# Patient Record
Sex: FEMALE | Race: WHITE | NOT HISPANIC OR LATINO | Employment: OTHER | URBAN - METROPOLITAN AREA
[De-identification: names, ages, dates, MRNs, and addresses within clinical notes are randomized per-mention and may not be internally consistent; named-entity substitution may affect disease eponyms.]

---

## 2017-01-19 ENCOUNTER — HOSPITAL ENCOUNTER (OUTPATIENT)
Dept: ULTRASOUND IMAGING | Facility: CLINIC | Age: 82
Discharge: HOME | End: 2017-01-19
Attending: SURGERY | Admitting: SURGERY
Payer: MEDICARE

## 2017-01-19 ENCOUNTER — OFFICE VISIT (OUTPATIENT)
Dept: OTHER | Facility: CLINIC | Age: 82
End: 2017-01-19
Attending: SURGERY
Payer: MEDICARE

## 2017-01-19 VITALS — HEART RATE: 61 BPM | SYSTOLIC BLOOD PRESSURE: 145 MMHG | DIASTOLIC BLOOD PRESSURE: 62 MMHG

## 2017-01-19 DIAGNOSIS — I65.22 CAROTID STENOSIS, LEFT: Primary | ICD-10-CM

## 2017-01-19 DIAGNOSIS — I65.29 CAROTID STENOSIS: ICD-10-CM

## 2017-01-19 DIAGNOSIS — I65.29 CAROTID STENOSIS: Primary | ICD-10-CM

## 2017-01-19 DIAGNOSIS — E78.5 HYPERLIPIDEMIA LDL GOAL <70: ICD-10-CM

## 2017-01-19 PROCEDURE — 99211 OFF/OP EST MAY X REQ PHY/QHP: CPT

## 2017-01-19 PROCEDURE — 93880 EXTRACRANIAL BILAT STUDY: CPT

## 2017-01-19 PROCEDURE — 99214 OFFICE O/P EST MOD 30 MIN: CPT | Mod: ZP | Performed by: SURGERY

## 2017-01-19 NOTE — NURSING NOTE
"Chief Complaint   Patient presents with     RECHECK     Bilateral carotid (1:15 VHC; 2:00 WRO) 1 year follow up, History of bilateral carotid artery stenosis       Initial /62 mmHg  Pulse 61  Breastfeeding? No Estimated body mass index is 35.88 kg/(m^2) as calculated from the following:    Height as of 6/20/16: 5' 1.5\" (1.562 m).    Weight as of 6/20/16: 193 lb (87.544 kg).  BP completed using cuff size: large left arm    Face to face nursing time: 8 minutes    Paula Portillo MA     "

## 2017-01-19 NOTE — PROGRESS NOTES
Ghislaine Walls comes to see me today for her yearly follow-up of her asymptomatic moderate left carotid stenosis.  She suffered a left hemispheric stroke on 4/25/2001 felt not to be related to her carotid disease with a very good recovery.    PMH: Medications: Norvasc, Synthroid, lisinopril, Lopressor, omega-3 fatty acids            Medical:  Hypertension under good control with medications                          Mixed hyperlipidemia.  She's had an elevated LDL in the past.  6/20/2016 LDL= 125                          Hypothyroidism                          Bilateral calf and ankle edema for many years  This is asymptomatic and she does not desire compression.            Nonsmoker.  Lives independently.    Exam: Alert and appropriate.  Blood pressure 145/62.  Pulse 61.  Normal affect.  Glasses.  BMI= 35.9 kg/m2              No obvious carotid bruits.  Chest= clear   Cardiovascular=RR              Bilateral lower extremity edema.  Skin is soft and supple with no thickening or significant varicosities.  Normal sensation.              +3  Dorsalis pedis pulses bilaterally.      Carotid duplex  today reveals minimal disease within the right carotid bifurcation.  On the left is a stable stenosis at the carotid bulb with a PSV= 184 cm/s   And proximal ICA with a PSV= 157 cm/s.                  This is essentially unchanged from last year when the velocities were 205 and 137 cm/s respectively.      Impression:  #1   Stable moderate asymptomatic left carotid bifurcation disease with minimal disease in the right.  She knows the symptoms associated with a stroke and will seek prompt attention if these                                   occur.   Plan follow-up duplex in one year.                              #2   Persistent elevated LDL.  With her carotid disease consideration of a statin may be appropriate per primary care.                      .  #3    Chronic dependent edema of little clinical concern  .                        #4    No evidence of PAD.       Niko Parker MD       Please route or send letter to:  Primary Care Provider (PCP)

## 2017-06-28 ENCOUNTER — OFFICE VISIT (OUTPATIENT)
Dept: FAMILY MEDICINE | Facility: CLINIC | Age: 82
End: 2017-06-28
Payer: COMMERCIAL

## 2017-06-28 VITALS
HEART RATE: 88 BPM | DIASTOLIC BLOOD PRESSURE: 66 MMHG | BODY MASS INDEX: 35.51 KG/M2 | HEIGHT: 62 IN | SYSTOLIC BLOOD PRESSURE: 126 MMHG | RESPIRATION RATE: 16 BRPM | TEMPERATURE: 98.6 F | WEIGHT: 193 LBS

## 2017-06-28 DIAGNOSIS — I10 HYPERTENSION GOAL BP (BLOOD PRESSURE) < 140/90: Chronic | ICD-10-CM

## 2017-06-28 DIAGNOSIS — H90.0 CONDUCTIVE HEARING LOSS, BILATERAL: Chronic | ICD-10-CM

## 2017-06-28 DIAGNOSIS — Z86.73 HISTORY OF CVA (CEREBROVASCULAR ACCIDENT): ICD-10-CM

## 2017-06-28 DIAGNOSIS — I10 ESSENTIAL HYPERTENSION WITH GOAL BLOOD PRESSURE LESS THAN 140/90: Chronic | ICD-10-CM

## 2017-06-28 DIAGNOSIS — N28.9 RENAL INSUFFICIENCY, MILD: Chronic | ICD-10-CM

## 2017-06-28 DIAGNOSIS — Z00.00 ROUTINE MEDICAL EXAM: Primary | ICD-10-CM

## 2017-06-28 DIAGNOSIS — E78.5 HYPERLIPIDEMIA LDL GOAL <130: Chronic | ICD-10-CM

## 2017-06-28 DIAGNOSIS — E03.9 ACQUIRED HYPOTHYROIDISM: Chronic | ICD-10-CM

## 2017-06-28 LAB
ALBUMIN UR-MCNC: 30 MG/DL
APPEARANCE UR: CLEAR
BASOPHILS # BLD AUTO: 0 10E9/L (ref 0–0.2)
BASOPHILS NFR BLD AUTO: 0.7 %
BILIRUB UR QL STRIP: ABNORMAL
COLOR UR AUTO: YELLOW
DIFFERENTIAL METHOD BLD: NORMAL
EOSINOPHIL # BLD AUTO: 0.1 10E9/L (ref 0–0.7)
EOSINOPHIL NFR BLD AUTO: 1.9 %
ERYTHROCYTE [DISTWIDTH] IN BLOOD BY AUTOMATED COUNT: 12.9 % (ref 10–15)
GLUCOSE UR STRIP-MCNC: NEGATIVE MG/DL
HCT VFR BLD AUTO: 36 % (ref 35–47)
HGB BLD-MCNC: 11.9 G/DL (ref 11.7–15.7)
HGB UR QL STRIP: NEGATIVE
KETONES UR STRIP-MCNC: NEGATIVE MG/DL
LEUKOCYTE ESTERASE UR QL STRIP: ABNORMAL
LYMPHOCYTES # BLD AUTO: 2 10E9/L (ref 0.8–5.3)
LYMPHOCYTES NFR BLD AUTO: 34.8 %
MCH RBC QN AUTO: 31.2 PG (ref 26.5–33)
MCHC RBC AUTO-ENTMCNC: 33.1 G/DL (ref 31.5–36.5)
MCV RBC AUTO: 95 FL (ref 78–100)
MONOCYTES # BLD AUTO: 0.6 10E9/L (ref 0–1.3)
MONOCYTES NFR BLD AUTO: 10.6 %
NEUTROPHILS # BLD AUTO: 2.9 10E9/L (ref 1.6–8.3)
NEUTROPHILS NFR BLD AUTO: 52 %
NITRATE UR QL: NEGATIVE
NON-SQ EPI CELLS #/AREA URNS LPF: ABNORMAL /LPF
PH UR STRIP: 5.5 PH (ref 5–7)
PLATELET # BLD AUTO: 200 10E9/L (ref 150–450)
RBC # BLD AUTO: 3.81 10E12/L (ref 3.8–5.2)
RBC #/AREA URNS AUTO: ABNORMAL /HPF (ref 0–2)
SP GR UR STRIP: 1.02 (ref 1–1.03)
URN SPEC COLLECT METH UR: ABNORMAL
UROBILINOGEN UR STRIP-ACNC: 0.2 EU/DL (ref 0.2–1)
WBC # BLD AUTO: 5.7 10E9/L (ref 4–11)
WBC #/AREA URNS AUTO: ABNORMAL /HPF (ref 0–2)

## 2017-06-28 PROCEDURE — 85025 COMPLETE CBC W/AUTO DIFF WBC: CPT | Performed by: FAMILY MEDICINE

## 2017-06-28 PROCEDURE — 80053 COMPREHEN METABOLIC PANEL: CPT | Performed by: FAMILY MEDICINE

## 2017-06-28 PROCEDURE — 36415 COLL VENOUS BLD VENIPUNCTURE: CPT | Performed by: FAMILY MEDICINE

## 2017-06-28 PROCEDURE — 80061 LIPID PANEL: CPT | Performed by: FAMILY MEDICINE

## 2017-06-28 PROCEDURE — 81001 URINALYSIS AUTO W/SCOPE: CPT | Performed by: FAMILY MEDICINE

## 2017-06-28 PROCEDURE — 99397 PER PM REEVAL EST PAT 65+ YR: CPT | Performed by: FAMILY MEDICINE

## 2017-06-28 RX ORDER — AMLODIPINE BESYLATE 10 MG/1
10 TABLET ORAL DAILY
Qty: 90 TABLET | Refills: 3 | Status: SHIPPED | OUTPATIENT
Start: 2017-06-28 | End: 2018-07-05

## 2017-06-28 RX ORDER — LISINOPRIL 40 MG/1
40 TABLET ORAL DAILY
Qty: 90 TABLET | Refills: 3 | Status: SHIPPED | OUTPATIENT
Start: 2017-06-28 | End: 2018-07-05

## 2017-06-28 RX ORDER — LEVOTHYROXINE SODIUM 50 UG/1
50 TABLET ORAL DAILY
Qty: 90 TABLET | Refills: 3 | Status: SHIPPED | OUTPATIENT
Start: 2017-06-28 | End: 2018-07-05

## 2017-06-28 RX ORDER — METOPROLOL TARTRATE 100 MG
100 TABLET ORAL 2 TIMES DAILY
Qty: 180 TABLET | Refills: 3 | Status: SHIPPED | OUTPATIENT
Start: 2017-06-28 | End: 2018-07-05

## 2017-06-28 NOTE — MR AVS SNAPSHOT
After Visit Summary   6/28/2017    Ghislaine Walls    MRN: [id number]           Patient Information     Date Of Birth          1935        Visit Information        Provider Department      6/28/2017 1:15 PM Miguelangel Simmons MD [facility]        Today's Diagnoses     Routine medical exam    -  1    Hypertension goal BP (blood pressure) < 140/90        Renal insufficiency, mild        Hyperlipidemia LDL goal <130        Acquired hypothyroidism        Conductive hearing loss, bilateral        Essential hypertension with goal blood pressure less than 140/90        History of CVA (cerebrovascular accident)          Care Instructions      Preventive Health Recommendations  Female Ages 65 +    Yearly exam:     See your health care provider every year in order to  o Review health changes.   o Discuss preventive care.    o Review your medicines if your doctor has prescribed any.      You no longer need a yearly Pap test unless you've had an abnormal Pap test in the past 10 years. If you have vaginal symptoms, such as bleeding or discharge, be sure to talk with your provider about a Pap test.      Every 1 to 2 years, have a mammogram.  If you are over 69, talk with your health care provider about whether or not you want to continue having screening mammograms.      Every 10 years, have a colonoscopy. Or, have a yearly FIT test (stool test). These exams will check for colon cancer.       Have a cholesterol test every 5 years, or more often if your doctor advises it.       Have a diabetes test (fasting glucose) every three years. If you are at risk for diabetes, you should have this test more often.       At age 65, have a bone density scan (DEXA) to check for osteoporosis (brittle bone disease).    Shots:    Get a flu shot each year.    Get a tetanus shot every 10 years.    Talk to your doctor about your pneumonia vaccines. There are now two you should receive -  "Pneumovax (PPSV 23) and Prevnar (PCV 13).    Talk to your doctor about the shingles vaccine.    Talk to your doctor about the hepatitis B vaccine.    Nutrition:     Eat at least 5 servings of fruits and vegetables each day.      Eat whole-grain bread, whole-wheat pasta and brown rice instead of white grains and rice.      Talk to your provider about Calcium and Vitamin D.     Lifestyle    Exercise at least 150 minutes a week (30 minutes a day, 5 days a week). This will help you control your weight and prevent disease.      Limit alcohol to one drink per day.      No smoking.       Wear sunscreen to prevent skin cancer.       See your dentist twice a year for an exam and cleaning.      See your eye doctor every 1 to 2 years to screen for conditions such as glaucoma, macular degeneration, cataracts, etc           Follow-ups after your visit        Who to contact     If you have questions or need follow up information about today's clinic visit or your schedule please contact [facility] directly at [phone].  Normal or non-critical lab and imaging results will be communicated to you by I'mOKhart, letter or phone within 4 business days after the clinic has received the results. If you do not hear from us within 7 days, please contact the clinic through [employer] or phone. If you have a critical or abnormal lab result, we will notify you by phone as soon as possible.  Submit refill requests through Fastnote or call your pharmacy and they will forward the refill request to us. Please allow 3 business days for your refill to be completed.          Additional Information About Your Visit        Fastnote Information     Fastnote lets you send messages to your doctor, view your test results, renew your prescriptions, schedule appointments and more. To sign up, go to [email or web address] . Click on \"Log in\" on the left side of the screen, which will take you to the Welcome page. Then click on " "\"Sign up Now\" on the right side of the page.     You will be asked to enter the access code listed below, as well as some personal information. Please follow the directions to create your username and password.     Your access code is: [email or web address]  Expires: 2017  1:44 PM     Your access code will  in 90 days. If you need help or a new code, please call your [place] clinic or [phone].        Care EveryWhere ID     This is your Care EveryWhere ID. This could be used by other organizations to access your [place] medical records  [id number]        Your Vitals Were     Pulse Temperature Respirations Height BMI (Body Mass Index)       88 98.6  F (37  C) (Tympanic) 16 5' 1.5\" (1.562 m) 35.88 kg/m2        Blood Pressure from Last 3 Encounters:   17 126/66   17 145/62   16 126/80    Weight from Last 3 Encounters:   17 193 lb (87.5 kg)   16 193 lb (87.5 kg)   06/15/15 192 lb 8 oz (87.3 kg)              We Performed the Following     CBC with platelets differential     Comprehensive metabolic panel     Lipid Profile     UA with Microscopic          Where to get your medicines      These medications were sent to [facility] [address] [place] [address]     Phone:  [phone]     amLODIPine 10 MG tablet    levothyroxine 50 MCG tablet    lisinopril 40 MG tablet    metoprolol 100 MG tablet          Primary Care Provider    Physician No Ref-Primary       No address on file        Equal Access to Services     [facility] : Hadii kalpana Fierro, waaxda luqadaha, qaybta kaalmadalila riggins, enrique clemens . So [facility] [phone].    ATENCIÓN: Si habla español, tiene a khalil disposición servicios gratuitos de asistencia lingüística. Llame al [phone].    We comply with applicable federal civil rights laws and [place] laws. We do not discriminate on the basis of race, color, " national origin, age, disability sex, sexual orientation or gender identity.            Thank you!     Thank you for choosing [facility]  for your care. Our goal is always to provide you with excellent care. Hearing back from our patients is one way we can continue to improve our services. Please take a few minutes to complete the written survey that you may receive in the mail after your visit with us. Thank you!             Your Updated Medication List - Protect others around you: Learn how to safely use, store and throw away your medicines at www.disposemymeds.org.          This list is accurate as of: 6/28/17  1:48 PM.  Always use your most recent med list.                   Brand Name Dispense Instructions for use Diagnosis    amLODIPine 10 MG tablet    NORVASC    90 tablet    Take 1 tablet (10 mg) by mouth daily    Essential hypertension with goal blood pressure less than 140/90       fish oil-omega-3 fatty acids 1000 MG capsule      Take 1 capsule by mouth every other day.        levothyroxine 50 MCG tablet    SYNTHROID/LEVOTHROID    90 tablet    Take 1 tablet (50 mcg) by mouth daily    Essential hypertension with goal blood pressure less than 140/90       lisinopril 40 MG tablet    PRINIVIL/ZESTRIL    90 tablet    Take 1 tablet (40 mg) by mouth daily    Essential hypertension with goal blood pressure less than 140/90       melatonin 3 MG tablet      Take 1 tablet by mouth nightly as needed.        metoprolol 100 MG tablet    LOPRESSOR    180 tablet    Take 1 tablet (100 mg) by mouth 2 times daily    Essential hypertension with goal blood pressure less than 140/90

## 2017-06-28 NOTE — NURSING NOTE
"Chief Complaint   Patient presents with     Physical       Initial /66  Pulse 88  Temp 98.6  F (37  C) (Tympanic)  Resp 16  Ht 5' 1.5\" (1.562 m)  Wt 193 lb (87.5 kg)  BMI 35.88 kg/m2 Estimated body mass index is 35.88 kg/(m^2) as calculated from the following:    Height as of this encounter: 5' 1.5\" (1.562 m).    Weight as of this encounter: 193 lb (87.5 kg).  Medication Reconciliation: complete     Annabel Olea CMA      "

## 2017-06-28 NOTE — PROGRESS NOTES
SUBJECTIVE:   Ghislaine Walls is a 81 year old female who presents for Preventive Visit.  click delete button to remove this line now  click delete button to remove this line now  Are you in the first 12 months of your Medicare Part B coverage?  No    Healthy Habits:    Do you get at least three servings of calcium containing foods daily (dairy, green leafy vegetables, etc.)? yes    Amount of exercise or daily activities, outside of work: 2 day(s) per week    Problems taking medications regularly No    Medication side effects: No    Have you had an eye exam in the past two years? yes    Do you see a dentist twice per year? yes    Do you have sleep apnea, excessive snoring or daytime drowsiness?no    COGNITIVE SCREEN  1) Repeat 3 items (Banana, Sunrise, Chair)    2) Clock draw: NORMAL  3) 3 item recall: Recalls 2 objects   Results: NORMAL clock, 1-2 items recalled: COGNITIVE IMPAIRMENT LESS LIKELY    Mini-CogTM Copyright S Abi. Licensed by the author for use in [facility]; reprinted with permission ([email or web address]). All rights reserved.          Reviewed and updated as needed this visit by clinical staff  Tobacco  Allergies  Meds  Med Hx  Surg Hx  Fam Hx  Soc Hx        Reviewed and updated as needed this visit by Provider        Social History   Substance Use Topics     Smoking status: Former Smoker     Packs/day: 0.50     Years: 5.00     Types: Cigarettes     Quit date: 1/1/1973     Smokeless tobacco: Never Used     Alcohol use 0.0 oz/week     0 Standard drinks or equivalent per week      Comment: 1/week       The patient does not drink >3 drinks per day nor >7 drinks per week.    Today's PHQ-2 Score:   PHQ-2 ( 1999 Pfizer) 6/28/2017 6/20/2016   Q1: Little interest or pleasure in doing things 0 0   Q2: Feeling down, depressed or hopeless 0 0   PHQ-2 Score 0 0       Do you feel safe in your environment - Yes    Do you have a Health Care Directive?: Yes: Patient states has Advance Directive and  will bring in a copy to clinic.    Current providers sharing in care for this patient include:   Patient Care Team:  No Ref-Primary, Physician as PCP - General      Hearing impairment: Yes    Ability to successfully perform activities of daily living: Yes, no assistance needed     Fall risk:  Fallen 2 or more times in the past year?: No  Any fall with injury in the past year?: No    Home safety:  none identified  click delete button to remove this line now    The following health maintenance items are reviewed in Epic and correct as of today:  Health Maintenance   Topic Date Due     ADVANCE DIRECTIVE PLANNING Q5 YRS  08/31/1953     FALL RISK ASSESSMENT  06/20/2017     INFLUENZA VACCINE (SYSTEM ASSIGNED)  09/01/2017     TETANUS IMMUNIZATION (SYSTEM ASSIGNED)  05/27/2024     DEXA SCAN SCREENING (SYSTEM ASSIGNED)  Completed     PNEUMOCOCCAL  Completed     Patient Active Problem List   Diagnosis     Hyperlipidemia LDL goal <130     Hypertension goal BP (blood pressure) < 140/90     Renal insufficiency, mild     [place] (hard of hearing)     Urgency incontinence     Thyroid activity decreased     History of CVA (cerebrovascular accident)     Past Surgical History:   Procedure Laterality Date     APPENDECTOMY  1951     CATARACT IOL, RT/LT  2001    bilateral (laser - 5/2013)     DENTAL SURGERY  1962    wisdom     EYE SURGERY  1956    Muscle release     TUBAL LIGATION  1971       Social History   Substance Use Topics     Smoking status: Former Smoker     Packs/day: 0.50     Years: 5.00     Types: Cigarettes     Quit date: 1/1/1973     Smokeless tobacco: Never Used     Alcohol use 0.0 oz/week     0 Standard drinks or equivalent per week      Comment: 1/week     Family History   Problem Relation Age of Onset     HEART DISEASE Mother      Respiratory Mother      HEART DISEASE Father      HEART DISEASE Brother      Coronary Artery Disease Brother      CAB     HEART DISEASE Brother      Coronary Artery Disease Brother      CAB      "          ROS:  C: NEGATIVE for fever, chills, change in weight  I: NEGATIVE for worrisome rashes, moles or lesions  E: NEGATIVE for vision changes or irritation  E/M: NEGATIVE for ear, mouth and throat problems  R: NEGATIVE for significant cough or SOB  B: NEGATIVE for masses, tenderness or discharge  CV: NEGATIVE for chest pain, palpitations or peripheral edema  GI: NEGATIVE for nausea, abdominal pain, heartburn, or change in bowel habits  : NEGATIVE for frequency, dysuria, or hematuria  M: NEGATIVE for significant arthralgias or myalgia  N: NEGATIVE for weakness, dizziness or paresthesias  E: NEGATIVE for temperature intolerance, skin/hair changes  H: NEGATIVE for bleeding problems  P: NEGATIVE for changes in mood or affect    OBJECTIVE:   /66  Pulse 88  Temp 98.6  F (37  C) (Tympanic)  Resp 16  Ht 5' 1.5\" (1.562 m)  Wt 193 lb (87.5 kg)  BMI 35.88 kg/m2 Estimated body mass index is 35.88 kg/(m^2) as calculated from the following:    Height as of this encounter: 5' 1.5\" (1.562 m).    Weight as of this encounter: 193 lb (87.5 kg).  EXAM:   GENERAL APPEARANCE: healthy, alert and no distress  EYES: Eyes grossly normal to inspection, PERRL and conjunctivae and sclerae normal  HENT: ear canals and TM's normal, nose and mouth without ulcers or lesions, oropharynx clear and oral mucous membranes moist  NECK: no adenopathy, no asymmetry, masses, or scars and thyroid normal to palpation  RESP: lungs clear to auscultation - no rales, rhonchi or wheezes  CV: regular rate and rhythm, normal S1 S2, no S3 or S4, no murmur, click or rub, no peripheral edema and peripheral pulses strong  ABDOMEN: soft, nontender, no hepatosplenomegaly, no masses and bowel sounds normal  MS: no musculoskeletal defects are noted and gait is age appropriate without ataxia  SKIN: no suspicious lesions or rashes  NEURO: Normal strength and tone, sensory exam grossly normal, mentation intact and speech normal  PSYCH: mentation appears " "normal and affect normal/bright    ASSESSMENT / PLAN:       ICD-10-CM    1. Routine medical exam Z00.00    2. Hypertension goal BP (blood pressure) < 140/90 I10 UA with Microscopic     CBC with platelets differential     Comprehensive metabolic panel   3. Renal insufficiency, mild N28.9    4. Hyperlipidemia LDL goal <130 E78.5 Lipid Profile   5. Acquired hypothyroidism E03.9    6. Conductive hearing loss, bilateral H90.0    7. Essential hypertension with goal blood pressure less than 140/90 I10 levothyroxine (SYNTHROID/LEVOTHROID) 50 MCG tablet     amLODIPine (NORVASC) 10 MG tablet     metoprolol (LOPRESSOR) 100 MG tablet     lisinopril (PRINIVIL/ZESTRIL) 40 MG tablet   8. History of CVA (cerebrovascular accident) Z86.73        End of Life Planning:  Patient currently has an advanced directive: she will get us a copy    COUNSELING:          Estimated body mass index is 35.88 kg/(m^2) as calculated from the following:    Height as of this encounter: 5' 1.5\" (1.562 m).    Weight as of this encounter: 193 lb (87.5 kg).  Weight management plan: Discussed healthy diet and exercise guidelines and patient will follow up in 6 months in clinic to re-evaluate.   reports that she quit smoking about 44 years ago. Her smoking use included Cigarettes. She has a 2.50 pack-year smoking history. She has never used smokeless tobacco.      Appropriate preventive services were discussed with this patient, including applicable screening as appropriate for cardiovascular disease, diabetes, osteopenia/osteoporosis, and glaucoma.  As appropriate for age/gender, discussed screening for colorectal cancer, prostate cancer, breast cancer, and cervical cancer. Checklist reviewing preventive services available has been given to the patient.    Reviewed patients plan of care and provided an AVS. The Basic Care Plan (routine screening as documented in Health Maintenance) for Ghislaine meets the Care Plan requirement. This Care Plan has been " established and reviewed with the Patient.    Counseling Resources:  ATP IV Guidelines  Pooled Cohorts Equation Calculator  Breast Cancer Risk Calculator  FRAX Risk Assessment  ICSI Preventive Guidelines  Dietary Guidelines for Americans, 2010  USDA's MyPlate  ASA Prophylaxis  Lung CA Screening    Miguelangel Simmons MD  [facility]

## 2017-06-28 NOTE — LETTER
[facility]  [address]  [address]  [place] [address] [phone]                                                                                                           Ghislaine Walls  [address]   [place] [address]    July 14, 2017      Dear Ghislaine,    The results of your recent tests were reviewed and are enclosed.     All your tests were normal.  They can be repeated in one year.      Thank you for choosing [facility].  We appreciate the opportunity to serve you and look forward to supporting your healthcare needs in the future.    If you have any questions or concerns, please call me or my staff at [phone].      Sincerely,    Miguelangel Simmons MD    Results for orders placed or performed in visit on 06/28/17   UA with Microscopic   Result Value Ref Range    Color Urine Yellow     Appearance Urine Clear     Glucose Urine Negative NEG mg/dL    Bilirubin Urine (A) NEG     Small  This is an unconfirmed screening test result. A positive result may be false.      Ketones Urine Negative NEG mg/dL    Specific Gravity Urine 1.020 1.003 - 1.035    pH Urine 5.5 5.0 - 7.0 pH    Protein Albumin Urine 30 (A) NEG mg/dL    Urobilinogen Urine 0.2 0.2 - 1.0 EU/dL    Nitrite Urine Negative NEG    Blood Urine Negative NEG    Leukocyte Esterase Urine Trace (A) NEG    Source Midstream Urine     WBC Urine O - 2 0 - 2 /HPF    RBC Urine O - 2 0 - 2 /HPF    Squamous Epithelial /LPF Urine Few FEW /LPF   CBC with platelets differential   Result Value Ref Range    WBC 5.7 4.0 - 11.0 10e9/L    RBC Count 3.81 3.8 - 5.2 10e12/L    Hemoglobin 11.9 11.7 - 15.7 g/dL    Hematocrit 36.0 35.0 - 47.0 %    MCV 95 78 - 100 fl    MCH 31.2 26.5 - 33.0 pg    MCHC 33.1 31.5 - 36.5 g/dL    RDW 12.9 10.0 - 15.0 %    Platelet Count 200 150 - 450 10e9/L    Diff Method Automated Method     % Neutrophils 52.0 %    % Lymphocytes 34.8 %    % Monocytes  10.6 %    % Eosinophils 1.9 %    % Basophils 0.7 %    Absolute Neutrophil 2.9 1.6 - 8.3 10e9/L    Absolute Lymphocytes 2.0 0.8 - 5.3 10e9/L    Absolute Monocytes 0.6 0.0 - 1.3 10e9/L    Absolute Eosinophils 0.1 0.0 - 0.7 10e9/L    Absolute Basophils 0.0 0.0 - 0.2 10e9/L   Comprehensive metabolic panel   Result Value Ref Range    Sodium 144 133 - 144 mmol/L    Potassium 4.3 3.4 - 5.3 mmol/L    Chloride 113 (H) 94 - 109 mmol/L    Carbon Dioxide 18 (L) 20 - 32 mmol/L    Anion Gap 13 3 - 14 mmol/L    Glucose 97 70 - 99 mg/dL    Urea Nitrogen 43 (H) 7 - 30 mg/dL    Creatinine 1.65 (H) 0.52 - 1.04 mg/dL    GFR Estimate 30 (L) >60 mL/min/1.7m2    GFR Estimate If Black 36 (L) >60 mL/min/1.7m2    Calcium 9.5 8.5 - 10.1 mg/dL    Bilirubin Total 0.8 0.2 - 1.3 mg/dL    Albumin 3.8 3.4 - 5.0 g/dL    Protein Total 7.7 6.8 - 8.8 g/dL    Alkaline Phosphatase 89 40 - 150 U/L    ALT 14 0 - 50 U/L    AST 15 0 - 45 U/L   Lipid Profile   Result Value Ref Range    Cholesterol 173 <200 mg/dL    Triglycerides 93 <150 mg/dL    HDL Cholesterol 45 (L) >49 mg/dL    LDL Cholesterol Calculated 109 (H) <100 mg/dL    Non HDL Cholesterol 128 <130 mg/dL

## 2017-06-29 LAB
ALBUMIN SERPL-MCNC: 3.8 G/DL (ref 3.4–5)
ALP SERPL-CCNC: 89 U/L (ref 40–150)
ALT SERPL W P-5'-P-CCNC: 14 U/L (ref 0–50)
ANION GAP SERPL CALCULATED.3IONS-SCNC: 13 MMOL/L (ref 3–14)
AST SERPL W P-5'-P-CCNC: 15 U/L (ref 0–45)
BILIRUB SERPL-MCNC: 0.8 MG/DL (ref 0.2–1.3)
BUN SERPL-MCNC: 43 MG/DL (ref 7–30)
CALCIUM SERPL-MCNC: 9.5 MG/DL (ref 8.5–10.1)
CHLORIDE SERPL-SCNC: 113 MMOL/L (ref 94–109)
CHOLEST SERPL-MCNC: 173 MG/DL
CO2 SERPL-SCNC: 18 MMOL/L (ref 20–32)
CREAT SERPL-MCNC: 1.65 MG/DL (ref 0.52–1.04)
GFR SERPL CREATININE-BSD FRML MDRD: 30 ML/MIN/1.7M2
GLUCOSE SERPL-MCNC: 97 MG/DL (ref 70–99)
HDLC SERPL-MCNC: 45 MG/DL
LDLC SERPL CALC-MCNC: 109 MG/DL
NONHDLC SERPL-MCNC: 128 MG/DL
POTASSIUM SERPL-SCNC: 4.3 MMOL/L (ref 3.4–5.3)
PROT SERPL-MCNC: 7.7 G/DL (ref 6.8–8.8)
SODIUM SERPL-SCNC: 144 MMOL/L (ref 133–144)
TRIGL SERPL-MCNC: 93 MG/DL

## 2017-08-12 ENCOUNTER — RADIANT APPOINTMENT (OUTPATIENT)
Dept: MAMMOGRAPHY | Facility: CLINIC | Age: 82
End: 2017-08-12
Attending: FAMILY MEDICINE
Payer: COMMERCIAL

## 2017-08-12 DIAGNOSIS — Z12.31 VISIT FOR SCREENING MAMMOGRAM: ICD-10-CM

## 2017-08-12 PROCEDURE — G0202 SCR MAMMO BI INCL CAD: HCPCS | Mod: TC

## 2018-02-15 ENCOUNTER — OFFICE VISIT (OUTPATIENT)
Dept: OTHER | Facility: CLINIC | Age: 83
End: 2018-02-15
Attending: SURGERY
Payer: MEDICARE

## 2018-02-15 ENCOUNTER — HOSPITAL ENCOUNTER (OUTPATIENT)
Dept: ULTRASOUND IMAGING | Facility: CLINIC | Age: 83
Discharge: HOME | End: 2018-02-15
Attending: SURGERY | Admitting: SURGERY
Payer: MEDICARE

## 2018-02-15 VITALS — DIASTOLIC BLOOD PRESSURE: 77 MMHG | HEART RATE: 74 BPM | SYSTOLIC BLOOD PRESSURE: 166 MMHG

## 2018-02-15 DIAGNOSIS — R60.9 EDEMA, UNSPECIFIED TYPE: ICD-10-CM

## 2018-02-15 DIAGNOSIS — I65.29 CAROTID STENOSIS: ICD-10-CM

## 2018-02-15 DIAGNOSIS — I65.22 CAROTID STENOSIS, ASYMPTOMATIC, LEFT: Primary | ICD-10-CM

## 2018-02-15 PROCEDURE — G0463 HOSPITAL OUTPT CLINIC VISIT: HCPCS

## 2018-02-15 PROCEDURE — 99214 OFFICE O/P EST MOD 30 MIN: CPT | Mod: ZP | Performed by: SURGERY

## 2018-02-15 PROCEDURE — 93880 EXTRACRANIAL BILAT STUDY: CPT

## 2018-02-15 NOTE — MR AVS SNAPSHOT
"              After Visit Summary   2/15/2018    Ghislaine Walls    MRN: [id number]           Patient Information     Date Of Birth          1935        Visit Information        Provider Department      2/15/2018 3:30 PM Niko Parker MD [facility]      Today's Diagnoses     Carotid stenosis, asymptomatic, left    -  1    Edema, unspecified type           Follow-ups after your visit        Follow-up notes from your care team     Return in about 1 year (around 2/15/2019).      Who to contact     If you have questions or need follow up information about today's clinic visit or your schedule please contact [facility] directly at [phone].  Normal or non-critical lab and imaging results will be communicated to you by MyChart, letter or phone within 4 business days after the clinic has received the results. If you do not hear from us within 7 days, please contact the clinic through MyChart or phone. If you have a critical or abnormal lab result, we will notify you by phone as soon as possible.  Submit refill requests through [employer] or call your pharmacy and they will forward the refill request to us. Please allow 3 business days for your refill to be completed.          Additional Information About Your Visit        MyChart Information     [employer]pped lets you send messages to your doctor, view your test results, renew your prescriptions, schedule appointments and more. To sign up, go to [email or web address] . Click on \"Log in\" on the left side of the screen, which will take you to the Welcome page. Then click on \"Sign up Now\" on the right side of the page.     You will be asked to enter the access code listed below, as well as some personal information. Please follow the directions to create your username and password.     Your access code is: [email or web address]  Expires: 5/16/2018  4:03 PM     Your access code will "  in 90 days. If you need help or a new code, please call your [place] clinic or [phone].        Care EveryWhere ID     This is your Care EveryWhere ID. This could be used by other organizations to access your [place] medical records  [id number]        Your Vitals Were     Pulse                   74            Blood Pressure from Last 3 Encounters:   02/15/18 166/77   17 126/66   17 145/62    Weight from Last 3 Encounters:   17 193 lb (87.5 kg)   16 193 lb (87.5 kg)   06/15/15 192 lb 8 oz (87.3 kg)              Today, you had the following     No orders found for display       Primary Care Provider Office Phone # Fax #    [facility] [phone] [address] [facility] [id number]        Equal Access to Services     ZBIGNIEW FLOREZ : Hadii aad ku hadasho Soomaali, waaxda luqadaha, qaybta kaalmada adeegyada, waxay idiin hayilianan shayy clemens . So [facility] [phone].    ATENCIÓN: Si habla español, tiene a khalil disposición servicios gratuitos de asistencia lingüística. Llame al [phone].    We comply with applicable federal civil rights laws and [place] laws. We do not discriminate on the basis of race, color, national origin, age, disability, sex, sexual orientation, or gender identity.            Thank you!     Thank you for choosing [facility] [place]  for your care. Our goal is always to provide you with excellent care. Hearing back from our patients is one way we can continue to improve our services. Please take a few minutes to complete the written survey that you may receive in the mail after your visit with us. Thank you!             Your Updated Medication List - Protect others around you: Learn how to safely use, store and throw away your medicines at www.disposemymeds.org.          This list is accurate as of 2/15/18  4:03 PM.  Always use your most recent med list.                   Brand  Name Dispense Instructions for use Diagnosis    amLODIPine 10 MG tablet    NORVASC    90 tablet    Take 1 tablet (10 mg) by mouth daily    Essential hypertension with goal blood pressure less than 140/90       fish oil-omega-3 fatty acids 1000 MG capsule      Take 1 capsule by mouth every other day.        levothyroxine 50 MCG tablet    SYNTHROID/LEVOTHROID    90 tablet    Take 1 tablet (50 mcg) by mouth daily    Essential hypertension with goal blood pressure less than 140/90       lisinopril 40 MG tablet    PRINIVIL/ZESTRIL    90 tablet    Take 1 tablet (40 mg) by mouth daily    Essential hypertension with goal blood pressure less than 140/90       melatonin 3 MG tablet      Take 1 tablet by mouth nightly as needed.        metoprolol tartrate 100 MG tablet    LOPRESSOR    180 tablet    Take 1 tablet (100 mg) by mouth 2 times daily    Essential hypertension with goal blood pressure less than 140/90

## 2018-02-15 NOTE — PROGRESS NOTES
[place] [facility]    Ghislaine Walls returns for follow-up of her asymptomatic left carotid stenosis.  She is remained completely asymptomatic.    PMH: Medications: Norvasc, lisinopril, Lopressor, Synthroid            Non-smoker.            Lives independently.      ROS: History of bilateral lower extremity edema that does not change with elevation and not venous in etiology.  No history of ulcerations or other problems to her legs.    She has a history of hypertension but does not check her blood pressure on a regular basis at home.    Exam: Alert and appropriate.  Blood pressure 166/77.  Pulse 74.              No carotid bruits.  Normal affect.              Chest= clear    Cardiovascular=RR               Nonpitting edema both lower legs and ankle regions with normal skin.               +3 palpable dorsalis pedis pulses bilaterally.      Carotid duplex reveals a stable moderate stenosis of the left carotid bifurcation.  Peak systolic velocity= 174 cm/s  (this was 184 cm/s last year).  Very minimal changes were noted at the right carotid bifurcation normal velocities and ratios.          Impression:   Stable moderate asymptomatic left carotid bifurcation stenosis.  Disease within the external carotid artery which is not clinically significant.  Minimal changes on the right.  Recommend follow-up duplex ultrasound in 1 year.  Also stressed the importance of good risk factor control.  We have no record of her LDL but do feel with underlying vascular problems that should be less than 70.  Her blood pressure is elevated in clinic but this may be due to anxiety.  She will start checking this at home on a regular basis.                           Chronic lower extremity swelling with no skin ulcerations or concerns.  Good arterial circulation.         MD garry Garcias      Please route or send letter to:  Primary Care Provider (PCP)

## 2018-02-27 ENCOUNTER — APPOINTMENT (OUTPATIENT)
Dept: GENERAL RADIOLOGY | Facility: CLINIC | Age: 83
End: 2018-02-27
Attending: EMERGENCY MEDICINE
Payer: MEDICARE

## 2018-02-27 ENCOUNTER — HOSPITAL ENCOUNTER (EMERGENCY)
Facility: CLINIC | Age: 83
Discharge: HOME | End: 2018-02-27
Attending: EMERGENCY MEDICINE | Admitting: EMERGENCY MEDICINE
Payer: MEDICARE

## 2018-02-27 ENCOUNTER — APPOINTMENT (OUTPATIENT)
Dept: ULTRASOUND IMAGING | Facility: CLINIC | Age: 83
End: 2018-02-27
Attending: EMERGENCY MEDICINE
Payer: MEDICARE

## 2018-02-27 ENCOUNTER — TELEPHONE (OUTPATIENT)
Dept: NURSING | Facility: CLINIC | Age: 83
End: 2018-02-27

## 2018-02-27 VITALS
RESPIRATION RATE: 18 BRPM | HEART RATE: 84 BPM | TEMPERATURE: 98 F | BODY MASS INDEX: 35.87 KG/M2 | SYSTOLIC BLOOD PRESSURE: 190 MMHG | OXYGEN SATURATION: 97 % | DIASTOLIC BLOOD PRESSURE: 89 MMHG | WEIGHT: 190 LBS | HEIGHT: 61 IN

## 2018-02-27 DIAGNOSIS — R60.0 LEG EDEMA: ICD-10-CM

## 2018-02-27 LAB
ALBUMIN SERPL-MCNC: 3.8 G/DL (ref 3.4–5)
ALP SERPL-CCNC: 114 U/L (ref 40–150)
ALT SERPL W P-5'-P-CCNC: 24 U/L (ref 0–50)
ANION GAP SERPL CALCULATED.3IONS-SCNC: 7 MMOL/L (ref 3–14)
AST SERPL W P-5'-P-CCNC: 26 U/L (ref 0–45)
BASOPHILS # BLD AUTO: 0 10E9/L (ref 0–0.2)
BASOPHILS NFR BLD AUTO: 0.3 %
BILIRUB SERPL-MCNC: 1.1 MG/DL (ref 0.2–1.3)
BUN SERPL-MCNC: 24 MG/DL (ref 7–30)
CALCIUM SERPL-MCNC: 9.4 MG/DL (ref 8.5–10.1)
CHLORIDE SERPL-SCNC: 109 MMOL/L (ref 94–109)
CO2 SERPL-SCNC: 23 MMOL/L (ref 20–32)
CREAT SERPL-MCNC: 1.22 MG/DL (ref 0.52–1.04)
DIFFERENTIAL METHOD BLD: ABNORMAL
EOSINOPHIL # BLD AUTO: 0.1 10E9/L (ref 0–0.7)
EOSINOPHIL NFR BLD AUTO: 2.1 %
ERYTHROCYTE [DISTWIDTH] IN BLOOD BY AUTOMATED COUNT: 14 % (ref 10–15)
GFR SERPL CREATININE-BSD FRML MDRD: 42 ML/MIN/1.7M2
GLUCOSE SERPL-MCNC: 95 MG/DL (ref 70–99)
HCT VFR BLD AUTO: 41.2 % (ref 35–47)
HGB BLD-MCNC: 14.3 G/DL (ref 11.7–15.7)
IMM GRANULOCYTES # BLD: 0 10E9/L (ref 0–0.4)
IMM GRANULOCYTES NFR BLD: 0.2 %
LYMPHOCYTES # BLD AUTO: 1.8 10E9/L (ref 0.8–5.3)
LYMPHOCYTES NFR BLD AUTO: 28.8 %
MCH RBC QN AUTO: 33.6 PG (ref 26.5–33)
MCHC RBC AUTO-ENTMCNC: 34.7 G/DL (ref 31.5–36.5)
MCV RBC AUTO: 97 FL (ref 78–100)
MONOCYTES # BLD AUTO: 0.7 10E9/L (ref 0–1.3)
MONOCYTES NFR BLD AUTO: 11.3 %
NEUTROPHILS # BLD AUTO: 3.6 10E9/L (ref 1.6–8.3)
NEUTROPHILS NFR BLD AUTO: 57.3 %
NRBC # BLD AUTO: 0 10*3/UL
NRBC BLD AUTO-RTO: 0 /100
NT-PROBNP SERPL-MCNC: 1844 PG/ML (ref 0–1800)
PLATELET # BLD AUTO: 203 10E9/L (ref 150–450)
POTASSIUM SERPL-SCNC: 4.4 MMOL/L (ref 3.4–5.3)
PROT SERPL-MCNC: 8.6 G/DL (ref 6.8–8.8)
RBC # BLD AUTO: 4.25 10E12/L (ref 3.8–5.2)
SODIUM SERPL-SCNC: 139 MMOL/L (ref 133–144)
TROPONIN I SERPL-MCNC: <0.015 UG/L (ref 0–0.04)
TSH SERPL DL<=0.005 MIU/L-ACNC: 0.64 MU/L (ref 0.4–4)
WBC # BLD AUTO: 6.3 10E9/L (ref 4–11)

## 2018-02-27 PROCEDURE — 25000132 ZZH RX MED GY IP 250 OP 250 PS 637: Mod: GY | Performed by: EMERGENCY MEDICINE

## 2018-02-27 PROCEDURE — 93005 ELECTROCARDIOGRAM TRACING: CPT

## 2018-02-27 PROCEDURE — 71046 X-RAY EXAM CHEST 2 VIEWS: CPT

## 2018-02-27 PROCEDURE — 99285 EMERGENCY DEPT VISIT HI MDM: CPT | Mod: 25

## 2018-02-27 PROCEDURE — 84443 ASSAY THYROID STIM HORMONE: CPT | Performed by: EMERGENCY MEDICINE

## 2018-02-27 PROCEDURE — A9270 NON-COVERED ITEM OR SERVICE: HCPCS | Mod: GY | Performed by: EMERGENCY MEDICINE

## 2018-02-27 PROCEDURE — 80053 COMPREHEN METABOLIC PANEL: CPT | Performed by: EMERGENCY MEDICINE

## 2018-02-27 PROCEDURE — 85025 COMPLETE CBC W/AUTO DIFF WBC: CPT | Performed by: EMERGENCY MEDICINE

## 2018-02-27 PROCEDURE — 93970 EXTREMITY STUDY: CPT

## 2018-02-27 PROCEDURE — 83880 ASSAY OF NATRIURETIC PEPTIDE: CPT | Performed by: EMERGENCY MEDICINE

## 2018-02-27 PROCEDURE — 84484 ASSAY OF TROPONIN QUANT: CPT | Performed by: EMERGENCY MEDICINE

## 2018-02-27 RX ORDER — FUROSEMIDE 40 MG
40 TABLET ORAL ONCE
Status: COMPLETED | OUTPATIENT
Start: 2018-02-27 | End: 2018-02-27

## 2018-02-27 RX ORDER — FUROSEMIDE 20 MG
20 TABLET ORAL DAILY
Qty: 20 TABLET | Refills: 0 | Status: SHIPPED | OUTPATIENT
Start: 2018-02-28 | End: 2018-07-05

## 2018-02-27 RX ADMIN — FUROSEMIDE 40 MG: 40 TABLET ORAL at 17:29

## 2018-02-27 ASSESSMENT — ENCOUNTER SYMPTOMS
FEVER: 0
SHORTNESS OF BREATH: 1
LIGHT-HEADEDNESS: 0
ABDOMINAL PAIN: 0
COUGH: 1
DIZZINESS: 0

## 2018-02-27 NOTE — ED NOTES
Patient reports she had evening blood pressure medication due, will be taking it as soon as she gets home.

## 2018-02-27 NOTE — ED PROVIDER NOTES
History     Chief Complaint:  Leg Swelling    HPI   Ghislaine Walls is a 82 year old female with history of hypertension, hyperlipidemia, and hypothyroidism who presents to the emergency department today for evaluation of leg swelling. The patient reports noticing increased swelling to her legs and feet bilaterally this morning. She states the swelling has seemed to improve throughout the day today, after elevation and urinating. It seems to be localized below her knees. The patient states her legs were somewhat enlarged yesterday, but this morning when she awoke the swelling was much more noticeable. The patient also notes that she has not been urinating as frequently today, and that when she eats salt her legs seem to swell up. The patient denies any pain to her legs. However the patient does endorse some shortness of breath with this leg swelling, but notes this shortness of breath has been ongoing intermittently for the last few months. Although it has been somewhat chronic for her, she does note some acute shortness of breath today while walking into the emergency department. She also endorses a productive non-bloody cough, but this also has been present for many months. The patient notes that she is chronically fatigued through most of her adult life. Patient denies any chest pain, palpitations, fever, abdominal pain, pelvic pain, lightheadedness. She denies any recent changes to her medications.     Cardiac/PE/DVT Risk Factors:  History of hypertension - Positive   History of hyperlipidemia - Positive   History of diabetes - Negative   History of smoking - Positive   Personal history of PE/DVT - Negative   Family history of PE/DVT - Negative   Family history of heart complications - Negative   Recent travel - Negative   Recent surgery - Negative   Other immobilizations - Negative   Cancer - Negative      Allergies:  Oxybutynin  Seasonal Allergies      Medications:    levothyroxine (SYNTHROID/LEVOTHROID) 50  "MCG tablet  amLODIPine (NORVASC) 10 MG tablet  metoprolol (LOPRESSOR) 100 MG tablet  lisinopril (PRINIVIL/ZESTRIL) 40 MG tablet     Past Medical History:    Hyperlipidemia   Hypertension  Hypothyroid   Peripheral artery disease  Renal insufficiency     Past Surgical History:    Appendectomy   Cataract surgery bilateral  [place] teeth extraction  Tubal ligation     Family History:    Heart disease  Respiratory disease     Social History:  The patient was accompanied to the ED by herself.  Smoking Status: Former smoker  Smokeless Tobacco: No  Alcohol Use: Yes    Marital Status:  Single      Review of Systems   Constitutional: Negative for fever.   Respiratory: Positive for cough and shortness of breath.    Cardiovascular: Positive for leg swelling. Negative for chest pain.   Gastrointestinal: Negative for abdominal pain.   Genitourinary: Positive for decreased urine volume. Negative for pelvic pain.   Neurological: Negative for dizziness and light-headedness.   All other systems reviewed and are negative.    Physical Exam     Patient Vitals for the past 24 hrs:   BP Temp Temp src Pulse Heart Rate Resp SpO2 Height Weight   02/27/18 1629 180/75 - - - - - - - -   02/27/18 1252 180/75 98  F (36.7  C) Oral 82 82 18 97 % 1.549 m (5' 1\") 86.2 kg (190 lb)      Physical Exam  SKIN:  Warm, dry.  HEMATOLOGIC/IMMUNOLOGIC/LYMPHATIC:  No pallor.  BLE edema.  HENT:  No JVD.  EYES:  Conjunctivae normal.  CARDIOVASCULAR:  Regular rate and rhythm.  No murmur.  Normal equal pedal pulses.  RESPIRATORY:  No respiratory distress, breath sounds equal with very faint bibasilar crackles.  GASTROINTESTINAL:  Soft, nontender abdomen.  No distension.  MUSCULOSKELETAL:  Non-tender lower limbs.  NEUROLOGIC:  Alert, conversant.  PSYCHIATRIC:  Normal mood.    Emergency Department Course     ECG:  Indication: Leg swelling  Completed at 1254.  Read at 1258.   Sinus rhythm with 1st degree AV block with occasional premature complexes. Minimal voltage " criteria for LVH, may be normal variant. Possible anterior infarct, age undetermined. Abnormal ECG.   Rate 76 bpm. SC interval 240. QRS duration 82. QT/QTc 398/447. P-R-T axes 45 -27 44.     Imaging:  Radiology findings were communicated with the patient who voiced understanding of the findings.    US Lower Extremity Venous Duplex bilateral:   IMPRESSION: No DVT identified.  Report per radiology       Chest XR, PA & LAT:  IMPRESSION: Mild vascular congestion. No consolidation, pleural  effusion or pneumothorax. Mild cardiomegaly.  Report per radiology       Laboratory:  Laboratory findings were communicated with the patient who voiced understanding of the findings.    CBC: WBC 6.3, HGB 14.3,    CMP: Creatinine 1.22 (H), GFR Estimate 42 (L), o/w WNL.   BNP: 1844 (H)   Troponin (Collected 1439): <0.015   TSH with free T4 reflex: 0.64     Interventions:   Lasix 40mg PO      Emergency Department Course:  Nursing notes and vitals reviewed.  1517 I performed an exam of the patient as documented above.   IV was inserted and blood was drawn for laboratory testing, results above.   The patient was sent for a chest x-ray and lower extremity ultrasound while in the emergency department, results above.    16[address] Patient road tested and did not become hypoxic.   1700 I rechecked the patient and updated her on her results.   Findings and plan explained to the Patient. Patient discharged home with instructions regarding supportive care, medications, and reasons to return. The importance of close follow-up was reviewed. I personally reviewed the laboratory and imaging results with the Patient and answered all related questions prior to discharge.   Impression & Plan      Medical Decision Making:  This patient presents with lower extremity edema that is equal and new. Otherwise she is feeling relatively well. Battery of testing performed which is reassuring. Scant amount of radiographic vascular congestion on x-ray and her BNP  is a bit elevated. However she ambulates well with no dyspnea and I think she can be discharged for primary care follow up and start Lasix. First dose of Lasix given here. I advised her to return if any new concerns, especially respiratory.     Diagnosis:    ICD-10-CM    1. Leg edema R60.0        Disposition:  Discharged to home with the below prescription.     Discharge Medications:  New Prescriptions    FUROSEMIDE (LASIX) 20 MG TABLET    Take 1 tablet (20 mg) by mouth daily for 20 days         Scribe Disclosure:  I, Aashish Alejo, am serving as a scribe at 3:12 PM on 2/27/2018 to document services personally performed by Gt Briseno MD based on my observations and the provider's statements to me.    2/27/2018    EMERGENCY DEPARTMENT       Gt Briseno MD  02/28/18 0958

## 2018-02-27 NOTE — DISCHARGE INSTRUCTIONS
Leg Swelling in Both Legs    Swelling of the feet, ankles, and legs is called edema. It is caused by excess fluid that has collected in the tissues. Extra fluid in the body settles in the lowest part because of gravity. This is why the legs and feet are most affected.  Some of the causes for edema include:    Disease of the heart like congestive heart failure    Standing or sitting for long periods of time    Infection of the feet or legs    Blood pooling in the veins of your legs (venous insufficiency)    Dilated veins in your lower leg (varicose veins)    Garters or other clothing that is tight on your legs. This will cause blood to pool in your legs because the clothing limits blood flow.    Some medicines such as hormones like birth control pills, some blood pressure medicines like calcium channel blockers (amlodipine) and steroids, some antidepressants like MAO inhibitors and tricyclics    Menstrual periods that cause you to retain fluids    Many types of renal disease    Liver failure or cirrhosis    Pregnancy, some swelling is normal, but a sudden increase in leg swelling or weight gain can be a sign of a dangerous complication of pregnancy    Poor nutrition    Thyroid disease  Medical treatment will depend on what is causing the swelling in your legs. Your healthcare provider may prescribe water pills (diuretics) to get rid of the extra fluid.  Home care  Follow these guidelines when caring for yourself at home:    Don't wear clothing like garters that is tight on your legs.    Keep your legs up while lying or sitting.    If infection, injury, or recent surgery is causing the swelling, stay off your legs as much as possible until symptoms get better.    If your healthcare provider says that your leg swelling is caused by venous insufficiency or varicose veins, don't sit or  one place for long periods of time. Take breaks and walk about every few hours. Brisk walking is a good exercise. It helps  circulate the blood that has collected in your leg. Talk with your provider about using support stockings to stop daytime leg swelling.    If your provider says that heart disease is causing your leg swelling, follow a low-salt diet to stop extra fluid from staying in your body. You may also need medicine.  Follow-up care  Follow up with your healthcare provider, or as advised.  When to seek medical advice  Call your healthcare provider right away if any of these occur:    New shortness of breath or chest pain    Shortness of breath or chest pain that gets worse    Swelling in both legs or ankles that gets worse    Swelling of the abdomen    Redness, warmth, or swelling in one leg    Fever of 100.4 F (38 C) or higher, or as directed by your healthcare provider    Yellow color to your skin or eyes    Rapid, unexplained weight gain    Having to sleep upright or use an increased number of pillows  Date Last Reviewed: 3/31/2016    4375-6544 [employer]. [address] [place] [address], [place] [address]. All rights reserved. This information is not intended as a substitute for professional medical care. Always follow your healthcare professional's instructions.

## 2018-02-27 NOTE — TELEPHONE ENCOUNTER
PT walk in.  States she has increased edema.  /64.  Both legs with edema up to knees.  Legs firm and feet swollen  Legs taunt. and shinny Pt states that she does not have any respiratory distress. Pt is not on any diuretic. Decreased voiding today. Advised pt to go to ER for evaluation.

## 2018-02-27 NOTE — ED AVS SNAPSHOT
Emergency Department    [address] [place] [address]    Phone:  [phone]    Fax:  [phone]                                       Ghislaine Walls   MRN: [id number]    Department:   Emergency Department   Date of Visit:  2/27/2018           Patient Information     Date Of Birth          1935        Your diagnoses for this visit were:     Leg edema        You were seen by Miguelangel Walker MD and Gt Briseno MD.      Follow-up Information     Please follow up.    Why:  recheck with your clinic this week - continue daily lasix, next dose tomorrow        Discharge Instructions         Leg Swelling in Both Legs    Swelling of the feet, ankles, and legs is called edema. It is caused by excess fluid that has collected in the tissues. Extra fluid in the body settles in the lowest part because of gravity. This is why the legs and feet are most affected.  Some of the causes for edema include:    Disease of the heart like congestive heart failure    Standing or sitting for long periods of time    Infection of the feet or legs    Blood pooling in the veins of your legs (venous insufficiency)    Dilated veins in your lower leg (varicose veins)    Garters or other clothing that is tight on your legs. This will cause blood to pool in your legs because the clothing limits blood flow.    Some medicines such as hormones like birth control pills, some blood pressure medicines like calcium channel blockers (amlodipine) and steroids, some antidepressants like MAO inhibitors and tricyclics    Menstrual periods that cause you to retain fluids    Many types of renal disease    Liver failure or cirrhosis    Pregnancy, some swelling is normal, but a sudden increase in leg swelling or weight gain can be a sign of a dangerous complication of pregnancy    Poor nutrition    Thyroid disease  Medical treatment will depend on what is causing the swelling in your legs. Your healthcare provider may  prescribe water pills (diuretics) to get rid of the extra fluid.  Home care  Follow these guidelines when caring for yourself at home:    Don't wear clothing like garters that is tight on your legs.    Keep your legs up while lying or sitting.    If infection, injury, or recent surgery is causing the swelling, stay off your legs as much as possible until symptoms get better.    If your healthcare provider says that your leg swelling is caused by venous insufficiency or varicose veins, don't sit or  one place for long periods of time. Take breaks and walk about every few hours. Brisk walking is a good exercise. It helps circulate the blood that has collected in your leg. Talk with your provider about using support stockings to stop daytime leg swelling.    If your provider says that heart disease is causing your leg swelling, follow a low-salt diet to stop extra fluid from staying in your body. You may also need medicine.  Follow-up care  Follow up with your healthcare provider, or as advised.  When to seek medical advice  Call your healthcare provider right away if any of these occur:    New shortness of breath or chest pain    Shortness of breath or chest pain that gets worse    Swelling in both legs or ankles that gets worse    Swelling of the abdomen    Redness, warmth, or swelling in one leg    Fever of 100.4 F (38 C) or higher, or as directed by your healthcare provider    Yellow color to your skin or eyes    Rapid, unexplained weight gain    Having to sleep upright or use an increased number of pillows  Date Last Reviewed: 3/31/2016    7388-2917 [employer]. [address] [place] [address], [place] [address]. All rights reserved. This information is not intended as a substitute for professional medical care. Always follow your healthcare professional's instructions.          24 Hour Appointment Hotline       To make an appointment at any [facility], call [phone] ([phone]). If  you don't have a family doctor or clinic, we will help you find one. [facility] clinics are conveniently located to serve the needs of you and your family.             Review of your medicines      START taking        Dose / Directions Last dose taken    furosemide 20 MG tablet   Commonly known as:  LASIX   Dose:  20 mg   Quantity:  20 tablet   Start taking on:  2/28/2018        Take 1 tablet (20 mg) by mouth daily for 20 days   Refills:  0          Our records show that you are taking the medicines listed below. If these are incorrect, please call your family doctor or clinic.        Dose / Directions Last dose taken    amLODIPine 10 MG tablet   Commonly known as:  NORVASC   Dose:  10 mg   Quantity:  90 tablet        Take 1 tablet (10 mg) by mouth daily   Refills:  3        fish oil-omega-3 fatty acids 1000 MG capsule   Dose:  1 capsule        Take 1 capsule by mouth every other day.   Refills:  0        levothyroxine 50 MCG tablet   Commonly known as:  SYNTHROID/LEVOTHROID   Dose:  50 mcg   Quantity:  90 tablet        Take 1 tablet (50 mcg) by mouth daily   Refills:  3        lisinopril 40 MG tablet   Commonly known as:  PRINIVIL/ZESTRIL   Dose:  40 mg   Quantity:  90 tablet        Take 1 tablet (40 mg) by mouth daily   Refills:  3        melatonin 3 MG tablet   Dose:  1 tablet        Take 1 tablet by mouth nightly as needed.   Refills:  0        metoprolol tartrate 100 MG tablet   Commonly known as:  LOPRESSOR   Dose:  100 mg   Quantity:  180 tablet        Take 1 tablet (100 mg) by mouth 2 times daily   Refills:  3                Prescriptions were sent or printed at these locations (1 Prescription)                   [facility] [address] [place] [id number]    Telephone:  [phone]   Fax:  [phone]   Hours:                  E-Prescribed (1 of 1)         furosemide (LASIX) 20 MG tablet                Procedures and tests performed during  your visit     BNP    CBC with platelets + differential    Chest XR,  PA & LAT    Comprehensive metabolic panel    EKG 12 lead    TSH with free T4 reflex    Troponin I (now)    US Lower Extremity Venous Duplex Bilateral      Orders Needing Specimen Collection     None      Pending Results     No orders found from 2/25/2018 to 2/28/2018.            Pending Culture Results     No orders found from 2/25/2018 to 2/28/2018.            Pending Results Instructions     If you had any lab results that were not finalized at the time of your Discharge, you can call the ED Lab Result RN at [phone]. You will be contacted by this team for any positive Lab results or changes in treatment. The nurses are available 7 days a week from 10A to 6:30P.  You can leave a message 24 hours per day and they will return your call.        Test Results From Your Hospital Stay        2/27/2018  2:48 PM      Component Results     Component Value Ref Range & Units Status    WBC 6.3 4.0 - 11.0 10e9/L Final    RBC Count 4.25 3.8 - 5.2 10e12/L Final    Hemoglobin 14.3 11.7 - 15.7 g/dL Final    Hematocrit 41.2 35.0 - 47.0 % Final    MCV 97 78 - 100 fl Final    MCH 33.6 (H) 26.5 - 33.0 pg Final    MCHC 34.7 31.5 - 36.5 g/dL Final    RDW 14.0 10.0 - 15.0 % Final    Platelet Count 203 150 - 450 10e9/L Final    Diff Method Automated Method  Final    % Neutrophils 57.3 % Final    % Lymphocytes 28.8 % Final    % Monocytes 11.3 % Final    % Eosinophils 2.1 % Final    % Basophils 0.3 % Final    % Immature Granulocytes 0.2 % Final    Nucleated RBCs 0 0 /100 Final    Absolute Neutrophil 3.6 1.6 - 8.3 10e9/L Final    Absolute Lymphocytes 1.8 0.8 - 5.3 10e9/L Final    Absolute Monocytes 0.7 0.0 - 1.3 10e9/L Final    Absolute Eosinophils 0.1 0.0 - 0.7 10e9/L Final    Absolute Basophils 0.0 0.0 - 0.2 10e9/L Final    Abs Immature Granulocytes 0.0 0 - 0.4 10e9/L Final    Absolute Nucleated RBC 0.0  Final         2/27/2018  3:09 PM      Component Results      Component Value Ref Range & Units Status    Sodium 139 133 - 144 mmol/L Final    Potassium 4.4 3.4 - 5.3 mmol/L Final    Chloride 109 94 - 109 mmol/L Final    Carbon Dioxide 23 20 - 32 mmol/L Final    Anion Gap 7 3 - 14 mmol/L Final    Glucose 95 70 - 99 mg/dL Final    Urea Nitrogen 24 7 - 30 mg/dL Final    Creatinine 1.22 (H) 0.52 - 1.04 mg/dL Final    GFR Estimate 42 (L) >60 mL/min/1.7m2 Final    Non  GFR Calc    GFR Estimate If Black 51 (L) >60 mL/min/1.7m2 Final    African American GFR Calc    Calcium 9.4 8.5 - 10.1 mg/dL Final    Bilirubin Total 1.1 0.2 - 1.3 mg/dL Final    Albumin 3.8 3.4 - 5.0 g/dL Final    Protein Total 8.6 6.8 - 8.8 g/dL Final    Alkaline Phosphatase 114 40 - 150 U/L Final    ALT 24 0 - 50 U/L Final    AST 26 0 - 45 U/L Final         2/27/2018  3:10 PM      Component Results     Component Value Ref Range & Units Status    N-Terminal Pro BNP Inpatient 1844 (H) 0 - 1800 pg/mL Final       Reference range shown and results flagged as abnormal are suggested inpatient   cut points for confirming diagnosis if CHF in an acute setting. Establishing a   baseline value for each individual patient is useful for follow-up. An   inpatient or emergency department NT-proPBNP <300 pg/mL effectively rules out   acute CHF, with 99% negative predictive value.  The outpatient non-acute reference range for ruling out CHF is:   0-125 pg/mL (age 18 to less than 75)   0-450 pg/mL (age 75 yrs and older)           2/27/2018  3:10 PM      Component Results     Component Value Ref Range & Units Status    Troponin I ES <0.015 0.000 - 0.045 ug/L Final    The 99th percentile for upper reference range is 0.045 ug/L.  Troponin values   in the range of 0.045 - 0.120 ug/L may be associated with risks of adverse   clinical events.           2/27/2018  4:26 PM      Narrative     ULTRASOUND BILATERAL LOWER EXTREMITY VENOUS DUPLEX   2/27/2018 4:19 PM    HISTORY: Acute bilateral lower limb swelling. No pain, no  trauma.    TECHNIQUE:  Venous Doppler US.? Color flow and spectral Doppler with  waveform analysis performed.    FINDINGS: Calf veins were suboptimally visualized due to swelling.  Specifically, the peroneal veins were not seen. Otherwise, no evidence  of lower extremity deep venous thrombosis.         Impression     IMPRESSION: No DVT identified.    DINESH MONTENEGRO MD         2/27/2018  4:33 PM      Narrative     CHEST TWO VIEWS  2/27/2018 4:02 PM     HISTORY: dyspnea;     COMPARISON: None        Impression     IMPRESSION: Mild vascular congestion. No consolidation, pleural  effusion or pneumothorax. Mild cardiomegaly.    NATALIE MONTGOMERY MD         2/27/2018  3:45 PM      Component Results     Component Value Ref Range & Units Status    TSH 0.64 0.40 - 4.00 mU/L Final                Clinical Quality Measure: Blood Pressure Screening     Your blood pressure was checked while you were in the emergency department today. The last reading we obtained was  BP: 180/75 . Please read the guidelines below about what these numbers mean and what you should do about them.  If your systolic blood pressure (the top number) is less than 120 and your diastolic blood pressure (the bottom number) is less than 80, then your blood pressure is normal. There is nothing more that you need to do about it.  If your systolic blood pressure (the top number) is 120-139 or your diastolic blood pressure (the bottom number) is 80-89, your blood pressure may be higher than it should be. You should have your blood pressure rechecked within a year by a primary care provider.  If your systolic blood pressure (the top number) is 140 or greater or your diastolic blood pressure (the bottom number) is 90 or greater, you may have high blood pressure. High blood pressure is treatable, but if left untreated over time it can put you at risk for heart attack, stroke, or kidney failure. You should have your blood pressure rechecked by a primary care provider  "within the next 4 weeks.  If your provider in the emergency department today gave you specific instructions to follow-up with your doctor or provider even sooner than that, you should follow that instruction and not wait for up to 4 weeks for your follow-up visit.        Thank you for choosing [facility]       Thank you for choosing [facility] for your care. Our goal is always to provide you with excellent care. Hearing back from our patients is one way we can continue to improve our services. Please take a few minutes to complete the written survey that you may receive in the mail after you visit with us. Thank you!        [employer] Information     [employer] lets you send messages to your doctor, view your test results, renew your prescriptions, schedule appointments and more. To sign up, go to [email or web address] . Click on \"Log in\" on the left side of the screen, which will take you to the Welcome page. Then click on \"Sign up Now\" on the right side of the page.     You will be asked to enter the access code listed below, as well as some personal information. Please follow the directions to create your username and password.     Your access code is: [email or web address]  Expires: 2018  4:03 PM     Your access code will  in 90 days. If you need help or a new code, please call your [facility] clinic or [phone].        Care EveryWhere ID     This is your Care EveryWhere ID. This could be used by other organizations to access your [facility] medical records  [id number]        Equal Access to Services     ZBIGNIEW FLOREZ : Hadii kalpana castro Soshelby, waaxda lujessica, qaybta kaalmada enrique riggins . So [facility] [phone].    ATENCIÓN: Si habla español, tiene a khalil disposición servicios gratuitos de asistencia lingüística. Llame al [phone].    We comply with applicable federal civil rights laws and [place] laws. We do not discriminate on the basis of race, color, national " origin, age, disability, sex, sexual orientation, or gender identity.            After Visit Summary       This is your record. Keep this with you and show to your community pharmacist(s) and doctor(s) at your next visit.

## 2018-02-27 NOTE — ED AVS SNAPSHOT
Emergency Department    [address] [place] [address]    Phone:  [phone]    Fax:  [phone]                                       Ghislaine Walls   MRN: [id number]    Department:   Emergency Department   Date of Visit:  2/27/2018           After Visit Summary Signature Page     I have received my discharge instructions, and my questions have been answered. I have discussed any challenges I see with this plan with the nurse or doctor.    ..........................................................................................................................................  Patient/Patient Representative Signature      ..........................................................................................................................................  Patient Representative Print Name and Relationship to Patient    ..................................................               ................................................  Date                                            Time    ..........................................................................................................................................  Reviewed by Signature/Title    ...................................................              ..............................................  Date                                                            Time

## 2018-03-01 ENCOUNTER — OFFICE VISIT (OUTPATIENT)
Dept: FAMILY MEDICINE | Facility: CLINIC | Age: 83
End: 2018-03-01
Payer: COMMERCIAL

## 2018-03-01 VITALS
WEIGHT: 193.5 LBS | SYSTOLIC BLOOD PRESSURE: 140 MMHG | DIASTOLIC BLOOD PRESSURE: 88 MMHG | RESPIRATION RATE: 14 BRPM | HEART RATE: 67 BPM | TEMPERATURE: 98.4 F | BODY MASS INDEX: 36.56 KG/M2

## 2018-03-01 DIAGNOSIS — I10 HYPERTENSION GOAL BP (BLOOD PRESSURE) < 140/90: Chronic | ICD-10-CM

## 2018-03-01 DIAGNOSIS — N18.30 CKD (CHRONIC KIDNEY DISEASE) STAGE 3, GFR 30-59 ML/MIN (H): ICD-10-CM

## 2018-03-01 DIAGNOSIS — I89.0 LYMPHEDEMA: Primary | ICD-10-CM

## 2018-03-01 PROCEDURE — 99214 OFFICE O/P EST MOD 30 MIN: CPT | Performed by: FAMILY MEDICINE

## 2018-03-01 NOTE — PROGRESS NOTES
"  SUBJECTIVE:   Ghislaine Walls is a 82 year old female who presents to clinic today for the following health issues:      ED/UC Followup:    Facility:  [facility]   Date of visit: 02/27/2018  Reason for visit: Leg Edema   Current Status: Not much change in the swelling.      Ghislaine is an 83 y/o female presenting for ED f/u.  Presented to ED on 2/27/18 with leg swelling complaint.  Was diagnosed with b/l LE Edema and given lasix 20 mg po qd for 20 days.  Recommended to f/u with PCP.  Has HTN, HLD, and hypothyroidism.  BNP was ~1800 in the ED and CXR showed vascular congestion.  Started taking her Lasix script yesterday, 20 mg po qd.  Tells me she is urinating and even more with the Lasix.  Admits that she is not rasing her legs or wearing compression socks.       ED summary from 2/27/18 as follows:  \"Medical Decision Making: This patient presents with lower extremity edema that is equal and new. Otherwise she is feeling relatively well. Battery of testing performed which is reassuring. Scant amount of radiographic vascular congestion on x-ray and her BNP is a bit elevated. However she ambulates well with no dyspnea and I think she can be discharged for primary care follow up and start Lasix. First dose of Lasix given here. I advised her to return if any new concerns, especially respiratory.\"     Problem list and histories reviewed & adjusted, as indicated.  Additional history: as documented    Labs reviewed in EPIC    Reviewed and updated as needed this visit by clinical staff  Tobacco  Allergies  Meds  Problems  Med Hx  Surg Hx  Fam Hx  Soc Hx        Reviewed and updated as needed this visit by Provider  Allergies  Meds  Problems         ROS:  C: NEGATIVE for fever, chills, change in weight  I: NEGATIVE for worrisome rashes, moles or lesions  E: NEGATIVE for vision changes or irritation  E/M: NEGATIVE for ear, mouth and throat problems  R: NEGATIVE for significant cough or SOB  CV: NEGATIVE for " chest pain, palpitations  GI: NEGATIVE for nausea, abdominal pain  M: NEGATIVE for significant arthralgias   N: NEGATIVE for weakness, dizziness or paresthesias  P: NEGATIVE for changes in mood or affect    OBJECTIVE:     /88  Pulse 67  Temp 98.4  F (36.9  C) (Tympanic)  Resp 14  Wt 193 lb 8 oz (87.8 kg)  BMI 36.56 kg/m2  Body mass index is 36.56 kg/(m^2).   GENERAL APPEARANCE:  alert and no acute distress  EYES: Eyes grossly normal to inspection, PERRL and conjunctivae and sclerae normal  HENT: ear canals and TM's normal, nose and mouth without ulcers or lesions, oropharynx clear and oral mucous membranes moist  NECK: no adenopathy, no asymmetry, masses, or scars and thyroid normal to palpation  RESP: lungs clear to auscultation - no rales, rhonchi or wheezes  CV: regular rate and rhythm, normal S1 S2, no S3 or S4, no murmur, click or rub.  Peripheral pulses strong  ABDOMEN: soft, nontender, no hepatosplenomegaly, no masses and bowel sounds normal  MS: gait is age appropriate without ataxia.  +3 pitting edema in LE's.  No palpable cord in LE's.   SKIN: +chronic venous stasis dermatitis in b/l LE's.  No erythema or ulcers.  NEURO: Normal strength and tone for age, sensory exam grossly normal, mentation intact and speech normal  PSYCH: mentation appears normal and affect normal/bright    Diagnostic Test Results:  BMP (future order)     ASSESSMENT/PLAN:     Problem List Items Addressed This Visit     Hypertension goal BP (blood pressure) < 140/90 (Chronic)    CKD (chronic kidney disease) stage 3, GFR 30-59 ml/min (Chronic)    Relevant Orders    Basic metabolic panel  (Ca, Cl, CO2, Creat, Gluc, K, Na, BUN)      Other Visit Diagnoses     Lymphedema    -  Primary         --For lymphedema, will continue to monitor weight closely.  Weight has been stable at 193 lbs since 6/2016.  Weight today is 193lbs.  Instructed to weight herself daily at home; will weight herself first thing in AM after going to the bathroom  and either without clothes or wearing the same pajama clothes daily.  Will also raise legs daily and wear compression socks to help with the leg swelling.  Has CKD and HTN, so will also need to monitor BMP closely while on Lasix.  Recommended to RTC in 1-2 weeks for BMP and weight check.      Mahsa Soto, DO  [facility]

## 2018-03-01 NOTE — NURSING NOTE
"Chief Complaint   Patient presents with     Hospital F/U     ER Leg Edema       Initial /88  Pulse 67  Temp 98.4  F (36.9  C) (Tympanic)  Resp 14  Wt 193 lb 8 oz (87.8 kg)  BMI 36.56 kg/m2 Estimated body mass index is 36.56 kg/(m^2) as calculated from the following:    Height as of 2/27/18: 5' 1\" (1.549 m).    Weight as of this encounter: 193 lb 8 oz (87.8 kg).  Medication Reconciliation: complete   Maricel Infante MA student     "

## 2018-03-01 NOTE — MR AVS SNAPSHOT
After Visit Summary   3/1/2018    Ghislaine Walls    MRN: [id number]           Patient Information     Date Of Birth          1935        Visit Information        Provider Department      3/1/2018 1:10 PM Mahsa Soto,  [facility]        Today's Diagnoses     CKD (chronic kidney disease) stage 3, GFR 30-59 ml/min    -  1      Care Instructions    Coping with Edema  What is edema?  Edema is the build-up of fluid in the body, which causes swelling. Swelling most commonly occurs in the feet, ankles, lower legs or hands.  Swelling can occur in the belly or chest may be a sign of a more severe problem.  Certain medicines or conditions can make the swelling worse.  Symptoms include:    Feet and lower legs get larger when you sit or walk.    Hands feel tight when you make a fist.    When you push on the skin, skin stays dented.    Shiny, tight skin.    Fast weight gain.  How is it treated?  Your care team may give you a medicine to reduce the swelling.  They may also suggest that you meet with a dietitian. He or she can help with food choices to reduce the swelling.  What can I do about the swelling?    Place your feet above your heart 3 times a day: Sit with your feet up on a stool with a pillow. Sit on the bed or couch with two pillows under your feet.    Do not stand for long periods of time.    Wear loose-fitting clothes.    Do not cross your legs.    Reduce the salt in your diet.   These foods are high in salt:    Chips, soup    Frozen meals, TV dinners    Andino, lunch meat, ham    Sauces (soy, canned spaghetti sauce)    Walk or do other exercise.    Wear compression stockings.    Drink water as normal.    Weigh yourself every day at the same time to keep track of weight gain.  When should I call my care team?  Call your care team if:    You have a hard time breathing.    You gained 5 pounds or more in 1 week.    Your hands or feet feel cold when you touch  them.    You are peeing very little or not at all.    Swelling is moving up your arms or legs.    Your tongue is swelling.    You cannot eat for more than a day  If you have any side effects, call us. We can help you manage these problems.  For more information, see:  www.chemocare.com  www.cancer.org/treatment/treatmentsandsideeffects/physicalsideeffects/dealingwithsymptomsathome  www.cancer.gov/cancertopics/coping/chemotherapy-and-you  Comments:  __________________________________________  __________________________________________  __________________________________________  __________________________________________  __________________________________________  __________________________________________  __________________________________________  For informational purposes only. Not to replace the advice of your health care provider.  Copyright   2014 [employer]. All rights reserved. SMARTworks 482258 - REV 03/16.    Leg Swelling in Both Legs    Swelling of the feet, ankles, and legs is called edema. It is caused by excess fluid that has collected in the tissues. Extra fluid in the body settles in the lowest part because of gravity. This is why the legs and feet are most affected.  Some of the causes for edema include:    Disease of the heart like congestive heart failure    Standing or sitting for long periods of time    Infection of the feet or legs    Blood pooling in the veins of your legs (venous insufficiency)    Dilated veins in your lower leg (varicose veins)    Garters or other clothing that is tight on your legs. This will cause blood to pool in your legs because the clothing limits blood flow.    Some medicines such as hormones like birth control pills, some blood pressure medicines like calcium channel blockers (amlodipine) and steroids, some antidepressants like MAO inhibitors and tricyclics    Menstrual periods that cause you to retain fluids    Many types of renal disease    Liver  failure or cirrhosis    Pregnancy, some swelling is normal, but a sudden increase in leg swelling or weight gain can be a sign of a dangerous complication of pregnancy    Poor nutrition    Thyroid disease  Medical treatment will depend on what is causing the swelling in your legs. Your healthcare provider may prescribe water pills (diuretics) to get rid of the extra fluid.  Home care  Follow these guidelines when caring for yourself at home:    Don't wear clothing like garters that is tight on your legs.    Keep your legs up while lying or sitting.    If infection, injury, or recent surgery is causing the swelling, stay off your legs as much as possible until symptoms get better.    If your healthcare provider says that your leg swelling is caused by venous insufficiency or varicose veins, don't sit or  one place for long periods of time. Take breaks and walk about every few hours. Brisk walking is a good exercise. It helps circulate the blood that has collected in your leg. Talk with your provider about using support stockings to stop daytime leg swelling.    If your provider says that heart disease is causing your leg swelling, follow a low-salt diet to stop extra fluid from staying in your body. You may also need medicine.  Follow-up care  Follow up with your healthcare provider, or as advised.  When to seek medical advice  Call your healthcare provider right away if any of these occur:    New shortness of breath or chest pain    Shortness of breath or chest pain that gets worse    Swelling in both legs or ankles that gets worse    Swelling of the abdomen    Redness, warmth, or swelling in one leg    Fever of 100.4 F (38 C) or higher, or as directed by your healthcare provider    Yellow color to your skin or eyes    Rapid, unexplained weight gain    Having to sleep upright or use an increased number of pillows  Date Last Reviewed: 3/31/2016    5665-2445 [employer]. [address] [facility],  "[place] [address]. All rights reserved. This information is not intended as a substitute for professional medical care. Always follow your healthcare professional's instructions.                Follow-ups after your visit        Follow-up notes from your care team     Return in about 2 weeks (around 3/15/2018), or if symptoms worsen or fail to improve, for Lab Work.      Future tests that were ordered for you today     Open Future Orders        Priority Expected Expires Ordered    Basic metabolic panel  (Ca, Cl, CO2, Creat, Gluc, K, Na, BUN) Routine  3/1/2019 3/1/2018            Who to contact     If you have questions or need follow up information about today's clinic visit or your schedule please contact [facility] directly at [phone].  Normal or non-critical lab and imaging results will be communicated to you by MyChart, letter or phone within 4 business days after the clinic has received the results. If you do not hear from us within 7 days, please contact the clinic through MyChart or phone. If you have a critical or abnormal lab result, we will notify you by phone as soon as possible.  Submit refill requests through [employer] or call your pharmacy and they will forward the refill request to us. Please allow 3 business days for your refill to be completed.          Additional Information About Your Visit        [employer] Information     [employer] lets you send messages to your doctor, view your test results, renew your prescriptions, schedule appointments and more. To sign up, go to [email or web address] . Click on \"Log in\" on the left side of the screen, which will take you to the Welcome page. Then click on \"Sign up Now\" on the right side of the page.     You will be asked to enter the access code listed below, as well as some personal information. Please follow the directions to create your username and password.     Your access code is: [email or web address]  Expires: 5/16/2018  4:03 " PM     Your access code will  in 90 days. If you need help or a new code, please call your [place] clinic or [phone].        Care EveryWhere ID     This is your Care EveryWhere ID. This could be used by other organizations to access your [place] medical records  [id number]        Your Vitals Were     Pulse Temperature Respirations BMI (Body Mass Index)          67 98.4  F (36.9  C) (Tympanic) 14 36.56 kg/m2         Blood Pressure from Last 3 Encounters:   18 140/88   18 190/89   02/15/18 166/77    Weight from Last 3 Encounters:   18 193 lb 8 oz (87.8 kg)   18 190 lb (86.2 kg)   17 193 lb (87.5 kg)               Primary Care Provider Office Phone # Fax #    [facility] [phone] [address] [facility] [place] [facility] [address]        Equal Access to Services     ZBIGNIEW FLOREZ : Hadii kalpana ku hadasho Soomaali, waaxda luqadaha, qaybta kaalmada adeegyada, waxay kwadwoin haymartha clemens . So [facility] [phone].    ATENCIÓN: Si habla español, tiene a khalil disposición servicios gratuitos de asistencia lingüística. Llame al [phone].    We comply with applicable federal civil rights laws and [place] laws. We do not discriminate on the basis of race, color, national origin, age, disability, sex, sexual orientation, or gender identity.            Thank you!     Thank you for choosing [facility]  for your care. Our goal is always to provide you with excellent care. Hearing back from our patients is one way we can continue to improve our services. Please take a few minutes to complete the written survey that you may receive in the mail after your visit with us. Thank you!             Your Updated Medication List - Protect others around you: Learn how to safely use, store and throw away your medicines at www.disposemymeds.org.          This list is accurate as of 3/1/18  1:47 PM.  Always use your  most recent med list.                   Brand Name Dispense Instructions for use Diagnosis    amLODIPine 10 MG tablet    NORVASC    90 tablet    Take 1 tablet (10 mg) by mouth daily    Essential hypertension with goal blood pressure less than 140/90       fish oil-omega-3 fatty acids 1000 MG capsule      Take 1 capsule by mouth every other day.        furosemide 20 MG tablet    LASIX    20 tablet    Take 1 tablet (20 mg) by mouth daily for 20 days        levothyroxine 50 MCG tablet    SYNTHROID/LEVOTHROID    90 tablet    Take 1 tablet (50 mcg) by mouth daily    Essential hypertension with goal blood pressure less than 140/90       lisinopril 40 MG tablet    PRINIVIL/ZESTRIL    90 tablet    Take 1 tablet (40 mg) by mouth daily    Essential hypertension with goal blood pressure less than 140/90       melatonin 3 MG tablet      Take 1 tablet by mouth nightly as needed.        metoprolol tartrate 100 MG tablet    LOPRESSOR    180 tablet    Take 1 tablet (100 mg) by mouth 2 times daily    Essential hypertension with goal blood pressure less than 140/90

## 2018-03-01 NOTE — PATIENT INSTRUCTIONS
Coping with Edema  What is edema?  Edema is the build-up of fluid in the body, which causes swelling. Swelling most commonly occurs in the feet, ankles, lower legs or hands.  Swelling can occur in the belly or chest may be a sign of a more severe problem.  Certain medicines or conditions can make the swelling worse.  Symptoms include:    Feet and lower legs get larger when you sit or walk.    Hands feel tight when you make a fist.    When you push on the skin, skin stays dented.    Shiny, tight skin.    Fast weight gain.  How is it treated?  Your care team may give you a medicine to reduce the swelling.  They may also suggest that you meet with a dietitian. He or she can help with food choices to reduce the swelling.  What can I do about the swelling?    Place your feet above your heart 3 times a day: Sit with your feet up on a stool with a pillow. Sit on the bed or couch with two pillows under your feet.    Do not stand for long periods of time.    Wear loose-fitting clothes.    Do not cross your legs.    Reduce the salt in your diet.   These foods are high in salt:    Chips, soup    Frozen meals, TV dinners    Andino, lunch meat, ham    Sauces (soy, canned spaghetti sauce)    Walk or do other exercise.    Wear compression stockings.    Drink water as normal.    Weigh yourself every day at the same time to keep track of weight gain.  When should I call my care team?  Call your care team if:    You have a hard time breathing.    You gained 5 pounds or more in 1 week.    Your hands or feet feel cold when you touch them.    You are peeing very little or not at all.    Swelling is moving up your arms or legs.    Your tongue is swelling.    You cannot eat for more than a day  If you have any side effects, call us. We can help you manage these problems.  For more information,  see:  www.chemocare.com  www.cancer.org/treatment/treatmentsandsideeffects/physicalsideeffects/dealingwithsymptomsathome  www.cancer.gov/cancertopics/coping/chemotherapy-and-you  Comments:  __________________________________________  __________________________________________  __________________________________________  __________________________________________  __________________________________________  __________________________________________  __________________________________________  For informational purposes only. Not to replace the advice of your health care provider.  Copyright   2014 [facility]. All rights reserved. SMARTworks 913503 - REV 03/16.    Leg Swelling in Both Legs    Swelling of the feet, ankles, and legs is called edema. It is caused by excess fluid that has collected in the tissues. Extra fluid in the body settles in the lowest part because of gravity. This is why the legs and feet are most affected.  Some of the causes for edema include:    Disease of the heart like congestive heart failure    Standing or sitting for long periods of time    Infection of the feet or legs    Blood pooling in the veins of your legs (venous insufficiency)    Dilated veins in your lower leg (varicose veins)    Garters or other clothing that is tight on your legs. This will cause blood to pool in your legs because the clothing limits blood flow.    Some medicines such as hormones like birth control pills, some blood pressure medicines like calcium channel blockers (amlodipine) and steroids, some antidepressants like MAO inhibitors and tricyclics    Menstrual periods that cause you to retain fluids    Many types of renal disease    Liver failure or cirrhosis    Pregnancy, some swelling is normal, but a sudden increase in leg swelling or weight gain can be a sign of a dangerous complication of pregnancy    Poor nutrition    Thyroid disease  Medical treatment will depend on what is causing the  swelling in your legs. Your healthcare provider may prescribe water pills (diuretics) to get rid of the extra fluid.  Home care  Follow these guidelines when caring for yourself at home:    Don't wear clothing like garters that is tight on your legs.    Keep your legs up while lying or sitting.    If infection, injury, or recent surgery is causing the swelling, stay off your legs as much as possible until symptoms get better.    If your healthcare provider says that your leg swelling is caused by venous insufficiency or varicose veins, don't sit or  one place for long periods of time. Take breaks and walk about every few hours. Brisk walking is a good exercise. It helps circulate the blood that has collected in your leg. Talk with your provider about using support stockings to stop daytime leg swelling.    If your provider says that heart disease is causing your leg swelling, follow a low-salt diet to stop extra fluid from staying in your body. You may also need medicine.  Follow-up care  Follow up with your healthcare provider, or as advised.  When to seek medical advice  Call your healthcare provider right away if any of these occur:    New shortness of breath or chest pain    Shortness of breath or chest pain that gets worse    Swelling in both legs or ankles that gets worse    Swelling of the abdomen    Redness, warmth, or swelling in one leg    Fever of 100.4 F (38 C) or higher, or as directed by your healthcare provider    Yellow color to your skin or eyes    Rapid, unexplained weight gain    Having to sleep upright or use an increased number of pillows  Date Last Reviewed: 3/31/2016    9555-0575 [employer]. [address] [place] [address], [place] [address]. All rights reserved. This information is not intended as a substitute for professional medical care. Always follow your healthcare professional's instructions.

## 2018-03-14 DIAGNOSIS — N18.30 CKD (CHRONIC KIDNEY DISEASE) STAGE 3, GFR 30-59 ML/MIN (H): ICD-10-CM

## 2018-03-14 PROCEDURE — 80048 BASIC METABOLIC PNL TOTAL CA: CPT | Performed by: FAMILY MEDICINE

## 2018-03-14 PROCEDURE — 36415 COLL VENOUS BLD VENIPUNCTURE: CPT | Performed by: FAMILY MEDICINE

## 2018-03-14 NOTE — LETTER
March 16, 2018      Ghislaine LACHELLE Walls  [address] [address]        Dear ,    We are writing to inform you of your test results.    Your kidney lab is stable, which is great!  I would recommend that you return for an office visit in about 3-4 months for your yearly physical.  I hope your leg swelling has improved.  It was great to meet you, Ghislaine.  Have a great weekend.    Resulted Orders   Basic metabolic panel  (Ca, Cl, CO2, Creat, Gluc, K, Na, BUN)   Result Value Ref Range    Sodium 143 133 - 144 mmol/L    Potassium 3.9 3.4 - 5.3 mmol/L    Chloride 109 94 - 109 mmol/L    Carbon Dioxide 26 20 - 32 mmol/L    Anion Gap 8 3 - 14 mmol/L    Glucose 119 (H) 70 - 99 mg/dL    Urea Nitrogen 32 (H) 7 - 30 mg/dL    Creatinine 1.33 (H) 0.52 - 1.04 mg/dL    GFR Estimate 38 (L) >60 mL/min/1.7m2      Comment:      Non  GFR Calc    GFR Estimate If Black 46 (L) >60 mL/min/1.7m2      Comment:       GFR Calc    Calcium 9.1 8.5 - 10.1 mg/dL       If you have any questions or concerns, please call the clinic at the number listed above.     Sincerely,  [facility]

## 2018-03-15 LAB
ANION GAP SERPL CALCULATED.3IONS-SCNC: 8 MMOL/L (ref 3–14)
BUN SERPL-MCNC: 32 MG/DL (ref 7–30)
CALCIUM SERPL-MCNC: 9.1 MG/DL (ref 8.5–10.1)
CHLORIDE SERPL-SCNC: 109 MMOL/L (ref 94–109)
CO2 SERPL-SCNC: 26 MMOL/L (ref 20–32)
CREAT SERPL-MCNC: 1.33 MG/DL (ref 0.52–1.04)
GFR SERPL CREATININE-BSD FRML MDRD: 38 ML/MIN/1.7M2
GLUCOSE SERPL-MCNC: 119 MG/DL (ref 70–99)
POTASSIUM SERPL-SCNC: 3.9 MMOL/L (ref 3.4–5.3)
SODIUM SERPL-SCNC: 143 MMOL/L (ref 133–144)

## 2018-03-23 ENCOUNTER — OFFICE VISIT (OUTPATIENT)
Dept: FAMILY MEDICINE | Facility: CLINIC | Age: 83
End: 2018-03-23
Payer: COMMERCIAL

## 2018-03-23 VITALS
RESPIRATION RATE: 16 BRPM | HEIGHT: 61 IN | WEIGHT: 196 LBS | BODY MASS INDEX: 37 KG/M2 | DIASTOLIC BLOOD PRESSURE: 70 MMHG | SYSTOLIC BLOOD PRESSURE: 139 MMHG | HEART RATE: 65 BPM | TEMPERATURE: 98.2 F | OXYGEN SATURATION: 94 %

## 2018-03-23 DIAGNOSIS — M67.40 GANGLION CYST: Primary | ICD-10-CM

## 2018-03-23 PROCEDURE — 99213 OFFICE O/P EST LOW 20 MIN: CPT | Performed by: FAMILY MEDICINE

## 2018-03-23 NOTE — PROGRESS NOTES
"  SUBJECTIVE:   Ghislaine Walls is a 82 year old female who presents to clinic today for the following health issues:      Musculoskeletal problem/pain      Duration: cyst X2 months, worsening over past 2 days.    Description  Location: Right hand index finger    Intensity:  2 to 8/10    Accompanying signs and symptoms: swelling and redness    History  Previous similar problem: no   Previous evaluation:  none    Precipitating or alleviating factors:  Trauma or overuse: no   Aggravating factors include: Use    Therapies tried and outcome: nothing      Problem list and histories reviewed & adjusted, as indicated.  Additional history: as documented    Labs reviewed in EPIC    Reviewed and updated as needed this visit by clinical staff  Tobacco  Allergies  Meds  Problems  Med Hx  Surg Hx  Fam Hx  Soc Hx        Reviewed and updated as needed this visit by Provider  Allergies  Meds  Problems         ROS:  CONSTITUTIONAL: NEGATIVE for fever, chills, change in weight  INTEGUMENTARY/SKIN: NEGATIVE for worrisome rashes, moles or lesions  EYES: NEGATIVE for vision changes or irritation  ENT/MOUTH: NEGATIVE for ear, mouth and throat problems  RESP: NEGATIVE for significant cough or SOB  CV: NEGATIVE for chest pain, palpitations or peripheral edema  GI: NEGATIVE for nausea, abdominal pain, heartburn, or change in bowel habits  : NEGATIVE for frequency, dysuria, or hematuria  MUSCULOSKELETAL:POSITIVE  for right index finger pain  HEME: NEGATIVE for bleeding problems  PSYCHIATRIC: NEGATIVE for changes in mood or affect    OBJECTIVE:     /70 (BP Location: Left arm, Patient Position: Sitting, Cuff Size: Adult Regular)  Pulse 65  Temp 98.2  F (36.8  C) (Tympanic)  Resp 16  Ht 5' 1\" (1.549 m)  Wt 196 lb (88.9 kg)  SpO2 94%  Breastfeeding? No  BMI 37.03 kg/m2  Body mass index is 37.03 kg/(m^2).   GENERAL: healthy, alert and no distress  EYES: Eyes grossly normal to inspection, PERRL and conjunctivae and " sclerae normal  HENT: ear canals and TM's normal, nose and mouth without ulcers or lesions  NECK: no adenopathy, no asymmetry, masses, or scars and thyroid normal to palpation  RESP: lungs clear to auscultation - no rales, rhonchi or wheezes  CV: regular rate and rhythm, normal S1 S2, no S3 or S4, no murmur, click or rub, no peripheral edema and peripheral pulses strong  ABDOMEN: soft, nontender, no hepatosplenomegaly, no masses and bowel sounds normal  MS: right index finger with ganglion cyst at the DIP joint (~1 cm in size)--slight tenderness with moderate palpation over the cyst.  SKIN: no suspicious lesions or rashes.  NEURO: Normal strength and tone, mentation intact and speech normal  PSYCH: mentation appears normal, affect normal/bright    Diagnostic Test Results:  none     ASSESSMENT/PLAN:     Problem List Items Addressed This Visit     None      Visit Diagnoses     Ganglion cyst    -  Primary    Relevant Orders    ORTHOPEDICS ADULT REFERRAL           Recommended rest (avoid repetitive movements that cause pain to right index finger, etc), ice packs (or warm packs) over the cyst 3-4 times daily x 1-2 weeks  May use OTC Tylenol/Ibuprofen prn pain.   Recommended to schedule low dose NSAID (has CKD) q12 hours x 1 week with Tylenol q4-6 hours prn pain.  Referral ordered to [facility] for evaluation/treatment of enlarging/painful ganglion cyst/  F/u in 1-2 weeks or sooner if needed.    Mahsa Soto, [facility]

## 2018-03-23 NOTE — MR AVS SNAPSHOT
After Visit Summary   3/23/2018    Ghislaine Walls    MRN: [id number]           Patient Information     Date Of Birth          1935        Visit Information        Provider Department      3/23/2018 10:10 AM Mahsa Soto DO [facility]        Today's Diagnoses     Ganglion cyst    -  1      Care Instructions      Ganglion Cyst: Hand    A ganglion cyst is a firm, fluid-filled lump that can suddenly appear on the front or back of the wrist or at the base of a finger. These cysts grow from normal tissue in the wrist and fingers, and range in size from a pea to a peach pit. Although ganglion cysts are common, they don t spread, and they don t become cancerous. They can occur after an injury, but many times it isn t known why they grow. Ganglion cysts can change in size, and may go away on their own.  Symptoms  A ganglion cyst is sometimes painful, especially when it first occurs. Constantly using your hand or wrist can make the cyst enlarge and hurt more. Some hand and wrist movements, such as grasping things, may also be difficult.  How a ganglion cyst develops  Your wrist and hand are made up of many small bones that meet at joints. Tendons attach muscles to the bones at the joints. The tendons allow the joints to bend and straighten. Both tendons and joints are lined with tissue called synovium. This tissue makes a thick fluid that keeps the joints and tendons moving easily. Sometimes the tissue balloons out from the joint or tendons and forms a cyst. As the cyst fills with fluid and grows, it appears as a lump you can feel.  Where ganglion cysts occur  A ganglion cyst can occur anywhere on the hand near a joint. Cysts most commonly appear on the back or palm side of the wrist, or on the palm at the base of a finger. Your doctor can usually diagnose a cyst by examining the lump. He or she may draw off a little fluid or order an X-ray to rule out other  problems.  Treating a ganglion cyst  Your healthcare provider may just watch your ganglion cyst. Many shrink and become painless without treatment. Some disappear altogether. If the cyst is unsightly or painful, or makes it hard for you to use your hand, your healthcare provider can treat it or, if needed, remove it surgically.  Nonsurgical treatment  To shrink the cyst, your provider may remove (aspirate) the fluid with a needle. If the cyst hurts, your provider may also give you an injection of an anti-inflammatory, such as cortisone, to relieve the irritation. Your hand may then be wrapped to help keep the cyst from recurring.  Surgery  If the cyst reappears after treatment, your healthcare provider may remove it surgically. A section of the tissue that lines the joint or tendon is removed along with the cyst. This helps prevent another cyst from forming, although recurrence of the cyst is still possible after surgery. Usually, only your hand or arm is numbed, and you can go home a few hours after surgery. Your hand may be in a splint for several days.  Date Last Reviewed: 9/10/2015    7561-7072 [employer]. [address] [place] [address]. All rights reserved. This information is not intended as a substitute for professional medical care. Always follow your healthcare professional's instructions.                Follow-ups after your visit        Additional Services     ORTHOPEDICS ADULT REFERRAL       Your provider has referred you to: [facility] [place] [phone]   [email or web address]    Please be aware that coverage of these services is subject to the terms and limitations of your health insurance plan.  Call member services at your health plan with any benefit or coverage questions.      Please bring the following to your appointment:    >>   Any x-rays, CTs or MRIs which have been performed.  Contact the facility where they were done to arrange for  " prior to your scheduled appointment.    >>   List of current medications   >>   This referral request   >>   Any documents/labs given to you for this referral                  Follow-up notes from your care team     Return in about 1 week (around 3/30/2018), or if symptoms worsen or fail to improve, for ganglion cyst.      Who to contact     If you have questions or need follow up information about today's clinic visit or your schedule please contact [facility] directly at [phone].  Normal or non-critical lab and imaging results will be communicated to you by MyChart, letter or phone within 4 business days after the clinic has received the results. If you do not hear from us within 7 days, please contact the clinic through [employer] or phone. If you have a critical or abnormal lab result, we will notify you by phone as soon as possible.  Submit refill requests through [employer] or call your pharmacy and they will forward the refill request to us. Please allow 3 business days for your refill to be completed.          Additional Information About Your Visit        [employer] Information     [employer] lets you send messages to your doctor, view your test results, renew your prescriptions, schedule appointments and more. To sign up, go to [email or web address] . Click on \"Log in\" on the left side of the screen, which will take you to the Welcome page. Then click on \"Sign up Now\" on the right side of the page.     You will be asked to enter the access code listed below, as well as some personal information. Please follow the directions to create your username and password.     Your access code is: [email or web address]  Expires: 2018  5:03 PM     Your access code will  in 90 days. If you need help or a new code, please call your [facility] or [phone].        Care EveryWhere ID     This is your Care EveryWhere ID. This could be used by other organizations to access your " "[place] medical records  [id number]        Your Vitals Were     Pulse Temperature Respirations Height Pulse Oximetry Breastfeeding?    65 98.2  F (36.8  C) (Tympanic) 16 5' 1\" (1.549 m) 94% No    BMI (Body Mass Index)                   37.03 kg/m2            Blood Pressure from Last 3 Encounters:   03/23/18 139/70   03/01/18 140/88   02/27/18 190/89    Weight from Last 3 Encounters:   03/23/18 196 lb (88.9 kg)   03/01/18 193 lb 8 oz (87.8 kg)   02/27/18 190 lb (86.2 kg)              We Performed the Following     ORTHOPEDICS ADULT REFERRAL        Primary Care Provider Office Phone # Fax #    [facility] [phone] [address] [facility] [phone]        Equal Access to Services     ZBIGNIEW FLOREZ : Hadii aad ku hadasho Soomaali, waaxda luqadaha, qaybta kaalmada adeegyada, waxay idiin hayaan hsayy elenaarajose clemens . So [facility] [phone].    ATENCIÓN: Si habla español, tiene a khalil disposición servicios gratuitos de asistencia lingüística. Llame al [phone].    We comply with applicable federal civil rights laws and [place] laws. We do not discriminate on the basis of race, color, national origin, age, disability, sex, sexual orientation, or gender identity.            Thank you!     Thank you for choosing [facility]  for your care. Our goal is always to provide you with excellent care. Hearing back from our patients is one way we can continue to improve our services. Please take a few minutes to complete the written survey that you may receive in the mail after your visit with us. Thank you!             Your Updated Medication List - Protect others around you: Learn how to safely use, store and throw away your medicines at www.disposemymeds.org.          This list is accurate as of 3/23/18 10:45 AM.  Always use your most recent med list.                   Brand Name Dispense Instructions for use Diagnosis    amLODIPine 10 " MG tablet    NORVASC    90 tablet    Take 1 tablet (10 mg) by mouth daily    Essential hypertension with goal blood pressure less than 140/90       fish oil-omega-3 fatty acids 1000 MG capsule      Take 1 capsule by mouth every other day.        furosemide 20 MG tablet    LASIX    20 tablet    Take 1 tablet (20 mg) by mouth daily for 20 days        levothyroxine 50 MCG tablet    SYNTHROID/LEVOTHROID    90 tablet    Take 1 tablet (50 mcg) by mouth daily    Essential hypertension with goal blood pressure less than 140/90       lisinopril 40 MG tablet    PRINIVIL/ZESTRIL    90 tablet    Take 1 tablet (40 mg) by mouth daily    Essential hypertension with goal blood pressure less than 140/90       melatonin 3 MG tablet      Take 1 tablet by mouth nightly as needed.        metoprolol tartrate 100 MG tablet    LOPRESSOR    180 tablet    Take 1 tablet (100 mg) by mouth 2 times daily    Essential hypertension with goal blood pressure less than 140/90

## 2018-03-23 NOTE — NURSING NOTE
"Chief Complaint   Patient presents with     Finger     Right first finger red, swollen and painful. Cyst noted for 2 months       Initial /70 (BP Location: Left arm, Patient Position: Sitting, Cuff Size: Adult Regular)  Pulse 65  Temp 98.2  F (36.8  C) (Tympanic)  Resp 16  Ht 5' 1\" (1.549 m)  Wt 196 lb (88.9 kg)  SpO2 94%  Breastfeeding? No  BMI 37.03 kg/m2 Estimated body mass index is 37.03 kg/(m^2) as calculated from the following:    Height as of this encounter: 5' 1\" (1.549 m).    Weight as of this encounter: 196 lb (88.9 kg).  Medication Reconciliation: complete     Nancy Mejia LPN  "

## 2018-03-23 NOTE — PATIENT INSTRUCTIONS
Ganglion Cyst: Hand    A ganglion cyst is a firm, fluid-filled lump that can suddenly appear on the front or back of the wrist or at the base of a finger. These cysts grow from normal tissue in the wrist and fingers, and range in size from a pea to a peach pit. Although ganglion cysts are common, they don t spread, and they don t become cancerous. They can occur after an injury, but many times it isn t known why they grow. Ganglion cysts can change in size, and may go away on their own.  Symptoms  A ganglion cyst is sometimes painful, especially when it first occurs. Constantly using your hand or wrist can make the cyst enlarge and hurt more. Some hand and wrist movements, such as grasping things, may also be difficult.  How a ganglion cyst develops  Your wrist and hand are made up of many small bones that meet at joints. Tendons attach muscles to the bones at the joints. The tendons allow the joints to bend and straighten. Both tendons and joints are lined with tissue called synovium. This tissue makes a thick fluid that keeps the joints and tendons moving easily. Sometimes the tissue balloons out from the joint or tendons and forms a cyst. As the cyst fills with fluid and grows, it appears as a lump you can feel.  Where ganglion cysts occur  A ganglion cyst can occur anywhere on the hand near a joint. Cysts most commonly appear on the back or palm side of the wrist, or on the palm at the base of a finger. Your doctor can usually diagnose a cyst by examining the lump. He or she may draw off a little fluid or order an X-ray to rule out other problems.  Treating a ganglion cyst  Your healthcare provider may just watch your ganglion cyst. Many shrink and become painless without treatment. Some disappear altogether. If the cyst is unsightly or painful, or makes it hard for you to use your hand, your healthcare provider can treat it or, if needed, remove it surgically.  Nonsurgical treatment  To shrink the cyst, your  provider may remove (aspirate) the fluid with a needle. If the cyst hurts, your provider may also give you an injection of an anti-inflammatory, such as cortisone, to relieve the irritation. Your hand may then be wrapped to help keep the cyst from recurring.  Surgery  If the cyst reappears after treatment, your healthcare provider may remove it surgically. A section of the tissue that lines the joint or tendon is removed along with the cyst. This helps prevent another cyst from forming, although recurrence of the cyst is still possible after surgery. Usually, only your hand or arm is numbed, and you can go home a few hours after surgery. Your hand may be in a splint for several days.  Date Last Reviewed: 9/10/2015    0383-3565 [employer]. [address] [place] [address], [place] [address]. All rights reserved. This information is not intended as a substitute for professional medical care. Always follow your healthcare professional's instructions.

## 2018-07-05 ENCOUNTER — OFFICE VISIT (OUTPATIENT)
Dept: FAMILY MEDICINE | Facility: CLINIC | Age: 83
End: 2018-07-05
Payer: COMMERCIAL

## 2018-07-05 VITALS
BODY MASS INDEX: 33.4 KG/M2 | HEIGHT: 62 IN | TEMPERATURE: 98.6 F | WEIGHT: 181.5 LBS | HEART RATE: 53 BPM | OXYGEN SATURATION: 99 % | RESPIRATION RATE: 14 BRPM | DIASTOLIC BLOOD PRESSURE: 54 MMHG | SYSTOLIC BLOOD PRESSURE: 142 MMHG

## 2018-07-05 DIAGNOSIS — N18.30 CKD (CHRONIC KIDNEY DISEASE) STAGE 3, GFR 30-59 ML/MIN (H): Chronic | ICD-10-CM

## 2018-07-05 DIAGNOSIS — E03.9 HYPOTHYROIDISM, UNSPECIFIED TYPE: ICD-10-CM

## 2018-07-05 DIAGNOSIS — Z00.00 ENCOUNTER FOR ROUTINE ADULT HEALTH EXAMINATION WITHOUT ABNORMAL FINDINGS: Primary | ICD-10-CM

## 2018-07-05 DIAGNOSIS — H90.0 CONDUCTIVE HEARING LOSS, BILATERAL: Chronic | ICD-10-CM

## 2018-07-05 DIAGNOSIS — I10 HYPERTENSION GOAL BP (BLOOD PRESSURE) < 140/90: Chronic | ICD-10-CM

## 2018-07-05 PROCEDURE — 80048 BASIC METABOLIC PNL TOTAL CA: CPT | Performed by: FAMILY MEDICINE

## 2018-07-05 PROCEDURE — 84439 ASSAY OF FREE THYROXINE: CPT | Performed by: FAMILY MEDICINE

## 2018-07-05 PROCEDURE — 36415 COLL VENOUS BLD VENIPUNCTURE: CPT | Performed by: FAMILY MEDICINE

## 2018-07-05 PROCEDURE — 84443 ASSAY THYROID STIM HORMONE: CPT | Performed by: FAMILY MEDICINE

## 2018-07-05 PROCEDURE — G0439 PPPS, SUBSEQ VISIT: HCPCS | Performed by: FAMILY MEDICINE

## 2018-07-05 RX ORDER — AMLODIPINE BESYLATE 10 MG/1
10 TABLET ORAL DAILY
Qty: 90 TABLET | Refills: 1 | Status: SHIPPED | OUTPATIENT
Start: 2018-07-05 | End: 2018-10-24

## 2018-07-05 RX ORDER — LEVOTHYROXINE SODIUM 50 UG/1
50 TABLET ORAL DAILY
Qty: 90 TABLET | Refills: 1 | Status: SHIPPED | OUTPATIENT
Start: 2018-07-05 | End: 2018-07-11

## 2018-07-05 RX ORDER — LISINOPRIL 40 MG/1
40 TABLET ORAL DAILY
Qty: 90 TABLET | Refills: 1 | Status: SHIPPED | OUTPATIENT
Start: 2018-07-05 | End: 2019-01-02

## 2018-07-05 RX ORDER — METOPROLOL TARTRATE 100 MG
100 TABLET ORAL 2 TIMES DAILY
Qty: 180 TABLET | Refills: 1 | Status: SHIPPED | OUTPATIENT
Start: 2018-07-05 | End: 2019-01-02

## 2018-07-05 NOTE — PROGRESS NOTES
SUBJECTIVE:   Ghislaine Walls is a 82 year old female who presents for Preventive Visit.    Are you in the first 12 months of your Medicare Part B coverage?  No    Healthy Habits:    Do you get at least three servings of calcium containing foods daily (dairy, green leafy vegetables, etc.)? yes and no, taking calcium and/or vitamin D supplement: no    Amount of exercise or daily activities, outside of work: 2 day(s) per week    Problems taking medications regularly No    Medication side effects: No    Have you had an eye exam in the past two years? yes    Do you see a dentist twice per year? yes    Do you have sleep apnea, excessive snoring or daytime drowsiness?no      Ability to successfully perform activities of daily living: Yes, no assistance needed    Home safety:  none identified     Hearing impairment: Yes, wears HEARING AIDES    Fall risk:  Fallen 2 or more times in the past year?: No  Any fall with injury in the past year?: No      COGNITIVE SCREEN  1) Repeat 3 items (Leader, Season, Table)    2) Clock draw: NORMAL  3) 3 item recall: Recalls 3 objects  Results: 3 items recalled: COGNITIVE IMPAIRMENT LESS LIKELY    Mini-CogTM Copyright [employer]. Licensed by the author for use in [facility]; reprinted with permission (soob@[email or web address]). All rights reserved.          Reviewed and updated as needed this visit by clinical staff  Tobacco  Allergies  Meds  Problems  Med Hx  Surg Hx  Fam Hx  Soc Hx          Reviewed and updated as needed this visit by Provider  Allergies  Meds  Problems        Social History   Substance Use Topics     Smoking status: Former Smoker     Packs/day: 0.50     Years: 5.00     Types: Cigarettes     Quit date: 1/1/1973     Smokeless tobacco: Never Used     Alcohol use 0.0 oz/week     0 Standard drinks or equivalent per week      Comment: 1/week       If you drink alcohol do you typically have >3 drinks per day or >7 drinks per week? No                        Today's  "PHQ-2 Score:   PHQ-2 ( 1999 Pf[employer]) 7/5/2018 6/28/2017   Q1: Little interest or pleasure in doing things 0 0   Q2: Feeling down, depressed or hopeless 0 0   PHQ-2 Score 0 0       Do you feel safe in your environment - Yes    Do you have a Health Care Directive?: Yes: Advance Directive has been received and scanned.    Current providers sharing in care for this patient include:   Patient Care Team:  [facility] Erica as PCP - General    The following health maintenance items are reviewed in Epic and correct as of today:  Health Maintenance   Topic Date Due     FALL RISK ASSESSMENT  06/28/2018     INFLUENZA VACCINE (1) 09/01/2018     PHQ-2 Q1 YR  07/05/2019     ADVANCE DIRECTIVE PLANNING Q5 YRS  03/23/2023     TETANUS IMMUNIZATION (SYSTEM ASSIGNED)  05/27/2024     DEXA SCAN SCREENING (SYSTEM ASSIGNED)  Completed     PNEUMOCOCCAL  Completed     Labs reviewed in EPIC        ROS:  CONSTITUTIONAL: NEGATIVE for fever, chills, change in weight  INTEGUMENTARY/SKIN: NEGATIVE for worrisome rashes, moles or lesions  EYES: NEGATIVE for vision changes or irritation  ENT/MOUTH: NEGATIVE for ear, mouth and throat problems  RESP: NEGATIVE for significant cough or SOB  BREAST: NEGATIVE for masses, tenderness or discharge  CV: NEGATIVE for chest pain, palpitations or peripheral edema  GI: NEGATIVE for nausea, abdominal pain, heartburn, or change in bowel habits  : NEGATIVE for frequency, dysuria, or hematuria  MUSCULOSKELETAL: NEGATIVE for significant arthralgias or myalgia  NEURO: NEGATIVE for weakness, dizziness or paresthesias  HEME: NEGATIVE for bleeding problems  PSYCHIATRIC: NEGATIVE for changes in mood or affect    OBJECTIVE:   /54  Pulse 53  Temp 98.6  F (37  C) (Tympanic)  Resp 14  Ht 5' 2\" (1.575 m)  Wt 181 lb 8 oz (82.3 kg)  SpO2 99%  Breastfeeding? No  BMI 33.2 kg/m2 Estimated body mass index is 33.2 kg/(m^2) as calculated from the following:    Height as of this encounter: 5' 2\" " (1.575 m).    Weight as of this encounter: 181 lb 8 oz (82.3 kg).  EXAM:   GENERAL APPEARANCE: alert and no distress  EYES: Eyes grossly normal to inspection, PERRL and conjunctivae and sclerae normal  HENT: ear canals and TM's normal, nose and mouth without ulcers or lesions, oropharynx clear and oral mucous membranes moist  NECK: no adenopathy, no asymmetry, masses, or scars and thyroid normal to palpation  RESP: lungs clear to auscultation - no rales, rhonchi or wheezes  BREAST: normal without masses, tenderness or nipple discharge and no palpable axillary masses or adenopathy  CV: regular rate and rhythm, normal S1 S2, no S3 or S4, no murmur, click or rub, peripheral pulses strong.  +2 pitting b/l LE edema  ABDOMEN: soft, nontender, no hepatosplenomegaly, no masses and bowel sounds normal   (female): deferred per pt request  MS: no musculoskeletal defects are noted and gait is age appropriate without ataxia  SKIN: no suspicious lesions or rashes  NEURO: Normal strength and tone, sensory exam grossly normal, mentation intact and speech normal  PSYCH: mentation appears normal and affect normal/bright    Diagnostic Test Results:  BMP, TSH/T4    ASSESSMENT / PLAN:   Ghislaine was seen today for medicare visit.    Diagnoses and all orders for this visit:    Encounter for routine adult health examination without abnormal findings    Hypertension goal BP (blood pressure) < 140/90  -     amLODIPine (NORVASC) 10 MG tablet; Take 1 tablet (10 mg) by mouth daily  -     lisinopril (PRINIVIL/ZESTRIL) 40 MG tablet; Take 1 tablet (40 mg) by mouth daily  -     metoprolol tartrate (LOPRESSOR) 100 MG tablet; Take 1 tablet (100 mg) by mouth 2 times daily    Conductive hearing loss, bilateral    CKD (chronic kidney disease) stage 3, GFR 30-59 ml/min  -     Basic metabolic panel  (Ca, Cl, CO2, Creat, Gluc, K, Na, BUN)    Hypothyroidism, unspecified type  -     levothyroxine (SYNTHROID/LEVOTHROID) 50 MCG tablet; Take 1 tablet (50 mcg)  "by mouth daily  -     TSH  -     T4, free      Follow-up recommended for yearly preventive exams or sooner for an acute issues.    Needs to see provider in 6-7 months for med refills and monitor labs    End of Life Planning:  Patient currently has an advanced directive: DNR/DNI.  Practitioner is supportive of decision.    COUNSELING:  Reviewed preventive health counseling, as reflected in patient instructions  Special attention given to:       Regular exercise       Healthy diet/nutrition       Vision screening       Hearing screening       Dental care    BP Readings from Last 1 Encounters:   07/05/18 142/54     Estimated body mass index is 33.2 kg/(m^2) as calculated from the following:    Height as of this encounter: 5' 2\" (1.575 m).    Weight as of this encounter: 181 lb 8 oz (82.3 kg).      Weight management plan: Discussed healthy diet and exercise guidelines and patient will follow up in 6 months in clinic to re-evaluate.     reports that she quit smoking about 45 years ago. Her smoking use included Cigarettes. She has a 2.50 pack-year smoking history. She has never used smokeless tobacco.      Appropriate preventive services were discussed with this patient, including applicable screening as appropriate for cardiovascular disease, diabetes, osteopenia/osteoporosis, and glaucoma.  As appropriate for age/gender, discussed screening for colorectal cancer, prostate cancer, breast cancer, and cervical cancer. Checklist reviewing preventive services available has been given to the patient.    Reviewed patients plan of care and provided an AVS. The Intermediate Care Plan ( asthma action plan, low back pain action plan, and migraine action plan) for Ghislaine meets the Care Plan requirement. This Care Plan has been established and reviewed with the Patient.    Counseling Resources:  ATP IV Guidelines  Pooled Cohorts Equation Calculator  Breast Cancer Risk Calculator  FRAX Risk Assessment  ICSI Preventive " Guidelines  Dietary Guidelines for Americans, 2010  USDA's MyPlate  ASA Prophylaxis  Lung CA Screening    Mahsa Soto,   [facility]

## 2018-07-05 NOTE — MR AVS SNAPSHOT
After Visit Summary   7/5/2018    Ghislaine Walls    MRN: [id number]           Patient Information     Date Of Birth          1935        Visit Information        Provider Department      7/5/2018 1:10 PM Mahsa Soto DO [facility]        Today's Diagnoses     Encounter for routine adult health examination without abnormal findings    -  1    Hyperlipidemia LDL goal <130        Hypertension goal BP (blood pressure) < 140/90        Conductive hearing loss, bilateral        CKD (chronic kidney disease) stage 3, GFR 30-59 ml/min        Essential hypertension with goal blood pressure less than 140/90        Hypothyroidism, unspecified type          Care Instructions      Preventive Health Recommendations    Female Ages 65 +    Yearly exam:     See your health care provider every year in order to  o Review health changes.   o Discuss preventive care.    o Review your medicines if your doctor has prescribed any.      You no longer need a yearly Pap test unless you've had an abnormal Pap test in the past 10 years. If you have vaginal symptoms, such as bleeding or discharge, be sure to talk with your provider about a Pap test.      Every 1 to 2 years, have a mammogram.  If you are over 69, talk with your health care provider about whether or not you want to continue having screening mammograms.      Every 10 years, have a colonoscopy. Or, have a yearly FIT test (stool test). These exams will check for colon cancer.       Have a cholesterol test every 5 years, or more often if your doctor advises it.       Have a diabetes test (fasting glucose) every three years. If you are at risk for diabetes, you should have this test more often.       At age 65, have a bone density scan (DEXA) to check for osteoporosis (brittle bone disease).    Shots:    Get a flu shot each year.    Get a tetanus shot every 10 years.    Talk to your doctor about your pneumonia vaccines. There  are now two you should receive - Pneumovax (PPSV 23) and Prevnar (PCV 13).    Talk to your pharmacist about the shingles vaccine.    Talk to your doctor about the hepatitis B vaccine.    Nutrition:     Eat at least 5 servings of fruits and vegetables each day.      Eat whole-grain bread, whole-wheat pasta and brown rice instead of white grains and rice.      Get adequate Calcium and Vitamin D.     Lifestyle    Exercise at least 150 minutes a week (30 minutes a day, 5 days a week). This will help you control your weight and prevent disease.      Limit alcohol to one drink per day.      No smoking.       Wear sunscreen to prevent skin cancer.       See your dentist twice a year for an exam and cleaning.    See your eye doctor every 1 to 2 years to screen for conditions such as glaucoma, macular degeneration and cataracts.  Gout Diet  Gout is a painful condition caused by an excess of uric acid, a waste product made by the body. Uric acid forms crystals that collect in the joints. The immune response to these crystals brings on symptoms of joint pain and swelling. This is called a gout attack. Often, medications and diet changes are combined to manage gout. Below are some guidelines for changing your diet to help you manage gout and prevent attacks. Your health care provider will help you determine the best eating plan for you.     Eating to manage gout  Weight loss for those who are overweight may help reduce gout attacks.  Eat less of these foods  Eating too many foods containing purines may raise the levels of uric acid in your body. This raises your risk for a gout attack. Try to limit these foods and drinks:  Alcohol, such as beer and red wine. You may be told to avoid alcohol completely.  Soft drinks that contain sugar or high fructose corn syrup  Certain fish, including anchovies, sardines, fish eggs, and herring  Shellfish  Certain meats, such as red meat, hot dogs, luncheon meats, and turkey  Organ meats, such  as liver, kidneys, and sweetbreads  Legumes, such as dried beans and peas  Other high fat foods such as gravy, whole milk, and high fat cheeses  Vegetables such as asparagus, cauliflower, spinach, and mushrooms used to be thought to contribute to an increased risk for a gout attack, but recent studies show that high purine vegetables don't increase the risk for a gout attack.  Eat more of these foods  Other foods may be helpful for people with gout. Add some of these foods to your diet:  Cherries contain chemicals that may lower uric acid.  Omega fatty acids. These are found in some fatty fish such as salmon, certain oils (flax, olive, or nut), and nuts themselves. Omega fatty acids may help prevent inflammation due to gout.  Dairy products that are low-fat or fat-free, such as cheese and yogurt  Complex carbohydrate foods, including whole grains, brown rice, oats, and beans  Coffee, in moderation  Water, approximately 64 ounces per day  Follow-up care  Follow up with your healthcare provider as advised.  When to seek medical advice  Call your healthcare provider right away if any of these occur:  Return of gout symptoms, usually at night:  Severe pain, swelling, and heat in a joint, especially the base of the big toe  Affected joint is hard to move  Skin of the affected joint is purple or red  Fever of 100.4 F (38 C) or higher  Pain that doesn't get better even with prescribed medicine   Date Last Reviewed: 1/12/2016 2000-2017 [employer]. [address] [place] [address]. All rights reserved. This information is not intended as a substitute for professional medical care. Always follow your healthcare professional's instructions.        Eating to Prevent Gout  Gout is a painful form of arthritis caused by an excess of uric acid. This is a waste product made by the body. It builds up in the body and forms crystals that collect in the joints, causing a gout attack. Alcohol and certain foods  can trigger a gout attack. Below are some guidelines for changing your diet to help you manage gout. Your healthcare provider can work with you to determine the best eating plan for you. Know that diet is only one part of managing gout. Take your medicines as prescribed and follow the other guidelines your healthcare provider has given you.  Foods to limit  Eating too many foods containing purines may increase the levels of uric acid in your body and increase your risk for a gout attack. It may be best to limit these high-purine foods:  Alcohol (beer and red wine). You may be told to avoid alcohol completely.  Certain fish (anchovies, sardines, fish roes, herring, tuna, mussels, codfish, scallops, trout, and rainer)  Certain meats (red meat, processed meat, broussard, turkey, wild game, and goose)  Sauces and gravies made with meat  Organ meats (such as liver, kidneys, sweetbreads, and tripe)  Legumes (such as dried beans and peas)  Mushrooms, spinach, asparagus, and cauliflower  Yeast and yeast extract supplements  Foods to try  Some foods may be helpful for people with gout. You may want to try adding some of the following foods to your diet:  Dark berries. These include blueberries, blackberries, and cherries. These berries contain chemicals that may lower uric acid.  Tofu. Tofu, which is made from soy, is a good source of protein. Studies have shown that it may be a better choice than meat for people with gout.  Omega fatty acids. These acids are found in fatty fish (such as salmon), certain oils (such as flax, olive, or nut oils), or nuts. They may help prevent inflammation due to gout.  The following guidelines are recommended by the American Medical Association for people with gout. Your diet should be:  High in fiber, whole grains, fruits, and vegetables.  Low in protein (15% of calories should come from protein. Choose lean sources, such as soy, lean meats, and poultry).  Low in fat (no more than 30% of  calories should come from fat, with only 10% coming from animal, or saturated, fat).   Date Last Reviewed: 11/1/2017 2000-2017 [employer]. [address] [place] [address], [place] [address]. All rights reserved. This information is not intended as a substitute for professional medical care. Always follow your healthcare professional's instructions.        Gout    Gout is an inflammation of a joint due to a build-up of gout crystals in the joint fluid. This occurs when there is an excess of uric acid (a normal waste product) in the body. Uric acid builds up in the body when the kidneys are unable to filter enough of it from the blood. This may occur with age. It is also associated with kidney disease. Gout occurs more often in people with obesity, diabetes, high blood pressure, or high levels of fats in the blood. It may run in families. Gout tends to come and go. A flare up of gout is called an attack. Drinking alcohol or eating certain foods (such as shellfish or foods with additives such as high-fructose corn syrup) may increase uric acid levels in the blood and cause a gout attack.  During a gout attack, the affected joint may become a hot, red, swollen and painful. If you have had one attack of gout, you are likely to have another. An attack of gout can be treated with medicine. If these attacks become frequent, a daily medicine may be prescribed to help the kidneys remove uric acid from the body.  Home care  During a gout attack:  Rest painful joints. If gout affects the joints of your foot or leg, you may want to use crutches for the first few days to keep from bearing weight on the affected joint.  When sitting or lying down, raise the painful joint to a level higher than your heart.  Apply an ice pack (ice cubes in a plastic bag wrapped in a thin towel) over the injured area for 20 minutes every 1 to 2 hours the first day for pain relief. Continue this 3 to 4 times a day for swelling and  pain.  Avoid alcohol and foods listed below (see Preventing attacks) during a gout attack. Drink extra fluid to help flush the uric acid through your kidneys.  If you were prescribed a medicine to treat gout, take it as your healthcare provider has instructed. Don't skip doses.  Take anti-inflammatory medicine as directed.   If pain medicines have been prescribed, take them exactly as directed.    Preventing attacks  Minimize or avoid alcohol use. Excess alcohol intake can cause a gout attack.  Limit these foods and beverages:  Organ meats, such as kidneys and liver  Certain seafoods (anchovies, sardines, shrimp, scallops, herring, mackerel)  Wild game, meat extracts and meat gravies  Foods and beverages sweetened with high-fructose corn syrup, such as sodas  Eat a healthy diet including low-fat and nonfat dairy, whole grains, and vegetables.  If you are overweight, talk to your healthcare provider about a weight reduction plan. Avoid fasting or extreme low calorie diets (less than 900 calories per day). This will increase uric acid levels in the body.  If you have diabetes or high blood pressure, work with your doctor to manage these conditions.  Protect the joint from injury. Trauma can trigger a gout attack.  Follow-up care  Follow up with your healthcare provider, or as advised.   When to seek medical advice  Call your healthcare provider if you have any of the following:  Fever over 100.4 F (38. C) with worsening joint pain  Increasing redness around the joint  Pain developing in another joint  Repeated vomiting, abdominal pain, or blood in the vomit or stool (black or red color)  Date Last Reviewed: 3/1/2017    2025-0493 [employer]. [address] [place] [address]. All rights reserved. This information is not intended as a substitute for professional medical care. Always follow your healthcare professional's instructions.                  Follow-ups after your visit        Follow-up  "notes from your care team     Return for Routine Visit.      Who to contact     If you have questions or need follow up information about today's clinic visit or your schedule please contact [facility] directly at [phone].  Normal or non-critical lab and imaging results will be communicated to you by MyChart, letter or phone within 4 business days after the clinic has received the results. If you do not hear from us within 7 days, please contact the clinic through MyChart or phone. If you have a critical or abnormal lab result, we will notify you by phone as soon as possible.  Submit refill requests through [employer] [place] or call your pharmacy and they will forward the refill request to us. Please allow 3 business days for your refill to be completed.          Additional Information About Your Visit        Care EveryWhere ID     This is your Care EveryWhere ID. This could be used by other organizations to access your [place] medical records  [id number]        Your Vitals Were     Pulse Temperature Respirations Height Pulse Oximetry Breastfeeding?    53 98.6  F (37  C) (Tympanic) 14 5' 2\" (1.575 m) 99% No    BMI (Body Mass Index)                   33.2 kg/m2            Blood Pressure from Last 3 Encounters:   07/05/18 142/54   03/23/18 139/70   03/01/18 140/88    Weight from Last 3 Encounters:   07/05/18 181 lb 8 oz (82.3 kg)   03/23/18 196 lb (88.9 kg)   03/01/18 193 lb 8 oz (87.8 kg)              We Performed the Following     Basic metabolic panel  (Ca, Cl, CO2, Creat, Gluc, K, Na, BUN)     T4, free     TSH          Where to get your medicines      These medications were sent to [facility] [address] [place] [address] [address] [facility]  [address] [facility] [address]     Phone:  [phone]     amLODIPine 10 MG tablet    levothyroxine 50 MCG tablet    lisinopril 40 MG tablet    metoprolol tartrate 100 MG tablet          Primary Care Provider Office Phone # Fax # "    [facility] [phone]       [address] [facility] [address]        Equal Access to Services     ZBIGNIEW FLOREZ : Hadii aad ku hadrenardo Soarmandali, waaxda luqadaha, qaybta kaalmada ademoonyada, enrique vincent jayleen gillis. So [facility] [phone].    ATENCIÓN: Si habla español, tiene a khalil disposición servicios gratuitos de asistencia lingüística. Llame al [phone].    We comply with applicable federal civil rights laws and [place] laws. We do not discriminate on the basis of race, color, national origin, age, disability, sex, sexual orientation, or gender identity.            Thank you!     Thank you for choosing [facility]  for your care. Our goal is always to provide you with excellent care. Hearing back from our patients is one way we can continue to improve our services. Please take a few minutes to complete the written survey that you may receive in the mail after your visit with us. Thank you!             Your Updated Medication List - Protect others around you: Learn how to safely use, store and throw away your medicines at www.disposemymeds.org.          This list is accurate as of 7/5/18  1:53 PM.  Always use your most recent med list.                   Brand Name Dispense Instructions for use Diagnosis    amLODIPine 10 MG tablet    NORVASC    90 tablet    Take 1 tablet (10 mg) by mouth daily    Essential hypertension with goal blood pressure less than 140/90       fish oil-omega-3 fatty acids 1000 MG capsule      Take 1 capsule by mouth every other day.        furosemide 20 MG tablet    LASIX    20 tablet    Take 1 tablet (20 mg) by mouth daily for 20 days        levothyroxine 50 MCG tablet    SYNTHROID/LEVOTHROID    90 tablet    Take 1 tablet (50 mcg) by mouth daily    Essential hypertension with goal blood pressure less than 140/90, Hypothyroidism, unspecified type       lisinopril 40 MG tablet     PRINIVIL/ZESTRIL    90 tablet    Take 1 tablet (40 mg) by mouth daily    Essential hypertension with goal blood pressure less than 140/90       melatonin 3 MG tablet      Take 1 tablet by mouth nightly as needed.        metoprolol tartrate 100 MG tablet    LOPRESSOR    180 tablet    Take 1 tablet (100 mg) by mouth 2 times daily    Essential hypertension with goal blood pressure less than 140/90

## 2018-07-05 NOTE — PATIENT INSTRUCTIONS
Preventive Health Recommendations    Female Ages 65 +    Yearly exam:     See your health care provider every year in order to  o Review health changes.   o Discuss preventive care.    o Review your medicines if your doctor has prescribed any.      You no longer need a yearly Pap test unless you've had an abnormal Pap test in the past 10 years. If you have vaginal symptoms, such as bleeding or discharge, be sure to talk with your provider about a Pap test.      Every 1 to 2 years, have a mammogram.  If you are over 69, talk with your health care provider about whether or not you want to continue having screening mammograms.      Every 10 years, have a colonoscopy. Or, have a yearly FIT test (stool test). These exams will check for colon cancer.       Have a cholesterol test every 5 years, or more often if your doctor advises it.       Have a diabetes test (fasting glucose) every three years. If you are at risk for diabetes, you should have this test more often.       At age 65, have a bone density scan (DEXA) to check for osteoporosis (brittle bone disease).    Shots:    Get a flu shot each year.    Get a tetanus shot every 10 years.    Talk to your doctor about your pneumonia vaccines. There are now two you should receive - Pneumovax (PPSV 23) and Prevnar (PCV 13).    Talk to your pharmacist about the shingles vaccine.    Talk to your doctor about the hepatitis B vaccine.    Nutrition:     Eat at least 5 servings of fruits and vegetables each day.      Eat whole-grain bread, whole-wheat pasta and brown rice instead of white grains and rice.      Get adequate Calcium and Vitamin D.     Lifestyle    Exercise at least 150 minutes a week (30 minutes a day, 5 days a week). This will help you control your weight and prevent disease.      Limit alcohol to one drink per day.      No smoking.       Wear sunscreen to prevent skin cancer.       See your dentist twice a year for an exam and cleaning.    See your eye doctor  every 1 to 2 years to screen for conditions such as glaucoma, macular degeneration and cataracts.  Gout Diet  Gout is a painful condition caused by an excess of uric acid, a waste product made by the body. Uric acid forms crystals that collect in the joints. The immune response to these crystals brings on symptoms of joint pain and swelling. This is called a gout attack. Often, medications and diet changes are combined to manage gout. Below are some guidelines for changing your diet to help you manage gout and prevent attacks. Your health care provider will help you determine the best eating plan for you.     Eating to manage gout  Weight loss for those who are overweight may help reduce gout attacks.  Eat less of these foods  Eating too many foods containing purines may raise the levels of uric acid in your body. This raises your risk for a gout attack. Try to limit these foods and drinks:  Alcohol, such as beer and red wine. You may be told to avoid alcohol completely.  Soft drinks that contain sugar or high fructose corn syrup  Certain fish, including anchovies, sardines, fish eggs, and herring  Shellfish  Certain meats, such as red meat, hot dogs, luncheon meats, and turkey  Organ meats, such as liver, kidneys, and sweetbreads  Legumes, such as dried beans and peas  Other high fat foods such as gravy, whole milk, and high fat cheeses  Vegetables such as asparagus, cauliflower, spinach, and mushrooms used to be thought to contribute to an increased risk for a gout attack, but recent studies show that high purine vegetables don't increase the risk for a gout attack.  Eat more of these foods  Other foods may be helpful for people with gout. Add some of these foods to your diet:  Cherries contain chemicals that may lower uric acid.  Omega fatty acids. These are found in some fatty fish such as salmon, certain oils (flax, olive, or nut), and nuts themselves. Omega fatty acids may help prevent inflammation due to  gout.  Dairy products that are low-fat or fat-free, such as cheese and yogurt  Complex carbohydrate foods, including whole grains, brown rice, oats, and beans  Coffee, in moderation  Water, approximately 64 ounces per day  Follow-up care  Follow up with your healthcare provider as advised.  When to seek medical advice  Call your healthcare provider right away if any of these occur:  Return of gout symptoms, usually at night:  Severe pain, swelling, and heat in a joint, especially the base of the big toe  Affected joint is hard to move  Skin of the affected joint is purple or red  Fever of 100.4 F (38 C) or higher  Pain that doesn't get better even with prescribed medicine   Date Last Reviewed: 1/12/2016 2000-2017 [employer]. [address] [place] [address], [place] [address]. All rights reserved. This information is not intended as a substitute for professional medical care. Always follow your healthcare professional's instructions.        Eating to Prevent Gout  Gout is a painful form of arthritis caused by an excess of uric acid. This is a waste product made by the body. It builds up in the body and forms crystals that collect in the joints, causing a gout attack. Alcohol and certain foods can trigger a gout attack. Below are some guidelines for changing your diet to help you manage gout. Your healthcare provider can work with you to determine the best eating plan for you. Know that diet is only one part of managing gout. Take your medicines as prescribed and follow the other guidelines your healthcare provider has given you.  Foods to limit  Eating too many foods containing purines may increase the levels of uric acid in your body and increase your risk for a gout attack. It may be best to limit these high-purine foods:  Alcohol (beer and red wine). You may be told to avoid alcohol completely.  Certain fish (anchovies, sardines, fish roes, herring, tuna, mussels, codfish, scallops, trout, and  rainer)  Certain meats (red meat, processed meat, broussard, turkey, wild game, and goose)  Sauces and gravies made with meat  Organ meats (such as liver, kidneys, sweetbreads, and tripe)  Legumes (such as dried beans and peas)  Mushrooms, spinach, asparagus, and cauliflower  Yeast and yeast extract supplements  Foods to try  Some foods may be helpful for people with gout. You may want to try adding some of the following foods to your diet:  Dark berries. These include blueberries, blackberries, and cherries. These berries contain chemicals that may lower uric acid.  Tofu. Tofu, which is made from soy, is a good source of protein. Studies have shown that it may be a better choice than meat for people with gout.  Omega fatty acids. These acids are found in fatty fish (such as salmon), certain oils (such as flax, olive, or nut oils), or nuts. They may help prevent inflammation due to gout.  The following guidelines are recommended by the American Medical Association for people with gout. Your diet should be:  High in fiber, whole grains, fruits, and vegetables.  Low in protein (15% of calories should come from protein. Choose lean sources, such as soy, lean meats, and poultry).  Low in fat (no more than 30% of calories should come from fat, with only 10% coming from animal, or saturated, fat).   Date Last Reviewed: 11/1/2017 2000-2017 [employer]. [address] [place] [address], [place] [address]. All rights reserved. This information is not intended as a substitute for professional medical care. Always follow your healthcare professional's instructions.        Gout    Gout is an inflammation of a joint due to a build-up of gout crystals in the joint fluid. This occurs when there is an excess of uric acid (a normal waste product) in the body. Uric acid builds up in the body when the kidneys are unable to filter enough of it from the blood. This may occur with age. It is also associated with kidney disease. Gout  occurs more often in people with obesity, diabetes, high blood pressure, or high levels of fats in the blood. It may run in families. Gout tends to come and go. A flare up of gout is called an attack. Drinking alcohol or eating certain foods (such as shellfish or foods with additives such as high-fructose corn syrup) may increase uric acid levels in the blood and cause a gout attack.  During a gout attack, the affected joint may become a hot, red, swollen and painful. If you have had one attack of gout, you are likely to have another. An attack of gout can be treated with medicine. If these attacks become frequent, a daily medicine may be prescribed to help the kidneys remove uric acid from the body.  Home care  During a gout attack:  Rest painful joints. If gout affects the joints of your foot or leg, you may want to use crutches for the first few days to keep from bearing weight on the affected joint.  When sitting or lying down, raise the painful joint to a level higher than your heart.  Apply an ice pack (ice cubes in a plastic bag wrapped in a thin towel) over the injured area for 20 minutes every 1 to 2 hours the first day for pain relief. Continue this 3 to 4 times a day for swelling and pain.  Avoid alcohol and foods listed below (see Preventing attacks) during a gout attack. Drink extra fluid to help flush the uric acid through your kidneys.  If you were prescribed a medicine to treat gout, take it as your healthcare provider has instructed. Don't skip doses.  Take anti-inflammatory medicine as directed.   If pain medicines have been prescribed, take them exactly as directed.    Preventing attacks  Minimize or avoid alcohol use. Excess alcohol intake can cause a gout attack.  Limit these foods and beverages:  Organ meats, such as kidneys and liver  Certain seafoods (anchovies, sardines, shrimp, scallops, herring, mackerel)  Wild game, meat extracts and meat gravies  Foods and beverages sweetened with  high-fructose corn syrup, such as sodas  Eat a healthy diet including low-fat and nonfat dairy, whole grains, and vegetables.  If you are overweight, talk to your healthcare provider about a weight reduction plan. Avoid fasting or extreme low calorie diets (less than 900 calories per day). This will increase uric acid levels in the body.  If you have diabetes or high blood pressure, work with your doctor to manage these conditions.  Protect the joint from injury. Trauma can trigger a gout attack.  Follow-up care  Follow up with your healthcare provider, or as advised.   When to seek medical advice  Call your healthcare provider if you have any of the following:  Fever over 100.4 F (38. C) with worsening joint pain  Increasing redness around the joint  Pain developing in another joint  Repeated vomiting, abdominal pain, or blood in the vomit or stool (black or red color)  Date Last Reviewed: 3/1/2017    6945-8169 [employer]. [address] [place] [address]. All rights reserved. This information is not intended as a substitute for professional medical care. Always follow your healthcare professional's instructions.

## 2018-07-06 LAB
ANION GAP SERPL CALCULATED.3IONS-SCNC: 7 MMOL/L (ref 3–14)
BUN SERPL-MCNC: 36 MG/DL (ref 7–30)
CALCIUM SERPL-MCNC: 9.6 MG/DL (ref 8.5–10.1)
CHLORIDE SERPL-SCNC: 111 MMOL/L (ref 94–109)
CO2 SERPL-SCNC: 24 MMOL/L (ref 20–32)
CREAT SERPL-MCNC: 1.5 MG/DL (ref 0.52–1.04)
GFR SERPL CREATININE-BSD FRML MDRD: 33 ML/MIN/1.7M2
GLUCOSE SERPL-MCNC: 94 MG/DL (ref 70–99)
POTASSIUM SERPL-SCNC: 4.8 MMOL/L (ref 3.4–5.3)
SODIUM SERPL-SCNC: 142 MMOL/L (ref 133–144)
T4 FREE SERPL-MCNC: 1.47 NG/DL (ref 0.76–1.46)
TSH SERPL DL<=0.005 MIU/L-ACNC: 0.42 MU/L (ref 0.4–4)

## 2018-07-09 ENCOUNTER — TELEPHONE (OUTPATIENT)
Dept: FAMILY MEDICINE | Facility: CLINIC | Age: 83
End: 2018-07-09

## 2018-07-09 DIAGNOSIS — E03.9 HYPOTHYROIDISM, UNSPECIFIED TYPE: Primary | ICD-10-CM

## 2018-07-09 NOTE — TELEPHONE ENCOUNTER
Pt called requesting alternative for Amlodipine since she forgot to ask at visit. She feels this medication is the cause of her fluids retention in her legs and hands. Denies breathing problems chest pains or paresthesia. Please advice.

## 2018-07-09 NOTE — TELEPHONE ENCOUNTER
Reason for Call:  Other prescription    Detailed comments:   Patient states that she was told at her office visit on 07/05/18 that her amLODIPine (NORVASC) 10 MG tablet could be causing fluid retention      She would like to discuss changing that medication to something else   Please call patient to discuss    Phone Number Patient can be reached at: Home number on file [phone] (home)    Best Time: anytime  Can we leave a detailed message on this number? YES    Call taken on 7/9/2018 at 8:13 AM by NONA BARRETT

## 2018-07-10 NOTE — TELEPHONE ENCOUNTER
Talked with pt.  She decided she would stay on the amlodipine. I encouraged her to elevate her legs when ever she is sitting.

## 2018-07-10 NOTE — TELEPHONE ENCOUNTER
"Unfortunately, there may not be an alternative to amlodipine that you might like to switch to...  We could try to switch you back to Hydrochlorothiazide (\"HCTZ\").  Or try Chlorthalidone which is similar to HCTZ.    Both of these BP meds can increase your risk of getting a gout flare.  If you watch what you eat (anti-gout diet) and keep up with your fluids/hydration/exercise, you may help to decrease your risk of getting gout while on one of these medications.    --Dr. Soto    "

## 2018-07-11 RX ORDER — LEVOTHYROXINE SODIUM 25 UG/1
25 TABLET ORAL DAILY
Qty: 90 TABLET | Refills: 0 | Status: SHIPPED | OUTPATIENT
Start: 2018-07-11 | End: 2018-10-04

## 2018-07-11 NOTE — TELEPHONE ENCOUNTER
Please help notify Ghislaine about her lab results last week.    I sent a letter with Ghislaine's lab results as well.     1.  BMP normal.    2.  Thyroid levels indicate she has too much thyroid, so we are decreasing her dose and will re-check her thyroid in 7-8 weeks during lab-only appt.      Here is the message in the letter:  It looks like your kidney function is stable and your electrolytes are normal.  I think we may have to decrease your thyroid medication.  I am sending you a new prescription of Synthroid 25 mcg.  We should re-check your thyroid levels in about 7-8 weeks.  I will have it ordered for you, so you just need to stop by the lab to get your blood drawn when it is due in 7-8 weeks.    Thanks!  --Dr. Soto

## 2018-08-29 ENCOUNTER — TELEPHONE (OUTPATIENT)
Dept: FAMILY MEDICINE | Facility: CLINIC | Age: 83
End: 2018-08-29

## 2018-08-29 DIAGNOSIS — M10.9 ACUTE GOUTY ARTHRITIS: Primary | ICD-10-CM

## 2018-08-29 RX ORDER — ALLOPURINOL 100 MG/1
100 TABLET ORAL DAILY
Qty: 30 TABLET | Refills: 1 | Status: SHIPPED | OUTPATIENT
Start: 2018-08-29 | End: 2018-10-24

## 2018-08-29 NOTE — TELEPHONE ENCOUNTER
Patient was infomed Rx for allopurinol was approved and sent to [facility] pharmacy. F/U appointment needed in 1 -2 months. Pt verbalized understating and agreement with plan.

## 2018-08-29 NOTE — TELEPHONE ENCOUNTER
I will sen over allopurinol.  Please have her schedule f/u in 1-2 months to check up on her gout flare and medication tolerance.  Thanks!  --Dr. Soto

## 2018-08-29 NOTE — TELEPHONE ENCOUNTER
Patient was called back. She had 4 gout flare ups in the last several months. She did try colcrys for gout once prescribed by an outside provider. She is requesting to try allopurinol as a preventative drug. Patient said that Dr. Soto is aware of her gout symptoms. Provider to review and advise if OV is needed for this.

## 2018-08-30 DIAGNOSIS — E03.9 HYPOTHYROIDISM, UNSPECIFIED TYPE: ICD-10-CM

## 2018-08-30 PROCEDURE — 84443 ASSAY THYROID STIM HORMONE: CPT | Performed by: FAMILY MEDICINE

## 2018-08-30 PROCEDURE — 84439 ASSAY OF FREE THYROXINE: CPT | Performed by: FAMILY MEDICINE

## 2018-08-30 PROCEDURE — 36415 COLL VENOUS BLD VENIPUNCTURE: CPT | Performed by: FAMILY MEDICINE

## 2018-08-31 ENCOUNTER — HOSPITAL ENCOUNTER (OUTPATIENT)
Dept: MAMMOGRAPHY | Facility: CLINIC | Age: 83
Discharge: HOME | End: 2018-08-31
Attending: FAMILY MEDICINE | Admitting: FAMILY MEDICINE
Payer: MEDICARE

## 2018-08-31 DIAGNOSIS — Z12.31 VISIT FOR SCREENING MAMMOGRAM: ICD-10-CM

## 2018-08-31 LAB
T4 FREE SERPL-MCNC: 1.11 NG/DL (ref 0.76–1.46)
TSH SERPL DL<=0.005 MIU/L-ACNC: 0.84 MU/L (ref 0.4–4)

## 2018-08-31 PROCEDURE — 77067 SCR MAMMO BI INCL CAD: CPT

## 2018-10-04 DIAGNOSIS — E03.9 HYPOTHYROIDISM, UNSPECIFIED TYPE: ICD-10-CM

## 2018-10-04 RX ORDER — LEVOTHYROXINE SODIUM 25 UG/1
25 TABLET ORAL DAILY
Qty: 90 TABLET | Refills: 2 | Status: SHIPPED | OUTPATIENT
Start: 2018-10-04 | End: 2018-12-31

## 2018-10-04 NOTE — TELEPHONE ENCOUNTER
"levothyroxine (SYNTHROID/LEVOTHROID) 25 MCG tablet  Last Written Prescription Date:  07/11/18  Last Fill Quantity: 90,  # refills: 0   Last office visit: 7/5/2018 with prescribing provider:  07/05/18   Future Office Visit:    Requested Prescriptions   Pending Prescriptions Disp Refills     levothyroxine (SYNTHROID/LEVOTHROID) 25 MCG tablet 90 tablet 0     Sig: Take 1 tablet (25 mcg) by mouth daily    Thyroid Protocol Passed    10/4/2018  2:33 PM       Passed - Patient is 12 years or older       Passed - Recent (12 mo) or future (30 days) visit within the authorizing provider's specialty    Patient had office visit in the last 12 months or has a visit in the next 30 days with authorizing provider or within the authorizing provider's specialty.  See \"Patient Info\" tab in inbasket, or \"Choose Columns\" in Meds & Orders section of the refill encounter.           Passed - Normal TSH on file in past 12 months    Recent Labs   Lab Test  08/30/18   1442   TSH  0.84             Passed - No active pregnancy on record    If patient is pregnant or has had a positive pregnancy test, please check TSH.         Passed - No positive pregnancy test in past 12 months    If patient is pregnant or has had a positive pregnancy test, please check TSH.            "

## 2018-10-08 ENCOUNTER — TELEPHONE (OUTPATIENT)
Dept: FAMILY MEDICINE | Facility: CLINIC | Age: 83
End: 2018-10-08

## 2018-10-08 NOTE — TELEPHONE ENCOUNTER
[facility] pharmacy was called. Rx is ready for . Pt was informed Rx is ready at her pharmacy.

## 2018-10-08 NOTE — TELEPHONE ENCOUNTER
Reason for Call:  Medication or medication refill:    Do you use a [facility]?  Name of the pharmacy [facility] phone number [facility]    Name of the medication requested: thyroid med    Other request: states pharmacy did not get request please call again    Can we leave a detailed message on this number? YES    Phone number patient can be reached at: Home number on file [phone] (home)    Best Time:     Call taken on 10/8/2018 at 8:25 AM by ENRICO CHRISTIAN

## 2018-10-24 ENCOUNTER — OFFICE VISIT (OUTPATIENT)
Dept: FAMILY MEDICINE | Facility: CLINIC | Age: 83
End: 2018-10-24
Payer: COMMERCIAL

## 2018-10-24 VITALS
SYSTOLIC BLOOD PRESSURE: 200 MMHG | BODY MASS INDEX: 34.1 KG/M2 | WEIGHT: 185.3 LBS | HEART RATE: 67 BPM | OXYGEN SATURATION: 99 % | RESPIRATION RATE: 14 BRPM | DIASTOLIC BLOOD PRESSURE: 90 MMHG | TEMPERATURE: 99.5 F | HEIGHT: 62 IN

## 2018-10-24 DIAGNOSIS — N18.30 CKD (CHRONIC KIDNEY DISEASE) STAGE 3, GFR 30-59 ML/MIN (H): Chronic | ICD-10-CM

## 2018-10-24 DIAGNOSIS — E03.8 OTHER SPECIFIED HYPOTHYROIDISM: Chronic | ICD-10-CM

## 2018-10-24 DIAGNOSIS — M10.9 ACUTE GOUTY ARTHRITIS: ICD-10-CM

## 2018-10-24 DIAGNOSIS — I10 HYPERTENSION, UNCONTROLLED: Primary | ICD-10-CM

## 2018-10-24 PROCEDURE — 36415 COLL VENOUS BLD VENIPUNCTURE: CPT | Performed by: FAMILY MEDICINE

## 2018-10-24 PROCEDURE — 99214 OFFICE O/P EST MOD 30 MIN: CPT | Performed by: FAMILY MEDICINE

## 2018-10-24 PROCEDURE — 80048 BASIC METABOLIC PNL TOTAL CA: CPT | Performed by: FAMILY MEDICINE

## 2018-10-24 PROCEDURE — 84443 ASSAY THYROID STIM HORMONE: CPT | Performed by: FAMILY MEDICINE

## 2018-10-24 RX ORDER — AMLODIPINE BESYLATE 5 MG/1
5 TABLET ORAL DAILY
Qty: 30 TABLET | Refills: 0 | COMMUNITY
Start: 2018-10-24 | End: 2019-03-28

## 2018-10-24 RX ORDER — ALLOPURINOL 100 MG/1
100 TABLET ORAL DAILY
Qty: 30 TABLET | Refills: 1 | Status: SHIPPED | OUTPATIENT
Start: 2018-10-24 | End: 2019-01-02

## 2018-10-24 NOTE — MR AVS SNAPSHOT
"              After Visit Summary   10/24/2018    Ghislaine Walls    MRN: [id number]           Patient Information     Date Of Birth          1935        Visit Information        Provider Department      10/24/2018 4:30 PM Mahsa Soto,  [facility]        Today's Diagnoses     Gouty arthritis    -  1    Hypertension, uncontrolled        Benign essential hypertension        Hypertension goal BP (blood pressure) < 140/90        CKD (chronic kidney disease) stage 3, GFR 30-59 ml/min (H)           Follow-ups after your visit        Follow-up notes from your care team     Return in about 2 weeks (around 11/7/2018) for If Not Getting Any Better.      Who to contact     If you have questions or need follow up information about today's clinic visit or your schedule please contact [facility] directly at [phone].  Normal or non-critical lab and imaging results will be communicated to you by MyChart, letter or phone within 4 business days after the clinic has received the results. If you do not hear from us within 7 days, please contact the clinic through MyChart or phone. If you have a critical or abnormal lab result, we will notify you by phone as soon as possible.  Submit refill requests through [employer] or call your pharmacy and they will forward the refill request to us. Please allow 3 business days for your refill to be completed.          Additional Information About Your Visit        Care EveryWhere ID     This is your Care EveryWhere ID. This could be used by other organizations to access your [facility] medical records  [id number]        Your Vitals Were     Pulse Temperature Respirations Height Pulse Oximetry Breastfeeding?    67 99.5  F (37.5  C) (Tympanic) 14 5' 2\" (1.575 m) 99% No    BMI (Body Mass Index)                   33.89 kg/m2            Blood Pressure from Last 3 Encounters:   10/24/18 200/90   07/05/18 142/54   03/23/18 " 139/70    Weight from Last 3 Encounters:   10/24/18 185 lb 4.8 oz (84.1 kg)   07/05/18 181 lb 8 oz (82.3 kg)   03/23/18 196 lb (88.9 kg)              We Performed the Following     Basic metabolic panel  (Ca, Cl, CO2, Creat, Gluc, K, Na, BUN)     TSH with free T4 reflex          Today's Medication Changes          These changes are accurate as of 10/24/18  4:59 PM.  If you have any questions, ask your nurse or doctor.               These medicines have changed or have updated prescriptions.        Dose/Directions    amLODIPine 5 MG tablet   Commonly known as:  NORVASC   This may have changed:    - medication strength  - how much to take   Used for:  Hypertension goal BP (blood pressure) < 140/90   Changed by:  Mahsa Soto DO        Dose:  5 mg   Take 1 tablet (5 mg) by mouth daily   Quantity:  30 tablet   Refills:  0                Primary Care Provider Office Phone # Fax #    [facility] [phone] [address] [facility] [address]        Equal Access to Services     ZBIGNIEW FLOREZ AH: Hadii kalpana ku hadasho Soshelby, waaxda luqadaha, qaybta kaalmada adedylon, enrique gillis. So [facility] [phone].    ATENCIÓN: Si habla español, tiene a khalil disposición servicios gratuitos de asistencia lingüística. Felicia al [phone].    We comply with applicable federal civil rights laws and [place] laws. We do not discriminate on the basis of race, color, national origin, age, disability, sex, sexual orientation, or gender identity.            Thank you!     Thank you for choosing [facility]  for your care. Our goal is always to provide you with excellent care. Hearing back from our patients is one way we can continue to improve our services. Please take a few minutes to complete the written survey that you may receive in the mail after your visit with us. Thank you!             Your Updated Medication  List - Protect others around you: Learn how to safely use, store and throw away your medicines at www.disposemymeds.org.          This list is accurate as of 10/24/18  4:59 PM.  Always use your most recent med list.                   Brand Name Dispense Instructions for use Diagnosis    allopurinol 100 MG tablet    ZYLOPRIM    30 tablet    Take 1 tablet (100 mg) by mouth daily    Acute gouty arthritis       amLODIPine 5 MG tablet    NORVASC    30 tablet    Take 1 tablet (5 mg) by mouth daily    Hypertension goal BP (blood pressure) < 140/90       fish oil-omega-3 fatty acids 1000 MG capsule      Take 1 capsule by mouth every other day.        levothyroxine 25 MCG tablet    SYNTHROID/LEVOTHROID    90 tablet    Take 1 tablet (25 mcg) by mouth daily    Hypothyroidism, unspecified type       lisinopril 40 MG tablet    PRINIVIL/ZESTRIL    90 tablet    Take 1 tablet (40 mg) by mouth daily    Hypertension goal BP (blood pressure) < 140/90       melatonin 3 MG tablet      Take 1 tablet by mouth nightly as needed.        metoprolol tartrate 100 MG tablet    LOPRESSOR    180 tablet    Take 1 tablet (100 mg) by mouth 2 times daily    Hypertension goal BP (blood pressure) < 140/90

## 2018-10-24 NOTE — PROGRESS NOTES
"  SUBJECTIVE:   Ghislaine Walls is a 83 year old female who presents to clinic today for the following health issues:      Medication Followup of Allopurinol    Taking Medication as prescribed: yes--needs refill    Side Effects:  None    Medication Helping Symptoms:  yes       Hypertension Follow-up  Stopped amlodipine in August due to fluid retention    Outpatient blood pressures are being checked at home 150's    Low Salt Diet: Yes      Problem list and histories reviewed & adjusted, as indicated.  Additional history: as documented    Labs reviewed in EPIC    Reviewed and updated as needed this visit by clinical staff  Tobacco  Allergies  Meds  Problems  Med Hx  Surg Hx  Fam Hx  Soc Hx        Reviewed and updated as needed this visit by Provider  Allergies  Meds  Problems         ROS:  CONSTITUTIONAL: NEGATIVE for fever, chills, change in weight  INTEGUMENTARY/SKIN: NEGATIVE for worrisome rashes, moles or lesions  EYES: NEGATIVE for vision changes or irritation  ENT/MOUTH: NEGATIVE for ear, mouth and throat problems  RESP: NEGATIVE for significant cough or SOB  CV: NEGATIVE for chest pain or palpitations  GI: NEGATIVE for nausea, abdominal pain, heartburn, or change in bowel habits  : NEGATIVE for frequency, dysuria, or hematuria  MUSCULOSKELETAL: NEGATIVE for significant arthralgias or myalgia  NEURO: NEGATIVE for weakness, dizziness or paresthesias  HEME: NEGATIVE for bleeding problems    OBJECTIVE:     /90  Pulse 67  Temp 99.5  F (37.5  C) (Tympanic)  Resp 14  Ht 5' 2\" (1.575 m)  Wt 185 lb 4.8 oz (84.1 kg)  SpO2 99%  Breastfeeding? No  BMI 33.89 kg/m2  Body mass index is 33.89 kg/(m^2).   GENERAL APPEARANCE: alert and no distress  EYES: Eyes grossly normal to inspection, PERRL and conjunctivae and sclerae normal  HENT: Nose and mouth without ulcers or lesions, oropharynx clear and oral mucous membranes moist  NECK: no adenopathy, no asymmetry, masses, or scars and thyroid normal to " palpation  RESP: lungs clear to auscultation - no rales, rhonchi or wheezes  CV: regular rate and rhythm, normal S1 S2, no S3 or S4, no murmur, click or rub, peripheral pulses strong.  +2 pitting b/l LE edema  ABDOMEN: soft, nontender, no masses and bowel sounds normal  MS: no musculoskeletal defects are noted and gait is age appropriate without ataxia  SKIN: no suspicious lesions or rashes  NEURO: Normal strength and tone, sensory exam grossly normal, mentation intact and speech normal  PSYCH: mentation appears normal and affect normal/bright     Diagnostic Test Results:  BMP, TSH/T4    ASSESSMENT/PLAN:     Problem List Items Addressed This Visit     Other specified hypothyroidism (Chronic)    Relevant Orders    TSH with free T4 reflex (Completed)    CKD (chronic kidney disease) stage 3, GFR 30-59 ml/min (H) (Chronic)      Other Visit Diagnoses     Hypertension, uncontrolled    -  Primary    Relevant Orders    Basic metabolic panel  (Ca, Cl, CO2, Creat, Gluc, K, Na, BUN) (Completed)    TSH with free T4 reflex (Completed)    Acute gouty arthritis        Relevant Medications    allopurinol (ZYLOPRIM) 100 MG tablet         Ghislaine tells me that BP's at home have been in 140s/90s (possibly?).  So may have White coat syndrome.  We will have Ghislaine check her BP's at home and bring in BP log (and BP machine) to next OV in 1-2 weeks to help confirm BP control at home vs at office and to make sure that her BP machine works too.  Ghislaine took herself off the Amlodipine due to b/l leg swelling.  She is agreeable to re-start her amlodipine but at a lower dose (5 mg instead of 10 mg) to see if a lower dose helps control her BP without making her legs swell up.  She has CKD with HTN, so BP goal is <140/90.  Also takes Lisinopril 40 mg daily and Metoprolol 100 mg BID.  May need to have Ghislaine see Nephrology for her uncontrolled HTN with CKD.  May not be able to be on Lisinopril as well due to CKD.  Last creatinine/BUN was  1.5/36 on 7/5/18  GFR was 33 on 7/5/18.    Also has gout which has been a lot better controlled on Allopurinol 100 mg po every day over the last 2 months.  But due to CKD, we may need to switch her to Uloric (or lower dose of Allopurinol?)? Or consult Nephrology for alternative.    Will RTC in 1-2 weeks to re-check BP.    Mahsa Soto, DO  [facility]

## 2018-10-24 NOTE — LETTER
October 31, 2018      Ghislaine Walls  [address] [address]        Dear ,    We are writing to inform you of your test results.    I am happy to inform you that your kidney function is stable and your electrolytes are normal.  Your thyroid hormone level is normal as well.    Resulted Orders   Basic metabolic panel  (Ca, Cl, CO2, Creat, Gluc, K, Na, BUN)   Result Value Ref Range    Sodium 140 133 - 144 mmol/L    Potassium 4.5 3.4 - 5.3 mmol/L    Chloride 107 94 - 109 mmol/L    Carbon Dioxide 21 20 - 32 mmol/L    Anion Gap 12 3 - 14 mmol/L    Glucose 98 70 - 99 mg/dL      Comment:      Non Fasting    Urea Nitrogen 29 7 - 30 mg/dL    Creatinine 1.59 (H) 0.52 - 1.04 mg/dL    GFR Estimate 31 (L) >60 mL/min/1.7m2      Comment:      Non  GFR Calc    GFR Estimate If Black 38 (L) >60 mL/min/1.7m2      Comment:       GFR Calc    Calcium 9.2 8.5 - 10.1 mg/dL   TSH with free T4 reflex   Result Value Ref Range    TSH 1.00 0.40 - 4.00 mU/L       If you have any questions or concerns, please call the clinic at the number listed above.       Sincerely,        Mahsa Soto, DO

## 2018-10-25 LAB
ANION GAP SERPL CALCULATED.3IONS-SCNC: 12 MMOL/L (ref 3–14)
BUN SERPL-MCNC: 29 MG/DL (ref 7–30)
CALCIUM SERPL-MCNC: 9.2 MG/DL (ref 8.5–10.1)
CHLORIDE SERPL-SCNC: 107 MMOL/L (ref 94–109)
CO2 SERPL-SCNC: 21 MMOL/L (ref 20–32)
CREAT SERPL-MCNC: 1.59 MG/DL (ref 0.52–1.04)
GFR SERPL CREATININE-BSD FRML MDRD: 31 ML/MIN/1.7M2
GLUCOSE SERPL-MCNC: 98 MG/DL (ref 70–99)
POTASSIUM SERPL-SCNC: 4.5 MMOL/L (ref 3.4–5.3)
SODIUM SERPL-SCNC: 140 MMOL/L (ref 133–144)
TSH SERPL DL<=0.005 MIU/L-ACNC: 1 MU/L (ref 0.4–4)

## 2018-12-12 DIAGNOSIS — I65.23 BILATERAL CAROTID ARTERY STENOSIS: Primary | ICD-10-CM

## 2018-12-17 ENCOUNTER — TELEPHONE (OUTPATIENT)
Dept: FAMILY MEDICINE | Facility: CLINIC | Age: 83
End: 2018-12-17

## 2018-12-17 NOTE — TELEPHONE ENCOUNTER
Reason for Call:  Other kidney disease    Detailed comments: for insurance reasons pt needs to know if she has kidney disease or chronic kidney disease.      Phone Number Patient can be reached at: Home number on file [phone] (home)    Best Time: any    Can we leave a detailed message on this number? YES    Call taken on 12/17/2018 at 11:15 AM by CISCO SILVESTRE

## 2018-12-17 NOTE — TELEPHONE ENCOUNTER
Patient was called and informed she does have CKD.     CKD (chronic kidney disease) stage 3, GFR 30-59 ml/min (H)     Creatinine 1.59 Abnormally high   H  0.52 - 1.04 mg/dL Final 10/24/2018  5:02 PM 65   GFR Estimate 31 Abnormally low   L  >60 mL/min/1.7m2 Final 10/24/2018  5:02 PM 66

## 2018-12-26 DIAGNOSIS — M10.9 ACUTE GOUTY ARTHRITIS: ICD-10-CM

## 2018-12-26 DIAGNOSIS — E03.9 HYPOTHYROIDISM, UNSPECIFIED TYPE: ICD-10-CM

## 2018-12-26 DIAGNOSIS — I10 HYPERTENSION GOAL BP (BLOOD PRESSURE) < 140/90: Chronic | ICD-10-CM

## 2018-12-26 RX ORDER — ALLOPURINOL 100 MG/1
100 TABLET ORAL DAILY
Qty: 30 TABLET | Refills: 1 | Status: CANCELLED | OUTPATIENT
Start: 2018-12-26

## 2018-12-26 NOTE — TELEPHONE ENCOUNTER
Reason for Call:  Medication or medication refill:    Do you use a [facility]?  Name of the pharmacy and phone number for the current request:  [facility] [address] [place] [address]      Name of the medication requested: metoprolol tartrate (LOPRESSOR) 100 MG tablet, lisinopril (PRINIVIL/ZESTRIL) 40 MG tablet, levothyroxine (SYNTHROID/LEVOTHROID) 25 MCG tablet, amLODIPine (NORVASC) 5 MG tablet, allopurinol (ZYLOPRIM) 100 MG tablet    Other request: Please send to pharmacy. If any questions please call.    Can we leave a detailed message on this number? YES    Phone number patient can be reached at: Home number on file [phone] (home)    Best Time: any    Call taken on 12/26/2018 at 10:22 AM by JAUN IQBAL

## 2018-12-26 NOTE — TELEPHONE ENCOUNTER
"Requested Prescriptions   Pending Prescriptions Disp Refills     metoprolol tartrate (LOPRESSOR) 100 MG tablet  Last Written Prescription Date:  7/5/18  Last Fill Quantity: 180,  # refills: 1   Last office visit: 10/24/2018 with prescribing provider:  ruchi   Future Office Visit:     180 tablet 1     Sig: Take 1 tablet (100 mg) by mouth 2 times daily    Beta-Blockers Protocol Failed - 12/26/2018 10:23 AM       Failed - Blood pressure under 140/90 in past 12 months    BP Readings from Last 3 Encounters:   10/24/18 200/90   07/05/18 142/54   03/23/18 139/70                Passed - Patient is age 6 or older       Passed - Recent (12 mo) or future (30 days) visit within the authorizing provider's specialty    Patient had office visit in the last 12 months or has a visit in the next 30 days with authorizing provider or within the authorizing provider's specialty.  See \"Patient Info\" tab in inbasket, or \"Choose Columns\" in Meds & Orders section of the refill encounter.              allopurinol (ZYLOPRIM) 100 MG tablet  Last Written Prescription Date:  10/24/18  Last Fill Quantity: 30,  # refills: 1   Last office visit: 10/24/2018 with prescribing provider:  ruchi   Future Office Visit:     30 tablet 1     Sig: Take 1 tablet (100 mg) by mouth daily    Gout Agents Protocol Failed - 12/26/2018 10:23 AM       Failed - Has Uric Acid on file in past 12 months and value is less than 6    No lab results found.  If level is 6mg/dL or greater, ok to refill one time and refer to provider.          Failed - Normal serum creatinine on file in the past 12 months    Recent Labs   Lab Test 10/24/18  1702   CR 1.59*            Passed - CBC on file in past 12 months    Recent Labs   Lab Test 02/27/18  1439   WBC 6.3   RBC 4.25   HGB 14.3   HCT 41.2                   Passed - ALT on file in past 12 months    Recent Labs   Lab Test 02/27/18  1439   ALT 24            Passed - Recent (12 mo) or future (30 days) visit within the " "authorizing provider's specialty    Patient had office visit in the last 12 months or has a visit in the next 30 days with authorizing provider or within the authorizing provider's specialty.  See \"Patient Info\" tab in inbasket, or \"Choose Columns\" in Meds & Orders section of the refill encounter.             Passed - Patient is age 18 or older       Passed - No active pregnancy on record       Passed - No positive pregnancy test in past year        lisinopril (PRINIVIL/ZESTRIL) 40 MG tablet  Last Written Prescription Date:  7/5/18  Last Fill Quantity: 90,  # refills: 1   Last office visit: 10/24/2018 with prescribing provider:  ruchi Johnson Office Visit:     90 tablet 1     Sig: Take 1 tablet (40 mg) by mouth daily    ACE Inhibitors (Including Combos) Protocol Failed - 12/26/2018 10:23 AM       Failed - Blood pressure under 140/90 in past 12 months    BP Readings from Last 3 Encounters:   10/24/18 200/90   07/05/18 142/54   03/23/18 139/70                Failed - Normal serum creatinine on file in past 12 months    Recent Labs   Lab Test 10/24/18  1702   CR 1.59*            Passed - Recent (12 mo) or future (30 days) visit within the authorizing provider's specialty    Patient had office visit in the last 12 months or has a visit in the next 30 days with authorizing provider or within the authorizing provider's specialty.  See \"Patient Info\" tab in inbasket, or \"Choose Columns\" in Meds & Orders section of the refill encounter.             Passed - Patient is age 18 or older       Passed - No active pregnancy on record       Passed - Normal serum potassium on file in past 12 months    Recent Labs   Lab Test 10/24/18  1702   POTASSIUM 4.5            Passed - No positive pregnancy test in past 12 months        amLODIPine (NORVASC) 5 MG tablet  Last Written Prescription Date:  10/24/18  Last Fill Quantity: 30,  # refills: 0   Last office visit: 10/24/2018 with prescribing provider:  ruchi Johnson Office Visit:     " "30 tablet 0     Sig: Take 1 tablet (5 mg) by mouth daily    Calcium Channel Blockers Protocol  Failed - 12/26/2018 10:23 AM       Failed - Blood pressure under 140/90 in past 12 months    BP Readings from Last 3 Encounters:   10/24/18 200/90   07/05/18 142/54   03/23/18 139/70                Failed - Normal serum creatinine on file in past 12 months    Recent Labs   Lab Test 10/24/18  1702   CR 1.59*            Passed - Recent (12 mo) or future (30 days) visit within the authorizing provider's specialty    Patient had office visit in the last 12 months or has a visit in the next 30 days with authorizing provider or within the authorizing provider's specialty.  See \"Patient Info\" tab in inbasket, or \"Choose Columns\" in Meds & Orders section of the refill encounter.             Passed - Patient is age 18 or older       Passed - No active pregnancy on record       Passed - No positive pregnancy test in past 12 months        levothyroxine (SYNTHROID/LEVOTHROID) 25 MCG tablet  Last Written Prescription Date:  10/4/18  Last Fill Quantity: 90,  # refills: 2   Last office visit: 10/24/2018 with prescribing provider:  ruchi   Future Office Visit:     90 tablet 2     Sig: Take 1 tablet (25 mcg) by mouth daily    Thyroid Protocol Passed - 12/26/2018 10:23 AM       Passed - Patient is 12 years or older       Passed - Recent (12 mo) or future (30 days) visit within the authorizing provider's specialty    Patient had office visit in the last 12 months or has a visit in the next 30 days with authorizing provider or within the authorizing provider's specialty.  See \"Patient Info\" tab in inbasket, or \"Choose Columns\" in Meds & Orders section of the refill encounter.             Passed - Normal TSH on file in past 12 months    Recent Labs   Lab Test 10/24/18  1702   TSH 1.00             Passed - No active pregnancy on record    If patient is pregnant or has had a positive pregnancy test, please check TSH.         Passed - No " positive pregnancy test in past 12 months    If patient is pregnant or has had a positive pregnancy test, please check TSH.

## 2018-12-28 ENCOUNTER — TELEPHONE (OUTPATIENT)
Dept: FAMILY MEDICINE | Facility: CLINIC | Age: 83
End: 2018-12-28

## 2018-12-28 NOTE — TELEPHONE ENCOUNTER
Reason for Call:  Form, our goal is to have forms completed with 72 hours, however, some forms may require a visit or additional information.    Type of letter, form or note:  Form for control protein diet     Who is the form from?: from [facility]    Where did the form come from:  [facility]' manager named dutch     What clinic location was the form placed at?:     Where the form was placed:     What number is listed as a contact on the form?:        Additional comments: pt wants to knowif this has been rcvd. Pt does have ph number but the first name is dutch the manager at the apts    Call taken on 12/28/2018 at 11:04 AM by Ronny Hanson

## 2018-12-31 RX ORDER — LEVOTHYROXINE SODIUM 25 UG/1
25 TABLET ORAL DAILY
Qty: 90 TABLET | Refills: 2 | Status: SHIPPED | OUTPATIENT
Start: 2018-12-31 | End: 2019-03-28

## 2018-12-31 RX ORDER — AMLODIPINE BESYLATE 5 MG/1
5 TABLET ORAL DAILY
Qty: 30 TABLET | Refills: 0 | Status: CANCELLED | OUTPATIENT
Start: 2018-12-31

## 2018-12-31 RX ORDER — METOPROLOL TARTRATE 100 MG
100 TABLET ORAL 2 TIMES DAILY
Qty: 180 TABLET | Refills: 1 | Status: CANCELLED | OUTPATIENT
Start: 2018-12-31

## 2018-12-31 RX ORDER — LISINOPRIL 40 MG/1
40 TABLET ORAL DAILY
Qty: 90 TABLET | Refills: 1 | Status: CANCELLED | OUTPATIENT
Start: 2018-12-31

## 2018-12-31 NOTE — PROGRESS NOTES
"  SUBJECTIVE:   Ghislaine Walls is a 83 year old female who presents to clinic today for the following health issues:      Hypertension Follow-up      Outpatient blood pressures are being checked at home.  Results are See recorded flow sheet that patient brought in.    Low Salt Diet: no added salt      Amount of exercise or physical activity: 4-5 days/week for an average of 30-45 minutes    Problems taking medications regularly: No    Medication side effects: none    Diet: low salt      Problem list and histories reviewed & adjusted, as indicated.  Additional history: as documented    Labs reviewed in EPIC    Reviewed and updated as needed this visit by clinical staff  Tobacco  Allergies  Meds  Problems  Med Hx  Surg Hx  Fam Hx  Soc Hx        Reviewed and updated as needed this visit by Provider  Tobacco  Allergies  Meds  Problems  Med Hx  Surg Hx  Fam Hx         ROS:  CONSTITUTIONAL: NEGATIVE for fever, chills, change in weight  INTEGUMENTARY/SKIN: POSITIVE for eczema  EYES: NEGATIVE for vision changes or irritation  ENT/MOUTH: NEGATIVE for ear, mouth and throat problems  RESP: NEGATIVE for significant cough or SOB  CV: NEGATIVE for chest pain or palpitations  GI: NEGATIVE for nausea, abdominal pain, heartburn, or change in bowel habits  : NEGATIVE for frequency, dysuria, or hematuria  MUSCULOSKELETAL: NEGATIVE for significant arthralgias or myalgia  NEURO: NEGATIVE for weakness, dizziness or paresthesias  HEME: NEGATIVE for bleeding problems    OBJECTIVE:     /68   Pulse 62   Temp 98.6  F (37  C) (Tympanic)   Resp 12   Ht 1.549 m (5' 1\")   Wt 82.6 kg (182 lb)   SpO2 98%   Breastfeeding? No   BMI 34.39 kg/m    Body mass index is 34.39 kg/m .   GENERAL APPEARANCE: alert and no distress  EYES: Eyes grossly normal to inspection, PERRL and conjunctivae and sclerae normal  HENT: Nose and mouth without ulcers or lesions, oropharynx clear and oral mucous membranes moist  NECK: no adenopathy, " no asymmetry, masses, or scars and thyroid normal to palpation  RESP: lungs clear to auscultation - no rales, rhonchi or wheezes  CV: regular rate and rhythm, normal S1 S2, no S3 or S4, no murmur, click or rub, peripheral pulses strong.  +2 pitting b/l LE edema  ABDOMEN: soft, nontender, no masses and bowel sounds normal  MS: no musculoskeletal defects are noted and gait is age appropriate without ataxia  SKIN: no suspicious lesions or rashes  NEURO: Normal strength and tone, sensory exam grossly normal, mentation intact and speech normal  PSYCH: mentation appears normal and affect normal/bright    Diagnostic Test Results:  BMP    ASSESSMENT/PLAN:     Problem List Items Addressed This Visit     Hyperlipidemia LDL goal <130 (Chronic)    Hypertension goal BP (blood pressure) < 140/90 - Primary (Chronic)    Relevant Medications    lisinopril (PRINIVIL/ZESTRIL) 40 MG tablet    metoprolol tartrate (LOPRESSOR) 100 MG tablet    Other Relevant Orders    Basic metabolic panel  (Ca, Cl, CO2, Creat, Gluc, K, Na, BUN) (Completed)    Other specified hypothyroidism (Chronic)    Relevant Orders    Basic metabolic panel  (Ca, Cl, CO2, Creat, Gluc, K, Na, BUN) (Completed)    CKD (chronic kidney disease) stage 3, GFR 30-59 ml/min (H) (Chronic)    Relevant Orders    Basic metabolic panel  (Ca, Cl, CO2, Creat, Gluc, K, Na, BUN) (Completed)      Other Visit Diagnoses     Acute gouty arthritis        Relevant Medications    febuxostat (ULORIC) 40 MG TABS tablet    Eczema, unspecified type        Relevant Medications    triamcinolone (KENALOG) 0.1 % external cream           --BP still not quite at goal but much better since she has re-started her Amlodipine.  She has CKD with HTN, so BP goal is <140/90.  Checking BMP today since it is due for monitoring purposes.   Recommended to continue Lisinopril 40 mg daily and Metoprolol 100 mg BID.  We may consider increasing her Amlodipine to 7.5 mg daily.  Pt would like to hold off on that change  for and RTC in 1 month to re-check BP.  --Also has hx of gout which has been a lot better controlled on Allopurinol 100 mg po every day.  We will try to switch her to Uloric since she has CKD.   --For eczema concern, agreeable to try kenalog as needed for flare-ups.           Mahsa Soto, DO  [facility]

## 2018-12-31 NOTE — TELEPHONE ENCOUNTER
Routing refill request to provider for review/approval because:  Blood pressure out of range     Prescription approved per [facility] Refill Protocol for 12 months of refills since last appointment, which was 10/24/18

## 2018-12-31 NOTE — TELEPHONE ENCOUNTER
Due for office visit to re-check BP and possibly get labs...  Does she have any BP logs available?  See OV from Oct 2018 for details.  --Dr. Soto

## 2019-01-02 ENCOUNTER — OFFICE VISIT (OUTPATIENT)
Dept: FAMILY MEDICINE | Facility: CLINIC | Age: 84
End: 2019-01-02
Payer: MEDICARE

## 2019-01-02 VITALS
HEIGHT: 61 IN | HEART RATE: 62 BPM | RESPIRATION RATE: 12 BRPM | SYSTOLIC BLOOD PRESSURE: 176 MMHG | WEIGHT: 182 LBS | TEMPERATURE: 98.6 F | OXYGEN SATURATION: 98 % | DIASTOLIC BLOOD PRESSURE: 68 MMHG | BODY MASS INDEX: 34.36 KG/M2

## 2019-01-02 DIAGNOSIS — I10 HYPERTENSION GOAL BP (BLOOD PRESSURE) < 140/90: Primary | Chronic | ICD-10-CM

## 2019-01-02 DIAGNOSIS — N18.30 CKD (CHRONIC KIDNEY DISEASE) STAGE 3, GFR 30-59 ML/MIN (H): Chronic | ICD-10-CM

## 2019-01-02 DIAGNOSIS — E78.5 HYPERLIPIDEMIA LDL GOAL <130: Chronic | ICD-10-CM

## 2019-01-02 DIAGNOSIS — E03.8 OTHER SPECIFIED HYPOTHYROIDISM: Chronic | ICD-10-CM

## 2019-01-02 DIAGNOSIS — M10.9 ACUTE GOUTY ARTHRITIS: ICD-10-CM

## 2019-01-02 DIAGNOSIS — L30.9 ECZEMA, UNSPECIFIED TYPE: ICD-10-CM

## 2019-01-02 PROCEDURE — 99214 OFFICE O/P EST MOD 30 MIN: CPT | Performed by: FAMILY MEDICINE

## 2019-01-02 PROCEDURE — 80048 BASIC METABOLIC PNL TOTAL CA: CPT | Performed by: FAMILY MEDICINE

## 2019-01-02 PROCEDURE — 36415 COLL VENOUS BLD VENIPUNCTURE: CPT | Performed by: FAMILY MEDICINE

## 2019-01-02 RX ORDER — FEBUXOSTAT 40 MG/1
40 TABLET, FILM COATED ORAL DAILY
Qty: 90 TABLET | Refills: 0 | Status: SHIPPED | OUTPATIENT
Start: 2019-01-02 | End: 2019-04-18

## 2019-01-02 RX ORDER — LISINOPRIL 40 MG/1
40 TABLET ORAL DAILY
Qty: 90 TABLET | Refills: 0 | Status: SHIPPED | OUTPATIENT
Start: 2019-01-02 | End: 2019-03-28

## 2019-01-02 RX ORDER — TRIAMCINOLONE ACETONIDE 1 MG/G
CREAM TOPICAL 2 TIMES DAILY PRN
Qty: 30 G | Refills: 0 | Status: ON HOLD | OUTPATIENT
Start: 2019-01-02 | End: 2019-10-01

## 2019-01-02 RX ORDER — METOPROLOL TARTRATE 100 MG
100 TABLET ORAL 2 TIMES DAILY
Qty: 180 TABLET | Refills: 0 | Status: SHIPPED | OUTPATIENT
Start: 2019-01-02 | End: 2019-03-28

## 2019-01-02 ASSESSMENT — MIFFLIN-ST. JEOR: SCORE: 1222.93

## 2019-01-02 NOTE — LETTER
January 8, 2019      Ghislaine Walls  [address] [address]        Dear ,    We are writing to inform you of your test results.        I am happy to inform you that your kidney function is stable and your electrolytes are normal.    Resulted Orders   Basic metabolic panel  (Ca, Cl, CO2, Creat, Gluc, K, Na, BUN)   Result Value Ref Range    Sodium 140 133 - 144 mmol/L    Potassium 5.1 3.4 - 5.3 mmol/L    Chloride 109 94 - 109 mmol/L    Carbon Dioxide 23 20 - 32 mmol/L    Anion Gap 8 3 - 14 mmol/L    Glucose 95 70 - 99 mg/dL    Urea Nitrogen 37 (H) 7 - 30 mg/dL    Creatinine 1.42 (H) 0.52 - 1.04 mg/dL    GFR Estimate 34 (L) >60 mL/min/[1.73_m2]      Comment:      Non  GFR Calc  Starting 12/18/2018, serum creatinine based estimated GFR (eGFR) will be   calculated using the Chronic Kidney Disease Epidemiology Collaboration   (CKD-EPI) equation.      GFR Estimate If Black 40 (L) >60 mL/min/[1.73_m2]      Comment:       GFR Calc  Starting 12/18/2018, serum creatinine based estimated GFR (eGFR) will be   calculated using the Chronic Kidney Disease Epidemiology Collaboration   (CKD-EPI) equation.      Calcium 9.4 8.5 - 10.1 mg/dL     If you have any questions or concerns, please call the clinic at the number listed above.     Sincerely,        Mahsa Soto, DO

## 2019-01-03 DIAGNOSIS — M10.9 ACUTE GOUTY ARTHRITIS: ICD-10-CM

## 2019-01-03 LAB
ANION GAP SERPL CALCULATED.3IONS-SCNC: 8 MMOL/L (ref 3–14)
BUN SERPL-MCNC: 37 MG/DL (ref 7–30)
CALCIUM SERPL-MCNC: 9.4 MG/DL (ref 8.5–10.1)
CHLORIDE SERPL-SCNC: 109 MMOL/L (ref 94–109)
CO2 SERPL-SCNC: 23 MMOL/L (ref 20–32)
CREAT SERPL-MCNC: 1.42 MG/DL (ref 0.52–1.04)
GFR SERPL CREATININE-BSD FRML MDRD: 34 ML/MIN/{1.73_M2}
GLUCOSE SERPL-MCNC: 95 MG/DL (ref 70–99)
POTASSIUM SERPL-SCNC: 5.1 MMOL/L (ref 3.4–5.3)
SODIUM SERPL-SCNC: 140 MMOL/L (ref 133–144)

## 2019-01-03 RX ORDER — ALLOPURINOL 100 MG/1
100 TABLET ORAL DAILY
Qty: 90 TABLET | Refills: 0 | Status: SHIPPED | OUTPATIENT
Start: 2019-01-03 | End: 2019-03-28

## 2019-01-03 NOTE — TELEPHONE ENCOUNTER
Patient reports febuxostat (ULORIC) is over $300 and would like to go back to allopurinol . Please advice on request.

## 2019-01-03 NOTE — TELEPHONE ENCOUNTER
Reason for Call:  Medication or medication refill:    [facility]  Name of the pharmacy and phone number for the current request:  [facility] on [address]    Name of the medication requested:   Allopurinol  100 mg    Patient states new medication is very expensive even with insurance payment     she would like to go back on the old medicarion   She has run out of this medication    Other request: You may call the patient to discuss    Can we leave a detailed message on this number? YES    Phone number patient can be reached at: Home number on file [phone] (home)    Best Time: anytime    Call taken on 1/3/2019 at 1:55 PM by NONA BARRTET

## 2019-03-28 ENCOUNTER — HOSPITAL ENCOUNTER (OUTPATIENT)
Dept: ULTRASOUND IMAGING | Facility: CLINIC | Age: 84
Discharge: HOME | End: 2019-03-28
Attending: SURGERY | Admitting: SURGERY
Payer: MEDICARE

## 2019-03-28 ENCOUNTER — TRANSFERRED RECORDS (OUTPATIENT)
Dept: HEALTH INFORMATION MANAGEMENT | Facility: CLINIC | Age: 84
End: 2019-03-28

## 2019-03-28 ENCOUNTER — OFFICE VISIT (OUTPATIENT)
Dept: OTHER | Facility: CLINIC | Age: 84
End: 2019-03-28
Attending: SURGERY
Payer: MEDICARE

## 2019-03-28 VITALS — SYSTOLIC BLOOD PRESSURE: 195 MMHG | HEART RATE: 60 BPM | DIASTOLIC BLOOD PRESSURE: 69 MMHG

## 2019-03-28 DIAGNOSIS — I10 HYPERTENSION GOAL BP (BLOOD PRESSURE) < 140/90: Chronic | ICD-10-CM

## 2019-03-28 DIAGNOSIS — I65.23 BILATERAL CAROTID ARTERY STENOSIS: ICD-10-CM

## 2019-03-28 DIAGNOSIS — I65.22 CAROTID STENOSIS, ASYMPTOMATIC, LEFT: Primary | ICD-10-CM

## 2019-03-28 DIAGNOSIS — M10.9 ACUTE GOUTY ARTHRITIS: ICD-10-CM

## 2019-03-28 DIAGNOSIS — E03.9 HYPOTHYROIDISM, UNSPECIFIED TYPE: ICD-10-CM

## 2019-03-28 PROCEDURE — 99213 OFFICE O/P EST LOW 20 MIN: CPT | Mod: ZP | Performed by: SURGERY

## 2019-03-28 PROCEDURE — 93880 EXTRACRANIAL BILAT STUDY: CPT

## 2019-03-28 PROCEDURE — G0463 HOSPITAL OUTPT CLINIC VISIT: HCPCS | Mod: 25

## 2019-03-28 RX ORDER — METOPROLOL TARTRATE 100 MG
100 TABLET ORAL 2 TIMES DAILY
Qty: 60 TABLET | Refills: 0 | Status: SHIPPED | OUTPATIENT
Start: 2019-03-28 | End: 2019-04-18

## 2019-03-28 RX ORDER — LISINOPRIL 40 MG/1
40 TABLET ORAL DAILY
Qty: 30 TABLET | Refills: 0 | Status: SHIPPED | OUTPATIENT
Start: 2019-03-28 | End: 2019-04-18

## 2019-03-28 RX ORDER — AMLODIPINE BESYLATE 5 MG/1
5 TABLET ORAL DAILY
Qty: 30 TABLET | Refills: 0 | Status: SHIPPED | OUTPATIENT
Start: 2019-03-28 | End: 2019-04-18

## 2019-03-28 RX ORDER — ALLOPURINOL 100 MG/1
100 TABLET ORAL DAILY
Qty: 30 TABLET | Refills: 0 | Status: SHIPPED | OUTPATIENT
Start: 2019-03-28 | End: 2019-04-18

## 2019-03-28 RX ORDER — LEVOTHYROXINE SODIUM 25 UG/1
25 TABLET ORAL DAILY
Qty: 30 TABLET | Refills: 0 | Status: SHIPPED | OUTPATIENT
Start: 2019-03-28 | End: 2019-04-26

## 2019-03-28 NOTE — TELEPHONE ENCOUNTER
"Requested Prescriptions   Pending Prescriptions Disp Refills     allopurinol (ZYLOPRIM) 100 MG tablet  Last Written Prescription Date:  1/3/2019  Last Fill Quantity: 90 tablet,  # refills: 0   Last office visit: 1/2/2019 with prescribing provider:  Charles   Future Office Visit:   Next 5 appointments (look out 90 days)    Mar 28, 2019  1:15 PM CDT  Return Visit with Niko Parker MD  [facility] ([facility]) [address]  [place] [address]  [phone]          90 tablet 0     Sig: Take 1 tablet (100 mg) by mouth daily    Gout Agents Protocol Failed - 3/28/2019  9:55 AM       Failed - CBC on file in past 12 months    Recent Labs   Lab Test 02/27/18  1439   WBC 6.3   RBC 4.25   HGB 14.3   HCT 41.2                   Failed - ALT on file in past 12 months    Recent Labs   Lab Test 02/27/18  1439   ALT 24            Failed - Has Uric Acid on file in past 12 months and value is less than 6    No lab results found.  If level is 6mg/dL or greater, ok to refill one time and refer to provider.          Failed - Recent (12 mo) or future (30 days) visit within the authorizing provider's specialty    Patient had office visit in the last 12 months or has a visit in the next 30 days with authorizing provider or within the authorizing provider's specialty.  See \"Patient Info\" tab in inbasket, or \"Choose Columns\" in Meds & Orders section of the refill encounter.             Failed - Normal serum creatinine on file in the past 12 months    Recent Labs   Lab Test 01/02/19  1602   CR 1.42*            Passed - Medication is active on med list       Passed - Patient is age 18 or older       Passed - No active pregnancy on record       Passed - No positive pregnancy test in past year        amLODIPine (NORVASC) 5 MG tablet  Last Written Prescription Date:  10/24/2018  Last Fill Quantity: 30 tablet,  # refills: 0   Last office visit: " "1/2/2019 with prescribing provider:  Charles Johnson Office Visit:   Next 5 appointments (look out 90 days)    Mar 28, 2019  1:15 PM CDT  Return Visit with Niko Parker MD  [facility] ([facility]) [address]  [place] [address] [phone]          30 tablet 0     Sig: Take 1 tablet (5 mg) by mouth daily    Calcium Channel Blockers Protocol  Failed - 3/28/2019  9:55 AM       Failed - Blood pressure under 140/90 in past 12 months    BP Readings from Last 3 Encounters:   01/02/19 176/68   10/24/18 200/90   07/05/18 142/54                Failed - Recent (12 mo) or future (30 days) visit within the authorizing provider's specialty    Patient had office visit in the last 12 months or has a visit in the next 30 days with authorizing provider or within the authorizing provider's specialty.  See \"Patient Info\" tab in inbasket, or \"Choose Columns\" in Meds & Orders section of the refill encounter.             Failed - Normal serum creatinine on file in past 12 months    Recent Labs   Lab Test 01/02/19  1602   CR 1.42*            Passed - Medication is active on med list       Passed - Patient is age 18 or older       Passed - No active pregnancy on record       Passed - No positive pregnancy test in past 12 months        levothyroxine (SYNTHROID/LEVOTHROID) 25 MCG tablet  Last Written Prescription Date:  12/31/2018  Last Fill Quantity: 90 tablet,  # refills: 2   Last office visit: 1/2/2019 with prescribing provider:  Charles Johnson Office Visit:   Next 5 appointments (look out 90 days)    Mar 28, 2019  1:15 PM CDT  Return Visit with Niko Parker MD  [facility] ([facility]) [address]  [place] [id number] [phone]          90 tablet 2     Sig: Take 1 tablet (25 mcg) by mouth daily    Thyroid Protocol Failed - 3/28/2019  9:55 " "AM       Failed - Recent (12 mo) or future (30 days) visit within the authorizing provider's specialty    Patient had office visit in the last 12 months or has a visit in the next 30 days with authorizing provider or within the authorizing provider's specialty.  See \"Patient Info\" tab in inbasket, or \"Choose Columns\" in Meds & Orders section of the refill encounter.             Passed - Patient is 12 years or older       Passed - Medication is active on med list       Passed - Normal TSH on file in past 12 months    Recent Labs   Lab Test 10/24/18  1702   TSH 1.00             Passed - No active pregnancy on record    If patient is pregnant or has had a positive pregnancy test, please check TSH.         Passed - No positive pregnancy test in past 12 months    If patient is pregnant or has had a positive pregnancy test, please check TSH.          lisinopril (PRINIVIL/ZESTRIL) 40 MG tablet  Last Written Prescription Date:  1/2/2019  Last Fill Quantity: 90 tablet,  # refills: 0   Last office visit: 1/2/2019 with prescribing provider:  Charles   Future Office Visit:   Next 5 appointments (look out 90 days)    Mar 28, 2019  1:15 PM CDT  Return Visit with Niko Parker MD  [facility] ([facility]) [address]  [facility] [address]  [phone]          90 tablet 0     Sig: Take 1 tablet (40 mg) by mouth daily    ACE Inhibitors (Including Combos) Protocol Failed - 3/28/2019  9:55 AM       Failed - Blood pressure under 140/90 in past 12 months    BP Readings from Last 3 Encounters:   01/02/19 176/68   10/24/18 200/90   07/05/18 142/54                Failed - Recent (12 mo) or future (30 days) visit within the authorizing provider's specialty    Patient had office visit in the last 12 months or has a visit in the next 30 days with authorizing provider or within the authorizing provider's specialty.  See \"Patient Info\" tab in " "inbasket, or \"Choose Columns\" in Meds & Orders section of the refill encounter.             Failed - Normal serum creatinine on file in past 12 months    Recent Labs   Lab Test 01/02/19  1602   CR 1.42*            Passed - Medication is active on med list       Passed - Patient is age 18 or older       Passed - No active pregnancy on record       Passed - Normal serum potassium on file in past 12 months    Recent Labs   Lab Test 01/02/19  1602   POTASSIUM 5.1            Passed - No positive pregnancy test within past 12 months        metoprolol tartrate (LOPRESSOR) 100 MG tablet  Last Written Prescription Date:  1/2/2019  Last Fill Quantity: 180 tablet,  # refills: 0   Last office visit: 1/2/2019 with prescribing provider:  Charles   Future Office Visit:   Next 5 appointments (look out 90 days)    Mar 28, 2019  1:15 PM CDT  Return Visit with Niko Parker MD  [facility] ([facility]) [address]  [facility] [address]  [phone]          180 tablet 0     Sig: Take 1 tablet (100 mg) by mouth 2 times daily    Beta-Blockers Protocol Failed - 3/28/2019  9:55 AM       Failed - Blood pressure under 140/90 in past 12 months    BP Readings from Last 3 Encounters:   01/02/19 176/68   10/24/18 200/90   07/05/18 142/54                Failed - Recent (12 mo) or future (30 days) visit within the authorizing provider's specialty    Patient had office visit in the last 12 months or has a visit in the next 30 days with authorizing provider or within the authorizing provider's specialty.  See \"Patient Info\" tab in inbasket, or \"Choose Columns\" in Meds & Orders section of the refill encounter.             Passed - Patient is age 6 or older       Passed - Medication is active on med list           "

## 2019-03-28 NOTE — PROGRESS NOTES
[place] [facility]    Ghislaine Walls returns for vascular follow-up.  This 83-year-old independent woman has been followed for bilateral carotid stenosis.  She has been followed for a moderate left carotid stenosis with a ICA PSV= 174 cm/s on her exam 1 year ago with only mild changes of the right.    No history of PAD with excellent palpable dorsalis pedis pulses in the past.    She had several severe episodes of gout in her hands and feet over the past year.  Presently on allopurinol with no recurrent symptoms.  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      PMH: Medications: Allopurinol,Uloric, Norvasc, levothyroxine, lisinopril, Lopressor.            Medical: Hypertension, hypothyroidism, gout, carotid disease,CKI           Non-smoker.  Ambulatory.  Lives independently.      1/2/2019 laboratory: SCr= 1.42 (chronic)    Exam: Alert and appropriate.  Normal affect.             Blood pressure 195/69 (home 140-150) pulse 60 regular             HEENT= normal             Cardiovascular= regular rate             Chest= clear              Extremities= chronic ankle and calf edema.  No acute gouty changes.                    Chronic arthritic changes to both hand knuckles.                    Pedal pulses are difficult to palpate due to swelling but triphasic                      Bedside Doppler to both dorsalis pedis arteries      Carotid duplex today reveals stable less than 20% stenosis of the right carotid bulb.  Also stable moderate stenosis of the left proximal internal carotid artery.  Peak systolic velocity is 177 cm/sec which is essentially unchanged over the last year.    Impression: #1.  Stable moderate asymptomatic left carotid bifurcation disease with mild changes in the right.  Aware of the importance of good risk factor control.  Repeat carotid duplex ultrasound 1 year.                       #2.  No evidence of significant PAD with excellent Doppler signals in both dorsalis  pedis arteries.      Niko Parker MD     CC  Patient Care Team:  Clinic, [facility] as PCP - Mahsa Appiah DO as Assigned PCP

## 2019-03-28 NOTE — TELEPHONE ENCOUNTER
Medication is being filled for 1 time refill only due to:  Patient needs to be seen because it has been more than one year since last visit.     Will need, CBC, ALT, Uric Acid, Cr, blood pressure, TSH with her next visit for the refill protocol.

## 2019-03-28 NOTE — TELEPHONE ENCOUNTER
Reason for Call:  Medication or medication refill:    Do you use a [facility]?  Name of the pharmacy and phone number for the current request:  [facility] [address] - [phone]    Name of the medication requested: amlodipine, levothyroxine, lisinopril, metoprolol, allopurinol    Other request: new pharmacy    Can we leave a detailed message on this number? YES    Phone number patient can be reached at: Home number on file [phone] (home)    Best Time: any    Call taken on 3/28/2019 at 9:54 AM by CISCO SILVESTRE

## 2019-03-28 NOTE — NURSING NOTE
"Ghislaine Walls is a 83 year old female who presents for:  Chief Complaint   Patient presents with     RECHECK     Jairon carotid (12:30 [facility]; 1:15 [facility]) History of carotid artery stenosis; 1 year follow up to 2/15/18 appointment with Dr. Keith Brittons:    Vitals:    03/28/19 1334   BP: 195/69   BP Location: Left arm   Patient Position: Chair   Pulse: 60       BMI:  Estimated body mass index is 34.39 kg/m  as calculated from the following:    Height as of 1/2/19: 5' 1\" (1.549 m).    Weight as of 1/2/19: 182 lb (82.6 kg).    Pain Score:  Data Unavailable        Leti Adkins MA    "

## 2019-04-18 ENCOUNTER — OFFICE VISIT (OUTPATIENT)
Dept: FAMILY MEDICINE | Facility: CLINIC | Age: 84
End: 2019-04-18
Payer: MEDICARE

## 2019-04-18 VITALS
BODY MASS INDEX: 33.21 KG/M2 | RESPIRATION RATE: 14 BRPM | HEART RATE: 55 BPM | HEIGHT: 62 IN | WEIGHT: 180.5 LBS | SYSTOLIC BLOOD PRESSURE: 158 MMHG | OXYGEN SATURATION: 99 % | DIASTOLIC BLOOD PRESSURE: 60 MMHG | TEMPERATURE: 97.7 F

## 2019-04-18 DIAGNOSIS — E03.8 OTHER SPECIFIED HYPOTHYROIDISM: Chronic | ICD-10-CM

## 2019-04-18 DIAGNOSIS — I10 HYPERTENSION GOAL BP (BLOOD PRESSURE) < 140/90: Primary | Chronic | ICD-10-CM

## 2019-04-18 DIAGNOSIS — N18.30 CKD (CHRONIC KIDNEY DISEASE) STAGE 3, GFR 30-59 ML/MIN (H): Chronic | ICD-10-CM

## 2019-04-18 DIAGNOSIS — M10.9 GOUTY ARTHRITIS: ICD-10-CM

## 2019-04-18 LAB
BASOPHILS # BLD AUTO: 0.1 10E9/L (ref 0–0.2)
BASOPHILS NFR BLD AUTO: 1.5 %
DIFFERENTIAL METHOD BLD: ABNORMAL
EOSINOPHIL # BLD AUTO: 0.1 10E9/L (ref 0–0.7)
EOSINOPHIL NFR BLD AUTO: 2 %
ERYTHROCYTE [DISTWIDTH] IN BLOOD BY AUTOMATED COUNT: 14.1 % (ref 10–15)
HCT VFR BLD AUTO: 35 % (ref 35–47)
HGB BLD-MCNC: 11.7 G/DL (ref 11.7–15.7)
LYMPHOCYTES # BLD AUTO: 1.5 10E9/L (ref 0.8–5.3)
LYMPHOCYTES NFR BLD AUTO: 25.9 %
MCH RBC QN AUTO: 32.2 PG (ref 26.5–33)
MCHC RBC AUTO-ENTMCNC: 33.4 G/DL (ref 31.5–36.5)
MCV RBC AUTO: 96 FL (ref 78–100)
MONOCYTES # BLD AUTO: 0.6 10E9/L (ref 0–1.3)
MONOCYTES NFR BLD AUTO: 10.6 %
NEUTROPHILS # BLD AUTO: 3.6 10E9/L (ref 1.6–8.3)
NEUTROPHILS NFR BLD AUTO: 60 %
PLATELET # BLD AUTO: 241 10E9/L (ref 150–450)
RBC # BLD AUTO: 3.63 10E12/L (ref 3.8–5.2)
WBC # BLD AUTO: 6 10E9/L (ref 4–11)

## 2019-04-18 PROCEDURE — 85025 COMPLETE CBC W/AUTO DIFF WBC: CPT | Performed by: FAMILY MEDICINE

## 2019-04-18 PROCEDURE — 84550 ASSAY OF BLOOD/URIC ACID: CPT | Performed by: FAMILY MEDICINE

## 2019-04-18 PROCEDURE — 84443 ASSAY THYROID STIM HORMONE: CPT | Performed by: FAMILY MEDICINE

## 2019-04-18 PROCEDURE — 84439 ASSAY OF FREE THYROXINE: CPT | Performed by: FAMILY MEDICINE

## 2019-04-18 PROCEDURE — 36415 COLL VENOUS BLD VENIPUNCTURE: CPT | Performed by: FAMILY MEDICINE

## 2019-04-18 PROCEDURE — 99214 OFFICE O/P EST MOD 30 MIN: CPT | Performed by: FAMILY MEDICINE

## 2019-04-18 PROCEDURE — 80053 COMPREHEN METABOLIC PANEL: CPT | Performed by: FAMILY MEDICINE

## 2019-04-18 RX ORDER — ALLOPURINOL 100 MG/1
100 TABLET ORAL DAILY
Qty: 90 TABLET | Refills: 1 | Status: SHIPPED | OUTPATIENT
Start: 2019-04-18 | End: 2019-05-09

## 2019-04-18 RX ORDER — AMLODIPINE BESYLATE 2.5 MG/1
7.5 TABLET ORAL DAILY
Qty: 270 TABLET | Refills: 0 | Status: SHIPPED | OUTPATIENT
Start: 2019-04-18 | End: 2019-05-09

## 2019-04-18 RX ORDER — METOPROLOL TARTRATE 100 MG
100 TABLET ORAL 2 TIMES DAILY
Qty: 180 TABLET | Refills: 1 | Status: SHIPPED | OUTPATIENT
Start: 2019-04-18 | End: 2019-05-09

## 2019-04-18 RX ORDER — AMLODIPINE BESYLATE 10 MG/1
10 TABLET ORAL DAILY
Qty: 90 TABLET | Refills: 1 | Status: CANCELLED | OUTPATIENT
Start: 2019-04-18

## 2019-04-18 RX ORDER — LISINOPRIL 40 MG/1
40 TABLET ORAL DAILY
Qty: 90 TABLET | Refills: 1 | Status: SHIPPED | OUTPATIENT
Start: 2019-04-18 | End: 2019-05-09

## 2019-04-18 ASSESSMENT — MIFFLIN-ST. JEOR: SCORE: 1231.99

## 2019-04-18 NOTE — PROGRESS NOTES
"  SUBJECTIVE:   Ghislaine Walls is a 83 year old female who presents to clinic today for the following health issues:      Hypertension Follow-up      Outpatient blood pressures are being checked at home.  Results are See recorded flow sheet that patient brought in.    Low Salt Diet: no added salt    Amount of exercise or physical activity: 4-5 days/week for an average of 30-45 minutes    Problems taking medications regularly: No    Medication side effects: none    Diet: low salt      BP still uncontrolled and has noticed more swelling in her hands lately (has recently improved with time though)  Has a hx of uncontrolled gout and HTN.    Problem list and histories reviewed & adjusted, as indicated.  Additional history: as documented    Labs reviewed in EPIC    Reviewed and updated as needed this visit by clinical staff  Tobacco  Allergies  Meds  Problems  Med Hx  Surg Hx  Fam Hx  Soc Hx        Reviewed and updated as needed this visit by Provider  Tobacco  Allergies  Meds  Problems  Med Hx  Surg Hx  Fam Hx         ROS:  CONSTITUTIONAL: NEGATIVE for fever, chills, change in weight  INTEGUMENTARY/SKIN: NEGATIVE for erythema or bruising   EYES: NEGATIVE for vision changes or irritation  ENT/MOUTH: NEGATIVE for ear, mouth and throat problems  RESP: NEGATIVE for significant cough or SOB  CV: NEGATIVE for chest pain or palpitations  GI: NEGATIVE for nausea, abdominal pain, heartburn, or change in bowel habits  : NEGATIVE for frequency, dysuria, or hematuria  MUSCULOSKELETAL: POSITIVE for swelling and painful joints in hands/fingers.   NEURO: NEGATIVE for weakness, dizziness or paresthesias  HEME: NEGATIVE for bleeding problems    OBJECTIVE:     /60 (Patient Position: Sitting, Cuff Size: Adult Regular)   Pulse 55   Temp 97.7  F (36.5  C) (Tympanic)   Resp 14   Ht 1.575 m (5' 2\")   Wt 81.9 kg (180 lb 8 oz)   SpO2 99%   Breastfeeding? No   BMI 33.01 kg/m    Body mass index is 33.01 kg/m . "   GENERAL APPEARANCE: alert and no distress  EYES: Eyes grossly normal to inspection, PERRL and conjunctivae and sclerae normal  HENT: Nose and mouth without ulcers or lesions, oropharynx clear and oral mucous membranes moist  NECK: no adenopathy, no asymmetry, masses, or scars and thyroid normal to palpation  RESP: lungs clear to auscultation - no rales, rhonchi or wheezes  CV: regular rate and rhythm, normal S1 S2, no S3 or S4, no murmur, click or rub, peripheral pulses strong.  +1 pitting b/l LE edema  ABDOMEN: soft, nontender, no masses and bowel sounds normal  MS: +b/l hand swelling (left hand/fingers worse than right hand/fingers) with gouty/arthritis deformities in a few fingers.  Gait is age appropriate without ataxia  SKIN: no suspicious lesions or rashes  NEURO: Normal strength and tone, sensory exam grossly normal, mentation intact and speech normal  PSYCH: mentation appears normal and affect normal/bright    Diagnostic Test Results:  CMP, TSH/T4, CBC, Uric acid    ASSESSMENT/PLAN:     Problem List Items Addressed This Visit     Hypertension goal BP (blood pressure) < 140/90 - Primary (Chronic)    Relevant Medications    amLODIPine (NORVASC) 2.5 MG tablet    metoprolol tartrate (LOPRESSOR) 100 MG tablet    lisinopril (PRINIVIL/ZESTRIL) 40 MG tablet    Other Relevant Orders    Comprehensive metabolic panel (Completed)    Other specified hypothyroidism (Chronic)    Relevant Orders    TSH (Completed)    T4, free (Completed)    CKD (chronic kidney disease) stage 3, GFR 30-59 ml/min (H) (Chronic)    Relevant Orders    CBC with platelets and differential (Completed)    Gouty arthritis    Relevant Medications    allopurinol (ZYLOPRIM) 100 MG tablet    Other Relevant Orders    Uric acid (Completed)           --BP still not quite at goal.  Ghislaine is an 84 y/o female with CKD and HTN, so BP goal is <140/90.  We are going to increase Amlodipine to 7.5 mg daily.  Refill provided.   --Checking labs today.  Refills  provided for other meds but will hold off on Levothyroxine until thyroid labs come back.  --Recommended to continue Lisinopril 40 mg daily and Metoprolol 100 mg BID.    --Will RTC in 1 month to re-check BP (may do nurse only OV).  --For gout and arthritis concerns, we discussed possibly referring her to see a Rheumatologist.  She would like to try OTC Glucosamine and Aspercreme for now but may consider the referral at follow-up.  Would like to continue Allopurinol.      Mahsa Soto, DO  [facility]    Mahsa oSto, DO  [facility]

## 2019-04-18 NOTE — LETTER
"April 23, 2019      Ghislaine LAROSE Titi  [address]   [facility] [address]        Dear ,    We are writing to inform you of your test results.    I am happy to inform you that your kidney function is stable and your thyroid hormone level is normal.    You have a slightly abnormally elevated uric acid level which is due to your gout.  As long as your gout remains controlled on Allopurinol, we do not need to worry about this current uric acid level but we will monitor it routinely, at least every 4-6 months.  Please also remember to keep your gout controlled through your diet.  Foods that help keep your gout under control include \"low-purine products\" such as cherries, cabbage, low fat dairy products, etc.  Avoid foods with too much purine in them (such as red meats, sugary beverages, alcohol, etc.)  Purine is chemical that causes your uric acid level to increase.      Resulted Orders   TSH   Result Value Ref Range    TSH 1.03 0.40 - 4.00 mU/L   T4, free   Result Value Ref Range    T4 Free 1.40 0.76 - 1.46 ng/dL   Comprehensive metabolic panel   Result Value Ref Range    Sodium 141 133 - 144 mmol/L    Potassium 4.9 3.4 - 5.3 mmol/L    Chloride 115 (H) 94 - 109 mmol/L    Carbon Dioxide 17 (L) 20 - 32 mmol/L    Anion Gap 9 3 - 14 mmol/L    Glucose 101 (H) 70 - 99 mg/dL    Urea Nitrogen 40 (H) 7 - 30 mg/dL    Creatinine 1.47 (H) 0.52 - 1.04 mg/dL    GFR Estimate 33 (L) >60 mL/min/[1.73_m2]      Comment:      Non  GFR Calc  Starting 12/18/2018, serum creatinine based estimated GFR (eGFR) will be   calculated using the Chronic Kidney Disease Epidemiology Collaboration   (CKD-EPI) equation.      GFR Estimate If Black 38 (L) >60 mL/min/[1.73_m2]      Comment:       GFR Calc  Starting 12/18/2018, serum creatinine based estimated GFR (eGFR) will be   calculated using the Chronic Kidney Disease Epidemiology Collaboration   (CKD-EPI) equation.      Calcium 9.5 8.5 - 10.1 mg/dL    " Bilirubin Total 0.6 0.2 - 1.3 mg/dL    Albumin 3.6 3.4 - 5.0 g/dL    Protein Total 8.1 6.8 - 8.8 g/dL    Alkaline Phosphatase 101 40 - 150 U/L    ALT 14 0 - 50 U/L    AST 17 0 - 45 U/L   CBC with platelets and differential   Result Value Ref Range    WBC 6.0 4.0 - 11.0 10e9/L    RBC Count 3.63 (L) 3.8 - 5.2 10e12/L    Hemoglobin 11.7 11.7 - 15.7 g/dL    Hematocrit 35.0 35.0 - 47.0 %    MCV 96 78 - 100 fl    MCH 32.2 26.5 - 33.0 pg    MCHC 33.4 31.5 - 36.5 g/dL    RDW 14.1 10.0 - 15.0 %    Platelet Count 241 150 - 450 10e9/L    % Neutrophils 60.0 %    % Lymphocytes 25.9 %    % Monocytes 10.6 %    % Eosinophils 2.0 %    % Basophils 1.5 %    Absolute Neutrophil 3.6 1.6 - 8.3 10e9/L    Absolute Lymphocytes 1.5 0.8 - 5.3 10e9/L    Absolute Monocytes 0.6 0.0 - 1.3 10e9/L    Absolute Eosinophils 0.1 0.0 - 0.7 10e9/L    Absolute Basophils 0.1 0.0 - 0.2 10e9/L    Diff Method Automated Method    Uric acid   Result Value Ref Range    Uric Acid 7.4 (H) 2.6 - 6.0 mg/dL     If you have any questions or concerns, please call the clinic at the number listed above.     Sincerely,    Mahsa Soto, DO

## 2019-04-19 LAB
ALBUMIN SERPL-MCNC: 3.6 G/DL (ref 3.4–5)
ALP SERPL-CCNC: 101 U/L (ref 40–150)
ALT SERPL W P-5'-P-CCNC: 14 U/L (ref 0–50)
ANION GAP SERPL CALCULATED.3IONS-SCNC: 9 MMOL/L (ref 3–14)
AST SERPL W P-5'-P-CCNC: 17 U/L (ref 0–45)
BILIRUB SERPL-MCNC: 0.6 MG/DL (ref 0.2–1.3)
BUN SERPL-MCNC: 40 MG/DL (ref 7–30)
CALCIUM SERPL-MCNC: 9.5 MG/DL (ref 8.5–10.1)
CHLORIDE SERPL-SCNC: 115 MMOL/L (ref 94–109)
CO2 SERPL-SCNC: 17 MMOL/L (ref 20–32)
CREAT SERPL-MCNC: 1.47 MG/DL (ref 0.52–1.04)
GFR SERPL CREATININE-BSD FRML MDRD: 33 ML/MIN/{1.73_M2}
GLUCOSE SERPL-MCNC: 101 MG/DL (ref 70–99)
POTASSIUM SERPL-SCNC: 4.9 MMOL/L (ref 3.4–5.3)
PROT SERPL-MCNC: 8.1 G/DL (ref 6.8–8.8)
SODIUM SERPL-SCNC: 141 MMOL/L (ref 133–144)
T4 FREE SERPL-MCNC: 1.4 NG/DL (ref 0.76–1.46)
TSH SERPL DL<=0.005 MIU/L-ACNC: 1.03 MU/L (ref 0.4–4)
URATE SERPL-MCNC: 7.4 MG/DL (ref 2.6–6)

## 2019-04-25 DIAGNOSIS — E03.9 HYPOTHYROIDISM, UNSPECIFIED TYPE: ICD-10-CM

## 2019-04-25 NOTE — TELEPHONE ENCOUNTER
"Requested Prescriptions   Pending Prescriptions Disp Refills     levothyroxine (SYNTHROID/LEVOTHROID) 25 MCG tablet  Last Written Prescription Date:  3/28/2019  Last Fill Quantity: 30 tablet,  # refills: 0   Last office visit: 4/18/2019 with prescribing provider:  Charles   Future Office Visit:     30 tablet 0     Sig: Take 1 tablet (25 mcg) by mouth daily       Thyroid Protocol Passed - 4/25/2019 11:43 AM        Passed - Patient is 12 years or older        Passed - Recent (12 mo) or future (30 days) visit within the authorizing provider's specialty     Patient had office visit in the last 12 months or has a visit in the next 30 days with authorizing provider or within the authorizing provider's specialty.  See \"Patient Info\" tab in inbasket, or \"Choose Columns\" in Meds & Orders section of the refill encounter.              Passed - Medication is active on med list        Passed - Normal TSH on file in past 12 months     Recent Labs   Lab Test 04/18/19  1339   TSH 1.03              Passed - No active pregnancy on record     If patient is pregnant or has had a positive pregnancy test, please check TSH.          Passed - No positive pregnancy test in past 12 months     If patient is pregnant or has had a positive pregnancy test, please check TSH.             "

## 2019-04-26 RX ORDER — LEVOTHYROXINE SODIUM 25 UG/1
25 TABLET ORAL DAILY
Qty: 90 TABLET | Refills: 3 | Status: SHIPPED | OUTPATIENT
Start: 2019-04-26 | End: 2019-05-09

## 2019-05-07 DIAGNOSIS — I10 HYPERTENSION GOAL BP (BLOOD PRESSURE) < 140/90: Chronic | ICD-10-CM

## 2019-05-07 DIAGNOSIS — M10.9 GOUTY ARTHRITIS: ICD-10-CM

## 2019-05-07 DIAGNOSIS — E03.9 HYPOTHYROIDISM, UNSPECIFIED TYPE: ICD-10-CM

## 2019-05-07 NOTE — TELEPHONE ENCOUNTER
Reason for Call:  Medication or medication refill:    Do you use a [facility]?  Name of the pharmacy and phone number for the current request:  [facility]    Name of the medication requested: levothyroxine (SYNTHROID/LEVOTHROID) 25 MCG tablet, allopurinol (ZYLOPRIM) 100 MG tablet,  amLODIPine (NORVASC) 2.5 MG tablet, lisinopril (PRINIVIL/ZESTRIL) 40 MG tablet,  metoprolol tartrate (LOPRESSOR) 100 MG tablet    Other request: Patient switched pharmacies to [facility] mail order. Please send new rx for medications    Can we leave a detailed message on this number? YES    Phone number patient can be reached at: Other phone number:  [phone]    Best Time: anytime     Call taken on 5/7/2019 at 9:27 AM by Ana Tierney

## 2019-05-07 NOTE — TELEPHONE ENCOUNTER
"Requested Prescriptions   Pending Prescriptions Disp Refills     allopurinol (ZYLOPRIM) 100 MG tablet  Last Written Prescription Date:  4/18/2019  Last Fill Quantity: 90 tablet,  # refills: 1   Last office visit: 4/18/2019 with prescribing provider:  Charles   Future Office Visit:     90 tablet 1     Sig: Take 1 tablet (100 mg) by mouth daily       Gout Agents Protocol Failed - 5/7/2019  9:34 AM        Failed - Has Uric Acid on file in past 12 months and value is less than 6     Recent Labs   Lab Test 04/18/19  1339   URIC 7.4*     If level is 6mg/dL or greater, ok to refill one time and refer to provider.           Failed - Normal serum creatinine on file in the past 12 months     Recent Labs   Lab Test 04/18/19  1339   CR 1.47*             Passed - CBC on file in past 12 months     Recent Labs   Lab Test 04/18/19  1339   WBC 6.0   RBC 3.63*   HGB 11.7   HCT 35.0                    Passed - ALT on file in past 12 months     Recent Labs   Lab Test 04/18/19  1339   ALT 14             Passed - Recent (12 mo) or future (30 days) visit within the authorizing provider's specialty     Patient had office visit in the last 12 months or has a visit in the next 30 days with authorizing provider or within the authorizing provider's specialty.  See \"Patient Info\" tab in inbasket, or \"Choose Columns\" in Meds & Orders section of the refill encounter.              Passed - Medication is active on med list        Passed - Patient is age 18 or older        Passed - No active pregnancy on record        Passed - No positive pregnancy test in past year        amLODIPine (NORVASC) 2.5 MG tablet  Last Written Prescription Date:  4/18/2019  Last Fill Quantity: 270 tablet,  # refills: 0   Last office visit: 4/18/2019 with prescribing provider:  Charles   Future Office Visit:     270 tablet 0     Sig: Take 3 tablets (7.5 mg) by mouth daily       Calcium Channel Blockers Protocol  Failed - 5/7/2019  9:34 AM        Failed - Blood " "pressure under 140/90 in past 12 months     BP Readings from Last 3 Encounters:   04/18/19 158/60   03/28/19 195/69   01/02/19 176/68                 Failed - Normal serum creatinine on file in past 12 months     Recent Labs   Lab Test 04/18/19  1339   CR 1.47*             Passed - Recent (12 mo) or future (30 days) visit within the authorizing provider's specialty     Patient had office visit in the last 12 months or has a visit in the next 30 days with authorizing provider or within the authorizing provider's specialty.  See \"Patient Info\" tab in inbasket, or \"Choose Columns\" in Meds & Orders section of the refill encounter.              Passed - Medication is active on med list        Passed - Patient is age 18 or older        Passed - No active pregnancy on record        Passed - No positive pregnancy test in past 12 months        levothyroxine (SYNTHROID/LEVOTHROID) 25 MCG tablet  Last Written Prescription Date:  4/26/2019  Last Fill Quantity: 90 tablet,  # refills: 3   Last office visit: 4/18/2019 with prescribing provider:  Charles   Future Office Visit:     90 tablet 3     Sig: Take 1 tablet (25 mcg) by mouth daily       Thyroid Protocol Passed - 5/7/2019  9:34 AM        Passed - Patient is 12 years or older        Passed - Recent (12 mo) or future (30 days) visit within the authorizing provider's specialty     Patient had office visit in the last 12 months or has a visit in the next 30 days with authorizing provider or within the authorizing provider's specialty.  See \"Patient Info\" tab in inTasty Labset, or \"Choose Columns\" in Meds & Orders section of the refill encounter.              Passed - Medication is active on med list        Passed - Normal TSH on file in past 12 months     Recent Labs   Lab Test 04/18/19  1339   TSH 1.03              Passed - No active pregnancy on record     If patient is pregnant or has had a positive pregnancy test, please check TSH.          Passed - No positive pregnancy test in " "past 12 months     If patient is pregnant or has had a positive pregnancy test, please check TSH.          lisinopril (PRINIVIL/ZESTRIL) 40 MG tablet  Last Written Prescription Date:  4/18/2019  Last Fill Quantity: 90 tablet,  # refills: 1   Last office visit: 4/18/2019 with prescribing provider:  Charles   Future Office Visit:     90 tablet 1     Sig: Take 1 tablet (40 mg) by mouth daily       ACE Inhibitors (Including Combos) Protocol Failed - 5/7/2019  9:34 AM        Failed - Blood pressure under 140/90 in past 12 months     BP Readings from Last 3 Encounters:   04/18/19 158/60   03/28/19 195/69   01/02/19 176/68                 Failed - Normal serum creatinine on file in past 12 months     Recent Labs   Lab Test 04/18/19  1339   CR 1.47*             Passed - Recent (12 mo) or future (30 days) visit within the authorizing provider's specialty     Patient had office visit in the last 12 months or has a visit in the next 30 days with authorizing provider or within the authorizing provider's specialty.  See \"Patient Info\" tab in inbasket, or \"Choose Columns\" in Meds & Orders section of the refill encounter.              Passed - Medication is active on med list        Passed - Patient is age 18 or older        Passed - No active pregnancy on record        Passed - Normal serum potassium on file in past 12 months     Recent Labs   Lab Test 04/18/19  1339   POTASSIUM 4.9             Passed - No positive pregnancy test within past 12 months        metoprolol tartrate (LOPRESSOR) 100 MG tablet  Last Written Prescription Date:  4/18/2019  Last Fill Quantity: 180 tablet,  # refills: 1   Last office visit: 4/18/2019 with prescribing provider:  Charles   Future Office Visit:     180 tablet 1     Sig: Take 1 tablet (100 mg) by mouth 2 times daily       Beta-Blockers Protocol Failed - 5/7/2019  9:34 AM        Failed - Blood pressure under 140/90 in past 12 months     BP Readings from Last 3 Encounters:   04/18/19 158/60 " "  03/28/19 195/69   01/02/19 176/68                 Passed - Patient is age 6 or older        Passed - Recent (12 mo) or future (30 days) visit within the authorizing provider's specialty     Patient had office visit in the last 12 months or has a visit in the next 30 days with authorizing provider or within the authorizing provider's specialty.  See \"Patient Info\" tab in inbasket, or \"Choose Columns\" in Meds & Orders section of the refill encounter.              Passed - Medication is active on med list           "

## 2019-05-09 RX ORDER — METOPROLOL TARTRATE 100 MG
100 TABLET ORAL 2 TIMES DAILY
Qty: 180 TABLET | Refills: 1 | Status: SHIPPED | OUTPATIENT
Start: 2019-05-09 | End: 2019-06-14

## 2019-05-09 RX ORDER — ALLOPURINOL 100 MG/1
100 TABLET ORAL DAILY
Qty: 90 TABLET | Refills: 1 | Status: SHIPPED | OUTPATIENT
Start: 2019-05-09 | End: 2020-01-09

## 2019-05-09 RX ORDER — LEVOTHYROXINE SODIUM 25 UG/1
25 TABLET ORAL DAILY
Qty: 90 TABLET | Refills: 3 | Status: SHIPPED | OUTPATIENT
Start: 2019-05-09 | End: 2020-01-09

## 2019-05-09 RX ORDER — AMLODIPINE BESYLATE 2.5 MG/1
7.5 TABLET ORAL DAILY
Qty: 270 TABLET | Refills: 0 | Status: SHIPPED | OUTPATIENT
Start: 2019-05-09 | End: 2019-05-29

## 2019-05-09 RX ORDER — LISINOPRIL 40 MG/1
40 TABLET ORAL DAILY
Qty: 90 TABLET | Refills: 1 | Status: SHIPPED | OUTPATIENT
Start: 2019-05-09 | End: 2019-06-14

## 2019-05-09 NOTE — TELEPHONE ENCOUNTER
Routing refill request to provider for review/approval because:  Labs out of range:  Cr, Uric Acid  BP out of range       Prescription approved per [facility] Refill Protocol for 12 months of refills since last appointment, which was 4/18/2019. (levothyroxine)    These medications were just filled, but through [facility] mail delivery needs new orders.

## 2019-05-29 ENCOUNTER — ANCILLARY PROCEDURE (OUTPATIENT)
Dept: GENERAL RADIOLOGY | Facility: CLINIC | Age: 84
End: 2019-05-29
Attending: FAMILY MEDICINE
Payer: MEDICARE

## 2019-05-29 ENCOUNTER — OFFICE VISIT (OUTPATIENT)
Dept: FAMILY MEDICINE | Facility: CLINIC | Age: 84
End: 2019-05-29
Payer: MEDICARE

## 2019-05-29 VITALS
HEIGHT: 62 IN | SYSTOLIC BLOOD PRESSURE: 162 MMHG | RESPIRATION RATE: 14 BRPM | DIASTOLIC BLOOD PRESSURE: 62 MMHG | HEART RATE: 61 BPM | WEIGHT: 182.5 LBS | OXYGEN SATURATION: 100 % | BODY MASS INDEX: 33.58 KG/M2 | TEMPERATURE: 98.8 F

## 2019-05-29 DIAGNOSIS — N18.30 CKD (CHRONIC KIDNEY DISEASE) STAGE 3, GFR 30-59 ML/MIN (H): Chronic | ICD-10-CM

## 2019-05-29 DIAGNOSIS — M25.511 CHRONIC PAIN OF BOTH SHOULDERS: ICD-10-CM

## 2019-05-29 DIAGNOSIS — G89.29 CHRONIC PAIN OF BOTH SHOULDERS: ICD-10-CM

## 2019-05-29 DIAGNOSIS — M25.512 CHRONIC PAIN OF BOTH SHOULDERS: ICD-10-CM

## 2019-05-29 DIAGNOSIS — R60.0 EDEMA EXTREMITIES: ICD-10-CM

## 2019-05-29 DIAGNOSIS — M25.512 CHRONIC PAIN OF BOTH SHOULDERS: Primary | ICD-10-CM

## 2019-05-29 DIAGNOSIS — M25.511 CHRONIC PAIN OF BOTH SHOULDERS: Primary | ICD-10-CM

## 2019-05-29 DIAGNOSIS — G89.29 CHRONIC PAIN OF BOTH SHOULDERS: Primary | ICD-10-CM

## 2019-05-29 DIAGNOSIS — I10 HYPERTENSION GOAL BP (BLOOD PRESSURE) < 140/90: Chronic | ICD-10-CM

## 2019-05-29 PROCEDURE — 73030 X-RAY EXAM OF SHOULDER: CPT | Mod: RT

## 2019-05-29 PROCEDURE — 73030 X-RAY EXAM OF SHOULDER: CPT | Mod: LT

## 2019-05-29 PROCEDURE — 99214 OFFICE O/P EST MOD 30 MIN: CPT | Performed by: FAMILY MEDICINE

## 2019-05-29 RX ORDER — HYDRALAZINE HYDROCHLORIDE 10 MG/1
10 TABLET, FILM COATED ORAL 3 TIMES DAILY
Qty: 90 TABLET | Refills: 1 | Status: SHIPPED | OUTPATIENT
Start: 2019-05-29 | End: 2019-06-14

## 2019-05-29 RX ORDER — AMLODIPINE BESYLATE 2.5 MG/1
2.5 TABLET ORAL DAILY
Qty: 90 TABLET | Refills: 1 | COMMUNITY
Start: 2019-05-29 | End: 2019-06-08 | Stop reason: SINTOL

## 2019-05-29 ASSESSMENT — MIFFLIN-ST. JEOR: SCORE: 1241.06

## 2019-05-29 NOTE — PROGRESS NOTES
"Subjective     Ghislaine Walls is a 83 year old female who presents to clinic today for the following health issues:    HPI   Medication Problem      Duration: Ongoing since starting new dose Amlodopine    Description (location/character/radiation): Edema of hands and ankles with discomfort    Intensity:  moderate    Accompanying signs and symptoms: See above    History (similar episodes/previous evaluation): Yes    Precipitating or alleviating factors: None    Therapies tried and outcome: NA     Also having chronic/ongoing and worsening b/l shoulder pain with weakness--for months.  Unable to do some ADL's due to the pain.  No hx traumatic injury.       Reviewed and updated as needed this visit by Provider  Tobacco  Allergies  Meds  Problems  Med Hx  Surg Hx  Fam Hx         Review of Systems   ROS COMP: CONSTITUTIONAL: NEGATIVE for fever, chills, change in weight  INTEGUMENTARY/SKIN: NEGATIVE for worrisome rashes, moles or lesions  EYES: NEGATIVE for vision changes or irritation  ENT/MOUTH: NEGATIVE for ear, mouth and throat problems  RESP: NEGATIVE for significant cough or SOB  CV: NEGATIVE for chest pain or palpitations  GI: NEGATIVE for nausea, abdominal pain, heartburn, or change in bowel habits  : NEGATIVE for frequency, dysuria, or hematuria  HEME: NEGATIVE for bleeding problems      Objective    /62   Pulse 61   Temp 98.8  F (37.1  C) (Tympanic)   Resp 14   Ht 1.575 m (5' 2\")   Wt 82.8 kg (182 lb 8 oz)   SpO2 100%   Breastfeeding? No   BMI 33.38 kg/m    Body mass index is 33.38 kg/m .  Physical Exam   GENERAL: Alert and no acute distress  EYES: Eyes grossly normal to inspection, PERRL and conjunctivae and sclerae normal  HENT: ear canals and TM's normal, nose and mouth without ulcers or lesions  NECK: no adenopathy, no asymmetry, masses, or scars and thyroid normal to palpation  RESP: lungs clear to auscultation - no rales, rhonchi or wheezes  CV: regular rate and rhythm, normal S1 S2, " "no S3 or S4, no murmur, click or rub, no peripheral edema and peripheral pulses strong  ABDOMEN: soft, nontender, and bowel sounds normal  MS: b/l shoulder pain/tenderness with ROM testing (especially in LUE and with rotator cuff movements)  +tenderness reproducible at GH joints and biceps  SKIN: no suspicious lesions or rashes  NEURO:mentation intact and speech normal.  +feels weakness (and pain) in b/l UE's with ROM testing  PSYCH: mentation appears normal, affect normal/bright    Diagnostic Test Results:  Labs reviewed in Epic   xray b/l shoulders     Assessment & Plan   Problem List Items Addressed This Visit     Hypertension goal BP (blood pressure) < 140/90 (Chronic)    Relevant Medications    hydrALAZINE (APRESOLINE) 10 MG tablet    amLODIPine (NORVASC) 2.5 MG tablet    CKD (chronic kidney disease) stage 3, GFR 30-59 ml/min (H) (Chronic)      Other Visit Diagnoses     Chronic pain of both shoulders    -  Primary    Relevant Orders    XR Shoulder Left G/E 3 Views    XR Shoulder Right G/E 3 Views    Edema extremities             1.  HTN--still uncontrolled and not tolerating Amlodipine due to extremity swelling in hands and legs.  Will decrease Amlodipine to 2.5 mg po every day  And Add Hydralazine 10 mg TID initially, plan to titrate to therapeutic dose.  Has CKD  Continue Lisinopril and Metoprolol at same doses    2.  B/l chronic shoulder pain, likely rotator cuff tear vs tendinitis  XRAY b/l shoulders ordered today, consider MRI if xray inconclusive  Declined PT referral for now.  Reccommended to take Tylenol Extra Strength and do light/easy stretches in her UE's      BMI:   Estimated body mass index is 33.38 kg/m  as calculated from the following:    Height as of this encounter: 1.575 m (5' 2\").    Weight as of this encounter: 82.8 kg (182 lb 8 oz).   Weight management plan: Discussed healthy diet and exercise guidelines        See Patient Instructions  Return in about 2 weeks (around 6/12/2019) for BP " Sparkle.    Mahsa Soto, [facility]

## 2019-05-30 ENCOUNTER — TELEPHONE (OUTPATIENT)
Dept: FAMILY MEDICINE | Facility: CLINIC | Age: 84
End: 2019-05-30

## 2019-05-30 DIAGNOSIS — M25.512 CHRONIC PAIN OF BOTH SHOULDERS: Primary | ICD-10-CM

## 2019-05-30 DIAGNOSIS — M25.511 CHRONIC PAIN OF BOTH SHOULDERS: Primary | ICD-10-CM

## 2019-05-30 DIAGNOSIS — G89.29 CHRONIC PAIN OF BOTH SHOULDERS: Primary | ICD-10-CM

## 2019-05-30 NOTE — TELEPHONE ENCOUNTER
Please help inform pt about xray results form yesterday.  Appears that she has mild to moderate arthritic changes in her left shoulder.  And only mild arthritic changes in her right shoulder joint.    We discussed getting an MRI of her shoulders to help r/o any ligament tears, etc. that might require surgery or possibly only PT and/or a shoulder joint injection to help with the pain and weakness.    Her left shoulder appeared to be the worse on exam and on this xray result... Would she like to get an MRI of both of her shoulders or just the left shoulder?  Or, does she want to just see PT to see if that helps strengthen up her shoulder joint and relieve her pain?    Both PT and MRI?    I've pended all those orders, just need to know which one(s) she would like to do.  Thanks!  --Dr. Soto

## 2019-05-30 NOTE — TELEPHONE ENCOUNTER
Patient was called with providers message. States she would rita to find out what is wrong first before she starts Tx. Pt would like to think about options given MRI and PT and call clinic back when she decides what to do. Message will be postponed for triage to follow up.

## 2019-05-31 NOTE — TELEPHONE ENCOUNTER
Pt was called and informed MRI orders were placed. She was given [facility] radiology phone number [phone].

## 2019-06-05 ENCOUNTER — HOSPITAL ENCOUNTER (OUTPATIENT)
Dept: MRI IMAGING | Facility: CLINIC | Age: 84
End: 2019-06-05
Attending: FAMILY MEDICINE
Payer: MEDICARE

## 2019-06-05 ENCOUNTER — HOSPITAL ENCOUNTER (OUTPATIENT)
Dept: MRI IMAGING | Facility: CLINIC | Age: 84
Discharge: HOME | End: 2019-06-05
Attending: FAMILY MEDICINE | Admitting: FAMILY MEDICINE
Payer: MEDICARE

## 2019-06-05 DIAGNOSIS — G89.29 CHRONIC PAIN OF BOTH SHOULDERS: ICD-10-CM

## 2019-06-05 DIAGNOSIS — M25.511 CHRONIC PAIN OF BOTH SHOULDERS: ICD-10-CM

## 2019-06-05 DIAGNOSIS — M25.512 CHRONIC PAIN OF BOTH SHOULDERS: ICD-10-CM

## 2019-06-05 PROCEDURE — 73221 MRI JOINT UPR EXTREM W/O DYE: CPT | Mod: RT

## 2019-06-06 ENCOUNTER — TELEPHONE (OUTPATIENT)
Dept: FAMILY MEDICINE | Facility: CLINIC | Age: 84
End: 2019-06-06

## 2019-06-06 DIAGNOSIS — M79.89 SWELLING OF EXTREMITY: Primary | ICD-10-CM

## 2019-06-06 RX ORDER — FUROSEMIDE 20 MG
20 TABLET ORAL DAILY
Qty: 3 TABLET | Refills: 0 | Status: SHIPPED | OUTPATIENT
Start: 2019-06-06 | End: 2019-06-08

## 2019-06-06 NOTE — TELEPHONE ENCOUNTER
She can stop the amlodipine.    Continue the Hydralazine.  I can send her a few water pills (lasix) to take once daily for 3 days.  But needs to follow up at the clinic to re-check.  She missed her appt yesterday.  We have Saturday clinic as well.  --Dr. Soto

## 2019-06-06 NOTE — TELEPHONE ENCOUNTER
Pt called to report  worsening swelling of leg and hands has worsened since OV. Medications were reviewed and pt reports she is taking medication as prescribed.     -Amlodipine 2.5 mg one tablet daily   -Hydralazine 10 mg  - Take 1 tablet by mouth 3 times daily.  -Lisinopril 40 mg - one tablet daily  -metoprolol -take 1 tablet 2 times daily .    Pt asked is she should stop one amlodipine or Hydralazine.     She denies chest pain, SOB , or any new symptoms.     Advised that pt schedule an office visit. Pt declines states is hard to drive with hand swelling . Please advice.

## 2019-06-08 ENCOUNTER — OFFICE VISIT (OUTPATIENT)
Dept: FAMILY MEDICINE | Facility: CLINIC | Age: 84
End: 2019-06-08
Payer: MEDICARE

## 2019-06-08 VITALS
OXYGEN SATURATION: 98 % | TEMPERATURE: 99.5 F | BODY MASS INDEX: 32.92 KG/M2 | RESPIRATION RATE: 16 BRPM | DIASTOLIC BLOOD PRESSURE: 66 MMHG | SYSTOLIC BLOOD PRESSURE: 128 MMHG | WEIGHT: 180 LBS | HEART RATE: 54 BPM

## 2019-06-08 DIAGNOSIS — I10 HYPERTENSION GOAL BP (BLOOD PRESSURE) < 140/90: Chronic | ICD-10-CM

## 2019-06-08 DIAGNOSIS — N18.30 CKD (CHRONIC KIDNEY DISEASE) STAGE 3, GFR 30-59 ML/MIN (H): Chronic | ICD-10-CM

## 2019-06-08 DIAGNOSIS — I50.40 COMBINED SYSTOLIC AND DIASTOLIC CONGESTIVE HEART FAILURE, UNSPECIFIED HF CHRONICITY (H): ICD-10-CM

## 2019-06-08 DIAGNOSIS — R60.9 DEPENDENT EDEMA: Primary | ICD-10-CM

## 2019-06-08 DIAGNOSIS — M79.89 SWELLING OF EXTREMITY: ICD-10-CM

## 2019-06-08 LAB
ANION GAP SERPL CALCULATED.3IONS-SCNC: 6 MMOL/L (ref 3–14)
BUN SERPL-MCNC: 39 MG/DL (ref 7–30)
CALCIUM SERPL-MCNC: 9 MG/DL (ref 8.5–10.1)
CHLORIDE SERPL-SCNC: 113 MMOL/L (ref 94–109)
CO2 SERPL-SCNC: 21 MMOL/L (ref 20–32)
CREAT SERPL-MCNC: 1.47 MG/DL (ref 0.52–1.04)
GFR SERPL CREATININE-BSD FRML MDRD: 33 ML/MIN/{1.73_M2}
GLUCOSE SERPL-MCNC: 104 MG/DL (ref 70–99)
NT-PROBNP SERPL-MCNC: 2104 PG/ML (ref 0–450)
POTASSIUM SERPL-SCNC: 4.7 MMOL/L (ref 3.4–5.3)
SODIUM SERPL-SCNC: 140 MMOL/L (ref 133–144)

## 2019-06-08 PROCEDURE — 99214 OFFICE O/P EST MOD 30 MIN: CPT | Performed by: FAMILY MEDICINE

## 2019-06-08 PROCEDURE — 36415 COLL VENOUS BLD VENIPUNCTURE: CPT | Performed by: FAMILY MEDICINE

## 2019-06-08 PROCEDURE — 83880 ASSAY OF NATRIURETIC PEPTIDE: CPT | Performed by: FAMILY MEDICINE

## 2019-06-08 PROCEDURE — 80048 BASIC METABOLIC PNL TOTAL CA: CPT | Performed by: FAMILY MEDICINE

## 2019-06-08 RX ORDER — FUROSEMIDE 20 MG
20 TABLET ORAL DAILY
Qty: 30 TABLET | Refills: 1 | Status: SHIPPED | OUTPATIENT
Start: 2019-06-08 | End: 2019-06-14

## 2019-06-08 NOTE — PROGRESS NOTES
Subjective     Ghislaine Walls is a 83 year old female who presents to clinic today for the following health issues:    HPI   Concern - edema  Onset: ongoing    Description:   Swelling in feet, ankles, hands, moving up into arms    Intensity: severe    Progression of Symptoms:  intermittent    Accompanying Signs & Symptoms:  Pain, swelling    Previous history of similar problem:   yes    Precipitating factors:   Worsened by: na    Alleviating factors:  Improved by: unknown    Therapies Tried and outcome:     Pt stopped th Amlodipine 3 days ago  She has taken Furosemide 20 mg daily, took the last of 3 tablets this morning    Fatigue      Duration: months    Description (location/character/radiation): feels tired all the time, low energy    Intensity:  moderate    Accompanying signs and symptoms: none    History (similar episodes/previous evaluation): has been ongoing for months    Precipitating or alleviating factors: None    Therapies tried and outcome: has been on thyroid replacement       BP Readings from Last 3 Encounters:   06/08/19 128/66   05/29/19 162/62   04/18/19 158/60    Wt Readings from Last 3 Encounters:   06/08/19 81.6 kg (180 lb)   05/29/19 82.8 kg (182 lb 8 oz)   04/18/19 81.9 kg (180 lb 8 oz)                      Reviewed and updated as needed this visit by Provider         Review of Systems   ROS COMP: CONSTITUTIONAL:NEGATIVE for fever, chills, change in weight and POSITIVE  for fatigue  ENT/MOUTH: NEGATIVE for ear, mouth and throat problems  RESP: NEGATIVE for significant cough or SOB  CV: POSITIVE for lower extremity edema and NEGATIVE for chest pain/chest pressure and dyspnea on exertion  ENDOCRINE: NEGATIVE for temperature intolerance, skin/hair changes and POSITIVE  for HX thyroid disease      Objective    /66   Pulse 54   Temp 99.5  F (37.5  C) (Tympanic)   Resp 16   Wt 81.6 kg (180 lb)   SpO2 98%   BMI 32.92 kg/m    Body mass index is 32.92 kg/m .  Physical Exam   GENERAL:  "healthy, alert and no distress  NECK: no adenopathy, no asymmetry, masses, or scars and thyroid normal to palpation  RESP: lungs clear to auscultation - no rales, rhonchi or wheezes  CV: regular rate and rhythm, normal S1 S2, no S3 or S4, no murmur, click or rub, no peripheral edema and peripheral pulses strong  ABDOMEN: soft, nontender, no hepatosplenomegaly, no masses and bowel sounds normal  MS: 2+ edema to mid calf    Diagnostic Test Results:  Labs reviewed in [facility]  Lab: see below, results pending        Assessment & Plan     Ghislaine was seen today for edema.    Diagnoses and all orders for this visit:    Dependent edema  -     BNP-N terminal pro    Swelling of extremity  -     furosemide (LASIX) 20 MG tablet; Take 1 tablet (20 mg) by mouth daily    CKD (chronic kidney disease) stage 3, GFR 30-59 ml/min (H)  -     Basic metabolic panel  (Ca, Cl, CO2, Creat, Gluc, K, Na, BUN)    Combined systolic and diastolic congestive heart failure, unspecified HF chronicity (H)  -     BNP-N terminal pro    Hypertension goal BP (blood pressure) < 140/90         BMI:   Estimated body mass index is 32.92 kg/m  as calculated from the following:    Height as of 5/29/19: 1.575 m (5' 2\").    Weight as of this encounter: 81.6 kg (180 lb).   Weight management plan: Discussed healthy diet and exercise guidelines        FUTURE APPOINTMENTS:       - Follow-up visit in 1 week  Pt has an appt already scheduled with Dr Soto for next Wednesday 6/12/19.  She will keep that appointment  Patient Instructions   Cut the dose of Lisinopril in half, take only 20 mg daily       Return in about 1 week (around 6/15/2019) for Hypertension, edema.    Korey Padilla MD  [facility]      "

## 2019-06-08 NOTE — LETTER
Bernice 10, 2019      Ghislaine LAROSE Titi  [address]   [place] [address]        Dear ,    We are writing to inform you of your test results.    BNP elevated, evidence for congestive heart failure  Metabolic panel shows decreased kidney function, stable from last test. Mild elevation of glucose but not fasting    Resulted Orders   Basic metabolic panel  (Ca, Cl, CO2, Creat, Gluc, K, Na, BUN)   Result Value Ref Range    Sodium 140 133 - 144 mmol/L    Potassium 4.7 3.4 - 5.3 mmol/L    Chloride 113 (H) 94 - 109 mmol/L    Carbon Dioxide 21 20 - 32 mmol/L    Anion Gap 6 3 - 14 mmol/L    Glucose 104 (H) 70 - 99 mg/dL    Urea Nitrogen 39 (H) 7 - 30 mg/dL    Creatinine 1.47 (H) 0.52 - 1.04 mg/dL    GFR Estimate 33 (L) >60 mL/min/[1.73_m2]      Comment:      Non  GFR Calc  Starting 12/18/2018, serum creatinine based estimated GFR (eGFR) will be   calculated using the Chronic Kidney Disease Epidemiology Collaboration   (CKD-EPI) equation.      GFR Estimate If Black 38 (L) >60 mL/min/[1.73_m2]      Comment:       GFR Calc  Starting 12/18/2018, serum creatinine based estimated GFR (eGFR) will be   calculated using the Chronic Kidney Disease Epidemiology Collaboration   (CKD-EPI) equation.      Calcium 9.0 8.5 - 10.1 mg/dL   BNP-N terminal pro   Result Value Ref Range    N-Terminal Pro Bnp 2,104 (H) 0 - 450 pg/mL      Comment:         Reference range shown and results flagged as abnormal are for the outpatient,   non acute settings. Establishing a baseline value for each individual patient   is useful for follow-up.  Suggested inpatient cut points for confirming diagnosis of CHF in an acute   setting are:   >450 pg/mL (age 18 to less than 50)   >900 pg/mL (age 50 to less than 75)   >1800 pg/mL (75 yrs and older)  An inpatient or emergency department NT-proPBNP <300 pg/mL effectively rules   out acute CHF, with 99% negative predictive value.          If you have any questions or  concerns, please call the clinic at the number listed above.       Sincerely,        Korey Padilla MD

## 2019-06-13 ENCOUNTER — TELEPHONE (OUTPATIENT)
Dept: FAMILY MEDICINE | Facility: CLINIC | Age: 84
End: 2019-06-13

## 2019-06-13 DIAGNOSIS — M19.019 SHOULDER ARTHRITIS: Primary | ICD-10-CM

## 2019-06-13 DIAGNOSIS — M75.51 BURSITIS OF BOTH SHOULDERS: ICD-10-CM

## 2019-06-13 DIAGNOSIS — M75.52 BURSITIS OF BOTH SHOULDERS: ICD-10-CM

## 2019-06-13 NOTE — TELEPHONE ENCOUNTER
It appears that the MRI's of Pito baptiste (6/5/19) shows some arthritis, bursitis, and tendinopathy (but no ligamentous/tissue tears) and this is all likely causing her shoulder pain.  I am recommending that she see Ortho to possibly get a steroid injection if that pain is still ongoing and not getting any better with conservative treatment (Physical Therapy, rest, hot/cold compression applications, etc).  I will have the order pended.  Please let me know if pt would like a referral (you may sign it if requesting it) or if she has any further questions/concerns.  Thanks!  --Dr. Soto

## 2019-06-13 NOTE — TELEPHONE ENCOUNTER
MRI results relayed to patient. She states that she has an appointment with PCP tomorrow, 6/14/19, and would like to discuss the referral at that time.

## 2019-06-14 ENCOUNTER — OFFICE VISIT (OUTPATIENT)
Dept: FAMILY MEDICINE | Facility: CLINIC | Age: 84
End: 2019-06-14
Payer: MEDICARE

## 2019-06-14 VITALS
HEIGHT: 62 IN | TEMPERATURE: 98.2 F | DIASTOLIC BLOOD PRESSURE: 58 MMHG | BODY MASS INDEX: 33.13 KG/M2 | OXYGEN SATURATION: 97 % | RESPIRATION RATE: 14 BRPM | SYSTOLIC BLOOD PRESSURE: 164 MMHG | HEART RATE: 64 BPM | WEIGHT: 180 LBS

## 2019-06-14 DIAGNOSIS — N18.30 CKD (CHRONIC KIDNEY DISEASE) STAGE 3, GFR 30-59 ML/MIN (H): Chronic | ICD-10-CM

## 2019-06-14 DIAGNOSIS — I50.9 CHRONIC CONGESTIVE HEART FAILURE, UNSPECIFIED HEART FAILURE TYPE (H): ICD-10-CM

## 2019-06-14 DIAGNOSIS — I10 HYPERTENSION GOAL BP (BLOOD PRESSURE) < 140/90: Chronic | ICD-10-CM

## 2019-06-14 DIAGNOSIS — I89.0 LYMPHEDEMA: Primary | ICD-10-CM

## 2019-06-14 LAB — NT-PROBNP SERPL-MCNC: 2800 PG/ML (ref 0–450)

## 2019-06-14 PROCEDURE — 80048 BASIC METABOLIC PNL TOTAL CA: CPT | Performed by: FAMILY MEDICINE

## 2019-06-14 PROCEDURE — 99214 OFFICE O/P EST MOD 30 MIN: CPT | Performed by: FAMILY MEDICINE

## 2019-06-14 PROCEDURE — 83880 ASSAY OF NATRIURETIC PEPTIDE: CPT | Performed by: FAMILY MEDICINE

## 2019-06-14 PROCEDURE — 36415 COLL VENOUS BLD VENIPUNCTURE: CPT | Performed by: FAMILY MEDICINE

## 2019-06-14 RX ORDER — FUROSEMIDE 40 MG
40 TABLET ORAL DAILY
Qty: 30 TABLET | Refills: 0 | Status: SHIPPED | OUTPATIENT
Start: 2019-06-14 | End: 2019-07-06

## 2019-06-14 RX ORDER — METOPROLOL SUCCINATE 100 MG/1
100 TABLET, EXTENDED RELEASE ORAL DAILY
Qty: 90 TABLET | Refills: 1 | Status: SHIPPED | OUTPATIENT
Start: 2019-06-14 | End: 2019-06-14

## 2019-06-14 RX ORDER — LISINOPRIL 20 MG/1
20 TABLET ORAL DAILY
Qty: 90 TABLET | Refills: 1 | Status: SHIPPED | OUTPATIENT
Start: 2019-06-14 | End: 2019-09-27

## 2019-06-14 RX ORDER — METOPROLOL SUCCINATE 100 MG/1
100 TABLET, EXTENDED RELEASE ORAL DAILY
Qty: 90 TABLET | Refills: 1 | Status: SHIPPED | OUTPATIENT
Start: 2019-06-14 | End: 2019-10-25

## 2019-06-14 RX ORDER — HYDRALAZINE HYDROCHLORIDE 10 MG/1
10 TABLET, FILM COATED ORAL 3 TIMES DAILY
Qty: 90 TABLET | Refills: 1 | Status: SHIPPED | OUTPATIENT
Start: 2019-06-14 | End: 2019-07-06

## 2019-06-14 ASSESSMENT — MIFFLIN-ST. JEOR: SCORE: 1229.72

## 2019-06-14 NOTE — PROGRESS NOTES
"Subjective     Ghislaine Walls is a 83 year old female who presents to clinic today for the following health issues:    HPI   Hypertension Follow-up      Do you check your blood pressure regularly outside of the clinic? Yes     Are you following a low salt diet? Yes---trying, eats out a lot    Are your blood pressures ever more than 140 on the top number (systolic) OR more   than 90 on the bottom number (diastolic), for example 140/90? Yes    Amount of exercise or physical activity: minimal    Problems taking medications regularly: No    Medication side effects: none    Diet: low salt  EDEMA OF ANKLES AND LEGS--has not noticed any difference with Lasix 20 mg daily (has been on it for about 1.5 weeks).      Reviewed and updated as needed this visit by Provider  Tobacco  Allergies  Meds  Problems  Med Hx  Surg Hx  Fam Hx         Review of Systems   ROS COMP: CONSTITUTIONAL: NEGATIVE for fever, chills, change in weight  INTEGUMENTARY/SKIN: NEGATIVE for worrisome rashes, moles or lesions  EYES: NEGATIVE for vision changes or irritation  ENT/MOUTH: NEGATIVE for ear, mouth and throat problems  RESP: NEGATIVE for significant cough or SOB  CV: NEGATIVE for chest pain or palpitations  GI: NEGATIVE for nausea, abdominal pain, heartburn, or change in bowel habits  : NEGATIVE for frequency, dysuria, or hematuria  HEME: NEGATIVE for bleeding problems          Objective    /58   Pulse 64   Temp 98.2  F (36.8  C) (Tympanic)   Resp 14   Ht 1.575 m (5' 2\")   Wt 81.6 kg (180 lb)   SpO2 97%   Breastfeeding? No   BMI 32.92 kg/m    Body mass index is 32.92 kg/m .  Physical Exam   GENERAL APPEARANCE: alert and no acute distress  EYES: Eyes grossly normal to inspection, PERRL and conjunctivae and sclerae normal  HENT: Nose and mouth without ulcers or lesions, oropharynx clear and oral mucous membranes moist  NECK: no adenopathy, no asymmetry, masses, or scars and thyroid normal to palpation  RESP: lungs clear to " "auscultation - no rales, rhonchi or wheezes  CV: regular rate and rhythm, normal S1 S2, no S3 or S4, no murmur, click or rub, peripheral pulses strong.  +2 pitting b/l LE edema  ABDOMEN: soft, nontender, no masses and bowel sounds normal  SKIN: no suspicious lesions or rashes  PSYCH: mentation appears normal and affect normal/bright    Diagnostic Test Results:  Labs reviewed in Epic  BNP and BMP        Assessment & Plan   Problem List Items Addressed This Visit     Hypertension goal BP (blood pressure) < 140/90 (Chronic)    Relevant Medications    lisinopril (PRINIVIL/ZESTRIL) 20 MG tablet    metoprolol succinate ER (TOPROL-XL) 100 MG 24 hr tablet    hydrALAZINE (APRESOLINE) 10 MG tablet    Other Relevant Orders    Basic metabolic panel  (Ca, Cl, CO2, Creat, Gluc, K, Na, BUN) (Completed)    BNP-N terminal pro (Completed)    LYMPHEDEMA THERAPY REFERRAL    CKD (chronic kidney disease) stage 3, GFR 30-59 ml/min (H) (Chronic)    Relevant Orders    LYMPHEDEMA THERAPY REFERRAL    Lymphedema - Primary      Other Visit Diagnoses     Chronic congestive heart failure, unspecified heart failure type (H)        Relevant Orders    BNP-N terminal pro (Completed)    LYMPHEDEMA THERAPY REFERRAL           BP still not quite at goal and continues to c/o LE edema.  BP goal is <140/90.   Referral to Lymphedema Clinic.  BNP and BMP  Continue Lisinopril at 20 mg po every day, Metoprolol at 100 mg po every day, and Hydralazine at 10 mg TID  Increase Lasix to 40 mg po every day.  RTC to get BP, LE edema and BMP re-checked in 1 week or sooner if needed.    BMI:   Estimated body mass index is 32.92 kg/m  as calculated from the following:    Height as of this encounter: 1.575 m (5' 2\").    Weight as of this encounter: 81.6 kg (180 lb).   Weight management plan: Discussed healthy diet and exercise guidelines        See Patient Instructions  Return in about 1 week (around 6/21/2019) for BP Recheck.    Mahsa Soto, [facility] " [facility]

## 2019-06-15 LAB
ANION GAP SERPL CALCULATED.3IONS-SCNC: 9 MMOL/L (ref 3–14)
BUN SERPL-MCNC: 42 MG/DL (ref 7–30)
CALCIUM SERPL-MCNC: 9.3 MG/DL (ref 8.5–10.1)
CHLORIDE SERPL-SCNC: 113 MMOL/L (ref 94–109)
CO2 SERPL-SCNC: 20 MMOL/L (ref 20–32)
CREAT SERPL-MCNC: 1.49 MG/DL (ref 0.52–1.04)
GFR SERPL CREATININE-BSD FRML MDRD: 32 ML/MIN/{1.73_M2}
GLUCOSE SERPL-MCNC: 97 MG/DL (ref 70–99)
POTASSIUM SERPL-SCNC: 4.8 MMOL/L (ref 3.4–5.3)
SODIUM SERPL-SCNC: 142 MMOL/L (ref 133–144)

## 2019-06-17 PROBLEM — I89.0 LYMPHEDEMA: Status: ACTIVE | Noted: 2019-06-17

## 2019-06-18 ENCOUNTER — TELEPHONE (OUTPATIENT)
Dept: FAMILY MEDICINE | Facility: CLINIC | Age: 84
End: 2019-06-18

## 2019-06-18 DIAGNOSIS — I89.0 LYMPHEDEMA: ICD-10-CM

## 2019-06-18 DIAGNOSIS — I10 HYPERTENSION GOAL BP (BLOOD PRESSURE) < 140/90: Chronic | ICD-10-CM

## 2019-06-18 DIAGNOSIS — N18.30 CKD (CHRONIC KIDNEY DISEASE) STAGE 3, GFR 30-59 ML/MIN (H): Primary | Chronic | ICD-10-CM

## 2019-06-18 NOTE — TELEPHONE ENCOUNTER
Patient Contact    Attempt # 1    Was call answered?  No.  Left message on voicemail with information to call triage back.    Upon callback, relay provider result.

## 2019-06-18 NOTE — TELEPHONE ENCOUNTER
Please help inform pt about lab results:    BNP is abnormally elevated but stable.  It tells us if she has a CHF flare up.  We are currently trying to get thee excess fluid/edema off with Lasix.  We are also monitoring her kidney function closely since Lasix metabolizes through the kidney and can affect kidney function.  Also trying to get her BP under better control.    I am recommending that we re-check her BMP in about 1-2 weeks during a nurse only OV so we can get BP re-checked as well.  I will have the BMP ordered.  I am also recommending that we have her see Nephrology due to her CKD lymphedema, and ongoing uncontrolled HTN; referral placed.    Please let me know if Ghislaine has any questions or concerns.  Thanks!  --Dr. Soto

## 2019-06-18 NOTE — TELEPHONE ENCOUNTER
Patient returned call and lab results were relayed. Provided referral information for her.     Your provider has referred you to: [facility]: [facility] Bernabe Santana [phone]   [email or web address]    Scheduled lab only and nurse only in 2 weeks.

## 2019-07-05 ENCOUNTER — ALLIED HEALTH/NURSE VISIT (OUTPATIENT)
Dept: NURSING | Facility: CLINIC | Age: 84
End: 2019-07-05
Payer: MEDICARE

## 2019-07-05 ENCOUNTER — TRANSFERRED RECORDS (OUTPATIENT)
Dept: HEALTH INFORMATION MANAGEMENT | Facility: CLINIC | Age: 84
End: 2019-07-05

## 2019-07-05 VITALS — SYSTOLIC BLOOD PRESSURE: 148 MMHG | DIASTOLIC BLOOD PRESSURE: 60 MMHG | HEART RATE: 60 BPM

## 2019-07-05 DIAGNOSIS — I10 HYPERTENSION GOAL BP (BLOOD PRESSURE) < 140/90: Chronic | ICD-10-CM

## 2019-07-05 DIAGNOSIS — I10 HYPERTENSION GOAL BP (BLOOD PRESSURE) < 140/90: Primary | ICD-10-CM

## 2019-07-05 DIAGNOSIS — I89.0 LYMPHEDEMA: ICD-10-CM

## 2019-07-05 DIAGNOSIS — N18.30 CKD (CHRONIC KIDNEY DISEASE) STAGE 3, GFR 30-59 ML/MIN (H): Chronic | ICD-10-CM

## 2019-07-05 LAB
ANION GAP SERPL CALCULATED.3IONS-SCNC: 13 MMOL/L (ref 3–14)
BUN SERPL-MCNC: 78 MG/DL (ref 7–30)
CALCIUM SERPL-MCNC: 9.3 MG/DL (ref 8.5–10.1)
CHLORIDE SERPL-SCNC: 111 MMOL/L (ref 94–109)
CO2 SERPL-SCNC: 18 MMOL/L (ref 20–32)
CREAT SERPL-MCNC: 1.58 MG/DL (ref 0.52–1.04)
GFR SERPL CREATININE-BSD FRML MDRD: 30 ML/MIN/{1.73_M2}
GLUCOSE SERPL-MCNC: 103 MG/DL (ref 70–99)
POTASSIUM SERPL-SCNC: 4.5 MMOL/L (ref 3.4–5.3)
SODIUM SERPL-SCNC: 142 MMOL/L (ref 133–144)

## 2019-07-05 PROCEDURE — 80048 BASIC METABOLIC PNL TOTAL CA: CPT | Performed by: FAMILY MEDICINE

## 2019-07-05 PROCEDURE — 99207 ZZC NO CHARGE LOS: CPT

## 2019-07-05 PROCEDURE — 36415 COLL VENOUS BLD VENIPUNCTURE: CPT | Performed by: FAMILY MEDICINE

## 2019-07-05 NOTE — NURSING NOTE
Patient presented for nurse only B/P recheck. Patient stated concerns because she had seen the diagnosis of CHF and told me that this had never been discussed with her. Advised to schedule f/u visit with primary provider to answer any questions or concerns.    Nancy Mejia LPN

## 2019-07-05 NOTE — PROGRESS NOTES
"Subjective     Ghislaine Walls is a 83 year old female who presents to clinic today for the following health issues:    HPI   Hypertension Follow-up      Do you check your blood pressure regularly outside of the clinic? { :549817}     Are you following a low salt diet? { :063021}    Are your blood pressures ever more than 140 on the top number (systolic) OR more   than 90 on the bottom number (diastolic), for example 140/90? { :824417}    Amount of exercise or physical activity: {Exercise frequency days per week:914240}    Problems taking medications regularly: {Med Problems:780501::\"No\"}    Medication side effects: {CHRONIC MED SIDE EFFECTS:485224::\"none\"}    Diet: { :235692}      {additonal problems for provider to add (Optional):514610}    {HIST REVIEW/ LINKS 2 (Optional):994955}    {Additional problems for the provider to add (optional):030847}  Reviewed and updated as needed this visit by Provider         Review of Systems   {ROS COMP (Optional):082454}      Objective    There were no vitals taken for this visit.  There is no height or weight on file to calculate BMI.  Physical Exam   {Exam List (Optional):514734}    {Diagnostic Test Results (Optional):414627::\"Diagnostic Test Results:\",\"Labs reviewed in Epic\"}        {PROVIDER CHARTING PREFERENCE:786712}      "

## 2019-07-06 ENCOUNTER — TELEPHONE (OUTPATIENT)
Dept: FAMILY MEDICINE | Facility: CLINIC | Age: 84
End: 2019-07-06

## 2019-07-06 DIAGNOSIS — I10 HYPERTENSION GOAL BP (BLOOD PRESSURE) < 140/90: Chronic | ICD-10-CM

## 2019-07-06 RX ORDER — HYDRALAZINE HYDROCHLORIDE 25 MG/1
25 TABLET, FILM COATED ORAL 3 TIMES DAILY
Qty: 90 TABLET | Refills: 3 | Status: SHIPPED | OUTPATIENT
Start: 2019-07-06 | End: 2019-09-27

## 2019-07-06 RX ORDER — FUROSEMIDE 40 MG
40 TABLET ORAL DAILY
Qty: 30 TABLET | Refills: 3 | Status: SHIPPED | OUTPATIENT
Start: 2019-07-06 | End: 2019-09-27

## 2019-07-06 NOTE — TELEPHONE ENCOUNTER
Please help inform pt about recent abnormally low kidney function result.  Her kidney function has been chronically low and stable so this is good but still needs to see Nephrology... I had referred her to see Nephrology (and the Lymphedema clinic) last June 2019 and was just hoping to get an update on whether she was able to make an appt or not.  She also got her BP re-checked yesterday via nurse only appt which has definitely improved on the higher dose of Lasix at 40 mg po every day, so will send her a few refills of the Lasix.  However, BP was still not quite at goal (<140/90), it was 148/60 yesterday.  I am recommending that we increase her Hydralazine from 10 to 25 mg po TID.  Will send the new script as well.    Please let me know if she has any questions or concerns.  Thanks!  --Dr. Soto

## 2019-07-08 NOTE — TELEPHONE ENCOUNTER
Pt was called with providers message.    Reports she has a future appointment with Nephrology  07/25/2019. She also wants provider aware she was seen with doctor Nataliya and had a Cortizone shot. Shoulder pain has improved.

## 2019-07-26 ENCOUNTER — TRANSFERRED RECORDS (OUTPATIENT)
Dept: HEALTH INFORMATION MANAGEMENT | Facility: CLINIC | Age: 84
End: 2019-07-26

## 2019-08-01 ENCOUNTER — HOSPITAL ENCOUNTER (OUTPATIENT)
Dept: ULTRASOUND IMAGING | Facility: CLINIC | Age: 84
Discharge: HOME | End: 2019-08-01
Attending: INTERNAL MEDICINE | Admitting: INTERNAL MEDICINE
Payer: MEDICARE

## 2019-08-01 DIAGNOSIS — T56.0X1A: ICD-10-CM

## 2019-08-01 DIAGNOSIS — M10.10: ICD-10-CM

## 2019-08-01 DIAGNOSIS — I10 HYPERTENSION: ICD-10-CM

## 2019-08-01 DIAGNOSIS — N18.30 CHRONIC KIDNEY DISEASE, STAGE III (MODERATE) (H): ICD-10-CM

## 2019-08-01 PROCEDURE — 76770 US EXAM ABDO BACK WALL COMP: CPT

## 2019-08-22 ENCOUNTER — TRANSFERRED RECORDS (OUTPATIENT)
Dept: HEALTH INFORMATION MANAGEMENT | Facility: CLINIC | Age: 84
End: 2019-08-22

## 2019-08-29 DIAGNOSIS — N18.30 CHRONIC KIDNEY DISEASE, STAGE III (MODERATE) (H): ICD-10-CM

## 2019-08-29 PROCEDURE — 36415 COLL VENOUS BLD VENIPUNCTURE: CPT | Performed by: NURSE PRACTITIONER

## 2019-08-29 PROCEDURE — 80069 RENAL FUNCTION PANEL: CPT | Performed by: NURSE PRACTITIONER

## 2019-08-30 LAB
ALBUMIN SERPL-MCNC: 3.4 G/DL (ref 3.4–5)
ANION GAP SERPL CALCULATED.3IONS-SCNC: 10 MMOL/L (ref 3–14)
BUN SERPL-MCNC: 52 MG/DL (ref 7–30)
CALCIUM SERPL-MCNC: 9.5 MG/DL (ref 8.5–10.1)
CHLORIDE SERPL-SCNC: 109 MMOL/L (ref 94–109)
CO2 SERPL-SCNC: 21 MMOL/L (ref 20–32)
CREAT SERPL-MCNC: 1.55 MG/DL (ref 0.52–1.04)
GFR SERPL CREATININE-BSD FRML MDRD: 31 ML/MIN/{1.73_M2}
GLUCOSE SERPL-MCNC: 115 MG/DL (ref 70–99)
PHOSPHATE SERPL-MCNC: 3.7 MG/DL (ref 2.5–4.5)
POTASSIUM SERPL-SCNC: 4.2 MMOL/L (ref 3.4–5.3)
SODIUM SERPL-SCNC: 140 MMOL/L (ref 133–144)

## 2019-09-24 ENCOUNTER — TRANSFERRED RECORDS (OUTPATIENT)
Dept: HEALTH INFORMATION MANAGEMENT | Facility: CLINIC | Age: 84
End: 2019-09-24

## 2019-09-24 LAB
CREAT SERPL-MCNC: 2.2 MG/DL (ref 0.6–0.88)
GFR SERPL CREATININE-BSD FRML MDRD: 20 ML/MIN/1.73M2
GLUCOSE SERPL-MCNC: 126 MG/DL (ref 65–99)
POTASSIUM SERPL-SCNC: 3.9 MMOL/L (ref 3.5–5.3)
TSH SERPL-ACNC: 1.94 MIU/L (ref 0.4–4.5)

## 2019-09-26 ENCOUNTER — TELEPHONE (OUTPATIENT)
Dept: FAMILY MEDICINE | Facility: CLINIC | Age: 84
End: 2019-09-26

## 2019-09-26 NOTE — PROGRESS NOTES
"    Ghislaine Walsl is a 84 year old female who presents to clinic today for the following health issues:    HPI   Anemia/Weight Loss/Extreme Fatique      Duration: X2 month    Description (location/character/radiation): See above    Intensity:  severe    Accompanying signs and symptoms: Is scheduled for transfusion    History (similar episodes/previous evaluation): Yes    Precipitating or alleviating factors: Anemia    Therapies tried and outcome: Ongoing medical treatment     Check sores on buttock.  Noticed sores >1 month ago.  Having discharge and more pain.  No fevers.   Unintentional weight loss recently.       Reviewed and updated as needed this visit by Provider  Tobacco  Allergies  Meds  Problems  Med Hx  Surg Hx  Fam Hx         Review of Systems   ROS COMP: CONSTITUTIONAL: NEGATIVE for fever, chills  EYES: NEGATIVE for vision changes or irritation  ENT/MOUTH: NEGATIVE for ear, mouth and throat problems  RESP: NEGATIVE for significant cough or SOB  CV: NEGATIVE for chest pain or palpitations  GI: NEGATIVE for nausea, abdominal pain, heartburn, or change in bowel habits  : NEGATIVE for frequency, dysuria, or hematuria  HEME: NEGATIVE for bleeding problems            /50 (Patient Position: Sitting, Cuff Size: Adult Regular)   Pulse 69   Temp 97.8  F (36.6  C) (Tympanic)   Resp 14   Ht 1.575 m (5' 2\")   Wt 73 kg (161 lb)   SpO2 100%   BMI 29.45 kg/m    Body mass index is 29.45 kg/m .  Physical Exam   GENERAL APPEARANCE: appears fatigue, no acute distress  EYES: Eyes grossly normal to inspection, PERRL and conjunctivae and sclerae normal  HENT: Nose and mouth without ulcers or lesions, oropharynx clear and oral mucous membranes moist  NECK: no adenopathy, no asymmetry, masses, or scars and thyroid normal to palpation  RESP: lungs clear to auscultation - no rales, rhonchi or wheezes  CV: regular rate and rhythm, normal S1 S2, no S3 or S4, no murmur, click or rub, peripheral pulses strong. "  +2 pitting b/l LE edema  ABDOMEN: soft, nontender, no masses and bowel sounds normal  SKIN: two infected ulcers with thick white discharge present.  They are located on the buttocks (one on each butt cheek).  +large erythematous rash with satellite lesions present along the buttocks (between the butt cheeks) as well.   PSYCH: mentation appears normal and affect normal/bright    Diagnostic Test Results:  Labs reviewed in Epic        Assessment & Plan   Problem List Items Addressed This Visit     Lymphedema    Relevant Orders    LYMPHEDEMA THERAPY REFERRAL    Weight loss, abnormal      Other Visit Diagnoses     Pressure injury of buttock, stage 2, unspecified laterality (H)    -  Primary    Relevant Orders    Wound Culture Aerobic Bacterial GICH (FUTURE)    Gram stain GICH (Future)    Wound Culture Aerobic Bacterial (Completed)    Gram stain (Completed)    Fungus Culture, non-blood (Completed)    WOUND CARE REFERRAL    Congestive heart failure, unspecified HF chronicity, unspecified heart failure type (H)        Relevant Orders    Echocardiogram Complete    Need for prophylactic vaccination and inoculation against influenza        Relevant Orders    INFLUENZA (HIGH DOSE) 3 VALENT VACCINE [39996] (Completed)    ADMIN INFLUENZA (For MEDICARE Patients ONLY) [] (Completed)         85 y/o female with abnormal weight loss x 2 months, new infected pressure ulcers on buttocks (>1 month duration), anemia, CKD and CHF:  --swabbed discharge from ulcers, will hold off on abx until wound cx returns.  Will need to dose abx per CKD and try to avoid risk of c diff with abx use.  --refer to wound care and lymphedema clinic.  --Cardiac echo for CHF and abnormal weight loss.  Had labs ordered for work-up purposes but pt left before they could be drawn.  Will have her RTC to get them completed  --Flu shot adminsitered    BMI:   Estimated body mass index is 32.92 kg/m  as calculated from the following:    Height as of 6/14/19: 1.575  "m (5' 2\").    Weight as of 6/14/19: 81.6 kg (180 lb).           See Patient Instructions  Return in about 1 week (around 10/4/2019) for re-check / follow-up.    Mahsa Soto, [facility]      "

## 2019-09-26 NOTE — TELEPHONE ENCOUNTER
Nurse practitioner Alicia Scanlon from [facility] called to report concerns of pt's weight loss and anemia. Pt was seen by that provider this week and noticed a remarkably weight loss of 9 pounds in one month and 15 lbs since 07/2019. Also pt's hgb was 11.6 and now is 9.4. NP was going to increase her diuretic but dis not change dose since she doesn't know if pt is hydrating.  NP will send her OV notes for PCP review.     FYI-  Pt was called and future appt was scheduled to follow up on symptoms.

## 2019-09-27 ENCOUNTER — OFFICE VISIT (OUTPATIENT)
Dept: FAMILY MEDICINE | Facility: CLINIC | Age: 84
End: 2019-09-27
Payer: MEDICARE

## 2019-09-27 VITALS
TEMPERATURE: 97.8 F | DIASTOLIC BLOOD PRESSURE: 50 MMHG | RESPIRATION RATE: 14 BRPM | SYSTOLIC BLOOD PRESSURE: 128 MMHG | HEART RATE: 69 BPM | OXYGEN SATURATION: 100 % | WEIGHT: 161 LBS | BODY MASS INDEX: 29.63 KG/M2 | HEIGHT: 62 IN

## 2019-09-27 DIAGNOSIS — D63.1 ANEMIA OF CHRONIC RENAL FAILURE, STAGE 3 (MODERATE) (H): ICD-10-CM

## 2019-09-27 DIAGNOSIS — R63.4 WEIGHT LOSS, ABNORMAL: ICD-10-CM

## 2019-09-27 DIAGNOSIS — I10 HYPERTENSION GOAL BP (BLOOD PRESSURE) < 140/90: Chronic | ICD-10-CM

## 2019-09-27 DIAGNOSIS — L89.302 PRESSURE INJURY OF BUTTOCK, STAGE 2, UNSPECIFIED LATERALITY (H): Primary | ICD-10-CM

## 2019-09-27 DIAGNOSIS — N18.30 ANEMIA OF CHRONIC RENAL FAILURE, STAGE 3 (MODERATE) (H): ICD-10-CM

## 2019-09-27 DIAGNOSIS — Z23 NEED FOR PROPHYLACTIC VACCINATION AND INOCULATION AGAINST INFLUENZA: ICD-10-CM

## 2019-09-27 DIAGNOSIS — I89.0 LYMPHEDEMA: ICD-10-CM

## 2019-09-27 DIAGNOSIS — I50.9 CONGESTIVE HEART FAILURE, UNSPECIFIED HF CHRONICITY, UNSPECIFIED HEART FAILURE TYPE (H): ICD-10-CM

## 2019-09-27 LAB
GRAM STN SPEC: ABNORMAL
GRAM STN SPEC: ABNORMAL
Lab: ABNORMAL
SPECIMEN SOURCE: ABNORMAL

## 2019-09-27 PROCEDURE — 87102 FUNGUS ISOLATION CULTURE: CPT | Performed by: FAMILY MEDICINE

## 2019-09-27 PROCEDURE — 87077 CULTURE AEROBIC IDENTIFY: CPT | Performed by: FAMILY MEDICINE

## 2019-09-27 PROCEDURE — G0008 ADMIN INFLUENZA VIRUS VAC: HCPCS | Performed by: FAMILY MEDICINE

## 2019-09-27 PROCEDURE — 87205 SMEAR GRAM STAIN: CPT | Performed by: FAMILY MEDICINE

## 2019-09-27 PROCEDURE — 87181 SC STD AGAR DILUTION PER AGT: CPT | Performed by: FAMILY MEDICINE

## 2019-09-27 PROCEDURE — 87070 CULTURE OTHR SPECIMN AEROBIC: CPT | Performed by: FAMILY MEDICINE

## 2019-09-27 PROCEDURE — 99214 OFFICE O/P EST MOD 30 MIN: CPT | Mod: 25 | Performed by: FAMILY MEDICINE

## 2019-09-27 PROCEDURE — 86140 C-REACTIVE PROTEIN: CPT | Performed by: FAMILY MEDICINE

## 2019-09-27 PROCEDURE — 90662 IIV NO PRSV INCREASED AG IM: CPT | Performed by: FAMILY MEDICINE

## 2019-09-27 PROCEDURE — 87186 SC STD MICRODIL/AGAR DIL: CPT | Mod: XU | Performed by: FAMILY MEDICINE

## 2019-09-27 RX ORDER — FUROSEMIDE 40 MG
TABLET ORAL
Qty: 30 TABLET | Refills: 3 | Status: ON HOLD | COMMUNITY
Start: 2019-09-27 | End: 2019-10-05

## 2019-09-27 RX ORDER — HEPARIN SODIUM,PORCINE 10 UNIT/ML
5 VIAL (ML) INTRAVENOUS
Status: CANCELLED | OUTPATIENT
Start: 2019-10-04

## 2019-09-27 RX ORDER — HEPARIN SODIUM (PORCINE) LOCK FLUSH IV SOLN 100 UNIT/ML 100 UNIT/ML
5 SOLUTION INTRAVENOUS
Status: CANCELLED | OUTPATIENT
Start: 2019-10-04

## 2019-09-27 RX ORDER — NYSTATIN 100000 [USP'U]/G
POWDER TOPICAL 2 TIMES DAILY
Qty: 30 G | Refills: 1 | Status: ON HOLD | OUTPATIENT
Start: 2019-09-27 | End: 2019-10-01

## 2019-09-27 RX ORDER — FUROSEMIDE 40 MG
40 TABLET ORAL DAILY
Qty: 30 TABLET | Refills: 3 | Status: CANCELLED | OUTPATIENT
Start: 2019-09-27

## 2019-09-27 ASSESSMENT — MIFFLIN-ST. JEOR: SCORE: 1138.54

## 2019-09-27 NOTE — TELEPHONE ENCOUNTER
Pharm is requesting a 90 day supply.  Patient states he is taking 60 mg , please correct if appropriate

## 2019-09-30 ENCOUNTER — TELEPHONE (OUTPATIENT)
Dept: FAMILY MEDICINE | Facility: CLINIC | Age: 84
End: 2019-09-30

## 2019-09-30 ENCOUNTER — HOSPITAL ENCOUNTER (INPATIENT)
Facility: CLINIC | Age: 84
LOS: 4 days | Discharge: NURSING, REHAB OR LONG-TERM CARE | DRG: 606 | End: 2019-10-05
Attending: EMERGENCY MEDICINE | Admitting: INTERNAL MEDICINE
Payer: MEDICARE

## 2019-09-30 DIAGNOSIS — I24.89 DEMAND ISCHEMIA (H): ICD-10-CM

## 2019-09-30 DIAGNOSIS — I48.0 PAF (PAROXYSMAL ATRIAL FIBRILLATION) (H): Primary | ICD-10-CM

## 2019-09-30 DIAGNOSIS — I50.9 ACUTE ON CHRONIC CONGESTIVE HEART FAILURE, UNSPECIFIED HEART FAILURE TYPE (H): ICD-10-CM

## 2019-09-30 DIAGNOSIS — R06.09 DYSPNEA ON EXERTION: ICD-10-CM

## 2019-09-30 DIAGNOSIS — R53.1 WEAKNESS: ICD-10-CM

## 2019-09-30 PROCEDURE — 93005 ELECTROCARDIOGRAM TRACING: CPT

## 2019-09-30 PROCEDURE — 85025 COMPLETE CBC W/AUTO DIFF WBC: CPT | Performed by: EMERGENCY MEDICINE

## 2019-09-30 PROCEDURE — 83880 ASSAY OF NATRIURETIC PEPTIDE: CPT | Performed by: EMERGENCY MEDICINE

## 2019-09-30 PROCEDURE — 80048 BASIC METABOLIC PNL TOTAL CA: CPT | Performed by: EMERGENCY MEDICINE

## 2019-09-30 PROCEDURE — 84484 ASSAY OF TROPONIN QUANT: CPT | Performed by: EMERGENCY MEDICINE

## 2019-09-30 PROCEDURE — 84443 ASSAY THYROID STIM HORMONE: CPT | Performed by: EMERGENCY MEDICINE

## 2019-09-30 PROCEDURE — 96374 THER/PROPH/DIAG INJ IV PUSH: CPT

## 2019-09-30 PROCEDURE — 99285 EMERGENCY DEPT VISIT HI MDM: CPT | Mod: 25

## 2019-09-30 ASSESSMENT — ENCOUNTER SYMPTOMS
MUSCULOSKELETAL NEGATIVE: 1
ABDOMINAL PAIN: 0
CONSTITUTIONAL NEGATIVE: 1
SHORTNESS OF BREATH: 1
WEAKNESS: 1

## 2019-09-30 ASSESSMENT — MIFFLIN-ST. JEOR: SCORE: 1122.67

## 2019-09-30 NOTE — TELEPHONE ENCOUNTER
Ghislaine's wound cx from her buttocks last Friday finally came back and grew several bacterial infections.    She will need to start antibiotics, however she is currently in the hospital.  The hospitalists should be baldemar to address her infected pressure ulcers by adding wound care and antibiotics.  Is there a way we can notify the hospital team as well (unless it has already been addressed)?    --Dr. Soto

## 2019-10-01 ENCOUNTER — APPOINTMENT (OUTPATIENT)
Dept: GENERAL RADIOLOGY | Facility: CLINIC | Age: 84
DRG: 606 | End: 2019-10-01
Attending: EMERGENCY MEDICINE
Payer: MEDICARE

## 2019-10-01 ENCOUNTER — APPOINTMENT (OUTPATIENT)
Dept: PHYSICAL THERAPY | Facility: CLINIC | Age: 84
DRG: 606 | End: 2019-10-01
Attending: INTERNAL MEDICINE
Payer: MEDICARE

## 2019-10-01 ENCOUNTER — APPOINTMENT (OUTPATIENT)
Dept: ULTRASOUND IMAGING | Facility: CLINIC | Age: 84
DRG: 606 | End: 2019-10-01
Attending: INTERNAL MEDICINE
Payer: MEDICARE

## 2019-10-01 ENCOUNTER — APPOINTMENT (OUTPATIENT)
Dept: CARDIOLOGY | Facility: CLINIC | Age: 84
DRG: 606 | End: 2019-10-01
Attending: INTERNAL MEDICINE
Payer: MEDICARE

## 2019-10-01 PROBLEM — R53.1 GENERALIZED WEAKNESS: Status: ACTIVE | Noted: 2019-10-01

## 2019-10-01 PROBLEM — I50.9 CHF (CONGESTIVE HEART FAILURE) (H): Status: ACTIVE | Noted: 2019-10-01

## 2019-10-01 PROBLEM — L89.302 PRESSURE INJURY OF BUTTOCK, STAGE 2, UNSPECIFIED LATERALITY (H): Status: ACTIVE | Noted: 2019-10-01

## 2019-10-01 PROBLEM — R53.81 PHYSICAL DECONDITIONING: Status: ACTIVE | Noted: 2019-10-01

## 2019-10-01 LAB
ALBUMIN UR-MCNC: 10 MG/DL
ANION GAP SERPL CALCULATED.3IONS-SCNC: 14 MMOL/L (ref 3–14)
APPEARANCE UR: CLEAR
BACTERIA #/AREA URNS HPF: ABNORMAL /HPF
BACTERIA SPEC CULT: ABNORMAL
BASOPHILS # BLD AUTO: 0 10E9/L (ref 0–0.2)
BASOPHILS NFR BLD AUTO: 0.2 %
BILIRUB UR QL STRIP: NEGATIVE
BUN SERPL-MCNC: 111 MG/DL (ref 7–30)
CALCIUM SERPL-MCNC: 9.5 MG/DL (ref 8.5–10.1)
CHLORIDE SERPL-SCNC: 105 MMOL/L (ref 94–109)
CO2 SERPL-SCNC: 14 MMOL/L (ref 20–32)
COLOR UR AUTO: YELLOW
CREAT SERPL-MCNC: 3.27 MG/DL (ref 0.52–1.04)
DIFFERENTIAL METHOD BLD: ABNORMAL
EOSINOPHIL # BLD AUTO: 0.1 10E9/L (ref 0–0.7)
EOSINOPHIL NFR BLD AUTO: 0.8 %
ERYTHROCYTE [DISTWIDTH] IN BLOOD BY AUTOMATED COUNT: 14.6 % (ref 10–15)
GFR SERPL CREATININE-BSD FRML MDRD: 12 ML/MIN/{1.73_M2}
GLUCOSE SERPL-MCNC: 96 MG/DL (ref 70–99)
GLUCOSE UR STRIP-MCNC: NEGATIVE MG/DL
HCT VFR BLD AUTO: 30.8 % (ref 35–47)
HGB BLD-MCNC: 10 G/DL (ref 11.7–15.7)
HGB UR QL STRIP: NEGATIVE
HYALINE CASTS #/AREA URNS LPF: 25 /LPF (ref 0–2)
IMM GRANULOCYTES # BLD: 0 10E9/L (ref 0–0.4)
IMM GRANULOCYTES NFR BLD: 0.5 %
INTERPRETATION ECG - MUSE: NORMAL
KETONES UR STRIP-MCNC: NEGATIVE MG/DL
LEUKOCYTE ESTERASE UR QL STRIP: NEGATIVE
LYMPHOCYTES # BLD AUTO: 1 10E9/L (ref 0.8–5.3)
LYMPHOCYTES NFR BLD AUTO: 17 %
Lab: ABNORMAL
MCH RBC QN AUTO: 31.1 PG (ref 26.5–33)
MCHC RBC AUTO-ENTMCNC: 32.5 G/DL (ref 31.5–36.5)
MCV RBC AUTO: 96 FL (ref 78–100)
MONOCYTES # BLD AUTO: 0.7 10E9/L (ref 0–1.3)
MONOCYTES NFR BLD AUTO: 11.7 %
NEUTROPHILS # BLD AUTO: 4.2 10E9/L (ref 1.6–8.3)
NEUTROPHILS NFR BLD AUTO: 69.8 %
NITRATE UR QL: NEGATIVE
NRBC # BLD AUTO: 0 10*3/UL
NRBC BLD AUTO-RTO: 0 /100
NT-PROBNP SERPL-MCNC: ABNORMAL PG/ML (ref 0–1800)
PH UR STRIP: 5 PH (ref 5–7)
PLATELET # BLD AUTO: 415 10E9/L (ref 150–450)
POTASSIUM SERPL-SCNC: 3.6 MMOL/L (ref 3.4–5.3)
RBC # BLD AUTO: 3.22 10E12/L (ref 3.8–5.2)
RBC #/AREA URNS AUTO: <1 /HPF (ref 0–2)
SODIUM SERPL-SCNC: 133 MMOL/L (ref 133–144)
SODIUM UR-SCNC: 59 MMOL/L
SOURCE: ABNORMAL
SP GR UR STRIP: 1.02 (ref 1–1.03)
SPECIMEN SOURCE: ABNORMAL
SQUAMOUS #/AREA URNS AUTO: 1 /HPF (ref 0–1)
TROPONIN I SERPL-MCNC: 0.08 UG/L (ref 0–0.04)
TROPONIN I SERPL-MCNC: 0.09 UG/L (ref 0–0.04)
TROPONIN I SERPL-MCNC: 0.11 UG/L (ref 0–0.04)
TSH SERPL DL<=0.005 MIU/L-ACNC: 1.14 MU/L (ref 0.4–4)
UROBILINOGEN UR STRIP-MCNC: NORMAL MG/DL (ref 0–2)
WBC # BLD AUTO: 6 10E9/L (ref 4–11)
WBC #/AREA URNS AUTO: <1 /HPF (ref 0–5)

## 2019-10-01 PROCEDURE — 25000132 ZZH RX MED GY IP 250 OP 250 PS 637: Mod: GY | Performed by: HOSPITALIST

## 2019-10-01 PROCEDURE — 99207 ZZC APP CREDIT; MD BILLING SHARED VISIT: CPT | Performed by: HOSPITALIST

## 2019-10-01 PROCEDURE — 93306 TTE W/DOPPLER COMPLETE: CPT | Mod: 26 | Performed by: INTERNAL MEDICINE

## 2019-10-01 PROCEDURE — 25000128 H RX IP 250 OP 636: Performed by: EMERGENCY MEDICINE

## 2019-10-01 PROCEDURE — 25500064 ZZH RX 255 OP 636: Performed by: INTERNAL MEDICINE

## 2019-10-01 PROCEDURE — 99223 1ST HOSP IP/OBS HIGH 75: CPT | Mod: AI | Performed by: INTERNAL MEDICINE

## 2019-10-01 PROCEDURE — 97110 THERAPEUTIC EXERCISES: CPT | Mod: GP

## 2019-10-01 PROCEDURE — 71046 X-RAY EXAM CHEST 2 VIEWS: CPT

## 2019-10-01 PROCEDURE — 97161 PT EVAL LOW COMPLEX 20 MIN: CPT | Mod: GP

## 2019-10-01 PROCEDURE — 21000000 ZZH R&B IMCU HEART CARE

## 2019-10-01 PROCEDURE — G0463 HOSPITAL OUTPT CLINIC VISIT: HCPCS

## 2019-10-01 PROCEDURE — 84484 ASSAY OF TROPONIN QUANT: CPT | Performed by: INTERNAL MEDICINE

## 2019-10-01 PROCEDURE — 97530 THERAPEUTIC ACTIVITIES: CPT | Mod: GP

## 2019-10-01 PROCEDURE — 81001 URINALYSIS AUTO W/SCOPE: CPT | Performed by: EMERGENCY MEDICINE

## 2019-10-01 PROCEDURE — 36415 COLL VENOUS BLD VENIPUNCTURE: CPT | Performed by: INTERNAL MEDICINE

## 2019-10-01 PROCEDURE — 40000264 ECHOCARDIOGRAM COMPLETE

## 2019-10-01 PROCEDURE — 84300 ASSAY OF URINE SODIUM: CPT | Performed by: INTERNAL MEDICINE

## 2019-10-01 PROCEDURE — 93970 EXTREMITY STUDY: CPT

## 2019-10-01 PROCEDURE — 25000132 ZZH RX MED GY IP 250 OP 250 PS 637: Mod: GY | Performed by: INTERNAL MEDICINE

## 2019-10-01 RX ORDER — POTASSIUM CHLORIDE 1500 MG/1
20-40 TABLET, EXTENDED RELEASE ORAL
Status: DISCONTINUED | OUTPATIENT
Start: 2019-10-01 | End: 2019-10-05 | Stop reason: HOSPADM

## 2019-10-01 RX ORDER — ONDANSETRON 2 MG/ML
4 INJECTION INTRAMUSCULAR; INTRAVENOUS EVERY 6 HOURS PRN
Status: DISCONTINUED | OUTPATIENT
Start: 2019-10-01 | End: 2019-10-05 | Stop reason: HOSPADM

## 2019-10-01 RX ORDER — ASPIRIN 81 MG/1
81 TABLET, CHEWABLE ORAL DAILY
Status: DISCONTINUED | OUTPATIENT
Start: 2019-10-01 | End: 2019-10-01

## 2019-10-01 RX ORDER — POTASSIUM CHLORIDE 7.45 MG/ML
10 INJECTION INTRAVENOUS
Status: DISCONTINUED | OUTPATIENT
Start: 2019-10-01 | End: 2019-10-05 | Stop reason: HOSPADM

## 2019-10-01 RX ORDER — POLYETHYLENE GLYCOL 3350 17 G/17G
17 POWDER, FOR SOLUTION ORAL DAILY PRN
Status: DISCONTINUED | OUTPATIENT
Start: 2019-10-01 | End: 2019-10-05 | Stop reason: HOSPADM

## 2019-10-01 RX ORDER — AMOXICILLIN 250 MG
1 CAPSULE ORAL 2 TIMES DAILY PRN
Status: DISCONTINUED | OUTPATIENT
Start: 2019-10-01 | End: 2019-10-05 | Stop reason: HOSPADM

## 2019-10-01 RX ORDER — ACETAMINOPHEN 325 MG/1
650 TABLET ORAL EVERY 4 HOURS PRN
Status: DISCONTINUED | OUTPATIENT
Start: 2019-10-01 | End: 2019-10-05 | Stop reason: HOSPADM

## 2019-10-01 RX ORDER — AMOXICILLIN 250 MG
2 CAPSULE ORAL 2 TIMES DAILY PRN
Status: DISCONTINUED | OUTPATIENT
Start: 2019-10-01 | End: 2019-10-05 | Stop reason: HOSPADM

## 2019-10-01 RX ORDER — METOPROLOL SUCCINATE 100 MG/1
100 TABLET, EXTENDED RELEASE ORAL DAILY
Status: DISCONTINUED | OUTPATIENT
Start: 2019-10-01 | End: 2019-10-04

## 2019-10-01 RX ORDER — HYDRALAZINE HYDROCHLORIDE 25 MG/1
25 TABLET, FILM COATED ORAL 3 TIMES DAILY
COMMUNITY
End: 2019-10-25

## 2019-10-01 RX ORDER — POTASSIUM CHLORIDE 1.5 G/1.58G
20-40 POWDER, FOR SOLUTION ORAL
Status: DISCONTINUED | OUTPATIENT
Start: 2019-10-01 | End: 2019-10-05 | Stop reason: HOSPADM

## 2019-10-01 RX ORDER — FUROSEMIDE 10 MG/ML
40 INJECTION INTRAMUSCULAR; INTRAVENOUS ONCE
Status: COMPLETED | OUTPATIENT
Start: 2019-10-01 | End: 2019-10-01

## 2019-10-01 RX ORDER — ONDANSETRON 4 MG/1
4 TABLET, ORALLY DISINTEGRATING ORAL EVERY 6 HOURS PRN
Status: DISCONTINUED | OUTPATIENT
Start: 2019-10-01 | End: 2019-10-05 | Stop reason: HOSPADM

## 2019-10-01 RX ORDER — LEVOTHYROXINE SODIUM 25 UG/1
25 TABLET ORAL DAILY
Status: DISCONTINUED | OUTPATIENT
Start: 2019-10-01 | End: 2019-10-05 | Stop reason: HOSPADM

## 2019-10-01 RX ORDER — POTASSIUM CHLORIDE 29.8 MG/ML
20 INJECTION INTRAVENOUS
Status: DISCONTINUED | OUTPATIENT
Start: 2019-10-01 | End: 2019-10-05 | Stop reason: HOSPADM

## 2019-10-01 RX ORDER — FUROSEMIDE 10 MG/ML
20 INJECTION INTRAMUSCULAR; INTRAVENOUS
Status: DISCONTINUED | OUTPATIENT
Start: 2019-10-01 | End: 2019-10-01

## 2019-10-01 RX ORDER — HYDROCODONE BITARTRATE AND ACETAMINOPHEN 5; 325 MG/1; MG/1
1-2 TABLET ORAL EVERY 4 HOURS PRN
Status: DISCONTINUED | OUTPATIENT
Start: 2019-10-01 | End: 2019-10-02

## 2019-10-01 RX ORDER — HYDRALAZINE HYDROCHLORIDE 25 MG/1
25 TABLET, FILM COATED ORAL 3 TIMES DAILY
Status: DISCONTINUED | OUTPATIENT
Start: 2019-10-01 | End: 2019-10-05 | Stop reason: HOSPADM

## 2019-10-01 RX ORDER — ALLOPURINOL 100 MG/1
100 TABLET ORAL DAILY
Status: DISCONTINUED | OUTPATIENT
Start: 2019-10-01 | End: 2019-10-05 | Stop reason: HOSPADM

## 2019-10-01 RX ORDER — NALOXONE HYDROCHLORIDE 0.4 MG/ML
.1-.4 INJECTION, SOLUTION INTRAMUSCULAR; INTRAVENOUS; SUBCUTANEOUS
Status: DISCONTINUED | OUTPATIENT
Start: 2019-10-01 | End: 2019-10-05 | Stop reason: HOSPADM

## 2019-10-01 RX ORDER — POTASSIUM CL/LIDO/0.9 % NACL 10MEQ/0.1L
10 INTRAVENOUS SOLUTION, PIGGYBACK (ML) INTRAVENOUS
Status: DISCONTINUED | OUTPATIENT
Start: 2019-10-01 | End: 2019-10-05 | Stop reason: HOSPADM

## 2019-10-01 RX ORDER — LIDOCAINE 40 MG/G
CREAM TOPICAL
Status: DISCONTINUED | OUTPATIENT
Start: 2019-10-01 | End: 2019-10-05 | Stop reason: HOSPADM

## 2019-10-01 RX ADMIN — HYDROCODONE BITARTRATE AND ACETAMINOPHEN 1 TABLET: 5; 325 TABLET ORAL at 03:29

## 2019-10-01 RX ADMIN — FUROSEMIDE 40 MG: 10 INJECTION, SOLUTION INTRAVENOUS at 01:45

## 2019-10-01 RX ADMIN — HYDROCODONE BITARTRATE AND ACETAMINOPHEN 1 TABLET: 5; 325 TABLET ORAL at 15:08

## 2019-10-01 RX ADMIN — LEVOTHYROXINE SODIUM 25 MCG: 25 TABLET ORAL at 09:16

## 2019-10-01 RX ADMIN — METOPROLOL SUCCINATE 100 MG: 100 TABLET, EXTENDED RELEASE ORAL at 09:16

## 2019-10-01 RX ADMIN — HUMAN ALBUMIN MICROSPHERES AND PERFLUTREN 9 ML: 10; .22 INJECTION, SOLUTION INTRAVENOUS at 11:15

## 2019-10-01 RX ADMIN — ASPIRIN 81 MG 81 MG: 81 TABLET ORAL at 09:16

## 2019-10-01 RX ADMIN — HYDRALAZINE HYDROCHLORIDE 25 MG: 25 TABLET ORAL at 15:08

## 2019-10-01 ASSESSMENT — ACTIVITIES OF DAILY LIVING (ADL)
ADLS_ACUITY_SCORE: 22
ADLS_ACUITY_SCORE: 24
ADLS_ACUITY_SCORE: 23
ADLS_ACUITY_SCORE: 24
ADLS_ACUITY_SCORE: 22

## 2019-10-01 NOTE — CONSULTS
[facility]    Nephrology Consultation     Date of Admission:  9/30/2019    Assessment & Plan      Ghislaine Walls is a 84 year old female who was admitted on 9/30/2019.     1) Baseline CKD 3:  Cr/eGFR ~ 1.5/33.  She has had some fluctuation that has come with variation in her diuretic doses.     2) DRAGAN:  Cr/eGFR to 3.27/12.  This has come with an increase in her diuretic dose from furosemide 20 mg BID to 40/20.  Her lisinopril was held at the same time.  UA is bland.      3) Volume Status:  Her CXR is totally clear.  She is breathing ambient air.  I do not see elevated neck veins.  She does have peripheral edema.  Her BNP is elevated, however I do not find this to be a useful marker in CKD.  I suspect she is intravascularly dry.  The echo will help sort this out.      4)  Anemia:  HGB 10.  At our office in August her ferritin was 336 and TSAT 11%.      5) HTN:  BP is acceptable at present.  Even in absence of lisinopril.      6) MBD:  PTH was 105 in August.      7) Edema:  BLE.  Apparently stasis.  Does not like compression stocking.      Suggest:    Hold furosemide  Continue to hold lisinopril  Evaluate anemia and MBD  Await echo.  Check LE venous dopplers.        Rik Awan MD  [facility] - Nephrology  [phone]    Reason for Consult      I was asked to see the patient for A/CKD.    Primary Care Physician      Mahsa Soto    Chief Complaint      I feel better.      History is obtained from the patient and chart review.      History of Present Illness      Ghislaine Walls is a 84 year old female who presents with A/CKD.     Past Medical History   I have reviewed this patient's medical history and updated it with pertinent information if needed.   Past Medical History:   Diagnosis Date     Alutiiq (hard of hearing)     wears hearing aids     Hyperlipidaemia      Hyperlipidaemia LDL goal < 130      Hypertension      Hypothyroid      PAD (peripheral artery disease) (H)       Renal insufficiency, mild        Past Surgical History   I have reviewed this patient's surgical history and updated it with pertinent information if needed.  Past Surgical History:   Procedure Laterality Date     APPENDECTOMY       CATARACT IOL, RT/LT      bilateral (laser - 2013)     DENTAL SURGERY      wisdom     EYE SURGERY      Muscle release     TUBAL LIGATION         Prior to Admission Medications   Prior to Admission Medications   Prescriptions Last Dose Informant Patient Reported? Taking?   Melatonin 10 MG TABS tablet prn  Yes Yes   Sig: Take 10 mg by mouth nightly as needed for sleep    allopurinol (ZYLOPRIM) 100 MG tablet 2019 at Unknown time  No Yes   Sig: Take 1 tablet (100 mg) by mouth daily   furosemide (LASIX) 40 MG tablet 2019 at Unknown time  Yes Yes   Si mg in AM and 20 mg in PM   hydrALAZINE (APRESOLINE) 25 MG tablet 2019 at Unknown time  Yes Yes   Sig: Take 25 mg by mouth 3 times daily   levothyroxine (SYNTHROID/LEVOTHROID) 25 MCG tablet 2019 at Unknown time  No Yes   Sig: Take 1 tablet (25 mcg) by mouth daily   metoprolol succinate ER (TOPROL-XL) 100 MG 24 hr tablet 2019 at Unknown time  No Yes   Sig: Take 1 tablet (100 mg) by mouth daily      Facility-Administered Medications: None     Allergies   Allergies   Allergen Reactions     Oxybutynin Itching     Seasonal Allergies        Social History   I have reviewed this patient's social history and updated it with pertinent information if needed. Ghislaine Walls  reports that she quit smoking about 46 years ago. Her smoking use included cigarettes. She has a 2.50 pack-year smoking history. She has never used smokeless tobacco. She reports current alcohol use. She reports that she does not use drugs.    Family History   I have reviewed this patient's family history and updated it with pertinent information if needed.   Family History   Problem Relation Age of Onset     Heart Disease Mother       Respiratory Mother      Heart Disease Father      Heart Disease Brother      Coronary Artery Disease Brother         CAB     Heart Disease Brother      Coronary Artery Disease Brother         CAB       Review of Systems   The 10 point Review of Systems is negative other than noted in the HPI.    Physical Exam   Temp: 98.2  F (36.8  C) Temp src: Oral BP: 130/50 Pulse: 75 Heart Rate: 71 Resp: 18 SpO2: 100 % O2 Device: None (Room air)    Vital Signs with Ranges  Temp:  [97.5  F (36.4  C)-98.3  F (36.8  C)] 98.2  F (36.8  C)  Pulse:  [71-75] 75  Heart Rate:  [71] 71  Resp:  [18-20] 18  BP: (130-151)/(49-72) 130/50  SpO2:  [99 %-100 %] 100 %  161 lbs 0 oz    GENERAL: alert, NAD  HEENT:  Normocephalic. No gross abnormalities.  Pupils equal.  MMM.  Dentition is ok.  CV: RRR, no murmurs, no clicks, gallops, or rubs, 1-2+ BLE edema to knees, bilat carotid bruits  RESP: Clear bilaterally with good efforts  GI: Abdomen soft/nt/nd, BS normal. No masses, organomegaly  MUSCULOSKELETAL: extremities nl - no gross deformities noted  SKIN: no suspicious lesions or rashes, dry to touch  NEURO: Weak but not focal exam.    PSYCH: mood good, affect appropriate  LYMPH: No palpable ant/post cervical and supraclavicular adenopathy    Data   BMP  Recent Labs   Lab 09/30/19  2310      POTASSIUM 3.6   CHLORIDE 105   ALIVIA 9.5   CO2 14*   *   CR 3.27*   GLC 96     [email or web address](phos:4)  CBC)  Recent Labs   Lab 09/30/19  2310   WBC 6.0   HGB 10.0*   HCT 30.8*   MCV 96        No lab results found in last 7 days.    Invalid input(s): BILIRUBININDIRECT  No lab results found in last 7 days.  No results found for: D2VIT, D3VIT, DTOT  Recent Labs   Lab 09/30/19  2310   HGB 10.0*   HCT 30.8*   MCV 96     No results for input(s): PTHI in the last 168 hours.

## 2019-10-01 NOTE — PLAN OF CARE
Discharge Planner OT   Patient plan for discharge: N/A  Current status: Orders rec'd and chart reviewed. Pt is an 84 year old female with CHF, admitted with generalized weakness. Pt reports that she lives alone in an apartment, uses a straight cane for ambulation. Pt reports increased difficulty to care for herself and to mobilize during past several weeks. Spoke with PT, recommending TCU and per chart plan is to discharge in the next couple days. Will defer inpt OT to TCU.   Barriers to return to prior living situation: see PT note  Recommendations for discharge: see PT note  Rationale for recommendations: Defer OT to inpt, discharge OT.        Entered by: Bibiana Mario 10/01/2019 3:40 PM

## 2019-10-01 NOTE — PHARMACY-ADMISSION MEDICATION HISTORY
Admission medication history interview status for the 9/30/2019  admission is complete. See EPIC admission navigator for prior to admission medications     Medication history source reliability:Moderate    Actions taken by pharmacist (provider contacted, etc):called her  pharmacy     Additional medication history information not noted on PTA med list :Hydralazine    Medication reconciliation/reorder completed by provider prior to medication history? No    Time spent in this activity: 25min    Prior to Admission medications    Medication Sig Last Dose Taking? Auth Provider   allopurinol (ZYLOPRIM) 100 MG tablet Take 1 tablet (100 mg) by mouth daily 9/30/2019 at Unknown time Yes Mahsa Soto DO   furosemide (LASIX) 40 MG tablet 40 mg in AM and 20 mg in PM 9/30/2019 at Unknown time Yes Mahsa Soto DO   hydrALAZINE (APRESOLINE) 25 MG tablet Take 25 mg by mouth 3 times daily 9/30/2019 at Unknown time Yes Unknown, Entered By History   levothyroxine (SYNTHROID/LEVOTHROID) 25 MCG tablet Take 1 tablet (25 mcg) by mouth daily 9/30/2019 at Unknown time Yes Mahsa Soto DO   Melatonin 10 MG TABS tablet Take 10 mg by mouth nightly as needed for sleep  prn Yes Reported, Patient   metoprolol succinate ER (TOPROL-XL) 100 MG 24 hr tablet Take 1 tablet (100 mg) by mouth daily 9/30/2019 at Unknown time Yes Mahsa Soto DO

## 2019-10-01 NOTE — ED PROVIDER NOTES
History     Chief Complaint:  Generalized Weakness     HPI   Ghislaine Walls is a 84 year old female with a history of lymphedema, CKD, prior stroke, hyperlipidemia, hypertension who presents with increasing weakness and fatigue.  Patient has been unable to ambulate at home and had to call EMS 3 times within the last several days due to being unable to stand from a sitting position.  She notes she used to be on water pills for her lower extremity swelling but because of her kidney, she is not able to take these all the time.  She states she has been looking into assisted living facilities but has not yet found one nor made a commitment to any particular facility.  She notes that since April she is also had increasing arm weakness but did have bilateral steroid injections to her shoulders providing significant improvement.  She ultimately describes lack of energy but has no chest pain or shortness of breath.  With any kind of exertion she does have some shortness of breath but at resting she has no dyspnea.  She denies fever and constitutional symptoms.    Allergies:  Oxybutynin  Seasonal Allergies     Medications:    No current outpatient medications on file.      Past Medical History:    Past Medical History:   Diagnosis Date     [place] (hard of hearing)      Hyperlipidaemia      Hyperlipidaemia LDL goal < 130      Hypertension      Hypothyroid      PAD (peripheral artery disease) (H)      Renal insufficiency, mild        Patient Active Problem List    Diagnosis Date Noted     CHF (congestive heart failure) (H) possible  10/01/2019     Priority: Medium     Physical deconditioning 10/01/2019     Priority: Medium     Generalized weakness 10/01/2019     Priority: Medium     Anemia of chronic renal failure, stage 3 (moderate) (H) 09/27/2019     Priority: Medium     Weight loss, abnormal 09/27/2019     Priority: Medium     Lymphedema 06/17/2019     Priority: Medium     Gouty arthritis 10/24/2018     Priority: Medium      CKD (chronic kidney disease) stage 3, GFR 30-59 ml/min (H) 03/01/2018     Priority: Medium     History of CVA (cerebrovascular accident) 06/28/2017     Priority: Medium     Other specified hypothyroidism 06/15/2015     Priority: Medium     Urgency incontinence 09/30/2014     Priority: Medium     [place] (hard of hearing) 05/27/2014     Priority: Medium     Hyperlipidemia LDL goal <130 05/23/2013     Priority: Medium     Hypertension goal BP (blood pressure) < 140/90 05/23/2013     Priority: Medium        Past Surgical History:    Past Surgical History:   Procedure Laterality Date     APPENDECTOMY  1951     CATARACT IOL, RT/LT  2001    bilateral (laser - 5/2013)     DENTAL SURGERY  1962    wisdom     EYE SURGERY  1956    Muscle release     TUBAL LIGATION  1971        Family History:    family history includes Coronary Artery Disease in her brother and brother; Heart Disease in her brother, brother, father, and mother; Respiratory in her mother.    Social History:   reports that she quit smoking about 46 years ago. Her smoking use included cigarettes. She has a 2.50 pack-year smoking history. She has never used smokeless tobacco. She reports current alcohol use. She reports that she does not use drugs.    PCP: Mahsa Soto     Review of Systems   Constitutional: Negative.    Respiratory: Positive for shortness of breath.    Cardiovascular: Negative for chest pain.   Gastrointestinal: Negative for abdominal pain.   Musculoskeletal: Negative.    Skin: Negative.    Neurological: Positive for weakness.   All other systems reviewed and are negative.    Physical Exam     Patient Vitals for the past 24 hrs:   BP Temp Temp src Pulse Heart Rate Resp SpO2 Height Weight   10/01/19 0414 -- -- -- -- -- 18 -- -- --   10/01/19 0329 -- -- -- -- -- 18 -- -- --   10/01/19 0230 (!) 146/72 97.5  F (36.4  C) Oral 73 -- 18 100 % -- --   10/01/19 0208 (!) 151/66 -- -- 74 -- -- 100 % -- --   10/01/19 0145 -- -- -- -- -- -- 100 % -- --  "  10/01/19 0130 -- -- -- -- -- -- 100 % -- --   10/01/19 0115 -- -- -- -- -- -- 100 % -- --   10/01/19 0030 -- -- -- -- -- -- 100 % -- --   10/01/19 0015 -- -- -- -- -- -- 100 % -- --   10/01/19 0000 -- -- -- -- -- -- 99 % -- --   09/30/19 2330 -- -- -- -- -- -- 100 % -- --   09/30/19 2315 -- -- -- -- -- -- 100 % -- --   09/30/19 2303 135/49 98.3  F (36.8  C) Oral 71 71 20 100 % 1.549 m (5' 1\") 73 kg (161 lb)      Physical Exam  General: Alert, appears elderly & frail. Cooperative.     In mild distress  HEENT:  Head:  Atraumatic  Ears:  External ears are normal  Mouth/Throat:  Oropharynx is without erythema or exudate and mucous membranes are moist.   Eyes:   Conjunctivae normal and EOM are normal. No scleral icterus.  CV:  Normal rate, regular rhythm, normal heart sounds and radial pulses are 2+ and symmetric.  No murmur.  Resp:  Breath sounds are clear bilaterally    Non-labored, no retractions or accessory muscle use  GI:  Abdomen is soft, no distension, no tenderness. No rebound or guarding.  No CVA tenderness bilaterally  MS:  2+ pitting edema.    Back atraumatic.    No midline cervical, thoracic, or lumbar tenderness  Skin:  Warm and dry.  No rash or lesions noted.  Neuro: Alert. Globally weak. GCS: 15  Psych:  Normal mood and affect.    Emergency Department Course     ECG (00:18:15):  Rate 77 bpm.   QT/QTc 436/493.   P-R-T axes 58 -31 89.   Sinus rhythm with first-degree AV block, left axis deviation, incomplete left bundle branch block, prolonged QT, abnormal ECG.  No prior ECG to compare with.  Interpreted at 0031 10/1/19 by Trace Crystal MD.     Imaging:  XR Chest 2 Views   Final Result   IMPRESSION: No focal air-space disease or other acute findings. Normal   heart size. No pulmonary edema. Degenerative changes thoracic spine.      MANOJ GAITAN MD           Laboratory:  Labs Ordered and Resulted from Time of ED Arrival Up to the Time of Departure from the ED   CBC WITH PLATELETS DIFFERENTIAL - " Abnormal; Notable for the following components:       Result Value    RBC Count 3.22 (*)     Hemoglobin 10.0 (*)     Hematocrit 30.8 (*)     All other components within normal limits   BASIC METABOLIC PANEL - Abnormal; Notable for the following components:    Carbon Dioxide 14 (*)     Urea Nitrogen 111 (*)     Creatinine 3.27 (*)     GFR Estimate 12 (*)     GFR Estimate If Black 14 (*)     All other components within normal limits   TROPONIN I - Abnormal; Notable for the following components:    Troponin I ES 0.093 (*)     All other components within normal limits   ROUTINE UA WITH MICROSCOPIC - Abnormal; Notable for the following components:    Protein Albumin Urine 10 (*)     Bacteria Urine Few (*)     Hyaline Casts 25 (*)     All other components within normal limits   NT PROBNP INPATIENT - Abnormal; Notable for the following components:    N-Terminal Pro BNP Inpatient 18,031 (*)     All other components within normal limits   TSH WITH FREE T4 REFLEX   STRAIGHT CATH FOR URINE        Interventions:  Medications   levothyroxine (SYNTHROID/LEVOTHROID) tablet 25 mcg (has no administration in time range)   metoprolol succinate ER (TOPROL-XL) 24 hr tablet 100 mg (has no administration in time range)   allopurinol (ZYLOPRIM) tablet 100 mg (has no administration in time range)   lidocaine 1 % 0.1-1 mL (has no administration in time range)   lidocaine (LMX4) cream (has no administration in time range)   sodium chloride (PF) 0.9% PF flush 3 mL (has no administration in time range)   sodium chloride (PF) 0.9% PF flush 3 mL (has no administration in time range)   naloxone (NARCAN) injection 0.1-0.4 mg (has no administration in time range)   melatonin tablet 1 mg (has no administration in time range)   HOLD: nitroGLYcerin IF (has no administration in time range)   potassium chloride ER (K-DUR/KLOR-CON M) CR tablet 20-40 mEq (has no administration in time range)   potassium chloride (KLOR-CON) Packet 20-40 mEq (has no  administration in time range)   potassium chloride 10 mEq in 100 mL sterile water intermittent infusion (premix) (has no administration in time range)   potassium chloride 10 mEq in 100 mL intermittent infusion with 10 mg lidocaine (has no administration in time range)   potassium chloride 20 mEq in 50 mL intermittent infusion (has no administration in time range)   acetaminophen (TYLENOL) tablet 650 mg (has no administration in time range)   HYDROcodone-acetaminophen (NORCO) 5-325 MG per tablet 1-2 tablet (1 tablet Oral Given 10/1/19 0329)   senna-docusate (SENOKOT-S/PERICOLACE) 8.6-50 MG per tablet 1 tablet (has no administration in time range)     Or   senna-docusate (SENOKOT-S/PERICOLACE) 8.6-50 MG per tablet 2 tablet (has no administration in time range)   polyethylene glycol (MIRALAX/GLYCOLAX) Packet 17 g (has no administration in time range)   ondansetron (ZOFRAN-ODT) ODT tab 4 mg (has no administration in time range)     Or   ondansetron (ZOFRAN) injection 4 mg (has no administration in time range)   furosemide (LASIX) injection 20 mg (has no administration in time range)   Continuing beta blocker from home medication list OR beta blocker order already placed during this visit (has no administration in time range)   melatonin tablet 5 mg (has no administration in time range)   aspirin (ASA) chewable tablet 81 mg (has no administration in time range)   furosemide (LASIX) injection 40 mg (40 mg Intravenous Given 10/1/19 0145)        Impression & Plan    Medical Decision Making:  Patient is a 84-year-old female with a history of lymphedema, CKD, CVA, hyperlipidemia, and hypertension who presents with increasing weakness, exertional dyspnea and fatigue.  The patient's clinical exam and work-up in the emergency department suspicious for acute on chronic heart failure exacerbation.  Patient has significant lower extremity edema and weakness and I suspect this may be related to congestive heart failure and physical  deconditioning.  Her BNP is approximately 18,000 and her troponin is mildly detectable, I suspect related to demand ischemia from acute on chronic heart failure.  Patient does not have a recent echocardiogram within the last several years and ultimately I do feel patient would benefit from admission for further evaluation of suspected CHF exacerbation.  Patient typically takes 40 mg of Lasix in the morning and 20 mg at night.  She was given 40 mg IV Lasix here in the emergency department.  Of note patient does have CKD with a typical baseline creatinine of 1.4-1.5.  Her creatinine is significantly elevated here today at 3.27.  This also could potentially be related to diuretic medication changes.  Due to the complexity of the patient's presentation noted above she will be admitted to the hospital.  I spoke with Dr. Carbajal of the hospitalist service who agreed to [facility] admission.    Diagnosis:    ICD-10-CM    1. Dyspnea on exertion R06.09 UA with Microscopic     Troponin I     CANCELED: Troponin I   2. Weakness R53.1    3. Demand ischemia (H) I24.8    4. Acute on chronic congestive heart failure, unspecified heart failure type (H) I50.9         Discharge Medications:  Current Discharge Medication List           9/30/2019   Trace Crystal MD White, Scott, MD  10/01/19 0718

## 2019-10-01 NOTE — PLAN OF CARE
"Discharge Planner PT   Patient plan for discharge: \"I can't go home\"  Current status: Pt is an 84 year old female with CHF, admitted with generalized weakness. Pt reports that she lives alone in an apartment, uses a straight cane for ambulation. Pt reports increased difficulty to care for herself and to mobilize during past several weeks.  This date, pt demonstrates weakness in bilateral lower extremities. Pt requires mod assist for bed mobility. Pt performs sit to/from stand and stand pivot transfer with mod assist. Pt unable to ambulate this date, however, able to participate in pregait activities.  Barriers to return to prior living situation: Lives alone, decreased activity tolerance, level of assist, fall risk  Recommendations for discharge: TCU  Rationale for recommendations: Pt will benefit from continued therapy to address impairments and increase mobility and functional independence prior to returning home.        Entered by: Bianca Muhammad 10/01/2019 2:54 PM      "

## 2019-10-01 NOTE — PROGRESS NOTES
[facility]    Hospitalist Progress Note      Assessment & Plan   Ghislaine Walls is a 84 year old female who was admitted on 9/30/2019 with deconditioning and generalized weakness.    Elevated BNP  Elevated troponin, type 2 MI secondary to demand with ongoing DRAGAN on CKD  Physical deconditioning  Presented with lethargy, lower extremity edema (slightly worse than her baseline) and weakness.  Her Lasix dose was increased following her recent visit with her nephrologist.  BNP is 18,000 in the ER with the mildly elevated troponins (likely secondary to renal disease). Echocardiogram shows normal EF no diastolic dysfunction. Troponins relatively flat (0.093--0.082--0.107) without chest pain or discomfort. EKG with incomplete left bundle branch. Recently had increase in her diuretic dose (40mg AM with 20mg bedtime)  - Continue metoprolol  - PT/OT/Social work to evaluate.  - Hold further lasix    Acute kidney injury  CKD (chronic kidney disease) stage 3, GFR 30-59 ml/min (H)  Baseline creatinine is near 1.5 her creatinine on admission was 3.27. Likely secondary to her diuretic change  - Nephrology consult appreciated. Hold lasix, monitor Cr in AM    Lower extremity lymphedema  Lower extremity dopplers without DVT. She has received treatment for this in the past, not currently wearing any stockings has stated she does not like compression stockings in the past  - Consult lymphedema for wraps, perhaps there is a middle ground that she would tolerate.    Hyperlipidemia LDL goal <130  Hypertension goal BP (blood pressure) < 140/90  - Continue PTA metoprolol, hydralazine  - Not on statin PTA, lipid profile in AM    Hypothyroidism  - Continue Synthroid    Gout arthritis  - Continue allopurinol    Normocytic Anemia  Hgb 10 on admit. Multiple studies pending for full workup. No acute bleed at this time    Bilateral buttock pressure wound  Present on admission, wound nurse consulted    DVT Prophylaxis: Pneumatic  Compression Devices  Code Status: DNR/DNI  Expected discharge: 24-48hours, recommended to prior living arrangement pending PT/OT eval once kidney function returns to baseline  Karol Han, DO  Text Page (7am - 6pm)    Interval History   Patient seen and examined. She reports decreased intake and decreased urination the past few days. She denies chest pain, shortness of breath, abdominal pain, nausea, vomiting. States she feels a little tired today.    -Data reviewed today: I reviewed all new labs and imaging results over the last 24 hours. I personally reviewed no images or EKG's today.    Echocardiogram 10/1:   1. Left ventricular systolic function is normal. The visual ejection fraction is estimated at 60-65%.  2. There is mild concentric left ventricular hypertrophy.  3. No regional wall motion abnormalities noted.  4. The right ventricle is normal in structure, function and size.  5. The left atrium is moderately dilated.  6. There is mild (1+) tricuspid regurgitation.  7. The aortic valve is trileaflet with aortic valve sclerosis.    Physical Exam   Temp: 98.2  F (36.8  C) Temp src: Oral BP: 130/50 Pulse: 75 Heart Rate: 71 Resp: 14 SpO2: 100 % O2 Device: None (Room air)    Vitals:    09/30/19 2303   Weight: 73 kg (161 lb)     Vital Signs with Ranges  Temp:  [97.5  F (36.4  C)-98.3  F (36.8  C)] 98.2  F (36.8  C)  Pulse:  [71-75] 75  Heart Rate:  [71] 71  Resp:  [14-20] 14  BP: (130-151)/(49-72) 130/50  SpO2:  [99 %-100 %] 100 %  I/O last 3 completed shifts:  In: 120 [P.O.:120]  Out: -     Constitutional: Awake, alert, cooperative, no apparent distress  Respiratory: Clear to auscultation bilaterally, no crackles or wheezing  Cardiovascular: Regular rate and rhythm, normal S1 and S2, and no murmur noted  GI: Normal bowel sounds, soft, non-distended, non-tender  Skin/Integumen: No rashes, no cyanosis, 2+ edema to bilateral knees  Other:     Medications     - MEDICATION INSTRUCTIONS -         allopurinol  100 mg  Oral Daily     hydrALAZINE  25 mg Oral TID     levothyroxine  25 mcg Oral Daily     metoprolol succinate ER  100 mg Oral Daily     sodium chloride (PF)  3 mL Intracatheter Q8H       Data   Recent Labs   Lab 10/01/19  1008 10/01/19  0557 09/30/19  2310   WBC  --   --  6.0   HGB  --   --  10.0*   MCV  --   --  96   PLT  --   --  415   NA  --   --  133   POTASSIUM  --   --  3.6   CHLORIDE  --   --  105   CO2  --   --  14*   BUN  --   --  111*   CR  --   --  3.27*   ANIONGAP  --   --  14   ALIVIA  --   --  9.5   GLC  --   --  96   TROPI 0.107* 0.082* 0.093*       Recent Results (from the past 24 hour(s))   XR Chest 2 Views    Narrative    XR CHEST 2 VW  10/1/2019 12:47 AM     INDICATION: Weakness, exertional shortness of breath/fatigue. Concern  for pleural effusions in setting of possible heart failure.    COMPARISON: 2/27/2018.      Impression    IMPRESSION: No focal air-space disease or other acute findings. Normal  heart size. No pulmonary edema. Degenerative changes thoracic spine.    MANOJ GAITAN MD   US Lower Extremity Venous Duplex Bilateral    Narrative    PROCEDURE:  Venous Doppler ultrasound of the bilateral lower extremities    DATE OF PROCEDURE:  10/1/2019 12:16 PM    CLINICAL HISTORY/INDICATION:   bilateral lower extremity edema.  Eval for DVT.    COMPARISON:   Prior lower extremity venous duplex ultrasound 2/27/2018    TECHNIQUE:   Grayscale, color-flow, and spectral waveform analysis were performed  of the deep veins of the bilateral lower extremities    FINDINGS:   The right common femoral vein, superficial femoral vein and popliteal  vein demonstrate normal compressibility, spectral waveform, color flow  and augmentation.    The left common femoral vein, superficial femoral vein and popliteal  vein demonstrate normal compressibility, spectral waveform, color flow  and augmentation.    Significant edema/swelling below the popliteal fossa bilaterally  limits evaluation, the tibial and peroneal veins  are not well seen  bilaterally as a result.      Impression    IMPRESSION:   1. No evidence of deep venous thrombosis in the visualized right lower  extremity  2. No evidence of deep venous thrombosis in the visualized left lower  extremity    AREN SHARMA MD

## 2019-10-01 NOTE — ED TRIAGE NOTES
Pt presents from home where she lives alone. Pt states she has been having increased weakness and states she does not feel well enough to live on her own anymore. EMS reports this is the 2nd time in 3 nights they have been to her home due to her being sitting on a stool and unable to get up on her own. Pt reports she has been talking to her daughter about assisted living. Pt reports she hasn't been able to cook much or perform her normal ADLs

## 2019-10-01 NOTE — PROGRESS NOTES
MD Notification    Notified Person: MD    Notified Person Name: Solomon    Notification Date/Time: 10/1/19 0254    Notification Interaction: amcon text page    Purpose of Notification: Pt request to restart home melatonin 10mg and request for ibuprofen for lower leg pain.    Orders Received: Home melatonin restarted.     Comments:

## 2019-10-01 NOTE — PLAN OF CARE
0230 - Admission from ED in stable condition.     0230-0700: A&Ox4. VSS on RA. Denies chest pain or shortness of breath. Reports feeling weak and unable to live at home alone anymore. LS clear/diminished. PTA pressure injury to bilat buttocks, WOC consulted. Tele SR 1AVB. BLE +3 edema, painful, prn norco given. Trending trops. Plan for nephro consult and echo today.

## 2019-10-01 NOTE — ED NOTES
"[facility]  ED Nurse Handoff Report    ED Chief complaint: Generalized Weakness      ED Diagnosis:   Final diagnoses:   Dyspnea on exertion   Weakness   Demand ischemia (H)   Acute on chronic congestive heart failure, unspecified heart failure type (H)       Code Status: has not been addressed this visit    Allergies:   Allergies   Allergen Reactions     Oxybutynin Itching     Seasonal Allergies        Activity level - Baseline/Home:  Independent with cane  Activity Level - Current:   Stand with Assist of 2    Patient's Preferred language: English   Needed?: No    Isolation: No  Infection: Not Applicable  Bariatric?: No    Vital Signs:   Vitals:    09/30/19 2315 09/30/19 2330 10/01/19 0000 10/01/19 0030   BP:       Pulse:       Resp:       Temp:       TempSrc:       SpO2: 100% 100% 99% 100%   Weight:       Height:           Cardiac Rhythm: ,        Pain level: 0-10 Pain Scale: 0    Is this patient confused?: No   Does this patient have a guardian?  No         If yes, is there guardianship documents in the Epic \"Code/ACP\" activity?  N/A         Guardian Notified?  N/A  [place] - Suicide Severity Rating Scale Completed?  Yes  If yes, what color did the patient score?  White    Patient Report: Initial Complaint: generalized weakness  Focused Assessment: pt. Reports increased weakness. Lives alone and does not feel well enough to live on her own anymore. She called EMS due to being unable to get up from stool she was sitting on. Pt. Denies SOB or chest pain when asked. Edema noted to bilateral lower extremities.       Tests Performed: ekg, labs, cath urine, CXR  Abnormal Results:   Results for orders placed or performed during the hospital encounter of 09/30/19   XR Chest 2 Views    Narrative    XR CHEST 2 VW  10/1/2019 12:47 AM     INDICATION: Weakness, exertional shortness of breath/fatigue. Concern  for pleural effusions in setting of possible heart failure.    COMPARISON: 2/27/2018.      " Impression    IMPRESSION: No focal air-space disease or other acute findings. Normal  heart size. No pulmonary edema. Degenerative changes thoracic spine.   CBC with platelets differential   Result Value Ref Range    WBC 6.0 4.0 - 11.0 10e9/L    RBC Count 3.22 (L) 3.8 - 5.2 10e12/L    Hemoglobin 10.0 (L) 11.7 - 15.7 g/dL    Hematocrit 30.8 (L) 35.0 - 47.0 %    MCV 96 78 - 100 fl    MCH 31.1 26.5 - 33.0 pg    MCHC 32.5 31.5 - 36.5 g/dL    RDW 14.6 10.0 - 15.0 %    Platelet Count 415 150 - 450 10e9/L    Diff Method Automated Method     % Neutrophils 69.8 %    % Lymphocytes 17.0 %    % Monocytes 11.7 %    % Eosinophils 0.8 %    % Basophils 0.2 %    % Immature Granulocytes 0.5 %    Nucleated RBCs 0 0 /100    Absolute Neutrophil 4.2 1.6 - 8.3 10e9/L    Absolute Lymphocytes 1.0 0.8 - 5.3 10e9/L    Absolute Monocytes 0.7 0.0 - 1.3 10e9/L    Absolute Eosinophils 0.1 0.0 - 0.7 10e9/L    Absolute Basophils 0.0 0.0 - 0.2 10e9/L    Abs Immature Granulocytes 0.0 0 - 0.4 10e9/L    Absolute Nucleated RBC 0.0    Basic metabolic panel   Result Value Ref Range    Sodium 133 133 - 144 mmol/L    Potassium 3.6 3.4 - 5.3 mmol/L    Chloride 105 94 - 109 mmol/L    Carbon Dioxide 14 (L) 20 - 32 mmol/L    Anion Gap 14 3 - 14 mmol/L    Glucose 96 70 - 99 mg/dL    Urea Nitrogen 111 (H) 7 - 30 mg/dL    Creatinine 3.27 (H) 0.52 - 1.04 mg/dL    GFR Estimate 12 (L) >60 mL/min/[1.73_m2]    GFR Estimate If Black 14 (L) >60 mL/min/[1.73_m2]    Calcium 9.5 8.5 - 10.1 mg/dL   Troponin I   Result Value Ref Range    Troponin I ES 0.093 (H) 0.000 - 0.045 ug/L   TSH with free T4 reflex   Result Value Ref Range    TSH 1.14 0.40 - 4.00 mU/L   Nt probnp inpatient (BNP)   Result Value Ref Range    N-Terminal Pro BNP Inpatient 18,031 (H) 0 - 1,800 pg/mL   EKG 12-lead, tracing only   Result Value Ref Range    Interpretation ECG Click View Image link to view waveform and result        Treatments provided: IV lasix    Family Comments: daughter at bedside    OBS  brochure/video discussed/provided to patient/family: N/A              Name of person given brochure if not patient: na              Relationship to patient: na    ED Medications:   Medications   furosemide (LASIX) injection 40 mg (has no administration in time range)       Drips infusing?:  No    For the majority of the shift this patient was Green.   Interventions performed were routine cares.    Severe Sepsis OR Septic Shock Diagnosis Present: No    To be done/followed up on inpatient unit:  monitor    ED NURSE PHONE NUMBER: *[id number]

## 2019-10-01 NOTE — ED NOTES
Bed: ED13  Expected date: 9/30/19  Expected time: 10:49 PM  Means of arrival: Ambulance  Comments:  Esther M2 84F failure to thrive

## 2019-10-01 NOTE — PROGRESS NOTES
10/01/19 1400   Quick Adds   Type of Visit Initial PT Evaluation   Living Environment   Lives With alone   Living Arrangements apartment   Home Accessibility no concerns   Transportation Anticipated family or friend will provide   Self-Care   Usual Activity Tolerance fair   Current Activity Tolerance poor   Regular Exercise No   Equipment Currently Used at Home cane, straight   Functional Level Prior   Ambulation 1-->assistive equipment   Fall history within last six months yes   Number of times patient has fallen within last six months 2   Which of the above functional risks had a recent onset or change? ambulation;transferring;fall history   Prior Functional Level Comment Pt reports increased difficulty with caring for herself over the past several weeks.   General Information   Onset of Illness/Injury or Date of Surgery - Date 09/30/19   Referring Physician Dr. Carbajal   Patient/Family Goals Statement None stated   Pertinent History of Current Problem (include personal factors and/or comorbidities that impact the POC) Pt is an 84 year old female admitted for generalized weakness and CHF exacerbation.   Precautions/Limitations fall precautions   Cognitive Status Examination   Level of Consciousness alert   Pain Assessment   Patient Currently in Pain No   Range of Motion (ROM)   ROM Quick Adds No deficits were identified   Strength   Strength Comments Gross LE strength 3/5 bilterally   Bed Mobility   Bed Mobility Comments Supine to sit mod assist   Transfer Skills   Transfer Comments Sit to/from stand mod assist   Gait   Gait Comments Pt performed pregait activities, unable to advance LEs to ambulate   Balance   Balance Comments Good in sitting, fair in standing   General Therapy Interventions   Planned Therapy Interventions balance training;gait training;strengthening;transfer training;home program guidelines;risk factor education;progressive activity/exercise   Clinical Impression   Criteria for Skilled  "Therapeutic Intervention yes, treatment indicated   PT Diagnosis Impaired ambulation   Influenced by the following impairments Decreased strength, decreased endurance, decreased balance   Functional limitations due to impairments Difficulty with bed mobility, transfers, ambulation   Clinical Presentation Stable/Uncomplicated   Clinical Presentation Rationale VSS, pain controlled   Clinical Decision Making (Complexity) Low complexity   Therapy Frequency 5x/week   Predicted Duration of Therapy Intervention (days/wks) 1 week   Anticipated Discharge Disposition Transitional Care Facility   Risk & Benefits of therapy have been explained Yes   Patient, Family & other staff in agreement with plan of care Yes   [facility] TM \"6 Clicks\"   2016, Trustees of [facility], under license to [employer].  All rights reserved.   6 Clicks Short Forms Basic Mobility Inpatient Short Form   [facility]  \"6 Clicks\" V.2 Basic Mobility Inpatient Short Form   1. Turning from your back to your side while in a flat bed without using bedrails? 3 - A Little   2. Moving from lying on your back to sitting on the side of a flat bed without using bedrails? 3 - A Little   3. Moving to and from a bed to a chair (including a wheelchair)? 3 - A Little   4. Standing up from a chair using your arms (e.g., wheelchair, or bedside chair)? 3 - A Little   5. To walk in hospital room? 2 - A Lot   6. Climbing 3-5 steps with a railing? 1 - Total   Basic Mobility Raw Score (Score out of 24.Lower scores equate to lower levels of function) 15   Total Evaluation Time   Total Evaluation Time (Minutes) 12     "

## 2019-10-01 NOTE — TELEPHONE ENCOUNTER
Patient Contact    Attempt # 1    Was call answered?  No.  Left message on voicemail with information to call triage back.    Upon callback, relay provider message.    Hospital Contact    Spoke with REESE and relayed provider message below. She will pass along the message to patient's nurse. Instructed to call back with questions.

## 2019-10-01 NOTE — PLAN OF CARE
Alert and oriented x 4 but at times forgetful. VS stable, on RA and some complaints of pain. She was given Norco x 1 with good pain control. Tele SR with rates in the 70's. She was turned and repositioned every two hours unless she was up in the chair for meals. Ace wraps applied to legs for her +3-4 LE edema. She and her daughter were educated on CORE, CHF and transitional care facilities both were able to verbalize understanding of teaching and all questions answered. She was straight cathed this evening for 400ml.  Plan to monitor kidney function.

## 2019-10-01 NOTE — PROGRESS NOTES
Care Coordination:    -Orders received for Congestive Heart Failure to assist with disposition and discharge planning.  CHF education to be completed by bedside nurse.  Will continue to follow and assist with discharge planning.    Di Matute RN, BAN   Care Coordinator  Ph. [phone]

## 2019-10-01 NOTE — PROGRESS NOTES
"[facility] Nurse Inpatient Pressure Injury Assessment   Reason for consultation: Evaluate and treat BL Buttock and coccyx      ASSESSMENT  Pressure Injury: on BL IT and coccyx , present on admission ,   Pressure Injury is Stage either Stage 2 or 3 deferring definitive staging until wound base has evolved   Contributing factor of the pressure injury: pressure, age, immobility and moisture  Status: initial assessment  Recommend provider order: NA     TREATMENT PLAN  BL IT and Coccyx  wound: Every other day Odd Days  1. Clean wound with saline or MicroKlenz Spray, pat dry  2. Paint periwound tissue with No Sting Skin Prep (#[id number]) and allow to dry thoroughly  3. Press a Mepilex  Border Dressing (#246068) and  Sacral Dressing (PS#[id number])  to the area, making sure to conform nicely to skin curvatures   4. Time and date dressing change and yamila with a \"T\" for treatment of a wound  5. Reposition pt every 1 to 2 hours when in bed and hourly when up to the chair to relieve pressure and promote perfusion to tissue  NOTE- continue to assess under dressing and document findings BID, per protocol      Pressure Injury Prevention (PIP) Plan:  If patient is declining pressure injury prevention interventions: Explore reason why and address patient's concerns and Educate on pressure injury risk and prevention intervention(s)  Mattress: Follow bed algorithm, reassess daily and order specialty mattress, if indicated.  HOB: Maintain at or below 30 degrees, unless contraindicated  Repositioning in bed: Every 1-2 hours , Left/right positioning; avoid supine and Raise foot of bed prior to raising head of bed, to reduce patient sliding down (shear)  Heels: Keep elevated off mattress and Pillows under calves  Protective Dressing: see wound care orders  Positioning Equipment: None  Chair positioning: Chair cushion (#[id number])    If patient has a buttock pressure injury, or high risk for PI use chair cushion or SPS.  Moisture Management: Perineal " cleansing /protection: Follow Incontinence Protocol, Avoid brief in bed, Clean and dry skin folds with bathing  and Moisturize dry skin  Under Devices: Inspect skin under all medical devices during skin inspection   Ask provider to discontinue device when no longer needed.      Orders Written  WOC Nurse follow-up plan:weekly  Nursing to notify the Provider(s) and re-consult the WOC Nurse if wound(s) deteriorates or new skin concern.    Patient History  According to provider note(s):  Ghislaine Walls is a 84 year old female who was admitted on 9/30/2019 with deconditioning and generalized weakness.    Objective Data  Containment of urine/stool: Diaper and Incontinence Protocol    Current Diet/ Nutrition:  Orders Placed This Encounter      Combination Diet Low Saturated Fat Na <2400mg Diet      Output:   I/O last 3 completed shifts:  In: 120 [P.O.:120]  Out: -     Risk Assessment:   Sensory Perception: 4-->no impairment  Moisture: 3-->occasionally moist  Activity: 3-->walks occasionally  Mobility: 3-->slightly limited  Nutrition: 3-->adequate  Friction and Shear: 1-->problem  Donato Score: 17      Labs:   Recent Labs   Lab 09/30/19  2310   HGB 10.0*   WBC 6.0       Physical Exam  Skin inspection: focused BL buttock/coccyx      Wound Location:  BL IT   Date of last Photo NA  Wound History: Pt admitted with pressure injury to BL IT. Dtr reports pt spends most of her time in a recliner chair including to sleep at night as her bed is too low.  Measurements (length x width x depth, in cm) L 1.6 cm x 2.0 cm  x  0.1 cm   R 1.8cm x 1.4cm x 0.1cm   Wound Base:  100 % fibrin vs slough few pink dermal buds  Tunneling N/A  Undermining N/A  Palpation of the wound bed: normal   Periwound skin: intact and erythema- blanchable  Color: pink  Temperature: normal   Drainage:, scant  Description of drainage: serous  Odor: none  Pain: denies , none     Wound Location:  Coccyx  Date of last Photo NA  Wound History : Pt admitted with  pressure injury to BL IT. Dtr reports pt spends most of her time in a recliner chair including to sleep at night as her bed is too low.  Measurements (length x width x depth, in cm) 0.3 cm x 0.2 cm  x  0.1 cm   Wound Base:  100 % fibrin vs slough  Tunneling N/A  Undermining N/A  Palpation of the wound bed: normal   Periwound skin: intact and erythema- blanchable  Color: pink  Temperature: normal   Drainage:, scant  Description of drainage: serous  Odor: none  Pain: denies , none     Interventions  Current support surface: Standard  Atmos Air mattress ordered pulsate  Current off-loading measures: Pillows under calves  Repositioning aid: Pillows  Visual inspection of wound(s) completed   Tube Securement: NA  Wound Care: was done per plan of care.  Supplies: at bedside  Educated provided: importance of repositioning and plan of care  Education provided to: family member daughter and nurse  Discussed importance of:repositioning every 2 hours, off-loading pressure to wound, their role in pressure injury prevention, dressing change frequency, head elevation <30 degrees, off-loading mattress, nutrition on wound healing and moisture management  Discussed plan of care with Family and Nurse    Milad Fuentes RN BSN CWOCN

## 2019-10-01 NOTE — PROGRESS NOTES
RECEIVING UNIT ED HANDOFF REVIEW    ED Nurse Handoff Report was reviewed by: Tasha Esposito RN [place] [facility] [place] 1, [place] at 1:46 AM

## 2019-10-02 ENCOUNTER — APPOINTMENT (OUTPATIENT)
Dept: OCCUPATIONAL THERAPY | Facility: CLINIC | Age: 84
DRG: 606 | End: 2019-10-02
Attending: HOSPITALIST
Payer: MEDICARE

## 2019-10-02 ENCOUNTER — APPOINTMENT (OUTPATIENT)
Dept: PHYSICAL THERAPY | Facility: CLINIC | Age: 84
DRG: 606 | End: 2019-10-02
Payer: MEDICARE

## 2019-10-02 LAB
ANION GAP SERPL CALCULATED.3IONS-SCNC: 10 MMOL/L (ref 3–14)
BUN SERPL-MCNC: 104 MG/DL (ref 7–30)
CALCIUM SERPL-MCNC: 8.7 MG/DL (ref 8.5–10.1)
CHLORIDE SERPL-SCNC: 115 MMOL/L (ref 94–109)
CHOLEST SERPL-MCNC: 123 MG/DL
CO2 SERPL-SCNC: 18 MMOL/L (ref 20–32)
CREAT SERPL-MCNC: 2 MG/DL (ref 0.52–1.04)
CRP SERPL-MCNC: 65.9 MG/L (ref 0–8)
DEPRECATED CALCIDIOL+CALCIFEROL SERPL-MC: 27 UG/L (ref 20–75)
ERYTHROCYTE [DISTWIDTH] IN BLOOD BY AUTOMATED COUNT: 14.7 % (ref 10–15)
FERRITIN SERPL-MCNC: 410 NG/ML (ref 8–252)
FOLATE SERPL-MCNC: 5.8 NG/ML
GFR SERPL CREATININE-BSD FRML MDRD: 23 ML/MIN/{1.73_M2}
GLUCOSE SERPL-MCNC: 100 MG/DL (ref 70–99)
HCT VFR BLD AUTO: 28.1 % (ref 35–47)
HDLC SERPL-MCNC: 27 MG/DL
HGB BLD-MCNC: 9.2 G/DL (ref 11.7–15.7)
IRON SATN MFR SERPL: 13 % (ref 15–46)
IRON SERPL-MCNC: 26 UG/DL (ref 35–180)
LDLC SERPL CALC-MCNC: 74 MG/DL
MCH RBC QN AUTO: 31.4 PG (ref 26.5–33)
MCHC RBC AUTO-ENTMCNC: 32.7 G/DL (ref 31.5–36.5)
MCV RBC AUTO: 96 FL (ref 78–100)
NONHDLC SERPL-MCNC: 96 MG/DL
PLATELET # BLD AUTO: 298 10E9/L (ref 150–450)
POTASSIUM SERPL-SCNC: 3.2 MMOL/L (ref 3.4–5.3)
PTH-INTACT SERPL-MCNC: 43 PG/ML (ref 12–64)
RBC # BLD AUTO: 2.93 10E12/L (ref 3.8–5.2)
SODIUM SERPL-SCNC: 143 MMOL/L (ref 133–144)
TIBC SERPL-MCNC: 207 UG/DL (ref 240–430)
TRIGL SERPL-MCNC: 111 MG/DL
TROPONIN I SERPL-MCNC: 0.43 UG/L (ref 0–0.04)
VIT B12 SERPL-MCNC: 1020 PG/ML (ref 193–986)
WBC # BLD AUTO: 4.9 10E9/L (ref 4–11)

## 2019-10-02 PROCEDURE — 25000132 ZZH RX MED GY IP 250 OP 250 PS 637: Mod: GY | Performed by: INTERNAL MEDICINE

## 2019-10-02 PROCEDURE — 83540 ASSAY OF IRON: CPT | Performed by: INTERNAL MEDICINE

## 2019-10-02 PROCEDURE — 83550 IRON BINDING TEST: CPT | Performed by: INTERNAL MEDICINE

## 2019-10-02 PROCEDURE — 25000132 ZZH RX MED GY IP 250 OP 250 PS 637: Mod: GY | Performed by: HOSPITALIST

## 2019-10-02 PROCEDURE — 97110 THERAPEUTIC EXERCISES: CPT | Mod: GP

## 2019-10-02 PROCEDURE — 82728 ASSAY OF FERRITIN: CPT | Performed by: INTERNAL MEDICINE

## 2019-10-02 PROCEDURE — 99233 SBSQ HOSP IP/OBS HIGH 50: CPT | Performed by: PHYSICIAN ASSISTANT

## 2019-10-02 PROCEDURE — 97140 MANUAL THERAPY 1/> REGIONS: CPT | Mod: GO

## 2019-10-02 PROCEDURE — 82607 VITAMIN B-12: CPT | Performed by: INTERNAL MEDICINE

## 2019-10-02 PROCEDURE — 25800030 ZZH RX IP 258 OP 636: Performed by: HOSPITALIST

## 2019-10-02 PROCEDURE — 99222 1ST HOSP IP/OBS MODERATE 55: CPT | Performed by: INTERNAL MEDICINE

## 2019-10-02 PROCEDURE — 84484 ASSAY OF TROPONIN QUANT: CPT | Performed by: INTERNAL MEDICINE

## 2019-10-02 PROCEDURE — 82746 ASSAY OF FOLIC ACID SERUM: CPT | Performed by: INTERNAL MEDICINE

## 2019-10-02 PROCEDURE — 97165 OT EVAL LOW COMPLEX 30 MIN: CPT | Mod: GO

## 2019-10-02 PROCEDURE — 80048 BASIC METABOLIC PNL TOTAL CA: CPT | Performed by: INTERNAL MEDICINE

## 2019-10-02 PROCEDURE — 36415 COLL VENOUS BLD VENIPUNCTURE: CPT | Performed by: INTERNAL MEDICINE

## 2019-10-02 PROCEDURE — 80061 LIPID PANEL: CPT | Performed by: INTERNAL MEDICINE

## 2019-10-02 PROCEDURE — 25000125 ZZHC RX 250: Performed by: HOSPITALIST

## 2019-10-02 PROCEDURE — 86140 C-REACTIVE PROTEIN: CPT | Performed by: INTERNAL MEDICINE

## 2019-10-02 PROCEDURE — 93005 ELECTROCARDIOGRAM TRACING: CPT

## 2019-10-02 PROCEDURE — 85027 COMPLETE CBC AUTOMATED: CPT | Performed by: INTERNAL MEDICINE

## 2019-10-02 PROCEDURE — 93010 ELECTROCARDIOGRAM REPORT: CPT | Mod: 76 | Performed by: INTERNAL MEDICINE

## 2019-10-02 PROCEDURE — 25000125 ZZHC RX 250

## 2019-10-02 PROCEDURE — 83970 ASSAY OF PARATHORMONE: CPT | Performed by: INTERNAL MEDICINE

## 2019-10-02 PROCEDURE — 21000000 ZZH R&B IMCU HEART CARE

## 2019-10-02 PROCEDURE — 82306 VITAMIN D 25 HYDROXY: CPT | Performed by: INTERNAL MEDICINE

## 2019-10-02 RX ORDER — DILTIAZEM HYDROCHLORIDE 30 MG/1
30 TABLET, FILM COATED ORAL EVERY 8 HOURS SCHEDULED
Status: DISCONTINUED | OUTPATIENT
Start: 2019-10-02 | End: 2019-10-03

## 2019-10-02 RX ORDER — DILTIAZEM HYDROCHLORIDE 5 MG/ML
INJECTION INTRAVENOUS
Status: COMPLETED
Start: 2019-10-02 | End: 2019-10-02

## 2019-10-02 RX ORDER — DILTIAZEM HYDROCHLORIDE 5 MG/ML
10 INJECTION INTRAVENOUS ONCE
Status: COMPLETED | OUTPATIENT
Start: 2019-10-02 | End: 2019-10-02

## 2019-10-02 RX ADMIN — APIXABAN 2.5 MG: 2.5 TABLET, FILM COATED ORAL at 12:55

## 2019-10-02 RX ADMIN — HYDRALAZINE HYDROCHLORIDE 25 MG: 25 TABLET ORAL at 22:27

## 2019-10-02 RX ADMIN — HYDRALAZINE HYDROCHLORIDE 25 MG: 25 TABLET ORAL at 15:53

## 2019-10-02 RX ADMIN — LEVOTHYROXINE SODIUM 25 MCG: 25 TABLET ORAL at 08:26

## 2019-10-02 RX ADMIN — DILTIAZEM HYDROCHLORIDE 10 MG: 5 INJECTION INTRAVENOUS at 04:55

## 2019-10-02 RX ADMIN — DILTIAZEM HYDROCHLORIDE 30 MG: 30 TABLET, FILM COATED ORAL at 22:27

## 2019-10-02 RX ADMIN — DILTIAZEM HYDROCHLORIDE 30 MG: 30 TABLET, FILM COATED ORAL at 15:53

## 2019-10-02 RX ADMIN — ACETAMINOPHEN 650 MG: 325 TABLET, FILM COATED ORAL at 19:25

## 2019-10-02 RX ADMIN — DILTIAZEM HYDROCHLORIDE 5 MG/HR: 5 INJECTION INTRAVENOUS at 05:03

## 2019-10-02 RX ADMIN — METOPROLOL SUCCINATE 100 MG: 100 TABLET, EXTENDED RELEASE ORAL at 08:26

## 2019-10-02 RX ADMIN — HYDRALAZINE HYDROCHLORIDE 25 MG: 25 TABLET ORAL at 01:41

## 2019-10-02 RX ADMIN — HYDROCODONE BITARTRATE AND ACETAMINOPHEN 1 TABLET: 5; 325 TABLET ORAL at 05:12

## 2019-10-02 RX ADMIN — HYDRALAZINE HYDROCHLORIDE 25 MG: 25 TABLET ORAL at 08:26

## 2019-10-02 RX ADMIN — APIXABAN 2.5 MG: 2.5 TABLET, FILM COATED ORAL at 22:27

## 2019-10-02 RX ADMIN — POTASSIUM CHLORIDE 40 MEQ: 1500 TABLET, EXTENDED RELEASE ORAL at 06:43

## 2019-10-02 ASSESSMENT — ACTIVITIES OF DAILY LIVING (ADL)
ADLS_ACUITY_SCORE: 23
ADLS_ACUITY_SCORE: 23
ADLS_ACUITY_SCORE: 21
ADLS_ACUITY_SCORE: 22
ADLS_ACUITY_SCORE: 22
ADLS_ACUITY_SCORE: 25

## 2019-10-02 ASSESSMENT — MIFFLIN-ST. JEOR: SCORE: 1104.38

## 2019-10-02 NOTE — CONSULTS
,  Cardiology Consultation      Ghislaine Walls MRN# [id number]   YOB: 1935 Age: 84 year old   Date of Admission: 9/30/2019     Reason for consult: AF with RVR           Assessment and Plan:     84-year-old female with chronic renal insufficiency hypertension and anemia who was admitted to [facility] on 9/30/2019 with generalized weakness.  Troponins mildly elevated and absence of chest discomfort.  Today she was noted to be in atrial fibrillation with rapid ventricular response.  She is currently on intravenous diltiazem and minimally symptomatic.  She is also on oral metoprolol.        1. Atrial fibrillation with rapid ventricular response, CHADSVASC of 6.   2. Chronic renal insufficiency  3. Mildly elevated troponins in the absence of chest discomfort and normal left ventricular systolic function on echocardiography  4. HTN      PLAN  -She is minimally symptomatic so I think that rate control and anticoagulation is appropriate.  -Hopefully will be able to transition to p.o. diltiazem soon  - Would not recommend ischemic workup at this time given lack of cardiac symptoms and abnormal renal function  - would recommend eliquis 2.5 MG PO BID for thromboembolic prophylaxis             Chief Complaint:   Generalized Weakness           History of Present Illness:   This patient is a 84 year old female who is known to have mild to moderate carotid artery disease hypertension and chronic renal insufficiency for which she is followed by Dr. Awan.  She was admitted to [facility] on 9/30/2019 with weakness and lethargy at home.  It appears that she had trouble getting up from a sitting position.    She was admitted for further evaluation and there was initial concern for congestive heart failure given an elevated BNP.  An echocardiogram, however, demonstrated normal left ventricular systolic function and her diuretics were continued as prescribed by nephrology.  This morning she was noted to  "be in atrial fibrillation with rapid ventricular response.  She remains minimally symptomatic but due to elevated heart rates was started on a diltiazem drip in addition to metoprolol.  She denies any chest pain or shortness of breath.            Physical Exam:   Vitals were reviewed  Blood pressure 116/60, pulse 134, temperature 97.6  F (36.4  C), temperature source Oral, resp. rate 17, height 1.549 m (5' 1\"), weight 71.2 kg (156 lb 15.5 oz), SpO2 100 %, not currently breastfeeding.  Temperatures:  Current - Temp: 97.6  F (36.4  C); Max - Temp  Av.8  F (36.6  C)  Min: 97.6  F (36.4  C)  Max: 98  F (36.7  C)  Respiration range: Resp  Avg: 15.3  Min: 14  Max: 17  Pulse range: Pulse  Av  Min: 134  Max: 134  Blood pressure range: Systolic (24hrs), Av , Min:100 , Max:159   ; Diastolic (24hrs), Av, Min:46, Max:74    Pulse oximetry range: SpO2  Av.5 %  Min: 98 %  Max: 100 %    Intake/Output Summary (Last 24 hours) at 10/2/2019 0851  Last data filed at 10/2/2019 0600  Gross per 24 hour   Intake 510 ml   Output 1225 ml   Net -715 ml     Constitutional:   awake, alert, cooperative, no apparent distress, and appears stated age     Eyes:   Lids and lashes normal, pupils equal, round and reactive to light, extra ocular muscles intact, sclera clear, conjunctiva normal     Neck:   supple, symmetrical, trachea midline, no JVD     Back:   symmetric     Lungs:   No increased work of breathing, good air exchange, clear to auscultation bilaterally, no crackles or wheezing       Cardiovascular:   Irregular     Abdomen:   non-tender     Musculoskeletal:   motor strength is 5 out of 5 all extremities bilaterally     Neurologic:   Grossly nonfocal     Skin:   no bruising or bleeding     Additional findings:     No edema               Past Medical History:   I have reviewed this patient's past medical history  Past Medical History:   Diagnosis Date     [place] (hard of hearing)     wears hearing aids     " Hyperlipidaemia      Hyperlipidaemia LDL goal < 130      Hypertension      Hypothyroid      PAD (peripheral artery disease) (H)      Renal insufficiency, mild              Past Surgical History:   I have reviewed this patient's past surgical history  Past Surgical History:   Procedure Laterality Date     APPENDECTOMY       CATARACT IOL, RT/LT      bilateral (laser - 2013)     DENTAL SURGERY      wisdom     EYE SURGERY      Muscle release     TUBAL LIGATION  1971               Social History:   I have reviewed this patient's social history  Social History     Tobacco Use     Smoking status: Former Smoker     Packs/day: 0.50     Years: 5.00     Pack years: 2.50     Types: Cigarettes     Last attempt to quit: 1973     Years since quittin.7     Smokeless tobacco: Never Used   Substance Use Topics     Alcohol use: Yes     Alcohol/week: 0.0 standard drinks     Comment: 1/week             Family History:   I have reviewed this patient's family history  Family History   Problem Relation Age of Onset     Heart Disease Mother      Respiratory Mother      Heart Disease Father      Heart Disease Brother      Coronary Artery Disease Brother         CAB     Heart Disease Brother      Coronary Artery Disease Brother         CAB             Allergies:     Allergies   Allergen Reactions     Oxybutynin Itching     Seasonal Allergies              Medications:   I have reviewed this patient's current medications  Medications Prior to Admission   Medication Sig Dispense Refill Last Dose     allopurinol (ZYLOPRIM) 100 MG tablet Take 1 tablet (100 mg) by mouth daily 90 tablet 1 2019 at Unknown time     furosemide (LASIX) 40 MG tablet 40 mg in AM and 20 mg in PM 30 tablet 3 2019 at Unknown time     hydrALAZINE (APRESOLINE) 25 MG tablet Take 25 mg by mouth 3 times daily   2019 at Unknown time     levothyroxine (SYNTHROID/LEVOTHROID) 25 MCG tablet Take 1 tablet (25 mcg) by mouth daily 90 tablet 3  9/30/2019 at Unknown time     Melatonin 10 MG TABS tablet Take 10 mg by mouth nightly as needed for sleep    prn     metoprolol succinate ER (TOPROL-XL) 100 MG 24 hr tablet Take 1 tablet (100 mg) by mouth daily 90 tablet 1 9/30/2019 at Unknown time     Current Facility-Administered Medications Ordered in Epic   Medication Dose Route Frequency Last Rate Last Dose     acetaminophen (TYLENOL) tablet 650 mg  650 mg Oral Q4H PRN         allopurinol (ZYLOPRIM) tablet 100 mg  100 mg Oral Daily         Continuing beta blocker from home medication list OR beta blocker order already placed during this visit   Does not apply DOES NOT GO TO MAR         diltiazem (CARDIZEM) 125 mg in sodium chloride 0.9 % 125 mL infusion  5-15 mg/hr Intravenous Continuous 10 mL/hr at 10/02/19 0811 10 mg/hr at 10/02/19 0811     HOLD: nitroGLYcerin IF   Does not apply HOLD         hydrALAZINE (APRESOLINE) tablet 25 mg  25 mg Oral TID   25 mg at 10/02/19 0826     HYDROcodone-acetaminophen (NORCO) 5-325 MG per tablet 1-2 tablet  1-2 tablet Oral Q4H PRN   1 tablet at 10/02/19 0512     levothyroxine (SYNTHROID/LEVOTHROID) tablet 25 mcg  25 mcg Oral Daily   25 mcg at 10/02/19 0826     lidocaine (LMX4) cream   Topical Q1H PRN         lidocaine 1 % 0.1-1 mL  0.1-1 mL Other Q1H PRN         melatonin tablet 5 mg  5 mg Oral At Bedtime PRN         metoprolol succinate ER (TOPROL-XL) 24 hr tablet 100 mg  100 mg Oral Daily   100 mg at 10/02/19 0826     naloxone (NARCAN) injection 0.1-0.4 mg  0.1-0.4 mg Intravenous Q2 Min PRN         ondansetron (ZOFRAN-ODT) ODT tab 4 mg  4 mg Oral Q6H PRN        Or     ondansetron (ZOFRAN) injection 4 mg  4 mg Intravenous Q6H PRN         polyethylene glycol (MIRALAX/GLYCOLAX) Packet 17 g  17 g Oral Daily PRN         potassium chloride (KLOR-CON) Packet 20-40 mEq  20-40 mEq Oral or Feeding Tube Q2H PRN         potassium chloride 10 mEq in 100 mL intermittent infusion with 10 mg lidocaine  10 mEq Intravenous Q1H PRN          potassium chloride 10 mEq in 100 mL sterile water intermittent infusion (premix)  10 mEq Intravenous Q1H PRN         potassium chloride 20 mEq in 50 mL intermittent infusion  20 mEq Intravenous Q1H PRN         potassium chloride ER (K-DUR/KLOR-CON M) CR tablet 20-40 mEq  20-40 mEq Oral Q2H PRN   40 mEq at 10/02/19 0643     senna-docusate (SENOKOT-S/PERICOLACE) 8.6-50 MG per tablet 1 tablet  1 tablet Oral BID PRN        Or     senna-docusate (SENOKOT-S/PERICOLACE) 8.6-50 MG per tablet 2 tablet  2 tablet Oral BID PRN         sodium chloride (PF) 0.9% PF flush 3 mL  3 mL Intracatheter q1 min prn         sodium chloride (PF) 0.9% PF flush 3 mL  3 mL Intracatheter Q8H   3 mL at 10/02/19 0142     No current Epic-ordered outpatient medications on file.             Review of Systems:   The 10 point Review of Systems is negative other than noted in the HPI            Data:   All laboratory data reviewed  Results for orders placed or performed during the hospital encounter of 19 (from the past 24 hour(s))   Troponin I   Result Value Ref Range    Troponin I ES 0.107 (H) 0.000 - 0.045 ug/L   Echocardiogram Complete    Narrative    [id number]  [facility]  [id number]  [id number]^ROD^XIN           [facility]  Echocardiography Laboratory  [address] [place], [place] [address]        Name: ROSIO YOUNG  MRN: [id number]  : 1935  Study Date: 10/01/2019 10:42 AM  Age: 84 yrs  Gender: Female  Patient Location: [facility]  Reason For Study: CHF  Ordering Physician: Xin Carbajal MD  Referring Physician: Mahsa Soto DO  Performed By: SPARKLE Valenzuela     BSA: 1.7 m2  Height: 61 in  Weight: 161 lb  HR: 84  BP: 130/50 mmHg  _____________________________________________________________________________  __        Procedure  Complete Portable Echo Adult. Optison (NDC #[id number]) given intravenously.  _____________________________________________________________________________  __         Interpretation Summary     1. Left ventricular systolic function is normal. The visual ejection fraction  is estimated at 60-65%.  2. There is mild concentric left ventricular hypertrophy.  3. No regional wall motion abnormalities noted.  4. The right ventricle is normal in structure, function and size.  5. The left atrium is moderately dilated.  6. There is mild (1+) tricuspid regurgitation.  7. The aortic valve is trileaflet with aortic valve sclerosis.  _____________________________________________________________________________  __        Left Ventricle  The left ventricle is normal in size. There is mild concentric left  ventricular hypertrophy. Left ventricular systolic function is normal. The  visual ejection fraction is estimated at 60-65%. Grade I or early diastolic  dysfunction. No regional wall motion abnormalities noted.     Right Ventricle  The right ventricle is normal in structure, function and size.     Atria  The left atrium is moderately dilated. Right atrial size is normal. There is  no color Doppler evidence of an atrial shunt.     Mitral Valve  There is mild mitral annular calcification.        Tricuspid Valve  The tricuspid valve is normal in structure and function. There is mild (1+)  tricuspid regurgitation. The right ventricular systolic pressure is  approximated at 29mmHg plus the right atrial pressure.     Aortic Valve  The aortic valve is trileaflet with aortic valve sclerosis.     Pulmonic Valve  The pulmonic valve is normal in structure and function.     Vessels  Normal size aorta. IVC diameter <2.1 cm collapsing >50% with sniff suggests a  normal RA pressure of 3 mmHg.     Pericardium  There is no pericardial effusion.        Rhythm  Sinus rhythm was noted.  _____________________________________________________________________________  __  MMode/2D Measurements & Calculations  IVSd: 1.3 cm     LVIDd: 3.3 cm  LVPWd: 1.4 cm  LV mass(C)d: 160.1 grams  LV mass(C)dI: 92.9 grams/m2  Ao  root diam: 3.1 cm  LA dimension: 4.6 cm  asc Aorta Diam: 2.8 cm  LA/Ao: 1.5  LVOT diam: 2.2 cm  LVOT area: 3.7 cm2  LA Volume Indexed (AL/bp): 50.5 ml/m2  RWT: 0.86        Doppler Measurements & Calculations  MV E max cheikh: 69.0 cm/sec  MV A max cheikh: 127.9 cm/sec  MV E/A: 0.54     MV max P.8 mmHg  MV mean P.2 mmHg  MV V2 VTI: 34.0 cm  MV dec time: 0.21 sec  PA acc time: 0.09 sec  TR max cheikh: 270.1 cm/sec  TR max P.2 mmHg  E/E' av.5  Lateral E/e': 12.0  Medial E/e': 15.1           _____________________________________________________________________________  __           Report approved by: John Johansen 10/01/2019 11:45 AM      US Lower Extremity Venous Duplex Bilateral    Narrative    PROCEDURE:  Venous Doppler ultrasound of the bilateral lower extremities    DATE OF PROCEDURE:  10/1/2019 12:16 PM    CLINICAL HISTORY/INDICATION:   bilateral lower extremity edema.  Eval for DVT.    COMPARISON:   Prior lower extremity venous duplex ultrasound 2018    TECHNIQUE:   Grayscale, color-flow, and spectral waveform analysis were performed  of the deep veins of the bilateral lower extremities    FINDINGS:   The right common femoral vein, superficial femoral vein and popliteal  vein demonstrate normal compressibility, spectral waveform, color flow  and augmentation.    The left common femoral vein, superficial femoral vein and popliteal  vein demonstrate normal compressibility, spectral waveform, color flow  and augmentation.    Significant edema/swelling below the popliteal fossa bilaterally  limits evaluation, the tibial and peroneal veins are not well seen  bilaterally as a result.      Impression    IMPRESSION:   1. No evidence of deep venous thrombosis in the visualized right lower  extremity  2. No evidence of deep venous thrombosis in the visualized left lower  extremity    AREN SHARMA MD   EKG 12-lead, tracing only   Result Value Ref Range    Interpretation ECG Click View Image link to  view waveform and result    Basic metabolic panel   Result Value Ref Range    Sodium 143 133 - 144 mmol/L    Potassium 3.2 (L) 3.4 - 5.3 mmol/L    Chloride 115 (H) 94 - 109 mmol/L    Carbon Dioxide 18 (L) 20 - 32 mmol/L    Anion Gap 10 3 - 14 mmol/L    Glucose 100 (H) 70 - 99 mg/dL    Urea Nitrogen 104 (H) 7 - 30 mg/dL    Creatinine 2.00 (H) 0.52 - 1.04 mg/dL    GFR Estimate 23 (L) >60 mL/min/[1.73_m2]    GFR Estimate If Black 26 (L) >60 mL/min/[1.73_m2]    Calcium 8.7 8.5 - 10.1 mg/dL   CBC with platelets   Result Value Ref Range    WBC 4.9 4.0 - 11.0 10e9/L    RBC Count 2.93 (L) 3.8 - 5.2 10e12/L    Hemoglobin 9.2 (L) 11.7 - 15.7 g/dL    Hematocrit 28.1 (L) 35.0 - 47.0 %    MCV 96 78 - 100 fl    MCH 31.4 26.5 - 33.0 pg    MCHC 32.7 31.5 - 36.5 g/dL    RDW 14.7 10.0 - 15.0 %    Platelet Count 298 150 - 450 10e9/L   Lipid panel reflex to direct LDL   Result Value Ref Range    Cholesterol 123 <200 mg/dL    Triglycerides 111 <150 mg/dL    HDL Cholesterol 27 (L) >49 mg/dL    LDL Cholesterol Calculated 74 <100 mg/dL    Non HDL Cholesterol 96 <130 mg/dL   CRP inflammation   Result Value Ref Range    CRP Inflammation 65.9 (H) 0.0 - 8.0 mg/L   Ferritin   Result Value Ref Range    Ferritin 410 (H) 8 - 252 ng/mL   Parathyroid Hormone Intact   Result Value Ref Range    Parathyroid Hormone Intact 43 12 - 64 pg/mL   Iron and iron binding capacity   Result Value Ref Range    Iron 26 (L) 35 - 180 ug/dL    Iron Binding Cap 207 (L) 240 - 430 ug/dL    Iron Saturation Index 13 (L) 15 - 46 %   Troponin I   Result Value Ref Range    Troponin I ES 0.435 (HH) 0.000 - 0.045 ug/L     EKG results: AF with RVR

## 2019-10-02 NOTE — PLAN OF CARE
Pt was lethargic until about 0130. Dejesus catheter was inserted. Pt comlains of generalized pain all over. Norco was given at 0500. Pt went into A-fib RVR at about 0400. Dilt started at 0500 at 5ml/hr. Repo Q 2hrs. Pt wanted her ace wraps off due to pain. Continue to monitor.

## 2019-10-02 NOTE — PLAN OF CARE
Discharge Planner OT   Patient plan for discharge: None stated today.  Current status: Lymph eval complete. Patient wrapped from feet to knees. Asleep most of the time, but did verbally respond when I educated her on wearing schedule and adverse reactions. Also discussed with nursing.   Barriers to return to prior living situation: Needs A with ADL.   Recommendations for discharge: TCU.  Rationale for recommendations: Patient will need lymph services at TCU as well.        Entered by: Bibiana Ramírez 10/02/2019 12:04 PM

## 2019-10-02 NOTE — CONSULTS
CLINICAL NUTRITION SERVICES  -  ASSESSMENT NOTE      Recommendations Ordered by Registered Dietitian (RD):   --Ordered Vanilla Shake with Beneprotein to come at 2pm each day  --Liberalize diet from low saturated fat to 2g sodium diet.   Malnutrition:   % Weight Loss:  > 7.5% in 3 months (severe malnutrition) - Patient stated that she has lost 20 pounds in the past 3 months.   % Intake:  </= 75% for >/= 1 month (severe malnutrition)  Subcutaneous Fat Loss:  Upper arm region moderate depletion - observed when patient lifted up arm  Muscle Loss:  Temporal region mild depletion, Clavicle bone region moderate depletion and Scapular bone region mild depletion - observed   Fluid Retention:  Mild to Moderate (2-3+) Edema     Malnutrition Diagnosis: Severe malnutrition  In Context of:  Chronic illness or disease       REASON FOR ASSESSMENT  Ghislaine Walls is a 84 year old female seen by Registered Dietitian for Admission Nutrition Risk Screen for stageable pressure injuries or large/non-healing wound or burn.  Provider Order Nutrition Education - Congestive Heart Failure (CHF) - Dietitian to instruct patient on 2 gram sodium diet      NUTRITION HISTORY  Chart Review:   PMH of chronic lymphedema, CKD stage III, prior history of CVA, hyperlipidemia, hypertension who presents to the emergency department brought in by EMS due to increased weakness and lethargy.    - Information obtained from Patient:  Patient stated that she has had a decreased appetite recently but she still tries to eat two meals a day. She stated that she typically eats whole wheat bread with peanut butter in the morning for breakfast and then eggs or another source of protein for lunch. In addition to her decreased appetite the patient stated that she feels she has lost about 20 pounds in the past three months. She focuses mainly on protein when she does eat. She was interested in setting up a supplement to come in the afternoon each day.     Main barrier  "to oral intake is decreased appetite.       CURRENT NUTRITION ORDERS  Diet Order:     Low Saturated Fat/2400 mg Sodium       Current Intake/Tolerance:  % since admission per RN note in flow sheet. Per meal review meals have been lower in protein.     NUTRITION FOCUSED PHYSICAL ASSESSMENT FOR DIAGNOSING MALNUTRITION)  Completed:  Yes, visual assessment only.          Observed:    Muscle wasting (refer to documentation in Malnutrition section) and Subcutaneous fat loss (refer to documentation in Malnutrition section)    Obtained from Chart/Interdisciplinary Team:  Pressure Injury Stage 2 to 3 Pressure Injury     Per WOC RN note:  Pressure Injury: on BL IT and coccyx , present on admission ,   Pressure Injury is Stage either Stage 2 or 3 deferring definitive staging until wound base has evolved   Contributing factor of the pressure injury: pressure, age, immobility and moisture  Status: initial assessment    ANTHROPOMETRICS  Height: 5' 1\"  Weight: 156 lbs 15.48 oz (71 kg)   Body mass index is 29.66 kg/m .  Weight Status:  Overweight BMI 25-29.9  IBW: 47 kg   % IBW: 151%  Weight History: Weight review shows up to 23 lb loss in 3.5 months 13% weight loss in the past 3.5 months. Patient noted that she has lost about 20 pounds in past 3 months.  Wt Readings from Last 10 Encounters:   10/02/19 71.2 kg (156 lb 15.5 oz)   09/27/19 73 kg (161 lb)   06/14/19 81.6 kg (180 lb)   06/08/19 81.6 kg (180 lb)   05/29/19 82.8 kg (182 lb 8 oz)   04/18/19 81.9 kg (180 lb 8 oz)   01/02/19 82.6 kg (182 lb)   10/24/18 84.1 kg (185 lb 4.8 oz)   07/05/18 82.3 kg (181 lb 8 oz)   03/23/18 88.9 kg (196 lb)       LABS  Labs reviewed  K+ 3.2 (depleted)    (H), Cr 2.00 (H)    MEDICATIONS  Medications reviewed  Electrolyte protocol in place if needed      ASSESSED NUTRITION NEEDS PER APPROVED PRACTICE GUIDELINES:    Dosing Weight: 53 kg (Adjusted based on IBW and dry wt)  Estimated Energy Needs: 4280-8616 kcals (25-30 " Kcal/Kg)  Justification: repletion - Pressure injury   Estimated Protein Needs: 69-80 grams protein (1.3-1.5 g pro/Kg)  Justification: wound healing  Estimated Fluid Needs: (1 mL/Kcal)  Justification: maintenance    MALNUTRITION:  % Weight Loss:  > 7.5% in 3 months (severe malnutrition)  % Intake:  </= 75% for >/= 1 month (severe malnutrition)  Subcutaneous Fat Loss:  Upper arm region moderate depletion - observed when patient lifted up arm  Muscle Loss:  Temporal region mild depletion, Clavicle bone region moderate depletion and Scapular bone region mild depletion - observed   Fluid Retention:  Mild to Moderate (2-3+) Edema     Malnutrition Diagnosis: Severe malnutrition  In Context of:  Chronic illness or disease    NUTRITION DIAGNOSIS:  Inadequate oral intake related to decreased appetite as evidenced by severe weight loss of 11% in the past 3 months and observation of mild to moderate fat and muscle loss.       NUTRITION INTERVENTIONS  Recommendations / Nutrition Prescription  --Ordered Vanilla Shake with Beneprotein to come every day in the afternoon  --Liberalize diet from low saturated fat to 2g sodium diet.     Implementation  Nutrition education: Provided education on role of RD, supplements available, and importance of protein. Brought up sodium restriction, patient declines extensive education at this time due to lack of appetite.   Medical Food Supplement - ordered Vanilla Shake with Beneprotein        Nutrition Goals  Patient to consume >/= 50% of meals BID plus one supplement daily.       MONITORING AND EVALUATION:  Progress towards goals will be monitored and evaluated per protocol and Practice Guidelines    Adeline Linton  Dietetic Intern

## 2019-10-02 NOTE — PROVIDER NOTIFICATION
Called by nursing regarding patient converted into atrial fibrillation with RVR. Blood pressure OK. Patient asymptomatic. No prior history of atrial fibrillation documented in the chart.  Will give IV diltiazem 10 mg now and start a diltiazem drip. Consider anticoagulation, [id number] is 4, will defer to daytime rounding hospitalist.  Leonardo Austin MD

## 2019-10-02 NOTE — PLAN OF CARE
Discharge Planner PT   Patient plan for discharge: Not stated  Current status: Patient supine in bed upon arrival of therapist, agreeable to working with PT/CR. Patient wanting to take lymph wraps off, educated on signs/syptoms of intolerance. Patient states itchiness, denies pain, educated on keeping on as tolerated. Patient not agreeable for OOB mobility but agreeable to supine exercises. Completed x 10 reps with cues for technique. All needs in reach and bed alarm on upon therapist exit.   Barriers to return to prior living situation: Lives alone, decreased activity tolerance, level of assist, fall risk  Recommendations for discharge: TCU  Rationale for recommendations: Patient would benefit from continued skilled therapy to further improve strength, balance, and independence with mobility and ambulation to address functional limitations and decrease falls risk.         Entered by: Arleen Jordan 10/02/2019 5:01 PM

## 2019-10-02 NOTE — CONSULTS
Medication coverage check for Eliquis (2.5mg). Patient has a deductible remaining to meet.    1st fill: Free with free trial card from discharge pharmacy.  2nd fill: $424 in order to meet deductible.  After that: Up to $123 monthly. ($123 at [facility]. Preferred pharmacies like [facility] would cost patient less, likely as low as $95 monthly.)    Daphne Ramon CphT  [facility] Discharge Pharmacy Liaison  Liaison Cell: [phone]

## 2019-10-02 NOTE — PROGRESS NOTES
[facility]    Nephrology Progress Note     Assessment & Plan     Ghislaine Walls is a 84 year old female who was admitted on 9/30/2019.      1) Baseline CKD 3:  Cr/eGFR ~ 1.5/33.  She has had some fluctuation that has come with variation in her diuretic doses.      2) DRAGAN:  Cr/eGFR to 3.27/12.  This has come with an increase in her diuretic dose from furosemide 20 mg BID to 40/20.  Her lisinopril was held at the same time.  UA is bland.     Better. Cr down to 2.00     3) Volume Status:  Her CXR is totally clear.  She is breathing ambient air.  I do not see elevated neck veins.  She does have peripheral edema.  Her BNP is elevated, however I do not find this to be a useful marker in CKD.  I suspect she is intravascularly dry.  Echo supports this.    4)  Anemia:  HGB 10.  At our office in August her ferritin was 336 and TSAT 11%.       5) HTN:  BP is acceptable at present.  Even in absence of lisinopril.       6) MBD:  PTH was 105 in August.       7) Edema:  BLE.  Apparently stasis.  Does not like compression stocking.       Suggest:     Continue to hold furosemide  Lymphedema Rx is arboleda.        Rik Awan MD  [facility] Nephrology  [phone]    Interval History       Breathing OK.  Transient afib.  Cr better off diuretic.  Lymphedema Rx ordered.  No DVT in legs    Physical Exam   Temp: 97.4  F (36.3  C) Temp src: Oral BP: 139/53 Pulse: 66 Heart Rate: 87 Resp: 17 SpO2: 100 % O2 Device: None (Room air)    Vitals:    09/30/19 2303 10/02/19 0553   Weight: 73 kg (161 lb) 71.2 kg (156 lb 15.5 oz)     Vital Signs with Ranges  Temp:  [97.4  F (36.3  C)-98  F (36.7  C)] 97.4  F (36.3  C)  Pulse:  [] 66  Heart Rate:  [] 87  Resp:  [17] 17  BP: (100-159)/(53-71) 139/53  SpO2:  [98 %-100 %] 100 %  I/O last 3 completed shifts:  In: 60 [P.O.:60]  Out: 1325 [Urine:1325]    GENERAL APPEARANCE: pleasant, NAD, a & o  HEENT:  Eyes/ears/nose/neck grossly normal  RESP: lungs cta b c good  efforts, no crackles, rhonchi or wheezes  CV: reg S1S2  ABDOMEN: o/s/nt/nd, bs present  EXTREMITIES/SKIN: no rashes/lesions; 2+ BLE edema to thighs.    Medications     - MEDICATION INSTRUCTIONS -         allopurinol  100 mg Oral Daily     apixaban ANTICOAGULANT  2.5 mg Oral BID     diltiazem  30 mg Oral Q8H KEVEN     hydrALAZINE  25 mg Oral TID     levothyroxine  25 mcg Oral Daily     metoprolol succinate ER  100 mg Oral Daily     sodium chloride (PF)  3 mL Intracatheter Q8H       Data   BMP  Recent Labs   Lab 10/02/19  0531 09/30/19  2310    133   POTASSIUM 3.2* 3.6   CHLORIDE 115* 105   ALIVIA 8.7 9.5   CO2 18* 14*   * 111*   CR 2.00* 3.27*   * 96     Phos@[email or web address](phos:4)  CBC)  Recent Labs   Lab 10/02/19  0531 09/30/19  2310   WBC 4.9 6.0   HGB 9.2* 10.0*   HCT 28.1* 30.8*   MCV 96 96    415     No lab results found in last 7 days.    Invalid input(s): BILIRUBININDIRECT  No lab results found in last 7 days.  No results found for: D2VIT, D3VIT, DTOT  Recent Labs   Lab 10/02/19  0531   HGB 9.2*   HCT 28.1*   MCV 96   IRON 26*   IRONSAT 13*   *   HOWARD 410*   B12 1,020*   FOLIC 5.8     Recent Labs   Lab 10/02/19  0531   PTHI 43       Attestation:   I have reviewed today's relevant vital signs, notes, medications, labs and imaging.

## 2019-10-02 NOTE — PROGRESS NOTES
10/02/19 1215   General Information   Discipline OT   Onset of Edema 09/30/19   Pertinent history of current problem (PT: include personal factors and/or comorbidities that impact the POC; OT: include additional occupational profile info) Pt is an 84 year old female admitted for generalized weakness and CHF exacerbation.   Pain   Patient currently in pain No   Edema Examination / Assessment   Skin Condition Non-pitting   Skin Condition Comments Mild edema B ankles and feet.   ROM   Range of Motion (WFL) no deficits were identified   Assessment/Plan   Patient presents with Edema   Assessment Would benefit from GCB   Assessment of Occupational Performance 1-3 Performance Deficits   Identified Performance Deficits edema   Clinical Decision Making (Complexity) Low complexity   Planned Edema Interventions Gradient compression bandaging   Treatment Frequency Daily   Treatment Duration 2 days   Patient, Family and/or Staff in agreement with plan of care. Yes   Risks and benefits of treatment have been explained. Yes   Total Evaluation Time   Total Evaluation Time (Minutes) 10

## 2019-10-03 ENCOUNTER — APPOINTMENT (OUTPATIENT)
Dept: OCCUPATIONAL THERAPY | Facility: CLINIC | Age: 84
DRG: 606 | End: 2019-10-03
Payer: MEDICARE

## 2019-10-03 ENCOUNTER — APPOINTMENT (OUTPATIENT)
Dept: PHYSICAL THERAPY | Facility: CLINIC | Age: 84
DRG: 606 | End: 2019-10-03
Payer: MEDICARE

## 2019-10-03 LAB
ANION GAP SERPL CALCULATED.3IONS-SCNC: 6 MMOL/L (ref 3–14)
BUN SERPL-MCNC: 91 MG/DL (ref 7–30)
CALCIUM SERPL-MCNC: 9.2 MG/DL (ref 8.5–10.1)
CHLORIDE SERPL-SCNC: 116 MMOL/L (ref 94–109)
CO2 SERPL-SCNC: 21 MMOL/L (ref 20–32)
CREAT SERPL-MCNC: 1.64 MG/DL (ref 0.52–1.04)
GFR SERPL CREATININE-BSD FRML MDRD: 29 ML/MIN/{1.73_M2}
GLUCOSE SERPL-MCNC: 117 MG/DL (ref 70–99)
HGB BLD-MCNC: 9.1 G/DL (ref 11.7–15.7)
INTERPRETATION ECG - MUSE: NORMAL
POTASSIUM SERPL-SCNC: 3.8 MMOL/L (ref 3.4–5.3)
SODIUM SERPL-SCNC: 143 MMOL/L (ref 133–144)

## 2019-10-03 PROCEDURE — 25000128 H RX IP 250 OP 636: Performed by: PHYSICIAN ASSISTANT

## 2019-10-03 PROCEDURE — 25000132 ZZH RX MED GY IP 250 OP 250 PS 637: Mod: GY | Performed by: HOSPITALIST

## 2019-10-03 PROCEDURE — 99232 SBSQ HOSP IP/OBS MODERATE 35: CPT | Performed by: PHYSICIAN ASSISTANT

## 2019-10-03 PROCEDURE — 97530 THERAPEUTIC ACTIVITIES: CPT | Mod: GP

## 2019-10-03 PROCEDURE — 25800030 ZZH RX IP 258 OP 636: Performed by: PHYSICIAN ASSISTANT

## 2019-10-03 PROCEDURE — 25000132 ZZH RX MED GY IP 250 OP 250 PS 637: Mod: GY | Performed by: INTERNAL MEDICINE

## 2019-10-03 PROCEDURE — 97140 MANUAL THERAPY 1/> REGIONS: CPT | Mod: GO

## 2019-10-03 PROCEDURE — 25000132 ZZH RX MED GY IP 250 OP 250 PS 637: Mod: GY | Performed by: PHYSICIAN ASSISTANT

## 2019-10-03 PROCEDURE — 21000000 ZZH R&B IMCU HEART CARE

## 2019-10-03 PROCEDURE — 97110 THERAPEUTIC EXERCISES: CPT | Mod: GP

## 2019-10-03 PROCEDURE — 85018 HEMOGLOBIN: CPT | Performed by: PHYSICIAN ASSISTANT

## 2019-10-03 PROCEDURE — 36415 COLL VENOUS BLD VENIPUNCTURE: CPT | Performed by: PHYSICIAN ASSISTANT

## 2019-10-03 PROCEDURE — 99232 SBSQ HOSP IP/OBS MODERATE 35: CPT | Performed by: INTERNAL MEDICINE

## 2019-10-03 PROCEDURE — 80048 BASIC METABOLIC PNL TOTAL CA: CPT | Performed by: PHYSICIAN ASSISTANT

## 2019-10-03 RX ORDER — METHYLPREDNISOLONE SODIUM SUCCINATE 125 MG/2ML
125 INJECTION, POWDER, LYOPHILIZED, FOR SOLUTION INTRAMUSCULAR; INTRAVENOUS
Status: DISCONTINUED | OUTPATIENT
Start: 2019-10-03 | End: 2019-10-05 | Stop reason: HOSPADM

## 2019-10-03 RX ORDER — DIPHENHYDRAMINE HYDROCHLORIDE 50 MG/ML
50 INJECTION INTRAMUSCULAR; INTRAVENOUS
Status: DISCONTINUED | OUTPATIENT
Start: 2019-10-03 | End: 2019-10-05 | Stop reason: HOSPADM

## 2019-10-03 RX ORDER — FERROUS SULFATE 325(65) MG
325 TABLET ORAL
Status: DISCONTINUED | OUTPATIENT
Start: 2019-10-03 | End: 2019-10-04

## 2019-10-03 RX ADMIN — IRON SUCROSE 300 MG: 20 INJECTION, SOLUTION INTRAVENOUS at 17:08

## 2019-10-03 RX ADMIN — APIXABAN 2.5 MG: 2.5 TABLET, FILM COATED ORAL at 09:28

## 2019-10-03 RX ADMIN — FERROUS SULFATE TAB 325 MG (65 MG ELEMENTAL FE) 325 MG: 325 (65 FE) TAB at 14:47

## 2019-10-03 RX ADMIN — APIXABAN 2.5 MG: 2.5 TABLET, FILM COATED ORAL at 21:49

## 2019-10-03 RX ADMIN — HYDRALAZINE HYDROCHLORIDE 25 MG: 25 TABLET ORAL at 18:40

## 2019-10-03 RX ADMIN — HYDRALAZINE HYDROCHLORIDE 25 MG: 25 TABLET ORAL at 21:49

## 2019-10-03 RX ADMIN — DILTIAZEM HYDROCHLORIDE 30 MG: 30 TABLET, FILM COATED ORAL at 09:29

## 2019-10-03 RX ADMIN — LEVOTHYROXINE SODIUM 25 MCG: 25 TABLET ORAL at 09:28

## 2019-10-03 RX ADMIN — ALLOPURINOL 100 MG: 100 TABLET ORAL at 09:28

## 2019-10-03 RX ADMIN — METOPROLOL SUCCINATE 100 MG: 100 TABLET, EXTENDED RELEASE ORAL at 09:28

## 2019-10-03 RX ADMIN — ACETAMINOPHEN 650 MG: 325 TABLET, FILM COATED ORAL at 00:53

## 2019-10-03 RX ADMIN — HYDRALAZINE HYDROCHLORIDE 25 MG: 25 TABLET ORAL at 09:29

## 2019-10-03 RX ADMIN — ACETAMINOPHEN 650 MG: 325 TABLET, FILM COATED ORAL at 09:28

## 2019-10-03 ASSESSMENT — ACTIVITIES OF DAILY LIVING (ADL)
ADLS_ACUITY_SCORE: 23
ADLS_ACUITY_SCORE: 24

## 2019-10-03 NOTE — PROGRESS NOTES
"[facility]    Nephrology Progress Note     Assessment & Plan       Ghislaine Walls is a 84 year old female who was admitted on 9/30/2019.      1) Baseline CKD 3:  Cr/eGFR ~ 1.5/33.  She has had some fluctuation that has come with variation in her diuretic doses.      2) DRAGAN:  Cr/eGFR to 3.27/12.  This has come with an increase in her diuretic dose from furosemide 20 mg BID to 40/20.  Her lisinopril was held at the same time.  UA is bland.      Better. Cr down to 1.64.      3) Volume Status:  Her CXR is totally clear.  She is breathing ambient air.  I do not see elevated neck veins.  She does have peripheral edema.  Her BNP is elevated, however I do not find this to be a useful marker in CKD.  I suspect was intravascularly dry.  Echo supports this.    I offered to resume some diuretic - a lower dose than previous. She said \"diuretics never worked.\"  The LE edema persisted despite diuretic.  I think she could go without diuretic and see how it goes.  If resumed I would consider furosemide 20 mg daily.       4)  Anemia:  HGB 10 to 9.1.  At our office in August her ferritin was 336 and TSAT 11%.  She might benefit from some oral iron.      5) HTN:  BP borderline at present. 139/62 may be OK for 82 yo.      6) MBD:  PTH was 105 in August.       7) Edema:  BLE.  Apparently stasis.  Does not like wraps. Willing to try stockings.  Perhaps stockings on during the day and off at night.       Suggest:     Compression stockings during the day.  No furosemide per her preference.          Rik Awan MD  [facility] - Nephrology  [phone]    Interval History     Had to take wraps off of legs.  Could not tolerate.  No sleep until 4 AM.  She is willing to try compression stockings.     Physical Exam   Temp: 97.6  F (36.4  C) Temp src: Oral BP: 139/62 Pulse: 66 Heart Rate: 66 Resp: 16 SpO2: 100 % O2 Device: None (Room air)    Vitals:    09/30/19 2303 10/02/19 0553   Weight: 73 kg (161 lb) 71.2 kg " (156 lb 15.5 oz)     Vital Signs with Ranges  Temp:  [97.4  F (36.3  C)-98  F (36.7  C)] 97.6  F (36.4  C)  Pulse:  [64-72] 66  Heart Rate:  [58-87] 66  Resp:  [16] 16  BP: (128-144)/(53-62) 139/62  SpO2:  [98 %-100 %] 100 %  I/O last 3 completed shifts:  In: 540 [P.O.:540]  Out: 650 [Urine:650]    GENERAL APPEARANCE: pleasant, NAD, a & o  HEENT:  Eyes/ears/nose/neck grossly normal  RESP: lungs cta b c good efforts, no crackles, rhonchi or wheezes  CV: RRR, nl S1/S2, no m/r/g   ABDOMEN: o/s/nt/nd, bs present  EXTREMITIES/SKIN: no rashes/lesions; 2+ BLE edema    Medications     - MEDICATION INSTRUCTIONS -         allopurinol  100 mg Oral Daily     apixaban ANTICOAGULANT  2.5 mg Oral BID     hydrALAZINE  25 mg Oral TID     levothyroxine  25 mcg Oral Daily     metoprolol succinate ER  100 mg Oral Daily     sodium chloride (PF)  3 mL Intracatheter Q8H       Data   BMP  Recent Labs   Lab 10/03/19  0555 10/02/19  0531 09/30/19  2310    143 133   POTASSIUM 3.8 3.2* 3.6   CHLORIDE 116* 115* 105   ALIVIA 9.2 8.7 9.5   CO2 21 18* 14*   BUN 91* 104* 111*   CR 1.64* 2.00* 3.27*   * 100* 96     Phos@[email or web address](phos:4)  CBC)  Recent Labs   Lab 10/03/19  0555 10/02/19  0531 09/30/19  2310   WBC  --  4.9 6.0   HGB 9.1* 9.2* 10.0*   HCT  --  28.1* 30.8*   MCV  --  96 96   PLT  --  298 415     No lab results found in last 7 days.    Invalid input(s): BILIRUBININDIRECT  No lab results found in last 7 days.  No results found for: D2VIT, D3VIT, DTOT  Recent Labs   Lab 10/03/19  0555 10/02/19  0531   HGB 9.1* 9.2*   HCT  --  28.1*   MCV  --  96   IRON  --  26*   IRONSAT  --  13*   FEB  --  207*   HOWARD  --  410*   B12  --  1,020*   FOLIC  --  5.8     Recent Labs   Lab 10/02/19  0531   PTHI 43       Attestation:   I have reviewed today's relevant vital signs, notes, medications, labs and imaging.

## 2019-10-03 NOTE — PLAN OF CARE
VSS. Pt requested her lymph wraps off at 0330. I washed her legs and applied lotion. Pt was more alert this shift. Oleg was taken out at 0530. Continue to monitor

## 2019-10-03 NOTE — PROGRESS NOTES
[facility]    Hospitalist Progress Note      Assessment & Plan   Ghislaine Walls is a 84 year old female who was admitted on 9/30/2019 with deconditioning and generalized weakness.     Elevated BNP  Elevated troponin, type 2 MI secondary to demand with ongoing DRAGAN on CKD  Presented with lethargy, lower extremity edema (slightly worse than her baseline) and weakness.  Her Lasix dose was increased following her recent visit with her nephrologist.  BNP is 18,000 in the ER with the mildly elevated troponins (likely secondary to renal disease). Echocardiogram shows normal EF no diastolic dysfunction. Troponins  (0.093--0.082--0.107>0.4) without chest pain or discomfort. EKG with incomplete left bundle branch. Recently had increase in her diuretic dose (40mg AM with 20mg bedtime)  - Continue metoprolol  - PT/OT/Social work to evaluate.  - Nephrology discussed resuming lasix at low dose with patient 10/3- she wants to avoid as she feels this didn't help in the past   --Cardiology following, do not recommend ischemic workup at this time in absence of chest pain.      New Atrial Fibrillation with RVR  New diagnosis for patient. Developed afib with RVR overnight 10/2 and placed on diltiazem drip. She converted to sinus rhythm afternoon of 10/2.   --Eliquis started this admission, will discuss cost- patient will discharge with one month of Eliquis covered and discuss with family when to do going forward   --Cardiology following, appreciate assistance   --dilt drip stopped 10/2- continue metoprolol per Cardiology   --will discuss outpatient cardiac monitoring with Cardiology      Acute kidney injury, resolved  CKD (chronic kidney disease) stage 3, GFR 30-59 ml/min (H)  Baseline creatinine is near 1.5 her creatinine on admission was 3.27. Likely secondary to her diuretic change. Cr now back near baseline at 1.6.   - Nephrology consult appreciated.     Generalized weakness  Physical deconditioning  Patient  reports limited mobility for some time with generalized weakness and pain limiting arm movement. She had a cortisone injection in both shoulders back in June with significant improvement but reports this has warn off. Denies focal weakness or significant acute changes in functional status. She has not tried physical therapy. TSH recently checked and ok. Possible that afib contributing to some of her fatigue.   --PT following, anticipate need for TCU  --SW following        Lower extremity lymphedema  Lower extremity dopplers without DVT. She has received treatment for this in the past, not currently wearing any stockings has stated she does not like compression stockings in the past  - Consult lymphedema for wraps, she does not tolerate well. Recommended off at night and on during the day. She will think about this      Hyperlipidemia LDL goal <130  Hypertension goal BP (blood pressure) < 140/90  - Continue PTA metoprolol, hydralazine  - Not on statin PTA, lipid profile in AM     Hypothyroidism  - Continue Synthroid     Gout arthritis  - Continue allopurinol     Normocytic Anemia  Hgb 10 on admit>9.2. no evidence of acute bleeding. Iron deficiency indicated by iron studies- she reports PCP had ordered iron infusion for 10/4.      Bilateral buttock pressure wound  Present on admission, wound nurse consulted    Malnutrition, severe  Due to reported weight loss over the past year. Followed by nutrition during admission.     DVT Prophylaxis: Eliquis  Code Status: DNR/DNI    Disposition: Expected discharge to TCU tomorrow    This patient was seen and examined with Dr. Marcelino who agrees with the above plan.    Ashley Hemphill PA-C    Interval History   Feeling much better today. Has more energy and improved movement. Denies dizziness, CP, shortness of breath. Shoulder pain is a little better. Agreeable to anticoagulation, would like to discuss cost with family.     -Data reviewed today: I reviewed all new labs and  imaging results over the last 24 hours. I personally reviewed no images or EKG's today.    Physical Exam   Temp: 97.6  F (36.4  C) Temp src: Oral BP: 139/62 Pulse: 66 Heart Rate: 66 Resp: 16 SpO2: 100 % O2 Device: None (Room air)    Vitals:    09/30/19 2303 10/02/19 0553   Weight: 73 kg (161 lb) 71.2 kg (156 lb 15.5 oz)     Vital Signs with Ranges  Temp:  [97.4  F (36.3  C)-98  F (36.7  C)] 97.6  F (36.4  C)  Pulse:  [64-72] 66  Heart Rate:  [58-87] 66  Resp:  [16] 16  BP: (128-144)/(53-62) 139/62  SpO2:  [98 %-100 %] 100 %  I/O last 3 completed shifts:  In: 540 [P.O.:540]  Out: 650 [Urine:650]    Constitutional: Awake, alert, cooperative. Appears comfortable and is appropropriately conversant. More energy than yesterday   ENT: normal cephalic, moist mucous membranes  Respiratory: Lungs clear to auscultation bilaterally, no increased work of breathing or wheezing   Cardiovascular: Regular rate and rhythm, no murmur, 1+ bilateral edema, distal pulses +2/4  GI:  active bowel sounds, abdomen soft, non-tender  Skin/Integumen: warm, dry  MSK:  Bilateral thenar atrophy. Mild swelling of bilateral hands. She has reduced ROM in bilateral shoulders which she states is unchanged. She can dorsi and plantar flex. Can extend both knees though limited on left due to pain.   Neuro:  Speech is clear. Face symmetric. Tongue midline. Limited ROM in shoulders makes neuro exam difficulty but  strength is +4/5 and equal.  Sensation intact.     Medications     - MEDICATION INSTRUCTIONS -         allopurinol  100 mg Oral Daily     apixaban ANTICOAGULANT  2.5 mg Oral BID     ferrous sulfate  325 mg Oral Daily with lunch     hydrALAZINE  25 mg Oral TID     levothyroxine  25 mcg Oral Daily     metoprolol succinate ER  100 mg Oral Daily     sodium chloride (PF)  3 mL Intracatheter Q8H       Data   Recent Labs   Lab 10/03/19  0555 10/02/19  0531 10/01/19  1008 10/01/19  0557 09/30/19  2310   WBC  --  4.9  --   --  6.0   HGB 9.1* 9.2*  --    --  10.0*   MCV  --  96  --   --  96   PLT  --  298  --   --  415    143  --   --  133   POTASSIUM 3.8 3.2*  --   --  3.6   CHLORIDE 116* 115*  --   --  105   CO2 21 18*  --   --  14*   BUN 91* 104*  --   --  111*   CR 1.64* 2.00*  --   --  3.27*   ANIONGAP 6 10  --   --  14   ALIVIA 9.2 8.7  --   --  9.5   * 100*  --   --  96   TROPI  --  0.435* 0.107* 0.082* 0.093*       No results found for this or any previous visit (from the past 24 hour(s)).

## 2019-10-03 NOTE — PLAN OF CARE
Alert and oriented x 4. VS stable, on RA and does complain of pain in her shoulders and generally,She took Tylenol x1 with minimal pain relief. Tele converted from a fib to SR this  afternoon around 4pm and dilt drip was discontinued for oral. Plan for further monitoring of edema and cardiac rhythms. Lymph wraps should be off at 12 tomorrow.

## 2019-10-03 NOTE — PROGRESS NOTES
SW:  D:  Received a call back from [facility].  They will have a double room available for patient tomorrow.    P:  Will continue to follow.    DELORES Tan, [facility]  [phone]  [facility]

## 2019-10-03 NOTE — PLAN OF CARE
Patient weak feeling in arms and legs.  Given tylenol prn.  Up in chair for meals.  VSS, dressing change to wounds done.  Purwick in place.  Makes needs known.  Probable discharge to [facility] tomorrow.  Continue to monitor.

## 2019-10-03 NOTE — PLAN OF CARE
PT:  Discharge Planner PT   Patient plan for discharge: TCU  Current status: Pt required min A for supine to sit, mod A sit to stand with FWW with immediate LOB backwards sitting onto the bed, stood again with mod A and FWW and transferred bed to chair with CGA. Participated well in seated strengthening. Set up with lunch tray and encouraged up to chair for all meals.  Barriers to return to prior living situation: Needs assist with mobility, falls risk, low activity tolerance, lives alone.  Recommendations for discharge: TCU  Rationale for recommendations: Pt would benefit from continued PT to improve strength, balance, mobility to increase independence, reduce falls risk and increase safety before returning home.       Entered by: Mahsa Dorantes 10/03/2019 12:15 PM

## 2019-10-03 NOTE — PROGRESS NOTES
"Cardiology Progress Note          Assessment and Plan:        84-year-old female with chronic renal insufficiency hypertension and anemia who was admitted to [facility] on 2019 with generalized weakness.  Troponins mildly elevated in absence of chest discomfort. Noted to be in atrial fibrillation with rapid ventricular response on 10/02/2019 with spontaneous conversion to NSR. Minimally symptomatic from a cardiac standpoint with normal LVEF on echo.     1. Atrial fibrillation with rapid ventricular response, CHADSVASC of 6.   2. Chronic renal insufficiency  3. Mildly elevated troponins in the absence of chest discomfort and normal left ventricular systolic function on echocardiography  4. HTN     PLAN  - continue metoprolol/eliquis  - consider amiodarone if she has frequent symptomatic recurrences of AF  - needs outpatient [facility] follow up in 2-3 weeks  - please call with qs          Interval History:     No CP or SOB. Remains in NSR.              Review of Systems:   As per subjective, otherwise 5 systems reviewed and negative.           Physical Exam:   Blood pressure 139/62, pulse 66, temperature 97.6  F (36.4  C), temperature source Oral, resp. rate 16, height 1.549 m (5' 1\"), weight 71.2 kg (156 lb 15.5 oz), SpO2 100 %, not currently breastfeeding.      Vital Sign Ranges  Temperature Temp  Av.7  F (36.5  C)  Min: 97.4  F (36.3  C)  Max: 98  F (36.7  C)   Blood pressure Systolic (24hrs), Av , Min:128 , Max:144        Diastolic (24hrs), Av, Min:53, Max:62      Pulse Pulse  Av.3  Min: 64  Max: 72   Respirations Resp  Av  Min: 16  Max: 16   Pulse oximetry SpO2  Av.3 %  Min: 98 %  Max: 100 %         Intake/Output Summary (Last 24 hours) at 10/3/2019 1100  Last data filed at 10/3/2019 0538  Gross per 24 hour   Intake 480 ml   Output 650 ml   Net -170 ml       Constitutional: NAD  Skin: Warm and dry  Neck: No JVD  Lungs: CTA  Cardiovascular: RRR, no m/r/g  Abdomen: Soft, non " tender.  Extremities and Back: No LE edema  Neurological: Nonfocal           Medications:     I have reviewed this patient's current medications.              Data:     Labs reviewed.     Tele: MARILYN Fuentes MD, [facility].

## 2019-10-04 ENCOUNTER — APPOINTMENT (OUTPATIENT)
Dept: PHYSICAL THERAPY | Facility: CLINIC | Age: 84
DRG: 606 | End: 2019-10-04
Payer: MEDICARE

## 2019-10-04 LAB
ANION GAP SERPL CALCULATED.3IONS-SCNC: 5 MMOL/L (ref 3–14)
BUN SERPL-MCNC: 73 MG/DL (ref 7–30)
CALCIUM SERPL-MCNC: 10 MG/DL (ref 8.5–10.1)
CHLORIDE SERPL-SCNC: 120 MMOL/L (ref 94–109)
CO2 SERPL-SCNC: 21 MMOL/L (ref 20–32)
CREAT SERPL-MCNC: 1.22 MG/DL (ref 0.52–1.04)
GFR SERPL CREATININE-BSD FRML MDRD: 41 ML/MIN/{1.73_M2}
GLUCOSE BLDC GLUCOMTR-MCNC: 100 MG/DL (ref 70–99)
GLUCOSE BLDC GLUCOMTR-MCNC: 119 MG/DL (ref 70–99)
GLUCOSE SERPL-MCNC: 108 MG/DL (ref 70–99)
INTERPRETATION ECG - MUSE: NORMAL
MAGNESIUM SERPL-MCNC: 1.7 MG/DL (ref 1.6–2.3)
POTASSIUM SERPL-SCNC: 3.8 MMOL/L (ref 3.4–5.3)
SODIUM SERPL-SCNC: 146 MMOL/L (ref 133–144)

## 2019-10-04 PROCEDURE — 25000132 ZZH RX MED GY IP 250 OP 250 PS 637: Mod: GY | Performed by: HOSPITALIST

## 2019-10-04 PROCEDURE — 97530 THERAPEUTIC ACTIVITIES: CPT | Mod: GP

## 2019-10-04 PROCEDURE — 25000132 ZZH RX MED GY IP 250 OP 250 PS 637: Mod: GY | Performed by: INTERNAL MEDICINE

## 2019-10-04 PROCEDURE — 21000000 ZZH R&B IMCU HEART CARE

## 2019-10-04 PROCEDURE — 00000146 ZZHCL STATISTIC GLUCOSE BY METER IP

## 2019-10-04 PROCEDURE — 83735 ASSAY OF MAGNESIUM: CPT | Performed by: INTERNAL MEDICINE

## 2019-10-04 PROCEDURE — 99233 SBSQ HOSP IP/OBS HIGH 50: CPT | Performed by: PHYSICIAN ASSISTANT

## 2019-10-04 PROCEDURE — 99232 SBSQ HOSP IP/OBS MODERATE 35: CPT | Performed by: PHYSICIAN ASSISTANT

## 2019-10-04 PROCEDURE — 36415 COLL VENOUS BLD VENIPUNCTURE: CPT | Performed by: INTERNAL MEDICINE

## 2019-10-04 PROCEDURE — 80048 BASIC METABOLIC PNL TOTAL CA: CPT | Performed by: INTERNAL MEDICINE

## 2019-10-04 PROCEDURE — 97110 THERAPEUTIC EXERCISES: CPT | Mod: GP

## 2019-10-04 PROCEDURE — 25000132 ZZH RX MED GY IP 250 OP 250 PS 637: Mod: GY | Performed by: PHYSICIAN ASSISTANT

## 2019-10-04 RX ORDER — DILTIAZEM HYDROCHLORIDE 30 MG/1
30 TABLET, FILM COATED ORAL EVERY 6 HOURS SCHEDULED
Status: DISCONTINUED | OUTPATIENT
Start: 2019-10-04 | End: 2019-10-05

## 2019-10-04 RX ORDER — METOPROLOL SUCCINATE 100 MG/1
100 TABLET, EXTENDED RELEASE ORAL DAILY
Status: DISCONTINUED | OUTPATIENT
Start: 2019-10-04 | End: 2019-10-05 | Stop reason: HOSPADM

## 2019-10-04 RX ADMIN — HYDRALAZINE HYDROCHLORIDE 25 MG: 25 TABLET ORAL at 15:51

## 2019-10-04 RX ADMIN — METOPROLOL SUCCINATE 100 MG: 100 TABLET, EXTENDED RELEASE ORAL at 06:20

## 2019-10-04 RX ADMIN — APIXABAN 2.5 MG: 2.5 TABLET, FILM COATED ORAL at 09:39

## 2019-10-04 RX ADMIN — ACETAMINOPHEN 650 MG: 325 TABLET, FILM COATED ORAL at 11:00

## 2019-10-04 RX ADMIN — DILTIAZEM HYDROCHLORIDE 30 MG: 30 TABLET, FILM COATED ORAL at 21:19

## 2019-10-04 RX ADMIN — HYDRALAZINE HYDROCHLORIDE 25 MG: 25 TABLET ORAL at 21:18

## 2019-10-04 RX ADMIN — APIXABAN 2.5 MG: 2.5 TABLET, FILM COATED ORAL at 21:18

## 2019-10-04 RX ADMIN — MELATONIN 5 MG TABLET 5 MG: at 23:58

## 2019-10-04 RX ADMIN — DILTIAZEM HYDROCHLORIDE 30 MG: 30 TABLET, FILM COATED ORAL at 15:51

## 2019-10-04 RX ADMIN — ALLOPURINOL 100 MG: 100 TABLET ORAL at 09:38

## 2019-10-04 RX ADMIN — HYDRALAZINE HYDROCHLORIDE 25 MG: 25 TABLET ORAL at 09:38

## 2019-10-04 RX ADMIN — LEVOTHYROXINE SODIUM 25 MCG: 25 TABLET ORAL at 09:38

## 2019-10-04 RX ADMIN — ACETAMINOPHEN 650 MG: 325 TABLET, FILM COATED ORAL at 16:55

## 2019-10-04 RX ADMIN — DILTIAZEM HYDROCHLORIDE 30 MG: 30 TABLET, FILM COATED ORAL at 10:54

## 2019-10-04 RX ADMIN — ACETAMINOPHEN 650 MG: 325 TABLET, FILM COATED ORAL at 02:33

## 2019-10-04 RX ADMIN — SENNOSIDES AND DOCUSATE SODIUM 1 TABLET: 8.6; 5 TABLET ORAL at 12:27

## 2019-10-04 ASSESSMENT — ACTIVITIES OF DAILY LIVING (ADL)
ADLS_ACUITY_SCORE: 23

## 2019-10-04 ASSESSMENT — MIFFLIN-ST. JEOR: SCORE: 1114.38

## 2019-10-04 NOTE — PLAN OF CARE
Pt was more alert this shift. C/o of right shoulder pain. Went back into afib RVR around 0400. Asymptomatic. Plan for discharge.

## 2019-10-04 NOTE — PROGRESS NOTES
[facility] Cardiology Progress Note  Date of Service: 10/04/2019  Primary Cardiologist: Will be Dr. Gina Scanlon LACHELLE Walls is a 84 year old female admitted on 9/30/2019 with generalized weakness, cardiology consulted for rapid AF.    Assessment:  1. PAF with RVR, asymptomatic, spontaneously converted to SR in 70's-80's after brief dilt drip   - Re-developed AF with rates 100's - 120's overnight, persists today, on PTA Toprol 100.    - Started on Eliquis for COP1OT4-HDTf of 6.    - Normal LVEF.    2. Mild type II troponin rise  3. Hypertension, controlled  4. Acute on chronic renal insufficiency with creat of 3.2 on admit - resolved with PTA Lasix + lisinopril hold.   5. Chronic LE edema, neg for DVT. No change off diuretic.     Plan:   1. Start diltiazem 30 mg QID. Anticipate transition to long-acting tomorrow.   2. Maintain mag > 2.0, K+ > 4.0.  3. Continue to monitor on tele overnight.   4. Follow-up arranged in clinic with me on 10/17.    Daphne Cm PA-C, KIERA  Pager: [phone]  Text Page  (7:30am - 4pm M-F)   __________________________________________________________________________    Physical Exam   Temp: 98  F (36.7  C) Temp src: Oral BP: 134/80 Pulse: 115 Heart Rate: 74 Resp: 16 SpO2: 100 % O2 Device: None (Room air)    Vitals:    09/30/19 2303 10/02/19 0553 10/04/19 0620   Weight: 73 kg (161 lb) 71.2 kg (156 lb 15.5 oz) 72.2 kg (159 lb 2.8 oz)       GENERAL:  The patient is in no apparent distress.   NECK: CVP appears normal, no masses or thyromegaly.  PULMONARY:  There is a normal respiratory effort. Clear lungs to auscultation bilaterally.   CARDIOVASCULAR:  Irregularly irregular, rapid, variable S1, no murmurs appreciated.  GI:  Non tender abdomen with normoactive bowel sounds and no hepatosplenomegaly. There are no masses palpable.   EXTREMITIES:  Pitting and non-pitting bilateral LE edema.  VASCULAR: 2+ Pulses bilaterally in upper and lower extremities.    Medications     -  MEDICATION INSTRUCTIONS -         allopurinol  100 mg Oral Daily     amiodarone  150 mg Intravenous Once     apixaban ANTICOAGULANT  2.5 mg Oral BID     diltiazem  30 mg Oral Q6H KEVEN     ferrous sulfate  325 mg Oral Daily with lunch     hydrALAZINE  25 mg Oral TID     levothyroxine  25 mcg Oral Daily     metoprolol succinate ER  100 mg Oral Daily     sodium chloride (PF)  3 mL Intracatheter Q8H       Data   Most Recent 3 CBC's:  Recent Labs   Lab Test 10/03/19  0555 10/02/19  0531 09/30/19  2310 04/18/19  1339   WBC  --  4.9 6.0 6.0   HGB 9.1* 9.2* 10.0* 11.7   MCV  --  96 96 96   PLT  --  298 415 241     Most Recent 3 BMP's:  Recent Labs   Lab Test 10/04/19  0614 10/03/19  0555 10/02/19  0531   * 143 143   POTASSIUM 3.8 3.8 3.2*   CHLORIDE 120* 116* 115*   CO2 21 21 18*   BUN 73* 91* 104*   CR 1.22* 1.64* 2.00*   ANIONGAP 5 6 10   ALIVIA 10.0 9.2 8.7   * 117* 100*     Most Recent 2 LFT's:  Recent Labs   Lab Test 04/18/19  1339 02/27/18  1439   AST 17 26   ALT 14 24   ALKPHOS 101 114   BILITOTAL 0.6 1.1     Most Recent 3 Troponin's:  Recent Labs   Lab Test 10/02/19  0531 10/01/19  1008 10/01/19  0557   TROPI 0.435* 0.107* 0.082*     Most Recent 3 BNP's:  Recent Labs   Lab Test 09/30/19  2310 06/14/19  1331 06/08/19  1031 02/27/18  1439   NTBNPI 18,031*  --   --  1,844*   NTBNP  --  2,800* 2,104*  --      Most Recent Cholesterol Panel:  Recent Labs   Lab Test 10/02/19  0531   CHOL 123   LDL 74   HDL 27*   TRIG 111     Most Recent TSH and T4:  Recent Labs   Lab Test 09/30/19  2310 04/18/19  1339   TSH 1.14 1.03   T4  --  1.40     Most Recent Hemoglobin A1c:No lab results found.  Most Recent Anemia Panel:  Recent Labs   Lab Test 10/03/19  0555 10/02/19  0531   WBC  --  4.9   HGB 9.1* 9.2*   HCT  --  28.1*   MCV  --  96   PLT  --  298   IRON  --  26*   IRONSAT  --  13*   FEB  --  207*   HOWARD  --  410*   B12  --  1,020*   FOLIC  --  5.8

## 2019-10-04 NOTE — PLAN OF CARE
Discharge Planner PT   Patient plan for discharge: TCU  Current status: Patient sleeping in bed upon arrival; agreeable to therapy. Performed supine to sit with Min A and increased time. STS transfer to FWW completed with Mod A and increased time. Noted bracing of LEs on bed to assist with standing balance. Transferred from bed to chair with FWW and CGA. Participated in seated strengthening exercises well.   Barriers to return to prior living situation: Needs assist with mobility, falls risk, low activity tolerance, lives alone.  Recommendations for discharge: TCU per plan established by the PT.  Rationale for recommendations: Pt would benefit from continued PT to improve strength, balance, mobility to increase independence, reduce falls risk and increase safety before returning home.       Entered by: Keke Fairbanks 10/04/2019 8:32 AM

## 2019-10-04 NOTE — PROVIDER NOTIFICATION
MD Notification    Notified Person: MD    Notified Person Name: Han    Notification Date/Time: 10/04/19 0610     Notification Interaction: Telephone    Purpose of Notification: Pt went back into afib rvr.    Orders Received: give metoprolol early.    Comments:

## 2019-10-04 NOTE — PLAN OF CARE
A&O x4. VSS on room air. Tele A Fib CVR/RVR. Pt started on oral diltiazem. C/o of generalized back pain, prn tylenol given. Denies CP/SOB. Up with A2. 2 dime sized open wounds to buttock, dressing changed. Purewick in place. C/o of abdominal fullness, PRN senna given. Knee high compression stockings on and legs elevated while in bed. Nephrology signed off. Will continue to monitor.

## 2019-10-04 NOTE — PROGRESS NOTES
[facility]    Hospitalist Progress Note      Assessment & Plan   Ghislaine Walls is a 84 year old female who was admitted on 9/30/2019 with deconditioning and generalized weakness.    New Atrial Fibrillation with RVR  New diagnosis for patient. Developed afib with RVR overnight 10/2 and placed on diltiazem drip. She converted to sinus rhythm afternoon of 10/2- now back in afib with RVR 10/4  --Eliquis started this admission, will discuss cost- patient will discharge with one month of Eliquis covered and discuss with family when to do going forward   --Cardiology following, appreciate assistance   --oral diltiazem started 10/4- hopefully switch to ER 10/5     Elevated BNP  Elevated troponin, type 2 MI secondary to demand with ongoing DRAGAN on CKD  Presented with lethargy, lower extremity edema (slightly worse than her baseline) and weakness.  Her Lasix dose was increased following her recent visit with her nephrologist.  BNP is 18,000 in the ER with the mildly elevated troponins (likely secondary to renal disease). Echocardiogram shows normal EF no diastolic dysfunction. Troponins  (0.093--0.082--0.107>0.4) without chest pain or discomfort. EKG with incomplete left bundle branch. Recently had increase in her diuretic dose (40mg AM with 20mg bedtime)  - Continue metoprolol  - PT/OT/Social work to evaluate.  - Nephrology discussed resuming lasix at low dose with patient 10/3- she wants to avoid as she feels this didn't help in the past   --Cardiology following, do not recommend ischemic workup at this time in absence of chest pain.   --elevate legs    Acute kidney injury, resolved  CKD (chronic kidney disease) stage 3, GFR 30-59 ml/min (H)  Baseline creatinine is near 1.5 her creatinine on admission was 3.27. Likely secondary to her diuretic change. Cr now back near baseline at 1.6.   - Nephrology consult appreciated.      Generalized weakness  Physical deconditioning  Patient reports limited mobility  for some time with generalized weakness and pain limiting arm movement. She had a cortisone injection in both shoulders back in June with significant improvement but reports this has warn off. Denies focal weakness or significant acute changes in functional status. She has not tried physical therapy. TSH recently checked and ok. Possible that afib contributing to some of her fatigue.   --PT following, plan to discharge to TCU  --SW following      Lower extremity lymphedema  Lower extremity dopplers without DVT. She has received treatment for this in the past, not currently wearing any stockings has stated she does not like compression stockings in the past  - Consult lymphedema for wraps, she does not tolerate well. Recommended off at night and on during the day. She will think about this- if refuses at least try ELEAZAR     Hyperlipidemia LDL goal <130  Hypertension goal BP (blood pressure) < 140/90  - Continue PTA metoprolol, hydralazine  - Not on statin PTA, lipid profile in AM     Hypothyroidism  - Continue Synthroid     Gout arthritis  - Continue allopurinol     Normocytic Anemia  Hgb 10 on admit>9.2. no evidence of acute bleeding. Iron deficiency indicated by iron studies- she reports PCP had ordered iron infusion for 10/4- IV iron given 10/3.      Bilateral buttock pressure wound  Present on admission, wound nurse consulted    DVT Prophylaxis: Eliquis  Code Status: DNR/DNI    Disposition: Expected discharge to TCU, hopefully 10/5 if rates under control    This patient was seen and examined with Dr. Marcelino who agrees with the above plan.    FANI Seo-TAMMIE    Interval History   Feeling ok this morning but does note feeling more tired. Denies SOB, palpitations, CP, dizziness. Tolerating po. Refusing lymphedema wraps today.     -Data reviewed today: I reviewed all new labs and imaging results over the last 24 hours. I personally reviewed no images or EKG's today.    Physical Exam   Temp: 98  F (36.7  C)  Temp src: Oral BP: 134/80 Pulse: 115 Heart Rate: 74 Resp: 16 SpO2: 100 % O2 Device: None (Room air)    Vitals:    09/30/19 2303 10/02/19 0553 10/04/19 0620   Weight: 73 kg (161 lb) 71.2 kg (156 lb 15.5 oz) 72.2 kg (159 lb 2.8 oz)     Vital Signs with Ranges  Temp:  [97.5  F (36.4  C)-98.3  F (36.8  C)] 98  F (36.7  C)  Pulse:  [115] 115  Heart Rate:  [65-75] 74  Resp:  [16] 16  BP: (134-175)/(59-84) 134/80  SpO2:  [97 %-100 %] 100 %  I/O last 3 completed shifts:  In: -   Out: 150 [Urine:150]    Constitutional: Awake, alert, cooperative. Appears comfortable and is appropropriately conversant. More energy than yesterday   ENT: normal cephalic, moist mucous membranes  Respiratory: Lungs clear to auscultation bilaterally, no increased work of breathing or wheezing   Cardiovascular: irregularly irregular rhythm with tachycardia, no murmur, 1+ bilateral edema, distal pulses +2/4  GI:  active bowel sounds, abdomen soft, non-tender  Skin/Integumen: warm, dry  MSK:  Bilateral thenar atrophy. Mild swelling of bilateral hands. She has reduced ROM in bilateral shoulders which she states is unchanged. She can dorsi and plantar flex. Can extend both knees though limited on left due to pain.   Neuro:  Speech is clear. Face symmetric. Tongue midline. Limited ROM in shoulders makes neuro exam difficulty but  strength is +4/5 and equal.  Sensation intact.      Medications     - MEDICATION INSTRUCTIONS -         allopurinol  100 mg Oral Daily     apixaban ANTICOAGULANT  2.5 mg Oral BID     diltiazem  30 mg Oral Q6H KEVEN     ferrous sulfate  325 mg Oral Daily with lunch     hydrALAZINE  25 mg Oral TID     levothyroxine  25 mcg Oral Daily     metoprolol succinate ER  100 mg Oral Daily     sodium chloride (PF)  3 mL Intracatheter Q8H       Data   Recent Labs   Lab 10/04/19  0614 10/03/19  0555 10/02/19  0531 10/01/19  1008 10/01/19  0557 09/30/19  2310   WBC  --   --  4.9  --   --  6.0   HGB  --  9.1* 9.2*  --   --  10.0*   MCV  --    --  96  --   --  96   PLT  --   --  298  --   --  415   * 143 143  --   --  133   POTASSIUM 3.8 3.8 3.2*  --   --  3.6   CHLORIDE 120* 116* 115*  --   --  105   CO2 21 21 18*  --   --  14*   BUN 73* 91* 104*  --   --  111*   CR 1.22* 1.64* 2.00*  --   --  3.27*   ANIONGAP 5 6 10  --   --  14   ALIVIA 10.0 9.2 8.7  --   --  9.5   * 117* 100*  --   --  96   TROPI  --   --  0.435* 0.107* 0.082* 0.093*       No results found for this or any previous visit (from the past 24 hour(s)).

## 2019-10-05 VITALS
WEIGHT: 148.37 LBS | OXYGEN SATURATION: 100 % | DIASTOLIC BLOOD PRESSURE: 67 MMHG | TEMPERATURE: 97.7 F | SYSTOLIC BLOOD PRESSURE: 154 MMHG | RESPIRATION RATE: 18 BRPM | HEIGHT: 61 IN | HEART RATE: 115 BPM | BODY MASS INDEX: 28.01 KG/M2

## 2019-10-05 LAB
ALBUMIN SERPL-MCNC: 2.3 G/DL (ref 3.4–5)
ALP SERPL-CCNC: 72 U/L (ref 40–150)
ALT SERPL W P-5'-P-CCNC: 14 U/L (ref 0–50)
ANION GAP SERPL CALCULATED.3IONS-SCNC: 6 MMOL/L (ref 3–14)
AST SERPL W P-5'-P-CCNC: 19 U/L (ref 0–45)
BILIRUB SERPL-MCNC: 0.5 MG/DL (ref 0.2–1.3)
BUN SERPL-MCNC: 59 MG/DL (ref 7–30)
CALCIUM SERPL-MCNC: 9.2 MG/DL (ref 8.5–10.1)
CHLORIDE SERPL-SCNC: 119 MMOL/L (ref 94–109)
CO2 SERPL-SCNC: 21 MMOL/L (ref 20–32)
CREAT SERPL-MCNC: 1.16 MG/DL (ref 0.52–1.04)
ERYTHROCYTE [DISTWIDTH] IN BLOOD BY AUTOMATED COUNT: 15.7 % (ref 10–15)
GFR SERPL CREATININE-BSD FRML MDRD: 43 ML/MIN/{1.73_M2}
GLUCOSE SERPL-MCNC: 94 MG/DL (ref 70–99)
HCT VFR BLD AUTO: 29.2 % (ref 35–47)
HGB BLD-MCNC: 9.6 G/DL (ref 11.7–15.7)
MCH RBC QN AUTO: 31.8 PG (ref 26.5–33)
MCHC RBC AUTO-ENTMCNC: 32.9 G/DL (ref 31.5–36.5)
MCV RBC AUTO: 97 FL (ref 78–100)
PLATELET # BLD AUTO: 283 10E9/L (ref 150–450)
POTASSIUM SERPL-SCNC: 3.7 MMOL/L (ref 3.4–5.3)
PROT SERPL-MCNC: 6.5 G/DL (ref 6.8–8.8)
RBC # BLD AUTO: 3.02 10E12/L (ref 3.8–5.2)
SODIUM SERPL-SCNC: 146 MMOL/L (ref 133–144)
WBC # BLD AUTO: 6.7 10E9/L (ref 4–11)

## 2019-10-05 PROCEDURE — 99239 HOSP IP/OBS DSCHRG MGMT >30: CPT | Performed by: INTERNAL MEDICINE

## 2019-10-05 PROCEDURE — 25000132 ZZH RX MED GY IP 250 OP 250 PS 637: Mod: GY | Performed by: PHYSICIAN ASSISTANT

## 2019-10-05 PROCEDURE — 99231 SBSQ HOSP IP/OBS SF/LOW 25: CPT | Performed by: INTERNAL MEDICINE

## 2019-10-05 PROCEDURE — 25000132 ZZH RX MED GY IP 250 OP 250 PS 637: Mod: GY | Performed by: HOSPITALIST

## 2019-10-05 PROCEDURE — 25000132 ZZH RX MED GY IP 250 OP 250 PS 637: Mod: GY | Performed by: INTERNAL MEDICINE

## 2019-10-05 PROCEDURE — 80053 COMPREHEN METABOLIC PANEL: CPT | Performed by: PHYSICIAN ASSISTANT

## 2019-10-05 PROCEDURE — 36415 COLL VENOUS BLD VENIPUNCTURE: CPT | Performed by: PHYSICIAN ASSISTANT

## 2019-10-05 PROCEDURE — 85027 COMPLETE CBC AUTOMATED: CPT | Performed by: PHYSICIAN ASSISTANT

## 2019-10-05 RX ORDER — DILTIAZEM HYDROCHLORIDE 120 MG/1
120 CAPSULE, EXTENDED RELEASE ORAL DAILY
DISCHARGE
Start: 2019-10-05 | End: 2019-10-25

## 2019-10-05 RX ORDER — DILTIAZEM HYDROCHLORIDE 120 MG/1
120 CAPSULE, EXTENDED RELEASE ORAL DAILY
Status: DISCONTINUED | OUTPATIENT
Start: 2019-10-05 | End: 2019-10-05 | Stop reason: HOSPADM

## 2019-10-05 RX ADMIN — APIXABAN 2.5 MG: 2.5 TABLET, FILM COATED ORAL at 08:08

## 2019-10-05 RX ADMIN — LEVOTHYROXINE SODIUM 25 MCG: 25 TABLET ORAL at 08:08

## 2019-10-05 RX ADMIN — ALLOPURINOL 100 MG: 100 TABLET ORAL at 08:08

## 2019-10-05 RX ADMIN — HYDRALAZINE HYDROCHLORIDE 25 MG: 25 TABLET ORAL at 08:08

## 2019-10-05 RX ADMIN — METOPROLOL SUCCINATE 100 MG: 100 TABLET, EXTENDED RELEASE ORAL at 06:28

## 2019-10-05 RX ADMIN — DILTIAZEM HYDROCHLORIDE 30 MG: 30 TABLET, FILM COATED ORAL at 06:28

## 2019-10-05 ASSESSMENT — ACTIVITIES OF DAILY LIVING (ADL)
ADLS_ACUITY_SCORE: 24
ADLS_ACUITY_SCORE: 29

## 2019-10-05 ASSESSMENT — MIFFLIN-ST. JEOR: SCORE: 1065.38

## 2019-10-05 NOTE — PROGRESS NOTES
SW:  D:  Received discharge orders for patient.  Bed available at [facility] for today.  Patient's daughter will transport, via the skyway, around 14:00 today.  Patient and daughter informed of the plan and in agreement to the plan.  Call placed to update [facility] and faxed the orders and the PAS.    PAS-RR    D: Per DHS regulation, SW completed and submitted PAS-RR to [place] [employer] Direct Connect via the Senior LinkAge Line.  PAS-RR confirmation # is : [id number].    I: SW spoke with patient and daughter and they are aware a PAS-RR has been submitted.  KATHI reviewed with patient and daughter that they may be contacted for a follow up appointment within 10 days of hospital discharge if their SNF stay is < 30 days.  Contact information for [facility] Link[employer] Line was also provided.    A:Patient and daughter verbalized understanding.    P: Further questions may be directed to [facility] LinkAge Line at #[phone], option #4 for PAS-RR staff.

## 2019-10-05 NOTE — PLAN OF CARE
Patient is A+OX4. VSS on RA. Converted back to SR with 1st degree AV block around 2000. Continuing oral dilt q6h. Up with assist of 1-2. Dressing on buttocks CDI. Encouraging patient to stay off of coccyx. Plan for likely discharge to TCU today.

## 2019-10-05 NOTE — DISCHARGE SUMMARY
[facility]    Discharge Summary  Hospitalist    Date of Admission:  9/30/2019  Date of Discharge:  10/5/2019  Discharging Provider: Rodo Marcelino MD    Discharge Diagnoses     New Atrial Fibrillation with RVR  Elevated troponin, type 2 MI secondary to demand ischemia  Acute kidney injury, resolved  CKD (chronic kidney disease) stage 3, GFR 30-59 ml/min (H)  Generalized weakness  Physical deconditioning  Lower extremity lymphedema  Hyperlipidemia LDL goal <130  Hypertension goal BP (blood pressure) < 140/90  Hypothyroidism  Gouty arthritis  Normocytic Anemia  Bilateral buttock pressure wound    Hospital Course:    Ghislaine Walls is a 84 year old female who was admitted on 9/30/2019 with deconditioning and generalized weakness.     New Atrial Fibrillation with RVR  New diagnosis for patient. Developed afib with RVR overnight 10/2 and placed on diltiazem drip. She was in PAF, but converted to NSR prior to Discharge  -Eliquis started this admission,patient will discharge with one month of Eliquis covered and discuss with family what to do going forward   -Cardiology followed  -oral diltiazem started 10/4- switched to ER 10/5     Elevated BNP  Elevated troponin, type 2 MI secondary to demand with ongoing DRAGAN on CKD  Presented with lethargy, lower extremity edema (slightly worse than her baseline) and weakness.  Her Lasix dose was increased following her recent visit with her nephrologist.  BNP is 18,000 in the ER with the mildly elevated troponins (likely secondary to renal disease). Echocardiogram shows normal EF no diastolic dysfunction. Troponins  (0.093--0.082--0.107>0.4) without chest pain or discomfort. EKG with incomplete left bundle branch. Recently had increase in her diuretic dose (40mg AM with 20mg bedtime)  -Continue metoprolol  -PT/OT/Social work to evaluate.  -Nephrology discussed resuming lasix at low dose with patient 10/3- she wants to avoid as she feels this didn't help in the  past   -hold lasix for now, re-evaluate resumption as OP     Acute kidney injury, resolved  CKD (chronic kidney disease) stage 3, GFR 30-59 ml/min (H)  Baseline creatinine is near 1.5 her creatinine on admission was 3.27. Likely secondary to her diuretic change. Cr now back near baseline at 1.16.      Generalized weakness  Physical deconditioning  Patient reports limited mobility for some time with generalized weakness and pain limiting arm movement. She had a cortisone injection in both shoulders back in June with significant improvement but reports this has warn off. Denies focal weakness or significant acute changes in functional status. She has not tried physical therapy. TSH recently checked and ok. Possible that afib contributing to some of her fatigue.   -PT following, plan to discharge to TCU    Lower extremity lymphedema  Lower extremity dopplers without DVT. She has received treatment for this in the past, not currently wearing any stockings has stated she does not like compression stockings in the past  -Consulted lymphedema for wraps, she does not tolerate well. Recommended off at night and on during the day. She will think about this- if refuses at least try ELEAZAR     Hyperlipidemia LDL goal <130  Hypertension goal BP (blood pressure) < 140/90  -Continue PTA metoprolol, hydralazine, Cardizem added as above  -Not on statin PTA     Hypothyroidism  -Continue Synthroid     Gout arthritis  -Continue allopurinol     Normocytic Anemia  -Hgb 10 on admit>9.2. no evidence of acute bleeding. Iron deficiency indicated by iron studies- she reports PCP had ordered iron infusion for 10/4- IV iron given 10/3.      Bilateral buttock pressure wound  Present on admission, wound nurse consulted    Rodo Marcelino MD    Significant Results and Procedures   See below    Pending Results     Unresulted Labs Ordered in the Past 30 Days of this Admission     Date and Time Order Name Status Description    9/27/2019 1439 FUNGUS  CULTURE Preliminary           Code Status   DNR / DNI       Primary Care Physician   Mahsa Soto    Physical Exam   Temp: 97.7  F (36.5  C) Temp src: Oral BP: (!) 154/67   Heart Rate: 64 Resp: 18 SpO2: 100 % O2 Device: None (Room air)      Constitutional: AAOX3, NAD  Respiratory: CTA B/L, Normal WOB  Cardiovascular: RRR, No murmur  GI: Soft, Non- tender, BS- normoactive  MSK: trace edema  Neuro: CN- grossly intact     Discharge Disposition   Discharged to home  Condition at discharge: Stable    Consultations This Hospital Stay   CORE CLINIC EVALUATION IP CONSULT  CARDIAC REHAB IP CONSULT  CARE COORDINATOR IP CONSULT  NUTRITION SERVICES ADULT IP CONSULT  PHYSICAL THERAPY ADULT IP CONSULT  OCCUPATIONAL THERAPY ADULT IP CONSULT  SOCIAL WORK IP CONSULT  WOUND OSTOMY CONTINENCE NURSE  IP CONSULT  NEPHROLOGY IP CONSULT  LYMPHEDEMA THERAPY IP CONSULT  CARDIOLOGY IP CONSULT  PHARMACY LIAISON FOR MEDICATION COVERAGE CONSULT  PHYSICAL THERAPY ADULT IP CONSULT  OCCUPATIONAL THERAPY ADULT IP CONSULT    Time Spent on this Encounter   IRodo MD, personally saw the patient today and spent greater than 30 minutes discharging this patient.    Discharge Orders      Basic metabolic panel     Discharge Order: F/U with Cardiac  CHRISTINA      Follow-Up with Cardiologist      General info for SNF    Length of Stay Estimate: Short Term Care: Estimated # of Days <30  Condition at Discharge: Improving  Level of care:skilled   Rehabilitation Potential: Good  Admission H&P remains valid and up-to-date: Yes  Recent Chemotherapy: N/A  Use Nursing Home Standing Orders: N/A     Mantoux instructions    Give two-step Mantoux (PPD) Per Facility Policy Yes     Follow Up and recommended labs and tests    Follow up with [facility] physician.  The following labs/tests are recommended: BMP within 1 week  Cardiology in 1-2 weeks     Activity - Up with nursing assistance     Reason for your hospital stay    PAF with RVR     DNR/DNI      Physical Therapy Adult Consult    Evaluate and treat as clinically indicated.    Reason:     Occupational Therapy Adult Consult    Evaluate and treat as clinically indicated.    Reason:     Fall precautions     Advance Diet as Tolerated    Follow this diet upon discharge: Orders Placed This Encounter      Room Service      Snacks/Supplements Adult: Other; vanilla milkshake + 2 pkts benepro @ 2 pm; Between Meals      Combination Diet 2 gm NA Diet       Discharge Medications   Current Discharge Medication List      START taking these medications    Details   apixaban ANTICOAGULANT (ELIQUIS) 2.5 MG tablet Take 1 tablet (2.5 mg) by mouth 2 times daily    Associated Diagnoses: PAF (paroxysmal atrial fibrillation) (H)      diltiazem ER (DILT-XR) 120 MG 24 hr capsule Take 1 capsule (120 mg) by mouth daily    Associated Diagnoses: PAF (paroxysmal atrial fibrillation) (H)         CONTINUE these medications which have NOT CHANGED    Details   allopurinol (ZYLOPRIM) 100 MG tablet Take 1 tablet (100 mg) by mouth daily  Qty: 90 tablet, Refills: 1    Associated Diagnoses: Gouty arthritis      hydrALAZINE (APRESOLINE) 25 MG tablet Take 25 mg by mouth 3 times daily      levothyroxine (SYNTHROID/LEVOTHROID) 25 MCG tablet Take 1 tablet (25 mcg) by mouth daily  Qty: 90 tablet, Refills: 3    Associated Diagnoses: Hypothyroidism, unspecified type      Melatonin 10 MG TABS tablet Take 10 mg by mouth nightly as needed for sleep       metoprolol succinate ER (TOPROL-XL) 100 MG 24 hr tablet Take 1 tablet (100 mg) by mouth daily  Qty: 90 tablet, Refills: 1    Associated Diagnoses: Hypertension goal BP (blood pressure) < 140/90         STOP taking these medications       furosemide (LASIX) 40 MG tablet Comments:   Reason for Stopping:             Allergies   Allergies   Allergen Reactions     Oxybutynin Itching     Seasonal Allergies      Data   Most Recent 3 CBC's:  Recent Labs   Lab Test 10/05/19  0559 10/03/19  0555 10/02/19  0539  09/30/19  2310   WBC 6.7  --  4.9 6.0   HGB 9.6* 9.1* 9.2* 10.0*   MCV 97  --  96 96     --  298 415      Most Recent 3 BMP's:  Recent Labs   Lab Test 10/05/19  0559 10/04/19  0614 10/03/19  0555   * 146* 143   POTASSIUM 3.7 3.8 3.8   CHLORIDE 119* 120* 116*   CO2 21 21 21   BUN 59* 73* 91*   CR 1.16* 1.22* 1.64*   ANIONGAP 6 5 6   ALIVIA 9.2 10.0 9.2   GLC 94 108* 117*     Most Recent 2 LFT's:  Recent Labs   Lab Test 10/05/19  0559 04/18/19  1339   AST 19 17   ALT 14 14   ALKPHOS 72 101   BILITOTAL 0.5 0.6     Most Recent INR's and Anticoagulation Dosing History:  Anticoagulation Dose History     There is no flowsheet data to display.        Most Recent 3 Troponin's:  Recent Labs   Lab Test 10/02/19  0531 10/01/19  1008 10/01/19  0557   TROPI 0.435* 0.107* 0.082*     Most Recent Cholesterol Panel:  Recent Labs   Lab Test 10/02/19  0531   CHOL 123   LDL 74   HDL 27*   TRIG 111     Most Recent 6 Bacteria Isolates From Any Culture (See EPIC Reports for Culture Details):  Recent Labs   Lab Test 09/27/19  1436   CULT Culture negative after 1 week  Moderate growth  Klebsiella variicola  *  Light growth  Pseudomonas fluorescens putida group  *  Light growth  Staphylococcus lugdunensis  *  Moderate growth  Eikenella corrodens  *  Light growth  Normal skin christian       Most Recent TSH, T4 and A1c Labs:  Recent Labs   Lab Test 09/30/19  2310 04/18/19  1339   TSH 1.14 1.03   T4  --  1.40       Results for orders placed or performed during the hospital encounter of 09/30/19   XR Chest 2 Views    Narrative    XR CHEST 2 VW  10/1/2019 12:47 AM     INDICATION: Weakness, exertional shortness of breath/fatigue. Concern  for pleural effusions in setting of possible heart failure.    COMPARISON: 2/27/2018.      Impression    IMPRESSION: No focal air-space disease or other acute findings. Normal  heart size. No pulmonary edema. Degenerative changes thoracic spine.    MANOJ GAITAN MD   US Lower Extremity Venous  Duplex Bilateral    Narrative    PROCEDURE:  Venous Doppler ultrasound of the bilateral lower extremities    DATE OF PROCEDURE:  10/1/2019 12:16 PM    CLINICAL HISTORY/INDICATION:   bilateral lower extremity edema.  Eval for DVT.    COMPARISON:   Prior lower extremity venous duplex ultrasound 2/27/2018    TECHNIQUE:   Grayscale, color-flow, and spectral waveform analysis were performed  of the deep veins of the bilateral lower extremities    FINDINGS:   The right common femoral vein, superficial femoral vein and popliteal  vein demonstrate normal compressibility, spectral waveform, color flow  and augmentation.    The left common femoral vein, superficial femoral vein and popliteal  vein demonstrate normal compressibility, spectral waveform, color flow  and augmentation.    Significant edema/swelling below the popliteal fossa bilaterally  limits evaluation, the tibial and peroneal veins are not well seen  bilaterally as a result.      Impression    IMPRESSION:   1. No evidence of deep venous thrombosis in the visualized right lower  extremity  2. No evidence of deep venous thrombosis in the visualized left lower  extremity    AREN SHARMA MD

## 2019-10-05 NOTE — PROGRESS NOTES
Pt. ok'd for discharge to [facility]. Daughter here to transport. All belongings sent with pt along with packet for nsg home.

## 2019-10-05 NOTE — PROGRESS NOTES
[facility]    Cardiology Progress Note     Assessment & Plan   Ghislaine Walls is a 84 year old female who was admitted on 9/30/2019.     1. Paroxysmal atrial fibrillation with rapid ventricular response  2. Demand NSTEMI  3. Hypertension  4. Acute on chronic renal insufficiency likely due to over diuresis  5. Chronic lower extremity edema    Overall, she is doing quite well today.  She converted back to sinus rhythm yesterday and remained in sinus rhythm throughout the majority of the day.  I do think she warrants some additional rate support with diltiazem as planned by the service yesterday.  We will transition from diltiazem 30 mg 4 times a day to diltiazem 120 mg sustained release daily.    Otherwise, she will continue on current medical therapy including Eliquis 2.5 mg twice a day, hydralazine 25 mg 3 times a day, and metoprolol succinate 100 mg daily.  Lasix was held on admission.  It is reasonable to hold this until outpatient follow-up but if she develops worsening shortness of breath then this can be resumed in the outpatient setting after a phone call with our service.    Cardiology will sign off at this time.  Please page with any questions or concerns prior to her dismissal from the hospital.    Edinson Lazo MD    Interval History   Doing well today.  Remains in sinus rhythm this morning.  No chest pain or chest discomfort.  No shortness of breath.    Physical Exam   Temp: 97.7  F (36.5  C) Temp src: Oral BP: (!) 154/67   Heart Rate: 64 Resp: 18 SpO2: 100 % O2 Device: None (Room air)    Vitals:    10/02/19 0553 10/04/19 0620 10/05/19 0639   Weight: 71.2 kg (156 lb 15.5 oz) 72.2 kg (159 lb 2.8 oz) 67.3 kg (148 lb 5.9 oz)     Vital Signs with Ranges  Temp:  [97.7  F (36.5  C)-98.2  F (36.8  C)] 97.7  F (36.5  C)  Heart Rate:  [64-71] 64  Resp:  [16-18] 18  BP: (126-157)/(56-80) 154/67  SpO2:  [96 %-100 %] 100 %  I/O last 3 completed shifts:  In: 720 [P.O.:720]  Out: 1000  [Urine:1000]    Constitutional: No apparent distress.   Eyes: No xanthelasma or conjunctivitis  Respiratory: Clear to auscultation bilaterally. No crackles or wheezes.  Cardiovascular: Regular rate and rhythm. Normal S1 and S2.  No murmurs auscultated.  Extremities: No peripheral edema.  Neurologic: Moving all extremities. No facial assymmetry.  Psychiatric: Alert and oriented. Answers questions appropriately.     Medications     - MEDICATION INSTRUCTIONS -         allopurinol  100 mg Oral Daily     apixaban ANTICOAGULANT  2.5 mg Oral BID     diltiazem  30 mg Oral Q6H KEVEN     hydrALAZINE  25 mg Oral TID     levothyroxine  25 mcg Oral Daily     metoprolol succinate ER  100 mg Oral Daily     sodium chloride (PF)  3 mL Intracatheter Q8H       Data   Results for orders placed or performed during the hospital encounter of 09/30/19 (from the past 24 hour(s))   Glucose by meter   Result Value Ref Range    Glucose 100 (H) 70 - 99 mg/dL   Glucose by meter   Result Value Ref Range    Glucose 119 (H) 70 - 99 mg/dL   Comprehensive metabolic panel   Result Value Ref Range    Sodium 146 (H) 133 - 144 mmol/L    Potassium 3.7 3.4 - 5.3 mmol/L    Chloride 119 (H) 94 - 109 mmol/L    Carbon Dioxide 21 20 - 32 mmol/L    Anion Gap 6 3 - 14 mmol/L    Glucose 94 70 - 99 mg/dL    Urea Nitrogen 59 (H) 7 - 30 mg/dL    Creatinine 1.16 (H) 0.52 - 1.04 mg/dL    GFR Estimate 43 (L) >60 mL/min/[1.73_m2]    GFR Estimate If Black 50 (L) >60 mL/min/[1.73_m2]    Calcium 9.2 8.5 - 10.1 mg/dL    Bilirubin Total 0.5 0.2 - 1.3 mg/dL    Albumin 2.3 (L) 3.4 - 5.0 g/dL    Protein Total 6.5 (L) 6.8 - 8.8 g/dL    Alkaline Phosphatase 72 40 - 150 U/L    ALT 14 0 - 50 U/L    AST 19 0 - 45 U/L   CBC with platelets   Result Value Ref Range    WBC 6.7 4.0 - 11.0 10e9/L    RBC Count 3.02 (L) 3.8 - 5.2 10e12/L    Hemoglobin 9.6 (L) 11.7 - 15.7 g/dL    Hematocrit 29.2 (L) 35.0 - 47.0 %    MCV 97 78 - 100 fl    MCH 31.8 26.5 - 33.0 pg    MCHC 32.9 31.5 - 36.5 g/dL     RDW 15.7 (H) 10.0 - 15.0 %    Platelet Count 283 150 - 450 10e9/L

## 2019-10-07 ENCOUNTER — TELEPHONE (OUTPATIENT)
Dept: CARDIOLOGY | Facility: CLINIC | Age: 84
End: 2019-10-07

## 2019-10-07 ENCOUNTER — NURSING HOME VISIT (OUTPATIENT)
Dept: GERIATRICS | Facility: CLINIC | Age: 84
End: 2019-10-07
Payer: MEDICARE

## 2019-10-07 VITALS
RESPIRATION RATE: 16 BRPM | HEART RATE: 72 BPM | TEMPERATURE: 97.2 F | OXYGEN SATURATION: 99 % | BODY MASS INDEX: 30.32 KG/M2 | DIASTOLIC BLOOD PRESSURE: 68 MMHG | WEIGHT: 160.6 LBS | HEIGHT: 61 IN | SYSTOLIC BLOOD PRESSURE: 173 MMHG

## 2019-10-07 DIAGNOSIS — N18.30 CKD (CHRONIC KIDNEY DISEASE) STAGE 3, GFR 30-59 ML/MIN (H): ICD-10-CM

## 2019-10-07 DIAGNOSIS — I48.91 ATRIAL FIBRILLATION WITH RVR (H): Primary | ICD-10-CM

## 2019-10-07 DIAGNOSIS — I24.89 DEMAND ISCHEMIA (H): ICD-10-CM

## 2019-10-07 DIAGNOSIS — I89.0 LYMPHEDEMA: ICD-10-CM

## 2019-10-07 DIAGNOSIS — I10 ESSENTIAL HYPERTENSION: ICD-10-CM

## 2019-10-07 DIAGNOSIS — I50.32 CHRONIC DIASTOLIC HEART FAILURE (H): ICD-10-CM

## 2019-10-07 DIAGNOSIS — R53.81 DEBILITY: ICD-10-CM

## 2019-10-07 DIAGNOSIS — R53.83 FATIGUE, UNSPECIFIED TYPE: ICD-10-CM

## 2019-10-07 DIAGNOSIS — N17.9 AKI (ACUTE KIDNEY INJURY) (H): ICD-10-CM

## 2019-10-07 DIAGNOSIS — M62.81 GENERALIZED MUSCLE WEAKNESS: ICD-10-CM

## 2019-10-07 DIAGNOSIS — L89.302 PRESSURE INJURY OF BUTTOCK, STAGE 2, UNSPECIFIED LATERALITY (H): ICD-10-CM

## 2019-10-07 PROCEDURE — 99309 SBSQ NF CARE MODERATE MDM 30: CPT | Performed by: NURSE PRACTITIONER

## 2019-10-07 PROCEDURE — 99207 ZZC CDG-CODE INCORRECT PER BILLING BASED ON TIME: CPT | Performed by: NURSE PRACTITIONER

## 2019-10-07 RX ORDER — ACETAMINOPHEN 325 MG/1
1000 TABLET ORAL 3 TIMES DAILY
COMMUNITY
End: 2020-02-10

## 2019-10-07 ASSESSMENT — MIFFLIN-ST. JEOR: SCORE: 1120.86

## 2019-10-07 NOTE — LETTER
10/7/2019        RE: Ghislaine Walls  [address]  [facility] [address]        [facility]  PRIMARY CARE PROVIDER AND CLINIC:  Mahsa Soto DO, [address] / [facility] [address]  Chief Complaint   Patient presents with     Hospital F/U     [facility] Medical Record Number:  [id number]  Place of Service where encounter took place:  [facility] [[id number]]    Ghislaine Walls  is a 84 year old  (1935), admitted to the above facility from  [facility]. Hospital stay 9/30/19 through 10/5/19..  Admitted to this facility for  rehab, medical management and nursing care.    HPI:    HPI information obtained from: facility chart records, facility staff, patient report and [facility] chart review.     Brief Summary of Hospital Course: Mrs. Walls is a 85 y/o with minimal PMH that includes HTN, PAD, CKD III, gout whom lives alone in independent living.  She reported to the ER due to worsening/increasing weakness and fatigue and reported she can not live on her own anymore.  Workup showed she was in new A-fib RVR.  She was seen by Neph due to DRAGAN and they felt she was vascularly dry, thus held her Furosemide.  Seen by Cards whom recommended rate control and anticoagulation.  With holding the Furosemide, creatine greatly improved.  She thus transitioned to the TCU for ongoing cares and PT/OT.     Updates on Status Since Skilled nursing Admission:   In meeting Mrs. Walls today she endorses the above.  Reports the fatigue and weakness has been going on for some time.  She denies any light headedness/dizziness, no palpations, chest pain, does report ongoing LE edema.  She denies pain, has no other complaints.  Does report she is hoping to transition to an MIGUEL.     CODE STATUS/ADVANCE DIRECTIVES DISCUSSION:   DNR / DNI  Patient's living condition: lives alone     ALLERGIES: Oxybutynin and Seasonal allergies  PAST MEDICAL  HISTORY:  has a past medical history of [place] (hard of hearing), Hyperlipidaemia, Hyperlipidaemia LDL goal < 130, Hypertension, Hypothyroid, PAD (peripheral artery disease) (H), and Renal insufficiency, mild.  PAST SURGICAL HISTORY:   has a past surgical history that includes appendectomy (1951); tubal ligation (1971); Eye surgery (1956); cataract iol, rt/lt (2001); and Dental surgery (1962).  FAMILY HISTORY: family history includes Coronary Artery Disease in her brother and brother; Heart Disease in her brother, brother, father, and mother; Respiratory in her mother.  SOCIAL HISTORY:   reports that she quit smoking about 46 years ago. Her smoking use included cigarettes. She has a 2.50 pack-year smoking history. She has never used smokeless tobacco. She reports current alcohol use. She reports that she does not use drugs.    Post Discharge Medication Reconciliation Status: discharge medications reconciled and changed, per note/orders (see AVS)    Current Outpatient Medications   Medication Sig Dispense Refill     acetaminophen (TYLENOL) 325 MG tablet Take 1,000 mg by mouth every 8 hours as needed for fever or pain       allopurinol (ZYLOPRIM) 100 MG tablet Take 1 tablet (100 mg) by mouth daily 90 tablet 1     apixaban ANTICOAGULANT (ELIQUIS) 2.5 MG tablet Take 1 tablet (2.5 mg) by mouth 2 times daily       diltiazem ER (DILT-XR) 120 MG 24 hr capsule Take 1 capsule (120 mg) by mouth daily       hydrALAZINE (APRESOLINE) 25 MG tablet Take 25 mg by mouth 3 times daily       levothyroxine (SYNTHROID/LEVOTHROID) 25 MCG tablet Take 1 tablet (25 mcg) by mouth daily 90 tablet 3     Melatonin 10 MG TABS tablet Take 10 mg by mouth nightly as needed for sleep        metoprolol succinate ER (TOPROL-XL) 100 MG 24 hr tablet Take 1 tablet (100 mg) by mouth daily 90 tablet 1     ROS:  7 point ROS done including, light headedness/dizziness, fever/chills, pain, Resp, CV, GI, and  and is negative other than noted in HPI.   "    Vitals:  BP (!) 173/68   Pulse 72   Temp 97.2  F (36.2  C)   Resp 16   Ht 1.549 m (5' 1\")   Wt 72.8 kg (160 lb 9.6 oz)   SpO2 99%   BMI 30.35 kg/m        Exam:  GENERAL APPEARANCE:  Elderly female sitting up in WC, NAD, non-toxic.  ENT:  Mouth and oropharynx normal, moist mucous membranes.   RESP:  Lungs CTA.  Regular relaxed breathing effort.  No cough.   CV: S1/S2 no murmur or rubs.  Regular rhythm and rate.     ABDOMEN:   Protuberant, soft, non-tender with active bowel sounds.  No guarding, rigidity, or rebound tenderness.  EXTREMITIES:  2+ bilateral lower extremity edema, no calf tenderness.   PSYCH: Alert and orientated, pleasant and cooperative.   SKIN: Bilateral buttocks cheeks have a roughly 1 cm irregular round pressure ulcer stage II with no drainage, no s/s of infection.      Lab/Diagnostic data:  I have personally reviewed labs, which are in facility or EMR chart.     ASSESSMENT/PLAN:  Atrial fibrillation with RVR (H), acute  Demand ischemia (H)  Fatigue, unspecified type  Generalized muscle weakness  Essential hypertension  Lymphedema  Chronic diastolic heart failure (H)  Admitted and found to have new onset A-fib RVR.  Seen by Cards whom recommended rate control and anticoagulation.  We note ongoing HTN issues, noting her Furosemide and Lisinopril have been on hold.  Besides fatigue/weakness, she is asymptomatic.  Today on exam, HR is regular.   -Continue Apixaban BID.   -Continue Diltiazem, Toprol, Hydralazine.   --Furosemide and Lisinopril remain on hold for now.   --Has Cards f/u 10/17.     DRAGAN (acute kidney injury) (H)  CKD (chronic kidney disease) stage 3, GFR 30-59 ml/min (H)  Creatine baseline is 1.4-1.5 lately, but now with Lisinopril and Furosemide on hold, creatine is at a all time low at 1.16 with CrCl around 42.   -Recheck 11 Oct.     Pressure injury of buttock, stage 2, bilaterality (H)  Bilateral buttocks PU II are roughly 1 cm round, superficial and no s/s of infection. "   -Clean and pad with dressing daily.     Debility  -PT/OT to eval and treat.     Orders written by provider at facility  -As noted above.     Total time spent with patient visit at the skilled nursing facility was 40 min including patient visit and review of past records. Greater than 50% of total time spent with counseling and coordinating care due to admission/establish new care, review of HPI, review/discuss code status/POLST, review hospital plan and medications, development of POC, patient education and review of POC with pt.  Also updated RN staff with POC.       Electronically signed by:  DHAVAL Agarwal CNP                   Sincerely,        DHAVAL Agarwal CNP

## 2019-10-07 NOTE — PROGRESS NOTES
[facility]  PRIMARY CARE PROVIDER AND CLINIC:  Mahsa Soto, , [address] [facility] [address] [address]  Chief Complaint   Patient presents with     Hospital F/U     [facility] Medical Record Number:  [id number]  Place of Service where encounter took place:  [facility] [[id number]]    Ghislaine Walls  is a 84 year old  (1935), admitted to the above facility from  [facility]. Hospital stay 9/30/19 through 10/5/19..  Admitted to this facility for  rehab, medical management and nursing care.    HPI:    HPI information obtained from: facility chart records, facility staff, patient report and [facility] chart review.     Brief Summary of Hospital Course: Mrs. Walls is a 83 y/o with minimal PMH that includes HTN, PAD, CKD III, gout whom lives alone in independent living.  She reported to the ER due to worsening/increasing weakness and fatigue and reported she can not live on her own anymore.  Workup showed she was in new A-fib RVR.  She was seen by Neph due to DRAGAN and they felt she was vascularly dry, thus held her Furosemide.  Seen by Cards whom recommended rate control and anticoagulation.  With holding the Furosemide, creatine greatly improved.  She thus transitioned to the TCU for ongoing cares and PT/OT.     Updates on Status Since Skilled nursing Admission:   In meeting Mrs. Walls today she endorses the above.  Reports the fatigue and weakness has been going on for some time.  She denies any light headedness/dizziness, no palpations, chest pain, does report ongoing LE edema.  She denies pain, has no other complaints.  Does report she is hoping to transition to an MIGUEL.     CODE STATUS/ADVANCE DIRECTIVES DISCUSSION:   DNR / DNI  Patient's living condition: lives alone     ALLERGIES: Oxybutynin and Seasonal allergies  PAST MEDICAL HISTORY:  has a past medical history of [place] (hard of hearing), Hyperlipidaemia, Hyperlipidaemia LDL goal <  "130, Hypertension, Hypothyroid, PAD (peripheral artery disease) (H), and Renal insufficiency, mild.  PAST SURGICAL HISTORY:   has a past surgical history that includes appendectomy (1951); tubal ligation (1971); Eye surgery (1956); cataract iol, rt/lt (2001); and Dental surgery (1962).  FAMILY HISTORY: family history includes Coronary Artery Disease in her brother and brother; Heart Disease in her brother, brother, father, and mother; Respiratory in her mother.  SOCIAL HISTORY:   reports that she quit smoking about 46 years ago. Her smoking use included cigarettes. She has a 2.50 pack-year smoking history. She has never used smokeless tobacco. She reports current alcohol use. She reports that she does not use drugs.    Post Discharge Medication Reconciliation Status: discharge medications reconciled and changed, per note/orders (see AVS)    Current Outpatient Medications   Medication Sig Dispense Refill     acetaminophen (TYLENOL) 325 MG tablet Take 1,000 mg by mouth every 8 hours as needed for fever or pain       allopurinol (ZYLOPRIM) 100 MG tablet Take 1 tablet (100 mg) by mouth daily 90 tablet 1     apixaban ANTICOAGULANT (ELIQUIS) 2.5 MG tablet Take 1 tablet (2.5 mg) by mouth 2 times daily       diltiazem ER (DILT-XR) 120 MG 24 hr capsule Take 1 capsule (120 mg) by mouth daily       hydrALAZINE (APRESOLINE) 25 MG tablet Take 25 mg by mouth 3 times daily       levothyroxine (SYNTHROID/LEVOTHROID) 25 MCG tablet Take 1 tablet (25 mcg) by mouth daily 90 tablet 3     Melatonin 10 MG TABS tablet Take 10 mg by mouth nightly as needed for sleep        metoprolol succinate ER (TOPROL-XL) 100 MG 24 hr tablet Take 1 tablet (100 mg) by mouth daily 90 tablet 1     ROS:  7 point ROS done including, light headedness/dizziness, fever/chills, pain, Resp, CV, GI, and  and is negative other than noted in HPI.      Vitals:  BP (!) 173/68   Pulse 72   Temp 97.2  F (36.2  C)   Resp 16   Ht 1.549 m (5' 1\")   Wt 72.8 kg (160 lb " 9.6 oz)   SpO2 99%   BMI 30.35 kg/m       Exam:  GENERAL APPEARANCE:  Elderly female sitting up in WC, NAD, non-toxic.  ENT:  Mouth and oropharynx normal, moist mucous membranes.   RESP:  Lungs CTA.  Regular relaxed breathing effort.  No cough.   CV: S1/S2 no murmur or rubs.  Regular rhythm and rate.     ABDOMEN:   Protuberant, soft, non-tender with active bowel sounds.  No guarding, rigidity, or rebound tenderness.  EXTREMITIES:  2+ bilateral lower extremity edema, no calf tenderness.   PSYCH: Alert and orientated, pleasant and cooperative.   SKIN: Bilateral buttocks cheeks have a roughly 1 cm irregular round pressure ulcer stage II with no drainage, no s/s of infection.      Lab/Diagnostic data:  I have personally reviewed labs, which are in facility or EMR chart.     ASSESSMENT/PLAN:  Atrial fibrillation with RVR (H), acute  Demand ischemia (H)  Fatigue, unspecified type  Generalized muscle weakness  Essential hypertension  Lymphedema  Chronic diastolic heart failure (H)  Admitted and found to have new onset A-fib RVR.  Seen by Cards whom recommended rate control and anticoagulation.  We note ongoing HTN issues, noting her Furosemide and Lisinopril have been on hold.  Besides fatigue/weakness, she is asymptomatic.  Today on exam, HR is regular.   -Continue Apixaban BID.   -Continue Diltiazem, Toprol, Hydralazine.   --Furosemide and Lisinopril remain on hold for now.   --Has Cards f/u 10/17.     DRAGNA (acute kidney injury) (H)  CKD (chronic kidney disease) stage 3, GFR 30-59 ml/min (H)  Creatine baseline is 1.4-1.5 lately, but now with Lisinopril and Furosemide on hold, creatine is at a all time low at 1.16 with CrCl around 42.   -Recheck 11 Oct.     Pressure injury of buttock, stage 2, bilaterality (H)  Bilateral buttocks PU II are roughly 1 cm round, superficial and no s/s of infection.   -Clean and pad with dressing daily.     Debility  -PT/OT to eval and treat.     Orders written by provider at facility  -As  noted above.     Total time spent with patient visit at the skilled nursing facility was 40 min including patient visit and review of past records. Greater than 50% of total time spent with counseling and coordinating care due to admission/establish new care, review of HPI, review/discuss code status/POLST, review hospital plan and medications, development of POC, patient education and review of POC with pt.  Also updated RN staff with POC.       Electronically signed by:  DHAVAL Agarwal CNP

## 2019-10-07 NOTE — TELEPHONE ENCOUNTER
Patient was evaluated by cardiology while inpatient for weakness and lethargy and found to be in A. Fib with RVR-converted to NSR on 10/4/19 while on IV Diltiazem. Pt has hx of PAF and takes Eliquis and started on po Diltiazem prior to discharge. RN called [facility] to confirm the follow up plan for patient with Care Coordinator. RN confirmed with Care Coordinator that patient has a scheduled F/U appt on 10/17/19 with [facility], with labs prior Reminded to send last progress note, MAR, active orders, and provider order sheet to appt. KHANH Camargo RN.

## 2019-10-08 ENCOUNTER — NURSING HOME VISIT (OUTPATIENT)
Dept: GERIATRICS | Facility: CLINIC | Age: 84
End: 2019-10-08
Payer: MEDICARE

## 2019-10-08 DIAGNOSIS — N17.9 AKI (ACUTE KIDNEY INJURY) (H): ICD-10-CM

## 2019-10-08 DIAGNOSIS — E03.9 ACQUIRED HYPOTHYROIDISM: ICD-10-CM

## 2019-10-08 DIAGNOSIS — I89.0 LYMPHEDEMA OF BOTH LOWER EXTREMITIES: ICD-10-CM

## 2019-10-08 DIAGNOSIS — I50.32 CHRONIC HEART FAILURE WITH PRESERVED EJECTION FRACTION (H): ICD-10-CM

## 2019-10-08 DIAGNOSIS — I48.0 PAF (PAROXYSMAL ATRIAL FIBRILLATION) (H): Primary | ICD-10-CM

## 2019-10-08 DIAGNOSIS — R53.81 PHYSICAL DECONDITIONING: ICD-10-CM

## 2019-10-08 DIAGNOSIS — I24.89 DEMAND ISCHEMIA (H): ICD-10-CM

## 2019-10-08 DIAGNOSIS — I10 UNCONTROLLED HYPERTENSION: ICD-10-CM

## 2019-10-08 DIAGNOSIS — D63.1 ANEMIA OF CHRONIC RENAL FAILURE, STAGE 3 (MODERATE) (H): ICD-10-CM

## 2019-10-08 DIAGNOSIS — N18.30 ANEMIA OF CHRONIC RENAL FAILURE, STAGE 3 (MODERATE) (H): ICD-10-CM

## 2019-10-08 DIAGNOSIS — N18.30 CKD (CHRONIC KIDNEY DISEASE) STAGE 3, GFR 30-59 ML/MIN (H): ICD-10-CM

## 2019-10-08 DIAGNOSIS — L89.302 PRESSURE INJURY OF BUTTOCK, STAGE 2, UNSPECIFIED LATERALITY (H): ICD-10-CM

## 2019-10-08 PROCEDURE — 99306 1ST NF CARE HIGH MDM 50: CPT | Mod: AI | Performed by: INTERNAL MEDICINE

## 2019-10-08 NOTE — LETTER
10/8/2019        RE: Ghislaine Walls  [address]  [facility] [address]        [facility]  INITIAL VISIT NOTE  October 8, 2019    PRIMARY CARE PROVIDER AND CLINIC:  Mahsa Soto [address] / [facility] [address]    Chief Complaint   Patient presents with     Hospital F/U       HPI:    Ghislaine Walls is a 84 year old  (1935) female who was seen at [place] on October 8, 2019 for an initial visit. Medical history is notable for hypertension, dyslipidemia, hypothyroidism, peripheral artery disease, chronic kidney disease stage III, chronic anemia, lower extremity lymphedema, and gout.  She was hospitalized at [facility] from September 30 through October 5, 2019 for  generalized weakness.  Inpatient, she developed new onset of atrial fibrillation with RVR.  Troponin I was mildly elevated at 0.093 with no true trajectory.  BNP was elevated at 18,000.  She was noted to be in acute kidney injury with creatinine of 3.27.  Chest x-ray was negative for acute cardiopulmonary disease or pulmonary edema.  Echocardiogram revealed moderately dilated left atrium, normal LV systolic function with EF of 60 to 65%, grade 1 or early diastolic dysfunction, 1+ tricuspid regurgitation, and no regional wall motion abnormalities.  Doppler study of lower extremities was negative for DVT. She was placed on IV diltiazem, then transitioned to oral.  [id number] score was calculated at 6 and she was started on apixaban.  In the hospital, her Lasix were discontinued due to acute kidney injury.  Cardiology did not recommend ischemic work-up at this time given lack of cardiac symptoms and abnormal renal function.  Renal function improved and cr was down to 1.16 at time of discharge.  Inpatient, the patient received IV iron on October 3.  She converted to normal sinus rhythm by the time of discharge.    Patient is admitted to this facility for medical  management, nursing care, and rehab.     Patient was seen in her room, while lying in bed.  The patient believes that her lower extremity swelling is worse today.  She reports no chest pain, dyspnea, palpitation, nausea, vomiting, abdominal pain, melena, hematochezia, or urinary symptoms.  Blood pressure has been running high since yesterday with systolic blood pressure around 170-180.    CODE STATUS:   DNR / DNI    PAST MEDICAL HISTORY:   Paroxysmal atrial fibrillation, [id number] score 6  Heart failure with preserved ejection fraction  Hypertension  Dyslipidemia  Hypothyroidism  Chronic kidney disease, stage III, baseline creatinine 1.2-1.5  Peripheral arterial disease  Lymphedema  Chronic anemia, baseline Hgb around 11  Gout  Impaired hearing    PAST SURGICAL HISTORY:   Past Surgical History:   Procedure Laterality Date     APPENDECTOMY       CATARACT IOL, RT/LT      bilateral (laser - 2013)     DENTAL SURGERY      wisdom     EYE SURGERY      Muscle release     TUBAL LIGATION         FAMILY HISTORY:   Family History   Problem Relation Age of Onset     Heart Disease Mother      Respiratory Mother      Heart Disease Father      Heart Disease Brother      Coronary Artery Disease Brother         CAB     Heart Disease Brother      Coronary Artery Disease Brother         CAB       SOCIAL HISTORY:  Patient is .  She lives in an apartment.  She does normally use a cane for ambulation.    Social History     Tobacco Use     Smoking status: Former Smoker     Packs/day: 0.50     Years: 5.00     Pack years: 2.50     Types: Cigarettes     Last attempt to quit: 1973     Years since quittin.7     Smokeless tobacco: Never Used   Substance Use Topics     Alcohol use: Yes     Alcohol/week: 0.0 standard drinks     Comment: 1/week       MEDICATIONS:  Current Outpatient Medications   Medication Sig Dispense Refill     acetaminophen (TYLENOL) 325 MG tablet Take 1,000 mg by mouth every 8 hours as  needed for fever or pain       allopurinol (ZYLOPRIM) 100 MG tablet Take 1 tablet (100 mg) by mouth daily 90 tablet 1     apixaban ANTICOAGULANT (ELIQUIS) 2.5 MG tablet Take 1 tablet (2.5 mg) by mouth 2 times daily       diltiazem ER (DILT-XR) 120 MG 24 hr capsule Take 1 capsule (120 mg) by mouth daily       hydrALAZINE (APRESOLINE) 25 MG tablet Take 25 mg by mouth 3 times daily       levothyroxine (SYNTHROID/LEVOTHROID) 25 MCG tablet Take 1 tablet (25 mcg) by mouth daily 90 tablet 3     Melatonin 10 MG TABS tablet Take 10 mg by mouth nightly as needed for sleep        metoprolol succinate ER (TOPROL-XL) 100 MG 24 hr tablet Take 1 tablet (100 mg) by mouth daily 90 tablet 1       ALLERGIES:  Allergies   Allergen Reactions     Oxybutynin Itching     Seasonal Allergies        ROS:  10 point ROS neg other than the symptoms noted above in the HPI.    PHYSICAL EXAM:  Vital signs: Blood pressure 180/73, heart rate 74, respiratory rate 16, temperature 98.2  F, oxygen saturation 98%  Gen: Cooperative and in no acute distress, weak appearing  HEENT: Normocephalic; oropharynx clear, mild conjunctival pallor  Card: Normal S1, S2, RRR  Resp: Lungs clear to auscultation bilaterally  GI: Abdomen soft, not-tender, non-distended, +BS  Ext: +B/L LE edema  Neuro: CX II-XII grossly intact; ROM in all four extremities grossly in tact  Psych: Alert and oriented almost x3; normal affect  Skin: No acute rash    LABORATORY/IMAGING DATA:  Lab Results   Component Value Date    WBC 6.7 10/05/2019     Lab Results   Component Value Date    RBC 3.02 10/05/2019     Lab Results   Component Value Date    HGB 9.6 10/05/2019     Lab Results   Component Value Date    HCT 29.2 10/05/2019     Lab Results   Component Value Date    MCV 97 10/05/2019     Lab Results   Component Value Date    MCH 31.8 10/05/2019     Lab Results   Component Value Date    MCHC 32.9 10/05/2019     Lab Results   Component Value Date    RDW 15.7 10/05/2019     Lab Results    Component Value Date     10/05/2019     Last Comprehensive Metabolic Panel:  Sodium   Date Value Ref Range Status   10/05/2019 146 (H) 133 - 144 mmol/L Final     Potassium   Date Value Ref Range Status   10/05/2019 3.7 3.4 - 5.3 mmol/L Final     Chloride   Date Value Ref Range Status   10/05/2019 119 (H) 94 - 109 mmol/L Final     Carbon Dioxide   Date Value Ref Range Status   10/05/2019 21 20 - 32 mmol/L Final     Anion Gap   Date Value Ref Range Status   10/05/2019 6 3 - 14 mmol/L Final     Glucose   Date Value Ref Range Status   10/05/2019 94 70 - 99 mg/dL Final     Urea Nitrogen   Date Value Ref Range Status   10/05/2019 59 (H) 7 - 30 mg/dL Final     Creatinine   Date Value Ref Range Status   10/05/2019 1.16 (H) 0.52 - 1.04 mg/dL Final     GFR Estimate   Date Value Ref Range Status   10/05/2019 43 (L) >60 mL/min/[1.73_m2] Final     Comment:     Non  GFR Calc  Starting 12/18/2018, serum creatinine based estimated GFR (eGFR) will be   calculated using the Chronic Kidney Disease Epidemiology Collaboration   (CKD-EPI) equation.       Calcium   Date Value Ref Range Status   10/05/2019 9.2 8.5 - 10.1 mg/dL Final       ASSESSMENT/PLAN:  Paroxysmal atrial fibrillation with RVR, converted to normal sinus rhythm,  Acute demand ischemia, due to tachyarrhythmia.  She is currently in normal sinus rhythm.  Plan:  Continue diltiazem  mg p.o. daily and metoprolol  mg p.o. daily  Continue anticoagulation with Eliquis 2.5 mg p.o. twice daily  Monitor heart rate  Follow-up with cardiology clinic on October 17    Heart failure with preserved ejection fraction, chronic,  Benign essential hypertension,  Chronic lower extremity lymphedema.  Blood pressure is uncontrolled with systolic around 180.  Furosemide was discontinued in the hospital due to acute kidney injury.  Plan:  Continue compression stockings  Resume furosemide 40 mg p.o. daily  Continue diltiazem  mg p.o. daily, metoprolol ER  100 mg p.o. daily, and hydralazine 25 mg p.o. 3 times daily  Repeat BMP on October 11  Monitor volume status and blood pressure  Adjust antihypertensive regimen further as indicated    Acute kidney injury, superimposed on chronic kidney disease stage III.  Baseline creatinine is 1.2-1.5.  Creatinine was 3.27 upon admission to the hospital which improved.  Plan:  Avoid NSAIDs or other nephrotoxins  Repeat BMP in October 11    Chronic anemia.  Patient received IV iron on October 3.  Hemoglobin is currently stable.  Plan:  Continue to monitor hemoglobin periodically    Gout.  Chronic pain  Plan:  Continue prior to admission allopurinol 100 mg p.o. daily    Hypothyroidism.  Chronic pain  Plan:  Continue prior to admission levothyroxine 25 mcg daily    Pressure injury of buttock, stage 2.  Bilateral buttocks PU II are roughly 1 cm round, superficial and no s/s of infection.   Present on admission to TCU.  Plan:  Clean and pad with dressing daily.     Physical deconditioning.  Plan:  Continue PT/OT      Electronically signed by:  Reinaldo Bashir MD                      Sincerely,        Reinaldo Bashir MD

## 2019-10-08 NOTE — PROGRESS NOTES
[place] [facility]  INITIAL VISIT NOTE  October 8, 2019    PRIMARY CARE PROVIDER AND CLINIC:  Mahsa Soto [address] [facility] [address] / [facility] [address]    Chief Complaint   Patient presents with     Hospital F/U       HPI:    Ghislaine Walls is a 84 year old  (1935) female who was seen at [place] on [facility] on October 8, 2019 for an initial visit. Medical history is notable for hypertension, dyslipidemia, hypothyroidism, peripheral artery disease, chronic kidney disease stage III, chronic anemia, lower extremity lymphedema, and gout.  She was hospitalized at [facility] from September 30 through October 5, 2019 for  generalized weakness.  Inpatient, she developed new onset of atrial fibrillation with RVR.  Troponin I was mildly elevated at 0.093 with no true trajectory.  BNP was elevated at 18,000.  She was noted to be in acute kidney injury with creatinine of 3.27.  Chest x-ray was negative for acute cardiopulmonary disease or pulmonary edema.  Echocardiogram revealed moderately dilated left atrium, normal LV systolic function with EF of 60 to 65%, grade 1 or early diastolic dysfunction, 1+ tricuspid regurgitation, and no regional wall motion abnormalities.  Doppler study of lower extremities was negative for DVT. She was placed on IV diltiazem, then transitioned to oral.  [id number] score was calculated at 6 and she was started on apixaban.  In the hospital, her Lasix were discontinued due to acute kidney injury.  Cardiology did not recommend ischemic work-up at this time given lack of cardiac symptoms and abnormal renal function.  Renal function improved and cr was down to 1.16 at time of discharge.  Inpatient, the patient received IV iron on October 3.  She converted to normal sinus rhythm by the time of discharge.    Patient is admitted to this facility for medical management, nursing care, and rehab.     Patient was seen in her room, while lying in bed.  The  patient believes that her lower extremity swelling is worse today.  She reports no chest pain, dyspnea, palpitation, nausea, vomiting, abdominal pain, melena, hematochezia, or urinary symptoms.  Blood pressure has been running high since yesterday with systolic blood pressure around 170-180.    CODE STATUS:   DNR / DNI    PAST MEDICAL HISTORY:   Paroxysmal atrial fibrillation, [id number] score 6  Heart failure with preserved ejection fraction  Hypertension  Dyslipidemia  Hypothyroidism  Chronic kidney disease, stage III, baseline creatinine 1.2-1.5  Peripheral arterial disease  Lymphedema  Chronic anemia, baseline Hgb around 11  Gout  Impaired hearing    PAST SURGICAL HISTORY:   Past Surgical History:   Procedure Laterality Date     APPENDECTOMY       CATARACT IOL, RT/LT      bilateral (laser - 2013)     DENTAL SURGERY      wisdom     EYE SURGERY      Muscle release     TUBAL LIGATION         FAMILY HISTORY:   Family History   Problem Relation Age of Onset     Heart Disease Mother      Respiratory Mother      Heart Disease Father      Heart Disease Brother      Coronary Artery Disease Brother         CAB     Heart Disease Brother      Coronary Artery Disease Brother         CAB       SOCIAL HISTORY:  Patient is .  She lives in an apartment.  She does normally use a cane for ambulation.    Social History     Tobacco Use     Smoking status: Former Smoker     Packs/day: 0.50     Years: 5.00     Pack years: 2.50     Types: Cigarettes     Last attempt to quit: 1973     Years since quittin.7     Smokeless tobacco: Never Used   Substance Use Topics     Alcohol use: Yes     Alcohol/week: 0.0 standard drinks     Comment: 1/week       MEDICATIONS:  Current Outpatient Medications   Medication Sig Dispense Refill     acetaminophen (TYLENOL) 325 MG tablet Take 1,000 mg by mouth every 8 hours as needed for fever or pain       allopurinol (ZYLOPRIM) 100 MG tablet Take 1 tablet (100 mg) by  mouth daily 90 tablet 1     apixaban ANTICOAGULANT (ELIQUIS) 2.5 MG tablet Take 1 tablet (2.5 mg) by mouth 2 times daily       diltiazem ER (DILT-XR) 120 MG 24 hr capsule Take 1 capsule (120 mg) by mouth daily       hydrALAZINE (APRESOLINE) 25 MG tablet Take 25 mg by mouth 3 times daily       levothyroxine (SYNTHROID/LEVOTHROID) 25 MCG tablet Take 1 tablet (25 mcg) by mouth daily 90 tablet 3     Melatonin 10 MG TABS tablet Take 10 mg by mouth nightly as needed for sleep        metoprolol succinate ER (TOPROL-XL) 100 MG 24 hr tablet Take 1 tablet (100 mg) by mouth daily 90 tablet 1       ALLERGIES:  Allergies   Allergen Reactions     Oxybutynin Itching     Seasonal Allergies        ROS:  10 point ROS neg other than the symptoms noted above in the HPI.    PHYSICAL EXAM:  Vital signs: Blood pressure 180/73, heart rate 74, respiratory rate 16, temperature 98.2  F, oxygen saturation 98%  Gen: Cooperative and in no acute distress, weak appearing  HEENT: Normocephalic; oropharynx clear, mild conjunctival pallor  Card: Normal S1, S2, RRR  Resp: Lungs clear to auscultation bilaterally  GI: Abdomen soft, not-tender, non-distended, +BS  Ext: +B/L LE edema  Neuro: CX II-XII grossly intact; ROM in all four extremities grossly in tact  Psych: Alert and oriented almost x3; normal affect  Skin: No acute rash    LABORATORY/IMAGING DATA:  Lab Results   Component Value Date    WBC 6.7 10/05/2019     Lab Results   Component Value Date    RBC 3.02 10/05/2019     Lab Results   Component Value Date    HGB 9.6 10/05/2019     Lab Results   Component Value Date    HCT 29.2 10/05/2019     Lab Results   Component Value Date    MCV 97 10/05/2019     Lab Results   Component Value Date    MCH 31.8 10/05/2019     Lab Results   Component Value Date    MCHC 32.9 10/05/2019     Lab Results   Component Value Date    RDW 15.7 10/05/2019     Lab Results   Component Value Date     10/05/2019     Last Comprehensive Metabolic Panel:  Sodium   Date  Value Ref Range Status   10/05/2019 146 (H) 133 - 144 mmol/L Final     Potassium   Date Value Ref Range Status   10/05/2019 3.7 3.4 - 5.3 mmol/L Final     Chloride   Date Value Ref Range Status   10/05/2019 119 (H) 94 - 109 mmol/L Final     Carbon Dioxide   Date Value Ref Range Status   10/05/2019 21 20 - 32 mmol/L Final     Anion Gap   Date Value Ref Range Status   10/05/2019 6 3 - 14 mmol/L Final     Glucose   Date Value Ref Range Status   10/05/2019 94 70 - 99 mg/dL Final     Urea Nitrogen   Date Value Ref Range Status   10/05/2019 59 (H) 7 - 30 mg/dL Final     Creatinine   Date Value Ref Range Status   10/05/2019 1.16 (H) 0.52 - 1.04 mg/dL Final     GFR Estimate   Date Value Ref Range Status   10/05/2019 43 (L) >60 mL/min/[1.73_m2] Final     Comment:     Non  GFR Calc  Starting 12/18/2018, serum creatinine based estimated GFR (eGFR) will be   calculated using the Chronic Kidney Disease Epidemiology Collaboration   (CKD-EPI) equation.       Calcium   Date Value Ref Range Status   10/05/2019 9.2 8.5 - 10.1 mg/dL Final       ASSESSMENT/PLAN:  Paroxysmal atrial fibrillation with RVR, converted to normal sinus rhythm,  Acute demand ischemia, due to tachyarrhythmia.  She is currently in normal sinus rhythm.  Plan:  Continue diltiazem  mg p.o. daily and metoprolol  mg p.o. daily  Continue anticoagulation with Eliquis 2.5 mg p.o. twice daily  Monitor heart rate  Follow-up with cardiology clinic on October 17    Heart failure with preserved ejection fraction, chronic,  Benign essential hypertension,  Chronic lower extremity lymphedema.  Blood pressure is uncontrolled with systolic around 180.  Furosemide was discontinued in the hospital due to acute kidney injury.  Plan:  Continue compression stockings  Resume furosemide 40 mg p.o. daily  Continue diltiazem  mg p.o. daily, metoprolol  mg p.o. daily, and hydralazine 25 mg p.o. 3 times daily  Repeat BMP on October 11  Monitor volume  status and blood pressure  Adjust antihypertensive regimen further as indicated    Acute kidney injury, superimposed on chronic kidney disease stage III.  Baseline creatinine is 1.2-1.5.  Creatinine was 3.27 upon admission to the hospital which improved.  Plan:  Avoid NSAIDs or other nephrotoxins  Repeat BMP in October 11    Chronic anemia.  Patient received IV iron on October 3.  Hemoglobin is currently stable.  There are no signs or symptoms of GI bleed.  Plan:  Continue to monitor hemoglobin periodically    Gout.  Chronic.  Plan:  Continue prior to admission allopurinol 100 mg p.o. daily    Hypothyroidism.  Chronic.  Plan:  Continue prior to admission levothyroxine 25 mcg daily    Pressure injury of buttock, stage 2.  Bilateral buttocks PU II are roughly 1 cm round, superficial and no s/s of infection.   Present on admission to TCU.  Plan:  Clean and pad with dressing daily.     Physical deconditioning.  Plan:  Continue PT/OT      Electronically signed by:  Reinaldo Bashir MD

## 2019-10-11 ENCOUNTER — HOSPITAL LABORATORY (OUTPATIENT)
Dept: OTHER | Facility: CLINIC | Age: 84
End: 2019-10-11

## 2019-10-11 LAB
ANION GAP SERPL CALCULATED.3IONS-SCNC: 7 MMOL/L (ref 3–14)
BUN SERPL-MCNC: 26 MG/DL (ref 7–30)
CALCIUM SERPL-MCNC: 9.4 MG/DL (ref 8.5–10.1)
CHLORIDE SERPL-SCNC: 111 MMOL/L (ref 94–109)
CO2 SERPL-SCNC: 18 MMOL/L (ref 20–32)
CREAT SERPL-MCNC: 1.14 MG/DL (ref 0.52–1.04)
GFR SERPL CREATININE-BSD FRML MDRD: 44 ML/MIN/{1.73_M2}
GLUCOSE SERPL-MCNC: 112 MG/DL (ref 70–99)
POTASSIUM SERPL-SCNC: 4.3 MMOL/L (ref 3.4–5.3)
SODIUM SERPL-SCNC: 136 MMOL/L (ref 133–144)

## 2019-10-12 ENCOUNTER — TELEPHONE (OUTPATIENT)
Dept: GERIATRICS | Facility: CLINIC | Age: 84
End: 2019-10-12

## 2019-10-12 ENCOUNTER — HOSPITAL LABORATORY (OUTPATIENT)
Dept: OTHER | Facility: CLINIC | Age: 84
End: 2019-10-12

## 2019-10-13 LAB
C DIFF TOX B STL QL: NEGATIVE
SPECIMEN SOURCE: NORMAL

## 2019-10-13 NOTE — TELEPHONE ENCOUNTER
Called re: fever    Has had diarrhea x3 today. No appetite, but no emesis. Temp was 102.4 a couple hours ago. Was given APAP 1000 mg x1 and recheck an hour later was 101.8. /77. HR 78. O2 98%. She doesn't have abdominal pain, but does has over past day or so endorse pain on her R side from her head to her toes. There is one other resident on the unit with diarrhea.     Differential for fever in setting of diarrhea includes viral gastroenteritis, C diff - no urinary or respiratory symptoms.     -- change APAP from 1000 mg q8h PRN to 650 mg q4h PRN for fever, pain; max 4g/24 hours  -- send stool for C diff  -- continue at minimum q shift VS, call for any hypotension or concerning change in condition     Courtney Woodward MD

## 2019-10-14 ENCOUNTER — HOSPITAL LABORATORY (OUTPATIENT)
Dept: OTHER | Facility: CLINIC | Age: 84
End: 2019-10-14

## 2019-10-14 ENCOUNTER — NURSING HOME VISIT (OUTPATIENT)
Dept: GERIATRICS | Facility: CLINIC | Age: 84
End: 2019-10-14
Payer: MEDICARE

## 2019-10-14 VITALS
DIASTOLIC BLOOD PRESSURE: 76 MMHG | HEART RATE: 79 BPM | RESPIRATION RATE: 28 BRPM | OXYGEN SATURATION: 96 % | HEIGHT: 61 IN | WEIGHT: 160.2 LBS | SYSTOLIC BLOOD PRESSURE: 180 MMHG | TEMPERATURE: 99.5 F | BODY MASS INDEX: 30.25 KG/M2

## 2019-10-14 DIAGNOSIS — I50.32 CHRONIC HEART FAILURE WITH PRESERVED EJECTION FRACTION (H): ICD-10-CM

## 2019-10-14 DIAGNOSIS — R53.83 FATIGUE, UNSPECIFIED TYPE: ICD-10-CM

## 2019-10-14 DIAGNOSIS — L89.302 PRESSURE INJURY OF BUTTOCK, STAGE 2, UNSPECIFIED LATERALITY (H): ICD-10-CM

## 2019-10-14 DIAGNOSIS — I48.0 PAF (PAROXYSMAL ATRIAL FIBRILLATION) (H): ICD-10-CM

## 2019-10-14 DIAGNOSIS — R41.89 COGNITIVE IMPAIRMENT: ICD-10-CM

## 2019-10-14 DIAGNOSIS — N18.30 CKD (CHRONIC KIDNEY DISEASE) STAGE 3, GFR 30-59 ML/MIN (H): ICD-10-CM

## 2019-10-14 DIAGNOSIS — I89.0 LYMPHEDEMA OF BOTH LOWER EXTREMITIES: ICD-10-CM

## 2019-10-14 DIAGNOSIS — R50.9 FEVER, UNSPECIFIED FEVER CAUSE: ICD-10-CM

## 2019-10-14 DIAGNOSIS — R53.81 DEBILITY: Primary | ICD-10-CM

## 2019-10-14 DIAGNOSIS — I10 UNCONTROLLED HYPERTENSION: ICD-10-CM

## 2019-10-14 LAB
ALBUMIN UR-MCNC: 30 MG/DL
ANION GAP SERPL CALCULATED.3IONS-SCNC: 7 MMOL/L (ref 3–14)
APPEARANCE UR: ABNORMAL
BACTERIA #/AREA URNS HPF: ABNORMAL /HPF
BASOPHILS # BLD AUTO: 0 10E9/L (ref 0–0.2)
BASOPHILS NFR BLD AUTO: 0.6 %
BILIRUB UR QL STRIP: NEGATIVE
BUN SERPL-MCNC: 21 MG/DL (ref 7–30)
CALCIUM SERPL-MCNC: 8.9 MG/DL (ref 8.5–10.1)
CHLORIDE SERPL-SCNC: 110 MMOL/L (ref 94–109)
CO2 SERPL-SCNC: 20 MMOL/L (ref 20–32)
COLOR UR AUTO: YELLOW
CREAT SERPL-MCNC: 1.2 MG/DL (ref 0.52–1.04)
DIFFERENTIAL METHOD BLD: ABNORMAL
EOSINOPHIL # BLD AUTO: 0.1 10E9/L (ref 0–0.7)
EOSINOPHIL NFR BLD AUTO: 1.8 %
ERYTHROCYTE [DISTWIDTH] IN BLOOD BY AUTOMATED COUNT: 15.7 % (ref 10–15)
GFR SERPL CREATININE-BSD FRML MDRD: 42 ML/MIN/{1.73_M2}
GLUCOSE SERPL-MCNC: 105 MG/DL (ref 70–99)
GLUCOSE UR STRIP-MCNC: NEGATIVE MG/DL
HCT VFR BLD AUTO: 31.1 % (ref 35–47)
HGB BLD-MCNC: 9.9 G/DL (ref 11.7–15.7)
HGB UR QL STRIP: NEGATIVE
HYALINE CASTS #/AREA URNS LPF: 2 /LPF (ref 0–2)
IMM GRANULOCYTES # BLD: 0 10E9/L (ref 0–0.4)
IMM GRANULOCYTES NFR BLD: 0.9 %
KETONES UR STRIP-MCNC: 5 MG/DL
LEUKOCYTE ESTERASE UR QL STRIP: ABNORMAL
LYMPHOCYTES # BLD AUTO: 0.9 10E9/L (ref 0.8–5.3)
LYMPHOCYTES NFR BLD AUTO: 26.2 %
MCH RBC QN AUTO: 31.3 PG (ref 26.5–33)
MCHC RBC AUTO-ENTMCNC: 31.8 G/DL (ref 31.5–36.5)
MCV RBC AUTO: 98 FL (ref 78–100)
MONOCYTES # BLD AUTO: 0.6 10E9/L (ref 0–1.3)
MONOCYTES NFR BLD AUTO: 17.4 %
MUCOUS THREADS #/AREA URNS LPF: PRESENT /LPF
NEUTROPHILS # BLD AUTO: 1.7 10E9/L (ref 1.6–8.3)
NEUTROPHILS NFR BLD AUTO: 53.1 %
NITRATE UR QL: NEGATIVE
NRBC # BLD AUTO: 0 10*3/UL
NRBC BLD AUTO-RTO: 0 /100
PH UR STRIP: 5.5 PH (ref 5–7)
PLATELET # BLD AUTO: 186 10E9/L (ref 150–450)
POTASSIUM SERPL-SCNC: 4.1 MMOL/L (ref 3.4–5.3)
RBC # BLD AUTO: 3.16 10E12/L (ref 3.8–5.2)
RBC #/AREA URNS AUTO: 3 /HPF (ref 0–2)
SODIUM SERPL-SCNC: 137 MMOL/L (ref 133–144)
SOURCE: ABNORMAL
SP GR UR STRIP: 1.02 (ref 1–1.03)
SQUAMOUS #/AREA URNS AUTO: 4 /HPF (ref 0–1)
UROBILINOGEN UR STRIP-MCNC: NORMAL MG/DL (ref 0–2)
WBC # BLD AUTO: 3.3 10E9/L (ref 4–11)
WBC #/AREA URNS AUTO: 35 /HPF (ref 0–5)
WBC CLUMPS #/AREA URNS HPF: PRESENT /HPF

## 2019-10-14 PROCEDURE — 99309 SBSQ NF CARE MODERATE MDM 30: CPT | Performed by: NURSE PRACTITIONER

## 2019-10-14 RX ORDER — ACETAMINOPHEN 325 MG/1
650 TABLET ORAL EVERY 4 HOURS PRN
COMMUNITY
End: 2019-10-14

## 2019-10-14 RX ORDER — FUROSEMIDE 40 MG
40 TABLET ORAL DAILY
COMMUNITY
End: 2019-10-14

## 2019-10-14 ASSESSMENT — MIFFLIN-ST. JEOR: SCORE: 1119.04

## 2019-10-14 NOTE — PROGRESS NOTES
[place] [facility]  [place] [facility] Record Number:  [id number]  Place of Service where encounter took place:  [facility] ([facility]) [[id number]]  Chief Complaint   Patient presents with     RECHECK       HPI:    Ghislaine Walls  is a 84 year old (1935), who is being seen today for an episodic care visit.  HPI information obtained from: facility chart records, facility staff, patient report and [facility] chart review.      Met with Mrs. Walls today for follow up.  Mrs. Walls reports she continues to feel fatigued, but not worse than before.  She denies any SOB but does endorse TOBAR and thinks it's worse than before, but then later denied.  She endorses ongoing chronic arm pain but unchanged in nature.  She denies fevers, but reports she does not feel as good as she should, but vague in nature.  We do note Tmax over weekend was 102.4.    Past Medical and Surgical History reviewed in Epic today.    MEDICATIONS:  Current Outpatient Medications   Medication Sig Dispense Refill     acetaminophen (TYLENOL) 325 MG tablet Take 1,000 mg by mouth every 8 hours as needed for fever or pain       allopurinol (ZYLOPRIM) 100 MG tablet Take 1 tablet (100 mg) by mouth daily 90 tablet 1     apixaban ANTICOAGULANT (ELIQUIS) 2.5 MG tablet Take 1 tablet (2.5 mg) by mouth 2 times daily       diltiazem ER (DILT-XR) 120 MG 24 hr capsule Take 1 capsule (120 mg) by mouth daily       hydrALAZINE (APRESOLINE) 25 MG tablet Take 25 mg by mouth 3 times daily       levothyroxine (SYNTHROID/LEVOTHROID) 25 MCG tablet Take 1 tablet (25 mcg) by mouth daily 90 tablet 3     Melatonin 10 MG TABS tablet Take 10 mg by mouth nightly as needed for sleep        metoprolol succinate ER (TOPROL-XL) 100 MG 24 hr tablet Take 1 tablet (100 mg) by mouth daily 90 tablet 1     REVIEW OF SYSTEMS:  Limited secondary to cognitive impairment but today 7 point ROS done including, light headedness/dizziness, fever/chills, pain, Resp, CV, GI,  "and  and is negative other than noted in HPI.      Objective:  BP (!) 180/76   Pulse 79   Temp 99.5  F (37.5  C)   Resp 28   Ht 1.549 m (5' 1\")   Wt 72.7 kg (160 lb 3.2 oz)   SpO2 96%   BMI 30.27 kg/m       Exam:  GENERAL APPEARANCE:  Elderly female sitting up in bed, NAD, non-toxic.  ENT:  Mouth and oropharynx normal, moist mucous membranes.   RESP:  Lungs diminished in bilateral bases, otherwise CTA, no crackles.  Regular relaxed breathing effort.  No cough.   CV: S1/S2 no murmur or rubs.  Regular rhythm and rate.     ABDOMEN:   Protuberant, soft, non-tender with active bowel sounds.  No guarding, rigidity, or rebound tenderness.  EXTREMITIES:  Trace bilateral lower extremity edema, 1-2+ pedal edema, no calf tenderness.   PSYCH: Alert and orientated, pleasant and cooperative.  Suspect some degree of cognitive/memory impairment but stable/unchanged.   SKIN: Bilateral buttocks cheeks have a roughly 1 cm irregular round pressure ulcer stage II with no drainage, no s/s of infection.      Labs:   I have personally reviewed labs, which are in facility or EMR chart.     ASSESSMENT/PLAN:  Fever, unspecified fever cause  Fatigue, unspecified type   had fevers over the weekend, T max of 102.4, reports of loose stools, C-diff was negative.  She denies fevers but reports ongoing fatigue and vague not feeling as well as she usually does.  She is non-toxic on exam and later seen ambulating well with PT/OT.  Hemodynamically stable, actually HTN.    Thus etiology of fevers is unclear.  C-diff negative, lungs do not sound suspicious, she denies dysuria.  Abdominal exam benign on exam.  Does not appear toxic.    -Will get Labs, UA/UC and CXR for work up.   -No empiric treatment at this time.     PAF (paroxysmal atrial fibrillation) (H)  Uncontrolled hypertension  Chronic heart failure with preserved ejection fraction (H)  Lymphedema of both lower extremities  Admitted due to new onset A-fib RVR, today HR is " regular.  HR's controlled, SBP's 150-180's.  Last week Dr. Bashir resumed/ordered her Furosemide 40 mg's q daily.  We found out today that was never done, medication error.  Her weight is stable, no crackles on exam, but diminished, we do note ongoing mild LE edema, but not bad.  Thus unclear if volume is etiology of hypertension.  Her prior Lisinopril and Furosemide remain held due to DRAGAN in hospital that resolved.   -Continue Apixaban.   -Continue Diltiazem ER, Hydralazine, Toprol.   --Due to CXR not being done yet, will give a one time dose of Furosemide 40 mg's today.     CKD (chronic kidney disease) stage 3, GFR 30-59 ml/min (H)  Creatine baseline was 1.4-1.5 but lately 1.1 and now 1.2 while off Lisinopril and Furosemide.   -Furosemide x 1 now.   -Continue to clinically monitor.     Pressure injury of buttock, stage 2, bilaterality (H)  Review of both pressures ulcers today show mild improvement, no s/s of infection.   -No changes, continue to clinically monitor.     Cognitive impairment  Mild cognitive/memory impairment, stable/unchanged.   -No changes, continue to clinically monitor.     Debility  -Continues with PT/OT.    Orders written by provider at facility  -As noted above.     Electronically signed by:  DHAVAL Agarwal CNP

## 2019-10-14 NOTE — LETTER
10/14/2019        RE: Ghislaine Walls  [address]  [facility] [address]        [place] [facility]  [place] [facility] Record Number:  [id number]  Place of Service where encounter took place:  [facility] ([facility]) [[id number]]  Chief Complaint   Patient presents with     RECHECK       HPI:    Ghislaine Walls  is a 84 year old (1935), who is being seen today for an episodic care visit.  HPI information obtained from: facility chart records, facility staff, patient report and [facility] chart review.      Met with Mrs. Walls today for follow up.  Mrs. Walls reports she continues to feel fatigued, but not worse than before.  She denies any SOB but does endorse OTBAR and thinks it's worse than before, but then later denied.  She endorses ongoing chronic arm pain but unchanged in nature.  She denies fevers, but reports she does not feel as good as she should, but vague in nature.  We do note Tmax over weekend was 102.4.    Past Medical and Surgical History reviewed in Epic today.    MEDICATIONS:  Current Outpatient Medications   Medication Sig Dispense Refill     acetaminophen (TYLENOL) 325 MG tablet Take 1,000 mg by mouth every 8 hours as needed for fever or pain       allopurinol (ZYLOPRIM) 100 MG tablet Take 1 tablet (100 mg) by mouth daily 90 tablet 1     apixaban ANTICOAGULANT (ELIQUIS) 2.5 MG tablet Take 1 tablet (2.5 mg) by mouth 2 times daily       diltiazem ER (DILT-XR) 120 MG 24 hr capsule Take 1 capsule (120 mg) by mouth daily       hydrALAZINE (APRESOLINE) 25 MG tablet Take 25 mg by mouth 3 times daily       levothyroxine (SYNTHROID/LEVOTHROID) 25 MCG tablet Take 1 tablet (25 mcg) by mouth daily 90 tablet 3     Melatonin 10 MG TABS tablet Take 10 mg by mouth nightly as needed for sleep        metoprolol succinate ER (TOPROL-XL) 100 MG 24 hr tablet Take 1 tablet (100 mg) by mouth daily 90 tablet 1     REVIEW OF SYSTEMS:  Limited secondary to cognitive  "impairment but today 7 point ROS done including, light headedness/dizziness, fever/chills, pain, Resp, CV, GI, and  and is negative other than noted in HPI.      Objective:  BP (!) 180/76   Pulse 79   Temp 99.5  F (37.5  C)   Resp 28   Ht 1.549 m (5' 1\")   Wt 72.7 kg (160 lb 3.2 oz)   SpO2 96%   BMI 30.27 kg/m        Exam:  GENERAL APPEARANCE:  Elderly female sitting up in bed, NAD, non-toxic.  ENT:  Mouth and oropharynx normal, moist mucous membranes.   RESP:  Lungs  diminished in bilateral bases, otherwise CTA, no crackles.  Regular relaxed breathing effort.  No cough.   CV: S1/S2 no murmur or rubs.  Regular rhythm and rate.     ABDOMEN:   Protuberant, soft, non-tender with active bowel sounds.  No guarding, rigidity, or rebound tenderness.  EXTREMITIES:   Trace bilateral lower extremity edema, 1-2+ pedal edema, no calf tenderness.   PSYCH: Alert and orientated, pleasant and cooperative.  Suspect some degree of cognitive/memory impairment but stable/unchanged.   SKIN: Bilateral buttocks cheeks have a roughly 1 cm irregular round pressure ulcer stage II with no drainage, no s/s of infection.      Labs:   I have personally reviewed labs, which are in facility or EMR chart.     ASSESSMENT/PLAN:  Fever, unspecified fever cause  Fatigue, unspecified type   had fevers over the weekend, T max of 102.4, reports of loose stools, C-diff was negative.  She denies fevers but reports ongoing fatigue and vague not feeling as well as she usually does.  She is non-toxic on exam and later seen ambulating well with PT/OT.  Hemodynamically stable, actually HTN.    Thus etiology of fevers is unclear.  C-diff negative, lungs do not sound suspicious, she denies dysuria.  Abdominal exam benign on exam.  Does not appear toxic.    -Will get Labs, UA/UC and CXR for work up.   -No empiric treatment at this time.     PAF (paroxysmal atrial fibrillation) (H)  Uncontrolled hypertension  Chronic heart failure with preserved " ejection fraction (H)  Lymphedema of both lower extremities  Admitted due to new onset A-fib RVR, today HR is regular.  HR's controlled, SBP's 150-180's.  Last week Dr. Bashir resumed/ordered her Furosemide 40 mg's q daily.  We found out today that was never done, medication error.  Her weight is stable, no crackles on exam, but diminished, we do note ongoing mild LE edema, but not bad.  Thus unclear if volume is etiology of hypertension.  Her prior Lisinopril and Furosemide remain held due to DRAGAN in hospital that resolved.   -Continue Apixaban.   -Continue Diltiazem ER, Hydralazine, Toprol.   --Due to CXR not being done yet, will give a one time dose of Furosemide 40 mg's today.     CKD (chronic kidney disease) stage 3, GFR 30-59 ml/min (H)  Creatine baseline was 1.4-1.5 but lately 1.1 and now 1.2 while off Lisinopril and Furosemide.   -Furosemide x 1 now.   -Continue to clinically monitor.     Pressure injury of buttock, stage 2, bilaterality (H)  Review of both pressures ulcers today show mild improvement, no s/s of infection.   -No changes, continue to clinically monitor.     Cognitive impairment  Mild cognitive/memory impairment, stable/unchanged.   -No changes, continue to clinically monitor.     Debility  -Continues with PT/OT.    Orders written by provider at facility  -As noted above.     Electronically signed by:  DHAVAL Agarwal CNP           Sincerely,        DHAVAL Agarwal CNP

## 2019-10-15 ENCOUNTER — NURSING HOME VISIT (OUTPATIENT)
Dept: GERIATRICS | Facility: CLINIC | Age: 84
End: 2019-10-15
Payer: MEDICARE

## 2019-10-15 VITALS
BODY MASS INDEX: 30.32 KG/M2 | RESPIRATION RATE: 28 BRPM | OXYGEN SATURATION: 98 % | WEIGHT: 160.6 LBS | DIASTOLIC BLOOD PRESSURE: 83 MMHG | TEMPERATURE: 99 F | SYSTOLIC BLOOD PRESSURE: 183 MMHG | HEIGHT: 61 IN | HEART RATE: 61 BPM

## 2019-10-15 DIAGNOSIS — I48.0 PAF (PAROXYSMAL ATRIAL FIBRILLATION) (H): ICD-10-CM

## 2019-10-15 DIAGNOSIS — N18.30 CKD (CHRONIC KIDNEY DISEASE) STAGE 3, GFR 30-59 ML/MIN (H): ICD-10-CM

## 2019-10-15 DIAGNOSIS — I50.32 CHRONIC DIASTOLIC HEART FAILURE (H): ICD-10-CM

## 2019-10-15 DIAGNOSIS — M25.511 CHRONIC PAIN OF BOTH SHOULDERS: ICD-10-CM

## 2019-10-15 DIAGNOSIS — R53.81 DEBILITY: ICD-10-CM

## 2019-10-15 DIAGNOSIS — M25.512 CHRONIC PAIN OF BOTH SHOULDERS: ICD-10-CM

## 2019-10-15 DIAGNOSIS — I10 UNCONTROLLED HYPERTENSION: ICD-10-CM

## 2019-10-15 DIAGNOSIS — G89.29 CHRONIC PAIN OF BOTH SHOULDERS: ICD-10-CM

## 2019-10-15 DIAGNOSIS — R53.83 FATIGUE, UNSPECIFIED TYPE: ICD-10-CM

## 2019-10-15 DIAGNOSIS — R50.9 FEVER, UNSPECIFIED FEVER CAUSE: Primary | ICD-10-CM

## 2019-10-15 DIAGNOSIS — N39.0 URINARY TRACT INFECTION WITHOUT HEMATURIA, SITE UNSPECIFIED: ICD-10-CM

## 2019-10-15 DIAGNOSIS — I89.0 LYMPHEDEMA: ICD-10-CM

## 2019-10-15 PROCEDURE — 99309 SBSQ NF CARE MODERATE MDM 30: CPT | Performed by: NURSE PRACTITIONER

## 2019-10-15 RX ORDER — CIPROFLOXACIN 250 MG/1
250 TABLET, FILM COATED ORAL 2 TIMES DAILY
Qty: 4 TABLET | Refills: 0
Start: 2019-10-15 | End: 2019-10-22

## 2019-10-15 RX ORDER — LISINOPRIL 10 MG/1
10 TABLET ORAL DAILY
Start: 2019-10-15 | End: 2019-10-25

## 2019-10-15 ASSESSMENT — MIFFLIN-ST. JEOR: SCORE: 1120.86

## 2019-10-15 NOTE — PROGRESS NOTES
"[facility]  [facility] Record Number:  [id number]  Place of Service where encounter took place:  [facility] ([facility] [[id number]]  Chief Complaint   Patient presents with     RECHECK       HPI:    Ghislaine Walls  is a 84 year old (1935), who is being seen today for an episodic care visit.  HPI information obtained from: facility chart records, facility staff, patient report and [facility] chart review.     Met with Mrs. Walls today for follow up.  She reports the same, ongoing fatigue worse than usual, not dysuria but \"Warm\" sensation with voiding, and ongoing chronic shoulder pain.  No new complaints.      Past Medical and Surgical History reviewed in Epic today.    MEDICATIONS:  Current Outpatient Medications   Medication Sig Dispense Refill     acetaminophen (TYLENOL) 325 MG tablet Take 1,000 mg by mouth 3 times daily       allopurinol (ZYLOPRIM) 100 MG tablet Take 1 tablet (100 mg) by mouth daily 90 tablet 1     apixaban ANTICOAGULANT (ELIQUIS) 2.5 MG tablet Take 1 tablet (2.5 mg) by mouth 2 times daily       ciprofloxacin (CIPRO) 250 MG tablet Take 1 tablet (250 mg) by mouth 2 times daily for 4 doses 4 tablet 0     diltiazem ER (DILT-XR) 120 MG 24 hr capsule Take 1 capsule (120 mg) by mouth daily       hydrALAZINE (APRESOLINE) 25 MG tablet Take 25 mg by mouth 3 times daily       levothyroxine (SYNTHROID/LEVOTHROID) 25 MCG tablet Take 1 tablet (25 mcg) by mouth daily 90 tablet 3     lisinopril (PRINIVIL/ZESTRIL) 10 MG tablet Take 1 tablet (10 mg) by mouth daily       Melatonin 10 MG TABS tablet Take 10 mg by mouth nightly as needed for sleep        metoprolol succinate ER (TOPROL-XL) 100 MG 24 hr tablet Take 1 tablet (100 mg) by mouth daily 90 tablet 1     REVIEW OF SYSTEMS:  Limited secondary to cognitive impairment but today 7 point ROS done including, light headedness/dizziness, fever/chills, pain, Resp, CV, GI, and  and is negative other than noted in HPI.  " "    Objective:  BP (!) 183/83   Pulse 61   Temp 99  F (37.2  C)   Resp 28   Ht 1.549 m (5' 1\")   Wt 72.8 kg (160 lb 9.6 oz)   SpO2 98%   BMI 30.35 kg/m       Exam:  GENERAL APPEARANCE:  Elderly female sitting up in bed, NAD, non-toxic.  ENT:  Mouth and oropharynx normal, moist mucous membranes.   RESP: Slight crackles in LLL today, R side more clear today, rest is CTA.  Regular relaxed breathing effort.  No cough.   CV: S1/S2 no murmur or rubs.  Regular rhythm and rate.     ABDOMEN:   Protuberant, soft, non-tender with active bowel sounds.  No guarding, rigidity, or rebound tenderness.  EXTREMITIES:  Trace bilateral lower extremity edema, 1-2+ pedal edema, no calf tenderness.   PSYCH: Alert and orientated, pleasant and cooperative.  Suspect some degree of cognitive/memory impairment but stable/unchanged.   SKIN: Bilateral buttocks ulcers not assessed today.     Labs:   I have personally reviewed labs, which are in facility or EMR chart.     ASSESSMENT/PLAN:  Fever, unspecified fever cause  Fatigue, unspecified type  Urinary tract infection without hematuria, site unspecified  Been fatigued, with vague unwellness and T max of 102.4.  Urine showing WBC with clumps and many bacteria, culture pending.  CXR negative, C-diff negative, thus etiology is presumed UTI.  Started Ciprofloxacin empirically, awaiting culture data.    -Already received Ciprofloxacin 500 mg's BID.   -Continue Ciprofloxacin 250 mg's BID x 2 days.   -f/u on culture data.     Uncontrolled hypertension  Chronic diastolic heart failure (H)  Lymphedema  PAF (paroxysmal atrial fibrillation) (H)  Due to DRAGAN hospital stopped Furosemide and Lisinopril.  Lately SBP's 180's.  Weight stable, pedal/ankle edema is 1-2+ and stable.  Minimal crackles today, none yesterday.  Creatine is lower than baseline.  Due to UTI will hold on diuretics, will start low dose Lisinopril and titrate pending creatine.   -Continue Apixaban.   -Continue Diltiazem, Hydralazine, " Toprol.   --Start Lisinopril 10 mg's q daily.   --Continue to hold Furosemide for now.     CKD (chronic kidney disease) stage 3, GFR 30-59 ml/min (H)  Creatine baseline 1.4-1.5 but lower than baseline 1.1-12 hours since off Lisinopril and Furosemide.   -Plan as noted above.   -Recheck BMP 18 Oct.     Chronic pain of both shoulders  Went back and reviewed EMR, she had bilateral shoulder MRI's in June that noted tendinopathy, fluid/bursitis, DJD in the Left and Right, but also noted tendon sheath effusion on the Right.  In speaking with the Son he reports she is s/p injection of the shoulders which helped for a few weeks.  Reports shoulder pain is chronic.   -Changed Acetaminophen to TID.   --Did suggest after TCU they consider return to PCP for repeat injections or consult Orthopedics.     Debility  -Continues with PT/OT.     Orders written by provider at facility  -As noted above.     Electronically signed by:  DHAVAL Agarwal CNP

## 2019-10-15 NOTE — LETTER
"    10/15/2019        RE: Ghislaine Walls  [address]  [facility] [address]        [facility]  [facility] Medical Record Number:  [id number]  [facility] of [facility] where encounter took place[facility] [[id number]]  Chief Complaint   Patient presents with     RECHECK       HPI:    Ghislaine Walls  is a 84 year old (1935), who is being seen today for an episodic care visit.  HPI information obtained from: facility chart records, facility staff, patient report and [facility] chart review.     Met with Mrs. Walls today for follow up.  She reports the same, ongoing fatigue worse than usual, not dysuria but \"Warm\" sensation with voiding, and ongoing chronic shoulder pain.  No new complaints.      Past Medical and Surgical History reviewed in Epic today.    MEDICATIONS:  Current Outpatient Medications   Medication Sig Dispense Refill     acetaminophen (TYLENOL) 325 MG tablet Take 1,000 mg by mouth 3 times daily       allopurinol (ZYLOPRIM) 100 MG tablet Take 1 tablet (100 mg) by mouth daily 90 tablet 1     apixaban ANTICOAGULANT (ELIQUIS) 2.5 MG tablet Take 1 tablet (2.5 mg) by mouth 2 times daily       ciprofloxacin (CIPRO) 250 MG tablet Take 1 tablet (250 mg) by mouth 2 times daily for 4 doses 4 tablet 0     diltiazem ER (DILT-XR) 120 MG 24 hr capsule Take 1 capsule (120 mg) by mouth daily       hydrALAZINE (APRESOLINE) 25 MG tablet Take 25 mg by mouth 3 times daily       levothyroxine (SYNTHROID/LEVOTHROID) 25 MCG tablet Take 1 tablet (25 mcg) by mouth daily 90 tablet 3     lisinopril (PRINIVIL/ZESTRIL) 10 MG tablet Take 1 tablet (10 mg) by mouth daily       Melatonin 10 MG TABS tablet Take 10 mg by mouth nightly as needed for sleep        metoprolol succinate ER (TOPROL-XL) 100 MG 24 hr tablet Take 1 tablet (100 mg) by mouth daily 90 tablet 1     REVIEW OF SYSTEMS:  Limited secondary to cognitive impairment but today 7 point ROS done including, light " "headedness/dizziness, fever/chills, pain, Resp, CV, GI, and  and is negative other than noted in HPI.      Objective:  BP (!) 183/83   Pulse 61   Temp 99  F (37.2  C)   Resp 28   Ht 1.549 m (5' 1\")   Wt 72.8 kg (160 lb 9.6 oz)   SpO2 98%   BMI 30.35 kg/m        Exam:  GENERAL APPEARANCE:  Elderly female sitting up in bed, NAD, non-toxic.  ENT:  Mouth and oropharynx normal, moist mucous membranes.   RESP: Slight crackles in LLL today, R side more clear today, rest is CTA.  Regular relaxed breathing effort.  No cough.   CV: S1/S2 no murmur or rubs.  Regular rhythm and rate.     ABDOMEN:   Protuberant, soft, non-tender with active bowel sounds.  No guarding, rigidity, or rebound tenderness.  EXTREMITIES:  Trace bilateral lower extremity edema, 1-2+ pedal edema, no calf tenderness.   PSYCH: Alert and orientated, pleasant and cooperative.  Suspect some degree of cognitive/memory impairment but stable/unchanged.   SKIN: Bilateral buttocks ulcers not assessed today.     Labs:   I have personally reviewed labs, which are in facility or EMR chart.     ASSESSMENT/PLAN:  Fever, unspecified fever cause  Fatigue, unspecified type  Urinary tract infection without hematuria, site unspecified  Been fatigued, with vague unwellness and T max of 102.4.  Urine showing WBC with clumps and many bacteria, culture pending.  CXR negative, C-diff negative, thus etiology is presumed UTI.  Started Ciprofloxacin empirically, awaiting culture data.    -Already received Ciprofloxacin 500 mg's BID.   -Continue Ciprofloxacin 250 mg's BID x 2 days.   -f/u on culture data.     Uncontrolled hypertension  Chronic diastolic heart failure (H)  Lymphedema  PAF (paroxysmal atrial fibrillation) (H)  Due to DRAGAN hospital stopped Furosemide and Lisinopril.  Lately SBP's 180's.  Weight stable, pedal/ankle edema is 1-2+ and stable.  Minimal crackles today, none yesterday.  Creatine is lower than baseline.  Due to UTI will hold on diuretics, will start " low dose Lisinopril and titrate pending creatine.   -Continue Apixaban.   -Continue Diltiazem, Hydralazine, Toprol.   --Start Lisinopril 10 mg's q daily.   --Continue to hold Furosemide for now.     CKD (chronic kidney disease) stage 3, GFR 30-59 ml/min (H)  Creatine baseline 1.4-1.5 but lower than baseline 1.1-12 hours since off Lisinopril and Furosemide.   -Plan as noted above.   -Recheck BMP 18 Oct.     Chronic pain of both shoulders  Went back and reviewed EMR, she had bilateral shoulder MRI's in June that noted tendinopathy, fluid/bursitis, DJD in the Left and Right, but also noted tendon sheath effusion on the Right.  In speaking with the Son he reports she is s/p injection of the shoulders which helped for a few weeks.  Reports shoulder pain is chronic.   -Changed Acetaminophen to TID.   --Did suggest after TCU they consider return to PCP for repeat injections or consult Orthopedics.     Debility  -Continues with PT/OT.     Orders written by provider at facility  -As noted above.     Electronically signed by:  DHAVAL Agarwal CNP           Sincerely,        DHAVAL Agarwal CNP

## 2019-10-17 ENCOUNTER — CARE COORDINATION (OUTPATIENT)
Dept: CARDIOLOGY | Facility: CLINIC | Age: 84
End: 2019-10-17

## 2019-10-17 ENCOUNTER — OFFICE VISIT (OUTPATIENT)
Dept: CARDIOLOGY | Facility: CLINIC | Age: 84
End: 2019-10-17
Attending: PHYSICIAN ASSISTANT
Payer: MEDICARE

## 2019-10-17 VITALS
BODY MASS INDEX: 30.3 KG/M2 | HEART RATE: 52 BPM | OXYGEN SATURATION: 100 % | SYSTOLIC BLOOD PRESSURE: 126 MMHG | HEIGHT: 61 IN | DIASTOLIC BLOOD PRESSURE: 65 MMHG | WEIGHT: 160.5 LBS

## 2019-10-17 DIAGNOSIS — I48.0 PAF (PAROXYSMAL ATRIAL FIBRILLATION) (H): ICD-10-CM

## 2019-10-17 LAB
ANION GAP SERPL CALCULATED.3IONS-SCNC: 13.3 MMOL/L (ref 6–17)
BACTERIA SPEC CULT: ABNORMAL
BACTERIA SPEC CULT: ABNORMAL
BUN SERPL-MCNC: 21 MG/DL (ref 7–30)
CALCIUM SERPL-MCNC: 9.6 MG/DL (ref 8.5–10.5)
CHLORIDE SERPL-SCNC: 111 MMOL/L (ref 98–107)
CO2 SERPL-SCNC: 19 MMOL/L (ref 23–29)
CREAT SERPL-MCNC: 1.22 MG/DL (ref 0.7–1.3)
GFR SERPL CREATININE-BSD FRML MDRD: 42 ML/MIN/{1.73_M2}
GLUCOSE SERPL-MCNC: 116 MG/DL (ref 70–105)
Lab: ABNORMAL
POTASSIUM SERPL-SCNC: 4.3 MMOL/L (ref 3.5–5.1)
SODIUM SERPL-SCNC: 139 MMOL/L (ref 136–145)
SPECIMEN SOURCE: ABNORMAL

## 2019-10-17 PROCEDURE — 99214 OFFICE O/P EST MOD 30 MIN: CPT | Performed by: PHYSICIAN ASSISTANT

## 2019-10-17 PROCEDURE — 36415 COLL VENOUS BLD VENIPUNCTURE: CPT | Performed by: PHYSICIAN ASSISTANT

## 2019-10-17 PROCEDURE — 80048 BASIC METABOLIC PNL TOTAL CA: CPT | Performed by: PHYSICIAN ASSISTANT

## 2019-10-17 ASSESSMENT — MIFFLIN-ST. JEOR: SCORE: 1120.4

## 2019-10-17 NOTE — PATIENT INSTRUCTIONS
Today's Plan:   - Please increase the Eliquis dose to 5 mg twice daily.   - Remember to take care to avoid falling or injuring yourself on the blood thinning medication. If you hit your head, you need to seek urgent care.   - Please return to see Dr. Fuentes in 3-4 months.      If you have questions or concerns please call my nurse team at [phone].  Scheduling phone number: [phone]  Reminder: Please bring in all current medications, over the counter supplements and vitamin bottles to your next appointment.    It was a pleasure seeing you today!     Daphne Cm PA-C, PABernabeC

## 2019-10-17 NOTE — LETTER
10/17/2019    Mahsa Soto, DO  [address]  [facility] [address]    RE: Ghislaine Walls       Dear Colleague,    I had the pleasure of seeing Ghislaine Walls in the [facility].      Cardiology Clinic Progress Note    Ghislaine Walls MRN# [id number]   YOB: 1935 Age: 84 year old   Primary cardiologist: will be Dr. Fuentes         Assessment and Plan:     In summary, Ghislaine Walls presents today for a hospital f/u visit.    1. PAF with RVR, noted while severely dehydrated    - LVEF 60-65%.    - Pursuing rate control approach on diltiazem + Toprol, currently SR in 50's.   - Anticoagulated on Eliquis (currently low-dose in setting of recent renal dysfunction) for a [id number] of 4.    - One brief episode suggestive of recurrence.     2. Chronic fatigue, poor sleep - declines sleep study for now.     3. DRAGAN/CKD from dehydration - resolved, off Lasix. Creatinine today is 1.2.    4. HTN - controlled.     Plan:  - Increase Eliquis to 5 mg BID (appropriate dose after renal recovery).   - Continue fall precautions. Consider [facility] (previously living independently) - she's receptive to this.     Follow-up:  - Dr. Fuentes in 3-4 months.         History of Presenting Illness:      Ghislaine Walls is a pleasant 84 year old patient who presents today for a hospital f/u visit.    The patient has a history of the following -   # PAF, on Eliquis  # HTN  # CKD  # Chronic LE edema / lymphedema  # Gout  # Hypothyroid, supplemented    She was admitted to [facility] on 9/30/19 with generalized weakness, found to be in acute renal failure with a creatinine of 3.3, and rapid AF. She spontaneously converted to SR in the 70's-80's after a brief diltiazem drip and was maintained on her PTA Toprol 100. However she re-developed rapid rates so oral diltiazem was added and she re-converted to SR after that. Her DRAGAN resolved after holding lisinopril and Lasix.  "This was complicated by a mild type II troponin rise. ECHO showed a preserved LVEF of 60-65% with grade I diastolic dysfunction, moderate LAE and mild TR. IVC was normal. Additionally, she had venous studies performed on her legs for chronic leg swelling which showed no evidence for bilateral DVT's. Her swelling did not change off her diuretic. She was discharged to [facility] on 10/5 at a wt of 159 lbs. She unfortunately developed a Klebsiella UTI since that time and is being treated with Cipro.     Today, Ghislaine presents to clinic alone from the [facility]. She has had one brief episode of palpitations since leaving the hospital while trying to get to sleep one night, for which she alerted the nurse but states it did not last long. Otherwise no symptoms to suggest recurrence. She complains of chronic fatigue and chronic poor sleep x many years. She's never been tested for ANUSHKA. Her chronic leg swelling is stable. She denies chest pain, dyspnea, PND, orthopnea, near-syncope. She's had no issues with bleeding. She remains very deconditioned and is essentially wheelchair-bound at this point, requires assist in transfer and a walker to ambulate. Prior to her admission she was living independently. Weight is stable. BMP today shows a creat of 1.2, BUN 21, K+ 4.3.  She is in sinus bradycardia per auscultation.          Review of Systems:     12-pt ROS is negative except for as noted in the HPI.          Physical Exam:     Vitals: /65 (BP Location: Right arm, Cuff Size: Adult Large)   Pulse 52   Ht 1.549 m (5' 1\")   Wt 72.8 kg (160 lb 8 oz)   SpO2 100%   BMI 30.33 kg/m     Wt Readings from Last 10 Encounters:   10/17/19 72.8 kg (160 lb 8 oz)   10/15/19 72.8 kg (160 lb 9.6 oz)   10/14/19 72.7 kg (160 lb 3.2 oz)   10/07/19 72.8 kg (160 lb 9.6 oz)   10/05/19 67.3 kg (148 lb 5.9 oz)   09/27/19 73 kg (161 lb)   06/14/19 81.6 kg (180 lb)   06/08/19 81.6 kg (180 lb)   05/29/19 82.8 kg (182 lb 8 oz)   04/18/19 81.9 kg " (180 lb 8 oz)       Constitutional:  Patient is pleasant, alert, cooperative, and in NAD.  HEENT:  NCAT. PERRLA. EOM's intact.   Neck:  CVP appears normal. No carotid bruits.   Pulmonary: Normal respiratory effort. CTAB.   Cardiac: RRR, normal S1/S2, no S3/S4, no murmur or rub.   Abdomen:  Non-tender abdomen, no hepatosplenomegaly appreciated.   Vascular: Pulses in the upper and lower extremities are 2+ and equal bilaterally.  Extremities: Bilateral LE non-pitting edema appreciated.   Skin:  No rashes or lesions appreciated.   Neurological:  No gross motor or sensory deficits.   Psych: Depressed affect.        Data:     Labs reviewed:  Recent Labs   Lab Test 10/02/19  0531 09/30/19  2310 09/24/19 06/14/19  1331 06/08/19  1031 04/18/19  1339  06/28/17  1351 06/20/16  1511   LDL 74  --   --   --   --   --   --  109* 125*   HDL 27*  --   --   --   --   --   --  45* 54   NHDL 96  --   --   --   --   --   --  128 141*   CHOL 123  --   --   --   --   --   --  173 195   TRIG 111  --   --   --   --   --   --  93 79   TSH  --  1.14 1.94  --   --  1.03   < >  --  0.57   NTBNP  --   --   --  2,800* 2,104*  --   --   --   --    IRON 26*  --   --   --   --   --   --   --   --    *  --   --   --   --   --   --   --   --    IRONSAT 13*  --   --   --   --   --   --   --   --    HOWARD 410*  --   --   --   --   --   --   --   --     < > = values in this interval not displayed.       Lab Results   Component Value Date    WBC 3.3 (L) 10/14/2019    RBC 3.16 (L) 10/14/2019    HGB 9.9 (L) 10/14/2019    HCT 31.1 (L) 10/14/2019    MCV 98 10/14/2019    MCH 31.3 10/14/2019    MCHC 31.8 10/14/2019    RDW 15.7 (H) 10/14/2019     10/14/2019       Lab Results   Component Value Date     10/17/2019    POTASSIUM 4.3 10/17/2019    CHLORIDE 111 (H) 10/17/2019    CO2 19 (L) 10/17/2019    ANIONGAP 13.3 10/17/2019     (H) 10/17/2019    BUN 21 10/17/2019    CR 1.22 10/17/2019    GFRESTIMATED 42 (L) 10/17/2019    GFRESTBLACK 51 (L)  10/17/2019    ALIVIA 9.6 10/17/2019      Lab Results   Component Value Date    AST 19 10/05/2019    ALT 14 10/05/2019       No results found for: A1C    No results found for: INR        Problem List:     Patient Active Problem List   Diagnosis     Hyperlipidemia LDL goal <130     Hypertension goal BP (blood pressure) < 140/90     [place] (hard of hearing)     Urgency incontinence     Other specified hypothyroidism     History of CVA (cerebrovascular accident)     CKD (chronic kidney disease) stage 3, GFR 30-59 ml/min (H)     Gouty arthritis     Lymphedema     Anemia of chronic renal failure, stage 3 (moderate) (H)     Weight loss, abnormal     CHF (congestive heart failure) (H) possible      Physical deconditioning     Generalized weakness     Pressure injury of buttock, stage 2, unspecified laterality (H)     Atrial fibrillation with RVR (H), acute     Fatigue, unspecified type     Generalized muscle weakness     Demand ischemia (H)     Essential hypertension     DRAGAN (acute kidney injury) (H)     Chronic diastolic heart failure (H)     Debility     Fever, unspecified fever cause     Uncontrolled hypertension     Chronic heart failure with preserved ejection fraction (H)     Lymphedema of both lower extremities     PAF (paroxysmal atrial fibrillation) (H)     Cognitive impairment     Urinary tract infection without hematuria, site unspecified     Chronic pain of both shoulders           Medications:     Current Outpatient Medications   Medication Sig Dispense Refill     acetaminophen (TYLENOL) 325 MG tablet Take 1,000 mg by mouth 3 times daily       allopurinol (ZYLOPRIM) 100 MG tablet Take 1 tablet (100 mg) by mouth daily 90 tablet 1     apixaban ANTICOAGULANT (ELIQUIS) 2.5 MG tablet Take 1 tablet (2.5 mg) by mouth 2 times daily       diltiazem ER (DILT-XR) 120 MG 24 hr capsule Take 1 capsule (120 mg) by mouth daily       hydrALAZINE (APRESOLINE) 25 MG tablet Take 25 mg by mouth 3 times daily       levothyroxine  (SYNTHROID/LEVOTHROID) 25 MCG tablet Take 1 tablet (25 mcg) by mouth daily 90 tablet 3     lisinopril (PRINIVIL/ZESTRIL) 10 MG tablet Take 1 tablet (10 mg) by mouth daily       Melatonin 10 MG TABS tablet Take 10 mg by mouth nightly as needed for sleep        metoprolol succinate ER (TOPROL-XL) 100 MG 24 hr tablet Take 1 tablet (100 mg) by mouth daily 90 tablet 1     ciprofloxacin (CIPRO) 250 MG tablet Take 1 tablet (250 mg) by mouth 2 times daily for 4 doses (Patient not taking: Reported on 10/17/2019) 4 tablet 0           Past Medical History:     Past Medical History:   Diagnosis Date     [place] (hard of hearing)     wears hearing aids     Hyperlipidaemia      Hyperlipidaemia LDL goal < 130      Hypertension      Hypothyroid      PAD (peripheral artery disease) (H)      Renal insufficiency, mild      Past Surgical History:   Procedure Laterality Date     APPENDECTOMY  1951     CATARACT IOL, RT/LT  2001    bilateral (laser - 5/2013)     DENTAL SURGERY  1962    wisdom     EYE SURGERY  1956    Muscle release     TUBAL LIGATION  1971     Family History   Problem Relation Age of Onset     Heart Disease Mother      Respiratory Mother      Heart Disease Father      Heart Disease Brother      Coronary Artery Disease Brother         CAB     Heart Disease Brother      Coronary Artery Disease Brother         CAB     Social History     Socioeconomic History     Marital status: Single     Spouse name: Not on file     Number of children: Not on file     Years of education: Not on file     Highest education level: Not on file   Occupational History     Not on file   Social Needs     Financial resource strain: Not on file     Food insecurity:     Worry: Not on file     Inability: Not on file     Transportation needs:     Medical: Not on file     Non-medical: Not on file   Tobacco Use     Smoking status: Former Smoker     Packs/day: 0.50     Years: 5.00     Pack years: 2.50     Types: Cigarettes     Last attempt to quit: 1/1/1973      Years since quittin.8     Smokeless tobacco: Never Used   Substance and Sexual Activity     Alcohol use: Not Currently     Alcohol/week: 0.0 standard drinks     Drug use: No     Sexual activity: Not Currently     Partners: Male   Lifestyle     Physical activity:     Days per week: Not on file     Minutes per session: Not on file     Stress: Not on file   Relationships     Social connections:     Talks on phone: Not on file     Gets together: Not on file     Attends [employer] service: Not on file     Active member of club or organization: Not on file     Attends meetings of clubs or organizations: Not on file     Relationship status: Not on file     Intimate partner violence:     Fear of current or ex partner: Not on file     Emotionally abused: Not on file     Physically abused: Not on file     Forced sexual activity: Not on file   Other Topics Concern     Parent/sibling w/ CABG, MI or angioplasty before 65F 55M? No   Social History Narrative     Not on file           Allergies:   Oxybutynin and Seasonal allergies      Daphne Cm PA-C, KIERA  [facility]  Pager: [phone]            Thank you for allowing me to participate in the care of your patient.    Sincerely,     Daphne Cm PA-C     [facility]

## 2019-10-17 NOTE — PROGRESS NOTES
Received call from [facility], who requests a signed order for Eliquis dose increase that KIERA Pepper made at  today because their pharmacy will not accept the AVS as an order.     Faxed signed order to [facility] (fx: [phone]) for pt to increase Eliquis dose to 5mg twice daily.     MOJGAN [place] 3:32 PM 10/17/2019

## 2019-10-17 NOTE — PROGRESS NOTES
Cardiology Clinic Progress Note    Ghislaine Walls MRN# [id number]   YOB: 1935 Age: 84 year old   Primary cardiologist: will be Dr. Fuentes         Assessment and Plan:     In summary, Ghislaine Walls presents today for a hospital f/u visit.    1. PAF with RVR, noted while severely dehydrated    - LVEF 60-65%.    - Pursuing rate control approach on diltiazem + Toprol, currently SR in 50's.   - Anticoagulated on Eliquis (currently low-dose in setting of recent renal dysfunction) for a [id number] of 4.    - One brief episode suggestive of recurrence.     2. Chronic fatigue, poor sleep - declines sleep study for now.     3. DRAGAN/CKD from dehydration - resolved, off Lasix. Creatinine today is 1.2.    4. HTN - controlled.     Plan:  - Increase Eliquis to 5 mg BID (appropriate dose after renal recovery).   - Continue fall precautions. Consider move to [facility] living from [facility] (previously living independently) - she's receptive to this.     Follow-up:  - Dr. Fuentes in 3-4 months.         History of Presenting Illness:      Ghislaine Walls is a pleasant 84 year old patient who presents today for a hospital f/u visit.    The patient has a history of the following -   # PAF, on Eliquis  # HTN  # CKD  # Chronic LE edema / lymphedema  # Gout  # Hypothyroid, supplemented    She was admitted to [facility] on 9/30/19 with generalized weakness, found to be in acute renal failure with a creatinine of 3.3, and rapid AF. She spontaneously converted to SR in the 70's-80's after a brief diltiazem drip and was maintained on her PTA Toprol 100. However she re-developed rapid rates so oral diltiazem was added and she re-converted to SR after that. Her DRAGAN resolved after holding lisinopril and Lasix. This was complicated by a mild type II troponin rise. ECHO showed a preserved LVEF of 60-65% with grade I diastolic dysfunction, moderate LAE and mild TR. IVC was normal. Additionally, she had venous studies performed on her legs for  "chronic leg swelling which showed no evidence for bilateral DVT's. Her swelling did not change off her diuretic. She was discharged to [facility] on 10/5 at a wt of 159 lbs. She unfortunately developed a Klebsiella UTI since that time and is being treated with Cipro.     Today, Ghislaine presents to clinic alone from the [facility]CU. She has had one brief episode of palpitations since leaving the hospital while trying to get to sleep one night, for which she alerted the nurse but states it did not last long. Otherwise no symptoms to suggest recurrence. She complains of chronic fatigue and chronic poor sleep x many years. She's never been tested for ANUSHKA. Her chronic leg swelling is stable. She denies chest pain, dyspnea, PND, orthopnea, near-syncope. She's had no issues with bleeding. She remains very deconditioned and is essentially wheelchair-bound at this point, requires assist in transfer and a walker to ambulate. Prior to her admission she was living independently. Weight is stable. BMP today shows a creat of 1.2, BUN 21, K+ 4.3.  She is in sinus bradycardia per auscultation.          Review of Systems:     12-pt ROS is negative except for as noted in the HPI.          Physical Exam:     Vitals: /65 (BP Location: Right arm, Cuff Size: Adult Large)   Pulse 52   Ht 1.549 m (5' 1\")   Wt 72.8 kg (160 lb 8 oz)   SpO2 100%   BMI 30.33 kg/m    Wt Readings from Last 10 Encounters:   10/17/19 72.8 kg (160 lb 8 oz)   10/15/19 72.8 kg (160 lb 9.6 oz)   10/14/19 72.7 kg (160 lb 3.2 oz)   10/07/19 72.8 kg (160 lb 9.6 oz)   10/05/19 67.3 kg (148 lb 5.9 oz)   09/27/19 73 kg (161 lb)   06/14/19 81.6 kg (180 lb)   06/08/19 81.6 kg (180 lb)   05/29/19 82.8 kg (182 lb 8 oz)   04/18/19 81.9 kg (180 lb 8 oz)       Constitutional:  Patient is pleasant, alert, cooperative, and in NAD.  HEENT:  NCAT. PERRLA. EOM's intact.   Neck:  CVP appears normal. No carotid bruits.   Pulmonary: Normal respiratory effort. CTAB.   Cardiac: RRR, " normal S1/S2, no S3/S4, no murmur or rub.   Abdomen:  Non-tender abdomen, no hepatosplenomegaly appreciated.   Vascular: Pulses in the upper and lower extremities are 2+ and equal bilaterally.  Extremities: Bilateral LE non-pitting edema appreciated.   Skin:  No rashes or lesions appreciated.   Neurological:  No gross motor or sensory deficits.   Psych: Depressed affect.        Data:     Labs reviewed:  Recent Labs   Lab Test 10/02/19  0531 09/30/19  2310 09/24/19 06/14/19  1331 06/08/19  1031 04/18/19  1339  06/28/17  1351 06/20/16  1511   LDL 74  --   --   --   --   --   --  109* 125*   HDL 27*  --   --   --   --   --   --  45* 54   NHDL 96  --   --   --   --   --   --  128 141*   CHOL 123  --   --   --   --   --   --  173 195   TRIG 111  --   --   --   --   --   --  93 79   TSH  --  1.14 1.94  --   --  1.03   < >  --  0.57   NTBNP  --   --   --  2,800* 2,104*  --   --   --   --    IRON 26*  --   --   --   --   --   --   --   --    *  --   --   --   --   --   --   --   --    IRONSAT 13*  --   --   --   --   --   --   --   --    HOWARD 410*  --   --   --   --   --   --   --   --     < > = values in this interval not displayed.       Lab Results   Component Value Date    WBC 3.3 (L) 10/14/2019    RBC 3.16 (L) 10/14/2019    HGB 9.9 (L) 10/14/2019    HCT 31.1 (L) 10/14/2019    MCV 98 10/14/2019    MCH 31.3 10/14/2019    MCHC 31.8 10/14/2019    RDW 15.7 (H) 10/14/2019     10/14/2019       Lab Results   Component Value Date     10/17/2019    POTASSIUM 4.3 10/17/2019    CHLORIDE 111 (H) 10/17/2019    CO2 19 (L) 10/17/2019    ANIONGAP 13.3 10/17/2019     (H) 10/17/2019    BUN 21 10/17/2019    CR 1.22 10/17/2019    GFRESTIMATED 42 (L) 10/17/2019    GFRESTBLACK 51 (L) 10/17/2019    ALIVIA 9.6 10/17/2019      Lab Results   Component Value Date    AST 19 10/05/2019    ALT 14 10/05/2019       No results found for: A1C    No results found for: INR        Problem List:     Patient Active Problem List    Diagnosis     Hyperlipidemia LDL goal <130     Hypertension goal BP (blood pressure) < 140/90     [place] (hard of hearing)     Urgency incontinence     Other specified hypothyroidism     History of CVA (cerebrovascular accident)     CKD (chronic kidney disease) stage 3, GFR 30-59 ml/min (H)     Gouty arthritis     Lymphedema     Anemia of chronic renal failure, stage 3 (moderate) (H)     Weight loss, abnormal     CHF (congestive heart failure) (H) possible      Physical deconditioning     Generalized weakness     Pressure injury of buttock, stage 2, unspecified laterality (H)     Atrial fibrillation with RVR (H), acute     Fatigue, unspecified type     Generalized muscle weakness     Demand ischemia (H)     Essential hypertension     DRAGAN (acute kidney injury) (H)     Chronic diastolic heart failure (H)     Debility     Fever, unspecified fever cause     Uncontrolled hypertension     Chronic heart failure with preserved ejection fraction (H)     Lymphedema of both lower extremities     PAF (paroxysmal atrial fibrillation) (H)     Cognitive impairment     Urinary tract infection without hematuria, site unspecified     Chronic pain of both shoulders           Medications:     Current Outpatient Medications   Medication Sig Dispense Refill     acetaminophen (TYLENOL) 325 MG tablet Take 1,000 mg by mouth 3 times daily       allopurinol (ZYLOPRIM) 100 MG tablet Take 1 tablet (100 mg) by mouth daily 90 tablet 1     apixaban ANTICOAGULANT (ELIQUIS) 2.5 MG tablet Take 1 tablet (2.5 mg) by mouth 2 times daily       diltiazem ER (DILT-XR) 120 MG 24 hr capsule Take 1 capsule (120 mg) by mouth daily       hydrALAZINE (APRESOLINE) 25 MG tablet Take 25 mg by mouth 3 times daily       levothyroxine (SYNTHROID/LEVOTHROID) 25 MCG tablet Take 1 tablet (25 mcg) by mouth daily 90 tablet 3     lisinopril (PRINIVIL/ZESTRIL) 10 MG tablet Take 1 tablet (10 mg) by mouth daily       Melatonin 10 MG TABS tablet Take 10 mg by mouth nightly as  needed for sleep        metoprolol succinate ER (TOPROL-XL) 100 MG 24 hr tablet Take 1 tablet (100 mg) by mouth daily 90 tablet 1     ciprofloxacin (CIPRO) 250 MG tablet Take 1 tablet (250 mg) by mouth 2 times daily for 4 doses (Patient not taking: Reported on 10/17/2019) 4 tablet 0           Past Medical History:     Past Medical History:   Diagnosis Date     [place] (hard of hearing)     wears hearing aids     Hyperlipidaemia      Hyperlipidaemia LDL goal < 130      Hypertension      Hypothyroid      PAD (peripheral artery disease) (H)      Renal insufficiency, mild      Past Surgical History:   Procedure Laterality Date     APPENDECTOMY       CATARACT IOL, RT/LT      bilateral (laser - 2013)     DENTAL SURGERY      wisdom     EYE SURGERY      Muscle release     TUBAL LIGATION  1971     Family History   Problem Relation Age of Onset     Heart Disease Mother      Respiratory Mother      Heart Disease Father      Heart Disease Brother      Coronary Artery Disease Brother         CAB     Heart Disease Brother      Coronary Artery Disease Brother         CAB     Social History     Socioeconomic History     Marital status: Single     Spouse name: Not on file     Number of children: Not on file     Years of education: Not on file     Highest education level: Not on file   Occupational History     Not on file   Social Needs     Financial resource strain: Not on file     Food insecurity:     Worry: Not on file     Inability: Not on file     Transportation needs:     Medical: Not on file     Non-medical: Not on file   Tobacco Use     Smoking status: Former Smoker     Packs/day: 0.50     Years: 5.00     Pack years: 2.50     Types: Cigarettes     Last attempt to quit: 1973     Years since quittin.8     Smokeless tobacco: Never Used   Substance and Sexual Activity     Alcohol use: Not Currently     Alcohol/week: 0.0 standard drinks     Drug use: No     Sexual activity: Not Currently     Partners: Male    Lifestyle     Physical activity:     Days per week: Not on file     Minutes per session: Not on file     Stress: Not on file   Relationships     Social connections:     Talks on phone: Not on file     Gets together: Not on file     Attends Druze service: Not on file     Active member of club or organization: Not on file     Attends meetings of clubs or organizations: Not on file     Relationship status: Not on file     Intimate partner violence:     Fear of current or ex partner: Not on file     Emotionally abused: Not on file     Physically abused: Not on file     Forced sexual activity: Not on file   Other Topics Concern     Parent/sibling w/ CABG, MI or angioplasty before 65F 55M? No   Social History Narrative     Not on file           Allergies:   Oxybutynin and Seasonal allergies      Daphne Cm PA-C, PA-C  [facility]  Pager: [phone]

## 2019-10-18 ENCOUNTER — HOSPITAL LABORATORY (OUTPATIENT)
Dept: OTHER | Facility: CLINIC | Age: 84
End: 2019-10-18

## 2019-10-18 LAB
ANION GAP SERPL CALCULATED.3IONS-SCNC: 8 MMOL/L (ref 3–14)
BUN SERPL-MCNC: 21 MG/DL (ref 7–30)
CALCIUM SERPL-MCNC: 8.9 MG/DL (ref 8.5–10.1)
CHLORIDE SERPL-SCNC: 110 MMOL/L (ref 94–109)
CO2 SERPL-SCNC: 20 MMOL/L (ref 20–32)
CREAT SERPL-MCNC: 1.22 MG/DL (ref 0.52–1.04)
GFR SERPL CREATININE-BSD FRML MDRD: 41 ML/MIN/{1.73_M2}
GLUCOSE SERPL-MCNC: 110 MG/DL (ref 70–99)
POTASSIUM SERPL-SCNC: 3.7 MMOL/L (ref 3.4–5.3)
SODIUM SERPL-SCNC: 138 MMOL/L (ref 133–144)

## 2019-10-22 ENCOUNTER — DISCHARGE SUMMARY NURSING HOME (OUTPATIENT)
Dept: GERIATRICS | Facility: CLINIC | Age: 84
End: 2019-10-22
Payer: MEDICARE

## 2019-10-22 VITALS
HEIGHT: 61 IN | DIASTOLIC BLOOD PRESSURE: 75 MMHG | HEART RATE: 81 BPM | RESPIRATION RATE: 18 BRPM | TEMPERATURE: 99.1 F | WEIGHT: 159.8 LBS | BODY MASS INDEX: 30.17 KG/M2 | SYSTOLIC BLOOD PRESSURE: 132 MMHG | OXYGEN SATURATION: 97 %

## 2019-10-22 DIAGNOSIS — I89.0 LYMPHEDEMA OF BOTH LOWER EXTREMITIES: ICD-10-CM

## 2019-10-22 DIAGNOSIS — N39.0 URINARY TRACT INFECTION WITHOUT HEMATURIA, SITE UNSPECIFIED: ICD-10-CM

## 2019-10-22 DIAGNOSIS — G89.29 CHRONIC PAIN OF BOTH SHOULDERS: ICD-10-CM

## 2019-10-22 DIAGNOSIS — M25.512 CHRONIC PAIN OF BOTH SHOULDERS: ICD-10-CM

## 2019-10-22 DIAGNOSIS — L89.302 PRESSURE INJURY OF BUTTOCK, STAGE 2, UNSPECIFIED LATERALITY (H): ICD-10-CM

## 2019-10-22 DIAGNOSIS — I10 ESSENTIAL HYPERTENSION: ICD-10-CM

## 2019-10-22 DIAGNOSIS — I48.0 PAF (PAROXYSMAL ATRIAL FIBRILLATION) (H): ICD-10-CM

## 2019-10-22 DIAGNOSIS — R53.81 DEBILITY: ICD-10-CM

## 2019-10-22 DIAGNOSIS — I50.32 CHRONIC DIASTOLIC HEART FAILURE (H): ICD-10-CM

## 2019-10-22 DIAGNOSIS — M25.511 CHRONIC PAIN OF BOTH SHOULDERS: ICD-10-CM

## 2019-10-22 DIAGNOSIS — R41.89 COGNITIVE IMPAIRMENT: ICD-10-CM

## 2019-10-22 DIAGNOSIS — N18.30 CKD (CHRONIC KIDNEY DISEASE) STAGE 3, GFR 30-59 ML/MIN (H): ICD-10-CM

## 2019-10-22 DIAGNOSIS — I24.89 DEMAND ISCHEMIA (H): ICD-10-CM

## 2019-10-22 DIAGNOSIS — N17.9 AKI (ACUTE KIDNEY INJURY) (H): ICD-10-CM

## 2019-10-22 DIAGNOSIS — I48.91 ATRIAL FIBRILLATION WITH RVR (H): Primary | ICD-10-CM

## 2019-10-22 PROCEDURE — 99316 NF DSCHRG MGMT 30 MIN+: CPT | Performed by: NURSE PRACTITIONER

## 2019-10-22 ASSESSMENT — MIFFLIN-ST. JEOR: SCORE: 1117.23

## 2019-10-22 NOTE — PROGRESS NOTES
[facility] DISCHARGE SUMMARY  PATIENT'S NAME: Ghislaine Walls  YOB: 1935  MEDICAL RECORD NUMBER:  [id number]  Place of Service where encounter took place:  [facility] [[id number]]    PRIMARY CARE PROVIDER AND CLINIC RESPONSIBLE AFTER TRANSFER:   Mahsa Soto DO, [address] / [facility] [place] [address]    Assisted Living: [facility] - new resident     Transferring providers: DHAVAL Agarwal CNP, Reinaldo Bashir MD  Recent Hospitalization/ED:  [facility] stay 9/30/19 to 10/05/19.  Date of SNF Admission: October / 05 / 2019  Date of SNF (anticipated) Discharge: October / 24 / 2019  Discharged to: new assisted living for [facility]  Cognitive Scores: SLUMS: 24/30 and CPT: 4.8/5.6  Physical Function: TUG 31.9 sec with increased fall risk, 200 ft 4ww, SBA.   DME: Walker    CODE STATUS/ADVANCE DIRECTIVES DISCUSSION:  DNR / DNI   ALLERGIES: Oxybutynin and Seasonal allergies    DISCHARGE DIAGNOSIS/NURSING FACILITY COURSE:   Mrs. Walls is a 85 y/o with minimal PMH that includes HTN, PAD, CKD III, gout whom lived alone in independent living.  She reported to the ER due to worsening/increasing weakness and fatigue and reported she can not live on her own anymore.  Workup showed she was in new A-fib RVR.  She was seen by Neph due to DRAGAN and they felt she was vascularly dry, thus held her Furosemide.  Seen by Cards whom recommended rate control and anticoagulation.  With holding the Furosemide, creatine greatly improved.  She thus transitioned to the TCU for ongoing cares and PT/OT.     While at the TCU initially Mrs. Walls was slow to improve, had fatigue and then started to have intermittent fevers.  Workup showed Klebsiella UTI which we treated.  After no more fevers, and fatigue was less but she still had minimal energy and spent most of her time in bed, but did work with PT/OT.  We noted some degree  of cognitive/memory impairment and after treating her UTI she started to note her chronic bilateral shoulder pain as her biggest barrier to being independent.  Due to improving but not up to a point where she can return home, decision was made to transition to [facility].  She had Cardiology f/u already once.  She would likely benefit from follow up with Orthopedics for her shoulders.     In meeting Mrs. Walls today for discharge, she reports the above.  Reports overall she is feeling better.  No more fevers, no more dysuria, but does endorse ongoing mild TOBAR with exertions but no SOB at rest.  Reports ongoing bilateral shoulder pain as chronic.  She is not sure if she is ready or not, but she is being set up with Home care as well.       Atrial fibrillation with RVR (H), acute  PAF (paroxysmal atrial fibrillation) (H)  Demand ischemia (H)  Essential hypertension  Chronic diastolic heart failure (H)  Lymphedema of both lower extremities  Started on Apixaban and Diltiazem in hospital.   While at TCU SBP's continue to be erratic 120-180's but asymptomatic.  HR's 50-80's.  Had Card's f/u 10/17 where they increased her Apixaban, no other changes.   -Cards f/u in 3-4 months.   -Continues Apixaban at 5 mg's BID.  -Continues Diltiazem, Hydralazine, Toprol.   --We continue to hold prior Furosemide.      Weight was 160.6 lbs on 10/5, and weight 159.8 on discharge 10/21.  Continues to have Trace to 1+ LE edema, no other s/s of CHF.     DRAGAN (acute kidney injury) (H)  CKD (chronic kidney disease) stage 3, GFR 30-59 ml/min (H)  Creatine baseline 1.4-1.5 prior, creatine did get up to 3.27 in hospital.  Prior Lisinopril and Furosemide were stopped and remain held.  Creatine lately now 1.2 and stable.   -Using Creatine of 1.2 as new baseline off ACEI and Furosemide.   --Recheck left to PCP discretion.     Pressure injury of buttock, stage 2, bilaterality (H)  Came with bilateral buttocks Stage II pressure ulcers, these are much  improved.   -Cleaning q daily with wound  and covering with padded dressing.     Chronic pain of both shoulders  Her biggest issue now is chronic shoulder pain.  She had bilateral shoulder MRI's in June that noted tendinopathy, fluid/bursitis, DJD in the Left and Right, but also noted tendon sheath effusion on the Right.  In speaking with the Son he reports she is s/p injection of the shoulders which helped for a few weeks.  We were considering injections here at TCU but she developed a UTI and needed antibiotics, thus we delayed steroids to the outpatient arena.   -Recommended PCP f/u with consideration of Ortho consult or f/u to consider repeat injections.     Urinary tract infection without hematuria, site unspecified  Developed Klebsiella UTI while at TCU.  Treated with Ciprofloxacin, has finished treatment.     Cognitive impairment  We have noted mild-moderate cognitive/memory impairment and chronic fatigue.  Stable/unchanged with no behavior issues.      Debility  -Will DC with  home care.   -See Epic order for F2F.     Past Medical History:  has a past medical history of [place] (hard of hearing), Hyperlipidaemia, Hyperlipidaemia LDL goal < 130, Hypertension, Hypothyroid, PAD (peripheral artery disease) (H), and Renal insufficiency, mild.    Discharge Medications:  Current Outpatient Medications   Medication Sig Dispense Refill     acetaminophen (TYLENOL) 325 MG tablet Take 1,000 mg by mouth 3 times daily       allopurinol (ZYLOPRIM) 100 MG tablet Take 1 tablet (100 mg) by mouth daily 90 tablet 1     apixaban ANTICOAGULANT (ELIQUIS) 5 MG tablet Take 1 tablet (5 mg) by mouth 2 times daily 60 tablet 5     diltiazem ER (DILT-XR) 120 MG 24 hr capsule Take 1 capsule (120 mg) by mouth daily       hydrALAZINE (APRESOLINE) 25 MG tablet Take 25 mg by mouth 3 times daily       levothyroxine (SYNTHROID/LEVOTHROID) 25 MCG tablet Take 1 tablet (25 mcg) by mouth daily 90 tablet 3     lisinopril (PRINIVIL/ZESTRIL) 10  "MG tablet Take 1 tablet (10 mg) by mouth daily       Melatonin 10 MG TABS tablet Take 10 mg by mouth nightly as needed for sleep        metoprolol succinate ER (TOPROL-XL) 100 MG 24 hr tablet Take 1 tablet (100 mg) by mouth daily 90 tablet 1     Medication Changes/Rationale:   -As noted above.     Controlled medications sent with patient:   not applicable/none     ROS:   Limited secondary to cognitive impairment but today 7 point ROS done including, light headedness/dizziness, fever/chills, pain, Resp, CV, GI, and  and is negative other than noted in HPI.    Physical Exam:   Vitals: /75   Pulse 81   Temp 99.1  F (37.3  C)   Resp 18   Ht 1.549 m (5' 1\")   Wt 72.5 kg (159 lb 12.8 oz)   SpO2 97%   BMI 30.19 kg/m    BMI= Body mass index is 30.19 kg/m .     GENERAL APPEARANCE:  Elderly female sitting up in bed, NAD, non-toxic.  ENT:  Mouth and oropharynx normal, moist mucous membranes.   RESP: Slightly diminished in lower lobes bilaterally, no crackles, CTA superiorly.  Regular relaxed breathing effort.  No cough.   CV: S1/S2 no murmur or rubs.  Regular rhythm and rate.     ABDOMEN:   Protuberant, soft, non-tender with active bowel sounds.  No guarding, rigidity, or rebound tenderness.  EXTREMITIES:  Trace to 1+ bilateral lower extremity edema, no calf tenderness.   PSYCH: Alert and orientated, pleasant and cooperative.  Suspect some degree of cognitive/memory impairment but stable/unchanged.   SKIN: Bilateral buttocks ulcers are improved, still minor skin breakdown, thus stage II, but much smaller and no s/s of infection.     SNF labs: Labs done in SNF are in [place] EPIC. Please refer to them using EPIC/Care Everywhere.    DISCHARGE PLAN:    Follow up labs: No labs orders/due    Medical Follow Up:      Follow up with primary care provider in 1-2 weeks.    Follow up with Cardiology as noted above.       MTM referral needed and placed by this provider: No      Discharge Services: Home Care:  Occupational " Therapy, Physical Therapy, Registered Nurse, Home Health Aide and From:  [facility]    Discharge Instructions Verbalized to Patient at Discharge:     Instructed patient to arrange/attend above appointments.     TOTAL DISCHARGE TIME:   Greater than 30 minutes  Electronically signed by:  DHAVAL Agarwal [facility]     Home care Face to Face documentation done in [facility] attached to Home care orders for [facility].

## 2019-10-22 NOTE — LETTER
10/22/2019        RE: Ghislaine Walls  [address]  [facility] [address]        [facility] DISCHARGE SUMMARY  PATIENT'S NAME: Ghislaine Walls  YOB: 1935  MEDICAL RECORD NUMBER:  [id number]  Place of Service where encounter took place:  [facility] [[id number]]    PRIMARY CARE PROVIDER AND CLINIC RESPONSIBLE AFTER TRANSFER:   Mahsa Soto DO, [address] / [facility] [address]    Assisted Living: [facility] - new resident     Transferring providers: DHAVAL Agarwal CNP, Reinaldo Bashir MD  Recent Hospitalization/ED:  [facility] stay 9/30/19 to 10/05/19.  Date of SNF Admission: October / 05 / 2019  Date of SNF (anticipated) Discharge: October / 24 / 2019  Discharged to: new assisted living for [facility]  Cognitive Scores: SLUMS: 24/30 and CPT: 4.8/5.6  Physical Function: TUG 31.9 sec with increased fall risk, 200 ft 4ww, SBA.   DME: [facility]    CODE STATUS/ADVANCE DIRECTIVES DISCUSSION:  DNR / DNI   ALLERGIES: Oxybutynin and Seasonal allergies    DISCHARGE DIAGNOSIS/NURSING FACILITY COURSE:   Mrs. Walls is a 85 y/o with minimal PMH that includes HTN, PAD, CKD III, gout whom lived alone in independent living.  She reported to the ER due to worsening/increasing weakness and fatigue and reported she can not live on her own anymore.  Workup showed she was in new A-fib RVR.  She was seen by Neph due to DRAGAN and they felt she was vascularly dry, thus held her Furosemide.  Seen by Cards whom recommended rate control and anticoagulation.  With holding the Furosemide, creatine greatly improved.  She thus transitioned to the TCU for ongoing cares and PT/OT.     While at the TCU initially Mrs. Walls was slow to improve, had fatigue and then started to have intermittent fevers.  Workup showed Klebsiella UTI which we treated.  After no more fevers, and fatigue was less but she  still had minimal energy and spent most of her time in bed, but did work with PT/OT.  We noted some degree of cognitive/memory impairment and after treating her UTI she started to note her chronic bilateral shoulder pain as her biggest barrier to being independent.  Due to improving but not up to a point where she can return home, decision was made to transition to Critical access hospital.  She had Cardiology f/u already once.  She would likely benefit from follow up with Orthopedics for her shoulders.     In meeting Mrs. Walls today for discharge, she reports the above.  Reports overall she is feeling better.  No more fevers, no more dysuria, but does endorse ongoing mild TOBAR with exertions but no SOB at rest.  Reports ongoing bilateral shoulder pain as chronic.  She is not sure if she is ready or not, but she is being set up with Home care as well.       Atrial fibrillation with RVR (H), acute  PAF (paroxysmal atrial fibrillation) (H)  Demand ischemia (H)  Essential hypertension  Chronic diastolic heart failure (H)  Lymphedema of both lower extremities  Started on Apixaban and Diltiazem in hospital.   While at TCU SBP's continue to be erratic 120-180's but asymptomatic.  HR's 50-80's.  Had Card's f/u 10/17 where they increased her Apixaban, no other changes.   -Cards f/u in 3-4 months.   -Continues Apixaban at 5 mg's BID.  -Continues Diltiazem, Hydralazine, Toprol.   --We continue to hold prior Furosemide.      Weight was 160.6 lbs on 10/5, and weight 159.8 on discharge 10/21.  Continues to have Trace to 1+ LE edema, no other s/s of CHF.     DRAGAN (acute kidney injury) (H)  CKD (chronic kidney disease) stage 3, GFR 30-59 ml/min (H)  Creatine baseline 1.4-1.5 prior, creatine did get up to 3.27 in hospital.  Prior Lisinopril and Furosemide were stopped and remain held.  Creatine lately now 1.2 and stable.   -Using Creatine of 1.2 as new baseline off ACEI and Furosemide.   --Recheck left to PCP discretion.     Pressure injury of  buttock, stage 2, bilaterality (H)  Came with bilateral buttocks Stage II pressure ulcers, these are much improved.   -Cleaning q daily with wound  and covering with padded dressing.     Chronic pain of both shoulders  Her biggest issue now is chronic shoulder pain.  She had bilateral shoulder MRI's in June that noted tendinopathy, fluid/bursitis, DJD in the Left and Right, but also noted tendon sheath effusion on the Right.  In speaking with the Son he reports she is s/p injection of the shoulders which helped for a few weeks.  We were considering injections here at TCU but she developed a UTI and needed antibiotics, thus we delayed steroids to the outpatient arena.   -Recommended PCP f/u with consideration of Ortho consult or f/u to consider repeat injections.     Urinary tract infection without hematuria, site unspecified  Developed Klebsiella UTI while at TCU.  Treated with Ciprofloxacin, has finished treatment.     Cognitive impairment  We have noted mild-moderate cognitive/memory impairment and chronic fatigue.  Stable/unchanged with no behavior issues.      Debility  -Will DC with  home care.   -See Epic order for F2F.     Past Medical History:  has a past medical history of [place] (hard of hearing), Hyperlipidaemia, Hyperlipidaemia LDL goal < 130, Hypertension, Hypothyroid, PAD (peripheral artery disease) (H), and Renal insufficiency, mild.    Discharge Medications:  Current Outpatient Medications   Medication Sig Dispense Refill     acetaminophen (TYLENOL) 325 MG tablet Take 1,000 mg by mouth 3 times daily       allopurinol (ZYLOPRIM) 100 MG tablet Take 1 tablet (100 mg) by mouth daily 90 tablet 1     apixaban ANTICOAGULANT (ELIQUIS) 5 MG tablet Take 1 tablet (5 mg) by mouth 2 times daily 60 tablet 5     diltiazem ER (DILT-XR) 120 MG 24 hr capsule Take 1 capsule (120 mg) by mouth daily       hydrALAZINE (APRESOLINE) 25 MG tablet Take 25 mg by mouth 3 times daily       levothyroxine  "(SYNTHROID/LEVOTHROID) 25 MCG tablet Take 1 tablet (25 mcg) by mouth daily 90 tablet 3     lisinopril (PRINIVIL/ZESTRIL) 10 MG tablet Take 1 tablet (10 mg) by mouth daily       Melatonin 10 MG TABS tablet Take 10 mg by mouth nightly as needed for sleep        metoprolol succinate ER (TOPROL-XL) 100 MG 24 hr tablet Take 1 tablet (100 mg) by mouth daily 90 tablet 1     Medication Changes/Rationale:   -As noted above.     Controlled medications sent with patient:   not applicable/none     ROS:   Limited secondary to cognitive impairment but today 7 point ROS done including, light headedness/dizziness, fever/chills, pain, Resp, CV, GI, and  and is negative other than noted in HPI.    Physical Exam:   Vitals: /75   Pulse 81   Temp 99.1  F (37.3  C)   Resp 18   Ht 1.549 m (5' 1\")   Wt 72.5 kg (159 lb 12.8 oz)   SpO2 97%   BMI 30.19 kg/m     BMI= Body mass index is 30.19 kg/m .     GENERAL APPEARANCE:  Elderly female sitting up in bed, NAD, non-toxic.  ENT:  Mouth and oropharynx normal, moist mucous membranes.   RESP: Slight ly diminished in lower lobes bilaterally, no crackles, CTA superiorly.  Regular relaxed breathing effort.  No cough.   CV: S1/S2 no murmur or rubs.  Regular rhythm and rate.     ABDOMEN:   Protuberant, soft, non-tender with active bowel sounds.  No guarding, rigidity, or rebound tenderness.  EXTREMITIES:  Trace to 1+ bilateral lower extremity edema, no calf tenderness.   PSYCH: Alert and orientated, pleasant and cooperative.  Suspect some degree of cognitive/memory impairment but stable/unchanged.   SKIN: Bilateral buttocks ulcers  are improved, still minor skin breakdown, thus stage II, but much smaller and no s/s of infection.     SNF labs: Labs done in SNF are in [place] EPIC. Please refer to them using [employer]/Care Everywhere.    DISCHARGE PLAN:    Follow up labs: No labs orders/due    Medical Follow Up:      Follow up with primary care provider in 1-2 weeks.    Follow up with " Cardiology as noted above.       MTM referral needed and placed by this provider: No      Discharge Services: Home Care:  Occupational Therapy, Physical Therapy, Registered Nurse, Home Health Aide and From:  [facility]    Discharge Instructions Verbalized to Patient at Discharge:     Instructed patient to arrange/attend above appointments.     TOTAL DISCHARGE TIME:   Greater than 30 minutes  Electronically signed by:  DHAVAL Agarwal CNP     Home care Face to Face documentation done in EPIC attached to Home care orders for [facility].               Sincerely,        DHAVAL Agarwal CNP

## 2019-10-25 ENCOUNTER — MEDICAL CORRESPONDENCE (OUTPATIENT)
Dept: HEALTH INFORMATION MANAGEMENT | Facility: CLINIC | Age: 84
End: 2019-10-25

## 2019-10-25 ENCOUNTER — TELEPHONE (OUTPATIENT)
Dept: FAMILY MEDICINE | Facility: CLINIC | Age: 84
End: 2019-10-25

## 2019-10-25 ENCOUNTER — CARE COORDINATION (OUTPATIENT)
Dept: CARDIOLOGY | Facility: CLINIC | Age: 84
End: 2019-10-25

## 2019-10-25 DIAGNOSIS — I10 UNCONTROLLED HYPERTENSION: ICD-10-CM

## 2019-10-25 DIAGNOSIS — I48.0 PAF (PAROXYSMAL ATRIAL FIBRILLATION) (H): ICD-10-CM

## 2019-10-25 DIAGNOSIS — I10 HYPERTENSION GOAL BP (BLOOD PRESSURE) < 140/90: Chronic | ICD-10-CM

## 2019-10-25 RX ORDER — LISINOPRIL 10 MG/1
10 TABLET ORAL DAILY
Qty: 90 TABLET | Refills: 3 | Status: SHIPPED | OUTPATIENT
Start: 2019-10-25 | End: 2020-01-10

## 2019-10-25 RX ORDER — HYDRALAZINE HYDROCHLORIDE 25 MG/1
25 TABLET, FILM COATED ORAL 3 TIMES DAILY
Qty: 270 TABLET | Refills: 3 | Status: SHIPPED | OUTPATIENT
Start: 2019-10-25 | End: 2020-01-10

## 2019-10-25 RX ORDER — DILTIAZEM HYDROCHLORIDE 120 MG/1
120 CAPSULE, EXTENDED RELEASE ORAL DAILY
Qty: 90 CAPSULE | Refills: 3 | Status: SHIPPED | OUTPATIENT
Start: 2019-10-25 | End: 2020-01-10

## 2019-10-25 RX ORDER — METOPROLOL SUCCINATE 100 MG/1
100 TABLET, EXTENDED RELEASE ORAL DAILY
Qty: 90 TABLET | Refills: 3 | Status: SHIPPED | OUTPATIENT
Start: 2019-10-25 | End: 2020-01-10

## 2019-10-25 NOTE — TELEPHONE ENCOUNTER
[facility] RN saw patient today for services,   Patient admitted to services with the request of orders for disease management, education, medication management with education, cardio/pulmonary monitoring, nutrition/hydration, skin integrity and safety.   1 more time this week,   2 times a week for 2 weeks  then 1 time a week for 4 weeks.  and 2 as needed visits.   Also requesting PT, OT, and SW for eval and treat. HHA for bathing and personal cares, 1 time a week for 5 weeks.     Any further questions, please don't hesitant to call BUT I do check [facility] daily for routine orders.    Dariana Saba RN BSN

## 2019-10-25 NOTE — PROGRESS NOTES
Received message from [facility] nurse, Dariana, who is admitting pt to homecare today after discharge from U on 10/24/19.  Dariana states pt needs all cardiac med prescriptions sent to the [facility] on [address] in [place]. Eliquis, lisinopril, diltiazem, metoprolol and hydralazine prescriptions sent as requested.      MOJGAN Templeton 10:54 AM 10/25/2019

## 2019-10-27 LAB
FUNGUS SPEC CULT: NORMAL
Lab: NORMAL
SPECIMEN SOURCE: NORMAL

## 2019-10-28 ENCOUNTER — DOCUMENTATION ONLY (OUTPATIENT)
Dept: FAMILY MEDICINE | Facility: CLINIC | Age: 84
End: 2019-10-28

## 2019-10-28 ENCOUNTER — TELEPHONE (OUTPATIENT)
Dept: FAMILY MEDICINE | Facility: CLINIC | Age: 84
End: 2019-10-28

## 2019-10-28 DIAGNOSIS — I48.91 ATRIAL FIBRILLATION WITH RVR (H): Primary | ICD-10-CM

## 2019-10-28 RX ORDER — WARFARIN SODIUM 3 MG/1
3 TABLET ORAL DAILY
Qty: 50 TABLET | Refills: 1 | Status: SHIPPED | OUTPATIENT
Start: 2019-10-28 | End: 2020-01-09

## 2019-10-28 NOTE — TELEPHONE ENCOUNTER
Huddle with provider.     Will start patient on Warfarin medication today. This was sent to patient pharmacy.     Mica ARMENTA home care was updated this was sent to patient pharmacy.     Also noted that she will contact patients home care RN to set up INR's for patient to be drawn in home.     No further follow up at this time.

## 2019-10-28 NOTE — TELEPHONE ENCOUNTER
Mica,  home care     Just got back from U, at a new assisted living and has not taken her Eliquis medication since Saturday. This was the medication that the patient was discharged on and family cannot afford to pay this much for medication.     Requesting a medication change at this time because she has not taking her anticoag medication for 2 days.     What next steps should this patient be taking at this time? Med change (patient was put on the med by TCU).     Please call Mica back ASAP at [phone] after provider review.

## 2019-10-28 NOTE — PROGRESS NOTES
[facility] now requests orders and shares plan of care/discharge summaries for some patients through [employer].  Please REPLY TO THIS MESSAGE OR ROUTE BACK TO THE AUTHOR in order to give authorization for orders when needed.  This is considered a verbal order, you will still receive a faxed copy of orders for signature.  Thank you for your assistance in improving collaboration for our patients.    ORDER  PT for 1w1, 2w3, 1w1    MD SUMMARY/PLAN OF CARE  PT for 1w1, 2w3, 1w1 for gait training, strengthening, balance, falls prevention, HEP progression, and improving mobility and transfer independence and safety.    Thanks!  Gloria Moser, PT  [facility]  [phone]  [email or web address]

## 2019-10-29 ENCOUNTER — TELEPHONE (OUTPATIENT)
Dept: FAMILY MEDICINE | Facility: CLINIC | Age: 84
End: 2019-10-29

## 2019-10-29 DIAGNOSIS — Z79.01 LONG TERM CURRENT USE OF ANTICOAGULANT THERAPY: ICD-10-CM

## 2019-10-29 DIAGNOSIS — I48.91 ATRIAL FIBRILLATION WITH RVR (H): Primary | ICD-10-CM

## 2019-10-29 LAB — INR PPP: 1.3 (ref 0.8–1.1)

## 2019-10-29 NOTE — TELEPHONE ENCOUNTER
Pt started on warfarin yesterday 3mg,   [facility] RN took INR today of 1.3    What would you like dosing to be? Who is following warfarin dosing/INRs.     Cardiology said not them.     [facility] plans to see again Friday for med setup and ongoing HC orders.     Please advise above today, thanks    Dariana Saba RN BSN  [facility] : [facility]  Cell: [phone] *No need to call-I check EPIC Everyday*

## 2019-10-29 NOTE — TELEPHONE ENCOUNTER
For [facility] clinics to follow we will need an INR referral to allow us to order INRs and dose warfarin.    Please review the INR referral and sign.  Until we receive authorization we will need to direct home care to call you for dosing and management.    Thank you!    Lorna Alvarez, PharmD [facility]  Anticoagulation Clinical Pharmacist

## 2019-10-29 NOTE — TELEPHONE ENCOUNTER
Signed INR referral completed by PCP care team, [place] INR clinic will be following.      Please draw INR 11/01/2019 per STANFORD Henry, and call [phone] to speak with anticoagulation nurse for dosing and next INR date.    Per Dariana, last dose Eliquis of was 10/26/2019    Diarrhea over weekend, has resolved.    Lorna Alvarez, PharmD BCACP  Anticoagulation Clinical Pharmacist

## 2019-10-30 ENCOUNTER — TELEPHONE (OUTPATIENT)
Dept: FAMILY MEDICINE | Facility: CLINIC | Age: 84
End: 2019-10-30

## 2019-10-30 NOTE — TELEPHONE ENCOUNTER
Our goal is to have forms completed within 72 hours, however some forms may require a visit or additional information.    What clinic location was the form placed at [facility] or [place].?     Who is the form from?   Where did the form come from? Faxed to clinic   The form was placed in the inbox of Mahsa Soto, DO      Please fax to [phone]   Phone number:      Additional comments: [facility] PT 1 w 1, 2 w 3, 1 w 1    Call take on 10/30/2019 at 12:07 PM by Michelle Burnham

## 2019-10-31 ENCOUNTER — TELEPHONE (OUTPATIENT)
Dept: FAMILY MEDICINE | Facility: CLINIC | Age: 84
End: 2019-10-31

## 2019-11-01 ENCOUNTER — TELEPHONE (OUTPATIENT)
Dept: FAMILY MEDICINE | Facility: CLINIC | Age: 84
End: 2019-11-01

## 2019-11-01 LAB — INR PPP: 1.9 (ref 0.8–1.1)

## 2019-11-01 NOTE — TELEPHONE ENCOUNTER
ANTICOAGULATION MANAGEMENT     Patient Name:  Ghislaine Walls  Date:  2019    ASSESSMENT /SUBJECTIVE:      Today's INR result of 1.9 is subtherapeutic. Goal INR of 2.0-3.0      Warfarin dose taken: Warfarin taken as previously instructed    Diet: No new diet changes affecting INR    Medication changes/ interactions: No new medications/supplements affecting INR    Previous INR: Subtherapeutic     S/S of bleeding or thromboembolism: No    New injury or illness:  No    Upcoming surgery, procedure or cardioversion:  No    Additional findings: Recent TCU discharged and changed from Eliquis to Warfarin      PLAN:    Spoke with [facility] Nurse regarding INR result and instructed:     Warfarin Dosing Instructions: Continue your current warfarin dose    Instructed patient to follow up no later than: 4 days; Home Care     Education provided: No      Nurse Bear,  verbalizes understanding and agrees to warfarin dosing plan.    Instructed to call the Anticoagulation Clinic for any changes, questions or concerns. (#[phone])        OBJECTIVE:  INR   Date Value Ref Range Status   2019 1.9 (A) 0.8 - 1.1 Final             Anticoagulation Summary  As of 2019    INR goal:   2.0-3.0   TTR:   --   INR used for dosin.9! (2019)   Warfarin maintenance plan:   1.5 mg (3 mg x 0.5) every Tue, Thu, Sat; 3 mg (3 mg x 1) all other days   Full warfarin instructions:   1.5 mg every Tue, Thu, Sat; 3 mg all other days   Weekly warfarin total:   16.5 mg   Plan last modified:   Rosie Mcrae RN (2019)   Next INR check:   2019   Priority:   INR   Target end date:            Anticoagulation Episode Summary     INR check location:   Home Draw    Preferred lab:       Send INR reminders to:   LEESA HUERTA    Comments:         Anticoagulation Care Providers     Provider Role Specialty Phone number    Alma Delia Wallace PA-C Responsible Physician Assistant [phone]

## 2019-11-04 ENCOUNTER — OFFICE VISIT (OUTPATIENT)
Dept: FAMILY MEDICINE | Facility: CLINIC | Age: 84
End: 2019-11-04
Payer: MEDICARE

## 2019-11-04 VITALS
OXYGEN SATURATION: 99 % | WEIGHT: 160 LBS | RESPIRATION RATE: 16 BRPM | TEMPERATURE: 97.7 F | HEART RATE: 53 BPM | BODY MASS INDEX: 30.23 KG/M2 | DIASTOLIC BLOOD PRESSURE: 56 MMHG | SYSTOLIC BLOOD PRESSURE: 110 MMHG

## 2019-11-04 DIAGNOSIS — R53.81 PHYSICAL DECONDITIONING: ICD-10-CM

## 2019-11-04 DIAGNOSIS — I50.32 CHRONIC DIASTOLIC HEART FAILURE (H): ICD-10-CM

## 2019-11-04 DIAGNOSIS — N18.30 CKD (CHRONIC KIDNEY DISEASE) STAGE 3, GFR 30-59 ML/MIN (H): Chronic | ICD-10-CM

## 2019-11-04 DIAGNOSIS — I10 ESSENTIAL HYPERTENSION: ICD-10-CM

## 2019-11-04 DIAGNOSIS — M62.81 GENERALIZED MUSCLE WEAKNESS: ICD-10-CM

## 2019-11-04 DIAGNOSIS — I48.0 PAF (PAROXYSMAL ATRIAL FIBRILLATION) (H): ICD-10-CM

## 2019-11-04 DIAGNOSIS — E03.8 OTHER SPECIFIED HYPOTHYROIDISM: Primary | Chronic | ICD-10-CM

## 2019-11-04 PROBLEM — N39.0 URINARY TRACT INFECTION WITHOUT HEMATURIA, SITE UNSPECIFIED: Status: RESOLVED | Noted: 2019-10-15 | Resolved: 2019-11-04

## 2019-11-04 PROBLEM — R50.9 FEVER, UNSPECIFIED FEVER CAUSE: Status: RESOLVED | Noted: 2019-10-14 | Resolved: 2019-11-04

## 2019-11-04 PROCEDURE — 99214 OFFICE O/P EST MOD 30 MIN: CPT | Performed by: FAMILY MEDICINE

## 2019-11-04 PROCEDURE — 84439 ASSAY OF FREE THYROXINE: CPT | Performed by: FAMILY MEDICINE

## 2019-11-04 PROCEDURE — 80048 BASIC METABOLIC PNL TOTAL CA: CPT | Performed by: FAMILY MEDICINE

## 2019-11-04 PROCEDURE — 84443 ASSAY THYROID STIM HORMONE: CPT | Performed by: FAMILY MEDICINE

## 2019-11-04 PROCEDURE — 36415 COLL VENOUS BLD VENIPUNCTURE: CPT | Performed by: FAMILY MEDICINE

## 2019-11-04 NOTE — PROGRESS NOTES
Subjective     Ghislaine Walls is a 84 year old female who presents to clinic today for the following health issues:    [facility]       Hospital Follow-up Visit:    Hospital/Nursing Home/IP Rehab Facility: [facility]   Date of Admission: 10/5/19  Date of Discharge: 10/24/19  Reason(s) for Admission: debilitated, generalized weakness, CHF--diastolic, afib, CKD            Problems taking medications regularly:  None       Medication changes since discharge: None       Problems adhering to non-medication therapy:  None    Summary of hospitalization:  [facility] discharge summary reviewed  Diagnostic Tests/Treatments reviewed.  Follow up needed: Orthopedics, Cardiology, Nephrology  Other Healthcare Providers Involved in Patient s Care:         Homecare, Care Coordination and Physical Therapy  Update since discharge: fluctuating course.     Post Discharge Medication Reconciliation: discharge medications reconciled, continue medications without change.  Plan of care communicated with patient and daughter     Coding guidelines for this visit:  Type of Medical   Decision Making Face-to-Face Visit       within 7 Days of discharge Face-to-Face Visit        within 14 days of discharge   Moderate Complexity [id number]   High Complexity [id number]            Would like to go to a clinic that is closer to where she lives, maybe clinic in [place]?  Feels stable overall since discharge from TCU but still having poor appetite.  No weight changes.     Reviewed and updated as needed this visit by Provider  Tobacco  Allergies  Meds  Problems  Med Hx  Surg Hx  Fam Hx         Review of Systems   ROS COMP: CONSTITUTIONAL: NEGATIVE for fever, chills  EYES: NEGATIVE for vision changes or irritation  ENT/MOUTH: NEGATIVE for ear, mouth and throat problems  RESP: NEGATIVE for significant cough or SOB  CV: NEGATIVE for chest pain or palpitations  GI: NEGATIVE for nausea, abdominal pain, heartburn, or change in bowel  habits  : NEGATIVE for frequency, dysuria, or hematuria  HEME: NEGATIVE for bleeding problems      Objective    /56   Pulse 53   Temp 97.7  F (36.5  C) (Tympanic)   Resp 16   Wt 72.6 kg (160 lb)   SpO2 99%   BMI 30.23 kg/m    Body mass index is 30.23 kg/m .  Physical Exam   GENERAL APPEARANCE: appears fatigue, no acute distress.  Pleasant and cooperative.  EYES: Eyes grossly normal to inspection, PERRL and conjunctivae and sclerae normal  HENT: Nose and mouth without ulcers or lesions, oropharynx clear and oral mucous membranes moist  NECK: no adenopathy, no asymmetry, masses, or scars and thyroid normal to palpation  RESP: lungs clear to auscultation - no rales, rhonchi or wheezes  CV: regular rate and rhythm, normal S1 S2, no S3 or S4, no murmur, click or rub, peripheral pulses strong.  +2 pitting b/l LE edema  ABDOMEN: soft, nontender, no masses and bowel sounds normal  SKIN: large erythematous area between her butt cheeks with butt paste applied generaously; only one small shallow ulcer over the right lower butt cheek is still present--but healing well.  Underwear is soiled with stool.  PSYCH: appears tired, depressed    Diagnostic Test Results:  Labs reviewed in Epic        Assessment & Plan   Problem List Items Addressed This Visit     Other specified hypothyroidism - Primary (Chronic)    Relevant Orders    TSH (Completed)    T4 free (Completed)    CKD (chronic kidney disease) stage 3, GFR 30-59 ml/min (H) (Chronic)    Relevant Orders    Basic metabolic panel  (Ca, Cl, CO2, Creat, Gluc, K, Na, BUN) (Completed)    Physical deconditioning    Generalized muscle weakness    Essential hypertension    Relevant Orders    Basic metabolic panel  (Ca, Cl, CO2, Creat, Gluc, K, Na, BUN) (Completed)    Chronic diastolic heart failure (H)    PAF (paroxysmal atrial fibrillation) (H)           --Labs: BMP, TSH/T4  --Weight stable but continues to have poor appetite.  Recommended small/frequent meals with Ensure  "supplement.  --underwear appeared soiled on exam, nurse brought in Depends to wear temporarily until she gets back home to change  --Will see Cardiology in ~3 months and Nephrology in 1-2 months as recommended  No longer on Eliquis due to high cost.  Now taking Warfarin, gets INR checked with INR clinic.  Last INR subtherapeutic at 1.9; next INR check scheduled for tomorrow     --Continue home care, PT/OT  --Encouraged her to set up f/u appt with Orthopedics to re-check chronic shoulder pain       BMI:   Estimated body mass index is 30.23 kg/m  as calculated from the following:    Height as of 10/22/19: 1.549 m (5' 1\").    Weight as of this encounter: 72.6 kg (160 lb).       See Patient Instructions  Return in about 3 months (around 2/4/2020) for re-check / follow-up.    Mahsa Soto, DO  [facility]      "

## 2019-11-05 ENCOUNTER — TELEPHONE (OUTPATIENT)
Dept: FAMILY MEDICINE | Facility: CLINIC | Age: 84
End: 2019-11-05

## 2019-11-05 LAB
ANION GAP SERPL CALCULATED.3IONS-SCNC: 10 MMOL/L (ref 3–14)
BUN SERPL-MCNC: 38 MG/DL (ref 7–30)
CALCIUM SERPL-MCNC: 9.2 MG/DL (ref 8.5–10.1)
CHLORIDE SERPL-SCNC: 113 MMOL/L (ref 94–109)
CO2 SERPL-SCNC: 16 MMOL/L (ref 20–32)
CREAT SERPL-MCNC: 1.23 MG/DL (ref 0.52–1.04)
GFR SERPL CREATININE-BSD FRML MDRD: 40 ML/MIN/{1.73_M2}
GLUCOSE SERPL-MCNC: 115 MG/DL (ref 70–99)
INR PPP: 4.1
POTASSIUM SERPL-SCNC: 4.7 MMOL/L (ref 3.4–5.3)
SODIUM SERPL-SCNC: 139 MMOL/L (ref 133–144)
T4 FREE SERPL-MCNC: 1.55 NG/DL (ref 0.76–1.46)
TSH SERPL DL<=0.005 MIU/L-ACNC: 1.82 MU/L (ref 0.4–4)

## 2019-11-05 NOTE — TELEPHONE ENCOUNTER
Our goal is to have forms completed within 72 hours, however some forms may require a visit or additional information.    What clinic location was the form placed at [facility] or [place].?     Who is the form from?   Where did the form come from? Faxed to clinic   The form was placed in the inbox of Mahsa Soto, DO      Please fax to [phone]  Phone number:      Additional comments: [facility] OT 2 w 4  //  SN 1.9 from  11/1/19    Call take on 11/5/2019 at 4:00 PM by Michelle Burnham

## 2019-11-05 NOTE — TELEPHONE ENCOUNTER
ANTICOAGULATION MANAGEMENT     Patient Name:  Ghislaine Walls  Date:  2019    ASSESSMENT /SUBJECTIVE:      Today's INR result of 4.1 is supratherapeutic. Goal INR of 2.0-3.0      Warfarin dose taken: Warfarin taken as previously instructed    Diet: No new diet changes affecting INR    Medication changes/ interactions: No new medications/supplements affecting INR    Previous INR: Subtherapeutic     S/S of bleeding or thromboembolism: No    New injury or illness:  No    Upcoming surgery, procedure or cardioversion:  No    Additional findings: No      PLAN:    Spoke with home care nurse Bear regarding INR result and instructed:     Warfarin Dosing Instructions: hold warfarin today then change your warfarin dose to 3mg Mon/Fri, 1.5mg Rest of week    Instructed patient to follow up no later than:     Education provided: No      Bear verbalizes understanding and agrees to warfarin dosing plan.    Instructed to call the Anticoagulation Clinic for any changes, questions or concerns. (#[phone])        OBJECTIVE:  INR   Date Value Ref Range Status   2019 4.1  Final             Anticoagulation Summary  As of 2019    INR goal:   2.0-3.0   TTR:   --   INR used for dosin.1! (2019)   Warfarin maintenance plan:   3 mg (3 mg x 1) every Mon, Fri; 1.5 mg (3 mg x 0.5) all other days   Full warfarin instructions:   : Hold; Otherwise 3 mg every Mon, Fri; 1.5 mg all other days   Weekly warfarin total:   13.5 mg   Plan last modified:   Nicole Pimentel RN (2019)   Next INR check:   2019   Priority:   INR   Target end date:            Anticoagulation Episode Summary     INR check location:   Home Draw    Preferred lab:       Send INR reminders to:   LEESA HUERTA    Comments:         Anticoagulation Care Providers     Provider Role Specialty Phone number    Alma Delia Wallace PA-C Responsible Physician Assistant [phone]

## 2019-11-07 ENCOUNTER — TELEPHONE (OUTPATIENT)
Dept: FAMILY MEDICINE | Facility: CLINIC | Age: 84
End: 2019-11-07

## 2019-11-07 DIAGNOSIS — R73.9 HYPERGLYCEMIA: Primary | ICD-10-CM

## 2019-11-07 DIAGNOSIS — E03.9 HYPOTHYROIDISM, UNSPECIFIED TYPE: ICD-10-CM

## 2019-11-07 NOTE — LETTER
[facility]  [address]  [place] [id number] [phone]                                                                                                           Ghislaine Walls  [address]  [place] [address]    November 7, 2019          Dear Ghislaine Walls,    Please see lab result information below.     1.  High thyroid? T4 is slightly above normal but TSH is normal  We might need to decrease your Levothyroxine.  You are on the lowest dose however, so I'd like to keep you on this dose for now but get your thyroid labs re-checked in about 3-4 weeks to see if labs improve without a change in your dose.  I will have this lab available for home care to get tested.     2.  Chronic Kidney Disease (low kidney function) is stable.     3.You have high glucose (glucose 115), may be due to pre-diabetes?  But you were not fasting, so could be normal.  Recommend that we check a HgbA1c either now or when you return for blood work.  I will have a HgbA1c available if you would like to get that dose sooner.  Home care can get this drawn, so you don't need to come in to the clinic.        Please let me know if any questions or concerns.  Thanks!  --Dr. Soto

## 2019-11-07 NOTE — TELEPHONE ENCOUNTER
Patient was called with lab results. States she may be transferring care since she moved. Asked for lab letter with providers comments. Lab appointment was generated and mailed to patient.

## 2019-11-07 NOTE — TELEPHONE ENCOUNTER
Please help inform pt about recent lab results:    1.  High thyroid? T4 is slightly above normal but TSH is normal  We might need to decrease her Levothyroxine.  She is on the lowest dose however, so I'd like to keep her on this dose for now but get her thyroid labs re-checked in about 3-4 weeks to see if labs improve without a change in her dose.  I will have this lab available for home care to get tested.    2.  CKD (low kidney function)--stable    3.  High glucose (glucose 115), may be due to pre-diabetes?  But she was not fasting, so could be normal.  Recommend that we check a HgbA1c either now or when she returns for blood work.  I will have a HgbA1c available if she would like to get that dose sooner.  [facility] care can actually get this drawn, so she does not need to come in.      Please let me know if any questions or concerns.  Thanks!  --Dr. Soto

## 2019-11-07 NOTE — TELEPHONE ENCOUNTER
Our goal is to have forms completed within 72 hours, however some forms may require a visit or additional information.    What clinic location was the form placed at [facility] or [place].?     Who is the form from?   Where did the form come from? Faxed to clinic   The form was placed in the inbox of Mahsa Soto, DO      Please fax to [phone]  Phone number:      Additional comments:  [facility] SN INR 4.1 from 11/5/19    Call take on 11/7/2019 at 3:04 PM by Michelle Burnham

## 2019-11-08 ENCOUNTER — TELEPHONE (OUTPATIENT)
Dept: FAMILY MEDICINE | Facility: CLINIC | Age: 84
End: 2019-11-08

## 2019-11-08 LAB — INR PPP: 3.5 (ref 0.8–1.1)

## 2019-11-08 NOTE — TELEPHONE ENCOUNTER
ANTICOAGULATION MANAGEMENT     Patient Name:  Ghislaine Walls  Date:  11/8/2019    ASSESSMENT /SUBJECTIVE:      Today's INR result of 3.5 is supratherapeutic. Goal INR of 2.0-3.0      Warfarin dose taken: Warfarin taken as previously instructed    Diet: No new diet changes affecting INR    Medication changes/ interactions: No new medications/supplements affecting INR    Previous INR: Supratherapeutic     S/S of bleeding or thromboembolism: No    New injury or illness:  No    Upcoming surgery, procedure or cardioversion:  No    Additional findings: None      PLAN:    Spoke with Nurse Bear regarding INR result and instructed:     Warfarin Dosing Instructions:  Change to 3 mg sun, 1.5 mg all other days    Instructed patient to follow up no later than: 1 week    Education provided: No      Nurse Bear verbalizes understanding and agrees to warfarin dosing plan.    Instructed to call the Anticoagulation Clinic for any changes, questions or concerns. (#[phone])        OBJECTIVE:  INR   Date Value Ref Range Status   11/08/2019 3.5 (A) 0.8 - 1.1 Final             Anticoagulation Summary  As of 11/8/2019    INR goal:   2.0-3.0   TTR:   --   INR used for dosing:   3.5! (11/8/2019)   Warfarin maintenance plan:   3 mg (3 mg x 1) every Mon, Fri; 1.5 mg (3 mg x 0.5) all other days   Full warfarin instructions:   11/8: 1.5 mg; 11/10: 3 mg; 11/11: 1.5 mg; Otherwise 3 mg every Mon, Fri; 1.5 mg all other days   Weekly warfarin total:   13.5 mg   Plan last modified:   Nicole Pimentel RN (11/5/2019)   Next INR check:   11/15/2019   Priority:   INR   Target end date:            Anticoagulation Episode Summary     INR check location:   Home Draw    Preferred lab:       Send INR reminders to:   LEESA HUERTA    Comments:         Anticoagulation Care Providers     Provider Role Specialty Phone number    Alma Delia Wallace PA-C Responsible Physician Assistant [phone]

## 2019-11-12 ENCOUNTER — TELEPHONE (OUTPATIENT)
Dept: FAMILY MEDICINE | Facility: CLINIC | Age: 84
End: 2019-11-12

## 2019-11-12 NOTE — TELEPHONE ENCOUNTER
Our goal is to have forms completed within 72 hours, however some forms may require a visit or additional information.    What clinic location was the form placed at [facility] or [place].?     Who is the form from?   Where did the form come from? Faxed to clinic   The form was placed in the inbox of Mahsa Soto, DO      Please fax to [phone]   Phone number:      Additional comments: [facility] SN INR 3.5 from 11/8/19    Call take on 11/12/2019 at 4:56 PM by Michelle Burnham

## 2019-11-15 ENCOUNTER — TELEPHONE (OUTPATIENT)
Dept: FAMILY MEDICINE | Facility: CLINIC | Age: 84
End: 2019-11-15

## 2019-11-15 LAB — INR PPP: 3.4 (ref 0.8–1.1)

## 2019-11-15 NOTE — TELEPHONE ENCOUNTER
Our goal is to have forms completed within 72 hours, however some forms may require a visit or additional information.    What clinic location was the form placed at [facility] or [place].?     Who is the form from?   Where did the form come from? Faxed to clinic   The form was placed in the inbox of Mahsa Soto, DO      Please fax to [phone]   Phone number:      Additional comments: [facility] 10/25/19 to 12/23/19    Call take on 11/15/2019 at 1:26 PM by Michelle Burnham

## 2019-11-15 NOTE — TELEPHONE ENCOUNTER
ANTICOAGULATION MANAGEMENT     Patient Name:  Ghislaine Walls  Date:  11/15/2019    ASSESSMENT /SUBJECTIVE:      Today's INR result of 3.4 is supratherapeutic. Goal INR of 2.0-3.0      Warfarin dose taken: Warfarin taken as previously instructed    Diet: No new diet changes affecting INR    Medication changes/ interactions: No new medications/supplements affecting INR    Previous INR: Supratherapeutic     S/S of bleeding or thromboembolism: No    New injury or illness:  No    Upcoming surgery, procedure or cardioversion:  No    Additional findings: none      PLAN:    Spoke with Bear home care nurse, regarding INR result and instructed:     Warfarin Dosing Instructions: Hold warfarin today only then change your warfarin dose to 1.5 mg daily    Instructed patient to follow up no later than: Tues, 11/19    Education provided: Yes: importance of checking INR as instructed      Ghislaine verbalizes understanding and agrees to warfarin dosing plan.    Instructed to call the Anticoagulation Clinic for any changes, questions or concerns. (#[phone])        OBJECTIVE:  INR   Date Value Ref Range Status   11/15/2019 3.4 (A) 0.8 - 1.1 Final             Anticoagulation Summary  As of 11/15/2019    INR goal:   2.0-3.0   TTR:   0.0 % (1 wk)   INR used for dosing:   3.4! (11/15/2019)   Warfarin maintenance plan:   1.5 mg (3 mg x 0.5) every day   Full warfarin instructions:   11/15: Hold; Otherwise 1.5 mg every day   Weekly warfarin total:   10.5 mg   Plan last modified:   Delisa Jackson RN (11/15/2019)   Next INR check:   11/19/2019   Priority:   High   Target end date:            Anticoagulation Episode Summary     INR check location:   Home Draw    Preferred lab:       Send INR reminders to:   LEESA HUERTA    Comments:         Anticoagulation Care Providers     Provider Role Specialty Phone number    Alma Delia Wallace PA-C Responsible Physician Assistant [phone]

## 2019-11-16 DIAGNOSIS — Z53.9 DIAGNOSIS NOT YET DEFINED: Primary | ICD-10-CM

## 2019-11-16 PROCEDURE — G0180 MD CERTIFICATION HHA PATIENT: HCPCS | Performed by: FAMILY MEDICINE

## 2019-11-19 ENCOUNTER — TELEPHONE (OUTPATIENT)
Dept: FAMILY MEDICINE | Facility: CLINIC | Age: 84
End: 2019-11-19

## 2019-11-19 LAB — INR PPP: 2.4 (ref 0.8–1.1)

## 2019-11-19 NOTE — TELEPHONE ENCOUNTER
ANTICOAGULATION MANAGEMENT     Patient Name:  Ghislaine Walls  Date:  2019    ASSESSMENT /SUBJECTIVE:      Today's INR result of 2.4 is therapeutic. Goal INR of 2.0-3.0      Warfarin dose taken: Warfarin taken as previously instructed    Diet: No new diet changes affecting INR    Medication changes/ interactions: No new medications/supplements affecting INR    Previous INR: Therapeutic     S/S of bleeding or thromboembolism: No    New injury or illness:  No    Upcoming surgery, procedure or cardioversion:  No    Additional findings: None      PLAN:    Spoke with [facility] Nurse regarding INR result and instructed:     Warfarin Dosing Instructions: Continue your current warfarin dose    Instructed patient to follow up no later than: 1 week    Education provided: No      Bear Nurse verbalizes understanding and agrees to warfarin dosing plan.    Instructed to call the Anticoagulation Clinic for any changes, questions or concerns. (#[phone])        OBJECTIVE:  INR   Date Value Ref Range Status   2019 2.4 (A) 0.8 - 1.1 Final             Anticoagulation Summary  As of 2019    INR goal:   2.0-3.0   TTR:   21.8 % (1.6 wk)   INR used for dosin.4 (2019)   Warfarin maintenance plan:   1.5 mg (3 mg x 0.5) every day   Full warfarin instructions:   1.5 mg every day   Weekly warfarin total:   10.5 mg   No change documented:   Rosie Mcrae, RN   Plan last modified:   Delisa Jackson, RN (11/15/2019)   Next INR check:   2019   Priority:   High   Target end date:            Anticoagulation Episode Summary     INR check location:   Home Draw    Preferred lab:       Send INR reminders to:   LEESA HUERTA    Comments:         Anticoagulation Care Providers     Provider Role Specialty Phone number    Alma Delia Wallace PA-C Responsible Physician Assistant [phone]

## 2019-11-20 ENCOUNTER — TELEPHONE (OUTPATIENT)
Dept: FAMILY MEDICINE | Facility: CLINIC | Age: 84
End: 2019-11-20

## 2019-11-20 NOTE — TELEPHONE ENCOUNTER
Our goal is to have forms completed within 72 hours, however some forms may require a visit or additional information.    What clinic location was the form placed at [facility] or [place].?     Who is the form from?   Where did the form come from? Faxed to clinic   The form was placed in the inbox of Mahsa Soto, DO      Please fax to [phone]  Phone number: [phone]    Additional comments: [facility]- SN, INR 3.4 fingerstick (begin date 11/15/2019)    Call take on 11/20/2019 at 10:25 AM by Lindsey Santiago

## 2019-11-21 ENCOUNTER — TELEPHONE (OUTPATIENT)
Dept: FAMILY MEDICINE | Facility: CLINIC | Age: 84
End: 2019-11-21

## 2019-11-21 NOTE — TELEPHONE ENCOUNTER
Our goal is to have forms completed within 72 hours, however some forms may require a visit or additional information.    What clinic location was the form placed at [facility] or [place].?     Who is the form from?   Where did the form come from? Faxed to clinic   The form was placed in the inbox of Mahsa Soto, DO      Please fax to [phone]   Phone number:      Additional comments: [facility] SN INR 2.4 from 11/19/18    Call take on 11/21/2019 at 2:43 PM by Michelle Burnham

## 2019-11-26 ENCOUNTER — TELEPHONE (OUTPATIENT)
Dept: FAMILY MEDICINE | Facility: CLINIC | Age: 84
End: 2019-11-26

## 2019-11-26 LAB — INR PPP: 1.5 (ref 0.8–1.1)

## 2019-11-26 NOTE — TELEPHONE ENCOUNTER
ANTICOAGULATION MANAGEMENT     Patient Name:  Ghislaine Walls  Date:  2019    ASSESSMENT /SUBJECTIVE:      Today's INR result of 1.5 is subtherapeutic. Goal INR of 2.0-3.0      Warfarin dose taken: Warfarin taken as previously instructed    Diet: No new diet changes affecting INR    Medication changes/ interactions: No new medications/supplements affecting INR    Previous INR: Therapeutic     S/S of bleeding or thromboembolism: No    New injury or illness:  No    Upcoming surgery, procedure or cardioversion:  No    Additional findings: none, still newer to warfarin in last month, not yet stable      PLAN:    Spoke with Jessica home care nurse, regarding INR result and instructed:     Warfarin Dosing Instructions: Change your warfarin dose to 3 mg michaela Tues; 1.5 mg all other days    Instructed patient to follow up no later than:     Education provided: Yes: significance of INR result      Jessica verbalizes understanding and agrees to warfarin dosing plan.    Instructed to call the Anticoagulation Clinic for any changes, questions or concerns. (#[phone])        OBJECTIVE:  INR   Date Value Ref Range Status   2019 1.5 (A) 0.8 - 1.1 Final             Anticoagulation Summary  As of 2019    INR goal:   2.0-3.0   TTR:   30.6 % (2.6 wk)   INR used for dosin.5! (2019)   Warfarin maintenance plan:   3 mg (3 mg x 1) every Tue; 1.5 mg (3 mg x 0.5) all other days   Full warfarin instructions:   3 mg every Tue; 1.5 mg all other days   Weekly warfarin total:   12 mg   Plan last modified:   Delisa Jackson RN (2019)   Next INR check:   2019   Priority:   High   Target end date:            Anticoagulation Episode Summary     INR check location:   Home Draw    Preferred lab:       Send INR reminders to:   LEESA HUERTA    Comments:         Anticoagulation Care Providers     Provider Role Specialty Phone number    Alma Delia Wallace PA-C Responsible Physician  Assistant [phone]

## 2019-11-29 ENCOUNTER — TELEPHONE (OUTPATIENT)
Dept: FAMILY MEDICINE | Facility: CLINIC | Age: 84
End: 2019-11-29

## 2019-11-29 NOTE — TELEPHONE ENCOUNTER
Our goal is to have forms completed within 72 hours, however some forms may require a visit or additional information.    What clinic location was the form placed at [facility] or [place].?     Who is the form from?   Where did the form come from? Faxed to clinic   The form was placed in the inbox of Mahsa Soto, DO      Please fax to [phone]   Phone number:      Additional comments: [facility] SN INR 1.5 from 11/26/19    Call take on 11/29/2019 at 11:11 AM by Michelle Burnham

## 2019-12-02 ENCOUNTER — TELEPHONE (OUTPATIENT)
Dept: FAMILY MEDICINE | Facility: CLINIC | Age: 84
End: 2019-12-02

## 2019-12-02 LAB — INR PPP: 1.8 (ref 0.8–1.1)

## 2019-12-02 NOTE — TELEPHONE ENCOUNTER
ANTICOAGULATION MANAGEMENT     Patient Name:  Ghislaine Walls  Date:  2019    ASSESSMENT /SUBJECTIVE:      Today's INR result of 1.8 is subtherapeutic. Goal INR of 2.0-3.0      Warfarin dose taken: Warfarin taken as previously instructed    Diet: No new diet changes affecting INR    Medication changes/ interactions: No new medications/supplements affecting INR    Previous INR: Subtherapeutic     S/S of bleeding or thromboembolism: No    New injury or illness:  No    Upcoming surgery, procedure or cardioversion:  No    Additional findings: None      PLAN:    Spoke with nurse Bear [facility] regarding INR result and instructed:     Warfarin Dosing Instructions: 3 mg today then continue your current warfarin dose of 3 mg on tues and 1.5 mg all other days    Instructed patient to follow up no later than: 19    Education provided: Yes: significance of dosing      nurse Bear with [facility] verbalizes understanding and agrees to warfarin dosing plan.    Instructed to call the Anticoagulation Clinic for any changes, questions or concerns. (#[phone])        OBJECTIVE:  INR   Date Value Ref Range Status   2019 1.8 (A) 0.8 - 1.1 Final             Anticoagulation Summary  As of 2019    INR goal:   2.0-3.0   TTR:   23.0 % (3.4 wk)   INR used for dosin.8! (2019)   Warfarin maintenance plan:   3 mg (3 mg x 1) every Tue; 1.5 mg (3 mg x 0.5) all other days   Full warfarin instructions:   : 3 mg; Otherwise 3 mg every Tue; 1.5 mg all other days   Weekly warfarin total:   12 mg   Plan last modified:   Delisa Jackson RN (2019)   Next INR check:   2019   Priority:   High   Target end date:            Anticoagulation Episode Summary     INR check location:   Home Draw    Preferred lab:       Send INR reminders to:   LEESA HUERTA    Comments:         Anticoagulation Care Providers     Provider Role Specialty Phone number    Alma Delia Wallace PA-C Responsible  Physician Assistant [phone]

## 2019-12-04 ENCOUNTER — TELEPHONE (OUTPATIENT)
Dept: FAMILY MEDICINE | Facility: CLINIC | Age: 84
End: 2019-12-04

## 2019-12-04 NOTE — TELEPHONE ENCOUNTER
Our goal is to have forms completed within 72 hours, however some forms may require a visit or additional information.    What clinic location was the form placed at [facility] or [place].?     Who is the form from?   Where did the form come from? Faxed to clinic   The form was placed in the inbox of Mahsa Soto, DO      Please fax to [phone]  Phone number: [phone]    Additional comments: [facility]- OT 1 every 30 days 1, HA 1 week 4, OT 1 week 1/ 2 week 2, SN (entered date for all 4 pgs is 12/02/2019)    Call take on 12/4/2019 at 9:40 AM by Lindsey Santiago

## 2019-12-05 ENCOUNTER — TELEPHONE (OUTPATIENT)
Dept: FAMILY MEDICINE | Facility: CLINIC | Age: 84
End: 2019-12-05

## 2019-12-05 NOTE — TELEPHONE ENCOUNTER
Reason for Call: Request for an order or referral:    Order or referral being requested: Home Care Orders    Date needed: as soon as possible    Has the patient been seen by the PCP for this problem? YES    Additional comments: Bear a nurse with [facility] care called to get verbal orders from Dr. Soto for a skilled nursing visit - one visit for one week with 2 PRN's.    Phone number Patient can be reached at:  Other phone number: [phone]    Best Time:  Any    Can we leave a detailed message on this number?  YES    Call taken on 12/5/2019 at 8:24 AM by Candi Fernandes

## 2019-12-06 ENCOUNTER — TELEPHONE (OUTPATIENT)
Dept: CARDIOLOGY | Facility: CLINIC | Age: 84
End: 2019-12-06

## 2019-12-06 ENCOUNTER — TELEPHONE (OUTPATIENT)
Dept: FAMILY MEDICINE | Facility: CLINIC | Age: 84
End: 2019-12-06

## 2019-12-06 LAB — INR PPP: 2.2 (ref 0.8–1.1)

## 2019-12-06 NOTE — TELEPHONE ENCOUNTER
Patient called stating she is possible wanting to change from warfarin currently back  to Eliquis after Jan 1st 2020 as her new driug plan would cover the medication for about $100.00/month.   Patient states that would eliminate INR draws.    Patient will think about changing medications more and call back in a few weeks.

## 2019-12-06 NOTE — TELEPHONE ENCOUNTER
ANTICOAGULATION MANAGEMENT     Patient Name:  Ghislaine Walls  Date:  2019    ASSESSMENT /SUBJECTIVE:      Today's INR result of 2.2 is therapeutic. Goal INR of 2.0-3.0      Warfarin dose taken: Warfarin taken as previously instructed    Diet: No new diet changes affecting INR    Medication changes/ interactions: No new medications/supplements affecting INR    Previous INR: Subtherapeutic     S/S of bleeding or thromboembolism: No    New injury or illness:  No    Upcoming surgery, procedure or cardioversion:  No    Additional findings: None      PLAN:    Spoke with Mercy ARELLANO  regarding INR result and instructed:     Warfarin Dosing Instructions: Continue your current warfarin dose  3 mg on Tues and 1.5 mg All other days  Instructed patient to follow up no later than: 1 week    Education provided: Yes: significance of INR      [facility] verbalizes understanding and agrees to warfarin dosing plan.    Instructed to call the Anticoagulation Clinic for any changes, questions or concerns. (#[phone])        OBJECTIVE:  INR   Date Value Ref Range Status   2019 2.2 (A) 0.8 - 1.1 Final             Anticoagulation Summary  As of 2019    INR goal:   2.0-3.0   TTR:   26.8 % (4 wk)   INR used for dosin.2 (2019)   Warfarin maintenance plan:   3 mg (3 mg x 1) every Tue; 1.5 mg (3 mg x 0.5) all other days   Full warfarin instructions:   3 mg every Tue; 1.5 mg all other days   Weekly warfarin total:   12 mg   Plan last modified:   Delisa Jackson RN (2019)   Next INR check:      Priority:   High   Target end date:            Anticoagulation Episode Summary     INR check location:   Home Draw    Preferred lab:       Send INR reminders to:   LEESA HUERTA    Comments:         Anticoagulation Care Providers     Provider Role Specialty Phone number    Alma Delia Wallace PA-C Responsible Physician Assistant [phone]

## 2019-12-10 ENCOUNTER — TELEPHONE (OUTPATIENT)
Dept: FAMILY MEDICINE | Facility: CLINIC | Age: 84
End: 2019-12-10

## 2019-12-10 NOTE — TELEPHONE ENCOUNTER
Anticoagulation Management    Bear with [facility] called to report that Home care would be discharging Ghislaine on 12/13/19 and will do the last INR with home care on this date as well.    Martha Antoine RN

## 2019-12-11 ENCOUNTER — TELEPHONE (OUTPATIENT)
Dept: FAMILY MEDICINE | Facility: CLINIC | Age: 84
End: 2019-12-11

## 2019-12-11 NOTE — TELEPHONE ENCOUNTER
Our goal is to have forms completed within 72 hours, however some forms may require a visit or additional information.    What clinic location was the form placed at [facility] or [place].?     Who is the form from?   Where did the form come from? Faxed to clinic   The form was placed in the inbox of Mahsa Soto, DO      Please fax to [phone]  Phone number: [phone]    Additional comments: [facility]- SN 1 week 1, 2 as needed// SN, INR via fingerstick 2.2 (begin date for both 12/06/2019)    Call take on 12/11/2019 at 10:13 AM by Lindsey Santiago

## 2019-12-13 ENCOUNTER — TELEPHONE (OUTPATIENT)
Dept: FAMILY MEDICINE | Facility: CLINIC | Age: 84
End: 2019-12-13

## 2019-12-13 LAB — INR PPP: 2.1 (ref 0.8–1.1)

## 2019-12-13 NOTE — TELEPHONE ENCOUNTER
Our goal is to have forms completed within 72 hours, however some forms may require a visit or additional information.    What clinic location was the form placed at [facility] or [place].?     Who is the form from?   Where did the form come from? Faxed to clinic   The form was placed in the inbox of Mahsa Soto, DO      Please fax to [phone]   Phone number:      Additional comments: [facility] OT 1 w 1, 2 w 1    Call take on 12/13/2019 at 11:33 AM by Michelle Burnham

## 2019-12-13 NOTE — TELEPHONE ENCOUNTER
ANTICOAGULATION MANAGEMENT     Patient Name:  Ghislaine Walls  Date:  2019    ASSESSMENT /SUBJECTIVE:      Today's INR result of 2.1 is therapeutic. Goal INR of 2.0-3.0      Warfarin dose taken: Warfarin taken as previously instructed    Diet: No new diet changes affecting INR    Medication changes/ interactions: No new medications/supplements affecting INR    Previous INR: Therapeutic     S/S of bleeding or thromboembolism: No    New injury or illness:  No    Upcoming surgery, procedure or cardioversion:  No    Additional findings: Patient reports has 1 beer every Friday at happy hour, home care [facility] is discharging today      PLAN:    Spoke with [facility] regarding INR result and instructed:     Warfarin Dosing Instructions: Continue your current warfarin dose of 3 mg on Tues and 1.5 mg all other days    Instructed patient to follow up no later than: 2 weeks, appt made at [facility] on  at 2 pm    Education provided: No      Dariana verbalizes understanding and agrees to warfarin dosing plan.    Instructed to call the Anticoagulation Clinic for any changes, questions or concerns. (#[phone])        OBJECTIVE:  INR   Date Value Ref Range Status   2019 2.1 (A) 0.8 - 1.1 Final             Anticoagulation Summary  As of 2019    INR goal:   2.0-3.0   TTR:   41.5 % (1.2 mo)   INR used for dosin.1 (2019)   Warfarin maintenance plan:   3 mg (3 mg x 1) every Tue; 1.5 mg (3 mg x 0.5) all other days   Full warfarin instructions:   3 mg every Tue; 1.5 mg all other days   Weekly warfarin total:   12 mg   Plan last modified:   Delisa Jackson RN (2019)   Next INR check:   2019   Priority:   High   Target end date:            Anticoagulation Episode Summary     INR check location:   Home Draw    Preferred lab:       Send INR reminders to:   LEESA HUERTA    Comments:         Anticoagulation Care Providers     Provider Role Specialty Phone number    Alma Delia Wallace  KIERA Bragg Responsible Physician Assistant [phone]

## 2019-12-17 ENCOUNTER — TELEPHONE (OUTPATIENT)
Dept: FAMILY MEDICINE | Facility: CLINIC | Age: 84
End: 2019-12-17

## 2019-12-17 NOTE — TELEPHONE ENCOUNTER
Our goal is to have forms completed within 72 hours, however some forms may require a visit or additional information.    What clinic location was the form placed at [facility] or [place].?     Who is the form from?   Where did the form come from? Faxed to clinic   The form was placed in the inbox of Mahsa Soto, DO      Please fax to [phone]  Phone number:      Additional comments: [facility] SN INR 2.1 from 12/13/19    Call take on 12/17/2019 at 2:55 PM by Michelle Burnham

## 2019-12-23 ENCOUNTER — TELEPHONE (OUTPATIENT)
Dept: FAMILY MEDICINE | Facility: CLINIC | Age: 84
End: 2019-12-23

## 2019-12-23 NOTE — TELEPHONE ENCOUNTER
Our goal is to have forms completed within 72 hours, however some forms may require a visit or additional information.    What clinic location was the form placed at [facility] or [place].?     Who is the form from?   Where did the form come from? Faxed to clinic   The form was placed in the inbox of Mahsa Soto, DO      Please fax to [phone]   Phone number:      Additional comments: [facility] OT 1 w 1    Call take on 12/23/2019 at 4:40 PM by Michelle Burnham

## 2019-12-26 ENCOUNTER — PATIENT OUTREACH (OUTPATIENT)
Dept: CARE COORDINATION | Facility: CLINIC | Age: 84
End: 2019-12-26

## 2019-12-26 DIAGNOSIS — Z76.89 HEALTH CARE HOME: Primary | ICD-10-CM

## 2019-12-26 ASSESSMENT — ACTIVITIES OF DAILY LIVING (ADL): DEPENDENT_IADLS:: INDEPENDENT

## 2019-12-26 NOTE — PROGRESS NOTES
Clinic Care Coordination Contact    Clinic Care Coordination Contact  OUTREACH    Referral Information:  Referral Source: Home Care    CC KATHI called patient post Home Care discharge to inquire patient's needs.   Patient states that she is doing well, she is using a walker to get around currently, but it weaning off. Patient states she has all her needs met as she lives in an Assisted Living and has support from staff and family.     Chief Complaint   Patient presents with     Clinic Care Coordination - Follow-up     LENNY ARMENTA follow up post          Universal Utilization:   Clinic Utilization: [facility]  Difficulty keeping appointments: No  Compliance Concerns: No  No-Show Concerns: No  No PCP office visit in Past Year: No  Utilization    Last refreshed: 12/26/2019  9:05 AM:  Hospital Admissions 1           Last refreshed: 12/26/2019  9:05 AM:  ED Visits 0           Last refreshed: 12/26/2019  9:05 AM:  No Show Count (past year) 1              Current as of: 12/26/2019  9:05 AM              Clinical Concerns:  Current Medical Concerns:    Patient Active Problem List   Diagnosis     Hyperlipidemia LDL goal <130     Hypertension goal BP (blood pressure) < 140/90     [place] (hard of hearing)     Urgency incontinence     Other specified hypothyroidism     History of CVA (cerebrovascular accident)     CKD (chronic kidney disease) stage 3, GFR 30-59 ml/min (H)     Gouty arthritis     Lymphedema     Anemia of chronic renal failure, stage 3 (moderate) (H)     Weight loss, abnormal     CHF (congestive heart failure) (H) possible      Physical deconditioning     Pressure injury of buttock, stage 2, unspecified laterality (H)     Atrial fibrillation with RVR (H), acute     Fatigue, unspecified type     Generalized muscle weakness     Demand ischemia (H)     Essential hypertension     DRAGAN (acute kidney injury) (H)     Chronic diastolic heart failure (H)     Debility     Chronic heart failure with preserved ejection  fraction (H)     Lymphedema of both lower extremities     PAF (paroxysmal atrial fibrillation) (H)     Cognitive impairment     Chronic pain of both shoulders       Current Behavioral Concerns: None Noted.   Education Provided to patient: LENNY ARMENTA role        Medication Management: Self     Functional Status:  Dependent ADLs: Ambulation-walker  Dependent IADLs: Independent  Bed or wheelchair confined: No  Mobility Status: Independent w/Device  Fallen 2 or more times in the past year?: No  Any fall with injury in the past year?: No    Living Situation:  Current living arrangement: (Assisted Living )  Type of residence: Independent Senior Living    Diet/Exercise/Sleep:  Inadequate nutrition (GOAL): No  Food Insecurity: No  Tube Feeding: No    Transportation:  Transportation concerns (GOAL): No        Psychosocial:  Orthodox or spiritual beliefs that impact treatment: No  Mental health DX: No  Mental health management concern (GOAL): No  Informal Support system: Family        Resources and Interventions:  Current Resources:        Advance Care Plan/Directive  Advanced Care Plans/Directives on file:: No  Advanced Care Plan/Directive Status: Not Applicable           Future Appointments              Tomorrow [facility]    In 1 month Laurie Conteh MD [facility],           Plan: No further outreaches will be made at this time unless a new referral is made or a change in the pt's status occurs. Patient was provided with LENNY ARMENAT contact information and encouraged to call with any questions or concerns.    SILVIA Puente  Clinic Care Coordinator  [phone]  [email or web address]

## 2019-12-27 ENCOUNTER — TELEPHONE (OUTPATIENT)
Dept: FAMILY MEDICINE | Facility: CLINIC | Age: 84
End: 2019-12-27

## 2019-12-27 ENCOUNTER — APPOINTMENT (OUTPATIENT)
Dept: LAB | Facility: CLINIC | Age: 84
End: 2019-12-27
Payer: MEDICARE

## 2019-12-27 DIAGNOSIS — Z86.73 HISTORY OF CVA (CEREBROVASCULAR ACCIDENT): ICD-10-CM

## 2019-12-27 DIAGNOSIS — I48.91 ATRIAL FIBRILLATION WITH RVR (H): Primary | ICD-10-CM

## 2020-01-02 DIAGNOSIS — I10 HYPERTENSION GOAL BP (BLOOD PRESSURE) < 140/90: Chronic | ICD-10-CM

## 2020-01-02 RX ORDER — METOPROLOL SUCCINATE 100 MG/1
100 TABLET, EXTENDED RELEASE ORAL DAILY
Qty: 90 TABLET | Refills: 3
Start: 2020-01-02

## 2020-01-02 NOTE — TELEPHONE ENCOUNTER
Reason for Call:  Medication or medication refill:    Do you use a [facility]?  Name of the pharmacy and phone number for the current request:  [facility]    Name of the medication requested: metprolol    Other request: pt  Out  Of meds    Can we leave a detailed message on this number? YES    Phone number patient can be reached at: Home number on file [phone] (home)    Best Time: any    Call taken on 1/2/2020 at 10:48 AM by CISCO SILVESTRE

## 2020-01-02 NOTE — TELEPHONE ENCOUNTER
"Requested Prescriptions   Pending Prescriptions Disp Refills     metoprolol succinate ER (TOPROL-XL) 100 MG 24 hr tablet 90 tablet 3     Sig: Take 1 tablet (100 mg) by mouth daily   Last Written Prescription Date:  10/25/19  Last Fill Quantity: 90,  # refills: 3   Last office visit: 11/4/2019 with prescribing provider:  Charles   Future Office Visit:   Next 5 appointments (look out 90 days)    Feb 10, 2020  1:30 PM CST  Office Visit with Laurie Conteh MD  [facility] ([facility]) [address] [address]  [phone]             Beta-Blockers Protocol Passed - 1/2/2020 10:49 AM        Passed - Blood pressure under 140/90 in past 12 months     BP Readings from Last 3 Encounters:   11/04/19 110/56   10/22/19 132/75   10/17/19 126/65                 Passed - Patient is age 6 or older        Passed - Recent (12 mo) or future (30 days) visit within the authorizing provider's specialty     Patient has had an office visit with the authorizing provider or a provider within the authorizing providers department within the previous 12 mos or has a future within next 30 days. See \"Patient Info\" tab in inbasket, or \"Choose Columns\" in Meds & Orders section of the refill encounter.              Passed - Medication is active on med list          "

## 2020-01-02 NOTE — TELEPHONE ENCOUNTER
Denied: metoprolol ER 100mg    Pt refill too early. Last prescribed 10/25/19 for 90 with 3 refills. Receipt confirmed by pharmacy.     I called pt and encouraged her to call her pharmacy, as she had a years worth or refills sent over in 10/19.    Pt states she will call back if there are issues.

## 2020-01-09 DIAGNOSIS — E03.9 HYPOTHYROIDISM, UNSPECIFIED TYPE: ICD-10-CM

## 2020-01-09 DIAGNOSIS — I48.91 ATRIAL FIBRILLATION WITH RVR (H): ICD-10-CM

## 2020-01-09 DIAGNOSIS — M10.9 GOUTY ARTHRITIS: ICD-10-CM

## 2020-01-09 DIAGNOSIS — I10 HYPERTENSION GOAL BP (BLOOD PRESSURE) < 140/90: Chronic | ICD-10-CM

## 2020-01-09 NOTE — TELEPHONE ENCOUNTER
Routing refill request to provider for review/approval because:  Pt switched to mail order pharm  Pt moved and will have a new PCP    Are you ok filling for first 3 mo with mail order? Her new appt is early February

## 2020-01-09 NOTE — TELEPHONE ENCOUNTER
Reason for Call:  Medication or medication refill:    Do you use a [facility]?  Name of the pharmacy and phone number for the current request:  [facility]  fax [phone]    Name of the medication requested: warfarin,hydralazine,allopurinol, levothyroxine     Other request: pt is using a new mail order pharmacy  Phone [phone]    Can we leave a detailed message on this number? YES    Phone number patient can be reached at: Cell number on file:    Telephone Information:   Mobile [phone]       Best Time: any    Call taken on 1/9/2020 at 3:51 PM by CISCO SILVESTRE

## 2020-01-09 NOTE — TELEPHONE ENCOUNTER
"Requested Prescriptions   Pending Prescriptions Disp Refills     warfarin ANTICOAGULANT (COUMADIN) 3 MG tablet 50 tablet 1     Sig: Take 1 tablet (3 mg) by mouth daily   Last Written Prescription Date:  10/28/19  Last Fill Quantity: 50,  # refills: 1   Last office visit: 11/4/2019 with prescribing provider:  Charles Johnson Office Visit:   Next 5 appointments (look out 90 days)    Feb 10, 2020  1:30 PM CST  Office Visit with Laurie Conteh MD  [facility]) [address] [facility] [address]  [phone]             Vitamin K Antagonists Failed - 1/9/2020  4:34 PM        Failed - INR is within goal in the past 6 weeks     Confirm INR is within goal in the past 6 weeks.     Recent Labs   Lab Test 12/13/19   INR 2.1*                       Passed - Recent (12 mo) or future (30 days) visit within the authorizing provider's specialty     Patient has had an office visit with the authorizing provider or a provider within the authorizing providers department within the previous 12 mos or has a future within next 30 days. See \"Patient Info\" tab in inbasket, or \"Choose Columns\" in Meds & Orders section of the refill encounter.              Passed - Medication is active on med list        Passed - Patient is 18 years of age or older        Passed - Patient is not pregnant        Passed - No positive pregnancy on file in past 12 months        allopurinol (ZYLOPRIM) 100 MG tablet 90 tablet 1     Sig: Take 1 tablet (100 mg) by mouth daily   Last Written Prescription Date:  05/09/19  Last Fill Quantity: 90,  # refills: 1   Last office visit: 11/4/2019 with prescribing provider:  Charles Johnson Office Visit:   Next 5 appointments (look out 90 days)    Feb 10, 2020  1:30 PM CST  Office Visit with Laurie Conteh MD  [facility] ([facility]) [address] [facility] [address]  [phone]             Gout Agents Protocol Failed - 1/9/2020  4:34 PM    " "    Failed - Has Uric Acid on file in past 12 months and value is less than 6     Recent Labs   Lab Test 04/18/19  1339   URIC 7.4*     If level is 6mg/dL or greater, ok to refill one time and refer to provider.           Failed - Normal serum creatinine on file in the past 12 months     Recent Labs   Lab Test 11/04/19  1537   CR 1.23*             Passed - CBC on file in past 12 months     Recent Labs   Lab Test 10/14/19  1059   WBC 3.3*   RBC 3.16*   HGB 9.9*   HCT 31.1*                    Passed - ALT on file in past 12 months     Recent Labs   Lab Test 10/05/19  0559   ALT 14             Passed - Recent (12 mo) or future (30 days) visit within the authorizing provider's specialty     Patient has had an office visit with the authorizing provider or a provider within the authorizing providers department within the previous 12 mos or has a future within next 30 days. See \"Patient Info\" tab in inbasket, or \"Choose Columns\" in Meds & Orders section of the refill encounter.              Passed - Medication is active on med list        Passed - Patient is age 18 or older        Passed - No active pregnancy on record        Passed - No positive pregnancy test in past year        hydrALAZINE (APRESOLINE) 25 MG tablet 270 tablet 3     Sig: Take 1 tablet (25 mg) by mouth 3 times daily   Last Written Prescription Date:  10/25/19  Last Fill Quantity: 270,  # refills: 3   Last office visit: 11/4/2019 with prescribing provider:  Charles   Future Office Visit:   Next 5 appointments (look out 90 days)    Feb 10, 2020  1:30 PM CST  Office Visit with Laurie Conteh MD  [facility] ([facility]) [address] [facility] [address]  [phone]             Vasodilators Passed - 1/9/2020  4:34 PM        Passed - Most recent BP less than 140/90 on record     BP Readings from Last 3 Encounters:   11/04/19 110/56   10/22/19 132/75   10/17/19 126/65                 Passed - Most recent encounter is " "not a hospital encounter. Patient has recent (12 mos) or future (1 mos) visit with authorizing provider's specialty     Patient's most recent encounter is NOT a hospital encounter and has had an office visit in the last 12 months or has a visit in the next 30 days with authorizing provider or within the authorizing provider's specialty.      See \"Patient Info\" tab in inbasket, or \"Choose Columns\" in Meds & Orders section of the refill encounter.      If most recent encounter is a hospital encounter AND the patient does NOT have an appointment scheduled with the authorizing provider or authorizing provider's specialty within the next 30 days, forward refill to authorizing provider for medication review.          Passed - Medication is active on med list        Passed - Patient is of age 18 years or older        Passed - Patient is not pregnant        Passed - Patient has not had a positive pregnancy test within the past 12 months        levothyroxine (SYNTHROID/LEVOTHROID) 25 MCG tablet 90 tablet 3     Sig: Take 1 tablet (25 mcg) by mouth daily   Last Written Prescription Date:  05/09/19  Last Fill Quantity: 90,  # refills: 3   Last office visit: 11/4/2019 with prescribing provider:  Charles   Future Office Visit:   Next 5 appointments (look out 90 days)    Feb 10, 2020  1:30 PM CST  Office Visit with Laurie Conteh MD  [facility] ([facility]) [address] [address]  [phone]             Thyroid Protocol Passed - 1/9/2020  4:34 PM        Passed - Patient is 12 years or older        Passed - Recent (12 mo) or future (30 days) visit within the authorizing provider's specialty     Patient has had an office visit with the authorizing provider or a provider within the authorizing providers department within the previous 12 mos or has a future within next 30 days. See \"Patient Info\" tab in inbasket, or \"Choose Columns\" in Meds & Orders section of the refill encounter.  "             Passed - Medication is active on med list        Passed - Normal TSH on file in past 12 months     Recent Labs   Lab Test 11/04/19  1537   TSH 1.82              Passed - No active pregnancy on record     If patient is pregnant or has had a positive pregnancy test, please check TSH.          Passed - No positive pregnancy test in past 12 months     If patient is pregnant or has had a positive pregnancy test, please check TSH.

## 2020-01-10 DIAGNOSIS — I48.0 PAF (PAROXYSMAL ATRIAL FIBRILLATION) (H): ICD-10-CM

## 2020-01-10 DIAGNOSIS — I10 UNCONTROLLED HYPERTENSION: ICD-10-CM

## 2020-01-10 DIAGNOSIS — I10 HYPERTENSION GOAL BP (BLOOD PRESSURE) < 140/90: Chronic | ICD-10-CM

## 2020-01-10 RX ORDER — HYDRALAZINE HYDROCHLORIDE 25 MG/1
25 TABLET, FILM COATED ORAL 3 TIMES DAILY
Qty: 270 TABLET | Refills: 0 | Status: SHIPPED | OUTPATIENT
Start: 2020-01-10 | End: 2020-02-10

## 2020-01-10 RX ORDER — LEVOTHYROXINE SODIUM 25 UG/1
25 TABLET ORAL DAILY
Qty: 90 TABLET | Refills: 0 | Status: SHIPPED | OUTPATIENT
Start: 2020-01-10 | End: 2020-05-07

## 2020-01-10 RX ORDER — LISINOPRIL 10 MG/1
10 TABLET ORAL DAILY
Qty: 90 TABLET | Refills: 2 | Status: SHIPPED | OUTPATIENT
Start: 2020-01-10 | End: 2020-03-02

## 2020-01-10 RX ORDER — WARFARIN SODIUM 3 MG/1
3 TABLET ORAL DAILY
Qty: 90 TABLET | Refills: 0 | Status: SHIPPED | OUTPATIENT
Start: 2020-01-10 | End: 2020-04-28

## 2020-01-10 RX ORDER — DILTIAZEM HYDROCHLORIDE 120 MG/1
120 CAPSULE, EXTENDED RELEASE ORAL DAILY
Qty: 90 CAPSULE | Refills: 2 | Status: SHIPPED | OUTPATIENT
Start: 2020-01-10 | End: 2020-08-13

## 2020-01-10 RX ORDER — METOPROLOL SUCCINATE 100 MG/1
100 TABLET, EXTENDED RELEASE ORAL DAILY
Qty: 90 TABLET | Refills: 2 | Status: SHIPPED | OUTPATIENT
Start: 2020-01-10 | End: 2020-02-10 | Stop reason: ALTCHOICE

## 2020-01-10 RX ORDER — ALLOPURINOL 100 MG/1
100 TABLET ORAL DAILY
Qty: 90 TABLET | Refills: 0 | Status: SHIPPED | OUTPATIENT
Start: 2020-01-10 | End: 2020-04-23

## 2020-01-10 RX ORDER — HYDRALAZINE HYDROCHLORIDE 25 MG/1
25 TABLET, FILM COATED ORAL 3 TIMES DAILY
Qty: 270 TABLET | Refills: 2 | Status: SHIPPED | OUTPATIENT
Start: 2020-01-10 | End: 2020-03-31 | Stop reason: DRUGHIGH

## 2020-01-28 ENCOUNTER — TELEPHONE (OUTPATIENT)
Dept: FAMILY MEDICINE | Facility: CLINIC | Age: 85
End: 2020-01-28

## 2020-02-04 DIAGNOSIS — I65.23 BILATERAL CAROTID ARTERY STENOSIS: Primary | ICD-10-CM

## 2020-02-10 ENCOUNTER — ANTICOAGULATION THERAPY VISIT (OUTPATIENT)
Dept: NURSING | Facility: CLINIC | Age: 85
End: 2020-02-10
Payer: MEDICARE

## 2020-02-10 ENCOUNTER — OFFICE VISIT (OUTPATIENT)
Dept: FAMILY MEDICINE | Facility: CLINIC | Age: 85
End: 2020-02-10
Payer: MEDICARE

## 2020-02-10 VITALS
HEART RATE: 51 BPM | SYSTOLIC BLOOD PRESSURE: 184 MMHG | DIASTOLIC BLOOD PRESSURE: 66 MMHG | HEIGHT: 61 IN | TEMPERATURE: 98.1 F | WEIGHT: 151.3 LBS | OXYGEN SATURATION: 98 % | BODY MASS INDEX: 28.57 KG/M2

## 2020-02-10 DIAGNOSIS — I10 ESSENTIAL HYPERTENSION: Primary | ICD-10-CM

## 2020-02-10 DIAGNOSIS — D22.9 BENIGN MOLE: ICD-10-CM

## 2020-02-10 DIAGNOSIS — Z76.89 ESTABLISHING CARE WITH NEW DOCTOR, ENCOUNTER FOR: ICD-10-CM

## 2020-02-10 DIAGNOSIS — E03.8 OTHER SPECIFIED HYPOTHYROIDISM: Chronic | ICD-10-CM

## 2020-02-10 DIAGNOSIS — I48.91 ATRIAL FIBRILLATION, UNSPECIFIED TYPE (H): ICD-10-CM

## 2020-02-10 LAB — INR POINT OF CARE: 1.3 (ref 0.86–1.14)

## 2020-02-10 PROCEDURE — 99207 ZZC NO CHARGE NURSE ONLY: CPT

## 2020-02-10 PROCEDURE — 99214 OFFICE O/P EST MOD 30 MIN: CPT | Performed by: INTERNAL MEDICINE

## 2020-02-10 PROCEDURE — 36416 COLLJ CAPILLARY BLOOD SPEC: CPT

## 2020-02-10 PROCEDURE — 85610 PROTHROMBIN TIME: CPT | Mod: QW

## 2020-02-10 PROCEDURE — 84443 ASSAY THYROID STIM HORMONE: CPT | Performed by: INTERNAL MEDICINE

## 2020-02-10 RX ORDER — METOPROLOL TARTRATE 100 MG
100 TABLET ORAL 2 TIMES DAILY
Qty: 180 TABLET | Refills: 3 | Status: SHIPPED | OUTPATIENT
Start: 2020-02-10 | End: 2020-03-31

## 2020-02-10 ASSESSMENT — MIFFLIN-ST. JEOR: SCORE: 1078.67

## 2020-02-10 NOTE — PROGRESS NOTES
Chief Complaint:       Ghislaine Walls is a 84 year old female who presents to clinic today for the following health issues:      Establish care    HPI:   Patient Ghislaine Walls is a very pleasant 84 year old female with history of atrial fibrillation on long term anticoagulation therapy with warfarin who presents to Internal Medicine clinic today to establish care and follow up of multiple concerns. Regarding the patient's atrial fibrillation, the patient is due for an anticoagulation clinic referral to establish routine INR check follow up going forward. Regarding the patient's hypothyroidism, the patient is due for a repeat TSH lab at this time. Patient is also followed by the [facility] cardiology clinic in [place] going forward for her atrial fibrillation. No chest pain, headaches, fever or chills. Patient also complains of a chronic skin mole of the left upper back of unclear etiology and unknown duration.      Current Medications:     Current Outpatient Medications   Medication Sig Dispense Refill     allopurinol (ZYLOPRIM) 100 MG tablet Take 1 tablet (100 mg) by mouth daily 90 tablet 0     diltiazem ER (DILT-XR) 120 MG 24 hr capsule Take 1 capsule (120 mg) by mouth daily 90 capsule 2     hydrALAZINE (APRESOLINE) 25 MG tablet Take 1 tablet (25 mg) by mouth 3 times daily 270 tablet 2     levothyroxine (SYNTHROID/LEVOTHROID) 25 MCG tablet Take 1 tablet (25 mcg) by mouth daily 90 tablet 0     lisinopril (PRINIVIL/ZESTRIL) 10 MG tablet Take 1 tablet (10 mg) by mouth daily 90 tablet 2     Melatonin 10 MG TABS tablet Take 10 mg by mouth nightly as needed for sleep        metoprolol tartrate (LOPRESSOR) 100 MG tablet Take 1 tablet (100 mg) by mouth 2 times daily 180 tablet 3     warfarin ANTICOAGULANT (COUMADIN) 3 MG tablet Take 1 tablet (3 mg) by mouth daily 90 tablet 0         Allergies:      Allergies   Allergen Reactions     Oxybutynin Itching     Seasonal Allergies             Past Medical History:     Past Medical  History:   Diagnosis Date     [place] (hard of hearing)     wears hearing aids     Hyperlipidaemia      Hyperlipidaemia LDL goal < 130      Hypertension      Hypothyroid      PAD (peripheral artery disease) (H)      Renal insufficiency, mild      Uncontrolled hypertension 10/14/2019         Past Surgical History:     Past Surgical History:   Procedure Laterality Date     APPENDECTOMY       CATARACT IOL, RT/LT      bilateral (laser - 2013)     DENTAL SURGERY      wisdom     EYE SURGERY      Muscle release     TUBAL LIGATION  1971         Family Medical History:     Family History   Problem Relation Age of Onset     Heart Disease Mother      Respiratory Mother      Heart Disease Father      Heart Disease Brother      Coronary Artery Disease Brother         CAB     Heart Disease Brother      Coronary Artery Disease Brother         CAB         Social History:     Social History     Socioeconomic History     Marital status: Single     Spouse name: Not on file     Number of children: Not on file     Years of education: Not on file     Highest education level: Not on file   Occupational History     Not on file   Social Needs     Financial resource strain: Not on file     Food insecurity:     Worry: Not on file     Inability: Not on file     Transportation needs:     Medical: Not on file     Non-medical: Not on file   Tobacco Use     Smoking status: Former Smoker     Packs/day: 0.50     Years: 5.00     Pack years: 2.50     Types: Cigarettes     Last attempt to quit: 1973     Years since quittin.1     Smokeless tobacco: Never Used   Substance and Sexual Activity     Alcohol use: Not Currently     Alcohol/week: 0.0 standard drinks     Drug use: No     Sexual activity: Not Currently     Partners: Male   Lifestyle     Physical activity:     Days per week: Not on file     Minutes per session: Not on file     Stress: Not on file   Relationships     Social connections:     Talks on phone: Not on file      "Gets together: Not on file     Attends Oriental orthodox service: Not on file     Active member of club or organization: Not on file     Attends meetings of clubs or organizations: Not on file     Relationship status: Not on file     Intimate partner violence:     Fear of current or ex partner: Not on file     Emotionally abused: Not on file     Physically abused: Not on file     Forced sexual activity: Not on file   Other Topics Concern     Parent/sibling w/ CABG, MI or angioplasty before 65F 55M? No   Social History Narrative     Not on file           Review of System:     Constitutional: Negative for fever or chills  Skin: Negative for rashes, positive for a chronic appearing skin mole present of the left upper back  Ears/Nose/Throat: Negative for nasal congestion, sore throat  Respiratory: No shortness of breath, dyspnea on exertion, cough, or hemoptysis  Cardiovascular: Negative for chest pain, positive for atrial fibrillation  Gastrointestinal: Negative for nausea, vomiting  Genitourinary: Negative for dysuria, hematuria  Musculoskeletal: Negative for myalgias  Neurologic: Negative for headaches  Psychiatric: Negative for depression, anxiety  Hematologic/Lymphatic/Immunologic: Negative  Endocrine: positive for hypothyroidism  Behavioral: Negative for tobacco use       Physical Exam:   BP (!) 184/66 (BP Location: Right arm, Patient Position: Sitting, Cuff Size: Adult Regular)   Pulse 51   Temp 98.1  F (36.7  C) (Oral)   Ht 1.549 m (5' 1\")   Wt 68.6 kg (151 lb 4.8 oz)   SpO2 98%   BMI 28.59 kg/m      GENERAL: chronically ill appearing elderly femaley, alert and no acute distress  EYES: eyes grossly normal to inspection, and conjunctivae and sclerae normal  HENT: Normocephalic atraumatic. Nose and mouth without ulcers or lesions  NECK: supple  RESP: lungs clear to auscultation   CV: regular rate and rhythm, normal S1 S2  LYMPH: no peripheral edema   ABDOMEN: nondistended  MS: no gross musculoskeletal defects " noted  SKIN: a chronic appearing skin mole present of the left upper back  NEURO: Alert & Oriented x 3.   PSYCH: mentation appears normal, affect normal        Diagnostic Test Results:     Diagnostic Test Results:  Results for orders placed or performed in visit on 02/10/20   INR point of care     Status: Abnormal   Result Value Ref Range    INR Protime 1.3 (A) 0.86 - 1.14       ASSESSMENT/PLAN:     (Z76.89) Establishing care with new doctor, encounter for  (I48.91) Atrial fibrillation, unspecified type (H)  Comment: heart rate is well controlled  Plan: ANTICOAGULATION CLINIC REFERRAL            (I10) Essential hypertension  (primary encounter diagnosis)  Comment: BP is not at goal  Plan: metoprolol tartrate (LOPRESSOR) 100 MG tablet      (D22.9) Benign mole  Comment: chronic mole of the left upper back of unclear etiology  Plan: DERMATOLOGY REFERRAL      (E03.8) Other specified hypothyroidism  Comment: patient is due for a TSH lab  Plan: TSH with free T4 reflex      Follow Up Plan:     Patient is instructed to return to Internal Medicine clinic for follow-up visit in 2 weeks.        Laurie Conteh MD  Internal Medicine  [facility]

## 2020-02-10 NOTE — PROGRESS NOTES
ANTICOAGULATION FOLLOW-UP CLINIC VISIT    Patient Name:  Ghislaine Walls  Date:  2/10/2020  Contact Type:  Face to Face    SUBJECTIVE:  Patient Findings     Positives:   Missed doses    Comments:   Pt reports that she missed her warfarin dose last night. Pt denies missing any other doses but it is possible that she is missing some doses. Pt INR is 1.3 today. Pt also reports that she uses a knife to cut her tablets in half. Pt given a pill cutter.        Clinical Outcomes     Comments:   Pt reports that she missed her warfarin dose last night. Pt denies missing any other doses but it is possible that she is missing some doses. Pt INR is 1.3 today. Pt also reports that she uses a knife to cut her tablets in half. Pt given a pill cutter.           OBJECTIVE    INR Protime   Date Value Ref Range Status   02/10/2020 1.3 (A) 0.86 - 1.14 Final       ASSESSMENT / PLAN  INR assessment SUB    Recheck INR In: 2 WEEKS    INR Location Clinic      Anticoagulation Summary  As of 2/10/2020    INR goal:   2.0-3.0   TTR:   23.3 % (3.1 mo)   Prior goal:   2.0-3.0   INR used for dosin.3! (2/10/2020)   Warfarin maintenance plan:   3 mg (3 mg x 1) every Tue; 1.5 mg (3 mg x 0.5) all other days   Full warfarin instructions:   2/10: 3 mg; Otherwise 3 mg every Tue; 1.5 mg all other days   Weekly warfarin total:   12 mg   Plan last modified:   Delisa Jackson RN (2019)   Next INR check:   2020   Priority:   High   Target end date:   2/10/2025    Indications    Atrial fibrillation  unspecified type (H) [I48.91]             Anticoagulation Episode Summary     INR check location:   Home Draw    Preferred lab:       Send INR reminders to:   LEESA HUERTA    Comments:         Anticoagulation Care Providers     Provider Role Specialty Phone number    Laurie Conteh MD Referring Internal Medicine [phone]    Alma Delia Wallace PA-C Responsible Physician Assistant [phone]            See the Encounter  Report to view Anticoagulation Flowsheet and Dosing Calendar (Go to Encounters tab in chart review, and find the Anticoagulation Therapy Visit)    Pt INR is 1.3 today. See findings. Pt advised to take 3 mg today 2/10/20 and 3 mg tomorrow 2/11/20 then 3 mg on Tuesdays and 1.5 mg all the other days. Recheck INR on 2/28/20 after an appointment in the building with her dermatologist. Pt denies any changes in health,diet,activity or medication. Pt reports that she wants to switch to eliquis. She has an appointment with her cardiologist at the end of March to discuss. Ghislaine aware if signs of clotting (pain, tenderness, swelling, color change in leg or arm, SOB) and bleeding occur (blood in stool, urine, large bruising, bleeding gums, nosebleeds) to have INR check sooner. If sx severe report to ER or concerned for stroke call 911. If general questions or concerns arise, call clinic.         Harmony Hernandez RN

## 2020-02-11 LAB — TSH SERPL DL<=0.005 MIU/L-ACNC: 1.36 MU/L (ref 0.4–4)

## 2020-02-28 ENCOUNTER — APPOINTMENT (OUTPATIENT)
Age: 85
Setting detail: DERMATOLOGY
End: 2020-03-02

## 2020-02-28 ENCOUNTER — ANTICOAGULATION THERAPY VISIT (OUTPATIENT)
Dept: NURSING | Facility: CLINIC | Age: 85
End: 2020-02-28
Payer: MEDICARE

## 2020-02-28 DIAGNOSIS — L65.9 NONSCARRING HAIR LOSS, UNSPECIFIED: ICD-10-CM

## 2020-02-28 DIAGNOSIS — L82.1 OTHER SEBORRHEIC KERATOSIS: ICD-10-CM

## 2020-02-28 DIAGNOSIS — L60.8 OTHER NAIL DISORDERS: ICD-10-CM

## 2020-02-28 DIAGNOSIS — I48.0 PAF (PAROXYSMAL ATRIAL FIBRILLATION) (H): Primary | ICD-10-CM

## 2020-02-28 DIAGNOSIS — L85.3 XEROSIS CUTIS: ICD-10-CM

## 2020-02-28 DIAGNOSIS — L29.8 OTHER PRURITUS: ICD-10-CM

## 2020-02-28 LAB — INR POINT OF CARE: 1.4 (ref 0.86–1.14)

## 2020-02-28 PROCEDURE — OTHER COUNSELING: OTHER

## 2020-02-28 PROCEDURE — OTHER MIPS QUALITY: OTHER

## 2020-02-28 PROCEDURE — 99202 OFFICE O/P NEW SF 15 MIN: CPT

## 2020-02-28 PROCEDURE — 85610 PROTHROMBIN TIME: CPT | Mod: QW

## 2020-02-28 PROCEDURE — 99207 ZZC NO CHARGE NURSE ONLY: CPT

## 2020-02-28 PROCEDURE — 36416 COLLJ CAPILLARY BLOOD SPEC: CPT

## 2020-02-28 ASSESSMENT — LOCATION DETAILED DESCRIPTION DERM
LOCATION DETAILED: LEFT SUPERIOR UPPER BACK
LOCATION DETAILED: PERIUNGUAL SKIN RIGHT THUMB
LOCATION DETAILED: SUPERIOR THORACIC SPINE
LOCATION DETAILED: RIGHT MEDIAL FRONTAL SCALP
LOCATION DETAILED: RIGHT MEDIAL UPPER BACK
LOCATION DETAILED: LEFT THUMBNAIL

## 2020-02-28 ASSESSMENT — LOCATION ZONE DERM
LOCATION ZONE: TRUNK
LOCATION ZONE: FINGER
LOCATION ZONE: FINGERNAIL
LOCATION ZONE: SCALP

## 2020-02-28 ASSESSMENT — LOCATION SIMPLE DESCRIPTION DERM
LOCATION SIMPLE: RIGHT SCALP
LOCATION SIMPLE: RIGHT THUMB
LOCATION SIMPLE: LEFT THUMBNAIL
LOCATION SIMPLE: UPPER BACK
LOCATION SIMPLE: RIGHT UPPER BACK
LOCATION SIMPLE: LEFT UPPER BACK

## 2020-02-28 NOTE — PROGRESS NOTES
ANTICOAGULATION FOLLOW-UP CLINIC VISIT    Patient Name:  Ghislaine Walls  Date:  2020  Contact Type:  Face to Face    SUBJECTIVE:  Patient Findings     Positives:   Missed doses (Pt denies missing any warfarin.)    Comments:   The patient was assessed for diet, medication, and activity level changes, missed or extra doses, bruising or bleeding, with no problem findings.  Pt sets up own medication in med boxes for 1 week and is using the pill cutter to cut warfarin.        Clinical Outcomes     Comments:   The patient was assessed for diet, medication, and activity level changes, missed or extra doses, bruising or bleeding, with no problem findings.  Pt sets up own medication in med boxes for 1 week and is using the pill cutter to cut warfarin.           OBJECTIVE    INR Protime   Date Value Ref Range Status   2020 1.4 (A) 0.86 - 1.14 Final       ASSESSMENT / PLAN  INR assessment SUB    Recheck INR In: 2 WEEKS    INR Location Clinic      Anticoagulation Summary  As of 2020    INR goal:   2.0-3.0   TTR:   19.5 % (3.7 mo)   INR used for dosin.4! (2020)   Warfarin maintenance plan:   3 mg (3 mg x 1) every Tue, Thu, Sat; 1.5 mg (3 mg x 0.5) all other days   Full warfarin instructions:   : 3 mg; Otherwise 3 mg every Tue, Thu, Sat; 1.5 mg all other days   Weekly warfarin total:   15 mg   Plan last modified:   Ghislaine Lucero RN (2020)   Next INR check:   3/12/2020   Priority:   High   Target end date:   2/10/2025    Indications    Atrial fibrillation  unspecified type (H) [I48.91]             Anticoagulation Episode Summary     INR check location:   Home Draw    Preferred lab:       Send INR reminders to:   LEESA HUERTA    Comments:         Anticoagulation Care Providers     Provider Role Specialty Phone number    Laurie Conteh MD Referring Internal Medicine [phone]    Alma Delia Wallace PA-C Responsible Physician Assistant [phone]            See the  Encounter Report to view Anticoagulation Flowsheet and Dosing Calendar (Go to Encounters tab in chart review, and find the Anticoagulation Therapy Visit)    Dosage adjustment made based on physician directed care plan.    INR 1.4 See findings, pt denies having missed warfarin, Pt will take 3mg today, maintenance dose changed 22% 3mg TuThSat, 1.5mg all other days, INR check 2 weeks.Discussed with pt s/s of clotting concern to contact clinic or 911.    Ghislaine Lucero RN

## 2020-02-28 NOTE — PROCEDURE: MIPS QUALITY
Quality 110: Preventive Care And Screening: Influenza Immunization: Influenza Immunization Administered during Influenza season
Detail Level: Detailed
Quality 226: Preventive Care And Screening: Tobacco Use: Screening And Cessation Intervention: Patient screened for tobacco use and is an ex/non-smoker
Quality 131: Pain Assessment And Follow-Up: Pain assessment using a standardized tool is documented as negative, no follow-up plan required
Quality 130: Documentation Of Current Medications In The Medical Record: Current Medications Documented
Quality 474: Zoster Vaccination Status: Shingrix Vaccination Administered or Previously Received
Quality 431: Preventive Care And Screening: Unhealthy Alcohol Use - Screening: Patient screened for unhealthy alcohol use using a single question and scores less than 2 times per year
Quality 111:Pneumonia Vaccination Status For Older Adults: Pneumococcal Vaccination Previously Received

## 2020-02-28 NOTE — PROCEDURE: COUNSELING
Detail Level: Detailed
Patient Specific Counseling (Will Not Stick From Patient To Patient): Natural occurrence with aging. Not related to taking Warfarin or Eliquis.
Patient Specific Counseling (Will Not Stick From Patient To Patient): Take over the counter anti-histamine, Zyrtec or Claritin daily for itch relief.
Detail Level: Generalized
Patient Specific Counseling (Will Not Stick From Patient To Patient): Use dip or gel polish for a thicker coating over nail

## 2020-03-02 ENCOUNTER — OFFICE VISIT (OUTPATIENT)
Dept: FAMILY MEDICINE | Facility: CLINIC | Age: 85
End: 2020-03-02
Payer: MEDICARE

## 2020-03-02 VITALS
WEIGHT: 149 LBS | HEART RATE: 64 BPM | DIASTOLIC BLOOD PRESSURE: 68 MMHG | HEIGHT: 61 IN | TEMPERATURE: 97.6 F | BODY MASS INDEX: 28.13 KG/M2 | SYSTOLIC BLOOD PRESSURE: 164 MMHG | OXYGEN SATURATION: 98 %

## 2020-03-02 DIAGNOSIS — M10.9 GOUTY ARTHRITIS: ICD-10-CM

## 2020-03-02 DIAGNOSIS — I10 UNCONTROLLED HYPERTENSION: ICD-10-CM

## 2020-03-02 DIAGNOSIS — I48.0 PAF (PAROXYSMAL ATRIAL FIBRILLATION) (H): Primary | ICD-10-CM

## 2020-03-02 PROCEDURE — 99214 OFFICE O/P EST MOD 30 MIN: CPT | Performed by: INTERNAL MEDICINE

## 2020-03-02 RX ORDER — LISINOPRIL 20 MG/1
20 TABLET ORAL DAILY
Qty: 90 TABLET | Refills: 3 | Status: SHIPPED | OUTPATIENT
Start: 2020-03-02 | End: 2020-03-02

## 2020-03-02 RX ORDER — LISINOPRIL 20 MG/1
20 TABLET ORAL DAILY
Qty: 90 TABLET | Refills: 3 | Status: SHIPPED | OUTPATIENT
Start: 2020-03-02 | End: 2020-04-22 | Stop reason: DRUGHIGH

## 2020-03-02 ASSESSMENT — MIFFLIN-ST. JEOR: SCORE: 1068.24

## 2020-03-02 NOTE — PROGRESS NOTES
Chief Complaint:       Ghislaine Walls is a 84 year old female who presents to clinic today for the following health issues:         Chief Complaint   Patient presents with     Follow Up     Atrial fibrillation       HPI:   Patient Ghislaine Walls is a very pleasant 84 year old female with history of hypertension, hyperlipidemia, atrial fibrillation who presents to Internal Medicine clinic today for follow up of multiple concerns. Regarding the patient's hypertension, the patient's BP is not well controlled and is not at goal. Regarding the patient's chronic gout, the patient denies any acute gout flare up symptoms at this time.           Current Medications:     Current Outpatient Medications   Medication Sig Dispense Refill     allopurinol (ZYLOPRIM) 100 MG tablet Take 1 tablet (100 mg) by mouth daily 90 tablet 0     diltiazem ER (DILT-XR) 120 MG 24 hr capsule Take 1 capsule (120 mg) by mouth daily 90 capsule 2     hydrALAZINE (APRESOLINE) 25 MG tablet Take 1 tablet (25 mg) by mouth 3 times daily (Patient taking differently: Take 50 mg by mouth 3 times daily ) 270 tablet 2     levothyroxine (SYNTHROID/LEVOTHROID) 25 MCG tablet Take 1 tablet (25 mcg) by mouth daily 90 tablet 0     lisinopril (ZESTRIL) 20 MG tablet Take 1 tablet (20 mg) by mouth daily 90 tablet 3     Melatonin 10 MG TABS tablet Take 10 mg by mouth nightly as needed for sleep        metoprolol tartrate (LOPRESSOR) 100 MG tablet Take 1 tablet (100 mg) by mouth 2 times daily 180 tablet 3     warfarin ANTICOAGULANT (COUMADIN) 3 MG tablet Take 1 tablet (3 mg) by mouth daily 90 tablet 0         Allergies:      Allergies   Allergen Reactions     Oxybutynin Itching     Seasonal Allergies             Past Medical History:     Past Medical History:   Diagnosis Date     [place] (hard of hearing)     wears hearing aids     Hyperlipidaemia      Hyperlipidaemia LDL goal < 130      Hypertension      Hypothyroid      PAD (peripheral artery disease) (H)      Renal  insufficiency, mild      Uncontrolled hypertension 10/14/2019         Past Surgical History:     Past Surgical History:   Procedure Laterality Date     APPENDECTOMY       CATARACT IOL, RT/LT      bilateral (laser - 2013)     DENTAL SURGERY      wisdom     EYE SURGERY      Muscle release     TUBAL LIGATION  1971         Family Medical History:     Family History   Problem Relation Age of Onset     Heart Disease Mother      Respiratory Mother      Heart Disease Father      Heart Disease Brother      Coronary Artery Disease Brother         CAB     Heart Disease Brother      Coronary Artery Disease Brother         CAB         Social History:     Social History     Socioeconomic History     Marital status: Single     Spouse name: Not on file     Number of children: Not on file     Years of education: Not on file     Highest education level: Not on file   Occupational History     Not on file   Social Needs     Financial resource strain: Not on file     Food insecurity:     Worry: Not on file     Inability: Not on file     Transportation needs:     Medical: Not on file     Non-medical: Not on file   Tobacco Use     Smoking status: Former Smoker     Packs/day: 0.50     Years: 5.00     Pack years: 2.50     Types: Cigarettes     Last attempt to quit: 1973     Years since quittin.1     Smokeless tobacco: Never Used   Substance and Sexual Activity     Alcohol use: Not Currently     Alcohol/week: 0.0 standard drinks     Drug use: No     Sexual activity: Not Currently     Partners: Male   Lifestyle     Physical activity:     Days per week: Not on file     Minutes per session: Not on file     Stress: Not on file   Relationships     Social connections:     Talks on phone: Not on file     Gets together: Not on file     Attends [employer] service: Not on file     Active member of club or organization: Not on file     Attends meetings of clubs or organizations: Not on file     Relationship status: Not on file  "    Intimate partner violence:     Fear of current or ex partner: Not on file     Emotionally abused: Not on file     Physically abused: Not on file     Forced sexual activity: Not on file   Other Topics Concern     Parent/sibling w/ CABG, MI or angioplasty before 65F 55M? No   Social History Narrative     Not on file           Review of System:     Constitutional: Negative for fever or chills  Skin: Negative for rashes  Ears/Nose/Throat: Negative for nasal congestion, sore throat  Respiratory: No shortness of breath, dyspnea on exertion, cough, or hemoptysis  Cardiovascular: Negative for chest pain  Gastrointestinal: Negative for nausea, vomiting  Rheumatologic: positive for chronic gout  Genitourinary: Negative for dysuria, hematuria  Musculoskeletal: Negative for myalgias  Neurologic: Negative for headaches  Psychiatric: Negative for depression, anxiety  Hematologic/Lymphatic/Immunologic: Negative  Endocrine: Negative  Behavioral: Negative for tobacco use       Physical Exam:   BP (!) 164/68 (BP Location: Left arm, Patient Position: Sitting, Cuff Size: Adult Regular)   Pulse 64   Temp 97.6  F (36.4  C) (Oral)   Ht 1.549 m (5' 1\")   Wt 67.6 kg (149 lb)   SpO2 98%   BMI 28.15 kg/m      GENERAL: chronically ill appearing elderly female, alert and no acute distress  EYES: eyes grossly normal to inspection, and conjunctivae and sclerae normal  HENT: Normocephalic atraumatic. Nose and mouth without ulcers or lesions  NECK: supple  RESP: lungs clear to auscultation   CV: regular rate and rhythm, normal S1 S2  LYMPH: no peripheral edema   ABDOMEN: nondistended  MS: no gross musculoskeletal defects noted  SKIN: no suspicious lesions or rashes  NEURO: Alert & Oriented x 3.   PSYCH: mentation appears normal, affect normal        Diagnostic Test Results:     Lab Results   Component Value Date    WBC 3.3 10/14/2019     Lab Results   Component Value Date    RBC 3.16 10/14/2019     Lab Results   Component Value Date    " HGB 9.9 10/14/2019     Lab Results   Component Value Date    HCT 31.1 10/14/2019     Lab Results   Component Value Date    MCV 98 10/14/2019     Lab Results   Component Value Date    MCH 31.3 10/14/2019     Lab Results   Component Value Date    MCHC 31.8 10/14/2019     Lab Results   Component Value Date    RDW 15.7 10/14/2019     Lab Results   Component Value Date     10/14/2019         ASSESSMENT/PLAN:     (I10) Uncontrolled hypertension  (primary encounter diagnosis)  Comment: BP is still not at goal  Plan: lisinopril (ZESTRIL) 20 MG tablet,       (I48.0) PAF (paroxysmal atrial fibrillation)   Comment: no chest pain, no acute palpitations  Plan: continue current cardiac medications including warfarin long term anticoagulation therapy      (M10.9) Gouty arthritis  Comment: no acute gout symptoms  Plan: continue current allopurinol medication      Follow Up Plan:     Patient is instructed to return to Internal Medicine clinic for follow-up visit in 2 months.        Laurie Conteh MD  Internal Medicine  [facility]

## 2020-03-12 ENCOUNTER — ANTICOAGULATION THERAPY VISIT (OUTPATIENT)
Dept: NURSING | Facility: CLINIC | Age: 85
End: 2020-03-12
Payer: MEDICARE

## 2020-03-12 LAB — INR POINT OF CARE: 1.5 (ref 0.86–1.14)

## 2020-03-12 PROCEDURE — 85610 PROTHROMBIN TIME: CPT | Mod: QW

## 2020-03-12 PROCEDURE — 99207 ZZC NO CHARGE NURSE ONLY: CPT

## 2020-03-12 PROCEDURE — 36416 COLLJ CAPILLARY BLOOD SPEC: CPT

## 2020-03-12 NOTE — PROGRESS NOTES
ANTICOAGULATION FOLLOW-UP CLINIC VISIT    Patient Name:  Ghislaine Walls  Date:  3/12/2020  Contact Type:  Face to Face    SUBJECTIVE:  Patient Findings     Comments:   Patient denies any identifiable changes that caused the subtherapeutic INR.           Clinical Outcomes     Negatives:   Major bleeding event, Thromboembolic event, Anticoagulation-related hospital admission, Anticoagulation-related ED visit, Anticoagulation-related fatality    Comments:   Patient denies any identifiable changes that caused the subtherapeutic INR.              OBJECTIVE    INR Protime   Date Value Ref Range Status   2020 1.5 (A) 0.86 - 1.14 Final       ASSESSMENT / PLAN  INR assessment SUB    Recheck INR In: 2 WEEKS    INR Location Clinic      Anticoagulation Summary  As of 3/12/2020    INR goal:   2.0-3.0   TTR:   17.5 % (4.2 mo)   INR used for dosin.5! (3/12/2020)   Warfarin maintenance plan:   1.5 mg (3 mg x 0.5) every Mon, Wed, Fri; 3 mg (3 mg x 1) all other days   Full warfarin instructions:   3/12: 4.5 mg; Otherwise 1.5 mg every Mon, Wed, Fri; 3 mg all other days   Weekly warfarin total:   16.5 mg   Plan last modified:   Rossana Howard RN (3/12/2020)   Next INR check:   3/26/2020   Priority:   High   Target end date:   2/10/2025         Anticoagulation Episode Summary     INR check location:   Home Draw    Preferred lab:       Send INR reminders to:   LEESA HUERTA    Comments:         Anticoagulation Care Providers     Provider Role Specialty Phone number    Wero, Laurie Zuniga MD Referring Internal Medicine [phone]    Alma Delia Wallace PA-C Responsible Physician Assistant [phone]            See the Encounter Report to view Anticoagulation Flowsheet and Dosing Calendar (Go to Encounters tab in chart review, and find the Anticoagulation Therapy Visit)    Dosage adjustment made based on physician directed care plan.    Ghislaine denies any changes or missed doses.  Will give booster dose  today and then increase maintenance dose ~ 10% and recheck in 2 weeks.     Rossana Howard RN

## 2020-03-23 ENCOUNTER — TELEPHONE (OUTPATIENT)
Dept: FAMILY MEDICINE | Facility: CLINIC | Age: 85
End: 2020-03-23

## 2020-03-23 DIAGNOSIS — I48.0 PAF (PAROXYSMAL ATRIAL FIBRILLATION) (H): Primary | ICD-10-CM

## 2020-03-23 NOTE — TELEPHONE ENCOUNTER
Standing orders placed for INR.  Rossana Howard RN  [facility]

## 2020-03-26 ENCOUNTER — ANTICOAGULATION THERAPY VISIT (OUTPATIENT)
Dept: NURSING | Facility: CLINIC | Age: 85
End: 2020-03-26

## 2020-03-26 DIAGNOSIS — I48.0 PAF (PAROXYSMAL ATRIAL FIBRILLATION) (H): ICD-10-CM

## 2020-03-26 LAB — INR PPP: 3.2 (ref 0.86–1.14)

## 2020-03-26 PROCEDURE — 85610 PROTHROMBIN TIME: CPT | Performed by: INTERNAL MEDICINE

## 2020-03-26 PROCEDURE — 36416 COLLJ CAPILLARY BLOOD SPEC: CPT | Performed by: INTERNAL MEDICINE

## 2020-03-26 NOTE — PROGRESS NOTES
Anticoagulation Management    Unable to reach [place] today.    Today's INR result of 3.20 is supratherapeutic (goal INR of 2.0-3.0).  Result received from: Clinic Lab    Follow up required to confirm warfarin dose taken and assess for changes    left message to call back       Anticoagulation clinic to follow up    Rossana Howard RN  Transfer return call to: [phone]

## 2020-03-26 NOTE — PROGRESS NOTES
Denies any changes.  Will decrease dose today and then resume maintenance dose and recheck INR in 2 weeks.  Rossana Howard RN  [facility]

## 2020-03-31 ENCOUNTER — VIRTUAL VISIT (OUTPATIENT)
Dept: CARDIOLOGY | Facility: CLINIC | Age: 85
End: 2020-03-31
Attending: PHYSICIAN ASSISTANT
Payer: MEDICARE

## 2020-03-31 VITALS
HEART RATE: 73 BPM | HEIGHT: 62 IN | WEIGHT: 145 LBS | SYSTOLIC BLOOD PRESSURE: 170 MMHG | BODY MASS INDEX: 26.68 KG/M2 | DIASTOLIC BLOOD PRESSURE: 70 MMHG

## 2020-03-31 DIAGNOSIS — I24.89 DEMAND ISCHEMIA (H): ICD-10-CM

## 2020-03-31 DIAGNOSIS — R06.09 DYSPNEA ON EXERTION: ICD-10-CM

## 2020-03-31 DIAGNOSIS — I10 HYPERTENSION GOAL BP (BLOOD PRESSURE) < 140/90: ICD-10-CM

## 2020-03-31 DIAGNOSIS — I48.0 PAF (PAROXYSMAL ATRIAL FIBRILLATION) (H): Primary | ICD-10-CM

## 2020-03-31 DIAGNOSIS — I10 UNCONTROLLED HYPERTENSION: ICD-10-CM

## 2020-03-31 PROCEDURE — 99443 ZZC PHYSICIAN TELEPHONE EVALUATION 21-30 MIN: CPT | Performed by: INTERNAL MEDICINE

## 2020-03-31 RX ORDER — METOPROLOL SUCCINATE 100 MG/1
100 TABLET, EXTENDED RELEASE ORAL DAILY
COMMUNITY
End: 2020-09-15

## 2020-03-31 RX ORDER — HYDRALAZINE HYDROCHLORIDE 50 MG/1
100 TABLET, FILM COATED ORAL 3 TIMES DAILY
COMMUNITY
End: 2020-09-15 | Stop reason: DRUGHIGH

## 2020-03-31 ASSESSMENT — MIFFLIN-ST. JEOR: SCORE: 1065.97

## 2020-03-31 NOTE — PROGRESS NOTES
Service Date: 03/31/2020      TELEPHONE NOTE       HISTORY OF PRESENT ILLNESS:  I had the pleasure of discussing Ms. Walls's care with her today in the form of a telephone visit.  She is a very pleasant, 84-year-old female whom I saw as an inpatient at [facility] in 09/2019.  She presented to [facility] with generalized weakness and was found to be in acute renal failure with atrial fibrillation with rapid ventricular response.  She was also noted to have mildly elevated troponins, which were flat in trajectory.      At the time I saw her, an echocardiogram demonstrated normal left ventricular systolic function with no significant valvular disease.  Moderate left atrial enlargement was noted.  She converted spontaneously to normal sinus rhythm, and we recommended continuation of rate control with diltiazem and metoprolol.  She was also started on Eliquis initially for thromboembolic prophylaxis, but then transitioned to warfarin due to cost issues.      She has done well since then and was seen by Daphne Cm in followup in 10/2019.  Today she reports feeling well, denying any chest discomfort or palpitations.  She does have chronic fatigue, but has previously declined a sleep study.  She does remain as active as possible at home, but does state that any heavy exertion tends to leave her fatigued.      She has been compliant with all her medications.  Interestingly, her blood pressure appears to have trended upwards in the last few weeks.  She is now on metoprolol, hydralazine, diltiazem and lisinopril for blood pressure control.  She states that her blood pressures range about 150 systolic at home.      IMPRESSION:   1.  Paroxysmal atrial fibrillation, treated with a rate control and anticoagulation approach.   2.  Chronic fatigue.   3.  Acute renal insufficiency, which is now resolved.   4.  Hypertension, which appears to be poorly controlled.      Ms. Walls appears to be stable overall  from a cardiovascular standpoint.  She did express a wish to transition to Eliquis given the inconvenience of frequent INR measurements.  She is scheduled to have her INR measured next week.  I have asked her to hold her Coumadin for 2 days prior to the INR check, and if her INR is less than 2, we will restart Eliquis at 5 mg p.o. b.i.d.      Regarding her hypertension, I have asked her to increase her lisinopril to 40 mg daily.  If her blood pressure remains persistently elevated, I would recommend discontinuing the diltiazem and starting her on amlodipine 5 mg daily.      To work up for generalized fatigue and minor elevation in troponins during her hospital stay, I think a stress perfusion study would be reasonable, and I will order this to be performed in the next few months.      It was a pleasure discussing her care with her today.         IGNACIO BEST MD             D: 2020   T: 2020   MT: keisha      Name:     ROSIO YOUNG   MRN:      [id number]        Account:      [id number]   :      1935           Service Date: 2020      Document: [id number]

## 2020-03-31 NOTE — PROGRESS NOTES
"Ghislaine Walls is a 84 year old female who is being evaluated via a billable telephone visit.      The patient has been notified of following:     \"This telephone visit will be conducted via a call between you and your physician/provider. We have found that certain health care needs can be provided without the need for a physical exam.  This service lets us provide the care you need with a short phone conversation.  If a prescription is necessary we can send it directly to your pharmacy.  If lab work is needed we can place an order for that and you can then stop by our lab to have the test done at a later time.    If during the course of the call the physician/provider feels a telephone visit is not appropriate, you will not be charged for this service.\"     Patient has given verbal consent for Telephone visit?  YES    Ghislaine Walls complains of    Chief Complaint   Patient presents with     Hypertension       I have reviewed and updated the patient's Past Medical History, Social History, Family History and Medication List.    ALLERGIES  Oxybutynin and Seasonal allergies    Patient Reported Vital Signs:  B/P-170/70  P-73  Weight-145 lbs     [facility]      Phone call duration: 21 minutes    Seema Fuentes MD    "

## 2020-04-08 ENCOUNTER — TELEPHONE (OUTPATIENT)
Dept: FAMILY MEDICINE | Facility: CLINIC | Age: 85
End: 2020-04-08

## 2020-04-08 NOTE — TELEPHONE ENCOUNTER
To MD:    Pt calling with questions regarding her warfarin and starting Eliquis. Had a cardiology appt 3/31 in which it was discussed that she was to hold her warfarin two days prior to her next INR (4/9), and if that INR was less than 2.0, pt could start Eliquis.    Pt reports that she held her warfarin as instructed yesterday, 4/7, and was planning on holding today before her INR tomorrow.  However, pt does not have a script for Eliquis and uses a mail order pharmacy that takes at least a week to deliver her meds, and therefore would be unable to start Eliquis tomorrow.    At this point, should pt just resume her warfarin until she has the Eliquis prescription, and then we can test her INR at that time and have her start Eliquis once her INR is below 2.0? (Pt requests that Eliquis rx be sent to [facility] mail order pharmacy).    Paula Varela RN on 4/8/2020 at 2:16 PM

## 2020-04-08 NOTE — TELEPHONE ENCOUNTER
Spoke to pt regarding her warfarin. MD in agreement that pt should continue her warfarin until she gets her Eliquis prescription. Pt had held warfarin on 4/7 and was planning on holding warfarin tonight (4/8), and having an INR tomorrow (4/9). Pt was to start Eliquis tomorrow if INR was less than 2.0. However, pt does not yet have her rx for Eliquis.    Instructed pt to continue her warfarin dosing of 1.5mg Mon/Wed/Fri; 3mg all other days for now until she receives her Eliquis rx. Once she receives Eliquis rx, will notify [facility] and [facility] will work with pt on dosing and to set up an INR and have pt start Eliquis once INR is 2.0 or below.    Pt verbalized understanding.    aPula Varela RN on 4/8/2020 at 4:54 PM

## 2020-04-09 ENCOUNTER — ANTICOAGULATION THERAPY VISIT (OUTPATIENT)
Dept: NURSING | Facility: CLINIC | Age: 85
End: 2020-04-09

## 2020-04-09 ENCOUNTER — TELEPHONE (OUTPATIENT)
Dept: CARDIOLOGY | Facility: CLINIC | Age: 85
End: 2020-04-09

## 2020-04-09 DIAGNOSIS — I48.0 PAF (PAROXYSMAL ATRIAL FIBRILLATION) (H): ICD-10-CM

## 2020-04-09 DIAGNOSIS — I48.0 PAF (PAROXYSMAL ATRIAL FIBRILLATION) (H): Primary | ICD-10-CM

## 2020-04-09 DIAGNOSIS — Z86.73 HISTORY OF CVA (CEREBROVASCULAR ACCIDENT): ICD-10-CM

## 2020-04-09 LAB — INR PPP: 2 (ref 0.86–1.14)

## 2020-04-09 PROCEDURE — 36416 COLLJ CAPILLARY BLOOD SPEC: CPT | Performed by: INTERNAL MEDICINE

## 2020-04-09 PROCEDURE — 85610 PROTHROMBIN TIME: CPT | Performed by: INTERNAL MEDICINE

## 2020-04-09 NOTE — PROGRESS NOTES
Patient will be transitioning to Eliquis per cardiology recommendation.  The RX has not been sent in at this time.  Spoke with Cardiology and they will sent the RX to her mail order pharmacy. Once patient receives the medication, she will schedule an INR appointment and then we will direct her further on when to transition to Eliquis. She did hold one dose this past week, but will resume maintenance dose.      Pt denies any changes in diet,health or activity. Ghislaine is aware if signs of clotting (pain, tenderness, swelling, color change in leg or arm, SOB) and bleeding occur (blood in stool, urine, large bruising, bleeding gums, nosebleeds) to have INR check sooner. If sx severe report to ER or concerned for stroke call 911. If general questions or concerns arise, call clinic.

## 2020-04-09 NOTE — TELEPHONE ENCOUNTER
Voicemail received from patient calling to report that her INR was 2.00 today, and that Dr Fuentes told her she could start eliquis once her INR was less than or equal to 2.0. Per Epic notes, patient had a similar conversation with her INR team yesterday, but at that time she did not have her eliquis prescription yet. Pt states that today she still does not have her prescription. Upon review of chart, eliquis prescription was never sent in. Pt requests mail-order pharmacy, Rx sent for eliquis 5mg BID (dose recommended at virtual visit 3/31/20 by Dr Fuentes). Advised patient to continue her current warfarin dosing for now, and then to contact her INR team once she receives her eliquis. Reviewed that they will direct her regarding dosing/holds/INR rechecks and when to start eliquis. Pt verbalized understanding.     Spoke to Darshana, INR RN with update as above. Darshana in agreement of plan, will reach out to patient to coordinate next recheck/dosing instructions.

## 2020-04-22 ENCOUNTER — TELEPHONE (OUTPATIENT)
Dept: CARDIOLOGY | Facility: CLINIC | Age: 85
End: 2020-04-22

## 2020-04-22 ENCOUNTER — TELEPHONE (OUTPATIENT)
Dept: FAMILY MEDICINE | Facility: CLINIC | Age: 85
End: 2020-04-22

## 2020-04-22 DIAGNOSIS — I48.0 PAF (PAROXYSMAL ATRIAL FIBRILLATION) (H): ICD-10-CM

## 2020-04-22 DIAGNOSIS — I10 HYPERTENSION GOAL BP (BLOOD PRESSURE) < 140/90: Primary | Chronic | ICD-10-CM

## 2020-04-22 LAB
CAPILLARY BLOOD COLLECTION: NORMAL
INR PPP: 3.5 (ref 0.86–1.14)

## 2020-04-22 PROCEDURE — 36416 COLLJ CAPILLARY BLOOD SPEC: CPT | Performed by: INTERNAL MEDICINE

## 2020-04-22 PROCEDURE — 85610 PROTHROMBIN TIME: CPT | Performed by: INTERNAL MEDICINE

## 2020-04-22 RX ORDER — LISINOPRIL 40 MG/1
40 TABLET ORAL DAILY
Qty: 90 TABLET | Refills: 3 | Status: SHIPPED | OUTPATIENT
Start: 2020-04-22 | End: 2020-04-27

## 2020-04-22 NOTE — TELEPHONE ENCOUNTER
Patient was going to transition from Warfarin to Eliquis per cardiology notes. She was waiting to receive Eliquis from Mail order pharmacy.    Has she received her Eliquis?  INR supra today.    Tried calling patient but her phone just rings and rings.    Will need to try back.  Rosie Mcrae, RN  Anticoagulation Nurse - [facility], [place]

## 2020-04-22 NOTE — TELEPHONE ENCOUNTER
Message from patient, she has been using up her lisinopril 20mg x2 tabs since Dr. Fuentes changed her dose to 40mg daily. She needs to have an updated script sent to [facility] mail order pharmacy.     Spoke with patient and new RX sent to [facility] mail order service for lisinopril 40mg daily.

## 2020-04-22 NOTE — TELEPHONE ENCOUNTER
ANTICOAGULATION MANAGEMENT     Patient Name:  Ghislaine Walls  Date:  4/22/2020    ASSESSMENT /SUBJECTIVE:    Today's INR result of 3.5 is supratherapeutic. Goal INR of 2.0-3.0      Warfarin dose taken: Warfarin taken as previously instructed    Diet: No new diet changes affecting INR    Medication changes/ interactions: No new medications/supplements affecting INR    Previous INR: Therapeutic     S/S of bleeding or thromboembolism: No    New injury or illness: No    Upcoming surgery, procedure or cardioversion: No    Additional findings: Patient has received her Eliquis and will now transition from warfarin to Eliquis. Plan to HOLD x2 and recheck INR Friday. May start Eliquis if INR <2.0.      PLAN:    Spoke with Ghislaine regarding INR result and instructed:     Warfarin Dosing Instructions: HOLD today and tomorrow.    Instructed patient to follow up no later than: 2 days  Lab visit scheduled    Education provided: None required      Ghislaine verbalizes understanding and agrees to warfarin dosing plan.    Instructed to call the Anticoagulation Clinic for any changes, questions or concerns. (#[phone])        OBJECTIVE:  INR   Date Value Ref Range Status   04/22/2020 3.50 (H) 0.86 - 1.14 Final     Comment:     This test is intended for monitoring Coumadin therapy.  Results are not   accurate in patients with prolonged INR due to factor deficiency.               Anticoagulation Summary  As of 4/22/2020    INR goal:   2.0-3.0   TTR:   30.5 % (5.5 mo)   INR used for dosing:   3.50! (4/22/2020)   Warfarin maintenance plan:   1.5 mg (3 mg x 0.5) every Mon, Wed, Fri; 3 mg (3 mg x 1) all other days   Full warfarin instructions:   4/22: Hold; 4/23: Hold; Otherwise 1.5 mg every Mon, Wed, Fri; 3 mg all other days   Weekly warfarin total:   16.5 mg   Plan last modified:   Rossana Howard RN (3/12/2020)   Next INR check:   4/24/2020   Priority:   High   Target end date:   2/10/2025         Anticoagulation Episode Summary      INR check location:   Home Draw    Preferred lab:       Send INR reminders to:   LEESA HUERTA    Comments:         Anticoagulation Care Providers     Provider Role Specialty Phone number    aLurie Conteh MD Referring Internal Medicine [phone]    Alma Delia Wallace PA-C Responsible Physician Assistant [phone]

## 2020-04-23 DIAGNOSIS — M10.9 GOUTY ARTHRITIS: ICD-10-CM

## 2020-04-23 RX ORDER — ALLOPURINOL 100 MG/1
100 TABLET ORAL DAILY
Qty: 90 TABLET | Refills: 0 | Status: SHIPPED | OUTPATIENT
Start: 2020-04-23 | End: 2020-09-15

## 2020-04-23 NOTE — TELEPHONE ENCOUNTER
Routing refill request to provider for review/approval because:    Gout Agents Protocol [id number]/23/2020 11:41 AM   Has Uric Acid on file in past 12 months and value is less than 6 Protocol Details    Normal serum creatinine on file in the past 12 months

## 2020-04-24 ENCOUNTER — DOCUMENTATION ONLY (OUTPATIENT)
Dept: CARDIOLOGY | Facility: CLINIC | Age: 85
End: 2020-04-24

## 2020-04-24 ENCOUNTER — TELEPHONE (OUTPATIENT)
Dept: FAMILY MEDICINE | Facility: CLINIC | Age: 85
End: 2020-04-24

## 2020-04-24 ENCOUNTER — ANTICOAGULATION THERAPY VISIT (OUTPATIENT)
Dept: ANTICOAGULATION | Facility: CLINIC | Age: 85
End: 2020-04-24

## 2020-04-24 DIAGNOSIS — I48.0 PAF (PAROXYSMAL ATRIAL FIBRILLATION) (H): ICD-10-CM

## 2020-04-24 DIAGNOSIS — I50.32 CHRONIC DIASTOLIC HEART FAILURE (H): Primary | ICD-10-CM

## 2020-04-24 LAB
CAPILLARY BLOOD COLLECTION: NORMAL
INR PPP: 2 (ref 0.86–1.14)

## 2020-04-24 PROCEDURE — 85610 PROTHROMBIN TIME: CPT | Performed by: INTERNAL MEDICINE

## 2020-04-24 PROCEDURE — 36416 COLLJ CAPILLARY BLOOD SPEC: CPT | Performed by: INTERNAL MEDICINE

## 2020-04-24 NOTE — TELEPHONE ENCOUNTER
ANTICOAGULATION MANAGEMENT     Patient Name:  Ghislaine Walls  Date:  2020    ASSESSMENT /SUBJECTIVE:    Today's INR result of 2.00 is therapeutic. Goal INR of 2.0-3.0      Warfarin dose taken: Warfarin taken as previously instructed    Diet: No new diet changes affecting INR    Medication changes/ interactions: No new medications/supplements affecting INR    Previous INR: Supratherapeutic     S/S of bleeding or thromboembolism: No    New injury or illness: No    Upcoming surgery, procedure or cardioversion: No    Additional findings: None      PLAN:    Spoke with Ghislaine regarding INR result and instructed:     Warfarin Dosing Instructions: Patient will hold warfarin tonight and then start eliquis tomorrow. She will no longer take warfarin.  Huddled with Pharmacist [facility], Lorna Alvarez, who agrees to plan.        Education provided: Educated to stop taking warfarin, being eliquis tomorrow (BID) and no longer needing INR checks.      Ghislaine verbalizes understanding and agrees to warfarin dosing plan.    Instructed to call the Anticoagulation Clinic for any changes, questions or concerns. (#[phone])        OBJECTIVE:  INR   Date Value Ref Range Status   2020 2.00 (H) 0.86 - 1.14 Final     Comment:     This test is intended for monitoring Coumadin therapy.  Results are not   accurate in patients with prolonged INR due to factor deficiency.               Anticoagulation Summary  As of 2020    INR goal:   2.0-3.0   TTR:   30.9 % (5.6 mo)   INR used for dosin.00 (2020)   Warfarin maintenance plan:   1.5 mg (3 mg x 0.5) every Mon, Wed, Fri; 3 mg (3 mg x 1) all other days   Full warfarin instructions:   1.5 mg every Mon, Wed, Fri; 3 mg all other days   Weekly warfarin total:   16.5 mg   Plan last modified:   Rossana Howard RN (3/12/2020)   Next INR check:      Priority:   High   Target end date:   2/10/2025         Anticoagulation Episode Summary     INR check location:   Home Draw     Preferred lab:       Send INR reminders to:   LEESA HUERTA    Comments:         Anticoagulation Care Providers     Provider Role Specialty Phone number    Laurie Conteh MD Referring Internal Medicine [phone]    Alma Delia Wallace PA-C Responsible Physician Assistant [phone]

## 2020-04-24 NOTE — PROGRESS NOTES
Message from [facility] that lisinopril 40mg is on back order and an alternative is requested.    Attempted to contact patient to see if she wants a script sent locally or if she wants to go back to lisinopril 20mg x2 tabs. Attempted to leave a message but unclear if her machine is working. Will try again    4/27/2020 spoke with patient, she prefers to order lisinopril 20mg x2 tabs from mailorder rather than go locally to try to find 40mg sizes. Rx escripted.

## 2020-04-27 RX ORDER — LISINOPRIL 20 MG/1
TABLET ORAL
Qty: 180 TABLET | Refills: 1 | Status: SHIPPED | OUTPATIENT
Start: 2020-04-27 | End: 2020-04-28 | Stop reason: DRUGHIGH

## 2020-04-28 ENCOUNTER — DOCUMENTATION ONLY (OUTPATIENT)
Dept: CARDIOLOGY | Facility: CLINIC | Age: 85
End: 2020-04-28

## 2020-04-28 DIAGNOSIS — I50.32 CHRONIC HEART FAILURE WITH PRESERVED EJECTION FRACTION (H): Primary | ICD-10-CM

## 2020-04-28 RX ORDER — LISINOPRIL 40 MG/1
40 TABLET ORAL DAILY
Qty: 90 TABLET | Refills: 3 | Status: ON HOLD | OUTPATIENT
Start: 2020-04-28 | End: 2020-07-24

## 2020-04-28 NOTE — PROGRESS NOTES
Received a reply from [facility] that they cannot source the zestril generic form. Local [facility] had no backorder issues for their lisinopril.    Called patient, she no longer uses [facility]. She asks that we send the script locally to [facility] in [facility] for the 40mg size. If that is not available, then she asks for the 20mg x2 tabs order be placed. Will contact patient if the 40mg lisinopril is not available and the other size is ordered. She will have her daughter  the script.

## 2020-05-06 DIAGNOSIS — E03.9 HYPOTHYROIDISM, UNSPECIFIED TYPE: ICD-10-CM

## 2020-05-07 RX ORDER — LEVOTHYROXINE SODIUM 25 UG/1
25 TABLET ORAL DAILY
Qty: 90 TABLET | Refills: 2 | Status: SHIPPED | OUTPATIENT
Start: 2020-05-07 | End: 2021-03-15

## 2020-06-18 ENCOUNTER — TRANSFERRED RECORDS (OUTPATIENT)
Dept: HEALTH INFORMATION MANAGEMENT | Facility: CLINIC | Age: 85
End: 2020-06-18

## 2020-07-21 ENCOUNTER — APPOINTMENT (OUTPATIENT)
Dept: GENERAL RADIOLOGY | Facility: CLINIC | Age: 85
DRG: 682 | End: 2020-07-21
Attending: EMERGENCY MEDICINE
Payer: MEDICARE

## 2020-07-21 ENCOUNTER — HOSPITAL ENCOUNTER (INPATIENT)
Facility: CLINIC | Age: 85
LOS: 8 days | Discharge: NURSING, REHAB OR LONG-TERM CARE | DRG: 682 | End: 2020-07-31
Attending: EMERGENCY MEDICINE | Admitting: INTERNAL MEDICINE
Payer: MEDICARE

## 2020-07-21 DIAGNOSIS — H90.0 CONDUCTIVE HEARING LOSS, BILATERAL: ICD-10-CM

## 2020-07-21 DIAGNOSIS — D64.9 ANEMIA, UNSPECIFIED TYPE: ICD-10-CM

## 2020-07-21 DIAGNOSIS — I10 HYPERTENSION GOAL BP (BLOOD PRESSURE) < 140/90: Chronic | ICD-10-CM

## 2020-07-21 DIAGNOSIS — Z86.73 HISTORY OF CVA (CEREBROVASCULAR ACCIDENT): ICD-10-CM

## 2020-07-21 DIAGNOSIS — M19.90 INFLAMMATORY ARTHRITIS: ICD-10-CM

## 2020-07-21 DIAGNOSIS — M62.81 GENERALIZED MUSCLE WEAKNESS: ICD-10-CM

## 2020-07-21 DIAGNOSIS — I50.32 CHRONIC HEART FAILURE WITH PRESERVED EJECTION FRACTION (H): ICD-10-CM

## 2020-07-21 DIAGNOSIS — K44.9 HIATAL HERNIA: ICD-10-CM

## 2020-07-21 DIAGNOSIS — I48.0 PAF (PAROXYSMAL ATRIAL FIBRILLATION) (H): ICD-10-CM

## 2020-07-21 DIAGNOSIS — H90.0 CONDUCTIVE HEARING LOSS, BILATERAL: Primary | Chronic | ICD-10-CM

## 2020-07-21 PROBLEM — R53.1 GENERALIZED WEAKNESS: Status: ACTIVE | Noted: 2020-07-21

## 2020-07-21 LAB
ALBUMIN SERPL-MCNC: 2 G/DL (ref 3.4–5)
ALBUMIN UR-MCNC: 30 MG/DL
ALP SERPL-CCNC: 69 U/L (ref 40–150)
ALT SERPL W P-5'-P-CCNC: 8 U/L (ref 0–50)
ANION GAP SERPL CALCULATED.3IONS-SCNC: 9 MMOL/L (ref 3–14)
ANISOCYTOSIS BLD QL SMEAR: SLIGHT
APPEARANCE UR: ABNORMAL
AST SERPL W P-5'-P-CCNC: 18 U/L (ref 0–45)
BACTERIA #/AREA URNS HPF: ABNORMAL /HPF
BASOPHILS # BLD AUTO: 0.1 10E9/L (ref 0–0.2)
BASOPHILS NFR BLD AUTO: 2 %
BILIRUB DIRECT SERPL-MCNC: 0.1 MG/DL (ref 0–0.2)
BILIRUB SERPL-MCNC: 0.4 MG/DL (ref 0.2–1.3)
BILIRUB UR QL STRIP: NEGATIVE
BUN SERPL-MCNC: 39 MG/DL (ref 7–30)
CALCIUM SERPL-MCNC: 9.3 MG/DL (ref 8.5–10.1)
CHLORIDE SERPL-SCNC: 112 MMOL/L (ref 94–109)
CO2 SERPL-SCNC: 14 MMOL/L (ref 20–32)
COLOR UR AUTO: YELLOW
CREAT SERPL-MCNC: 1.43 MG/DL (ref 0.52–1.04)
DIFFERENTIAL METHOD BLD: ABNORMAL
EOSINOPHIL # BLD AUTO: 0 10E9/L (ref 0–0.7)
EOSINOPHIL NFR BLD AUTO: 1 %
ERYTHROCYTE [DISTWIDTH] IN BLOOD BY AUTOMATED COUNT: 15.6 % (ref 10–15)
GFR SERPL CREATININE-BSD FRML MDRD: 34 ML/MIN/{1.73_M2}
GLUCOSE SERPL-MCNC: 91 MG/DL (ref 70–99)
GLUCOSE UR STRIP-MCNC: NEGATIVE MG/DL
HCT VFR BLD AUTO: 24.7 % (ref 35–47)
HEMOCCULT STL QL: NEGATIVE
HGB BLD-MCNC: 7.8 G/DL (ref 11.7–15.7)
HGB UR QL STRIP: NEGATIVE
INR PPP: 2.07 (ref 0.86–1.14)
INTERPRETATION ECG - MUSE: NORMAL
KETONES UR STRIP-MCNC: NEGATIVE MG/DL
LABORATORY COMMENT REPORT: NORMAL
LEUKOCYTE ESTERASE UR QL STRIP: NEGATIVE
LYMPHOCYTES # BLD AUTO: 0.7 10E9/L (ref 0.8–5.3)
LYMPHOCYTES NFR BLD AUTO: 15 %
MCH RBC QN AUTO: 29.8 PG (ref 26.5–33)
MCHC RBC AUTO-ENTMCNC: 31.6 G/DL (ref 31.5–36.5)
MCV RBC AUTO: 94 FL (ref 78–100)
MONOCYTES # BLD AUTO: 0.7 10E9/L (ref 0–1.3)
MONOCYTES NFR BLD AUTO: 16 %
MUCOUS THREADS #/AREA URNS LPF: PRESENT /LPF
NEUTROPHILS # BLD AUTO: 2.9 10E9/L (ref 1.6–8.3)
NEUTROPHILS NFR BLD AUTO: 66 %
NITRATE UR QL: NEGATIVE
NT-PROBNP SERPL-MCNC: 8140 PG/ML (ref 0–1800)
OVALOCYTES BLD QL SMEAR: SLIGHT
PH UR STRIP: 5 PH (ref 5–7)
PLATELET # BLD AUTO: 466 10E9/L (ref 150–450)
PLATELET # BLD EST: ABNORMAL 10*3/UL
POTASSIUM SERPL-SCNC: 4.4 MMOL/L (ref 3.4–5.3)
PROT SERPL-MCNC: 6.4 G/DL (ref 6.8–8.8)
RBC # BLD AUTO: 2.62 10E12/L (ref 3.8–5.2)
RBC #/AREA URNS AUTO: 12 /HPF (ref 0–2)
SARS-COV-2 RNA SPEC QL NAA+PROBE: NEGATIVE
SARS-COV-2 RNA SPEC QL NAA+PROBE: NORMAL
SODIUM SERPL-SCNC: 135 MMOL/L (ref 133–144)
SOURCE: ABNORMAL
SP GR UR STRIP: 1.01 (ref 1–1.03)
SPECIMEN SOURCE: NORMAL
SPECIMEN SOURCE: NORMAL
SQUAMOUS #/AREA URNS AUTO: 30 /HPF (ref 0–1)
TROPONIN I SERPL-MCNC: <0.015 UG/L (ref 0–0.04)
TSH SERPL DL<=0.005 MIU/L-ACNC: 1.86 MU/L (ref 0.4–4)
UROBILINOGEN UR STRIP-MCNC: NORMAL MG/DL (ref 0–2)
WBC # BLD AUTO: 4.4 10E9/L (ref 4–11)
WBC #/AREA URNS AUTO: 1 /HPF (ref 0–5)

## 2020-07-21 PROCEDURE — 83880 ASSAY OF NATRIURETIC PEPTIDE: CPT | Performed by: EMERGENCY MEDICINE

## 2020-07-21 PROCEDURE — 71045 X-RAY EXAM CHEST 1 VIEW: CPT

## 2020-07-21 PROCEDURE — 84443 ASSAY THYROID STIM HORMONE: CPT | Performed by: EMERGENCY MEDICINE

## 2020-07-21 PROCEDURE — 25000132 ZZH RX MED GY IP 250 OP 250 PS 637: Mod: GY | Performed by: INTERNAL MEDICINE

## 2020-07-21 PROCEDURE — 96375 TX/PRO/DX INJ NEW DRUG ADDON: CPT

## 2020-07-21 PROCEDURE — 85610 PROTHROMBIN TIME: CPT | Performed by: EMERGENCY MEDICINE

## 2020-07-21 PROCEDURE — 84484 ASSAY OF TROPONIN QUANT: CPT | Performed by: EMERGENCY MEDICINE

## 2020-07-21 PROCEDURE — 85025 COMPLETE CBC W/AUTO DIFF WBC: CPT | Performed by: EMERGENCY MEDICINE

## 2020-07-21 PROCEDURE — G0378 HOSPITAL OBSERVATION PER HR: HCPCS

## 2020-07-21 PROCEDURE — 99285 EMERGENCY DEPT VISIT HI MDM: CPT | Mod: 25

## 2020-07-21 PROCEDURE — 82272 OCCULT BLD FECES 1-3 TESTS: CPT | Performed by: EMERGENCY MEDICINE

## 2020-07-21 PROCEDURE — 96374 THER/PROPH/DIAG INJ IV PUSH: CPT

## 2020-07-21 PROCEDURE — 80048 BASIC METABOLIC PNL TOTAL CA: CPT | Performed by: EMERGENCY MEDICINE

## 2020-07-21 PROCEDURE — 36415 COLL VENOUS BLD VENIPUNCTURE: CPT | Performed by: INTERNAL MEDICINE

## 2020-07-21 PROCEDURE — 99220 ZZC INITIAL OBSERVATION CARE,LEVL III: CPT | Performed by: INTERNAL MEDICINE

## 2020-07-21 PROCEDURE — 81001 URINALYSIS AUTO W/SCOPE: CPT | Performed by: EMERGENCY MEDICINE

## 2020-07-21 PROCEDURE — 80076 HEPATIC FUNCTION PANEL: CPT | Performed by: INTERNAL MEDICINE

## 2020-07-21 PROCEDURE — 25000128 H RX IP 250 OP 636: Performed by: INTERNAL MEDICINE

## 2020-07-21 PROCEDURE — C9803 HOPD COVID-19 SPEC COLLECT: HCPCS

## 2020-07-21 PROCEDURE — 25800030 ZZH RX IP 258 OP 636: Performed by: INTERNAL MEDICINE

## 2020-07-21 PROCEDURE — 93005 ELECTROCARDIOGRAM TRACING: CPT

## 2020-07-21 PROCEDURE — U0003 INFECTIOUS AGENT DETECTION BY NUCLEIC ACID (DNA OR RNA); SEVERE ACUTE RESPIRATORY SYNDROME CORONAVIRUS 2 (SARS-COV-2) (CORONAVIRUS DISEASE [COVID-19]), AMPLIFIED PROBE TECHNIQUE, MAKING USE OF HIGH THROUGHPUT TECHNOLOGIES AS DESCRIBED BY CMS-2020-01-R: HCPCS | Performed by: EMERGENCY MEDICINE

## 2020-07-21 RX ORDER — METOPROLOL SUCCINATE 100 MG/1
100 TABLET, EXTENDED RELEASE ORAL DAILY
Status: DISCONTINUED | OUTPATIENT
Start: 2020-07-21 | End: 2020-07-31 | Stop reason: HOSPADM

## 2020-07-21 RX ORDER — ACETAMINOPHEN 325 MG/1
650 TABLET ORAL EVERY 4 HOURS PRN
Status: DISCONTINUED | OUTPATIENT
Start: 2020-07-21 | End: 2020-07-21

## 2020-07-21 RX ORDER — ACETAMINOPHEN 650 MG/1
650 SUPPOSITORY RECTAL EVERY 4 HOURS PRN
Status: DISCONTINUED | OUTPATIENT
Start: 2020-07-21 | End: 2020-07-21

## 2020-07-21 RX ORDER — NALOXONE HYDROCHLORIDE 0.4 MG/ML
.1-.4 INJECTION, SOLUTION INTRAMUSCULAR; INTRAVENOUS; SUBCUTANEOUS
Status: DISCONTINUED | OUTPATIENT
Start: 2020-07-21 | End: 2020-07-31 | Stop reason: HOSPADM

## 2020-07-21 RX ORDER — SODIUM CHLORIDE 9 MG/ML
INJECTION, SOLUTION INTRAVENOUS CONTINUOUS
Status: DISCONTINUED | OUTPATIENT
Start: 2020-07-21 | End: 2020-07-21

## 2020-07-21 RX ORDER — ACETAMINOPHEN 500 MG
1000 TABLET ORAL
COMMUNITY
End: 2022-02-16

## 2020-07-21 RX ORDER — LEVOTHYROXINE SODIUM 25 UG/1
25 TABLET ORAL DAILY
Status: DISCONTINUED | OUTPATIENT
Start: 2020-07-22 | End: 2020-07-31 | Stop reason: HOSPADM

## 2020-07-21 RX ORDER — HYDRALAZINE HYDROCHLORIDE 50 MG/1
50 TABLET, FILM COATED ORAL 3 TIMES DAILY
Status: DISCONTINUED | OUTPATIENT
Start: 2020-07-21 | End: 2020-07-31 | Stop reason: HOSPADM

## 2020-07-21 RX ORDER — LISINOPRIL 40 MG/1
40 TABLET ORAL DAILY
Status: DISCONTINUED | OUTPATIENT
Start: 2020-07-21 | End: 2020-07-22

## 2020-07-21 RX ORDER — ONDANSETRON 4 MG/1
4 TABLET, ORALLY DISINTEGRATING ORAL EVERY 6 HOURS PRN
Status: DISCONTINUED | OUTPATIENT
Start: 2020-07-21 | End: 2020-07-31

## 2020-07-21 RX ORDER — ACETAMINOPHEN 500 MG
1000 TABLET ORAL 3 TIMES DAILY
Status: DISCONTINUED | OUTPATIENT
Start: 2020-07-21 | End: 2020-07-21

## 2020-07-21 RX ORDER — TRAMADOL HYDROCHLORIDE 50 MG/1
50 TABLET ORAL EVERY 6 HOURS PRN
Status: DISCONTINUED | OUTPATIENT
Start: 2020-07-21 | End: 2020-07-31 | Stop reason: HOSPADM

## 2020-07-21 RX ORDER — ALLOPURINOL 100 MG/1
100 TABLET ORAL DAILY
Status: DISCONTINUED | OUTPATIENT
Start: 2020-07-21 | End: 2020-07-31 | Stop reason: HOSPADM

## 2020-07-21 RX ORDER — ONDANSETRON 2 MG/ML
4 INJECTION INTRAMUSCULAR; INTRAVENOUS EVERY 6 HOURS PRN
Status: DISCONTINUED | OUTPATIENT
Start: 2020-07-21 | End: 2020-07-31

## 2020-07-21 RX ORDER — HYDRALAZINE HYDROCHLORIDE 20 MG/ML
10 INJECTION INTRAMUSCULAR; INTRAVENOUS EVERY 4 HOURS PRN
Status: DISCONTINUED | OUTPATIENT
Start: 2020-07-21 | End: 2020-07-31 | Stop reason: HOSPADM

## 2020-07-21 RX ORDER — ACETAMINOPHEN 500 MG
1000 TABLET ORAL 3 TIMES DAILY
Status: DISCONTINUED | OUTPATIENT
Start: 2020-07-21 | End: 2020-07-31 | Stop reason: HOSPADM

## 2020-07-21 RX ORDER — DILTIAZEM HYDROCHLORIDE 120 MG/1
120 CAPSULE, COATED, EXTENDED RELEASE ORAL DAILY
Status: DISCONTINUED | OUTPATIENT
Start: 2020-07-21 | End: 2020-07-31 | Stop reason: HOSPADM

## 2020-07-21 RX ORDER — SODIUM CHLORIDE 9 MG/ML
INJECTION, SOLUTION INTRAVENOUS CONTINUOUS
Status: ACTIVE | OUTPATIENT
Start: 2020-07-21 | End: 2020-07-22

## 2020-07-21 RX ADMIN — SODIUM CHLORIDE: 9 INJECTION, SOLUTION INTRAVENOUS at 22:22

## 2020-07-21 RX ADMIN — PROCHLORPERAZINE EDISYLATE 5 MG: 5 INJECTION INTRAMUSCULAR; INTRAVENOUS at 20:02

## 2020-07-21 RX ADMIN — TRAMADOL HYDROCHLORIDE 50 MG: 50 TABLET, FILM COATED ORAL at 17:50

## 2020-07-21 RX ADMIN — ALLOPURINOL 100 MG: 100 TABLET ORAL at 14:56

## 2020-07-21 RX ADMIN — APIXABAN 5 MG: 5 TABLET, FILM COATED ORAL at 21:03

## 2020-07-21 RX ADMIN — ONDANSETRON 4 MG: 4 TABLET, ORALLY DISINTEGRATING ORAL at 19:13

## 2020-07-21 RX ADMIN — HYDRALAZINE HYDROCHLORIDE 50 MG: 50 TABLET, FILM COATED ORAL at 16:17

## 2020-07-21 RX ADMIN — SODIUM CHLORIDE 250 ML: 9 INJECTION, SOLUTION INTRAVENOUS at 20:06

## 2020-07-21 RX ADMIN — ACETAMINOPHEN 1000 MG: 500 TABLET, FILM COATED ORAL at 14:55

## 2020-07-21 RX ADMIN — METOPROLOL SUCCINATE 100 MG: 100 TABLET, EXTENDED RELEASE ORAL at 16:13

## 2020-07-21 RX ADMIN — ACETAMINOPHEN 1000 MG: 500 TABLET, FILM COATED ORAL at 21:09

## 2020-07-21 RX ADMIN — DILTIAZEM HYDROCHLORIDE 120 MG: 120 CAPSULE, COATED, EXTENDED RELEASE ORAL at 16:13

## 2020-07-21 RX ADMIN — HYDRALAZINE HYDROCHLORIDE 10 MG: 20 INJECTION INTRAMUSCULAR; INTRAVENOUS at 14:56

## 2020-07-21 RX ADMIN — HYDRALAZINE HYDROCHLORIDE 50 MG: 50 TABLET, FILM COATED ORAL at 21:08

## 2020-07-21 RX ADMIN — LISINOPRIL 40 MG: 40 TABLET ORAL at 16:11

## 2020-07-21 ASSESSMENT — MIFFLIN-ST. JEOR
SCORE: 1127.21
SCORE: 1053.72

## 2020-07-21 ASSESSMENT — ENCOUNTER SYMPTOMS
DIARRHEA: 1
COUGH: 1
WEAKNESS: 1

## 2020-07-21 NOTE — PROVIDER NOTIFICATION
"MD Notification    Notified Person: MD    Notified Person Name: Dr. Marcelino     Notification Date/Time: 07/21/2020 1424    Notification Interaction: paged    Purpose of Notification: \"FYI pt vitals are stable excpt for hypertension 191/67\"    Orders Received:    Comments:    "

## 2020-07-21 NOTE — PROGRESS NOTES
MD Notification    Notified Person: MD    Notified Person Name: Dr. Marcelino    Notification Date/Time: 7/21/20; 1635    Notification Interaction:   [phone]        Purpose of Notification: Covid result: negative. Pt scheduled for cortisone shot Thursday. Pt reports pain despite PRN tylenol administered.    Orders Received: medication adjusted: scheduled tylenol + PRN tramadol    Comments:

## 2020-07-21 NOTE — ED NOTES
Bed: ED14  Expected date: 7/21/20  Expected time: 8:25 AM  Means of arrival: Ambulance  Comments:  Timi 84f weakness ETA 3115

## 2020-07-21 NOTE — ED NOTES
"[facility]  ED Nurse Handoff Report    ED Chief complaint: Generalized Weakness      ED Diagnosis:   Final diagnoses:   None       Code Status: Per hospitalist    Allergies:   Allergies   Allergen Reactions     Oxybutynin Itching     Seasonal Allergies        Patient Story: Pt from assisted living who comes in with increasing weakness and unable to get up to go to bathroom today.   Focused Assessment:  Resp: Occasional cough per pt but not heard in ED  Cardiac: WNL Neuro: A little bit forgetful, increased weakness. Requires assistance to get up.     Treatments and/or interventions provided:   Patient's response to treatments and/or interventions:     To be done/followed up on inpatient unit:      Does this patient have any cognitive concerns?: Forgetful    Activity level - Baseline/Home:  Independent  Activity Level - Current:   Stand with Assist    Patient's Preferred language: English   Needed?: No    Isolation: Covid  Infection: Covid pending   Bariatric?: No    Vital Signs:   Vitals:    07/21/20 0836   BP: (!) 160/70   Resp: 16   Temp: 98.9  F (37.2  C)   TempSrc: Oral   SpO2: 99%   Weight: 70.3 kg (155 lb)   Height: 1.6 m (5' 3\")       Cardiac Rhythm:     Was the PSS-3 completed:   Yes  What interventions are required if any?               Family Comments:   OBS brochure/video discussed/provided to patient/family: Yes              Name of person given brochure if not patient:               Relationship to patient:     For the majority of the shift this patient's behavior was Green.   Behavioral interventions performed were .    ED NURSE PHONE NUMBER:          "

## 2020-07-21 NOTE — PLAN OF CARE
COVID Result pending   Pt is A&Ox4, VSS on RA excpt for hypertension, Managed with IV hydralazine, Reg diet, IV SL, Managed pain with PRN tylenol, Assist of 2, Continue to monitor.

## 2020-07-21 NOTE — H&P
[facility]    History and Physical  Hospitalist       Date of Admission:  7/21/2020    Assessment & Plan   Ghislaine Walls is a 84 year old female who presents with generalized weakness from AL at [facility].    Generalized weakness  Physical deconditioning  -Etiology for generalized weakness and physical deconditioning is unclear.  Does not appear to have an infectious issue.  Renal function is at baseline  -Definitely has a drop in her hemoglobin, question ?GI bleed  -recent diarrhea that lasted about a week; resolved about 1 week ago  -Patient does have a chronic cough however given the pandemic will need to rule out COVID-19  -Work-up of anemia as described below  -Physical therapy evaluation  -Etiology of generalized weakness unclear although this just appears like a progressive decline without any acute inciting event. Check ECHO in AM.    Acute on chronic Normocytic Anemia  -Per Dr. Bailey the emergency room, rectal exam revealed brown stool, no evidence of melena  -Stool for occult blood negative  -Etiology for drop in hemoglobin unclear at the moment  -Check iron studies, vitamin B12 and folate  -We will give her IV iron infusion if she is low in iron stores  -Monitor daily CBC    Paroxysmal atrial Fibrillation  Essential hypertension  -Prior to admission on diltiazem extended release 120 mg daily, Toprol- mg daily and lisinopril 40 mg daily  -Blood pressure was on the slightly higher side  -Heart rate is controlled at the moment  -Will continue her prior to admission antihypertensive regimen  -Continue prior to admission Eliquis  -BNP is elevated however clinically does not appear to be in HF, CXR without pulmonary edema.  Hold off on diuretics at this time.    CKD (chronic kidney disease) stage 3, GFR 30-59 ml/min (H)  -Recent Baseline creatinine appears to be around 1.2-1.3, although her creatinine has been as high as 3.27 in September 2019.  Creatinine today is  1.43  -Creatinine probably is at baseline    Hypothyroidism  -Continue prior to admission levothyroxine    Gouty arthritis  -No active gout at the moment.  Continue prior to admission allopurinol    Ghislaine is a LOW SUSPICION PUI.  Follow these instructions:    If COVID test positive -> continue isolation precautions    If COVID test negative -> discontinue COVID-specific isolation precautions     DVT Prophylaxis: Pneumatic Compression Devices  Code Status: DNR / DNI    Disposition: Expected discharge in <2 days    Rodo Marcelino MD    Primary Care Physician   Laurie Conteh    Chief Complaint   Generalized weakness    History is obtained from the patient    History of Present Illness   Ghislaine Walls is a 84 year old female who presents to [facility] from assisted living at [facility] with with generalized weakness.  Patient apparently has been feeling weak for roughly 2 weeks and was not able to get the necessary assistance needed from assisted living and therefore requested evaluation in the ED and called EMS.  As far as other pertinent review of system is concerned, patient denies chest pain or dyspnea. No nausea, vomiting or diarrhea.  Denies dysuria urinary urgency or frequency.  No lightheadedness or dizziness.  No recent fever or chills or COVID-19 exposure.  No focal extremity weakness or headache or visual changes.  No hematochezia or melena or bleeding from any source.  Patient does complain of bilateral shoulder joint pain and bilateral knee pain.  She is being treated for shoulder bursitis as an outpatient through [facility].  In the emergency room the patient was seen by patient was evaluated by Dr. Brian Bansal.  Work-up was largely negative except for drop in her hemoglobin from baseline of around 9-10 to7.8.  Infectious work-up was negative.  Patient is being admitted for further evaluation of generalized weakness and worsening anemia.    Past Medical History    I have reviewed this  patient's medical history and updated it with pertinent information if needed.   Past Medical History:   Diagnosis Date     [place] (hard of hearing)     wears hearing aids     Hyperlipidaemia      Hyperlipidaemia LDL goal < 130      Hypertension      Hypothyroid      PAD (peripheral artery disease) (H)      Renal insufficiency, mild      Uncontrolled hypertension 10/14/2019       Past Surgical History   I have reviewed this patient's surgical history and updated it with pertinent information if needed.  Past Surgical History:   Procedure Laterality Date     APPENDECTOMY  1951     CATARACT IOL, RT/LT  2001    bilateral (laser - 5/2013)     DENTAL SURGERY  1962    wisdom     EYE SURGERY  1956    Muscle release     TUBAL LIGATION  1971       Prior to Admission Medications   Prior to Admission Medications   Prescriptions Last Dose Informant Patient Reported? Taking?   allopurinol (ZYLOPRIM) 100 MG tablet   No No   Sig: Take 1 tablet (100 mg) by mouth daily   apixaban ANTICOAGULANT (ELIQUIS) 5 MG tablet   No No   Sig: Take 1 tablet (5 mg) by mouth 2 times daily   diltiazem ER (DILT-XR) 120 MG 24 hr capsule   No No   Sig: Take 1 capsule (120 mg) by mouth daily   hydrALAZINE (APRESOLINE) 50 MG tablet   Yes No   Sig: Take 50 mg by mouth 3 times daily   levothyroxine (SYNTHROID/LEVOTHROID) 25 MCG tablet   No No   Sig: Take 1 tablet (25 mcg) by mouth daily   lisinopril (ZESTRIL) 40 MG tablet   No No   Sig: Take 1 tablet (40 mg) by mouth daily   metoprolol succinate ER (TOPROL-XL) 100 MG 24 hr tablet   Yes No   Sig: Take 100 mg by mouth daily      Facility-Administered Medications: None     Allergies   Allergies   Allergen Reactions     Oxybutynin Itching     Seasonal Allergies        Social History   I have reviewed this patient's social history and updated it with pertinent information if needed. Ghislaine LAROSE Titi  reports that she quit smoking about 47 years ago. Her smoking use included cigarettes. She has a 2.50 pack-year  smoking history. She has never used smokeless tobacco. She reports current alcohol use. She reports that she does not use drugs.    Family History   I have reviewed this patient's family history and updated it with pertinent information if needed.   Family History   Problem Relation Age of Onset     Heart Disease Mother      Respiratory Mother      Heart Disease Father      Heart Disease Brother      Coronary Artery Disease Brother         CAB     Heart Disease Brother      Coronary Artery Disease Brother         CAB       Review of Systems   The 10 point Review of Systems is negative other than noted in the HPI or here.     Physical Exam   Temp: 98.9  F (37.2  C) Temp src: Oral BP: (!) 160/70   Heart Rate: 94 Resp: 16 SpO2: 99 % O2 Device: None (Room air)    Vital Signs with Ranges  Temp:  [98.9  F (37.2  C)] 98.9  F (37.2  C)  Heart Rate:  [94] 94  Resp:  [16] 16  BP: (160)/(70) 160/70  SpO2:  [99 %] 99 %  155 lbs 0 oz    Gen: AAOX3, NAD  HEENT: Conjunctival pallor+  Neck: Supple neck, good ROM  Resp: CTA B/L, normal WOB  CVS- RRR, no murmur, no edema  Abd/GI: Soft, non-tender. BS- normoactive  Skin- Warm, dry, no rashes  MSK: Slightly swollen left knee joint with may be small effusion, no erythema, nonspecific tenderness throughout.  Tender over bilateral shoulder joint again no effusion or erythema  Neuro- CN- intact. Moving X 4    Data   Data reviewed today:  I personally reviewed the EKG tracing showing Sinus rhythm with first-degree AV block.  Recent Labs   Lab 07/21/20  0849   WBC 4.4   HGB 7.8*   MCV 94   *   INR 2.07*      POTASSIUM 4.4   CHLORIDE 112*   CO2 14*   BUN 39*   CR 1.43*   ANIONGAP 9   ALIVIA 9.3   GLC 91   TROPI <0.015       Recent Results (from the past 24 hour(s))   XR Chest Port 1 View    Narrative    CHEST PORTABLE ONE VIEW July 21, 2020 11:52 AM     HISTORY: Chronic cough, weakness.     COMPARISON: 10/1/2019.    FINDINGS: Slight aortic tortuosity.      Impression    IMPRESSION:  No acute consolidation.    DINESH MONTENEGRO MD

## 2020-07-21 NOTE — CONSULTS
Consult received    Patient with bilateral shoulder pain which has been previously treated by Dr. Jerry STRICKLAND with subacromial bursa injections and is due for follow-up tomorrow 7/22    Patient presented to the ED with progressing weakness, semi productive cough and diarrhea. Overview of labs show increased WBC    Patient is not appropriate for injections at this time and should follow up when possible with Dr. Dave Odell PAC

## 2020-07-21 NOTE — PROGRESS NOTES
RECEIVING UNIT ED HANDOFF REVIEW    ED Nurse Handoff Report was reviewed by: Deysi Martinez RN on July 21, 2020 at 1:21 PM

## 2020-07-21 NOTE — PHARMACY-ADMISSION MEDICATION HISTORY
Pharmacy Medication History  Admission medication history interview status for the 7/21/2020  admission is complete. See EPIC admission navigator for prior to admission medications     Medication history sources: Patient, Surescripts and Care Everywhere  Medication history source reliability: Moderate  Adherence assessment: unknown    Significant changes made to the medication list:        Additional medication history information:   ---  [facility] [address] has a med list but does not manage pt's medications.  When through [facility], Care Everywhere and nursing home's med list with patient.  --- Pt reported she takes lisinopril 40 mg po daily and this dose hasn't changed for a long time.  I noticed that lisinopril 20 mg po daily filled on 6/25/2020 and lisinopril 40 mg po daily was filled on 5/18/2020.  I question if the dose was decreased.    Medication reconciliation completed by provider prior to medication history? No    Time spent in this activity: 20 minutes      Prior to Admission medications    Medication Sig Last Dose Taking? Auth Provider   acetaminophen (TYLENOL) 500 MG tablet Take 1,000 mg by mouth 3 times daily 7/20/2020 Yes Unknown, Entered By History   allopurinol (ZYLOPRIM) 100 MG tablet Take 1 tablet (100 mg) by mouth daily 7/20/2020 Yes Toshia Smith PA-C   apixaban ANTICOAGULANT (ELIQUIS) 5 MG tablet Take 1 tablet (5 mg) by mouth 2 times daily 7/20/2020 Yes Seema Fuentes MD   diltiazem ER (DILT-XR) 120 MG 24 hr capsule Take 1 capsule (120 mg) by mouth daily 7/20/2020 Yes Daphne Cm PA-C   hydrALAZINE (APRESOLINE) 50 MG tablet Take 50 mg by mouth 3 times daily 7/20/2020 Yes Reported, Patient   levothyroxine (SYNTHROID/LEVOTHROID) 25 MCG tablet Take 1 tablet (25 mcg) by mouth daily 7/20/2020 Yes Laurie Conteh MD   lisinopril (ZESTRIL) 40 MG tablet Take 1 tablet (40 mg) by mouth daily 7/20/2020 Yes Seema Fuentes MD   metoprolol succinate ER (TOPROL-XL) 100 MG 24 hr  tablet Take 100 mg by mouth daily 7/20/2020 Yes Reported, Patient

## 2020-07-21 NOTE — ED PROVIDER NOTES
"  History     Chief Complaint:  Generalized Weakness       The history is provided by the patient.      Ghislaine Walls is a 84 year old female with a history of stage III CKD, hypertension, hyperlipidemia, anticoagulated on Eliquis, who presents from assisted living for gradually increasing weakness over the past two weeks. The patient states that this has slowly worsened and today was unable to get to the bathroom. She states that over the past 2-3 weeks she has had a semi-productive cough as well as intermittent diarrhea. She presents today concerned that this weakness has increased to the point that she was unable to get to the bathroom today.     Allergies:  Oxybutynin  Seasonal Allergies     Medications:    Allopurinol  Eliquis  Hydralazine  Levothyroxine  Diltiazem ER  Lisinopril  Metoprolol succinate     Past Medical History:    Hard of hearing  Hyperlipidemia  Hypertension  Hypothyroid  Peripheral artery disease  CKD stage III  Gout    Past Surgical History:    Appendectomy  Cataract bilateral  [place] teeth extraction  Eye muscle release  Tubal ligation    Family History:    Heart disease  CAB   Diabetes  Kidney disease    Social History:  The patient presents to the ED alone.  Smoking Status: Former Smoker, 2.5 pack years, quit 47.5 years ago  Smokeless Tobacco: Never Used  Alcohol Use: Yes  Drug Use: No  PCP: Laurie Conteh     Review of Systems   Respiratory: Positive for cough.    Gastrointestinal: Positive for diarrhea (intermittent).   Neurological: Positive for weakness.   All other systems reviewed and are negative.      Physical Exam     Patient Vitals for the past 24 hrs:   BP Temp Temp src Heart Rate Resp SpO2 Height Weight   07/21/20 0836 (!) 160/70 98.9  F (37.2  C) Oral 94 16 99 % 1.6 m (5' 3\") 70.3 kg (155 lb)       Physical Exam  GENERAL: well developed, pleasant, appears pale  HEAD: atraumatic  EYES: pupils reactive, extraocular muscles intact, conjunctivae normal  ENT:  mucus " membranes moist  NECK:  trachea midline, normal range of motion  RESPIRATORY: no tachypnea, breath sounds clear to auscultation   CVS: normal S1/S2, no murmurs, intact distal pulses  ABDOMEN: soft, nontender, nondistention  MUSCULOSKELETAL: no deformities  SKIN: warm and dry, no acute rashes or ulceration  NEURO: GCS 15, cranial nerves intact, alert and oriented x3  PSYCH:  Mood/affect normal    Emergency Department Course     ECG:  Indication: Weakness  Time: 1151  Vent. Rate 90 bpm. MO interval 240. QRS duration 88. QT/QTc 370/452. P-R-T axis 79 -38 86. Sinus rhythm with 1st degree AV block. Left axis deviation. Left ventricular hypertrophy with repolarization abnormality. Abnormal ECG. Read time: 1155     Imaging:  Radiology findings were communicated with the patient and admitting MD who voiced understanding of the findings.    XR Chest Port 1 View:  No acute consolidation.  As per radiology.     Laboratory:  Laboratory findings were communicated with the patient and admitting MD who voiced understanding of the findings.    CBC: WBC: 4.4, HGB: 7.8 (low), PLT: 466 (high)    BMP: CO2 14, (low), Chloride 112 (high), Urea 39 (low), Creatinine 1.43 (high), GFR 34 (low) o/w WNL    UA with Microscopic: Albumin 30, RBC 12, Bacteria Many, Squamous Epithelial 30, Mucous Present o/w Negative     INR: 2.07 (high)     BNP: 8,140 (high)     0849 Troponin: <0.015     Occult blood stool: Pending    Symptomatic COVID-19 Virus (Coronavirus) PCR: Pending    Emergency Department Course:  Past medical records, nursing notes, and vitals reviewed.    0910 I performed an exam of the patient as documented above.     IV was inserted and blood was drawn for laboratory testing, results above.    The patient provided a urine sample here in the emergency department. This was sent for laboratory testing, findings above.    Portable chest x-ray was used at the patient's bedside, see results above.      1110 I rechecked the patient and updated  her on the results of her workup thus far. I obtained consent at this time to speak with the patient's daughter.    1118 I spoke with the patient's daughter who states that the patient's presentation is similar to when she was admitted last October.     1200 I rechecked the patient and discussed the results of her workup thus far. I obtained consent for admission at this time.     1240 I consulted with Dr. Marcelino, hospitalist, regarding the patient's history and presentation here in the emergency department who accepted the patient for admission.     Findings and plan explained to the patient who consents to admission. Discussed the patient with Dr. Marcelino, who will admit the patient to an observation bed for further monitoring, evaluation, and treatment.    I personally reviewed the laboratory and imaging results with the patient and answered all related questions prior to admission.     Impression & Plan     Covid-19  Ghislaine Walls was evaluated during a global COVID-19 pandemic, which necessitated consideration that the patient might be at risk for infection with the SARS-CoV-2 virus that causes COVID-19.   Applicable protocols for evaluation were followed during the patient's care. COVID-19 was considered as part of the patient's evaluation. The plan for testing is: a test was obtained during this visit.    Medical Decision Making:    Patient presents increasing weakness that is been progressive over the last 2 weeks.  And really no significant specific complaints she did have an episode of diarrhea that lasted for several days but has subsided and has a chronic cough which she notes is longstanding.  Spoke with the daughter and she notes this feels similar to when she was admitted in October and really never bounced back from that October visit.  Patient notes that she is having pain everywhere.  Labs show a drop in her hemoglobin although her last hemoglobin was in October.  Rectal exam with female  chaperone shows brown stool guaiac is pending.  Patient stated increased weakness to the point she could not get around.  For Dr. chua regarding observation admission for exacerbation of chronic weakness and anemia.  Urinalysis and chest x-ray are negative.  Troponin is normal.  BNP is elevated although her chest x-ray is clear and work of breathing is normal.  She does have some generalized edema although shortness of breath does not seem to be component of her symptoms.    Diagnosis:    ICD-10-CM    1. Generalized muscle weakness  M62.81 Occult blood stool (ED Lab POCT Only 3-11)     Nt probnp inpatient     Nt probnp inpatient     Troponin I     Troponin I     Symptomatic COVID-19 Virus (Coronavirus) by PCR     TSH with free T4 reflex     TSH with free T4 reflex     CANCELED: Troponin I     CANCELED: BNP     CANCELED: TSH with free T4 reflex   2. Anemia, unspecified type  D64.9        Disposition:  Admitted to Dr. Marcelino.    Scribe Disclosure:  I, Corey Rogel, am serving as a scribe at 8:50 AM on 7/21/2020 to document services personally performed by Brian Bansal MD based on my observations and the provider's statements to me.      Brian Bansal MD  07/21/20 2278

## 2020-07-22 ENCOUNTER — APPOINTMENT (OUTPATIENT)
Dept: PHYSICAL THERAPY | Facility: CLINIC | Age: 85
DRG: 682 | End: 2020-07-22
Attending: INTERNAL MEDICINE
Payer: MEDICARE

## 2020-07-22 ENCOUNTER — APPOINTMENT (OUTPATIENT)
Dept: CARDIOLOGY | Facility: CLINIC | Age: 85
DRG: 682 | End: 2020-07-22
Attending: INTERNAL MEDICINE
Payer: MEDICARE

## 2020-07-22 PROBLEM — H90.0 CONDUCTIVE HEARING LOSS, BILATERAL: Status: ACTIVE | Noted: 2020-07-21

## 2020-07-22 LAB
ANION GAP SERPL CALCULATED.3IONS-SCNC: 8 MMOL/L (ref 3–14)
BUN SERPL-MCNC: 33 MG/DL (ref 7–30)
CALCIUM SERPL-MCNC: 8.9 MG/DL (ref 8.5–10.1)
CHLORIDE SERPL-SCNC: 113 MMOL/L (ref 94–109)
CO2 SERPL-SCNC: 15 MMOL/L (ref 20–32)
CREAT SERPL-MCNC: 1.14 MG/DL (ref 0.52–1.04)
ERYTHROCYTE [DISTWIDTH] IN BLOOD BY AUTOMATED COUNT: 15.9 % (ref 10–15)
FERRITIN SERPL-MCNC: 484 NG/ML (ref 8–252)
FOLATE SERPL-MCNC: 6.8 NG/ML
GFR SERPL CREATININE-BSD FRML MDRD: 44 ML/MIN/{1.73_M2}
GLUCOSE SERPL-MCNC: 122 MG/DL (ref 70–99)
HCT VFR BLD AUTO: 23.2 % (ref 35–47)
HGB BLD-MCNC: 7.2 G/DL (ref 11.7–15.7)
IRON SATN MFR SERPL: 5 % (ref 15–46)
IRON SERPL-MCNC: 9 UG/DL (ref 35–180)
MCH RBC QN AUTO: 29.3 PG (ref 26.5–33)
MCHC RBC AUTO-ENTMCNC: 31 G/DL (ref 31.5–36.5)
MCV RBC AUTO: 94 FL (ref 78–100)
PLATELET # BLD AUTO: 434 10E9/L (ref 150–450)
POTASSIUM SERPL-SCNC: 4.4 MMOL/L (ref 3.4–5.3)
RBC # BLD AUTO: 2.46 10E12/L (ref 3.8–5.2)
SODIUM SERPL-SCNC: 136 MMOL/L (ref 133–144)
TIBC SERPL-MCNC: 167 UG/DL (ref 240–430)
VIT B12 SERPL-MCNC: 556 PG/ML (ref 193–986)
WBC # BLD AUTO: 5.8 10E9/L (ref 4–11)

## 2020-07-22 PROCEDURE — G0378 HOSPITAL OBSERVATION PER HR: HCPCS

## 2020-07-22 PROCEDURE — 83550 IRON BINDING TEST: CPT | Performed by: INTERNAL MEDICINE

## 2020-07-22 PROCEDURE — 99207 ZZC CDG-CODE CATEGORY CHANGED: CPT | Performed by: INTERNAL MEDICINE

## 2020-07-22 PROCEDURE — 93306 TTE W/DOPPLER COMPLETE: CPT

## 2020-07-22 PROCEDURE — 82607 VITAMIN B-12: CPT | Performed by: INTERNAL MEDICINE

## 2020-07-22 PROCEDURE — 99233 SBSQ HOSP IP/OBS HIGH 50: CPT | Performed by: INTERNAL MEDICINE

## 2020-07-22 PROCEDURE — 85027 COMPLETE CBC AUTOMATED: CPT | Performed by: INTERNAL MEDICINE

## 2020-07-22 PROCEDURE — 93306 TTE W/DOPPLER COMPLETE: CPT | Mod: 26 | Performed by: INTERNAL MEDICINE

## 2020-07-22 PROCEDURE — 25000132 ZZH RX MED GY IP 250 OP 250 PS 637: Mod: GY | Performed by: INTERNAL MEDICINE

## 2020-07-22 PROCEDURE — 97530 THERAPEUTIC ACTIVITIES: CPT | Mod: GP

## 2020-07-22 PROCEDURE — 36415 COLL VENOUS BLD VENIPUNCTURE: CPT | Performed by: INTERNAL MEDICINE

## 2020-07-22 PROCEDURE — 97161 PT EVAL LOW COMPLEX 20 MIN: CPT | Mod: GP

## 2020-07-22 PROCEDURE — 80048 BASIC METABOLIC PNL TOTAL CA: CPT | Performed by: INTERNAL MEDICINE

## 2020-07-22 PROCEDURE — 82746 ASSAY OF FOLIC ACID SERUM: CPT | Performed by: INTERNAL MEDICINE

## 2020-07-22 PROCEDURE — 83540 ASSAY OF IRON: CPT | Performed by: INTERNAL MEDICINE

## 2020-07-22 PROCEDURE — 82728 ASSAY OF FERRITIN: CPT | Performed by: INTERNAL MEDICINE

## 2020-07-22 RX ORDER — MAGNESIUM CARB/ALUMINUM HYDROX 105-160MG
296 TABLET,CHEWABLE ORAL ONCE
Status: DISCONTINUED | OUTPATIENT
Start: 2020-07-23 | End: 2020-07-31 | Stop reason: HOSPADM

## 2020-07-22 RX ORDER — LISINOPRIL 20 MG/1
20 TABLET ORAL DAILY
Status: DISCONTINUED | OUTPATIENT
Start: 2020-07-22 | End: 2020-07-31 | Stop reason: HOSPADM

## 2020-07-22 RX ADMIN — HYDRALAZINE HYDROCHLORIDE 50 MG: 50 TABLET, FILM COATED ORAL at 13:53

## 2020-07-22 RX ADMIN — HYDRALAZINE HYDROCHLORIDE 50 MG: 50 TABLET, FILM COATED ORAL at 07:55

## 2020-07-22 RX ADMIN — DILTIAZEM HYDROCHLORIDE 120 MG: 120 CAPSULE, COATED, EXTENDED RELEASE ORAL at 07:53

## 2020-07-22 RX ADMIN — METOPROLOL SUCCINATE 100 MG: 100 TABLET, EXTENDED RELEASE ORAL at 07:53

## 2020-07-22 RX ADMIN — ACETAMINOPHEN 1000 MG: 500 TABLET, FILM COATED ORAL at 07:56

## 2020-07-22 RX ADMIN — POLYETHYLENE GLYCOL 3350, SODIUM SULFATE ANHYDROUS, SODIUM BICARBONATE, SODIUM CHLORIDE, POTASSIUM CHLORIDE 2000 ML: 236; 22.74; 6.74; 5.86; 2.97 POWDER, FOR SOLUTION ORAL at 18:39

## 2020-07-22 RX ADMIN — HYDRALAZINE HYDROCHLORIDE 50 MG: 50 TABLET, FILM COATED ORAL at 20:24

## 2020-07-22 RX ADMIN — ALLOPURINOL 100 MG: 100 TABLET ORAL at 07:55

## 2020-07-22 RX ADMIN — ACETAMINOPHEN 1000 MG: 500 TABLET, FILM COATED ORAL at 13:53

## 2020-07-22 RX ADMIN — ACETAMINOPHEN 1000 MG: 500 TABLET, FILM COATED ORAL at 20:24

## 2020-07-22 RX ADMIN — LEVOTHYROXINE SODIUM 25 MCG: 25 TABLET ORAL at 07:53

## 2020-07-22 RX ADMIN — APIXABAN 5 MG: 5 TABLET, FILM COATED ORAL at 20:24

## 2020-07-22 RX ADMIN — LISINOPRIL 20 MG: 20 TABLET ORAL at 07:53

## 2020-07-22 RX ADMIN — APIXABAN 5 MG: 5 TABLET, FILM COATED ORAL at 07:53

## 2020-07-22 NOTE — PROGRESS NOTES
Pt with 2 episodes emesis. No abdominal pain. Check LFTS. Symptomatic rx for now. Minimal PO intake. Start gentle hydration

## 2020-07-22 NOTE — PLAN OF CARE
Aox4. COVID neg. 2A, turn/repo. VSS ex HTN. Purewick in place. Regular diet: minimal intake. R PIV infusing NS: 500 mL bolus completed; 75ml/hr continuous. Emesis x2: Zofran and Compazine administered. Pain in BL LE and BL shoulders: tramadol + acetaminophen administered, ortho consult pending. AM labs to be drawn. Continue to monitor.

## 2020-07-22 NOTE — PROGRESS NOTES
Observation goals  PRIOR TO DISCHARGE      Comments:   -diagnostic tests and consults completed and resulted: not met    -vital signs normal or at patient baseline: met     Nurse to notify provider when observation goals have been met and patient is ready for discharge.

## 2020-07-22 NOTE — PLAN OF CARE
"Spoke with daughter Krupa on the phone. Krupa stated that pt was experiencing a significant amount of pain and wanted pain medications. When writer went into room to offer PRN tramadol, pt refused, stating \"I'm not having pain right now.\" When asked if she was sure that she wanted to hold off until later, she stated \"yes.\"   "

## 2020-07-22 NOTE — PROGRESS NOTES
SW:   D: [facility] spoke with [facility] who stated [facility] can accept pt. [facility] stated they would speak with KATHI in the morning to determine discharge, if pt gets cleared to discharge tomorrow by GI    P: Will continue to follow.    JONATHAN Michel

## 2020-07-22 NOTE — PROGRESS NOTES
Observation goals  PRIOR TO DISCHARGE      Comments: -diagnostic tests and consults completed and resulted NOT MET  -vital signs normal or at patient baseline NOT MET  Nurse to notify provider when observation goals have been met and patient is ready for discharge.

## 2020-07-22 NOTE — CONSULTS
Consult Date:  07/22/2020      GASTROENTEROLOGY CONSULTATION      REASON FOR CONSULTATION:  We were asked to see Ms. Walls by Dr. Marcelino for further evaluation of anemia.      HISTORY OF PRESENT ILLNESS:  The patient is an 84-year-old woman with weakness which has been ongoing for a while as she says.  When she was admitted to the hospital, she was found to have a hemoglobin of 7.2 prompting GI evaluation.  Her daughter states she had a hospitalization with similar complaints about a year ago and did receive iron infusion at that time.  Interestingly, current ferritin is 484, but with a 5% iron saturation.  She denies any overt bleeding in her stool or vomiting blood.  She does get intermittent diarrhea, but she describes it as only once in a while.  She did have previous colonoscopy and upper endoscopy by my office in 2011.  At that time she did have a 7 mm serrated adenoma, but the exam was otherwise unremarkable.  Upper endoscopy was done at that time as well and gastric biopsies were unremarkable as were esophageal biopsies.      PAST MEDICAL HISTORY:  Notable for hypertension, chronic renal failure, hypothyroidism, gout, hyperlipidemia.  She has a history of prior appendectomy, cataract extraction and tubal ligations.  She specifically denies any nonsteroidal anti-inflammatories.      SOCIAL HISTORY:  She is a nonsmoker and states her alcohol intake is an infrequent beer on Fridays.      FAMILY HISTORY:  She has 2 daughters and 1 son.  I did speak with her daughter, Krupa, regarding her current admission.        CHRONIC MEDICATIONS:  Include Allopurinol, Eliquis for atrial fibrillation, diltiazem, hydralazine, L-thyroxine, lisinopril, metoprolol.        ALLERGIES:  OXYBUTYNIN.      FAMILY HISTORY:  Notable for coronary artery disease in 3 brothers.      REVIEW OF SYSTEMS:     CARDIAC:  Atrial fibrillation as noted.  Also hypertension, but no history of congestive failure or MI.     PULMONARY:  Completely  negative.   GASTROINTESTINAL:  Negative.   ENDOCRINE:  Denies diabetes.  Hypothyroidism as noted.   HEMATOLOGIC:  She did receive iron infusion last year, presumably for iron deficiency.      PHYSICAL EXAMINATION:   GENERAL:  Elderly woman, quite hard of hearing but alert.  Seems frail.   VITAL SIGNS:  Temperature is 95.7, pulse of 56, blood pressure 140/47.   HEENT:  Pupils round, reactive to light.  Pharynx benign.   NECK:  Supple.   COR:  Surprisingly regular S1, S2, no murmurs appreciated.   CHEST:  Anterior and posterior breath sounds are clear.    ABDOMEN:  Benign.  Bowel sounds are present.  There is no hepatosplenomegaly.   EXTREMITIES:  1+ pitting edema bilaterally.   MENTAL STATUS:  Alert, oriented x 3, but she is quite hard of hearing.   SKIN:  Unremarkable.      LABORATORY DATA:  Potassium 4.4, creatinine 1.14.  Liver function tests are normal.  Albumin 2.0, hemoglobin 7.2 with an MCV of 94, white count 5.8.  She did have a 2D echocardiogram essentially normal except for a markedly dilated left atrium.      ASSESSMENT:  Severe anemia.  Etiology uncertain.  Traditionally has been iron deficient, but she does have a normal ferritin at present.  There are really no other localizing symptoms.      RECOMMENDATIONS:  We will proceed with upper endoscopy and colonoscopy in the near future, probably tomorrow in a.m.         DULCE MARIA SAGE MD             D: 2020   T: 2020   MT:       Name:     ROSIO YOUNG   MRN:      [id number]        Account:       [id number]   :      1935           Consult Date:  2020      Document: [id number]

## 2020-07-22 NOTE — PLAN OF CARE
COVID negative. A&Ox4, VSS on RA with ex of htn. Denied pain throughout shift despite daughter Krupa calling and stating pt was having pain. When offered patient pain medications, patient refused.  Ax2 turn and repositioned q2 hours.  Reg diet. Incontinent, purewick in place. IV SL. PT seen, rec TCU. Plan for Nutrition consult,  GI consult/ possible colonoscopy, and to transfer to another floor.

## 2020-07-22 NOTE — PLAN OF CARE
PT orders received & acknowledged. Chart reviewed. 1x eval & treat completed.    Patient lives at [facility], elevator access. Patient receives meals and cleaning services. In the last two weeks getting increased services with assistance for bathroom tasks. Patient reports up until the last 2 weeks she was THOMAS with functional mobilituy & ADLs. Recently reporting significant weakness.      Discharge Planner PT   Patient plan for discharge: Agreeable to TCU  Current status: Greeted patient supine in bed and agreeable to session. Patient lethargic and falling asleep between subjective questioning. VSS on RA. Engaged patient in supine > EOB at [facility]. Patient demonstrates poor trunk control while in sitting at edge of bed initially requiring [facility] to maintain upright, progressing to CGA. Attempted multiple bouts of sit <> stand with FWW, ultimately requiring modAx2 to achieve standing. Engaged patient in 4 side steps along edge of bed with FWW at modAx2 for balance & maintaining upright positioning. Engaged patient in EOB > supine at modAx2. Patient demonstrates significant global weakness that is limiting functional mobility. Discussed discharge recommendation. Concluded session with patient supine in bed, pillows for positioning, all needs in reach and alarm engaged.    Barriers to return to prior living situation: LE weakness, current level of assist (Ax2), impaired balance, high fall risk, decreased activity tolerance   Recommendations for discharge: TCU  Rationale for recommendations: Patient is below baseline for functional mobility & would benefit from continued physical therapy in the setting of a TCU for improving functional mobility, LE strength and tolerance for functional activities in order to return to baseline of functioning.        Entered by: Vi Cardenas 07/22/2020 10:25 AM       Please re-consult if patient transitions to IP status. Patient requiring Ax2 for mobilizing with nursing staff.

## 2020-07-22 NOTE — PROGRESS NOTES
[facility]    Hospitalist Progress Note    Date of Service (when I saw the patient): 07/22/2020    Assessment & Plan   Ghislaine Walls is a 84 year old female with hx of paroxysmal atrial fibrillation on anticoagulation with apixaban, HTN, CKD, and chronic pain syndrome who was admitted on 7/21/2020 for evaluation of generalized weakness and fatigue    Generalized weakness, multifactorial   Iron deficiency anemia  Presents with 2 day history of generalized weakness and increased fatigue. Likely due to iron deficiency anemia and sub-optimal nutrition. Patient's daughter reports that she eats and drinks very little. Evidence of dehydration present on arrival with mild DRAGAN. Hgb 7.8 on arrival from baseline ~9. No s/sx of active bleeding and FOBT on arrival was negative. Iron studies consistent with iron deficiency anemia.   Other significant work-up:     TSH normal    UA on showed many bacteria, but sample was contaminated    TTE (7/22) showed LVEF 55-60% with moderate diastolic dysfunction not significantly changed from prior study    No significant events on telemetry  - Hgb 7.2, suspect dilutional from IV fluids. Will repeat Hgb in AM   - Hold iron supplementation for now  - B12, folate pending  - GI consult. Colonoscopy in 2011 showed a polyp which was removed. She has not had a repeat colonoscopy  - Nutrition consult  - PT consult    Shoulder bursitis  History of gout  Polyarthropathy  * Follows with Dr. Jerry STRICKLAND for subacromial bursa injection. Has had long-standing history of polyarthropathy involving shoulders, knees, and wrists, but does not appear to have a rheumatological work-up.   * PTA on APAP 1000 mg TID and allopurinol 100 mg daily  - Was due for shoulder injection on 7/22; this has been deferred while the patient is hospitalized  - Continues on PTA APAP 1000 mg TID and allopurinol  - Consider outpatient Rheumatology referral    Paroxysmal atrial fibrillation  [PTA: diltiazem ER  "120 mg daily, metoprolol  mg daily, apixaban 5 mg BID]  - Continues on PTA meds    Essential hypertension  [PTA: diltiazem  mg daily, metoprolol  mg daily, hydralazine 50 mg TID, lisinopril 20 mg daily]  - Continues on PTA meds    CKD stage III  Creatinine 1.43 on arrival from baseline ~1.2. Suspect mild dehydration in the context of poor oral intake. She received IV fluids with improvement  - Creatinine 1.14 today, will follow    Hypothyroidism  - Continues on PTA levothyroxine    FEN: Clear liquid diet  DVT Prophylaxis: Apixaban  Code Status: DNR/DNI    Disposition: Expected discharge once Hgb stable, and she has been cleared by GI/PT - possibly tomorrow    Nola Gomes    Interval History   No acute events. Reports feeling \"achy\" all over - per discussion with patient's daughter, this is baseline, but requesting improved plan for pain. Lengthy discussion regarding need for close follow-up. Denies cp/sob, dizziness/lightheadedness  - GI consult  - Nutrition consult  - PT consult    Time Spent on this Encounter   I spent 40 minutes on the unit/floor managing the care of Ghislaine Walls. Over 50% of my time was spent on the following:   - Counseling the patient and/or family regarding: diagnostic results, prognosis, risks and benefits of treatment options and prevention of disease  - Coordination of care with the: care coordinator/    Nola Gomes MD    -Data reviewed today: I reviewed all new labs and imaging results over the last 24 hours. I personally reviewed no images or EKG's today.    Physical Exam   Temp: 95.7  F (35.4  C) Temp src: Oral BP: (!) 140/47 Pulse: 84 Heart Rate: 56 Resp: 16 SpO2: 100 % O2 Device: None (Room air)    Vitals:    07/21/20 0836 07/21/20 1300   Weight: 70.3 kg (155 lb) 61.4 kg (135 lb 4.8 oz)     Vital Signs with Ranges  Temp:  [95.7  F (35.4  C)-98.2  F (36.8  C)] 95.7  F (35.4  C)  Pulse:  [84] 84  Heart Rate:  [56-96] 56  Resp:  [16-18] " 16  BP: (121-184)/(41-65) 140/47  SpO2:  [97 %-100 %] 100 %  I/O last 3 completed shifts:  In: -   Out: 602 [Urine:600; Emesis/NG output:2]    Constitutional: Resting comfortably, NAD  HEENT: Sclera white, MMM  Respiratory: Breathing non-labored. Lungs CTAB - no wheezes, crackles, or rhonchi  Cardiovascular: Heart RRR, no m/r/g. No pedal edema  GI: +BS, abd soft/NT  Skin/Integument: No rash  Musculoskeletal: Normal muscle bulk and tone  Neuro: Alert and appropriate, DIAS  Psych: Flat affect    Medications       acetaminophen  1,000 mg Oral TID     allopurinol  100 mg Oral Daily     apixaban ANTICOAGULANT  5 mg Oral BID     diltiazem ER COATED BEADS  120 mg Oral Daily     hydrALAZINE  50 mg Oral TID     levothyroxine  25 mcg Oral Daily     lisinopril  20 mg Oral Daily     polyethylene glycol  2,000 mL Oral Once    Followed by     [START ON 2020] magnesium citrate  296 mL Oral Once     metoprolol succinate ER  100 mg Oral Daily     Data   Recent Labs   Lab 20  1031 20  2038 20  0849   WBC 5.8  --  4.4   HGB 7.2*  --  7.8*   MCV 94  --  94     --  466*   INR  --   --  2.07*     --  135   POTASSIUM 4.4  --  4.4   CHLORIDE 113*  --  112*   CO2 15*  --  14*   BUN 33*  --  39*   CR 1.14*  --  1.43*   ANIONGAP 8  --  9   ALIVIA 8.9  --  9.3   *  --  91   ALBUMIN  --  2.0*  --    PROTTOTAL  --  6.4*  --    BILITOTAL  --  0.4  --    ALKPHOS  --  69  --    ALT  --  8  --    AST  --  18  --    TROPI  --   --  <0.015       Recent Results (from the past 24 hour(s))   Echocardiogram Complete    Narrative    [id number]  [id number]  [id number]  [id number]^MARIAN^KATHRYN           [facility]  Echocardiography Laboratory  [address] [place] [address]        Name: ROSIO YOUNG  MRN: [id number]  : 1935  Study Date: 2020 11:21 AM  Age: 84 yrs  Gender: Female  Patient Location: [facility]  Reason For Study: CHF  Ordering Physician: KATHRYN FONSECA  Physician: Laurie Conteh  Performed By: Grover Gomez     BSA: 1.7 m2  Height: 64 in  Weight: 135 lb  HR: 58  BP: 143/49 mmHg  _____________________________________________________________________________  __        Procedure  Complete Portable Echo Adult.  _____________________________________________________________________________  __        Interpretation Summary     1. Normal biventricular size and function. Left ventricular ejection fraction  of 55-60%. Grade II or moderate diastolic dysfunction.  2. The left atrium is severely dilated. Right atrial size is normal.  3. No hemodynamically significant valvular disease.  4. Normal pulmonary artery pressure.  Compared to the previous echocardiogram on 10/1/2019, there are no significant  changes.  Technically difficult study.  _____________________________________________________________________________  __        Left Ventricle  The left ventricle is normal in size. There is concentric remodeling present.  Left ventricular systolic function is normal. The visual ejection fraction is  estimated at 55-60%. Grade II or moderate diastolic dysfunction. Diastolic  Doppler findings (E/E' ratio and/or other parameters) suggest left ventricular  filling pressures are increased. Regional wall motion abnormalities cannot be  excluded due to limited visualization.     Right Ventricle  The right ventricle is normal in size and function.     Atria  The left atrium is severely dilated. Right atrial size is normal.     Mitral Valve  The mitral valve leaflets appear thickened, but open well. There is trace  mitral regurgitation.        Tricuspid Valve  Normal tricuspid valve. There is mild (1+) tricuspid regurgitation. The right  ventricular systolic pressure is approximated at 31.3 mmHg plus the right  atrial pressure.     Aortic Valve  There is mild trileaflet aortic sclerosis. No aortic regurgitation is present.  No aortic stenosis is present.     Pulmonic Valve  The pulmonic  valve is not well visualized.     Vessels  Normal size aorta. IVC diameter <2.1 cm collapsing >50% with sniff suggests a  normal RA pressure of 3 mmHg.     Pericardium  There is no pericardial effusion.        Rhythm  Sinus rhythm was noted.  _____________________________________________________________________________  __  MMode/2D Measurements & Calculations  IVSd: 1.3 cm     LVIDd: 3.8 cm  LVIDs: 2.6 cm  LVPWd: 1.1 cm  LVPWs: 0.03 cm  FS: 31.2 %  LV mass(C)d: 156.1 grams  LV mass(C)dI: 94.3 grams/m2  Ao root diam: 3.0 cm  asc Aorta Diam: 3.0 cm  LVOT diam: 2.2 cm  LVOT area: 3.7 cm2  LA Volume (BP): 114.0 ml  LA Volume Index (BP): 68.7 ml/m2  RWT: 0.59           Doppler Measurements & Calculations  MV E max edilberto: 151.0 cm/sec  MV A max edilberto: 98.5 cm/sec  MV E/A: 1.5  MV max P.1 mmHg  MV mean PG: 3.2 mmHg  MV V2 VTI: 53.0 cm  MVA(VTI): 1.7 cm2  MV dec slope: 631.2 cm/sec2  Ao V2 max: 157.5 cm/sec  Ao max PG: 10.0 mmHg  Ao V2 mean: 117.7 cm/sec  Ao mean P.1 mmHg  Ao V2 VTI: 41.9 cm  ESVIN(I,D): 2.2 cm2  ESVIN(V,D): 2.5 cm2  LV V1 max P.4 mmHg  LV V1 max: 105.3 cm/sec  LV V1 VTI: 24.5 cm  SV(LVOT): 90.9 ml  SI(LVOT): 54.9 ml/m2  PA acc time: 0.10 sec  TR max edilberto: 279.6 cm/sec  TR max P.3 mmHg  Pulm Sys Edilberto: 63.8 cm/sec  Pulm Hicks Edilberto: 52.4 cm/sec  Pulm S/D: 1.2  AV Edilberto Ratio (DI): 0.67  ESVIN Index (cm2/m2): 1.3  E/E' av.5  Lateral E/e': 16.1  Medial E/e': 24.8              _____________________________________________________________________________  __        Report approved by: Javier Mcdermott 2020 12:47 PM

## 2020-07-22 NOTE — PLAN OF CARE
[facility]      OUTPATIENT PHYSICAL THERAPY EVALUATION  PLAN OF TREATMENT FOR OUTPATIENT REHABILITATION  (COMPLETE FOR INITIAL CLAIMS ONLY)  Patient's Last Name, First Name, M.I.  YOB: 1935  Ghislaine Walls                        Provider's Name  [facility] Medical Record No.  [id number]                               Onset Date:  07/21/20   Start of Care Date:  07/22/20      Type:     _X_PT   ___OT   ___SLP Medical Diagnosis:                           PT Diagnosis:  impaired functional mobility   Visits from SOC:  1   _________________________________________________________________________________  Plan of Treatment/Functional Goals    Planned Interventions:  ,    bed mobility training, transfer training, home program guidelines, progressive activity/exercise, balance training, gait training,       Goals: See Physical Therapy Goals on Care Plan in [employer] electronic health record.    Therapy Frequency: (1x eval & treat while on OBS status)  Predicted Duration of Therapy Intervention:    _________________________________________________________________________________    I CERTIFY THE NEED FOR THESE SERVICES FURNISHED UNDER        THIS PLAN OF TREATMENT AND WHILE UNDER MY CARE     (Physician co-signature of this document indicates review and certification of the therapy plan).                Certification date from: 07/22/20, Certification date to: 07/22/20    Referring Physician: Rodo Marcelino MD            Initial Assessment        See Physical Therapy evaluation dated 07/22/20 in Epic electronic health record.

## 2020-07-22 NOTE — PROGRESS NOTES
07/22/20 1000   Quick Adds   Type of Visit Initial PT Evaluation   Living Environment   Lives With alone   Living Arrangements apartment   Home Accessibility no concerns   Living Environment Comment Patient lives at [facility], elevator access. Patient receives meals and cleaning services. In the last two weeks getting increased services with assistance for bathroom tasks.   Self-Care   Usual Activity Tolerance fair   Current Activity Tolerance poor   Equipment Currently Used at Home walker, rolling;grab bar, toilet;grab bar, tub/shower;shower chair   Activity/Exercise/Self-Care Comment Patient reports up until the last 2 weeks she was THOMAS with functional mobilituy & ADLs. Recently reporting significant weakness.   Functional Level Prior   Ambulation 1-->assistive equipment   Transferring 1-->assistive equipment   Fall history within last six months yes   Number of times patient has fallen within last six months 1   Which of the above functional risks had a recent onset or change? ambulation;transferring   General Information   Onset of Illness/Injury or Date of Surgery - Date 07/21/20   Referring Physician Rodo Marcelino MD   Patient/Family Goals Statement To get stronger   Pertinent History of Current Problem (include personal factors and/or comorbidities that impact the POC) per chart: 84 year old female with a history of stage III CKD, hypertension, hyperlipidemia, anticoagulated on Eliquis, who presents from assisted living for gradually increasing weakness over the past two weeks. The patient states that this has slowly worsened and today was unable to get to the bathroom. She states that over the past 2-3 weeks she has had a semi-productive cough as well as intermittent diarrhea. She presents today concerned that this weakness has increased to the point that she was unable to get to the bathroom today.    Precautions/Limitations fall precautions   General Observations Greeted patient supine in bed  sleeping.   General Info Comments Activity: ambulate with assist   Cognitive Status Examination   Orientation person;place   Level of Consciousness alert;lethargic/somnolent  (sleepinging in between subjective questions)   Follows Commands and Answers Questions 75% of the time;able to follow single-step instructions   Pain Assessment   Patient Currently in Pain   (3/10 tailbone)   Integumentary/Edema   Integumentary/Edema no deficits were identifed   Posture    Posture Forward head position;Protracted shoulders   Range of Motion (ROM)   ROM Comment B LE WFL   Strength   Strength Comments B LE functionally weak   Bed Mobility   Bed Mobility Comments supine > EOB at [place]; EOB > supine at modAx2   Transfer Skills   Transfer Comments sit <> stand with FWW at modAx2   Gait   Gait Comments 4 side steps with FWW at modAx2    Balance   Balance Comments poor seated trunk stability & very unsteady in standing with FWW at modAx2   Sensory Examination   Sensory Perception Comments denies numbness/tingling in LE   General Therapy Interventions   Planned Therapy Interventions bed mobility training;transfer training;home program guidelines;progressive activity/exercise;balance training;gait training   Clinical Impression   Criteria for Skilled Therapeutic Intervention yes, treatment indicated   PT Diagnosis impaired functional mobility   Influenced by the following impairments LE weakness, pain, fatigue, decreased activity tolerance   Functional limitations due to impairments bed mobility, transfers, ambulation   Clinical Presentation Stable/Uncomplicated   Clinical Presentation Rationale PMH, current presentation, clinical judgement   Clinical Decision Making (Complexity) Low complexity   Therapy Frequency   (1x eval & treat while on OBS status)   Anticipated Discharge Disposition Transitional Care Facility   Risk & Benefits of therapy have been explained Yes   Patient, Family & other staff in agreement with plan of care Yes  "  Therapy Certification   Start of care date 07/22/20   Certification date from 07/22/20   Certification date to 07/22/20   [facility] AM-PAC TM \"6 Clicks\"   2016, Trustees of [facility], under license to [employer].  All rights reserved.   6 Clicks Short Forms Basic Mobility Inpatient Short Form   [facility] AM-PAC  \"6 Clicks\" V.2 Basic Mobility Inpatient Short Form   1. Turning from your back to your side while in a flat bed without using bedrails? 2 - A Lot   2. Moving from lying on your back to sitting on the side of a flat bed without using bedrails? 2 - A Lot   3. Moving to and from a bed to a chair (including a wheelchair)? 2 - A Lot   4. Standing up from a chair using your arms (e.g., wheelchair, or bedside chair)? 2 - A Lot   5. To walk in hospital room? 1 - Total   6. Climbing 3-5 steps with a railing? 1 - Total   Basic Mobility Raw Score (Score out of 24.Lower scores equate to lower levels of function) 10   Total Evaluation Time   Total Evaluation Time (Minutes) 10     "

## 2020-07-22 NOTE — CONSULTS
Care Transition Initial Assessment -      Met with: Patient    Active Problems:    Generalized weakness       DATA  Lives With: alone   Living Arrangements: apartment  Quality of Family Relationships: involved  Description of Support System: Involved  Who is your support system?: Children  Support Assessment: Adequate family and caregiver support.   Identified issues/concerns regarding health management: N/A   Quality of Family Relationships: involved    Writer spoke with pt who stated she lives at [facility]. She lives there alone and receives meals, cleaning assistance, and some assistance in the bathroom. She stated she uses a walker and has grab bars in the bathroom.     Writer spoke with pt about discharge plan. Pt stated she has been to [facility] before and would like a referral sent there. She stated she would not like any other referrals sent at this time, as she lives at [facility] as well.     ASSESSMENT  Cognitive Status:  awake, alert and oriented  Concerns to be addressed: discharge planning2     PLAN  Financial costs for the patient includes: Was not discussed.  Patient given options and choices for discharge yes   Patient/family is agreeable to the plan?  Yes:   Transportation/person available to transport on day of discharge  is TBD and have they been notified/set up   Patient Goals and Preferences: [facility] .  Patient anticipates discharging to:  TCU .    JONATHAN Michel

## 2020-07-22 NOTE — PLAN OF CARE
A&Ox4. Forgetful at times. Elevated BP, within PRN medication parameters, all other VSS on RA. Pt not OOB this shift. Turned and repositioned Q2H. Purewick in place with adequate output. Tolerating regular diet. IVF @ 75/hr. Infrequent congested cough. Denies pain, nausea, SOB, dizziness/lightheadedness. PT consult in place. Plan for ECHO today.

## 2020-07-22 NOTE — PLAN OF CARE
PRIMARY DIAGNOSIS: GENERALIZED WEAKNESS    OUTPATIENT/OBSERVATION GOALS TO BE MET BEFORE DISCHARGE  1. Orthostatic performed: N/A    2. Tolerating PO medications: Yes    3. Return to near baseline physical activity: No    4. Cleared for discharge by consultants (if involved): No    Discharge Planner Nurse   Safe discharge environment identified: No  Barriers to discharge: Yes       Entered by: Faith Liao 07/22/2020 1:36 PM     Please review provider order for any additional goals.   Nurse to notify provider when observation goals have been met and patient is ready for discharge.

## 2020-07-23 ENCOUNTER — ANESTHESIA (OUTPATIENT)
Dept: GASTROENTEROLOGY | Facility: CLINIC | Age: 85
DRG: 682 | End: 2020-07-23
Payer: MEDICARE

## 2020-07-23 ENCOUNTER — ANESTHESIA EVENT (OUTPATIENT)
Dept: GASTROENTEROLOGY | Facility: CLINIC | Age: 85
DRG: 682 | End: 2020-07-23
Payer: MEDICARE

## 2020-07-23 LAB
ABO + RH BLD: NORMAL
ABO + RH BLD: NORMAL
ANION GAP SERPL CALCULATED.3IONS-SCNC: 9 MMOL/L (ref 3–14)
BLD GP AB SCN SERPL QL: NORMAL
BLD PROD TYP BPU: NORMAL
BLD PROD TYP BPU: NORMAL
BLD UNIT ID BPU: 0
BLOOD BANK CMNT PATIENT-IMP: NORMAL
BLOOD PRODUCT CODE: NORMAL
BPU ID: NORMAL
BUN SERPL-MCNC: 31 MG/DL (ref 7–30)
CALCIUM SERPL-MCNC: 8.7 MG/DL (ref 8.5–10.1)
CHLORIDE SERPL-SCNC: 113 MMOL/L (ref 94–109)
CO2 SERPL-SCNC: 16 MMOL/L (ref 20–32)
CREAT SERPL-MCNC: 1.06 MG/DL (ref 0.52–1.04)
GFR SERPL CREATININE-BSD FRML MDRD: 48 ML/MIN/{1.73_M2}
GLUCOSE SERPL-MCNC: 92 MG/DL (ref 70–99)
HGB BLD-MCNC: 6.7 G/DL (ref 11.7–15.7)
HGB BLD-MCNC: 8.4 G/DL (ref 11.7–15.7)
NUM BPU REQUESTED: 1
POTASSIUM SERPL-SCNC: 4.1 MMOL/L (ref 3.4–5.3)
SODIUM SERPL-SCNC: 138 MMOL/L (ref 133–144)
SPECIMEN EXP DATE BLD: NORMAL
TRANSFUSION STATUS PATIENT QL: NORMAL
TRANSFUSION STATUS PATIENT QL: NORMAL

## 2020-07-23 PROCEDURE — P9016 RBC LEUKOCYTES REDUCED: HCPCS | Performed by: INTERNAL MEDICINE

## 2020-07-23 PROCEDURE — 25000132 ZZH RX MED GY IP 250 OP 250 PS 637: Mod: GY | Performed by: INTERNAL MEDICINE

## 2020-07-23 PROCEDURE — 86900 BLOOD TYPING SEROLOGIC ABO: CPT | Performed by: INTERNAL MEDICINE

## 2020-07-23 PROCEDURE — 86901 BLOOD TYPING SEROLOGIC RH(D): CPT | Performed by: INTERNAL MEDICINE

## 2020-07-23 PROCEDURE — 80048 BASIC METABOLIC PNL TOTAL CA: CPT | Performed by: INTERNAL MEDICINE

## 2020-07-23 PROCEDURE — 85018 HEMOGLOBIN: CPT | Performed by: INTERNAL MEDICINE

## 2020-07-23 PROCEDURE — 12000000 ZZH R&B MED SURG/OB

## 2020-07-23 PROCEDURE — G0378 HOSPITAL OBSERVATION PER HR: HCPCS

## 2020-07-23 PROCEDURE — 86923 COMPATIBILITY TEST ELECTRIC: CPT | Performed by: INTERNAL MEDICINE

## 2020-07-23 PROCEDURE — 36415 COLL VENOUS BLD VENIPUNCTURE: CPT | Performed by: INTERNAL MEDICINE

## 2020-07-23 PROCEDURE — 86850 RBC ANTIBODY SCREEN: CPT | Performed by: INTERNAL MEDICINE

## 2020-07-23 PROCEDURE — 99233 SBSQ HOSP IP/OBS HIGH 50: CPT | Performed by: INTERNAL MEDICINE

## 2020-07-23 PROCEDURE — 40000873 ZZH CANCELLED SURGERY UP TO 15 MINS: Performed by: INTERNAL MEDICINE

## 2020-07-23 RX ORDER — LIDOCAINE 40 MG/G
CREAM TOPICAL
Status: DISCONTINUED | OUTPATIENT
Start: 2020-07-23 | End: 2020-07-23 | Stop reason: HOSPADM

## 2020-07-23 RX ORDER — POLYETHYLENE GLYCOL 3350 17 G/17G
238 POWDER, FOR SOLUTION ORAL ONCE
Status: COMPLETED | OUTPATIENT
Start: 2020-07-23 | End: 2020-07-23

## 2020-07-23 RX ADMIN — POLYETHYLENE GLYCOL 3350 238 G: 17 POWDER, FOR SOLUTION ORAL at 12:38

## 2020-07-23 RX ADMIN — DILTIAZEM HYDROCHLORIDE 120 MG: 120 CAPSULE, COATED, EXTENDED RELEASE ORAL at 11:40

## 2020-07-23 RX ADMIN — ACETAMINOPHEN 1000 MG: 500 TABLET, FILM COATED ORAL at 11:39

## 2020-07-23 RX ADMIN — HYDRALAZINE HYDROCHLORIDE 50 MG: 50 TABLET, FILM COATED ORAL at 20:59

## 2020-07-23 RX ADMIN — APIXABAN 5 MG: 5 TABLET, FILM COATED ORAL at 21:00

## 2020-07-23 RX ADMIN — HYDRALAZINE HYDROCHLORIDE 50 MG: 50 TABLET, FILM COATED ORAL at 13:16

## 2020-07-23 RX ADMIN — APIXABAN 5 MG: 5 TABLET, FILM COATED ORAL at 11:40

## 2020-07-23 RX ADMIN — METOPROLOL SUCCINATE 100 MG: 100 TABLET, EXTENDED RELEASE ORAL at 11:39

## 2020-07-23 RX ADMIN — LISINOPRIL 20 MG: 20 TABLET ORAL at 11:41

## 2020-07-23 RX ADMIN — ALLOPURINOL 100 MG: 100 TABLET ORAL at 11:41

## 2020-07-23 RX ADMIN — ACETAMINOPHEN 1000 MG: 500 TABLET, FILM COATED ORAL at 21:00

## 2020-07-23 ASSESSMENT — LIFESTYLE VARIABLES: TOBACCO_USE: 1

## 2020-07-23 ASSESSMENT — ENCOUNTER SYMPTOMS: DYSRHYTHMIAS: 1

## 2020-07-23 ASSESSMENT — ACTIVITIES OF DAILY LIVING (ADL)
ADLS_ACUITY_SCORE: 23
ADLS_ACUITY_SCORE: 23

## 2020-07-23 NOTE — PROGRESS NOTES
Observation goals  PRIOR TO DISCHARGE      Comments:  -diagnostic tests and consults completed and resulted - not met    -vital signs normal or at patient baseline - met    Nurse to notify provider when observation goals have been met and patient is ready for discharge.

## 2020-07-23 NOTE — PROGRESS NOTES
OBS GOALS:  -diagnostic tests and consults completed and resulted - not met   -vital signs normal or at patient baseline - met

## 2020-07-23 NOTE — PLAN OF CARE
A+O x 4. Patient calm and cooperative. Incontinent of B&B. Will call for BM. On bowel prep for colonoscopy tomorrow. Patient is weak. 2 x assist with walker and gait belt. Covid negative. NPO besides prep. Blanchable coccyx. Complains of shoulder pain. Tramadol due at 1am. VSS on RA. Observation status. Possible discharge back to [place] tomorrow if all goes well with procedure.

## 2020-07-23 NOTE — PROGRESS NOTES
DATE & TIME: 7/22/20 [id number]    Cognitive Concerns/ Orientation : AOx4, [place]  BEHAVIOR & AGGRESSION TOOL COLOR: Green  CIWA SCORE: NA   ABNL VS/O2: HTN other VSS on RA  MOBILITY: Ax2 w/ GB and walker  PAIN MANAGMENT: Tramadol PRN and Tylenol scheduled  DIET: NPO  BOWEL/BLADDER: Incontinent of bladder- purwick in place. Continent of bowel (BSC)  ABNL LAB/BG: Iron- 9, Iron binding- 167, Hgb- 7.2, Hematocrit- 23.2, BG- 122  DRAIN/DEVICES: Purwick in place  TELEMETRY RHYTHM: NA  SKIN: Blanchable redness on coccyx  TESTS/PROCEDURES: Colonoscopy tomorrow AM  D/C DAY/GOALS/PLACE: Plan to discharge to [facility] pending colonoscopy    OTHER IMPORTANT INFO: NA

## 2020-07-23 NOTE — PLAN OF CARE
A&Ox4, VSS on RA. Denied pain throughout shift despite daughter Ax2 turn and repositioned q2 hours. Incontinent of B&B, large loose stool on shift. Pure wick in place. Attempted to stand at bedside, but was too weak to do so. NPO for colonoscopy today except for completion of bowl prep. Incontinent, purewick in place. Coccyx redness blanchable, otherwise skin is pale IV SL. Planning to return to [place] at their TCU pending sign off from GI. MD/RN ROUNDING SIGNED OFF D/E SHIFT: N/A  COMMIT TO SIT DONE AND SIGNED OFF Yes

## 2020-07-23 NOTE — CONSULTS
"CLINICAL NUTRITION SERVICES  -  ASSESSMENT NOTE    RECOMMENDATIONS FOR MD/PROVIDER TO ORDER:   - Advance diet as medically indicated    Future/Additional Recommendations:   - Supplements once diet advanced    Malnutrition:   % Weight Loss:  > 10% in 6 months (severe malnutrition)  % Intake:  </= 50% for >/= 5 days (severe malnutrition) - suspected  Subcutaneous Fat Loss:  Unable to assess  Muscle Loss:  Unable to assess  Fluid Retention:  None noted    Malnutrition Diagnosis: Severe malnutrition  In Context of:  Acute illness or injury  Chronic illness or disease     REASON FOR ASSESSMENT  Ghislaine Walls is a 84 year old female seen by Registered Dietitian for Patient/Family Request \"Poor nutrition due to lack of appetite\"    NUTRITION HISTORY  - Information obtained from chart review.   - H/o stage 3 CKD, hypertension, hyperlipidemia, presents from correction for gradually increasing weakness over the past 2 weeks.   - Has also had a cough and intermittent diarrhea.   - Resides in an correction.    - Attempted to speak with patient today after attempted procedure, however she was soundly sleeping - did not want to disturb.   - Reviewed MD documentation, patient eats and drinks very little at baseline.     Seen 10/2/2019 by Dietetic Intern -->   \"- Information obtained from Patient:  Patient stated that she has had a decreased appetite recently but she still tries to eat two meals a day. She stated that she typically eats whole wheat bread with peanut butter in the morning for breakfast and then eggs or another source of protein for lunch. In addition to her decreased appetite the patient stated that she feels she has lost about 20 pounds in the past three months. She focuses mainly on protein when she does eat. She was interested in setting up a supplement to come in the afternoon each day.      Main barrier to oral intake is decreased appetite. \"      CURRENT NUTRITION ORDERS  Diet Order:     NPO     Current " "Intake/Tolerance:  No oral intakes recorded over admission  Just 3 meals ordered this admission (7/21 and 7/22).     NUTRITION FOCUSED PHYSICAL ASSESSMENT FOR DIAGNOSING MALNUTRITION)  No:  Pt covered by blankets and sleeping during attempt. Did not assess.       Obtained from Chart/Interdisciplinary Team:  7/21 - Emesis x2  [place] IRA, weak    Colonoscopy was planned for today, postponed due to inadequate prep    ANTHROPOMETRICS  Height: 5' 4\"  Weight: 135 lbs 4.8 oz (61.4 kg)   Body mass index is 23.22 kg/m .  Weight Status:  Normal BMI  IBW: 54.5 kg  % IBW: 113%  Weight History: 10# loss in the past 4 months (7%). 25# loss in the past 9 months (15.6%).   Wt Readings from Last 10 Encounters:   07/21/20 61.4 kg (135 lb 4.8 oz)   03/31/20 65.8 kg (145 lb)   03/02/20 67.6 kg (149 lb)   02/10/20 68.6 kg (151 lb 4.8 oz)   11/04/19 72.6 kg (160 lb)   10/22/19 72.5 kg (159 lb 12.8 oz)   10/17/19 72.8 kg (160 lb 8 oz)   10/15/19 72.8 kg (160 lb 9.6 oz)   10/14/19 72.7 kg (160 lb 3.2 oz)   10/07/19 72.8 kg (160 lb 9.6 oz)       LABS  Labs reviewed    MEDICATIONS  Medications reviewed    ASSESSED NUTRITION NEEDS PER APPROVED PRACTICE GUIDELINES:  Dosing Weight 61.4 kg   Estimated Energy Needs: 3593-5121 kcals (25-30 Kcal/Kg)  Justification: maintenance  Estimated Protein Needs: 74-92 grams protein (1.2-1.5 g pro/Kg)  Justification: preservation of lean body mass  Estimated Fluid Needs: 1 mL/kcal   Justification: maintenance    MALNUTRITION:  % Weight Loss:  > 10% in 6 months (severe malnutrition)  % Intake:  </= 50% for >/= 5 days (severe malnutrition) - suspected  Subcutaneous Fat Loss:  Unable to assess  Muscle Loss:  Unable to assess  Fluid Retention:  None noted    Malnutrition Diagnosis: Severe malnutrition  In Context of:  Acute illness or injury  Chronic illness or disease    NUTRITION DIAGNOSIS:  Malnutrition related to suspected poor oral intakes in the setting of acute on chronic illness and aging  as evidenced by weight " loss of 15.6% in 9 months and negligible oral intakes since admission.       NUTRITION INTERVENTIONS  Recommendations / Nutrition Prescription  - Advance diet as medically indicated   - Supplements once diet advanced     - If unable to advance diet, will need to consider nutrition support .    Implementation  Nutrition education: Not appropriate at this time due to patient condition      Nutrition Goals  Diet advancement >CLD in the next 2 days.   Intakes of >25% meals TID.       MONITORING AND EVALUATION:  Progress towards goals will be monitored and evaluated per protocol and Practice Guidelines    Nithya White RD, LD  Pager: [phone]

## 2020-07-23 NOTE — PROGRESS NOTES
BRODY BOND. VSS on RA ex htn. NPO. A2, turn/repo. Purewick in place. R PIV SL.GI consult completed: upper endoscopy and colonoscopy probable for tomorrow morning. Pt transferred to  with belongings (frank). Continue to monitor.

## 2020-07-23 NOTE — PROGRESS NOTES
[facility]    Hospitalist Progress Note    Date of Service (when I saw the patient): 07/23/2020    Assessment & Plan   Ghislaine Walls is a 84 year old female with hx of paroxysmal atrial fibrillation on anticoagulation with apixaban, HTN, CKD, and chronic pain syndrome who was admitted on 7/21/2020 for evaluation of generalized weakness and fatigue    Generalized weakness, multifactorial   Iron deficiency anemia  Presents with 2 day history of generalized weakness and increased fatigue. Likely due to iron deficiency anemia and sub-optimal nutrition. Patient's daughter reports that she eats and drinks very little. Evidence of dehydration present on arrival with mild DRAGAN. Hgb 7.8 on arrival from baseline ~9. No s/sx of active bleeding and FOBT on arrival was negative. Iron studies consistent with iron deficiency anemia.   Other significant work-up:     TSH normal, B12/folate normal    UA on showed many bacteria, but sample was contaminated    TTE (7/22) showed LVEF 55-60% with moderate diastolic dysfunction not significantly changed from prior study    No significant events on telemetry  - Repeat Hgb pending, will follow  - EGD/colonoscopy likely tomorrow, 7/24 due to inadequate prep  - Nutrition consult  - PT consult  Addendum: Hgb returned at 6.7. Will proceed with blood transfusion and repeat Hgb this evening post-transfusion    Shoulder bursitis  History of gout  Polyarthropathy  * Follows with Dr. Jerry STRICKLAND for subacromial bursa injection. Has had long-standing history of polyarthropathy involving shoulders, knees, and wrists, but does not appear to have a rheumatological work-up.   * PTA on APAP 1000 mg TID and allopurinol 100 mg daily  - Was due for shoulder injection on 7/22; this has been deferred while the patient is hospitalized  - Continues on PTA APAP 1000 mg TID and allopurinol  - Consider outpatient Rheumatology referral    Paroxysmal atrial fibrillation  [PTA: diltiazem   mg daily, metoprolol  mg daily, apixaban 5 mg BID]  - Continues on PTA meds    Essential hypertension  [PTA: diltiazem  mg daily, metoprolol  mg daily, hydralazine 50 mg TID, lisinopril 20 mg daily]  - Continues on PTA meds    CKD stage III  Creatinine 1.43 on arrival from baseline ~1.2. Suspect mild dehydration in the context of poor oral intake. She received IV fluids with improvement  - Creatinine 1.06 today, will follow    Hypothyroidism  - Continues on PTA levothyroxine    FEN: Clear liquid diet  DVT Prophylaxis: Apixaban  Code Status: DNR/DNI    Disposition: Change to inpatient status. Expected discharge once Hgb stable, and she has been cleared by GI/PT - possibly tomorrow    Nola Gomes    Interval History   No events overnight. Refused part of her prep, still having solid stool. GI planning to repeat miralax/gatorade and re-attempt EGD/colonoscopy, likely tomorrow. She otherwise offers no concerns. Denies cp/sob, dizziness/lightheadedness. Chronic pain stable.    Nola Gomes MD    -Data reviewed today: I reviewed all new labs and imaging results over the last 24 hours. I personally reviewed no images or EKG's today.    Physical Exam   Temp: 98.2  F (36.8  C) Temp src: Oral BP: (!) 183/76(in Endo) Pulse: 78(in Endo) Heart Rate: 79 Resp: 18 SpO2: 100 %(in Endo) O2 Device: None (Room air)    Vitals:    07/21/20 0836 07/21/20 1300   Weight: 70.3 kg (155 lb) 61.4 kg (135 lb 4.8 oz)     Vital Signs with Ranges  Temp:  [95.7  F (35.4  C)-98.2  F (36.8  C)] 98.2  F (36.8  C)  Pulse:  [78-79] 78  Heart Rate:  [56-79] 79  Resp:  [16-18] 18  BP: (140-183)/(41-76) 183/76  SpO2:  [96 %-100 %] 100 %  I/O last 3 completed shifts:  In: -   Out: 400 [Urine:400]    Constitutional: Resting comfortably, NAD  HEENT: Sclera white, MMM  Respiratory: Breathing non-labored. Lungs CTAB - no wheezes, crackles, or rhonchi  Cardiovascular: Heart RRR, no m/r/g. No pedal edema  GI: +BS, abd  soft/NT  Skin/Integument: No rash  Musculoskeletal: Normal muscle bulk and tone  Neuro: Alert and appropriate, DIAS  Psych: Flat affect    Medications       acetaminophen  1,000 mg Oral TID     allopurinol  100 mg Oral Daily     apixaban ANTICOAGULANT  5 mg Oral BID     diltiazem ER COATED BEADS  120 mg Oral Daily     hydrALAZINE  50 mg Oral TID     levothyroxine  25 mcg Oral Daily     lisinopril  20 mg Oral Daily     magnesium citrate  296 mL Oral Once     metoprolol succinate ER  100 mg Oral Daily     polyethylene glycol  238 g Oral Once     Data   Recent Labs   Lab 20  0950 20  1031 20  2038 20  0849   WBC  --  5.8  --  4.4   HGB  --  7.2*  --  7.8*   MCV  --  94  --  94   PLT  --  434  --  466*   INR  --   --   --  2.07*    136  --  135   POTASSIUM 4.1 4.4  --  4.4   CHLORIDE 113* 113*  --  112*   CO2 16* 15*  --  14*   BUN 31* 33*  --  39*   CR 1.06* 1.14*  --  1.43*   ANIONGAP 9 8  --  9   ALIVIA 8.7 8.9  --  9.3   GLC 92 122*  --  91   ALBUMIN  --   --  2.0*  --    PROTTOTAL  --   --  6.4*  --    BILITOTAL  --   --  0.4  --    ALKPHOS  --   --  69  --    ALT  --   --  8  --    AST  --   --  18  --    TROPI  --   --   --  <0.015       Recent Results (from the past 24 hour(s))   Echocardiogram Complete    Narrative    [id number]  [id number]  [id number]  [id number]^MARIAN^KATHRYN           [facility]  Echocardiography Laboratory  [facility] [place] [address]        Name: ROSIO YOUNG  MRN: [id number]  : 1935  Study Date: 2020 11:21 AM  Age: 84 yrs  Gender: Female  Patient Location: [facility]  Reason For Study: CHF  Ordering Physician: KATHRYN FONSECA  Referring Physician: Laurie Conteh  Performed By: Grover Gomez     BSA: 1.7 m2  Height: 64 in  Weight: 135 lb  HR: 58  BP: 143/49 mmHg  _____________________________________________________________________________  __        Procedure  Complete Portable Echo  Adult.  _____________________________________________________________________________  __        Interpretation Summary     1. Normal biventricular size and function. Left ventricular ejection fraction  of 55-60%. Grade II or moderate diastolic dysfunction.  2. The left atrium is severely dilated. Right atrial size is normal.  3. No hemodynamically significant valvular disease.  4. Normal pulmonary artery pressure.  Compared to the previous echocardiogram on 10/1/2019, there are no significant  changes.  Technically difficult study.  _____________________________________________________________________________  __        Left Ventricle  The left ventricle is normal in size. There is concentric remodeling present.  Left ventricular systolic function is normal. The visual ejection fraction is  estimated at 55-60%. Grade II or moderate diastolic dysfunction. Diastolic  Doppler findings (E/E' ratio and/or other parameters) suggest left ventricular  filling pressures are increased. Regional wall motion abnormalities cannot be  excluded due to limited visualization.     Right Ventricle  The right ventricle is normal in size and function.     Atria  The left atrium is severely dilated. Right atrial size is normal.     Mitral Valve  The mitral valve leaflets appear thickened, but open well. There is trace  mitral regurgitation.        Tricuspid Valve  Normal tricuspid valve. There is mild (1+) tricuspid regurgitation. The right  ventricular systolic pressure is approximated at 31.3 mmHg plus the right  atrial pressure.     Aortic Valve  There is mild trileaflet aortic sclerosis. No aortic regurgitation is present.  No aortic stenosis is present.     Pulmonic Valve  The pulmonic valve is not well visualized.     Vessels  Normal size aorta. IVC diameter <2.1 cm collapsing >50% with sniff suggests a  normal RA pressure of 3 mmHg.     Pericardium  There is no pericardial effusion.        Rhythm  Sinus rhythm was  noted.  _____________________________________________________________________________  __  MMode/2D Measurements & Calculations  IVSd: 1.3 cm     LVIDd: 3.8 cm  LVIDs: 2.6 cm  LVPWd: 1.1 cm  LVPWs: 0.03 cm  FS: 31.2 %  LV mass(C)d: 156.1 grams  LV mass(C)dI: 94.3 grams/m2  Ao root diam: 3.0 cm  asc Aorta Diam: 3.0 cm  LVOT diam: 2.2 cm  LVOT area: 3.7 cm2  LA Volume (BP): 114.0 ml  LA Volume Index (BP): 68.7 ml/m2  RWT: 0.59           Doppler Measurements & Calculations  MV E max edilberto: 151.0 cm/sec  MV A max edilberto: 98.5 cm/sec  MV E/A: 1.5  MV max P.1 mmHg  MV mean PG: 3.2 mmHg  MV V2 VTI: 53.0 cm  MVA(VTI): 1.7 cm2  MV dec slope: 631.2 cm/sec2  Ao V2 max: 157.5 cm/sec  Ao max PG: 10.0 mmHg  Ao V2 mean: 117.7 cm/sec  Ao mean P.1 mmHg  Ao V2 VTI: 41.9 cm  ESVIN(I,D): 2.2 cm2  ESVIN(V,D): 2.5 cm2  LV V1 max P.4 mmHg  LV V1 max: 105.3 cm/sec  LV V1 VTI: 24.5 cm  SV(LVOT): 90.9 ml  SI(LVOT): 54.9 ml/m2  PA acc time: 0.10 sec  TR max edilberto: 279.6 cm/sec  TR max P.3 mmHg  Pulm Sys Edilberto: 63.8 cm/sec  Pulm Hicks Edilberto: 52.4 cm/sec  Pulm S/D: 1.2  AV Edilberto Ratio (DI): 0.67  ESVIN Index (cm2/m2): 1.3  E/E' av.5  Lateral E/e': 16.1  Medial E/e': 24.8              _____________________________________________________________________________  __        Report approved by: Javier Mcdermott 2020 12:47 PM

## 2020-07-23 NOTE — PROGRESS NOTES
GI:  Prep for procedures inadequate (refused mg citrate) some solid stool in return.   Will give second round of miralax/gatorade today.  Procedure possible later today or more likely tomorrow  Rik Bailon MD  [phone]  After 5 pm or on weekends  [phone]

## 2020-07-23 NOTE — PLAN OF CARE
Discharge Planner OT   Patient plan for discharge: TCU  Current status: OT order received and chart reviewed. Per EMR, pt has been accepted at TCU tomorrow, 7/24/2020. No acute care OT services indicated. Defer OT to next level of care  Barriers to return to prior living situation: defer to PT  Recommendations for discharge: TCU  Rationale for recommendations: defer to PT       Entered by: Mahsa Oropeza 07/23/2020 3:37 PM

## 2020-07-23 NOTE — PROGRESS NOTES
Critical lab called for a hgb of 6.7. E-paged and updated AMD (Nola Gomes MD) @ [id number]. Will monitor.

## 2020-07-23 NOTE — ANESTHESIA PREPROCEDURE EVALUATION
Anesthesia Pre-Procedure Evaluation    Patient: Ghislaine Walls   MRN: [id number] : 1935          Preoperative Diagnosis: Anemia [D64.9]    Procedure(s):  COLONOSCOPY  ESOPHAGOGASTRODUODENOSCOPY (EGD)    Past Medical History:   Diagnosis Date     [place] (hard of hearing)     wears hearing aids     Hyperlipidaemia      Hyperlipidaemia LDL goal < 130      Hypertension      Hypothyroid      PAD (peripheral artery disease) (H)      Renal insufficiency, mild      Uncontrolled hypertension 10/14/2019     Past Surgical History:   Procedure Laterality Date     APPENDECTOMY       CATARACT IOL, RT/LT      bilateral (laser - 2013)     DENTAL SURGERY      wisdom     EYE SURGERY      Muscle release     TUBAL LIGATION       Allergies   Allergen Reactions     Oxybutynin Itching     Seasonal Allergies      Social History     Tobacco Use     Smoking status: Former Smoker     Packs/day: 0.50     Years: 5.00     Pack years: 2.50     Types: Cigarettes     Last attempt to quit: 1973     Years since quittin.5     Smokeless tobacco: Never Used   Substance Use Topics     Alcohol use: Yes     Alcohol/week: 0.0 standard drinks     Comment: 1 beer on      Prior to Admission medications    Medication Sig Start Date End Date Taking? Authorizing Provider   acetaminophen (TYLENOL) 500 MG tablet Take 1,000 mg by mouth 3 times daily   Yes Unknown, Entered By History   allopurinol (ZYLOPRIM) 100 MG tablet Take 1 tablet (100 mg) by mouth daily 20  Yes Toshia Smith PA-C   apixaban ANTICOAGULANT (ELIQUIS) 5 MG tablet Take 1 tablet (5 mg) by mouth 2 times daily 20  Yes Seema Fuentes MD   diltiazem ER (DILT-XR) 120 MG 24 hr capsule Take 1 capsule (120 mg) by mouth daily 1/10/20  Yes Daphne Cm PA-C   hydrALAZINE (APRESOLINE) 50 MG tablet Take 50 mg by mouth 3 times daily   Yes Reported, Patient   levothyroxine (SYNTHROID/LEVOTHROID) 25 MCG tablet Take 1 tablet (25 mcg) by mouth  daily 5/7/20  Yes Laurie Conteh MD   lisinopril (ZESTRIL) 40 MG tablet Take 1 tablet (40 mg) by mouth daily 4/28/20  Yes Seema Fuentes MD   metoprolol succinate ER (TOPROL-XL) 100 MG 24 hr tablet Take 100 mg by mouth daily   Yes Reported, Patient     Current Facility-Administered Medications Ordered in Epic   Medication Dose Route Frequency Last Rate Last Dose     acetaminophen (TYLENOL) tablet 1,000 mg  1,000 mg Oral TID   1,000 mg at 07/22/20 2024     allopurinol (ZYLOPRIM) tablet 100 mg  100 mg Oral Daily   100 mg at 07/22/20 0755     apixaban ANTICOAGULANT (ELIQUIS) tablet 5 mg  5 mg Oral BID   5 mg at 07/22/20 2024     diltiazem ER COATED BEADS (CARDIZEM CD/CARTIA XT) 24 hr capsule 120 mg  120 mg Oral Daily   120 mg at 07/22/20 0753     hydrALAZINE (APRESOLINE) injection 10 mg  10 mg Intravenous Q4H PRN   10 mg at 07/21/20 1456     hydrALAZINE (APRESOLINE) tablet 50 mg  50 mg Oral TID   50 mg at 07/22/20 2024     levothyroxine (SYNTHROID/LEVOTHROID) tablet 25 mcg  25 mcg Oral Daily   25 mcg at 07/22/20 0753     lidocaine (LMX4) cream   Topical Q1H PRN         lidocaine 1 % 0.1-1 mL  0.1-1 mL Other Q1H PRN         lisinopril (ZESTRIL) tablet 20 mg  20 mg Oral Daily   20 mg at 07/22/20 0753     magnesium citrate solution 296 mL  296 mL Oral Once         May continue current IV fluids if patient has IV fluids infusing.   Does not apply Continuous PRN         May take regular AM medications except those listed below   Does not apply Continuous PRN         melatonin tablet 1 mg  1 mg Oral At Bedtime PRN         metoprolol succinate ER (TOPROL-XL) 24 hr tablet 100 mg  100 mg Oral Daily   100 mg at 07/22/20 0753     naloxone (NARCAN) injection 0.1-0.4 mg  0.1-0.4 mg Intravenous Q2 Min PRN         ondansetron (ZOFRAN-ODT) ODT tab 4 mg  4 mg Oral Q6H PRN   4 mg at 07/21/20 1913    Or     ondansetron (ZOFRAN) injection 4 mg  4 mg Intravenous Q6H PRN         prochlorperazine (COMPAZINE) injection 5 mg  5 mg  Intravenous Q6H PRN   5 mg at 20     sodium chloride (PF) 0.9% PF flush 3 mL  3 mL Intracatheter q1 min prn         sodium chloride (PF) 0.9% PF flush 3 mL  3 mL Intracatheter Q8H   3 mL at 20 0758     traMADol (ULTRAM) tablet 50 mg  50 mg Oral Q6H PRN   50 mg at 20 1750     No current [facility]-ordered outpatient medications on file.       - MEDICATION INSTRUCTIONS -       - MEDICATION INSTRUCTIONS -       Recent Labs   Lab Test 20  1031 20  0849    135   POTASSIUM 4.4 4.4   CHLORIDE 113* 112*   CO2 15* 14*   ANIONGAP 8 9   * 91   BUN 33* 39*   CR 1.14* 1.43*   ALIVIA 8.9 9.3     Recent Labs   Lab Test 20  1031 20  0849   WBC 5.8 4.4   HGB 7.2* 7.8*    466*     No results for input(s): ABO, RH in the last 47061 hours.  Recent Labs   Lab Test 20  0849 10/02/19  0531 10/01/19  1008   TROPI <0.015 0.435* 0.107*     No results for input(s): PH, PCO2, PO2, HCO3 in the last 51185 hours.  No results for input(s): HCG in the last 28074 hours.  Recent Results (from the past 744 hour(s))   XR Chest Port 1 View    Narrative    CHEST PORTABLE ONE VIEW 2020 11:52 AM     HISTORY: Chronic cough, weakness.     COMPARISON: 10/1/2019.    FINDINGS: Slight aortic tortuosity.      Impression    IMPRESSION: No acute consolidation.    DINESH MONTENEGRO MD   Echocardiogram Complete    Narrative    [id number]  [id number]  [id number]  [id number]^MARIAN^KATHRYN           [facility]  Echocardiography Laboratory  [facility] [address] [place] [address]        Name: ROSIO YOUNG  MRN: [id number]  : 1935  Study Date: 2020 11:21 AM  Age: 84 yrs  Gender: Female  Patient Location: [facility]  Reason For Study: CHF  Ordering Physician: KATHRYN FONSECA  Referring Physician: Laurie Conteh  Performed By: Grover Gomez     BSA: 1.7 m2  Height: 64 in  Weight: 135 lb  HR: 58  BP: 143/49  mmHg  _____________________________________________________________________________  __        Procedure  Complete Portable Echo Adult.  _____________________________________________________________________________  __        Interpretation Summary     1. Normal biventricular size and function. Left ventricular ejection fraction  of 55-60%. Grade II or moderate diastolic dysfunction.  2. The left atrium is severely dilated. Right atrial size is normal.  3. No hemodynamically significant valvular disease.  4. Normal pulmonary artery pressure.  Compared to the previous echocardiogram on 10/1/2019, there are no significant  changes.  Technically difficult study.  _____________________________________________________________________________  __        Left Ventricle  The left ventricle is normal in size. There is concentric remodeling present.  Left ventricular systolic function is normal. The visual ejection fraction is  estimated at 55-60%. Grade II or moderate diastolic dysfunction. Diastolic  Doppler findings (E/E' ratio and/or other parameters) suggest left ventricular  filling pressures are increased. Regional wall motion abnormalities cannot be  excluded due to limited visualization.     Right Ventricle  The right ventricle is normal in size and function.     Atria  The left atrium is severely dilated. Right atrial size is normal.     Mitral Valve  The mitral valve leaflets appear thickened, but open well. There is trace  mitral regurgitation.        Tricuspid Valve  Normal tricuspid valve. There is mild (1+) tricuspid regurgitation. The right  ventricular systolic pressure is approximated at 31.3 mmHg plus the right  atrial pressure.     Aortic Valve  There is mild trileaflet aortic sclerosis. No aortic regurgitation is present.  No aortic stenosis is present.     Pulmonic Valve  The pulmonic valve is not well visualized.     Vessels  Normal size aorta. IVC diameter <2.1 cm collapsing >50% with sniff suggests  a  normal RA pressure of 3 mmHg.     Pericardium  There is no pericardial effusion.        Rhythm  Sinus rhythm was noted.  _____________________________________________________________________________  __  MMode/2D Measurements & Calculations  IVSd: 1.3 cm     LVIDd: 3.8 cm  LVIDs: 2.6 cm  LVPWd: 1.1 cm  LVPWs: 0.03 cm  FS: 31.2 %  LV mass(C)d: 156.1 grams  LV mass(C)dI: 94.3 grams/m2  Ao root diam: 3.0 cm  asc Aorta Diam: 3.0 cm  LVOT diam: 2.2 cm  LVOT area: 3.7 cm2  LA Volume (BP): 114.0 ml  LA Volume Index (BP): 68.7 ml/m2  RWT: 0.59           Doppler Measurements & Calculations  MV E max edilberto: 151.0 cm/sec  MV A max edilberto: 98.5 cm/sec  MV E/A: 1.5  MV max P.1 mmHg  MV mean PG: 3.2 mmHg  MV V2 VTI: 53.0 cm  MVA(VTI): 1.7 cm2  MV dec slope: 631.2 cm/sec2  Ao V2 max: 157.5 cm/sec  Ao max PG: 10.0 mmHg  Ao V2 mean: 117.7 cm/sec  Ao mean P.1 mmHg  Ao V2 VTI: 41.9 cm  ESVIN(I,D): 2.2 cm2  ESVIN(V,D): 2.5 cm2  LV V1 max P.4 mmHg  LV V1 max: 105.3 cm/sec  LV V1 VTI: 24.5 cm  SV(LVOT): 90.9 ml  SI(LVOT): 54.9 ml/m2  PA acc time: 0.10 sec  TR max edilberto: 279.6 cm/sec  TR max P.3 mmHg  Pulm Sys Edilberto: 63.8 cm/sec  Pulm Hicks Edilberto: 52.4 cm/sec  Pulm S/D: 1.2  AV Edilberto Ratio (DI): 0.67  ESVIN Index (cm2/m2): 1.3  E/E' av.5  Lateral E/e': 16.1  Medial E/e': 24.8              _____________________________________________________________________________  __        Report approved by: Javier Mcdermott 2020 12:47 PM        Recent Results (from the past 4320 hour(s))   Echocardiogram Complete    Narrative    [id number]  [id number]  [id number]  [id number]^MARIAN^KATHRYN           [facility]  Echocardiography Laboratory  [address] [place] [address]        Name: ROSIO YOUNG  MRN: [id number]  : 1935  Study Date: 2020 11:21 AM  Age: 84 yrs  Gender: Female  Patient Location: [facility]  Reason For Study: CHF  Ordering Physician: KATHRYN FONSECA  Referring Physician: Wero  Laurie  Performed By: Grover Gomez     BSA: 1.7 m2  Height: 64 in  Weight: 135 lb  HR: 58  BP: 143/49 mmHg  _____________________________________________________________________________  __        Procedure  Complete Portable Echo Adult.  _____________________________________________________________________________  __        Interpretation Summary     1. Normal biventricular size and function. Left ventricular ejection fraction  of 55-60%. Grade II or moderate diastolic dysfunction.  2. The left atrium is severely dilated. Right atrial size is normal.  3. No hemodynamically significant valvular disease.  4. Normal pulmonary artery pressure.  Compared to the previous echocardiogram on 10/1/2019, there are no significant  changes.  Technically difficult study.  _____________________________________________________________________________  __        Left Ventricle  The left ventricle is normal in size. There is concentric remodeling present.  Left ventricular systolic function is normal. The visual ejection fraction is  estimated at 55-60%. Grade II or moderate diastolic dysfunction. Diastolic  Doppler findings (E/E' ratio and/or other parameters) suggest left ventricular  filling pressures are increased. Regional wall motion abnormalities cannot be  excluded due to limited visualization.     Right Ventricle  The right ventricle is normal in size and function.     Atria  The left atrium is severely dilated. Right atrial size is normal.     Mitral Valve  The mitral valve leaflets appear thickened, but open well. There is trace  mitral regurgitation.        Tricuspid Valve  Normal tricuspid valve. There is mild (1+) tricuspid regurgitation. The right  ventricular systolic pressure is approximated at 31.3 mmHg plus the right  atrial pressure.     Aortic Valve  There is mild trileaflet aortic sclerosis. No aortic regurgitation is present.  No aortic stenosis is present.     Pulmonic Valve  The pulmonic valve is not well  visualized.     Vessels  Normal size aorta. IVC diameter <2.1 cm collapsing >50% with sniff suggests a  normal RA pressure of 3 mmHg.     Pericardium  There is no pericardial effusion.        Rhythm  Sinus rhythm was noted.  _____________________________________________________________________________  __  MMode/2D Measurements & Calculations  IVSd: 1.3 cm     LVIDd: 3.8 cm  LVIDs: 2.6 cm  LVPWd: 1.1 cm  LVPWs: 0.03 cm  FS: 31.2 %  LV mass(C)d: 156.1 grams  LV mass(C)dI: 94.3 grams/m2  Ao root diam: 3.0 cm  asc Aorta Diam: 3.0 cm  LVOT diam: 2.2 cm  LVOT area: 3.7 cm2  LA Volume (BP): 114.0 ml  LA Volume Index (BP): 68.7 ml/m2  RWT: 0.59           Doppler Measurements & Calculations  MV E max edilberto: 151.0 cm/sec  MV A max edilberto: 98.5 cm/sec  MV E/A: 1.5  MV max P.1 mmHg  MV mean PG: 3.2 mmHg  MV V2 VTI: 53.0 cm  MVA(VTI): 1.7 cm2  MV dec slope: 631.2 cm/sec2  Ao V2 max: 157.5 cm/sec  Ao max PG: 10.0 mmHg  Ao V2 mean: 117.7 cm/sec  Ao mean P.1 mmHg  Ao V2 VTI: 41.9 cm  ESVIN(I,D): 2.2 cm2  ESVIN(V,D): 2.5 cm2  LV V1 max P.4 mmHg  LV V1 max: 105.3 cm/sec  LV V1 VTI: 24.5 cm  SV(LVOT): 90.9 ml  SI(LVOT): 54.9 ml/m2  PA acc time: 0.10 sec  TR max edilberto: 279.6 cm/sec  TR max P.3 mmHg  Pulm Sys Edilberto: 63.8 cm/sec  Pulm Hicks Edilberto: 52.4 cm/sec  Pulm S/D: 1.2  AV Edilberto Ratio (DI): 0.67  ESVIN Index (cm2/m2): 1.3  E/E' av.5  Lateral E/e': 16.1  Medial E/e': 24.8              _____________________________________________________________________________  __        Report approved by: Javier Mcdermott 2020 12:47 PM            RECENT LABS:       Anesthesia Evaluation     .             ROS/MED HX    ENT/Pulmonary:     (+)tobacco use, Past use , . .    Neurologic: Comment: Cognitive impairment    (+)CVA other neuro hearing deficit    Cardiovascular:     (+) hypertension-Peripheral Vascular Disease---. : . . . :. dysrhythmias a-fib, .       METS/Exercise Tolerance:     Hematologic:     (+) Anemia, -     "  Musculoskeletal:   (+) arthritis,  other musculoskeletal- weakness, fatigue       GI/Hepatic:         Renal/Genitourinary:     (+) chronic renal disease, type: CRI, Other Renal/ Genitourinary, urge incontinence      Endo:     (+) thyroid problem hypothyroidism, .      Psychiatric:         Infectious Disease:         Malignancy:         Other:                          Physical Exam  Normal systems: dental    Airway   Mallampati: II  TM distance: >3 FB  Neck ROM: full    Dental     Cardiovascular   Rhythm and rate: regular and normal      Pulmonary    breath sounds clear to auscultation            Lab Results   Component Value Date    WBC 5.8 07/22/2020    HGB 7.2 (L) 07/22/2020    HCT 23.2 (L) 07/22/2020     07/22/2020    CRP 65.9 (H) 10/02/2019     07/22/2020    POTASSIUM 4.4 07/22/2020    CHLORIDE 113 (H) 07/22/2020    CO2 15 (L) 07/22/2020    BUN 33 (H) 07/22/2020    CR 1.14 (H) 07/22/2020     (H) 07/22/2020    ALIVIA 8.9 07/22/2020    PHOS 3.7 08/29/2019    MAG 1.7 10/04/2019    ALBUMIN 2.0 (L) 07/21/2020    PROTTOTAL 6.4 (L) 07/21/2020    ALT 8 07/21/2020    AST 18 07/21/2020    ALKPHOS 69 07/21/2020    BILITOTAL 0.4 07/21/2020    INR 2.07 (H) 07/21/2020    TSH 1.86 07/21/2020    T4 1.55 (H) 11/04/2019       Preop Vitals  BP Readings from Last 3 Encounters:   07/23/20 (!) 147/66   03/31/20 (!) 170/70   03/02/20 (!) 164/68    Pulse Readings from Last 3 Encounters:   07/23/20 79   03/31/20 73   03/02/20 64      Resp Readings from Last 3 Encounters:   07/23/20 18   11/04/19 16   10/22/19 18    SpO2 Readings from Last 3 Encounters:   07/23/20 96%   03/02/20 98%   02/10/20 98%      Temp Readings from Last 1 Encounters:   07/23/20 36.8  C (98.2  F) (Oral)    Ht Readings from Last 1 Encounters:   07/21/20 1.626 m (5' 4\")      Wt Readings from Last 1 Encounters:   07/21/20 61.4 kg (135 lb 4.8 oz)    Estimated body mass index is 23.22 kg/m  as calculated from the following:    Height as of this " "encounter: 1.626 m (5' 4\").    Weight as of this encounter: 61.4 kg (135 lb 4.8 oz).       Anesthesia Plan      History & Physical Review  History and physical reviewed and following examination; no interval change.    ASA Status:  3 .    NPO Status:  > 8 hours    Plan for MAC Reason for MAC:  Deep or markedly invasive procedure (G8)           Postoperative Care      Consents  Anesthetic plan, risks, benefits and alternatives discussed with:  Patient..                 Felice Langston MD  "

## 2020-07-23 NOTE — PROGRESS NOTES
D: KATHI following for discharge planning. Pt not yet ready for discharge.  I: Pt has been accepted at [facility]. KATHI updated Rajeev in admissions. He will have a bed for her if ready on Friday. Pt will be eligible for to have her Medicare 3-day stay waived due to the pandemic, per Rajeev, and will have Medicare coverage for TCU, even though she is OBS status.    P: KATHI following.     DELORES Yarbrough, LGSW  [phone]  [facility]

## 2020-07-24 ENCOUNTER — RESULTS ONLY (OUTPATIENT)
Dept: ENDOSCOPY | Facility: CLINIC | Age: 85
End: 2020-07-24

## 2020-07-24 ENCOUNTER — ANESTHESIA (OUTPATIENT)
Dept: GASTROENTEROLOGY | Facility: CLINIC | Age: 85
DRG: 682 | End: 2020-07-24
Payer: MEDICARE

## 2020-07-24 ENCOUNTER — ANESTHESIA EVENT (OUTPATIENT)
Dept: GASTROENTEROLOGY | Facility: CLINIC | Age: 85
DRG: 682 | End: 2020-07-24
Payer: MEDICARE

## 2020-07-24 LAB
ANION GAP SERPL CALCULATED.3IONS-SCNC: 7 MMOL/L (ref 3–14)
BUN SERPL-MCNC: 25 MG/DL (ref 7–30)
CALCIUM SERPL-MCNC: 9.1 MG/DL (ref 8.5–10.1)
CHLORIDE SERPL-SCNC: 113 MMOL/L (ref 94–109)
CO2 SERPL-SCNC: 17 MMOL/L (ref 20–32)
COLONOSCOPY: NORMAL
CREAT SERPL-MCNC: 0.97 MG/DL (ref 0.52–1.04)
ERYTHROCYTE [DISTWIDTH] IN BLOOD BY AUTOMATED COUNT: 16 % (ref 10–15)
GFR SERPL CREATININE-BSD FRML MDRD: 54 ML/MIN/{1.73_M2}
GLUCOSE SERPL-MCNC: 80 MG/DL (ref 70–99)
HCT VFR BLD AUTO: 26.1 % (ref 35–47)
HGB BLD-MCNC: 8.3 G/DL (ref 11.7–15.7)
MCH RBC QN AUTO: 29.5 PG (ref 26.5–33)
MCHC RBC AUTO-ENTMCNC: 31.8 G/DL (ref 31.5–36.5)
MCV RBC AUTO: 93 FL (ref 78–100)
PLATELET # BLD AUTO: 399 10E9/L (ref 150–450)
POTASSIUM SERPL-SCNC: 4.1 MMOL/L (ref 3.4–5.3)
RBC # BLD AUTO: 2.81 10E12/L (ref 3.8–5.2)
SODIUM SERPL-SCNC: 137 MMOL/L (ref 133–144)
UPPER GI ENDOSCOPY: NORMAL
WBC # BLD AUTO: 5.1 10E9/L (ref 4–11)

## 2020-07-24 PROCEDURE — 85027 COMPLETE CBC AUTOMATED: CPT | Performed by: INTERNAL MEDICINE

## 2020-07-24 PROCEDURE — 99232 SBSQ HOSP IP/OBS MODERATE 35: CPT | Performed by: INTERNAL MEDICINE

## 2020-07-24 PROCEDURE — 88341 IMHCHEM/IMCYTCHM EA ADD ANTB: CPT | Mod: 26 | Performed by: INTERNAL MEDICINE

## 2020-07-24 PROCEDURE — 88305 TISSUE EXAM BY PATHOLOGIST: CPT | Mod: 26,76 | Performed by: INTERNAL MEDICINE

## 2020-07-24 PROCEDURE — 25000128 H RX IP 250 OP 636: Performed by: NURSE ANESTHETIST, CERTIFIED REGISTERED

## 2020-07-24 PROCEDURE — 88342 IMHCHEM/IMCYTCHM 1ST ANTB: CPT | Performed by: INTERNAL MEDICINE

## 2020-07-24 PROCEDURE — 25000132 ZZH RX MED GY IP 250 OP 250 PS 637: Mod: GY | Performed by: INTERNAL MEDICINE

## 2020-07-24 PROCEDURE — 36415 COLL VENOUS BLD VENIPUNCTURE: CPT | Performed by: INTERNAL MEDICINE

## 2020-07-24 PROCEDURE — 25800030 ZZH RX IP 258 OP 636: Performed by: NURSE ANESTHETIST, CERTIFIED REGISTERED

## 2020-07-24 PROCEDURE — 88305 TISSUE EXAM BY PATHOLOGIST: CPT | Performed by: INTERNAL MEDICINE

## 2020-07-24 PROCEDURE — 0DBL8ZZ EXCISION OF TRANSVERSE COLON, VIA NATURAL OR ARTIFICIAL OPENING ENDOSCOPIC: ICD-10-PCS | Performed by: INTERNAL MEDICINE

## 2020-07-24 PROCEDURE — 43239 EGD BIOPSY SINGLE/MULTIPLE: CPT | Performed by: INTERNAL MEDICINE

## 2020-07-24 PROCEDURE — 37000008 ZZH ANESTHESIA TECHNICAL FEE, 1ST 30 MIN: Performed by: INTERNAL MEDICINE

## 2020-07-24 PROCEDURE — 12000000 ZZH R&B MED SURG/OB

## 2020-07-24 PROCEDURE — 25000125 ZZHC RX 250: Performed by: NURSE ANESTHETIST, CERTIFIED REGISTERED

## 2020-07-24 PROCEDURE — 0DB68ZX EXCISION OF STOMACH, VIA NATURAL OR ARTIFICIAL OPENING ENDOSCOPIC, DIAGNOSTIC: ICD-10-PCS | Performed by: INTERNAL MEDICINE

## 2020-07-24 PROCEDURE — 88342 IMHCHEM/IMCYTCHM 1ST ANTB: CPT | Mod: 26 | Performed by: INTERNAL MEDICINE

## 2020-07-24 PROCEDURE — 45385 COLONOSCOPY W/LESION REMOVAL: CPT | Performed by: INTERNAL MEDICINE

## 2020-07-24 PROCEDURE — 88341 IMHCHEM/IMCYTCHM EA ADD ANTB: CPT | Performed by: INTERNAL MEDICINE

## 2020-07-24 PROCEDURE — 37000009 ZZH ANESTHESIA TECHNICAL FEE, EACH ADDTL 15 MIN: Performed by: INTERNAL MEDICINE

## 2020-07-24 PROCEDURE — 0DB98ZX EXCISION OF DUODENUM, VIA NATURAL OR ARTIFICIAL OPENING ENDOSCOPIC, DIAGNOSTIC: ICD-10-PCS | Performed by: INTERNAL MEDICINE

## 2020-07-24 PROCEDURE — 40000010 ZZH STATISTIC ANES STAT CODE-CRNA PER MINUTE: Performed by: INTERNAL MEDICINE

## 2020-07-24 PROCEDURE — 80048 BASIC METABOLIC PNL TOTAL CA: CPT | Performed by: INTERNAL MEDICINE

## 2020-07-24 RX ORDER — LISINOPRIL 20 MG/1
20 TABLET ORAL DAILY
DISCHARGE
Start: 2020-07-25 | End: 2020-08-24

## 2020-07-24 RX ORDER — PANTOPRAZOLE SODIUM 40 MG/1
40 TABLET, DELAYED RELEASE ORAL
Status: DISCONTINUED | OUTPATIENT
Start: 2020-07-24 | End: 2020-07-31 | Stop reason: HOSPADM

## 2020-07-24 RX ORDER — FLUMAZENIL 0.1 MG/ML
0.2 INJECTION, SOLUTION INTRAVENOUS
Status: DISCONTINUED | OUTPATIENT
Start: 2020-07-24 | End: 2020-07-24

## 2020-07-24 RX ORDER — SODIUM CHLORIDE, SODIUM LACTATE, POTASSIUM CHLORIDE, CALCIUM CHLORIDE 600; 310; 30; 20 MG/100ML; MG/100ML; MG/100ML; MG/100ML
INJECTION, SOLUTION INTRAVENOUS CONTINUOUS PRN
Status: DISCONTINUED | OUTPATIENT
Start: 2020-07-24 | End: 2020-07-24

## 2020-07-24 RX ORDER — NALOXONE HYDROCHLORIDE 0.4 MG/ML
.1-.4 INJECTION, SOLUTION INTRAMUSCULAR; INTRAVENOUS; SUBCUTANEOUS
Status: DISCONTINUED | OUTPATIENT
Start: 2020-07-24 | End: 2020-07-24

## 2020-07-24 RX ORDER — PANTOPRAZOLE SODIUM 40 MG/1
40 TABLET, DELAYED RELEASE ORAL
DISCHARGE
Start: 2020-07-24 | End: 2020-09-15

## 2020-07-24 RX ORDER — EPHEDRINE SULFATE 50 MG/ML
INJECTION, SOLUTION INTRAMUSCULAR; INTRAVENOUS; SUBCUTANEOUS PRN
Status: DISCONTINUED | OUTPATIENT
Start: 2020-07-24 | End: 2020-07-24

## 2020-07-24 RX ORDER — PROPOFOL 10 MG/ML
INJECTION, EMULSION INTRAVENOUS PRN
Status: DISCONTINUED | OUTPATIENT
Start: 2020-07-24 | End: 2020-07-24

## 2020-07-24 RX ORDER — PROPOFOL 10 MG/ML
INJECTION, EMULSION INTRAVENOUS CONTINUOUS PRN
Status: DISCONTINUED | OUTPATIENT
Start: 2020-07-24 | End: 2020-07-24

## 2020-07-24 RX ORDER — ONDANSETRON 2 MG/ML
INJECTION INTRAMUSCULAR; INTRAVENOUS PRN
Status: DISCONTINUED | OUTPATIENT
Start: 2020-07-24 | End: 2020-07-24

## 2020-07-24 RX ADMIN — PROPOFOL 10 MG: 10 INJECTION, EMULSION INTRAVENOUS at 10:23

## 2020-07-24 RX ADMIN — HYDRALAZINE HYDROCHLORIDE 50 MG: 50 TABLET, FILM COATED ORAL at 08:15

## 2020-07-24 RX ADMIN — Medication 5 MG: at 10:26

## 2020-07-24 RX ADMIN — Medication 5 MG: at 10:22

## 2020-07-24 RX ADMIN — SODIUM CHLORIDE, POTASSIUM CHLORIDE, SODIUM LACTATE AND CALCIUM CHLORIDE: 600; 310; 30; 20 INJECTION, SOLUTION INTRAVENOUS at 10:00

## 2020-07-24 RX ADMIN — DEXMEDETOMIDINE HYDROCHLORIDE 4 MCG: 100 INJECTION, SOLUTION INTRAVENOUS at 10:05

## 2020-07-24 RX ADMIN — PROPOFOL 20 MG: 10 INJECTION, EMULSION INTRAVENOUS at 10:06

## 2020-07-24 RX ADMIN — APIXABAN 5 MG: 5 TABLET, FILM COATED ORAL at 08:15

## 2020-07-24 RX ADMIN — PANTOPRAZOLE SODIUM 40 MG: 40 TABLET, DELAYED RELEASE ORAL at 15:41

## 2020-07-24 RX ADMIN — ALLOPURINOL 100 MG: 100 TABLET ORAL at 08:15

## 2020-07-24 RX ADMIN — ACETAMINOPHEN 1000 MG: 500 TABLET, FILM COATED ORAL at 20:10

## 2020-07-24 RX ADMIN — ONDANSETRON 4 MG: 2 INJECTION INTRAMUSCULAR; INTRAVENOUS at 10:21

## 2020-07-24 RX ADMIN — APIXABAN 5 MG: 5 TABLET, FILM COATED ORAL at 20:10

## 2020-07-24 RX ADMIN — LEVOTHYROXINE SODIUM 25 MCG: 25 TABLET ORAL at 08:16

## 2020-07-24 RX ADMIN — Medication 5 MG: at 10:31

## 2020-07-24 RX ADMIN — ACETAMINOPHEN 1000 MG: 500 TABLET, FILM COATED ORAL at 15:39

## 2020-07-24 RX ADMIN — HYDRALAZINE HYDROCHLORIDE 50 MG: 50 TABLET, FILM COATED ORAL at 20:10

## 2020-07-24 RX ADMIN — DEXMEDETOMIDINE HYDROCHLORIDE 4 MCG: 100 INJECTION, SOLUTION INTRAVENOUS at 10:21

## 2020-07-24 RX ADMIN — DILTIAZEM HYDROCHLORIDE 120 MG: 120 CAPSULE, COATED, EXTENDED RELEASE ORAL at 08:15

## 2020-07-24 RX ADMIN — METOPROLOL SUCCINATE 100 MG: 100 TABLET, EXTENDED RELEASE ORAL at 08:16

## 2020-07-24 RX ADMIN — LISINOPRIL 20 MG: 20 TABLET ORAL at 08:16

## 2020-07-24 RX ADMIN — PROPOFOL 150 MCG/KG/MIN: 10 INJECTION, EMULSION INTRAVENOUS at 10:03

## 2020-07-24 RX ADMIN — ACETAMINOPHEN 1000 MG: 500 TABLET, FILM COATED ORAL at 08:16

## 2020-07-24 RX ADMIN — HYDRALAZINE HYDROCHLORIDE 50 MG: 50 TABLET, FILM COATED ORAL at 15:39

## 2020-07-24 ASSESSMENT — ACTIVITIES OF DAILY LIVING (ADL)
FALL_HISTORY_WITHIN_LAST_SIX_MONTHS: YES
WHICH_OF_THE_ABOVE_FUNCTIONAL_RISKS_HAD_A_RECENT_ONSET_OR_CHANGE?: AMBULATION;TRANSFERRING;BATHING
SWALLOWING: 0-->SWALLOWS FOODS/LIQUIDS WITHOUT DIFFICULTY
ADLS_ACUITY_SCORE: 26
RETIRED_COMMUNICATION: 0-->UNDERSTANDS/COMMUNICATES WITHOUT DIFFICULTY
AMBULATION: 1-->ASSISTIVE EQUIPMENT
ADLS_ACUITY_SCORE: 27
ADLS_ACUITY_SCORE: 26
TRANSFERRING: 1-->ASSISTIVE EQUIPMENT
COGNITION: 0 - NO COGNITION ISSUES REPORTED
DRESS: 2-->ASSISTIVE PERSON
TOILETING: 3-->ASSISTIVE EQUIPMENT AND PERSON
BATHING: 3-->ASSISTIVE EQUIPMENT AND PERSON
ADLS_ACUITY_SCORE: 27
ADLS_ACUITY_SCORE: 26
RETIRED_EATING: 0-->INDEPENDENT
NUMBER_OF_TIMES_PATIENT_HAS_FALLEN_WITHIN_LAST_SIX_MONTHS: 1
ADLS_ACUITY_SCORE: 27

## 2020-07-24 ASSESSMENT — LIFESTYLE VARIABLES: TOBACCO_USE: 1

## 2020-07-24 ASSESSMENT — MIFFLIN-ST. JEOR: SCORE: 1143.08

## 2020-07-24 ASSESSMENT — ENCOUNTER SYMPTOMS: DYSRHYTHMIAS: 1

## 2020-07-24 NOTE — ANESTHESIA CARE TRANSFER NOTE
Patient: Ghislaine Walls    Procedure(s):  COLONOSCOPY, FLEXIBLE, WITH LESION REMOVAL USING SNARE  ESOPHAGOGASTRODUODENOSCOPY, WITH BIOPSY    Diagnosis: Anemia [D64.9]  Diagnosis Additional Information: No value filed.    Anesthesia Type:   MAC     Note:  Airway :Face Mask  Patient transferred to:PACU  Handoff Report: Identifed the Patient, Identified the Reponsible Provider, Reviewed the pertinent medical history, Discussed the surgical course, Reviewed Intra-OP anesthesia mangement and issues during anesthesia, Set expectations for post-procedure period and Allowed opportunity for questions and acknowledgement of understanding      Vitals: (Last set prior to Anesthesia Care Transfer)    CRNA VITALS  7/24/2020 1021 - 7/24/2020 1058      7/24/2020             NIBP:  96/43    NIBP Mean:  65    Ht Rate:  58    SpO2:  100 %                Electronically Signed By: DHAVAL Barrera CRNA  July 24, 2020  10:58 AM

## 2020-07-24 NOTE — ANESTHESIA POSTPROCEDURE EVALUATION
Patient: Ghislaine Walls    Procedure(s):  COLONOSCOPY, FLEXIBLE, WITH LESION REMOVAL USING SNARE  ESOPHAGOGASTRODUODENOSCOPY, WITH BIOPSY    Diagnosis:Anemia [D64.9]  Diagnosis Additional Information: No value filed.    Anesthesia Type:  MAC    Note:  Anesthesia Post Evaluation    Patient location during evaluation: Phase 2 and Endoscopy Recovery  Patient participation: Able to fully participate in evaluation  Level of consciousness: awake and alert  Pain management: adequate  Airway patency: patent  Cardiovascular status: acceptable  Respiratory status: acceptable  Hydration status: acceptable  PONV: none             Last vitals:  Vitals:    07/24/20 0816 07/24/20 0915 07/24/20 1100   BP: (!) 158/62 (!) 162/72 104/56   Pulse:      Resp: 16 16 18   Temp: 36.8  C (98.2  F)     SpO2: 96% 99% 100%         Electronically Signed By: Kane Shirley MD  July 24, 2020  12:54 PM

## 2020-07-24 NOTE — PROGRESS NOTES
DATE & TIME: 7/23 0700-1930    Cognitive Concerns/ Orientation: A&OX4   BEHAVIOR & AGGRESSION TOOL COLOR: Green   CIWA SCORE: N/A   ABNL VS/O2: BP elevated on and off. HR stable. On lots of BP meds.   MOBILITY: T&R q 2 hrs.   PAIN MANAGMENT: Scheduled Tylenol   DIET: NPO. Been drinking the bowel prep  BOWEL/BLADDER: Inc of both. Lots of watery stools (light brown)..   ABNL LAB/BG: Hgb 6.7. Got 1 unit of PRBC. Tolerated very well.    DRAIN/DEVICES: None.   TELEMETRY RHYTHM: N/a   TESTS/PROCEDURES: Colonoscopy canceled today for still having brownish watery stools. Drinking another bowel prep now. Had a total of 6 water stools needing the whole bed change. Stool watery light brown in color    D/C DAY/GOALS/PLACE: Already accepted at [facility]  OTHER IMPORTANT INFO: DEBBIE STONE/RN ROUNDING SIGNED OFF D/E SHIFT: yes  COMMIT TO SIT DONE AND SIGNED OFF yes

## 2020-07-24 NOTE — PROCEDURES
INPATIENT PRE-PROCEDURE NOTE    CHIEF COMPLAINT / REASON FOR PROCEDURE:  anemia    History and Physical Reviewed: on chart-<30 days old; updated within 7 days.    PRE-SEDATION ASSESSMENT:   Lung Exam: normal  Heart Exam: normal  Mallampati Airway Classification: Class I (visualization of the soft palate, fauces, uvula, anterior and posterior pillars)  Previous reaction to anesthesia/sedation: NO  Sedation plan based on assessment:Deep sedation    Comment(s):      ASA Classification: 2 - Mild systemic disease      IMPRESSION:  Anemia    Plan: egd and colonoscopy      S.KHANH Bailon MD  [facility]  Office: [phone]  Pager:  [phone] 8- 5pm on Monday-Thursday, 8-4pm Friday

## 2020-07-24 NOTE — PROGRESS NOTES
[facility]    Hospitalist Progress Note    Date of Service (when I saw the patient): 07/24/2020    Assessment & Plan   Ghislaine Walls is a 84 year old female with hx of paroxysmal atrial fibrillation on anticoagulation with apixaban, HTN, CKD, and chronic pain syndrome who was admitted on 7/21/2020 for evaluation of generalized weakness and fatigue    Generalized weakness, multifactorial   Iron deficiency anemia, suspect nutritional  Severe protein-calorie malnutrition in context of chronic illness  Presented with generalized weakness and increased fatigue, worse 2 days PTA. Likely due to iron deficiency anemia and sub-optimal nutrition. Patient's daughter reports that she eats and drinks very little. Evidence of dehydration present on arrival with mild DRAGAN. Hgb 7.8 on arrival from baseline ~9, and gradually decreased to 6.7 with no s/sx of active bleeding. FOBT on arrival was negative. Iron studies consistent with iron deficiency anemia (ferritin normal, but iron only 9 and tsat very low: 5%). [facility] was consulted - patient underwent EGD and colonoscopy which respectively showed a moderate hiatal hernia, duodenal erosions, and a medium polyp that was resected. No source of active bleeding was identified  Other significant work-up:     TSH normal, B12/folate normal    UA on showed many bacteria, but sample was contaminated    TTE (7/22) showed LVEF 55-60% with moderate diastolic dysfunction not significantly changed from prior study    No significant events on telemetry  - Patient received 1u prbcs on 7/23 for Hgb 6.7, and Hgb has since been stable in 8 range  - Nutrition consult pending  - PT/OT recommending TCU    Hiatal hernia  Noted on EGD (7/24)  - Start pantoprazole 40 mg PO daily    Shoulder bursitis  History of gout  Polyarthropathy  * Follows with Dr. Jerry STRICKLAND for subacromial bursa injection. Has had long-standing history of polyarthropathy involving shoulders, knees, and wrists, but  does not appear to have a rheumatological work-up.   * PTA on APAP 1000 mg TID and allopurinol 100 mg daily  - Was due for shoulder injection on 7/22; this has been deferred while the patient was hospitalized, and will need to be rescheduled   - Continues on PTA APAP 1000 mg TID and allopurinol  - Consider outpatient Rheumatology referral    Paroxysmal atrial fibrillation  [PTA: diltiazem  mg daily, metoprolol  mg daily, apixaban 5 mg BID]  - Continues on PTA meds    Essential hypertension  [PTA: diltiazem  mg daily, metoprolol  mg daily, hydralazine 50 mg TID, lisinopril 20 mg daily]  - Continues on PTA meds    CKD stage III  Creatinine 1.43 on arrival from recent baseline ~1.2. Suspect mild dehydration in the context of poor oral intake. She received IV fluids with improvement  - Creatinine 0.97 today, will follow    Hypothyroidism  - Continues on PTA levothyroxine    FEN: Clear liquid diet - ADAT  DVT Prophylaxis: Apixaban  Code Status: DNR/DNI    Disposition: Expected discharge to TCU tomorrow if Hgb stable, 7/25    Nola Gomes    Interval History   No events overnight. Patient offers no concerns. EGD/colonoscopy results reviewed with patient's daughter.  - Monitor overnight with repeat Hgb in AM    Nola Gomes MD    -Data reviewed today: I reviewed all new labs and imaging results over the last 24 hours. I personally reviewed no images or EKG's today.    Physical Exam   Temp: 98.2  F (36.8  C) Temp src: Oral BP: 104/56 Pulse: 70 Heart Rate: 58 Resp: 18 SpO2: 100 % O2 Device: None (Room air)    Vitals:    07/21/20 0836 07/21/20 1300 07/24/20 0915   Weight: 70.3 kg (155 lb) 61.4 kg (135 lb 4.8 oz) 70.3 kg (155 lb)     Vital Signs with Ranges  Temp:  [97.1  F (36.2  C)-98.9  F (37.2  C)] 98.2  F (36.8  C)  Pulse:  [69-72] 70  Heart Rate:  [58-80] 58  Resp:  [16-18] 18  BP: (104-170)/(51-78) 104/56  SpO2:  [96 %-100 %] 100 %  I/O last 3 completed shifts:  In: 358.33   Out: -      Constitutional: Resting comfortably, NAD  HEENT: Sclera white, MMM  Respiratory: Breathing non-labored. Lungs CTAB - no wheezes, crackles, or rhonchi  Cardiovascular: Heart RRR, no m/r/g. No pedal edema  GI: +BS, abd soft/NT  Skin/Integument: No rash  Musculoskeletal: Normal muscle bulk and tone  Neuro: Alert and appropriate, DIAS  Psych: Flat affect    Medications       acetaminophen  1,000 mg Oral TID     allopurinol  100 mg Oral Daily     apixaban ANTICOAGULANT  5 mg Oral BID     diltiazem ER COATED BEADS  120 mg Oral Daily     hydrALAZINE  50 mg Oral TID     levothyroxine  25 mcg Oral Daily     lisinopril  20 mg Oral Daily     magnesium citrate  296 mL Oral Once     metoprolol succinate ER  100 mg Oral Daily     Data   Recent Labs   Lab 07/24/20  0849 07/23/20  2114 07/23/20  1224 07/23/20  0950 07/22/20  1031 07/21/20  2038 07/21/20  0849   WBC 5.1  --   --   --  5.8  --  4.4   HGB 8.3* 8.4* 6.7*  --  7.2*  --  7.8*   MCV 93  --   --   --  94  --  94     --   --   --  434  --  466*   INR  --   --   --   --   --   --  2.07*     --   --  138 136  --  135   POTASSIUM 4.1  --   --  4.1 4.4  --  4.4   CHLORIDE 113*  --   --  113* 113*  --  112*   CO2 17*  --   --  16* 15*  --  14*   BUN 25  --   --  31* 33*  --  39*   CR 0.97  --   --  1.06* 1.14*  --  1.43*   ANIONGAP 7  --   --  9 8  --  9   ALIVIA 9.1  --   --  8.7 8.9  --  9.3   GLC 80  --   --  92 122*  --  91   ALBUMIN  --   --   --   --   --  2.0*  --    PROTTOTAL  --   --   --   --   --  6.4*  --    BILITOTAL  --   --   --   --   --  0.4  --    ALKPHOS  --   --   --   --   --  69  --    ALT  --   --   --   --   --  8  --    AST  --   --   --   --   --  18  --    TROPI  --   --   --   --   --   --  <0.015       No results found for this or any previous visit (from the past 24 hour(s)).

## 2020-07-24 NOTE — PLAN OF CARE
Cognitive Concerns/ Orientation : AOx4, [place]   BEHAVIOR & AGGRESSION TOOL COLOR: green  CIWA SCORE: NA   ABNL VS/O2: VSS on r/a, ex HTN.  PTA meds given.  No qualification for PRN BP meds.   MOBILITY: assist x 2 with walker and gait belt.   PAIN MANAGMENT: tylenol is scheduled, denies pain otherwise.  DIET: 2g Na   BOWEL/BLADDER: incontinent of both.     ABNL LAB/BG: hgb 8.3 this AM. No blood in stool/active bleeding noted this shift.    DRAIN/DEVICES: PIV in right AC.   TELEMETRY RHYTHM: NA  SKIN: barrier cream to labia and rectum with good response.  Redness decreased.     TESTS/PROCEDURES: colonoscopy done.  No active bleed, biopsies taken/sent, polyp removed.    D/C DAY/GOALS/PLACE: to [facility] tomorrow if hgb stable.      SW reports transportation has been establlished for 11am.

## 2020-07-24 NOTE — PROGRESS NOTES
D: KATHI following for discharge planning.   I: Pt accepted at [facility] for Saturday. [facility] set for 1100. Pt updated and in agreement. She is aware she will be billed for the cost of the ride. Pt reports her daughter is aware of the plan.   P: KATHI following.     DELORES Yarbrough, LGSW  [phone]  [facility]

## 2020-07-24 NOTE — CONSULTS
"CLINICAL NUTRITION SERVICES BRIEF NOTE  Refer to RD note dated 7/23 for full assessment    New Findings  - Spoke with daughter Krupa over the phone this morning, who expresses great concern with Ghislaine's nutritional status.     Nutrition History Obtained:   - Patient lives at [facility] where she receives some services and up to 1 meal delivered to her room daily.   - Patient has been eating less and less over the past several months, as far as Krupa can tell. When she calls in the evenings to check on her and ask if she's eaten, Ghislaine will tell her \"no\", she hasn't. Krupa will tell her to eat at least a PB sandwich, which Ghislaine is receptive to. On average Krupa suspects she eats 1 or fewer meals daily.   - She likes vanilla Ensure and drinks maybe 1/2-1 full drink daily, likes Kind nut bars, likes bananas.   - Complains that fruit and vegetables will cause GI distress (gas and diarrhea), so will eat few of these foods. Krupa had purchased applesauce in individual containers, however Ghislaine did not eat any of these.     - Spoke to patient just after procedure - she is very hungry now, thinks a sausage pizza with extra cheese sounds really good. OK with vanilla milkshakes between meals.     Intervention  - Will plan the following interventions once diet is advanced:   Room service w/ ordering assistance  High protein supplements BID between meals (Plus2 shake BID)   Will restrict to a 2g Na diet, per Krupa's request given medical hx.     - Recommended that Krupa get in touch with RD at the [facility], in order to set up more consistent meal/snacking reinforcement at her facility.     Nithya White, MAZIN, LD  Pager: [phone]  Weekend Pager: [phone]    "

## 2020-07-24 NOTE — ANESTHESIA PREPROCEDURE EVALUATION
Anesthesia Pre-Procedure Evaluation    Patient: Ghislaine Walls   MRN: [id number] : 1935          Preoperative Diagnosis: Anemia [D64.9]    Procedure(s):  COLONOSCOPY  ESOPHAGOGASTRODUODENOSCOPY (EGD)    Past Medical History:   Diagnosis Date     Atrial fibrillation (H) new 10/19     [place] (hard of hearing)     wears hearing aids     Hyperlipidaemia      Hyperlipidaemia LDL goal < 130      Hypertension      Hypothyroid      PAD (peripheral artery disease) (H)      Renal insufficiency, mild      Uncontrolled hypertension 10/14/2019     Past Surgical History:   Procedure Laterality Date     APPENDECTOMY       CATARACT IOL, RT/LT      bilateral (laser - 2013)     DENTAL SURGERY      wisdom     EYE SURGERY      Muscle release     TUBAL LIGATION         Anesthesia Evaluation     .             ROS/MED HX    ENT/Pulmonary:     (+)tobacco use, Past use , . .    Neurologic: Comment: Cognitive impairment    (+)CVA date:  other neuro hearing deficit    Cardiovascular:     (+) hypertension-Peripheral Vascular Disease---. : . . . :. dysrhythmias a-fib, .       METS/Exercise Tolerance:     Hematologic:     (+) Anemia (transfused yesterday afternoon for 6.7, repeat 8.4), -      Musculoskeletal:   (+) arthritis,  other musculoskeletal- weakness, fatigue       GI/Hepatic:         Renal/Genitourinary:     (+) chronic renal disease, type: CRI, Other Renal/ Genitourinary, urge incontinence      Endo:     (+) thyroid problem hypothyroidism, .      Psychiatric:         Infectious Disease:         Malignancy:         Other:                          Physical Exam  Normal systems: cardiovascular, pulmonary and dental    Airway   Mallampati: II  TM distance: >3 FB  Neck ROM: full    Dental     Cardiovascular       Pulmonary             Lab Results   Component Value Date    WBC 5.1 2020    HGB 8.3 (L) 2020    HCT 26.1 (L) 2020     2020    CRP 65.9 (H) 10/02/2019      "07/24/2020    POTASSIUM 4.1 07/24/2020    CHLORIDE 113 (H) 07/24/2020    CO2 PENDING 07/24/2020    BUN PENDING 07/24/2020    CR PENDING 07/24/2020    GLC PENDING 07/24/2020    ALIVIA PENDING 07/24/2020    PHOS 3.7 08/29/2019    MAG 1.7 10/04/2019    ALBUMIN 2.0 (L) 07/21/2020    PROTTOTAL 6.4 (L) 07/21/2020    ALT 8 07/21/2020    AST 18 07/21/2020    ALKPHOS 69 07/21/2020    BILITOTAL 0.4 07/21/2020    INR 2.07 (H) 07/21/2020    TSH 1.86 07/21/2020    T4 1.55 (H) 11/04/2019       Preop Vitals  BP Readings from Last 3 Encounters:   07/24/20 (!) 162/72   03/31/20 (!) 170/70   03/02/20 (!) 164/68    Pulse Readings from Last 3 Encounters:   07/23/20 70   03/31/20 73   03/02/20 64      Resp Readings from Last 3 Encounters:   07/24/20 16   11/04/19 16   10/22/19 18    SpO2 Readings from Last 3 Encounters:   07/24/20 99%   03/02/20 98%   02/10/20 98%      Temp Readings from Last 1 Encounters:   07/24/20 36.8  C (98.2  F) (Oral)    Ht Readings from Last 1 Encounters:   07/21/20 1.626 m (5' 4\")      Wt Readings from Last 1 Encounters:   07/24/20 70.3 kg (155 lb)    Estimated body mass index is 26.61 kg/m  as calculated from the following:    Height as of this encounter: 1.626 m (5' 4\").    Weight as of this encounter: 70.3 kg (155 lb).       Anesthesia Plan      History & Physical Review  History and physical reviewed and following examination; no interval change.    ASA Status:  3 .    NPO Status:  > 6 hours    Plan for MAC (with GA backup) with Intravenous induction. Maintenance will be TIVA.    PONV prophylaxis:  Ondansetron (or other 5HT-3)         Postoperative Care  Postoperative pain management:  Oral pain medications.      Consents  Anesthetic plan, risks, benefits and alternatives discussed with:  Patient (Including possibility of intraoperative awareness or recall.)..                 Kane Shirley MD  "

## 2020-07-24 NOTE — PLAN OF CARE
DATE & TIME: 7/23-24/2020   Overnight    Cognitive Concerns/ Orientation : A+O x 4, but can be slow to answer and [place]   BEHAVIOR & AGGRESSION TOOL COLOR: green  CIWA SCORE: NA   ABNL VS/O2: patient is having some higher blood pressures this morning.   MOBILITY: assist x 2 with walker and gait belt. Has not gotten OOB overnight.   PAIN MANAGMENT: tylenol is scheduled, and she hasn't asked for anything more.   DIET: NPO except for meds and bowel prep.   BOWEL/BLADDER: incontinent of both. Wearing brief. Has had a couple of Bms overnight.   ABNL LAB/BG: hgb was 6.8 yesterday. One unit was given, and hgb came up to 8.4.  DRAIN/DEVICES: PIV in right AC.   TELEMETRY RHYTHM: NA  SKIN: patient is slightly red around her labia and anus, but that could be from her brief being wet and not wanting to tell us.   TESTS/PROCEDURES: has colonoscopy today. Tried yesterday, but they said her bowel still wasn't cleared out enough.   D/C DAY/GOALS/PLACE: should go over to [place] when medically cleared. Bed has already been reserved for her.   OTHER IMPORTANT INFO: patient does complain of her sore shoulders and knees. The are constant, but she doesn't want to take any opioid for them. Have tried heat packs.  MD/RN ROUNDING SIGNED OFF D/E SHIFT: NA  COMMIT TO SIT DONE AND SIGNED OFF yes

## 2020-07-24 NOTE — PROCEDURES
GI:  EGD:  Few duodenal erosions and medium sized HH.  Otherwise negative:  Gastric and duodenal bxs sent.  No obvious source for anemia.    Colon:  Single medium polyp transverse colon removed.  No obvious bleeding source in colon.    See Provation procedure note.   Rik Bailon MD  [phone]  After 5 pm or on weekends  [phone]

## 2020-07-25 LAB
ALBUMIN SERPL-MCNC: 1.7 G/DL (ref 3.4–5)
ALP SERPL-CCNC: 90 U/L (ref 40–150)
ALT SERPL W P-5'-P-CCNC: 10 U/L (ref 0–50)
AST SERPL W P-5'-P-CCNC: 25 U/L (ref 0–45)
BASOPHILS # BLD AUTO: 0.1 10E9/L (ref 0–0.2)
BASOPHILS NFR BLD AUTO: 1 %
BILIRUB SERPL-MCNC: 0.4 MG/DL (ref 0.2–1.3)
BUN SERPL-MCNC: 31 MG/DL (ref 7–30)
CALCIUM SERPL-MCNC: 8.9 MG/DL (ref 8.5–10.1)
CHLORIDE SERPL-SCNC: 110 MMOL/L (ref 94–109)
CO2 SERPL-SCNC: 17 MMOL/L (ref 20–32)
CREAT SERPL-MCNC: 1.16 MG/DL (ref 0.52–1.04)
D DIMER PPP FEU-MCNC: 5 UG/ML FEU (ref 0–0.5)
DIFFERENTIAL METHOD BLD: ABNORMAL
EOSINOPHIL # BLD AUTO: 0.1 10E9/L (ref 0–0.7)
EOSINOPHIL NFR BLD AUTO: 1 %
ERYTHROCYTE [DISTWIDTH] IN BLOOD BY AUTOMATED COUNT: 16.2 % (ref 10–15)
GFR SERPL CREATININE-BSD FRML MDRD: 43 ML/MIN/{1.73_M2}
GLUCOSE SERPL-MCNC: 121 MG/DL (ref 70–99)
HCT VFR BLD AUTO: 24.1 % (ref 35–47)
HGB BLD-MCNC: 7.7 G/DL (ref 11.7–15.7)
LDH SERPL L TO P-CCNC: 152 U/L (ref 81–234)
LYMPHOCYTES # BLD AUTO: 1 10E9/L (ref 0.8–5.3)
LYMPHOCYTES NFR BLD AUTO: 16 %
MCH RBC QN AUTO: 30 PG (ref 26.5–33)
MCHC RBC AUTO-ENTMCNC: 32.2 G/DL (ref 31.5–36.5)
MCV RBC AUTO: 94 FL (ref 78–100)
MONOCYTES # BLD AUTO: 0.4 10E9/L (ref 0–1.3)
MONOCYTES NFR BLD AUTO: 6 %
NEUTROPHILS # BLD AUTO: 4.7 10E9/L (ref 1.6–8.3)
NEUTROPHILS NFR BLD AUTO: 76 %
NRBC # BLD AUTO: 0.1 10*3/UL
NRBC BLD AUTO-RTO: 2 /100
PLATELET # BLD AUTO: 387 10E9/L (ref 150–450)
PLATELET # BLD EST: ABNORMAL 10*3/UL
POTASSIUM SERPL-SCNC: 4.2 MMOL/L (ref 3.4–5.3)
PROT SERPL-MCNC: 6.2 G/DL (ref 6.8–8.8)
RBC # BLD AUTO: 2.57 10E12/L (ref 3.8–5.2)
RBC MORPH BLD: ABNORMAL
RETICS # AUTO: 82.2 10E9/L (ref 25–95)
RETICS/RBC NFR AUTO: 3.2 % (ref 0.5–2)
SODIUM SERPL-SCNC: 138 MMOL/L (ref 133–144)
WBC # BLD AUTO: 6.2 10E9/L (ref 4–11)

## 2020-07-25 PROCEDURE — 83010 ASSAY OF HAPTOGLOBIN QUANT: CPT | Performed by: INTERNAL MEDICINE

## 2020-07-25 PROCEDURE — 12000000 ZZH R&B MED SURG/OB

## 2020-07-25 PROCEDURE — 36415 COLL VENOUS BLD VENIPUNCTURE: CPT | Performed by: INTERNAL MEDICINE

## 2020-07-25 PROCEDURE — 85060 BLOOD SMEAR INTERPRETATION: CPT | Performed by: INTERNAL MEDICINE

## 2020-07-25 PROCEDURE — 40000847 ZZHCL STATISTIC MORPHOLOGY W/INTERP HISTOLOGY TC 85060: Performed by: INTERNAL MEDICINE

## 2020-07-25 PROCEDURE — 25000128 H RX IP 250 OP 636: Performed by: INTERNAL MEDICINE

## 2020-07-25 PROCEDURE — 85025 COMPLETE CBC W/AUTO DIFF WBC: CPT | Performed by: INTERNAL MEDICINE

## 2020-07-25 PROCEDURE — 82668 ASSAY OF ERYTHROPOIETIN: CPT | Performed by: INTERNAL MEDICINE

## 2020-07-25 PROCEDURE — 85045 AUTOMATED RETICULOCYTE COUNT: CPT | Performed by: INTERNAL MEDICINE

## 2020-07-25 PROCEDURE — 82784 ASSAY IGA/IGD/IGG/IGM EACH: CPT | Performed by: INTERNAL MEDICINE

## 2020-07-25 PROCEDURE — 99233 SBSQ HOSP IP/OBS HIGH 50: CPT | Performed by: INTERNAL MEDICINE

## 2020-07-25 PROCEDURE — 80053 COMPREHEN METABOLIC PANEL: CPT | Performed by: INTERNAL MEDICINE

## 2020-07-25 PROCEDURE — 25000132 ZZH RX MED GY IP 250 OP 250 PS 637: Mod: GY | Performed by: INTERNAL MEDICINE

## 2020-07-25 PROCEDURE — 86334 IMMUNOFIX E-PHORESIS SERUM: CPT | Performed by: INTERNAL MEDICINE

## 2020-07-25 PROCEDURE — 85379 FIBRIN DEGRADATION QUANT: CPT | Performed by: INTERNAL MEDICINE

## 2020-07-25 PROCEDURE — 83615 LACTATE (LD) (LDH) ENZYME: CPT | Performed by: INTERNAL MEDICINE

## 2020-07-25 RX ADMIN — TRAMADOL HYDROCHLORIDE 50 MG: 50 TABLET, FILM COATED ORAL at 17:12

## 2020-07-25 RX ADMIN — ACETAMINOPHEN 1000 MG: 500 TABLET, FILM COATED ORAL at 20:09

## 2020-07-25 RX ADMIN — HYDRALAZINE HYDROCHLORIDE 50 MG: 50 TABLET, FILM COATED ORAL at 08:14

## 2020-07-25 RX ADMIN — ACETAMINOPHEN 1000 MG: 500 TABLET, FILM COATED ORAL at 13:31

## 2020-07-25 RX ADMIN — LEVOTHYROXINE SODIUM 25 MCG: 25 TABLET ORAL at 08:13

## 2020-07-25 RX ADMIN — HYDRALAZINE HYDROCHLORIDE 50 MG: 50 TABLET, FILM COATED ORAL at 20:09

## 2020-07-25 RX ADMIN — APIXABAN 5 MG: 5 TABLET, FILM COATED ORAL at 08:14

## 2020-07-25 RX ADMIN — METOPROLOL SUCCINATE 100 MG: 100 TABLET, EXTENDED RELEASE ORAL at 08:14

## 2020-07-25 RX ADMIN — ALLOPURINOL 100 MG: 100 TABLET ORAL at 08:14

## 2020-07-25 RX ADMIN — ONDANSETRON 4 MG: 4 TABLET, ORALLY DISINTEGRATING ORAL at 17:12

## 2020-07-25 RX ADMIN — HYDRALAZINE HYDROCHLORIDE 50 MG: 50 TABLET, FILM COATED ORAL at 13:31

## 2020-07-25 RX ADMIN — ACETAMINOPHEN 1000 MG: 500 TABLET, FILM COATED ORAL at 08:13

## 2020-07-25 RX ADMIN — DILTIAZEM HYDROCHLORIDE 120 MG: 120 CAPSULE, COATED, EXTENDED RELEASE ORAL at 08:13

## 2020-07-25 RX ADMIN — LISINOPRIL 20 MG: 20 TABLET ORAL at 08:14

## 2020-07-25 RX ADMIN — APIXABAN 2.5 MG: 2.5 TABLET, FILM COATED ORAL at 20:09

## 2020-07-25 RX ADMIN — PANTOPRAZOLE SODIUM 40 MG: 40 TABLET, DELAYED RELEASE ORAL at 08:14

## 2020-07-25 ASSESSMENT — ACTIVITIES OF DAILY LIVING (ADL)
ADLS_ACUITY_SCORE: 28
ADLS_ACUITY_SCORE: 27
ADLS_ACUITY_SCORE: 27
ADLS_ACUITY_SCORE: 29
ADLS_ACUITY_SCORE: 28
ADLS_ACUITY_SCORE: 27

## 2020-07-25 NOTE — PROGRESS NOTES
[facility]    Hospitalist Progress Note    Date of Service (when I saw the patient): 07/25/2020    Assessment & Plan   Ghislaine Walls is a 84 year old female with hx of paroxysmal atrial fibrillation on anticoagulation with apixaban, HTN, CKD, and chronic pain syndrome who was admitted on 7/21/2020 for evaluation of generalized weakness and fatigue    Acute anemia with iron deficiency  * Hgb 7.8 on arrival from baseline ~9, and gradually decreased to 6.7 with no s/sx of active bleeding. FOBT on arrival was negative. Iron studies consistent with iron deficiency anemia (ferritin normal, but iron only 9 and tsat very low: 5%). [facility] was consulted, and patient underwent EGD and colonoscopy (7/24) which respectively showed a moderate hiatal hernia, duodenal erosions, and a medium polyp that was resected. No source of active bleeding was identified  * Received 1u prbcs on 7/23 for Hgb 6.7  - Hgb decreased to 7.7 today with no signs of active bleeding  - ?hemolysis. Hemolysis labs and peripheral smear ordered  - Decrease PTA apixaban from 5 to 2.5 mg BID in light of age and CKD  - Heme/Onc ([facility]) has been consulted  - Repeat Hgb in AM    Generalized weakness, multifactorial   Severe protein-calorie malnutrition in context of chronic illness  Presented with generalized weakness and increased fatigue, worse 2 days PTA. Likely due to anemia and sub-optimal nutrition. Patient's daughter reports that she eats and drinks very little. Evidence of dehydration present on arrival with mild DRAGAN. Other significant work-up:     TSH normal, B12/folate normal    UA on showed many bacteria, but sample was contaminated    TTE (7/22) showed LVEF 55-60% with moderate diastolic dysfunction not significantly changed from prior study    No significant events on telemetry  - Nutrition has recommended Plus2 shakes between meals  - PT/OT recommending TCU    Hiatal hernia  Noted on EGD (7/24)  - She has been started on  "pantoprazole 40 mg PO daily    Shoulder bursitis  History of gout  Polyarthropathy  * Follows with Dr. Jerry [facility] for subacromial bursa injection. Has had long-standing history of polyarthropathy involving shoulders, knees, and wrists, but does not appear to have a rheumatological work-up.   * PTA on APAP 1000 mg TID and allopurinol 100 mg daily  - Was due for shoulder injection on 7/22; this has been deferred while the patient was hospitalized, and will need to be rescheduled   - Continues on PTA APAP 1000 mg TID and allopurinol  - Consider outpatient Rheumatology referral    Paroxysmal atrial fibrillation  [PTA: diltiazem  mg daily, metoprolol  mg daily, apixaban 5 mg BID]  - PTA apixaban has been decreased as noted above  - Continues on diltiazem and metoprolol XL    Essential hypertension  [PTA: diltiazem  mg daily, metoprolol  mg daily, hydralazine 50 mg TID, lisinopril 20 mg daily]  - Continues on PTA meds    CKD stage III  Non-anion gap metabolic acidosis likely due to CKD  Creatinine 1.43 on arrival from recent baseline ~1.2. Suspect mild dehydration in the context of poor oral intake. She received IV fluids with improvement  - Creatinine 1.16, HCO3 17 today - 7/25    Hypothyroidism  - Continues on PTA levothyroxine    FEN: Regular diet with Plus2 shakes  DVT Prophylaxis: Apixaban  Code Status: DNR/DNI    Disposition: Expected discharge to TCU pending stable Hgb and anemia work-up    Nola Gomes    Interval History   No events overnight. Patient reports feeling \"tired\" but offers no other concerns. Denies cp/sob, dizziness/lightheadedness  - Hemolysis labs ordered  - Heme/Onc consult  - Decrease Eliquis dose    Nola Gomes MD    -Data reviewed today: I reviewed all new labs and imaging results over the last 24 hours. I personally reviewed no images or EKG's today.    Physical Exam   Temp: 97.9  F (36.6  C) Temp src: Oral BP: (!) 167/63   Heart Rate: 69 Resp: 16 " SpO2: 99 % O2 Device: None (Room air)    Vitals:    07/21/20 0836 07/21/20 1300 07/24/20 0915   Weight: 70.3 kg (155 lb) 61.4 kg (135 lb 4.8 oz) 70.3 kg (155 lb)     Vital Signs with Ranges  Temp:  [97.9  F (36.6  C)-98.4  F (36.9  C)] 97.9  F (36.6  C)  Heart Rate:  [58-69] 69  Resp:  [16-18] 16  BP: (104-167)/(56-71) 167/63  SpO2:  [98 %-100 %] 99 %  I/O last 3 completed shifts:  In: 1300 [P.O.:900; I.V.:400]  Out: -     Constitutional: Resting comfortably, NAD  HEENT: Sclera white, MMM  Respiratory: Breathing non-labored. Lungs CTAB - no wheezes, crackles, or rhonchi  Cardiovascular: Heart RRR, no m/r/g. No pedal edema  GI: +BS, abd soft/NT  Skin/Integument: No rash  Musculoskeletal: Normal muscle bulk and tone  Neuro: Alert and appropriate, DIAS  Psych: Flat affect    Medications       acetaminophen  1,000 mg Oral TID     allopurinol  100 mg Oral Daily     apixaban ANTICOAGULANT  5 mg Oral BID     diltiazem ER COATED BEADS  120 mg Oral Daily     hydrALAZINE  50 mg Oral TID     levothyroxine  25 mcg Oral Daily     lisinopril  20 mg Oral Daily     magnesium citrate  296 mL Oral Once     metoprolol succinate ER  100 mg Oral Daily     pantoprazole  40 mg Oral QAM AC     Data   Recent Labs   Lab 07/25/20  0554 07/24/20  0849 07/23/20  2114  07/23/20  0950 07/22/20  1031 07/21/20  2038 07/21/20  0849   WBC 6.2 5.1  --   --   --  5.8  --  4.4   HGB 7.7* 8.3* 8.4*   < >  --  7.2*  --  7.8*   MCV 94 93  --   --   --  94  --  94    399  --   --   --  434  --  466*   INR  --   --   --   --   --   --   --  2.07*    137  --   --  138 136  --  135   POTASSIUM 4.2 4.1  --   --  4.1 4.4  --  4.4   CHLORIDE 110* 113*  --   --  113* 113*  --  112*   CO2 17* 17*  --   --  16* 15*  --  14*   BUN 31* 25  --   --  31* 33*  --  39*   CR 1.16* 0.97  --   --  1.06* 1.14*  --  1.43*   ANIONGAP  --  7  --   --  9 8  --  9   ALIVIA 8.9 9.1  --   --  8.7 8.9  --  9.3   * 80  --   --  92 122*  --  91   ALBUMIN 1.7*  --   --    --   --   --  2.0*  --    PROTTOTAL 6.2*  --   --   --   --   --  6.4*  --    BILITOTAL 0.4  --   --   --   --   --  0.4  --    ALKPHOS 90  --   --   --   --   --  69  --    ALT 10  --   --   --   --   --  8  --    AST 25  --   --   --   --   --  18  --    TROPI  --   --   --   --   --   --   --  <0.015    < > = values in this interval not displayed.       No results found for this or any previous visit (from the past 24 hour(s)).

## 2020-07-25 NOTE — PROVIDER NOTIFICATION
MD Notification    Notified Person: MD    Notified Person Name: Nola Gomes    Notification Date/Time: 7/25/2020 @0740    Notification Interaction: web paged    Purpose of Notification:  FYI: Discharge ride set at 11Am    Orders Received: will have to defer discharge due to Hgb trending down.     Comments: Will update SW

## 2020-07-25 NOTE — PLAN OF CARE
DATE & TIME: 7/24/20 1900-0730   Cognitive Concerns/ Orientation : A&O x4  BEHAVIOR & AGGRESSION TOOL COLOR: Green  CIWA SCORE: NA  ABNL VS/O2: VSS on RA ex elevated BP  MOBILITY: A x2, GB/W, turn & repo PRN  PAIN MANAGMENT: Denies pain. Received scheduled Tylenol.  DIET: 2g Na  BOWEL/BLADDER: Incontinent. Purewick in place overnight  ABNL LAB/BG: AM labs: Hgb 7.7, creatinine 1.16  DRAIN/DEVICES: R arm PIV SL  TELEMETRY RHYTHM: NA  SKIN: BUE 2+ edema, BLE 2-3+ edema. Bel area blanchable, barrier cream applied   TESTS/PROCEDURES: None scheduled   D/C DAY/GOALS/PLACE: Ride set for 11AM to [facility]  OTHER IMPORTANT INFO: On [place] STONE/RN ROUNDING SIGNED OFF D/E SHIFT: Yes   COMMIT TO SIT DONE AND SIGNED OFF Yes

## 2020-07-25 NOTE — PLAN OF CARE
DATE & TIME: 7/25/20 5743-4115  Cognitive Concerns/ Orientation : A&O x4  BEHAVIOR & AGGRESSION TOOL COLOR: Green  CIWA SCORE: NA  ABNL VS/O2: VSS on RA ex elevated BP  MOBILITY: A x2, GB/W, turn & repo   PAIN MANAGMENT: Denies pain. Received scheduled Tylenol.  DIET: 2g Na  BOWEL/BLADDER: Incontinent of Stool and urine.   ABNL LAB/BG: Hgb 7.7, D-Dimer 5.0-MD notified.  DRAIN/DEVICES: R arm PIV SL  TELEMETRY RHYTHM: NA  SKIN: BUE 2+ edema, BLE 2-3+ edema. Bel area blanchable, barrier cream and powder applied  TESTS/PROCEDURES: None scheduled   D/C DAY/GOALS/PLACE: Discharged to [facility] delayed due to Hgb 7.7. No signs of active bleeding.  OTHER IMPORTANT INFO: On Eliquis. Hemo/Oncology consult pending.

## 2020-07-25 NOTE — PROGRESS NOTES
D: SW following for discharge planning. Pt not ready for discharge today.   I: KATHI updated Elenita. Ride canceled.   P: KATHI following for discharge planning.     DELORES Yarbrough, LGSW  [phone]  [facility]

## 2020-07-25 NOTE — PROGRESS NOTES
"GASTROENTEROLOGY PROGRESS NOTE     SUBJECTIVE:  She is doing well without complaint, no evidence of bleeding.    GI ROS: Denies nausea, vomiting, abdominal pain, diarrhea, constipation, melena, or rectal bleeding.     OBJECTIVE:  BP (!) 164/65 (BP Location: Left arm)   Pulse 70   Temp 99  F (37.2  C) (Oral)   Resp 16   Ht 1.626 m (5' 4\")   Wt 70.3 kg (155 lb)   SpO2 97%   BMI 26.61 kg/m    Temp (24hrs), Av.4  F (36.9  C), Min:97.9  F (36.6  C), Max:99  F (37.2  C)    Patient Vitals for the past 72 hrs:   Weight   20 0915 70.3 kg (155 lb)       Intake/Output Summary (Last 24 hours) at 2020 1655  Last data filed at 2020 1300  Gross per 24 hour   Intake 200 ml   Output --   Net 200 ml        General Appearance: alert, oriented x3, no acute distress.  Eyes: PERRL, sclera anicteric.  GI: Soft, NABS, NT/ND.    Neuro: no asterixis.      Labs:     Recent Labs   Lab Test 20  0554 20  0849 20  2114  20  1031 20  0849 20  1342 20  1327   WBC 6.2 5.1  --   --  5.8 4.4  --   --    HGB 7.7* 8.3* 8.4*   < > 7.2* 7.8*  --   --    MCV 94 93  --   --  94 94  --   --     399  --   --  434 466*  --   --    INR  --   --   --   --   --  2.07* 2.00* 3.50*    < > = values in this interval not displayed.     Recent Labs   Lab Test 20  0554 20  0849 20  0950 20  1031   POTASSIUM 4.2 4.1 4.1 4.4   CHLORIDE 110* 113* 113* 113*   CO2 17* 17* 16* 15*   BUN 31* 25 31* 33*   ANIONGAP  --  7 9 8     Recent Labs   Lab Test 20  0554 208 20  1112 10/14/19  1720 10/05/19  0559 10/01/19  0108   ALBUMIN 1.7* 2.0*  --   --  2.3*  --    BILITOTAL 0.4 0.4  --   --  0.5  --    ALT 10 8  --   --  14  --    AST 25 18  --   --  19  --    PROTEIN  --   --  30* 30*  --  10*           Assessment and Plan: 84 year old female with anemia, no overt GI bleeding.  EGD/colon unremarkable.  Hgb slightly decreased today.  If continues to decrease, can " do small bowel capsule endoscopy as inpatient on 7/27 or 7/28 pending equipment availability.  Will follow.    Ramirez Rivas MD

## 2020-07-26 LAB
ANION GAP SERPL CALCULATED.3IONS-SCNC: 6 MMOL/L (ref 3–14)
BUN SERPL-MCNC: 30 MG/DL (ref 7–30)
CALCIUM SERPL-MCNC: 8.6 MG/DL (ref 8.5–10.1)
CHLORIDE SERPL-SCNC: 111 MMOL/L (ref 94–109)
CO2 SERPL-SCNC: 18 MMOL/L (ref 20–32)
CREAT SERPL-MCNC: 1.08 MG/DL (ref 0.52–1.04)
GFR SERPL CREATININE-BSD FRML MDRD: 47 ML/MIN/{1.73_M2}
GLUCOSE SERPL-MCNC: 84 MG/DL (ref 70–99)
HGB BLD-MCNC: 7.3 G/DL (ref 11.7–15.7)
POTASSIUM SERPL-SCNC: 4.3 MMOL/L (ref 3.4–5.3)
SODIUM SERPL-SCNC: 135 MMOL/L (ref 133–144)

## 2020-07-26 PROCEDURE — 85018 HEMOGLOBIN: CPT | Performed by: INTERNAL MEDICINE

## 2020-07-26 PROCEDURE — 25000132 ZZH RX MED GY IP 250 OP 250 PS 637: Mod: GY | Performed by: INTERNAL MEDICINE

## 2020-07-26 PROCEDURE — 80048 BASIC METABOLIC PNL TOTAL CA: CPT | Performed by: INTERNAL MEDICINE

## 2020-07-26 PROCEDURE — 99232 SBSQ HOSP IP/OBS MODERATE 35: CPT | Performed by: INTERNAL MEDICINE

## 2020-07-26 PROCEDURE — 12000000 ZZH R&B MED SURG/OB

## 2020-07-26 PROCEDURE — 36415 COLL VENOUS BLD VENIPUNCTURE: CPT | Performed by: INTERNAL MEDICINE

## 2020-07-26 RX ADMIN — APIXABAN 2.5 MG: 2.5 TABLET, FILM COATED ORAL at 19:54

## 2020-07-26 RX ADMIN — ACETAMINOPHEN 1000 MG: 500 TABLET, FILM COATED ORAL at 13:59

## 2020-07-26 RX ADMIN — HYDRALAZINE HYDROCHLORIDE 50 MG: 50 TABLET, FILM COATED ORAL at 19:54

## 2020-07-26 RX ADMIN — ALLOPURINOL 100 MG: 100 TABLET ORAL at 07:23

## 2020-07-26 RX ADMIN — APIXABAN 2.5 MG: 2.5 TABLET, FILM COATED ORAL at 07:23

## 2020-07-26 RX ADMIN — LISINOPRIL 20 MG: 20 TABLET ORAL at 07:23

## 2020-07-26 RX ADMIN — HYDRALAZINE HYDROCHLORIDE 50 MG: 50 TABLET, FILM COATED ORAL at 07:23

## 2020-07-26 RX ADMIN — ACETAMINOPHEN 1000 MG: 500 TABLET, FILM COATED ORAL at 07:23

## 2020-07-26 RX ADMIN — LEVOTHYROXINE SODIUM 25 MCG: 25 TABLET ORAL at 07:23

## 2020-07-26 RX ADMIN — METOPROLOL SUCCINATE 100 MG: 100 TABLET, EXTENDED RELEASE ORAL at 07:23

## 2020-07-26 RX ADMIN — PANTOPRAZOLE SODIUM 40 MG: 40 TABLET, DELAYED RELEASE ORAL at 07:23

## 2020-07-26 RX ADMIN — ACETAMINOPHEN 1000 MG: 500 TABLET, FILM COATED ORAL at 19:54

## 2020-07-26 RX ADMIN — DILTIAZEM HYDROCHLORIDE 120 MG: 120 CAPSULE, COATED, EXTENDED RELEASE ORAL at 07:23

## 2020-07-26 RX ADMIN — HYDRALAZINE HYDROCHLORIDE 50 MG: 50 TABLET, FILM COATED ORAL at 13:59

## 2020-07-26 ASSESSMENT — ACTIVITIES OF DAILY LIVING (ADL)
ADLS_ACUITY_SCORE: 28
ADLS_ACUITY_SCORE: 29
ADLS_ACUITY_SCORE: 28

## 2020-07-26 NOTE — PLAN OF CARE
DATE & TIME: 7/25/20 8864-3533  Cognitive Concerns/ Orientation : A&O x4, forgetful, [facility]  BEHAVIOR & AGGRESSION TOOL COLOR: Green  CIWA SCORE: NA  ABNL VS/O2: VSS on RA ex /59  MOBILITY: A x2 GB/W, turn & repo Q2h   PAIN MANAGMENT: Denies pain ex when repositioning. Resolved by rest.  DIET: 2g Na, did not eat overnight   BOWEL/BLADDER: Incontinent. No BM. Needs urine sample but incontinent overnight.  ABNL LAB/BG: Hgb 7.3 in AM, D-Dimer 5.0 (MD aware)   DRAIN/DEVICES: R arm PIV SL  TELEMETRY RHYTHM: NA  SKIN: BUE 2-3+ edema, BLE 2+ edema. Bel area blanchable redness (improving), powder applied   TESTS/PROCEDURES: Potential capsule study on 27th or 28th if pt still here   D/C DAY/GOALS/PLACE: Planned discharge for 7/25 morning but delayed due to low Hgb. Pending Hgb and GI recommendation   OTHER IMPORTANT INFO: On Eliquis, dose reduced yesterday. Pt wondering if she can receive her steroid shots that she missed due to being in the hospital.

## 2020-07-26 NOTE — PLAN OF CARE
DATE & TIME: 7/26/20 7144-0931  Cognitive Concerns/ Orientation : A&O x4, forgetful, [place]  BEHAVIOR & AGGRESSION TOOL COLOR: Green  CIWA SCORE: NA  ABNL VS/O2: VSS on RA except hypertensive but within parameters.  MOBILITY: A x2 GB/W, turn & repo Q2h. Pt was up in chair for lunch.   PAIN MANAGMENT: Denies pain at rest. Lower back pain with movement, on scheduled Tylenol.  DIET: 2g Na poor appetite  BOWEL/BLADDER: Incontinent. No BM.  ABNL LAB/BG: Hgb 7.3 in AM, D-Dimer 5.0 (MD aware)   DRAIN/DEVICES: R arm PIV SL.  TELEMETRY RHYTHM: NA  SKIN: BUE 2-3+ edema, BLE 2+ edema. Bel area blanchable redness (improving), powder applied   TESTS/PROCEDURES: Potential capsule study in 1-2 days.   D/C DAY/GOALS/PLACE: Planned discharge for 7/25 morning but delayed due to low Hgb. Pending Hgb and GI recommendation   OTHER IMPORTANT INFO: On Eliquis. CMS intact except generalized weakness and edema. Denies lightheaded or dizzy on standing. Denies SOB or chest pain.

## 2020-07-26 NOTE — PROVIDER NOTIFICATION
MD Notification    Notified Person: MD    Notified Person Name: Eliseo    Notification Date/Time: 07/26/20 3010    Notification Interaction: Paged     Purpose of Notification: FYI Hgb 7.3 on AM labs    Orders Received:    Comments:

## 2020-07-26 NOTE — PROGRESS NOTES
[facility]  Hematology/oncology Progress Note            History:   Ghislaine Walls is an 84-year-old woman with a history of hypertension, chronic kidney disease and hypothyroidism.  She was hospitalized on 07/21 with generalized weakness for the previous 2 weeks.  She was not able to get necessary assistance from the assisted living and evaluated in the ER.  She has some joint pain and underwent injection to both shoulders with improvement and is seen by [facility] and has diagnosis of bursitis.  She also has some joint ache in her hands.  Denies any bleeding, melena, or hematochezia.  She was found to have anemia with a hemoglobin at 7.8 on admission, down to 6.7 on the 07/23, required red cell transfusion.  She was seen by GI and underwent a colonoscopy and EGD on 07/24.  The colonoscopy showed a 10 mm polyp in the transverse colon, removed.  The examination otherwise was normal.  Upper endoscopy showed a normal esophagus medium-sized hiatal hernia, duodenal erosions without bleeding, biopsied.  Blood work for the anemia was obtained.  Peripheral blood smear is pending.  She has a normal B12 level 556, folic acid of 6.8, ferritin 484, iron 9, saturation 5%, TIBC 167; all are low.  Her creatinine 1.14 with GFR estimated 44.  Haptoglobin obtained and is pending.  LDH normal, 152.      PAST MEDICAL HISTORY:  Hearing deficit, dyslipidemia, hypertension, hypothyroidism, peripheral artery disease, renal insufficiency, history of appendectomy, cataract surgery bilaterally, tubal ligation, joint pain and bursitis status post subacromial bursa injections.        SOCIAL HISTORY:  She lives in assisted living.  Currently nonsmoker for the last 47 years.  Alcohol, not heavy.      FAMILY HISTORY:  Negative for malignancy or blood disorders.      INTERVAL HISTORY:  Pt is resting, no bleeding  Hgb 7.3 today         Medications:       acetaminophen  1,000 mg Oral TID     allopurinol  100 mg Oral Daily      apixaban ANTICOAGULANT  2.5 mg Oral BID     diltiazem ER COATED BEADS  120 mg Oral Daily     hydrALAZINE  50 mg Oral TID     levothyroxine  25 mcg Oral Daily     lisinopril  20 mg Oral Daily     magnesium citrate  296 mL Oral Once     metoprolol succinate ER  100 mg Oral Daily     pantoprazole  40 mg Oral QAM AC                ROS:   RESP, CV, GI,, MUSCULOSKELETAL, HEME/ALLERGY/IMMUNE,  Skin: reviewed and negative except the positives mentioned in this note            Physical Exam:       Vital Sign Ranges  Temp:  [98.3  F (36.8  C)-99.9  F (37.7  C)] 99.9  F (37.7  C)  Heart Rate:  [65-76] 76  Resp:  [16-17] 16  BP: (146-164)/(57-65) 164/63  SpO2:  [97 %-99 %] 97 %      I/O last 3 completed shifts:  In: 200 [P.O.:200]  Out: -     Constitutional: no apparent distress  HEENT: unremarkable  Neck: Supple  Lungs: No increased work of breathing  Ext: no edema, cyanosis           Data:       ROUTINE LABS (Last four results)  CMP  Recent Labs   Lab 07/26/20  0850 07/25/20  0554 07/24/20  0849 07/23/20  0950 07/22/20  1031 07/21/20 2038    138 137 138 136  --    POTASSIUM 4.3 4.2 4.1 4.1 4.4  --    CHLORIDE 111* 110* 113* 113* 113*  --    CO2 18* 17* 17* 16* 15*  --    ANIONGAP 6  --  7 9 8  --    GLC 84 121* 80 92 122*  --    BUN 30 31* 25 31* 33*  --    CR 1.08* 1.16* 0.97 1.06* 1.14*  --    GFRESTIMATED 47* 43* 54* 48* 44*  --    GFRESTBLACK 55* 50* 62 56* 51*  --    ALIVIA 8.6 8.9 9.1 8.7 8.9  --    PROTTOTAL  --  6.2*  --   --   --  6.4*   ALBUMIN  --  1.7*  --   --   --  2.0*   BILITOTAL  --  0.4  --   --   --  0.4   ALKPHOS  --  90  --   --   --  69   AST  --  25  --   --   --  18   ALT  --  10  --   --   --  8     CBC  Recent Labs   Lab 07/26/20  0850 07/25/20  0554 07/24/20  0849 07/23/20  2114  07/22/20  1031 07/21/20  0849   WBC  --  6.2 5.1  --   --  5.8 4.4   RBC  --  2.57* 2.81*  --   --  2.46* 2.62*   HGB 7.3* 7.7* 8.3* 8.4*   < > 7.2* 7.8*   HCT  --  24.1* 26.1*  --   --  23.2* 24.7*   MCV  --  94 93   --   --  94 94   MCH  --  30.0 29.5  --   --  29.3 29.8   MCHC  --  32.2 31.8  --   --  31.0* 31.6   RDW  --  16.2* 16.0*  --   --  15.9* 15.6*   PLT  --  387 399  --   --  434 466*    < > = values in this interval not displayed.     INR  Recent Labs   Lab 07/21/20  0849   INR 2.07*     Immunofixation serum and urine pending.  Smear and haptoglobin pending         Assessment and Plan:   1.  Normochromic normocytic anemia, etiology unclear.  There is no evidence of B12 deficiency or folic acid deficiency.  Her iron levels are low, but ferritin is high, which is more consistent with chronic illness or chronic kidney disease.  Chronic kidney disease can be associated with anemia, although her kidney function did not change since 2018 when her hemoglobin was normal and the anemia was moderate in 2019.   2.  Chronic kidney disease.   3.  Arthritis and bursitis.   4.  Negative GI workup and negative Hemoccult occult stool.      PLAN:    I will follow up on the haptoglobin immune fixation on urine and serum in addition to EPO level and peripheral blood smear to rule out hemolysis and other abnormalities on peripheral blood smear.    If the diagnostic workup is negative, she will need bone marrow biopsy to rule out primary myeloid disorders including myelodysplastic syndrome.   Transfuse RBC if hgb 7 or lower, or symptomatic      Lawrence Oh M.D.

## 2020-07-26 NOTE — CONSULTS
Consult Date:  07/25/2020      REQUESTING PHYSICIAN:  Rodo Marcelino MD      REASON FOR CONSULTATION:  Anemia.      HISTORY:  Medical record reviewed, history obtained and patient examined.      Ghislaine Walls is an 84-year-old woman with a history of hypertension, chronic kidney disease and hypothyroidism.  She was hospitalized on 07/21 with generalized weakness for the previous 2 weeks.  She was not able to get necessary assistance from the assisted living and evaluated in the ER.  She has some joint pain and underwent injection to both shoulders with improvement and is seen by [facility] and has diagnosis of bursitis.  She also has some joint ache in her hands.  Denies any bleeding, melena, or hematochezia.  She was found to have anemia with a hemoglobin at 7.8 on admission, down to 6.7 on the 07/23, required red cell transfusion.  She was seen by GI and underwent a colonoscopy and EGD on 07/24.  The colonoscopy showed a 10 mm polyp in the transverse colon, removed.  The examination otherwise was normal.  Upper endoscopy showed a normal esophagus medium-sized hiatal hernia, duodenal erosions without bleeding, biopsied.  Blood work for the anemia was obtained.  Peripheral blood smear is pending.  She has a normal B12 level 556, folic acid of 6.8, ferritin 484, iron 9, saturation 5%, TIBC 167; all are low.  Her creatinine 1.14 with GFR estimated 44.  Haptoglobin obtained and is pending.  LDH normal, 152.      PAST MEDICAL HISTORY:  Hearing deficit, dyslipidemia, hypertension, hypothyroidism, peripheral artery disease, renal insufficiency, history of appendectomy, cataract surgery bilaterally, tubal ligation, joint pain and bursitis status post subacromial bursa injections.      MEDICATIONS PRIOR TO ADMISSION:  Allopurinol, apixaban, diltiazem, hydralazine, levothyroxine, Zestril, metoprolol.      ALLERGIES:  OXYBUTYNIN CAUSES ITCHING.      SOCIAL HISTORY:  She lives in assisted living.  Currently nonsmoker for the last  47 years.  Alcohol, not heavy.      FAMILY HISTORY:  Negative for malignancy or blood disorders.      REVIEW OF SYSTEMS:  Significant for fatigue, decline in performance status, chronic diarrhea, intermittent depending on what she eats, but no melena or hematochezia.  No fever or night sweats, no weight loss.  The remainder of 10-point system review is unremarkable.      PHYSICAL EXAMINATION:   GENERAL:  Pleasant, in no acute distress.   VITAL SIGNS:  Stable as charted, afebrile.  Weight is 155 pounds.   HEENT:  Normocephalic, atraumatic.  Sclerae are anicteric.   NECK:  No lymphadenopathy, no JVD.   LUNGS:  Clear to auscultation.   HEART:  Regular rhythm.   ABDOMEN:  Soft, nontender, no organomegaly.   EXTREMITIES:  +1 edema in the legs.   LYMPHATIC:  No lymphadenopathy in cervical, supraclavicular, axillary, epitrochlear or inguinal areas.   SKIN:  Limited skin examination, no petechia or ecchymosis, no rash.   NEUROLOGIC:  Grossly intact. She has no numbness or weakness of extremities.      ANCILLARY DATA:  As detailed above.  Creatinine on admission was 1.43, but currently 1.16.  TSH 1.86, normal.  Liver enzymes, normal.  White count 6.2, platelets 387, MCV 94.  Differential is normal.  Retic count 3.2.  Peripheral blood smear for morphology is pending.     Endoscopy as detailed.     Chest x-ray, no acute consolidation.      IMPRESSION:     1.  Normochromic normocytic anemia, etiology unclear.  There is no evidence of B12 deficiency or folic acid deficiency.  Her iron levels are low, but ferritin is high, which is more consistent with chronic illness or chronic kidney disease.  Chronic kidney disease can be associated with anemia, although her kidney function did not change since 2018 when her hemoglobin was normal and the anemia was moderate in 2019.   2.  Chronic kidney disease.   3.  Arthritis and bursitis.   4.  Negative GI workup and negative Hemoccult occult stool.      DISCUSSION AND RECOMMENDATIONS:  I  will follow up on the haptoglobin and peripheral blood smear to rule out hemolysis and other abnormalities on peripheral blood smear.  I will obtain immune fixation on urine and serum in addition to EPO level.  If the diagnostic workup is negative, she will need bone marrow biopsy to rule out primary myeloid disorders including myelodysplastic syndrome.      I appreciate the opportunity to participate in the care of Ghislaine Walls.         LAWRENCE ARNOLD MD             D: 2020   T: 2020   MT: LUCINDA      Name:     GHISLAINE WALLS   MRN:      [id number]        Account:       [id number]   :      1935           Consult Date:  2020      Document: [id number]       cc: Lawrence Conteh MD

## 2020-07-26 NOTE — PLAN OF CARE
DATE & TIME: 7/25/20 3-11pm shift  Cognitive Concerns/ Orientation : A&O x4- forgetful  BEHAVIOR & AGGRESSION TOOL COLOR: Green  CIWA SCORE: NA  ABNL VS/O2: VSS on RA ex elevated BP  MOBILITY: A x2, GB/W, turn & repo   PAIN MANAGMENT: Denies pain. Received scheduled Tylenol. One time prn tramadol   DIET: 2g Na- poor appetite at dinner. Nauseated- given Zofran.  BOWEL/BLADDER: Incontinent of Stool and urine.   ABNL LAB/BG: Hgb 7.7, D-Dimer 5.0-MD notified.  DRAIN/DEVICES: R arm PIV SL  TELEMETRY RHYTHM: NA  SKIN: BUE 2+ edema, BLE 2+ edema. Bel area blanchable, powder applied  TESTS/PROCEDURES: None scheduled   D/C DAY/GOALS/PLACE: Discharged to [facility] delayed due to Hgb 7.7. hem/onc consult ordered. No signs of active bleeding. Maybe capsule study 27th or 28th.?  OTHER IMPORTANT INFO: On Eliquis, reduced dose.

## 2020-07-26 NOTE — PROGRESS NOTES
GI chart check    No overt bleeding, Hgb continues to drift down.     Recommend capsule endoscopy in the next 1-2 days pending equipment availability.    Will follow.    Ramirez Rivas MD

## 2020-07-26 NOTE — PROGRESS NOTES
[facility]    Hospitalist Progress Note    Date of Service (when I saw the patient): 07/26/2020    Assessment & Plan   Ghislaine Walls is a 84 year old female with hx of paroxysmal atrial fibrillation on anticoagulation with apixaban, HTN, CKD, and chronic pain syndrome who was admitted on 7/21/2020 for evaluation of generalized weakness and fatigue. She has been found to have progressive anemia of unclear etiology    Normocytic anemia of unclear etiology  * Question occult bleed vs primary myeloid disorder. GI and Heme/Onc were consulted during this admission.  * Hgb 7.8 on arrival from baseline ~9, and gradually decreased to 6.7 with no s/sx of active bleeding. FOBT on arrival was negative. Iron studies showed low iron and iron sat, but ferritin high. [facility] was consulted, and patient underwent EGD and colonoscopy (7/24) which respectively showed a moderate hiatal hernia, duodenal erosions, and a medium polyp that was resected. No source of active bleeding was identified  * Received 1u prbcs on 7/23 for Hgb 6.7  - Hgb continues to trend down: 7.3 today, no clear signs of active bleeding  - ?hemolysis. Hemolysis labs and peripheral smear pending  - SPEP/UPEP, EPO pending  - GI considering small bowel capsule endoscopy, 7/27 or 7/28  - Heme/Onc to consider bone marrow biopsy if above work-up is negative  - PTA apixaban has been decreased from 5 to 2.5 mg BID in light of age and CKD  - Heme/Onc ([facility]) and GI following, appreciate assistance    Generalized weakness, multifactorial   Severe protein-calorie malnutrition in context of chronic illness  Presented with generalized weakness and increased fatigue, worse 2 days PTA. Likely due to anemia and sub-optimal nutrition. Patient's daughter reports that she eats and drinks very little. Evidence of dehydration present on arrival with mild DRAGAN. Other significant work-up:     TSH normal, B12/folate normal    UA on showed many bacteria, but sample was  "contaminated    TTE (7/22) showed LVEF 55-60% with moderate diastolic dysfunction not significantly changed from prior study    No significant events on telemetry  - Nutrition has recommended Plus2 shakes between meals  - PT/OT recommending TCU    Hiatal hernia  Noted on EGD (7/24)  - She has been started on pantoprazole 40 mg PO daily    CKD stage III  Non-anion gap metabolic acidosis, likely due to CKD  Creatinine 1.43 on arrival from recent baseline ~1.2. Suspect mild dehydration in the context of poor oral intake. She received IV fluids with improvement  - Creatinine stable ~1-1.1. HCO3 18    Paroxysmal atrial fibrillation  [PTA: diltiazem  mg daily, metoprolol  mg daily, apixaban 5 mg BID]  - PTA apixaban has been decreased as noted above  - Continues on diltiazem and metoprolol XL    Essential hypertension  [PTA: diltiazem  mg daily, metoprolol  mg daily, hydralazine 50 mg TID, lisinopril 20 mg daily]  - Continues on PTA meds    Hypothyroidism  - Continues on PTA levothyroxine    Shoulder bursitis  History of gout  Polyarthropathy  * Follows with Dr. Jerry Acosta [facility] for subacromial bursa injection. Has had long-standing history of polyarthropathy involving shoulders, knees, and wrists, but does not appear to have a rheumatological work-up.   * PTA on APAP 1000 mg TID and allopurinol 100 mg daily  - Was due for shoulder injection on 7/22; this has been deferred while the patient was hospitalized, and will need to be rescheduled   - Continues on PTA APAP 1000 mg TID and allopurinol  - Consider outpatient Rheumatology referral    FEN: Regular diet with Plus2 shakes  DVT Prophylaxis: Apixaban  Code Status: DNR/DNI    Disposition: Expected discharge to TCU pending stable Hgb and anemia work-up, ?mid-next week    Nola Gomes    Interval History   No events overnight. Patient reports feeling \"tired\" but offers no other concerns. Denies cp/sob, dizziness/lightheadedness. Anemia work-up " pending. GI and Heme/Onc recs reviewed    Nola Gomes MD    -Data reviewed today: I reviewed all new labs and imaging results over the last 24 hours. I personally reviewed no images or EKG's today.    Physical Exam   Temp: 99.9  F (37.7  C) Temp src: Oral BP: (!) 164/63   Heart Rate: 76 Resp: 16 SpO2: 97 % O2 Device: None (Room air)    Vitals:    07/21/20 0836 07/21/20 1300 07/24/20 0915   Weight: 70.3 kg (155 lb) 61.4 kg (135 lb 4.8 oz) 70.3 kg (155 lb)     Vital Signs with Ranges  Temp:  [98.3  F (36.8  C)-99.9  F (37.7  C)] 99.9  F (37.7  C)  Heart Rate:  [65-76] 76  Resp:  [16-17] 16  BP: (146-164)/(57-65) 164/63  SpO2:  [97 %-99 %] 97 %  I/O last 3 completed shifts:  In: 200 [P.O.:200]  Out: -     Constitutional: Resting comfortably, NAD  HEENT: Sclera white, MMM  Respiratory: Breathing non-labored. Lungs CTAB - no wheezes, crackles, or rhonchi  Cardiovascular: Heart RRR, no m/r/g. No pedal edema  GI: +BS, abd soft/NT  Skin/Integument: No rash  Musculoskeletal: Normal muscle bulk and tone  Neuro: Alert and appropriate, DIAS  Psych: Flat affect    Medications       acetaminophen  1,000 mg Oral TID     allopurinol  100 mg Oral Daily     apixaban ANTICOAGULANT  2.5 mg Oral BID     diltiazem ER COATED BEADS  120 mg Oral Daily     hydrALAZINE  50 mg Oral TID     levothyroxine  25 mcg Oral Daily     lisinopril  20 mg Oral Daily     magnesium citrate  296 mL Oral Once     metoprolol succinate ER  100 mg Oral Daily     pantoprazole  40 mg Oral QAM AC     Data   Recent Labs   Lab 07/26/20  0850 07/25/20  0554 07/24/20  0849  07/23/20  0950 07/22/20  1031 07/21/20  2038 07/21/20  0849   WBC  --  6.2 5.1  --   --  5.8  --  4.4   HGB 7.3* 7.7* 8.3*   < >  --  7.2*  --  7.8*   MCV  --  94 93  --   --  94  --  94   PLT  --  387 399  --   --  434  --  466*   INR  --   --   --   --   --   --   --  2.07*    138 137  --  138 136  --  135   POTASSIUM 4.3 4.2 4.1  --  4.1 4.4  --  4.4   CHLORIDE 111* 110* 113*  --   113* 113*  --  112*   CO2 18* 17* 17*  --  16* 15*  --  14*   BUN 30 31* 25  --  31* 33*  --  39*   CR 1.08* 1.16* 0.97  --  1.06* 1.14*  --  1.43*   ANIONGAP 6  --  7  --  9 8  --  9   ALIVIA 8.6 8.9 9.1  --  8.7 8.9  --  9.3   GLC 84 121* 80  --  92 122*  --  91   ALBUMIN  --  1.7*  --   --   --   --  2.0*  --    PROTTOTAL  --  6.2*  --   --   --   --  6.4*  --    BILITOTAL  --  0.4  --   --   --   --  0.4  --    ALKPHOS  --  90  --   --   --   --  69  --    ALT  --  10  --   --   --   --  8  --    AST  --  25  --   --   --   --  18  --    TROPI  --   --   --   --   --   --   --  <0.015    < > = values in this interval not displayed.       No results found for this or any previous visit (from the past 24 hour(s)).

## 2020-07-27 ENCOUNTER — APPOINTMENT (OUTPATIENT)
Dept: GENERAL RADIOLOGY | Facility: CLINIC | Age: 85
DRG: 682 | End: 2020-07-27
Attending: PHYSICIAN ASSISTANT
Payer: MEDICARE

## 2020-07-27 LAB
ALBUMIN UR-MCNC: 30 MG/DL
ANION GAP SERPL CALCULATED.3IONS-SCNC: 6 MMOL/L (ref 3–14)
APPEARANCE UR: ABNORMAL
BILIRUB UR QL STRIP: NEGATIVE
BUN SERPL-MCNC: 33 MG/DL (ref 7–30)
CALCIUM SERPL-MCNC: 8.5 MG/DL (ref 8.5–10.1)
CHLORIDE SERPL-SCNC: 111 MMOL/L (ref 94–109)
CO2 SERPL-SCNC: 18 MMOL/L (ref 20–32)
COLOR UR AUTO: YELLOW
CREAT SERPL-MCNC: 1.08 MG/DL (ref 0.52–1.04)
CRP SERPL-MCNC: 86 MG/L (ref 0–8)
EPO SERPL-ACNC: 17 MU/ML (ref 4–27)
ERYTHROCYTE [DISTWIDTH] IN BLOOD BY AUTOMATED COUNT: 15.9 % (ref 10–15)
GFR SERPL CREATININE-BSD FRML MDRD: 47 ML/MIN/{1.73_M2}
GLUCOSE SERPL-MCNC: 93 MG/DL (ref 70–99)
GLUCOSE UR STRIP-MCNC: NEGATIVE MG/DL
HAPTOGLOB SERPL-MCNC: 312 MG/DL (ref 32–197)
HCT VFR BLD AUTO: 22.6 % (ref 35–47)
HGB BLD-MCNC: 7.2 G/DL (ref 11.7–15.7)
HGB UR QL STRIP: NEGATIVE
KETONES UR STRIP-MCNC: NEGATIVE MG/DL
LEUKOCYTE ESTERASE UR QL STRIP: NEGATIVE
MCH RBC QN AUTO: 29.8 PG (ref 26.5–33)
MCHC RBC AUTO-ENTMCNC: 31.9 G/DL (ref 31.5–36.5)
MCV RBC AUTO: 93 FL (ref 78–100)
MUCOUS THREADS #/AREA URNS LPF: PRESENT /LPF
NITRATE UR QL: NEGATIVE
PH UR STRIP: 5 PH (ref 5–7)
PLATELET # BLD AUTO: 291 10E9/L (ref 150–450)
POTASSIUM SERPL-SCNC: 4.3 MMOL/L (ref 3.4–5.3)
RBC # BLD AUTO: 2.42 10E12/L (ref 3.8–5.2)
RBC #/AREA URNS AUTO: 1 /HPF (ref 0–2)
SODIUM SERPL-SCNC: 135 MMOL/L (ref 133–144)
SOURCE: ABNORMAL
SP GR UR STRIP: 1.01 (ref 1–1.03)
UROBILINOGEN UR STRIP-MCNC: NORMAL MG/DL (ref 0–2)
WBC # BLD AUTO: 4.8 10E9/L (ref 4–11)
WBC #/AREA URNS AUTO: 2 /HPF (ref 0–5)

## 2020-07-27 PROCEDURE — 86335 IMMUNFIX E-PHORSIS/URINE/CSF: CPT | Performed by: INTERNAL MEDICINE

## 2020-07-27 PROCEDURE — 87086 URINE CULTURE/COLONY COUNT: CPT | Performed by: INTERNAL MEDICINE

## 2020-07-27 PROCEDURE — 85027 COMPLETE CBC AUTOMATED: CPT | Performed by: INTERNAL MEDICINE

## 2020-07-27 PROCEDURE — 73560 X-RAY EXAM OF KNEE 1 OR 2: CPT | Mod: 50

## 2020-07-27 PROCEDURE — 81001 URINALYSIS AUTO W/SCOPE: CPT | Performed by: INTERNAL MEDICINE

## 2020-07-27 PROCEDURE — 86431 RHEUMATOID FACTOR QUANT: CPT | Performed by: INTERNAL MEDICINE

## 2020-07-27 PROCEDURE — 99233 SBSQ HOSP IP/OBS HIGH 50: CPT | Performed by: INTERNAL MEDICINE

## 2020-07-27 PROCEDURE — 36415 COLL VENOUS BLD VENIPUNCTURE: CPT | Performed by: INTERNAL MEDICINE

## 2020-07-27 PROCEDURE — 86039 ANTINUCLEAR ANTIBODIES (ANA): CPT | Performed by: INTERNAL MEDICINE

## 2020-07-27 PROCEDURE — 87040 BLOOD CULTURE FOR BACTERIA: CPT | Performed by: INTERNAL MEDICINE

## 2020-07-27 PROCEDURE — 80048 BASIC METABOLIC PNL TOTAL CA: CPT | Performed by: INTERNAL MEDICINE

## 2020-07-27 PROCEDURE — 86038 ANTINUCLEAR ANTIBODIES: CPT | Performed by: INTERNAL MEDICINE

## 2020-07-27 PROCEDURE — 86200 CCP ANTIBODY: CPT | Performed by: INTERNAL MEDICINE

## 2020-07-27 PROCEDURE — 12000000 ZZH R&B MED SURG/OB

## 2020-07-27 PROCEDURE — 25000132 ZZH RX MED GY IP 250 OP 250 PS 637: Mod: GY | Performed by: INTERNAL MEDICINE

## 2020-07-27 PROCEDURE — 86140 C-REACTIVE PROTEIN: CPT | Performed by: INTERNAL MEDICINE

## 2020-07-27 PROCEDURE — 73080 X-RAY EXAM OF ELBOW: CPT | Mod: 50

## 2020-07-27 RX ADMIN — METOPROLOL SUCCINATE 100 MG: 100 TABLET, EXTENDED RELEASE ORAL at 07:59

## 2020-07-27 RX ADMIN — ALLOPURINOL 100 MG: 100 TABLET ORAL at 07:59

## 2020-07-27 RX ADMIN — HYDRALAZINE HYDROCHLORIDE 50 MG: 50 TABLET, FILM COATED ORAL at 13:46

## 2020-07-27 RX ADMIN — ACETAMINOPHEN 1000 MG: 500 TABLET, FILM COATED ORAL at 13:46

## 2020-07-27 RX ADMIN — DILTIAZEM HYDROCHLORIDE 120 MG: 120 CAPSULE, COATED, EXTENDED RELEASE ORAL at 08:00

## 2020-07-27 RX ADMIN — TRAMADOL HYDROCHLORIDE 50 MG: 50 TABLET, FILM COATED ORAL at 02:22

## 2020-07-27 RX ADMIN — HYDRALAZINE HYDROCHLORIDE 50 MG: 50 TABLET, FILM COATED ORAL at 07:59

## 2020-07-27 RX ADMIN — ACETAMINOPHEN 1000 MG: 500 TABLET, FILM COATED ORAL at 07:59

## 2020-07-27 RX ADMIN — ACETAMINOPHEN 1000 MG: 500 TABLET, FILM COATED ORAL at 21:13

## 2020-07-27 RX ADMIN — LISINOPRIL 20 MG: 20 TABLET ORAL at 07:59

## 2020-07-27 RX ADMIN — PANTOPRAZOLE SODIUM 40 MG: 40 TABLET, DELAYED RELEASE ORAL at 07:59

## 2020-07-27 RX ADMIN — APIXABAN 2.5 MG: 2.5 TABLET, FILM COATED ORAL at 08:00

## 2020-07-27 RX ADMIN — LEVOTHYROXINE SODIUM 25 MCG: 25 TABLET ORAL at 08:00

## 2020-07-27 RX ADMIN — HYDRALAZINE HYDROCHLORIDE 50 MG: 50 TABLET, FILM COATED ORAL at 21:09

## 2020-07-27 RX ADMIN — TRAMADOL HYDROCHLORIDE 50 MG: 50 TABLET, FILM COATED ORAL at 14:50

## 2020-07-27 ASSESSMENT — ACTIVITIES OF DAILY LIVING (ADL)
ADLS_ACUITY_SCORE: 28
ADLS_ACUITY_SCORE: 28
ADLS_ACUITY_SCORE: 29
ADLS_ACUITY_SCORE: 28
ADLS_ACUITY_SCORE: 29
ADLS_ACUITY_SCORE: 29

## 2020-07-27 NOTE — PROVIDER NOTIFICATION
MD Notification    Notified Person: MD    Notified Person Name: Dr Mccullough    Notification Date/Time:7/27/20 @1354    Notification Interaction:web page    Purpose of Notification:She is incontinent, do you want me to straight cath for the urine?    Orders Received:not at this time    Comments:

## 2020-07-27 NOTE — PROGRESS NOTES
Consult received    Will see patient in AM for possible joint aspiration at bedside  Continue to hold anticoagulation   XRs ordered     Formal consult to follow    Makenna COYNE

## 2020-07-27 NOTE — PROGRESS NOTES
"[facility] - GASTROENTEROLOGY PROGRESS NOTE     IMPRESSION:  1. Anemia - iron studies are consistent with anemia of chronic disease (no iron deficiency anemia, which would be an indication for capsule endoscopy of the small bowel). EGD/Colon negaitve for a source. Hematology following and pursuing further work-up. No indication for capsule endoscopy at this time.  2. Fever - defer mgmt to primary service.     RECOMMENDATIONS:  1. Defer further anemia management to hematology.  2. We will sign off, please call with any questions or concerns.     Trace Huang MD  [facility]  Cell [phone]  After 5 PM, please call [phone]  ________________________________________________________________________      SUBJECTIVE:  Patient denies any GI bleeding. She feels fine.        OBJECTIVE:  BP (!) 156/66 (BP Location: Left arm)   Pulse 70   Temp 101.5  F (38.6  C) (Oral)   Resp 17   Ht 1.626 m (5' 4\")   Wt 70.3 kg (155 lb)   SpO2 98%   BMI 26.61 kg/m    Temp (24hrs), Av.6  F (37.6  C), Min:98.5  F (36.9  C), Max:101.5  F (38.6  C)    No data found.     PHYSICAL EXAM  GEN: Alert, NAD.    HRT: reg  LUNGS: CTA  ABD: +BS, non-tender, non-distended, no rebound or guarding.    Additional Data:  I have reviewed the patient's new clinical lab results:     Recent Labs   Lab Test 20  0858 20  0850 20  0554 20  0849  20  0849 20  1342 20  1327   WBC 4.8  --  6.2 5.1   < > 4.4  --   --    HGB 7.2* 7.3* 7.7* 8.3*   < > 7.8*  --   --    MCV 93  --  94 93   < > 94  --   --      --  387 399   < > 466*  --   --    INR  --   --   --   --   --  2.07* 2.00* 3.50*    < > = values in this interval not displayed.     Recent Labs   Lab Test 20  0858 20  0850 20  0554 20  0849   POTASSIUM 4.3 4.3 4.2 4.1   CHLORIDE 111* 111* 110* 113*   CO2 18* 18* 17* 17*   BUN 33* 30 31* 25   ANIONGAP 6 6  --  7     Recent Labs   Lab Test 20  0554 20 " 07/21/20  1112 10/14/19  1720 10/05/19  0559 10/01/19  0108   ALBUMIN 1.7* 2.0*  --   --  2.3*  --    BILITOTAL 0.4 0.4  --   --  0.5  --    ALT 10 8  --   --  14  --    AST 25 18  --   --  19  --    PROTEIN  --   --  30* 30*  --  10*

## 2020-07-27 NOTE — PROGRESS NOTES
SW:  D:  Note patient needs a pill cam placed before discharge, placement of pill cam may be today or tomorrow.   Waylon at [facility] has been notified.  Because patient is from their [facility], writer is assessing the TCU will hold a bed for patient Write has asked [facility] to confirm this.

## 2020-07-27 NOTE — PROGRESS NOTES
[facility]    Internal Medicine Hospitalist Progress Note  07/27/2020  I evaluated patient on the above date.    Ganga Mccullough Jr., MD  [phone] (p)  Text Page        Assessment & Plan New actions/orders today (07/27/2020) are underlined.    Ghislaine Walls is a 84 year old female with hx including HTN, paroxysmal atrial fibrillation on anticoagulation with apixaban, gout, CKD, and chronic pain syndrome, who was admitted on 7/21/2020 for evaluation of generalized weakness and fatigue. She has been found to have progressive anemia of unclear etiology     Normocytic anemia - suspect ACD (unclear cause, ?inflammatory arthritis) +/- iron deficiency components; other causes not ruled out.  * Question occult bleed vs primary myeloid disorder. GI and Heme/Onc were consulted during this admission.  * Hgb 7.8 on arrival from baseline ~9, and gradually decreased to 6.7 with no s/sx of active bleeding. FOBT on arrival was negative. Iron studies showed low iron and iron sat, but ferritin high, TIBC low. [facility] was consulted, and patient underwent EGD and colonoscopy (7/24) which respectively showed a moderate hiatal hernia, duodenal erosions, and a medium transverse colon polyp that was resected; no source of active bleeding was identified  * Received 1u prbcs on 7/23 for Hgb 6.7.   * TSH 7/21 normal. B12 and folate 7/22 normal. LDH 7/25 normal. Peripheral smear, SPEP/UPEP, EPO levels 7/25 pending. Apixaban dose decreased 7/25 in light of age and CKD.  * On 7/27, GI deferred on capsule endoscopy as iron studies more suggestive of ACD (low TIBC, ferritin high; iron levels could be low with ACD).  Recent Labs   Lab 07/27/20  0858 07/26/20  0850 07/25/20  0554 07/24/20  0849 07/23/20  2114 07/23/20  1224   HGB 7.2* 7.3* 7.7* 8.3* 8.4* 6.7*   - Heme/Onc to consider bone marrow.  - Monitor CBC.  - Consider prbc transfusion if hgb </= 7.0 or if significant bleeding with hemodynamic instability or if  symptomatic.  - Workup and management of other issues as below.  - Appreciate GI and Hematology help.    Fever, unclear etiology, ?inflammatory arthritis.  Fever to 101.5 am 7/27.  - Check blood cultures, UA/UC.  - Workup of other issues as below.     DRAGAN (suspect prerenal due to poor PO intake) on CKD stage III.  Non-anion gap metabolic acidosis, likely due to CKD.  Baseline cr ~ 1.2. Creatinine 1.43 and bicarb 14 on admit 7/21.. She received IV fluids with improvement.  Recent Labs   Lab 07/27/20  0858 07/26/20  0850 07/25/20  0554 07/24/20  0849 07/23/20  0950 07/22/20  1031   CR 1.08* 1.08* 1.16* 0.97 1.06* 1.14*   - Monitor BMP.  - Avoid nephrotoxic medications.    Polyarthropathy, ?inflammatory.  Shoulder bursitis.  Knee pain and swelling.  History of gout.  * Follows with Dr. Jerry STRICKLAND for subacromial bursa injection. Has had long-standing history of polyarthropathy involving shoulders, knees, and wrists, but does not appear to have a rheumatological work-up.   * PTA on APAP 1000 mg TID and allopurinol 100 mg daily.  * Pt c/o knee pain 7/27, knees warm and swollen on exam.  - Ortho consult for possible joint aspiration.  - Hold apixaban for now.  - Check CRP, GWEN, rheumatoid factor.   - Was due for shoulder injection on 7/22; this was deferred while the patient was hospitalized, and will need to be rescheduled; Ortho consult as above.  - Continue on PTA APAP 1000 mg TID and allopurinol.  - Outpatient Rheumatology evaluation (no Rheum at this hospital).    Generalized weakness, multifactorial, related to above issues.  Severe protein-calorie malnutrition in context of chronic illness.  * Presented with generalized weakness and increased fatigue, worse 2 days PTA. Likely due to anemia and sub-optimal nutrition. Patient's daughter reported that she eats and drinks very little. Evidence of dehydration present on arrival with mild DRAGAN. Other significant work-up:   ? UA on showed many bacteria, but sample was  contaminated  ? TTE (7/22) showed LVEF 55-60% with moderate diastolic dysfunction not significantly changed from prior study  ? No significant events on telemetry  - Treat above issues.  - Continue nutrition supplements/shakes per Nutrition.  - Continue PT, OT; PT/OT recommending TCU.     Hiatal hernia.  Noted on EGD (7/24). Started on Protonix.  - Continue Protonix.    Paroxysmal atrial fibrillation.  [PTA: diltiazem  mg daily, metoprolol  mg daily, apixaban 5 mg BID]  PTA apixaban has been decreased as noted above.  - Hold apixaban as above.  - Continue diltiazem and metoprolol XL.     Essential hypertension.  [PTA: diltiazem  mg daily, metoprolol  mg daily, hydralazine 50 mg TID, lisinopril 20 mg daily.]  - Continues on diltiazem, metoprolol XL, hydralazine, lisinopril.     Hypothyroidism.  TSH 7/21 normal.  - Continue PTA levothyroxine.       COVID-19 testing.  COVID-19 PCR Results    COVID-19 PCR Results 7/21/20 7/21/20    1243 1243   COVID-19 Virus PCR to [facility] - Result Test received-See reflex to IDDL test SARS CoV2 (COVID-19) Virus RT-PCR    COVID-19 Virus PCR to [facility] - Source Nasopharyngeal    SARS-CoV-2 Virus Specimen Source  Nasopharyngeal   SARS-CoV-2 PCR Result  NEGATIVE      Comments are available for some flowsheets but are not being displayed.         COVID-19 Antibody Results, Testing for Immunity    COVID-19 Antibody Results, Testing for Immunity   No data to display.             Diet: 2 Gram Sodium Diet  Room Service  Snacks/Supplements Adult: Other; Plus2 Shake; Between Meals  Advance Diet as Tolerated    Prophylaxis: PCD's, ambulation. On apixaban.  Dejesus Catheter: not present  Code Status: No CPR- Do NOT Intubate    Disposition Plan   Expected discharge: 1-2d, recommended to prior living arrangement pending above.  Entered: Ganga Mcculloguh MD 07/27/2020, 2:38 PM         Interval History    Febrile this am.  Pt c/o knee pain and lower back pain.  Continued elbow  "pain and swelling.    -Data reviewed today: I reviewed all new labs and imaging over the last 24 hours. I personally reviewed no images or EKG's today.    Physical Exam   Heart Rate: 82, Blood pressure 130/51, pulse 62, temperature 98.4  F (36.9  C), temperature source Oral, resp. rate 16, height 1.626 m (5' 4\"), weight 70.3 kg (155 lb), SpO2 100 %, not currently breastfeeding.  Vitals:    07/21/20 0836 07/21/20 1300 07/24/20 0915   Weight: 70.3 kg (155 lb) 61.4 kg (135 lb 4.8 oz) 70.3 kg (155 lb)     Vital Signs with Ranges  Temp:  [98.4  F (36.9  C)-101.5  F (38.6  C)] 98.4  F (36.9  C)  Pulse:  [62] 62  Heart Rate:  [67-82] 82  Resp:  [15-17] 16  BP: (130-156)/(51-66) 130/51  SpO2:  [97 %-100 %] 100 %  Patient Vitals for the past 24 hrs:   BP Temp Temp src Pulse Heart Rate Resp SpO2   07/27/20 1240 130/51 98.4  F (36.9  C) Oral 62 -- 16 100 %   07/27/20 0718 (!) 156/66 101.5  F (38.6  C) Oral -- 82 17 98 %   07/27/20 0614 -- -- -- -- -- 17 --   07/27/20 0330 -- -- -- -- -- 17 --   07/27/20 0222 -- -- -- -- -- 16 --   07/26/20 2247 (!) 141/59 98.7  F (37.1  C) Oral -- 79 17 98 %   07/26/20 2123 -- -- -- -- -- 16 --   07/26/20 2040 -- -- -- -- -- 16 --   07/26/20 1954 -- -- -- -- -- 15 --   07/26/20 1548 134/57 98.5  F (36.9  C) Oral -- 67 16 97 %   07/26/20 1444 -- -- -- -- -- 16 --     I/O's Last 24 hours  I/O last 3 completed shifts:  In: 440 [P.O.:440]  Out: -     Constitutional: Awake, alert, pale.  Respiratory: Diminished in bases. No crackles or wheezes.  Cardiovascular: Rate normal, no m/r/g.  GI: Soft, nt, nd, +BS.  Skin/Integumen:   Other:  Both knees with mild swelling/effusions, warm, but not hot, not exquisitely tender, not erythematous, no significant pain with passive ROM in knees. Elbows with swelling bilaterally.      Data   Recent Labs   Lab 07/27/20  0858 07/26/20  0850 07/25/20  0554 07/24/20  0849  07/21/20 2038 07/21/20  0849   WBC 4.8  --  6.2 5.1   < >  --  4.4   HGB 7.2* 7.3* 7.7* 8.3*   < " >  --  7.8*   MCV 93  --  94 93   < >  --  94     --  387 399   < >  --  466*   INR  --   --   --   --   --   --  2.07*    135 138 137   < >  --  135   POTASSIUM 4.3 4.3 4.2 4.1   < >  --  4.4   CHLORIDE 111* 111* 110* 113*   < >  --  112*   CO2 18* 18* 17* 17*   < >  --  14*   BUN 33* 30 31* 25   < >  --  39*   CR 1.08* 1.08* 1.16* 0.97   < >  --  1.43*   ANIONGAP 6 6  --  7   < >  --  9   ALIVIA 8.5 8.6 8.9 9.1   < >  --  9.3   GLC 93 84 121* 80   < >  --  91   ALBUMIN  --   --  1.7*  --   --  2.0*  --    PROTTOTAL  --   --  6.2*  --   --  6.4*  --    BILITOTAL  --   --  0.4  --   --  0.4  --    ALKPHOS  --   --  90  --   --  69  --    ALT  --   --  10  --   --  8  --    AST  --   --  25  --   --  18  --    TROPI  --   --   --   --   --   --  <0.015    < > = values in this interval not displayed.     Recent Labs   Lab Test 07/27/20  0858 07/26/20  0850 07/25/20  0554 07/24/20  0849 07/23/20  0950  10/04/19  1220 10/04/19  0832   GLC 93 84 121* 80 92   < >  --   --    BGM  --   --   --   --   --   --  119* 100*    < > = values in this interval not displayed.         No results found for this or any previous visit (from the past 24 hour(s)).    Medications   All medications were reviewed.      acetaminophen  1,000 mg Oral TID     allopurinol  100 mg Oral Daily     apixaban ANTICOAGULANT  2.5 mg Oral BID     diltiazem ER COATED BEADS  120 mg Oral Daily     hydrALAZINE  50 mg Oral TID     levothyroxine  25 mcg Oral Daily     lisinopril  20 mg Oral Daily     magnesium citrate  296 mL Oral Once     metoprolol succinate ER  100 mg Oral Daily     pantoprazole  40 mg Oral QAM AC

## 2020-07-27 NOTE — PROVIDER NOTIFICATION
MD Notification    Notified Person: MD    Notified Person Name: Dr. Mccullough    Notification Date/Time:7/27/20 @0959    Notification Interaction:web paged    Purpose of Notification: FYI: Patient temp this am 101.5. tylenol given, did you want to do anything else?    Orders Received:Not at this time    Comments:

## 2020-07-27 NOTE — PLAN OF CARE
DATE & TIME: 7/27/20 7-3pm  Cognitive Concerns/ Orientation : A&O x4, forgetful   BEHAVIOR & AGGRESSION TOOL COLOR: Green   CIWA SCORE: NA  ABNL VS/O2: VSS on RA ex elevated BP  MOBILITY: A x2, turn & repo Q2 hours, up with 2 assist/belt/walker. In chair for meals.Encouraged mobility.   PAIN MANAGMENT: Pt given PRN Tramadol x1 for bilateral elbow and knee pain. Pt states it is her bursitis flaring. Has scheduled Tylenol.  DIET: 2g Na   BOWEL/BLADDER: Incontinent. No BM. Needs urine sample.   ABNL LAB/BG: Hgb 7.2   DRAIN/DEVICES: R arm PIV SL  TELEMETRY RHYTHM: NA  SKIN: Pale, intact. BLE & BUE edema 2+. Bel rash improving   TESTS/PROCEDURES: Potential capsule study 7/27 or 7/28 pending equipment availability   D/C DAY/GOALS/PLACE: Pending hgb improvement   OTHER IMPORTANT INFO: On eliquis. Hem/onc and GI following

## 2020-07-27 NOTE — PROGRESS NOTES
[facility]  Hematology/oncology Progress Note            History:   Ghislaine Walls is an 84-year-old woman with a history of hypertension, chronic kidney disease and hypothyroidism.  She was hospitalized on 07/21 with generalized weakness for the previous 2 weeks.  She was not able to get necessary assistance from the assisted living and evaluated in the ER.  She has some joint pain and underwent injection to both shoulders with improvement and is seen by [facility] and has diagnosis of bursitis.  She also has some joint ache in her hands.  Denies any bleeding, melena, or hematochezia.  She was found to have anemia with a hemoglobin at 7.8 on admission, down to 6.7 on the 07/23, required red cell transfusion.  She was seen by GI and underwent a colonoscopy and EGD on 07/24.  The colonoscopy showed a 10 mm polyp in the transverse colon, removed.  The examination otherwise was normal.  Upper endoscopy showed a normal esophagus medium-sized hiatal hernia, duodenal erosions without bleeding, biopsied.  Blood work for the anemia was obtained.  Peripheral blood smear is pending.  She has a normal B12 level 556, folic acid of 6.8, ferritin 484, iron 9, saturation 5%, TIBC 167; all are low.  Her creatinine 1.14 with GFR estimated 44.  Haptoglobin obtained and is pending.  LDH normal, 152.      PAST MEDICAL HISTORY:  Hearing deficit, dyslipidemia, hypertension, hypothyroidism, peripheral artery disease, renal insufficiency, history of appendectomy, cataract surgery bilaterally, tubal ligation, joint pain and bursitis status post subacromial bursa injections.        SOCIAL HISTORY:  She lives in assisted living.  Currently nonsmoker for the last 47 years.  Alcohol, not heavy.      FAMILY HISTORY:  Negative for malignancy or blood disorders.      INTERVAL HISTORY:  Pt has no appetite for few months, no bleeding           Medications:       acetaminophen  1,000 mg Oral TID     allopurinol  100 mg Oral Daily      apixaban ANTICOAGULANT  2.5 mg Oral BID     diltiazem ER COATED BEADS  120 mg Oral Daily     hydrALAZINE  50 mg Oral TID     levothyroxine  25 mcg Oral Daily     lisinopril  20 mg Oral Daily     magnesium citrate  296 mL Oral Once     metoprolol succinate ER  100 mg Oral Daily     pantoprazole  40 mg Oral QAM AC                ROS:   RESP, CV, GI,, MUSCULOSKELETAL, HEME/ALLERGY/IMMUNE,  Skin: reviewed and negative except the positives mentioned in this note            Physical Exam:       Vital Sign Ranges  Temp:  [98.5  F (36.9  C)-101.5  F (38.6  C)] 101.5  F (38.6  C)  Heart Rate:  [67-82] 82  Resp:  [15-17] 17  BP: (134-156)/(57-66) 156/66  SpO2:  [97 %-98 %] 98 %      I/O last 3 completed shifts:  In: 440 [P.O.:440]  Out: -     Constitutional: no apparent distress  HEENT: unremarkable  Neck: Supple  Lungs: No increased work of breathing  Ext: no edema, cyanosis           Data:       ROUTINE LABS (Last four results)  CMP  Recent Labs   Lab 07/26/20  0850 07/25/20  0554 07/24/20  0849 07/23/20  0950 07/22/20  1031 07/21/20 2038    138 137 138 136  --    POTASSIUM 4.3 4.2 4.1 4.1 4.4  --    CHLORIDE 111* 110* 113* 113* 113*  --    CO2 18* 17* 17* 16* 15*  --    ANIONGAP 6  --  7 9 8  --    GLC 84 121* 80 92 122*  --    BUN 30 31* 25 31* 33*  --    CR 1.08* 1.16* 0.97 1.06* 1.14*  --    GFRESTIMATED 47* 43* 54* 48* 44*  --    GFRESTBLACK 55* 50* 62 56* 51*  --    ALIVIA 8.6 8.9 9.1 8.7 8.9  --    PROTTOTAL  --  6.2*  --   --   --  6.4*   ALBUMIN  --  1.7*  --   --   --  2.0*   BILITOTAL  --  0.4  --   --   --  0.4   ALKPHOS  --  90  --   --   --  69   AST  --  25  --   --   --  18   ALT  --  10  --   --   --  8     CBC  Recent Labs   Lab 07/27/20  0858 07/26/20  0850 07/25/20  0554 07/24/20  0849  07/22/20  1031   WBC 4.8  --  6.2 5.1  --  5.8   RBC 2.42*  --  2.57* 2.81*  --  2.46*   HGB 7.2* 7.3* 7.7* 8.3*   < > 7.2*   HCT 22.6*  --  24.1* 26.1*  --  23.2*   MCV 93  --  94 93  --  94   MCH 29.8  --  30.0  29.5  --  29.3   MCHC 31.9  --  32.2 31.8  --  31.0*   RDW 15.9*  --  16.2* 16.0*  --  15.9*     --  387 399  --  434    < > = values in this interval not displayed.     INR  Recent Labs   Lab 07/21/20  0849   INR 2.07*     Immunofixation serum and urine pending.  Smear and haptoglobin pending         Assessment and Plan:   1.  Normochromic normocytic anemia, etiology unclear.  There is no evidence of B12 deficiency or folic acid deficiency.  Her iron levels are low, but ferritin is high, which is more consistent with chronic illness or chronic kidney disease.  Chronic kidney disease can be associated with anemia, although her kidney function did not change since 2018 when her hemoglobin was normal and the anemia was moderate in 2019.   2.  Chronic kidney disease.   3.  Arthritis and bursitis.   4.  Negative GI workup and negative Hemoccult occult stool.      PLAN:    I will follow up on the haptoglobin immune fixation on urine and serum in addition to EPO level and peripheral blood smear to rule out hemolysis and other abnormalities on peripheral blood smear.    If the diagnostic workup is negative, she will need bone marrow biopsy to rule out primary myeloid disorders including myelodysplastic syndrome.   Transfuse RBC if hgb 7 or lower, or symptomatic      Lawrence Oh M.D.

## 2020-07-27 NOTE — PLAN OF CARE
DATE & TIME: 7/26/20 1900-0730  Cognitive Concerns/ Orientation : A&O x4, forgetful   BEHAVIOR & AGGRESSION TOOL COLOR: Green   CIWA SCORE: NA  ABNL VS/O2: VSS on RA ex elevated BP  MOBILITY: A x2, turn & repo q2h, pt refuses at times. Encouraged mobility.   PAIN MANAGMENT: Pt given PRN Tramadol x1 for bilateral elbow and knee pain. Attempted repositioning and rest first. Hot and cold packs refused. Pt states it is her bursitis flaring. Received scheduled Tylenol before bedtime.   DIET: 2g Na   BOWEL/BLADDER: Incontinent. No BM. Needs urine sample.   ABNL LAB/BG: Hgb 7.3 7/26 AM lab (MD aware)  DRAIN/DEVICES: R arm PIV SL  TELEMETRY RHYTHM: NA  SKIN: Pale, intact. BLE & BUE edema 2+. Bel rash improving   TESTS/PROCEDURES: Potential capsule study 7/27 or 7/28 pending equipment availability   D/C DAY/GOALS/PLACE: Pending hgb improvement   OTHER IMPORTANT INFO: On eliquis. Hem/onc and GI following     COMMIT TO SIT DONE AND SIGNED OFF Yes

## 2020-07-28 LAB
ANA PAT SER IF-IMP: ABNORMAL
ANA SER QL IF: POSITIVE
ANA TITR SER IF: ABNORMAL {TITER}
ANION GAP SERPL CALCULATED.3IONS-SCNC: 6 MMOL/L (ref 3–14)
BACTERIA SPEC CULT: NO GROWTH
BASOPHILS # BLD AUTO: 0 10E9/L (ref 0–0.2)
BASOPHILS NFR BLD AUTO: 0 %
BUN SERPL-MCNC: 28 MG/DL (ref 7–30)
CALCIUM SERPL-MCNC: 9 MG/DL (ref 8.5–10.1)
CCP AB SER IA-ACNC: >340 U/ML
CHLORIDE SERPL-SCNC: 110 MMOL/L (ref 94–109)
CO2 SERPL-SCNC: 17 MMOL/L (ref 20–32)
COPATH REPORT: NORMAL
COPATH REPORT: NORMAL
CREAT SERPL-MCNC: 1.04 MG/DL (ref 0.52–1.04)
CRP SERPL-MCNC: 110 MG/L (ref 0–8)
DIFFERENTIAL METHOD BLD: ABNORMAL
EOSINOPHIL # BLD AUTO: 0 10E9/L (ref 0–0.7)
EOSINOPHIL NFR BLD AUTO: 0 %
ERYTHROCYTE [DISTWIDTH] IN BLOOD BY AUTOMATED COUNT: 15.8 % (ref 10–15)
GFR SERPL CREATININE-BSD FRML MDRD: 49 ML/MIN/{1.73_M2}
GLUCOSE SERPL-MCNC: 86 MG/DL (ref 70–99)
HCT VFR BLD AUTO: 25 % (ref 35–47)
HGB BLD-MCNC: 7.7 G/DL (ref 11.7–15.7)
IGA SERPL-MCNC: 329 MG/DL (ref 84–499)
IGG SERPL-MCNC: 1545 MG/DL (ref 610–1616)
IGM SERPL-MCNC: 57 MG/DL (ref 35–242)
LYMPHOCYTES # BLD AUTO: 1.5 10E9/L (ref 0.8–5.3)
LYMPHOCYTES NFR BLD AUTO: 32 %
Lab: NORMAL
MCH RBC QN AUTO: 28.9 PG (ref 26.5–33)
MCHC RBC AUTO-ENTMCNC: 30.8 G/DL (ref 31.5–36.5)
MCV RBC AUTO: 94 FL (ref 78–100)
MONOCYTES # BLD AUTO: 0.4 10E9/L (ref 0–1.3)
MONOCYTES NFR BLD AUTO: 9 %
NEUTROPHILS # BLD AUTO: 2.8 10E9/L (ref 1.6–8.3)
NEUTROPHILS NFR BLD AUTO: 59 %
PLATELET # BLD AUTO: 321 10E9/L (ref 150–450)
PLATELET # BLD EST: ABNORMAL 10*3/UL
POTASSIUM SERPL-SCNC: 4.6 MMOL/L (ref 3.4–5.3)
PROT ELPH PNL UR ELPH: NORMAL
PROT PATTERN SERPL IFE-IMP: NORMAL
RBC # BLD AUTO: 2.66 10E12/L (ref 3.8–5.2)
RBC MORPH BLD: ABNORMAL
RHEUMATOID FACT SER NEPH-ACNC: 42 IU/ML (ref 0–20)
SMUDGE CELLS BLD QL SMEAR: PRESENT
SODIUM SERPL-SCNC: 133 MMOL/L (ref 133–144)
SPECIMEN SOURCE: NORMAL
URATE SERPL-MCNC: 5.2 MG/DL (ref 2.6–6)
WBC # BLD AUTO: 4.7 10E9/L (ref 4–11)

## 2020-07-28 PROCEDURE — 99232 SBSQ HOSP IP/OBS MODERATE 35: CPT | Performed by: INTERNAL MEDICINE

## 2020-07-28 PROCEDURE — 84550 ASSAY OF BLOOD/URIC ACID: CPT | Performed by: INTERNAL MEDICINE

## 2020-07-28 PROCEDURE — 36415 COLL VENOUS BLD VENIPUNCTURE: CPT | Performed by: INTERNAL MEDICINE

## 2020-07-28 PROCEDURE — 25000132 ZZH RX MED GY IP 250 OP 250 PS 637: Mod: GY | Performed by: INTERNAL MEDICINE

## 2020-07-28 PROCEDURE — 12000000 ZZH R&B MED SURG/OB

## 2020-07-28 PROCEDURE — 85025 COMPLETE CBC W/AUTO DIFF WBC: CPT | Performed by: INTERNAL MEDICINE

## 2020-07-28 PROCEDURE — 80048 BASIC METABOLIC PNL TOTAL CA: CPT | Performed by: INTERNAL MEDICINE

## 2020-07-28 PROCEDURE — 86140 C-REACTIVE PROTEIN: CPT | Performed by: INTERNAL MEDICINE

## 2020-07-28 PROCEDURE — 25000131 ZZH RX MED GY IP 250 OP 636 PS 637: Mod: GY | Performed by: PHYSICIAN ASSISTANT

## 2020-07-28 RX ORDER — METHYLPREDNISOLONE 4 MG/1
4 TABLET ORAL
Status: DISCONTINUED | OUTPATIENT
Start: 2020-07-29 | End: 2020-07-29

## 2020-07-28 RX ORDER — METHYLPREDNISOLONE 4 MG/1
8 TABLET ORAL ONCE
Status: COMPLETED | OUTPATIENT
Start: 2020-07-28 | End: 2020-07-28

## 2020-07-28 RX ORDER — METHYLPREDNISOLONE 4 MG/1
4 TABLET ORAL ONCE
Status: COMPLETED | OUTPATIENT
Start: 2020-07-28 | End: 2020-07-28

## 2020-07-28 RX ORDER — METHYLPREDNISOLONE 4 MG/1
4 TABLET ORAL AT BEDTIME
Status: DISCONTINUED | OUTPATIENT
Start: 2020-07-30 | End: 2020-07-29

## 2020-07-28 RX ORDER — METHYLPREDNISOLONE 4 MG/1
8 TABLET ORAL AT BEDTIME
Status: DISCONTINUED | OUTPATIENT
Start: 2020-07-28 | End: 2020-07-29

## 2020-07-28 RX ADMIN — METHYLPREDNISOLONE 8 MG: 4 TABLET ORAL at 12:01

## 2020-07-28 RX ADMIN — LISINOPRIL 20 MG: 20 TABLET ORAL at 09:00

## 2020-07-28 RX ADMIN — TRAMADOL HYDROCHLORIDE 50 MG: 50 TABLET, FILM COATED ORAL at 23:04

## 2020-07-28 RX ADMIN — TRAMADOL HYDROCHLORIDE 50 MG: 50 TABLET, FILM COATED ORAL at 09:03

## 2020-07-28 RX ADMIN — HYDRALAZINE HYDROCHLORIDE 50 MG: 50 TABLET, FILM COATED ORAL at 21:21

## 2020-07-28 RX ADMIN — HYDRALAZINE HYDROCHLORIDE 50 MG: 50 TABLET, FILM COATED ORAL at 09:00

## 2020-07-28 RX ADMIN — METOPROLOL SUCCINATE 100 MG: 100 TABLET, EXTENDED RELEASE ORAL at 09:00

## 2020-07-28 RX ADMIN — ACETAMINOPHEN 1000 MG: 500 TABLET, FILM COATED ORAL at 21:21

## 2020-07-28 RX ADMIN — LEVOTHYROXINE SODIUM 25 MCG: 25 TABLET ORAL at 07:33

## 2020-07-28 RX ADMIN — ALLOPURINOL 100 MG: 100 TABLET ORAL at 09:00

## 2020-07-28 RX ADMIN — METHYLPREDNISOLONE 4 MG: 4 TABLET ORAL at 16:05

## 2020-07-28 RX ADMIN — HYDRALAZINE HYDROCHLORIDE 50 MG: 50 TABLET, FILM COATED ORAL at 14:31

## 2020-07-28 RX ADMIN — ACETAMINOPHEN 1000 MG: 500 TABLET, FILM COATED ORAL at 08:59

## 2020-07-28 RX ADMIN — METHYLPREDNISOLONE 4 MG: 4 TABLET ORAL at 12:01

## 2020-07-28 RX ADMIN — PANTOPRAZOLE SODIUM 40 MG: 40 TABLET, DELAYED RELEASE ORAL at 07:33

## 2020-07-28 RX ADMIN — METHYLPREDNISOLONE 8 MG: 4 TABLET ORAL at 21:23

## 2020-07-28 RX ADMIN — APIXABAN 5 MG: 5 TABLET, FILM COATED ORAL at 21:22

## 2020-07-28 RX ADMIN — ACETAMINOPHEN 1000 MG: 500 TABLET, FILM COATED ORAL at 14:31

## 2020-07-28 RX ADMIN — DILTIAZEM HYDROCHLORIDE 120 MG: 120 CAPSULE, COATED, EXTENDED RELEASE ORAL at 09:00

## 2020-07-28 ASSESSMENT — ACTIVITIES OF DAILY LIVING (ADL)
ADLS_ACUITY_SCORE: 28
ADLS_ACUITY_SCORE: 30
ADLS_ACUITY_SCORE: 27
ADLS_ACUITY_SCORE: 29
ADLS_ACUITY_SCORE: 28
ADLS_ACUITY_SCORE: 30

## 2020-07-28 NOTE — CONSULTS
[facility]    Orthopedic Consultation    Ghislaine Walls MRN# [id number]   Age: 84 year old YOB: 1935     Date of Admission:  7/21/2020    Reason for consult: Joint pain and swelling       Requesting physician: Dr. Mccullough       Level of consult: One-time consult to assist in determining a diagnosis, recommend an appropriate treatment plan and place orders           Assessment and Plan:   Assessment:   History of gout  Multi joint inflammatory arthritis      Plan:   Conservative treatment at this time  Will Consult Rheum for possible chart review and suggestions  Medrol dose pack ordered  Pain medication as needed  May WBAT with walker  PT ordered           Chief Complaint:   Bilateral shoulder, bilateral elbow and bilateral knee pain         History of Present Illness:   This patient is a 84 year old female who presents with the following condition requiring a hospital admission:    Per note review: She was hospitalized on 07/21 with generalized weakness for the previous 2 weeks.  She was not able to get necessary assistance from the assisted living and evaluated in the ER.  She has some joint pain and underwent injection to both shoulders with improvement and is seen by [facility] and has diagnosis of bursitis.  She also has some joint ache in her hands.  Denies any bleeding, melena, or hematochezia.  She was found to have anemia with a hemoglobin at 7.8 on admission, down to 6.7 on the 07/23, required red cell transfusion.  She was seen by GI and underwent a colonoscopy and EGD on 07/24.  The colonoscopy showed a 10 mm polyp in the transverse colon, removed.  The examination otherwise was normal.  Upper endoscopy showed a normal esophagus medium-sized hiatal hernia, duodenal erosions without bleeding, biopsied.      In discussion with patient she has never had a workup through rheumatology. She notes that the injections in her shoulders only provider her with 3-4 weeks of relief.  She has  "had gout in the past and is on Allopurinol, which has been continued throughout hospital stays. She has had a low grade fever but feels otherwise \"fine.\"          Past Medical History:     Past Medical History:   Diagnosis Date     Atrial fibrillation (H) new 10/19     Andreafski (hard of hearing)     wears hearing aids     Hyperlipidaemia      Hyperlipidaemia LDL goal < 130      Hypertension      Hypothyroid      PAD (peripheral artery disease) (H)      Renal insufficiency, mild      Uncontrolled hypertension 10/14/2019             Past Surgical History:     Past Surgical History:   Procedure Laterality Date     APPENDECTOMY       CATARACT IOL, RT/LT      bilateral (laser - 2013)     DENTAL SURGERY      wisdom     ESOPHAGOSCOPY, GASTROSCOPY, DUODENOSCOPY (EGD), COMBINED N/A 2020    Procedure: ESOPHAGOGASTRODUODENOSCOPY, WITH BIOPSY;  Surgeon: Humberto Bailon MD;  Location:  GI     EYE SURGERY      Muscle release     TUBAL LIGATION               Social History:     Social History     Tobacco Use     Smoking status: Former Smoker     Packs/day: 0.50     Years: 5.00     Pack years: 2.50     Types: Cigarettes     Last attempt to quit: 1973     Years since quittin.6     Smokeless tobacco: Never Used   Substance Use Topics     Alcohol use: Yes     Alcohol/week: 0.0 standard drinks     Comment: 1 beer on              Family History:     Family History   Problem Relation Age of Onset     Heart Disease Mother      Respiratory Mother      Heart Disease Father      Heart Disease Brother      Coronary Artery Disease Brother         CAB     Heart Disease Brother      Coronary Artery Disease Brother         CAB             Immunizations:     VACCINE/DOSE   Diptheria   DPT   DTAP   HBIG   Hepatitis A   Hepatitis B   HIB   Influenza   Measles   Meningococcal   MMR   Mumps   Pneumococcal   Polio   Rubella   Small Pox   TDAP   Varicella   Zoster             Allergies:     Allergies "   Allergen Reactions     Oxybutynin Itching     Seasonal Allergies              Medications:     Current Facility-Administered Medications   Medication     acetaminophen (TYLENOL) tablet 1,000 mg     allopurinol (ZYLOPRIM) tablet 100 mg     diltiazem ER COATED BEADS (CARDIZEM CD/CARTIA XT) 24 hr capsule 120 mg     hydrALAZINE (APRESOLINE) injection 10 mg     hydrALAZINE (APRESOLINE) tablet 50 mg     levothyroxine (SYNTHROID/LEVOTHROID) tablet 25 mcg     lidocaine 1 % 10 mL     lisinopril (ZESTRIL) tablet 20 mg     magnesium citrate solution 296 mL     melatonin tablet 1 mg     metoprolol succinate ER (TOPROL-XL) 24 hr tablet 100 mg     naloxone (NARCAN) injection 0.1-0.4 mg     ondansetron (ZOFRAN-ODT) ODT tab 4 mg    Or     ondansetron (ZOFRAN) injection 4 mg     pantoprazole (PROTONIX) EC tablet 40 mg     prochlorperazine (COMPAZINE) injection 5 mg     sodium chloride (PF) 0.9% PF flush 3 mL     traMADol (ULTRAM) tablet 50 mg             Review of Systems:   CV: NEGATIVE for chest pain, palpitations or peripheral edema  C: NEGATIVE for fever, chills, change in weight  E/M: NEGATIVE for ear, mouth and throat problems  R: NEGATIVE for significant cough or SOB          Physical Exam:   All vitals have been reviewed  Patient Vitals for the past 24 hrs:   BP Temp Temp src Pulse Heart Rate Resp SpO2   07/28/20 1000 -- -- -- -- -- 16 --   07/28/20 0903 -- -- -- -- -- 16 --   07/28/20 0742 (!) 157/67 99.9  F (37.7  C) Oral -- 85 16 96 %   07/27/20 2337 134/57 98.1  F (36.7  C) Oral -- 81 17 99 %   07/27/20 2109 (!) 156/62 -- -- -- -- -- --   07/27/20 1523 128/53 97.8  F (36.6  C) Oral 70 71 16 98 %   07/27/20 1240 130/51 98.4  F (36.9  C) Oral 62 -- 16 100 %       Intake/Output Summary (Last 24 hours) at 7/28/2020 1139  Last data filed at 7/28/2020 1000  Gross per 24 hour   Intake 420 ml   Output --   Net 420 ml     Patient laying comfortably in bed   No ecchymosis or erythema over entirety of the left leg  No notable  swelling or edema in extremities  Bilateral shoulders with 1+ effusion  No erythema over UE entirely  No TTP over joints of the bilateral shoulders, elbows, or wrists  No pain with passive internal or external ROM of the shoulders  Pain with shoulder flexion passed 45 degrees in posterior joints  Full ROM of the elbow and wrists without pain  Full ROM of bilateral knees - 0 to 100 degrees limited by position in bed  Audible patellar crepitus  No TTP over medial or lateral joint line  1+ joint effusion bilaterally  No erythema over either knee  Hip flexes to 100 degrees  Internal rotation 30 degrees, External rotation 15 degrees  No pain with internal/external rotation while in flexion  No TTP over great trochanter  Bilateral calves are soft, non-tender.  Bilateral lower extremity is NVI.  Sensation intact bilateral lower extremities  5/5 motor with resisted dorsi and plantar flexion bilaterally  5/5 hip flexors  5/5 EHL  +Dp pulse          Data:   All laboratory data reviewed  Results for orders placed or performed during the hospital encounter of 07/21/20   XR Chest Port 1 View     Status: None    Narrative    CHEST PORTABLE ONE VIEW July 21, 2020 11:52 AM     HISTORY: Chronic cough, weakness.     COMPARISON: 10/1/2019.    FINDINGS: Slight aortic tortuosity.      Impression    IMPRESSION: No acute consolidation.    DINESH MONTENEGRO MD   XR Knee Bilateral 1/2 Views     Status: None    Narrative    EXAM: XR KNEE BILATERAL 1/2 VW  LOCATION: [facility]  DATE/TIME: 7/27/2020 6:13 PM    INDICATION: Evaluate DJD  COMPARISON: None.      Impression    IMPRESSION:   RIGHT KNEE: No acute fracture or malalignment. Severe patellofemoral, mild medial, and mild lateral compartment degenerative changes. Small knee joint effusion. Osteopenia.    LEFT KNEE: No acute fracture or malalignment. Severe patellofemoral, mild medial, and mild lateral compartment degenerative changes. Small knee joint effusion. Osteopenia.   XR  Elbow Bilateral G/E 3 Views     Status: None    Narrative    EXAM: XR ELBOW BILATERAL G/E 3 VW  LOCATION: [facility]  DATE/TIME: 7/27/2020 6:14 PM    INDICATION: Evaluate DJD  COMPARISON: None.      Impression    IMPRESSION:     Right elbow: No acute fracture or malalignment. Mild elbow joint degenerative changes. Question a small elbow joint effusion. Osteopenia. Diffuse soft tissue swelling.    Left elbow: No acute fracture or malalignment. Mild elbow joint degenerative changes. Moderate elbow joint effusion. Osteopenia. Diffuse soft tissue swelling.     CBC with platelets differential     Status: Abnormal   Result Value Ref Range    WBC 4.4 4.0 - 11.0 10e9/L    RBC Count 2.62 (L) 3.8 - 5.2 10e12/L    Hemoglobin 7.8 (L) 11.7 - 15.7 g/dL    Hematocrit 24.7 (L) 35.0 - 47.0 %    MCV 94 78 - 100 fl    MCH 29.8 26.5 - 33.0 pg    MCHC 31.6 31.5 - 36.5 g/dL    RDW 15.6 (H) 10.0 - 15.0 %    Platelet Count 466 (H) 150 - 450 10e9/L    Diff Method Manual Differential     % Neutrophils 66.0 %    % Lymphocytes 15.0 %    % Monocytes 16.0 %    % Eosinophils 1.0 %    % Basophils 2.0 %    Absolute Neutrophil 2.9 1.6 - 8.3 10e9/L    Absolute Lymphocytes 0.7 (L) 0.8 - 5.3 10e9/L    Absolute Monocytes 0.7 0.0 - 1.3 10e9/L    Absolute Eosinophils 0.0 0.0 - 0.7 10e9/L    Absolute Basophils 0.1 0.0 - 0.2 10e9/L    Anisocytosis Slight     Ovalocytes Slight     Platelet Estimate       Automated count confirmed.  Platelet morphology is normal.   Basic metabolic panel     Status: Abnormal   Result Value Ref Range    Sodium 135 133 - 144 mmol/L    Potassium 4.4 3.4 - 5.3 mmol/L    Chloride 112 (H) 94 - 109 mmol/L    Carbon Dioxide 14 (L) 20 - 32 mmol/L    Anion Gap 9 3 - 14 mmol/L    Glucose 91 70 - 99 mg/dL    Urea Nitrogen 39 (H) 7 - 30 mg/dL    Creatinine 1.43 (H) 0.52 - 1.04 mg/dL    GFR Estimate 34 (L) >60 mL/min/[1.73_m2]    GFR Estimate If Black 39 (L) >60 mL/min/[1.73_m2]    Calcium 9.3 8.5 - 10.1 mg/dL   UA with  Microscopic     Status: Abnormal   Result Value Ref Range    Color Urine Yellow     Appearance Urine Slightly Cloudy     Glucose Urine Negative NEG^Negative mg/dL    Bilirubin Urine Negative NEG^Negative    Ketones Urine Negative NEG^Negative mg/dL    Specific Gravity Urine 1.014 1.003 - 1.035    Blood Urine Negative NEG^Negative    pH Urine 5.0 5.0 - 7.0 pH    Protein Albumin Urine 30 (A) NEG^Negative mg/dL    Urobilinogen mg/dL Normal 0.0 - 2.0 mg/dL    Nitrite Urine Negative NEG^Negative    Leukocyte Esterase Urine Negative NEG^Negative    Source Midstream Urine     WBC Urine 1 0 - 5 /HPF    RBC Urine 12 (H) 0 - 2 /HPF    Bacteria Urine Many (A) NEG^Negative /HPF    Squamous Epithelial /HPF Urine 30 (H) 0 - 1 /HPF    Mucous Urine Present (A) NEG^Negative /LPF   INR     Status: Abnormal   Result Value Ref Range    INR 2.07 (H) 0.86 - 1.14   Occult blood stool (ED Lab POCT Only 3-11)     Status: None   Result Value Ref Range    Occult Blood Negative NEG^Negative   Nt probnp inpatient     Status: Abnormal   Result Value Ref Range    N-Terminal Pro BNP Inpatient 8,140 (H) 0 - 1,800 pg/mL   Troponin I     Status: None   Result Value Ref Range    Troponin I ES <0.015 0.000 - 0.045 ug/L   Symptomatic COVID-19 Virus (Coronavirus) by PCR     Status: None    Specimen: Nasopharyngeal   Result Value Ref Range    COVID-19 Virus PCR to [facility] - Source Nasopharyngeal     COVID-19 Virus PCR to [facility] - Result       Test received-See reflex to IDDL test SARS CoV2 (COVID-19) Virus RT-PCR   TSH with free T4 reflex     Status: None   Result Value Ref Range    TSH 1.86 0.40 - 4.00 mU/L   SARS-CoV-2 COVID-19 Virus (Coronavirus) RT-PCR Nasopharyngeal     Status: None    Specimen: Nasopharyngeal   Result Value Ref Range    SARS-CoV-2 Virus Specimen Source Nasopharyngeal     SARS-CoV-2 PCR Result NEGATIVE     SARS-CoV-2 PCR Comment       Testing was performed using the Xpert Xpress SARS-CoV-2 Assay on the Cepheid Gene-Xpert    [employer] Systems. Additional information about this Emergency Use Authorization (EUA)   assay can be found via the Lab Guide.     Hepatic panel     Status: Abnormal   Result Value Ref Range    Bilirubin Direct 0.1 0.0 - 0.2 mg/dL    Bilirubin Total 0.4 0.2 - 1.3 mg/dL    Albumin 2.0 (L) 3.4 - 5.0 g/dL    Protein Total 6.4 (L) 6.8 - 8.8 g/dL    Alkaline Phosphatase 69 40 - 150 U/L    ALT 8 0 - 50 U/L    AST 18 0 - 45 U/L   Basic metabolic panel     Status: Abnormal   Result Value Ref Range    Sodium 136 133 - 144 mmol/L    Potassium 4.4 3.4 - 5.3 mmol/L    Chloride 113 (H) 94 - 109 mmol/L    Carbon Dioxide 15 (L) 20 - 32 mmol/L    Anion Gap 8 3 - 14 mmol/L    Glucose 122 (H) 70 - 99 mg/dL    Urea Nitrogen 33 (H) 7 - 30 mg/dL    Creatinine 1.14 (H) 0.52 - 1.04 mg/dL    GFR Estimate 44 (L) >60 mL/min/[1.73_m2]    GFR Estimate If Black 51 (L) >60 mL/min/[1.73_m2]    Calcium 8.9 8.5 - 10.1 mg/dL   CBC with platelets     Status: Abnormal   Result Value Ref Range    WBC 5.8 4.0 - 11.0 10e9/L    RBC Count 2.46 (L) 3.8 - 5.2 10e12/L    Hemoglobin 7.2 (L) 11.7 - 15.7 g/dL    Hematocrit 23.2 (L) 35.0 - 47.0 %    MCV 94 78 - 100 fl    MCH 29.3 26.5 - 33.0 pg    MCHC 31.0 (L) 31.5 - 36.5 g/dL    RDW 15.9 (H) 10.0 - 15.0 %    Platelet Count 434 150 - 450 10e9/L   Ferritin     Status: Abnormal   Result Value Ref Range    Ferritin 484 (H) 8 - 252 ng/mL   Iron and iron binding capacity     Status: Abnormal   Result Value Ref Range    Iron 9 (L) 35 - 180 ug/dL    Iron Binding Cap 167 (L) 240 - 430 ug/dL    Iron Saturation Index 5 (L) 15 - 46 %   Vitamin B12     Status: None   Result Value Ref Range    Vitamin B12 556 193 - 986 pg/mL   Folate     Status: None   Result Value Ref Range    Folate 6.8 >5.4 ng/mL   Basic metabolic panel     Status: Abnormal   Result Value Ref Range    Sodium 138 133 - 144 mmol/L    Potassium 4.1 3.4 - 5.3 mmol/L    Chloride 113 (H) 94 - 109 mmol/L    Carbon Dioxide 16 (L) 20 - 32 mmol/L    Anion Gap 9  3 - 14 mmol/L    Glucose 92 70 - 99 mg/dL    Urea Nitrogen 31 (H) 7 - 30 mg/dL    Creatinine 1.06 (H) 0.52 - 1.04 mg/dL    GFR Estimate 48 (L) >60 mL/min/[1.73_m2]    GFR Estimate If Black 56 (L) >60 mL/min/[1.73_m2]    Calcium 8.7 8.5 - 10.1 mg/dL   Hemoglobin     Status: Abnormal   Result Value Ref Range    Hemoglobin 6.7 (LL) 11.7 - 15.7 g/dL   Hemoglobin     Status: Abnormal   Result Value Ref Range    Hemoglobin 8.4 (L) 11.7 - 15.7 g/dL   CBC with platelets     Status: Abnormal   Result Value Ref Range    WBC 5.1 4.0 - 11.0 10e9/L    RBC Count 2.81 (L) 3.8 - 5.2 10e12/L    Hemoglobin 8.3 (L) 11.7 - 15.7 g/dL    Hematocrit 26.1 (L) 35.0 - 47.0 %    MCV 93 78 - 100 fl    MCH 29.5 26.5 - 33.0 pg    MCHC 31.8 31.5 - 36.5 g/dL    RDW 16.0 (H) 10.0 - 15.0 %    Platelet Count 399 150 - 450 10e9/L   Basic metabolic panel     Status: Abnormal   Result Value Ref Range    Sodium 137 133 - 144 mmol/L    Potassium 4.1 3.4 - 5.3 mmol/L    Chloride 113 (H) 94 - 109 mmol/L    Carbon Dioxide 17 (L) 20 - 32 mmol/L    Anion Gap 7 3 - 14 mmol/L    Glucose 80 70 - 99 mg/dL    Urea Nitrogen 25 7 - 30 mg/dL    Creatinine 0.97 0.52 - 1.04 mg/dL    GFR Estimate 54 (L) >60 mL/min/[1.73_m2]    GFR Estimate If Black 62 >60 mL/min/[1.73_m2]    Calcium 9.1 8.5 - 10.1 mg/dL   Surgical pathology exam     Status: None   Result Value Ref Range    Copath Report       Patient Name: ROSIO YOUNG  MR#: [id number]  Specimen #: [id number]  Collected: 7/24/2020  Received: 7/24/2020  Reported: 7/27/2020 14:46  Ordering Phy(s): DULCE MARIA SAGE    For improved result formatting, select 'View Enhanced Report Format' under   Linked Documents section.    SPECIMEN(S):  A: Duodenal biopsy  B: Gastric body and antrum biopsy  C: Colon polyp, transverse    FINAL DIAGNOSIS:  A: Small intestine, duodenum, biopsy  - Nonneoplastic small intestinal mucosa with mild chronic inflammatory   change and mildly increased  intraepithelial lymphocytes    B: Stomach,  "body, biopsy  - Nonneoplastic gastric antral and oxyntic mucosa, no evidence of active   gastritis or malignancy, special  stain for Helicobacter pylori pending    C: Colon, transverse, polypectomy  - Sessile serrated adenoma  - No evidence of cytological dysplasia or invasive malignancy    COMMENT:  Intraepithelial lymphocytosis in small intestine is etiologically   nonspecific and can be associated with   celiac disease, gluten resistant sprue, Helicobacter pylori gastritis,   viral infections, Crohn's disease and  microscopic colitis. Correlation with pertinent clinical and laboratory   features is suggested. Dr. Alberts has  reviewed part A in consultation.    Electronically signed out by:    Ede De La Torre M.D.    CLINICAL HISTORY:  Anemia    GROSS:  Three specimen containers with formalin are received labeled with the   patient's name, date of birth, and  medical record number.  Information on the requisition slip, containers,   and associated labels is confirmed.    A. The specimen is designated \"small intestine, duodenum\". The specimen   consists of four pale tan soft tissue  fragments measuring up to 0.3 cm in greatest dimension. The specimen is   entirely submitted in one cassette.    B. The specimen is designated \"gastric body\". The specimen consists of   five pale tan soft tissue fragments  measuring up to 0.3 cm in greatest dimension. The specimen is entirely   submitted in  one cassette.    C:  The specimen is designated \"large intestine, transverse\". The specimen   consists of two pale tan soft  tissue fragments measuring up to 0.4 cm in greatest dimension. The   specimen is entirely submitted in one  cassette. (Dictated by: Mery Balbuena 7/24/2020 11:49 AM)    MICROSCOPIC:  A. Sections of small intestine show a mildly increased predominantly   lymphocytic inflammatory cell infiltrate  within the lamina propria, associated with a mild focal increase in   intraepithelial lymphocytes. " Gastric  metaplastic changes are not observed. Granulomas are not identified. There   is no evidence of neoplasia.    B, C. Microscopic performed    The technical component of this testing was completed at the [facility], with the professional component performed   at the [facility], [address] [place] [address]  [phone] ([phone])    CPT Codes:  A: 88860-RY1  B: 38846-QI1, 8834 1-IHC  C: 34790-NO6    COLLECTION SITE:  Client: [facility]  Location: [facility] (S)     Hemoglobin     Status: Abnormal   Result Value Ref Range    Hemoglobin 7.7 (L) 11.7 - 15.7 g/dL   Creatinine     Status: Abnormal   Result Value Ref Range    Creatinine 1.16 (H) 0.52 - 1.04 mg/dL    GFR Estimate 43 (L) >60 mL/min/[1.73_m2]    GFR Estimate If Black 50 (L) >60 mL/min/[1.73_m2]   Blood Morphology Pathologist Review     Status: None   Result Value Ref Range    Copath Report       Patient Name: ROSIO YOUNG  MR#: [id number]  Specimen #: [id number]  Collected: 7/25/2020  Received: 7/27/2020  Reported: 7/28/2020 08:29  Ordering Phy(s): NADER CANTRELL    For improved result formatting, select 'View Enhanced Report Format' under   Linked Documents section.    TEST(S):  Peripheral Smear Morphology    FINAL DIAGNOSIS:  Peripheral blood: Examination:  - Moderate normocytic normochromic anemia with rare nucleated red blood   cells  - Absolute monocytosis  - See comment    COMMENT:  Causes for normochromic normocytic anemia typically include chronic   infectious/inflammatory conditions,  hypersplenomegaly, renal disease, medication reactions, nutritional   deficiencies, and acute hemorrhage.    Reactive-appearing monocytosis is nonspecific and may be associated with   such settings as acute and chronic  inflammatory states, myocardial infarction, hypothyroidism, or underlying   malignancy (hematopoietic and  non-hematopoietic).   Clinical correlation is recommende d.  Although   monocytosis may also be seen in chronic  myelomonocytic leukemia (CMML), a continued observation period of up to 6   months is recommended to ascertain  that the monocytosis is persistent and that no underlying cause of the   monocytosis becomes discovered.    Electronically signed out by:    Jules Alberts M.D.    CLINICAL HISTORY:  84-year-old woman with hypertension, chronic kidney disease, and   hypothyroidism who presents with anemia.    PERIPHERAL BLOOD DATA:    PERIPHERAL BLOOD DATA  Patient Value (Reference Range >18 year old female)  6.18. . .WBC   (4.0-11.0 x 10*9/L)  2.57. . .RBC   (3.8-5.2 x 10*12/L)  7.7. . .HGB   (11.7-15.7 g/dL)  24.1. . .HCT   (35.0-47.0 %)  93.8. . .MCV   (78-100fL)  30.0. . .MCH   (26.5-33.0 pg)  32.0. . .MCHC   (31.5-36.5 g/dL)  16.2. . .RDW   (10.0-15.0 %)  387. . .PLT   (150-450 x 10*9/L)  3.20. . .Retic   (0.5-2.0%)    PERIPHERAL BLOOD DIFFERENTIAL  (Reference ranges >18 year old female)    Percent  50.7. .Neutrophils, segmented and  bands   (40 - 75)  19.7. .Lymphocytes   (20 - 48)  27.2. .Monocytes   (0 - 12)  1.9. .Eosinophils   (0 - 6)  0.2. .Basophils   (0 - 2)    Absolute  3.13. .Neutrophils,segmented and bands    (1.6 - 8.3 x 10*9/L)  1.22. .Lymphocytes    (0.8 - 5.3 x 10*9/L)  1.68. .Monocytes    (0 -1.3 x 10*9/L)  0.12. .Eosinophils    (0 - 0.7 x 10*9/L)  0.01. .Basophils    (0 - 0.2 x 10*9/L)    PERIPHERAL MORPHOLOGY:    ERYTHROCYTES: The red blood cells appear normochromic. There is mild   anisocytosis with nonspecific  poikilocytosis.  Rare target forms are seen. Polychromasia is not   increased. No significant schistocytes are  identified.  No rouleaux formation is seen. Rare circulating nucleated red   blood cells are present.    LEUKOCYTES: Monocytes are slightly increased in number.  No overt   dysplastic changes are seen.  No circulating  blasts are identified.    PLATELETS: The platelets are normal in number and  granulation.    CPT Codes:  A: 24376-LFOQ    COLLECTION SITE:  Client:  [facility]  Location:  66 (S)     Haptoglobin     Status: Abnormal   Result Value Ref Range    Haptoglobin 312 (H) 32 - 197 mg/dL   D dimer quantitative     Status: Abnormal   Result Value Ref Range    D Dimer 5.0 (H) 0.0 - 0.50 ug/ml FEU   Albumin level     Status: Abnormal   Result Value Ref Range    Albumin 1.7 (L) 3.4 - 5.0 g/dL   Alkaline phosphatase     Status: None   Result Value Ref Range    Alkaline Phosphatase 90 40 - 150 U/L   ALT     Status: None   Result Value Ref Range    ALT 10 0 - 50 U/L   AST     Status: None   Result Value Ref Range    AST 25 0 - 45 U/L   Bilirubin  total     Status: None   Result Value Ref Range    Bilirubin Total 0.4 0.2 - 1.3 mg/dL   Urea nitrogen     Status: Abnormal   Result Value Ref Range    Urea Nitrogen 31 (H) 7 - 30 mg/dL   Calcium     Status: None   Result Value Ref Range    Calcium 8.9 8.5 - 10.1 mg/dL   Chloride     Status: Abnormal   Result Value Ref Range    Chloride 110 (H) 94 - 109 mmol/L   Co2 Total     Status: Abnormal   Result Value Ref Range    Carbon Dioxide 17 (L) 20 - 32 mmol/L   Glucose     Status: Abnormal   Result Value Ref Range    Glucose 121 (H) 70 - 99 mg/dL   Convert HGB to CBC with Diff     Status: Abnormal   Result Value Ref Range    WBC 6.2 4.0 - 11.0 10e9/L    RBC Count 2.57 (L) 3.8 - 5.2 10e12/L    Hematocrit 24.1 (L) 35.0 - 47.0 %    MCV 94 78 - 100 fl    MCH 30.0 26.5 - 33.0 pg    MCHC 32.2 31.5 - 36.5 g/dL    RDW 16.2 (H) 10.0 - 15.0 %    Platelet Count 387 150 - 450 10e9/L    Diff Method Manual Differential     % Neutrophils 76.0 %    % Lymphocytes 16.0 %    % Monocytes 6.0 %    % Eosinophils 1.0 %    % Basophils 1.0 %    Nucleated RBCs 2 (H) 0 /100    Absolute Neutrophil 4.7 1.6 - 8.3 10e9/L    Absolute Lymphocytes 1.0 0.8 - 5.3 10e9/L    Absolute Monocytes 0.4 0.0 - 1.3 10e9/L    Absolute Eosinophils 0.1 0.0 - 0.7 10e9/L    Absolute Basophils 0.1  0.0 - 0.2 10e9/L    Absolute Nucleated RBC 0.1     RBC Morphology Consistent with reported results     Platelet Estimate       Automated count confirmed.  Platelet morphology is normal.   Potassium     Status: None   Result Value Ref Range    Potassium 4.2 3.4 - 5.3 mmol/L   Sodium     Status: None   Result Value Ref Range    Sodium 138 133 - 144 mmol/L   Reticulocyte count     Status: Abnormal   Result Value Ref Range    % Retic 3.2 (H) 0.5 - 2.0 %    Absolute Retic 82.2 25 - 95 10e9/L   Protein total     Status: Abnormal   Result Value Ref Range    Protein Total 6.2 (L) 6.8 - 8.8 g/dL   Lactate Dehydrogenase     Status: None   Result Value Ref Range    Lactate Dehydrogenase 152 81 - 234 U/L   Erythropoietin     Status: None   Result Value Ref Range    Erythropoietin 17 4 - 27 mU/mL   Protein Immunofixation Serum     Status: None (In process)   Result Value Ref Range    Immunofixation ELP PENDING     IGG 1,545 610 - 1,616 mg/dL     84 - 499 mg/dL    IGM 57 35 - 242 mg/dL   Basic metabolic panel     Status: Abnormal   Result Value Ref Range    Sodium 135 133 - 144 mmol/L    Potassium 4.3 3.4 - 5.3 mmol/L    Chloride 111 (H) 94 - 109 mmol/L    Carbon Dioxide 18 (L) 20 - 32 mmol/L    Anion Gap 6 3 - 14 mmol/L    Glucose 84 70 - 99 mg/dL    Urea Nitrogen 30 7 - 30 mg/dL    Creatinine 1.08 (H) 0.52 - 1.04 mg/dL    GFR Estimate 47 (L) >60 mL/min/[1.73_m2]    GFR Estimate If Black 55 (L) >60 mL/min/[1.73_m2]    Calcium 8.6 8.5 - 10.1 mg/dL   Hemoglobin     Status: Abnormal   Result Value Ref Range    Hemoglobin 7.3 (L) 11.7 - 15.7 g/dL   CBC with platelets     Status: Abnormal   Result Value Ref Range    WBC 4.8 4.0 - 11.0 10e9/L    RBC Count 2.42 (L) 3.8 - 5.2 10e12/L    Hemoglobin 7.2 (L) 11.7 - 15.7 g/dL    Hematocrit 22.6 (L) 35.0 - 47.0 %    MCV 93 78 - 100 fl    MCH 29.8 26.5 - 33.0 pg    MCHC 31.9 31.5 - 36.5 g/dL    RDW 15.9 (H) 10.0 - 15.0 %    Platelet Count 291 150 - 450 10e9/L   Basic metabolic panel      Status: Abnormal   Result Value Ref Range    Sodium 135 133 - 144 mmol/L    Potassium 4.3 3.4 - 5.3 mmol/L    Chloride 111 (H) 94 - 109 mmol/L    Carbon Dioxide 18 (L) 20 - 32 mmol/L    Anion Gap 6 3 - 14 mmol/L    Glucose 93 70 - 99 mg/dL    Urea Nitrogen 33 (H) 7 - 30 mg/dL    Creatinine 1.08 (H) 0.52 - 1.04 mg/dL    GFR Estimate 47 (L) >60 mL/min/[1.73_m2]    GFR Estimate If Black 55 (L) >60 mL/min/[1.73_m2]    Calcium 8.5 8.5 - 10.1 mg/dL   UA reflex to Microscopic     Status: Abnormal   Result Value Ref Range    Color Urine Yellow     Appearance Urine Slightly Cloudy     Glucose Urine Negative NEG^Negative mg/dL    Bilirubin Urine Negative NEG^Negative    Ketones Urine Negative NEG^Negative mg/dL    Specific Gravity Urine 1.015 1.003 - 1.035    Blood Urine Negative NEG^Negative    pH Urine 5.0 5.0 - 7.0 pH    Protein Albumin Urine 30 (A) NEG^Negative mg/dL    Urobilinogen mg/dL Normal 0.0 - 2.0 mg/dL    Nitrite Urine Negative NEG^Negative    Leukocyte Esterase Urine Negative NEG^Negative    Source Midstream Urine     RBC Urine 1 0 - 2 /HPF    WBC Urine 2 0 - 5 /HPF    Mucous Urine Present (A) NEG^Negative /LPF   Cyclic Citrullinated Peptide Antibody IgG     Status: Abnormal   Result Value Ref Range    Cyclic Citrullinated Peptide Antibody, IgG >340 (H) <7 U/mL   Rheumatoid factor     Status: Abnormal   Result Value Ref Range    Rheumatoid Factor 42 (H) <12 IU/mL   CRP inflammation     Status: Abnormal   Result Value Ref Range    CRP Inflammation 86.0 (H) 0.0 - 8.0 mg/L   CBC with platelets differential     Status: Abnormal (In process)   Result Value Ref Range    WBC 4.7 4.0 - 11.0 10e9/L    RBC Count 2.66 (L) 3.8 - 5.2 10e12/L    Hemoglobin 7.7 (L) 11.7 - 15.7 g/dL    Hematocrit 25.0 (L) 35.0 - 47.0 %    MCV 94 78 - 100 fl    MCH 28.9 26.5 - 33.0 pg    MCHC 30.8 (L) 31.5 - 36.5 g/dL    RDW 15.8 (H) 10.0 - 15.0 %    Platelet Count 321 150 - 450 10e9/L    Diff Method PENDING    Basic metabolic panel     Status:  Abnormal   Result Value Ref Range    Sodium 133 133 - 144 mmol/L    Potassium 4.6 3.4 - 5.3 mmol/L    Chloride 110 (H) 94 - 109 mmol/L    Carbon Dioxide 17 (L) 20 - 32 mmol/L    Anion Gap 6 3 - 14 mmol/L    Glucose 86 70 - 99 mg/dL    Urea Nitrogen 28 7 - 30 mg/dL    Creatinine 1.04 0.52 - 1.04 mg/dL    GFR Estimate 49 (L) >60 mL/min/[1.73_m2]    GFR Estimate If Black 57 (L) >60 mL/min/[1.73_m2]    Calcium 9.0 8.5 - 10.1 mg/dL   EKG 12 lead     Status: None   Result Value Ref Range    Interpretation ECG Click View Image link to view waveform and result    Orthopedic Surgery IP Consult: Patient to be seen: Routine within 24 hrs; b/l shoulder bursitis, ongoing pian, pt was due to see [facility] provider 7/22; Consultant may enter orders: Yes; Requesting provider? Hospitalist (if different from attending phy...     Status: None ()    Makenna Carlos PA-C     7/21/2020  2:38 PM  Consult received    Patient with bilateral shoulder pain which has been previously   treated by Dr. Jerry STRICKLAND with subacromial bursa injections   and is due for follow-up tomorrow 7/22    Patient presented to the ED with progressing weakness, semi   productive cough and diarrhea. Overview of labs show increased   WBC    Patient is not appropriate for injections at this time and should   follow up when possible with Dr. Dave Odell PAC   Nutrition Services Adult IP Consult     Status: None ()    Nithya Osborn, MAZIN, LD     7/23/2020  3:20 PM  CLINICAL NUTRITION SERVICES  -  ASSESSMENT NOTE    RECOMMENDATIONS FOR MD/PROVIDER TO ORDER:   - Advance diet as medically indicated    Future/Additional Recommendations:   - Supplements once diet advanced    Malnutrition:   % Weight Loss:  > 10% in 6 months (severe malnutrition)  % Intake:  </= 50% for >/= 5 days (severe malnutrition) -   suspected  Subcutaneous Fat Loss:  Unable to assess  Muscle Loss:  Unable to assess  Fluid Retention:  None noted    Malnutrition  "Diagnosis: Severe malnutrition  In Context of:  Acute illness or injury  Chronic illness or disease     REASON FOR ASSESSMENT  Ghislaine Walls is a 84 year old female seen by Registered   Dietitian for Patient/Family Request \"Poor nutrition due to lack   of appetite\"    NUTRITION HISTORY  - Information obtained from chart review.   - H/o stage 3 CKD, hypertension, hyperlipidemia, presents from   [facility] for gradually increasing weakness over the past 2 weeks.   - Has also had a cough and intermittent diarrhea.   - Resides in an correction.    - Attempted to speak with patient today after attempted   procedure, however she was soundly sleeping - did not want to   disturb.   - Reviewed MD documentation, patient eats and drinks very little   at baseline.     Seen 10/2/2019 by Dietetic Intern -->   \"- Information obtained from Patient:  Patient stated that she has had a decreased appetite recently but   she still tries to eat two meals a day. She stated that she   typically eats whole wheat bread with peanut butter in the   morning for breakfast and then eggs or another source of protein   for lunch. In addition to her decreased appetite the patient   stated that she feels she has lost about 20 pounds in the past   three months. She focuses mainly on protein when she does eat.   She was interested in setting up a supplement to come in the   afternoon each day.      Main barrier to oral intake is decreased appetite. \"      CURRENT NUTRITION ORDERS  Diet Order:     NPO     Current Intake/Tolerance:  No oral intakes recorded over admission  Just 3 meals ordered this admission (7/21 and 7/22).     NUTRITION FOCUSED PHYSICAL ASSESSMENT FOR DIAGNOSING   MALNUTRITION)  No:  Pt covered by blankets and sleeping during attempt. Did not   assess.       Obtained from Chart/Interdisciplinary Team:  7/21 - Emesis x2  [place], weak    Colonoscopy was planned for today, postponed due to inadequate   prep    ANTHROPOMETRICS  Height: 5' 4\"  Weight: " 135 lbs 4.8 oz (61.4 kg)   Body mass index is 23.22 kg/m .  Weight Status:  Normal BMI  IBW: 54.5 kg  % IBW: 113%  Weight History: 10# loss in the past 4 months (7%). 25# loss in   the past 9 months (15.6%).   Wt Readings from Last 10 Encounters:   07/21/20 61.4 kg (135 lb 4.8 oz)   03/31/20 65.8 kg (145 lb)   03/02/20 67.6 kg (149 lb)   02/10/20 68.6 kg (151 lb 4.8 oz)   11/04/19 72.6 kg (160 lb)   10/22/19 72.5 kg (159 lb 12.8 oz)   10/17/19 72.8 kg (160 lb 8 oz)   10/15/19 72.8 kg (160 lb 9.6 oz)   10/14/19 72.7 kg (160 lb 3.2 oz)   10/07/19 72.8 kg (160 lb 9.6 oz)       LABS  Labs reviewed    MEDICATIONS  Medications reviewed    ASSESSED NUTRITION NEEDS PER APPROVED PRACTICE GUIDELINES:  Dosing Weight 61.4 kg   Estimated Energy Needs: 3593-0300 kcals (25-30 Kcal/Kg)  Justification: maintenance  Estimated Protein Needs: 74-92 grams protein (1.2-1.5 g pro/Kg)  Justification: preservation of lean body mass  Estimated Fluid Needs: 1 mL/kcal   Justification: maintenance    MALNUTRITION:  % Weight Loss:  > 10% in 6 months (severe malnutrition)  % Intake:  </= 50% for >/= 5 days (severe malnutrition) -   suspected  Subcutaneous Fat Loss:  Unable to assess  Muscle Loss:  Unable to assess  Fluid Retention:  None noted    Malnutrition Diagnosis: Severe malnutrition  In Context of:  Acute illness or injury  Chronic illness or disease    NUTRITION DIAGNOSIS:  Malnutrition related to suspected poor oral intakes in the   setting of acute on chronic illness and aging  as evidenced by   weight loss of 15.6% in 9 months and negligible oral intakes   since admission.       NUTRITION INTERVENTIONS  Recommendations / Nutrition Prescription  - Advance diet as medically indicated   - Supplements once diet advanced     - If unable to advance diet, will need to consider nutrition   support .    Implementation  Nutrition education: Not appropriate at this time due to patient   condition      Nutrition Goals  Diet advancement >CLD in the  next 2 days.   Intakes of >25% meals TID.       MONITORING AND EVALUATION:  Progress towards goals will be monitored and evaluated per   protocol and Practice Guidelines    Nithya White, MAZIN, LD  Pager: [phone]     Echocardiogram Complete     Status: None    Narrative    [id number]  [id number]  [id number]  [id number]^MARIAN^KATHRYN           [facility]  Echocardiography Laboratory  [facility] [place] [address]        Name: ROSIO YOUNG  MRN: [id number]  : 1935  Study Date: 2020 11:21 AM  Age: 84 yrs  Gender: Female  Patient Location: [facility]  Reason For Study: CHF  Ordering Physician: KATHRYN FONSECA  Referring Physician: Laurie Conteh  Performed By: Grover Gomez     BSA: 1.7 m2  Height: 64 in  Weight: 135 lb  HR: 58  BP: 143/49 mmHg  _____________________________________________________________________________  __        Procedure  Complete Portable Echo Adult.  _____________________________________________________________________________  __        Interpretation Summary     1. Normal biventricular size and function. Left ventricular ejection fraction  of 55-60%. Grade II or moderate diastolic dysfunction.  2. The left atrium is severely dilated. Right atrial size is normal.  3. No hemodynamically significant valvular disease.  4. Normal pulmonary artery pressure.  Compared to the previous echocardiogram on 10/1/2019, there are no significant  changes.  Technically difficult study.  _____________________________________________________________________________  __        Left Ventricle  The left ventricle is normal in size. There is concentric remodeling present.  Left ventricular systolic function is normal. The visual ejection fraction is  estimated at 55-60%. Grade II or moderate diastolic dysfunction. Diastolic  Doppler findings (E/E' ratio and/or other parameters) suggest left ventricular  filling pressures are increased. Regional wall motion abnormalities cannot  be  excluded due to limited visualization.     Right Ventricle  The right ventricle is normal in size and function.     Atria  The left atrium is severely dilated. Right atrial size is normal.     Mitral Valve  The mitral valve leaflets appear thickened, but open well. There is trace  mitral regurgitation.        Tricuspid Valve  Normal tricuspid valve. There is mild (1+) tricuspid regurgitation. The right  ventricular systolic pressure is approximated at 31.3 mmHg plus the right  atrial pressure.     Aortic Valve  There is mild trileaflet aortic sclerosis. No aortic regurgitation is present.  No aortic stenosis is present.     Pulmonic Valve  The pulmonic valve is not well visualized.     Vessels  Normal size aorta. IVC diameter <2.1 cm collapsing >50% with sniff suggests a  normal RA pressure of 3 mmHg.     Pericardium  There is no pericardial effusion.        Rhythm  Sinus rhythm was noted.  _____________________________________________________________________________  __  MMode/2D Measurements & Calculations  IVSd: 1.3 cm     LVIDd: 3.8 cm  LVIDs: 2.6 cm  LVPWd: 1.1 cm  LVPWs: 0.03 cm  FS: 31.2 %  LV mass(C)d: 156.1 grams  LV mass(C)dI: 94.3 grams/m2  Ao root diam: 3.0 cm  asc Aorta Diam: 3.0 cm  LVOT diam: 2.2 cm  LVOT area: 3.7 cm2  LA Volume (BP): 114.0 ml  LA Volume Index (BP): 68.7 ml/m2  RWT: 0.59           Doppler Measurements & Calculations  MV E max edilberto: 151.0 cm/sec  MV A max edilberto: 98.5 cm/sec  MV E/A: 1.5  MV max P.1 mmHg  MV mean PG: 3.2 mmHg  MV V2 VTI: 53.0 cm  MVA(VTI): 1.7 cm2  MV dec slope: 631.2 cm/sec2  Ao V2 max: 157.5 cm/sec  Ao max PG: 10.0 mmHg  Ao V2 mean: 117.7 cm/sec  Ao mean P.1 mmHg  Ao V2 VTI: 41.9 cm  ESVIN(I,D): 2.2 cm2  ESVIN(V,D): 2.5 cm2  LV V1 max P.4 mmHg  LV V1 max: 105.3 cm/sec  LV V1 VTI: 24.5 cm  SV(LVOT): 90.9 ml  SI(LVOT): 54.9 ml/m2  PA acc time: 0.10 sec  TR max edilberto: 279.6 cm/sec  TR max P.3 mmHg  Pulm Sys Edilberto: 63.8 cm/sec  Pulm Hicks Edilberto: 52.4 cm/sec  Pulm  S/D: 1.2  AV Edilberto Ratio (DI): 0.67  ESVIN Index (cm2/m2): 1.3  E/E' av.5  Lateral E/e': 16.1  Medial E/e': 24.8              _____________________________________________________________________________  __        Report approved by: Javier Mcdermott 2020 12:47 PM      ABO/Rh type and screen     Status: None   Result Value Ref Range    Units Ordered 1     ABO O     RH(D) Pos     Antibody Screen Neg     Test Valid Only At [facility]        Specimen Expires 2020     Crossmatch Red Blood Cells    Blood component     Status: None   Result Value Ref Range    Unit Number [id number]     Blood Component Type Red Blood Cells Leukocyte Reduced     Division Number 00     Status of Unit Released to care unit     Blood Product Code [id number]     Unit Status ISS    Blood culture     Status: None (Preliminary result)    Specimen: Blood    Left Arm   Result Value Ref Range    Specimen Description Blood Left Arm     Culture Micro No growth after 16 hours    Blood culture     Status: None (Preliminary result)    Specimen: Blood    Right Arm   Result Value Ref Range    Specimen Description Blood Right Arm     Culture Micro No growth after 16 hours    Urine Culture Aerobic Bacterial     Status: None (Preliminary result)    Specimen: Urine catheter; Midstream Urine   Result Value Ref Range    Specimen Description Midstream Urine     Special Requests Specimen received in preservative     Culture Micro Culture negative monitoring continues           Attestation:  I have reviewed today's vital signs, notes, medications, labs and imaging with Dr. Gtz.  Amount of time performed on this consult: 30 minutes.    Makenna Odell PA-C

## 2020-07-28 NOTE — PLAN OF CARE
Pt has poor intake, pt refused to get out of bed. Did sit up in the chair for 1 hour and tolerated fair. Pt medicated once for pain with scheduled Tylenol and Tramadol. With relief.  VSS.

## 2020-07-28 NOTE — PROGRESS NOTES
Per consult service Rheumatology doesn't come to the hospital unless it is emergent. Tried to reach Makenna from O and left a message.

## 2020-07-28 NOTE — PROGRESS NOTES
"CLINICAL NUTRITION SERVICES - REASSESSMENT NOTE    Recommendations Ordered by Registered Dietitian (RD):   - Continue Room service w/ assist  - Send Plus2 Shake @ 2 pm only   Malnutrition: (7/23)   % Weight Loss:  > 10% in 6 months (severe malnutrition)  % Intake:  </= 50% for >/= 5 days (severe malnutrition) - suspected  Subcutaneous Fat Loss:  Unable to assess  Muscle Loss:  Unable to assess  Fluid Retention:  None noted     Malnutrition Diagnosis: Severe malnutrition  In Context of:  Acute illness or injury  Chronic illness or disease     EVALUATION OF PROGRESS TOWARD GOALS   Diet: 2g Na diet   Plus2 Smoothie BID between meals (525 kcal, 19 g pro)  Room service w/ assist     Intake/Tolerance:   - Poor appetite. Pt sitting in bed with two HB eggs at bedside from breakfast. Requested that writer cut them into quarters and add vazquez while in the room, after encouragement of eating was provided. Did not actually observe her take any bites.   - Did not drink the Plus2 shake this morning. Ghislaine did state that she likes it, just that it comes too frequently. Prefers that it is delivered in the afternoon only.   - Patient seems aware that she needs to eat but states \"sometimes I'm just not hungry\". Educated on importance of PO, even in small amounts if needed.     ASSESSED NUTRITION NEEDS:  Dosing Weight 61.4 kg   Estimated Energy Needs: 1760-1237 kcals (25-30 Kcal/Kg)  Justification: maintenance  Estimated Protein Needs: 74-92 grams protein (1.2-1.5 g pro/Kg)  Justification: preservation of lean body mass  Estimated Fluid Needs: 1 mL/kcal   Justification: maintenance    NEW FINDINGS:   Labs, Meds Reviewed  Stooling: last recorded 7/24 x3  Mild 2+ edema BL LEs, UEs, generalized  Planned discharge on 7/25 was delayed due to low Hgb    Previous Goals:   Diet advancement >CLD in the next 2 days.   Evaluation: Met  Intakes of >25% meals TID.   Evaluation: Met    Previous Nutrition Diagnosis:   Malnutrition related to " suspected poor oral intakes in the setting of acute on chronic illness and aging  as evidenced by weight loss of 15.6% in 9 months and negligible oral intakes since admission.   Evaluation: No change - updated below     CURRENT NUTRITION DIAGNOSIS  Inadequate oral intake related to reduced appetite in the setting of acute on chronic illness and aging as evidenced by intakes of % small meals and weight loss of 15.6% in 9 months.     INTERVENTIONS  Recommendations / Nutrition Prescription  Diet as ordered - 2g Na  - Continue Room service w/ assist  - Send Plus2 Shake @ 2 pm only    Implementation  Nutrition Education: provided encouragement of oral intakes    Goals  Intake of >50% meals BID + 1 shake daily.       MONITORING AND EVALUATION:  Progress towards goals will be monitored and evaluated per protocol and Practice Guidelines    Nithya White RD, LD  Pager: [phone]

## 2020-07-28 NOTE — PROGRESS NOTES
[facility]    Internal Medicine Hospitalist Progress Note  07/28/2020  I evaluated patient on the above date.    Ganga Mccullough Jr., MD  [phone] (p)  Text Page        Assessment & Plan New actions/orders today (07/28/2020) are underlined.    Ghislaine Walls is a 84 year old female with hx including HTN, paroxysmal atrial fibrillation on anticoagulation with apixaban, gout, CKD, and chronic pain syndrome, who was admitted on 7/21/2020 for evaluation of generalized weakness and fatigue. She has been found to have progressive anemia of unclear etiology     Normocytic anemia - suspect ACD (suspect inflammatory arthritis) +/- iron deficiency components; other causes not ruled out.  * Question occult bleed vs primary myeloid disorder. GI and Heme/Onc were consulted during this admission.  * Hgb 7.8 on arrival from baseline ~9, and gradually decreased to 6.7 with no s/sx of active bleeding. FOBT on arrival was negative. Iron studies showed low iron and iron sat, but ferritin high, TIBC low. [facility] was consulted, and patient underwent EGD and colonoscopy (7/24) which respectively showed a moderate hiatal hernia, duodenal erosions, and a medium transverse colon polyp that was resected; no source of active bleeding was identified  * Received 1u prbcs on 7/23 for Hgb 6.7.   * TSH 7/21 normal. B12 and folate 7/22 normal. LDH 7/25 normal. Peripheral smear, SPEP/UPEP, EPO levels 7/25 pending. Apixaban dose decreased 7/25 in light of age and CKD.  * On 7/27, GI deferred on capsule endoscopy as iron studies more suggestive of ACD (low TIBC, ferritin high; iron levels could be low with ACD).  Recent Labs   Lab 07/28/20  0923 07/27/20  0858 07/26/20  0850 07/25/20  0554 07/24/20  0849 07/23/20  2114   HGB 7.7* 7.2* 7.3* 7.7* 8.3* 8.4*   - Heme/Onc to consider bone marrow.  - Monitor CBC.  - Consider prbc transfusion if hgb </= 7.0 or if significant bleeding with hemodynamic instability or if symptomatic.  -  Workup and management of other issues as below.  - Appreciate GI and Hematology help.    Fever, suspect related to inflammatory arthritis.  Fever to 101.5 am 7/27. BC's 7/27 NGTD. UC 7/27 NGTD.  - Workup of other issues as below.     DRAGAN (suspect prerenal due to poor PO intake) on CKD stage III.  Non-anion gap metabolic acidosis, likely due to CKD.  Baseline cr ~ 1.2. Creatinine 1.43 and bicarb 14 on admit 7/21.. She received IV fluids with improvement.  Recent Labs   Lab 07/28/20  0923 07/27/20  0858 07/26/20  0850 07/25/20  0554 07/24/20  0849 07/23/20  0950   CR 1.04 1.08* 1.08* 1.16* 0.97 1.06*   - Monitor BMP.  - Avoid nephrotoxic medications.    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  Shoulder bursitis.  Knee pain and swelling.  History of gout.  * Follows with Dr. Jerry STRICKLAND for subacromial bursa injection. Has had long-standing history of polyarthropathy involving shoulders, knees, and wrists, but does not appear to have a rheumatological work-up.   * PTA on APAP 1000 mg TID and allopurinol 100 mg daily.  * Pt c/o knee pain 7/27, knees warm and swollen on exam. Lab workup 7/27: CRP 86 (H), ACPA > 340 (H), RF 42 (H), GWEN positive (speckled pattern). Ortho consulted 7/27, ordered a course of Medrol. Knee x-rays 7/27 showed no acute fracture, small joint effusion. Elbow x-rays 7/27 showed no acute fractures, diffuse soft tissue swelling, question of small right elbow effusion, moderate left elbow effusion. Uric acid level 7/28 normal.  - Continue Medrol with taper.  - Continue on PTA APAP 1000 mg TID and allopurinol.  - Outpatient Rheumatology evaluation (no Rheum at this hospital).  - Appreciate Ortho help.    Generalized weakness, multifactorial, related to above issues.  Severe protein-calorie malnutrition in context of chronic illness.  * Presented with generalized weakness and increased fatigue, worse 2 days PTA. Likely due to anemia and sub-optimal nutrition. Patient's daughter reported that she eats  and drinks very little. Evidence of dehydration present on arrival with mild DRAGAN. Other significant work-up:   ? UA on showed many bacteria, but sample was contaminated  ? TTE (7/22) showed LVEF 55-60% with moderate diastolic dysfunction not significantly changed from prior study  ? No significant events on telemetry  - Treat above issues.  - Continue nutrition supplements/shakes per Nutrition.  - Continue PT, OT; PT/OT recommending TCU.     Hiatal hernia.  Noted on EGD (7/24). Started on Protonix.  - Continue Protonix.    Paroxysmal atrial fibrillation.  [PTA: diltiazem  mg daily, metoprolol  mg daily, apixaban 5 mg BID]  PTA apixaban has been decreased as noted above.  - Restart apixaban.  - Continue diltiazem and metoprolol XL.     Essential hypertension.  [PTA: diltiazem  mg daily, metoprolol  mg daily, hydralazine 50 mg TID, lisinopril 20 mg daily.]  - Continues on diltiazem, metoprolol XL, hydralazine, lisinopril.     Hypothyroidism.  TSH 7/21 normal.  - Continue PTA levothyroxine.       COVID-19 testing.  COVID-19 PCR Results    COVID-19 PCR Results 7/21/20 7/21/20    1243 1243   COVID-19 Virus PCR to [facility] - Result Test received-See reflex to IDDL test SARS CoV2 (COVID-19) Virus RT-PCR    COVID-19 Virus PCR to [facility] - Source Nasopharyngeal    SARS-CoV-2 Virus Specimen Source  Nasopharyngeal   SARS-CoV-2 PCR Result  NEGATIVE      Comments are available for some flowsheets but are not being displayed.         COVID-19 Antibody Results, Testing for Immunity    COVID-19 Antibody Results, Testing for Immunity   No data to display.             Diet: 2 Gram Sodium Diet  Room Service  Snacks/Supplements Adult: Other; Plus2 Shake; Between Meals  Advance Diet as Tolerated    Prophylaxis: PCD's, ambulation. On apixaban.  Dejesus Catheter: not present  Code Status: No CPR- Do NOT Intubate    Disposition Plan   Expected discharge: 1-2d, recommended to prior living arrangement once joint  "pain/inflammation improved, hgb stable; needs close outpt Rheum evaluation.  Entered: Ganga Mccullough MD 07/28/2020, 1:25 PM         Interval History    Feeling better.  Knee pain improved.    -Data reviewed today: I reviewed all new labs and imaging over the last 24 hours. I personally reviewed no images or EKG's today.    Physical Exam   Heart Rate: 85, Blood pressure (!) 147/55, pulse 67, temperature 97.6  F (36.4  C), temperature source Oral, resp. rate 16, height 1.626 m (5' 4\"), weight 70.3 kg (155 lb), SpO2 97 %, not currently breastfeeding.  Vitals:    07/21/20 0836 07/21/20 1300 07/24/20 0915   Weight: 70.3 kg (155 lb) 61.4 kg (135 lb 4.8 oz) 70.3 kg (155 lb)     Vital Signs with Ranges  Temp:  [97.6  F (36.4  C)-99.9  F (37.7  C)] 97.6  F (36.4  C)  Pulse:  [67-70] 67  Heart Rate:  [71-85] 85  Resp:  [16-17] 16  BP: (128-157)/(53-67) 147/55  SpO2:  [96 %-99 %] 97 %  Patient Vitals for the past 24 hrs:   BP Temp Temp src Pulse Heart Rate Resp SpO2   07/28/20 1150 (!) 147/55 97.6  F (36.4  C) Oral 67 -- 16 97 %   07/28/20 1000 -- -- -- -- -- 16 --   07/28/20 0903 -- -- -- -- -- 16 --   07/28/20 0742 (!) 157/67 99.9  F (37.7  C) Oral -- 85 16 96 %   07/27/20 2337 134/57 98.1  F (36.7  C) Oral -- 81 17 99 %   07/27/20 2109 (!) 156/62 -- -- -- -- -- --   07/27/20 1523 128/53 97.8  F (36.6  C) Oral 70 71 16 98 %     I/O's Last 24 hours  I/O last 3 completed shifts:  In: 780 [P.O.:780]  Out: -     Constitutional: Awake, alert, pale.  Respiratory: Diminished in bases. No crackles or wheezes.  Cardiovascular: Rate normal, no m/r/g.  GI: Soft, nt, nd, +BS.  Skin/Integumen:   Other:  Bilateral knee mild swelling effusion stable. Elbows with swelling bilaterally.      Data   Recent Labs   Lab 07/28/20  0923 07/27/20  0858 07/26/20  0850 07/25/20  0554  07/21/20  2038   WBC 4.7 4.8  --  6.2   < >  --    HGB 7.7* 7.2* 7.3* 7.7*   < >  --    MCV 94 93  --  94   < >  --     291  --  387   < >  --     135 " 135 138   < >  --    POTASSIUM 4.6 4.3 4.3 4.2   < >  --    CHLORIDE 110* 111* 111* 110*   < >  --    CO2 17* 18* 18* 17*   < >  --    BUN 28 33* 30 31*   < >  --    CR 1.04 1.08* 1.08* 1.16*   < >  --    ANIONGAP 6 6 6  --    < >  --    ALIVIA 9.0 8.5 8.6 8.9   < >  --    GLC 86 93 84 121*   < >  --    ALBUMIN  --   --   --  1.7*  --  2.0*   PROTTOTAL  --   --   --  6.2*  --  6.4*   BILITOTAL  --   --   --  0.4  --  0.4   ALKPHOS  --   --   --  90  --  69   ALT  --   --   --  10  --  8   AST  --   --   --  25  --  18    < > = values in this interval not displayed.     Recent Labs   Lab Test 07/28/20  0923 07/27/20  0858 07/26/20  0850 07/25/20  0554 07/24/20  0849  10/04/19  1220 10/04/19  0832   GLC 86 93 84 121* 80   < >  --   --    BGM  --   --   --   --   --   --  119* 100*    < > = values in this interval not displayed.         Recent Results (from the past 24 hour(s))   XR Knee Bilateral 1/2 Views    Narrative    EXAM: XR KNEE BILATERAL 1/2 VW  LOCATION: [facility]  DATE/TIME: 7/27/2020 6:13 PM    INDICATION: Evaluate DJD  COMPARISON: None.      Impression    IMPRESSION:   RIGHT KNEE: No acute fracture or malalignment. Severe patellofemoral, mild medial, and mild lateral compartment degenerative changes. Small knee joint effusion. Osteopenia.    LEFT KNEE: No acute fracture or malalignment. Severe patellofemoral, mild medial, and mild lateral compartment degenerative changes. Small knee joint effusion. Osteopenia.   XR Elbow Bilateral G/E 3 Views    Narrative    EXAM: XR ELBOW BILATERAL G/E 3 VW  LOCATION: [facility]  DATE/TIME: 7/27/2020 6:14 PM    INDICATION: Evaluate DJD  COMPARISON: None.      Impression    IMPRESSION:     Right elbow: No acute fracture or malalignment. Mild elbow joint degenerative changes. Question a small elbow joint effusion. Osteopenia. Diffuse soft tissue swelling.    Left elbow: No acute fracture or malalignment. Mild elbow joint degenerative changes.  Moderate elbow joint effusion. Osteopenia. Diffuse soft tissue swelling.         Medications   All medications were reviewed.      acetaminophen  1,000 mg Oral TID     allopurinol  100 mg Oral Daily     diltiazem ER COATED BEADS  120 mg Oral Daily     hydrALAZINE  50 mg Oral TID     levothyroxine  25 mcg Oral Daily     lidocaine  10 mL Infiltration Once     lisinopril  20 mg Oral Daily     magnesium citrate  296 mL Oral Once     methylPREDNISolone  4 mg Oral Once    And     methylPREDNISolone  8 mg Oral At Bedtime    And     [START ON 7/29/2020] methylPREDNISolone  4 mg Oral QAM AC    And     [START ON 7/29/2020] methylPREDNISolone  4 mg Oral Daily with lunch    And     [START ON 7/29/2020] methylPREDNISolone  4 mg Oral Daily with supper    And     [START ON 7/30/2020] methylPREDNISolone  4 mg Oral At Bedtime     metoprolol succinate ER  100 mg Oral Daily     pantoprazole  40 mg Oral QAM AC

## 2020-07-28 NOTE — PROGRESS NOTES
[facility]  Hematology/oncology Progress Note            History:   Ghislaine Walls is an 84-year-old woman with a history of hypertension, chronic kidney disease and hypothyroidism.  She was hospitalized on 07/21 with generalized weakness for the previous 2 weeks.  She was not able to get necessary assistance from the assisted living and evaluated in the ER.  She has some joint pain and underwent injection to both shoulders with improvement and is seen by [facility] and has diagnosis of bursitis.  She also has some joint ache in her hands.  Denies any bleeding, melena, or hematochezia.  She was found to have anemia with a hemoglobin at 7.8 on admission, down to 6.7 on the 07/23, required red cell transfusion.  She was seen by GI and underwent a colonoscopy and EGD on 07/24.  The colonoscopy showed a 10 mm polyp in the transverse colon, removed.  The examination otherwise was normal.  Upper endoscopy showed a normal esophagus medium-sized hiatal hernia, duodenal erosions without bleeding, biopsied.  Blood work for the anemia was obtained.  Peripheral blood smear is pending.  She has a normal B12 level 556, folic acid of 6.8, ferritin 484, iron 9, saturation 5%, TIBC 167; all are low.  Her creatinine 1.14 with GFR estimated 44.  Haptoglobin obtained and is pending.  LDH normal, 152.      PAST MEDICAL HISTORY:  Hearing deficit, dyslipidemia, hypertension, hypothyroidism, peripheral artery disease, renal insufficiency, history of appendectomy, cataract surgery bilaterally, tubal ligation, joint pain and bursitis status post subacromial bursa injections.        SOCIAL HISTORY:  She lives in assisted living.  Currently nonsmoker for the last 47 years.  Alcohol, not heavy.      FAMILY HISTORY:  Negative for malignancy or blood disorders.      INTERVAL HISTORY:  Pain in upper arms, fever, is considered by ortho for joint aspiration           Medications:       acetaminophen  1,000 mg Oral TID      allopurinol  100 mg Oral Daily     diltiazem ER COATED BEADS  120 mg Oral Daily     hydrALAZINE  50 mg Oral TID     levothyroxine  25 mcg Oral Daily     lidocaine  10 mL Infiltration Once     lisinopril  20 mg Oral Daily     magnesium citrate  296 mL Oral Once     metoprolol succinate ER  100 mg Oral Daily     pantoprazole  40 mg Oral QAM AC                ROS:   RESP, CV, GI,, MUSCULOSKELETAL, HEME/ALLERGY/IMMUNE,  Skin: reviewed and negative except the positives mentioned in this note            Physical Exam:       Vital Sign Ranges  Temp:  [97.8  F (36.6  C)-99.9  F (37.7  C)] 99.9  F (37.7  C)  Pulse:  [62-70] 70  Heart Rate:  [71-85] 85  Resp:  [16-17] 16  BP: (128-157)/(51-67) 157/67  SpO2:  [96 %-100 %] 96 %      I/O last 3 completed shifts:  In: 780 [P.O.:780]  Out: -     Constitutional: no apparent distress  HEENT: unremarkable  Neck: Supple  Lungs: No increased work of breathing  Ext: upper ext edema          Data:       ROUTINE LABS (Last four results)  CMP  Recent Labs   Lab 07/27/20  0858 07/26/20  0850 07/25/20  0554 07/24/20  0849 07/23/20  0950  07/21/20  2038    135 138 137 138   < >  --    POTASSIUM 4.3 4.3 4.2 4.1 4.1   < >  --    CHLORIDE 111* 111* 110* 113* 113*   < >  --    CO2 18* 18* 17* 17* 16*   < >  --    ANIONGAP 6 6  --  7 9   < >  --    GLC 93 84 121* 80 92   < >  --    BUN 33* 30 31* 25 31*   < >  --    CR 1.08* 1.08* 1.16* 0.97 1.06*   < >  --    GFRESTIMATED 47* 47* 43* 54* 48*   < >  --    GFRESTBLACK 55* 55* 50* 62 56*   < >  --    ALIVIA 8.5 8.6 8.9 9.1 8.7   < >  --    PROTTOTAL  --   --  6.2*  --   --   --  6.4*   ALBUMIN  --   --  1.7*  --   --   --  2.0*   BILITOTAL  --   --  0.4  --   --   --  0.4   ALKPHOS  --   --  90  --   --   --  69   AST  --   --  25  --   --   --  18   ALT  --   --  10  --   --   --  8    < > = values in this interval not displayed.     CBC  Recent Labs   Lab 07/27/20  0858 07/26/20  0850 07/25/20  0554 07/24/20  0849  07/22/20  1031   WBC 4.8   --  6.2 5.1  --  5.8   RBC 2.42*  --  2.57* 2.81*  --  2.46*   HGB 7.2* 7.3* 7.7* 8.3*   < > 7.2*   HCT 22.6*  --  24.1* 26.1*  --  23.2*   MCV 93  --  94 93  --  94   MCH 29.8  --  30.0 29.5  --  29.3   MCHC 31.9  --  32.2 31.8  --  31.0*   RDW 15.9*  --  16.2* 16.0*  --  15.9*     --  387 399  --  434    < > = values in this interval not displayed.     INR  Recent Labs   Lab 07/21/20  0849   INR 2.07*     Immunofixation serum and urine pending.  Smear non diagnostic and haptoglobin high (hemolysis not suspected) retic 3.2 LDH normal, Epo 17 (inappropriately low)         Assessment and Plan:   1.  Normochromic normocytic anemia, etiology unclear.  There is no evidence of B12 deficiency or folic acid deficiency.  Her iron levels are low, but ferritin is high, which is more consistent with chronic illness or chronic kidney disease.  Chronic kidney disease can be associated with anemia, although her kidney function did not change since 2018 when her hemoglobin was normal and the anemia was moderate in 2019. ?associated with infection or inflammatory process.   2.  Chronic kidney disease.   3.  Arthritis and bursitis.   4.  Negative GI workup and negative Hemoccult occult stool.      PLAN:    I will follow up on the immune fixation on urine and serum.    If the diagnostic workup is negative and no improvement in hgb, she will need bone marrow biopsy to rule out primary myeloid disorders including myelodysplastic syndrome.   Transfuse RBC if hgb 7 or lower, or symptomatic      Lawrence Oh M.D.

## 2020-07-28 NOTE — PLAN OF CARE
DATE & TIME: 7/27/20 8823-5643  Cognitive Concerns/ Orientation : A&O x4, forgetful, slept throughout shift   BEHAVIOR & AGGRESSION TOOL COLOR: Green   CIWA SCORE: NA  ABNL VS/O2: VSS on RA  MOBILITY: A x2, turn & repo q2h, pt refuses at times. Encouraged mobility, discussed importance of getting out of bed   PAIN MANAGMENT: Denies pain overnight. PRN tramadol available, not needed this shift. Pt states her pain is due to bursitis  DIET: 2g Na   BOWEL/BLADDER: Incontinent. No BM  ABNL LAB/BG: Hgb 7.2 7/27 AM labs, CRP 86  DRAIN/DEVICES: R arm PIV SL  TELEMETRY RHYTHM: NA  SKIN: Pale, intact. BLE & BUE edema 2+. Bel rash improving, powder applied   TESTS/PROCEDURES: Per ortho, pt may have joint aspiration in AM  D/C DAY/GOALS/PLACE: Pending hgb improvement and TCU vs residential plan  OTHER IMPORTANT INFO: Hematology following, GI signed off. Bed alarm on.

## 2020-07-28 NOTE — PROGRESS NOTES
SW:  D:  Ortho and hemonc note reviewed.   Update [facility] admissions that hospital discharge date is not know.  We will keep [facility] updated daily.

## 2020-07-28 NOTE — PLAN OF CARE
DATE & TIME: 7/28/2020 1562-1016  Cognitive Concerns/ Orientation : A&O x4, forgetful,   BEHAVIOR & AGGRESSION TOOL COLOR: Green   CIWA SCORE: NA  ABNL VS/O2: BP elevated 141/56, other VSS on RA  MOBILITY: [facility] assist of 2, turn & repo q2h, pt refuses at times. Encouraged mobility, discussed importance of getting out of bed, refused this shift.   PAIN MANAGMENT: C/o knee pain, declined intervention (received prn Tylenol previous shift at 1430).   DIET: 2g Na  poor appetite  BOWEL/BLADDER: Incontinent of bowel and bladder. No BM this shift.   ABNL LAB/BG: Hgb 7.7  CRP 86  DRAIN/DEVICES: Loss of IV access previous shift.  TELEMETRY RHYTHM: NA  SKIN: Pale, intact. BLE & BUE edema 2+. Bel rash improving, powder applied   TESTS/PROCEDURES: no joint aspiration done today  D/C DAY/GOALS/PLACE: Pending hgb improvement and TCU vs senior living plan  OTHER IMPORTANT INFO: Hematology following, GI signed off. Bed alarm on.

## 2020-07-28 NOTE — PLAN OF CARE
Cognitive Concerns/ Orientation : A&Ox4, forgetful. Sleeping off and on through shift.   BEHAVIOR & AGGRESSION TOOL COLOR: Green   CIWA SCORE: NA  ABNL VS/O2: VSS on RA.   MOBILITY: A x2, turn & repo Q2 hours, up with 2 assist/belt/walker. Pt admittedly refused getting up in chair for dinner.  Encouraged mobility. Lift utilized to transfer pt to cart for transport.   PAIN MANAGMENT: Chronic bilateral elbow and knee pain. Inflamed. BL knee and elbow XR completed this evening.  Pt states it is her bursitis flaring. Has scheduled Tylenol. PRN Tramadol given during days.   DIET: 2g Na, fair appetite, encouraged fluid intake.   BOWEL/BLADDER: Incontinent. No BM. Urine sample collected and sent.   ABNL LAB/BG: Hgb 7.2, recheck in AM. CRP inflammation 86.0  DRAIN/DEVICES: R arm PIV SL  TELEMETRY RHYTHM: NA  SKIN: Pale, intact. BLE & BUE edema 2+. Bel rash improving.   TESTS/PROCEDURES: Potential capsule study 7/27 or 7/28 pending equipment availability for chronic anemia.   D/C DAY/GOALS/PLACE: Pending hgb improvement and placement. Recommending elevated care to a TCU vs penitentiary.   OTHER IMPORTANT INFO: On eliquis. Hem/onc and GI following. Possible bedside aspiration of joint fluid per Ortho. Bed alarm on for safety.

## 2020-07-29 ENCOUNTER — APPOINTMENT (OUTPATIENT)
Dept: PHYSICAL THERAPY | Facility: CLINIC | Age: 85
DRG: 682 | End: 2020-07-29
Attending: PHYSICIAN ASSISTANT
Payer: MEDICARE

## 2020-07-29 PROCEDURE — 12000000 ZZH R&B MED SURG/OB

## 2020-07-29 PROCEDURE — 25000131 ZZH RX MED GY IP 250 OP 636 PS 637: Mod: GY | Performed by: PHYSICIAN ASSISTANT

## 2020-07-29 PROCEDURE — 25000131 ZZH RX MED GY IP 250 OP 636 PS 637: Mod: GY | Performed by: INTERNAL MEDICINE

## 2020-07-29 PROCEDURE — 97161 PT EVAL LOW COMPLEX 20 MIN: CPT | Mod: GP

## 2020-07-29 PROCEDURE — 97110 THERAPEUTIC EXERCISES: CPT | Mod: GP

## 2020-07-29 PROCEDURE — 25000132 ZZH RX MED GY IP 250 OP 250 PS 637: Mod: GY | Performed by: INTERNAL MEDICINE

## 2020-07-29 PROCEDURE — 99232 SBSQ HOSP IP/OBS MODERATE 35: CPT | Performed by: INTERNAL MEDICINE

## 2020-07-29 PROCEDURE — 97530 THERAPEUTIC ACTIVITIES: CPT | Mod: GP

## 2020-07-29 RX ORDER — PREDNISONE 20 MG/1
20 TABLET ORAL DAILY
Status: DISCONTINUED | OUTPATIENT
Start: 2020-07-29 | End: 2020-07-31 | Stop reason: HOSPADM

## 2020-07-29 RX ADMIN — METHYLPREDNISOLONE 4 MG: 4 TABLET ORAL at 12:42

## 2020-07-29 RX ADMIN — LISINOPRIL 20 MG: 20 TABLET ORAL at 08:19

## 2020-07-29 RX ADMIN — METHYLPREDNISOLONE 4 MG: 4 TABLET ORAL at 08:19

## 2020-07-29 RX ADMIN — APIXABAN 5 MG: 5 TABLET, FILM COATED ORAL at 08:19

## 2020-07-29 RX ADMIN — ACETAMINOPHEN 1000 MG: 500 TABLET, FILM COATED ORAL at 20:36

## 2020-07-29 RX ADMIN — HYDRALAZINE HYDROCHLORIDE 50 MG: 50 TABLET, FILM COATED ORAL at 20:37

## 2020-07-29 RX ADMIN — HYDRALAZINE HYDROCHLORIDE 50 MG: 50 TABLET, FILM COATED ORAL at 08:19

## 2020-07-29 RX ADMIN — ACETAMINOPHEN 1000 MG: 500 TABLET, FILM COATED ORAL at 14:53

## 2020-07-29 RX ADMIN — METOPROLOL SUCCINATE 100 MG: 100 TABLET, EXTENDED RELEASE ORAL at 08:20

## 2020-07-29 RX ADMIN — HYDRALAZINE HYDROCHLORIDE 50 MG: 50 TABLET, FILM COATED ORAL at 14:53

## 2020-07-29 RX ADMIN — ALLOPURINOL 100 MG: 100 TABLET ORAL at 08:19

## 2020-07-29 RX ADMIN — ACETAMINOPHEN 1000 MG: 500 TABLET, FILM COATED ORAL at 08:19

## 2020-07-29 RX ADMIN — PREDNISONE 20 MG: 20 TABLET ORAL at 14:53

## 2020-07-29 RX ADMIN — DILTIAZEM HYDROCHLORIDE 120 MG: 120 CAPSULE, COATED, EXTENDED RELEASE ORAL at 08:20

## 2020-07-29 RX ADMIN — LEVOTHYROXINE SODIUM 25 MCG: 25 TABLET ORAL at 08:19

## 2020-07-29 RX ADMIN — PANTOPRAZOLE SODIUM 40 MG: 40 TABLET, DELAYED RELEASE ORAL at 08:19

## 2020-07-29 ASSESSMENT — ACTIVITIES OF DAILY LIVING (ADL)
ADLS_ACUITY_SCORE: 28
ADLS_ACUITY_SCORE: 27
ADLS_ACUITY_SCORE: 28

## 2020-07-29 NOTE — PLAN OF CARE
DATE & TIME: 7/28/2020 Night  Cognitive Concerns/ Orientation : Alert and oriented but forgetful  BEHAVIOR & AGGRESSION TOOL COLOR: Green, pleasant  ABNL VS/O2:  VSS on RA  MOBILITY: assist of 2, turn & repo q2h. Not out of bed this shift  PAIN MANAGMENT: knees 2/10 pain, declines intervention  DIET: 2g Na  BOWEL/BLADDER: Incontinent of bowel and bladder. No BM this shift.   ABNL LAB/BG: Hgb 7.7   SKIN: Pale, intact. BLE & BUE edema 2+, mepilex to coccyx CDI   TESTS/PROCEDURES:   D/C DAY/GOALS/PLACE: Pending   OTHER IMPORTANT INFO: Lung sounds diminished

## 2020-07-29 NOTE — PROGRESS NOTES
"SPIRITUAL HEALTH SERVICES: Tele-Encounter  Patient Location: 67  Spoke with: Ghislaine    Referral Source: Length of Stay    DATA:  Spoke with patient this afternoon per length of stay. Pt seemed quite tired when first answering the phone.  introduced pt to  services and pt said \"thank you for calling\" but did not seem interested in followup.    PLAN:  No need for further follow-up.      Tanner Carson Intern      ______________________________    Type of service:  Telephone Visit     has received verbal consent for a TelephoneVisit from the patient? Yes    Distance Provider Location: designated [place] office or home office (secure setting)    Mode of Communication: telephone (via [employer] phone or [employer] tele-call-number ([phone]))  "

## 2020-07-29 NOTE — PROGRESS NOTES
[facility]    Internal Medicine Hospitalist Progress Note  07/29/2020  I evaluated patient on the above date.    Ganga Mccullough Jr., MD  [phone] (p)  Text Page        Assessment & Plan New actions/orders today (07/29/2020) are underlined.    Ghislaine Walls is a 84 year old female with hx including HTN, paroxysmal atrial fibrillation on anticoagulation with apixaban, gout, CKD, and chronic pain syndrome, who was admitted on 7/21/2020 for evaluation of generalized weakness and fatigue. She has been found to have progressive anemia of unclear etiology     Normocytic anemia - suspect ACD (suspect inflammatory arthritis) +/- iron deficiency components; other causes not ruled out.  Abnormal SPEP/UPEP.  * Question occult bleed vs primary myeloid disorder. GI and Heme/Onc were consulted during this admission.  * Hgb 7.8 on arrival from baseline ~9, and gradually decreased to 6.7 with no s/sx of active bleeding. FOBT on arrival was negative. Iron studies showed low iron and iron sat, but ferritin high, TIBC low. [facility] was consulted, and patient underwent EGD and colonoscopy (7/24) which respectively showed a moderate hiatal hernia, duodenal erosions, and a medium transverse colon polyp that was resected; no source of active bleeding was identified  * Received 1u prbcs on 7/23 for Hgb 6.7.   * TSH 7/21 normal. B12 and folate 7/22 normal. LDH 7/25 normal. Peripheral smear 7/25 showed normochromic normocytic anemia with rare nucleated RBC, absolute monocytosis. 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 EPO levels 7/25 pending. Apixaban dose decreased 7/25 in light of age and CKD.  * On 7/27, GI deferred on capsule endoscopy as iron studies more suggestive of ACD (low TIBC, ferritin high; iron levels could be low with ACD).  Recent Labs   Lab 07/28/20  0923 07/27/20  0858 07/26/20  0850  07/25/20  0554 07/24/20  0849 07/23/20  2114   HGB 7.7* 7.2* 7.3* 7.7* 8.3* 8.4*   - [facility] to follow-up on SPEP/UPEP results.  - Heme/Onc to consider bone marrow.  - Monitor CBC.  - Consider prbc transfusion if hgb </= 7.0 or if significant bleeding with hemodynamic instability or if symptomatic.  - Workup and management of other issues as below.  - Appreciate Hematology help.    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  Shoulder bursitis.  Knee pain and swelling.  History of gout.  * Follows with Dr. Jerry STRICKLAND for subacromial bursa injection. Has had long-standing history of polyarthropathy involving shoulders, knees, and wrists, but does not appear to have a rheumatological work-up.   * PTA on APAP 1000 mg TID and allopurinol 100 mg daily.  * Pt c/o knee pain 7/27, knees warm and swollen on exam. Also had hand stiffness. Lab workup 7/27: CRP 86 (H), ACPA > 340 (H), RF 42 (H), GWEN positive (speckled pattern). Ortho consulted 7/27, ordered a course of Medrol. Knee x-rays 7/27 showed no acute fracture, small joint effusion. Elbow x-rays 7/27 showed no acute fractures, diffuse soft tissue swelling, question of small right elbow effusion, moderate left elbow effusion. Uric acid level 7/28 normal. Symptomatically improved 7/28 and 7/29.  - Discontinue Medrol and start prednisone 20 mg daily which will continue until outpatient Rheum evaluation.  - Continue on PTA APAP 1000 mg TID and allopurinol.  - Outpatient Rheumatology evaluation (no Rheum at this hospital).  - Appreciate Ortho help.    Fever, suspect related to inflammatory arthritis.  Fever to 101.5 am 7/27. BC's 7/27 NGTD. UC 7/27 NGTD. Afebrile since 7/27.  - Workup of other issues as noted.     DRAGAN (suspect prerenal due to poor PO intake) on CKD stage III.  Non-anion gap metabolic acidosis, likely due to CKD.  Baseline cr ~ 1.2. Creatinine 1.43 and bicarb 14 on admit 7/21.. She received IV fluids with improvement.  Recent Labs   Lab 07/28/20  0923  07/27/20  0858 07/26/20  0850 07/25/20  0554 07/24/20  0849 07/23/20  0950   CR 1.04 1.08* 1.08* 1.16* 0.97 1.06*   - Monitor BMP.  - Avoid nephrotoxic medications.    Generalized weakness, multifactorial, related to above issues.  Severe protein-calorie malnutrition in context of chronic illness.  * Presented with generalized weakness and increased fatigue, worse 2 days PTA. Likely due to anemia and sub-optimal nutrition. Patient's daughter reported that she eats and drinks very little. Evidence of dehydration present on arrival with mild DRAGAN. Other significant work-up:   ? UA on showed many bacteria, but sample was contaminated  ? TTE (7/22) showed LVEF 55-60% with moderate diastolic dysfunction not significantly changed from prior study  ? No significant events on telemetry  - Treat above issues.  - Continue nutrition supplements/shakes per Nutrition.  - Continue PT, OT; PT/OT recommending TCU.     Hiatal hernia.  Noted on EGD (7/24). Started on Protonix.  - Continue Protonix.    Paroxysmal atrial fibrillation.  [PTA: diltiazem  mg daily, metoprolol  mg daily, apixaban 5 mg BID]  PTA apixaban has been decreased as noted above.  - Continue apixaban.  - Continue diltiazem and metoprolol XL.     Essential hypertension.  [PTA: diltiazem  mg daily, metoprolol  mg daily, hydralazine 50 mg TID, lisinopril 20 mg daily.]  - Continues on diltiazem, metoprolol XL, hydralazine, lisinopril.     Hypothyroidism.  TSH 7/21 normal.  - Continue PTA levothyroxine.       COVID-19 testing.  COVID-19 PCR Results    COVID-19 PCR Results 7/21/20 7/21/20    1243 1243   COVID-19 Virus PCR to [facility] - Result Test received-See reflex to IDDL test SARS CoV2 (COVID-19) Virus RT-PCR    COVID-19 Virus PCR to [facility] - Source Nasopharyngeal    SARS-CoV-2 Virus Specimen Source  Nasopharyngeal   SARS-CoV-2 PCR Result  NEGATIVE      Comments are available for some flowsheets but are not being displayed.         COVID-19  "Antibody Results, Testing for Immunity    COVID-19 Antibody Results, Testing for Immunity   No data to display.             Diet: 2 Gram Sodium Diet  Room Service  Snacks/Supplements Adult: Other; Plus2 Shake; Between Meals  Advance Diet as Tolerated    Prophylaxis: PCD's, ambulation. On apixaban.  Dejesus Catheter: not present  Code Status: No CPR- Do NOT Intubate    Disposition Plan   Expected discharge: 1-2d, recommended to transitional care unit once joint pain/inflammation improved, hgb stable; needs close outpt Rheum evaluation.  Entered: Ganga Mccullough MD 07/29/2020, 12:37 PM       Communication.  - I d/w pt's daughter 7/29.    Interval History    Feeling much better.  \"I haven't felt this good in a long time\".  Hands less stiff.    -Data reviewed today: I reviewed all new labs and imaging over the last 24 hours. I personally reviewed no images or EKG's today.    Physical Exam   Heart Rate: 65, Blood pressure (!) 156/67, pulse 71, temperature 97.5  F (36.4  C), temperature source Oral, resp. rate 16, height 1.626 m (5' 4\"), weight 70.3 kg (155 lb), SpO2 99 %, not currently breastfeeding.  Vitals:    07/21/20 0836 07/21/20 1300 07/24/20 0915   Weight: 70.3 kg (155 lb) 61.4 kg (135 lb 4.8 oz) 70.3 kg (155 lb)     Vital Signs with Ranges  Temp:  [97.5  F (36.4  C)-98.1  F (36.7  C)] 97.5  F (36.4  C)  Pulse:  [71-76] 71  Heart Rate:  [65-66] 65  Resp:  [14-16] 16  BP: (137-174)/(55-83) 156/67  SpO2:  [98 %-99 %] 99 %  Patient Vitals for the past 24 hrs:   BP Temp Temp src Pulse Heart Rate Resp SpO2   07/29/20 0904 (!) 156/67 -- -- 71 -- -- 99 %   07/29/20 0817 (!) 174/83 97.5  F (36.4  C) Oral 76 -- 16 98 %   07/29/20 0005 137/63 98  F (36.7  C) Oral -- 65 16 98 %   07/28/20 2121 (!) 140/55 -- -- -- -- -- --   07/28/20 1550 (!) 141/56 98.1  F (36.7  C) Oral -- 66 14 99 %     I/O's Last 24 hours  I/O last 3 completed shifts:  In: 240 [P.O.:240]  Out: -     Constitutional: Awake, alert, " brighter.  Respiratory: Diminished in bases. No crackles or wheezes.  Cardiovascular: Rate normal, no m/r/g.  GI: Soft, nt, nd, +BS.  Skin/Integumen:   Other:  Bilateral knee mild swelling effusion stable.     Data   Recent Labs   Lab 07/28/20  0923 07/27/20  0858 07/26/20  0850 07/25/20  0554   WBC 4.7 4.8  --  6.2   HGB 7.7* 7.2* 7.3* 7.7*   MCV 94 93  --  94    291  --  387    135 135 138   POTASSIUM 4.6 4.3 4.3 4.2   CHLORIDE 110* 111* 111* 110*   CO2 17* 18* 18* 17*   BUN 28 33* 30 31*   CR 1.04 1.08* 1.08* 1.16*   ANIONGAP 6 6 6  --    ALIVIA 9.0 8.5 8.6 8.9   GLC 86 93 84 121*   ALBUMIN  --   --   --  1.7*   PROTTOTAL  --   --   --  6.2*   BILITOTAL  --   --   --  0.4   ALKPHOS  --   --   --  90   ALT  --   --   --  10   AST  --   --   --  25     Recent Labs   Lab Test 07/28/20  0923 07/27/20  0858 07/26/20  0850 07/25/20  0554 07/24/20  0849  10/04/19  1220 10/04/19  0832   GLC 86 93 84 121* 80   < >  --   --    BGM  --   --   --   --   --   --  119* 100*    < > = values in this interval not displayed.         No results found for this or any previous visit (from the past 24 hour(s)).    Medications   All medications were reviewed.      acetaminophen  1,000 mg Oral TID     allopurinol  100 mg Oral Daily     apixaban ANTICOAGULANT  5 mg Oral BID     diltiazem ER COATED BEADS  120 mg Oral Daily     hydrALAZINE  50 mg Oral TID     levothyroxine  25 mcg Oral Daily     lidocaine  10 mL Infiltration Once     lisinopril  20 mg Oral Daily     magnesium citrate  296 mL Oral Once     methylPREDNISolone  8 mg Oral At Bedtime    And     methylPREDNISolone  4 mg Oral QAM AC    And     methylPREDNISolone  4 mg Oral Daily with lunch    And     methylPREDNISolone  4 mg Oral Daily with supper    And     [START ON 7/30/2020] methylPREDNISolone  4 mg Oral At Bedtime     metoprolol succinate ER  100 mg Oral Daily     pantoprazole  40 mg Oral M AC

## 2020-07-29 NOTE — PROGRESS NOTES
Care Coordination:    -A follow-up appointment has been made for this patient.     A follow-up appointment has been made for you with Dr. Crawley, Rheumatologist on Monday, August 31st at 1:00 PM at [facility].  Please call the clinic at [phone] should you need to change or cancel your appointment.  Please arrive 15 minutes early to your scheduled appointment and bring your photo ID, insurance card and co-payment.     [address], [address], [place], [address]        Di Matute RN, BAN  Inpatient Care Coordination  [facility]  [address]  [address]  [place] [address]  [email or web address]   Office: [phone]  Fax: [phone]  Gender pronouns: she/her/hers  Employed by [facility] [employer]

## 2020-07-29 NOTE — PLAN OF CARE
PT orders received & acknowledged. Chart reviewed. Eval completed & treatment initiated. Patient initially admitted under OBS on 7/21 with therapy recommended discharge to TCU. PT re-consulted as patient still in hospital and transitioned to IP status.     Patient lives at [facility]. Patient receives meals and cleaning services. In the last 2 weeks prior to hospital admit getting increased services with assistance for bathroom tasks. Patient reports up until the last 3 weeks she was THOMAS with functional mobilituy & ADLs. Recently reporting significant weakness.      Discharge Planner PT   Patient plan for discharge: TCU  Current status: Greeted patient supine in bed talking on the phone to daughter Krupa - therapist spoke with daughter before session and updated daughter on physical therapy status post session. VSS on RA. Engaged patient in supine <> EOB at [facility] for getting up and modAx2 for returning to supine. Engaged patient in sit <> stand with FWW at modAx2. Transitioned to sit <> stand via sarasteady at modAx2 from bed surface and minAx1 from sarasteady bench for a total of 8 reps throughout session. Engaged patient in 3 reps of standing tolerance with patient requiring seated rest break after ~1min. Engaged patient in pre-gait activities during standing with patient able to perform marches with decreased foot clearance. Engaged patient in supine and seated at edge of bed exercises. Patient reports fatigue & return to supine. Discussed importance of getting out of bed multiple times throughout the day to improve strength. Concluded session with patient supine in bed, all needs in reach and alarm engaged.   Barriers to return to prior living situation: Current level of assist (Ax2), unable to ambulate, LE weakness, fatigue, pain, decreased activity tolerance   Recommendations for discharge: TCU  Rationale for recommendations: Patient is below baseline for functional mobility & would benefit  from continued physical therapy in the setting of a TCU for improving functional mobility, LE strength and tolerance for functional activities in order to return to baseline of functioning.          Entered by: Vi Cardenas 07/29/2020 10:01 AM

## 2020-07-29 NOTE — PLAN OF CARE
Pt A&O X4, forgetful at times. VSS on RA. Incontinent. Turn/repo q2 hours. No IV access. Denying pain. Continue to monitor.

## 2020-07-29 NOTE — PROGRESS NOTES
[facility]  Hematology/oncology Progress Note            History:   Ghislaine Walls is an 84-year-old woman with a history of hypertension, chronic kidney disease and hypothyroidism.  She was hospitalized on 07/21 with generalized weakness for the previous 2 weeks.  She was not able to get necessary assistance from the assisted living and evaluated in the ER.  She has some joint pain and underwent injection to both shoulders with improvement and is seen by [facility] and has diagnosis of bursitis.  She also has some joint ache in her hands.  Denies any bleeding, melena, or hematochezia.  She was found to have anemia with a hemoglobin at 7.8 on admission, down to 6.7 on the 07/23, required red cell transfusion.  She was seen by GI and underwent a colonoscopy and EGD on 07/24.  The colonoscopy showed a 10 mm polyp in the transverse colon, removed.  The examination otherwise was normal.  Upper endoscopy showed a normal esophagus medium-sized hiatal hernia, duodenal erosions without bleeding, biopsied.  Blood work for the anemia was obtained.  Peripheral blood smear is pending.  She has a normal B12 level 556, folic acid of 6.8, ferritin 484, iron 9, saturation 5%, TIBC 167; all are low.  Her creatinine 1.14 with GFR estimated 44.  Haptoglobin obtained and is pending.  LDH normal, 152.      PAST MEDICAL HISTORY:  Hearing deficit, dyslipidemia, hypertension, hypothyroidism, peripheral artery disease, renal insufficiency, history of appendectomy, cataract surgery bilaterally, tubal ligation, joint pain and bursitis status post subacromial bursa injections.        SOCIAL HISTORY:  She lives in assisted living.  Currently nonsmoker for the last 47 years.  Alcohol, not heavy.      FAMILY HISTORY:  Negative for malignancy or blood disorders.      INTERVAL HISTORY:  Pain in knees, no aspiration  Fatigue, no bleeding           Medications:       acetaminophen  1,000 mg Oral TID     allopurinol  100 mg Oral Daily      apixaban ANTICOAGULANT  5 mg Oral BID     diltiazem ER COATED BEADS  120 mg Oral Daily     hydrALAZINE  50 mg Oral TID     levothyroxine  25 mcg Oral Daily     lidocaine  10 mL Infiltration Once     lisinopril  20 mg Oral Daily     magnesium citrate  296 mL Oral Once     metoprolol succinate ER  100 mg Oral Daily     pantoprazole  40 mg Oral QAM AC     predniSONE  20 mg Oral Daily                ROS:   RESP, CV, GI,, MUSCULOSKELETAL, HEME/ALLERGY/IMMUNE,  Skin: reviewed and negative except the positives mentioned in this note            Physical Exam:       Vital Sign Ranges  Temp:  [97.3  F (36.3  C)-98  F (36.7  C)] 97.3  F (36.3  C)  Pulse:  [67-76] 71  Heart Rate:  [65-74] 74  Resp:  [16] 16  BP: (137-174)/(55-83) 165/74  SpO2:  [98 %-99 %] 99 %      I/O last 3 completed shifts:  In: 340 [P.O.:340]  Out: -     Constitutional: no apparent distress  HEENT: unremarkable  Neck: Supple  Lungs: No increased work of breathing  Ext: upper ext edema  , knees swelling        Data:       ROUTINE LABS (Last four results)  CMP  Recent Labs   Lab 07/28/20  0923 07/27/20  0858 07/26/20  0850 07/25/20  0554 07/24/20  0849    135 135 138 137   POTASSIUM 4.6 4.3 4.3 4.2 4.1   CHLORIDE 110* 111* 111* 110* 113*   CO2 17* 18* 18* 17* 17*   ANIONGAP 6 6 6  --  7   GLC 86 93 84 121* 80   BUN 28 33* 30 31* 25   CR 1.04 1.08* 1.08* 1.16* 0.97   GFRESTIMATED 49* 47* 47* 43* 54*   GFRESTBLACK 57* 55* 55* 50* 62   ALIVIA 9.0 8.5 8.6 8.9 9.1   PROTTOTAL  --   --   --  6.2*  --    ALBUMIN  --   --   --  1.7*  --    BILITOTAL  --   --   --  0.4  --    ALKPHOS  --   --   --  90  --    AST  --   --   --  25  --    ALT  --   --   --  10  --      CBC  Recent Labs   Lab 07/28/20  0923 07/27/20  0858 07/26/20  0850 07/25/20  0554 07/24/20  0849   WBC 4.7 4.8  --  6.2 5.1   RBC 2.66* 2.42*  --  2.57* 2.81*   HGB 7.7* 7.2* 7.3* 7.7* 8.3*   HCT 25.0* 22.6*  --  24.1* 26.1*   MCV 94 93  --  94 93   MCH 28.9 29.8  --  30.0 29.5   MCHC 30.8*  31.9  --  32.2 31.8   RDW 15.8* 15.9*  --  16.2* 16.0*    291  --  387 399     GWEN positive 1:160, rheumatoid +, high CRP    Immunofixation serum: Faint monoclonal IgG immunoglobulin of lambda light chain type. Urine: Monoclonal free immunoglobulin light chain of kappa chain type  Smear non diagnostic and haptoglobin high (hemolysis not suspected) retic 3.2 LDH normal, Epo 17 (inappropriately low)         Assessment and Plan:   1.  Normochromic normocytic anemia, etiology unclear.  There is no evidence of B12 deficiency or folic acid deficiency.  Her iron levels are low, but ferritin is high, which is more consistent with chronic illness or chronic kidney disease.  Chronic kidney disease can be associated with anemia, although her kidney function did not change since 2018 when her hemoglobin was normal and the anemia was moderate in 2019. ?associated with infection or inflammatory process.   2.  Chronic kidney disease.   3.  Arthritis (inflammatory).   4.  Negative GI workup and negative Hemoccult occult stool.   5.  Monoclonal gammopathy IgG kappa.     PLAN:    I will proceed with bone marrow biopsy. Will need bone survey or PET (can be done as outpatient), will await BMBx.  Transfuse RBC if hgb 7 or lower, or symptomatic  If stable for discharge and we can f/u on marrow biopsy as outpatient.    Lawrence Oh M.D.

## 2020-07-29 NOTE — PROGRESS NOTES
07/29/20 0900   Quick Adds   Type of Visit Initial PT Evaluation   Living Environment   Lives With alone   Living Arrangements apartment   Home Accessibility no concerns   Living Environment Comment Patient lives at [facility], elevator access. Patient receives meals and cleaning services. In the last two weeks getting increased services with assistance for bathroom tasks.   Self-Care   Usual Activity Tolerance fair   Current Activity Tolerance poor   Equipment Currently Used at Home walker, rolling;grab bar, toilet;grab bar, tub/shower;shower chair   Activity/Exercise/Self-Care Comment Patient reports up until the last 3 weeks she was THOMAS with functional mobilituy & ADLs. Recently reporting significant weakness.   Functional Level Prior   Ambulation 1-->assistive equipment   Transferring 1-->assistive equipment   Fall history within last six months yes   Number of times patient has fallen within last six months 1   Which of the above functional risks had a recent onset or change? ambulation;transferring   General Information   Onset of Illness/Injury or Date of Surgery - Date 07/28/20   Referring Physician Makenna Odell PA-C   Patient/Family Goals Statement per daughter for patient to walk again   Pertinent History of Current Problem (include personal factors and/or comorbidities that impact the POC) Per chart: 84 year old female with hx including HTN, paroxysmal atrial fibrillation on anticoagulation with apixaban, gout, CKD, and chronic pain syndrome, who was admitted on 7/21/2020 for evaluation of generalized weakness and fatigue. She has been found to have progressive anemia of unclear etiology   Precautions/Limitations fall precautions   Weight-Bearing Status - LLE weight-bearing as tolerated   Weight-Bearing Status - RLE weight-bearing as tolerated   General Observations Greeted patient supine in bed sleeping.   General Info Comments Activity: up ad louis   Cognitive Status Examination   Orientation  "person;place   Level of Consciousness alert   Follows Commands and Answers Questions 100% of the time;able to follow single-step instructions   Pain Assessment   Patient Currently in Pain   (6/10 in bilateral knees)   Posture    Posture Forward head position;Protracted shoulders   Range of Motion (ROM)   ROM Comment B UE decreased shoulder ROM to 90 degrees; B LE WFL   Strength   Strength Comments B LE functionally weak   Bed Mobility   Bed Mobility Comments supine <> EOB at [facility] for getting up and modAx2 for returning to supine   Transfer Skills   Transfer Comments  sit <> stand with FWW at modAx2   Gait   Gait Comments unable to perform this session   Balance   Balance Comments unsteady in standing with bilateral UE support   General Therapy Interventions   Planned Therapy Interventions balance training;bed mobility training;gait training;neuromuscular re-education;strengthening;transfer training;home program guidelines;progressive activity/exercise   Clinical Impression   Criteria for Skilled Therapeutic Intervention yes, treatment indicated   PT Diagnosis impaired functional mobility   Influenced by the following impairments pain, LE weakness, fatigue, decreased activity tolerance, impaired balance   Functional limitations due to impairments bed mobility, transfers, ambulation   Clinical Presentation Stable/Uncomplicated   Clinical Presentation Rationale PMH, current presentation, clinical judgement   Clinical Decision Making (Complexity) Low complexity   Therapy Frequency Daily   Predicted Duration of Therapy Intervention (days/wks) 4 days   Anticipated Discharge Disposition Transitional Care Facility   Risk & Benefits of therapy have been explained Yes   Patient, Family & other staff in agreement with plan of care Yes   [facility] AM-PAC TM \"6 Clicks\"   2016, Trustees of [facility], under license to [employer].  All rights reserved.   6 Clicks Short Forms Basic Mobility Inpatient Short Form " "  [facility] AM-PAC  \"6 Clicks\" V.2 Basic Mobility Inpatient Short Form   1. Turning from your back to your side while in a flat bed without using bedrails? 3 - A Little   2. Moving from lying on your back to sitting on the side of a flat bed without using bedrails? 2 - A Lot   3. Moving to and from a bed to a chair (including a wheelchair)? 2 - A Lot   4. Standing up from a chair using your arms (e.g., wheelchair, or bedside chair)? 2 - A Lot   5. To walk in hospital room? 1 - Total   6. Climbing 3-5 steps with a railing? 1 - Total   Basic Mobility Raw Score (Score out of 24.Lower scores equate to lower levels of function) 11   Total Evaluation Time   Total Evaluation Time (Minutes) 6     "

## 2020-07-29 NOTE — PLAN OF CARE
DATE & TIME: 7/29/2020 0700-1930  Cognitive Concerns/ Orientation : A&O x4, forgetful,   BEHAVIOR & AGGRESSION TOOL COLOR: Green   CIWA SCORE: NA  ABNL VS/O2: BP elevated 179/78, 160/72, other VSS on RA  MOBILITY: [facility] assist of 2, turn & repo q2h, pt refuses at times. Encouraged mobility, agreed to take a shower today but refused to sit on a chair.   PAIN MANAGMENT: C/o knee pain, declined intervention (received scheduled Tylenol this morning).   DIET: 2g Na  poor appetite  BOWEL/BLADDER: Incontinent of bowel and bladder. No BM this shift.   ABNL LAB/BG: Hgb 7.7 yesterday, no signs of active bleeding.  DRAIN/DEVICES: no IV access   TELEMETRY RHYTHM: NA  SKIN: Pale, intact. BLE & BUE edema 2+. Bel rash improving, powder applied   TESTS/PROCEDURES: none  D/C DAY/GOALS/PLACE: Pending hgb improvement and TCU vs MIGUEL plan  OTHER IMPORTANT INFO: Hematology following. BC/UC NGTD. Medrol discontinued and started on Prednisone 20 mg daily.     Update 1500-1930  Oncology saw the pt, plan is to proceed with bone marrow biopsy tomorrow. BP remains elevated 165/74 other   VSS, O2 wnl RA.

## 2020-07-29 NOTE — DISCHARGE INSTRUCTIONS
A follow-up appointment has been made for you with Dr. Crawley, Rheumatologist on Monday, August 31st at 1:00 PM at [facility].  Please call the clinic at [phone] should you need to change or cancel your appointment.  Please arrive 15 minutes early to your scheduled appointment and bring your photo ID, insurance card and co-payment.     [address] [facility], [address], [place], [address]

## 2020-07-30 ENCOUNTER — APPOINTMENT (OUTPATIENT)
Dept: PHYSICAL THERAPY | Facility: CLINIC | Age: 85
DRG: 682 | End: 2020-07-30
Payer: MEDICARE

## 2020-07-30 PROCEDURE — 83883 ASSAY NEPHELOMETRY NOT SPEC: CPT | Performed by: INTERNAL MEDICINE

## 2020-07-30 PROCEDURE — 25000131 ZZH RX MED GY IP 250 OP 636 PS 637: Mod: GY | Performed by: INTERNAL MEDICINE

## 2020-07-30 PROCEDURE — 84165 PROTEIN E-PHORESIS SERUM: CPT | Performed by: INTERNAL MEDICINE

## 2020-07-30 PROCEDURE — 25000132 ZZH RX MED GY IP 250 OP 250 PS 637: Mod: GY | Performed by: INTERNAL MEDICINE

## 2020-07-30 PROCEDURE — 36415 COLL VENOUS BLD VENIPUNCTURE: CPT | Performed by: INTERNAL MEDICINE

## 2020-07-30 PROCEDURE — 97530 THERAPEUTIC ACTIVITIES: CPT | Mod: GP | Performed by: PHYSICAL THERAPIST

## 2020-07-30 PROCEDURE — 12000000 ZZH R&B MED SURG/OB

## 2020-07-30 PROCEDURE — 97116 GAIT TRAINING THERAPY: CPT | Mod: GP | Performed by: PHYSICAL THERAPIST

## 2020-07-30 PROCEDURE — 00000402 ZZHCL STATISTIC TOTAL PROTEIN: Performed by: INTERNAL MEDICINE

## 2020-07-30 PROCEDURE — 99232 SBSQ HOSP IP/OBS MODERATE 35: CPT | Performed by: INTERNAL MEDICINE

## 2020-07-30 RX ADMIN — TRAMADOL HYDROCHLORIDE 50 MG: 50 TABLET, FILM COATED ORAL at 03:40

## 2020-07-30 RX ADMIN — ACETAMINOPHEN 1000 MG: 500 TABLET, FILM COATED ORAL at 15:27

## 2020-07-30 RX ADMIN — DILTIAZEM HYDROCHLORIDE 120 MG: 120 CAPSULE, COATED, EXTENDED RELEASE ORAL at 08:28

## 2020-07-30 RX ADMIN — ACETAMINOPHEN 1000 MG: 500 TABLET, FILM COATED ORAL at 08:27

## 2020-07-30 RX ADMIN — LISINOPRIL 20 MG: 20 TABLET ORAL at 08:28

## 2020-07-30 RX ADMIN — PANTOPRAZOLE SODIUM 40 MG: 40 TABLET, DELAYED RELEASE ORAL at 07:22

## 2020-07-30 RX ADMIN — HYDRALAZINE HYDROCHLORIDE 50 MG: 50 TABLET, FILM COATED ORAL at 15:27

## 2020-07-30 RX ADMIN — PREDNISONE 20 MG: 20 TABLET ORAL at 08:28

## 2020-07-30 RX ADMIN — ALLOPURINOL 100 MG: 100 TABLET ORAL at 08:27

## 2020-07-30 RX ADMIN — LEVOTHYROXINE SODIUM 25 MCG: 25 TABLET ORAL at 07:22

## 2020-07-30 RX ADMIN — ACETAMINOPHEN 1000 MG: 500 TABLET, FILM COATED ORAL at 20:19

## 2020-07-30 RX ADMIN — HYDRALAZINE HYDROCHLORIDE 50 MG: 50 TABLET, FILM COATED ORAL at 20:19

## 2020-07-30 RX ADMIN — HYDRALAZINE HYDROCHLORIDE 50 MG: 50 TABLET, FILM COATED ORAL at 08:27

## 2020-07-30 RX ADMIN — METOPROLOL SUCCINATE 100 MG: 100 TABLET, EXTENDED RELEASE ORAL at 08:28

## 2020-07-30 ASSESSMENT — ACTIVITIES OF DAILY LIVING (ADL)
ADLS_ACUITY_SCORE: 28

## 2020-07-30 NOTE — PROGRESS NOTES
SW:   D:  Dr Mccullough plans to discharge patient tomorrow following her bone biopsy.  He is requesting an 4 p.m.. discharge time.  Writer has arranged this with Waylon at [facility] admissions for their transport aide to be here at 4 p.m.  Plan:  Spoke with patient this afternoon.  She requested writer update her daughter.

## 2020-07-30 NOTE — PROGRESS NOTES
[facility]    Internal Medicine Hospitalist Progress Note  07/30/2020  I evaluated patient on the above date.    Ganga Mccullough Jr., MD  [phone] (p)  Text Page        Assessment & Plan New actions/orders today (07/30/2020) are underlined.    Ghislaine Walls is a 84 year old female with hx including HTN, paroxysmal atrial fibrillation on anticoagulation with apixaban, gout, CKD, and chronic pain syndrome, who was admitted on 7/21/2020 for evaluation of generalized weakness and fatigue. She has been found to have progressive anemia of unclear etiology     Normocytic anemia - suspect ACD (suspect inflammatory arthritis) +/- iron deficiency components; other causes not ruled out.  Abnormal SPEP/UPEP, possibly MGUS, other causes not ruled out.  * Question occult bleed vs primary myeloid disorder. GI and Heme/Onc were consulted during this admission.  * Hgb 7.8 on arrival from baseline ~9, and gradually decreased to 6.7 with no s/sx of active bleeding. FOBT on arrival was negative. Iron studies showed low iron and iron sat, but ferritin high, TIBC low. [facility] was consulted, and patient underwent EGD and colonoscopy (7/24) which respectively showed a moderate hiatal hernia, duodenal erosions, and a medium transverse colon polyp that was resected; no source of active bleeding was identified  * Received 1u prbcs on 7/23 for Hgb 6.7.   * TSH 7/21 normal. B12 and folate 7/22 normal. LDH 7/25 normal. Peripheral smear 7/25 showed normochromic normocytic anemia with rare nucleated RBC, absolute monocytosis. 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 EPO levels 7/25 pending. Apixaban dose decreased 7/25 in light of age and CKD.  * On 7/27, GI deferred on capsule endoscopy as iron studies more suggestive of ACD (low TIBC, ferritin high; iron levels could be low with ACD).  Recent Labs   Lab  07/28/20  0923 07/27/20  0858 07/26/20  0850 07/25/20  0554 07/24/20  0849 07/23/20  2114   HGB 7.7* 7.2* 7.3* 7.7* 8.3* 8.4*   - Plan bone marrow biopsy 7/31.  - Hold apixaban for now.  - Monitor CBC.  - Consider prbc transfusion if hgb </= 7.0 or if significant bleeding with hemodynamic instability or if symptomatic.  - Workup and management of other issues as below.  - Appreciate Hematology help - I d/w Dr. Oh 7/30.    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  Shoulder bursitis.  Knee pain and swelling.  History of gout.  * Follows with Dr. Jerry STRICKLAND for subacromial bursa injection. Has had long-standing history of polyarthropathy involving shoulders, knees, and wrists, but does not appear to have a rheumatological work-up.   * PTA on APAP 1000 mg TID and allopurinol 100 mg daily.  * Pt c/o knee pain 7/27, knees warm and swollen on exam. Also had hand stiffness. Lab workup 7/27: CRP 86 (H), ACPA > 340 (H), RF 42 (H), GWEN positive (speckled pattern). Ortho consulted 7/27, ordered a course of Medrol. Knee x-rays 7/27 showed no acute fracture, small joint effusion. Elbow x-rays 7/27 showed no acute fractures, diffuse soft tissue swelling, question of small right elbow effusion, moderate left elbow effusion. Uric acid level 7/28 normal. Symptomatically improved 7/28 and 7/29. Medrol changed to prednisone 7/29.  - Continue prednisone 20 mg daily - taper by 5 mg daily every 5 days until 5 mg daily and then keep on this dose until Rheumatology follow-up.  - Continue on PTA APAP 1000 mg TID and allopurinol.  - Outpatient Rheumatology evaluation (no Rheum at this hospital) - plan evaluation at [facility] on 8/31/2020).  - Appreciate Ortho help.  - I discussed, informally, with Dr. Kong from [facility] 7/30.    Fever, suspect related to inflammatory arthritis.  Fever to 101.5 am 7/27. BC's 7/27 NGTD. UC 7/27 NGTD. Afebrile since 7/27.  - Workup of other  issues as noted.     DRAGAN (suspect prerenal due to poor PO intake) on CKD stage III.  Non-anion gap metabolic acidosis, likely due to CKD.  Baseline cr ~ 1.2. Creatinine 1.43 and bicarb 14 on admit 7/21.. She received IV fluids with improvement.  Recent Labs   Lab 07/28/20  0923 07/27/20  0858 07/26/20  0850 07/25/20  0554 07/24/20  0849   CR 1.04 1.08* 1.08* 1.16* 0.97   - Monitor BMP.  - Avoid nephrotoxic medications.    Generalized weakness, multifactorial, related to above issues.  Severe protein-calorie malnutrition in context of chronic illness.  * Presented with generalized weakness and increased fatigue, worse 2 days PTA. Likely due to anemia and sub-optimal nutrition. Patient's daughter reported that she eats and drinks very little. Evidence of dehydration present on arrival with mild DRAGAN. Other significant work-up:   ? UA on showed many bacteria, but sample was contaminated  ? TTE (7/22) showed LVEF 55-60% with moderate diastolic dysfunction not significantly changed from prior study  ? No significant events on telemetry.  ? Other workup as above.  - Treat above issues.  - Continue nutrition supplements/shakes per Nutrition.  - Continue PT, OT.  - Plan TCU.    Hiatal hernia.  Noted on EGD (7/24). Started on Protonix.  - Continue Protonix.    Paroxysmal atrial fibrillation.  [PTA: diltiazem  mg daily, metoprolol  mg daily, apixaban 5 mg BID]  PTA apixaban has been decreased as noted above.  - Hold apixaban for now given need for BM biopsy.  - Continue diltiazem and metoprolol XL.     Essential hypertension.  [PTA: diltiazem  mg daily, metoprolol  mg daily, hydralazine 50 mg TID, lisinopril 20 mg daily.]  - Continues on diltiazem, metoprolol XL, hydralazine, lisinopril.     Hypothyroidism.  TSH 7/21 normal.  - Continue PTA levothyroxine.       COVID-19 testing.  COVID-19 PCR Results    COVID-19 PCR Results 7/21/20 7/21/20    1243 1243   COVID-19 Virus PCR to [facility] - Result Test  "received-See reflex to IDDL test SARS CoV2 (COVID-19) Virus RT-PCR    COVID-19 Virus PCR to [facility] - Source Nasopharyngeal    SARS-CoV-2 Virus Specimen Source  Nasopharyngeal   SARS-CoV-2 PCR Result  NEGATIVE      Comments are available for some flowsheets but are not being displayed.         COVID-19 Antibody Results, Testing for Immunity    COVID-19 Antibody Results, Testing for Immunity   No data to display.             Diet: 2 Gram Sodium Diet  Room Service  Snacks/Supplements Adult: Other; Plus2 Shake; Between Meals  Advance Diet as Tolerated    Prophylaxis: PCD's, ambulation. On apixaban.  Dejesus Catheter: not present  Code Status: No CPR- Do NOT Intubate    Disposition Plan   Expected discharge: 1-2d, recommended to transitional care unit pending bone marrow biopsy; do not need to wait for results before discharge.  Entered: Ganga Mccullough MD 07/30/2020, 2:18 PM       Communication.  - I d/w pt's daughter 7/30.    Interval History    Feeling better today.  \"Better every day\".    -Data reviewed today: I reviewed all new labs and imaging over the last 24 hours. I personally reviewed no images or EKG's today.    Physical Exam   Heart Rate: 69, Blood pressure (!) 149/63, pulse 59, temperature 97.6  F (36.4  C), temperature source Oral, resp. rate 18, height 1.626 m (5' 4\"), weight 70.3 kg (155 lb), SpO2 96 %, not currently breastfeeding.  Vitals:    07/21/20 0836 07/21/20 1300 07/24/20 0915   Weight: 70.3 kg (155 lb) 61.4 kg (135 lb 4.8 oz) 70.3 kg (155 lb)     Vital Signs with Ranges  Temp:  [97.3  F (36.3  C)-97.9  F (36.6  C)] 97.6  F (36.4  C)  Pulse:  [59-71] 59  Heart Rate:  [69-77] 69  Resp:  [16-18] 18  BP: (149-166)/(63-76) 149/63  SpO2:  [96 %-99 %] 96 %  Patient Vitals for the past 24 hrs:   BP Temp Temp src Pulse Heart Rate Resp SpO2   07/30/20 1300 (!) 149/63 97.6  F (36.4  C) Oral 59 -- 18 96 %   07/30/20 0814 (!) 158/70 97.8  F (36.6  C) Oral -- 69 18 98 %   07/30/20 0430 -- -- -- -- -- 16 -- "   07/30/20 0337 (!) 157/70 97.6  F (36.4  C) Oral -- 73 16 98 %   07/29/20 2029 (!) 166/76 97.9  F (36.6  C) Oral -- 77 16 98 %   07/29/20 1546 (!) 165/74 97.3  F (36.3  C) Oral -- 74 16 99 %   07/29/20 1451 (!) 159/69 97.5  F (36.4  C) Oral 71 -- 16 --     I/O's Last 24 hours  I/O last 3 completed shifts:  In: 540 [P.O.:540]  Out: -     Constitutional: Awake, alert, conversant.  Respiratory: Diminished in bases. No crackles or wheezes.  Cardiovascular: Rate normal, no m/r/g.  GI: Soft, nt, nd, +BS.  Skin/Integumen:   Other:  Bilateral knee mild swelling effusion, stable.     Data   Recent Labs   Lab 07/28/20  0923 07/27/20  0858 07/26/20  0850 07/25/20  0554   WBC 4.7 4.8  --  6.2   HGB 7.7* 7.2* 7.3* 7.7*   MCV 94 93  --  94    291  --  387    135 135 138   POTASSIUM 4.6 4.3 4.3 4.2   CHLORIDE 110* 111* 111* 110*   CO2 17* 18* 18* 17*   BUN 28 33* 30 31*   CR 1.04 1.08* 1.08* 1.16*   ANIONGAP 6 6 6  --    ALIVIA 9.0 8.5 8.6 8.9   GLC 86 93 84 121*   ALBUMIN  --   --   --  1.7*   PROTTOTAL  --   --   --  6.2*   BILITOTAL  --   --   --  0.4   ALKPHOS  --   --   --  90   ALT  --   --   --  10   AST  --   --   --  25     Recent Labs   Lab Test 07/28/20  0923 07/27/20  0858 07/26/20  0850 07/25/20  0554 07/24/20  0849  10/04/19  1220 10/04/19  0832   GLC 86 93 84 121* 80   < >  --   --    BGM  --   --   --   --   --   --  119* 100*    < > = values in this interval not displayed.         No results found for this or any previous visit (from the past 24 hour(s)).    Medications   All medications were reviewed.      acetaminophen  1,000 mg Oral TID     allopurinol  100 mg Oral Daily     diltiazem ER COATED BEADS  120 mg Oral Daily     hydrALAZINE  50 mg Oral TID     levothyroxine  25 mcg Oral Daily     lidocaine  10 mL Infiltration Once     lisinopril  20 mg Oral Daily     magnesium citrate  296 mL Oral Once     metoprolol succinate ER  100 mg Oral Daily     pantoprazole  40 mg Oral QAM AC     predniSONE  20 mg  Oral Daily

## 2020-07-30 NOTE — PROGRESS NOTES
[facility]  Hematology/oncology Progress Note            History:   Ghislaine Walls is an 84-year-old woman with a history of hypertension, chronic kidney disease and hypothyroidism.  She was hospitalized on 07/21 with generalized weakness for the previous 2 weeks.  She was not able to get necessary assistance from the assisted living and evaluated in the ER.  She has some joint pain and underwent injection to both shoulders with improvement and is seen by [facility] and has diagnosis of bursitis.  She also has some joint ache in her hands.  Denies any bleeding, melena, or hematochezia.  She was found to have anemia with a hemoglobin at 7.8 on admission, down to 6.7 on the 07/23, required red cell transfusion.  She was seen by GI and underwent a colonoscopy and EGD on 07/24.  The colonoscopy showed a 10 mm polyp in the transverse colon, removed.  The examination otherwise was normal.  Upper endoscopy showed a normal esophagus medium-sized hiatal hernia, duodenal erosions without bleeding, biopsied.  Blood work for the anemia was obtained.  Peripheral blood smear is pending.  She has a normal B12 level 556, folic acid of 6.8, ferritin 484, iron 9, saturation 5%, TIBC 167; all are low.  Her creatinine 1.14 with GFR estimated 44.  Haptoglobin obtained and is pending.  LDH normal, 152.      PAST MEDICAL HISTORY:  Hearing deficit, dyslipidemia, hypertension, hypothyroidism, peripheral artery disease, renal insufficiency, history of appendectomy, cataract surgery bilaterally, tubal ligation, joint pain and bursitis status post subacromial bursa injections.        SOCIAL HISTORY:  She lives in assisted living.  Currently nonsmoker for the last 47 years.  Alcohol, not heavy.      FAMILY HISTORY:  Negative for malignancy or blood disorders.      INTERVAL HISTORY:  Feels better on steroid  Fatigue, no bleeding           Medications:       acetaminophen  1,000 mg Oral TID     allopurinol  100 mg Oral Daily      diltiazem ER COATED BEADS  120 mg Oral Daily     hydrALAZINE  50 mg Oral TID     levothyroxine  25 mcg Oral Daily     lidocaine  10 mL Infiltration Once     lisinopril  20 mg Oral Daily     magnesium citrate  296 mL Oral Once     metoprolol succinate ER  100 mg Oral Daily     pantoprazole  40 mg Oral QAM AC     predniSONE  20 mg Oral Daily                ROS:   RESP, CV, GI,, MUSCULOSKELETAL, HEME/ALLERGY/IMMUNE,  Skin: reviewed and negative except the positives mentioned in this note            Physical Exam:       Vital Sign Ranges  Temp:  [97.3  F (36.3  C)-97.9  F (36.6  C)] 97.6  F (36.4  C)  Pulse:  [59] 59  Heart Rate:  [69-77] 69  Resp:  [16-18] 18  BP: (149-166)/(63-76) 149/63  SpO2:  [96 %-99 %] 96 %      I/O last 3 completed shifts:  In: 400 [P.O.:400]  Out: -     Constitutional: no apparent distress  HEENT: unremarkable  Neck: Supple  Lungs: No increased work of breathing  Ext: upper ext edema  , knees swelling        Data:       ROUTINE LABS (Last four results)  CMP  Recent Labs   Lab 07/28/20  0923 07/27/20  0858 07/26/20  0850 07/25/20  0554 07/24/20  0849    135 135 138 137   POTASSIUM 4.6 4.3 4.3 4.2 4.1   CHLORIDE 110* 111* 111* 110* 113*   CO2 17* 18* 18* 17* 17*   ANIONGAP 6 6 6  --  7   GLC 86 93 84 121* 80   BUN 28 33* 30 31* 25   CR 1.04 1.08* 1.08* 1.16* 0.97   GFRESTIMATED 49* 47* 47* 43* 54*   GFRESTBLACK 57* 55* 55* 50* 62   ALIVIA 9.0 8.5 8.6 8.9 9.1   PROTTOTAL  --   --   --  6.2*  --    ALBUMIN  --   --   --  1.7*  --    BILITOTAL  --   --   --  0.4  --    ALKPHOS  --   --   --  90  --    AST  --   --   --  25  --    ALT  --   --   --  10  --      CBC  Recent Labs   Lab 07/28/20  0923 07/27/20  0858 07/26/20  0850 07/25/20  0554 07/24/20  0849   WBC 4.7 4.8  --  6.2 5.1   RBC 2.66* 2.42*  --  2.57* 2.81*   HGB 7.7* 7.2* 7.3* 7.7* 8.3*   HCT 25.0* 22.6*  --  24.1* 26.1*   MCV 94 93  --  94 93   MCH 28.9 29.8  --  30.0 29.5   MCHC 30.8* 31.9  --  32.2 31.8   RDW 15.8* 15.9*  --   16.2* 16.0*    291  --  387 399     GWEN positive 1:160, rheumatoid +, high CRP    Immunofixation serum: Faint monoclonal IgG immunoglobulin of lambda light chain type. Urine: Monoclonal free immunoglobulin light chain of kappa chain type  Smear non diagnostic and haptoglobin high (hemolysis not suspected) retic 3.2 LDH normal, Epo 17 (inappropriately low)         Assessment and Plan:   1.  Normochromic normocytic anemia, etiology unclear.  There is no evidence of B12 deficiency or folic acid deficiency.  Her iron levels are low, but ferritin is high, which is more consistent with chronic illness or chronic kidney disease.  Chronic kidney disease can be associated with anemia, although her kidney function did not change since 2018 when her hemoglobin was normal and the anemia was moderate in 2019. ?associated with infection or inflammatory process.   2.  Chronic kidney disease.   3.  Arthritis (inflammatory).   4.  Negative GI workup and negative Hemoccult occult stool.   5.  Monoclonal gammopathy IgG kappa.     PLAN:    I will proceed with bone marrow biopsy (scheduled for 7/31). Will need bone survey or PET (can be done as outpatient), will await BMBx.  Transfuse RBC if hgb 7 or lower, or symptomatic  If stable for discharge and we can f/u on marrow biopsy result as outpatient.  D/w Dr Mccullough.    Lawrence Oh M.D.

## 2020-07-30 NOTE — PROVIDER NOTIFICATION
MD Notification    Notified Person: MD    Notified Person Name: Dr. Stallworth    Notification Date/Time: 7/29/20 8:16pm    Notification Interaction: Phone    Purpose of Notification: Hold Eliquis dose for tonight? Pt. has a Bone biopsy scheduled for tomorrow.    Orders Received:    Comments: Order to Hold Eliquis tonight

## 2020-07-30 NOTE — PLAN OF CARE
Cognitive Concerns/ Orientation : A&O x4, forgetful,   BEHAVIOR & AGGRESSION TOOL COLOR: Green   CIWA SCORE: NA  ABNL VS/O2: BP elevated 166/76,157/70, other VSS on RA  MOBILITY: [facility] assist of 2, turn & repo q2hr   PAIN MANAGMENT: Denies pain, on Scheduled Tylenol   DIET: 2g Na  poor appetite  BOWEL/BLADDER: Incontinent of bowel and bladder. No BM overnight   ABNL LAB/BG: Hgb 7.7, no signs of active bleeding.  DRAIN/DEVICES: no IV access   TELEMETRY RHYTHM: NA  SKIN: Pale, intact. BLE & BUE edema 2+.  TESTS/PROCEDURES: Bone marrow biopsy today- Eliquis held per MD order  D/C DAY/GOALS/PLACE: TCU recommended in 1-2 days once joint inflammation/pain improved, Hgb stabe  OTHER IMPORTANT INFO: Hematology following. BC/UC NGTD.

## 2020-07-30 NOTE — PLAN OF CARE
Discharge Planner PT   Patient plan for discharge: not stated  Current status: Patient in bed; needing encouragement for OOB mobility; Biopsy cancelled today per nurse; agreeable to get OOB to eat late breakfast; supine to sit with HOB elevated and CGA/use of bedrail; cues to get feet fully on floor; mod A x 2 to stand with some LOB backward initially ; able to ambulate in room with walker and min/mod A of 1 and CGA of another for safety; able to ambulate 15 feet; transfer to bedside chair for breakfast with min A and safety cues. Nurse attending to patient at end of session  Barriers to return to prior living situation: weakness, current level of assist; falls risk; impaired balance; decreased activity tolerance  Recommendations for discharge: TCU  Rationale for recommendations: Patient would benefit from ongoing PT to address current deficits and assist patient in her eventual return to PLOF.       Entered by: Roberta Hernandez 07/30/2020 12:08 PM

## 2020-07-31 ENCOUNTER — APPOINTMENT (OUTPATIENT)
Dept: PHYSICAL THERAPY | Facility: CLINIC | Age: 85
DRG: 682 | End: 2020-07-31
Payer: MEDICARE

## 2020-07-31 ENCOUNTER — ANESTHESIA EVENT (OUTPATIENT)
Dept: GASTROENTEROLOGY | Facility: CLINIC | Age: 85
DRG: 682 | End: 2020-07-31
Payer: MEDICARE

## 2020-07-31 ENCOUNTER — ANESTHESIA (OUTPATIENT)
Dept: GASTROENTEROLOGY | Facility: CLINIC | Age: 85
DRG: 682 | End: 2020-07-31
Payer: MEDICARE

## 2020-07-31 VITALS
HEART RATE: 59 BPM | WEIGHT: 155 LBS | BODY MASS INDEX: 26.46 KG/M2 | OXYGEN SATURATION: 99 % | RESPIRATION RATE: 16 BRPM | SYSTOLIC BLOOD PRESSURE: 179 MMHG | TEMPERATURE: 97.9 F | DIASTOLIC BLOOD PRESSURE: 79 MMHG | HEIGHT: 64 IN

## 2020-07-31 LAB
ALBUMIN SERPL ELPH-MCNC: 2.2 G/DL (ref 3.7–5.1)
ALPHA1 GLOB SERPL ELPH-MCNC: 0.5 G/DL (ref 0.2–0.4)
ALPHA2 GLOB SERPL ELPH-MCNC: 1 G/DL (ref 0.5–0.9)
B-GLOBULIN SERPL ELPH-MCNC: 0.8 G/DL (ref 0.6–1)
BASOPHILS # BLD AUTO: 0 10E9/L (ref 0–0.2)
BASOPHILS NFR BLD AUTO: 0 %
DIFFERENTIAL METHOD BLD: ABNORMAL
EOSINOPHIL # BLD AUTO: 0 10E9/L (ref 0–0.7)
EOSINOPHIL NFR BLD AUTO: 0 %
ERYTHROCYTE [DISTWIDTH] IN BLOOD BY AUTOMATED COUNT: 15.6 % (ref 10–15)
GAMMA GLOB SERPL ELPH-MCNC: 1.5 G/DL (ref 0.7–1.6)
HCT VFR BLD AUTO: 27 % (ref 35–47)
HGB BLD-MCNC: 8.2 G/DL (ref 11.7–15.7)
IMM GRANULOCYTES # BLD: 0 10E9/L (ref 0–0.4)
IMM GRANULOCYTES NFR BLD: 0.5 %
KAPPA LC UR-MCNC: 11.77 MG/DL (ref 0.33–1.94)
KAPPA LC/LAMBDA SER: 1.41 {RATIO} (ref 0.26–1.65)
LAMBDA LC SERPL-MCNC: 8.36 MG/DL (ref 0.57–2.63)
LYMPHOCYTES # BLD AUTO: 0.7 10E9/L (ref 0.8–5.3)
LYMPHOCYTES NFR BLD AUTO: 10.8 %
M PROTEIN SERPL ELPH-MCNC: 0 G/DL
MCH RBC QN AUTO: 28.5 PG (ref 26.5–33)
MCHC RBC AUTO-ENTMCNC: 30.4 G/DL (ref 31.5–36.5)
MCV RBC AUTO: 94 FL (ref 78–100)
MONOCYTES # BLD AUTO: 0.3 10E9/L (ref 0–1.3)
MONOCYTES NFR BLD AUTO: 4.4 %
NEUTROPHILS # BLD AUTO: 5.2 10E9/L (ref 1.6–8.3)
NEUTROPHILS NFR BLD AUTO: 84.3 %
NRBC # BLD AUTO: 0 10*3/UL
NRBC BLD AUTO-RTO: 0 /100
PLATELET # BLD AUTO: 357 10E9/L (ref 150–450)
PROT PATTERN SERPL ELPH-IMP: ABNORMAL
RBC # BLD AUTO: 2.88 10E12/L (ref 3.8–5.2)
RETICS # AUTO: 66.8 10E9/L (ref 25–95)
RETICS/RBC NFR AUTO: 2.3 % (ref 0.5–2)
WBC # BLD AUTO: 6.1 10E9/L (ref 4–11)

## 2020-07-31 PROCEDURE — 25800030 ZZH RX IP 258 OP 636: Performed by: NURSE ANESTHETIST, CERTIFIED REGISTERED

## 2020-07-31 PROCEDURE — 25000128 H RX IP 250 OP 636: Performed by: NURSE ANESTHETIST, CERTIFIED REGISTERED

## 2020-07-31 PROCEDURE — 88305 TISSUE EXAM BY PATHOLOGIST: CPT | Performed by: INTERNAL MEDICINE

## 2020-07-31 PROCEDURE — 40000847 ZZHCL STATISTIC MORPHOLOGY W/INTERP HISTOLOGY TC 85060: Performed by: INTERNAL MEDICINE

## 2020-07-31 PROCEDURE — 25000132 ZZH RX MED GY IP 250 OP 250 PS 637: Mod: GY | Performed by: INTERNAL MEDICINE

## 2020-07-31 PROCEDURE — 07DR3ZX EXTRACTION OF ILIAC BONE MARROW, PERCUTANEOUS APPROACH, DIAGNOSTIC: ICD-10-PCS | Performed by: PATHOLOGY

## 2020-07-31 PROCEDURE — 88342 IMHCHEM/IMCYTCHM 1ST ANTB: CPT | Mod: 26 | Performed by: INTERNAL MEDICINE

## 2020-07-31 PROCEDURE — 00000008 ZZHCL STATISTIC ADDL BM ASP PERF PF 38220: Performed by: INTERNAL MEDICINE

## 2020-07-31 PROCEDURE — 88341 IMHCHEM/IMCYTCHM EA ADD ANTB: CPT | Performed by: INTERNAL MEDICINE

## 2020-07-31 PROCEDURE — 97110 THERAPEUTIC EXERCISES: CPT | Mod: GP

## 2020-07-31 PROCEDURE — 88185 FLOWCYTOMETRY/TC ADD-ON: CPT | Performed by: INTERNAL MEDICINE

## 2020-07-31 PROCEDURE — 40000948 ZZHCL STATISTIC BONE MARROW ASP TC 85097: Performed by: INTERNAL MEDICINE

## 2020-07-31 PROCEDURE — 88313 SPECIAL STAINS GROUP 2: CPT | Performed by: INTERNAL MEDICINE

## 2020-07-31 PROCEDURE — 88237 TISSUE CULTURE BONE MARROW: CPT | Performed by: INTERNAL MEDICINE

## 2020-07-31 PROCEDURE — 38221 DX BONE MARROW BIOPSIES: CPT | Performed by: PATHOLOGY

## 2020-07-31 PROCEDURE — 38221 DX BONE MARROW BIOPSIES: CPT | Performed by: INTERNAL MEDICINE

## 2020-07-31 PROCEDURE — 99239 HOSP IP/OBS DSCHRG MGMT >30: CPT | Performed by: INTERNAL MEDICINE

## 2020-07-31 PROCEDURE — 97530 THERAPEUTIC ACTIVITIES: CPT | Mod: GP

## 2020-07-31 PROCEDURE — 88341 IMHCHEM/IMCYTCHM EA ADD ANTB: CPT | Mod: 26 | Performed by: INTERNAL MEDICINE

## 2020-07-31 PROCEDURE — 00000058 ZZHCL STATISTIC BONE MARROW ASP PERF TC 38220: Performed by: INTERNAL MEDICINE

## 2020-07-31 PROCEDURE — 88313 SPECIAL STAINS GROUP 2: CPT | Mod: 26 | Performed by: INTERNAL MEDICINE

## 2020-07-31 PROCEDURE — 88342 IMHCHEM/IMCYTCHM 1ST ANTB: CPT | Performed by: INTERNAL MEDICINE

## 2020-07-31 PROCEDURE — 88280 CHROMOSOME KARYOTYPE STUDY: CPT | Performed by: INTERNAL MEDICINE

## 2020-07-31 PROCEDURE — 85060 BLOOD SMEAR INTERPRETATION: CPT | Performed by: INTERNAL MEDICINE

## 2020-07-31 PROCEDURE — 40001005 ZZHCL STATISTIC FLOW >15 ABY TC 88189: Performed by: INTERNAL MEDICINE

## 2020-07-31 PROCEDURE — 40000424 ZZHCL STATISTIC BONE MARROW CORE PERF TC 38221: Performed by: INTERNAL MEDICINE

## 2020-07-31 PROCEDURE — 40000010 ZZH STATISTIC ANES STAT CODE-CRNA PER MINUTE: Performed by: PATHOLOGY

## 2020-07-31 PROCEDURE — 88271 CYTOGENETICS DNA PROBE: CPT | Performed by: INTERNAL MEDICINE

## 2020-07-31 PROCEDURE — 85025 COMPLETE CBC W/AUTO DIFF WBC: CPT | Performed by: PATHOLOGY

## 2020-07-31 PROCEDURE — 88311 DECALCIFY TISSUE: CPT | Mod: 26 | Performed by: INTERNAL MEDICINE

## 2020-07-31 PROCEDURE — 88275 CYTOGENETICS 100-300: CPT | Performed by: INTERNAL MEDICINE

## 2020-07-31 PROCEDURE — 38222 DX BONE MARROW BX & ASPIR: CPT | Mod: RT | Performed by: INTERNAL MEDICINE

## 2020-07-31 PROCEDURE — 88264 CHROMOSOME ANALYSIS 20-25: CPT | Performed by: INTERNAL MEDICINE

## 2020-07-31 PROCEDURE — 40000399 ZZHCL STATISTIC FLOW >15 ABY PF 88189: Performed by: INTERNAL MEDICINE

## 2020-07-31 PROCEDURE — 85097 BONE MARROW INTERPRETATION: CPT | Performed by: INTERNAL MEDICINE

## 2020-07-31 PROCEDURE — 25000131 ZZH RX MED GY IP 250 OP 636 PS 637: Mod: GY | Performed by: INTERNAL MEDICINE

## 2020-07-31 PROCEDURE — 88311 DECALCIFY TISSUE: CPT | Performed by: INTERNAL MEDICINE

## 2020-07-31 PROCEDURE — 36415 COLL VENOUS BLD VENIPUNCTURE: CPT | Performed by: PATHOLOGY

## 2020-07-31 PROCEDURE — 88184 FLOWCYTOMETRY/ TC 1 MARKER: CPT | Performed by: INTERNAL MEDICINE

## 2020-07-31 PROCEDURE — 85045 AUTOMATED RETICULOCYTE COUNT: CPT | Performed by: PATHOLOGY

## 2020-07-31 PROCEDURE — 88305 TISSUE EXAM BY PATHOLOGIST: CPT | Mod: 26,76 | Performed by: INTERNAL MEDICINE

## 2020-07-31 PROCEDURE — 37000008 ZZH ANESTHESIA TECHNICAL FEE, 1ST 30 MIN: Performed by: PATHOLOGY

## 2020-07-31 PROCEDURE — 37000009 ZZH ANESTHESIA TECHNICAL FEE, EACH ADDTL 15 MIN: Performed by: PATHOLOGY

## 2020-07-31 PROCEDURE — 40000795 ZZHCL STATISTIC DNA PROCESS AND HOLD: Performed by: INTERNAL MEDICINE

## 2020-07-31 PROCEDURE — 88305 TISSUE EXAM BY PATHOLOGIST: CPT | Mod: 26 | Performed by: INTERNAL MEDICINE

## 2020-07-31 RX ORDER — HYDROMORPHONE HYDROCHLORIDE 1 MG/ML
.3-.5 INJECTION, SOLUTION INTRAMUSCULAR; INTRAVENOUS; SUBCUTANEOUS EVERY 5 MIN PRN
Status: DISCONTINUED | OUTPATIENT
Start: 2020-07-31 | End: 2020-07-31 | Stop reason: HOSPADM

## 2020-07-31 RX ORDER — ONDANSETRON 2 MG/ML
4 INJECTION INTRAMUSCULAR; INTRAVENOUS EVERY 30 MIN PRN
Status: DISCONTINUED | OUTPATIENT
Start: 2020-07-31 | End: 2020-07-31

## 2020-07-31 RX ORDER — DEXAMETHASONE SODIUM PHOSPHATE 4 MG/ML
4 INJECTION, SOLUTION INTRA-ARTICULAR; INTRALESIONAL; INTRAMUSCULAR; INTRAVENOUS; SOFT TISSUE
Status: DISCONTINUED | OUTPATIENT
Start: 2020-07-31 | End: 2020-07-31

## 2020-07-31 RX ORDER — MEPERIDINE HYDROCHLORIDE 25 MG/ML
12.5 INJECTION INTRAMUSCULAR; INTRAVENOUS; SUBCUTANEOUS
Status: DISCONTINUED | OUTPATIENT
Start: 2020-07-31 | End: 2020-07-31 | Stop reason: HOSPADM

## 2020-07-31 RX ORDER — PREDNISONE 10 MG/1
TABLET ORAL
DISCHARGE
Start: 2020-08-01 | End: 2020-09-03 | Stop reason: DRUGHIGH

## 2020-07-31 RX ORDER — ONDANSETRON 4 MG/1
4 TABLET, ORALLY DISINTEGRATING ORAL EVERY 30 MIN PRN
Status: DISCONTINUED | OUTPATIENT
Start: 2020-07-31 | End: 2020-07-31

## 2020-07-31 RX ORDER — TRAMADOL HYDROCHLORIDE 50 MG/1
50 TABLET ORAL EVERY 6 HOURS PRN
Qty: 30 TABLET | Refills: 0 | Status: SHIPPED | OUTPATIENT
Start: 2020-07-31 | End: 2020-07-31

## 2020-07-31 RX ORDER — TRAMADOL HYDROCHLORIDE 50 MG/1
50 TABLET ORAL EVERY 6 HOURS PRN
Qty: 20 TABLET | Refills: 0 | Status: SHIPPED | OUTPATIENT
Start: 2020-07-31 | End: 2020-08-18

## 2020-07-31 RX ORDER — SODIUM CHLORIDE, SODIUM LACTATE, POTASSIUM CHLORIDE, CALCIUM CHLORIDE 600; 310; 30; 20 MG/100ML; MG/100ML; MG/100ML; MG/100ML
INJECTION, SOLUTION INTRAVENOUS CONTINUOUS
Status: DISCONTINUED | OUTPATIENT
Start: 2020-07-31 | End: 2020-07-31

## 2020-07-31 RX ORDER — ONDANSETRON 2 MG/ML
4 INJECTION INTRAMUSCULAR; INTRAVENOUS EVERY 30 MIN PRN
Status: DISCONTINUED | OUTPATIENT
Start: 2020-07-31 | End: 2020-07-31 | Stop reason: HOSPADM

## 2020-07-31 RX ORDER — SODIUM CHLORIDE, SODIUM LACTATE, POTASSIUM CHLORIDE, CALCIUM CHLORIDE 600; 310; 30; 20 MG/100ML; MG/100ML; MG/100ML; MG/100ML
INJECTION, SOLUTION INTRAVENOUS CONTINUOUS
Status: DISCONTINUED | OUTPATIENT
Start: 2020-07-31 | End: 2020-07-31 | Stop reason: HOSPADM

## 2020-07-31 RX ORDER — PROPOFOL 10 MG/ML
INJECTION, EMULSION INTRAVENOUS CONTINUOUS PRN
Status: DISCONTINUED | OUTPATIENT
Start: 2020-07-31 | End: 2020-07-31

## 2020-07-31 RX ORDER — SODIUM CHLORIDE 9 MG/ML
INJECTION, SOLUTION INTRAVENOUS CONTINUOUS PRN
Status: DISCONTINUED | OUTPATIENT
Start: 2020-07-31 | End: 2020-07-31

## 2020-07-31 RX ORDER — NALOXONE HYDROCHLORIDE 0.4 MG/ML
.1-.4 INJECTION, SOLUTION INTRAMUSCULAR; INTRAVENOUS; SUBCUTANEOUS
Status: DISCONTINUED | OUTPATIENT
Start: 2020-07-31 | End: 2020-07-31

## 2020-07-31 RX ORDER — ONDANSETRON 4 MG/1
4 TABLET, ORALLY DISINTEGRATING ORAL EVERY 30 MIN PRN
Status: DISCONTINUED | OUTPATIENT
Start: 2020-07-31 | End: 2020-07-31 | Stop reason: HOSPADM

## 2020-07-31 RX ORDER — DEXAMETHASONE SODIUM PHOSPHATE 4 MG/ML
4 INJECTION, SOLUTION INTRA-ARTICULAR; INTRALESIONAL; INTRAMUSCULAR; INTRAVENOUS; SOFT TISSUE
Status: DISCONTINUED | OUTPATIENT
Start: 2020-07-31 | End: 2020-07-31 | Stop reason: HOSPADM

## 2020-07-31 RX ORDER — ONDANSETRON 2 MG/ML
INJECTION INTRAMUSCULAR; INTRAVENOUS PRN
Status: DISCONTINUED | OUTPATIENT
Start: 2020-07-31 | End: 2020-07-31

## 2020-07-31 RX ADMIN — ACETAMINOPHEN 1000 MG: 500 TABLET, FILM COATED ORAL at 08:31

## 2020-07-31 RX ADMIN — ONDANSETRON 4 MG: 2 INJECTION INTRAMUSCULAR; INTRAVENOUS at 13:41

## 2020-07-31 RX ADMIN — DILTIAZEM HYDROCHLORIDE 120 MG: 120 CAPSULE, COATED, EXTENDED RELEASE ORAL at 08:31

## 2020-07-31 RX ADMIN — PREDNISONE 20 MG: 20 TABLET ORAL at 08:31

## 2020-07-31 RX ADMIN — METOPROLOL SUCCINATE 100 MG: 100 TABLET, EXTENDED RELEASE ORAL at 08:31

## 2020-07-31 RX ADMIN — PANTOPRAZOLE SODIUM 40 MG: 40 TABLET, DELAYED RELEASE ORAL at 06:39

## 2020-07-31 RX ADMIN — LEVOTHYROXINE SODIUM 25 MCG: 25 TABLET ORAL at 06:39

## 2020-07-31 RX ADMIN — ACETAMINOPHEN 1000 MG: 500 TABLET, FILM COATED ORAL at 15:52

## 2020-07-31 RX ADMIN — ALLOPURINOL 100 MG: 100 TABLET ORAL at 08:31

## 2020-07-31 RX ADMIN — LISINOPRIL 20 MG: 20 TABLET ORAL at 08:31

## 2020-07-31 RX ADMIN — SODIUM CHLORIDE: 9 INJECTION, SOLUTION INTRAVENOUS at 13:31

## 2020-07-31 RX ADMIN — PROPOFOL 100 MCG/KG/MIN: 10 INJECTION, EMULSION INTRAVENOUS at 13:33

## 2020-07-31 RX ADMIN — HYDRALAZINE HYDROCHLORIDE 50 MG: 50 TABLET, FILM COATED ORAL at 08:31

## 2020-07-31 RX ADMIN — HYDRALAZINE HYDROCHLORIDE 50 MG: 50 TABLET, FILM COATED ORAL at 15:52

## 2020-07-31 RX ADMIN — Medication 1 MG: at 00:21

## 2020-07-31 ASSESSMENT — ACTIVITIES OF DAILY LIVING (ADL)
ADLS_ACUITY_SCORE: 28

## 2020-07-31 ASSESSMENT — ENCOUNTER SYMPTOMS: DYSRHYTHMIAS: 1

## 2020-07-31 ASSESSMENT — LIFESTYLE VARIABLES: TOBACCO_USE: 1

## 2020-07-31 NOTE — DISCHARGE SUMMARY
[facility]  Discharge Summary        Ghislaine Walls MRN# [id number]   YOB: 1935 Age: 84 year old     Date of Admission: 7/21/2020  Date of Discharge: 7/31/2020  Admitting Physician: Rodo Marcelino MD  Discharge Physician: Ganga Mccullough MD     Primary Provider: Laurie Conteh  Primary Care Physician Phone Number: [phone]         Discharge Diagnoses:   1. Anemia - suspect anemia of chronic disease component (suspect inflammatory arthritis) +/- iron deficiency component; other causes not ruled out.  2. Abnormal SPEP/UPEP, possibly MGUS, other causes not ruled out.  3. 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  4. DRAGAN (suspect prerenal due to poor PO intake) on CKD stage III.  5. Non-anion gap metabolic acidosis, likely due to CKD.  Generalized weakness, multifactorial, related to above issues.  6. Fever, suspect related to inflammatory arthritis.  7. Severe protein-calorie malnutrition in context of chronic illness.  8. Hiatal hernia by EGD.        Other Chronic Medical Problems:      1. Essential hypertension.  2. Paroxysmal atrial fibrillation.  3. Hypothyroidism.  4. Gout.       Allergies:         Allergies   Allergen Reactions     Oxybutynin Itching     Seasonal Allergies            Discharge Medications:        Current Discharge Medication List      START taking these medications    Details   pantoprazole (PROTONIX) 40 MG EC tablet Take 1 tablet (40 mg) by mouth every morning (before breakfast)  Qty:      Associated Diagnoses: Hiatal hernia      predniSONE (DELTASONE) 10 MG tablet Take 2 tablets (20 mg) by mouth daily for 2 days, THEN 1.5 tablets (15 mg) daily for 5 days, THEN 1 tablet (10 mg) daily for 5 days, THEN 0.5 tablets (5 mg) daily. ; continue prednisone 5 mg daily at least until evaluation by Rheumatology.    Associated Diagnoses: Inflammatory arthritis      traMADol (ULTRAM) 50 MG tablet Take 1 tablet (50 mg) by mouth every 6 hours as needed for  moderate pain  Qty: 20 tablet, Refills: 0    Associated Diagnoses: Inflammatory arthritis         CONTINUE these medications which have CHANGED    Details   apixaban ANTICOAGULANT (ELIQUIS) 2.5 MG tablet Take 1 tablet (2.5 mg) by mouth 2 times daily    Associated Diagnoses: PAF (paroxysmal atrial fibrillation) (H); History of CVA (cerebrovascular accident)      lisinopril (ZESTRIL) 20 MG tablet Take 1 tablet (20 mg) by mouth daily  Qty:      Associated Diagnoses: Hypertension goal BP (blood pressure) < 140/90         CONTINUE these medications which have NOT CHANGED    Details   acetaminophen (TYLENOL) 500 MG tablet Take 1,000 mg by mouth 3 times daily      allopurinol (ZYLOPRIM) 100 MG tablet Take 1 tablet (100 mg) by mouth daily  Qty: 90 tablet, Refills: 0    Associated Diagnoses: Gouty arthritis      diltiazem ER (DILT-XR) 120 MG 24 hr capsule Take 1 capsule (120 mg) by mouth daily  Qty: 90 capsule, Refills: 2    Associated Diagnoses: PAF (paroxysmal atrial fibrillation) (H)      hydrALAZINE (APRESOLINE) 50 MG tablet Take 50 mg by mouth 3 times daily      levothyroxine (SYNTHROID/LEVOTHROID) 25 MCG tablet Take 1 tablet (25 mcg) by mouth daily  Qty: 90 tablet, Refills: 2    Associated Diagnoses: Hypothyroidism, unspecified type      metoprolol succinate ER (TOPROL-XL) 100 MG 24 hr tablet Take 100 mg by mouth daily                 Discharge Instructions and Follow-Up:      Discharge Orders      General info for SNF    Length of Stay Estimate: Short Term Care: Estimated # of Days <30  Condition at Discharge: Improving  Level of care:skilled   Rehabilitation Potential: Good  Admission H&P remains valid and up-to-date: Yes  Recent Chemotherapy: N/A  Use Nursing Home Standing Orders: Yes     Mantoux instructions    Give two-step Mantoux (PPD) Per Facility Policy Yes     Reason for your hospital stay    Weakness and fatigue due to anemia and malnutrition     Activity - Up with nursing assistance     Encourage PO fluids      Follow-up and recommended labs and tests     [facility] will schedule a telehealth follow up with Dr. Oh next week to review bone marrow biopsy results and discuss next steps for possible imaging.     Follow Up and recommended labs and tests    Follow up with TCU physician.  The following labs/tests are recommended: CBC and BMP next week.    A follow-up appointment has been made for you with Dr. Crawley, Rheumatologist on Monday, August 31st at 1:00 PM at [facility].  Please call the clinic at [phone] should you need to change or cancel your appointment.  Please arrive 15 minutes early to your scheduled appointment and bring your photo ID, insurance card and co-payment.     [address], [address], [place], [address]     Nutrition Services Adult IP Consult    Reason:  Severe protein calorie malnutrition     Physical Therapy Adult Consult    Evaluate and treat as clinically indicated.    Reason: Generalized weakness, deconditioning     Occupational Therapy Adult Consult    Evaluate and treat as clinically indicated.    Reason:  Generalized weakness, deconditioning     Advance Diet as Tolerated    Follow this diet upon discharge: 2 gram sodium diet.      Snacks/Supplements Adult: Plus2 Shake; Between Meals           Consultations This Hospital Stay:      1. Gastroenterology.  2. Hematology-[facility] ([facility]  3. Orthopedic Surgery.        Admission History:      Please see the H&P by Rodo Marcelino MD on 7/21/2020 for complete details. Briefly, *Ghislaine Walls is a 84 year old female with hx including HTN, paroxysmal atrial fibrillation on anticoagulation with apixaban, gout, CKD, and chronic pain syndrome, who was admitted on 7/21/2020 for evaluation of generalized weakness and fatigue. She was been found to have progressive anemia of unclear etiology.        Problem Oriented Hospital Course:      Normocytic anemia - suspect anemia of chronic disease component (suspect  inflammatory arthritis) +/- iron deficiency component; other causes not ruled out.  Abnormal SPEP/UPEP, possibly MGUS, other causes not ruled out.  * Question occult bleed vs primary myeloid disorder. GI and Heme/Onc were consulted during this admission.  * Hgb 7.8 on arrival from baseline ~9, and gradually decreased to 6.7 with no s/sx of active bleeding. FOBT on arrival was negative. Iron studies showed low iron and iron sat, but ferritin high, TIBC low. [facility] was consulted, and patient underwent EGD and colonoscopy (7/24) which respectively showed a moderate hiatal hernia, duodenal erosions, and a medium transverse colon polyp that was resected; no source of active bleeding was identified  * Received 1u prbcs on 7/23 for Hgb 6.7.   * TSH 7/21 normal. B12 and folate 7/22 normal. LDH 7/25 normal. Peripheral smear 7/25 showed normochromic normocytic anemia with rare nucleated RBC, absolute monocytosis. 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 EPO levels 7/25 pending. Apixaban dose decreased 7/25 in light of age and CKD.  * On 7/27, GI deferred on capsule endoscopy as iron studies more suggestive of ACD (low TIBC, ferritin high; iron levels could be low with ACD). Elevated kappa and lamda free ligtht chains 7/30. S/p bone marrow biopsy 7/31/2020.  Recent Labs   Lab 07/31/20  1245 07/28/20  0923 07/27/20  0858 07/26/20  0850 07/25/20  0554   HGB 8.2* 7.7* 7.2* 7.3* 7.7*   - Monitor CBC outpatient.  - Workup and management of other issues as below.  - [facility] will follow-up BM Bx results with Dr. Oh next week (telemedicine visit).       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  History of gout.  * Follows with Dr. Jerry STRICKLAND for subacromial bursa injection. Has had long-standing history of polyarthropathy involving shoulders, knees, and wrists, but does not appear to have a rheumatological work-up.    * PTA on APAP 1000 mg TID and allopurinol 100 mg daily.  * Pt c/o knee pain 7/27, knees warm and swollen on exam. Also had hand stiffness. Lab workup 7/27: CRP 86 (H), ACPA > 340 (H), RF 42 (H), GWEN positive (speckled pattern). Ortho consulted 7/27, ordered a course of Medrol. Knee x-rays 7/27 showed no acute fracture, small joint effusion. Elbow x-rays 7/27 showed no acute fractures, diffuse soft tissue swelling, question of small right elbow effusion, moderate left elbow effusion. Uric acid level 7/28 normal. Symptomatically improved 7/28 and 7/29. Medrol changed to prednisone 7/29. I discussed pt's case with Rheumatology informally on 7/30/2020.  - Continue prednisone 20 mg daily - taper by 5 mg daily every 5 days until 5 mg daily and then keep on this dose until Rheumatology follow-up.  - Continue on PTA APAP 1000 mg TID and allopurinol.  - Outpatient Rheumatology evaluation (no Rheum at this hospital) - plan evaluation at [facility] on 8/31/2020.     Fever, suspect related to inflammatory arthritis.  Fever to 101.5 am 7/27. BC's 7/27 NGTD. UC 7/27 NGTD. Afebrile since 7/27.  - Workup of other issues as noted.     DRAGAN (suspect prerenal due to poor PO intake) on CKD stage III.  Non-anion gap metabolic acidosis, likely due to CKD.  Baseline cr ~ 1.2. Creatinine 1.43 and bicarb 14 on admit 7/21.. She received IV fluids with improvement. Cr normalized 7/28.     Generalized weakness, multifactorial, related to above issues.  Severe protein-calorie malnutrition in context of chronic illness.  * Presented with generalized weakness and increased fatigue, worse 2 days PTA. Likely due to anemia and sub-optimal nutrition. Patient's daughter reported that she eats and drinks very little. Evidence of dehydration present on arrival with mild DRAGAN. UA on admit showed many bacteria, but sample was contaminated. TTE 7/22 showed LVEF 55-60% with moderate diastolic dysfunction not significantly changed  from prior study. Had other workup and findings as above. PT and OT consulted and recommended TCU.  - Treat above issues.  - Continue nutrition supplements/shakes per Nutrition.  - Continue PT, OT.  - Plan TCU.     Hiatal hernia.  Noted on EGD (7/24). Started on Protonix.  - Continue Protonix.     Paroxysmal atrial fibrillation.  [PTA: diltiazem  mg daily, metoprolol  mg daily, apixaban 5 mg BID]  PTA apixaban has been decreased as noted above.  - Continue apixaban 2.5 mg daily.  - Continue diltiazem and metoprolol XL.     Essential hypertension.  [PTA: diltiazem  mg daily, metoprolol  mg daily, hydralazine 50 mg TID, lisinopril 20 mg daily.]  - Continue diltiazem, metoprolol XL, hydralazine, lisinopril.     Hypothyroidism.  TSH 7/21 normal.  - Continue PTA levothyroxine.        COVID-19 testing.       COVID-19 PCR Results    COVID-19 PCR Results 7/21/20 7/21/20     1243 1243   COVID-19 Virus PCR to [facility] - Result Test received-See reflex to IDDL test SARS CoV2 (COVID-19) Virus RT-PCR     COVID-19 Virus PCR to [facility] - Source Nasopharyngeal     SARS-CoV-2 Virus Specimen Source   Nasopharyngeal   SARS-CoV-2 PCR Result   NEGATIVE       Comments are available for some flowsheets but are not being displayed.           COVID-19 Antibody Results, Testing for Immunity    COVID-19 Antibody Results, Testing for Immunity   No data to display.                     Pending Results:        Unresulted Labs Ordered in the Past 30 Days of this Admission     Date and Time Order Name Status Description    7/31/2020 1359 Bone marrow biopsy In process     7/31/2020 1359 FISH In process     7/29/2020 1801 CHROMOSOME BONE MARROW With Professional Interpretation In process     7/29/2020 1801 Leukemia Lymphoma Evaluation Non CSF In process     7/27/2020 1103 Blood culture Preliminary     7/27/2020 1103 Blood culture Preliminary                 Discharge Disposition:      Discharged to TCU.        Discharge Time:       Greater than 30 minutes.        Key Imaging Studies, Lab Findings and Procedures/Surgeries:        Results for orders placed or performed during the hospital encounter of 07/21/20   XR Chest Port 1 View    Narrative    CHEST PORTABLE ONE VIEW July 21, 2020 11:52 AM     HISTORY: Chronic cough, weakness.     COMPARISON: 10/1/2019.    FINDINGS: Slight aortic tortuosity.      Impression    IMPRESSION: No acute consolidation.    DINESH MONTENEGRO MD   XR Knee Bilateral 1/2 Views    Narrative    EXAM: XR KNEE BILATERAL 1/2 VW  LOCATION: [facility]  DATE/TIME: 7/27/2020 6:13 PM    INDICATION: Evaluate DJD  COMPARISON: None.      Impression    IMPRESSION:   RIGHT KNEE: No acute fracture or malalignment. Severe patellofemoral, mild medial, and mild lateral compartment degenerative changes. Small knee joint effusion. Osteopenia.    LEFT KNEE: No acute fracture or malalignment. Severe patellofemoral, mild medial, and mild lateral compartment degenerative changes. Small knee joint effusion. Osteopenia.         EGD 7/24/2020:  Findings:        The examined esophagus was normal.        A medium-sized hiatal hernia was present. Extended from Z line 36 cm to        40 cm from incisors. No [place] erosions seen on retroflexion view.        The exam of the stomach was otherwise normal. Biopsies sent for h pylori        A few diffuse erosions without bleeding were found in the duodenal bulb.        Biopsies for histology were taken from second portion of duodenum with a        cold forceps for evaluation of celiac disease. Second portion was normal        visually.                                                                                     Impression:               - Normal esophagus.                             - Medium-sized hiatal hernia.                             - Duodenal erosions without bleeding. Biopsied.                             h pylori biopsies sent                                                                  Colonoscopy 2020:  Findings:        A 10 mm polyp was found in the transverse colon. The polyp was        semi-sessile. The polyp was removed with a cold snare. Resection and        retrieval were complete.        The exam was otherwise without abnormality. Prep was borderline fair.        Multiple stool balls noted but mucosa well seen. No significant lesions        mised. No avm's. No bleeding source identified.                                                                                    Impression:               - One 10 mm polyp in the transverse colon, removed                             with a cold snare. Resected and retrieved.                             - The examination was otherwise normal.           XR Elbow Bilateral G/E 3 Views    Narrative    EXAM: XR ELBOW BILATERAL G/E 3 VW  LOCATION: [facility]  DATE/TIME: 2020 6:14 PM    INDICATION: Evaluate DJD  COMPARISON: None.      Impression    IMPRESSION:     Right elbow: No acute fracture or malalignment. Mild elbow joint degenerative changes. Question a small elbow joint effusion. Osteopenia. Diffuse soft tissue swelling.    Left elbow: No acute fracture or malalignment. Mild elbow joint degenerative changes. Moderate elbow joint effusion. Osteopenia. Diffuse soft tissue swelling.     Echocardiogram Complete    Narrative    [id number]  [id number]  [id number]  [id number]^MARIAN^KATHRYN           [facility]  Echocardiography Laboratory  [address] [place] [address]        Name: ROSIO YOUNG  MRN: [id number]  : 1935  Study Date: 2020 11:21 AM  Age: 84 yrs  Gender: Female  Patient Location: [facility]  Reason For Study: CHF  Ordering Physician: KATHRYN FONSECA  Referring Physician: Laurie Conteh  Performed By: Grover Gomez     BSA: 1.7 m2  Height: 64 in  Weight: 135 lb  HR: 58  BP: 143/49 mmHg  _____________________________________________________________________________  __         Procedure  Complete Portable Echo Adult.  _____________________________________________________________________________  __        Interpretation Summary     1. Normal biventricular size and function. Left ventricular ejection fraction  of 55-60%. Grade II or moderate diastolic dysfunction.  2. The left atrium is severely dilated. Right atrial size is normal.  3. No hemodynamically significant valvular disease.  4. Normal pulmonary artery pressure.  Compared to the previous echocardiogram on 10/1/2019, there are no significant  changes.  Technically difficult study.  _____________________________________________________________________________  __        Left Ventricle  The left ventricle is normal in size. There is concentric remodeling present.  Left ventricular systolic function is normal. The visual ejection fraction is  estimated at 55-60%. Grade II or moderate diastolic dysfunction. Diastolic  Doppler findings (E/E' ratio and/or other parameters) suggest left ventricular  filling pressures are increased. Regional wall motion abnormalities cannot be  excluded due to limited visualization.     Right Ventricle  The right ventricle is normal in size and function.     Atria  The left atrium is severely dilated. Right atrial size is normal.     Mitral Valve  The mitral valve leaflets appear thickened, but open well. There is trace  mitral regurgitation.        Tricuspid Valve  Normal tricuspid valve. There is mild (1+) tricuspid regurgitation. The right  ventricular systolic pressure is approximated at 31.3 mmHg plus the right  atrial pressure.     Aortic Valve  There is mild trileaflet aortic sclerosis. No aortic regurgitation is present.  No aortic stenosis is present.     Pulmonic Valve  The pulmonic valve is not well visualized.     Vessels  Normal size aorta. IVC diameter <2.1 cm collapsing >50% with sniff suggests a  normal RA pressure of 3 mmHg.     Pericardium  There is no pericardial effusion.         Rhythm  Sinus rhythm was noted.  _____________________________________________________________________________  __  MMode/2D Measurements & Calculations  IVSd: 1.3 cm     LVIDd: 3.8 cm  LVIDs: 2.6 cm  LVPWd: 1.1 cm  LVPWs: 0.03 cm  FS: 31.2 %  LV mass(C)d: 156.1 grams  LV mass(C)dI: 94.3 grams/m2  Ao root diam: 3.0 cm  asc Aorta Diam: 3.0 cm  LVOT diam: 2.2 cm  LVOT area: 3.7 cm2  LA Volume (BP): 114.0 ml  LA Volume Index (BP): 68.7 ml/m2  RWT: 0.59           Doppler Measurements & Calculations  MV E max edilberto: 151.0 cm/sec  MV A max edilberto: 98.5 cm/sec  MV E/A: 1.5  MV max P.1 mmHg  MV mean PG: 3.2 mmHg  MV V2 VTI: 53.0 cm  MVA(VTI): 1.7 cm2  MV dec slope: 631.2 cm/sec2  Ao V2 max: 157.5 cm/sec  Ao max PG: 10.0 mmHg  Ao V2 mean: 117.7 cm/sec  Ao mean P.1 mmHg  Ao V2 VTI: 41.9 cm  ESVIN(I,D): 2.2 cm2  ESVIN(V,D): 2.5 cm2  LV V1 max P.4 mmHg  LV V1 max: 105.3 cm/sec  LV V1 VTI: 24.5 cm  SV(LVOT): 90.9 ml  SI(LVOT): 54.9 ml/m2  PA acc time: 0.10 sec  TR max edilberto: 279.6 cm/sec  TR max P.3 mmHg  Pulm Sys Edilberto: 63.8 cm/sec  Pulm Hicks Edilberto: 52.4 cm/sec  Pulm S/D: 1.2  AV Edilberto Ratio (DI): 0.67  ESVIN Index (cm2/m2): 1.3  E/E' av.5  Lateral E/e': 16.1  Medial E/e': 24.8              _____________________________________________________________________________  __        Report approved by: Javier Mcdermott 2020 12:47 PM        Bone Marrow Biopsy 2020

## 2020-07-31 NOTE — PLAN OF CARE
Discharge Planner PT   Patient plan for discharge: TCU later today   Current status: Greeted patient supine in bed requiring encouragement for session, agreeable to short session. Engaged patient in supine > EOB at [place]. Engaged patient in 2 sit <> stand transfers from elevated bed height at modAx2 with patient experiencing difficulty with anterior weight shift. Engaged patient in stepping transfer from bed > recliner at min-modAx2 for balance. Attempted 3 trials of sit <> stand from recliner and unable to achieve full standing due to weakness & fatigue at maxAx2. Engaged patient in seated leg exercises. Patient reports pain in bilateral knees L>R at 6/10. Concluded session with patient sitting up in recliner, all needs in reach, alarm engaged and nursing staff updated with status. Called daughter post session to update.   Barriers to return to prior living situation: Current level of assist (Ax2), Ax2 to ambulate short distances, LE weakness, fatigue, pain, decreased activity tolerance   Recommendations for discharge: TCU  Rationale for recommendations: Patient is below baseline for functional mobility & would benefit from continued physical therapy in the setting of a TCU for improving functional mobility, LE strength and tolerance for functional activities in order to return to baseline of functioning.        Entered by: Vi Cardenas 07/31/2020 10:57 AM

## 2020-07-31 NOTE — PROGRESS NOTES
[facility]    Internal Medicine Hospitalist Progress Note  07/31/2020  I evaluated patient on the above date.    Ganga Mccullough Jr., MD  [phone] (p)  Text Page        Assessment & Plan New actions/orders today (07/31/2020) are underlined.    Ghislaine Walls is a 84 year old female with hx including HTN, paroxysmal atrial fibrillation on anticoagulation with apixaban, gout, CKD, and chronic pain syndrome, who was admitted on 7/21/2020 for evaluation of generalized weakness and fatigue. She has been found to have progressive anemia of unclear etiology     Normocytic anemia - suspect ACD component (suspect inflammatory arthritis) +/- iron deficiency components; other causes not ruled out.  Abnormal SPEP/UPEP, possibly MGUS, other causes not ruled out.  * Question occult bleed vs primary myeloid disorder. GI and Heme/Onc were consulted during this admission.  * Hgb 7.8 on arrival from baseline ~9, and gradually decreased to 6.7 with no s/sx of active bleeding. FOBT on arrival was negative. Iron studies showed low iron and iron sat, but ferritin high, TIBC low. [facility] was consulted, and patient underwent EGD and colonoscopy (7/24) which respectively showed a moderate hiatal hernia, duodenal erosions, and a medium transverse colon polyp that was resected; no source of active bleeding was identified  * Received 1u prbcs on 7/23 for Hgb 6.7.   * TSH 7/21 normal. B12 and folate 7/22 normal. LDH 7/25 normal. Peripheral smear 7/25 showed normochromic normocytic anemia with rare nucleated RBC, absolute monocytosis. 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 EPO levels 7/25 pending. Apixaban dose decreased 7/25 in light of age and CKD.  * On 7/27, GI deferred on capsule endoscopy as iron studies more suggestive of ACD (low TIBC, ferritin high; iron levels could be low with ACD).  Elevated  kappa and lamda free ligtht chains 7/30. S/p bone marrow biopsy 7/31/2020.  Recent Labs   Lab 07/31/20  1245 07/28/20  0923 07/27/20  0858 07/26/20  0850 07/25/20  0554   HGB 8.2* 7.7* 7.2* 7.3* 7.7*   - Monitor CBC.  - Consider prbc transfusion if hgb </= 7.0 or if significant bleeding with hemodynamic instability or if symptomatic.  - Workup and management of other issues as below.  - Appreciate Hematology help - pt will follow-up with Dr. Oh in 1 week to discuss results of biopsy.    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  Shoulder bursitis.  Knee pain and swelling.  History of gout.  * Follows with Dr. Jerry STRICKLAND for subacromial bursa injection. Has had long-standing history of polyarthropathy involving shoulders, knees, and wrists, but does not appear to have a rheumatological work-up.   * PTA on APAP 1000 mg TID and allopurinol 100 mg daily.  * Pt c/o knee pain 7/27, knees warm and swollen on exam. Also had hand stiffness. Lab workup 7/27: CRP 86 (H), ACPA > 340 (H), RF 42 (H), GWEN positive (speckled pattern). Ortho consulted 7/27, ordered a course of Medrol. Knee x-rays 7/27 showed no acute fracture, small joint effusion. Elbow x-rays 7/27 showed no acute fractures, diffuse soft tissue swelling, question of small right elbow effusion, moderate left elbow effusion. Uric acid level 7/28 normal. Symptomatically improved 7/28 and 7/29. Medrol changed to prednisone 7/29.  - Continue prednisone 20 mg daily - taper by 5 mg daily every 5 days until 5 mg daily and then keep on this dose until Rheumatology follow-up.  - Continue on PTA APAP 1000 mg TID and allopurinol.  - Outpatient Rheumatology evaluation (no Rheum at this hospital) - plan evaluation at [facility] on 8/31/2020.  - I discussed, informally, with Dr. Kong from [facility] 7/30.    Fever, suspect related to inflammatory arthritis.  Fever to 101.5 am 7/27. BC's 7/27 NGTD. UC 7/27 NGTD.  Afebrile since 7/27.  - Workup of other issues as noted.     DRAGAN (suspect prerenal due to poor PO intake) on CKD stage III.  Non-anion gap metabolic acidosis, likely due to CKD.  Baseline cr ~ 1.2. Creatinine 1.43 and bicarb 14 on admit 7/21.. She received IV fluids with improvement. Cr normalized 7/28.  - Monitor BMP.  - Avoid nephrotoxic medications.    Generalized weakness, multifactorial, related to above issues.  Severe protein-calorie malnutrition in context of chronic illness.  * Presented with generalized weakness and increased fatigue, worse 2 days PTA. Likely due to anemia and sub-optimal nutrition. Patient's daughter reported that she eats and drinks very little. Evidence of dehydration present on arrival with mild DRAGAN.  UA on admit showed many bacteria, but sample was contaminated. TTE 7/22 showed LVEF 55-60% with moderate diastolic dysfunction not significantly changed from prior study. Had other workup and findings as above.  - Treat above issues.  - Continue nutrition supplements/shakes per Nutrition.  - Continue PT, OT.  - Plan TCU.    Hiatal hernia.  Noted on EGD (7/24). Started on Protonix.  - Continue Protonix.    Paroxysmal atrial fibrillation.  [PTA: diltiazem  mg daily, metoprolol  mg daily, apixaban 5 mg BID]  PTA apixaban has been decreased as noted above.  - Resume apixaban.  - Continue diltiazem and metoprolol XL.     Essential hypertension.  [PTA: diltiazem  mg daily, metoprolol  mg daily, hydralazine 50 mg TID, lisinopril 20 mg daily.]  - Continues on diltiazem, metoprolol XL, hydralazine, lisinopril.     Hypothyroidism.  TSH 7/21 normal.  - Continue PTA levothyroxine.       COVID-19 testing.  COVID-19 PCR Results    COVID-19 PCR Results 7/21/20 7/21/20    1243 1243   COVID-19 Virus PCR to [facility] - Result Test received-See reflex to IDDL test SARS CoV2 (COVID-19) Virus RT-PCR    COVID-19 Virus PCR to [facility] - Source Nasopharyngeal    SARS-CoV-2 Virus Specimen  "Source  Nasopharyngeal   SARS-CoV-2 PCR Result  NEGATIVE      Comments are available for some flowsheets but are not being displayed.         COVID-19 Antibody Results, Testing for Immunity    COVID-19 Antibody Results, Testing for Immunity   No data to display.             Diet: 2 Gram Sodium Diet  Room Service  Snacks/Supplements Adult: Other; Plus2 Shake; Between Meals  Advance Diet as Tolerated    Prophylaxis: PCD's, ambulation. On apixaban.  Dejesus Catheter: not present  Code Status: No CPR- Do NOT Intubate    Disposition Plan   Expected discharge: Today, recommended to transitional care unit .  Entered: Ganga Mccullough MD 07/31/2020, 3:26 PM       Communication.  - I d/w pt's daughter 7/31.    Interval History    Doing better overall.  Knees still with pain, but improved overall.    -Data reviewed today: I reviewed all new labs and imaging over the last 24 hours. I personally reviewed no images or EKG's today.    Physical Exam   Heart Rate: 59, Blood pressure (!) 179/79, pulse 59, temperature 97.9  F (36.6  C), temperature source Oral, resp. rate 16, height 1.626 m (5' 4\"), weight 70.3 kg (155 lb), SpO2 99 %, not currently breastfeeding.  Vitals:    07/21/20 0836 07/21/20 1300 07/24/20 0915   Weight: 70.3 kg (155 lb) 61.4 kg (135 lb 4.8 oz) 70.3 kg (155 lb)     Vital Signs with Ranges  Temp:  [97.4  F (36.3  C)-97.9  F (36.6  C)] 97.9  F (36.6  C)  Pulse:  [58-64] 59  Heart Rate:  [55-68] 59  Resp:  [11-23] 16  BP: (148-185)/(60-91) 179/79  SpO2:  [96 %-100 %] 99 %  Patient Vitals for the past 24 hrs:   BP Temp Temp src Pulse Heart Rate Resp SpO2   07/31/20 1513 (!) 179/79 97.9  F (36.6  C) Oral -- 59 16 99 %   07/31/20 1450 (!) 181/68 -- -- 59 58 13 97 %   07/31/20 1440 (!) 185/67 -- -- 59 59 23 98 %   07/31/20 1430 (!) 178/67 -- -- 59 58 11 97 %   07/31/20 1420 (!) 166/91 -- -- 58 58 15 98 %   07/31/20 1411 -- -- -- -- 55 15 97 %   07/31/20 0829 (!) 165/67 97.9  F (36.6  C) Oral 64 65 16 96 %   07/31/20 " 0337 (!) 157/60 97.7  F (36.5  C) Oral -- 66 16 98 %   07/30/20 2017 (!) 148/60 97.7  F (36.5  C) Oral -- 68 16 97 %   07/30/20 1554 (!) 155/64 97.4  F (36.3  C) Oral -- 65 16 100 %     I/O's Last 24 hours  I/O last 3 completed shifts:  In: 200 [P.O.:200]  Out: -     Constitutional: Awake, alert, conversant.  Respiratory:   Cardiovascular:   GI:   Skin/Integumen:   Other:  Bilateral knee mild swelling, effusion, improved.     Data   Recent Labs   Lab 07/31/20  1245 07/28/20  0923 07/27/20  0858 07/26/20  0850 07/25/20  0554   WBC 6.1 4.7 4.8  --  6.2   HGB 8.2* 7.7* 7.2* 7.3* 7.7*   MCV 94 94 93  --  94    321 291  --  387   NA  --  133 135 135 138   POTASSIUM  --  4.6 4.3 4.3 4.2   CHLORIDE  --  110* 111* 111* 110*   CO2  --  17* 18* 18* 17*   BUN  --  28 33* 30 31*   CR  --  1.04 1.08* 1.08* 1.16*   ANIONGAP  --  6 6 6  --    ALIVIA  --  9.0 8.5 8.6 8.9   GLC  --  86 93 84 121*   ALBUMIN  --   --   --   --  1.7*   PROTTOTAL  --   --   --   --  6.2*   BILITOTAL  --   --   --   --  0.4   ALKPHOS  --   --   --   --  90   ALT  --   --   --   --  10   AST  --   --   --   --  25     Recent Labs   Lab Test 07/28/20  0923 07/27/20  0858 07/26/20  0850 07/25/20  0554 07/24/20  0849  10/04/19  1220 10/04/19  0832   GLC 86 93 84 121* 80   < >  --   --    BGM  --   --   --   --   --   --  119* 100*    < > = values in this interval not displayed.         No results found for this or any previous visit (from the past 24 hour(s)).    Medications   All medications were reviewed.    lactated ringers         acetaminophen  1,000 mg Oral TID     allopurinol  100 mg Oral Daily     apixaban ANTICOAGULANT  2.5 mg Oral BID     diltiazem ER COATED BEADS  120 mg Oral Daily     hydrALAZINE  50 mg Oral TID     levothyroxine  25 mcg Oral Daily     lidocaine  10 mL Infiltration Once     lisinopril  20 mg Oral Daily     magnesium citrate  296 mL Oral Once     metoprolol succinate ER  100 mg Oral Daily     pantoprazole  40 mg Oral QAM AC      predniSONE  20 mg Oral Daily

## 2020-07-31 NOTE — PLAN OF CARE
Cognitive Concerns/ Orientation : A&Ox3-4, forgetful at times  BEHAVIOR & AGGRESSION TOOL COLOR: Green   CIWA SCORE: NA  ABNL VS/O2: BP elevated 148/60, 157/60, other VSS on RA  MOBILITY: UW assist of 1-2 with walker and GB, Turn & Repo q2h.    PAIN MANAGMENT: Denies pain, on scheduled Tylenol.   DIET: 2g Na  fair appetite  BOWEL/BLADDER: Incontinent of bowel and bladder. No BM this shift.   ABNL LAB/BG:  Hgb 7.7 on 7/28, no signs of active bleeding.   DRAIN/DEVICES: PIV access x1 saline locked.   TELEMETRY RHYTHM: NA  SKIN: Pale, intact. BLE & BUE edema 2+. Mepilex on coccyx for protection, skin intact.   TESTS/PROCEDURES: Bone marrow biopsy today at noon  D/C DAY/GOALS/PLACE: Possible discharge to [place] today after the bone marrow biopsy; transport will be here at 4pm per SW's note   OTHER IMPORTANT INFO: Hem/Onc ordered BMBx today

## 2020-07-31 NOTE — PLAN OF CARE
Patient A&0xe. Up with 2 W/GB. Patient IV removed. Patient incontinent of urine. Patient cleaned and changed then helped to dress in her own clothing. Discharge instructions gone over with patient. All questions answered. Her belongings were gathered and returned to her. Her wheelchair ride to [place] arrived at 1600 and she was escorted from the floor.

## 2020-07-31 NOTE — PROGRESS NOTES
SW:  D:  [facility] transport will be here at 1600. Orders have been sent in the In Basket and script faxed.  Writer spoke with her daughter Krupa and updated her, gave her petinent phone numbers at [facility].  PA approved.  Effective date: 7/31/20  PA reference #: [id number]    Pt. notified: solitario Gentile

## 2020-07-31 NOTE — ANESTHESIA CARE TRANSFER NOTE
Patient: Ghislaine Walls    Procedure(s):  bone marrow biopsy    Diagnosis: Anemia [D64.9]  Diagnosis Additional Information: No value filed.    Anesthesia Type:   MAC     Note:  Airway :Room Air  Patient transferred to:PACU  Handoff Report: Identifed the Patient, Identified the Reponsible Provider, Reviewed the pertinent medical history, Discussed the surgical course, Reviewed Intra-OP anesthesia mangement and issues during anesthesia, Set expectations for post-procedure period and Allowed opportunity for questions and acknowledgement of understanding      Vitals: (Last set prior to Anesthesia Care Transfer)    CRNA VITALS  7/31/2020 1339 - 7/31/2020 1413      7/31/2020             Ht Rate:  53    Resp Rate (set):  10                Electronically Signed By: DHAVAL Rahman CRNA  July 31, 2020  2:13 PM

## 2020-07-31 NOTE — ANESTHESIA PREPROCEDURE EVALUATION
Anesthesia Pre-Procedure Evaluation    Patient: Ghislaine Walls   MRN: [id number] : 1935          Preoperative Diagnosis: Anemia [D64.9]    Procedure(s):  COLONOSCOPY  ESOPHAGOGASTRODUODENOSCOPY (EGD)    Past Medical History:   Diagnosis Date     Atrial fibrillation (H) new 10/19     [place] (hard of hearing)     wears hearing aids     Hyperlipidaemia      Hyperlipidaemia LDL goal < 130      Hypertension      Hypothyroid      PAD (peripheral artery disease) (H)      Renal insufficiency, mild      Uncontrolled hypertension 10/14/2019     Past Surgical History:   Procedure Laterality Date     APPENDECTOMY       CATARACT IOL, RT/LT      bilateral (laser - 2013)     DENTAL SURGERY      wisdom     ESOPHAGOSCOPY, GASTROSCOPY, DUODENOSCOPY (EGD), COMBINED N/A 2020    Procedure: ESOPHAGOGASTRODUODENOSCOPY, WITH BIOPSY;  Surgeon: Humberto Bailon MD;  Location:  GI     EYE SURGERY      Muscle release     TUBAL LIGATION         Anesthesia Evaluation     . Pt has had prior anesthetic.     No history of anesthetic complications          ROS/MED HX    ENT/Pulmonary:     (+)tobacco use, Past use , . .    Neurologic: Comment: Cognitive impairment    (+)CVA date:  TIA other neuro hearing deficit    Cardiovascular:     (+) hypertension-Peripheral Vascular Disease---. : . . . :. dysrhythmias a-fib, .       METS/Exercise Tolerance:     Hematologic:     (+) Anemia (transfused yesterday afternoon for 6.7, repeat 8.4), -      Musculoskeletal:   (+) arthritis,  other musculoskeletal- weakness, fatigue       GI/Hepatic:         Renal/Genitourinary:     (+) chronic renal disease, type: CRI, Other Renal/ Genitourinary, urge incontinence      Endo:     (+) thyroid problem hypothyroidism, .      Psychiatric:         Infectious Disease:         Malignancy:         Other:                            Physical Exam  Normal systems: cardiovascular, pulmonary and dental    Airway   Mallampati: II  TM  "distance: >3 FB  Neck ROM: full    Dental     Cardiovascular       Pulmonary             Lab Results   Component Value Date    WBC 6.1 07/31/2020    HGB 8.2 (L) 07/31/2020    HCT 27.0 (L) 07/31/2020     07/31/2020    .0 (H) 07/28/2020     07/28/2020    POTASSIUM 4.6 07/28/2020    CHLORIDE 110 (H) 07/28/2020    CO2 17 (L) 07/28/2020    BUN 28 07/28/2020    CR 1.04 07/28/2020    GLC 86 07/28/2020    ALIVIA 9.0 07/28/2020    PHOS 3.7 08/29/2019    MAG 1.7 10/04/2019    ALBUMIN 1.7 (L) 07/25/2020    PROTTOTAL 6.2 (L) 07/25/2020    ALT 10 07/25/2020    AST 25 07/25/2020    ALKPHOS 90 07/25/2020    BILITOTAL 0.4 07/25/2020    INR 2.07 (H) 07/21/2020    TSH 1.86 07/21/2020    T4 1.55 (H) 11/04/2019       Preop Vitals  BP Readings from Last 3 Encounters:   07/31/20 (!) 165/67   03/31/20 (!) 170/70   03/02/20 (!) 164/68    Pulse Readings from Last 3 Encounters:   07/31/20 64   03/31/20 73   03/02/20 64      Resp Readings from Last 3 Encounters:   07/31/20 16   11/04/19 16   10/22/19 18    SpO2 Readings from Last 3 Encounters:   07/31/20 96%   03/02/20 98%   02/10/20 98%      Temp Readings from Last 1 Encounters:   07/31/20 36.6  C (97.9  F) (Oral)    Ht Readings from Last 1 Encounters:   07/21/20 1.626 m (5' 4\")      Wt Readings from Last 1 Encounters:   07/24/20 70.3 kg (155 lb)    Estimated body mass index is 26.61 kg/m  as calculated from the following:    Height as of 7/21/20: 1.626 m (5' 4\").    Weight as of 7/24/20: 70.3 kg (155 lb).       Anesthesia Plan      History & Physical Review  History and physical reviewed and following examination; no interval change.    ASA Status:  3 .    NPO Status:  > 6 hours    Plan for MAC (with GA backup) with Intravenous induction. Maintenance will be TIVA.    PONV prophylaxis:  Ondansetron (or other 5HT-3)         Postoperative Care  Postoperative pain management:  Oral pain medications.      Consents  Anesthetic plan, risks, benefits and alternatives discussed " with:  Patient (Including possibility of intraoperative awareness or recall.)..                   Eric Blanco MD

## 2020-07-31 NOTE — PROGRESS NOTES
Progress Note     Primary Oncologist/Hematologist:  Dr. Oh          Assessment and Plan:New actions/orders today (07/31/2020) are underlined.     1.  Normochromic normocytic anemia, etiology unclear.    - There is no evidence of B12 deficiency or folic acid deficiency.    - Her iron levels are low, but ferritin is high, which is more consistent with chronic illness or chronic kidney disease.   - Chronic kidney disease can be associated with anemia, although her kidney function did not change since 2018 when her hemoglobin was normal and the anemia was moderate in 2019. EPO level normal.  - ? associated with infection or inflammatory process. CPR is up.  - Haptoglobin (elevated), bilirubin (normal), d dimer (elevated)- this is not convincing for hemolysis  - SPEP is pending  - FLC and immunoglobulins noted. 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  - Bone marrow biopsy today; results will be back next week  - Might need outpatient skeletal survery or PET/CT depending on results of bone marrow biopsy.  - Plan to see Dr. Oh in clinic in about 2 weeks to discuss bone marrow and next steps    2.  Chronic kidney disease.     3.  Arthritis (inflammatory).     4.  Negative GI workup and negative Hemoccult occult stool.       Scooby PUENTES, CNP  [facility]  [phone] (office), [phone] (cell)        Interval History:     Feeling okay. No new questions. We talked about follow up after bone marrow biopsy and possible imaging.              Review of Systems:     The 5 point Review of Systems is negative other than noted in the HPI             Medications:   Scheduled Medications    acetaminophen  1,000 mg Oral TID     allopurinol  100 mg Oral Daily     diltiazem ER COATED BEADS  120 mg Oral Daily     hydrALAZINE  50 mg Oral TID     levothyroxine  25 mcg Oral Daily     lidocaine  10 mL Infiltration Once     lisinopril  20 mg Oral Daily     magnesium citrate  296 mL Oral Once     metoprolol succinate ER   "100 mg Oral Daily     pantoprazole  40 mg Oral QAM AC     predniSONE  20 mg Oral Daily     PRN Medications  hydrALAZINE, melatonin, naloxone, ondansetron **OR** ondansetron, prochlorperazine, sodium chloride (PF), traMADol               Physical Exam:   Vitals were reviewed  Blood pressure (!) 165/67, pulse 64, temperature 97.9  F (36.6  C), temperature source Oral, resp. rate 16, height 1.626 m (5' 4\"), weight 70.3 kg (155 lb), SpO2 96 %, not currently breastfeeding.  Wt Readings from Last 4 Encounters:   07/24/20 70.3 kg (155 lb)   03/31/20 65.8 kg (145 lb)   03/02/20 67.6 kg (149 lb)   02/10/20 68.6 kg (151 lb 4.8 oz)       I/O last 3 completed shifts:  In: 400 [P.O.:400]  Out: -       Constitutional: Awake, alert, cooperative, no apparent distress     Lungs: Clear to auscultation bilaterally, no crackles or wheezing   Cardiovascular: Regular rate and rhythm, normal S1 and S2, and no murmur noted   Abdomen: Normal bowel sounds, soft, non-distended, non-tender   Skin: Pale. Dependent edema.   Other:               Data:   All laboratory data and imaging studies reviewed.    CMP  Recent Labs   Lab 07/28/20  0923 07/27/20  0858 07/26/20  0850 07/25/20  0554    135 135 138   POTASSIUM 4.6 4.3 4.3 4.2   CHLORIDE 110* 111* 111* 110*   CO2 17* 18* 18* 17*   ANIONGAP 6 6 6  --    GLC 86 93 84 121*   BUN 28 33* 30 31*   CR 1.04 1.08* 1.08* 1.16*   GFRESTIMATED 49* 47* 47* 43*   GFRESTBLACK 57* 55* 55* 50*   ALIVIA 9.0 8.5 8.6 8.9   PROTTOTAL  --   --   --  6.2*   ALBUMIN  --   --   --  1.7*   BILITOTAL  --   --   --  0.4   ALKPHOS  --   --   --  90   AST  --   --   --  25   ALT  --   --   --  10     CBC  Recent Labs   Lab 07/28/20  0923 07/27/20  0858 07/26/20  0850 07/25/20  0554   WBC 4.7 4.8  --  6.2   RBC 2.66* 2.42*  --  2.57*   HGB 7.7* 7.2* 7.3* 7.7*   HCT 25.0* 22.6*  --  24.1*   MCV 94 93  --  94   MCH 28.9 29.8  --  30.0   MCHC 30.8* 31.9  --  32.2   RDW 15.8* 15.9*  --  16.2*    291  --  387 "

## 2020-08-01 NOTE — PLAN OF CARE
Physical Therapy Discharge Summary    Reason for therapy discharge:    Discharged to transitional care facility.    Progress towards therapy goal(s). See goals on Care Plan in [facility] electronic health record for goal details.  Goals not met.  Barriers to achieving goals:   discharge from facility.    Therapy recommendation(s):    Continued therapy is recommended.  Rationale/Recommendations:  to achieve ind with all fucntional mobility.

## 2020-08-02 LAB
BACTERIA SPEC CULT: NO GROWTH
BACTERIA SPEC CULT: NO GROWTH
SPECIMEN SOURCE: NORMAL
SPECIMEN SOURCE: NORMAL

## 2020-08-02 NOTE — OP NOTE
Bone Marrow Procedure Note    Date: 8/2/2020  Diagnosis or Indication: Anemia and monoclonal gammopathy  Location:  [facility]    Premedication per physician order.    Patients identification was positively verified by:  Iris Cameron M.D. After informed consent was obtained (see the completed Affirmation of Consent for Bone Marrow Aspiration and/or Biopsy procedures form in the patient's chart), the patient was placed in the left lateral decubitus position and the bony landmarks of the pelvis were identified.     Medical staff reconfirmed the patient's name, date of birth, procedure, and procedure site markings.  Medication was administered.(See Anesthesia documentation). The skin surface over the right posterior iliac crest was scrubbed with Betadine and draped in a sterile fashion with sterile towels to create a sterile field.  The skin and periosteal surface of the right posterioe iliac crest were anesthetized with a total of 5 mL of 1% Lidocaine and a small incision was made through the skin over the corresponding site with a scalpel.     A single trephine bone marrow core was obtained from the right posterior iliac crest. Bone marrow aspirates were obtained from the right posterior iliac crest for routine processing, flow cytometry and cytogenetic studies and placed on hold for molecular studies.    Direct pressure was applied to the biopsy site with sterile gauze. The biopsy site was cleaned with alcohol and sterile dressing was placed over the biopsy incision using a pressure bandage by the SDS nurse. The patient was then placed in the supine position to maintain pressure on the biopsy site.  The patient's bed/examination table was lowered and the railings were raised.  Post-procedure wound care instructions, including routine dressing instructions and analgesia, were given to the patient by the SDS nurse.     The patient tolerated the procedure well. Complications:  None.    Iris Cameron MD

## 2020-08-03 ENCOUNTER — NURSING HOME VISIT (OUTPATIENT)
Dept: GERIATRICS | Facility: CLINIC | Age: 85
End: 2020-08-03
Payer: MEDICARE

## 2020-08-03 ENCOUNTER — PATIENT OUTREACH (OUTPATIENT)
Dept: CARE COORDINATION | Facility: CLINIC | Age: 85
End: 2020-08-03

## 2020-08-03 VITALS
HEART RATE: 64 BPM | TEMPERATURE: 97.8 F | SYSTOLIC BLOOD PRESSURE: 203 MMHG | WEIGHT: 140.4 LBS | HEIGHT: 61 IN | DIASTOLIC BLOOD PRESSURE: 85 MMHG | RESPIRATION RATE: 18 BRPM | OXYGEN SATURATION: 98 % | BODY MASS INDEX: 26.51 KG/M2

## 2020-08-03 DIAGNOSIS — K44.9 HIATAL HERNIA: ICD-10-CM

## 2020-08-03 DIAGNOSIS — R53.1 GENERALIZED WEAKNESS: Primary | ICD-10-CM

## 2020-08-03 DIAGNOSIS — I10 ESSENTIAL HYPERTENSION: ICD-10-CM

## 2020-08-03 DIAGNOSIS — Z71.89 ADVANCED DIRECTIVES, COUNSELING/DISCUSSION: ICD-10-CM

## 2020-08-03 DIAGNOSIS — E87.20 METABOLIC ACIDOSIS: ICD-10-CM

## 2020-08-03 DIAGNOSIS — H90.0 CONDUCTIVE HEARING LOSS, BILATERAL: ICD-10-CM

## 2020-08-03 DIAGNOSIS — I48.0 PAF (PAROXYSMAL ATRIAL FIBRILLATION) (H): ICD-10-CM

## 2020-08-03 DIAGNOSIS — M06.4 INFLAMMATORY POLYARTHROPATHY (H): ICD-10-CM

## 2020-08-03 DIAGNOSIS — N17.9 ACUTE KIDNEY INJURY SUPERIMPOSED ON CKD (H): ICD-10-CM

## 2020-08-03 DIAGNOSIS — N18.9 ACUTE KIDNEY INJURY SUPERIMPOSED ON CKD (H): ICD-10-CM

## 2020-08-03 DIAGNOSIS — D63.8 ANEMIA IN OTHER CHRONIC DISEASES CLASSIFIED ELSEWHERE: Primary | ICD-10-CM

## 2020-08-03 DIAGNOSIS — I10 HYPERTENSION GOAL BP (BLOOD PRESSURE) < 140/90: Chronic | ICD-10-CM

## 2020-08-03 DIAGNOSIS — E43 SEVERE PROTEIN-CALORIE MALNUTRITION (H): ICD-10-CM

## 2020-08-03 DIAGNOSIS — E03.9 HYPOTHYROIDISM, UNSPECIFIED TYPE: ICD-10-CM

## 2020-08-03 DIAGNOSIS — M62.81 GENERALIZED MUSCLE WEAKNESS: ICD-10-CM

## 2020-08-03 LAB — COPATH REPORT: NORMAL

## 2020-08-03 PROCEDURE — 99207 ZZC CDG-CODE INCORRECT PER BILLING BASED ON TIME: CPT | Performed by: NURSE PRACTITIONER

## 2020-08-03 PROCEDURE — 99309 SBSQ NF CARE MODERATE MDM 30: CPT | Performed by: NURSE PRACTITIONER

## 2020-08-03 ASSESSMENT — MIFFLIN-ST. JEOR: SCORE: 1029.23

## 2020-08-03 NOTE — LETTER
To:             Please give to facility    From:   Donna Marie RN, Care Coordinator   [facility] -Care Coordination  [facility]   E-mail [email or web address]   [phone]    Patient Name:  Ghislaine Walls YOB: 1935   Admit date   7/31/2020      *Information Needed:  Please contact me when the patient will discharge (or if they will move to long term care)- include the discharge date, disposition, and main diagnosis   - If the patient is discharged with home care services, please provide the name of the agency    Also- Please inform me if a care conference is being held.   Donna Marie RN, Care Coordinator   [facility] -Care Coordination  [facility]   E-mail [email or web address]   [phone]                              Thank you

## 2020-08-03 NOTE — PROGRESS NOTES
Clinic Care Coordination Contact  Care Coordination Transition Communication         Clinical Data:   [facility]  Discharge Summary        Ghislaine Walls MRN# [id number]   YOB: 1935 Age: 84 year old      Date of Admission:      7/21/2020  Date of Discharge:      7/31/2020  Admitting Physician:   Rodo Marcelino MD  Discharge Physician:  Ganga Mccullough MD     Primary Provider: Laurie Conteh  Primary Care Physician Phone Number: [phone]         Discharge Diagnoses:   1. Anemia - suspect anemia of chronic disease component (suspect inflammatory arthritis) +/- iron deficiency component; other causes not ruled out.  2. Abnormal SPEP/UPEP, possibly MGUS, other causes not ruled out.  3. 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  4. DRAGAN (suspect prerenal due to poor PO intake) on CKD stage III.  5. Non-anion gap metabolic acidosis, likely due to CKD.  Generalized weakness, multifactorial, related to above issues.  6. Fever, suspect related to inflammatory arthritis.  7. Severe protein-calorie malnutrition in context of chronic illness.  8. Hiatal hernia by EGD.         Transition to Facility:              Facility Name: [facility]              Contact name and phone number/fax: CC faxed contact information to the TCU to call when discharged     Plan: RN/SW Care Coordinator will await notification from facility staff informing RN/SW Care Coordinator of patient's discharge plans/needs. RN/SW Care Coordinator will review chart and outreach to facility staff every 4 weeks and as needed.     [facility]     Donna Marie RN Care Coordinator   [facility] / [facility]   Phone: [phone]  Email :  [email or web address]

## 2020-08-03 NOTE — PROGRESS NOTES
[facility]  PRIMARY CARE PROVIDER AND CLINIC:  Laurie Conteh MD, [address] / [place] [address]  Chief Complaint   Patient presents with     Hospital F/U     [place] Medical Record Number:  [id number]  Place of Service where encounter took place:  [facility] [[id number]]    Ghislaine Walls  is a 84 year old  (1935), admitted to the above facility from  [facility]. Hospital stay 7/21/2020 through 7/31/2020..  Admitted to this facility for  rehab, medical management and nursing care.    HPI:    HPI information obtained from: facility chart records, facility staff, patient report and [facility] chart review.   Brief Summary of Hospital Course:   84 year old female with hx including HTN, PAF on apixaban, gout, CKD, and chronic pain syndrome, hospitalized with generalized weakness and fatigue. She was been found to have progressive anemia of unclear etiology, suspect anemia of chronic disease component (suspect inflammatory arthritis) +/- iron deficiency component; other causes not ruled out, abnormal SPEP/UPEP, possibly MGUS, other causes not ruled out. GI and Heme/Onc consulted. Hgb 7.8 from baseline of 9, down to 6.7 and received 1 unit PRBC. FOBT negative, low iron sats, EGD and colonoscopy showed no active bleeding. GI deferred on capsule endoscopy as iron studies more suggestive of ACD (low TIBC, ferritin high; iron levels could be low with ACD). Elevated kappa and lamda free ligtht chains 7/30. S/p bone marrow biopsy 7/31/2020. [facility] will follow-up BM Bx results with Dr. Oh next week (telemedicine visit).  Polyarthropathy and hx gout sees Dr Dave STRICKLAND for subacromial bursa injection, takes tylenol and allopurinol. Due to knee pain and swelling saw ortho and treated with medrol course, on prednisone taper and needs rheumatology f/u 8/31. DRAGAN on CKD with Cr up to 1.43, improved with fluids. Poor intake and weakness: TCU recommended.  Hiatal hernia managed with Protonix. PAF and HTN:  rate controlled with diltiazem and metoprolol lisinopril and Hydralazine and takes low dose apixaban.     Updates on Status Since Skilled nursing Admission:   Patient seen for initial TCU visit. Reports feeling tired. No headaches, dizziness, chest pain, dyspnea, bowel or bladder problems. Per EMR review note weight up 2 lbs since admission. BP range 170-203/69-85 prior to meds, improved after. Sats are 98% on room air and HR 57-80. POLST reviewed: elects DNR/DNI.     CODE STATUS/ADVANCE DIRECTIVES DISCUSSION:   DNR / DNI  Patient's living condition: lives in an assisted living facility  ALLERGIES: Oxybutynin and Seasonal allergies  PAST MEDICAL HISTORY:  has a past medical history of Atrial fibrillation (H) (new 10/19), [place] (hard of hearing), Hyperlipidaemia, Hyperlipidaemia LDL goal < 130, Hypertension, Hypothyroid, PAD (peripheral artery disease) (H), Renal insufficiency, mild, and Uncontrolled hypertension (10/14/2019). She also has no past medical history of Sleep apnea.  PAST SURGICAL HISTORY:   has a past surgical history that includes appendectomy (1951); tubal ligation (1971); Eye surgery (1956); cataract iol, rt/lt (2001); Dental surgery (1962); Esophagoscopy, gastroscopy, duodenoscopy (EGD), combined (N/A, 7/24/2020); and Bone marrow biopsy, bone specimen, needle/trocar (N/A, 7/31/2020).  FAMILY HISTORY: family history includes Coronary Artery Disease in her brother and brother; Heart Disease in her brother, brother, father, and mother; Respiratory in her mother.  SOCIAL HISTORY:   reports that she quit smoking about 47 years ago. Her smoking use included cigarettes. She has a 2.50 pack-year smoking history. She has never used smokeless tobacco. She reports current alcohol use. She reports that she does not use drugs.    Post Discharge Medication Reconciliation Status: discharge medications reconciled, continue medications without change    Current  "Outpatient Medications   Medication Sig Dispense Refill     acetaminophen (TYLENOL) 500 MG tablet Take 1,000 mg by mouth 3 times daily       allopurinol (ZYLOPRIM) 100 MG tablet Take 1 tablet (100 mg) by mouth daily 90 tablet 0     apixaban ANTICOAGULANT (ELIQUIS) 2.5 MG tablet Take 1 tablet (2.5 mg) by mouth 2 times daily       diltiazem ER (DILT-XR) 120 MG 24 hr capsule Take 1 capsule (120 mg) by mouth daily 90 capsule 2     hydrALAZINE (APRESOLINE) 50 MG tablet Take 50 mg by mouth 3 times daily       levothyroxine (SYNTHROID/LEVOTHROID) 25 MCG tablet Take 1 tablet (25 mcg) by mouth daily 90 tablet 2     lisinopril (ZESTRIL) 20 MG tablet Take 1 tablet (20 mg) by mouth daily       metoprolol succinate ER (TOPROL-XL) 100 MG 24 hr tablet Take 100 mg by mouth daily       pantoprazole (PROTONIX) 40 MG EC tablet Take 1 tablet (40 mg) by mouth every morning (before breakfast)       predniSONE (DELTASONE) 10 MG tablet Take 2 tablets (20 mg) by mouth daily for 2 days, THEN 1.5 tablets (15 mg) daily for 5 days, THEN 1 tablet (10 mg) daily for 5 days, THEN 0.5 tablets (5 mg) daily. ; continue prednisone 5 mg daily at least until evaluation by Rheumatology.       traMADol (ULTRAM) 50 MG tablet Take 1 tablet (50 mg) by mouth every 6 hours as needed for moderate pain 20 tablet 0       ROS:  10 point ROS of systems including Constitutional, Eyes, Respiratory, Cardiovascular, Gastroenterology, Genitourinary, Integumentary, Musculoskeletal, Psychiatric were all negative except for pertinent positives noted in my HPI.    Vitals:  BP (!) 203/85   Pulse 64   Temp 97.8  F (36.6  C)   Resp 18   Ht 1.549 m (5' 1\")   Wt 63.7 kg (140 lb 6.4 oz)   SpO2 98%   BMI 26.53 kg/m    Exam:  Exam is limited due to COVID-19 precautions  GENERAL APPEARANCE:  Alert, in no distress, cooperative  ENT:  Mouth normal, moist mucous membranes, normal hearing acuity  EYES:  Conjunctiva and lids normal  RESP:  no respiratory distress  NEURO:   No " facial asymmetry, speech clear  PSYCH:  oriented X 3, affect and mood normal     Lab/Diagnostic data:  Most Recent 3 CBC's:  Recent Labs   Lab Test 08/04/20  0847 07/31/20  1245 07/28/20  0923   WBC 8.8 6.1 4.7   HGB 8.5* 8.2* 7.7*   MCV 94 94 94    357 321     Most Recent 3 BMP's:  Recent Labs   Lab Test 08/04/20  0847 07/28/20  0923 07/27/20  0858    133 135   POTASSIUM 4.3 4.6 4.3   CHLORIDE 113* 110* 111*   CO2 22 17* 18*   BUN 47* 28 33*   CR 0.98 1.04 1.08*   ANIONGAP 5 6 6   ALIVIA 9.2 9.0 8.5   GLC 90 86 93       ASSESSMENT/PLAN:  Anemia in other chronic diseases classified elsewhere  Acute on chronic. Hematology f/u as planned. CBC tomorrow. PPI daily. Monitor.     Inflammatory polyarthropathy (H)  Acute on chronic - rheumatology f/u as recommended, continue steroid taper, tylenol and tramadol. Monitor.     Acute kidney injury superimposed on CKD (H)  Metabolic acidosis  Acute on chronic, improved. Avoid nephrotoxic meds. BMP on 8/4.     Severe protein-calorie malnutrition (H)  Chronic issue, encourage intake and f/u with weights next visit. Dietary consult PRN.     Hiatal hernia  Newly noted - PPI as started. Monitor.     Hypothyroidism, unspecified type  Chronic, continue Synthroid as PTA.     Hypertension goal BP (blood pressure) < 140/90  Essential hypertension  PAF (paroxysmal atrial fibrillation) (H)  Acute on chronic issues, elevated BP prior to AM meds. At this time will monitor, f/u with ranges end of the week. Continue Eliquis, diltiazem, hydralazine, lisinopril, metoprolol. Cards f/u per home routine.     Generalized muscle weakness  Acute on chronic. Therapies - f/u with progress next visit.     Advanced directives, counseling/discussion  Reviewed and completed POLST: requests DNR/DNI.      Orders written by provider at facility  1. DNR/DNI  2. CBC, BMP on 8/4/20    Total time spent with patient visit at the skilled nursing facility was 35 min including patient visit and review of past  records. Greater than 50% of total time spent with counseling and coordinating care due to discussion with the patient/family concerning hospital course and current status; diagnostic results, impressions, and recommended follow up diagnostic studies; expected TCU course and goals to be achieved for discharge; education regarding current medical concerns and plan of care for management of anemia, renal impairment, HTN, weakness; instructions for recommended follow up as well as coordination of care including communicating with other medical professionals regarding status and POC and needed follow up. .     Electronically signed by:  DHAVAL Hawk CNP

## 2020-08-04 ENCOUNTER — HOSPITAL LABORATORY (OUTPATIENT)
Dept: OTHER | Facility: CLINIC | Age: 85
End: 2020-08-04

## 2020-08-04 ENCOUNTER — NURSING HOME VISIT (OUTPATIENT)
Dept: GERIATRICS | Facility: CLINIC | Age: 85
End: 2020-08-04
Payer: MEDICARE

## 2020-08-04 VITALS
HEART RATE: 63 BPM | HEIGHT: 61 IN | BODY MASS INDEX: 26.51 KG/M2 | SYSTOLIC BLOOD PRESSURE: 175 MMHG | TEMPERATURE: 98 F | RESPIRATION RATE: 18 BRPM | OXYGEN SATURATION: 96 % | DIASTOLIC BLOOD PRESSURE: 66 MMHG | WEIGHT: 140.4 LBS

## 2020-08-04 DIAGNOSIS — M10.9 GOUTY ARTHRITIS: ICD-10-CM

## 2020-08-04 DIAGNOSIS — D64.9 ANEMIA, UNSPECIFIED TYPE: Primary | ICD-10-CM

## 2020-08-04 DIAGNOSIS — I48.0 PAF (PAROXYSMAL ATRIAL FIBRILLATION) (H): ICD-10-CM

## 2020-08-04 DIAGNOSIS — R53.81 PHYSICAL DECONDITIONING: ICD-10-CM

## 2020-08-04 DIAGNOSIS — M19.90 GENERALIZED ARTHRITIS: ICD-10-CM

## 2020-08-04 DIAGNOSIS — I10 ESSENTIAL HYPERTENSION: ICD-10-CM

## 2020-08-04 DIAGNOSIS — N18.30 CKD (CHRONIC KIDNEY DISEASE) STAGE 3, GFR 30-59 ML/MIN (H): ICD-10-CM

## 2020-08-04 LAB
ANION GAP SERPL CALCULATED.3IONS-SCNC: 5 MMOL/L (ref 3–14)
BUN SERPL-MCNC: 47 MG/DL (ref 7–30)
CALCIUM SERPL-MCNC: 9.2 MG/DL (ref 8.5–10.1)
CHLORIDE SERPL-SCNC: 113 MMOL/L (ref 94–109)
CO2 SERPL-SCNC: 22 MMOL/L (ref 20–32)
COPATH REPORT: NORMAL
COPATH REPORT: NORMAL
CREAT SERPL-MCNC: 0.98 MG/DL (ref 0.52–1.04)
ERYTHROCYTE [DISTWIDTH] IN BLOOD BY AUTOMATED COUNT: 16 % (ref 10–15)
GFR SERPL CREATININE-BSD FRML MDRD: 53 ML/MIN/{1.73_M2}
GLUCOSE SERPL-MCNC: 90 MG/DL (ref 70–99)
HCT VFR BLD AUTO: 27.4 % (ref 35–47)
HGB BLD-MCNC: 8.5 G/DL (ref 11.7–15.7)
MCH RBC QN AUTO: 29 PG (ref 26.5–33)
MCHC RBC AUTO-ENTMCNC: 31 G/DL (ref 31.5–36.5)
MCV RBC AUTO: 94 FL (ref 78–100)
PLATELET # BLD AUTO: 349 10E9/L (ref 150–450)
POTASSIUM SERPL-SCNC: 4.3 MMOL/L (ref 3.4–5.3)
RBC # BLD AUTO: 2.93 10E12/L (ref 3.8–5.2)
SODIUM SERPL-SCNC: 140 MMOL/L (ref 133–144)
WBC # BLD AUTO: 8.8 10E9/L (ref 4–11)

## 2020-08-04 PROCEDURE — 99305 1ST NF CARE MODERATE MDM 35: CPT | Performed by: INTERNAL MEDICINE

## 2020-08-04 ASSESSMENT — MIFFLIN-ST. JEOR: SCORE: 1029.23

## 2020-08-04 NOTE — LETTER
8/4/2020        RE: Ghislaine Walls  [address]  [place] [address]        [facility]  PHYSICIAN NOTE    PRIMARY CARE PROVIDER AND CLINIC:  Laurie Conteh MD, [address] / [place] [address]    Chief Complaint   Patient presents with     Hospital F/U     [place] [facility] Medical Record Number:  [id number]  Place of Service where encounter took place:  [facility] [[id number]]    Ghislaine Walls is a 84 year old (1935), admitted to the above facility from  [facility]. Hospital stay 7/21/20 through 7/31/20. Admitted to this facility for  rehab, medical management and nursing care.     HPI:    HPI information obtained from: facility chart records, facility staff, patient report and [facility] chart review.     Brief summary of hospital course: Ghislaine Walls has h/o frailty, gout, afib on Eliquis, CKD3, and HTN admitted from her [facility] with progressive weakness. She reportedly was needing more and more assistance from staff. Found to have Hgb down to 6-7 range which was worse than baseline. Several labs completed and heme+GI consulted. EGD did show hiatal hernia with duodenal erosions without bleeding; a PPI was added to her regimen. Biopsies negative for H.pylori. Colonoscopy only had one polyp which was removed and otherwise unremarkable. Iron studies low but high ferritin with inflammation noted of joints with c/o knee and arm pain. She did have additional rheumatologic work up as well concerning for new dx RA and started on steroid course and will f/u rheumatology. She did receive a blood transfusion as well. Her Eliquis dose was reduced down to 2.5 mg BID. GI deferred on capsule endoscopy as thought small bowel AVM unlikely given not true LEANDER but more likely ACD. She ended up having bone marrow biopsy with results pending at discharge. She did have a self-limited fever, negative COVID-19 screening. DRAGAN on CKD3 noted as well  "likely d/t poor po intake. D/t frailty and deconditioning, transitioned to [facility] (of note, lives upstairs one floor in her residential).    Updates on status since skilled nursing admission: Ghislaine is seen in her room today sitting up in a chair. She is currently comfortable enough but says the pain she has in her knees is quite terrible while attempting physical therapy and comments, \"I'd rather die than do therapy!\". She is aware of plans to continue steroid taper and f/u with rheum at end of the month. Currently on 15 mg prednisone dose. Says has chronic bilateral knee pain but recently worse; no prior knee surgeries and no recent injuries/trauma. Chronic shoulder/bicep pain noted and some \"better\" with the steroids. The steroids didn't on the other hand seem to improve her knee pain. Feels she is able to eat ok and recalls only 1 BM since arrival to TCU but doesn't feel constipated. Remembers she had colonoscopy while in hospital. She has some anxiety with physical therapy but denies dyspnea. She is aware had labs today and I reassured her they were stable. She is curious about pending bone marrow biopsy results and will f/u with [facility] Dr. Oh later this month for these results.    CODE STATUS/ADVANCE DIRECTIVES DISCUSSION:   DNR / DNI  Patient's living condition: [facility]    ALLERGIES: Oxybutynin and Seasonal allergies    Past Medical History:   Diagnosis Date     Atrial fibrillation (H) new 10/19     [place] (hard of hearing)     wears hearing aids     Hyperlipidaemia      Hyperlipidaemia LDL goal < 130      Hypertension      Hypothyroid      PAD (peripheral artery disease) (H)      Renal insufficiency, mild      Uncontrolled hypertension 10/14/2019      Past Surgical History:   Procedure Laterality Date     APPENDECTOMY  1951     BONE MARROW BIOPSY, BONE SPECIMEN, NEEDLE/TROCAR N/A 7/31/2020    Procedure: bone marrow biopsy;  Surgeon: Iris Cameron MD;  Location:  GI     CATARACT IOL, RT/LT  2001 "    bilateral (laser - 2013)     DENTAL SURGERY      wisdom     ESOPHAGOSCOPY, GASTROSCOPY, DUODENOSCOPY (EGD), COMBINED N/A 2020    Procedure: ESOPHAGOGASTRODUODENOSCOPY, WITH BIOPSY;  Surgeon: Humberto Bailon MD;  Location:  GI     EYE SURGERY      Muscle release     TUBAL LIGATION       Family History   Problem Relation Age of Onset     Heart Disease Mother      Respiratory Mother      Heart Disease Father      Heart Disease Brother      Coronary Artery Disease Brother         CAB     Heart Disease Brother      Coronary Artery Disease Brother         CAB     Social History     Tobacco Use     Smoking status: Former Smoker     Packs/day: 0.50     Years: 5.00     Pack years: 2.50     Types: Cigarettes     Last attempt to quit: 1973     Years since quittin.6     Smokeless tobacco: Never Used   Substance Use Topics     Alcohol use: Yes     Alcohol/week: 0.0 standard drinks     Comment: 1 beer on      Drug use: No        Post-discharge medication reconciliation status: Reviewed and updated in [facility] according to facility MAR    Current Outpatient Medications   Medication Sig Dispense Refill     acetaminophen (TYLENOL) 500 MG tablet Take 1,000 mg by mouth 3 times daily       allopurinol (ZYLOPRIM) 100 MG tablet Take 1 tablet (100 mg) by mouth daily 90 tablet 0     apixaban ANTICOAGULANT (ELIQUIS) 2.5 MG tablet Take 1 tablet (2.5 mg) by mouth 2 times daily       diltiazem ER (DILT-XR) 120 MG 24 hr capsule Take 1 capsule (120 mg) by mouth daily 90 capsule 2     hydrALAZINE (APRESOLINE) 50 MG tablet Take 50 mg by mouth 3 times daily       levothyroxine (SYNTHROID/LEVOTHROID) 25 MCG tablet Take 1 tablet (25 mcg) by mouth daily 90 tablet 2     lisinopril (ZESTRIL) 20 MG tablet Take 1 tablet (20 mg) by mouth daily       metoprolol succinate ER (TOPROL-XL) 100 MG 24 hr tablet Take 100 mg by mouth daily       pantoprazole (PROTONIX) 40 MG EC tablet Take 1 tablet (40 mg) by mouth every  "morning (before breakfast)       predniSONE (DELTASONE) 10 MG tablet Take 2 tablets (20 mg) by mouth daily for 2 days, THEN 1.5 tablets (15 mg) daily for 5 days, THEN 1 tablet (10 mg) daily for 5 days, THEN 0.5 tablets (5 mg) daily. ; continue prednisone 5 mg daily at least until evaluation by Rheumatology.       traMADol (ULTRAM) 50 MG tablet Take 1 tablet (50 mg) by mouth every 6 hours as needed for moderate pain 20 tablet 0       ROS:  10 point ROS of systems including Constitutional, Eyes, Respiratory, Cardiovascular, Gastroenterology, Genitourinary, Integumentary, Musculoskeletal, Psychiatric were all negative except for pertinent positives noted in my HPI.    Exam:  BP (!) 175/66   Pulse 63   Temp 98  F (36.7  C)   Resp 18   Ht 1.549 m (5' 1\")   Wt 63.7 kg (140 lb 6.4 oz)   SpO2 96%   BMI 26.53 kg/m    Alert, casually dressed, sitting up in chair  No scleral icterus  Skin does not appear pale  Breathing non-labored, no cough  Non-obese abdomen  Frail and slightly nervous appearance and affect  Awareness of follow up plans, normal speech, no tremor  L>R medial knee joint pain more so than laterally according to her history with bogginess noted and slight warmth without erythema bilaterally  Very cautious in movement of left > right knees but is able to bend/flex knees actively on command    Lab/Diagnostic data:  Lab Results   Component Value Date    WBC 8.8 08/04/2020     Lab Results   Component Value Date    HGB 8.5 08/04/2020     Lab Results   Component Value Date     08/04/2020     Last Basic Metabolic Panel:  Sodium   Date Value Ref Range Status   08/04/2020 140 133 - 144 mmol/L Final     Potassium   Date Value Ref Range Status   08/04/2020 4.3 3.4 - 5.3 mmol/L Final     Chloride   Date Value Ref Range Status   08/04/2020 113 (H) 94 - 109 mmol/L Final     Carbon Dioxide   Date Value Ref Range Status   08/04/2020 22 20 - 32 mmol/L Final     Anion Gap   Date Value Ref Range Status   08/04/2020 5 " 3 - 14 mmol/L Final     Glucose   Date Value Ref Range Status   08/04/2020 90 70 - 99 mg/dL Final     Urea Nitrogen   Date Value Ref Range Status   08/04/2020 47 (H) 7 - 30 mg/dL Final     Creatinine   Date Value Ref Range Status   08/04/2020 0.98 0.52 - 1.04 mg/dL Final     GFR Estimate   Date Value Ref Range Status   08/04/2020 53 (L) >60 mL/min/[1.73_m2] Final     Comment:     Non  GFR Calc  Starting 12/18/2018, serum creatinine based estimated GFR (eGFR) will be   calculated using the Chronic Kidney Disease Epidemiology Collaboration   (CKD-EPI) equation.       Calcium   Date Value Ref Range Status   08/04/2020 9.2 8.5 - 10.1 mg/dL Final     TSH   Date Value Ref Range Status   07/21/2020 1.86 0.40 - 4.00 mU/L Final     Uric acid 5.2 on 7/28/20 (was 7.4 in the past)    ; ESR not collected  RF and CCP antibody positive    ASSESSMENT/PLAN:  Anemia, unspecified type  Possible multifactorial cause of anemia (ex: ACD with inflammation and CKD); Hgb stable today at 8.5  Invasive GI work up this hospital stay with EGD and colonoscopy rather unremarkable but did have hiatal hernia and started on PPI  Bone marrow biopsy pending and is to f/u with [facility] Dr. Oh for plan going forward    Generalized arthritis (?new diagnosis RA)  Gouty arthritis  Physical deconditioning  She is very distressed about bilateral knee pain with activity (ok at rest); arms some better with steroids  Does have RF+ and CCP antibodies so rheumatoid arthritis high in differential  Taking Tramadol on average only once per day; monitor bowels  On scheduled APAP and Allopurinol 100 mg daily with normal uric acid level while hospitalized  Continue steroid taper as directed but if worsening pain may need to curbside rheum about increasing dose again until her consultation on 8/31/20 given level of likely inflammation with possible new dx rheumatoid arthritis (+RF, CCP antibody, high CRP)  Continue physical therapy and  occupational therapy as tolerated    Essential hypertension  CKD (chronic kidney disease) stage 3, GFR 30-59 ml/min (H)  BPs higher recently but could be d/t pain, steroids and stress  If continues to remain high, would adjust her regimen (ex: Increase Lisinopril with close BMP follow up and make sure she is remaining hydrated); per pharmacist admit note, its not clear if she was taking 20 mg or 40 mg of Lisinopril daily anyway  HRs only 50-60s so not room to increase her Diltiazem or Toprol    PAF (paroxysmal atrial fibrillation) (H)  Rate controlled; is also anticoagulated with Eliquis (dose reduction this hospital stay d/t comorbidities)       Electronically signed by:  Makenna Escalante DO        Sincerely,        Makenna Escalante DO

## 2020-08-05 PROBLEM — D64.9 ACUTE ANEMIA: Status: RESOLVED | Noted: 2020-07-23 | Resolved: 2020-08-05

## 2020-08-05 PROBLEM — N17.9 AKI (ACUTE KIDNEY INJURY) (H): Status: RESOLVED | Noted: 2019-10-07 | Resolved: 2020-08-05

## 2020-08-05 NOTE — PROGRESS NOTES
[place] [facility]  PHYSICIAN NOTE    PRIMARY CARE PROVIDER AND CLINIC:  Laurie Conteh MD, [address] / [place] [address]    Chief Complaint   Patient presents with     Hospital F/U     [place] Medical Record Number:  [id number]  Place of Service where encounter took place:  [facility] ([facility]) [[id number]]    Ghislaine Wlals is a 84 year old (1935), admitted to the above facility from  [facility]. Hospital stay 7/21/20 through 7/31/20. Admitted to this facility for  rehab, medical management and nursing care.     HPI:    HPI information obtained from: facility chart records, facility staff, patient report and [facility] chart review.     Brief summary of hospital course: Ghislaine Walls has h/o frailty, gout, afib on Eliquis, CKD3, and HTN admitted from her halfway with progressive weakness. She reportedly was needing more and more assistance from staff. Found to have Hgb down to 6-7 range which was worse than baseline. Several labs completed and heme+GI consulted. EGD did show hiatal hernia with duodenal erosions without bleeding; a PPI was added to her regimen. Biopsies negative for H.pylori. Colonoscopy only had one polyp which was removed and otherwise unremarkable. Iron studies low but high ferritin with inflammation noted of joints with c/o knee and arm pain. She did have additional rheumatologic work up as well concerning for new dx RA and started on steroid course and will f/u rheumatology. She did receive a blood transfusion as well. Her Eliquis dose was reduced down to 2.5 mg BID. GI deferred on capsule endoscopy as thought small bowel AVM unlikely given not true LEANDER but more likely ACD. She ended up having bone marrow biopsy with results pending at discharge. She did have a self-limited fever, negative COVID-19 screening. DRAGAN on CKD3 noted as well likely d/t poor po intake. D/t frailty and deconditioning, transitioned to [facility] (of note, lives  "upstairs one floor in her assisted).    Updates on status since skilled nursing admission: Ghislaine is seen in her room today sitting up in a chair. She is currently comfortable enough but says the pain she has in her knees is quite terrible while attempting physical therapy and comments, \"I'd rather die than do therapy!\". She is aware of plans to continue steroid taper and f/u with rheum at end of the month. Currently on 15 mg prednisone dose. Says has chronic bilateral knee pain but recently worse; no prior knee surgeries and no recent injuries/trauma. Chronic shoulder/bicep pain noted and some \"better\" with the steroids. The steroids didn't on the other hand seem to improve her knee pain. Feels she is able to eat ok and recalls only 1 BM since arrival to TCU but doesn't feel constipated. Remembers she had colonoscopy while in hospital. She has some anxiety with physical therapy but denies dyspnea. She is aware had labs today and I reassured her they were stable. She is curious about pending bone marrow biopsy results and will f/u with [facility] Dr. Oh later this month for these results.    CODE STATUS/ADVANCE DIRECTIVES DISCUSSION:   DNR / DNI  Patient's living condition: [facility]    ALLERGIES: Oxybutynin and Seasonal allergies    Past Medical History:   Diagnosis Date     Atrial fibrillation (H) new 10/19     [place] (hard of hearing)     wears hearing aids     Hyperlipidaemia      Hyperlipidaemia LDL goal < 130      Hypertension      Hypothyroid      PAD (peripheral artery disease) (H)      Renal insufficiency, mild      Uncontrolled hypertension 10/14/2019      Past Surgical History:   Procedure Laterality Date     APPENDECTOMY  1951     BONE MARROW BIOPSY, BONE SPECIMEN, NEEDLE/TROCAR N/A 7/31/2020    Procedure: bone marrow biopsy;  Surgeon: Iris Cameron MD;  Location:  GI     CATARACT IOL, RT/LT  2001    bilateral (laser - 5/2013)     DENTAL SURGERY  1962    naye     ESOPHAGOSCOPY, GASTROSCOPY, " DUODENOSCOPY (EGD), COMBINED N/A 2020    Procedure: ESOPHAGOGASTRODUODENOSCOPY, WITH BIOPSY;  Surgeon: Humberto Bailon MD;  Location:  GI     EYE SURGERY      Muscle release     TUBAL LIGATION       Family History   Problem Relation Age of Onset     Heart Disease Mother      Respiratory Mother      Heart Disease Father      Heart Disease Brother      Coronary Artery Disease Brother         CAB     Heart Disease Brother      Coronary Artery Disease Brother         CAB     Social History     Tobacco Use     Smoking status: Former Smoker     Packs/day: 0.50     Years: 5.00     Pack years: 2.50     Types: Cigarettes     Last attempt to quit: 1973     Years since quittin.6     Smokeless tobacco: Never Used   Substance Use Topics     Alcohol use: Yes     Alcohol/week: 0.0 standard drinks     Comment: 1 beer on      Drug use: No        Post-discharge medication reconciliation status: Reviewed and updated in [facility] according to facility MAR    Current Outpatient Medications   Medication Sig Dispense Refill     acetaminophen (TYLENOL) 500 MG tablet Take 1,000 mg by mouth 3 times daily       allopurinol (ZYLOPRIM) 100 MG tablet Take 1 tablet (100 mg) by mouth daily 90 tablet 0     apixaban ANTICOAGULANT (ELIQUIS) 2.5 MG tablet Take 1 tablet (2.5 mg) by mouth 2 times daily       diltiazem ER (DILT-XR) 120 MG 24 hr capsule Take 1 capsule (120 mg) by mouth daily 90 capsule 2     hydrALAZINE (APRESOLINE) 50 MG tablet Take 50 mg by mouth 3 times daily       levothyroxine (SYNTHROID/LEVOTHROID) 25 MCG tablet Take 1 tablet (25 mcg) by mouth daily 90 tablet 2     lisinopril (ZESTRIL) 20 MG tablet Take 1 tablet (20 mg) by mouth daily       metoprolol succinate ER (TOPROL-XL) 100 MG 24 hr tablet Take 100 mg by mouth daily       pantoprazole (PROTONIX) 40 MG EC tablet Take 1 tablet (40 mg) by mouth every morning (before breakfast)       predniSONE (DELTASONE) 10 MG tablet Take 2 tablets (20 mg) by  "mouth daily for 2 days, THEN 1.5 tablets (15 mg) daily for 5 days, THEN 1 tablet (10 mg) daily for 5 days, THEN 0.5 tablets (5 mg) daily. ; continue prednisone 5 mg daily at least until evaluation by Rheumatology.       traMADol (ULTRAM) 50 MG tablet Take 1 tablet (50 mg) by mouth every 6 hours as needed for moderate pain 20 tablet 0       ROS:  10 point ROS of systems including Constitutional, Eyes, Respiratory, Cardiovascular, Gastroenterology, Genitourinary, Integumentary, Musculoskeletal, Psychiatric were all negative except for pertinent positives noted in my HPI.    Exam:  BP (!) 175/66   Pulse 63   Temp 98  F (36.7  C)   Resp 18   Ht 1.549 m (5' 1\")   Wt 63.7 kg (140 lb 6.4 oz)   SpO2 96%   BMI 26.53 kg/m    Alert, casually dressed, sitting up in chair  No scleral icterus  Skin does not appear pale  Breathing non-labored, no cough  Non-obese abdomen  Frail and slightly nervous appearance and affect  Awareness of follow up plans, normal speech, no tremor  L>R medial knee joint pain more so than laterally according to her history with bogginess noted and slight warmth without erythema bilaterally  Very cautious in movement of left > right knees but is able to bend/flex knees actively on command    Lab/Diagnostic data:  Lab Results   Component Value Date    WBC 8.8 08/04/2020     Lab Results   Component Value Date    HGB 8.5 08/04/2020     Lab Results   Component Value Date     08/04/2020     Last Basic Metabolic Panel:  Sodium   Date Value Ref Range Status   08/04/2020 140 133 - 144 mmol/L Final     Potassium   Date Value Ref Range Status   08/04/2020 4.3 3.4 - 5.3 mmol/L Final     Chloride   Date Value Ref Range Status   08/04/2020 113 (H) 94 - 109 mmol/L Final     Carbon Dioxide   Date Value Ref Range Status   08/04/2020 22 20 - 32 mmol/L Final     Anion Gap   Date Value Ref Range Status   08/04/2020 5 3 - 14 mmol/L Final     Glucose   Date Value Ref Range Status   08/04/2020 90 70 - 99 mg/dL " Final     Urea Nitrogen   Date Value Ref Range Status   08/04/2020 47 (H) 7 - 30 mg/dL Final     Creatinine   Date Value Ref Range Status   08/04/2020 0.98 0.52 - 1.04 mg/dL Final     GFR Estimate   Date Value Ref Range Status   08/04/2020 53 (L) >60 mL/min/[1.73_m2] Final     Comment:     Non  GFR Calc  Starting 12/18/2018, serum creatinine based estimated GFR (eGFR) will be   calculated using the Chronic Kidney Disease Epidemiology Collaboration   (CKD-EPI) equation.       Calcium   Date Value Ref Range Status   08/04/2020 9.2 8.5 - 10.1 mg/dL Final     TSH   Date Value Ref Range Status   07/21/2020 1.86 0.40 - 4.00 mU/L Final     Uric acid 5.2 on 7/28/20 (was 7.4 in the past)    ; ESR not collected  RF and CCP antibody positive    ASSESSMENT/PLAN:  Anemia, unspecified type  Possible multifactorial cause of anemia (ex: ACD with inflammation and CKD); Hgb stable today at 8.5  Invasive GI work up this hospital stay with EGD and colonoscopy rather unremarkable but did have hiatal hernia and started on PPI  Bone marrow biopsy pending and is to f/u with [facility] Dr. Oh for plan going forward    Generalized arthritis (?new diagnosis RA)  Gouty arthritis  Physical deconditioning  She is very distressed about bilateral knee pain with activity (ok at rest); arms some better with steroids  Does have RF+ and CCP antibodies so rheumatoid arthritis high in differential  Taking Tramadol on average only once per day; monitor bowels  On scheduled APAP and Allopurinol 100 mg daily with normal uric acid level while hospitalized  Continue steroid taper as directed but if worsening pain may need to curbside rheum about increasing dose again until her consultation on 8/31/20 given level of likely inflammation with possible new dx rheumatoid arthritis (+RF, CCP antibody, high CRP)  Continue physical therapy and occupational therapy as tolerated    Essential hypertension  CKD (chronic kidney disease)  stage 3, GFR 30-59 ml/min (H)  BPs higher recently but could be d/t pain, steroids and stress  If continues to remain high, would adjust her regimen (ex: Increase Lisinopril with close BMP follow up and make sure she is remaining hydrated); per pharmacist admit note, its not clear if she was taking 20 mg or 40 mg of Lisinopril daily anyway  HRs only 50-60s so not room to increase her Diltiazem or Toprol    PAF (paroxysmal atrial fibrillation) (H)  Rate controlled; is also anticoagulated with Eliquis (dose reduction this hospital stay d/t comorbidities)       Electronically signed by:  Makenna Escalante DO

## 2020-08-11 ENCOUNTER — HOSPITAL LABORATORY (OUTPATIENT)
Dept: OTHER | Facility: CLINIC | Age: 85
End: 2020-08-11

## 2020-08-11 ENCOUNTER — NURSING HOME VISIT (OUTPATIENT)
Dept: GERIATRICS | Facility: CLINIC | Age: 85
End: 2020-08-11
Payer: MEDICARE

## 2020-08-11 VITALS
HEART RATE: 78 BPM | RESPIRATION RATE: 18 BRPM | HEIGHT: 61 IN | BODY MASS INDEX: 26.77 KG/M2 | SYSTOLIC BLOOD PRESSURE: 199 MMHG | DIASTOLIC BLOOD PRESSURE: 81 MMHG | TEMPERATURE: 98.9 F | OXYGEN SATURATION: 91 % | WEIGHT: 141.8 LBS

## 2020-08-11 DIAGNOSIS — I10 ACCELERATED HYPERTENSION: ICD-10-CM

## 2020-08-11 DIAGNOSIS — M06.4 INFLAMMATORY POLYARTHROPATHY (H): ICD-10-CM

## 2020-08-11 DIAGNOSIS — K44.9 HIATAL HERNIA: ICD-10-CM

## 2020-08-11 DIAGNOSIS — R53.81 PHYSICAL DECONDITIONING: ICD-10-CM

## 2020-08-11 DIAGNOSIS — E43 SEVERE PROTEIN-CALORIE MALNUTRITION (H): ICD-10-CM

## 2020-08-11 DIAGNOSIS — E03.9 HYPOTHYROIDISM, UNSPECIFIED TYPE: ICD-10-CM

## 2020-08-11 DIAGNOSIS — I10 ESSENTIAL HYPERTENSION: ICD-10-CM

## 2020-08-11 DIAGNOSIS — D64.9 ANEMIA, UNSPECIFIED TYPE: ICD-10-CM

## 2020-08-11 DIAGNOSIS — R68.83 CHILLS: ICD-10-CM

## 2020-08-11 DIAGNOSIS — M62.81 GENERALIZED MUSCLE WEAKNESS: Primary | ICD-10-CM

## 2020-08-11 DIAGNOSIS — I48.0 PAF (PAROXYSMAL ATRIAL FIBRILLATION) (H): ICD-10-CM

## 2020-08-11 LAB
ANION GAP SERPL CALCULATED.3IONS-SCNC: 7 MMOL/L (ref 3–14)
ANISOCYTOSIS BLD QL SMEAR: ABNORMAL
BASOPHILS # BLD AUTO: 0 10E9/L (ref 0–0.2)
BASOPHILS NFR BLD AUTO: 0 %
BUN SERPL-MCNC: 47 MG/DL (ref 7–30)
CALCIUM SERPL-MCNC: 8.4 MG/DL (ref 8.5–10.1)
CHLORIDE SERPL-SCNC: 113 MMOL/L (ref 94–109)
CO2 SERPL-SCNC: 19 MMOL/L (ref 20–32)
CREAT SERPL-MCNC: 1.15 MG/DL (ref 0.52–1.04)
DIFFERENTIAL METHOD BLD: ABNORMAL
EOSINOPHIL # BLD AUTO: 0 10E9/L (ref 0–0.7)
EOSINOPHIL NFR BLD AUTO: 0 %
ERYTHROCYTE [DISTWIDTH] IN BLOOD BY AUTOMATED COUNT: 18.9 % (ref 10–15)
GFR SERPL CREATININE-BSD FRML MDRD: 44 ML/MIN/{1.73_M2}
GLUCOSE SERPL-MCNC: 81 MG/DL (ref 70–99)
HCT VFR BLD AUTO: 27.2 % (ref 35–47)
HGB BLD-MCNC: 8.6 G/DL (ref 11.7–15.7)
HYPOCHROMIA BLD QL: PRESENT
LYMPHOCYTES # BLD AUTO: 1.4 10E9/L (ref 0.8–5.3)
LYMPHOCYTES NFR BLD AUTO: 10 %
MCH RBC QN AUTO: 30 PG (ref 26.5–33)
MCHC RBC AUTO-ENTMCNC: 31.6 G/DL (ref 31.5–36.5)
MCV RBC AUTO: 95 FL (ref 78–100)
MONOCYTES # BLD AUTO: 1.7 10E9/L (ref 0–1.3)
MONOCYTES NFR BLD AUTO: 12 %
NEUTROPHILS # BLD AUTO: 11.2 10E9/L (ref 1.6–8.3)
NEUTROPHILS NFR BLD AUTO: 78 %
PLATELET # BLD AUTO: 297 10E9/L (ref 150–450)
PLATELET # BLD EST: ABNORMAL 10*3/UL
POTASSIUM SERPL-SCNC: 4.4 MMOL/L (ref 3.4–5.3)
RBC # BLD AUTO: 2.87 10E12/L (ref 3.8–5.2)
SODIUM SERPL-SCNC: 139 MMOL/L (ref 133–144)
WBC # BLD AUTO: 14.4 10E9/L (ref 4–11)

## 2020-08-11 PROCEDURE — 99310 SBSQ NF CARE HIGH MDM 45: CPT | Performed by: NURSE PRACTITIONER

## 2020-08-11 ASSESSMENT — MIFFLIN-ST. JEOR: SCORE: 1035.58

## 2020-08-11 NOTE — PROGRESS NOTES
[facility]  [place] Medical Record Number:  [id number]  Place of Service where encounter took place:  [facility] ([facility]) [[id number]]  Chief Complaint   Patient presents with     Nursing Home Acute       HPI:    Ghislaine Walls  is a 84 year old (1935), who is being seen today for an episodic care visit.  HPI information obtained from: facility chart records, facility staff, patient report and [facility] chart review. Today's concern is:  Patient has been in TCU since 7/31 following hospitalization due to weakness. She was found to have hemoglobin 6-7. She was seen by GI, orthopedics, and hematology.  EGD showed hiatal hernia and duodenal erosions without bleeding. She was started on PPI. She was started on prednisone for suspicion of rheumatoid arthritis. She also had a bone marrow biopsy in hospital. Since in TCU she continues to feel weak. She is walking about 20' with walker and CGA. Today she has the chills, /90, T100.3 then 98.9. she did not eat much breakfast.     Past Medical and Surgical History reviewed in Epic today.    MEDICATIONS:    Current Outpatient Medications   Medication Sig Dispense Refill     acetaminophen (TYLENOL) 500 MG tablet Take 1,000 mg by mouth 3 times daily       allopurinol (ZYLOPRIM) 100 MG tablet Take 1 tablet (100 mg) by mouth daily 90 tablet 0     apixaban ANTICOAGULANT (ELIQUIS) 2.5 MG tablet Take 1 tablet (2.5 mg) by mouth 2 times daily       diltiazem ER (DILT-XR) 120 MG 24 hr capsule Take 1 capsule (120 mg) by mouth daily 90 capsule 2     hydrALAZINE (APRESOLINE) 50 MG tablet Take 50 mg by mouth 3 times daily       levothyroxine (SYNTHROID/LEVOTHROID) 25 MCG tablet Take 1 tablet (25 mcg) by mouth daily 90 tablet 2     lisinopril (ZESTRIL) 20 MG tablet Take 1 tablet (20 mg) by mouth daily       metoprolol succinate ER (TOPROL-XL) 100 MG 24 hr tablet Take 100 mg by mouth daily       pantoprazole (PROTONIX) 40 MG EC tablet Take 1  "tablet (40 mg) by mouth every morning (before breakfast)       predniSONE (DELTASONE) 10 MG tablet Take 2 tablets (20 mg) by mouth daily for 2 days, THEN 1.5 tablets (15 mg) daily for 5 days, THEN 1 tablet (10 mg) daily for 5 days, THEN 0.5 tablets (5 mg) daily. ; continue prednisone 5 mg daily at least until evaluation by Rheumatology.       traMADol (ULTRAM) 50 MG tablet Take 1 tablet (50 mg) by mouth every 6 hours as needed for moderate pain 20 tablet 0         REVIEW OF SYSTEMS:  4 point ROS including Respiratory, CV, GI and , other than that noted in the HPI,  is negative    Objective:  BP (!) 199/81   Pulse 78   Temp 98.9  F (37.2  C)   Resp 18   Ht 1.549 m (5' 1\")   Wt 64.3 kg (141 lb 12.8 oz)   SpO2 91%   BMI 26.79 kg/m    Exam:  GENERAL APPEARANCE:  Alert, looks ill  ENT:  Mouth and posterior oropharynx normal, moist mucous membranes, hearing acuity adequate   EYES:  EOM, conjunctivae, lids, pupils and irises normal    RESP:  respiratory effort and palpation of chest normal, no respiratory distress, Lung sounds Bibasilar rales  CV:  Palpation and auscultation of heart done , rate and rhythm reg, no murmur, no rub or gallop, Edema trace  ABDOMEN:  normal bowel sounds, soft, nontender, no hepatosplenomegaly or other masses  M/S:   Gait and station not observd, Digits and nails normal   SKIN:  Inspection/Palpation of skin and subcutaneous tissue no rash  NEURO: 2-12 in normal limits and at patient's baseline  PSYCH:  insight and judgement, memory intact , affect and mood normal    Labs:   CBC RESULTS:   Recent Labs   Lab Test 08/04/20  0847 07/31/20  1245   WBC 8.8 6.1   RBC 2.93* 2.88*   HGB 8.5* 8.2*   HCT 27.4* 27.0*   MCV 94 94   MCH 29.0 28.5   MCHC 31.0* 30.4*   RDW 16.0* 15.6*    357       Last Basic Metabolic Panel:  Recent Labs   Lab Test 08/04/20  0847 07/28/20  0923    133   POTASSIUM 4.3 4.6   CHLORIDE 113* 110*   ALIVIA 9.2 9.0   CO2 22 17*   BUN 47* 28   CR 0.98 1.04   GLC 90 " 86       Liver Function Studies -   Recent Labs   Lab Test 07/25/20  0554 07/21/20 2038   PROTTOTAL 6.2* 6.4*   ALBUMIN 1.7* 2.0*   BILITOTAL 0.4 0.4   ALKPHOS 90 69   AST 25 18   ALT 10 8       TSH   Date Value Ref Range Status   07/21/2020 1.86 0.40 - 4.00 mU/L Final   02/10/2020 1.36 0.40 - 4.00 mU/L Final   ]        ASSESSMENT/PLAN:  Generalized muscle weakness  Chills  Asked to see today due to T 100.3, chills and malaise. /90. She did not eat breakfast this am. She has no c/o pain, she has no cough. She did have low grade temp in hospital, which was attributed to inflammatory arthritis.   -CBC w dif  -CXR PA and Lat  -BMP   -Blood cultures times two.  -COVID swab    Anemia, unspecified type  Inflammatory polyarthropathy (H)  Hiatal hernia  Recent hospitalization due to weakness. Found to have hgb 6-7. She had EGD that showed duodenal erosions. She was started on protonix. She had bone marrow biopsy-results pending.   She has long standing polyarthropathy. She was started on prednisone after input from rheumatology.   -continue prednisone taper  -follow up with rheumatology 8/31  -follow up with Dr Oh to discuss bone marrow results- need to schedule this appointment  -protonix 40mg q day  -continue allopurinol 100mg q day    PAF (paroxysmal atrial fibrillation) (H)  Eliquis reduced in hospital to 2.5mg BID. She continues on metoprolol for rate control. HR 75-85    Hypothyroidism, unspecified type  Euthyroid  -continue current levothyroxine    Essential hypertension  Accelerated hypertension   /90, 199/81, 205/71, HR 75-85. Uncontrolled hypertension  -increase hydralazine 75 mg TID  -continue diltiazem ER 120mg q day  -continue metoprolol 100mg q day  -continue lisinopril 20mg q day  -monitor BPs    Severe protein-calorie malnutrition (H)  Little appetite. Weight down 20# in 10 months.dietary is following she is on supplements  -continue nutritional supplements.     Physical  deconditioning  [facility]. She is walking about 20' with therapy. SLUMs 24/30  -continue physical therapy and OCCUPATIONAL THERAPY       Total time spent with patient visit at the skilled nursing facility was 38 minutes including patient visit and review of past records. Greater than 50% of total time spent with counseling and coordinating care due to very complicated pt with anemia and pain in multiple joints. she requires additonal follow up by rheumatoloy, hematology. Appointments discused with [facility]. . Today she has fever and chills with malaise and anorexia with extremely elevated blood pressures. . stat labs ordered and cxr ordered.   Electronically signed by:  DHAVAL Altamirano CNP

## 2020-08-12 LAB
SARS-COV-2 RNA SPEC QL NAA+PROBE: NOT DETECTED
SPECIMEN SOURCE: NORMAL

## 2020-08-13 ENCOUNTER — NURSING HOME VISIT (OUTPATIENT)
Dept: GERIATRICS | Facility: CLINIC | Age: 85
End: 2020-08-13
Payer: MEDICARE

## 2020-08-13 VITALS
WEIGHT: 141.8 LBS | DIASTOLIC BLOOD PRESSURE: 70 MMHG | OXYGEN SATURATION: 96 % | HEIGHT: 61 IN | RESPIRATION RATE: 15 BRPM | BODY MASS INDEX: 26.77 KG/M2 | TEMPERATURE: 98.4 F | HEART RATE: 80 BPM | SYSTOLIC BLOOD PRESSURE: 173 MMHG

## 2020-08-13 DIAGNOSIS — D64.9 ANEMIA, UNSPECIFIED TYPE: ICD-10-CM

## 2020-08-13 DIAGNOSIS — R53.81 PHYSICAL DECONDITIONING: ICD-10-CM

## 2020-08-13 DIAGNOSIS — I48.0 PAF (PAROXYSMAL ATRIAL FIBRILLATION) (H): ICD-10-CM

## 2020-08-13 DIAGNOSIS — K44.9 HIATAL HERNIA: ICD-10-CM

## 2020-08-13 DIAGNOSIS — M62.81 GENERALIZED MUSCLE WEAKNESS: Primary | ICD-10-CM

## 2020-08-13 DIAGNOSIS — E03.9 HYPOTHYROIDISM, UNSPECIFIED TYPE: ICD-10-CM

## 2020-08-13 DIAGNOSIS — M06.4 INFLAMMATORY POLYARTHROPATHY (H): ICD-10-CM

## 2020-08-13 DIAGNOSIS — I10 ESSENTIAL HYPERTENSION: ICD-10-CM

## 2020-08-13 DIAGNOSIS — E43 SEVERE PROTEIN-CALORIE MALNUTRITION (H): ICD-10-CM

## 2020-08-13 PROCEDURE — 99309 SBSQ NF CARE MODERATE MDM 30: CPT | Performed by: NURSE PRACTITIONER

## 2020-08-13 RX ORDER — DILTIAZEM HYDROCHLORIDE 180 MG/1
180 CAPSULE, EXTENDED RELEASE ORAL DAILY
Start: 2020-08-13 | End: 2020-09-15

## 2020-08-13 ASSESSMENT — MIFFLIN-ST. JEOR: SCORE: 1035.58

## 2020-08-13 NOTE — PROGRESS NOTES
[facility]  [place] Medical Record Number:  [id number]  Place of Service where encounter took place:  [facility] ([facility]) [[id number]]  Chief Complaint   Patient presents with     RECHECK       HPI:    Ghislaine Walls  is a 84 year old (1935), who is being seen today for an episodic care visit.  HPI information obtained from: facility chart records, facility staff, patient report and [facility] chart review. Today's concern is:  Patient has been in TCU since 7/31 following hospitalization due to weakness. She was found to have hemoglobin 6-7. She was seen by GI, orthopedics, and hematology.  EGD showed hiatal hernia and duodenal erosions without bleeding. She was started on PPI. She was started on prednisone for suspicion of rheumatoid arthritis. She also had a bone marrow biopsy in hospital. Since in TCU she continues to feel weak. She is walking about 20' with walker and CGA. She did have low grade temperature of 100 earlier this week. Covid was negative.    Past Medical and Surgical History reviewed in Epic today.    MEDICATIONS:    Current Outpatient Medications   Medication Sig Dispense Refill     acetaminophen (TYLENOL) 500 MG tablet Take 1,000 mg by mouth 3 times daily       allopurinol (ZYLOPRIM) 100 MG tablet Take 1 tablet (100 mg) by mouth daily 90 tablet 0     apixaban ANTICOAGULANT (ELIQUIS) 2.5 MG tablet Take 1 tablet (2.5 mg) by mouth 2 times daily       diltiazem ER (DILT-XR) 120 MG 24 hr capsule Take 1 capsule (120 mg) by mouth daily 90 capsule 2     hydrALAZINE (APRESOLINE) 50 MG tablet Take 75 mg by mouth 3 times daily       levothyroxine (SYNTHROID/LEVOTHROID) 25 MCG tablet Take 1 tablet (25 mcg) by mouth daily 90 tablet 2     lisinopril (ZESTRIL) 20 MG tablet Take 1 tablet (20 mg) by mouth daily       metoprolol succinate ER (TOPROL-XL) 100 MG 24 hr tablet Take 100 mg by mouth daily       pantoprazole (PROTONIX) 40 MG EC tablet Take 1 tablet (40 mg) by  "mouth every morning (before breakfast)       predniSONE (DELTASONE) 10 MG tablet Take 2 tablets (20 mg) by mouth daily for 2 days, THEN 1.5 tablets (15 mg) daily for 5 days, THEN 1 tablet (10 mg) daily for 5 days, THEN 0.5 tablets (5 mg) daily. ; continue prednisone 5 mg daily at least until evaluation by Rheumatology.       traMADol (ULTRAM) 50 MG tablet Take 1 tablet (50 mg) by mouth every 6 hours as needed for moderate pain 20 tablet 0         REVIEW OF SYSTEMS:  4 point ROS including Respiratory, CV, GI and , other than that noted in the HPI,  is negative    Objective:  BP (!) 173/70   Pulse 80   Temp 98.4  F (36.9  C)   Resp 15   Ht 1.549 m (5' 1\")   Wt 64.3 kg (141 lb 12.8 oz)   SpO2 96%   BMI 26.79 kg/m    Exam:  GENERAL APPEARANCE:  Alert, in no distress  ENT:  Mouth and posterior oropharynx normal, moist mucous membranes, hearing acuity adequate   EYES:  EOM, conjunctivae, lids, pupils and irises normal  RESP:  respiratory effort and palpation of chest normal, no respiratory distress, Lung sounds clear  CV:  Palpation and auscultation of heart done , rate and rhythm reg, no murmur, no rub or gallop, Edema trac3  ABDOMEN:  normal bowel sounds, soft, nontender, no hepatosplenomegaly or other masses  M/S:   Gait and station LE weakness, Digits and nails nl  SKIN:  Inspection/Palpation of skin and subcutaneous tissue  No rash  NEURO: 2-12 in normal limits and at patient's baseline  PSYCH:  insight and judgement, memory mild cognitive impairment  , affect and mood normal    Labs:   CBC RESULTS:   Recent Labs   Lab Test 08/11/20  0955 08/04/20  0847   WBC 14.4* 8.8   RBC 2.87* 2.93*   HGB 8.6* 8.5*   HCT 27.2* 27.4*   MCV 95 94   MCH 30.0 29.0   MCHC 31.6 31.0*   RDW 18.9* 16.0*    349       Last Basic Metabolic Panel:  Recent Labs   Lab Test 08/11/20  0955 08/04/20  0847    140   POTASSIUM 4.4 4.3   CHLORIDE 113* 113*   ALIVIA 8.4* 9.2   CO2 19* 22   BUN 47* 47*   CR 1.15* 0.98   GLC 81 90 "       Liver Function Studies -   Recent Labs   Lab Test 07/25/20  0554 07/21/20 2038   PROTTOTAL 6.2* 6.4*   ALBUMIN 1.7* 2.0*   BILITOTAL 0.4 0.4   ALKPHOS 90 69   AST 25 18   ALT 10 8       TSH   Date Value Ref Range Status   07/21/2020 1.86 0.40 - 4.00 mU/L Final   02/10/2020 1.36 0.40 - 4.00 mU/L Final   ]  CRP Inflammation   Date Value Ref Range Status   07/28/2020 110.0 (H) 0.0 - 8.0 mg/L Final     Ferritin   Date Value Ref Range Status   07/22/2020 484 (H) 8 - 252 ng/mL Final     Iron   Date Value Ref Range Status   07/22/2020 9 (L) 35 - 180 ug/dL Final     Iron Binding Cap   Date Value Ref Range Status   07/22/2020 167 (L) 240 - 430 ug/dL Final             ASSESSMENT/PLAN:  Generalized muscle weakness  Inflammatory polyarthropathy (H)  Recent hospitalization due to weakness. She reports generalized achiness. She has long standing polyarthropathy.  (H), ACPA > 340 (H), RF 42 (H), GWEN positive (speckled pattern).  She did have visit with oncology yesterday regarding results from bone marrow biopsy. She continues on prednisone taper.   -follow up with rheumatology on 8/31    Anemia, unspecified type  Hiatal hernia  She did have hgb 6-7 upon adm to hospital (prior to TCU stay). She had no sx of bleeding. EGD showed hiatal hernia nd duodenal ulcers. Iron studies suggested ACD. Oncology has suggested epoetin injections. hgb has been stable in 8s while in TCU  -follow up with oncology for epoetin injections  - hgb 8/17    PAF (paroxysmal atrial fibrillation) (H)  Eliquis reduced in hospital to 2.5mg BID. She continues on metoprolol for rate control. HR 75-85    Hypothyroidism, unspecified type  Euthyroid  -continue current levothyroxine    Essential hypertension  She did have increased blood pressure earlier this week. Hydralazine was increased to 75mg TID  Last three BPs 200/63, 173/70. 183/73, hr 70-90  -continue hydralazine 75 mg TID  -increase diltiazem ER 180mg q day  -continue metoprolol 100mg q  day  -continue lisinopril 20mg q day  -monitor BPs  -BMP 8/17    Severe protein-calorie malnutrition (H)  She has been eating better more lately. But has been losing weight lately  -continue supplements.     Physical deconditioning  Making slow progress in physical therapy. At this point she is too weak to return to her apartment  -continue physical therapy and OCCUPATIONAL THERAPY   Call placed to daughter, Krupa, to update on above plan.         Electronically signed by:  DHAVAL Altamirano CNP

## 2020-08-14 ENCOUNTER — HOSPITAL LABORATORY (OUTPATIENT)
Dept: OTHER | Facility: CLINIC | Age: 85
End: 2020-08-14

## 2020-08-15 ENCOUNTER — HOSPITAL LABORATORY (OUTPATIENT)
Dept: OTHER | Facility: CLINIC | Age: 85
End: 2020-08-15

## 2020-08-15 LAB
SARS-COV-2 RNA SPEC QL NAA+PROBE: NOT DETECTED
SPECIMEN SOURCE: NORMAL

## 2020-08-16 ENCOUNTER — TELEPHONE (OUTPATIENT)
Dept: GERIATRICS | Facility: CLINIC | Age: 85
End: 2020-08-16

## 2020-08-16 ENCOUNTER — HOSPITAL LABORATORY (OUTPATIENT)
Dept: OTHER | Facility: CLINIC | Age: 85
End: 2020-08-16

## 2020-08-16 NOTE — TELEPHONE ENCOUNTER
Resident having a cruncky cough and fever of 100.4  Awake most of the night    Staff asking for COVID test and granted.    Electronically signed by Sandi Bailey RN, CNP

## 2020-08-17 ENCOUNTER — NURSING HOME VISIT (OUTPATIENT)
Dept: GERIATRICS | Facility: CLINIC | Age: 85
End: 2020-08-17
Payer: MEDICARE

## 2020-08-17 ENCOUNTER — HOSPITAL LABORATORY (OUTPATIENT)
Dept: OTHER | Facility: CLINIC | Age: 85
End: 2020-08-17

## 2020-08-17 VITALS
HEART RATE: 75 BPM | TEMPERATURE: 98 F | WEIGHT: 141 LBS | HEIGHT: 61 IN | OXYGEN SATURATION: 90 % | SYSTOLIC BLOOD PRESSURE: 207 MMHG | RESPIRATION RATE: 16 BRPM | BODY MASS INDEX: 26.62 KG/M2 | DIASTOLIC BLOOD PRESSURE: 81 MMHG

## 2020-08-17 DIAGNOSIS — E03.9 HYPOTHYROIDISM, UNSPECIFIED TYPE: ICD-10-CM

## 2020-08-17 DIAGNOSIS — I48.0 PAF (PAROXYSMAL ATRIAL FIBRILLATION) (H): ICD-10-CM

## 2020-08-17 DIAGNOSIS — M06.4 INFLAMMATORY POLYARTHROPATHY (H): ICD-10-CM

## 2020-08-17 DIAGNOSIS — K44.9 HIATAL HERNIA: ICD-10-CM

## 2020-08-17 DIAGNOSIS — R53.81 PHYSICAL DECONDITIONING: ICD-10-CM

## 2020-08-17 DIAGNOSIS — M62.81 GENERALIZED MUSCLE WEAKNESS: Primary | ICD-10-CM

## 2020-08-17 DIAGNOSIS — D64.9 ANEMIA, UNSPECIFIED TYPE: ICD-10-CM

## 2020-08-17 DIAGNOSIS — E43 SEVERE PROTEIN-CALORIE MALNUTRITION (H): ICD-10-CM

## 2020-08-17 DIAGNOSIS — I10 ESSENTIAL HYPERTENSION: ICD-10-CM

## 2020-08-17 LAB
ANION GAP SERPL CALCULATED.3IONS-SCNC: 8 MMOL/L (ref 3–14)
BACTERIA SPEC CULT: ABNORMAL
BUN SERPL-MCNC: 36 MG/DL (ref 7–30)
CALCIUM SERPL-MCNC: 9 MG/DL (ref 8.5–10.1)
CHLORIDE SERPL-SCNC: 112 MMOL/L (ref 94–109)
CO2 SERPL-SCNC: 18 MMOL/L (ref 20–32)
CREAT SERPL-MCNC: 1.25 MG/DL (ref 0.52–1.04)
GFR SERPL CREATININE-BSD FRML MDRD: 39 ML/MIN/{1.73_M2}
GLUCOSE SERPL-MCNC: 121 MG/DL (ref 70–99)
HGB BLD-MCNC: 9 G/DL (ref 11.7–15.7)
Lab: ABNORMAL
POTASSIUM SERPL-SCNC: 4.4 MMOL/L (ref 3.4–5.3)
SARS-COV-2 RNA SPEC QL NAA+PROBE: NOT DETECTED
SODIUM SERPL-SCNC: 138 MMOL/L (ref 133–144)
SPECIMEN SOURCE: ABNORMAL
SPECIMEN SOURCE: NORMAL

## 2020-08-17 PROCEDURE — 99309 SBSQ NF CARE MODERATE MDM 30: CPT | Performed by: NURSE PRACTITIONER

## 2020-08-17 RX ORDER — AMOXICILLIN 250 MG
2 CAPSULE ORAL DAILY
COMMUNITY
End: 2021-02-21

## 2020-08-17 ASSESSMENT — MIFFLIN-ST. JEOR: SCORE: 1031.95

## 2020-08-17 NOTE — PROGRESS NOTES
[place] [facility]  [place] Medical Record Number:  [id number]  Place of Service where encounter took place:  [facility] [[id number]]  Chief Complaint   Patient presents with     RECHECK       HPI:    Ghislaine Walls  is a 84 year old (1935), who is being seen today for an episodic care visit.  HPI information obtained from: facility chart records, facility staff, patient report and [facility] chart review.     Per recent TCU provider progress notes:  84 year old female with hx including HTN, PAF on apixaban, gout, CKD, and chronic pain syndrome, hospitalized with generalized weakness and fatigue. She was been found to have progressive anemia of unclear etiology, suspect anemia of chronic disease component (suspect inflammatory arthritis) +/- iron deficiency component; other causes not ruled out, abnormal SPEP/UPEP, possibly MGUS, other causes not ruled out. GI and Heme/Onc consulted. Hgb 7.8 from baseline of 9, down to 6.7 and received 1 unit PRBC. FOBT negative, low iron sats, EGD and colonoscopy showed no active bleeding. GI deferred on capsule endoscopy as iron studies more suggestive of ACD (low TIBC, ferritin high; iron levels could be low with ACD). Elevated kappa and lamda free light chains 7/30. S/p bone marrow biopsy 7/31/2020. [facility] will follow-up BM Bx results with Dr. Oh next week (telemedicine visit).  Polyarthropathy and hx gout sees Dr Dave STRICKLAND for subacromial bursa injection, takes tylenol and allopurinol. Due to knee pain and swelling saw ortho and treated with medrol course, on prednisone taper and needs rheumatology f/u 8/31. DRAGAN on CKD with Cr up to 1.43, improved with fluids. Poor intake and weakness: TCU recommended. Hiatal hernia managed with Protonix. PAF and HTN:  rate controlled with diltiazem and metoprolol lisinopril and Hydralazine (dose increased to 75 mg TID) and takes low dose apixaban. Last week developed temp 100.3, BC  negative to date. Swabbed for COVID results pending.     Today's concern is:  Patient seen for episodic TCU follow up. Reports achy all over - we discussed making tramadol available more often, f/u with rheumatology as planned end of the month. No headaches, dizziness, chest pain, dyspnea, bladder problems. Does report constipation no stool two days. Per EMR review note weight up 2 lbs since admission. BP remains elevated at times prior to meds range 145-207/60-81 and HR 60-70s. Sats 90% on room air and walks 25 ft with 2ww.     Past Medical and Surgical History reviewed in Epic today.    MEDICATIONS:    Current Outpatient Medications   Medication Sig Dispense Refill     acetaminophen (TYLENOL) 500 MG tablet Take 1,000 mg by mouth 3 times daily       allopurinol (ZYLOPRIM) 100 MG tablet Take 1 tablet (100 mg) by mouth daily 90 tablet 0     apixaban ANTICOAGULANT (ELIQUIS) 2.5 MG tablet Take 1 tablet (2.5 mg) by mouth 2 times daily       diltiazem ER (DILT-XR) 180 MG 24 hr capsule Take 1 capsule (180 mg) by mouth daily       hydrALAZINE (APRESOLINE) 50 MG tablet Take 75 mg by mouth 3 times daily       levothyroxine (SYNTHROID/LEVOTHROID) 25 MCG tablet Take 1 tablet (25 mcg) by mouth daily 90 tablet 2     lisinopril (ZESTRIL) 20 MG tablet Take 1 tablet (20 mg) by mouth daily (Patient taking differently: Take 40 mg by mouth daily )       metoprolol succinate ER (TOPROL-XL) 100 MG 24 hr tablet Take 100 mg by mouth daily       pantoprazole (PROTONIX) 40 MG EC tablet Take 1 tablet (40 mg) by mouth every morning (before breakfast)       predniSONE (DELTASONE) 10 MG tablet Take 2 tablets (20 mg) by mouth daily for 2 days, THEN 1.5 tablets (15 mg) daily for 5 days, THEN 1 tablet (10 mg) daily for 5 days, THEN 0.5 tablets (5 mg) daily. ; continue prednisone 5 mg daily at least until evaluation by Rheumatology.       senna-docusate (SENOKOT-S/PERICOLACE) 8.6-50 MG tablet Take 2 tablets by mouth daily       traMADol (ULTRAM) 50  "MG tablet Take 1 tablet (50 mg) by mouth every 6 hours as needed for moderate pain (Patient taking differently: Take 50 mg by mouth every 4 hours as needed for moderate pain ) 20 tablet 0       REVIEW OF SYSTEMS:  4 point ROS including Respiratory, CV, GI and , other than that noted in the HPI,  is negative    Objective:  BP (!) 207/81   Pulse 75   Temp 98  F (36.7  C)   Resp 16   Ht 1.549 m (5' 1\")   Wt 64 kg (141 lb)   SpO2 90%   BMI 26.64 kg/m    Exam:  Exam is limited due to COVID-19 precautions  GENERAL APPEARANCE:  Alert, cooperative  ENT:  Mouth normal, moist mucous membranes, normal hearing acuity  EYES:  Conjunctiva and lids normal  RESP:  no respiratory distress, on room air, LSC  CV: no edema  MS: pain with extremity ROM  NEURO:   No facial asymmetry, speech clear  PSYCH:  oriented X 3, affect and mood normal     Labs:   Most Recent 3 CBC's:  Recent Labs   Lab Test 08/17/20  1000 08/11/20  0955 08/04/20  0847 07/31/20  1245   WBC  --  14.4* 8.8 6.1   HGB 9.0* 8.6* 8.5* 8.2*   MCV  --  95 94 94   PLT  --  297 349 357     Most Recent 3 BMP's:  Recent Labs   Lab Test 08/17/20  1000 08/11/20  0955 08/04/20  0847    139 140   POTASSIUM 4.4 4.4 4.3   CHLORIDE 112* 113* 113*   CO2 18* 19* 22   BUN 36* 47* 47*   CR 1.25* 1.15* 0.98   ANIONGAP 8 7 5   ALIVIA 9.0 8.4* 9.2   * 81 90       ASSESSMENT/PLAN:  Generalized muscle weakness  Inflammatory polyarthropathy (H)  Physical deconditioning  Acute on chronic, pain not well managed will make tramadol available ever 4 hrs and Staff to update provider if not effective. F/U with rheumatology as planned. On steroid taper as ordered. Therapies as ordered and f/u with progress next visit.     Anemia, unspecified type  Recently noted, improved on last check. Hgb monitoring PRN.     Hiatal hernia  Newly noted - PPI as ordered, Staff to update provider if not effective.     PAF (paroxysmal atrial fibrillation) (H)  Chronic, managed with diltiazem and " Eliquis no changes.     Hypothyroidism, unspecified type  Chronic, replaced with synthroid, continue same.     Essential hypertension  Chronic, remains elevated despite meds as above. Change lisinopril to 40 mg daily, f/u with BMP on 8/20 and review VS, wt ranges at that time.     Severe protein-calorie malnutrition (H)  Chronic, now on supplement. Monitor intake and weight per dietary routine.     Orders written by provider at facility  1. Increase lisinopril to 40 mg PO daily diagnosis HTN  2. BMP on 8/20 diagnosis HTN  3. Change tramadol to 50 mg PO every 4 hrs PRN pain. Staff to update provider if not effective.   4. Senna S 2 tabs PO daily diagnosis constipation    Electronically signed by:  DHAVAL Hawk CNP

## 2020-08-18 DIAGNOSIS — M19.90 INFLAMMATORY ARTHRITIS: ICD-10-CM

## 2020-08-18 LAB — COPATH REPORT: NORMAL

## 2020-08-18 RX ORDER — TRAMADOL HYDROCHLORIDE 50 MG/1
50 TABLET ORAL EVERY 4 HOURS PRN
Qty: 30 TABLET | Refills: 0 | Status: ON HOLD | OUTPATIENT
Start: 2020-08-18 | End: 2021-02-25

## 2020-08-19 ENCOUNTER — HOSPITAL LABORATORY (OUTPATIENT)
Dept: OTHER | Facility: CLINIC | Age: 85
End: 2020-08-19

## 2020-08-20 ENCOUNTER — HOSPITAL LABORATORY (OUTPATIENT)
Dept: OTHER | Facility: CLINIC | Age: 85
End: 2020-08-20

## 2020-08-20 ENCOUNTER — NURSING HOME VISIT (OUTPATIENT)
Dept: GERIATRICS | Facility: CLINIC | Age: 85
End: 2020-08-20
Payer: MEDICARE

## 2020-08-20 VITALS
WEIGHT: 141 LBS | HEIGHT: 61 IN | SYSTOLIC BLOOD PRESSURE: 155 MMHG | HEART RATE: 64 BPM | BODY MASS INDEX: 26.62 KG/M2 | TEMPERATURE: 98.6 F | DIASTOLIC BLOOD PRESSURE: 65 MMHG | OXYGEN SATURATION: 97 % | RESPIRATION RATE: 18 BRPM

## 2020-08-20 DIAGNOSIS — I10 ESSENTIAL HYPERTENSION: ICD-10-CM

## 2020-08-20 DIAGNOSIS — D64.9 ANEMIA, UNSPECIFIED TYPE: ICD-10-CM

## 2020-08-20 DIAGNOSIS — M06.4 INFLAMMATORY POLYARTHROPATHY (H): Primary | ICD-10-CM

## 2020-08-20 DIAGNOSIS — R53.81 PHYSICAL DECONDITIONING: ICD-10-CM

## 2020-08-20 LAB
ANION GAP SERPL CALCULATED.3IONS-SCNC: 5 MMOL/L (ref 3–14)
BUN SERPL-MCNC: 30 MG/DL (ref 7–30)
CALCIUM SERPL-MCNC: 8.6 MG/DL (ref 8.5–10.1)
CHLORIDE SERPL-SCNC: 113 MMOL/L (ref 94–109)
CO2 SERPL-SCNC: 21 MMOL/L (ref 20–32)
CREAT SERPL-MCNC: 1.19 MG/DL (ref 0.52–1.04)
GFR SERPL CREATININE-BSD FRML MDRD: 42 ML/MIN/{1.73_M2}
GLUCOSE SERPL-MCNC: 83 MG/DL (ref 70–99)
POTASSIUM SERPL-SCNC: 4.1 MMOL/L (ref 3.4–5.3)
SODIUM SERPL-SCNC: 139 MMOL/L (ref 133–144)

## 2020-08-20 PROCEDURE — 99309 SBSQ NF CARE MODERATE MDM 30: CPT | Performed by: NURSE PRACTITIONER

## 2020-08-20 ASSESSMENT — MIFFLIN-ST. JEOR: SCORE: 1031.95

## 2020-08-20 NOTE — PROGRESS NOTES
[place] [facility]  [place] Medical Record Number:  [id number]  Place of Service where encounter took place:  [facility] ([facility] [[id number]]  Chief Complaint   Patient presents with     RECHECK       HPI:    Ghislaine Walls  is a 84 year old (1935), who is being seen today for an episodic care visit.  HPI information obtained from: facility chart records, facility staff, patient report and [facility] chart review.     Per recent TCU provider progress notes:  84 year old female with hx including HTN, PAF on apixaban, gout, CKD, and chronic pain syndrome, hospitalized with generalized weakness and fatigue. She was been found to have progressive anemia of unclear etiology, suspect anemia of chronic disease component (suspect inflammatory arthritis) +/- iron deficiency component; other causes not ruled out, abnormal SPEP/UPEP, possibly MGUS, other causes not ruled out. GI and Heme/Onc consulted. Hgb 7.8 from baseline of 9, down to 6.7 and received 1 unit PRBC. FOBT negative, low iron sats, EGD and colonoscopy showed no active bleeding. GI deferred on capsule endoscopy as iron studies more suggestive of ACD (low TIBC, ferritin high; iron levels could be low with ACD). Elevated kappa and lamda free light chains 7/30. S/p bone marrow biopsy 7/31/2020. [facility] will follow-up BM Bx results with Dr. Oh next week (telemedicine visit).  Polyarthropathy and hx gout sees Dr Dave STRICKLAND for subacromial bursa injection, takes tylenol and allopurinol. Due to knee pain and swelling saw ortho and treated with medrol course, on prednisone taper and needs rheumatology f/u 8/31. DRAGAN on CKD with Cr up to 1.43, improved with fluids. Poor intake and weakness: TCU recommended. Hiatal hernia managed with Protonix. PAF and HTN:  rate controlled with diltiazem and metoprolol lisinopril and Hydralazine (dose increased to 75 mg TID) and takes low dose apixaban. Last week developed temp 100.3, BC negative to  date. Swabbed for COVID results pending. On last visit increased lisinopril to 40 mg PO daily, BMP due today. Also changed tramadol frequency to every 4 hrs as needed and scheduled senna s daily due to constipation. Patient re-scheduled heme/onc visit due to not feeling well.     Today's concern is:  Patient seen for episodic TCU follow up. Per staff no pain last few days, using tramadol PRN 0-2 times daily. Congestion cleared. Per EMR review note weight up 2 lbs since admission. BP range somewhat improved 139-188/57-71 and HR 60-70s. Sats 97% on room air. Walks 30 ft with 2ww and CGA.     Past Medical and Surgical History reviewed in Epic today.    MEDICATIONS:    Current Outpatient Medications   Medication Sig Dispense Refill     acetaminophen (TYLENOL) 500 MG tablet Take 1,000 mg by mouth 3 times daily       allopurinol (ZYLOPRIM) 100 MG tablet Take 1 tablet (100 mg) by mouth daily 90 tablet 0     apixaban ANTICOAGULANT (ELIQUIS) 2.5 MG tablet Take 1 tablet (2.5 mg) by mouth 2 times daily       diltiazem ER (DILT-XR) 180 MG 24 hr capsule Take 1 capsule (180 mg) by mouth daily       hydrALAZINE (APRESOLINE) 50 MG tablet Take 75 mg by mouth 3 times daily       levothyroxine (SYNTHROID/LEVOTHROID) 25 MCG tablet Take 1 tablet (25 mcg) by mouth daily 90 tablet 2     lisinopril (ZESTRIL) 20 MG tablet Take 1 tablet (20 mg) by mouth daily (Patient taking differently: Take 40 mg by mouth daily )       metoprolol succinate ER (TOPROL-XL) 100 MG 24 hr tablet Take 100 mg by mouth daily       pantoprazole (PROTONIX) 40 MG EC tablet Take 1 tablet (40 mg) by mouth every morning (before breakfast)       predniSONE (DELTASONE) 10 MG tablet Take 2 tablets (20 mg) by mouth daily for 2 days, THEN 1.5 tablets (15 mg) daily for 5 days, THEN 1 tablet (10 mg) daily for 5 days, THEN 0.5 tablets (5 mg) daily. ; continue prednisone 5 mg daily at least until evaluation by Rheumatology.       senna-docusate (SENOKOT-S/PERICOLACE) 8.6-50 MG  "tablet Take 2 tablets by mouth daily       traMADol (ULTRAM) 50 MG tablet Take 1 tablet (50 mg) by mouth every 4 hours as needed for moderate pain 30 tablet 0       REVIEW OF SYSTEMS:  Deferred due to on phone    Objective:  BP (!) 155/65   Pulse 64   Temp 98.6  F (37  C)   Resp 18   Ht 1.549 m (5' 1\")   Wt 64 kg (141 lb)   SpO2 97%   BMI 26.64 kg/m    Exam:  Exam is limited due to COVID-19 precautions  GENERAL APPEARANCE:  Alert, in no distress, cooperative  ENT:  Mouth normal, moist mucous membranes, normal hearing acuity  EYES:  Conjunctiva and lids normal  RESP:  no respiratory distress  NEURO:   No facial asymmetry, speech clear  PSYCH:  affect and mood normal     Labs:   Most Recent 3 CBC's:  Recent Labs   Lab Test 08/17/20  1000 08/11/20  0955 08/04/20  0847 07/31/20  1245   WBC  --  14.4* 8.8 6.1   HGB 9.0* 8.6* 8.5* 8.2*   MCV  --  95 94 94   PLT  --  297 349 357     Most Recent 3 BMP's:  Recent Labs   Lab Test 08/20/20  0754 08/17/20  1000 08/11/20  0955    138 139   POTASSIUM 4.1 4.4 4.4   CHLORIDE 113* 112* 113*   CO2 21 18* 19*   BUN 30 36* 47*   CR 1.19* 1.25* 1.15*   ANIONGAP 5 8 7   ALIVIA 8.6 9.0 8.4*   GLC 83 121* 81       ASSESSMENT/PLAN:  Inflammatory polyarthropathy (H)  Ongoing, now down to prednisone 5 mg daily. Pain improved last two days, due to see rheumatology 8/31.     Physical deconditioning  Acute on chronic, slow progress in therapies - f/u next week.     Anemia, unspecified type  Ongoing issue, Hgb stable. To see heme/onc as noted below.     Essential hypertension  Chronic, elevated with some improvement since recent med increases. F/U with BPs early next week for further adjustments. Renal function improved - monitor as needed.     Orders written by provider at facility  1. F/U with  [facility] DR. ARNOLD  Date: FRIDAY, AUGUST 21ST, 2020  Time: 3:30 PM  Location: [address] [place] [address]  Phone: [phone]  Transport: FACILITY STAFF [SEE " HUC]    Electronically signed by:  DHAVAL Hawk CNP

## 2020-08-21 ENCOUNTER — TELEPHONE (OUTPATIENT)
Dept: OTHER | Facility: CLINIC | Age: 85
End: 2020-08-21

## 2020-08-21 LAB
BACTERIA SPEC CULT: NO GROWTH
BACTERIA SPEC CULT: NO GROWTH
SARS-COV-2 RNA SPEC QL NAA+PROBE: NOT DETECTED
SPECIMEN SOURCE: NORMAL

## 2020-08-21 NOTE — TELEPHONE ENCOUNTER
Late Documentation:     Mailed recall letters to patient on 2/14/2020 and 3/12/2020 reminding patient to call [facility] & schedule an appointment per the follow-up orders signed by Dr. Parker.   These orders were expected to be completed by March 2020.      To date, patient has not responded to our attempts to contact them and has not contacted our office for scheduling.  No further attempts will be made to contact patient for scheduling per these follow-up orders.     Kenzie Valdivia    [facility]  Office: [phone]  Fax [phone]

## 2020-08-24 ENCOUNTER — NURSING HOME VISIT (OUTPATIENT)
Dept: GERIATRICS | Facility: CLINIC | Age: 85
End: 2020-08-24
Payer: MEDICARE

## 2020-08-24 VITALS
SYSTOLIC BLOOD PRESSURE: 162 MMHG | TEMPERATURE: 97.7 F | OXYGEN SATURATION: 97 % | WEIGHT: 134 LBS | RESPIRATION RATE: 16 BRPM | DIASTOLIC BLOOD PRESSURE: 63 MMHG | BODY MASS INDEX: 25.3 KG/M2 | HEART RATE: 62 BPM | HEIGHT: 61 IN

## 2020-08-24 DIAGNOSIS — I10 HYPERTENSION GOAL BP (BLOOD PRESSURE) < 140/90: Chronic | ICD-10-CM

## 2020-08-24 DIAGNOSIS — N18.30 CKD (CHRONIC KIDNEY DISEASE) STAGE 3, GFR 30-59 ML/MIN (H): Chronic | ICD-10-CM

## 2020-08-24 DIAGNOSIS — D64.9 ANEMIA, UNSPECIFIED TYPE: ICD-10-CM

## 2020-08-24 DIAGNOSIS — M10.9 GOUTY ARTHRITIS: ICD-10-CM

## 2020-08-24 DIAGNOSIS — M06.4 INFLAMMATORY POLYARTHROPATHY (H): Primary | ICD-10-CM

## 2020-08-24 DIAGNOSIS — I48.91 ATRIAL FIBRILLATION, UNSPECIFIED TYPE (H): ICD-10-CM

## 2020-08-24 DIAGNOSIS — I10 ACCELERATED HYPERTENSION: ICD-10-CM

## 2020-08-24 DIAGNOSIS — I10 ESSENTIAL HYPERTENSION: ICD-10-CM

## 2020-08-24 DIAGNOSIS — R53.81 PHYSICAL DECONDITIONING: ICD-10-CM

## 2020-08-24 PROCEDURE — 99310 SBSQ NF CARE HIGH MDM 45: CPT | Performed by: NURSE PRACTITIONER

## 2020-08-24 RX ORDER — LISINOPRIL 20 MG/1
20 TABLET ORAL DAILY
Start: 2020-08-24 | End: 2020-09-15

## 2020-08-24 RX ORDER — HYDROCHLOROTHIAZIDE 12.5 MG/1
12.5 TABLET ORAL DAILY
Start: 2020-08-24 | End: 2020-09-15

## 2020-08-24 ASSESSMENT — MIFFLIN-ST. JEOR: SCORE: 1000.2

## 2020-08-24 NOTE — PATIENT INSTRUCTIONS
[place] [facility] TELEPHONE ENCOUNTER    Chief Complaint   Patient presents with     RECHECK       Ghislaine Walls is a 84 year old  (1935), patient seen today for follow up     ASSESSMENT/PLAN  Accelerated hypertension     Decrease lisinopril 20mg q day    Add hydrochlorothiazide 12.5mg q day    BMP 8/31  Electronically signed by:   DHAVAL Altamirano CNP

## 2020-08-24 NOTE — PROGRESS NOTES
[place] [facility]  [place] Medical Record Number:  [id number]  Place of Service where encounter took place:  [facility] ([facility]) [[id number]]  Chief Complaint   Patient presents with     RECHECK       HPI:    Ghislaine Walls  is a 84 year old (1935), who is being seen today for an episodic care visit.  HPI information obtained from: facility chart records, facility staff, patient report and [facility] chart review. Today's concern is:  Patient has been in TCU since 7/31 following hospitalization due to weakness. She was found to have hemoglobin 6-7. She was seen by GI, orthopedics, and hematology.  EGD showed hiatal hernia and duodenal erosions without bleeding. She was started on PPI. She was started on prednisone for suspicion of rheumatoid arthritis. She also had a bone marrow biopsy in hospital. Since in TCU she continues to feel weak. She is walking about 30' with walker. She seem to be having less pain. She did have epoetin injection last week.     Past Medical and Surgical History reviewed in Epic today.    MEDICATIONS:    Current Outpatient Medications   Medication Sig Dispense Refill     acetaminophen (TYLENOL) 500 MG tablet Take 1,000 mg by mouth 3 times daily       allopurinol (ZYLOPRIM) 100 MG tablet Take 1 tablet (100 mg) by mouth daily 90 tablet 0     apixaban ANTICOAGULANT (ELIQUIS) 2.5 MG tablet Take 1 tablet (2.5 mg) by mouth 2 times daily       diltiazem ER (DILT-XR) 180 MG 24 hr capsule Take 1 capsule (180 mg) by mouth daily       hydrALAZINE (APRESOLINE) 50 MG tablet Take 75 mg by mouth 3 times daily       levothyroxine (SYNTHROID/LEVOTHROID) 25 MCG tablet Take 1 tablet (25 mcg) by mouth daily 90 tablet 2     lisinopril (ZESTRIL) 20 MG tablet Take 1 tablet (20 mg) by mouth daily (Patient taking differently: Take 40 mg by mouth daily )       metoprolol succinate ER (TOPROL-XL) 100 MG 24 hr tablet Take 100 mg by mouth daily       pantoprazole (PROTONIX) 40 MG EC  "tablet Take 1 tablet (40 mg) by mouth every morning (before breakfast)       predniSONE (DELTASONE) 10 MG tablet Take 2 tablets (20 mg) by mouth daily for 2 days, THEN 1.5 tablets (15 mg) daily for 5 days, THEN 1 tablet (10 mg) daily for 5 days, THEN 0.5 tablets (5 mg) daily. ; continue prednisone 5 mg daily at least until evaluation by Rheumatology.       senna-docusate (SENOKOT-S/PERICOLACE) 8.6-50 MG tablet Take 2 tablets by mouth daily       traMADol (ULTRAM) 50 MG tablet Take 1 tablet (50 mg) by mouth every 4 hours as needed for moderate pain 30 tablet 0         REVIEW OF SYSTEMS:  4 point ROS including Respiratory, CV, GI and , other than that noted in the HPI,  is negative    Objective:  BP (!) 162/63   Pulse 62   Temp 97.7  F (36.5  C)   Resp 16   Ht 1.549 m (5' 1\")   Wt 60.8 kg (134 lb)   SpO2 97%   BMI 25.32 kg/m    Exam: limited due to covid precautions  GENERAL APPEARANCE:  Alert, in no distress  ENT:  Mouth and posterior oropharynx normal, moist mucous membranes, hearing acuity [place]  EYES:  EOM, conjunctivae, lids, pupils and irises normal    RESP:  respiratory effort normal, no respiratory distress  CV:   Edema none  ABDOMEN:  normal bowel sounds, soft, nontender, no hepatosplenomegaly or other masses  M/S:   Gait and station not observed, Digits and nails normal   SKIN:  Inspection/Palpation of skin and subcutaneous tissue no rash  NEURO: 2-12 in normal limits and at patient's baseline  PSYCH:  insight and judgement, memory impaired , affect and mood normal    Labs:   CBC RESULTS:   Recent Labs   Lab Test 08/17/20  1000 08/11/20  0955 08/04/20  0847   WBC  --  14.4* 8.8   RBC  --  2.87* 2.93*   HGB 9.0* 8.6* 8.5*   HCT  --  27.2* 27.4*   MCV  --  95 94   MCH  --  30.0 29.0   MCHC  --  31.6 31.0*   RDW  --  18.9* 16.0*   PLT  --  297 349       Last Basic Metabolic Panel:  Recent Labs   Lab Test 08/20/20  0754 08/17/20  1000    138   POTASSIUM 4.1 4.4   CHLORIDE 113* 112*   ALIVIA 8.6 9.0 "   CO2 21 18*   BUN 30 36*   CR 1.19* 1.25*   GLC 83 121*       Liver Function Studies -   Recent Labs   Lab Test 07/25/20  0554 07/21/20 2038   PROTTOTAL 6.2* 6.4*   ALBUMIN 1.7* 2.0*   BILITOTAL 0.4 0.4   ALKPHOS 90 69   AST 25 18   ALT 10 8       TSH   Date Value Ref Range Status   07/21/2020 1.86 0.40 - 4.00 mU/L Final   02/10/2020 1.36 0.40 - 4.00 mU/L Final   ]          ASSESSMENT/PLAN:  Inflammatory polyarthropathy (H)  Physical deconditioning  gout  Recent hospitalization due to weakness. She reports generalized achiness. She has long standing polyarthropathy.  (H), ACPA > 340 (H), RF 42 (H), GWEN positive (speckled pattern).  She did meet with oncology  regarding results from bone marrow biopsy. She continues on prednisone 5mg q day and allopurinol 100mg q day. She seems to be less achy today.   -follow up with rheumatology on 8/31  -continue prn tramadol  Anemia, unspecified type  She did have hgb 6-7 upon adm to hospital (prior to TCU stay). She had no sx of bleeding. EGD showed hiatal hernia and duodenal ulcers. Iron studies suggested ACD.She is following with Dr Oh and received injection of epoetin last week.  hgb 8-9.   -periodic hemoglobin    Atrial fibrillation, unspecified type (H)  Eliquis reduced in hospital to 2.5mg BID. She continues on metoprolol and diltiazem for rate control. HR 75-85    Essential hypertension  BPs elevated. This am 207/73. Other BPs 121/55, 173/61, 162/63, 149/64. She has been on hydrochlorothiazide in past but apparently her creatinine went up. She has been on amlodipine in past but developed swelling in hands and feet. Discussed with nephrology today.    -decrease lisinopril 20mg  q day  -continue metoprolol ER 100mg q day  -continue diltiazem 180mg q day  -continue hydralazine 75mg TID  -add hydrochlorothiazide 12.5mg q day  -BMP in one week    CKD (chronic kidney disease) stage 3, GFR 30-59 ml/min (H)  She has developed DRAGAN when on lasix and  hydrochlorothiazide.  Now at baseline creat 1.25      Total time spent with patient visit at the skilled nursing facility was 35 min including patient visit, review of past records and  consult with nephrology. Greater than 50% of total time spent with counseling and coordinating care due to accelerated htn in setting of polyarthropathy and CKD 3. at risk for SEs due to muliple medicaitons but at risk for end orgran damage with elevated blood pressures. .  Electronically signed by:  DHAVAL Altamirano CNP

## 2020-08-25 LAB — COPATH REPORT: NORMAL

## 2020-08-26 ENCOUNTER — HOSPITAL LABORATORY (OUTPATIENT)
Dept: OTHER | Facility: CLINIC | Age: 85
End: 2020-08-26

## 2020-08-27 ENCOUNTER — NURSING HOME VISIT (OUTPATIENT)
Dept: GERIATRICS | Facility: CLINIC | Age: 85
End: 2020-08-27
Payer: MEDICARE

## 2020-08-27 VITALS
HEIGHT: 61 IN | DIASTOLIC BLOOD PRESSURE: 67 MMHG | SYSTOLIC BLOOD PRESSURE: 191 MMHG | RESPIRATION RATE: 16 BRPM | HEART RATE: 66 BPM | WEIGHT: 134 LBS | OXYGEN SATURATION: 96 % | BODY MASS INDEX: 25.3 KG/M2 | TEMPERATURE: 97.5 F

## 2020-08-27 DIAGNOSIS — I10 ACCELERATED HYPERTENSION: Primary | ICD-10-CM

## 2020-08-27 DIAGNOSIS — N18.30 CKD (CHRONIC KIDNEY DISEASE) STAGE 3, GFR 30-59 ML/MIN (H): Chronic | ICD-10-CM

## 2020-08-27 LAB
SARS-COV-2 RNA SPEC QL NAA+PROBE: ABNORMAL
SPECIMEN SOURCE: ABNORMAL

## 2020-08-27 PROCEDURE — 99309 SBSQ NF CARE MODERATE MDM 30: CPT | Performed by: NURSE PRACTITIONER

## 2020-08-27 ASSESSMENT — MIFFLIN-ST. JEOR: SCORE: 1000.2

## 2020-08-27 NOTE — PROGRESS NOTES
[place] [facility]  [place] Medical Record Number:  [id number]  Place of Service where encounter took place[facility] [[id number]]  Chief Complaint   Patient presents with     RECHECK       HPI:    Ghislaine Walls  is a 84 year old (1935), who is being seen today for an episodic care visit.  HPI information obtained from: facility chart records, facility staff, patient report and [facility] chart review.     Today's concern is:  1. Accelerated hypertension    2. CKD (chronic kidney disease) stage 3, GFR 30-59 ml/min (H)    patient with known HTN and CKD, previously seen by Dr Chaudhary, nephrology. Most recently decreased lisinopril dose back to 20 mg daily and added HCTZ 12.5 mg daily. She also remains on diltiazem 180 mg PO daily, hydralazine 75 mg TID, metoprolol 100 mg daily. BP range 121-123 systolic - improves after AM dose of meds given. Discussed with patient - recommend f/u with Dr Chaudhary as soon as able for further review and management.     Past Medical and Surgical History reviewed in Epic today.    MEDICATIONS:    Current Outpatient Medications   Medication Sig Dispense Refill     acetaminophen (TYLENOL) 500 MG tablet Take 1,000 mg by mouth 3 times daily       allopurinol (ZYLOPRIM) 100 MG tablet Take 1 tablet (100 mg) by mouth daily 90 tablet 0     apixaban ANTICOAGULANT (ELIQUIS) 2.5 MG tablet Take 1 tablet (2.5 mg) by mouth 2 times daily       diltiazem ER (DILT-XR) 180 MG 24 hr capsule Take 1 capsule (180 mg) by mouth daily       hydrALAZINE (APRESOLINE) 50 MG tablet Take 75 mg by mouth 3 times daily       hydrochlorothiazide (HYDRODIURIL) 12.5 MG tablet Take 1 tablet (12.5 mg) by mouth daily       levothyroxine (SYNTHROID/LEVOTHROID) 25 MCG tablet Take 1 tablet (25 mcg) by mouth daily 90 tablet 2     lisinopril (ZESTRIL) 20 MG tablet Take 1 tablet (20 mg) by mouth daily       metoprolol succinate ER (TOPROL-XL) 100 MG 24 hr tablet Take 100 mg by mouth daily  "      pantoprazole (PROTONIX) 40 MG EC tablet Take 1 tablet (40 mg) by mouth every morning (before breakfast)       predniSONE (DELTASONE) 10 MG tablet Take 2 tablets (20 mg) by mouth daily for 2 days, THEN 1.5 tablets (15 mg) daily for 5 days, THEN 1 tablet (10 mg) daily for 5 days, THEN 0.5 tablets (5 mg) daily. ; continue prednisone 5 mg daily at least until evaluation by Rheumatology.       senna-docusate (SENOKOT-S/PERICOLACE) 8.6-50 MG tablet Take 2 tablets by mouth daily       traMADol (ULTRAM) 50 MG tablet Take 1 tablet (50 mg) by mouth every 4 hours as needed for moderate pain 30 tablet 0       REVIEW OF SYSTEMS:  4 point ROS including Respiratory, CV, GI and , other than that noted in the HPI,  is negative    Objective:  BP (!) 191/67   Pulse 66   Temp 97.5  F (36.4  C)   Resp 16   Ht 1.549 m (5' 1\")   Wt 60.8 kg (134 lb)   SpO2 96%   BMI 25.32 kg/m    Exam:  Exam is limited due to COVID-19 precautions  GENERAL APPEARANCE:  Alert, in no distress, cooperative  ENT:  Mouth normal, moist mucous membranes, normal hearing acuity  EYES:  Conjunctiva and lids normal  RESP:  no respiratory distress  NEURO:   No facial asymmetry, speech clear  PSYCH:  oriented X 3, affect and mood normal     Labs:   Recent labs in [facility] reviewed by me today.     ASSESSMENT/PLAN:  1. Accelerated hypertension    2. CKD (chronic kidney disease) stage 3, GFR 30-59 ml/min (H)    acute on chronic issues, BP meds adjusted but remain quite elevated at times.     Orders written by provider at facility  Schedule f/u with [facility] Dr Chaudhary as soon as able.   Continue vs, wt, meds as ordered above. F/U with ranges next visit.     Electronically signed by:  DHAVAL Hawk CNP           "

## 2020-08-31 ENCOUNTER — HOSPITAL LABORATORY (OUTPATIENT)
Dept: OTHER | Facility: CLINIC | Age: 85
End: 2020-08-31

## 2020-08-31 LAB
ANION GAP SERPL CALCULATED.3IONS-SCNC: 7 MMOL/L (ref 3–14)
BUN SERPL-MCNC: 31 MG/DL (ref 7–30)
CALCIUM SERPL-MCNC: 8.6 MG/DL (ref 8.5–10.1)
CHLORIDE SERPL-SCNC: 115 MMOL/L (ref 94–109)
CO2 SERPL-SCNC: 21 MMOL/L (ref 20–32)
CREAT SERPL-MCNC: 1.19 MG/DL (ref 0.52–1.04)
GFR SERPL CREATININE-BSD FRML MDRD: 42 ML/MIN/{1.73_M2}
GLUCOSE SERPL-MCNC: 116 MG/DL (ref 70–99)
POTASSIUM SERPL-SCNC: 3.5 MMOL/L (ref 3.4–5.3)
SODIUM SERPL-SCNC: 143 MMOL/L (ref 133–144)

## 2020-09-01 ENCOUNTER — NURSING HOME VISIT (OUTPATIENT)
Dept: GERIATRICS | Facility: CLINIC | Age: 85
End: 2020-09-01
Payer: MEDICARE

## 2020-09-01 VITALS
WEIGHT: 134 LBS | HEIGHT: 61 IN | BODY MASS INDEX: 25.3 KG/M2 | HEART RATE: 83 BPM | OXYGEN SATURATION: 95 % | RESPIRATION RATE: 18 BRPM | TEMPERATURE: 98.6 F | DIASTOLIC BLOOD PRESSURE: 75 MMHG | SYSTOLIC BLOOD PRESSURE: 142 MMHG

## 2020-09-01 DIAGNOSIS — I48.0 PAF (PAROXYSMAL ATRIAL FIBRILLATION) (H): ICD-10-CM

## 2020-09-01 DIAGNOSIS — R53.81 PHYSICAL DECONDITIONING: ICD-10-CM

## 2020-09-01 DIAGNOSIS — N18.30 CKD (CHRONIC KIDNEY DISEASE) STAGE 3, GFR 30-59 ML/MIN (H): ICD-10-CM

## 2020-09-01 DIAGNOSIS — M19.90 INFLAMMATORY ARTHRITIS: ICD-10-CM

## 2020-09-01 DIAGNOSIS — U07.1 CLINICAL DIAGNOSIS OF COVID-19: Primary | ICD-10-CM

## 2020-09-01 DIAGNOSIS — D64.9 ANEMIA, UNSPECIFIED TYPE: ICD-10-CM

## 2020-09-01 DIAGNOSIS — I10 ESSENTIAL HYPERTENSION: ICD-10-CM

## 2020-09-01 DIAGNOSIS — E43 SEVERE PROTEIN-CALORIE MALNUTRITION (H): ICD-10-CM

## 2020-09-01 PROCEDURE — 99309 SBSQ NF CARE MODERATE MDM 30: CPT | Performed by: NURSE PRACTITIONER

## 2020-09-01 ASSESSMENT — MIFFLIN-ST. JEOR: SCORE: 995.2

## 2020-09-01 NOTE — PROGRESS NOTES
[place] [facility]  [place] [facility] Record Number:  [id number]  Place of Service where encounter took place:  [facility]ETTE HAYDEN ([facility]) [[id number]]  Chief Complaint   Patient presents with     RECHECK       HPI:    Ghislaine Walls  is a 85 year old (1935), who is being seen today for an episodic care visit.  HPI information obtained from: facility chart records, facility staff, patient report and [facility] chart review.   She came to this facility 7/31/2020 for short term rehab and medical management following hospitalization after presenting to the ED 7/21/2020 with progressive weakness. Hgb was 7.8, down from her baseline around 9. FOBT was negative in the ED. Hgb dropped to 6.7. GI consulted and she underwent EGD, which showed a moderate hiatal hernia and duodenal erosions. Colonoscopy revealed a medium transverse colon polyp that was resected. No source of active bleeding was found. She received 1 unit of PRBC on 7/23/2020 for Hgb 6.7. Iron studies suggestive of anemia of chronic disease. SPEP and UPEP were abnormal. Hematology consulted and she underwent bone marrow biopsy. She was noted to have pain and inflammation of her knees and hands. CRP elevated at 110.0, rheumatoid factor 42 and GWEN positive. Ortho consulted and ordered prednisone for suspected inflammatory arthritis.       Today's concern is:     Clinical diagnosis of COVID-19-she was positive on facility surveillance testing 8/26/2020. Afebrile. Denies cough, shortness of breath or chest pain.   Anemia, unspecified type  Inflammatory arthritis-reports shoulder and upper body joint pain has improved. Continues to have knee pain, mainly with therapy, but feels it's slightly better.   PAF (paroxysmal atrial fibrillation) (H)-HR: 71-83, outlier 114   CKD (chronic kidney disease) stage 3, GFR 30-59 ml/min (H)  Essential hypertension-BPs: 142/75, 162/75, 172/78, 155/60   Severe protein-calorie malnutrition (H)-reports her  appetite is fair. Weight is down 5-7 lbs.  Denies GI symptoms   Physical deconditioning-ambulating short distances with walker and assist. Requires max assist with cares. Therapists report that she is poorly motivated.   SLUMS 24/30, safety questionnaire 20.5/22     Past Medical and Surgical History reviewed in Epic today.    MEDICATIONS:    Current Outpatient Medications   Medication Sig Dispense Refill     acetaminophen (TYLENOL) 500 MG tablet Take 1,000 mg by mouth 3 times daily       allopurinol (ZYLOPRIM) 100 MG tablet Take 1 tablet (100 mg) by mouth daily 90 tablet 0     apixaban ANTICOAGULANT (ELIQUIS) 2.5 MG tablet Take 1 tablet (2.5 mg) by mouth 2 times daily       diltiazem ER (DILT-XR) 180 MG 24 hr capsule Take 1 capsule (180 mg) by mouth daily       hydrALAZINE (APRESOLINE) 50 MG tablet Take 75 mg by mouth 3 times daily       hydrochlorothiazide (HYDRODIURIL) 12.5 MG tablet Take 1 tablet (12.5 mg) by mouth daily       levothyroxine (SYNTHROID/LEVOTHROID) 25 MCG tablet Take 1 tablet (25 mcg) by mouth daily 90 tablet 2     lisinopril (ZESTRIL) 20 MG tablet Take 1 tablet (20 mg) by mouth daily       metoprolol succinate ER (TOPROL-XL) 100 MG 24 hr tablet Take 100 mg by mouth daily       pantoprazole (PROTONIX) 40 MG EC tablet Take 1 tablet (40 mg) by mouth every morning (before breakfast)       predniSONE (DELTASONE) 10 MG tablet Take 2 tablets (20 mg) by mouth daily for 2 days, THEN 1.5 tablets (15 mg) daily for 5 days, THEN 1 tablet (10 mg) daily for 5 days, THEN 0.5 tablets (5 mg) daily. ; continue prednisone 5 mg daily at least until evaluation by Rheumatology.       senna-docusate (SENOKOT-S/PERICOLACE) 8.6-50 MG tablet Take 2 tablets by mouth daily       traMADol (ULTRAM) 50 MG tablet Take 1 tablet (50 mg) by mouth every 4 hours as needed for moderate pain 30 tablet 0         REVIEW OF SYSTEMS:  4 point ROS including Respiratory, CV, GI and , other than that noted in the HPI,  is  "negative    Objective:  BP (!) 142/75   Pulse 83   Temp 98.6  F (37  C)   Resp 18   Ht 1.549 m (5' 1\")   Wt 60.8 kg (134 lb)   SpO2 95%   BMI 25.32 kg/m    Limited exam due to COVID-19 precautions   GENERAL APPEARANCE:  Alert, in no distress, frail  ENT:  [place]  EYES:  EOM normal, conjunctiva and lids normal  RESP:  no respiratory distress  CV:  no edema  M/S:   in bed. DIAS with good strength. No erythema or edema of knees  SKIN:  no visible rashes or open areas  PSYCH:  oriented X 3, memory impaired , affect and mood normal    Labs:   Recent labs in [facility] reviewed by me today.     ASSESSMENT/PLAN:  (U07.1) Clinical diagnosis of COVID-19  (primary encounter diagnosis)  Comment: tested positive on facility surveillance on 8/26/2020. Remains afebrile.   Plan: monitor VS, symptoms     (D64.9) Anemia, unspecified type  Comment: no s/s of active bleeding. Hgb 9.0 on 8/17/2020. She had follow up with Dr Oh 8/21/2020 and received a dose of Aranesp that day. Reports the bone marrow biopsy result was \"normal.\" Bone marrow biopsy report indicates a marked increase in storage iron and rare sideroblast, consistent with anemia of chronic disease.   Plan: follow Hgb. Follow up with Oncology and Rheumatology as scheduled.     (M19.90) Inflammatory arthritis  Comment: upper body joint pain has improved. Continues to have knee pain with activity, but pain is less. Tolerating prednisone without side effect.   Plan: continue prednisone, tylenol, tramadol. Rheumatology appointment is 9/25/2020.     (I48.0) PAF (paroxysmal atrial fibrillation) (H)  Comment: rate controlled   Plan: continue apixaban, metoprolol and diltiazem.     (N18.3) CKD (chronic kidney disease) stage 3, GFR 30-59 ml/min (H)  Comment: renal function is back to baseline with creatinine 1.19 on 8/31/2020.   Plan: follow BMP. Avoid nephrotoxins. Follow up with Dr Chaudhary 9/24/2020    (I10) Essential hypertension  Comment: antihypertensives have been adjusted " since tcu admission. Most recent change was starting HCTZ and decreasing lisinopril, per consultation with Nephrology, on 8/24/2020. HCT had previously been discontinued due to DRAGAN. BPs with improvement, although still elevated at times.   Plan: continue current doses of diltiazem, hydralazine, HCTZ, lisinopril and metoprolol. Monitor VS and adjust meds as indicated. Nephrology follow up as scheduled.     (E43) Severe protein-calorie malnutrition (H)  Comment: weight is down 5  lbs since tcu admission to 134 lbs. On chart review, her weight was 159 lbs during her tcu stay 10/2019.   Plan: continue nutritional supplements. Will discuss with dietician.     (R53.81) Physical deconditioning  Comment: slow progress in therapies   Plan: continue PHYSICAL THERAPY/OT. Goal is to return to her [facility] with increased services.       Electronically signed by:  DHAVAL Aranda CNP

## 2020-09-03 RX ORDER — PREDNISONE 5 MG/1
5 TABLET ORAL DAILY
COMMUNITY
End: 2020-09-15

## 2020-09-04 ENCOUNTER — NURSING HOME VISIT (OUTPATIENT)
Dept: GERIATRICS | Facility: CLINIC | Age: 85
End: 2020-09-04
Payer: MEDICARE

## 2020-09-04 VITALS
WEIGHT: 134 LBS | HEART RATE: 92 BPM | HEIGHT: 61 IN | SYSTOLIC BLOOD PRESSURE: 190 MMHG | RESPIRATION RATE: 18 BRPM | OXYGEN SATURATION: 96 % | DIASTOLIC BLOOD PRESSURE: 70 MMHG | TEMPERATURE: 97.1 F | BODY MASS INDEX: 25.3 KG/M2

## 2020-09-04 DIAGNOSIS — E43 SEVERE PROTEIN-CALORIE MALNUTRITION (H): ICD-10-CM

## 2020-09-04 DIAGNOSIS — R53.81 PHYSICAL DECONDITIONING: ICD-10-CM

## 2020-09-04 DIAGNOSIS — I10 ESSENTIAL HYPERTENSION: ICD-10-CM

## 2020-09-04 DIAGNOSIS — D64.9 ANEMIA, UNSPECIFIED TYPE: ICD-10-CM

## 2020-09-04 DIAGNOSIS — N18.30 CKD (CHRONIC KIDNEY DISEASE) STAGE 3, GFR 30-59 ML/MIN (H): ICD-10-CM

## 2020-09-04 DIAGNOSIS — M19.90 INFLAMMATORY ARTHRITIS: ICD-10-CM

## 2020-09-04 DIAGNOSIS — I48.0 PAF (PAROXYSMAL ATRIAL FIBRILLATION) (H): ICD-10-CM

## 2020-09-04 DIAGNOSIS — U07.1 CLINICAL DIAGNOSIS OF COVID-19: Primary | ICD-10-CM

## 2020-09-04 PROCEDURE — 99309 SBSQ NF CARE MODERATE MDM 30: CPT | Performed by: NURSE PRACTITIONER

## 2020-09-04 ASSESSMENT — MIFFLIN-ST. JEOR: SCORE: 995.2

## 2020-09-04 NOTE — PROGRESS NOTES
[place] [facility]  [place] [facility] Record Number:  [id number]  Place of Service where encounter took place:  [facility] JANETTE HAYDEN ([facility]) [[id number]]  Chief Complaint   Patient presents with     RECHECK       HPI:    Ghislaine Walls  is a 85 year old (1935), who is being seen today for an episodic care visit.  HPI information obtained from: facility chart records, facility staff, patient report and [facility] chart review.   She came to this facility 7/31/2020 for short term rehab and medical management following hospitalization after presenting to the ED 7/21/2020 with progressive weakness. Hgb was 7.8, down from her baseline around 9. FOBT was negative in the ED. Hgb dropped to 6.7. GI consulted and she underwent EGD, which showed a moderate hiatal hernia and duodenal erosions. Colonoscopy revealed a medium transverse colon polyp that was resected. No source of active bleeding was found. She received 1 unit of PRBC on 7/23/2020 for Hgb 6.7. Iron studies suggestive of anemia of chronic disease. SPEP and UPEP were abnormal. Hematology consulted and she underwent bone marrow biopsy. She was noted to have pain and inflammation of her knees and hands. CRP elevated at 110.0, rheumatoid factor 42 and GWEN positive. Ortho consulted and ordered prednisone for suspected inflammatory arthritis.       Today's concern is:     Clinical diagnosis of COVID-19-reports fatigue, but otherwise feels well. Afebrile. Denies cough, shortness of breath or chest pain.   Essential hypertension-BPs: 165/75, 160/80, 170/80, 190/80    Inflammatory arthritis-denies pain, including her knees   Anemia, unspecified type  PAF (paroxysmal atrial fibrillation) (H)-HR: 80-93   CKD (chronic kidney disease) stage 3, GFR 30-59 ml/min (H)  Severe protein-calorie malnutrition (H)-reports some improvement in her appetite. Denies trouble swallowing, denies GI symptoms   Physical deconditioning-ambulating short distances within her  "room with walker and assist (she is confined to her room due to COVID). Requires max assist of 1 with cares.     Past Medical and Surgical History reviewed in Epic today.    MEDICATIONS:    Current Outpatient Medications   Medication Sig Dispense Refill     acetaminophen (TYLENOL) 500 MG tablet Take 1,000 mg by mouth 3 times daily       allopurinol (ZYLOPRIM) 100 MG tablet Take 1 tablet (100 mg) by mouth daily 90 tablet 0     apixaban ANTICOAGULANT (ELIQUIS) 2.5 MG tablet Take 1 tablet (2.5 mg) by mouth 2 times daily       diltiazem ER (DILT-XR) 180 MG 24 hr capsule Take 1 capsule (180 mg) by mouth daily       hydrALAZINE (APRESOLINE) 50 MG tablet Take 100 mg by mouth 3 times daily        hydrochlorothiazide (HYDRODIURIL) 12.5 MG tablet Take 1 tablet (12.5 mg) by mouth daily       levothyroxine (SYNTHROID/LEVOTHROID) 25 MCG tablet Take 1 tablet (25 mcg) by mouth daily 90 tablet 2     lisinopril (ZESTRIL) 20 MG tablet Take 1 tablet (20 mg) by mouth daily       metoprolol succinate ER (TOPROL-XL) 100 MG 24 hr tablet Take 100 mg by mouth daily       pantoprazole (PROTONIX) 40 MG EC tablet Take 1 tablet (40 mg) by mouth every morning (before breakfast)       predniSONE (DELTASONE) 5 MG tablet Take 5 mg by mouth daily       senna-docusate (SENOKOT-S/PERICOLACE) 8.6-50 MG tablet Take 2 tablets by mouth daily       traMADol (ULTRAM) 50 MG tablet Take 1 tablet (50 mg) by mouth every 4 hours as needed for moderate pain 30 tablet 0       REVIEW OF SYSTEMS:  4 point ROS including Respiratory, CV, GI and , other than that noted in the HPI,  is negative    Objective:  BP (!) 190/70   Pulse 92   Temp 97.1  F (36.2  C)   Resp 18   Ht 1.549 m (5' 1\")   Wt 60.8 kg (134 lb)   SpO2 96%   BMI 25.32 kg/m    Limited exam due to COVID-19 precautions   GENERAL APPEARANCE:  Alert, in no distress, frail  ENT:  [place]  EYES:  EOM normal, conjunctiva and lids normal  RESP:  no respiratory distress  CV:  no edema  M/S:   in bed. DIAS with " good strength. No acute joint inflammation  SKIN:  no visible rashes or open areas  PSYCH:  oriented X 3, memory impaired , affect and mood normal    Labs:   Recent labs in [facility] reviewed by me today.     ASSESSMENT/PLAN:  (U07.1) Clinical diagnosis of COVID-19  (primary encounter diagnosis)  Comment: tested positive on facility surveillance on 8/26/2020. Remains afebrile and appears asymptomatic   Plan: monitor VS, symptoms     (I10) Essential hypertension  Comment: BPs remain above goal. Antihypertensives have been adjusted since tcu admission. Most recent change was starting HCTZ and decreasing lisinopril, per consultation with Nephrology, on 8/24/2020.  Plan: increase hydralazine to 100 mg tid. Continue HCTZ, lisinopril, diltiazem, metoprolol. Monitor VS and adjust meds as indicated. Nephrology follow up 9/24/2020.     (M19.90) Inflammatory arthritis  Comment: pain has improved and she denies pain of any type today   Plan: continue prednisone 5 mg daily. Rheumatology follow up 9/25/2020.     (D64.9) Anemia, unspecified type  Comment: no s/s of active bleeding. Hgb 9.0 on 8/17/2020. She had follow up with Dr Oh 8/21/2020 and received a dose of Aranesp that day.  Bone marrow biopsy report indicates a marked increase in storage iron and rare sideroblast, consistent with anemia of chronic disease.   Plan: Hgb 9/10/2020. Follow up with Oncology and Rheumatology as scheduled.     (I48.0) PAF (paroxysmal atrial fibrillation) (H)  Comment: rate controlled   Plan: continue apixaban, metoprolol and diltiazem.     (N18.3) CKD (chronic kidney disease) stage 3, GFR 30-59 ml/min (H)  Comment: renal function is back to baseline with creatinine 1.19 on 8/31/2020.   Plan: BMP 9/10/2020. Avoid nephrotoxins. Follow up with Dr Chaudhary 9/24/2020    (E43) Severe protein-calorie malnutrition (H)  Comment: weight is down 5 lbs from tcu admission, though she feels that her appetite has improved   Plan: continue nutritional  supplements. Dietician is involved.     (R53.81) Physical deconditioning  Comment: slow progress in therapies  Plan: continue PHYSICAL THERAPY/OT. Goal is to return to her [facility] with increased services       Electronically signed by:  DHAVAL Aranda CNP

## 2020-09-08 ASSESSMENT — MIFFLIN-ST. JEOR: SCORE: 978.87

## 2020-09-08 NOTE — PROGRESS NOTES
[place] [facility]  [place] [facility] Record Number:  [id number]  Place of Service where encounter took place:  [facility] ([facility]) [[id number]]  Chief Complaint   Patient presents with     RECHECK       HPI:    Ghislaine Walls  is a 85 year old (1935), who is being seen today for an episodic care visit.  HPI information obtained from: facility chart records, facility staff, patient report and [facility] chart review.   She came to this facility 7/31/2020 for short term rehab and medical management following hospitalization after presenting to the ED 7/21/2020 with progressive weakness. Hgb was 7.8, down from her baseline around 9. FOBT was negative in the ED. Hgb dropped to 6.7. GI consulted and she underwent EGD, which showed a moderate hiatal hernia and duodenal erosions. Colonoscopy revealed a medium transverse colon polyp that was resected. No source of active bleeding was found. She received 1 unit of PRBC on 7/23/2020 for Hgb 6.7. Iron studies suggestive of anemia of chronic disease. SPEP and UPEP were abnormal. Hematology consulted and she underwent bone marrow biopsy. She was noted to have pain and inflammation of her knees and hands. CRP elevated at 110.0, rheumatoid factor 42 and GWEN positive. Ortho consulted and ordered prednisone for suspected inflammatory arthritis.       Today's concern is:     Anemia, unspecified type-denies signs of bleeding. Reports feeling well.   Inflammatory arthritis-reports pain has improved. No joint pain, including her knees   Essential hypertension-BPs: 163/66, 172/73, 202/63 (taken prior to am meds), 151/57   PAF (paroxysmal atrial fibrillation) (H)-HR: 62-76   CKD (chronic kidney disease) stage 3, GFR 30-59 ml/min (H)  Severe protein-calorie malnutrition (H)-reports her appetite has improved and she feels like she's getting enough to eat. Weight is down 9-10 lbs from tcu admission. Denies problems chewing or swallowing. Denies GI symptoms  "  Weight loss  Clinical diagnosis of COVID-19-afebrile. Denies respiratory symptoms   Physical deconditioning-ambulating up to 150 ft with walker and stand by assist. Requires supervision to stand by assist with cares.   SLUMS 24/30, safety questionnaire 20.5/22     Past Medical and Surgical History reviewed in Epic today.    MEDICATIONS:    Current Outpatient Medications   Medication Sig Dispense Refill     acetaminophen (TYLENOL) 500 MG tablet Take 1,000 mg by mouth 3 times daily       allopurinol (ZYLOPRIM) 100 MG tablet Take 1 tablet (100 mg) by mouth daily 90 tablet 0     apixaban ANTICOAGULANT (ELIQUIS) 2.5 MG tablet Take 1 tablet (2.5 mg) by mouth 2 times daily       diltiazem ER (DILT-XR) 180 MG 24 hr capsule Take 1 capsule (180 mg) by mouth daily       hydrALAZINE (APRESOLINE) 50 MG tablet Take 100 mg by mouth 3 times daily        hydrochlorothiazide (HYDRODIURIL) 12.5 MG tablet Take 1 tablet (12.5 mg) by mouth daily       levothyroxine (SYNTHROID/LEVOTHROID) 25 MCG tablet Take 1 tablet (25 mcg) by mouth daily 90 tablet 2     lisinopril (ZESTRIL) 20 MG tablet Take 1 tablet (20 mg) by mouth daily       metoprolol succinate ER (TOPROL-XL) 100 MG 24 hr tablet Take 100 mg by mouth daily       pantoprazole (PROTONIX) 40 MG EC tablet Take 1 tablet (40 mg) by mouth every morning (before breakfast)       predniSONE (DELTASONE) 5 MG tablet Take 5 mg by mouth daily       senna-docusate (SENOKOT-S/PERICOLACE) 8.6-50 MG tablet Take 2 tablets by mouth daily       traMADol (ULTRAM) 50 MG tablet Take 1 tablet (50 mg) by mouth every 4 hours as needed for moderate pain 30 tablet 0         REVIEW OF SYSTEMS:  4 point ROS including Respiratory, CV, GI and , other than that noted in the HPI,  is negative    Objective:  BP (!) 163/67   Pulse 66   Temp 98.6  F (37  C)   Resp 16   Ht 1.549 m (5' 1\")   Wt 59.1 kg (130 lb 6.4 oz)   SpO2 95%   BMI 24.64 kg/m     Limited exam due to COVID-19 precautions   GENERAL " APPEARANCE:  Alert, in no distress, frail  ENT:  [place]  EYES:  EOM normal, conjunctiva and lids normal  RESP:  no respiratory distress  CV:  no edema  M/S:   in bed. DIAS with good strength. No acute joint inflammation  SKIN:  no visible rashes or open areas  PSYCH:  oriented X 3, memory impaired , affect and mood normal    Labs:   Recent labs in [facility] reviewed by me today.     ASSESSMENT/PLAN:  (D64.9) Anemia, unspecified type  (primary encounter diagnosis)  Comment: no s/s of active bleeding. She saw Dr Oh 8/21/2020 and received a dose of Aranesp in the clinic. Bone marrow biopsy report indicates a marked increase in storage iron and rare sideroblast, consistent with anemia of chronic disease. Hgb 9.0 on 8/17/2020  Plan: Hgb 9/10/2020 .Follow up with Oncology and Rheumatology as scheduled.      (M19.90) Inflammatory arthritis  Comment: pain much improved   Plan: continue prednisone 5 mg daily. Rheumatology follow up 9/25/2020.    (I10) Essential hypertension  Comment: multiple medication changes have been made since tcu admission with slight improvement. Hydralazine was increased 9/4/2020. Lisinopril was decreased and HCTZ was started, per consultation with Nephrology, 8/24/2020  Plan: continue hydralazine, HCTZ, lisinopril, diltiazem, metoprolol. Monitor VS. Nephrology follow up 9/24/2020.     (I48.0) PAF (paroxysmal atrial fibrillation) (H)  Comment: rate controlled   Plan: continue apixaban, metoprolol, diltiazem. Monitor VS     (N18.3) CKD (chronic kidney disease) stage 3, GFR 30-59 ml/min (H)  Comment: renal function is back to baseline with creatinine 1.19 on 8/31/2020.   Plan: BMP 9/10/2020. Avoid nephrotoxins. Follow up with Dr Chaudhary 9/24/2020    (E43) Severe protein-calorie malnutrition (H)  (R63.4) Weight loss  Comment: appetite has improved, per her report, but weight is down 9-10 lbs from admission.   Plan: discussed with dietician who will meet with patient to discuss food preferences. Continue  supplements.     (U07.1) Clinical diagnosis of COVID-19  Comment: tested positive on facility surveillance on 8/26/2020. Remains afebrile and appears asymptomatic   Plan: monitor VS, symptoms     (R53.81) Physical deconditioning  Comment: better progress in therapies this week  Plan: continue PHYSICAL THERAPY/OT. Goal is to return to her [facility] with higher level of services.       Electronically signed by:  DHAVAL Aranda CNP

## 2020-09-09 ENCOUNTER — NURSING HOME VISIT (OUTPATIENT)
Dept: GERIATRICS | Facility: CLINIC | Age: 85
End: 2020-09-09
Payer: MEDICARE

## 2020-09-09 VITALS
TEMPERATURE: 98.6 F | OXYGEN SATURATION: 95 % | HEIGHT: 61 IN | HEART RATE: 66 BPM | DIASTOLIC BLOOD PRESSURE: 67 MMHG | BODY MASS INDEX: 24.62 KG/M2 | RESPIRATION RATE: 16 BRPM | WEIGHT: 130.4 LBS | SYSTOLIC BLOOD PRESSURE: 163 MMHG

## 2020-09-09 DIAGNOSIS — R63.4 WEIGHT LOSS: ICD-10-CM

## 2020-09-09 DIAGNOSIS — U07.1 CLINICAL DIAGNOSIS OF COVID-19: ICD-10-CM

## 2020-09-09 DIAGNOSIS — I48.0 PAF (PAROXYSMAL ATRIAL FIBRILLATION) (H): ICD-10-CM

## 2020-09-09 DIAGNOSIS — M19.90 INFLAMMATORY ARTHRITIS: ICD-10-CM

## 2020-09-09 DIAGNOSIS — D64.9 ANEMIA, UNSPECIFIED TYPE: Primary | ICD-10-CM

## 2020-09-09 DIAGNOSIS — N18.30 CKD (CHRONIC KIDNEY DISEASE) STAGE 3, GFR 30-59 ML/MIN (H): ICD-10-CM

## 2020-09-09 DIAGNOSIS — I10 ESSENTIAL HYPERTENSION: ICD-10-CM

## 2020-09-09 DIAGNOSIS — R53.81 PHYSICAL DECONDITIONING: ICD-10-CM

## 2020-09-09 DIAGNOSIS — E43 SEVERE PROTEIN-CALORIE MALNUTRITION (H): ICD-10-CM

## 2020-09-09 PROCEDURE — 99309 SBSQ NF CARE MODERATE MDM 30: CPT | Performed by: NURSE PRACTITIONER

## 2020-09-10 ENCOUNTER — HOSPITAL LABORATORY (OUTPATIENT)
Dept: OTHER | Facility: CLINIC | Age: 85
End: 2020-09-10

## 2020-09-10 LAB
ANION GAP SERPL CALCULATED.3IONS-SCNC: 6 MMOL/L (ref 3–14)
BUN SERPL-MCNC: 40 MG/DL (ref 7–30)
CALCIUM SERPL-MCNC: 8.5 MG/DL (ref 8.5–10.1)
CHLORIDE SERPL-SCNC: 115 MMOL/L (ref 94–109)
CO2 SERPL-SCNC: 20 MMOL/L (ref 20–32)
CREAT SERPL-MCNC: 1.42 MG/DL (ref 0.52–1.04)
GFR SERPL CREATININE-BSD FRML MDRD: 34 ML/MIN/{1.73_M2}
GLUCOSE SERPL-MCNC: 74 MG/DL (ref 70–99)
HGB BLD-MCNC: 8.8 G/DL (ref 11.7–15.7)
POTASSIUM SERPL-SCNC: 3.6 MMOL/L (ref 3.4–5.3)
SODIUM SERPL-SCNC: 141 MMOL/L (ref 133–144)

## 2020-09-11 LAB
SARS-COV-2 RNA SPEC QL NAA+PROBE: NOT DETECTED
SPECIMEN SOURCE: NORMAL

## 2020-09-14 ENCOUNTER — DISCHARGE SUMMARY NURSING HOME (OUTPATIENT)
Dept: GERIATRICS | Facility: CLINIC | Age: 85
End: 2020-09-14
Payer: MEDICARE

## 2020-09-14 ENCOUNTER — HOSPITAL LABORATORY (OUTPATIENT)
Dept: OTHER | Facility: CLINIC | Age: 85
End: 2020-09-14

## 2020-09-14 VITALS
BODY MASS INDEX: 24.55 KG/M2 | TEMPERATURE: 97.3 F | DIASTOLIC BLOOD PRESSURE: 67 MMHG | HEIGHT: 61 IN | SYSTOLIC BLOOD PRESSURE: 174 MMHG | OXYGEN SATURATION: 96 % | RESPIRATION RATE: 16 BRPM | HEART RATE: 58 BPM | WEIGHT: 130 LBS

## 2020-09-14 DIAGNOSIS — I48.0 PAF (PAROXYSMAL ATRIAL FIBRILLATION) (H): ICD-10-CM

## 2020-09-14 DIAGNOSIS — M19.90 INFLAMMATORY ARTHRITIS: ICD-10-CM

## 2020-09-14 DIAGNOSIS — D64.9 ANEMIA, UNSPECIFIED TYPE: Primary | ICD-10-CM

## 2020-09-14 DIAGNOSIS — E43 SEVERE PROTEIN-CALORIE MALNUTRITION (H): ICD-10-CM

## 2020-09-14 DIAGNOSIS — R63.4 WEIGHT LOSS: ICD-10-CM

## 2020-09-14 DIAGNOSIS — I10 ESSENTIAL HYPERTENSION: ICD-10-CM

## 2020-09-14 DIAGNOSIS — U07.1 CLINICAL DIAGNOSIS OF COVID-19: ICD-10-CM

## 2020-09-14 DIAGNOSIS — R53.81 PHYSICAL DECONDITIONING: ICD-10-CM

## 2020-09-14 DIAGNOSIS — N18.30 CKD (CHRONIC KIDNEY DISEASE) STAGE 3, GFR 30-59 ML/MIN (H): ICD-10-CM

## 2020-09-14 PROCEDURE — 99316 NF DSCHRG MGMT 30 MIN+: CPT | Performed by: NURSE PRACTITIONER

## 2020-09-14 ASSESSMENT — MIFFLIN-ST. JEOR: SCORE: 977.06

## 2020-09-14 NOTE — PROGRESS NOTES
[facility] DISCHARGE SUMMARY  PATIENT'S NAME: Ghislaine Walls  YOB: 1935  MEDICAL RECORD NUMBER:  [id number]  Place of Service where encounter took place[facility] [[id number]]    PRIMARY CARE PROVIDER AND CLINIC RESPONSIBLE AFTER TRANSFER:   Laurie Conteh MD, [address] / [place] [address]    Assisted Living: [facility]     Transferring providers: DHAVAL Aranda CNP, Reinaldo Bashir MD  Recent Hospitalization/ED:  [facility] stay 7/21/2020 to 7/31/2020.  Date of SNF Admission: July / 31 / 2020  Date of SNF (anticipated) Discharge: September / 16 / 2020  Discharged to: previous assisted living for [facility]  Cognitive Scores: SLUMS: 24/30 and Safety Questionnaire: 20.5/22   DME: Walker    CODE STATUS/ADVANCE DIRECTIVES DISCUSSION:  DNR / DNI   ALLERGIES: Oxybutynin and Seasonal allergies    DISCHARGE DIAGNOSIS/NURSING FACILITY COURSE:   She came to this facility 7/31/2020 for short term rehab and medical management following hospitalization after presenting to the ED 7/21/2020 with progressive weakness. Hgb was 7.8, down from her baseline around 9. FOBT was negative in the ED. Hgb dropped to 6.7. GI consulted and she underwent EGD, which showed a moderate hiatal hernia and duodenal erosions. Colonoscopy revealed a medium transverse colon polyp that was resected. No source of active bleeding was found. She received 1 unit of PRBC on 7/23/2020 for Hgb 6.7. Iron studies suggestive of anemia of chronic disease. SPEP and UPEP were abnormal. Hematology consulted and she underwent bone marrow biopsy. She was noted to have pain and inflammation of her knees and hands. CRP elevated at 110.0, rheumatoid factor 42 and GWEN positive. Ortho consulted and ordered prednisone for suspected inflammatory arthritis.     She has worked with PHYSICAL THERAPY and OT with fairly good progress, somewhat  limited by lack of motivation. Ambulating up to 160 ft with walker and stand by assist. Requires stand by to min assist of 1 with cares. TUG 41 seconds, indicating high fall risk.   ASSESSMENT / PLAN:  (D64.9) Anemia, unspecified type  (primary encounter diagnosis)  Comment: Hgb 8.8 on 9/10/2020, close to baseline. She saw Dr Oh 8/21/2020 and received a dose of Aranesp in the clinic. Bone marrow biopsy report indicates a marked increase in storage iron and rare sideroblast, consistent with anemia of chronic disease. No s/s of active bleeding. She has reached max potential in therapies and is medically stable for discharge.   Plan: discharge back to her AL apt with increased services. Home care services and follow up appts as below.     (M19.90) Inflammatory arthritis  Comment: joint pain and mobility much improved. Denies pain of any type today.    Plan: continue prednisone 5 mg daily. Rheumatology follow up as scheduled     (I10) Essential hypertension  Comment: difficult to manage and multiple medication changes have been made with slight improvement (see below re: med changes). Recent BPs: 132/55, 174/67, 167/63   Plan: continue metoprolol, lisinopril, HCTZ, hydralazine, diltiazem. Follow up with Nephrology as scheduled.     (I48.0) PAF (paroxysmal atrial fibrillation) (H)  Comment: rate controlled: 58-71. Apixaban dose was decreased while inpatient due to CKD, anemia and advanced age  Plan: continue apixaban, metoprolol, diltiazem.     (N18.3) CKD (chronic kidney disease) stage 3, GFR 30-59 ml/min (H)  Comment: creatinine up slightly at 1.42 on 9/10/2020   Plan: follow up labs per PCP/Nephrology. Avoid nephrotoxins. Encourage fluids. Nephrology follow up 9/24/2020    (E43) Severe protein-calorie malnutrition (H)  (R63.4) Weight loss  Comment: appetite has improved, though her weight is down 9-10 lbs. No swallow issues, no GI symptoms   Plan: continue nutritional supplements. Appetite may  back in  her home environment.     (U07.1) Clinical diagnosis of COVID-19  Comment: she tested positive on facility surveillance 8/26/2020. Remained asymptomatic. Repeat test done 9/10/2020 was negative   Plan: follow symptoms     (R53.81) Physical deconditioning  Comment: progress in therapies as above   Plan: home therapies for ongoing gait training, strengthening and ADL safety       Past Medical History:  has a past medical history of Atrial fibrillation (H) (new 10/19), [place] (hard of hearing), Hyperlipidaemia, Hyperlipidaemia LDL goal < 130, Hypertension, Hypothyroid, PAD (peripheral artery disease) (H), Renal insufficiency, mild, and Uncontrolled hypertension (10/14/2019). She also has no past medical history of Sleep apnea.    Discharge Medications:    Current Outpatient Medications   Medication Sig Dispense Refill     acetaminophen (TYLENOL) 500 MG tablet Take 1,000 mg by mouth 3 times daily       allopurinol (ZYLOPRIM) 100 MG tablet Take 1 tablet (100 mg) by mouth daily 90 tablet 0     apixaban ANTICOAGULANT (ELIQUIS) 2.5 MG tablet Take 1 tablet (2.5 mg) by mouth 2 times daily       diltiazem ER (DILT-XR) 180 MG 24 hr capsule Take 1 capsule (180 mg) by mouth daily       hydrALAZINE (APRESOLINE) 50 MG tablet Take 100 mg by mouth 3 times daily        hydrochlorothiazide (HYDRODIURIL) 12.5 MG tablet Take 1 tablet (12.5 mg) by mouth daily       levothyroxine (SYNTHROID/LEVOTHROID) 25 MCG tablet Take 1 tablet (25 mcg) by mouth daily 90 tablet 2     lisinopril (ZESTRIL) 20 MG tablet Take 1 tablet (20 mg) by mouth daily       metoprolol succinate ER (TOPROL-XL) 100 MG 24 hr tablet Take 100 mg by mouth daily       pantoprazole (PROTONIX) 40 MG EC tablet Take 1 tablet (40 mg) by mouth every morning (before breakfast)       predniSONE (DELTASONE) 5 MG tablet Take 5 mg by mouth daily       senna-docusate (SENOKOT-S/PERICOLACE) 8.6-50 MG tablet Take 2 tablets by mouth daily       traMADol (ULTRAM) 50 MG tablet Take 1 tablet (50  "mg) by mouth every 4 hours as needed for moderate pain 30 tablet 0       Medication Changes/Rationale:     Hydralazine increased    Diltiazem increased    Lisinopril dose increased from 20 mg to 40 mg daily    HCTZ started and lisinopril decreased back to 20 mg daily per consultation with Nephrology    Aranesp given 8/21/2020 per Oncology     Bowel regimen adjusted     Controlled medications sent with patient:   Medication: tramadol  , 20 tabs given to patient at the time of discharge to take home     ROS:   4 point ROS including Respiratory, CV, GI and , other than that noted in the HPI,  is negative    Physical Exam:   Vitals: BP (!) 174/67   Pulse 58   Temp 97.3  F (36.3  C)   Resp 16   Ht 1.549 m (5' 1\")   Wt 59 kg (130 lb)   SpO2 96%   BMI 24.56 kg/m    BMI= Body mass index is 24.56 kg/m .  Limited exam due to COVID-19 precautions   GENERAL APPEARANCE:  Alert, in no distress, frail  ENT:  [place] [facility]  EYES:  EOM normal, conjunctiva and lids normal  RESP:  no respiratory distress  CV:  no edema  M/S:   in bed. DIAS with good strength. No acute joint inflammation  SKIN:  no visible rashes or open areas  PSYCH:  oriented X 3, memory impaired , affect and mood normal    SNF labs: Labs done in SNF are in [facility]. Please refer to them using [facility]/Care Everywhere.      DISCHARGE PLAN:    Follow up labs: per PCP and/or Nephrology     Medical Follow Up:      Follow up with primary care provider within 1-2 weeks   Follow up with Nephrology, Dr Chaudhary, 9/24/2020   Follow up with Rheumatology 9/25/2020    Follow up with Dr Oh as scheduled     MTM referral needed and placed by this provider: No    Discharge Services: Home Care:  Occupational Therapy, Physical Therapy, Registered Nurse, Home Health Aide and From:  [facility]    Discharge Instructions Verbalized to Patient at Discharge:     Continue nutritional supplements    Drink plenty of fluids       TOTAL DISCHARGE TIME:   Greater than 30 " minutes  Electronically signed by:  DHAVAL Aranda CNP

## 2020-09-15 LAB
SARS-COV-2 RNA SPEC QL NAA+PROBE: NOT DETECTED
SPECIMEN SOURCE: NORMAL

## 2020-09-15 RX ORDER — ALLOPURINOL 100 MG/1
100 TABLET ORAL DAILY
Qty: 30 TABLET | Refills: 0 | Status: SHIPPED | OUTPATIENT
Start: 2020-09-15 | End: 2020-10-28

## 2020-09-15 RX ORDER — DILTIAZEM HYDROCHLORIDE 180 MG/1
180 CAPSULE, EXTENDED RELEASE ORAL DAILY
Qty: 30 CAPSULE | Refills: 0 | Status: SHIPPED | OUTPATIENT
Start: 2020-09-15 | End: 2020-10-13

## 2020-09-15 RX ORDER — PANTOPRAZOLE SODIUM 40 MG/1
40 TABLET, DELAYED RELEASE ORAL
Qty: 30 TABLET | Refills: 0 | Status: SHIPPED | OUTPATIENT
Start: 2020-09-15 | End: 2020-10-13

## 2020-09-15 RX ORDER — HYDROCHLOROTHIAZIDE 12.5 MG/1
12.5 TABLET ORAL DAILY
Qty: 30 TABLET | Refills: 0 | Status: SHIPPED | OUTPATIENT
Start: 2020-09-15 | End: 2020-10-13

## 2020-09-15 RX ORDER — HYDRALAZINE HYDROCHLORIDE 100 MG/1
100 TABLET, FILM COATED ORAL 3 TIMES DAILY
COMMUNITY
End: 2020-09-15

## 2020-09-15 RX ORDER — HYDRALAZINE HYDROCHLORIDE 100 MG/1
100 TABLET, FILM COATED ORAL 3 TIMES DAILY
Qty: 90 TABLET | Refills: 0 | Status: SHIPPED | OUTPATIENT
Start: 2020-09-15 | End: 2020-10-28

## 2020-09-15 RX ORDER — PREDNISONE 5 MG/1
5 TABLET ORAL DAILY
Qty: 30 TABLET | Refills: 0 | Status: SHIPPED | OUTPATIENT
Start: 2020-09-15 | End: 2020-10-13

## 2020-09-15 RX ORDER — LISINOPRIL 20 MG/1
20 TABLET ORAL DAILY
Qty: 30 TABLET | Refills: 0 | Status: SHIPPED | OUTPATIENT
Start: 2020-09-15 | End: 2020-10-07 | Stop reason: ALTCHOICE

## 2020-09-15 RX ORDER — METOPROLOL SUCCINATE 100 MG/1
100 TABLET, EXTENDED RELEASE ORAL DAILY
Qty: 30 TABLET | Refills: 0 | Status: SHIPPED | OUTPATIENT
Start: 2020-09-15 | End: 2020-10-28

## 2020-09-15 NOTE — PATIENT INSTRUCTIONS
[facility] Discharge Orders    Name: Ghislaine Walls  : 1935  Planned Discharge Date: 2020  Discharged to: [facility] [address]     MEDICAL FOLLOW UP  Follow up with Dr Conteh within 1-2 weeks   Follow up with Specialists:   Dr Oh as scheduled  Nephrology 2020  Rheumatology 2020      ORDER CHANGES:  Multiple medication changes were made while you were in the hospital and on the tcu. Continue medications as noted below and follow up with your medical providers.     DISCHARGE MEDICATIONS:  The following medications were ordered from your pharmacy: pantoprazole, prednisone, apixaban, lisinopril, diltiazem, hydralazine, metoprolol, HCTZ and allopurinol.   [facility] on [address] [phone]   At discharge, the facility may send patient's remaining supply of tramadol.    Current Outpatient Medications   Medication Sig Dispense Refill     allopurinol (ZYLOPRIM) 100 MG tablet Take 1 tablet (100 mg) by mouth daily 30 tablet 0     apixaban ANTICOAGULANT (ELIQUIS) 2.5 MG tablet Take 1 tablet (2.5 mg) by mouth 2 times daily 60 tablet 0     diltiazem ER (DILT-XR) 180 MG 24 hr capsule Take 1 capsule (180 mg) by mouth daily 30 capsule 0     hydrALAZINE (APRESOLINE) 100 MG tablet Take 1 tablet (100 mg) by mouth 3 times daily 90 tablet 0     hydrochlorothiazide (HYDRODIURIL) 12.5 MG tablet Take 1 tablet (12.5 mg) by mouth daily 30 tablet 0     lisinopril (ZESTRIL) 20 MG tablet Take 1 tablet (20 mg) by mouth daily 30 tablet 0     metoprolol succinate ER (TOPROL-XL) 100 MG 24 hr tablet Take 1 tablet (100 mg) by mouth daily 30 tablet 0     pantoprazole (PROTONIX) 40 MG EC tablet Take 1 tablet (40 mg) by mouth every morning (before breakfast) 30 tablet 0     predniSONE (DELTASONE) 5 MG tablet Take 1 tablet (5 mg) by mouth daily 30 tablet 0     acetaminophen (TYLENOL) 500 MG tablet Take 1,000 mg by mouth 3 times daily       levothyroxine (SYNTHROID/LEVOTHROID) 25 MCG  tablet Take 1 tablet (25 mcg) by mouth daily       senna-docusate (SENOKOT-S/PERICOLACE) 8.6-50 MG tablet Take 2 tablets by mouth daily       traMADol (ULTRAM) 50 MG tablet Take 1 tablet (50 mg) by mouth every 4 hours as needed for moderate pain         SERVICES:  Home Care:  Occupational Therapy, Physical Therapy, Registered Nurse and Home Health Aide    ADDITIONAL INSTRUCTIONS:    Continue nutritional supplements    Drink plenty of fluids       DHAVAL Aranda CNP  This document was electronically signed on September 15, 2020

## 2020-09-21 ENCOUNTER — PATIENT OUTREACH (OUTPATIENT)
Dept: NURSING | Facility: CLINIC | Age: 85
End: 2020-09-21
Payer: MEDICARE

## 2020-09-21 ENCOUNTER — TELEPHONE (OUTPATIENT)
Dept: FAMILY MEDICINE | Facility: CLINIC | Age: 85
End: 2020-09-21

## 2020-09-21 DIAGNOSIS — D64.9 ANEMIA: Primary | ICD-10-CM

## 2020-09-21 SDOH — ECONOMIC STABILITY: FOOD INSECURITY: WITHIN THE PAST 12 MONTHS, YOU WORRIED THAT YOUR FOOD WOULD RUN OUT BEFORE YOU GOT MONEY TO BUY MORE.: NEVER TRUE

## 2020-09-21 SDOH — HEALTH STABILITY: PHYSICAL HEALTH: ON AVERAGE, HOW MANY DAYS PER WEEK DO YOU ENGAGE IN MODERATE TO STRENUOUS EXERCISE (LIKE A BRISK WALK)?: 0 DAYS

## 2020-09-21 SDOH — SOCIAL STABILITY: SOCIAL NETWORK
DO YOU BELONG TO ANY CLUBS OR ORGANIZATIONS SUCH AS CHURCH GROUPS UNIONS, FRATERNAL OR ATHLETIC GROUPS, OR SCHOOL GROUPS?: NO

## 2020-09-21 SDOH — SOCIAL STABILITY: SOCIAL NETWORK: IN A TYPICAL WEEK, HOW MANY TIMES DO YOU TALK ON THE PHONE WITH FAMILY, FRIENDS, OR NEIGHBORS?: TWICE A WEEK

## 2020-09-21 SDOH — ECONOMIC STABILITY: TRANSPORTATION INSECURITY
IN THE PAST 12 MONTHS, HAS LACK OF TRANSPORTATION KEPT YOU FROM MEETINGS, WORK, OR FROM GETTING THINGS NEEDED FOR DAILY LIVING?: NO

## 2020-09-21 SDOH — HEALTH STABILITY: PHYSICAL HEALTH: ON AVERAGE, HOW MANY MINUTES DO YOU ENGAGE IN EXERCISE AT THIS LEVEL?: 0 MIN

## 2020-09-21 SDOH — ECONOMIC STABILITY: FOOD INSECURITY: WITHIN THE PAST 12 MONTHS, THE FOOD YOU BOUGHT JUST DIDN'T LAST AND YOU DIDN'T HAVE MONEY TO GET MORE.: NEVER TRUE

## 2020-09-21 SDOH — SOCIAL STABILITY: SOCIAL NETWORK: HOW OFTEN DO YOU ATTEND CHURCH OR RELIGIOUS SERVICES?: NEVER

## 2020-09-21 SDOH — ECONOMIC STABILITY: INCOME INSECURITY: HOW HARD IS IT FOR YOU TO PAY FOR THE VERY BASICS LIKE FOOD, HOUSING, MEDICAL CARE, AND HEATING?: NOT HARD AT ALL

## 2020-09-21 SDOH — SOCIAL STABILITY: SOCIAL NETWORK: HOW OFTEN DO YOU ATTENT MEETINGS OF THE CLUB OR ORGANIZATION YOU BELONG TO?: NEVER

## 2020-09-21 SDOH — SOCIAL STABILITY: SOCIAL NETWORK: HOW OFTEN DO YOU GET TOGETHER WITH FRIENDS OR RELATIVES?: TWICE A WEEK

## 2020-09-21 SDOH — ECONOMIC STABILITY: TRANSPORTATION INSECURITY
IN THE PAST 12 MONTHS, HAS THE LACK OF TRANSPORTATION KEPT YOU FROM MEDICAL APPOINTMENTS OR FROM GETTING MEDICATIONS?: NO

## 2020-09-21 NOTE — TELEPHONE ENCOUNTER
Reason for Call: Request for an order or referral:    Order or referral being requested: Received an Order request for a Bed for the patient   She needsa F2F visit for    Called the patient to schedule and she said she does not believe that she needs one that she is now in ASL and that she gets in and out of bed to go the BR just fine and that she repositions herself just fine as well.  Patient also mentioned that they were trying to get her a Geriatric physician that would come to the facility so she would not have to come to the clinic any more.    Has the patient been seen by the PCP for this problem? Last OV was 3/2/2020    Additional comments: Documented on request and faxed it back to [facility]  Fax# [phone]    Call taken on 9/21/2020 at 10:21 AM by Maria Fernanda Vanessa

## 2020-09-21 NOTE — TELEPHONE ENCOUNTER
Reason for Call:  Form, our goal is to have forms completed with 72 hours, however, some forms may require a visit or additional information.    Type of letter, form or note:  medical    Who is the form from?: Home care    Where did the form come from: form was faxed in    What clinic location was the form placed at?: [facility]    Where the form was placed: [facility] Box/Folder    What number is listed as a contact on the form?: [phone] (P) [phone]       Additional comments: [facility]: fall report     Call taken on 9/21/2020 at 11:30 AM by Candi Ricks

## 2020-09-21 NOTE — LETTER
[facility]  Complex Care Plan  About Me:    Patient Name:  Ghislaine Walls    YOB: 1935  Age:         85 year old   [place] MRN:    [id number] Telephone Information:  Home Phone [phone]   Mobile [phone]       Address:  [address]  [facility] [address] Email address:  No e-mail address on record      Emergency Contact(s)    Name Relationship Lgl Grd Work Phone Home Phone Mobile Phone   1. TDA WALLS Daughter  [phone]    2. PETRA WALLS Son    [phone]           Primary language:  English     needed? No   [place] [employer]:  [phone] op. 1  Other communication barriers: None  Preferred Method of Communication:  Mail  Current living arrangement: I live alone  Mobility Status/ Medical Equipment: Independent w/Device    Health Maintenance  Health Maintenance Reviewed: Not assessed    My Access Plan  Medical Emergency 911   Primary Clinic Line [facility] - [phone]   24 Hour Appointment Line [phone] or  [phone]) (toll-free)   24 Hour Nurse Line [phone] (toll-free)   Preferred Urgent Care [facility], [phone]   Preferred Hospital [facility]  [phone]   Preferred Pharmacy [id number] [place], [place] - [address] AT [address] [place] [address]     Behavioral Health Crisis Line The National Suicide Prevention Lifeline at 1-555.997.2440 or 911       My Care Team Members  Patient Care Team       Relationship Specialty Notifications Start End    Laurie Conteh MD PCP - General Internal Medicine  2/10/20     Phone: [phone] Fax: [phone] [address] [place] [address]    Donna Marie, RN Lead Care Coordinator Primary Care - CC Admissions 8/3/20     Phone: [phone]         Nakita Mcrae APRN CNP Assigned PCP   8/30/20     Phone: [phone] Fax: [phone]          [address] [facility] [address] [facility] [address]            My Care Plans  Self Management and Treatment Plan  Goals and (Comments)  Goals        General    Functional (pt-stated)     Notes - Note created  9/21/2020  3:25 PM by Donna Marie, MOJGAN    Goal Statement: I will improve my strength in the next 1-2 months   Date Goal set: 9/21/2020  Barriers: Deconditioned  Strengths: Home care services in place  Date to Achieve By: 11/21/2020  Patient expressed understanding of goal: Yes  Action steps to achieve this goal:  1. I will keep future home care visits   2. I will use my walker for safety  3. Care coordinator venu to support patient home care staff for any needs             Action Plans on File: None    Advance Care Plans/Directives Type:            My Medical and Care Information  Problem List   Patient Active Problem List   Diagnosis     Hyperlipidemia LDL goal <130     Hypertension goal BP (blood pressure) < 140/90     [place] (hard of hearing)     Urgency incontinence     Other specified hypothyroidism     History of CVA (cerebrovascular accident)     CKD (chronic kidney disease) stage 3, GFR 30-59 ml/min (H)     Gouty arthritis     Lymphedema     Anemia of chronic renal failure, stage 3 (moderate) (H)     Weight loss, abnormal     Physical deconditioning     Pressure injury of buttock, stage 2, unspecified laterality (H)     Fatigue, unspecified type     Generalized muscle weakness     Demand ischemia (H)     Essential hypertension     Chronic diastolic heart failure (H)     Debility     Chronic heart failure with preserved ejection fraction (H)     Lymphedema of both lower extremities     Atrial fibrillation (H)     Cognitive impairment     Chronic pain of both shoulders     PAD (peripheral artery disease) (H)     Hypothyroid     Generalized weakness     Conductive hearing loss, bilateral     Chills     Inflammatory polyarthropathy (H)     Accelerated hypertension      Current Medications and Allergies:    Current  Outpatient Medications   Medication     acetaminophen (TYLENOL) 500 MG tablet     allopurinol (ZYLOPRIM) 100 MG tablet     apixaban ANTICOAGULANT (ELIQUIS) 2.5 MG tablet     diltiazem ER (DILT-XR) 180 MG 24 hr capsule     hydrALAZINE (APRESOLINE) 100 MG tablet     hydrochlorothiazide (HYDRODIURIL) 12.5 MG tablet     levothyroxine (SYNTHROID/LEVOTHROID) 25 MCG tablet     lisinopril (ZESTRIL) 20 MG tablet     metoprolol succinate ER (TOPROL-XL) 100 MG 24 hr tablet     pantoprazole (PROTONIX) 40 MG EC tablet     predniSONE (DELTASONE) 5 MG tablet     senna-docusate (SENOKOT-S/PERICOLACE) 8.6-50 MG tablet     traMADol (ULTRAM) 50 MG tablet     No current facility-administered medications for this visit.        Care Coordination Start Date: 9/21/2020   Frequency of Care Coordination: 2 weeks   Form Last Updated: 09/21/2020

## 2020-09-21 NOTE — PROGRESS NOTES
Clinic Care Coordination Contact  Care Coordination Communication         Clinical Data:   Date of Admission:      7/21/2020  Date of Discharge:      7/31/2020  Admitting Physician:   Rodo Marcelino MD  Discharge Physician:  Ganga Mccullough MD     Primary Provider: Laurie Conteh  Primary Care Physician Phone Number: [phone]         Discharge Diagnoses:   1. Anemia - suspect anemia of chronic disease component (suspect inflammatory arthritis) +/- iron deficiency component; other causes not ruled out.  2. Abnormal SPEP/UPEP, possibly MGUS, other causes not ruled out.  3. 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  4. DRAGAN (suspect prerenal due to poor PO intake) on CKD stage III.  5. Non-anion gap metabolic acidosis, likely due to CKD.  Generalized weakness, multifactorial, related to above issues.  6. Fever, suspect related to inflammatory arthritis.  7. Severe protein-calorie malnutrition in context of chronic illness.  8. Hiatal hernia by EGD.    Home Care Contact:              Home Care Agency: [facility]               Contact name () and phone number: [phone]              Care Coordination contacted home care: Yes              Anticipated start of care date: BD    Patient Contact:               Introduced self and role of care coordination.               Discharge instructions were reviewed with patient/caregiver.               Do you have any questions about your medications? No              Follow up appointment is scheduled for No Patient will call the clinic and set up a telephone visit .              Provided 24 Hour Nurse Line and/or 24 Hour Appointment Scheduling: Yes              Home care has contacted patient: Yes              Patient questions/concerns: Patient reports she has had 2 home care visits.  Patient has lived in the [facility] since last October and her meals are delivered .  Patient has a supportive daughter close by   Patient uses the cab for  appointments.  CC encouraged patient to fill out a [employer] application with home care staff  Patient is hesitant due to the [employer] mobility makes several stops to  others     Plan: RN/SW Care Coordinator will await notification from home care staff informing RN/SW Care Coordinator of patients discharge plans/needs. RN/SW Care Coordinator will review chart and outreach to home care every 4 weeks and as needed.      [facility]     Donna Marie  RN Care Coordinator   [facility]   Phone: [phone]  Email :  [email or web address]

## 2020-09-21 NOTE — LETTER
[place] CARE COORDINATION    September 21, 2020    Ghislaine Walls  [address]  [facility] [address]      Dear Ghislaine,    I am a clinic care coordinator who works with Laurie Conteh MD at [facility] . I wanted to thank you for spending the time to talk with me.  Below is a description of clinic care coordination and how I can further assist you.      The clinic care coordination team is made up of a registered nurse,  and community health worker who understand the health care system. The goal of clinic care coordination is to help you manage your health and improve access to the health care system in the most efficient manner. The team can assist you in meeting your health care goals by providing education, coordinating services, strengthening the communication among your providers and supporting you with any resource needs.    Please feel free to contact me at [phone] with any questions or concerns. We are focused on providing you with the highest-quality healthcare experience possible and that all starts with you.     Sincerely,     [facility]     Donna Marie RN Care Coordinator   [facility]   Phone: [phone]  Email :  [email or web address]      Enclosed: I have enclosed a copy of the Complex Care Plan. This has helpful information and goals that we have talked about. Please keep this in an easy to access place to use as needed.

## 2020-09-25 ENCOUNTER — TRANSFERRED RECORDS (OUTPATIENT)
Dept: HEALTH INFORMATION MANAGEMENT | Facility: CLINIC | Age: 85
End: 2020-09-25

## 2020-09-30 ENCOUNTER — TRANSFERRED RECORDS (OUTPATIENT)
Dept: HEALTH INFORMATION MANAGEMENT | Facility: CLINIC | Age: 85
End: 2020-09-30

## 2020-10-06 ENCOUNTER — TELEPHONE (OUTPATIENT)
Dept: OTHER | Facility: CLINIC | Age: 85
End: 2020-10-06

## 2020-10-06 NOTE — TELEPHONE ENCOUNTER
2 attempts have been made to reach Ghislaine to schedule her 1 yr follow up with Dr. Parker. No further attempts will be made.    Attempts:   1st LS 2/14/2020  2nd LS 3/12/2020    Roxi MCKINLEY

## 2020-10-07 ENCOUNTER — TELEPHONE (OUTPATIENT)
Dept: FAMILY MEDICINE | Facility: CLINIC | Age: 85
End: 2020-10-07

## 2020-10-07 ENCOUNTER — VIRTUAL VISIT (OUTPATIENT)
Dept: FAMILY MEDICINE | Facility: CLINIC | Age: 85
End: 2020-10-07
Payer: MEDICARE

## 2020-10-07 DIAGNOSIS — N17.9 ACUTE KIDNEY INJURY (H): ICD-10-CM

## 2020-10-07 DIAGNOSIS — N18.4 CKD (CHRONIC KIDNEY DISEASE) STAGE 4, GFR 15-29 ML/MIN (H): Primary | ICD-10-CM

## 2020-10-07 DIAGNOSIS — E55.9 VITAMIN D DEFICIENCY: ICD-10-CM

## 2020-10-07 DIAGNOSIS — I10 ESSENTIAL HYPERTENSION: ICD-10-CM

## 2020-10-07 DIAGNOSIS — I48.0 PAROXYSMAL ATRIAL FIBRILLATION (H): ICD-10-CM

## 2020-10-07 PROCEDURE — 99443 PR PHYSICIAN TELEPHONE EVALUATION 21-30 MIN: CPT | Mod: 95 | Performed by: INTERNAL MEDICINE

## 2020-10-07 RX ORDER — AMLODIPINE BESYLATE 5 MG/1
5 TABLET ORAL DAILY
Qty: 90 TABLET | Refills: 3 | Status: ON HOLD | OUTPATIENT
Start: 2020-10-07 | End: 2021-02-25

## 2020-10-07 RX ORDER — SULFASALAZINE 500 MG/1
TABLET ORAL
COMMUNITY
Start: 2020-09-27 | End: 2021-03-15

## 2020-10-07 RX ORDER — AMLODIPINE BESYLATE 5 MG/1
5 TABLET ORAL DAILY
Qty: 90 TABLET | Refills: 3 | Status: SHIPPED | OUTPATIENT
Start: 2020-10-07 | End: 2020-10-07

## 2020-10-07 NOTE — TELEPHONE ENCOUNTER
Spoke to home care nurse.  She will draw Vit.D and BMP.    Rachna Rogers RN on 10/7/2020 at 5:13 PM

## 2020-10-07 NOTE — TELEPHONE ENCOUNTER
Reason for Call:  Other c2c    Detailed comments:  Krupa wants to know if someone can call the to get a verbal consent from pt so she can join the 1p appt  ? Theres no c2c in chart     Phone Number Patient can be reached at: Home number on file   ([phone] home)    Best Time: any    Can we leave a detailed message on this number? YES    Call taken on 10/7/2020 at 12:20 PM by Ronny Hanson

## 2020-10-07 NOTE — TELEPHONE ENCOUNTER
Reason for Call:  Home Health Care    [facility] with  Homecare called regarding (reason for call):     Orders are needed for this patient.     PT:     OT:     Skilled Nursing: Creatinine and Vit D  Need approval to draw these and any other labs needed by Dr Vallejo Provider: Wero    Phone Number Homecare Nurse can be reached at: [phone]    Can we leave a detailed message on this number? YES    Phone number patient can be reached at:     Best Time: any    Call taken on 10/7/2020 at 4:50 PM by Jaxon Grullon

## 2020-10-07 NOTE — PROGRESS NOTES
"Ghislaine Walls is a 85 year old female who is being evaluated via a billable telephone visit.      The patient has been notified of following:     \"This telephone visit will be conducted via a call between you and your physician/provider. We have found that certain health care needs can be provided without the need for a physical exam.  This service lets us provide the care you need with a short phone conversation.  If a prescription is necessary we can send it directly to your pharmacy.  If lab work is needed we can place an order for that and you can then stop by our lab to have the test done at a later time.    Telephone visits are billed at different rates depending on your insurance coverage. During this emergency period, for some insurers they may be billed the same as an in-person visit.  Please reach out to your insurance provider with any questions.    If during the course of the call the physician/provider feels a telephone visit is not appropriate, you will not be charged for this service.\"    Patient has given verbal consent for Telephone visit?  Yes    What phone number would you like to be contacted at? [phone], daughter Krupa [phone] (verbal consent given by patient)    How would you like to obtain your AVS? Mail a copy    Subjective     Ghislaine Walls is a 85 year old female who presents via phone visit today for the following health issues:    HPI     Follow up on acute kidney injury  CKD stage 4       HPI:   Patient Ghislaine Walls is a very pleasant 85 year old female with history of CKD, hypertension, hyperlipidemia, atrial fibrillation today for telephone visit for follow up of multiple concerns. Regarding the patient's CKD, the patient was noted to have worsening of kidney function with elevation of Creatinine at her outpatient Rheumatology specialist clinic where her Cr was 2 several weeks ago. Patient was advised to follow up with her PCP clinic for the worsening kidney " function. Regarding the patient's hypertension in the setting of CKD, the patient's BP has been difficult to treat in the past and the patient has been followed on a routine basis by her local nephrology clinic at the [facility] nephrology clinic in [place]. Regarding the patient's chronic gout, the patient denies any acute gout flare up symptoms at this time.           Current Medications:     Current Outpatient Medications   Medication Sig Dispense Refill     allopurinol (ZYLOPRIM) 100 MG tablet Take 1 tablet (100 mg) by mouth daily 30 tablet 0     amLODIPine (NORVASC) 5 MG tablet Take 1 tablet (5 mg) by mouth daily 90 tablet 3     apixaban ANTICOAGULANT (ELIQUIS) 2.5 MG tablet Take 1 tablet (2.5 mg) by mouth 2 times daily 60 tablet 0     diltiazem ER (DILT-XR) 180 MG 24 hr capsule Take 1 capsule (180 mg) by mouth daily 30 capsule 0     hydrALAZINE (APRESOLINE) 100 MG tablet Take 1 tablet (100 mg) by mouth 3 times daily 90 tablet 0     hydrochlorothiazide (HYDRODIURIL) 12.5 MG tablet Take 1 tablet (12.5 mg) by mouth daily 30 tablet 0     levothyroxine (SYNTHROID/LEVOTHROID) 25 MCG tablet Take 1 tablet (25 mcg) by mouth daily 90 tablet 2     metoprolol succinate ER (TOPROL-XL) 100 MG 24 hr tablet Take 1 tablet (100 mg) by mouth daily 30 tablet 0     pantoprazole (PROTONIX) 40 MG EC tablet Take 1 tablet (40 mg) by mouth every morning (before breakfast) 30 tablet 0     predniSONE (DELTASONE) 5 MG tablet Take 1 tablet (5 mg) by mouth daily 30 tablet 0     sulfaSALAzine (AZULFIDINE) 500 MG tablet TAKE ONE TABLET BY MOUTH TWICE DAILY AFTER MEALS.       acetaminophen (TYLENOL) 500 MG tablet Take 1,000 mg by mouth 3 times daily       senna-docusate (SENOKOT-S/PERICOLACE) 8.6-50 MG tablet Take 2 tablets by mouth daily       traMADol (ULTRAM) 50 MG tablet Take 1 tablet (50 mg) by mouth every 4 hours as needed for moderate pain (Patient not taking: Reported on 10/7/2020) 30 tablet 0         Allergies:      Allergies    Allergen Reactions     Oxybutynin Itching     Seasonal Allergies             Past Medical History:     Past Medical History:   Diagnosis Date     Atrial fibrillation (H) new 10/19     [place] (hard of hearing)     wears hearing aids     Hyperlipidaemia      Hyperlipidaemia LDL goal < 130      Hypertension      Hypothyroid      PAD (peripheral artery disease) (H)      Renal insufficiency, mild      Uncontrolled hypertension 10/14/2019         Past Surgical History:     Past Surgical History:   Procedure Laterality Date     APPENDECTOMY  1951     BONE MARROW BIOPSY, BONE SPECIMEN, NEEDLE/TROCAR N/A 7/31/2020    Procedure: bone marrow biopsy;  Surgeon: Iris Cameron MD;  Location:  GI     CATARACT IOL, RT/LT  2001    bilateral (laser - 5/2013)     DENTAL SURGERY  1962    wisdom     ESOPHAGOSCOPY, GASTROSCOPY, DUODENOSCOPY (EGD), COMBINED N/A 7/24/2020    Procedure: ESOPHAGOGASTRODUODENOSCOPY, WITH BIOPSY;  Surgeon: Humberto Bailon MD;  Location:  GI     EYE SURGERY  1956    Muscle release     TUBAL LIGATION  1971         Family Medical History:     Family History   Problem Relation Age of Onset     Heart Disease Mother      Respiratory Mother      Heart Disease Father      Heart Disease Brother      Coronary Artery Disease Brother         CAB     Heart Disease Brother      Coronary Artery Disease Brother         CAB         Social History:     Social History     Socioeconomic History     Marital status: Single     Spouse name: Not on file     Number of children: Not on file     Years of education: Not on file     Highest education level: Not on file   Occupational History     Not on file   Social Needs     Financial resource strain: Not hard at all     Food insecurity     Worry: Never true     Inability: Never true     Transportation needs     Medical: No     Non-medical: No   Tobacco Use     Smoking status: Former Smoker     Packs/day: 0.50     Years: 5.00     Pack years: 2.50     Types: Cigarettes      Quit date: 1973     Years since quittin.8     Smokeless tobacco: Never Used   Substance and Sexual Activity     Alcohol use: Yes     Alcohol/week: 0.0 standard drinks     Comment: 1 beer on      Drug use: No     Sexual activity: Not Currently     Partners: Male   Lifestyle     Physical activity     Days per week: 0 days     Minutes per session: 0 min     Stress: Not on file   Relationships     Social connections     Talks on phone: Twice a week     Gets together: Twice a week     Attends Orthodoxy service: Never     Active member of club or organization: No     Attends meetings of clubs or organizations: Never     Relationship status: Not on file     Intimate partner violence     Fear of current or ex partner: Not on file     Emotionally abused: Not on file     Physically abused: Not on file     Forced sexual activity: Not on file   Other Topics Concern     Parent/sibling w/ CABG, MI or angioplasty before 65F 55M? No   Social History Narrative     Not on file           Review of System:     Constitutional: Negative for fever or chills  Skin: Negative for rashes  Ears/Nose/Throat: Negative for nasal congestion, sore throat  Respiratory: No shortness of breath, dyspnea on exertion, cough, or hemoptysis  Cardiovascular: Negative for chest pain  Gastrointestinal: Negative for nausea, vomiting  Rheumatologic: positive for chronic gout  Genitourinary: Negative for dysuria, hematuria, positive for recent worsening kidney function found on outside lab at the patient's local Rheumatologist specialist clinic  Musculoskeletal: Negative for myalgias  Neurologic: Negative for headaches  Psychiatric: Negative for depression, anxiety  Hematologic/Lymphatic/Immunologic: Negative  Endocrine: Negative  Behavioral: Negative for tobacco use       Physical Exam:   There were no vitals taken for this visit.    RESP: no cough over the phone  NEURO: Alert & Oriented x 3 over the phone.   PSYCH: mentation appears normal over  the phone        Diagnostic Test Results:     Lab Results   Component Value Date    WBC 3.3 10/14/2019     Lab Results   Component Value Date    RBC 3.16 10/14/2019     Lab Results   Component Value Date    HGB 9.9 10/14/2019     Lab Results   Component Value Date    HCT 31.1 10/14/2019     Lab Results   Component Value Date    MCV 98 10/14/2019     Lab Results   Component Value Date    MCH 31.3 10/14/2019     Lab Results   Component Value Date    MCHC 31.8 10/14/2019     Lab Results   Component Value Date    RDW 15.7 10/14/2019     Lab Results   Component Value Date     10/14/2019         ASSESSMENT/PLAN:   (I10) Essential hypertension  (N17.9) Acute kidney injury (H)  (N18.4) CKD (chronic kidney disease) stage 4, GFR 15-29 ml/min (H)  (primary encounter diagnosis)  Comment: worsening of kidney function with elevation of Creatinine at her outpatient Rheumatology specialist clinic where her Cr was 2 several weeks ago.  Plan: I have ordered lab for *Creatinine FUTURE anytime to be drawn by home health visiting RN this Wednesday, I have also helped the patient schedule a new Nephrology clinic appointment where the patient's appointment has been scheduled with [facility] nephrology clinic in [place] for later this month. I have also replaced the patient's current lisinopril (ZESTRIL) 20 MG tablet with new BP medication of amLODIPine (NORVASC) 5 MG tablet medication due to the acute kidney injury.      (E55.9) Vitamin D deficiency  Comment: no recent falls or acute injuries  Plan: **Vitamin D Deficiency FUTURE anytime      (I48.0) PAF (paroxysmal atrial fibrillation)   Comment: no chest pain, no acute palpitations  Plan: continue current cardiac medications including warfarin long term anticoagulation therapy      Follow Up Plan:     Patient is instructed to return to Internal Medicine clinic for follow-up visit in 1month.      Phone call duration:  30 minutes      Laurie Conteh MD  Internal  [facility]

## 2020-10-08 LAB
ANION GAP SERPL CALCULATED.3IONS-SCNC: 5 MMOL/L (ref 3–14)
BUN SERPL-MCNC: 37 MG/DL (ref 7–30)
CALCIUM SERPL-MCNC: 8.7 MG/DL (ref 8.5–10.1)
CHLORIDE SERPL-SCNC: 111 MMOL/L (ref 94–109)
CO2 SERPL-SCNC: 21 MMOL/L (ref 20–32)
CREAT SERPL-MCNC: 1.81 MG/DL (ref 0.52–1.04)
DEPRECATED CALCIDIOL+CALCIFEROL SERPL-MC: 30 UG/L (ref 20–75)
GFR SERPL CREATININE-BSD FRML MDRD: 25 ML/MIN/{1.73_M2}
GLUCOSE SERPL-MCNC: 129 MG/DL (ref 70–99)
POTASSIUM SERPL-SCNC: 4.5 MMOL/L (ref 3.4–5.3)
SODIUM SERPL-SCNC: 137 MMOL/L (ref 133–144)

## 2020-10-08 PROCEDURE — 82306 VITAMIN D 25 HYDROXY: CPT | Performed by: INTERNAL MEDICINE

## 2020-10-08 PROCEDURE — 80048 BASIC METABOLIC PNL TOTAL CA: CPT | Performed by: INTERNAL MEDICINE

## 2020-10-12 DIAGNOSIS — M19.90 INFLAMMATORY ARTHRITIS: ICD-10-CM

## 2020-10-12 DIAGNOSIS — K21.00 GASTROESOPHAGEAL REFLUX DISEASE WITH ESOPHAGITIS, UNSPECIFIED WHETHER HEMORRHAGE: Primary | ICD-10-CM

## 2020-10-12 DIAGNOSIS — I10 ESSENTIAL HYPERTENSION: ICD-10-CM

## 2020-10-12 DIAGNOSIS — I48.0 PAF (PAROXYSMAL ATRIAL FIBRILLATION) (H): ICD-10-CM

## 2020-10-12 NOTE — TELEPHONE ENCOUNTER
Last dispensed from TCU discharge for ONE month, further refills back to PCP Dr Wero PEMBERTON 10-7-20 Wero, follow up one month  No future OV scheduled    Elvi Miller RT (R)

## 2020-10-13 NOTE — TELEPHONE ENCOUNTER
Routing refill request to provider for review/approval because:  Last Rxs from non PCP provider: Anmol Salmeron  Last visit with PCP 10/7/2020   Zuri DENISE RN

## 2020-10-14 ENCOUNTER — DOCUMENTATION ONLY (OUTPATIENT)
Dept: OTHER | Facility: CLINIC | Age: 85
End: 2020-10-14

## 2020-10-14 RX ORDER — HYDROCHLOROTHIAZIDE 12.5 MG/1
12.5 TABLET ORAL DAILY
Qty: 90 TABLET | Refills: 3 | Status: SHIPPED
Start: 2020-10-14 | End: 2021-02-21

## 2020-10-14 RX ORDER — PREDNISONE 5 MG/1
5 TABLET ORAL DAILY
Qty: 90 TABLET | Refills: 3 | Status: SHIPPED | OUTPATIENT
Start: 2020-10-14 | End: 2021-03-15

## 2020-10-14 RX ORDER — PANTOPRAZOLE SODIUM 40 MG/1
40 TABLET, DELAYED RELEASE ORAL
Qty: 90 TABLET | Refills: 3 | Status: SHIPPED | OUTPATIENT
Start: 2020-10-14 | End: 2021-03-15

## 2020-10-14 RX ORDER — DILTIAZEM HYDROCHLORIDE 180 MG/1
180 CAPSULE, EXTENDED RELEASE ORAL DAILY
Qty: 90 CAPSULE | Refills: 3 | Status: SHIPPED | OUTPATIENT
Start: 2020-10-14 | End: 2021-03-08

## 2020-10-19 ENCOUNTER — TELEPHONE (OUTPATIENT)
Dept: FAMILY MEDICINE | Facility: CLINIC | Age: 85
End: 2020-10-19

## 2020-10-19 DIAGNOSIS — N17.9 ACUTE KIDNEY INJURY (H): ICD-10-CM

## 2020-10-19 DIAGNOSIS — E55.9 VITAMIN D DEFICIENCY: ICD-10-CM

## 2020-10-19 DIAGNOSIS — I10 ESSENTIAL HYPERTENSION: ICD-10-CM

## 2020-10-19 PROCEDURE — 80048 BASIC METABOLIC PNL TOTAL CA: CPT | Performed by: INTERNAL MEDICINE

## 2020-10-19 PROCEDURE — 82306 VITAMIN D 25 HYDROXY: CPT | Performed by: INTERNAL MEDICINE

## 2020-10-19 PROCEDURE — 36415 COLL VENOUS BLD VENIPUNCTURE: CPT | Performed by: INTERNAL MEDICINE

## 2020-10-19 NOTE — TELEPHONE ENCOUNTER
Returned call. OK'd requested orders.  Orders will be faxed to PCP for review and signature.   Keren Coffey RN

## 2020-10-19 NOTE — TELEPHONE ENCOUNTER
Reason for Call:  Home Health Care     conc[facility] with [facility] called regarding (reason for call):  Verbal orders    Orders are needed for this patient.     PT: \    OT: \    [facility] per wk for 1 wk for discharge     Pt Provider:     Phone Number Homecare Nurse can be reached at: [phone]    Can we leave a detailed message on this number? YES    Phone number patient can be reached at: Home number on file [phone] (home)    Best Time: any    Call taken on 10/19/2020 at 3:26 PM by Ronny Hanson

## 2020-10-20 DIAGNOSIS — N18.30 CHRONIC KIDNEY DISEASE, STAGE III (MODERATE) (H): Primary | ICD-10-CM

## 2020-10-20 DIAGNOSIS — N25.81 SECONDARY HYPERPARATHYROIDISM OF RENAL ORIGIN (H): ICD-10-CM

## 2020-10-20 LAB
ANION GAP SERPL CALCULATED.3IONS-SCNC: 7 MMOL/L (ref 3–14)
BUN SERPL-MCNC: 35 MG/DL (ref 7–30)
CALCIUM SERPL-MCNC: 10.1 MG/DL (ref 8.5–10.1)
CHLORIDE SERPL-SCNC: 111 MMOL/L (ref 94–109)
CO2 SERPL-SCNC: 18 MMOL/L (ref 20–32)
CREAT SERPL-MCNC: 1.49 MG/DL (ref 0.52–1.04)
DEPRECATED CALCIDIOL+CALCIFEROL SERPL-MC: 31 UG/L (ref 20–75)
GFR SERPL CREATININE-BSD FRML MDRD: 32 ML/MIN/{1.73_M2}
GLUCOSE SERPL-MCNC: 123 MG/DL (ref 70–99)
POTASSIUM SERPL-SCNC: 4.7 MMOL/L (ref 3.4–5.3)
SODIUM SERPL-SCNC: 136 MMOL/L (ref 133–144)

## 2020-10-22 DIAGNOSIS — I10 ESSENTIAL HYPERTENSION, MALIGNANT: Primary | ICD-10-CM

## 2020-10-22 DIAGNOSIS — N18.31 CHRONIC KIDNEY DISEASE (CKD) STAGE G3A/A1, MODERATELY DECREASED GLOMERULAR FILTRATION RATE (GFR) BETWEEN 45-59 ML/MIN/1.73 SQUARE METER AND ALBUMINURIA CREATININE RATIO LESS THAN 30 MG/G (H): ICD-10-CM

## 2020-10-22 NOTE — TELEPHONE ENCOUNTER
Tried calling Elen[facility] (per Alicia Scanlon, APRN CNP)  where the patient is currently at to schedule this appointment. Elenita's phone just rang and rang. We will try one more time.

## 2020-10-23 ENCOUNTER — PATIENT OUTREACH (OUTPATIENT)
Dept: NURSING | Facility: CLINIC | Age: 85
End: 2020-10-23
Payer: MEDICARE

## 2020-10-23 NOTE — PROGRESS NOTES
Clinic Care Coordination Contact    Follow Up Progress Note   Hospital admission 7/21-7/31/2020  Anemia      Assessment: Patient states her home care services have ended.  Patient continues home PT exercises daily.  Patient lives at the [facility] and her daughter is close by.  Patient uses [employer] for her transportation needs   Goals addressed this encounter:   Goals Addressed                 This Visit's Progress      COMPLETED: Functional (pt-stated)   100%     Goal Statement: I will improve my strength in the next 1-2 months   Date Goal set: 9/21/2020  Barriers: Deconditioned  Strengths: Home care services in place  Date to Achieve By: 11/21/2020  Patient expressed understanding of goal: Yes  Action steps to achieve this goal:  1. I will keep future home care visits   2. I will use my walker for safety  3. Care coordinator venu to support patient home care staff for any needs             Intervention/Education provided during outreach: CC available in the future for any needs           Plan:   No unmet needs, no further care coordination needed at this time   Graduation letter mailed   [facility] Catherine Marie RN Care Coordinator   [facility]   Phone: [phone]  Email :  [email or web address]

## 2020-10-23 NOTE — LETTER
[facility]  [address]  [place] [address]  October 23, 2020    Ghislaine Walls  [address]  [facility] [address]    Dear Ghislaine,  Your Care Team congratulates you on your journey to maintain wellness. This document will help guide you on your journey to maintain a healthy lifestyle.  You can use this to help you overcome any barriers you may encounter.  If you should have any questions or concerns, you can contact the members of your Care Team or contact your Primary Care Clinic for assistance.     Health Maintenance  Health Maintenance Reviewed:      My Access Plan  Medical Emergency 911   Primary Clinic Line [facility] - [phone]   24 Hour Appointment Line [phone] or  [phone]) (toll-free)   24 Hour Nurse Line [phone] (toll-free)   Preferred Urgent Care [facility], [phone]   Preferred Hospital [facility]  [phone]   Preferred Pharmacy [id number] - [place], [place] - [address] AT [address] [place] [address]     Behavioral Health Crisis Line The National Suicide Prevention Lifeline at 1-309.884.3216 or 911     My Care Team Members  Patient Care Team       Relationship Specialty Notifications Start End    Laurie Conteh MD PCP - General Internal Medicine  2/10/20     Phone: [phone] Fax: [phone]         [address] [place] [address]    Laurie Conteh MD Assigned PCP   9/6/20     Phone: [phone] Fax: [phone]         [address] [place] [address]              Goals       COMPLETED: Functional (pt-stated)      Goal Statement: I will improve my strength in the next 1-2 months   Date Goal set: 9/21/2020  Barriers: Deconditioned  Strengths: Home care services in place  Date to Achieve By: 11/21/2020  Patient expressed understanding of goal: Yes  Action steps to achieve this goal:  1. I will keep future home care visits    2. I will use my walker for safety  3. Care coordinator venu to support patient home care staff for any needs             Advance Care Plans/Directives Type:      Thank you for providing us with your Advance Directive. We will keep this on file and recommend that it be updated every 2 years, if your health situation changes, if there is a death of one of your health care agents, and/or if there are changes in your marital status.    It has been your Clinic Care Team's pleasure to work with you on your goals.    Regards,  Your Clinic Care Team

## 2020-10-27 DIAGNOSIS — M1A.9XX0 CHRONIC GOUT WITHOUT TOPHUS, UNSPECIFIED CAUSE, UNSPECIFIED SITE: Primary | ICD-10-CM

## 2020-10-27 DIAGNOSIS — I10 ESSENTIAL HYPERTENSION: ICD-10-CM

## 2020-10-27 DIAGNOSIS — I48.0 PAF (PAROXYSMAL ATRIAL FIBRILLATION) (H): ICD-10-CM

## 2020-10-27 NOTE — TELEPHONE ENCOUNTER
Reason for Call:  Medication or medication refill:    Do you use a [facility]?  Name of the pharmacy and phone number for the current request:     [id number] - [place], [place] - [address]      Name of the medication requested:     allopurinol (ZYLOPRIM) 100 MG tablet  apixaban ANTICOAGULANT (ELIQUIS) 2.5 MG tablet  hydrALAZINE (APRESOLINE) 100 MG tablet    metoprolol succinate ER (TOPROL-XL) 100 MG 24 hr tablet      Can we leave a detailed message on this number? YES    Phone number patient can be reached at: Cell number on file:    Telephone Information:   Mobile [phone]       Best Time: anytime    Call taken on 10/27/2020 at 11:16 AM by Keri Turner

## 2020-10-28 RX ORDER — METOPROLOL SUCCINATE 100 MG/1
100 TABLET, EXTENDED RELEASE ORAL DAILY
Qty: 90 TABLET | Refills: 3 | Status: SHIPPED | OUTPATIENT
Start: 2020-10-28 | End: 2021-03-15

## 2020-10-28 RX ORDER — ALLOPURINOL 100 MG/1
100 TABLET ORAL DAILY
Qty: 90 TABLET | Refills: 3 | Status: SHIPPED | OUTPATIENT
Start: 2020-10-28 | End: 2021-03-15

## 2020-10-28 RX ORDER — HYDRALAZINE HYDROCHLORIDE 100 MG/1
100 TABLET, FILM COATED ORAL 3 TIMES DAILY
Qty: 270 TABLET | Refills: 3 | Status: SHIPPED | OUTPATIENT
Start: 2020-10-28 | End: 2021-03-15

## 2020-10-28 NOTE — TELEPHONE ENCOUNTER
"Last visit 10/7/2020 - virtual     Last Rxs:     1) Hydralazine (per daughter pt is out) - #90 (30 day) 9/15/2020 - (provider Anmol Salmeron) with note to get further from PCP     2) Allopurinol 100mg- 30 day-9/15/2020 - (provider (Anmol Salmeron) with note to get further from PCP     3) Eliquis 2.5mg 30 day-9/15/2020 - (provider (Anmol Salmeron) with note to get further from PCP     4)  Metoprolol 100mg - 30 day 9/15/2020 (provider Anmol Salmeron) with note further from PCP     Requested Prescriptions   Pending Prescriptions Disp Refills     hydrALAZINE (APRESOLINE) 100 MG tablet 90 tablet 0     Sig: Take 1 tablet (100 mg) by mouth 3 times daily       Vasodilators Failed - 10/27/2020 11:19 AM        Failed - Most recent BP less than 140/90 on record     BP Readings from Last 3 Encounters:   09/14/20 (!) 174/67   09/09/20 (!) 163/67   09/04/20 (!) 190/70                 Passed - Most recent encounter is not a hospital encounter. Patient has recent (12 mos) or future (1 mos) visit with authorizing provider's specialty     Patient's most recent encounter is NOT a hospital encounter and has had an office visit in the last 12 months or has a visit in the next 30 days with authorizing provider or within the authorizing provider's specialty.      See \"Patient Info\" tab in inbasket, or \"Choose Columns\" in Meds & Orders section of the refill encounter.      If most recent encounter is a hospital encounter AND the patient does NOT have an appointment scheduled with the authorizing provider or authorizing provider's specialty within the next 30 days, forward refill to authorizing provider for medication review.          Passed - Medication is active on med list        Passed - Patient is of age 18 years or older        Passed - Patient is not pregnant        Passed - Patient has not had a positive pregnancy test within the past 12 months           allopurinol (ZYLOPRIM) 100 MG tablet 30 tablet 0     " "Sig: Take 1 tablet (100 mg) by mouth daily       Gout Agents Protocol Failed - 10/27/2020 11:19 AM        Failed - Normal serum creatinine on file in the past 12 months     Recent Labs   Lab Test 10/19/20  1411   CR 1.49*       Ok to refill medication if creatinine is low          Passed - CBC on file in past 12 months     Recent Labs   Lab Test 09/10/20  0716 08/11/20  0955 08/11/20  0955   WBC  --   --  14.4*   RBC  --   --  2.87*   HGB 8.8*   < > 8.6*   HCT  --   --  27.2*   PLT  --   --  297    < > = values in this interval not displayed.                 Passed - ALT on file in past 12 months     Recent Labs   Lab Test 07/25/20  0554   ALT 10             Passed - Has Uric Acid on file in past 12 months and value is less than 6     Recent Labs   Lab Test 07/28/20  0923   URIC 5.2     If level is 6mg/dL or greater, ok to refill one time and refer to provider.           Passed - Recent (12 mo) or future (30 days) visit within the authorizing provider's specialty     Patient has had an office visit with the authorizing provider or a provider within the authorizing providers department within the previous 12 mos or has a future within next 30 days. See \"Patient Info\" tab in inbasket, or \"Choose Columns\" in Meds & Orders section of the refill encounter.              Passed - Medication is active on med list        Passed - Patient is age 18 or older        Passed - No active pregnancy on record        Passed - No positive pregnancy test in past year           metoprolol succinate ER (TOPROL-XL) 100 MG 24 hr tablet 30 tablet 0     Sig: Take 1 tablet (100 mg) by mouth daily       Beta-Blockers Protocol Failed - 10/27/2020 11:19 AM        Failed - Blood pressure under 140/90 in past 12 months     BP Readings from Last 3 Encounters:   09/14/20 (!) 174/67   09/09/20 (!) 163/67   09/04/20 (!) 190/70                 Passed - Patient is age 6 or older        Passed - Recent (12 mo) or future (30 days) visit within the " "authorizing provider's specialty     Patient has had an office visit with the authorizing provider or a provider within the authorizing providers department within the previous 12 mos or has a future within next 30 days. See \"Patient Info\" tab in inbasket, or \"Choose Columns\" in Meds & Orders section of the refill encounter.              Passed - Medication is active on med list           apixaban ANTICOAGULANT (ELIQUIS) 2.5 MG tablet 60 tablet 0     Sig: Take 1 tablet (2.5 mg) by mouth 2 times daily       Direct Oral Anticoagulant Agents Failed - 10/27/2020 11:19 AM        Failed - Patient is 18-79 years of age        Failed - Serum creatinine less than or equal to 1.4 on file in past 12 mos     Recent Labs   Lab Test 10/19/20  1411   CR 1.49*       Ok to refill medication if creatinine is low          Failed - Weight is greater than 60 kg for the past year     Wt Readings from Last 3 Encounters:   09/14/20 59 kg (130 lb)   09/08/20 59.1 kg (130 lb 6.4 oz)   09/04/20 60.8 kg (134 lb)             Passed - Normal Platelets on file in past 12 months     Recent Labs   Lab Test 08/11/20  0955                  Passed - Medication is active on med list        Passed - No active pregnancy on record        Passed - No positive pregnancy test within past 12 months        Passed - Recent (6 mo) or future (30 days) visit within the authorizing provider's specialty             "

## 2020-10-28 NOTE — TELEPHONE ENCOUNTER
Donna Marie, RN  [facility],     Received a call from Krupa [email or web address]   States waiting for a refill on Hydralazine.  Patient is out of this medication.  Please call daughter with an update   Krupa [phone]     [facility]     Donna Marie  RN Care Coordinator   [facility]   Phone: [phone]  Email :  [email or web address]

## 2020-10-30 ENCOUNTER — TRANSFERRED RECORDS (OUTPATIENT)
Dept: HEALTH INFORMATION MANAGEMENT | Facility: CLINIC | Age: 85
End: 2020-10-30

## 2020-11-24 ENCOUNTER — MEDICAL CORRESPONDENCE (OUTPATIENT)
Dept: HEALTH INFORMATION MANAGEMENT | Facility: CLINIC | Age: 85
End: 2020-11-24

## 2020-12-01 DIAGNOSIS — M06.9 RHEUMATOID ARTHRITIS, ADULT (H): Primary | ICD-10-CM

## 2020-12-01 DIAGNOSIS — I10 ESSENTIAL HYPERTENSION, MALIGNANT: ICD-10-CM

## 2020-12-01 DIAGNOSIS — N18.31 CHRONIC KIDNEY DISEASE (CKD) STAGE G3A/A1, MODERATELY DECREASED GLOMERULAR FILTRATION RATE (GFR) BETWEEN 45-59 ML/MIN/1.73 SQUARE METER AND ALBUMINURIA CREATININE RATIO LESS THAN 30 MG/G (H): ICD-10-CM

## 2020-12-01 DIAGNOSIS — N18.30 CHRONIC KIDNEY DISEASE, STAGE III (MODERATE) (H): ICD-10-CM

## 2020-12-01 DIAGNOSIS — N25.81 SECONDARY HYPERPARATHYROIDISM OF RENAL ORIGIN (H): ICD-10-CM

## 2020-12-01 DIAGNOSIS — R82.90 NONSPECIFIC FINDING ON EXAMINATION OF URINE: ICD-10-CM

## 2020-12-01 LAB
ALBUMIN UR-MCNC: >=300 MG/DL
APPEARANCE UR: CLEAR
BACTERIA #/AREA URNS HPF: ABNORMAL /HPF
BASOPHILS # BLD AUTO: 0.1 10E9/L (ref 0–0.2)
BASOPHILS NFR BLD AUTO: 1.3 %
BILIRUB UR QL STRIP: NEGATIVE
COLOR UR AUTO: YELLOW
DIFFERENTIAL METHOD BLD: ABNORMAL
EOSINOPHIL # BLD AUTO: 0.1 10E9/L (ref 0–0.7)
EOSINOPHIL NFR BLD AUTO: 1.5 %
ERYTHROCYTE [DISTWIDTH] IN BLOOD BY AUTOMATED COUNT: 19 % (ref 10–15)
GLUCOSE UR STRIP-MCNC: NEGATIVE MG/DL
HCT VFR BLD AUTO: 34.4 % (ref 35–47)
HGB BLD-MCNC: 10.8 G/DL (ref 11.7–15.7)
HGB UR QL STRIP: NEGATIVE
KETONES UR STRIP-MCNC: NEGATIVE MG/DL
LEUKOCYTE ESTERASE UR QL STRIP: NEGATIVE
LYMPHOCYTES # BLD AUTO: 1.3 10E9/L (ref 0.8–5.3)
LYMPHOCYTES NFR BLD AUTO: 29.5 %
MCH RBC QN AUTO: 33.2 PG (ref 26.5–33)
MCHC RBC AUTO-ENTMCNC: 31.4 G/DL (ref 31.5–36.5)
MCV RBC AUTO: 106 FL (ref 78–100)
MONOCYTES # BLD AUTO: 1.1 10E9/L (ref 0–1.3)
MONOCYTES NFR BLD AUTO: 23.7 %
NEUTROPHILS # BLD AUTO: 2 10E9/L (ref 1.6–8.3)
NEUTROPHILS NFR BLD AUTO: 44 %
NITRATE UR QL: NEGATIVE
NON-SQ EPI CELLS #/AREA URNS LPF: ABNORMAL /LPF
PH UR STRIP: 5 PH (ref 5–7)
PLATELET # BLD AUTO: 197 10E9/L (ref 150–450)
PTH-INTACT SERPL-MCNC: 54 PG/ML (ref 18–80)
RBC # BLD AUTO: 3.25 10E12/L (ref 3.8–5.2)
RBC #/AREA URNS AUTO: ABNORMAL /HPF
SOURCE: ABNORMAL
SP GR UR STRIP: 1.02 (ref 1–1.03)
UROBILINOGEN UR STRIP-ACNC: 0.2 EU/DL (ref 0.2–1)
WBC # BLD AUTO: 4.6 10E9/L (ref 4–11)
WBC #/AREA URNS AUTO: >100 /HPF

## 2020-12-01 PROCEDURE — 83970 ASSAY OF PARATHORMONE: CPT | Performed by: NURSE PRACTITIONER

## 2020-12-01 PROCEDURE — 82565 ASSAY OF CREATININE: CPT | Mod: 59 | Performed by: STUDENT IN AN ORGANIZED HEALTH CARE EDUCATION/TRAINING PROGRAM

## 2020-12-01 PROCEDURE — 84155 ASSAY OF PROTEIN SERUM: CPT | Performed by: NURSE PRACTITIONER

## 2020-12-01 PROCEDURE — 84460 ALANINE AMINO (ALT) (SGPT): CPT | Performed by: STUDENT IN AN ORGANIZED HEALTH CARE EDUCATION/TRAINING PROGRAM

## 2020-12-01 PROCEDURE — 81001 URINALYSIS AUTO W/SCOPE: CPT | Performed by: INTERNAL MEDICINE

## 2020-12-01 PROCEDURE — 85025 COMPLETE CBC W/AUTO DIFF WBC: CPT | Performed by: STUDENT IN AN ORGANIZED HEALTH CARE EDUCATION/TRAINING PROGRAM

## 2020-12-01 PROCEDURE — 87088 URINE BACTERIA CULTURE: CPT | Performed by: NURSE PRACTITIONER

## 2020-12-01 PROCEDURE — 87086 URINE CULTURE/COLONY COUNT: CPT | Performed by: NURSE PRACTITIONER

## 2020-12-01 PROCEDURE — 80069 RENAL FUNCTION PANEL: CPT | Performed by: NURSE PRACTITIONER

## 2020-12-01 PROCEDURE — 36415 COLL VENOUS BLD VENIPUNCTURE: CPT | Performed by: INTERNAL MEDICINE

## 2020-12-01 PROCEDURE — 84450 TRANSFERASE (AST) (SGOT): CPT | Performed by: STUDENT IN AN ORGANIZED HEALTH CARE EDUCATION/TRAINING PROGRAM

## 2020-12-01 PROCEDURE — 87186 SC STD MICRODIL/AGAR DIL: CPT | Performed by: NURSE PRACTITIONER

## 2020-12-02 ENCOUNTER — DOCUMENTATION ONLY (OUTPATIENT)
Dept: FAMILY MEDICINE | Facility: CLINIC | Age: 85
End: 2020-12-02

## 2020-12-02 DIAGNOSIS — M06.9 ATROPHIC ARTHRITIS (H): Primary | ICD-10-CM

## 2020-12-02 LAB
ALBUMIN SERPL-MCNC: 3.5 G/DL (ref 3.4–5)
ALT SERPL W P-5'-P-CCNC: 11 U/L (ref 0–50)
ANION GAP SERPL CALCULATED.3IONS-SCNC: 7 MMOL/L (ref 3–14)
AST SERPL W P-5'-P-CCNC: 21 U/L (ref 0–45)
BUN SERPL-MCNC: 36 MG/DL (ref 7–30)
CALCIUM SERPL-MCNC: 9.7 MG/DL (ref 8.5–10.1)
CHLORIDE SERPL-SCNC: 113 MMOL/L (ref 94–109)
CO2 SERPL-SCNC: 16 MMOL/L (ref 20–32)
CREAT SERPL-MCNC: 1.4 MG/DL (ref 0.52–1.04)
CREAT SERPL-MCNC: 1.43 MG/DL (ref 0.52–1.04)
GFR SERPL CREATININE-BSD FRML MDRD: 33 ML/MIN/{1.73_M2}
GFR SERPL CREATININE-BSD FRML MDRD: 34 ML/MIN/{1.73_M2}
GLUCOSE SERPL-MCNC: 111 MG/DL (ref 70–99)
PHOSPHATE SERPL-MCNC: 3.8 MG/DL (ref 2.5–4.5)
POTASSIUM SERPL-SCNC: 5.1 MMOL/L (ref 3.4–5.3)
PROT SERPL-MCNC: 7.9 G/DL (ref 6.8–8.8)
SODIUM SERPL-SCNC: 136 MMOL/L (ref 133–144)

## 2020-12-04 ENCOUNTER — NURSE TRIAGE (OUTPATIENT)
Dept: NURSING | Facility: CLINIC | Age: 85
End: 2020-12-04

## 2020-12-04 DIAGNOSIS — N39.0 URINARY TRACT INFECTION WITH HEMATURIA, SITE UNSPECIFIED: Primary | ICD-10-CM

## 2020-12-04 DIAGNOSIS — R31.9 URINARY TRACT INFECTION WITH HEMATURIA, SITE UNSPECIFIED: Primary | ICD-10-CM

## 2020-12-04 RX ORDER — CIPROFLOXACIN 250 MG/1
250 TABLET, FILM COATED ORAL DAILY
Qty: 5 TABLET | Refills: 0 | Status: SHIPPED | OUTPATIENT
Start: 2020-12-04 | End: 2020-12-09

## 2020-12-04 NOTE — TELEPHONE ENCOUNTER
Caller is reporting that she feels worsening  weakness over past week; trouble getting to bathroom causes her to  be exhausted and needs to lay.  down. Has not fallen but needs walker to ambulate. Denies  orthopnea or  Increased ankle swelling. Caller denies  dysuria and voids every few hours but does not drink much fluid.    Caller wants to talk to provider over phone  Due to problems getting to clinic  Call was transferred from  who was unable to find telephone appointment in timely manner and not able to reach clinic triage nurse       Triage protocol and EMR reviewed  showing multiple lab tests from  3 days ago that do not appear to have been reviewed ( from nephrology).  Contacted clinic triage nurse who will  Discuss with PCP; patient's  call transferred to clinic triage  nurse Zuri who will disposition patient.    Lupe Zelaya RN  FNA                Additional Information    Negative: Severe difficulty breathing (e.g., struggling for each breath, speaks in single words)    Negative: Shock suspected (e.g., cold/pale/clammy skin, too weak to stand, low BP, rapid pulse)    Negative: Difficult to awaken or acting confused (e.g., disoriented, slurred speech)    Negative: Fainted > 15 minutes ago and still feels too weak or dizzy to stand    Negative: SEVERE weakness (i.e., unable to walk or barely able to walk, requires support) and new onset or worsening    Negative: Sounds like a life-threatening emergency to the triager    Negative: Weakness of the face, arm or leg on one side of the body    Negative: Has diabetes and weakness from low blood sugar (i.e., < 60 mg/dL or 3.5 mmol/L)    Negative: Recent heat exposure, suspected cause of weakness    Negative: Vomiting is the main symptom    Negative: Diarrhea is the main symptom    Negative: Difficulty breathing    Negative: Heart beating < 50 beats per minute OR > 140 beats per minute    Negative: Extra heartbeats OR irregular heart beating (i.e.,  'palpitations')    Negative: Follows bleeding (e.g., from vomiting, rectum, vagina)    Negative: Bloody, black, or tarry bowel movements    Negative: MODERATE weakness from poor fluid intake with no improvement after 2 hours of rest and fluids    Negative: Drinking very little and dehydration suspected (e.g., no urine > 12 hours, very dry mouth, very lightheaded)    Negative: Patient sounds very sick or weak to the triager    Negative: MODERATE weakness (i.e., interferes with work, school, normal activities) and cause unknown    Negative: Fever > 103 F (39.4 C) and not able to get the fever down using CARE ADVICE    Negative: Fever > 100.0 F (37.8 C) and bedridden (e.g., nursing home patient, stroke, chronic illness, recovering from surgery)    Negative: Fever > 101 F (38.3 C) and over 60 years of age    Negative: Fever > 100.0 F (37.8 C) and diabetes mellitus or weak immune system (e.g., HIV positive, cancer chemo, splenectomy, organ transplant, chronic steroids)    Negative: Pale skin (pallor)    MODERATE weakness (i.e., interferes with work, school, normal activities) and persists > 3 days    Protocols used: WEAKNESS (GENERALIZED) AND FATIGUE-A-OH

## 2020-12-04 NOTE — TELEPHONE ENCOUNTER
TO PCP:     Pt was transferred to FNA triage with red flag symptoms     Increased fatigue/weakness     FNA called our office and had pt speak with triage     Can walk with walker - but feeling tired/weak overall. Not dizzy or lightheaded     Dr Saeed's office ran UA/UC recently and per pt didn't treat for any UTI- looks like it's growing something. Frequency but that is normal for her with urinating. Denies other sx of UTI no fever or hematuria no burning. Not cloudy     Pt schedule a phone visit for Monday, not interested in coming in to the clinic but agreed to ER if any worsening over the weekend     FNA recommends PCP review UA/UC - does pt need any abx?     Please advise   Zuri DENISE RN

## 2020-12-05 LAB
BACTERIA SPEC CULT: ABNORMAL
BACTERIA SPEC CULT: ABNORMAL
Lab: ABNORMAL
SPECIMEN SOURCE: ABNORMAL

## 2020-12-07 ENCOUNTER — VIRTUAL VISIT (OUTPATIENT)
Dept: FAMILY MEDICINE | Facility: CLINIC | Age: 85
End: 2020-12-07
Payer: MEDICARE

## 2020-12-07 ENCOUNTER — PATIENT OUTREACH (OUTPATIENT)
Dept: NURSING | Facility: CLINIC | Age: 85
End: 2020-12-07
Payer: MEDICARE

## 2020-12-07 DIAGNOSIS — R31.9 URINARY TRACT INFECTION WITH HEMATURIA, SITE UNSPECIFIED: Primary | ICD-10-CM

## 2020-12-07 DIAGNOSIS — R63.0 POOR APPETITE: ICD-10-CM

## 2020-12-07 DIAGNOSIS — R63.4 WEIGHT LOSS: ICD-10-CM

## 2020-12-07 DIAGNOSIS — N39.0 URINARY TRACT INFECTION WITH HEMATURIA, SITE UNSPECIFIED: Primary | ICD-10-CM

## 2020-12-07 DIAGNOSIS — E55.9 VITAMIN D DEFICIENCY: ICD-10-CM

## 2020-12-07 DIAGNOSIS — R62.7 FAILURE TO THRIVE IN ADULT: ICD-10-CM

## 2020-12-07 DIAGNOSIS — R53.1 GENERALIZED WEAKNESS: Primary | ICD-10-CM

## 2020-12-07 PROCEDURE — 99443 PR PHYSICIAN TELEPHONE EVALUATION 21-30 MIN: CPT | Mod: 95 | Performed by: INTERNAL MEDICINE

## 2020-12-07 RX ORDER — AMOXICILLIN 250 MG/1
250 CAPSULE ORAL 2 TIMES DAILY
Qty: 10 CAPSULE | Refills: 0 | Status: SHIPPED | OUTPATIENT
Start: 2020-12-07 | End: 2020-12-12

## 2020-12-07 RX ORDER — MIRTAZAPINE 15 MG/1
15 TABLET, FILM COATED ORAL AT BEDTIME
Qty: 90 TABLET | Refills: 3 | Status: SHIPPED | OUTPATIENT
Start: 2020-12-07 | End: 2021-03-15

## 2020-12-07 NOTE — PROGRESS NOTES
"Ghislaine Walls is a 85 year old female who is being evaluated via a billable telephone visit.      The patient has been notified of following:     \"This telephone visit will be conducted via a call between you and your physician/provider. We have found that certain health care needs can be provided without the need for a physical exam.  This service lets us provide the care you need with a short phone conversation.  If a prescription is necessary we can send it directly to your pharmacy.  If lab work is needed we can place an order for that and you can then stop by our lab to have the test done at a later time.    Telephone visits are billed at different rates depending on your insurance coverage. During this emergency period, for some insurers they may be billed the same as an in-person visit.  Please reach out to your insurance provider with any questions.    If during the course of the call the physician/provider feels a telephone visit is not appropriate, you will not be charged for this service.\"    Patient has given verbal consent for Telephone visit?  Yes    What phone number would you like to be contacted at? [phone]    How would you like to obtain your AVS? Mail a copy    Subjective     Ghislaine Walls is a 85 year old female who presents via phone visit today for the following health issues:    HPI         Follow up on multiple concerns including UTI, weight loss, failure to thrive symptoms         HPI:   Patient Ghislaine Walls is a very pleasant 85 year old female with history of CKD, hypertension, hyperlipidemia, atrial fibrillation today for telephone visit for follow up of multiple concerns. Regarding the patient's CKD, the patient was noted to have worsening of kidney function with elevation of Creatinine at her outpatient Rheumatology specialist clinic recently. Patient has been referred to outpatient nephrology clinic for the worsening kidney function. Regarding the patient's hypertension " "in the setting of CKD, the patient's BP has been difficult to treat in the past and the patient has been followed on a routine basis by her local nephrology clinic at the [facility] nephrology clinic in [place]. Regarding the patient's chronic gout, the patient denies any acute gout flare up symptoms at this time. Patient's recent labs show UTI. Patient also has been losing some weight due to poor appetite with meals recently consisting only of drinking Ensure. Patient reports that she \"wants to be thinner\" over the phone today.      Current Medications:     Current Outpatient Medications   Medication Sig Dispense Refill     acetaminophen (TYLENOL) 500 MG tablet Take 1,000 mg by mouth 3 times daily       allopurinol (ZYLOPRIM) 100 MG tablet Take 1 tablet (100 mg) by mouth daily 90 tablet 3     amLODIPine (NORVASC) 5 MG tablet Take 1 tablet (5 mg) by mouth daily 90 tablet 3     amoxicillin (AMOXIL) 250 MG capsule Take 1 capsule (250 mg) by mouth 2 times daily for 5 days 10 capsule 0     apixaban ANTICOAGULANT (ELIQUIS) 2.5 MG tablet Take 1 tablet (2.5 mg) by mouth 2 times daily 180 tablet 3     ciprofloxacin (CIPRO) 250 MG tablet Take 1 tablet (250 mg) by mouth daily for 5 days 5 tablet 0     diltiazem ER (DILT-XR) 180 MG 24 hr capsule Take 1 capsule (180 mg) by mouth daily 90 capsule 3     hydrALAZINE (APRESOLINE) 100 MG tablet Take 1 tablet (100 mg) by mouth 3 times daily 270 tablet 3     hydrochlorothiazide (HYDRODIURIL) 12.5 MG tablet Take 1 tablet (12.5 mg) by mouth daily 90 tablet 3     levothyroxine (SYNTHROID/LEVOTHROID) 25 MCG tablet Take 1 tablet (25 mcg) by mouth daily 90 tablet 2     metoprolol succinate ER (TOPROL-XL) 100 MG 24 hr tablet Take 1 tablet (100 mg) by mouth daily 90 tablet 3     mirtazapine (REMERON) 15 MG tablet Take 1 tablet (15 mg) by mouth At Bedtime 90 tablet 3     pantoprazole (PROTONIX) 40 MG EC tablet Take 1 tablet (40 mg) by mouth every morning (before breakfast) 90 tablet 3 "     predniSONE (DELTASONE) 5 MG tablet Take 1 tablet (5 mg) by mouth daily 90 tablet 3     senna-docusate (SENOKOT-S/PERICOLACE) 8.6-50 MG tablet Take 2 tablets by mouth daily       sulfaSALAzine (AZULFIDINE) 500 MG tablet TAKE ONE TABLET BY MOUTH TWICE DAILY AFTER MEALS.       traMADol (ULTRAM) 50 MG tablet Take 1 tablet (50 mg) by mouth every 4 hours as needed for moderate pain 30 tablet 0         Allergies:      Allergies   Allergen Reactions     Oxybutynin Itching     Seasonal Allergies             Past Medical History:     Past Medical History:   Diagnosis Date     Atrial fibrillation (H) new 10/19     [place] (hard of hearing)     wears hearing aids     Hyperlipidaemia      Hyperlipidaemia LDL goal < 130      Hypertension      Hypothyroid      PAD (peripheral artery disease) (H)      Renal insufficiency, mild      Uncontrolled hypertension 10/14/2019         Past Surgical History:     Past Surgical History:   Procedure Laterality Date     APPENDECTOMY  1951     BONE MARROW BIOPSY, BONE SPECIMEN, NEEDLE/TROCAR N/A 7/31/2020    Procedure: bone marrow biopsy;  Surgeon: Iris Cameron MD;  Location:  GI     CATARACT IOL, RT/LT  2001    bilateral (laser - 5/2013)     DENTAL SURGERY  1962    [place]     ESOPHAGOSCOPY, GASTROSCOPY, DUODENOSCOPY (EGD), COMBINED N/A 7/24/2020    Procedure: ESOPHAGOGASTRODUODENOSCOPY, WITH BIOPSY;  Surgeon: Humberto Bailon MD;  Location:  GI     EYE SURGERY  1956    Muscle release     TUBAL LIGATION  1971         Family Medical History:     Family History   Problem Relation Age of Onset     Heart Disease Mother      Respiratory Mother      Heart Disease Father      Heart Disease Brother      Coronary Artery Disease Brother         CAB     Heart Disease Brother      Coronary Artery Disease Brother         CAB         Social History:     Social History     Socioeconomic History     Marital status: Single     Spouse name: Not on file     Number of children: Not on file     Years  of education: Not on file     Highest education level: Not on file   Occupational History     Not on file   Social Needs     Financial resource strain: Not hard at all     Food insecurity     Worry: Never true     Inability: Never true     Transportation needs     Medical: No     Non-medical: No   Tobacco Use     Smoking status: Former Smoker     Packs/day: 0.50     Years: 5.00     Pack years: 2.50     Types: Cigarettes     Quit date: 1973     Years since quittin.9     Smokeless tobacco: Never Used   Substance and Sexual Activity     Alcohol use: Yes     Alcohol/week: 0.0 standard drinks     Comment: 1 beer on      Drug use: No     Sexual activity: Not Currently     Partners: Male   Lifestyle     Physical activity     Days per week: 0 days     Minutes per session: 0 min     Stress: Not on file   Relationships     Social connections     Talks on phone: Twice a week     Gets together: Twice a week     Attends Congregational service: Never     Active member of club or organization: No     Attends meetings of clubs or organizations: Never     Relationship status: Not on file     Intimate partner violence     Fear of current or ex partner: Not on file     Emotionally abused: Not on file     Physically abused: Not on file     Forced sexual activity: Not on file   Other Topics Concern     Parent/sibling w/ CABG, MI or angioplasty before 65F 55M? No   Social History Narrative     Not on file           Review of System:     Constitutional: Negative for fever or chills, positive for recent weight loss, failure to thrive symptoms  Skin: Negative for rashes  Ears/Nose/Throat: Negative for nasal congestion, sore throat  Respiratory: No shortness of breath, dyspnea on exertion, cough, or hemoptysis  Cardiovascular: Negative for chest pain  Gastrointestinal: Negative for nausea, vomiting  Rheumatologic: positive for chronic gout  Genitourinary: positive for recent UTI  Musculoskeletal: Negative for myalgias  Neurologic:  "Negative for headaches  Psychiatric: Negative for depression, anxiety  Hematologic/Lymphatic/Immunologic: Negative  Endocrine: Negative  Behavioral: Negative for tobacco use       Physical Exam:   There were no vitals taken for this visit.    RESP: no cough over the phone  NEURO: Alert & Oriented x 3 over the phone.   PSYCH: mentation appears normal over the phone        Diagnostic Test Results:     Lab Results   Component Value Date    WBC 3.3 10/14/2019     Lab Results   Component Value Date    RBC 3.16 10/14/2019     Lab Results   Component Value Date    HGB 9.9 10/14/2019     Lab Results   Component Value Date    HCT 31.1 10/14/2019     Lab Results   Component Value Date    MCV 98 10/14/2019     Lab Results   Component Value Date    MCH 31.3 10/14/2019     Lab Results   Component Value Date    MCHC 31.8 10/14/2019     Lab Results   Component Value Date    RDW 15.7 10/14/2019     Lab Results   Component Value Date     10/14/2019         ASSESSMENT/PLAN:   (N39.0,  R31.9) Urinary tract infection with hematuria, site unspecified  (primary encounter diagnosis)  Comment: recent UTI   Plan: amoxicillin (AMOXIL) 250 MG capsule    (R63.4) Weight loss  (R62.7) Failure to thrive in adult  (R63.0) Poor appetite  Comment: patient has been losing some weight due to poor appetite with meals recently consisting only of drinking Ensure. Patient reports that she \"wants to be thinner\" over the phone today  Plan: mirtazapine (REMERON) 15 MG tablet for help improving appetite, I have also advised the patient to try to ensure more nutrition, calories in her diet going forward.      (E55.9) Vitamin D deficiency  Comment: no recent falls or acute injuries  Plan: continue current therapy      Follow Up Plan:     Patient is instructed to return to Internal Medicine clinic for follow-up visit in 1month.      Phone call duration:  30 minutes      Laurie Conteh MD  Internal Medicine  [facility]                  "

## 2020-12-07 NOTE — PROGRESS NOTES
Clinic Care Coordination Contact    Clinic Care Coordination Contact  OUTREACH    Referral Information:  Referral Source: IP Report    Primary Diagnosis: Other (include Comment box)(Fatigue)    Chief Complaint   Patient presents with     Clinic Care Coordination - Initial     Clinic Care Coordination RN        Universal Utilization:   Date of Admission:      7/21/2020  Date of Discharge:      7/31/2020  Admitting Physician:   Rodo Marcelino MD  Discharge Physician:  Ganga Mccullough MD     Primary Provider: Laurie Conteh  Primary Care Physician Phone Number: [phone]         Discharge Diagnoses:   1. Anemia - suspect anemia of chronic disease component (suspect inflammatory arthritis) +/- iron deficiency component; other causes not ruled out.  2. Abnormal SPEP/UPEP, possibly MGUS, other causes not ruled out.  3. 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  4. DRAGAN (suspect prerenal due to poor PO intake) on CKD stage III.  5. Non-anion gap metabolic acidosis, likely due to CKD.  Generalized weakness, multifactorial, related to above issues.  6. Fever, suspect related to inflammatory arthritis.  7. Severe protein-calorie malnutrition in context of chronic illness.  8. Hiatal hernia by EGD.     Clinic Utilization  Difficulty keeping appointments:: No  Compliance Concerns: No  No-Show Concerns: No  No PCP office visit in Past Year: No  Utilization    Last refreshed: 12/7/2020 12:45 AM: Hospital Admissions 1           Last refreshed: 12/7/2020 12:45 AM: ED Visits 1           Last refreshed: 12/7/2020 12:45 AM: No Show Count (past year) 0              Current as of: 12/7/2020 12:45 AM              Clinical Concerns:  Current Medical Concerns: Incoming call from Krupa?daughter.  Daughter is requesting a 3 way call today for the patients telephone visit at 4:00.(CC sent a message to the  to arrange a 3 way call )    There is not a Consent to communicate on file to speak to the daughter.    Daughter will discuss this with the patient and arrange a form to be completed for future calls   Daughter reports the patient is experiencing poor appetite and fatigue in the last week.  Using Ensure to get her nutrients   Current Behavioral Concerns: Not discussed   Education Provided to patient: Not discussed    Pain  Pain (GOAL):: No  Health Maintenance Reviewed: Not assessed  Clinical Pathway: None    Medication Management:  Not discussed today      Functional Status:  Dependent ADLs:: Ambulation-walker  Dependent IADLs:: Meal Preparation, Shopping, Laundry  Bed or wheelchair confined:: No  Mobility Status: Independent w/Device    Living Situation:  Current living arrangement:: I live alone  Type of residence:: Assisted living    Lifestyle & Psychosocial Needs:  Lifestyle     Physical activity     Days per week: 0 days     Minutes per session: 0 min     Stress: Not on file     Social Needs     Financial resource strain: Not hard at all     Food insecurity     Worry: Never true     Inability: Never true     Transportation needs     Medical: No     Non-medical: No     Diet:: Other(Supplement Ensure due to poor appetite)  Inadequate nutrition (GOAL):: No  Tube Feeding: No  Inadequate activity/exercise (GOAL):: No  Significant changes in sleep pattern (GOAL): No        Quaker or spiritual beliefs that impact treatment:: No  Mental health DX:: No  Mental health management concern (GOAL):: No  Chemical Dependency Status: No Current Concerns  Informal Support system:: Family   Socioeconomic History     Marital status: Single     Spouse name: Not on file     Number of children: Not on file     Years of education: Not on file     Highest education level: Not on file   Relationships     Social connections     Talks on phone: Twice a week     Gets together: Twice a week     Attends Rastafari service: Never     Active member of club or organization: No     Attends meetings of clubs or organizations: Never      Relationship status: Not on file     Intimate partner violence     Fear of current or ex partner: Not on file     Emotionally abused: Not on file     Physically abused: Not on file     Forced sexual activity: Not on file     Tobacco Use     Smoking status: Former Smoker     Packs/day: 0.50     Years: 5.00     Pack years: 2.50     Types: Cigarettes     Quit date: 1973     Years since quittin.9     Smokeless tobacco: Never Used   Substance and Sexual Activity     Alcohol use: Yes     Alcohol/week: 0.0 standard drinks     Comment: 1 beer on      Drug use: No     Sexual activity: Not Currently     Partners: Male               Resources and Interventions:  Current Resources:   List of home care services:: Skilled Nursing, Physicial Therapy, Occupational Therapy, Home Health Aid  Community Resources: Home Care     Equipment Currently Used at Home: walker, rolling, grab bar, toilet, grab bar, tub/shower, shower chair   )   )    Advance Care Plan/Directive  Advanced Care Plans/Directives on file:: No  Advanced Care Plan/Directive Status: Not Applicable    Referrals Placed: None     Goals:       Patient/Caregiver understanding: Daughter agrees with the plan discussed today     Outreach Frequency: 2 weeks  Future Appointments              [facility]    Today Laurie Conteh MD [facility] LUCINDA Santana          Plan:  Last care coordination the end of October.  CC informed daughter that will open to care coordination and to a follow up call in 1-2 business days   [facility]     Donna Marie RN Care Coordinator   [facility] / [facility] -[facility]   Phone: [phone]  Email :  [email or web address]

## 2020-12-08 DIAGNOSIS — E21.3 HYPERPARATHYROIDISM, UNSPECIFIED (H): Primary | ICD-10-CM

## 2020-12-08 DIAGNOSIS — N18.32 CHRONIC KIDNEY DISEASE (CKD) STAGE G3B/A1, MODERATELY DECREASED GLOMERULAR FILTRATION RATE (GFR) BETWEEN 30-44 ML/MIN/1.73 SQUARE METER AND ALBUMINURIA CREATININE RATIO LESS THAN 30 MG/G (H): ICD-10-CM

## 2020-12-08 PROBLEM — R63.0 POOR APPETITE: Status: ACTIVE | Noted: 2020-12-08

## 2020-12-08 PROBLEM — E55.9 VITAMIN D DEFICIENCY: Status: ACTIVE | Noted: 2020-12-08

## 2020-12-09 ENCOUNTER — TRANSFERRED RECORDS (OUTPATIENT)
Dept: HEALTH INFORMATION MANAGEMENT | Facility: CLINIC | Age: 85
End: 2020-12-09

## 2021-01-06 ENCOUNTER — TELEPHONE (OUTPATIENT)
Dept: FAMILY MEDICINE | Facility: CLINIC | Age: 86
End: 2021-01-06

## 2021-01-06 DIAGNOSIS — M62.81 GENERALIZED MUSCLE WEAKNESS: Primary | ICD-10-CM

## 2021-01-06 NOTE — TELEPHONE ENCOUNTER
TO PCP:     See below - pt requesting faxed referral for OT/PT for post-COVID19 weakness to living facility     See pended orders    Zuri DENISE RN

## 2021-01-06 NOTE — TELEPHONE ENCOUNTER
Reason for Call: Request for an order for PT and OT    Order or referral being requested: Physical therapy and Occupational therapy after covid for weakness    Date needed: asap    Has the patient been seen by the PCP for this problem? Not Applicable    Additional comments: [facility] is calling asking if an order can be faxed to them at [phone] and they will be able to provide the OT and PT there.      [facility] phone: [phone]      Phone number Patient can be reached at:  [phone]    Best Time:  any    Can we leave a detailed message on this number?  YES    Call taken on 1/6/2021 at 11:46 AM by Shelton Ozuna

## 2021-01-07 NOTE — TELEPHONE ENCOUNTER
[facility] and OT [facility] at [phone]. Informed MOJGAN Millan this was done.

## 2021-02-21 ENCOUNTER — HOSPITAL ENCOUNTER (INPATIENT)
Facility: CLINIC | Age: 86
LOS: 4 days | Discharge: NURSING, REHAB OR LONG-TERM CARE | DRG: 291 | End: 2021-02-25
Attending: EMERGENCY MEDICINE | Admitting: INTERNAL MEDICINE
Payer: MEDICARE

## 2021-02-21 ENCOUNTER — APPOINTMENT (OUTPATIENT)
Dept: GENERAL RADIOLOGY | Facility: CLINIC | Age: 86
DRG: 291 | End: 2021-02-21
Attending: EMERGENCY MEDICINE
Payer: MEDICARE

## 2021-02-21 DIAGNOSIS — R06.02 SHORTNESS OF BREATH: ICD-10-CM

## 2021-02-21 DIAGNOSIS — I10 HYPERTENSION GOAL BP (BLOOD PRESSURE) < 140/90: Primary | Chronic | ICD-10-CM

## 2021-02-21 DIAGNOSIS — E04.1 THYROID NODULE: ICD-10-CM

## 2021-02-21 DIAGNOSIS — E87.70 HYPERVOLEMIA, UNSPECIFIED HYPERVOLEMIA TYPE: ICD-10-CM

## 2021-02-21 DIAGNOSIS — N30.00 ACUTE CYSTITIS WITHOUT HEMATURIA: ICD-10-CM

## 2021-02-21 LAB
ANION GAP SERPL CALCULATED.3IONS-SCNC: 6 MMOL/L (ref 3–14)
BASE DEFICIT BLDV-SCNC: 6.4 MMOL/L
BASOPHILS # BLD AUTO: 0 10E9/L (ref 0–0.2)
BASOPHILS NFR BLD AUTO: 0.3 %
BUN SERPL-MCNC: 37 MG/DL (ref 7–30)
CALCIUM SERPL-MCNC: 9.3 MG/DL (ref 8.5–10.1)
CHLORIDE SERPL-SCNC: 118 MMOL/L (ref 94–109)
CO2 SERPL-SCNC: 20 MMOL/L (ref 20–32)
CREAT SERPL-MCNC: 1.41 MG/DL (ref 0.52–1.04)
DIFFERENTIAL METHOD BLD: ABNORMAL
EOSINOPHIL # BLD AUTO: 0 10E9/L (ref 0–0.7)
EOSINOPHIL NFR BLD AUTO: 0.9 %
ERYTHROCYTE [DISTWIDTH] IN BLOOD BY AUTOMATED COUNT: 16.6 % (ref 10–15)
GFR SERPL CREATININE-BSD FRML MDRD: 34 ML/MIN/{1.73_M2}
GLUCOSE SERPL-MCNC: 106 MG/DL (ref 70–99)
HCO3 BLDV-SCNC: 19 MMOL/L (ref 21–28)
HCT VFR BLD AUTO: 32.6 % (ref 35–47)
HGB BLD-MCNC: 9.8 G/DL (ref 11.7–15.7)
IMM GRANULOCYTES # BLD: 0 10E9/L (ref 0–0.4)
IMM GRANULOCYTES NFR BLD: 0.6 %
INTERPRETATION ECG - MUSE: NORMAL
LYMPHOCYTES # BLD AUTO: 0.7 10E9/L (ref 0.8–5.3)
LYMPHOCYTES NFR BLD AUTO: 20.9 %
MCH RBC QN AUTO: 32.9 PG (ref 26.5–33)
MCHC RBC AUTO-ENTMCNC: 30.1 G/DL (ref 31.5–36.5)
MCV RBC AUTO: 109 FL (ref 78–100)
MONOCYTES # BLD AUTO: 0.6 10E9/L (ref 0–1.3)
MONOCYTES NFR BLD AUTO: 17.6 %
NEUTROPHILS # BLD AUTO: 2 10E9/L (ref 1.6–8.3)
NEUTROPHILS NFR BLD AUTO: 59.7 %
NRBC # BLD AUTO: 0 10*3/UL
NRBC BLD AUTO-RTO: 0 /100
NT-PROBNP SERPL-MCNC: ABNORMAL PG/ML (ref 0–1800)
O2/TOTAL GAS SETTING VFR VENT: ABNORMAL %
OXYHGB MFR BLDV: 74 %
PCO2 BLDV: 35 MM HG (ref 40–50)
PH BLDV: 7.34 PH (ref 7.32–7.43)
PLATELET # BLD AUTO: 166 10E9/L (ref 150–450)
PO2 BLDV: 44 MM HG (ref 25–47)
POTASSIUM SERPL-SCNC: 5.1 MMOL/L (ref 3.4–5.3)
POTASSIUM SERPL-SCNC: 5.5 MMOL/L (ref 3.4–5.3)
POTASSIUM SERPL-SCNC: 5.8 MMOL/L (ref 3.4–5.3)
RBC # BLD AUTO: 2.98 10E12/L (ref 3.8–5.2)
SODIUM SERPL-SCNC: 144 MMOL/L (ref 133–144)
TROPONIN I SERPL-MCNC: <0.015 UG/L (ref 0–0.04)
WBC # BLD AUTO: 3.3 10E9/L (ref 4–11)

## 2021-02-21 PROCEDURE — 84132 ASSAY OF SERUM POTASSIUM: CPT | Performed by: INTERNAL MEDICINE

## 2021-02-21 PROCEDURE — 210N000002 HC R&B HEART CARE

## 2021-02-21 PROCEDURE — 99285 EMERGENCY DEPT VISIT HI MDM: CPT | Mod: 25

## 2021-02-21 PROCEDURE — 93005 ELECTROCARDIOGRAM TRACING: CPT

## 2021-02-21 PROCEDURE — 96374 THER/PROPH/DIAG INJ IV PUSH: CPT

## 2021-02-21 PROCEDURE — 250N000013 HC RX MED GY IP 250 OP 250 PS 637: Performed by: INTERNAL MEDICINE

## 2021-02-21 PROCEDURE — 82805 BLOOD GASES W/O2 SATURATION: CPT | Performed by: EMERGENCY MEDICINE

## 2021-02-21 PROCEDURE — 99223 1ST HOSP IP/OBS HIGH 75: CPT | Mod: AI | Performed by: INTERNAL MEDICINE

## 2021-02-21 PROCEDURE — 250N000011 HC RX IP 250 OP 636: Performed by: EMERGENCY MEDICINE

## 2021-02-21 PROCEDURE — 84484 ASSAY OF TROPONIN QUANT: CPT | Performed by: EMERGENCY MEDICINE

## 2021-02-21 PROCEDURE — 250N000011 HC RX IP 250 OP 636: Performed by: INTERNAL MEDICINE

## 2021-02-21 PROCEDURE — 36415 COLL VENOUS BLD VENIPUNCTURE: CPT | Performed by: INTERNAL MEDICINE

## 2021-02-21 PROCEDURE — 85025 COMPLETE CBC W/AUTO DIFF WBC: CPT | Performed by: EMERGENCY MEDICINE

## 2021-02-21 PROCEDURE — 71045 X-RAY EXAM CHEST 1 VIEW: CPT

## 2021-02-21 PROCEDURE — 83880 ASSAY OF NATRIURETIC PEPTIDE: CPT | Performed by: EMERGENCY MEDICINE

## 2021-02-21 PROCEDURE — 80048 BASIC METABOLIC PNL TOTAL CA: CPT | Performed by: EMERGENCY MEDICINE

## 2021-02-21 RX ORDER — ONDANSETRON 4 MG/1
4 TABLET, ORALLY DISINTEGRATING ORAL EVERY 6 HOURS PRN
Status: DISCONTINUED | OUTPATIENT
Start: 2021-02-21 | End: 2021-02-25 | Stop reason: HOSPADM

## 2021-02-21 RX ORDER — METOPROLOL SUCCINATE 100 MG/1
100 TABLET, EXTENDED RELEASE ORAL DAILY
Status: DISCONTINUED | OUTPATIENT
Start: 2021-02-22 | End: 2021-02-25 | Stop reason: HOSPADM

## 2021-02-21 RX ORDER — FUROSEMIDE 10 MG/ML
40 INJECTION INTRAMUSCULAR; INTRAVENOUS ONCE
Status: COMPLETED | OUTPATIENT
Start: 2021-02-21 | End: 2021-02-21

## 2021-02-21 RX ORDER — FUROSEMIDE 10 MG/ML
40 INJECTION INTRAMUSCULAR; INTRAVENOUS EVERY 12 HOURS
Status: DISCONTINUED | OUTPATIENT
Start: 2021-02-22 | End: 2021-02-22

## 2021-02-21 RX ORDER — SULFASALAZINE 500 MG/1
500 TABLET ORAL 2 TIMES DAILY
Status: DISCONTINUED | OUTPATIENT
Start: 2021-02-21 | End: 2021-02-25 | Stop reason: HOSPADM

## 2021-02-21 RX ORDER — MIRTAZAPINE 15 MG/1
15 TABLET, FILM COATED ORAL AT BEDTIME
Status: DISCONTINUED | OUTPATIENT
Start: 2021-02-21 | End: 2021-02-25 | Stop reason: HOSPADM

## 2021-02-21 RX ORDER — AMLODIPINE BESYLATE 5 MG/1
5 TABLET ORAL DAILY
Status: DISCONTINUED | OUTPATIENT
Start: 2021-02-22 | End: 2021-02-23

## 2021-02-21 RX ORDER — ALLOPURINOL 100 MG/1
100 TABLET ORAL DAILY
Status: DISCONTINUED | OUTPATIENT
Start: 2021-02-22 | End: 2021-02-25 | Stop reason: HOSPADM

## 2021-02-21 RX ORDER — METOPROLOL SUCCINATE 100 MG/1
100 TABLET, EXTENDED RELEASE ORAL DAILY
Status: DISCONTINUED | OUTPATIENT
Start: 2021-02-22 | End: 2021-02-21

## 2021-02-21 RX ORDER — DILTIAZEM HYDROCHLORIDE 180 MG/1
180 CAPSULE, COATED, EXTENDED RELEASE ORAL DAILY
Status: DISCONTINUED | OUTPATIENT
Start: 2021-02-22 | End: 2021-02-25 | Stop reason: HOSPADM

## 2021-02-21 RX ORDER — PANTOPRAZOLE SODIUM 40 MG/1
40 TABLET, DELAYED RELEASE ORAL
Status: DISCONTINUED | OUTPATIENT
Start: 2021-02-22 | End: 2021-02-25 | Stop reason: HOSPADM

## 2021-02-21 RX ORDER — PREDNISONE 5 MG/1
5 TABLET ORAL DAILY
Status: DISCONTINUED | OUTPATIENT
Start: 2021-02-22 | End: 2021-02-25 | Stop reason: HOSPADM

## 2021-02-21 RX ORDER — LEVOTHYROXINE SODIUM 25 UG/1
25 TABLET ORAL DAILY
Status: DISCONTINUED | OUTPATIENT
Start: 2021-02-22 | End: 2021-02-25 | Stop reason: HOSPADM

## 2021-02-21 RX ORDER — NICOTINE POLACRILEX 4 MG
15-30 LOZENGE BUCCAL
Status: DISCONTINUED | OUTPATIENT
Start: 2021-02-21 | End: 2021-02-25 | Stop reason: HOSPADM

## 2021-02-21 RX ORDER — ONDANSETRON 2 MG/ML
4 INJECTION INTRAMUSCULAR; INTRAVENOUS EVERY 6 HOURS PRN
Status: DISCONTINUED | OUTPATIENT
Start: 2021-02-21 | End: 2021-02-25 | Stop reason: HOSPADM

## 2021-02-21 RX ORDER — HYDRALAZINE HYDROCHLORIDE 50 MG/1
100 TABLET, FILM COATED ORAL
Status: DISCONTINUED | OUTPATIENT
Start: 2021-02-21 | End: 2021-02-25 | Stop reason: HOSPADM

## 2021-02-21 RX ORDER — ACETAMINOPHEN 325 MG/1
650 TABLET ORAL EVERY 4 HOURS PRN
Status: DISCONTINUED | OUTPATIENT
Start: 2021-02-21 | End: 2021-02-25 | Stop reason: HOSPADM

## 2021-02-21 RX ORDER — DEXTROSE MONOHYDRATE 25 G/50ML
25-50 INJECTION, SOLUTION INTRAVENOUS
Status: DISCONTINUED | OUTPATIENT
Start: 2021-02-21 | End: 2021-02-25 | Stop reason: HOSPADM

## 2021-02-21 RX ORDER — AMOXICILLIN 250 MG
1 CAPSULE ORAL 2 TIMES DAILY PRN
Status: DISCONTINUED | OUTPATIENT
Start: 2021-02-21 | End: 2021-02-25 | Stop reason: HOSPADM

## 2021-02-21 RX ORDER — AMOXICILLIN 250 MG
2 CAPSULE ORAL 2 TIMES DAILY PRN
Status: DISCONTINUED | OUTPATIENT
Start: 2021-02-21 | End: 2021-02-25 | Stop reason: HOSPADM

## 2021-02-21 RX ORDER — SODIUM POLYSTYRENE SULFONATE 15 G/60ML
15 SUSPENSION ORAL; RECTAL ONCE
Status: COMPLETED | OUTPATIENT
Start: 2021-02-21 | End: 2021-02-21

## 2021-02-21 RX ADMIN — APIXABAN 2.5 MG: 2.5 TABLET, FILM COATED ORAL at 22:10

## 2021-02-21 RX ADMIN — SULFASALAZINE 500 MG: 500 TABLET ORAL at 22:10

## 2021-02-21 RX ADMIN — FUROSEMIDE 40 MG: 10 INJECTION, SOLUTION INTRAVENOUS at 17:17

## 2021-02-21 RX ADMIN — FUROSEMIDE 40 MG: 10 INJECTION, SOLUTION INTRAVENOUS at 20:42

## 2021-02-21 RX ADMIN — HYDRALAZINE HYDROCHLORIDE 100 MG: 50 TABLET, FILM COATED ORAL at 22:40

## 2021-02-21 RX ADMIN — MIRTAZAPINE 15 MG: 15 TABLET, FILM COATED ORAL at 22:10

## 2021-02-21 RX ADMIN — SODIUM POLYSTYRENE SULFONATE 15 G: 15 SUSPENSION ORAL; RECTAL at 20:42

## 2021-02-21 ASSESSMENT — ENCOUNTER SYMPTOMS
SHORTNESS OF BREATH: 1
COUGH: 1

## 2021-02-21 ASSESSMENT — MIFFLIN-ST. JEOR: SCORE: 1008.81

## 2021-02-21 ASSESSMENT — ACTIVITIES OF DAILY LIVING (ADL): ADLS_ACUITY_SCORE: 28

## 2021-02-21 NOTE — ED NOTES
[facility]  ED Nurse Handoff Report    ED Chief complaint: Shortness of Breath      ED Diagnosis:   Final diagnoses:   Hypervolemia, unspecified hypervolemia type   Shortness of breath       Code Status: to be assessed by admitting provider     Allergies:   Allergies   Allergen Reactions     Oxybutynin Itching     Seasonal Allergies        Patient Story: Patient arrives to the ED today from [facility] with her daughter after her Spo2 was 87% when it was checked earlier today. Patient reports that she has been increasingly short of breath and that she has had issues ambulating without severe issues catching her breath. Patient reports that she has been having a mildly productive cough that she states has worsened in the last few days. Patient is actively working to breath. No o2 needed in the ED. Spo2 has been in the low to mid 90s   Focused Assessment:    Resp: patient reports being very short of breath and states that she has been having a hard time catching her breath with any movement. Patient reports a Hx of covid in December and has had both immunization shots   Cardiac: Diastolic heart failure. No chest pain. Hypertension with systolic BPs in the 160-170s   Neuro:WDL  GI:WDL  : pure wick in place     Treatments and/or interventions provided: lasix   Patient's response to treatments and/or interventions: maintains status     To be done/followed up on inpatient unit: continue to monitor      Does this patient have any cognitive concerns?: none     Activity level - Baseline/Home:  Wheelchair  Activity Level - Current:   Total Care    Patient's Preferred language: English   Needed?: No    Isolation: special precautions   Infection: Patient has a hx of covid however there is no confirmed positive in the chart. Patient retested in ED  Patient tested for COVID 19 prior to admission: YES  Bariatric?: No    Vital Signs:   Vitals:    02/21/21 1605 02/21/21 1610 02/21/21 1615  02/21/21 1620   BP:       Pulse:       Resp:       Temp:       TempSrc:       SpO2: 95% 93% 93% 93%   Weight:       Height:           Cardiac Rhythm:     Was the PSS-3 completed:   Yes  What interventions are required if any?               Family Comments: daughter at bedside. Very informed in care   OBS brochure/video discussed/provided to patient/family: n.a               Name of person given brochure if not patient:  N.a               Relationship to patient: n.a     For the majority of the shift this patient's behavior was Green.   Behavioral interventions performed were none .    ED NURSE PHONE NUMBER: [id number]

## 2021-02-21 NOTE — PHARMACY-ADMISSION MEDICATION HISTORY
Pharmacy Medication History  Admission medication history interview status for the 2/21/2021  admission is complete. See EPIC admission navigator for prior to admission medications     Location of Interview: Phone  Medication history sources: Patient, Patient's home med list and Care Everywhere    Significant changes made to the medication list:  Deleted hydrochlorothiazide, senna      In the past week, patient estimated taking medication this percent of the time: greater than 90%    Additional medication history information:   Patient reports good compliance. Home medlist was provided with doses, frequencies and [facility]    Medication reconciliation completed by provider prior to medication history? No    Time spent in this activity: 25min    Prior to Admission medications    Medication Sig Last Dose Taking? Auth Provider   acetaminophen (TYLENOL) 500 MG tablet Take 1,000 mg by mouth 3 times daily 2/21/2021 at Unknown time Yes Unknown, Entered By History   allopurinol (ZYLOPRIM) 100 MG tablet Take 1 tablet (100 mg) by mouth daily 2/21/2021 at Unknown time Yes Laurie Conteh MD   amLODIPine (NORVASC) 5 MG tablet Take 1 tablet (5 mg) by mouth daily 2/21/2021 at Unknown time Yes Laurie Conteh MD   apixaban ANTICOAGULANT (ELIQUIS) 2.5 MG tablet Take 1 tablet (2.5 mg) by mouth 2 times daily 2/21/2021 at Unknown time Yes Laurie Conteh MD   diltiazem ER (DILT-XR) 180 MG 24 hr capsule Take 1 capsule (180 mg) by mouth daily 2/21/2021 at Unknown time Yes Laurie Conteh MD   hydrALAZINE (APRESOLINE) 100 MG tablet Take 1 tablet (100 mg) by mouth 3 times daily 2/21/2021 at Unknown time Yes Laurie Conteh MD   levothyroxine (SYNTHROID/LEVOTHROID) 25 MCG tablet Take 1 tablet (25 mcg) by mouth daily 2/21/2021 at Unknown time Yes Laurie Conteh MD   metoprolol succinate ER (TOPROL-XL) 100 MG 24 hr tablet Take 1 tablet (100 mg) by mouth daily 2/21/2021 at Unknown time Yes Laurie Conteh MD    mirtazapine (REMERON) 15 MG tablet Take 1 tablet (15 mg) by mouth At Bedtime 2/20/2021 at pm Yes Laurie Conteh MD   pantoprazole (PROTONIX) 40 MG EC tablet Take 1 tablet (40 mg) by mouth every morning (before breakfast) 2/21/2021 at am Yes Laurie Conteh MD   predniSONE (DELTASONE) 5 MG tablet Take 1 tablet (5 mg) by mouth daily 2/21/2021 at Unknown time Yes Laurie Conteh MD   sulfaSALAzine (AZULFIDINE) 500 MG tablet TAKE ONE TABLET BY MOUTH TWICE DAILY AFTER MEALS. 2/21/2021 at Unknown time Yes Reported, Patient   traMADol (ULTRAM) 50 MG tablet Take 1 tablet (50 mg) by mouth every 4 hours as needed for moderate pain prn at prn Yes Nakita Mcrae APRN CNP       The information provided in this note is only as accurate as the sources available at the time of update(s)

## 2021-02-21 NOTE — ED PROVIDER NOTES
History   Chief Complaint:  Shortness of Breath       The history is provided by the patient and a relative (daughter).      Ghislaine Walls is a 85 year old female with history of atrial fibrillation, hyperlipidemia, hypertension, and hypothyroid who presents from [facility] for evaluation of increased shortness of breath and cough in the setting of having oxygen saturations at 87%. The patient's daughter reports that she had COVID in December and has since had ongoing shortness of breath and a cough. She feels that this has increased over the past two days. She denies any fever, vomiting, diarrhea, increased leg swelling or chest pain. The patient is anticoagulated on Eliquis.     Per the patient's daughter, the patient tested positive for COVID in December.       Echocardiogram Results from 7/21/2020:  1. Normal biventricular size and function. Left ventricular ejection fraction of 55-60%. Grade II or moderate diastolic dysfunction.  2. The left atrium is severely dilated. Right atrial size is normal.  3. No hemodynamically significant valvular disease.  4. Normal pulmonary artery pressure.  Compared to the previous echocardiogram on 10/1/2019, there are no significant changes.  Technically difficult study.      Review of Systems   Respiratory: Positive for cough and shortness of breath.    All other systems reviewed and are negative.      Allergies:  Oxybutynin  Seasonal Allergies    Medications:  Allopurinol  Amlodipine  Eliquis  Diltiazem  Hydralazine  Hydrochlorothiazide  Levothyroxine  Metoprolol succinate  Remeron  Protonix  Tramadol  Azulfidine    Past Medical History:     Atrial fibrillation  Hard of hearing   Hyperlipidemia   Hypertension   Hypothyroid   Peripheral artery disease  Renal insufficiency, mild   Gouty arthritis  Inflammatory polyarthropathy  Chronic diastolic heart failure    Past Surgical History:    Appendectomy  Bone marrow biopsy  Cataract bilateral  [place] teeth  "extraction  EGD combined  Tubal ligation  Eye surgery     Family History:    Heart disease  CAD    Social History:  The patient presents to the emergency department with her daughter.  The patient lives at [facility].     Physical Exam     Patient Vitals for the past 24 hrs:   BP Temp Temp src Pulse Resp SpO2 Height Weight   21 1620 -- -- -- -- -- 93 % -- --   21 1615 -- -- -- -- -- 93 % -- --   21 1610 -- -- -- -- -- 93 % -- --   21 1605 -- -- -- -- -- 95 % -- --   21 1600 (!) 166/77 -- -- 59 -- 95 % -- --   21 1516 (!) 164/50 97.9  F (36.6  C) Oral 55 18 93 % 1.6 m (5' 3\") 59 kg (130 lb)       Physical Exam  VS: Reviewed per above  HENT: normal speech  EYES: sclera anicteric  CV: Rate as noted, regular rhythm.   RESP: Effort normal. Breath sounds are normal bilaterally.  GI: no tenderness/rebound/guarding, not distended.  NEURO: Alert, moving all extremities  MSK: No deformity of the extremities, no lower extremity asymmetry  SKIN: Warm and dry    Emergency Department Course     ECG:  ECG taken at 1610, ECG read at 1617  Sinus bradycardia with 1st degree AV block.  Left axis deviation.  Minimal voltage criteria for LVH, may be normal variant.   Anterior infarct, age undetermined.  Abnormal ECG.  No change as compared to prior EKG dated 20   Rate 58 bpm. HI interval 246 ms. QRS duration 88 ms. QT/QTc 448/439 ms. P-R-T axis 37 -37 54.       Imaging:  XR Chest Port 1 View:  New small right greater than left pleural fluid. Patchy bibasilar new consolidation may be atelectasis or airspace disease. Mildly increased vascular congestion suggesting edema. Stable cardiac silhouette.  As per radiology.     Laboratory:  CBC: WBC: 3.3 (low), HB.8 (low), PLT: 166    BMP: Potassium: 5.5 (high), Chloride: 118 (high), Urea Nitrogen: 37 (high), Glucose: 106 (high), Creatinine: 1.41 (high), GFR: 34 (high), o/w WNL     Troponin (1546): <0.015    VBG and oxyhgb: pH 7.34 / PCO2 " 35 (low) / PO2 44 / Bicarb 19 (low) / FlO2 0L / Oxyhemoglobin 74 / Base excess 6.4    BNP: 17,600 (high)    Emergency Department Course:    Reviewed:  1550: I reviewed the patient's nursing notes, vitals, past medical records, Care Everywhere.     Assessments:  1555: I assessed the patient and performed a physical exam at this time.  1650: I reassessed the patient and informed them of their workup thus far. I recommended admission at this time and the patient consented.     Consults:   1653 I consulted with Dr. Marcelino, hospitalist, regarding the patient's history and presentation here in the emergency department who accepted the patient for admission.     Interventions:  1717 Lasix 40mg IV    Disposition:  The patient was admitted to the hospital under the care of Dr. Marcelino.     Impression & Plan     Covid-19  Ghislaine Walls was evaluated during a global COVID-19 pandemic, which necessitated consideration that the patient might be at risk for infection with the SARS-CoV-2 virus that causes COVID-19.   Applicable protocols for evaluation were followed during the patient's care.   COVID-19 was considered as part of the patient's evaluation. The plan for testing is:  a test was obtained at a prior visit and was considered today.     Medical Decision Making:  Patient presents to the ER for evaluation of progressive shortness of breath, possible hypoxemia at assisted living today.  On arrival vital signs are within normal range with SPO2 in the mid 90s on room air.  Exam is largely unremarkable.  EKG is sinus rhythm without specific ischemic change and a single troponin is negative.  No chest pain to suggest ACS.  BNP is elevated over 17,000 and chest x-ray shows findings concerning for pulmonary edema.  No fever or new cough to suggest underlying pneumonia.  Patient's daughter who came with pt to the ER confirms that she had a positive Covid19 test at [facility] at the end of this December.  In  discussion with admitting hospitalist, given positive test within 90 days, plan to not retest at this time.  Patient was given IV Lasix here in the ER due to concern for volume overload tripping to her shortness of breath.  She remained stable in the ER during my shift.    Diagnosis:    ICD-10-CM    1. Hypervolemia, unspecified hypervolemia type  E87.70 CANCELED: Symptomatic Influenza A/B & SARS-CoV2 (COVID-19) Virus PCR Multiplex   2. Shortness of breath  R06.02        Scribe Disclosure:  I, Corey Rogel, am serving as a scribe at 3:50 PM on 2/21/2021 to document services personally performed by Harry Reynoso MD based on my observations and the provider's statements to me.          Harry Reynoso MD  02/21/21 [id number]

## 2021-02-21 NOTE — H&P
[facility]    History and Physical  Hospitalist       Date of Admission:  2/21/2021    Assessment & Plan   Ghislaine Walls is a 85 year old female who presents with dyspnea.    Acute exacerbation of chronic heart failure with preserved EF with hypoxia.  Paroxysmal atrial fibrillation  Essential hypertension  -Known history of heart failure with preserved EF.  Last echo from 7/22/2020 shows an EF of 55-60% with grade 2 or moderate diastolic dysfunction.  -O2 sats at [facility] living facility was 87%.  -Continue Lasix 40 mg IV twice a day  -Echo in a.m.  -Monitor intake and output  -Cardiology consult in morning  -Continue prior to admission Toprol- mg daily, amlodipine 5 mg daily and diltiazem  mg daily  -Hold hydrochlorothiazide for now while on IV Lasix  -Continue apixaban 2.5 mg twice a day for A. fib.    Chronic kidney disease stage III with mild hyperkalemia  -Recheck potassium in the morning  -Creatinine appears to be around baseline  -Monitor creatinine closely while on IV diuresis    Hypothyroidism  -Continue prior to admission levothyroxine    History of gout  -No active issues at the moment, continue allopurinol    Chronic normocytic anemia  MGUS  -Hemoglobin at baseline, no active issues.    Recovered COVID-19.   -Last positive December 26, 2020.  Recovered.  No need for retesting    DVT Prophylaxis: Eliquis  Code Status: DNR / DNI    Disposition: Expected discharge in 2+days    Rodo Marcelino MD    Primary Care Physician   Laurie Conteh    Chief Complaint   Dyspnea    History is obtained from the patient and her daughter at bedside.    History of Present Illness   Ghislaine Walls is a 85 year old female with history of recent COVID-19 infection towards the end of December, atrial fibrillation, hyperlipidemia, hypertension, heart failure preserved EF and hypothyroid who presents from [facility] for evaluation of increased shortness of breath.  Patient  reports 2 days history of worsening dyspnea.  She already has some baseline dyspnea ever since her Covid 19 diagnosis at the end of December which got worse in the last few days.  Today her oxygen saturations was noted to be low at 87%.  She also has chronic cough which is not significantly changed.  She does notice that her lower extremity edema is slightly worse than baseline.  She denies any chest pain, no fever or chills.  No nausea vomiting diarrhea.  No dysuria urinary urgency or frequency.  Denies hematochezia or melena.  She is anticoagulated with Eliquis for her A. Fib.  She also tested positive for COVID-19 on December 26, 2020, this was confirmed with staff at [place].  She is considered recovered.  In the emergency room she was found to have BNP in excess of 17,000, chest x-ray consistent with vascular congestion.  She was started on IV diuretics.       Past Medical History    I have reviewed this patient's medical history and updated it with pertinent information if needed.   Past Medical History:   Diagnosis Date     Atrial fibrillation (H) new 10/19     [place] (hard of hearing)     wears hearing aids     Hyperlipidaemia      Hyperlipidaemia LDL goal < 130      Hypertension      Hypothyroid      PAD (peripheral artery disease) (H)      Renal insufficiency, mild      Uncontrolled hypertension 10/14/2019       Past Surgical History   I have reviewed this patient's surgical history and updated it with pertinent information if needed.  Past Surgical History:   Procedure Laterality Date     APPENDECTOMY  1951     BONE MARROW BIOPSY, BONE SPECIMEN, NEEDLE/TROCAR N/A 7/31/2020    Procedure: bone marrow biopsy;  Surgeon: Iris Cameron MD;  Location:  GI     CATARACT IOL, RT/LT  2001    bilateral (laser - 5/2013)     DENTAL SURGERY  1962    [place]     ESOPHAGOSCOPY, GASTROSCOPY, DUODENOSCOPY (EGD), COMBINED N/A 7/24/2020    Procedure: ESOPHAGOGASTRODUODENOSCOPY, WITH BIOPSY;  Surgeon: Humberto Bailon,  MD;  Location:  GI     EYE SURGERY  1956    Muscle release     TUBAL LIGATION  1971       Prior to Admission Medications   Prior to Admission Medications   Prescriptions Last Dose Informant Patient Reported? Taking?   acetaminophen (TYLENOL) 500 MG tablet  Self Yes No   Sig: Take 1,000 mg by mouth 3 times daily   allopurinol (ZYLOPRIM) 100 MG tablet   No No   Sig: Take 1 tablet (100 mg) by mouth daily   amLODIPine (NORVASC) 5 MG tablet   No No   Sig: Take 1 tablet (5 mg) by mouth daily   apixaban ANTICOAGULANT (ELIQUIS) 2.5 MG tablet   No No   Sig: Take 1 tablet (2.5 mg) by mouth 2 times daily   diltiazem ER (DILT-XR) 180 MG 24 hr capsule   No No   Sig: Take 1 capsule (180 mg) by mouth daily   hydrALAZINE (APRESOLINE) 100 MG tablet   No No   Sig: Take 1 tablet (100 mg) by mouth 3 times daily   hydrochlorothiazide (HYDRODIURIL) 12.5 MG tablet   No No   Sig: Take 1 tablet (12.5 mg) by mouth daily   levothyroxine (SYNTHROID/LEVOTHROID) 25 MCG tablet  Self No No   Sig: Take 1 tablet (25 mcg) by mouth daily   metoprolol succinate ER (TOPROL-XL) 100 MG 24 hr tablet   No No   Sig: Take 1 tablet (100 mg) by mouth daily   mirtazapine (REMERON) 15 MG tablet   No No   Sig: Take 1 tablet (15 mg) by mouth At Bedtime   pantoprazole (PROTONIX) 40 MG EC tablet   No No   Sig: Take 1 tablet (40 mg) by mouth every morning (before breakfast)   predniSONE (DELTASONE) 5 MG tablet   No No   Sig: Take 1 tablet (5 mg) by mouth daily   senna-docusate (SENOKOT-S/PERICOLACE) 8.6-50 MG tablet   Yes No   Sig: Take 2 tablets by mouth daily   sulfaSALAzine (AZULFIDINE) 500 MG tablet   Yes No   Sig: TAKE ONE TABLET BY MOUTH TWICE DAILY AFTER MEALS.   traMADol (ULTRAM) 50 MG tablet   No No   Sig: Take 1 tablet (50 mg) by mouth every 4 hours as needed for moderate pain      Facility-Administered Medications: None     Allergies   Allergies   Allergen Reactions     Oxybutynin Itching     Seasonal Allergies        Social History   I have reviewed this  patient's social history and updated it with pertinent information if needed. Ghislaine Walls  reports that she quit smoking about 48 years ago. Her smoking use included cigarettes. She has a 2.50 pack-year smoking history. She has never used smokeless tobacco. She reports current alcohol use. She reports that she does not use drugs.    Family History   I have reviewed this patient's family history and updated it with pertinent information if needed.   Family History   Problem Relation Age of Onset     Heart Disease Mother      Respiratory Mother      Heart Disease Father      Heart Disease Brother      Coronary Artery Disease Brother         CAB     Heart Disease Brother      Coronary Artery Disease Brother         CAB       Review of Systems   The 10 point Review of Systems is negative other than noted in the HPI or here.     Physical Exam   Temp: 97.9  F (36.6  C) Temp src: Oral BP: (!) 166/77 Pulse: 59   Resp: 18 SpO2: 93 %      Vital Signs with Ranges  Temp:  [97.9  F (36.6  C)] 97.9  F (36.6  C)  Pulse:  [55-59] 59  Resp:  [18] 18  BP: (164-166)/(50-77) 166/77  SpO2:  [93 %-95 %] 93 %  130 lbs 0 oz    Gen: AAOX3, NAD  HEENT: Moist oral mucosa, no pallor or icterus  Neck:  good ROM, no stridor  Resp: Decreased air entry bilaterally, few crackles bilaterally.  Normal effort of breathing.  CVS- RRR, no murmur, no edema  Abd/GI: Soft, non-tender. BS- normoactive  Skin- Warm, dry, no rashes  MSK: 1+ lower extremity edema  Neuro- CN- intact. Moving X 4; No focal deficits.     Data   Data reviewed today:  I personally reviewed the EKG tracing showing Sinus bradycardia with first-degree AV block.  Recent Labs   Lab 02/21/21  1546   WBC 3.3*   HGB 9.8*   *         POTASSIUM 5.5*   CHLORIDE 118*   CO2 20   BUN 37*   CR 1.41*   ANIONGAP 6   ALIVIA 9.3   *   TROPI <0.015       Recent Results (from the past 24 hour(s))   XR Chest Port 1 View    Narrative    CHEST PORTABLE ONE VIEW   2/21/2021 4:19  PM     HISTORY: Hypoxemia.    COMPARISON: 7/21/2020.      Impression    IMPRESSION: New small right greater than left pleural fluid. Patchy  bibasilar new consolidation may be atelectasis or airspace disease.  Mildly increased vascular congestion suggesting edema. Stable cardiac  silhouette.

## 2021-02-22 ENCOUNTER — APPOINTMENT (OUTPATIENT)
Dept: OCCUPATIONAL THERAPY | Facility: CLINIC | Age: 86
DRG: 291 | End: 2021-02-22
Attending: INTERNAL MEDICINE
Payer: MEDICARE

## 2021-02-22 ENCOUNTER — APPOINTMENT (OUTPATIENT)
Dept: CARDIOLOGY | Facility: CLINIC | Age: 86
DRG: 291 | End: 2021-02-22
Attending: INTERNAL MEDICINE
Payer: MEDICARE

## 2021-02-22 ENCOUNTER — APPOINTMENT (OUTPATIENT)
Dept: CT IMAGING | Facility: CLINIC | Age: 86
DRG: 291 | End: 2021-02-22
Attending: INTERNAL MEDICINE
Payer: MEDICARE

## 2021-02-22 LAB
ANION GAP SERPL CALCULATED.3IONS-SCNC: 6 MMOL/L (ref 3–14)
BUN SERPL-MCNC: 35 MG/DL (ref 7–30)
CALCIUM SERPL-MCNC: 8.9 MG/DL (ref 8.5–10.1)
CHLORIDE SERPL-SCNC: 116 MMOL/L (ref 94–109)
CO2 SERPL-SCNC: 21 MMOL/L (ref 20–32)
CREAT SERPL-MCNC: 1.42 MG/DL (ref 0.52–1.04)
ERYTHROCYTE [DISTWIDTH] IN BLOOD BY AUTOMATED COUNT: 16.2 % (ref 10–15)
GFR SERPL CREATININE-BSD FRML MDRD: 34 ML/MIN/{1.73_M2}
GLUCOSE SERPL-MCNC: 85 MG/DL (ref 70–99)
HCT VFR BLD AUTO: 28.7 % (ref 35–47)
HGB BLD-MCNC: 8.8 G/DL (ref 11.7–15.7)
MCH RBC QN AUTO: 33 PG (ref 26.5–33)
MCHC RBC AUTO-ENTMCNC: 30.7 G/DL (ref 31.5–36.5)
MCV RBC AUTO: 108 FL (ref 78–100)
PLATELET # BLD AUTO: 174 10E9/L (ref 150–450)
POTASSIUM SERPL-SCNC: 4.6 MMOL/L (ref 3.4–5.3)
POTASSIUM SERPL-SCNC: 4.7 MMOL/L (ref 3.4–5.3)
RBC # BLD AUTO: 2.67 10E12/L (ref 3.8–5.2)
SODIUM SERPL-SCNC: 143 MMOL/L (ref 133–144)
WBC # BLD AUTO: 3.8 10E9/L (ref 4–11)

## 2021-02-22 PROCEDURE — 71250 CT THORAX DX C-: CPT | Mod: ME

## 2021-02-22 PROCEDURE — 97165 OT EVAL LOW COMPLEX 30 MIN: CPT | Mod: GO | Performed by: OCCUPATIONAL THERAPIST

## 2021-02-22 PROCEDURE — 36415 COLL VENOUS BLD VENIPUNCTURE: CPT | Performed by: INTERNAL MEDICINE

## 2021-02-22 PROCEDURE — 255N000002 HC RX 255 OP 636: Performed by: INTERNAL MEDICINE

## 2021-02-22 PROCEDURE — 210N000002 HC R&B HEART CARE

## 2021-02-22 PROCEDURE — 250N000011 HC RX IP 250 OP 636: Performed by: INTERNAL MEDICINE

## 2021-02-22 PROCEDURE — 250N000012 HC RX MED GY IP 250 OP 636 PS 637: Performed by: INTERNAL MEDICINE

## 2021-02-22 PROCEDURE — 99233 SBSQ HOSP IP/OBS HIGH 50: CPT | Performed by: INTERNAL MEDICINE

## 2021-02-22 PROCEDURE — 80048 BASIC METABOLIC PNL TOTAL CA: CPT | Performed by: INTERNAL MEDICINE

## 2021-02-22 PROCEDURE — 999N000208 ECHOCARDIOGRAM COMPLETE

## 2021-02-22 PROCEDURE — 250N000013 HC RX MED GY IP 250 OP 250 PS 637: Performed by: INTERNAL MEDICINE

## 2021-02-22 PROCEDURE — 97535 SELF CARE MNGMENT TRAINING: CPT | Mod: GO | Performed by: OCCUPATIONAL THERAPIST

## 2021-02-22 PROCEDURE — 99232 SBSQ HOSP IP/OBS MODERATE 35: CPT | Mod: 25 | Performed by: INTERNAL MEDICINE

## 2021-02-22 PROCEDURE — 93306 TTE W/DOPPLER COMPLETE: CPT | Mod: 26 | Performed by: INTERNAL MEDICINE

## 2021-02-22 PROCEDURE — 85027 COMPLETE CBC AUTOMATED: CPT | Performed by: INTERNAL MEDICINE

## 2021-02-22 PROCEDURE — 84132 ASSAY OF SERUM POTASSIUM: CPT | Performed by: INTERNAL MEDICINE

## 2021-02-22 RX ORDER — FUROSEMIDE 10 MG/ML
40 INJECTION INTRAMUSCULAR; INTRAVENOUS EVERY 8 HOURS
Status: DISCONTINUED | OUTPATIENT
Start: 2021-02-22 | End: 2021-02-23

## 2021-02-22 RX ORDER — HYDRALAZINE HYDROCHLORIDE 20 MG/ML
10 INJECTION INTRAMUSCULAR; INTRAVENOUS ONCE
Status: COMPLETED | OUTPATIENT
Start: 2021-02-22 | End: 2021-02-22

## 2021-02-22 RX ORDER — HYDRALAZINE HYDROCHLORIDE 20 MG/ML
10 INJECTION INTRAMUSCULAR; INTRAVENOUS EVERY 4 HOURS PRN
Status: DISCONTINUED | OUTPATIENT
Start: 2021-02-22 | End: 2021-02-22

## 2021-02-22 RX ORDER — HYDRALAZINE HYDROCHLORIDE 20 MG/ML
20 INJECTION INTRAMUSCULAR; INTRAVENOUS EVERY 4 HOURS PRN
Status: DISCONTINUED | OUTPATIENT
Start: 2021-02-22 | End: 2021-02-25 | Stop reason: HOSPADM

## 2021-02-22 RX ADMIN — APIXABAN 2.5 MG: 2.5 TABLET, FILM COATED ORAL at 21:53

## 2021-02-22 RX ADMIN — FUROSEMIDE 40 MG: 10 INJECTION, SOLUTION INTRAVENOUS at 21:53

## 2021-02-22 RX ADMIN — METOPROLOL SUCCINATE 100 MG: 100 TABLET, EXTENDED RELEASE ORAL at 08:56

## 2021-02-22 RX ADMIN — PANTOPRAZOLE SODIUM 40 MG: 40 TABLET, DELAYED RELEASE ORAL at 08:56

## 2021-02-22 RX ADMIN — SULFASALAZINE 500 MG: 500 TABLET ORAL at 21:53

## 2021-02-22 RX ADMIN — PREDNISONE 5 MG: 5 TABLET ORAL at 08:56

## 2021-02-22 RX ADMIN — HYDRALAZINE HYDROCHLORIDE 10 MG: 20 INJECTION INTRAMUSCULAR; INTRAVENOUS at 01:33

## 2021-02-22 RX ADMIN — HYDRALAZINE HYDROCHLORIDE 100 MG: 50 TABLET, FILM COATED ORAL at 08:56

## 2021-02-22 RX ADMIN — MIRTAZAPINE 15 MG: 15 TABLET, FILM COATED ORAL at 21:53

## 2021-02-22 RX ADMIN — HYDRALAZINE HYDROCHLORIDE 100 MG: 50 TABLET, FILM COATED ORAL at 20:02

## 2021-02-22 RX ADMIN — HYDRALAZINE HYDROCHLORIDE 10 MG: 20 INJECTION INTRAMUSCULAR; INTRAVENOUS at 02:43

## 2021-02-22 RX ADMIN — SULFASALAZINE 500 MG: 500 TABLET ORAL at 08:55

## 2021-02-22 RX ADMIN — HYDRALAZINE HYDROCHLORIDE 100 MG: 50 TABLET, FILM COATED ORAL at 13:53

## 2021-02-22 RX ADMIN — AMLODIPINE BESYLATE 5 MG: 5 TABLET ORAL at 08:55

## 2021-02-22 RX ADMIN — DILTIAZEM HYDROCHLORIDE 180 MG: 180 CAPSULE, COATED, EXTENDED RELEASE ORAL at 08:56

## 2021-02-22 RX ADMIN — HUMAN ALBUMIN MICROSPHERES AND PERFLUTREN 9 ML: 10; .22 INJECTION, SOLUTION INTRAVENOUS at 11:57

## 2021-02-22 RX ADMIN — ALLOPURINOL 100 MG: 100 TABLET ORAL at 08:55

## 2021-02-22 RX ADMIN — FUROSEMIDE 40 MG: 10 INJECTION, SOLUTION INTRAVENOUS at 13:53

## 2021-02-22 RX ADMIN — FUROSEMIDE 40 MG: 10 INJECTION, SOLUTION INTRAVENOUS at 05:30

## 2021-02-22 RX ADMIN — APIXABAN 2.5 MG: 2.5 TABLET, FILM COATED ORAL at 08:56

## 2021-02-22 RX ADMIN — LEVOTHYROXINE SODIUM 25 MCG: 25 TABLET ORAL at 08:55

## 2021-02-22 ASSESSMENT — ACTIVITIES OF DAILY LIVING (ADL)
TOILETING_ISSUES: NO
ADLS_ACUITY_SCORE: 27
WEAR_GLASSES_OR_BLIND: YES
DOING_ERRANDS_INDEPENDENTLY_DIFFICULTY: YES
ADLS_ACUITY_SCORE: 27
ADLS_ACUITY_SCORE: 27
CONCENTRATING,_REMEMBERING_OR_MAKING_DECISIONS_DIFFICULTY: NO
ADLS_ACUITY_SCORE: 23
DIFFICULTY_EATING/SWALLOWING: NO
PREVIOUS_RESPONSIBILITIES: LAUNDRY;MEDICATION MANAGEMENT
EQUIPMENT_CURRENTLY_USED_AT_HOME: WHEELCHAIR, MANUAL
ADLS_ACUITY_SCORE: 23
DRESSING/BATHING_DIFFICULTY: NO
VISION_MANAGEMENT: GLASSES
FALL_HISTORY_WITHIN_LAST_SIX_MONTHS: NO
ADLS_ACUITY_SCORE: 23
DIFFICULTY_COMMUNICATING: NO

## 2021-02-22 ASSESSMENT — MIFFLIN-ST. JEOR: SCORE: 942.13

## 2021-02-22 NOTE — CONSULTS
[facility]    Cardiology Consultation     Date of Admission:  2/21/2021    Assessment & Plan     In summary this is an 85 year old female AFIB, HTN, HLD, HFpEF, hypothyroid here with SOB c/w acute on chronic CHF exacerbation.     1. AFIB: continue rate control with diltiazem  mg daily and Toprol 100 mg daily  -monitor on tele   -anticoagulation with apixaban 2.5 mg twice a day    2. HTN: controlled, on Toprol, amlodipine, diltiazem. Holding hydrochlorothiazide.     3. HFpEF:   -rechecked echocardiogram: no myopericarditis or pericardial effusion, findings c/w HFpEF exacerbation   -agree with IV lasix 40 mg twice a day, diuresed until cr elevation.   -I's/O's, daily weights   -PTA toprol  mg daily  -agree with thoracentesis     4. HLD: no on statin    5. COVID: PCR negative for acute infection     Will continue to follow.     Nicole De La Cruz MD    Code Status    Prior    Reason for Consult     Dyspnea    Primary Care Physician   Laurie Conteh     Primary cardiologist: Gina     Chief Complaint    Dyspnea     History of Present Illness     This is an 85 year old female AFIB, HTN, HLD, HFpEF, hypothyroid here with SOB. She is also with history of recent COVID-19 infection towards the end of December.     She has been dyspneic for two days but has chronic SOB since COVID in December. She was found to be hypoxic at [place], to 87%. Associated with LE edema, no palpitations. She is on apixaban for AFIB. In the emergency room she was found to have BNP in excess of 17,000, chest x-ray consistent with vascular congestion.  She was started on IV diuretics and admitted to medicine.     Seen in virtual visit by Dr. Fuentes in March. Increased lisinopril to 40 mg daily. Referred for stress testing which she did not have done. She had an echocardiogram 7/2020 and none since her COVID infection. This at the time showed Grade II diastolic dysfunction and normal LVEF. Severely dilated left atrium.  Normal pulmonary pressures.      COVID repeat testing negative.       Past Medical History   I have reviewed this patient's medical history and updated it with pertinent information if needed.   Past Medical History:   Diagnosis Date     Atrial fibrillation (H) new 10/19     [place] (hard of hearing)     wears hearing aids     Hyperlipidaemia      Hyperlipidaemia LDL goal < 130      Hypertension      Hypothyroid      PAD (peripheral artery disease) (H)      Renal insufficiency, mild      Uncontrolled hypertension 10/14/2019       Past Surgical History   I have reviewed this patient's surgical history and updated it with pertinent information if needed.  Past Surgical History:   Procedure Laterality Date     APPENDECTOMY  1951     BONE MARROW BIOPSY, BONE SPECIMEN, NEEDLE/TROCAR N/A 7/31/2020    Procedure: bone marrow biopsy;  Surgeon: Iris Cameron MD;  Location:  GI     CATARACT IOL, RT/LT  2001    bilateral (laser - 5/2013)     DENTAL SURGERY  1962    wisdom     ESOPHAGOSCOPY, GASTROSCOPY, DUODENOSCOPY (EGD), COMBINED N/A 7/24/2020    Procedure: ESOPHAGOGASTRODUODENOSCOPY, WITH BIOPSY;  Surgeon: Humberto Bailon MD;  Location:  GI     EYE SURGERY  1956    Muscle release     TUBAL LIGATION  1971       Prior to Admission Medications   Prior to Admission Medications   Prescriptions Last Dose Informant Patient Reported? Taking?   acetaminophen (TYLENOL) 500 MG tablet 2/21/2021 at Unknown time Self Yes Yes   Sig: Take 1,000 mg by mouth 3 times daily   allopurinol (ZYLOPRIM) 100 MG tablet 2/21/2021 at Unknown time  No Yes   Sig: Take 1 tablet (100 mg) by mouth daily   amLODIPine (NORVASC) 5 MG tablet 2/21/2021 at Unknown time  No Yes   Sig: Take 1 tablet (5 mg) by mouth daily   apixaban ANTICOAGULANT (ELIQUIS) 2.5 MG tablet 2/21/2021 at Unknown time  No Yes   Sig: Take 1 tablet (2.5 mg) by mouth 2 times daily   diltiazem ER (DILT-XR) 180 MG 24 hr capsule 2/21/2021 at Unknown time  No Yes   Sig: Take 1 capsule  (180 mg) by mouth daily   hydrALAZINE (APRESOLINE) 100 MG tablet 2/21/2021 at Unknown time  No Yes   Sig: Take 1 tablet (100 mg) by mouth 3 times daily   levothyroxine (SYNTHROID/LEVOTHROID) 25 MCG tablet 2/21/2021 at Unknown time Self No Yes   Sig: Take 1 tablet (25 mcg) by mouth daily   metoprolol succinate ER (TOPROL-XL) 100 MG 24 hr tablet 2/21/2021 at Unknown time  No Yes   Sig: Take 1 tablet (100 mg) by mouth daily   mirtazapine (REMERON) 15 MG tablet 2/20/2021 at pm  No Yes   Sig: Take 1 tablet (15 mg) by mouth At Bedtime   pantoprazole (PROTONIX) 40 MG EC tablet 2/21/2021 at am  No Yes   Sig: Take 1 tablet (40 mg) by mouth every morning (before breakfast)   predniSONE (DELTASONE) 5 MG tablet 2/21/2021 at Unknown time  No Yes   Sig: Take 1 tablet (5 mg) by mouth daily   sulfaSALAzine (AZULFIDINE) 500 MG tablet 2/21/2021 at Unknown time  Yes Yes   Sig: TAKE ONE TABLET BY MOUTH TWICE DAILY AFTER MEALS.   traMADol (ULTRAM) 50 MG tablet prn at prn  No Yes   Sig: Take 1 tablet (50 mg) by mouth every 4 hours as needed for moderate pain      Facility-Administered Medications: None     Allergies   Allergies   Allergen Reactions     Oxybutynin Itching     Seasonal Allergies        Social History   I have reviewed this patient's social history and updated it with pertinent information if needed. Ghislaine Walls  reports that she quit smoking about 48 years ago. Her smoking use included cigarettes. She has a 2.50 pack-year smoking history. She has never used smokeless tobacco. She reports current alcohol use. She reports that she does not use drugs.    Family History   I have reviewed this patient's family history and updated it with pertinent information if needed.   Family History   Problem Relation Age of Onset     Heart Disease Mother      Respiratory Mother      Heart Disease Father      Heart Disease Brother      Coronary Artery Disease Brother         CAB     Heart Disease Brother      Coronary Artery Disease  Brother         CAB       Review of Systems   The 10 point Review of Systems is negative other than noted in the HPI or here.     Physical Exam   Temp: 98.8  F (37.1  C) Temp src: Oral BP: (!) 196/75 Pulse: 64   Resp: 16 SpO2: 94 % O2 Device: Nasal cannula Oxygen Delivery: 2 LPM  Vital Signs with Ranges  Temp:  [97.9  F (36.6  C)-98.8  F (37.1  C)] 98.8  F (37.1  C)  Pulse:  [55-65] 64  Resp:  [14-18] 16  BP: (159-196)/(50-92) 196/75  SpO2:  [90 %-97 %] 94 %  115 lbs 4.8 oz    Physical deferred    Data   Results for orders placed or performed during the hospital encounter of 02/21/21 (from the past 24 hour(s))   CBC with platelets differential   Result Value Ref Range    WBC 3.3 (L) 4.0 - 11.0 10e9/L    RBC Count 2.98 (L) 3.8 - 5.2 10e12/L    Hemoglobin 9.8 (L) 11.7 - 15.7 g/dL    Hematocrit 32.6 (L) 35.0 - 47.0 %     (H) 78 - 100 fl    MCH 32.9 26.5 - 33.0 pg    MCHC 30.1 (L) 31.5 - 36.5 g/dL    RDW 16.6 (H) 10.0 - 15.0 %    Platelet Count 166 150 - 450 10e9/L    Diff Method Automated Method     % Neutrophils 59.7 %    % Lymphocytes 20.9 %    % Monocytes 17.6 %    % Eosinophils 0.9 %    % Basophils 0.3 %    % Immature Granulocytes 0.6 %    Nucleated RBCs 0 0 /100    Absolute Neutrophil 2.0 1.6 - 8.3 10e9/L    Absolute Lymphocytes 0.7 (L) 0.8 - 5.3 10e9/L    Absolute Monocytes 0.6 0.0 - 1.3 10e9/L    Absolute Eosinophils 0.0 0.0 - 0.7 10e9/L    Absolute Basophils 0.0 0.0 - 0.2 10e9/L    Abs Immature Granulocytes 0.0 0 - 0.4 10e9/L    Absolute Nucleated RBC 0.0    Basic metabolic panel   Result Value Ref Range    Sodium 144 133 - 144 mmol/L    Potassium 5.5 (H) 3.4 - 5.3 mmol/L    Chloride 118 (H) 94 - 109 mmol/L    Carbon Dioxide 20 20 - 32 mmol/L    Anion Gap 6 3 - 14 mmol/L    Glucose 106 (H) 70 - 99 mg/dL    Urea Nitrogen 37 (H) 7 - 30 mg/dL    Creatinine 1.41 (H) 0.52 - 1.04 mg/dL    GFR Estimate 34 (L) >60 mL/min/[1.73_m2]    GFR Estimate If Black 39 (L) >60 mL/min/[1.73_m2]    Calcium 9.3 8.5 - 10.1  mg/dL   Troponin I   Result Value Ref Range    Troponin I ES <0.015 0.000 - 0.045 ug/L   Nt probnp inpatient (BNP)   Result Value Ref Range    N-Terminal Pro BNP Inpatient 17,600 (H) 0 - 1,800 pg/mL   Blood gas venous and oxyhgb   Result Value Ref Range    Ph Venous 7.34 7.32 - 7.43 pH    PCO2 Venous 35 (L) 40 - 50 mm Hg    PO2 Venous 44 25 - 47 mm Hg    Bicarbonate Venous 19 (L) 21 - 28 mmol/L    FIO2 0L     Oxyhemoglobin Venous 74 %    Base Deficit Venous 6.4 mmol/L   EKG 12-lead, tracing only   Result Value Ref Range    Interpretation ECG Click View Image link to view waveform and result    XR Chest Port 1 View    Narrative    CHEST PORTABLE ONE VIEW   2/21/2021 4:19 PM     HISTORY: Hypoxemia.    COMPARISON: 7/21/2020.      Impression    IMPRESSION: New small right greater than left pleural fluid. Patchy  bibasilar new consolidation may be atelectasis or airspace disease.  Mildly increased vascular congestion suggesting edema. Stable cardiac  silhouette.    ALFREDO NEGRON MD   Potassium   Result Value Ref Range    Potassium 5.8 (H) 3.4 - 5.3 mmol/L   Potassium   Result Value Ref Range    Potassium 5.1 3.4 - 5.3 mmol/L   Potassium   Result Value Ref Range    Potassium 4.7 3.4 - 5.3 mmol/L   Basic metabolic panel   Result Value Ref Range    Sodium 143 133 - 144 mmol/L    Potassium 4.6 3.4 - 5.3 mmol/L    Chloride 116 (H) 94 - 109 mmol/L    Carbon Dioxide 21 20 - 32 mmol/L    Anion Gap 6 3 - 14 mmol/L    Glucose 85 70 - 99 mg/dL    Urea Nitrogen 35 (H) 7 - 30 mg/dL    Creatinine 1.42 (H) 0.52 - 1.04 mg/dL    GFR Estimate 34 (L) >60 mL/min/[1.73_m2]    GFR Estimate If Black 39 (L) >60 mL/min/[1.73_m2]    Calcium 8.9 8.5 - 10.1 mg/dL   CBC with platelets   Result Value Ref Range    WBC 3.8 (L) 4.0 - 11.0 10e9/L    RBC Count 2.67 (L) 3.8 - 5.2 10e12/L    Hemoglobin 8.8 (L) 11.7 - 15.7 g/dL    Hematocrit 28.7 (L) 35.0 - 47.0 %     (H) 78 - 100 fl    MCH 33.0 26.5 - 33.0 pg    MCHC 30.7 (L) 31.5 - 36.5 g/dL    RDW  16.2 (H) 10.0 - 15.0 %    Platelet Count 174 150 - 450 10e9/L       Echocardiogram:     1. Normal biventricular size and function. Left ventricular ejection fraction  of 55-60%. Grade II or moderate diastolic dysfunction.  2. The left atrium is severely dilated. Right atrial size is normal.  3. No hemodynamically significant valvular disease.  4. Normal pulmonary artery pressure.  Compared to the previous echocardiogram on 10/1/2019, there are no significant  changes.  Technically difficult study.  _____________________________________________________________________________

## 2021-02-22 NOTE — PROGRESS NOTES
Pt here with chf exac. A&O.  VSS. Tele . cardiac diet,  Wheelchair bound at baseline, turn and repo. Denies pain. Pt scoring green on the Aggression Stop Light Tool. Plan- diurese, bp improved after PRN hydralazine.

## 2021-02-22 NOTE — CONSULTS
REASON FOR ASSESSMENT:  CHF Consult for 2 gm NA Diet Education    NUTRITION HISTORY:    Information obtained from:  Patient    Pt reports she typically eats 2-3 meals per day depending on how hungry she is and she doesn't usually eat any snacks.     Living situation:   [facility] assisted [facility]    Grocery shopping:  N/a    Meal preparation:  Patient states she doesn't cook, meals are provided for her at [facility]. She could not remember what she usually eats during the day.     Previous diet instructions:  Pt states she has heard of a low salt diet before.       CURRENT DIET:  2 g Na    NUTRITION DIAGNOSIS:  Food- and nutrition-related knowledge deficit R/t 2g Na diet for CHF as evidenced by patient reports she does not cook for herself.     INTERVENTIONS:    Nutrition Prescription:  2g Na diet for CHF     Implementation:    Assessed learning needs, learning preferences, and willingness to learn    Nutrition Education (Content):  a) Provided handouts:  1) Tips for Low Na Diet  2) Label Reading  3) Low Na Foods/Drinks  4) Seasoning Your Food Without Salt  b) Discussed rational for limiting Na for CHF and stressed importance of following 2 gm Na guidelines     Nutrition Education (Application):  a) Discussed current eating habits and recommended alternative food choices    Anticipated good compliance    Diet Education - refer to Education Flowsheet    Goals:    Patient verbalizes understanding of diet     All of the above goals met during the education session    Follow Up:    Provided RD contact information for future questions.    Recommend Out-Patient Nutrition Referral, if further diet instructions are needed.    Paula Huffman  Dietetic Intern

## 2021-02-22 NOTE — PROVIDER NOTIFICATION
Paged Dr. Millan:      Ghislaine Walls, [phone]    BP is 182/75.  Patient states her bp normally runs 170's systolic.  Do you want to order a PRN?  She took her home hydralazine at 2200.    no need to call back.  thanks. Alma

## 2021-02-22 NOTE — PROVIDER NOTIFICATION
Paged Dr. Millan:      [phone]-  BP is now 194/70.   Heart rate is borderline at 59 to 60.  do you want to order another PRN?  It has been 25 min since I gave hydralazine IV.     thanks.

## 2021-02-22 NOTE — PLAN OF CARE
Arrived to unit approximately 18:15. A/O, calm, VSS ex 159/58. O2sats 91-93% on RA, TOBAR. Tele SR. Denies pain. Coccyx dark red but seems blanchable. Purewick in place. Given cola otherwise declines PO intake. COVID results pending, isolation maintained. K recheck pending. Plan for TTE tomorrow, continue diuresis.

## 2021-02-22 NOTE — PLAN OF CARE
VSS. Monitor remains Sinus rhythm. Pt. Denies pain. Purewick in place. Coccyx has blanchable redness. With dry scab on left buttock. Pt. States she had previous pressure sore on buttock. Sacral Mepilex applied. Encouraged to turn and reposition every 2 hours. Continue to monitor.

## 2021-02-22 NOTE — PLAN OF CARE
A&O. HTN noted, within parameters. Denies pain, SOB. Noted TOBAR. LS crackles in RUL and RML. Diminished throughout. K+ rechecked for 5.8. Given 40mg Lasix, 15g kayexalate. Rechecking K+ q4h x2. Special precautions for COVID R/O. Result pending. Purewick in place. Good UOP. Plan for echo in AM, continue diuresis.

## 2021-02-22 NOTE — PROGRESS NOTES
[facility]    Hospitalist Progress Note    Assessment & Plan   Ghislaine Walls is a 85 year old female who presents with dyspnea.     Acute exacerbation of chronic heart failure with preserved EF with hypoxia.  Paroxysmal atrial fibrillation  Essential hypertension  -Known history of heart failure with preserved EF.  Last echo from 7/22/2020 shows an EF of 55-60% with grade 2 or moderate diastolic dysfunction.  -O2 sats at assisted living facility was 87%.,  Continue IV Lasix, monitor intake/output  -Echocardiogram results pending, appreciate cardiology input  -Continue prior to admission Toprol- mg daily, amlodipine 5 mg daily and diltiazem  mg daily  -Hold hydrochlorothiazide for now while on IV Lasix  -Continue apixaban 2.5 mg twice a day for A. fib.  -Continue PT/OT  Chronic kidney disease stage III with mild hyperkalemia  -Potassium levels are within normal limit now, will repeat labs again in a.m.     Hypothyroidism  -Continue prior to admission levothyroxine     History of gout  -No active issues at the moment, continue allopurinol     Chronic normocytic anemia  MGUS  -Hemoglobin dropped to 8.8 from 9.8, monitor     Recovered COVID-19.   -Last positive  August 26, 2020, repeat PCR results are pending.      We will update family  DVT Prophylaxis: eliquis   Code Status: Prior     Disposition: Expected discharge in 1-2 days     Nichelle Carbajal MD  Text Page   (7am to 6pm)    Interval History   Plan shortness of breath present, quite weak and tired with minimal activity    -Data reviewed today: I reviewed all new labs and imaging results over the last 24 hours. I personally reviewed labs and imaging below  Physical Exam   Temp: 98.8  F (37.1  C) Temp src: Oral BP: (!) 196/75 Pulse: 64   Resp: 16 SpO2: 94 % O2 Device: Nasal cannula Oxygen Delivery: 2 LPM  Vitals:    02/21/21 1516 02/22/21 0545   Weight: 59 kg (130 lb) 52.3 kg (115 lb 4.8 oz)     Vital Signs with Ranges  Temp:   [97.9  F (36.6  C)-98.8  F (37.1  C)] 98.8  F (37.1  C)  Pulse:  [55-65] 64  Resp:  [14-18] 16  BP: (159-196)/(50-92) 196/75  SpO2:  [90 %-97 %] 94 %  I/O last 3 completed shifts:  In: 300 [P.O.:300]  Out: 1600 [Urine:1600]    Constitutional: Awake, answers questions appropriately  Respiratory: Bilateral basilar crackles heard  Cardiovascular: Regular rate and rhythm, normal S1 and S2+  GI: Normal bowel sounds, soft, non-distended, non-tender  Skin/Integumen: No edema present bilateral lower extremities  Neuro : moving all 4 extremities, no focal deficit noted     Medications       allopurinol  100 mg Oral Daily     amLODIPine  5 mg Oral Daily     apixaban ANTICOAGULANT  2.5 mg Oral BID     diltiazem ER COATED BEADS  180 mg Oral Daily     furosemide  40 mg Intravenous Q8H     hydrALAZINE  100 mg Oral 3 times daily     levothyroxine  25 mcg Oral Daily     metoprolol succinate ER  100 mg Oral Daily     mirtazapine  15 mg Oral At Bedtime     pantoprazole  40 mg Oral QAM AC     predniSONE  5 mg Oral Daily     sulfaSALAzine  500 mg Oral BID       Data   Recent Labs   Lab 02/22/21  0632 02/22/21  0126 02/21/21  2211 02/21/21  1546 02/21/21  1546   WBC 3.8*  --   --   --  3.3*   HGB 8.8*  --   --   --  9.8*   *  --   --   --  109*     --   --   --  166     --   --   --  144   POTASSIUM 4.6 4.7 5.1   < > 5.5*   CHLORIDE 116*  --   --   --  118*   CO2 21  --   --   --  20   BUN 35*  --   --   --  37*   CR 1.42*  --   --   --  1.41*   ANIONGAP 6  --   --   --  6   ALIVIA 8.9  --   --   --  9.3   GLC 85  --   --   --  106*   TROPI  --   --   --   --  <0.015    < > = values in this interval not displayed.     Recent Labs   Lab 02/22/21  0632 02/21/21  1546   GLC 85 106*       Imaging:   Recent Results (from the past 24 hour(s))   XR Chest Port 1 View    Narrative    CHEST PORTABLE ONE VIEW   2/21/2021 4:19 PM     HISTORY: Hypoxemia.    COMPARISON: 7/21/2020.      Impression    IMPRESSION: New small right  greater than left pleural fluid. Patchy  bibasilar new consolidation may be atelectasis or airspace disease.  Mildly increased vascular congestion suggesting edema. Stable cardiac  silhouette.    ALFREDO NEGRON MD

## 2021-02-22 NOTE — PROGRESS NOTES
02/22/21 1521   Quick Adds   Type of Visit Initial Occupational Therapy Evaluation   Living Environment   People in home alone   Current Living Arrangements assisted living   Home Accessibility no concerns   Transportation Anticipated agency   Living Environment Comments meals provided, daughter helps with showering, has been managing her medication. daughter helps with bills.    Self-Care   Usual Activity Tolerance fair   Current Activity Tolerance poor   Regular Exercise No   Equipment Currently Used at Home walker, rolling;shower chair;grab bar, toilet;grab bar, tub/shower;dressing device   Activity/Exercise/Self-Care Comment was I with dressing, BR transfers   Disability/Function   Fall history within last six months no   General Information   Onset of Illness/Injury or Date of Surgery 02/21/21   Referring Physician Rodo Marcelino MD   Patient/Family Therapy Goal Statement (OT) she does not want to have to go to TCU   Additional Occupational Profile Info/Pertinent History of Current Problem Patient arrives to the ED today from [facility] with her daughter after her Spo2 was 87% when it was checked earlier today. Patient reports that she has been increasingly short of breath and that she has had issues ambulating without severe issues catching her breath.   Performance Patterns (Routines, Roles, Habits) retired   Existing Precautions/Restrictions fall;oxygen therapy device and L/min   General Observations and Info pt supine in bed, agreeable to OT eval. on 2L NC   Cognitive Status Examination   Orientation Status orientation to person, place and time   Affect/Mental Status (Cognitive) sad/depressed affect;low arousal/lethargic;flat/blunted affect   Visual Perception   Visual Impairment/Limitations WNL   Sensory   Sensory Quick Adds No deficits were identified   Pain Assessment   Patient Currently in Pain No   Integumentary/Edema   Integumentary/Edema no deficits were identifed   Posture   Posture  kyphosis;forward head position   Range of Motion Comprehensive   Comment, General Range of Motion 0-90 shoulder flexion   Strength Comprehensive (MMT)   Comment, General Manual Muscle Testing (MMT) Assessment overall 3/5   Muscle Tone Assessment   Muscle Tone Quick Adds No deficits were identified   Coordination   Upper Extremity Coordination No deficits were identified   Bed Mobility   Comment (Bed Mobility) SBA with bed mobility   Transfers   Transfer Comments SBA with sit to stand   Instrumental Activities of Daily Living (IADL)   Previous Responsibilities laundry;medication management   Clinical Impression   Criteria for Skilled Therapeutic Interventions Met (OT) yes;skilled treatment is necessary   OT Diagnosis decreased activity tolerance and I with ADL's and functional mobility   OT Problem List-Impairments impacting ADL problems related to;activity tolerance impaired;flexibility   ADL comments/analysis decreased I with ADL's and transfers   Assessment of Occupational Performance 1-3 Performance Deficits   Identified Performance Deficits decreased dressing, BR transfers and home mgmt   Planned Therapy Interventions (OT) ADL retraining;transfer training;strengthening;home program guidelines;progressive activity/exercise;risk factor education   Clinical Decision Making Complexity (OT) low complexity   Therapy Frequency (OT) 5x/week   Predicted Duration of Therapy 1 week   Risk & Benefits of therapy have been explained evaluation/treatment results reviewed;risks/benefits reviewed;care plan/treatment goals reviewed;current/potential barriers reviewed;participants voiced agreement with care plan;participants included;patient   OT Discharge Planning    OT Discharge Recommendation (DC Rec) Home with assist;Transitional Care Facility   OT Rationale for DC Rec pt at baseline is mod I with dressing and ambulation with FWW. meals and house keeping provided by facility currently SBA with bed mobility and sit to stand.  deconditioned and fatigues quickly. pt states she does not want to go to TCU. pending hospital course she may progress to home with resumed assist for bathing, cleaning and meals.    OT Brief overview of current status  SBA with bed mobility, demo's deconditioning.    Total Evaluation Time (Minutes)   Total Evaluation Time (Minutes) 10

## 2021-02-23 ENCOUNTER — APPOINTMENT (OUTPATIENT)
Dept: PHYSICAL THERAPY | Facility: CLINIC | Age: 86
DRG: 291 | End: 2021-02-23
Attending: INTERNAL MEDICINE
Payer: MEDICARE

## 2021-02-23 LAB
ANION GAP SERPL CALCULATED.3IONS-SCNC: 6 MMOL/L (ref 3–14)
BUN SERPL-MCNC: 45 MG/DL (ref 7–30)
CALCIUM SERPL-MCNC: 8.6 MG/DL (ref 8.5–10.1)
CHLORIDE SERPL-SCNC: 113 MMOL/L (ref 94–109)
CO2 SERPL-SCNC: 23 MMOL/L (ref 20–32)
CREAT SERPL-MCNC: 1.92 MG/DL (ref 0.52–1.04)
ERYTHROCYTE [DISTWIDTH] IN BLOOD BY AUTOMATED COUNT: 15.9 % (ref 10–15)
FLUAV RNA RESP QL NAA+PROBE: NEGATIVE
FLUBV RNA RESP QL NAA+PROBE: NEGATIVE
GFR SERPL CREATININE-BSD FRML MDRD: 23 ML/MIN/{1.73_M2}
GLUCOSE SERPL-MCNC: 82 MG/DL (ref 70–99)
HCT VFR BLD AUTO: 27.7 % (ref 35–47)
HGB BLD-MCNC: 8.5 G/DL (ref 11.7–15.7)
LABORATORY COMMENT REPORT: NORMAL
LDH SERPL L TO P-CCNC: 206 U/L (ref 81–234)
MCH RBC QN AUTO: 33.2 PG (ref 26.5–33)
MCHC RBC AUTO-ENTMCNC: 30.7 G/DL (ref 31.5–36.5)
MCV RBC AUTO: 108 FL (ref 78–100)
PLATELET # BLD AUTO: 154 10E9/L (ref 150–450)
POTASSIUM SERPL-SCNC: 4.4 MMOL/L (ref 3.4–5.3)
PROT SERPL-MCNC: 6.7 G/DL (ref 6.8–8.8)
RBC # BLD AUTO: 2.56 10E12/L (ref 3.8–5.2)
RSV RNA SPEC QL NAA+PROBE: NORMAL
SARS-COV-2 RNA RESP QL NAA+PROBE: NEGATIVE
SODIUM SERPL-SCNC: 142 MMOL/L (ref 133–144)
SPECIMEN SOURCE: NORMAL
WBC # BLD AUTO: 3.6 10E9/L (ref 4–11)

## 2021-02-23 PROCEDURE — 250N000012 HC RX MED GY IP 250 OP 636 PS 637: Performed by: INTERNAL MEDICINE

## 2021-02-23 PROCEDURE — 85027 COMPLETE CBC AUTOMATED: CPT | Performed by: INTERNAL MEDICINE

## 2021-02-23 PROCEDURE — 250N000013 HC RX MED GY IP 250 OP 250 PS 637: Performed by: INTERNAL MEDICINE

## 2021-02-23 PROCEDURE — 84155 ASSAY OF PROTEIN SERUM: CPT | Performed by: INTERNAL MEDICINE

## 2021-02-23 PROCEDURE — 250N000011 HC RX IP 250 OP 636: Performed by: INTERNAL MEDICINE

## 2021-02-23 PROCEDURE — 83615 LACTATE (LD) (LDH) ENZYME: CPT | Performed by: INTERNAL MEDICINE

## 2021-02-23 PROCEDURE — 99233 SBSQ HOSP IP/OBS HIGH 50: CPT | Performed by: INTERNAL MEDICINE

## 2021-02-23 PROCEDURE — 80048 BASIC METABOLIC PNL TOTAL CA: CPT | Performed by: INTERNAL MEDICINE

## 2021-02-23 PROCEDURE — 97162 PT EVAL MOD COMPLEX 30 MIN: CPT | Mod: GP | Performed by: PHYSICAL THERAPIST

## 2021-02-23 PROCEDURE — 36415 COLL VENOUS BLD VENIPUNCTURE: CPT | Performed by: INTERNAL MEDICINE

## 2021-02-23 PROCEDURE — 87636 SARSCOV2 & INF A&B AMP PRB: CPT | Performed by: INTERNAL MEDICINE

## 2021-02-23 PROCEDURE — 210N000002 HC R&B HEART CARE

## 2021-02-23 RX ORDER — AMLODIPINE BESYLATE 10 MG/1
10 TABLET ORAL DAILY
Status: DISCONTINUED | OUTPATIENT
Start: 2021-02-23 | End: 2021-02-25 | Stop reason: HOSPADM

## 2021-02-23 RX ADMIN — AMLODIPINE BESYLATE 10 MG: 10 TABLET ORAL at 09:45

## 2021-02-23 RX ADMIN — DILTIAZEM HYDROCHLORIDE 180 MG: 180 CAPSULE, COATED, EXTENDED RELEASE ORAL at 09:45

## 2021-02-23 RX ADMIN — MIRTAZAPINE 15 MG: 15 TABLET, FILM COATED ORAL at 22:17

## 2021-02-23 RX ADMIN — SULFASALAZINE 500 MG: 500 TABLET ORAL at 09:45

## 2021-02-23 RX ADMIN — HYDRALAZINE HYDROCHLORIDE 100 MG: 50 TABLET, FILM COATED ORAL at 18:57

## 2021-02-23 RX ADMIN — PREDNISONE 5 MG: 5 TABLET ORAL at 09:45

## 2021-02-23 RX ADMIN — HYDRALAZINE HYDROCHLORIDE 100 MG: 50 TABLET, FILM COATED ORAL at 06:41

## 2021-02-23 RX ADMIN — LEVOTHYROXINE SODIUM 25 MCG: 25 TABLET ORAL at 09:45

## 2021-02-23 RX ADMIN — FUROSEMIDE 40 MG: 10 INJECTION, SOLUTION INTRAVENOUS at 05:32

## 2021-02-23 RX ADMIN — METOPROLOL SUCCINATE 100 MG: 100 TABLET, EXTENDED RELEASE ORAL at 09:45

## 2021-02-23 RX ADMIN — SULFASALAZINE 500 MG: 500 TABLET ORAL at 22:17

## 2021-02-23 RX ADMIN — PANTOPRAZOLE SODIUM 40 MG: 40 TABLET, DELAYED RELEASE ORAL at 09:46

## 2021-02-23 RX ADMIN — HYDRALAZINE HYDROCHLORIDE 100 MG: 50 TABLET, FILM COATED ORAL at 13:51

## 2021-02-23 RX ADMIN — ALLOPURINOL 100 MG: 100 TABLET ORAL at 09:45

## 2021-02-23 ASSESSMENT — ACTIVITIES OF DAILY LIVING (ADL)
ADLS_ACUITY_SCORE: 22
ADLS_ACUITY_SCORE: 23
ADLS_ACUITY_SCORE: 22
ADLS_ACUITY_SCORE: 23

## 2021-02-23 ASSESSMENT — MIFFLIN-ST. JEOR: SCORE: 949.84

## 2021-02-23 NOTE — PROVIDER NOTIFICATION
MD Notification    Notified Person: MD    Notified Person Name:     Notification Date/Time: 2-    Notification Interaction: Text paged    Purpose of Notification: Creatinine increased  to 1.92    Orders Received:    Comments:

## 2021-02-23 NOTE — PLAN OF CARE
VSS. Monitor remains Sinus rhythm. Pt. Denies pain. Lungs have diminished breath sounds. Stable O2 sats with O2 at 2L/NC. Continue to monitor. Plan for Thoracentesis tomorrow. COVID result pending.

## 2021-02-23 NOTE — PROGRESS NOTES
Care Management Follow Up    Length of Stay (days): 2    Expected Discharge Date: 02/24/21(return to [facility]?)     Concerns to be Addressed: discharge planning     Patient plan of care discussed at interdisciplinary rounds: Yes    Anticipated Discharge Disposition:  Patient is open to going to a TCU     Anticipated Discharge Services:  therapy  Anticipated Discharge DME:  walker    Patient/family educated on Medicare website which has current facility and service quality ratings:  N/A  Education Provided on the Discharge Plan:  yes  Patient/Family in Agreement with the Plan:  yes    Referrals Placed by CM/SW:  TCU  Private pay costs discussed: Not applicable    Additional Information:  Spoke with Martine MEYERS, the care coordinator, regarding discharge plans.  Patient is in agreement to go to [facility] if it is necessary.  She would prefer to go to [facility].  Referral sent, via discharge on the double, to check bed availability.  Call placed to [facility] and spoke with Abby, a nurse in the assisted living, [phone].  Abby states that patient receives no services, but they have tried speaking with the patient and her daughter on numerous occassions about adding services and they don't feel that patient needs services.  Gave Abby and update and she is asking that we call Payton Saba, the [facility], [phone], and give her an update.  Explained that I will speak with patient and/or daughter regarding the discharge recommendations and then update her.      Will continue to follow.      DELORES Licea, [facility]    [phone]  [facility]

## 2021-02-23 NOTE — PROGRESS NOTES
[facility]    Hospitalist Progress Note    Assessment & Plan   Ghislaine Walls is a 85 year old female who presents with dyspnea.     Acute exacerbation of chronic heart failure with preserved EF with hypoxia.  Paroxysmal atrial fibrillation  Essential hypertension  -Known history of heart failure with preserved EF.  Last echo from 7/22/2020 shows an EF of 55-60% with grade 2 or moderate diastolic dysfunction.  -O2 sats at assisted living facility was 87%.,  Continue monitoring intake/output, -1.5 L output noted.  -Echocardiogram results noted, showing EF 60-65 percentage with diastolic dysfunction and increased pulmonary hypertension, moderate  effusion noted on right side.  --.Above discussed with the patient's daughter and patient, her oxygen needs have not changed and creatinine is gone up to 1.9 plan to do thoracentesis today.  -Continue prior to admission Toprol- mg daily,  and diltiazem  mg daily, amlodipine dose increased to 10 mg on 2/23.  -Hold hydrochlorothiazide, Lasix is also on hold due to creatinine going up to 1.9  -Continue apixaban 2.5 mg twice a day for A. fib.  Apixaban temporarily on for possible thoracentesis today.  Try to wean oxygen down as possible.  -Continue PT/OT  Chronic kidney disease stage III with mild hyperkalemia  nino on chronic kidney disease - cr from 1.4-1.9 2/23  -Potassium levels are within normal limit now, will repeat labs again in a.m.  -hold lasix      Hypothyroidism  -Continue prior to admission levothyroxine     History of gout  -No active issues at the moment, continue allopurinol     Chronic normocytic anemia  MGUS  -Hemoglobin dropped to 8.8 from 9.8, monitor  Thyroid lesion    Recovered COVID-19.   -Last positive  August 26, 2020, I could not find out any new results of PCR, repeat PCR ordered today 2/23.    Contacted patient's daughter, updated on phone ,total time spent on patient care 40 minutes  Discussed about  thoracentesis.  DVT Prophylaxis: eliquis   Code Status: No CPR- Pre-arrest intubation OK     Disposition: Expected discharge in 1-2 days     Nichelle Carbajal MD  Text Page   (7am to 6pm)    Interval History   Patient resting, mentions that she is feeling quite cold, blood pressures were elevated in the morning, continue to be on 2 L of oxygen, continue to have a increased generalized.  She denies any chest pain.    -Data reviewed today: I reviewed all new labs and imaging results over the last 24 hours. I personally reviewed labs and imaging below  Physical Exam   Temp: 98.5  F (36.9  C) Temp src: Oral BP: 129/59 Pulse: 69   Resp: 16 SpO2: 99 % O2 Device: Nasal cannula Oxygen Delivery: 2 LPM  Vitals:    02/21/21 1516 02/22/21 0545 02/23/21 0607   Weight: 59 kg (130 lb) 52.3 kg (115 lb 4.8 oz) 53.1 kg (117 lb)     Vital Signs with Ranges  Temp:  [98.5  F (36.9  C)-98.8  F (37.1  C)] 98.5  F (36.9  C)  Pulse:  [53-69] 69  Resp:  [16] 16  BP: (129-166)/(51-64) 129/59  SpO2:  [96 %-99 %] 99 %  I/O last 3 completed shifts:  In: 940 [P.O.:940]  Out: 1215 [Urine:1215]    Constitutional: Awake, answers questions appropriately  Respiratory: Bilateral basilar crackles heard, breath sounds reduced right greater than left  Cardiovascular: Regular rate and rhythm, normal S1 and S2+  GI: Normal bowel sounds, soft, non-distended, non-tender  Skin/Integumen: No edema present bilateral lower extremities  Neuro : moving all 4 extremities, no focal deficit noted     Medications       allopurinol  100 mg Oral Daily     amLODIPine  10 mg Oral Daily     [Held by provider] apixaban ANTICOAGULANT  2.5 mg Oral BID     diltiazem ER COATED BEADS  180 mg Oral Daily     hydrALAZINE  100 mg Oral 3 times daily     levothyroxine  25 mcg Oral Daily     metoprolol succinate ER  100 mg Oral Daily     mirtazapine  15 mg Oral At Bedtime     pantoprazole  40 mg Oral QAM AC     predniSONE  5 mg Oral Daily     sulfaSALAzine  500 mg Oral BID       Data    Recent Labs   Lab 02/23/21  0902 02/23/21  0527 02/22/21  0632 02/22/21  0126 02/21/21  1546 02/21/21  1546   WBC  --  3.6* 3.8*  --   --  3.3*   HGB  --  8.5* 8.8*  --   --  9.8*   MCV  --  108* 108*  --   --  109*   PLT  --  154 174  --   --  166   NA  --  142 143  --   --  144   POTASSIUM  --  4.4 4.6 4.7   < > 5.5*   CHLORIDE  --  113* 116*  --   --  118*   CO2  --  23 21  --   --  20   BUN  --  45* 35*  --   --  37*   CR  --  1.92* 1.42*  --   --  1.41*   ANIONGAP  --  6 6  --   --  6   ALIVIA  --  8.6 8.9  --   --  9.3   GLC  --  82 85  --   --  106*   PROTTOTAL 6.7*  --   --   --   --   --    TROPI  --   --   --   --   --  <0.015    < > = values in this interval not displayed.     Recent Labs   Lab 02/23/21  0527 02/22/21  0632 02/21/21  1546   GLC 82 85 106*       Imaging:   Recent Results (from the past 24 hour(s))   CT Chest w/o Contrast    Narrative    CT CHEST W/O CONTRAST 2/22/2021 5:48 PM    CLINICAL HISTORY: Pneumonia, effusion or abscess suspected, xray done.    TECHNIQUE: CT chest without IV contrast. Multiplanar reformats were  obtained. Dose reduction techniques were used.  CONTRAST: None.    COMPARISON: Chest radiograph dated 2/21/2021    FINDINGS:   LUNGS AND PLEURA: There small left and moderate sized right pleural  effusions. There is mild mosaic attenuation of the lungs. There are  opacities in the lung bases.    MEDIASTINUM/AXILLAE: There is a 2.3 cm low-attenuation right thyroid  lesion. There is atherosclerotic disease including severe coronary  artery disease. Mitral annular calcification is present.     UPPER ABDOMEN: Cholelithiasis.    MUSCULOSKELETAL: Unremarkable.      Impression    IMPRESSION:   1.  Opacities in the lung bases are likely atelectasis. Minimal  airspace disease is possible.  2.  Moderate right and small left pleural effusions.  3.  Mild mosaic attenuation of the lungs could be seen with small  airways disease.  4.  Atherosclerotic disease.  5.  Thyroid lesion. Consider  thyroid ultrasound.    LESTER J FAHRNER, MD

## 2021-02-23 NOTE — PLAN OF CARE
Pt is a/o. Tele: Sinus Prudencio 1st degree AVB. 2L O2. Turn q 2 hours. Kept off bottom. Mepilex on bottom. Purewick in place.

## 2021-02-23 NOTE — CONSULTS
Care Management Initial Consult    General Information  Assessment completed with: Patient,    Type of CM/SW Visit: Initial Assessment    Primary Care Provider verified and updated as needed: Yes   Readmission within the last 30 days: no previous admission in last 30 days      Reason for Consult: discharge planning  Advance Care Planning: Advance Care Planning Reviewed: present on chart          Communication Assessment  Patient's communication style: spoken language (English or Bilingual)    Hearing Difficulty or Deaf: yes   Wear Glasses or Blind: yes    Cognitive  Cognitive/Neuro/Behavioral: WDL                      Living Environment:   People in home: alone     Current living Arrangements: assisted living  Name of Facility: [facility]    Able to return to prior arrangements: no(TCU recommended.)       Family/Social Support:  Care provided by: self  Provides care for: no one  Marital Status:   Children          Description of Support System: Supportive, Involved         Current Resources:   Patient receiving home care services: No     Community Resources: Transportation Services  Equipment currently used at home: walker, rolling, shower chair, grab bar, tub/shower, grab bar, toilet  Supplies currently used at home: None    Employment/Financial:  Employment Status: retired        Financial Concerns: No concerns identified   Referral to Financial Counselor: No       Lifestyle & Psychosocial Needs:  Lifestyle     Physical activity     Days per week: 0 days     Minutes per session: 0 min     Stress: Not on file     Social Needs     Financial resource strain: Not hard at all     Food insecurity     Worry: Never true     Inability: Never true     Transportation needs     Medical: No     Non-medical: No     Socioeconomic History     Marital status:      Spouse name: Not on file     Number of children: Not on file     Years of education: Not on file     Highest education level: Not on file   Relationships      "Social connections     Talks on phone: Twice a week     Gets together: Twice a week     Attends Hindu service: Never     Active member of club or organization: No     Attends meetings of clubs or organizations: Never     Relationship status: Not on file     Intimate partner violence     Fear of current or ex partner: Not on file     Emotionally abused: Not on file     Physically abused: Not on file     Forced sexual activity: Not on file     Tobacco Use     Smoking status: Former Smoker     Packs/day: 0.50     Years: 5.00     Pack years: 2.50     Types: Cigarettes     Quit date: 1973     Years since quittin.1     Smokeless tobacco: Never Used   Substance and Sexual Activity     Alcohol use: Yes     Alcohol/week: 0.0 standard drinks     Comment: 1 beer on      Drug use: No     Sexual activity: Not Currently     Partners: Male       Functional Status:  Prior to admission patient needed assistance:   Dependent ADLs:: Ambulation-walker  Dependent IADLs:: Cleaning, Meal Preparation, Cooking       Mental Health Status:  Mental Health Status: No Current Concerns       Chemical Dependency Status:  Chemical Dependency Status: No Current Concerns             Values/Beliefs:  Spiritual, Cultural Beliefs, Methodist Practices, Values that affect care: no               Additional Information:    Patient resides at [facility] and receiving light housekeeping and all meals.  She uses a walker at baseline and manages her own medications.  Patient uses a private transportation services called \"[employer]\". Currently therapy is recommending TCU and per my conversation with patient [facility].    Martine Will RN  Care Coordinator  [facility]  [phone] (text or call)        "

## 2021-02-23 NOTE — CONSULTS
"CLINICAL NUTRITION SERVICES  -  ASSESSMENT NOTE      Recommendations Ordered by Registered Dietitian (RD):   Vanilla Boost Plus once daily w/ breakfast   Future/Additional Recommendations:  NFPA once COVID precautions removed   Malnutrition:   % Weight Loss:  > 10% in 6 months (severe malnutrition)  % Intake:  No decreased intake noted but difficult to assess  Subcutaneous Fat Loss:  Unable to assess due to COVID precautions  Muscle Loss:  Unable to assess due to COVID precautions  Fluid Retention:  Trace - legs    Malnutrition Diagnosis: Patient does not meet two of the above criteria necessary for diagnosing malnutrition        REASON FOR ASSESSMENT  Ghislaine Walls is a 85 year old female seen by Registered Dietitian for Provider Order - weakness, reduced appetite.    DX:  CHF exacerbation     PMH:  HLD  HTN  CKD stage 3  Gout  Hypothyroidism  Recovered COVID    NUTRITION HISTORY  Information obtained from patient.    I spoke with pt yesterday to provide CHF education. She lives at [facility] and all her meals are provided for her, she does not cook. She typically eats 2-3 meals per day depending on how hungry she is and she doesn't usually eat any snacks. Pt was unable to remember what she usually eats.      CURRENT NUTRITION ORDERS  Diet Order:     2 mg Sodium, 1800 ml fluid restriction    Current Intake/Tolerance:  100% intakes documented in flow sheets. Pt ordered 3 meals yesterday but only breakfast today.      NUTRITION FOCUSED PHYSICAL ASSESSMENT FOR DIAGNOSING MALNUTRITION)  Yes  No:  Pt is rule out COVID           Obtained from Chart/Interdisciplinary Team:  Edema Trace     ANTHROPOMETRICS  Height: 5' 3\"  Weight: 117 lbs 0 oz (53.1 kg)   Body mass index is 20.73 kg/m .  Weight Status:  Normal BMI  IBW: 52.3 kg   % IBW: 102%  Weight History: 16.6% weight loss in 6 months  Wt Readings from Last 20 Encounters:   02/23/21 53.1 kg (117 lb)   10/20/20 59.4 kg (131 lb)    09/14/20 59 kg (130 lb) "   09/08/20 59.1 kg (130 lb 6.4 oz)   09/04/20 60.8 kg (134 lb)   08/27/20 60.8 kg (134 lb)   08/17/20 64 kg (141 lb)   08/13/20 64.3 kg (141 lb 12.8 oz)   08/03/20 63.7 kg (140 lb 6.4 oz)   07/24/20 70.3 kg (155 lb)   03/31/20 65.8 kg (145 lb)   03/02/20 67.6 kg (149 lb)   02/10/20 68.6 kg (151 lb 4.8 oz)   11/04/19 72.6 kg (160 lb)   10/22/19 72.5 kg (159 lb 12.8 oz)   10/17/19 72.8 kg (160 lb 8 oz)       LABS  Labs reviewed    BUN - 45 (H)   Cr - 1.92 (H)     MEDICATIONS  Medications reviewed    PRN Zofran  PRN Senna    ASSESSED NUTRITION NEEDS PER APPROVED PRACTICE GUIDELINES:    Dosing Weight 53 kg - current weight  Estimated Energy Needs: 1325 - 1590 kcals (25-30 Kcal/Kg)  Justification: maintenance  Estimated Protein Needs: 42 - 53 grams protein (0.8-1 g pro/Kg)  Justification: CKD  Estimated Fluid Needs: per provider    MALNUTRITION:  % Weight Loss:  > 10% in 6 months (severe malnutrition)  % Intake:  No decreased intake noted but difficult to assess  Subcutaneous Fat Loss:  Unable to assess due to COVID precautions  Muscle Loss:  Unable to assess due to COVID precautions  Fluid Retention:  Trace - legs    Malnutrition Diagnosis: Patient does not meet two of the above criteria necessary for diagnosing malnutrition    NUTRITION DIAGNOSIS:  Inadequate oral intake related to reduced appetite as evidenced by 16.6% weight loss in 6 months.      NUTRITION INTERVENTIONS  Recommendations / Nutrition Prescription  Ordered Vanilla Boost Plus once daily with breakfast.      Implementation  Nutrition education: No education needs assessed at this time  Medical Food Supplement - see above.      Nutrition Goals  Pt to consume >75% of nutritionally adequate meals TID.      MONITORING AND EVALUATION:  Progress towards goals will be monitored and evaluated per protocol and Practice Guidelines    Paula Huffman  Dietetic Intern

## 2021-02-24 ENCOUNTER — CARE COORDINATION (OUTPATIENT)
Dept: CARDIOLOGY | Facility: CLINIC | Age: 86
End: 2021-02-24

## 2021-02-24 ENCOUNTER — APPOINTMENT (OUTPATIENT)
Dept: ULTRASOUND IMAGING | Facility: CLINIC | Age: 86
DRG: 291 | End: 2021-02-24
Attending: INTERNAL MEDICINE
Payer: MEDICARE

## 2021-02-24 DIAGNOSIS — I50.32 CHRONIC HEART FAILURE WITH PRESERVED EJECTION FRACTION (H): Primary | ICD-10-CM

## 2021-02-24 LAB
ANION GAP SERPL CALCULATED.3IONS-SCNC: 8 MMOL/L (ref 3–14)
APPEARANCE FLD: NORMAL
BUN SERPL-MCNC: 48 MG/DL (ref 7–30)
CALCIUM SERPL-MCNC: 8.7 MG/DL (ref 8.5–10.1)
CHLORIDE SERPL-SCNC: 110 MMOL/L (ref 94–109)
CO2 SERPL-SCNC: 22 MMOL/L (ref 20–32)
COLOR FLD: NORMAL
CREAT SERPL-MCNC: 1.89 MG/DL (ref 0.52–1.04)
GFR SERPL CREATININE-BSD FRML MDRD: 24 ML/MIN/{1.73_M2}
GLUCOSE FLD-MCNC: 103 MG/DL
GLUCOSE SERPL-MCNC: 90 MG/DL (ref 70–99)
GRAM STN SPEC: NORMAL
GRAM STN SPEC: NORMAL
INR PPP: 1.16 (ref 0.86–1.14)
LDH FLD L TO P-CCNC: 84 U/L
LYMPHOCYTES NFR FLD MANUAL: 17 %
MONOS+MACROS NFR FLD MANUAL: 62 %
NEUTS BAND NFR FLD MANUAL: 9 %
OTHER CELLS FLD MANUAL: 12 %
POTASSIUM SERPL-SCNC: 4.4 MMOL/L (ref 3.4–5.3)
PROT FLD-MCNC: 3.1 G/DL
SODIUM SERPL-SCNC: 140 MMOL/L (ref 133–144)
SPECIMEN SOURCE FLD: NORMAL
SPECIMEN SOURCE: NORMAL
WBC # FLD AUTO: 492 /UL

## 2021-02-24 PROCEDURE — 88112 CYTOPATH CELL ENHANCE TECH: CPT | Mod: TC | Performed by: INTERNAL MEDICINE

## 2021-02-24 PROCEDURE — 32555 ASPIRATE PLEURA W/ IMAGING: CPT | Mod: 50

## 2021-02-24 PROCEDURE — 85610 PROTHROMBIN TIME: CPT | Performed by: INTERNAL MEDICINE

## 2021-02-24 PROCEDURE — 88112 CYTOPATH CELL ENHANCE TECH: CPT | Mod: 26 | Performed by: PATHOLOGY

## 2021-02-24 PROCEDURE — 250N000012 HC RX MED GY IP 250 OP 636 PS 637: Performed by: INTERNAL MEDICINE

## 2021-02-24 PROCEDURE — 999N001014 HC STATISTIC CYTO WRIGHT STAIN TC: Performed by: INTERNAL MEDICINE

## 2021-02-24 PROCEDURE — 88305 TISSUE EXAM BY PATHOLOGIST: CPT | Mod: 26 | Performed by: PATHOLOGY

## 2021-02-24 PROCEDURE — 999N001018 HC STATISTIC H-CELL BLOCK W/STAIN: Performed by: INTERNAL MEDICINE

## 2021-02-24 PROCEDURE — 99233 SBSQ HOSP IP/OBS HIGH 50: CPT | Performed by: INTERNAL MEDICINE

## 2021-02-24 PROCEDURE — 87070 CULTURE OTHR SPECIMN AEROBIC: CPT | Performed by: INTERNAL MEDICINE

## 2021-02-24 PROCEDURE — 36415 COLL VENOUS BLD VENIPUNCTURE: CPT | Performed by: INTERNAL MEDICINE

## 2021-02-24 PROCEDURE — 88342 IMHCHEM/IMCYTCHM 1ST ANTB: CPT | Mod: TC | Performed by: INTERNAL MEDICINE

## 2021-02-24 PROCEDURE — 84157 ASSAY OF PROTEIN OTHER: CPT | Performed by: INTERNAL MEDICINE

## 2021-02-24 PROCEDURE — 83615 LACTATE (LD) (LDH) ENZYME: CPT | Performed by: INTERNAL MEDICINE

## 2021-02-24 PROCEDURE — 88342 IMHCHEM/IMCYTCHM 1ST ANTB: CPT | Mod: 26 | Performed by: PATHOLOGY

## 2021-02-24 PROCEDURE — 88341 IMHCHEM/IMCYTCHM EA ADD ANTB: CPT | Mod: 26 | Performed by: PATHOLOGY

## 2021-02-24 PROCEDURE — 89051 BODY FLUID CELL COUNT: CPT | Performed by: INTERNAL MEDICINE

## 2021-02-24 PROCEDURE — 80048 BASIC METABOLIC PNL TOTAL CA: CPT | Performed by: INTERNAL MEDICINE

## 2021-02-24 PROCEDURE — 87205 SMEAR GRAM STAIN: CPT | Performed by: INTERNAL MEDICINE

## 2021-02-24 PROCEDURE — 250N000013 HC RX MED GY IP 250 OP 250 PS 637: Performed by: INTERNAL MEDICINE

## 2021-02-24 PROCEDURE — 88341 IMHCHEM/IMCYTCHM EA ADD ANTB: CPT | Mod: TC | Performed by: INTERNAL MEDICINE

## 2021-02-24 PROCEDURE — 999N000154 HC STATISTIC RADIOLOGY XRAY, US, CT, MAR, NM

## 2021-02-24 PROCEDURE — 250N000009 HC RX 250: Performed by: RADIOLOGY

## 2021-02-24 PROCEDURE — 88305 TISSUE EXAM BY PATHOLOGIST: CPT | Mod: TC | Performed by: INTERNAL MEDICINE

## 2021-02-24 PROCEDURE — 82945 GLUCOSE OTHER FLUID: CPT | Performed by: INTERNAL MEDICINE

## 2021-02-24 PROCEDURE — 210N000002 HC R&B HEART CARE

## 2021-02-24 PROCEDURE — 0W9B3ZZ DRAINAGE OF LEFT PLEURAL CAVITY, PERCUTANEOUS APPROACH: ICD-10-PCS | Performed by: RADIOLOGY

## 2021-02-24 PROCEDURE — 0W993ZZ DRAINAGE OF RIGHT PLEURAL CAVITY, PERCUTANEOUS APPROACH: ICD-10-PCS | Performed by: RADIOLOGY

## 2021-02-24 RX ORDER — LIDOCAINE HYDROCHLORIDE 10 MG/ML
10 INJECTION, SOLUTION EPIDURAL; INFILTRATION; INTRACAUDAL; PERINEURAL ONCE
Status: COMPLETED | OUTPATIENT
Start: 2021-02-24 | End: 2021-02-24

## 2021-02-24 RX ADMIN — LIDOCAINE HYDROCHLORIDE 8 ML: 10 INJECTION, SOLUTION EPIDURAL; INFILTRATION; INTRACAUDAL; PERINEURAL at 10:55

## 2021-02-24 RX ADMIN — SULFASALAZINE 500 MG: 500 TABLET ORAL at 08:42

## 2021-02-24 RX ADMIN — DILTIAZEM HYDROCHLORIDE 180 MG: 180 CAPSULE, COATED, EXTENDED RELEASE ORAL at 08:41

## 2021-02-24 RX ADMIN — HYDRALAZINE HYDROCHLORIDE 100 MG: 50 TABLET, FILM COATED ORAL at 14:25

## 2021-02-24 RX ADMIN — LEVOTHYROXINE SODIUM 25 MCG: 25 TABLET ORAL at 08:42

## 2021-02-24 RX ADMIN — ACETAMINOPHEN 650 MG: 325 TABLET, FILM COATED ORAL at 20:39

## 2021-02-24 RX ADMIN — ALLOPURINOL 100 MG: 100 TABLET ORAL at 08:42

## 2021-02-24 RX ADMIN — PREDNISONE 5 MG: 5 TABLET ORAL at 08:42

## 2021-02-24 RX ADMIN — MIRTAZAPINE 15 MG: 15 TABLET, FILM COATED ORAL at 20:40

## 2021-02-24 RX ADMIN — METOPROLOL SUCCINATE 100 MG: 100 TABLET, EXTENDED RELEASE ORAL at 08:42

## 2021-02-24 RX ADMIN — AMLODIPINE BESYLATE 10 MG: 10 TABLET ORAL at 08:42

## 2021-02-24 RX ADMIN — APIXABAN 2.5 MG: 2.5 TABLET, FILM COATED ORAL at 20:40

## 2021-02-24 RX ADMIN — HYDRALAZINE HYDROCHLORIDE 100 MG: 50 TABLET, FILM COATED ORAL at 20:40

## 2021-02-24 RX ADMIN — PANTOPRAZOLE SODIUM 40 MG: 40 TABLET, DELAYED RELEASE ORAL at 08:42

## 2021-02-24 RX ADMIN — SULFASALAZINE 500 MG: 500 TABLET ORAL at 20:40

## 2021-02-24 RX ADMIN — LIDOCAINE HYDROCHLORIDE 7 ML: 10 INJECTION, SOLUTION EPIDURAL; INFILTRATION; INTRACAUDAL; PERINEURAL at 10:40

## 2021-02-24 RX ADMIN — HYDRALAZINE HYDROCHLORIDE 100 MG: 50 TABLET, FILM COATED ORAL at 08:41

## 2021-02-24 ASSESSMENT — ACTIVITIES OF DAILY LIVING (ADL)
ADLS_ACUITY_SCORE: 23
ADLS_ACUITY_SCORE: 28
ADLS_ACUITY_SCORE: 24
ADLS_ACUITY_SCORE: 28
ADLS_ACUITY_SCORE: 23
ADLS_ACUITY_SCORE: 23

## 2021-02-24 ASSESSMENT — MIFFLIN-ST. JEOR: SCORE: 939.86

## 2021-02-24 NOTE — PROGRESS NOTES
RADIOLOGY PROCEDURE NOTE  Patient name: Ghislaine Walls  MRN: [id number]  : 1935    Pre-procedure diagnosis: Left pleural effusion  Post-procedure diagnosis: Same    Procedure Date/Time: 2021  11:01 AM  Procedure: Thoracentesis  Estimated blood loss: None  Specimen(s) collected with description: Pleural fluid.  The patient tolerated the procedure well with no immediate complications.    See imaging dictation for procedural details and findings.    Provider name: Miguleangel Scott MD  Assistant(s):None

## 2021-02-24 NOTE — PROGRESS NOTES
[facility]    Orders entered for CORE enrollment Trever Amador.  3/4/2021 10:50 AM Courtney Amador PA-C [facility]       Harmony Nguyen RN on 2/24/2021 at 2:04 PM

## 2021-02-24 NOTE — PLAN OF CARE
Pt is A&Ox4, denies pain and on tele SR , VSS, on 2 L NC and LS diminished with bibasilar crackles, turn and repositioned every 2 hrs, coccyx red blanchable - foam on, plan for Thora in AM.

## 2021-02-24 NOTE — CONSULTS
[facility]    Record review for CORE enrollment. Patient admitted w/SOB attributed to a/c HF exacerbation. Repeat ECHO done this admit showed ongoing grade II diastolic dysfunction and EF 60-65%. She underwent thoracentesis 2/24, likely transudate pleural fluid. As of this writing her diuretics are on hold d/t increased Cr. She has been followed in the [facility] for her chronic AF by Dr Fuentes and NP Daphne Cm. CORE enrollment scheduled as follows. Will have labs done at TCU - will forward orders when specific destination known. Reminder sent to board.   Future Appointments   Date Time Provider Department Center   2/25/2021  6:00 AM Blank Knox, PT [facility]   2/26/2021  3:30 PM Laurie Conteh MD [facility]   3/4/2021 10:50 AM More, KIERA [facility]       Please call with questions.         Harmony Nguyen RN, BSN  [facility] Clinic RN Care Coordinator  [facility]  [phone]  CORE Clinic: Cardiomyopathy, Optimization, Rehabilitation, Education

## 2021-02-24 NOTE — PLAN OF CARE
Neuro- A/Ox4   Most Recent Vitals- Temp: 99.4  F (37.4  C) Temp src: Oral BP: (!) 150/63 Pulse: 70   Resp: 16 SpO2: 94 % O2 Device: Nasal cannula Oxygen Delivery: 2 LPM  Tele/Cardiac- SR  Resp- 2L NC   Activity- 1 assist GB and walker   Pain- denies pain   Drips- IV SL   Skin- blanchable redness to coccyx, w/ meplex in place.   GI/- voiding via purwick during night, but up to BR during day   Aggression Color- Green  Plan- Plan to have thoracentesis today, lasix on hold. Will continue with plan of care until discharge.    TORRIE DUMONT RN

## 2021-02-24 NOTE — PROGRESS NOTES
RADIOLOGY PROCEDURE NOTE  Patient name: Ghislaine Walls  MRN: [id number]  : 1935    Pre-procedure diagnosis: Right pleural effusion  Post-procedure diagnosis: Same    Procedure Date/Time: 2021  11:01 AM  Procedure: Thoracentesis  Estimated blood loss: None  Specimen(s) collected with description: Pleural fluid.  The patient tolerated the procedure well with no immediate complications.    See imaging dictation for procedural details and findings.    Provider name: Miguelangel Scott MD  Assistant(s):None

## 2021-02-24 NOTE — PLAN OF CARE
A/O, very [place], requested daughter to bring in hearing aid batteries, using pocket talker. Pt was fatigued most of the day and adamantly declined activity or lunch/dinner despite encouragement. VSS ex elevated 160s/60s, currently 110s-130s/40s-50s. O2sats 92% weaned to RA, mild TOBAR. Tele SR c PACs, 1st degree AVB. Denies pain. Ax1 c GB, walker. Coccyx blanchable red with scabs, repositioned to sides, purewick in place. Thoracentesis sites WDL. Plan for possible discharge tomorrow.

## 2021-02-24 NOTE — PLAN OF CARE
OT: attempted OT, pt recently returned from thoracentesis, pt declined, pt needs hearing aides as unable to hear this therapist, needed to write for communication. Nursing notified and will try to find pocket talker pt had. Pt's daughter is going to bring in new HA batteries.

## 2021-02-24 NOTE — PROGRESS NOTES
Care Management Follow Up    Length of Stay (days): 3    Expected Discharge Date: 02/25/21     Concerns to be Addressed: discharge planning     Patient plan of care discussed at interdisciplinary rounds: Yes    Anticipated Discharge Disposition:  TCU placement     Anticipated Discharge Services:  therapy  Anticipated Discharge DME:  N/A    Patient/family educated on Medicare website which has current facility and service quality ratings:  N/A  Education Provided on the Discharge Plan:  yes  Patient/Family in Agreement with the Plan:  yes    Referrals Placed by CM/SW:  TCU  Private pay costs discussed: Not applicable    Additional Information:  Received a call back from [place].  They have a bed available for patient on discharge.  Call placed to update patient's daughter as to this information.  Explained that the tentative plan for discharge is tomorrow.  Explained that I will follow up with her tomorrow once the discharge is confirmed and we have aa ride arranged.  Patient's daughter is in agreement.    Will continue to follow.      DELORES Licea, [facility]    [phone]  [facility]

## 2021-02-24 NOTE — PLAN OF CARE
"PT: 2nd attempt by PT services to see pt. Pt firmly declining any participation with mobility. Firmly stating, \"No\" despite PT and RN encouragement with use of the pocket talker.   "

## 2021-02-24 NOTE — PROGRESS NOTES
[facility]    Hospitalist Progress Note    Assessment & Plan   Ghislaine Walls is a 85 year old female who presents with dyspnea.     Acute exacerbation of chronic heart failure with preserved EF with hypoxia.  Paroxysmal atrial fibrillation  Essential hypertension  -Known history of heart failure with preserved EF.  Last echo from 7/22/2020 shows an EF of 55-60% with grade 2 or moderate diastolic dysfunction.  -O2 sats at assisted living facility was 87%.,  Continue monitoring intake/output, -3.4 L output noted  -Repeat echocardiogram results noted, showing EF 60-65 percentage with diastolic dysfunction and increased pulmonary hypertension, moderate  effusion noted on right side.  --.Status post thoracentesis 2/24, ambercolored pleural fluid noted, appears transudate  -Continue prior to admission Toprol- mg daily,  and diltiazem  mg daily, amlodipine dose increased to 10 mg on 2/23 with good blood pressure control..  -Hold hydrochlorothiazide, Lasix is also on hold due to creatinine going up to 1.9, creatinine continues to be elevated, continue to hold her Lasix today.  -Continue apixaban 2.5 mg twice a day for A. fib.  Apixaban restarted following thoracentesis 2/24  -Continue PT/OT, current recommendation is TCU, discussed with social work, and family possibly could transfer patient to TCU on 2/25/2021.  Chronic kidney disease stage III with mild hyperkalemia  nino on chronic kidney disease - cr from 1.4-1.9 2/23  -Replace potassium as needed     Hypothyroidism  -Continue prior to admission levothyroxine     History of gout  -No active issues at the moment, continue allopurinol     Chronic normocytic anemia  MGUS  -Hemoglobin dropped to 8.8 from 9.8, monitor  Thyroid lesion    -Noted in the CT scan, will  order outpatient thyroid ultrasound on discharge.  Recovered COVID-19.   -Last positive  August 26, 2020, it seems patient had another PCR done in December 2020, 26 with a  positive result, at the time as per family patient did not have any symptoms.  Since this is around 4 months after her first infection I am not sure whether it is a new infection versus shedding, patient did not have any symptoms at the time.  Repeat PCR here negative, Covid precautions removed    Contacted patient's daughter, discussed plan of care, discharge plans, possible transfer to TCU on 2/25, total time 35 minutes.  Discussed with social work and nursing.  DVT Prophylaxis: eliquis   Code Status: No CPR- Pre-arrest intubation OK     Disposition: Expected discharge 2/25/2021 to [facility]    Nichelle Carbajal MD  Text Page   (7am to 6pm)    Interval History   Patient is back from thoracentesis, she does not need any oxygen, she is resting in room air, very hard of hearing due to lack of batteries in the hearing aid, discussed with the nursing, she is kind of sleepy now.  Occasional cough noted following thoracentesis, recommended to continue incentive spirometer and education regarding that.  -Data reviewed today: I reviewed all new labs and imaging results over the last 24 hours. I personally reviewed labs and imaging below  Physical Exam   Temp: 98.8  F (37.1  C) Temp src: Oral BP: 131/66 Pulse: 66   Resp: 17 SpO2: 92 % O2 Device: None (Room air) Oxygen Delivery: 1 LPM  Vitals:    02/22/21 0545 02/23/21 0607 02/24/21 0647   Weight: 52.3 kg (115 lb 4.8 oz) 53.1 kg (117 lb) 52.1 kg (114 lb 12.8 oz)     Vital Signs with Ranges  Temp:  [98.4  F (36.9  C)-99.4  F (37.4  C)] 98.8  F (37.1  C)  Pulse:  [61-77] 66  Resp:  [15-18] 17  BP: (125-164)/(48-70) 131/66  SpO2:  [92 %-99 %] 92 %  I/O last 3 completed shifts:  In: 130 [P.O.:130]  Out: 650 [Urine:650]    Constitutional: Awake, very hard of hearing  Respiratory: Scattered basilar crackles present  Cardiovascular: Regular rate and rhythm, normal S1 and S2+  GI: Normal bowel sounds, soft, non-distended, non-tender  Skin/Integumen: No edema present bilateral lower  extremities  Neuro : moving all 4 extremities, no focal deficit noted     Medications     - MEDICATION INSTRUCTIONS -         allopurinol  100 mg Oral Daily     amLODIPine  10 mg Oral Daily     apixaban ANTICOAGULANT  2.5 mg Oral BID     diltiazem ER COATED BEADS  180 mg Oral Daily     hydrALAZINE  100 mg Oral 3 times daily     levothyroxine  25 mcg Oral Daily     metoprolol succinate ER  100 mg Oral Daily     mirtazapine  15 mg Oral At Bedtime     pantoprazole  40 mg Oral QAM AC     predniSONE  5 mg Oral Daily     sulfaSALAzine  500 mg Oral BID       Data   Recent Labs   Lab 02/24/21  0601 02/23/21  0902 02/23/21  0527 02/22/21  0632 02/21/21  1546 02/21/21  1546   WBC  --   --  3.6* 3.8*  --  3.3*   HGB  --   --  8.5* 8.8*  --  9.8*   MCV  --   --  108* 108*  --  109*   PLT  --   --  154 174  --  166   INR 1.16*  --   --   --   --   --      --  142 143  --  144   POTASSIUM 4.4  --  4.4 4.6   < > 5.5*   CHLORIDE 110*  --  113* 116*  --  118*   CO2 22  --  23 21  --  20   BUN 48*  --  45* 35*  --  37*   CR 1.89*  --  1.92* 1.42*  --  1.41*   ANIONGAP 8  --  6 6  --  6   ALIVIA 8.7  --  8.6 8.9  --  9.3   GLC 90  --  82 85  --  106*   PROTTOTAL  --  6.7*  --   --   --   --    TROPI  --   --   --   --   --  <0.015    < > = values in this interval not displayed.     Recent Labs   Lab 02/24/21  0601 02/23/21  0527 02/22/21  0632 02/21/21  1546   GLC 90 82 85 106*       Imaging:   Recent Results (from the past 24 hour(s))   US Thoracentesis Bilateral    Narrative    ULTRASOUND GUIDED BILATERAL THORACENTESIS February 24, 2021 11:10 AM     HISTORY: Bilateral pleural effusion right greater than left , if only  1 side, do right side.    FINDINGS: Ultrasound was used to evaluate for the presence and best  approach for drainage of a pleural effusion. Written and oral informed  consent was obtained. A pause for the cause procedure to verify the  correct patient and correct procedure. The skin overlying the right  and left  chest posteriorly was prepped and draped in the usual sterile  fashion. The subcutaneous tissues were anesthetized with 15 mL 1%  lidocaine. A catheter was advanced into the pleural space and 950 mL  of  alejandro colored fluid was drained from the right and 450 mL of   alejandro colored fluid was drained from the left. Patient was monitored  by nurse under my direct supervision throughout the exam. Ultrasound  images were permanently stored.  There were no immediate  complications. Patient left the ultrasound suite in satisfactory  condition.      Impression    IMPRESSION: Technically successful bilateral thoracentesis without  immediate complications.

## 2021-02-24 NOTE — PROGRESS NOTES
Patient is on US schedule today Wednesday 2/25/2021 for a Right diagnostic and therapeutic thoracentesis.     -Labs WNL for procedure.  Diagnostic labs are entered.   -No NPO required.   -Consent will be done prior to procedure.     Please contact the US department at [phone] for procedural questions.      Thanks Shavon Leitschuh CNP Interventional Radiology ([phone])

## 2021-02-25 VITALS
RESPIRATION RATE: 18 BRPM | SYSTOLIC BLOOD PRESSURE: 155 MMHG | OXYGEN SATURATION: 94 % | DIASTOLIC BLOOD PRESSURE: 71 MMHG | WEIGHT: 120 LBS | TEMPERATURE: 98.1 F | HEART RATE: 71 BPM | BODY MASS INDEX: 21.26 KG/M2

## 2021-02-25 VITALS
TEMPERATURE: 99 F | SYSTOLIC BLOOD PRESSURE: 135 MMHG | HEART RATE: 64 BPM | RESPIRATION RATE: 16 BRPM | OXYGEN SATURATION: 93 % | DIASTOLIC BLOOD PRESSURE: 53 MMHG | BODY MASS INDEX: 21.37 KG/M2 | HEIGHT: 63 IN | WEIGHT: 120.6 LBS

## 2021-02-25 LAB
ALBUMIN UR-MCNC: 30 MG/DL
ANION GAP SERPL CALCULATED.3IONS-SCNC: 6 MMOL/L (ref 3–14)
APPEARANCE UR: ABNORMAL
BACTERIA #/AREA URNS HPF: ABNORMAL /HPF
BILIRUB UR QL STRIP: NEGATIVE
BUN SERPL-MCNC: 52 MG/DL (ref 7–30)
CALCIUM SERPL-MCNC: 8.3 MG/DL (ref 8.5–10.1)
CHLORIDE SERPL-SCNC: 108 MMOL/L (ref 94–109)
CO2 SERPL-SCNC: 23 MMOL/L (ref 20–32)
COLOR UR AUTO: YELLOW
CREAT SERPL-MCNC: 1.97 MG/DL (ref 0.52–1.04)
ERYTHROCYTE [DISTWIDTH] IN BLOOD BY AUTOMATED COUNT: 15.2 % (ref 10–15)
GFR SERPL CREATININE-BSD FRML MDRD: 23 ML/MIN/{1.73_M2}
GLUCOSE SERPL-MCNC: 97 MG/DL (ref 70–99)
GLUCOSE UR STRIP-MCNC: NEGATIVE MG/DL
HCT VFR BLD AUTO: 30.3 % (ref 35–47)
HGB BLD-MCNC: 9.5 G/DL (ref 11.7–15.7)
HGB UR QL STRIP: NEGATIVE
KETONES UR STRIP-MCNC: NEGATIVE MG/DL
LEUKOCYTE ESTERASE UR QL STRIP: ABNORMAL
MCH RBC QN AUTO: 33.2 PG (ref 26.5–33)
MCHC RBC AUTO-ENTMCNC: 31.4 G/DL (ref 31.5–36.5)
MCV RBC AUTO: 106 FL (ref 78–100)
NITRATE UR QL: NEGATIVE
PH UR STRIP: 5.5 PH (ref 5–7)
PLATELET # BLD AUTO: 172 10E9/L (ref 150–450)
POTASSIUM SERPL-SCNC: 4.2 MMOL/L (ref 3.4–5.3)
RBC # BLD AUTO: 2.86 10E12/L (ref 3.8–5.2)
RBC #/AREA URNS AUTO: 8 /HPF (ref 0–2)
SODIUM SERPL-SCNC: 137 MMOL/L (ref 133–144)
SOURCE: ABNORMAL
SP GR UR STRIP: 1.02 (ref 1–1.03)
SQUAMOUS #/AREA URNS AUTO: 9 /HPF (ref 0–1)
UROBILINOGEN UR STRIP-MCNC: 0 MG/DL (ref 0–2)
WBC # BLD AUTO: 6.2 10E9/L (ref 4–11)
WBC #/AREA URNS AUTO: 87 /HPF (ref 0–5)

## 2021-02-25 PROCEDURE — 36415 COLL VENOUS BLD VENIPUNCTURE: CPT | Performed by: INTERNAL MEDICINE

## 2021-02-25 PROCEDURE — 250N000011 HC RX IP 250 OP 636: Performed by: INTERNAL MEDICINE

## 2021-02-25 PROCEDURE — 85027 COMPLETE CBC AUTOMATED: CPT | Performed by: INTERNAL MEDICINE

## 2021-02-25 PROCEDURE — 80048 BASIC METABOLIC PNL TOTAL CA: CPT | Performed by: INTERNAL MEDICINE

## 2021-02-25 PROCEDURE — 87086 URINE CULTURE/COLONY COUNT: CPT | Performed by: INTERNAL MEDICINE

## 2021-02-25 PROCEDURE — 250N000013 HC RX MED GY IP 250 OP 250 PS 637: Performed by: INTERNAL MEDICINE

## 2021-02-25 PROCEDURE — 99239 HOSP IP/OBS DSCHRG MGMT >30: CPT | Performed by: INTERNAL MEDICINE

## 2021-02-25 PROCEDURE — 250N000012 HC RX MED GY IP 250 OP 636 PS 637: Performed by: INTERNAL MEDICINE

## 2021-02-25 PROCEDURE — 258N000003 HC RX IP 258 OP 636: Performed by: INTERNAL MEDICINE

## 2021-02-25 PROCEDURE — 81001 URINALYSIS AUTO W/SCOPE: CPT | Performed by: INTERNAL MEDICINE

## 2021-02-25 RX ORDER — CEFDINIR 300 MG/1
300 CAPSULE ORAL 2 TIMES DAILY
DISCHARGE
Start: 2021-02-25 | End: 2021-03-03

## 2021-02-25 RX ORDER — AMLODIPINE BESYLATE 10 MG/1
10 TABLET ORAL DAILY
DISCHARGE
Start: 2021-02-25 | End: 2021-03-03

## 2021-02-25 RX ORDER — CEFTRIAXONE 1 G/1
1 INJECTION, POWDER, FOR SOLUTION INTRAMUSCULAR; INTRAVENOUS ONCE
Status: COMPLETED | OUTPATIENT
Start: 2021-02-25 | End: 2021-02-25

## 2021-02-25 RX ORDER — AMOXICILLIN 250 MG
1 CAPSULE ORAL 2 TIMES DAILY PRN
DISCHARGE
Start: 2021-02-25 | End: 2021-04-15

## 2021-02-25 RX ADMIN — PREDNISONE 5 MG: 5 TABLET ORAL at 10:01

## 2021-02-25 RX ADMIN — APIXABAN 2.5 MG: 2.5 TABLET, FILM COATED ORAL at 10:01

## 2021-02-25 RX ADMIN — PANTOPRAZOLE SODIUM 40 MG: 40 TABLET, DELAYED RELEASE ORAL at 10:01

## 2021-02-25 RX ADMIN — SODIUM CHLORIDE 500 ML: 9 INJECTION, SOLUTION INTRAVENOUS at 08:50

## 2021-02-25 RX ADMIN — ALLOPURINOL 100 MG: 100 TABLET ORAL at 10:01

## 2021-02-25 RX ADMIN — METOPROLOL SUCCINATE 100 MG: 100 TABLET, EXTENDED RELEASE ORAL at 10:01

## 2021-02-25 RX ADMIN — CEFTRIAXONE SODIUM 1 G: 1 INJECTION, POWDER, FOR SOLUTION INTRAMUSCULAR; INTRAVENOUS at 11:56

## 2021-02-25 RX ADMIN — AMLODIPINE BESYLATE 10 MG: 10 TABLET ORAL at 10:01

## 2021-02-25 RX ADMIN — HYDRALAZINE HYDROCHLORIDE 100 MG: 50 TABLET, FILM COATED ORAL at 13:45

## 2021-02-25 RX ADMIN — HYDRALAZINE HYDROCHLORIDE 100 MG: 50 TABLET, FILM COATED ORAL at 06:11

## 2021-02-25 RX ADMIN — SULFASALAZINE 500 MG: 500 TABLET ORAL at 10:01

## 2021-02-25 RX ADMIN — LEVOTHYROXINE SODIUM 25 MCG: 25 TABLET ORAL at 06:11

## 2021-02-25 RX ADMIN — DILTIAZEM HYDROCHLORIDE 180 MG: 180 CAPSULE, COATED, EXTENDED RELEASE ORAL at 06:09

## 2021-02-25 ASSESSMENT — MIFFLIN-ST. JEOR: SCORE: 966.17

## 2021-02-25 ASSESSMENT — ACTIVITIES OF DAILY LIVING (ADL)
ADLS_ACUITY_SCORE: 24
ADLS_ACUITY_SCORE: 23
ADLS_ACUITY_SCORE: 29
ADLS_ACUITY_SCORE: 24

## 2021-02-25 NOTE — PROGRESS NOTES
Brief progress note:     Enrolled in [employer]. S/p thoracentesis. Would resume po diuretic when able.    Cardiology to sign off. Please page with questions.    Nicole De La Cruz MD MSc  [facility]

## 2021-02-25 NOTE — PROGRESS NOTES
"A/O but flat affect, VSS, O2sats 93% on RA. Tele SR c PACs, 1st degree AVB. Denies pain, SOB, or dizziness. Up to BSC Ax1-2 otherwise declined activity, discussed risk of deconditioning and asked patient if she understood this and she said \"yes.\" Pt declined all meals despite encouragement. Coccyx blanchable red with old scabs. UA collected, abx started for likely UTI. All TCU packet and personal belongings (including bilateral hearing aids) given to pt. Daughter Krupa called with update, she states pt has been disinterested in activity or food for some time now despite encouragement. Pt d/c to [facility] via [facility].    "

## 2021-02-25 NOTE — PLAN OF CARE
OT: Attempted to see pt for OT POC.  Pt in bed, awake, but refusing any activity despite strong encouragement.  Plan is for discharge today to TCU, agree with plan.  Pt is below baseline and has had a low level of activity while here.  Would benefit from skilled therapy at TCU to increase endurance and independence for ADLS.  Will complete order.    Occupational Therapy Discharge Summary    Reason for therapy discharge:    Discharged to transitional care facility.    Progress towards therapy goal(s). See goals on Care Plan in [facility] electronic health record for goal details.  Goals not met.  Barriers to achieving goals:   limited tolerance for therapy and discharge from facility.    Therapy recommendation(s):    Continued therapy is recommended.  Rationale/Recommendations:  Pt needs continued rehab to return to baseline level so she can return home.

## 2021-02-25 NOTE — PROGRESS NOTES
Care Management Discharge Note    Discharge Date: 02/25/21       Discharge Disposition:  TCU placement ([facility])    Discharge Services:  therapy    Discharge DME:  walker    Discharge Transportation: transport via [facility]    Private pay costs discussed: Not applicable    PAS Confirmation Code:  [id number]  Patient/family educated on Medicare website which has current facility and service quality ratings:  N/A    Education Provided on the Discharge Plan:  yes  Persons Notified of Discharge Plans: patient and daughter Krupa  Patient/Family in Agreement with the Plan:  yes    Handoff Referral Completed: Yes    Additional Information:  Received discharge orders for patient.  Bed available at [facility] for today.  Patient will transport, via [facility], around 14:00 today.  Patient informed of the plan and in agreement to the plan.  Call placed to update patient's daughter and she is in agreement to the plan.  Call placed to update [facility] and faxed the orders and the PAS.      PAS-RR    D: Per DHS regulation, SW completed and submitted PAS-RR to [place] [employer] via the [employer].  PAS-RR confirmation # is : [id number].    I: SW spoke with patient and daughter and they are aware a PAS-RR has been submitted.  KATHI reviewed with patient and daughter that they may be contacted for a follow up appointment within 10 days of hospital discharge if their SNF stay is < 30 days.  Contact information for [facility] Line was also provided.    A: Patient and daughter  verbalized understanding.    P: Further questions may be directed to [facility] Line at #[phone], option #4 for PAS-RR staff.         DELORES Licea, [facility]    [phone]  [facility]

## 2021-02-25 NOTE — PLAN OF CARE
PT: Pt declining OOB mobility. Per chart pt DCing to TCU today, confirmed with SW. Will defer further PT to next level of care    Physical Therapy Discharge Summary    Reason for therapy discharge:    Discharged to transitional care facility.    Progress towards therapy goal(s). See goals on Care Plan in [facility] electronic health record for goal details.  Goals partially met.  Barriers to achieving goals:   discharge from facility.    Therapy recommendation(s):    Continued therapy is recommended.  Rationale/Recommendations:  Pt will require TCU stay to improve strength, balance, endurance, IND with mobility as pt below baseline at this time .

## 2021-02-25 NOTE — DISCHARGE SUMMARY
[facility]    Discharge Summary  Hospitalist    Date of Admission:  2/21/2021  Date of Discharge:  2/25/2021  Discharging Provider: Nichelle Carbajal MD    Discharge Diagnoses   Possible CHF exacerbation-acute exacerbation of chronic heart failure with preserved EF  Urinary tract infection      History of Present Illness   Please review admission history and physical.  Hospital Course   Ghislaine Walls was admitted on 2/21/2021.  The following problems were addressed during her hospitalization:    Active Problems:    Shortness of breath    Hypervolemia, unspecified hypervolemia type    CHF exacerbation (H)  Ghislaine Walls is a 85 year old female who presents with dyspnea.     Acute exacerbation of chronic heart failure with preserved EF with hypoxia.  Paroxysmal atrial fibrillation  Essential hypertension  Patient has a known history of heart failure with preserved EF.  Last echo from 7/22/2020 shows an EF of 55-60% with grade 2 or moderate diastolic dysfunction.  Presented with a reduced oxygen saturation 87% daily in room air, repeat echocardiogram was obtained here showed ejection fraction 60-65 percentage with diastolic dysfunction and increased pulmonary hypertension. moderate pulmonary effusion was noted on the right side.  Patient underwent diuresis with IV Lasix with a -3.4 L output.  Her kidney function did go up from her baseline to 1.9 at which time Lasix was discontinued and patient was monitored closely.  Due to right pleural effusion she had underwent thoracentesis on 2/24 transudative fluid noted currently patient is not on oxygen and the plan is to transfer patient to TCU for further rehabilitation.  Patient had a low-grade temps prior to discharge, repeat UA was obtained which showed leukocyte esterase with the WBC in the 80s, she received a dose of IV Rocephin and the plan is to continue third-generation cephalosporin to finish the course.  Her urine culture on review on  2/27 shows that no specific organism was identified.  Her PTA Toprol- mg, diltiazem  mg daily, amlodipine dose which was increased here to 10 mg on 2/23 and to be continued.  Hydrochlorothiazide is on hold, Lasix was given here but currently on hold due to elevated creatinine.  Continue apixaban 2.5 mg twice a day for A. fib.  Patient received input from PT/OT    Chronic kidney disease stage III with mild hyperkalemia  nino on chronic kidney disease - cr from 1.4-1.9 2/23  Potassium was replaced as needed     Hypothyroidism  -Continue prior to admission levothyroxine     History of gout  -No active issues at the moment, continue allopurinol     Chronic normocytic anemia  MGUS  -Hemoglobin dropped to 8.8 from 9.8, monitor  Thyroid lesion    -Noted in the CT scan, will  order outpatient thyroid ultrasound on discharge.  Recovered COVID-19.   -Last positive  August 26, 2020, it seems patient had another PCR done in December 2020, 26 with a positive result, at the time as per family patient did not have any symptoms.  Since this is around 4 months after her first infection I am not sure whether it is a new infection versus shedding, patient did not have any symptoms at the time.  Repeat PCR here negative, Covid precautions removed      Nichelle Carbajal MD    Significant Results and Procedures       Pending Results   These results will be followed up by primary care physician at [facility]   Unresulted Labs Ordered in the Past 30 Days of this Admission     Date and Time Order Name Status Description    2/25/2021 0930 Urine Culture Aerobic Bacterial In process     2/23/2021 0837 Cytology non gyn In process     2/23/2021 0837 Fluid Culture Aerobic Bacterial Preliminary           Code Status   Full Code       Primary Care Physician   Laurie Conteh    Physical Exam   Temp: 99  F (37.2  C) Temp src: Oral BP: 135/53 Pulse: 64   Resp: 16 SpO2: 93 % O2 Device: None (Room air)    Vitals:    02/23/21 0607 02/24/21 0647  02/25/21 0611   Weight: 53.1 kg (117 lb) 52.1 kg (114 lb 12.8 oz) 54.7 kg (120 lb 9.6 oz)     Vital Signs with Ranges  Temp:  [98.9  F (37.2  C)-99.8  F (37.7  C)] 99  F (37.2  C)  Pulse:  [] 64  Resp:  [16-18] 16  BP: (117-144)/(48-88) 135/53  SpO2:  [92 %-94 %] 93 %  I/O last 3 completed shifts:  In: 340 [P.O.:340]  Out: 1750 [Urine:350]    The patient was examined on the day of discharge.    Discharge Disposition   Discharged to nursing home  Condition at discharge: Stable    Consultations This Hospital Stay   CORE CLINIC EVALUATION IP CONSULT  OCCUPATIONAL THERAPY ADULT IP CONSULT  NUTRITION SERVICES ADULT IP CONSULT  CARE MANAGEMENT / SOCIAL WORK IP CONSULT  CARDIOLOGY IP CONSULT  PHARMACY IP CONSULT  PHYSICAL THERAPY ADULT IP CONSULT  OCCUPATIONAL THERAPY ADULT IP CONSULT  NUTRITION SERVICES ADULT IP CONSULT  PHYSICAL THERAPY ADULT IP CONSULT  OCCUPATIONAL THERAPY ADULT IP CONSULT    Time Spent on this Encounter   INichelle MD, personally saw the patient today and spent greater than 30 minutes discharging this patient.    Discharge Orders      US Thyroid     General info for SNF    Length of Stay Estimate: Short Term Care: Estimated # of Days <30  Condition at Discharge: Improving  Level of care:skilled   Rehabilitation Potential: Good  Admission H&P remains valid and up-to-date: Yes  Recent Chemotherapy: N/A  Use Nursing Home Standing Orders: Yes     Mantoux instructions    Give two-step Mantoux (PPD) Per Facility Policy Yes     Reason for your hospital stay    Congestive heart failure  Exacerbation     Intake and output    Every shift     Daily weights    Call Provider for weight gain of more than 2 pounds per day or 5 pounds per week.     Follow Up and recommended labs and tests    Follow up with skilled nursing physician.  The following labs/tests are recommended: basic metabolic panel and cbc in 4-5 days, please evaluate fluid status and try low dose lasix once creatinine improves..      Activity - Up with nursing assistance     Physical Therapy Adult Consult    Evaluate and treat as clinically indicated.    Reason:  Deconditioning     Occupational Therapy Adult Consult    Evaluate and treat as clinically indicated.    Reason:  deconditioning     Fall precautions     Advance Diet as Tolerated    Follow this diet upon discharge: Orders Placed This Encounter      Fluid restriction 2000 ML FLUID      Snacks/Supplements Adult: Boost Plus; With Meals      Heart healthy diet     Discharge Medications   Current Discharge Medication List      START taking these medications    Details   cefdinir (OMNICEF) 300 MG capsule Take 1 capsule (300 mg) by mouth 2 times daily for 7 days    Associated Diagnoses: Acute cystitis without hematuria      senna-docusate (SENOKOT-S/PERICOLACE) 8.6-50 MG tablet Take 1 tablet by mouth 2 times daily as needed for constipation  Qty:      Associated Diagnoses: Hypertension goal BP (blood pressure) < 140/90         CONTINUE these medications which have CHANGED    Details   amLODIPine (NORVASC) 10 MG tablet Take 1 tablet (10 mg) by mouth daily  Qty:      Associated Diagnoses: Hypertension goal BP (blood pressure) < 140/90         CONTINUE these medications which have NOT CHANGED    Details   acetaminophen (TYLENOL) 500 MG tablet Take 1,000 mg by mouth 3 times daily      allopurinol (ZYLOPRIM) 100 MG tablet Take 1 tablet (100 mg) by mouth daily  Qty: 90 tablet, Refills: 3    Associated Diagnoses: Chronic gout without tophus, unspecified cause, unspecified site      apixaban ANTICOAGULANT (ELIQUIS) 2.5 MG tablet Take 1 tablet (2.5 mg) by mouth 2 times daily  Qty: 180 tablet, Refills: 3    Associated Diagnoses: PAF (paroxysmal atrial fibrillation) (H)      diltiazem ER (DILT-XR) 180 MG 24 hr capsule Take 1 capsule (180 mg) by mouth daily  Qty: 90 capsule, Refills: 3    Associated Diagnoses: PAF (paroxysmal atrial fibrillation) (H)      hydrALAZINE (APRESOLINE) 100 MG tablet Take 1  tablet (100 mg) by mouth 3 times daily  Qty: 270 tablet, Refills: 3    Associated Diagnoses: Essential hypertension      levothyroxine (SYNTHROID/LEVOTHROID) 25 MCG tablet Take 1 tablet (25 mcg) by mouth daily  Qty: 90 tablet, Refills: 2    Associated Diagnoses: Hypothyroidism, unspecified type      metoprolol succinate ER (TOPROL-XL) 100 MG 24 hr tablet Take 1 tablet (100 mg) by mouth daily  Qty: 90 tablet, Refills: 3    Associated Diagnoses: PAF (paroxysmal atrial fibrillation) (H)      mirtazapine (REMERON) 15 MG tablet Take 1 tablet (15 mg) by mouth At Bedtime  Qty: 90 tablet, Refills: 3    Associated Diagnoses: Poor appetite      pantoprazole (PROTONIX) 40 MG EC tablet Take 1 tablet (40 mg) by mouth every morning (before breakfast)  Qty: 90 tablet, Refills: 3    Associated Diagnoses: Gastroesophageal reflux disease with esophagitis, unspecified whether hemorrhage      predniSONE (DELTASONE) 5 MG tablet Take 1 tablet (5 mg) by mouth daily  Qty: 90 tablet, Refills: 3    Associated Diagnoses: Inflammatory arthritis      sulfaSALAzine (AZULFIDINE) 500 MG tablet TAKE ONE TABLET BY MOUTH TWICE DAILY AFTER MEALS.         STOP taking these medications       traMADol (ULTRAM) 50 MG tablet Comments:   Reason for Stopping:             Allergies   Allergies   Allergen Reactions     Oxybutynin Itching     Seasonal Allergies      Data   Most Recent 3 CBC's:  Recent Labs   Lab Test 02/25/21  0539 02/23/21  0527 02/22/21  0632   WBC 6.2 3.6* 3.8*   HGB 9.5* 8.5* 8.8*   * 108* 108*    154 174      Most Recent 3 BMP's:  Recent Labs   Lab Test 02/25/21  0539 02/24/21  0601 02/23/21  0527    140 142   POTASSIUM 4.2 4.4 4.4   CHLORIDE 108 110* 113*   CO2 23 22 23   BUN 52* 48* 45*   CR 1.97* 1.89* 1.92*   ANIONGAP 6 8 6   ALIVIA 8.3* 8.7 8.6   GLC 97 90 82     Most Recent 2 LFT's:  Recent Labs   Lab Test 12/01/20  1405 07/25/20  0554 07/21/20  2038   AST 21 25 18   ALT 11 10 8   ALKPHOS  --  90 69   BILITOTAL  --   0.4 0.4     Most Recent INR's and Anticoagulation Dosing History:  Anticoagulation Dose History     Recent Dosing and Labs Latest Ref Rng & Units 3/12/2020 3/26/2020 4/9/2020 4/22/2020 4/24/2020 7/21/2020 2/24/2021    INR 0.86 - 1.14 - 3.20(H) 2.00(H) 3.50(H) 2.00(H) 2.07(H) 1.16(H)    INR 0.86 - 1.14 1.5(A) - - - - - -        Most Recent 3 Troponin's:  Recent Labs   Lab Test 02/21/21  1546 07/21/20  0849 10/02/19  0531   TROPI <0.015 <0.015 0.435*     Most Recent Cholesterol Panel:  Recent Labs   Lab Test 10/02/19  0531   CHOL 123   LDL 74   HDL 27*   TRIG 111     Most Recent 6 Bacteria Isolates From Any Culture (See EPIC Reports for Culture Details):  Recent Labs   Lab Test 02/24/21  1045 12/01/20  1354 08/15/20  1228 08/15/20  1220 08/11/20  0955 07/27/20  1510   CULT Culture negative monitoring continues 50,000 to 100,000 colonies/mL  Klebsiella variicola/pneumoniae  *  <10,000 colonies/mL  urogenital christian  Susceptibility testing not routinely done   No growth No growth Cultured on the 1st day of incubation:  Staphylococcus pettenkoferi  *  Critical Value/Significant Value, preliminary result only, called to and read back by  Radha Awan CNP @ 2140. 8/12/20. AV    (Note)  POSITIVE for Staphylococci other than S.aureus, S.epidermidis and  S.lugdunensis, by [employer] multiplex nucleic acid test.  Coagulase-negative staphylococci are the most common venipuncture or  collection associated skin CONTAMINANTS grown in blood cultures.  Final identification and antimicrobial susceptibility testing will be  verified by standard methods.    Specimen tested with [employer] multiplex, gram-positive blood culture  nucleic acid test for the following targets: Staph aureus, Staph  epidermidis, Staph lugdunensis, other Staph species, Enterococcus  faecalis, Enterococcus faecium, Streptococcus species, S. agalactiae,  S. anginosus grp., S. pneumoniae, S. pyogenes, Listeria sp., mecA  (methicillin resistance) and Riri/B  (vancomycin resistance).    Critical Value/Significant Value called to and read back by Radha Awan RN, @1930 08/12/20.DH.   No growth     Most Recent TSH, T4 and A1c Labs:  Recent Labs   Lab Test 07/21/20  0849 11/04/19  1537 11/04/19  1537   TSH 1.86   < > 1.82   T4  --   --  1.55*    < > = values in this interval not displayed.     Results for orders placed or performed during the hospital encounter of 02/21/21   XR Chest Port 1 View    Narrative    CHEST PORTABLE ONE VIEW   2/21/2021 4:19 PM     HISTORY: Hypoxemia.    COMPARISON: 7/21/2020.      Impression    IMPRESSION: New small right greater than left pleural fluid. Patchy  bibasilar new consolidation may be atelectasis or airspace disease.  Mildly increased vascular congestion suggesting edema. Stable cardiac  silhouette.    ALFREDO NEGRON MD   CT Chest w/o Contrast    Narrative    CT CHEST W/O CONTRAST 2/22/2021 5:48 PM    CLINICAL HISTORY: Pneumonia, effusion or abscess suspected, xray done.    TECHNIQUE: CT chest without IV contrast. Multiplanar reformats were  obtained. Dose reduction techniques were used.  CONTRAST: None.    COMPARISON: Chest radiograph dated 2/21/2021    FINDINGS:   LUNGS AND PLEURA: There small left and moderate sized right pleural  effusions. There is mild mosaic attenuation of the lungs. There are  opacities in the lung bases.    MEDIASTINUM/AXILLAE: There is a 2.3 cm low-attenuation right thyroid  lesion. There is atherosclerotic disease including severe coronary  artery disease. Mitral annular calcification is present.     UPPER ABDOMEN: Cholelithiasis.    MUSCULOSKELETAL: Unremarkable.      Impression    IMPRESSION:   1.  Opacities in the lung bases are likely atelectasis. Minimal  airspace disease is possible.  2.  Moderate right and small left pleural effusions.  3.  Mild mosaic attenuation of the lungs could be seen with small  airways disease.  4.  Atherosclerotic disease.  5.  Thyroid lesion. Consider thyroid  ultrasound.    LESTER J FAHRNER, MD   US Thoracentesis Bilateral    Narrative    ULTRASOUND GUIDED BILATERAL THORACENTESIS 2021 11:10 AM     HISTORY: Bilateral pleural effusion right greater than left , if only  1 side, do right side.    FINDINGS: Ultrasound was used to evaluate for the presence and best  approach for drainage of a pleural effusion. Written and oral informed  consent was obtained. A pause for the cause procedure to verify the  correct patient and correct procedure. The skin overlying the right  and left chest posteriorly was prepped and draped in the usual sterile  fashion. The subcutaneous tissues were anesthetized with 15 mL 1%  lidocaine. A catheter was advanced into the pleural space and 950 mL  of  alejandro colored fluid was drained from the right and 450 mL of   alejandro colored fluid was drained from the left. Patient was monitored  by nurse under my direct supervision throughout the exam. Ultrasound  images were permanently stored.  There were no immediate  complications. Patient left the ultrasound suite in satisfactory  condition.      Impression    IMPRESSION: Technically successful bilateral thoracentesis without  immediate complications.   Echocardiogram Complete    Narrative    [id number]  [id number]  [id number]  [id number]^MARIAN^KATHRYN           [facility]  Echocardiography Laboratory  [address] [place] [address]        Name: ROSIO YOUNG  MRN: [id number]  : 1935  Study Date: 2021 11:33 AM  Age: 85 yrs  Gender: Female  Patient Location: [facility]  Reason For Study: Heart Failure  Ordering Physician: KATHRYN FONSECA  Referring Physician: Laurie Conteh  Performed By: Trace Camacho RDCS     BSA: 1.6 m2  Height: 63 in  Weight: 130 lb  HR: 64  BP: 151/57 mmHg  _____________________________________________________________________________  __        Procedure  Complete Portable Echo Adult. Optison (NDC #[id number]) given  intravenously.  _____________________________________________________________________________  __        Interpretation Summary     The left ventricle is normal in size.  Left ventricular systolic function is normal.  The visual ejection fraction is estimated at 60-65%.  Diastolic Doppler findings (E/E' ratio and/or other parameters) suggest left  ventricular filling pressures are increased.  Right ventricular systolic pressure is elevated, consistent with mild  pulmonary hypertension.  Moderate left pleural effusion  Compared to prior study, there is no significant change.  _____________________________________________________________________________  __        Left Ventricle  The left ventricle is normal in size. There is normal left ventricular wall  thickness. Left ventricular systolic function is normal. The visual ejection  fraction is estimated at 60-65%. Diastolic Doppler findings (E/E' ratio and/or  other parameters) suggest left ventricular filling pressures are increased.  Grade II or moderate diastolic dysfunction. Normal left ventricular wall  motion.     Right Ventricle  The right ventricle is normal in structure, function and size.     Atria  Normal left atrial size. Right atrial size is normal. There is no atrial shunt  seen.     Mitral Valve  There is mild mitral annular calcification. There is trace mitral  regurgitation.        Tricuspid Valve  The tricuspid valve is normal in structure and function. There is trace  tricuspid regurgitation. IVC diameter <2.1 cm collapsing >50% with sniff  suggests a normal RA pressure of 3 mmHg. The right ventricular systolic  pressure is approximated at 35.9 mmHg plus the right atrial pressure. Right  ventricular systolic pressure is elevated, consistent with mild pulmonary  hypertension.     Aortic Valve  The aortic valve is trileaflet with aortic valve sclerosis. There is trace  aortic regurgitation. No aortic stenosis is present.     Pulmonic Valve  The  pulmonic valve is not well seen, but is grossly normal. There is trace  pulmonic valvular regurgitation.     Vessels  Normal size aorta.     Pericardium  There is no pericardial effusion. Moderate left pleural effusion.        Rhythm  Sinus rhythm was noted.  _____________________________________________________________________________  __  MMode/2D Measurements & Calculations  IVSd: 1.2 cm     LVIDd: 4.8 cm  LVIDs: 3.2 cm  LVPWd: 0.80 cm  FS: 32.8 %  LV mass(C)d: 168.0 grams  LV mass(C)dI: 104.3 grams/m2  Ao root diam: 2.9 cm  LA dimension: 4.5 cm  asc Aorta Diam: 2.9 cm  LA/Ao: 1.6  LA Volume (BP): 48.3 ml  LA Volume Index (BP): 30.0 ml/m2  RWT: 0.33           Doppler Measurements & Calculations  MV E max cheikh: 148.0 cm/sec  MV A max cheikh: 104.7 cm/sec  MV E/A: 1.4  MV max P.1 mmHg  MV mean P.7 mmHg  MV V2 VTI: 46.1 cm  MV dec slope: 665.0 cm/sec2  MV dec time: 0.22 sec  PA acc time: 0.12 sec  TR max cheikh: 299.7 cm/sec  TR max P.9 mmHg  E/E' av.2  Lateral E/e': 27.6  Medial E/e': 34.7           _____________________________________________________________________________  __           Report approved by: Javier Bartlett 2021 03:49 PM

## 2021-02-25 NOTE — PLAN OF CARE
Neuro- A/O x2 disoriented to time and situation   Most Recent Vitals- Temp: 98.9  F (37.2  C) Temp src: Oral BP: 121/70 Pulse: 79   Resp: 16 SpO2: 94 % O2 Device: None (Room air) Oxygen Delivery: 1 LPM  Tele/Cardiac- SR w/ PAC's and intermittent runs of A-fib, oral Cardizem given early d/t HR increasing to 130's   Resp- room air. Previously required oxygen   Activity- refuses PT/OT. But can ambulate w/ 1 assist to BSC or BR   Pain- denies pain   Drips- IV SL   Skin- blanchable redness to coccyx w/ meplix in place.   GI/- voiding via purwick   Aggression Color- Green  Plan- Plan to [facility] today   Misc- fluid restriction of 1800. Decreased appetite. [place] but has new batteries for LU. Thora site on L/R CDI not drainage. Will continue with plan of care until discharge.     TORRIE DUMONT RN

## 2021-02-25 NOTE — PLAN OF CARE
Physical Therapy Discharge Summary    Reason for therapy discharge:    Discharged to transitional care facility.    Progress towards therapy goal(s). See goals on Care Plan in [facility] electronic health record for goal details.  Goals partially met.  Barriers to achieving goals:   discharge from facility.    Therapy recommendation(s):    Continued therapy is recommended.  Rationale/Recommendations:  To improve safety and IND with mobility as pt below baseline at this time .

## 2021-02-26 ENCOUNTER — NURSING HOME VISIT (OUTPATIENT)
Dept: GERIATRICS | Facility: CLINIC | Age: 86
End: 2021-02-26
Payer: MEDICARE

## 2021-02-26 ENCOUNTER — PATIENT OUTREACH (OUTPATIENT)
Dept: CARE COORDINATION | Facility: CLINIC | Age: 86
End: 2021-02-26

## 2021-02-26 ENCOUNTER — CARE COORDINATION (OUTPATIENT)
Dept: CARDIOLOGY | Facility: CLINIC | Age: 86
End: 2021-02-26

## 2021-02-26 DIAGNOSIS — N18.30 STAGE 3 CHRONIC KIDNEY DISEASE, UNSPECIFIED WHETHER STAGE 3A OR 3B CKD (H): Chronic | ICD-10-CM

## 2021-02-26 DIAGNOSIS — G89.29 CHRONIC PAIN OF BOTH SHOULDERS: Primary | ICD-10-CM

## 2021-02-26 DIAGNOSIS — E03.8 OTHER SPECIFIED HYPOTHYROIDISM: Chronic | ICD-10-CM

## 2021-02-26 DIAGNOSIS — I50.32 CHRONIC HEART FAILURE WITH PRESERVED EJECTION FRACTION (H): ICD-10-CM

## 2021-02-26 DIAGNOSIS — M06.4 INFLAMMATORY POLYARTHROPATHY (H): ICD-10-CM

## 2021-02-26 DIAGNOSIS — I48.0 PAROXYSMAL ATRIAL FIBRILLATION (H): ICD-10-CM

## 2021-02-26 DIAGNOSIS — R53.81 PHYSICAL DECONDITIONING: ICD-10-CM

## 2021-02-26 DIAGNOSIS — R06.02 SHORTNESS OF BREATH: ICD-10-CM

## 2021-02-26 DIAGNOSIS — I10 HYPERTENSION GOAL BP (BLOOD PRESSURE) < 140/90: Chronic | ICD-10-CM

## 2021-02-26 DIAGNOSIS — R63.0 POOR APPETITE: ICD-10-CM

## 2021-02-26 DIAGNOSIS — M25.511 CHRONIC PAIN OF BOTH SHOULDERS: Primary | ICD-10-CM

## 2021-02-26 DIAGNOSIS — R62.7 FAILURE TO THRIVE IN ADULT: ICD-10-CM

## 2021-02-26 DIAGNOSIS — M25.512 CHRONIC PAIN OF BOTH SHOULDERS: Primary | ICD-10-CM

## 2021-02-26 LAB
BACTERIA SPEC CULT: NORMAL
Lab: NORMAL
SPECIMEN SOURCE: NORMAL

## 2021-02-26 PROCEDURE — 99310 SBSQ NF CARE HIGH MDM 45: CPT | Performed by: NURSE PRACTITIONER

## 2021-02-26 NOTE — LETTER
[facility]   To:   [facility]          Please give to facility    From:   Milad Allison RN Care Coordinator [facility]     Patient Name:  Ghislaine Walls  YOB: 1935   Admit date: 2/25/21      *Information Needed:  Please contact me when the patient will discharge (or if they will move to long term care)- include the discharge date, disposition, and main diagnosis   - If the patient is discharged with home care services, please provide the name of the agency    Also- Please inform me if a care conference is being held.   Phone, Fax or Email with information       Thank You,   Milad Allison, RN  Clinic Care Coordinator  [facility]  [facility]  Ph: [phone]    [email or web address]

## 2021-02-26 NOTE — PROGRESS NOTES
[facility]  PRIMARY CARE PROVIDER AND CLINIC:  Laurie Conteh MD, [address] / [place] [address]  Chief Complaint   Patient presents with     Hospital F/U     [facility] Medical Record Number:  [id number]  Place of Service where encounter took place:  [facility] [[id number]]    Ghislaine Walls  is a 85 year old  (1935), admitted to the above facility from  [facility]. Hospital stay 2/21/21 through 2/25/21..  Admitted to this facility for  rehab, medical management and nursing care.    HPI:    HPI information obtained from: facility chart records, facility staff, patient report and [facility] chart review.     Brief Summary of Hospital Course:   85 yr old female presents with SOB from her snf. She has known HFpEF with 55-60% with grade 2 diastolic dysfunction. Repeat echo showed the same EF, increased pulmonary htn with moderate R sided effusion. She was diuresed, underwent thoracentesis on 2/24, cytology sent. Her amlodipine was increased to 10mg daily and lasix held per worsening renal fx. She was also found to have an UTI and discharged on cefdinir. Of note previously tested positive for covid in august 2020, this hospitalization was negative. She was then discharged to the TCU.      Updates on Status Since Skilled nursing Admission: not eating well, coarse LSs persist    CODE STATUS/ADVANCE DIRECTIVES DISCUSSION:   DNR only  Patient's living condition: lives in an assisted living facility  ALLERGIES: Oxybutynin and Seasonal allergies  PAST MEDICAL HISTORY:  has a past medical history of Atrial fibrillation (H) (new 10/19), [place] (hard of hearing), Hyperlipidaemia, Hyperlipidaemia LDL goal < 130, Hypertension, Hypothyroid, PAD (peripheral artery disease) (H), Renal insufficiency, mild, and Uncontrolled hypertension (10/14/2019). She also has no past medical history of Sleep apnea.  PAST SURGICAL HISTORY:   has a past surgical history that  includes appendectomy (1951); tubal ligation (1971); Eye surgery (1956); cataract iol, rt/lt (2001); Dental surgery (1962); Esophagoscopy, gastroscopy, duodenoscopy (EGD), combined (N/A, 7/24/2020); and Bone marrow biopsy, bone specimen, needle/trocar (N/A, 7/31/2020).  FAMILY HISTORY: family history includes Coronary Artery Disease in her brother and brother; Heart Disease in her brother, brother, father, and mother; Respiratory in her mother.  SOCIAL HISTORY:   reports that she quit smoking about 48 years ago. Her smoking use included cigarettes. She has a 2.50 pack-year smoking history. She has never used smokeless tobacco. She reports current alcohol use. She reports that she does not use drugs.    Post Discharge Medication Reconciliation Status: discharge medications reconciled and changed, per note/orders    Current Outpatient Medications   Medication Sig Dispense Refill     acetaminophen (TYLENOL) 500 MG tablet Take 1,000 mg by mouth 3 times daily       allopurinol (ZYLOPRIM) 100 MG tablet Take 1 tablet (100 mg) by mouth daily 90 tablet 3     amLODIPine (NORVASC) 10 MG tablet Take 1 tablet (10 mg) by mouth daily       apixaban ANTICOAGULANT (ELIQUIS) 2.5 MG tablet Take 1 tablet (2.5 mg) by mouth 2 times daily 180 tablet 3     cefdinir (OMNICEF) 300 MG capsule Take 1 capsule (300 mg) by mouth 2 times daily for 7 days (Patient taking differently: Take 300 mg by mouth 2 times daily Complete on 3/3)       diltiazem ER (DILT-XR) 180 MG 24 hr capsule Take 1 capsule (180 mg) by mouth daily 90 capsule 3     hydrALAZINE (APRESOLINE) 100 MG tablet Take 1 tablet (100 mg) by mouth 3 times daily 270 tablet 3     levothyroxine (SYNTHROID/LEVOTHROID) 25 MCG tablet Take 1 tablet (25 mcg) by mouth daily 90 tablet 2     metoprolol succinate ER (TOPROL-XL) 100 MG 24 hr tablet Take 1 tablet (100 mg) by mouth daily 90 tablet 3     mirtazapine (REMERON) 15 MG tablet Take 1 tablet (15 mg) by mouth At Bedtime 90 tablet 3      pantoprazole (PROTONIX) 40 MG EC tablet Take 1 tablet (40 mg) by mouth every morning (before breakfast) 90 tablet 3     predniSONE (DELTASONE) 5 MG tablet Take 1 tablet (5 mg) by mouth daily 90 tablet 3     senna-docusate (SENOKOT-S/PERICOLACE) 8.6-50 MG tablet Take 1 tablet by mouth 2 times daily as needed for constipation       sulfaSALAzine (AZULFIDINE) 500 MG tablet TAKE ONE TABLET BY MOUTH TWICE DAILY AFTER MEALS.         ROS:  10 point ROS of systems including Constitutional, Eyes, Respiratory, Cardiovascular, Gastroenterology, Genitourinary, Integumentary, Musculoskeletal, Psychiatric were all negative except for pertinent positives noted in my HPI.    Vitals:  BP (!) 155/71   Pulse 71   Temp 98.1  F (36.7  C)   Resp 18   Wt 54.4 kg (120 lb)   SpO2 94%   BMI 21.26 kg/m    Exam:  GENERAL APPEARANCE:  Alert, oriented, thin, cooperative, generalized pain  ENT:  Mouth and posterior oropharynx normal, moist mucous membranes, [place]  NECK:  No adenopathy,masses or thyromegaly  RESP:  crackles scattered throughout, cough non-productive  CV:  Palpation and auscultation of heart done , regular rate and rhythm, no murmur, rub, or gallop  ABDOMEN:  constipated, bowel sounds normal  M/S:   Gait and station abnormal weakness, kypotic  SKIN:  Inspection of skin and subcutaneous tissue baseline  NEURO:   moves all extremities  PSYCH:  oriented to 3    Lab/Diagnostic data:  Recent labs in [facility] reviewed by me today.     ASSESSMENT/PLAN:  Chronic pain of both shoulders  Continue tylenol 1000mg TID    Shortness of breath  S/P right sided thoracentesis on 2/24  Chronic heart failure with preserved ejection fraction (H)  Lasix/HCTZ on hold per renal fx, on RA. Encourage IS. Awaiting cytology. F/u with cardiology on 3/4    Paroxysmal atrial fibrillation (H)  Hypertension goal BP (blood pressure) < 140/90  Continue diltiazem 180mg daily, metoprolol succinate 100mg daily, amlodipine 10mg daily and hydralazine 100mg TID.  Continue  apixaban 2.5mg BID    Failure to thrive in adult  Physical deconditioning  Poor appetite  Dietitian following. Last 120lb. Continue supplements and remeron 15mg at HS    Other specified hypothyroidism  Thyroid lesion  Continue synthroid 25mcg daily. US thyroid outpatient per CT findings    UTI  Continue cefdinir, will complete course on 3/3    Hx of covid  Positive in 8/2020, tested negative recently    Constipation  Change senna S to 1 tab BID    Inflammatory polyarthropathy (H)  DMARD  Orencia infusion on hold while in TCU. Continue sulfasalazine 500mg BID    Stage 3 chronic kidney disease, unspecified whether stage 3a or 3b CKD  Last Cr 1.97 with GFR 23, recheck on 3/1, encourage fluid. Lasix on hold. Baseline 1.5-2    Macrocytic hypochromic anemia  Last Hgb 9.5, recheck 3/1       Orders written by provider at facility  Labs, IS, constipation    Total time spent with patient visit at the [facility] nursing facility was 37 including patient visit and review of past records. Greater than 50% of total time spent with counseling and coordinating care due to labs, IS, constipation, poor appetite and therapy, which lasted 20 minutes.     Electronically signed by:  Eric Wills NP

## 2021-02-26 NOTE — PROGRESS NOTES
[facility]    Called [facility] w/orders to draw [facility] labs(BNP, BMP, TSH Hgb) for patients upcoming appt on 3/4. Will fax orders on Mon(3/1) when next in clinic. Reminder sent to board.  Harmony Nguyen, RN on 2/26/2021 at 10:55 AM

## 2021-02-26 NOTE — PROGRESS NOTES
Clinic Care Coordination Contact  Care Coordination Transition Communication    Referral Source: IP Handoff    Clinical Data: Patient was hospitalized at [facility] from 2/21/21 to 2/25/21 with diagnosis of CHF exacerbation.     Transition to Facility:              Facility Name: [facility]              Contact name and phone number/fax: [phone]    Fax sent to facility containing CC RN contact information and request for notification when patient discharging.    Plan: RN/SW Care Coordinator will await notification from facility staff informing RN/SW Care Coordinator of patient's discharge plans/needs. RN/SW Care Coordinator will review chart and outreach to facility staff every 4 weeks and as needed.     Milad Allison RN  Clinic Care Coordinator  [facility]  Esther Childs, [facility]  Ph: [phone]

## 2021-03-01 ENCOUNTER — HOSPITAL LABORATORY (OUTPATIENT)
Dept: OTHER | Facility: CLINIC | Age: 86
End: 2021-03-01

## 2021-03-01 ENCOUNTER — DOCUMENTATION ONLY (OUTPATIENT)
Dept: OTHER | Facility: CLINIC | Age: 86
End: 2021-03-01

## 2021-03-01 ENCOUNTER — NURSING HOME VISIT (OUTPATIENT)
Dept: GERIATRICS | Facility: CLINIC | Age: 86
End: 2021-03-01
Payer: MEDICARE

## 2021-03-01 VITALS
SYSTOLIC BLOOD PRESSURE: 172 MMHG | RESPIRATION RATE: 16 BRPM | HEART RATE: 75 BPM | BODY MASS INDEX: 21.58 KG/M2 | TEMPERATURE: 98 F | DIASTOLIC BLOOD PRESSURE: 64 MMHG | WEIGHT: 121.8 LBS | OXYGEN SATURATION: 94 %

## 2021-03-01 DIAGNOSIS — D64.9 ANEMIA, UNSPECIFIED TYPE: ICD-10-CM

## 2021-03-01 DIAGNOSIS — I48.0 PAROXYSMAL ATRIAL FIBRILLATION (H): ICD-10-CM

## 2021-03-01 DIAGNOSIS — M06.4 INFLAMMATORY POLYARTHROPATHY (H): ICD-10-CM

## 2021-03-01 DIAGNOSIS — J90 BILATERAL PLEURAL EFFUSION: Primary | ICD-10-CM

## 2021-03-01 DIAGNOSIS — I10 HYPERTENSION GOAL BP (BLOOD PRESSURE) < 140/90: Chronic | ICD-10-CM

## 2021-03-01 DIAGNOSIS — N18.4 CKD (CHRONIC KIDNEY DISEASE) STAGE 4, GFR 15-29 ML/MIN (H): ICD-10-CM

## 2021-03-01 DIAGNOSIS — I50.32 CHRONIC HEART FAILURE WITH PRESERVED EJECTION FRACTION (H): ICD-10-CM

## 2021-03-01 DIAGNOSIS — M62.81 GENERALIZED MUSCLE WEAKNESS: ICD-10-CM

## 2021-03-01 PROBLEM — E43 SEVERE PROTEIN-CALORIE MALNUTRITION (H): Status: ACTIVE | Noted: 2021-03-01

## 2021-03-01 LAB
ANION GAP SERPL CALCULATED.3IONS-SCNC: 8 MMOL/L (ref 3–14)
BACTERIA SPEC CULT: NO GROWTH
BUN SERPL-MCNC: 68 MG/DL (ref 7–30)
CALCIUM SERPL-MCNC: 9.4 MG/DL (ref 8.5–10.1)
CHLORIDE SERPL-SCNC: 111 MMOL/L (ref 94–109)
CO2 SERPL-SCNC: 19 MMOL/L (ref 20–32)
CREAT SERPL-MCNC: 1.93 MG/DL (ref 0.52–1.04)
ERYTHROCYTE [DISTWIDTH] IN BLOOD BY AUTOMATED COUNT: 15.4 % (ref 10–15)
GFR SERPL CREATININE-BSD FRML MDRD: 23 ML/MIN/{1.73_M2}
GLUCOSE SERPL-MCNC: 93 MG/DL (ref 70–99)
HCT VFR BLD AUTO: 32.6 % (ref 35–47)
HGB BLD-MCNC: 10 G/DL (ref 11.7–15.7)
MCH RBC QN AUTO: 32.6 PG (ref 26.5–33)
MCHC RBC AUTO-ENTMCNC: 30.7 G/DL (ref 31.5–36.5)
MCV RBC AUTO: 106 FL (ref 78–100)
NT-PROBNP SERPL-MCNC: ABNORMAL PG/ML (ref 0–1800)
PLATELET # BLD AUTO: 186 10E9/L (ref 150–450)
POTASSIUM SERPL-SCNC: 4.3 MMOL/L (ref 3.4–5.3)
RBC # BLD AUTO: 3.07 10E12/L (ref 3.8–5.2)
SODIUM SERPL-SCNC: 138 MMOL/L (ref 133–144)
SPECIMEN SOURCE: NORMAL
TSH SERPL DL<=0.005 MIU/L-ACNC: 1.86 MU/L (ref 0.4–4)
WBC # BLD AUTO: 3.9 10E9/L (ref 4–11)

## 2021-03-01 PROCEDURE — 99309 SBSQ NF CARE MODERATE MDM 30: CPT | Performed by: NURSE PRACTITIONER

## 2021-03-01 NOTE — PROGRESS NOTES
[facility]  [facility] Medical Record Number:  [id number]  Place of Service where encounter took place:  [facility] ([facility] [[id number]]  Chief Complaint   Patient presents with     RECHECK       HPI:    Ghislaine Walls  is a 85 year old (1935), who is being seen today for an episodic care visit.  HPI information obtained from: facility chart records, facility staff, patient report and [facility] chart review. Today's concern is:  Patient in TCU since 2/25 following hospitalization due to shortness of breath. PMH includes HFpEF, AF, hypertension, covid recovered, anemia and hypothyroidism. During hospitalization she required thoracentesis. She required medication adjustment due to DRAGAN. She was started on antibiotics for UTI.   Since in TCU she continues to be weak. She is walking about 40' with therapy.     Past Medical and Surgical History reviewed in Epic today.    MEDICATIONS:    Current Outpatient Medications   Medication Sig Dispense Refill     acetaminophen (TYLENOL) 500 MG tablet Take 1,000 mg by mouth 3 times daily       allopurinol (ZYLOPRIM) 100 MG tablet Take 1 tablet (100 mg) by mouth daily 90 tablet 3     amLODIPine (NORVASC) 10 MG tablet Take 1 tablet (10 mg) by mouth daily       apixaban ANTICOAGULANT (ELIQUIS) 2.5 MG tablet Take 1 tablet (2.5 mg) by mouth 2 times daily 180 tablet 3     cefdinir (OMNICEF) 300 MG capsule Take 1 capsule (300 mg) by mouth 2 times daily for 7 days (Patient taking differently: Take 300 mg by mouth 2 times daily Complete on 3/3)       diltiazem ER (DILT-XR) 180 MG 24 hr capsule Take 1 capsule (180 mg) by mouth daily 90 capsule 3     hydrALAZINE (APRESOLINE) 100 MG tablet Take 1 tablet (100 mg) by mouth 3 times daily 270 tablet 3     levothyroxine (SYNTHROID/LEVOTHROID) 25 MCG tablet Take 1 tablet (25 mcg) by mouth daily 90 tablet 2     metoprolol succinate ER (TOPROL-XL) 100 MG 24 hr tablet Take 1 tablet (100 mg) by mouth daily 90  tablet 3     mirtazapine (REMERON) 15 MG tablet Take 1 tablet (15 mg) by mouth At Bedtime 90 tablet 3     pantoprazole (PROTONIX) 40 MG EC tablet Take 1 tablet (40 mg) by mouth every morning (before breakfast) 90 tablet 3     predniSONE (DELTASONE) 5 MG tablet Take 1 tablet (5 mg) by mouth daily 90 tablet 3     senna-docusate (SENOKOT-S/PERICOLACE) 8.6-50 MG tablet Take 1 tablet by mouth 2 times daily as needed for constipation       sulfaSALAzine (AZULFIDINE) 500 MG tablet TAKE ONE TABLET BY MOUTH TWICE DAILY AFTER MEALS.           REVIEW OF SYSTEMS:  4 point ROS including Respiratory, CV, GI and , other than that noted in the HPI,  is negative    Objective:  BP (!) 172/64   Pulse 75   Temp 98  F (36.7  C)   Resp 16   Wt 55.2 kg (121 lb 12.8 oz)   SpO2 94%   BMI 21.58 kg/m    Exam:  GENERAL APPEARANCE:  Alert, in no distress  ENT:  Mouth and posterior oropharynx normal, moist mucous membranes, hearing acuity adequate   EYES:  EOM, conjunctivae, lids, pupils and irises normal    RESP:  respiratory effort and palpation of chest normal, no respiratory distress, Lung sounds clear  CV:  Palpation and auscultation of heart done , rate and rhythm reg , no murmur, no rub or gallop, Edema none  ABDOMEN:  normal bowel sounds, soft, nontender, no hepatosplenomegaly or other masses  M/S:   Gait and station not observed., Digits and nails normal   SKIN:  Inspection/Palpation of skin and subcutaneous tissue no rash  NEURO: 2-12 in normal limits and at patient's baseline  PSYCH:  insight and judgement, memory mild impairment , affect and mood normal    Labs:   CBC RESULTS:   Recent Labs   Lab Test 02/25/21  0539 02/23/21  0527   WBC 6.2 3.6*   RBC 2.86* 2.56*   HGB 9.5* 8.5*   HCT 30.3* 27.7*   * 108*   MCH 33.2* 33.2*   MCHC 31.4* 30.7*   RDW 15.2* 15.9*    154       Last Basic Metabolic Panel:  Recent Labs   Lab Test 02/25/21  0539 02/24/21  0601    140   POTASSIUM 4.2 4.4   CHLORIDE 108 110*   ALIVIA  8.3* 8.7   CO2 23 22   BUN 52* 48*   CR 1.97* 1.89*   GLC 97 90       Liver Function Studies -   Recent Labs   Lab Test 02/23/21  0902 12/01/20  1405 12/01/20  1354 07/25/20  0554 07/21/20 2038   PROTTOTAL 6.7*  --  7.9 6.2* 6.4*   ALBUMIN  --   --  3.5 1.7* 2.0*   BILITOTAL  --   --   --  0.4 0.4   ALKPHOS  --   --   --  90 69   AST  --  21  --  25 18   ALT  --  11  --  10 8       TSH   Date Value Ref Range Status   07/21/2020 1.86 0.40 - 4.00 mU/L Final   02/10/2020 1.36 0.40 - 4.00 mU/L Final   ]    No results found for: A1C        ASSESSMENT/PLAN:  Bilateral pleural effusion  Chronic heart failure with preserved ejection fraction (H)  Recent hospitalization due to weakness and shortness of breath. She was treated with bilateral thoracentesis (950cc from right and 450cc from left). She was also treated with IV lasix (down 3.4L) She is now breathing comfortably on RA.   Diuretics on hold due to bump in creat.   -daily wts  -BNP, BMP, TSH and hgb in am.   -fluid restriction  -follow up with C[facility] 3/4    Hypertension goal BP (blood pressure) < 140/90  During hospitalization amlodipine increased 10mg.   BPs in /51, 172/69, 134/55  -continue amlodipine, diltiazem 180, hydralazine 100mg TID, metop  mg q day.     Paroxysmal atrial fibrillation (H)  Anticoagulated on eliquis 2.5mgBID     CKD (chronic kidney disease) stage 4, GFR 15-29 ml/min (H)  Baseline creat 1.4-1.9  7/22-8/11 Creatinine 0.98-1.15  8/17-8/31 Creatinine 1.19-1.25  Sept-Oct creatinine 1.49->1.81->1.42  -avoid nephrotoxins.   -BMP today  -follow up with nephrology      Inflammatory polyarthropathy (H)  Chronic achiness. She has been on prednisone, sulfasalazine and allopurinol   Has followed with rheumatology  Generalized muscle weakness  L[facility] in [facility]. At this point she can walk about 40'. She is high fall risk  -physical therapy and OCCUPATIONAL THERAPY   Anemia, unspecified type  She is followed by Dr Oh and has follow up  appointment on 3/3 for macrocytic hypochromic anemia. This is attributed to CKD. She is treated with aranesp.     Electronically signed by:  DHAVAL Altamirano CNP

## 2021-03-01 NOTE — PROGRESS NOTES
[facility]  INITIAL VISIT NOTE  March 2, 2021    PRIMARY CARE PROVIDER AND CLINIC:  WeroMargarita valentinoyu Efrain [address] [facility] [address] / [place] [address]    CHIEF COMPLAINT:  Hospital follow-up/Initial visit    HPI:    Ghislaine Walls is a 85 year old  (1935) female who [facility], 2021 for an initial visit.     Medical history is notable for PAF, HFpEF, hypertension, dyslipidemia, hypothyroidism, PAD, CKD stage IIIb, chronic anemia, MGUS, lower extremity lymphedema, inflammatory polyarthropathy, gout, and COVID-19 in December 2020.    Summary of hospital course:  Patient was hospitalized at [facility] from February 21 through February 25, 2021 for dyspnea and hypoxia secondary to acute CHF.  BNP was elevated at 17,600.  Troponin I was less than 0.015.  CBC showed hemoglobin of 9.8.  Chemistry profile was significant for creatinine of 1.41 and potassium of 5.5.  Test for COVID-19, influenza, and respiratory syncytial virus were negative.  EKG showed normal sinus rhythm.  Chest x-ray revealed new small right greater than left pleural effusion, patchy bibasilar new consultation, and mildly increased vascular congestion suggesting edema.  CT chest was significant for opacity in the lung bases, likely atelectasis, moderate right and small pleural effusions,, mild mosaic attenuation of the lungs, and 2.3 cm low-attenuation right thyroid lesion.  Echocardiogram demonstrated LVEF of 60 to 65%, grade 2 or moderate diastolic dysfunction, and mild pulmonary hypertension.  Patient was diuresed with IV furosemide and underwent bilateral thoracentesis on February 24 with removal of 150 mL fluid from the right and 450 mL of fluid from the left side.  Pleural fluid analysis showed white count of 492 with 60% monocytes, glucose of 103, total protein of 3.1, LDH of 84, and no organisms/growth. Hospital course was complicated by development of acute kidney injury with  peak creatinine of 1.97 on February 25.  Therefore diuretic therapy was discontinued.  UA was abnormal on February 25 but urine culture grew more than 100,000 colonies per mL mixed urogenital christian.  She was discharged on oral cefdinir 300 mg p.o. twice daily for 7 days     Patient is admitted to this facility for medical management, nursing care, and rehab.     Of note, history obtained from patient, facility RN, and extensive review of the chart necessitated by complex hospitalization.    Today's visit:  Patient was seen in her room, while lying in bed.  She appears extremely frail but in no acute distress.  According to the therapist, she has no motivation.  She complains of dyspnea with exertion but no shortness of breath at rest.  She denies fever, chills, chest pain, palpitation, nausea, vomiting, abdominal pain, or urinary symptoms.  She reports that she had a bowel movement yesterday.    CODE STATUS:   DNR    PAST MEDICAL HISTORY:   Paroxysmal atrial fibrillation, on Eliquis  Chronic HFpEF, LVEF 60-65% by echo on February 22, 2021  Hypertension  Dyslipidemia  Chronic kidney disease, stage IIIb, baseline creatinine 1.2-1.5  Hypothyroidism  Peripheral arterial disease  Chronic lower extremity lymphedema  Chronic anemia, baseline Hgb around 9  GERD  MGUS  Inflammatory polyarthropathy  Gout  Impaired hearing  COVID-19 in December 2020  Right thyroid lesion, measuring 2.3 cm, noted incidentally on CT chest on February 22, 2021      Past Medical History:   Diagnosis Date     Atrial fibrillation (H) new 10/19     [place] (hard of hearing)     wears hearing aids     Hyperlipidaemia      Hyperlipidaemia LDL goal < 130      Hypertension      Hypothyroid      PAD (peripheral artery disease) (H)      Renal insufficiency, mild      Uncontrolled hypertension 10/14/2019       PAST SURGICAL HISTORY:   Past Surgical History:   Procedure Laterality Date     APPENDECTOMY  1951     BONE MARROW BIOPSY, BONE SPECIMEN, NEEDLE/TROCAR  N/A 2020    Procedure: bone marrow biopsy;  Surgeon: Iris Cameron MD;  Location:  GI     CATARACT IOL, RT/LT      bilateral (laser - 2013)     DENTAL SURGERY      wisdom     ESOPHAGOSCOPY, GASTROSCOPY, DUODENOSCOPY (EGD), COMBINED N/A 2020    Procedure: ESOPHAGOGASTRODUODENOSCOPY, WITH BIOPSY;  Surgeon: Humberto Bailon MD;  Location:  GI     EYE SURGERY      Muscle release     TUBAL LIGATION         FAMILY HISTORY:   Family History   Problem Relation Age of Onset     Heart Disease Mother      Respiratory Mother      Heart Disease Father      Heart Disease Brother      Coronary Artery Disease Brother         CAB     Heart Disease Brother      Coronary Artery Disease Brother         CAB       SOCIAL HISTORY:  Patient is .  She resides in [facility] facility. She normally uses a walker for ambulation.    Social History     Tobacco Use     Smoking status: Former Smoker     Packs/day: 0.50     Years: 5.00     Pack years: 2.50     Types: Cigarettes     Quit date: 1973     Years since quittin.1     Smokeless tobacco: Never Used   Substance Use Topics     Alcohol use: Yes     Alcohol/week: 0.0 standard drinks     Comment: 1 beer on        MEDICATIONS:  Current Outpatient Medications   Medication Sig Dispense Refill     acetaminophen (TYLENOL) 500 MG tablet Take 1,000 mg by mouth 3 times daily       allopurinol (ZYLOPRIM) 100 MG tablet Take 1 tablet (100 mg) by mouth daily 90 tablet 3     amLODIPine (NORVASC) 10 MG tablet Take 1 tablet (10 mg) by mouth daily       apixaban ANTICOAGULANT (ELIQUIS) 2.5 MG tablet Take 1 tablet (2.5 mg) by mouth 2 times daily 180 tablet 3     cefdinir (OMNICEF) 300 MG capsule Take 1 capsule (300 mg) by mouth 2 times daily for 7 days (Patient taking differently: Take 300 mg by mouth 2 times daily Complete on 3/3)       diltiazem ER (DILT-XR) 180 MG 24 hr capsule Take 1 capsule (180 mg) by mouth daily 90 capsule 3      hydrALAZINE (APRESOLINE) 100 MG tablet Take 1 tablet (100 mg) by mouth 3 times daily 270 tablet 3     levothyroxine (SYNTHROID/LEVOTHROID) 25 MCG tablet Take 1 tablet (25 mcg) by mouth daily 90 tablet 2     metoprolol succinate ER (TOPROL-XL) 100 MG 24 hr tablet Take 1 tablet (100 mg) by mouth daily 90 tablet 3     mirtazapine (REMERON) 15 MG tablet Take 1 tablet (15 mg) by mouth At Bedtime 90 tablet 3     pantoprazole (PROTONIX) 40 MG EC tablet Take 1 tablet (40 mg) by mouth every morning (before breakfast) 90 tablet 3     predniSONE (DELTASONE) 5 MG tablet Take 1 tablet (5 mg) by mouth daily 90 tablet 3     senna-docusate (SENOKOT-S/PERICOLACE) 8.6-50 MG tablet Take 1 tablet by mouth 2 times daily as needed for constipation       sulfaSALAzine (AZULFIDINE) 500 MG tablet TAKE ONE TABLET BY MOUTH TWICE DAILY AFTER MEALS.         ALLERGIES:  Allergies   Allergen Reactions     Oxybutynin Itching     Seasonal Allergies        ROS:  10 point ROS were negative other than the symptoms noted above in the HPI.    PHYSICAL EXAM:  Vital signs were reviewed in the chart.  Vital Signs: Blood pressure 140/51, heart rate 59, respiratory rate 18, temperature 97.4  F, oxygen saturation 94%, weight 121.9 LBS  General: Extremely weak and frail appearing but in no acute distress  HEENT: Normocephalic; oropharynx clear; conjunctival pallor  Cardiovascular: Normal S1, S2, RRR  Respiratory: Lungs clear to auscultation bilaterally  GI: Abdomen soft, non-tender, non-distended, +BS  Extremities: Trace bilateral LE edema  Neuro: CX II-XII grossly intact; ROM in all four extremities grossly intact  Psych: Alert and oriented x3; normal affect  Skin: No acute rash    LABORATORY/IMAGING DATA:    Most Recent 3 CBC's:  Recent Labs   Lab Test 03/02/21  0701 03/01/21  0849 02/25/21  0539 02/23/21  0527   WBC  --  3.9* 6.2 3.6*   HGB 9.2* 10.0* 9.5* 8.5*   MCV  --  106* 106* 108*   PLT  --  186 172 154     Most Recent 3 BMP's:  Recent Labs   Lab Test  03/02/21  0701 03/01/21  0849 02/25/21  0539    138 137   POTASSIUM 4.1 4.3 4.2   CHLORIDE 110* 111* 108   CO2 21 19* 23   BUN 70* 68* 52*   CR 1.89* 1.93* 1.97*   ANIONGAP 9 8 6   ALIVIA 9.0 9.4 8.3*   * 93 97     Most Recent 2 LFT's:  Recent Labs   Lab Test 12/01/20  1405 07/25/20  0554 07/21/20  2038   AST 21 25 18   ALT 11 10 8   ALKPHOS  --  90 69   BILITOTAL  --  0.4 0.4     Most Recent Cholesterol Panel:  Recent Labs   Lab Test 10/02/19  0531   CHOL 123   LDL 74   HDL 27*   TRIG 111     Most Recent 6 Bacteria Isolates From Any Culture (See EPIC Reports for Culture Details):  Recent Labs   Lab Test 02/25/21  0930 02/24/21  1045 12/01/20  1354 08/15/20  1228 08/15/20  1220 08/11/20  0955   CULT >100,000 colonies/mL  mixed urogenital christian  Multiple morphotypes present with no predominant organism.  Growth consistent with   probable contamination during collection.  Suggest repeat specimen if clinically   indicated.   No growth 50,000 to 100,000 colonies/mL  Klebsiella variicola/pneumoniae  *  <10,000 colonies/mL  urogenital christian  Susceptibility testing not routinely done   No growth No growth Cultured on the 1st day of incubation:  Staphylococcus pettenkoferi  *  Critical Value/Significant Value, preliminary result only, called to and read back by  Radha Awan CNP @ 6635. 8/12/20. AV    (Note)  POSITIVE for Staphylococci other than S.aureus, S.epidermidis and  S.lugdunensis, by SparkWordsigene multiplex nucleic acid test.  Coagulase-negative staphylococci are the most common venipuncture or  collection associated skin CONTAMINANTS grown in blood cultures.  Final identification and antimicrobial susceptibility testing will be  verified by standard methods.    Specimen tested with Verigene multiplex, gram-positive blood culture  nucleic acid test for the following targets: Staph aureus, Staph  epidermidis, Staph lugdunensis, other Staph species, Enterococcus  faecalis, Enterococcus faecium, Streptococcus  species, S. agalactiae,  S. anginosus grp., S. pneumoniae, S. pyogenes, Listeria sp., mecA  (methicillin resistance) and Riri/B (vancomycin resistance).    Critical Value/Significant Value called to and read back by Radha Awan RN, @1930 08/12/20..       Most Recent TSH and T4:  Recent Labs   Lab Test 03/02/21  0701 11/04/19  1537 11/04/19  1537   TSH 2.37   < > 1.82   T4  --   --  1.55*    < > = values in this interval not displayed.     Most Recent Hemoglobin A1c:No lab results found.    ASSESSMENT/PLAN:  Acute on chronic HFpEF, LVEF 60-65%,  Bilateral pleural effusion, status post bilateral thoracentesis on February 24, 2021,  Chronic lower extremity edema.  Patient was treated with IV furosemide in the hospital.  Diuretic therapy is currently on hold due to bump in creatinine.  Plan:  Continue metoprolol  mg p.o. daily  Continue to hold off on further diuretic therapy   Closely monitor weight and volume status    DRAGAN superimposed on CKD stage IIIb.  Baseline creatinine is 1.2-1.5.    Creatinine increased to 1.97 on February 25, due to IV diuretic therapy.  Last creatinine was 1.89 on March 2.  Plan:  Avoid NSAIDs or other nephrotoxins  Closely monitor renal function    Abnormal UA.  Urine culture grew more than 100,000 colonies per mL mixed urogenital christian, suggestive of contamination.  Plan:  Discontinue cefdinir which was started in hospital    Right thyroid lesion.  Measuring 2.3 centimeters, noted incidentally on CT chest on February 22, 2021.  Plan:  Follow-up as outpatient    Paroxysmal atrial fibrillation.  Last EKG in the hospital showed normal sinus rhythm.  Plan:  Continue metoprolol  mg p.o. daily and diltiazem  mg p.o. daily  Continue anticoagulation therapy with Eliquis 2.5 mg p.o. twice daily  Monitor heart rate    Benign essential hypertension.  Blood pressure is currently controlled.  Plan:  Discontinue amlodipine as patient is already on diltiazem ER   Continue diltiazem ER  180 mg p.o. daily  Continue metoprolol  mg p.o. daily  Continue hydralazine 100 mg p.o. 3 times daily  Continue to monitor blood pressure  Adjust BP regimen as indicated     Chronic anemia,  MGUS.  Baseline hemoglobin around 9.  Last Hgb was 9.2 on March 2.  Plan:  Continue to monitor hemoglobin periodically    Inflammatory polyarthropathy.  Chronic.  Plan:  Continue PTA prednisone 5 mg p.o. daily and sulfasalazine 500 mg p.o. twice daily  Continue acetaminophen 1000 mg p.o. 3 times daily    Gout.  Chronic.  Plan:  Continue prior to admission allopurinol 100 mg p.o. daily     Hypothyroidism.  Last TSH was 2.37 on March 2.  Plan:  Continue prior to admission levothyroxine 25 mcg p.o. daily    Poor appetite.   Plan:  Continue PTA mirtazapine 15 mg p.o. at bedtime      Physical deconditioning.  Plan:  Continue PT/OT evaluation and therapy      Orders written by provider at facility:  Discontinue amlodipine  Discontinue cefdinir      Total unit/floor time of 45 minutes consisted of the following: examination of patient, reviewing the record including pertinent labs and imaging. More than 50% of this time was spent in coordination of care with patient, nursing staff, and other healthcare providers. This time was spent on discussing the care plan including cardiac medication changes, addressing antibiotic therapy, discharge/safe placement, and specialty follow up.        Electronically signed by:  Reinaldo Bashir MD

## 2021-03-01 NOTE — PROGRESS NOTES
[facility]    Faxed orders for BNP, BMP, TSH, Hgb to [facility] [phone] per Courtney Amador.  Dx: chronic HFpEF(I50.32)    Future Appointments   Date Time Provider Department Center   3/2/2021 10:30 AM [facility] Bashir [facility], [facility]   3/4/2021 10:50 AM Courtney Amador PA-C [facility] PSA CLIN   3/4/2021  3:00 PM Laurie Conteh MD [facility] CS      Lorna Britton RN BSN   2:07 PM 03/01/21

## 2021-03-02 ENCOUNTER — NURSING HOME VISIT (OUTPATIENT)
Dept: GERIATRICS | Facility: CLINIC | Age: 86
End: 2021-03-02
Payer: MEDICARE

## 2021-03-02 ENCOUNTER — HOSPITAL LABORATORY (OUTPATIENT)
Dept: OTHER | Facility: CLINIC | Age: 86
End: 2021-03-02

## 2021-03-02 VITALS
HEART RATE: 59 BPM | RESPIRATION RATE: 18 BRPM | WEIGHT: 121.9 LBS | TEMPERATURE: 97.2 F | SYSTOLIC BLOOD PRESSURE: 140 MMHG | DIASTOLIC BLOOD PRESSURE: 51 MMHG | OXYGEN SATURATION: 95 % | HEIGHT: 63 IN | BODY MASS INDEX: 21.6 KG/M2

## 2021-03-02 DIAGNOSIS — D64.9 CHRONIC ANEMIA: ICD-10-CM

## 2021-03-02 DIAGNOSIS — I10 ESSENTIAL HYPERTENSION: ICD-10-CM

## 2021-03-02 DIAGNOSIS — M06.4 INFLAMMATORY POLYARTHROPATHY (H): ICD-10-CM

## 2021-03-02 DIAGNOSIS — J90 BILATERAL PLEURAL EFFUSION: ICD-10-CM

## 2021-03-02 DIAGNOSIS — R82.90 ABNORMAL URINALYSIS: ICD-10-CM

## 2021-03-02 DIAGNOSIS — I50.33 ACUTE ON CHRONIC HEART FAILURE WITH PRESERVED EJECTION FRACTION (H): Primary | ICD-10-CM

## 2021-03-02 DIAGNOSIS — I48.0 PAF (PAROXYSMAL ATRIAL FIBRILLATION) (H): ICD-10-CM

## 2021-03-02 DIAGNOSIS — E07.9 THYROID LESION: ICD-10-CM

## 2021-03-02 DIAGNOSIS — R53.81 PHYSICAL DECONDITIONING: ICD-10-CM

## 2021-03-02 DIAGNOSIS — N18.32 STAGE 3B CHRONIC KIDNEY DISEASE (H): ICD-10-CM

## 2021-03-02 DIAGNOSIS — N17.9 ACUTE KIDNEY INJURY (H): ICD-10-CM

## 2021-03-02 LAB
ANION GAP SERPL CALCULATED.3IONS-SCNC: 9 MMOL/L (ref 3–14)
BUN SERPL-MCNC: 70 MG/DL (ref 7–30)
CALCIUM SERPL-MCNC: 9 MG/DL (ref 8.5–10.1)
CHLORIDE SERPL-SCNC: 110 MMOL/L (ref 94–109)
CO2 SERPL-SCNC: 21 MMOL/L (ref 20–32)
COPATH REPORT: NORMAL
CREAT SERPL-MCNC: 1.89 MG/DL (ref 0.52–1.04)
GFR SERPL CREATININE-BSD FRML MDRD: 24 ML/MIN/{1.73_M2}
GLUCOSE SERPL-MCNC: 111 MG/DL (ref 70–99)
HGB BLD-MCNC: 9.2 G/DL (ref 11.7–15.7)
NT-PROBNP SERPL-MCNC: ABNORMAL PG/ML (ref 0–450)
POTASSIUM SERPL-SCNC: 4.1 MMOL/L (ref 3.4–5.3)
SARS-COV-2 RNA RESP QL NAA+PROBE: NORMAL
SODIUM SERPL-SCNC: 140 MMOL/L (ref 133–144)
SPECIMEN SOURCE: NORMAL
T4 SERPL-MCNC: 7.3 UG/DL (ref 4.5–13.9)
TSH SERPL DL<=0.005 MIU/L-ACNC: 2.37 MU/L (ref 0.4–4)

## 2021-03-02 PROCEDURE — 99306 1ST NF CARE HIGH MDM 50: CPT | Performed by: INTERNAL MEDICINE

## 2021-03-02 ASSESSMENT — MIFFLIN-ST. JEOR: SCORE: 972.06

## 2021-03-03 ENCOUNTER — TRANSFERRED RECORDS (OUTPATIENT)
Dept: HEALTH INFORMATION MANAGEMENT | Facility: CLINIC | Age: 86
End: 2021-03-03

## 2021-03-03 ENCOUNTER — NURSING HOME VISIT (OUTPATIENT)
Dept: GERIATRICS | Facility: CLINIC | Age: 86
End: 2021-03-03
Payer: MEDICARE

## 2021-03-03 VITALS
SYSTOLIC BLOOD PRESSURE: 153 MMHG | TEMPERATURE: 98.4 F | HEART RATE: 68 BPM | DIASTOLIC BLOOD PRESSURE: 59 MMHG | RESPIRATION RATE: 18 BRPM | OXYGEN SATURATION: 93 % | WEIGHT: 123.4 LBS | BODY MASS INDEX: 21.86 KG/M2

## 2021-03-03 DIAGNOSIS — R11.2 NON-INTRACTABLE VOMITING WITH NAUSEA: Primary | ICD-10-CM

## 2021-03-03 DIAGNOSIS — M06.9 RHEUMATOID ARTHRITIS, INVOLVING UNSPECIFIED SITE, UNSPECIFIED WHETHER RHEUMATOID FACTOR PRESENT (H): ICD-10-CM

## 2021-03-03 DIAGNOSIS — I50.9 ACUTE ON CHRONIC CONGESTIVE HEART FAILURE, UNSPECIFIED HEART FAILURE TYPE (H): ICD-10-CM

## 2021-03-03 DIAGNOSIS — M62.81 GENERALIZED MUSCLE WEAKNESS: ICD-10-CM

## 2021-03-03 DIAGNOSIS — N18.4 STAGE 4 CHRONIC KIDNEY DISEASE (H): ICD-10-CM

## 2021-03-03 DIAGNOSIS — I10 ESSENTIAL HYPERTENSION: ICD-10-CM

## 2021-03-03 DIAGNOSIS — J90 BILATERAL PLEURAL EFFUSION: ICD-10-CM

## 2021-03-03 DIAGNOSIS — D64.9 CHRONIC ANEMIA: ICD-10-CM

## 2021-03-03 DIAGNOSIS — I48.0 PAROXYSMAL ATRIAL FIBRILLATION (H): ICD-10-CM

## 2021-03-03 DIAGNOSIS — E43 SEVERE PROTEIN-CALORIE MALNUTRITION (H): ICD-10-CM

## 2021-03-03 LAB
LABORATORY COMMENT REPORT: NORMAL
SARS-COV-2 RNA RESP QL NAA+PROBE: NEGATIVE
SPECIMEN SOURCE: NORMAL

## 2021-03-03 PROCEDURE — 99310 SBSQ NF CARE HIGH MDM 45: CPT | Performed by: NURSE PRACTITIONER

## 2021-03-03 NOTE — PROGRESS NOTES
[facility]  [facility] Medical Record Number:  [id number]  Place of [facility] where encounter took place[facility] [[id number]]  Chief Complaint   Patient presents with     RECHECK       HPI:    Ghislaine Walls  is a 85 year old (1935), who is being seen today for an episodic care visit.  HPI information obtained from: facility chart records, facility staff, patient report and [facility] chart review. Today's concern is:  Patient in TCU since 2/25 following hospitalization due to shortness of breath. PMH includes HFpEF, AF, hypertension, covid recovered, anemia and hypothyroidism. During hospitalization she required thoracentesis. She required medication adjustment due to DRAGAN. She was started on antibiotics for UTI.   Since in TCU she continues to be weak. She is walking about 40' with therapy.   Today she has nausea and spitting up after morning medications (given on empty stomach). Care conference today with daughter on phone and therapy staff.     Past Medical and Surgical History reviewed in Epic today.    MEDICATIONS:    Current Outpatient Medications   Medication Sig Dispense Refill     acetaminophen (TYLENOL) 500 MG tablet Take 1,000 mg by mouth 3 times daily       allopurinol (ZYLOPRIM) 100 MG tablet Take 1 tablet (100 mg) by mouth daily 90 tablet 3     apixaban ANTICOAGULANT (ELIQUIS) 2.5 MG tablet Take 1 tablet (2.5 mg) by mouth 2 times daily 180 tablet 3     diltiazem ER (DILT-XR) 180 MG 24 hr capsule Take 1 capsule (180 mg) by mouth daily 90 capsule 3     hydrALAZINE (APRESOLINE) 100 MG tablet Take 1 tablet (100 mg) by mouth 3 times daily 270 tablet 3     levothyroxine (SYNTHROID/LEVOTHROID) 25 MCG tablet Take 1 tablet (25 mcg) by mouth daily 90 tablet 2     metoprolol succinate ER (TOPROL-XL) 100 MG 24 hr tablet Take 1 tablet (100 mg) by mouth daily 90 tablet 3     mirtazapine (REMERON) 15 MG tablet Take 1 tablet (15 mg) by mouth At Bedtime 90 tablet 3      pantoprazole (PROTONIX) 40 MG EC tablet Take 1 tablet (40 mg) by mouth every morning (before breakfast) 90 tablet 3     predniSONE (DELTASONE) 5 MG tablet Take 1 tablet (5 mg) by mouth daily 90 tablet 3     senna-docusate (SENOKOT-S/PERICOLACE) 8.6-50 MG tablet Take 1 tablet by mouth 2 times daily as needed for constipation       sulfaSALAzine (AZULFIDINE) 500 MG tablet TAKE ONE TABLET BY MOUTH TWICE DAILY AFTER MEALS.           REVIEW OF SYSTEMS:  4 point ROS including Respiratory, CV, GI and , other than that noted in the HPI,  is negative    Objective:  BP (!) 153/59   Pulse 68   Temp 98.4  F (36.9  C)   Resp 18   Wt 56 kg (123 lb 6.4 oz)   SpO2 93%   BMI 21.86 kg/m    Exam:  GENERAL APPEARANCE:  Alert, in no distress  ENT:  Mouth and posterior oropharynx normal, moist mucous membranes, hearing acuity [place]  EYES:  EOM, conjunctivae, lids, pupils and irises normal  RESP:  respiratory effort and palpation of chest normal, no respiratory distress, Lung sounds clear  CV:  Palpation and auscultation of heart done , rate and rhythm reg,  Edema none  ABDOMEN:  normal bowel sounds, soft, nontender, no hepatosplenomegaly or other masses  M/S:   Gait and station slow but steady, Digits and nails normal   SKIN:  Inspection/Palpation of skin and subcutaneous tissue no rash  NEURO: 2-12 in normal limits and at patient's baseline  PSYCH:  insight and judgement, memory intact , affect and mood normal    Labs:   3/2 BNP 10, 548  CBC RESULTS:   Recent Labs   Lab Test 03/02/21  0701 03/01/21  0849 02/25/21  0539   WBC  --  3.9* 6.2   RBC  --  3.07* 2.86*   HGB 9.2* 10.0* 9.5*   HCT  --  32.6* 30.3*   MCV  --  106* 106*   MCH  --  32.6 33.2*   MCHC  --  30.7* 31.4*   RDW  --  15.4* 15.2*   PLT  --  186 172       Last Basic Metabolic Panel:  Recent Labs   Lab Test 03/02/21  0701 03/01/21  0849    138   POTASSIUM 4.1 4.3   CHLORIDE 110* 111*   ALIVIA 9.0 9.4   CO2 21 19*   BUN 70* 68*   CR 1.89* 1.93*   * 93   gfr  24    Liver Function Studies -   Recent Labs   Lab Test 02/23/21  0902 12/01/20  1405 12/01/20  1354 07/25/20  0554 07/21/20 2038   PROTTOTAL 6.7*  --  7.9 6.2* 6.4*   ALBUMIN  --   --  3.5 1.7* 2.0*   BILITOTAL  --   --   --  0.4 0.4   ALKPHOS  --   --   --  90 69   AST  --  21  --  25 18   ALT  --  11  --  10 8       TSH   Date Value Ref Range Status   03/02/2021 2.37 0.40 - 4.00 mU/L Final   03/01/2021 1.86 0.40 - 4.00 mU/L Final   ]    Wt Readings from Last 5 Encounters:   03/03/21 56 kg (123 lb 6.4 oz)   03/02/21 55.3 kg (121 lb 14.4 oz)   03/01/21 55.2 kg (121 lb 12.8 oz)   02/25/21 54.4 kg (120 lb)   02/25/21 54.7 kg (120 lb 9.6 oz)       ASSESSMENT/PLAN:  Non-intractable vomiting with nausea  This morning after morning meds patient started vomiting. She cannot keep any food or fluids down. She has not had BM in 3 days. No new meds. She does have h/o GERD and a hiatal hernia.   -clear liquids until vomiting stops  -monitor   -dulocolax supp today  Acute on chronic congestive heart failure, unspecified heart failure type (H)  Bilateral pleural effusion  Recent hospitalization due to weakness and shortness of breath. She was treated with bilateral thoracentesis (950cc from right and 450cc from left). She was also treated with IV lasix (down 3.4L) She is now breathing comfortably on RA.   Diuretics on hold due to bump in creat. Labs above. Weight up 3# since here.   -daily wts.   -fluid restriction  -follow up with [facility] 3/4    Stage 4 chronic kidney disease  Baseline creat 1.4-1.9  7/22-8/11 Creatinine 0.98-1.15  8/17-8/31 Creatinine 1.19-1.25  Sept-Oct creatinine 1.49->1.81->1.42  Recent creat above  -avoid nephrotoxins.   -follow up with nephrology    Essential hypertension  BP Readings from Last 3 Encounters:   03/03/21 (!) 153/59   03/02/21 (!) 140/51   03/01/21 (!) 172/64     BPs tend to run high. amlodpine added as outpatient. Patient is on diltiazem for rate control.   -DISCONTINUE amlodipine   -follow  up with [facility] in am     Rheumatoid arthritis, involving unspecified site, unspecified whether rheumatoid factor present (H)  RF positive. Follows with rheumatologist. She is on prednisone and sulfasalazine    Paroxysmal atrial fibrillation (H)  Anticoagulated on eliquis.  Rate controlled on diltiazem.     Chronic anemia  Follows with Dr Oh. Thought to be due to CKD4  She does receive epoetin.     Severe protein-calorie malnutrition (H)  Body mass index is 21.86 kg/m .  Dietary is following.   Generalized muscle weakness  Recovered covid  Care conference today. Patient has been weaker over past few months per daughter. She tested positive for covid at end of December. She lives in [place]. Today therapy reports she is able to get back and forth to bathroom. She needs some assistance with ADLs.   -continue physical therapy and OCCUPATIONAL THERAPY         Total time spent with patient visit at the skilled nursing facility was 37 minutes  including patient visit, review of past records and care conference with [facility] care team and dtr . Greater than 50% of total time spent with counseling and coordinating care due to overview of how Ghislaine is doing in TCU in setting of weakness and now nausea and vomiting. she was recently treated for acute heart failure and DRAGAN. she will likely need more diuretic but will leave this to [facility] tomorrow. Time spent explaining to daughter possible causes of weakness and nausea. Time spent reconciling medications.   Electronically signed by:  DHAVAL Altamirano CNP

## 2021-03-04 ENCOUNTER — OFFICE VISIT (OUTPATIENT)
Dept: CARDIOLOGY | Facility: CLINIC | Age: 86
End: 2021-03-04
Payer: MEDICARE

## 2021-03-04 VITALS
BODY MASS INDEX: 23.21 KG/M2 | DIASTOLIC BLOOD PRESSURE: 57 MMHG | HEIGHT: 62 IN | OXYGEN SATURATION: 94 % | WEIGHT: 126.1 LBS | SYSTOLIC BLOOD PRESSURE: 136 MMHG | HEART RATE: 61 BPM

## 2021-03-04 DIAGNOSIS — I50.32 CHRONIC HEART FAILURE WITH PRESERVED EJECTION FRACTION (H): Primary | ICD-10-CM

## 2021-03-04 PROCEDURE — 99215 OFFICE O/P EST HI 40 MIN: CPT | Performed by: PHYSICIAN ASSISTANT

## 2021-03-04 RX ORDER — TORSEMIDE 10 MG/1
10 TABLET ORAL DAILY
Qty: 90 TABLET | Refills: 3 | Status: SHIPPED | OUTPATIENT
Start: 2021-03-04 | End: 2021-03-09

## 2021-03-04 ASSESSMENT — MIFFLIN-ST. JEOR: SCORE: 975.24

## 2021-03-04 NOTE — PROGRESS NOTES
[id number]  65 minutes spent on the date of the encounter doing chart review, review of outside records, review of test results, patient visit, documentation, discussion with other provider(s), discussion with family and end of life discussion     HPI and Plan:   See dictation    Orders this Visit:  No orders of the defined types were placed in this encounter.    Orders Placed This Encounter   Medications     torsemide (DEMADEX) 10 MG tablet     Sig: Take 1 tablet (10 mg) by mouth daily     Dispense:  90 tablet     Refill:  3     There are no discontinued medications.      Encounter Diagnosis   Name Primary?     Chronic heart failure with preserved ejection fraction (H) Yes       CURRENT MEDICATIONS:  Current Outpatient Medications   Medication Sig Dispense Refill     acetaminophen (TYLENOL) 500 MG tablet Take 1,000 mg by mouth 3 times daily       allopurinol (ZYLOPRIM) 100 MG tablet Take 1 tablet (100 mg) by mouth daily 90 tablet 3     apixaban ANTICOAGULANT (ELIQUIS) 2.5 MG tablet Take 1 tablet (2.5 mg) by mouth 2 times daily 180 tablet 3     diltiazem ER (DILT-XR) 180 MG 24 hr capsule Take 1 capsule (180 mg) by mouth daily 90 capsule 3     hydrALAZINE (APRESOLINE) 100 MG tablet Take 1 tablet (100 mg) by mouth 3 times daily 270 tablet 3     levothyroxine (SYNTHROID/LEVOTHROID) 25 MCG tablet Take 1 tablet (25 mcg) by mouth daily 90 tablet 2     metoprolol succinate ER (TOPROL-XL) 100 MG 24 hr tablet Take 1 tablet (100 mg) by mouth daily 90 tablet 3     mirtazapine (REMERON) 15 MG tablet Take 1 tablet (15 mg) by mouth At Bedtime 90 tablet 3     pantoprazole (PROTONIX) 40 MG EC tablet Take 1 tablet (40 mg) by mouth every morning (before breakfast) 90 tablet 3     predniSONE (DELTASONE) 5 MG tablet Take 1 tablet (5 mg) by mouth daily 90 tablet 3     senna-docusate (SENOKOT-S/PERICOLACE) 8.6-50 MG tablet Take 1 tablet by mouth 2 times daily as needed for constipation       sulfaSALAzine (AZULFIDINE) 500 MG tablet TAKE ONE  TABLET BY MOUTH TWICE DAILY AFTER MEALS.       torsemide (DEMADEX) 10 MG tablet Take 1 tablet (10 mg) by mouth daily 90 tablet 3       ALLERGIES     Allergies   Allergen Reactions     Oxybutynin Itching     Seasonal Allergies        PAST MEDICAL HISTORY:  Past Medical History:   Diagnosis Date     Atrial fibrillation (H) new 10/19     [place] (hard of hearing)     wears hearing aids     Hyperlipidaemia      Hyperlipidaemia LDL goal < 130      Hypertension      Hypothyroid      PAD (peripheral artery disease) (H)      Renal insufficiency, mild      Uncontrolled hypertension 10/14/2019       PAST SURGICAL HISTORY:  Past Surgical History:   Procedure Laterality Date     APPENDECTOMY  1951     BONE MARROW BIOPSY, BONE SPECIMEN, NEEDLE/TROCAR N/A 7/31/2020    Procedure: bone marrow biopsy;  Surgeon: Iris Cameron MD;  Location:  GI     CATARACT IOL, RT/LT  2001    bilateral (laser - 5/2013)     DENTAL SURGERY  1962    wisdom     ESOPHAGOSCOPY, GASTROSCOPY, DUODENOSCOPY (EGD), COMBINED N/A 7/24/2020    Procedure: ESOPHAGOGASTRODUODENOSCOPY, WITH BIOPSY;  Surgeon: Humberto Bailon MD;  Location:  GI     EYE SURGERY  1956    Muscle release     TUBAL LIGATION  1971       FAMILY HISTORY:  Family History   Problem Relation Age of Onset     Heart Disease Mother      Respiratory Mother      Heart Disease Father      Heart Disease Brother      Coronary Artery Disease Brother         CAB     Heart Disease Brother      Coronary Artery Disease Brother         CAB       SOCIAL HISTORY:  Social History     Socioeconomic History     Marital status:      Spouse name: None     Number of children: None     Years of education: None     Highest education level: None   Occupational History     None   Social Needs     Financial resource strain: Not hard at all     Food insecurity     Worry: Never true     Inability: Never true     Transportation needs     Medical: No     Non-medical: No   Tobacco Use     Smoking status:  "Former Smoker     Packs/day: 0.50     Years: 5.00     Pack years: 2.50     Types: Cigarettes     Quit date: 1973     Years since quittin.2     Smokeless tobacco: Never Used   Substance and Sexual Activity     Alcohol use: Not Currently     Alcohol/week: 0.0 standard drinks     Drug use: No     Sexual activity: Not Currently     Partners: Male   Lifestyle     Physical activity     Days per week: 0 days     Minutes per session: 0 min     Stress: None   Relationships     Social connections     Talks on phone: Twice a week     Gets together: Twice a week     Attends Church service: Never     Active member of club or organization: No     Attends meetings of clubs or organizations: Never     Relationship status: None     Intimate partner violence     Fear of current or ex partner: None     Emotionally abused: None     Physically abused: None     Forced sexual activity: None   Other Topics Concern     Parent/sibling w/ CABG, MI or angioplasty before 65F 55M? No   Social History Narrative     None       Review of Systems:  Skin:  Negative     Eyes:  Positive for glasses  ENT:  Negative    Respiratory:  Positive for cough  Cardiovascular:    Positive for;edema;exercise intolerance;fatigue  Gastroenterology: Negative    Genitourinary:  Positive for urinary frequency  Musculoskeletal:  Positive for muscular weakness;joint pain  Neurologic:  Positive for stroke  Psychiatric:  Negative    Heme/Lymph/Imm:  Positive for weight loss  Endocrine:  Positive for thyroid disorder    Physical Exam:  Vitals: /57   Pulse 61   Ht 1.575 m (5' 2\")   Wt 57.2 kg (126 lb 1.6 oz)   SpO2 94%   BMI 23.06 kg/m     Please refer to dictation for physical exam    Recent Lab Results:  LIPID RESULTS:  Lab Results   Component Value Date    CHOL 123 10/02/2019    HDL 27 (L) 10/02/2019    LDL 74 10/02/2019    TRIG 111 10/02/2019    CHOLHDLRATIO 3.9 06/15/2015       LIVER ENZYME RESULTS:  Lab Results   Component Value Date    AST 21 " 12/01/2020    ALT 11 12/01/2020       CBC RESULTS:  Lab Results   Component Value Date    WBC 3.9 (L) 03/01/2021    RBC 3.07 (L) 03/01/2021    HGB 9.2 (L) 03/02/2021    HCT 32.6 (L) 03/01/2021     (H) 03/01/2021    MCH 32.6 03/01/2021    MCHC 30.7 (L) 03/01/2021    RDW 15.4 (H) 03/01/2021     03/01/2021       BMP RESULTS:  Lab Results   Component Value Date     03/02/2021    POTASSIUM 4.1 03/02/2021    CHLORIDE 110 (H) 03/02/2021    CO2 21 03/02/2021    ANIONGAP 9 03/02/2021     (H) 03/02/2021    BUN 70 (H) 03/02/2021    CR 1.89 (H) 03/02/2021    GFRESTIMATED 24 (L) 03/02/2021    GFRESTBLACK 28 (L) 03/02/2021    ALIVIA 9.0 03/02/2021        A1C RESULTS:  No results found for: A1C    INR RESULTS:  Lab Results   Component Value Date    INR 1.16 (H) 02/24/2021    INR 2.07 (H) 07/21/2020           CC  No referring provider defined for this encounter.

## 2021-03-04 NOTE — CONFIDENTIAL NOTE
[facility] TELEPHONE ENCOUNTER    No chief complaint on file.      Ghislaine Walls is a 85 year old  (1935),Nurse called today to report: ongoing nausea    ASSESSMENT/PLAN  Nausea and vomiting    Compazine 5mg po q 8 h prn  Electronically signed by:   DHAVAL Altamirano CNP

## 2021-03-04 NOTE — PROGRESS NOTES
Service Date: 2021      CLINIC VISIT      PRIMARY CARDIOLOGIST:  Has been Dr. Manuel, typically follows with Daphne Cm in [facility].      REASON FOR VISIT:  Hospital followup, heart failure with preserved EF.      HISTORY OF PRESENT ILLNESS:  Ms. Walls is a delightful 85-year-old woman who has had a difficult past couple of years.  Her past medical history is significant for the followin.  Paroxysmal atrial fibrillation with a rate control and anticoagulation approach.   2.  Heart failure with preserved EF.   3.  Hypertension that is difficult to control.   4.  CKD with stage III disease.   5.  Hypothyroidism.   6.  Gout.   7.  Chronic normocytic anemia with MGUS.   8.  COVID with the patient testing positive twice, once in August and once again in December.   9.  Recent thyroid lesion.   10.  Chronic pain and fatigue.      Ghislaine has had a difficult year.  She has previously been living in an apartment at [place] and again tested positive for COVID last summer but did not feel poorly at that time.  Unfortunately, she tested negative and then had tested positive again .  At that point, she was short of breath and remained short of breath until things worsened at the end of February.  She was then admitted with hypoxemia with O2 sats at 87% to the hospital.  There, she was found to have bilateral pleural effusions and heart failure with preserved EF.  She went for bilateral thoracenteses with 950 mL tapped from the right pleural space and 450 mL tapped from the left pleural space.  She was also found to have a UTI and treated for this as well.  She was diuresed from a weight of 130 pounds to about 122 pounds but had increased renal function.  On the date her weight was 115 pounds, she underwent echocardiogram showing normal IVC.  Lasix was held on discharge due to worsening renal function that then stabilized.  She was discharged on .  She is here today with her daughter, Krupa, for  followup of this.      The patient just says she feels horrible today.  She is not necessarily short of breath but she is tired.  She has pain, although it is not as bad as it has been in previous years.  She just does not feel well.  She denies orthopnea or PND.  She sleeps okay.  Her appetite is okay but she does not really enjoy eating.  She takes prednisone for her pain and this seems to work so that her pain is down in the single digits instead of worse.  She is getting PT and OT.      We talked today about if she wants to be rehospitalized or if she wants to continue to pursue restorative care.  She very clearly states that she has been a DNR/DNI for some time.  She states she does not want to go back to the hospital and she does not feel like she has much to live for, in spite of her daughter suggesting that she should stay around to see them.      Blood pressures at [place] have ranged from to 140s to 170s.  Weights have increased from 120 pounds on discharge to 127 pounds today.      SOCIAL HISTORY:  The patient is currently in [facility].  She also has an apartment in [place] where she was living previously.  She comes in today with her daughter, Krupa.  There are 3 children in the family.  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      PHYSICAL EXAMINATION:   GENERAL:  Elderly, frail-appearing woman who was seen today seated in a wheelchair . She is kyphotic.  She makes little eye contact.   HEENT:  She is normocephalic and atraumatic.   HEART:  Irregularly irregular.  I do not appreciate murmur, rub or gallop.   LUNGS:  Diminished bilaterally, right greater than left.  No rales or wheezes.   EXTREMITIES:  With 2+ pitting edema to mid shin.  No weeping.     NECK:  JVP to her jaw at 90 degrees and this is prominent.      ASSESSMENT AND PLAN:   1.  Acute on chronic heart failure with preserved EF secondary to hypertension and rate controlled  AFib.  On 02/22 in the hospital, she weighed 115 pounds and she had a normal IVC.  She is up to 127 pounds at her facility and I suspect has worsening bilateral pleural effusions.  She has not been on diuretic due to her renal dysfunction.  At this point, we are obligated to start her on torsemide 10 mg daily.  If her weight is not trending down 2-3 pounds over the next 2-3 days, I would like them to call and I would increase it to 20 mg.  In addition, if her hypertension remains elevated, I would consider switching her from metoprolol 100 mg daily to carvedilol 25 mg b.i.d. as well as continuing her diltiazem.  This will be used for rate control approach as well.  If she wants to continue to pursue restorative care, we would see her back in 2 weeks with a BMP prior.  They should send weights and blood pressures at that time.   2.  Hypertension, as above.   3.  Hypothyroidism with thyroid nodule noted on CT scan.  We will defer to family if they want further workup.   4.  CKD.   5.  End-of-life discussion.  We spent about 20-30 minutes today speaking about end-of-life situation.  The patient very clearly states to me that she does not want to be hospitalized and she would like to pursue hospice.  She states she does not want to be intubated or be resuscitated.  Her daughter mentioned several times that she thinks her mom should try to get better so that she can see her children and her mom states multiple times that all she would do is watch TV and she does not feel like her quality of life is reasonable.  She states she is tired of being this sick and tired.  Her daughter asked to speak with me privately and notes that the patient has cognitive decline and that the patient does not endorse this feeling of wanting to do hospice every day.  I have recommended that they meet with the palliative and/or hospice team at [facility] further family discussion.  With the patient's CKD, heart failure with preserved EF,  hypothyroidism, MGUS, new lesion on her thyroid, she is certainly a candidate for hospice.  I also relayed to the daughter that it is often that the parents do not want to disappoint their children by telling them they want to go on hospice and I would like her to consider that.  I will defer this discussion further to the team at [facility] and if there is any way we can help, will be happy to contribute.  If she elects to go hospice, we will not participate further in her care unless they request it.        Thank you for allowing me to participate in this delightful patient's care.  If she continues to pursue restorative care, we will see her back in 2 weeks with a BMP.  Otherwise, hospice and/or palliative can be pursued at [facility].      KIERA Thrasher PA-C             D: 2021   T: 2021   MT: GALO      Name:     ROSIO YOUNG   MRN:      [id number]        Account:      [id number]   :      1935           Service Date: 2021      Document: [id number]

## 2021-03-04 NOTE — LETTER
3/4/2021    Laurie Conteh MD  [address] [facility] [address]  [place] [address]    RE: Ghislaine Walls       Dear Colleague,    I had the pleasure of seeing Ghislaine Walls in the [facility].    [id number]  65 minutes spent on the date of the encounter doing chart review, review of outside records, review of test results, patient visit, documentation, discussion with other provider(s), discussion with family and end of life discussion     HPI and Plan:   See dictation    Orders this Visit:  No orders of the defined types were placed in this encounter.    Orders Placed This Encounter   Medications     torsemide (DEMADEX) 10 MG tablet     Sig: Take 1 tablet (10 mg) by mouth daily     Dispense:  90 tablet     Refill:  3     There are no discontinued medications.      Encounter Diagnosis   Name Primary?     Chronic heart failure with preserved ejection fraction (H) Yes       CURRENT MEDICATIONS:  Current Outpatient Medications   Medication Sig Dispense Refill     acetaminophen (TYLENOL) 500 MG tablet Take 1,000 mg by mouth 3 times daily       allopurinol (ZYLOPRIM) 100 MG tablet Take 1 tablet (100 mg) by mouth daily 90 tablet 3     apixaban ANTICOAGULANT (ELIQUIS) 2.5 MG tablet Take 1 tablet (2.5 mg) by mouth 2 times daily 180 tablet 3     diltiazem ER (DILT-XR) 180 MG 24 hr capsule Take 1 capsule (180 mg) by mouth daily 90 capsule 3     hydrALAZINE (APRESOLINE) 100 MG tablet Take 1 tablet (100 mg) by mouth 3 times daily 270 tablet 3     levothyroxine (SYNTHROID/LEVOTHROID) 25 MCG tablet Take 1 tablet (25 mcg) by mouth daily 90 tablet 2     metoprolol succinate ER (TOPROL-XL) 100 MG 24 hr tablet Take 1 tablet (100 mg) by mouth daily 90 tablet 3     mirtazapine (REMERON) 15 MG tablet Take 1 tablet (15 mg) by mouth At Bedtime 90 tablet 3     pantoprazole (PROTONIX) 40 MG EC tablet Take 1 tablet (40 mg) by mouth every morning (before breakfast) 90 tablet 3     predniSONE  (DELTASONE) 5 MG tablet Take 1 tablet (5 mg) by mouth daily 90 tablet 3     senna-docusate (SENOKOT-S/PERICOLACE) 8.6-50 MG tablet Take 1 tablet by mouth 2 times daily as needed for constipation       sulfaSALAzine (AZULFIDINE) 500 MG tablet TAKE ONE TABLET BY MOUTH TWICE DAILY AFTER MEALS.       torsemide (DEMADEX) 10 MG tablet Take 1 tablet (10 mg) by mouth daily 90 tablet 3       ALLERGIES     Allergies   Allergen Reactions     Oxybutynin Itching     Seasonal Allergies        PAST MEDICAL HISTORY:  Past Medical History:   Diagnosis Date     Atrial fibrillation (H) new 10/19     [place] (hard of hearing)     wears hearing aids     Hyperlipidaemia      Hyperlipidaemia LDL goal < 130      Hypertension      Hypothyroid      PAD (peripheral artery disease) (H)      Renal insufficiency, mild      Uncontrolled hypertension 10/14/2019       PAST SURGICAL HISTORY:  Past Surgical History:   Procedure Laterality Date     APPENDECTOMY  1951     BONE MARROW BIOPSY, BONE SPECIMEN, NEEDLE/TROCAR N/A 7/31/2020    Procedure: bone marrow biopsy;  Surgeon: Iris Cameron MD;  Location:  GI     CATARACT IOL, RT/LT  2001    bilateral (laser - 5/2013)     DENTAL SURGERY  1962    wisdom     ESOPHAGOSCOPY, GASTROSCOPY, DUODENOSCOPY (EGD), COMBINED N/A 7/24/2020    Procedure: ESOPHAGOGASTRODUODENOSCOPY, WITH BIOPSY;  Surgeon: Humberto Bailon MD;  Location:  GI     EYE SURGERY  1956    Muscle release     TUBAL LIGATION  1971       FAMILY HISTORY:  Family History   Problem Relation Age of Onset     Heart Disease Mother      Respiratory Mother      Heart Disease Father      Heart Disease Brother      Coronary Artery Disease Brother         CAB     Heart Disease Brother      Coronary Artery Disease Brother         CAB       SOCIAL HISTORY:  Social History     Socioeconomic History     Marital status:      Spouse name: None     Number of children: None     Years of education: None     Highest education level: None  "  Occupational History     None   Social Needs     Financial resource strain: Not hard at all     Food insecurity     Worry: Never true     Inability: Never true     Transportation needs     Medical: No     Non-medical: No   Tobacco Use     Smoking status: Former Smoker     Packs/day: 0.50     Years: 5.00     Pack years: 2.50     Types: Cigarettes     Quit date: 1973     Years since quittin.2     Smokeless tobacco: Never Used   Substance and Sexual Activity     Alcohol use: Not Currently     Alcohol/week: 0.0 standard drinks     Drug use: No     Sexual activity: Not Currently     Partners: Male   Lifestyle     Physical activity     Days per week: 0 days     Minutes per session: 0 min     Stress: None   Relationships     Social connections     Talks on phone: Twice a week     Gets together: Twice a week     Attends [employer] service: Never     Active member of club or organization: No     Attends meetings of clubs or organizations: Never     Relationship status: None     Intimate partner violence     Fear of current or ex partner: None     Emotionally abused: None     Physically abused: None     Forced sexual activity: None   Other Topics Concern     Parent/sibling w/ CABG, MI or angioplasty before 65F 55M? No   Social History Narrative     None       Review of Systems:  Skin:  Negative     Eyes:  Positive for glasses  ENT:  Negative    Respiratory:  Positive for cough  Cardiovascular:    Positive for;edema;exercise intolerance;fatigue  Gastroenterology: Negative    Genitourinary:  Positive for urinary frequency  Musculoskeletal:  Positive for muscular weakness;joint pain  Neurologic:  Positive for stroke  Psychiatric:  Negative    Heme/Lymph/Imm:  Positive for weight loss  Endocrine:  Positive for thyroid disorder    Physical Exam:  Vitals: /57   Pulse 61   Ht 1.575 m (5' 2\")   Wt 57.2 kg (126 lb 1.6 oz)   SpO2 94%   BMI 23.06 kg/m     Please refer to dictation for physical exam    Recent Lab " Results:  LIPID RESULTS:  Lab Results   Component Value Date    CHOL 123 10/02/2019    HDL 27 (L) 10/02/2019    LDL 74 10/02/2019    TRIG 111 10/02/2019    CHOLHDLRATIO 3.9 06/15/2015       LIVER ENZYME RESULTS:  Lab Results   Component Value Date    AST 21 2020    ALT 11 2020       CBC RESULTS:  Lab Results   Component Value Date    WBC 3.9 (L) 2021    RBC 3.07 (L) 2021    HGB 9.2 (L) 2021    HCT 32.6 (L) 2021     (H) 2021    MCH 32.6 2021    MCHC 30.7 (L) 2021    RDW 15.4 (H) 2021     2021       BMP RESULTS:  Lab Results   Component Value Date     2021    POTASSIUM 4.1 2021    CHLORIDE 110 (H) 2021    CO2 21 2021    ANIONGAP 9 2021     (H) 2021    BUN 70 (H) 2021    CR 1.89 (H) 2021    GFRESTIMATED 24 (L) 2021    GFRESTBLACK 28 (L) 2021    ALIVIA 9.0 2021        A1C RESULTS:  No results found for: A1C    INR RESULTS:  Lab Results   Component Value Date    INR 1.16 (H) 2021    INR 2.07 (H) 2020           CC  No referring provider defined for this encounter.        Service Date: 2021      CLINIC VISIT      PRIMARY CARDIOLOGIST:  Has been Dr. Manuel, typically follows with Daphne Cm in [facility].      REASON FOR VISIT:  Hospital followup, heart failure with preserved EF.      HISTORY OF PRESENT ILLNESS:  Ms. Walls is a delightful 85-year-old woman who has had a difficult past couple of years.  Her past medical history is significant for the followin.  Paroxysmal atrial fibrillation with a rate control and anticoagulation approach.   2.  Heart failure with preserved EF.   3.  Hypertension that is difficult to control.   4.  CKD with stage III disease.   5.  Hypothyroidism.   6.  Gout.   7.  Chronic normocytic anemia with MGUS.   8.  COVID with the patient testing positive twice, once in August and once again in December.   9.  Recent  thyroid lesion.   10.  Chronic pain and fatigue.      Ghislaine has had a difficult year.  She has previously been living in an apartment at [place] and again tested positive for COVID last summer but did not feel poorly at that time.  Unfortunately, she tested negative and then had tested positive again 12/26.  At that point, she was short of breath and remained short of breath until things worsened at the end of February.  She was then admitted with hypoxemia with O2 sats at 87% to the hospital.  There, she was found to have bilateral pleural effusions and heart failure with preserved EF.  She went for bilateral thoracenteses with 950 mL tapped from the right pleural space and 450 mL tapped from the left pleural space.  She was also found to have a UTI and treated for this as well.  She was diuresed from a weight of 130 pounds to about 122 pounds but had increased renal function.  On the date her weight was 115 pounds, she underwent echocardiogram showing normal IVC.  Lasix was held on discharge due to worsening renal function that then stabilized.  She was discharged on 02/25.  She is here today with her daughter, Krupa, for followup of this.      The patient just says she feels horrible today.  She is not necessarily short of breath but she is tired.  She has pain, although it is not as bad as it has been in previous years.  She just does not feel well.  She denies orthopnea or PND.  She sleeps okay.  Her appetite is okay but she does not really enjoy eating.  She takes prednisone for her pain and this seems to work so that her pain is down in the single digits instead of worse.  She is getting PT and OT.      We talked today about if she wants to be rehospitalized or if she wants to continue to pursue restorative care.  She very clearly states that she has been a DNR/DNI for some time.  She states she does not want to go back to the hospital and she does not feel like she has much to live for, in spite of her  daughter suggesting that she should stay around to see them.      Blood pressures at [place] have ranged from to 140s to 170s.  Weights have increased from 120 pounds on discharge to 127 pounds today.      SOCIAL HISTORY:  [facility]  She also has an apartment in [place] where she was living previously.  She comes in today with her daughter, Krupa.  There are 3 children in the family.  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      PHYSICAL EXAMINATION:   GENERAL:  Elderly, frail-appearing woman who was seen today seated in a wheelchair . She is kyphotic.  She makes little eye contact.   HEENT:  She is normocephalic and atraumatic.   HEART:  Irregularly irregular.  I do not appreciate murmur, rub or gallop.   LUNGS:  Diminished bilaterally, right greater than left.  No rales or wheezes.   EXTREMITIES:  With 2+ pitting edema to mid shin.  No weeping.     NECK:  JVP to her jaw at 90 degrees and this is prominent.      ASSESSMENT AND PLAN:   1.  Acute on chronic heart failure with preserved EF secondary to hypertension and rate controlled AFib.  On 02/22 in the hospital, she weighed 115 pounds and she had a normal IVC.  She is up to 127 pounds at her facility and I suspect has worsening bilateral pleural effusions.  She has not been on diuretic due to her renal dysfunction.  At this point, we are obligated to start her on torsemide 10 mg daily.  If her weight is not trending down 2-3 pounds over the next 2-3 days, I would like them to call and I would increase it to 20 mg.  In addition, if her hypertension remains elevated, I would consider switching her from metoprolol 100 mg daily to carvedilol 25 mg b.i.d. as well as continuing her diltiazem.  This will be used for rate control approach as well.  If she wants to continue to pursue restorative care, we would see her back in 2 weeks with a BMP prior.  They should send weights  and blood pressures at that time.   2.  Hypertension, as above.   3.  Hypothyroidism with thyroid nodule noted on CT scan.  We will defer to family if they want further workup.   4.  CKD.   5.  End-of-life discussion.  We spent about 20-30 minutes today speaking about end-of-life situation.  The patient very clearly states to me that she does not want to be hospitalized and she would like to pursue hospice.  She states she does not want to be intubated or be resuscitated.  Her daughter mentioned several times that she thinks her mom should try to get better so that she can see her children and her mom states multiple times that all she would do is watch TV and she does not feel like her quality of life is reasonable.  She states she is tired of being this sick and tired.  Her daughter asked to speak with me privately and notes that the patient has cognitive decline and that the patient does not endorse this feeling of wanting to do hospice every day.  I have recommended that they meet with the palliative and/or hospice team at [facility] further family discussion.  With the patient's CKD, heart failure with preserved EF, hypothyroidism, MGUS, new lesion on her thyroid, she is certainly a candidate for hospice.  I also relayed to the daughter that it is often that the parents do not want to disappoint their children by telling them they want to go on hospice and I would like her to consider that.  I will defer this discussion further to the team at [facility] and if there is any way we can help, will be happy to contribute.  If she elects to go hospice, we will not participate further in her care unless they request it.        Thank you for allowing me to participate in this delightful patient's care.  If she continues to pursue restorative care, we will see her back in 2 weeks with a BMP.  Otherwise, hospice and/or palliative can be pursued at [facility].      KIERA Thrasher PA-C             D:  2021   T: 2021   MT: GALO      Name:     ROSIO YOUNG   MRN:      [id number]        Account:      [id number]   :      1935           Service Date: 2021      Document: [id number]        cc:   No referring provider defined for this encounter.

## 2021-03-07 VITALS
TEMPERATURE: 97.3 F | SYSTOLIC BLOOD PRESSURE: 139 MMHG | DIASTOLIC BLOOD PRESSURE: 51 MMHG | RESPIRATION RATE: 18 BRPM | OXYGEN SATURATION: 92 % | WEIGHT: 125.4 LBS | BODY MASS INDEX: 22.94 KG/M2 | HEART RATE: 54 BPM

## 2021-03-08 ENCOUNTER — NURSING HOME VISIT (OUTPATIENT)
Dept: GERIATRICS | Facility: CLINIC | Age: 86
End: 2021-03-08
Payer: MEDICARE

## 2021-03-08 VITALS
BODY MASS INDEX: 23.23 KG/M2 | SYSTOLIC BLOOD PRESSURE: 201 MMHG | DIASTOLIC BLOOD PRESSURE: 74 MMHG | TEMPERATURE: 97.9 F | WEIGHT: 127 LBS | HEART RATE: 70 BPM | OXYGEN SATURATION: 93 % | RESPIRATION RATE: 18 BRPM

## 2021-03-08 DIAGNOSIS — I50.9 ACUTE ON CHRONIC CONGESTIVE HEART FAILURE, UNSPECIFIED HEART FAILURE TYPE (H): Primary | ICD-10-CM

## 2021-03-08 DIAGNOSIS — I10 ESSENTIAL HYPERTENSION: ICD-10-CM

## 2021-03-08 DIAGNOSIS — R09.02 HYPOXIA: ICD-10-CM

## 2021-03-08 DIAGNOSIS — I48.0 PAROXYSMAL ATRIAL FIBRILLATION (H): ICD-10-CM

## 2021-03-08 DIAGNOSIS — I48.0 PAF (PAROXYSMAL ATRIAL FIBRILLATION) (H): ICD-10-CM

## 2021-03-08 DIAGNOSIS — M06.9 RHEUMATOID ARTHRITIS, INVOLVING UNSPECIFIED SITE, UNSPECIFIED WHETHER RHEUMATOID FACTOR PRESENT (H): ICD-10-CM

## 2021-03-08 DIAGNOSIS — M62.81 GENERALIZED MUSCLE WEAKNESS: ICD-10-CM

## 2021-03-08 DIAGNOSIS — N18.4 STAGE 4 CHRONIC KIDNEY DISEASE (H): ICD-10-CM

## 2021-03-08 DIAGNOSIS — J90 BILATERAL PLEURAL EFFUSION: ICD-10-CM

## 2021-03-08 PROCEDURE — 99310 SBSQ NF CARE HIGH MDM 45: CPT | Performed by: NURSE PRACTITIONER

## 2021-03-08 RX ORDER — DILTIAZEM HYDROCHLORIDE 240 MG/1
240 CAPSULE, EXTENDED RELEASE ORAL DAILY
Start: 2021-03-08 | End: 2021-03-15

## 2021-03-08 NOTE — PROGRESS NOTES
[facility]  [place] [facility] Record Number:  [id number]  [facility] [[id number][facility]  Chief Complaint   Patient presents with     RECHECK       HPI:    Ghislaine Walls  is a 85 year old (1935), who is being seen today for an episodic care visit.  HPI information obtained from: facility chart records, facility staff, patient report and [facility] chart review. Today's concern is:  Patient in TCU since 2/25 following hospitalization due to shortness of breath. PMH includes HFpEF, AF, hypertension, covid recovered, anemia and hypothyroidism. During hospitalization she required thoracentesis. She required medication adjustment due to DRAGAN. She was started on antibiotics for UTI.   Since in TCU she continues to be weak. She is walking about 40' with therapy. she did go out to a [facility] clinic appointment on 3/4. She did have a bout of nausea with vomiting last week. This seems to have subsided.     Past Medical and Surgical History reviewed in Epic today.    MEDICATIONS:    Current Outpatient Medications   Medication Sig Dispense Refill     acetaminophen (TYLENOL) 500 MG tablet Take 1,000 mg by mouth 3 times daily       allopurinol (ZYLOPRIM) 100 MG tablet Take 1 tablet (100 mg) by mouth daily 90 tablet 3     apixaban ANTICOAGULANT (ELIQUIS) 2.5 MG tablet Take 1 tablet (2.5 mg) by mouth 2 times daily 180 tablet 3     diltiazem ER (DILT-XR) 180 MG 24 hr capsule Take 1 capsule (180 mg) by mouth daily 90 capsule 3     hydrALAZINE (APRESOLINE) 100 MG tablet Take 1 tablet (100 mg) by mouth 3 times daily 270 tablet 3     levothyroxine (SYNTHROID/LEVOTHROID) 25 MCG tablet Take 1 tablet (25 mcg) by mouth daily 90 tablet 2     metoprolol succinate ER (TOPROL-XL) 100 MG 24 hr tablet Take 1 tablet (100 mg) by mouth daily 90 tablet 3     mirtazapine (REMERON) 15 MG tablet Take 1 tablet (15 mg) by mouth At Bedtime 90 tablet 3     pantoprazole  (PROTONIX) 40 MG EC tablet Take 1 tablet (40 mg) by mouth every morning (before breakfast) 90 tablet 3     predniSONE (DELTASONE) 5 MG tablet Take 1 tablet (5 mg) by mouth daily 90 tablet 3     senna-docusate (SENOKOT-S/PERICOLACE) 8.6-50 MG tablet Take 1 tablet by mouth 2 times daily as needed for constipation       sulfaSALAzine (AZULFIDINE) 500 MG tablet TAKE ONE TABLET BY MOUTH TWICE DAILY AFTER MEALS.       torsemide (DEMADEX) 10 MG tablet Take 1 tablet (10 mg) by mouth daily 90 tablet 3         REVIEW OF SYSTEMS:  4 point ROS including Respiratory, CV, GI and , other than that noted in the HPI,  is negative    Objective:  /51   Pulse 54   Temp 97.3  F (36.3  C)   Resp 18   Wt 56.9 kg (125 lb 6.4 oz)   SpO2 92%   BMI 22.94 kg/m    Exam:  GENERAL APPEARANCE:  Alert, in no distress but tired  ENT:  Mouth and posterior oropharynx normal, moist mucous membranes, hearing acuity adequate   EYES:  EOM, conjunctivae, lids, pupils and irises normal    RESP:  respiratory effort and palpation of chest normal, no respiratory distress, Lung sounds clear  CV:  Palpation and auscultation of heart done , rate and rhythm irreg, Edema 1+  ABDOMEN:  normal bowel sounds, soft, nontender, no hepatosplenomegaly or other masses  M/S:   Gait and station steady, Digits and nails normal   SKIN:  Inspection/Palpation of skin and subcutaneous tissue no rash  NEURO: 2-12 in normal limits and at patient's baseline  PSYCH:  insight and judgement, memory mild cognitive impairment  , affect and mood flat    Labs:   CBC RESULTS:   Recent Labs   Lab Test 03/02/21  0701 03/01/21  0849 02/25/21  0539   WBC  --  3.9* 6.2   RBC  --  3.07* 2.86*   HGB 9.2* 10.0* 9.5*   HCT  --  32.6* 30.3*   MCV  --  106* 106*   MCH  --  32.6 33.2*   MCHC  --  30.7* 31.4*   RDW  --  15.4* 15.2*   PLT  --  186 172       Last Basic Metabolic Panel:  Recent Labs   Lab Test 03/02/21  0701 03/01/21  0849    138   POTASSIUM 4.1 4.3   CHLORIDE 110* 111*    ALIVIA 9.0 9.4   CO2 21 19*   BUN 70* 68*   CR 1.89* 1.93*   * 93       Liver Function Studies -   Recent Labs   Lab Test 02/23/21  0902 12/01/20  1405 12/01/20  1354 07/25/20  0554 07/21/20 2038   PROTTOTAL 6.7*  --  7.9 6.2* 6.4*   ALBUMIN  --   --  3.5 1.7* 2.0*   BILITOTAL  --   --   --  0.4 0.4   ALKPHOS  --   --   --  90 69   AST  --  21  --  25 18   ALT  --  11  --  10 8       TSH   Date Value Ref Range Status   03/02/2021 2.37 0.40 - 4.00 mU/L Final   03/01/2021 1.86 0.40 - 4.00 mU/L Final   ]    No results found for: A1C        ASSESSMENT/PLAN:  Acute on chronic congestive heart failure, unspecified heart failure type (H)  Bilateral pleural effusion  hypoxia  Recent hospitalization due to weakness and shortness of breath. She was treated with bilateral thoracentesis (950cc from right and 450cc from left). She was also treated with IV lasix (down 3.4L)    Diuretics were restarted on 3/4 per [facility] clinic.   In past day she has required supplemental oxygen to keep O2 sats >90%.   -daily wts.   -fluid restriction  -follow up with [facility] as scheduled  -torsemide 10 mg q day  -metoprolol 100mg q day  -continue supplmental oxygen to keep sats >90%  -compression socks   Stage 4 chronic kidney disease (H)  Baseline creat 1.4-1.9  7/22-8/11 Creatinine 0.98-1.15  8/17-8/31 Creatinine 1.19-1.25  Sept-Oct creatinine 1.49->1.81->1.42  Recent creat above  -avoid nephrotoxins.   -follow up with nephrology    Essential hypertension  Last few BPs 201/74, 139/77, 166/66  -continue metop as above  -increase diltiazem 240mg q day  -continue hydralazine 100mg po TID    Paroxysmal atrial fibrillation (H)  Anticoagulated on eliquis     Rheumatoid arthritis, involving unspecified site, unspecified whether rheumatoid factor present (H)  Chronic  -continue prednisone and sulfasalazine    Generalized muscle weakness  She is quite weak, she wants to sleep all day. She is not making progress with therapy. She told the [facility] clinic  she does not want to return to hospital  -care conference to discuss goals of care      Orders written by provider at facility  As above    Total time spent with patient visit at the skilled nursing facility was 37 minutes including patient visit and review of past records. Greater than 50% of total time spent with counseling and coordinating care due to discussion with SW, nsg and therapy staff about current situation- hypoxia, weakness. time spent reviewing EMR. Orders written. Medications reconciled.   Electronically signed by:  DHAVAL Altamirano CNP

## 2021-03-09 ENCOUNTER — HOSPITAL LABORATORY (OUTPATIENT)
Dept: OTHER | Facility: CLINIC | Age: 86
End: 2021-03-09

## 2021-03-09 ENCOUNTER — NURSING HOME VISIT (OUTPATIENT)
Dept: GERIATRICS | Facility: CLINIC | Age: 86
End: 2021-03-09
Payer: MEDICARE

## 2021-03-09 DIAGNOSIS — M06.9 RHEUMATOID ARTHRITIS, INVOLVING UNSPECIFIED SITE, UNSPECIFIED WHETHER RHEUMATOID FACTOR PRESENT (H): ICD-10-CM

## 2021-03-09 DIAGNOSIS — I48.0 PAROXYSMAL ATRIAL FIBRILLATION (H): ICD-10-CM

## 2021-03-09 DIAGNOSIS — N18.4 ANEMIA DUE TO STAGE 4 CHRONIC KIDNEY DISEASE (H): ICD-10-CM

## 2021-03-09 DIAGNOSIS — R09.02 HYPOXIA: ICD-10-CM

## 2021-03-09 DIAGNOSIS — I50.32 CHRONIC HEART FAILURE WITH PRESERVED EJECTION FRACTION (H): ICD-10-CM

## 2021-03-09 DIAGNOSIS — D63.1 ANEMIA DUE TO STAGE 4 CHRONIC KIDNEY DISEASE (H): ICD-10-CM

## 2021-03-09 DIAGNOSIS — J90 BILATERAL PLEURAL EFFUSION: ICD-10-CM

## 2021-03-09 DIAGNOSIS — Z71.89 ADVANCED DIRECTIVES, COUNSELING/DISCUSSION: ICD-10-CM

## 2021-03-09 DIAGNOSIS — I50.9 ACUTE ON CHRONIC CONGESTIVE HEART FAILURE, UNSPECIFIED HEART FAILURE TYPE (H): Primary | ICD-10-CM

## 2021-03-09 DIAGNOSIS — N18.4 STAGE 4 CHRONIC KIDNEY DISEASE (H): ICD-10-CM

## 2021-03-09 DIAGNOSIS — I10 ESSENTIAL HYPERTENSION: ICD-10-CM

## 2021-03-09 DIAGNOSIS — M62.81 GENERALIZED MUSCLE WEAKNESS: ICD-10-CM

## 2021-03-09 LAB
SARS-COV-2 RNA RESP QL NAA+PROBE: NORMAL
SPECIMEN SOURCE: NORMAL

## 2021-03-09 PROCEDURE — 99310 SBSQ NF CARE HIGH MDM 45: CPT | Performed by: NURSE PRACTITIONER

## 2021-03-09 RX ORDER — TORSEMIDE 10 MG/1
20 TABLET ORAL DAILY
Qty: 90 TABLET | Refills: 3
Start: 2021-03-09 | End: 2021-03-15

## 2021-03-09 NOTE — PROGRESS NOTES
[facility] [facility]  [place] Medical Record Number:  [id number]  Place of Service where encounter took place:  [facility] - [facility] ([facility]) [[id number]]  Chief Complaint   Patient presents with     RECHECK       HPI:    Ghislaine Walls  is a 85 year old (1935), who is being seen today for an episodic care visit.  HPI information obtained from: facility chart records, facility staff, patient report and [facility] chart review. Today's concern is:  Patient in TCU since 2/25 following hospitalization due to shortness of breath. PMH includes HFpEF, AF, hypertension, covid recovered, anemia and hypothyroidism. During hospitalization she required thoracentesis. She required medication adjustment due to DRAGAN. She was started on antibiotics for UTI.   Since in TCU she continues to be weak. She is walking about 40' with therapy. She did go out to a [facility] clinic appointment on 3/4. She did have a bout of nausea with vomiting last week.   Care conference set up for today to discuss goals of care.   Past Medical and Surgical History reviewed in Epic today.    MEDICATIONS:    Current Outpatient Medications   Medication Sig Dispense Refill     acetaminophen (TYLENOL) 500 MG tablet Take 1,000 mg by mouth 3 times daily       allopurinol (ZYLOPRIM) 100 MG tablet Take 1 tablet (100 mg) by mouth daily 90 tablet 3     apixaban ANTICOAGULANT (ELIQUIS) 2.5 MG tablet Take 1 tablet (2.5 mg) by mouth 2 times daily 180 tablet 3     diltiazem ER (DILT-XR) 240 MG 24 hr ER beaded capsule Take 1 capsule (240 mg) by mouth daily       hydrALAZINE (APRESOLINE) 100 MG tablet Take 1 tablet (100 mg) by mouth 3 times daily 270 tablet 3     levothyroxine (SYNTHROID/LEVOTHROID) 25 MCG tablet Take 1 tablet (25 mcg) by mouth daily 90 tablet 2     metoprolol succinate ER (TOPROL-XL) 100 MG 24 hr tablet Take 1 tablet (100 mg) by mouth daily 90 tablet 3     mirtazapine (REMERON) 15 MG tablet Take 1 tablet (15 mg) by mouth At Bedtime 90  tablet 3     pantoprazole (PROTONIX) 40 MG EC tablet Take 1 tablet (40 mg) by mouth every morning (before breakfast) 90 tablet 3     predniSONE (DELTASONE) 5 MG tablet Take 1 tablet (5 mg) by mouth daily 90 tablet 3     senna-docusate (SENOKOT-S/PERICOLACE) 8.6-50 MG tablet Take 1 tablet by mouth 2 times daily as needed for constipation       sulfaSALAzine (AZULFIDINE) 500 MG tablet TAKE ONE TABLET BY MOUTH TWICE DAILY AFTER MEALS.       torsemide (DEMADEX) 10 MG tablet Take 1 tablet (10 mg) by mouth daily 90 tablet 3         REVIEW OF SYSTEMS:  4 point ROS including Respiratory, CV, GI and , other than that noted in the HPI,  is negative    Objective:  BP (!) 201/74   Pulse 70   Temp 97.9  F (36.6  C)   Resp 18   Wt 57.6 kg (127 lb)   SpO2 93%   BMI 23.23 kg/m    Exam:  GENERAL APPEARANCE:  Alert, in no distress but tired  ENT:  Mouth and posterior oropharynx normal, moist mucous membranes, hearing acuity adequate   EYES:  EOM, conjunctivae, lids, pupils and irises normal    RESP:  respiratory effort  normal, no respiratory distress,   CV:   Edema 1+  ABDOMEN:  normal bowel sounds, soft, nontender,  M/S:   Gait and station generalized weakness , Digits and nails normal   SKIN:  Inspection/Palpation of skin and subcutaneous tissue no rash  NEURO: 2-12 in normal limits and at patient's baseline  PSYCH:  insight and judgement, memory mild cognitive impairment  , affect and mood normal    Labs:   CBC RESULTS:   Recent Labs   Lab Test 03/02/21  0701 03/01/21  0849 02/25/21  0539   WBC  --  3.9* 6.2   RBC  --  3.07* 2.86*   HGB 9.2* 10.0* 9.5*   HCT  --  32.6* 30.3*   MCV  --  106* 106*   MCH  --  32.6 33.2*   MCHC  --  30.7* 31.4*   RDW  --  15.4* 15.2*   PLT  --  186 172       Last Basic Metabolic Panel:  Recent Labs   Lab Test 03/02/21  0701 03/01/21  0849    138   POTASSIUM 4.1 4.3   CHLORIDE 110* 111*   ALIVIA 9.0 9.4   CO2 21 19*   BUN 70* 68*   CR 1.89* 1.93*   * 93       Liver Function Studies -    Recent Labs   Lab Test 02/23/21  0902 12/01/20  1405 12/01/20  1354 07/25/20  0554 07/21/20 2038   PROTTOTAL 6.7*  --  7.9 6.2* 6.4*   ALBUMIN  --   --  3.5 1.7* 2.0*   BILITOTAL  --   --   --  0.4 0.4   ALKPHOS  --   --   --  90 69   AST  --  21  --  25 18   ALT  --  11  --  10 8       TSH   Date Value Ref Range Status   03/02/2021 2.37 0.40 - 4.00 mU/L Final   03/01/2021 1.86 0.40 - 4.00 mU/L Final   ]        Wt Readings from Last 5 Encounters:   03/08/21 57.6 kg (127 lb)   03/07/21 56.9 kg (125 lb 6.4 oz)   03/04/21 57.2 kg (126 lb 1.6 oz)   03/03/21 56 kg (123 lb 6.4 oz)   03/02/21 55.3 kg (121 lb 14.4 oz)       ASSESSMENT/PLAN:  Acute on chronic congestive heart failure, unspecified heart failure type (H)  Hypoxia  Bilateral pleural effusion  Recent hospitalization due to weakness and shortness of breath. She was treated with bilateral thoracentesis (950cc from right and 450cc from left). She was also treated with IV lasix (down 3.4L)    Diuretics were restarted on 3/4 per [facility]. Weight  seems to hover around 124# and is not trending down.  In past day she has required some supplemental oxygen to keep O2 sats >90%.   -daily wts.   -fluid restriction  -follow up with [facility] as scheduled  -increase torsemide 20 mg q day  -metoprolol 100mg q day  -continue supplmental oxygen to keep sats >90%  -compression socks   -BMP 3/11    Essential hypertension  Last few BPs in /102, 210/74, 139/81  Diltiazem increased 3/8  -continue with current diltiazem and monitor  -continue hydralazine 100mg TID  -continue metoprolol  100mg q day    Paroxysmal atrial fibrillation (H)  Anticoagulated on eliquis    Stage 4 chronic kidney disease (H)  Baseline creat 1.4-1.9  7/22-8/11 Creatinine 0.98-1.15  8/17-8/31 Creatinine 1.19-1.25  Sept-Oct creatinine 1.49->1.81->1.42  Recent creat above  -avoid nephrotoxins.   -follow up with nephrology  -[facility] 3/11    Anemia due to CKD 4  Receives aranesp through hematology clinic. Last  injection 3/3. hgb 9.8  -continue plan of care    Rheumatoid arthritis, involving unspecified site, unspecified whether rheumatoid factor present (H)  Chronic  -continue prednisone and sulfasalazine    Generalized muscle weakness  She is quite weak, she wants to sleep all day. Therapy report: Pt ambulated 1x75', 1x50' with 4WW and CGA. Pt ambulates with forward flexed posture and decreased jeancarlos. Pt able to complete turns without loss of balance. She does require assist with ADLs  SLUMs 22/30  -therapy recommends additional care if she returns to her apartment.    -care conference today to discuss goals of care    Advanced directives, counseling/discussion  She told the [facility] clinic she does not want to return to hospital. She told admitting NP that she prefers comfort care.   Today discussion regarding Ghislaine's wishes for her medical and nursing care. She does not want to do therapy any longer. She does not want to return to the hospital   Daughter asks if there is anything else that can improve her situation so she will feel better and want to participate in therapy.   She is meeting weekly with house psychologist, who recognizes depressed state.   We agreed on a hospice consult.         Orders written by provider at facility  Increase torsemide 20mg q day  BMP 3/11  Hospice consult    Total time spent with patient visit at the skilled nursing facility was 45 min including patient visit, review of past records and care conference with KATHI, gordon, Therapy staff, dtr and pt . Greater than 50% of total time spent with counseling and coordinating care due to discussion of goals in light of overall decline in past few months. Pt and dtr have different views. During meeting it was made clear that ultimately it is Ghislaine's decision. Care conference time 3pm to 3:30pm.     Electronically signed by:  DHAVAL Altamirano CNP

## 2021-03-11 ENCOUNTER — HOSPITAL LABORATORY (OUTPATIENT)
Dept: OTHER | Facility: CLINIC | Age: 86
End: 2021-03-11

## 2021-03-11 LAB
ANION GAP SERPL CALCULATED.3IONS-SCNC: 6 MMOL/L (ref 3–14)
BUN SERPL-MCNC: 46 MG/DL (ref 7–30)
CALCIUM SERPL-MCNC: 8.8 MG/DL (ref 8.5–10.1)
CHLORIDE SERPL-SCNC: 116 MMOL/L (ref 94–109)
CO2 SERPL-SCNC: 22 MMOL/L (ref 20–32)
CREAT SERPL-MCNC: 1.66 MG/DL (ref 0.52–1.04)
GFR SERPL CREATININE-BSD FRML MDRD: 28 ML/MIN/{1.73_M2}
GLUCOSE SERPL-MCNC: 126 MG/DL (ref 70–99)
HGB BLD-MCNC: 8.6 G/DL (ref 11.7–15.7)
POTASSIUM SERPL-SCNC: 4.7 MMOL/L (ref 3.4–5.3)
SODIUM SERPL-SCNC: 144 MMOL/L (ref 133–144)

## 2021-03-15 ENCOUNTER — DISCHARGE SUMMARY NURSING HOME (OUTPATIENT)
Dept: GERIATRICS | Facility: CLINIC | Age: 86
End: 2021-03-15
Payer: MEDICARE

## 2021-03-15 VITALS
RESPIRATION RATE: 16 BRPM | TEMPERATURE: 98 F | DIASTOLIC BLOOD PRESSURE: 70 MMHG | HEART RATE: 92 BPM | OXYGEN SATURATION: 92 % | WEIGHT: 125.6 LBS | SYSTOLIC BLOOD PRESSURE: 212 MMHG | BODY MASS INDEX: 22.97 KG/M2

## 2021-03-15 DIAGNOSIS — I50.9 ACUTE ON CHRONIC CONGESTIVE HEART FAILURE, UNSPECIFIED HEART FAILURE TYPE (H): Primary | ICD-10-CM

## 2021-03-15 DIAGNOSIS — I48.0 PAROXYSMAL ATRIAL FIBRILLATION (H): ICD-10-CM

## 2021-03-15 DIAGNOSIS — R63.0 POOR APPETITE: ICD-10-CM

## 2021-03-15 DIAGNOSIS — I10 ESSENTIAL HYPERTENSION: ICD-10-CM

## 2021-03-15 DIAGNOSIS — M06.9 RHEUMATOID ARTHRITIS, INVOLVING UNSPECIFIED SITE, UNSPECIFIED WHETHER RHEUMATOID FACTOR PRESENT (H): ICD-10-CM

## 2021-03-15 DIAGNOSIS — E03.9 HYPOTHYROIDISM, UNSPECIFIED TYPE: ICD-10-CM

## 2021-03-15 DIAGNOSIS — J90 BILATERAL PLEURAL EFFUSION: ICD-10-CM

## 2021-03-15 DIAGNOSIS — Z71.89 ADVANCED DIRECTIVES, COUNSELING/DISCUSSION: ICD-10-CM

## 2021-03-15 DIAGNOSIS — D63.1 ANEMIA DUE TO STAGE 4 CHRONIC KIDNEY DISEASE (H): ICD-10-CM

## 2021-03-15 DIAGNOSIS — I48.0 PAF (PAROXYSMAL ATRIAL FIBRILLATION) (H): ICD-10-CM

## 2021-03-15 DIAGNOSIS — M1A.9XX0 CHRONIC GOUT WITHOUT TOPHUS, UNSPECIFIED CAUSE, UNSPECIFIED SITE: ICD-10-CM

## 2021-03-15 DIAGNOSIS — K21.00 GASTROESOPHAGEAL REFLUX DISEASE WITH ESOPHAGITIS, UNSPECIFIED WHETHER HEMORRHAGE: ICD-10-CM

## 2021-03-15 DIAGNOSIS — N18.4 ANEMIA DUE TO STAGE 4 CHRONIC KIDNEY DISEASE (H): ICD-10-CM

## 2021-03-15 DIAGNOSIS — M62.81 GENERALIZED MUSCLE WEAKNESS: ICD-10-CM

## 2021-03-15 DIAGNOSIS — N18.4 STAGE 4 CHRONIC KIDNEY DISEASE (H): ICD-10-CM

## 2021-03-15 DIAGNOSIS — I50.32 CHRONIC HEART FAILURE WITH PRESERVED EJECTION FRACTION (H): ICD-10-CM

## 2021-03-15 DIAGNOSIS — M19.90 INFLAMMATORY ARTHRITIS: ICD-10-CM

## 2021-03-15 PROCEDURE — 99316 NF DSCHRG MGMT 30 MIN+: CPT | Performed by: NURSE PRACTITIONER

## 2021-03-15 RX ORDER — ALLOPURINOL 100 MG/1
100 TABLET ORAL DAILY
Qty: 30 TABLET | Refills: 0 | Status: SHIPPED | OUTPATIENT
Start: 2021-03-15 | End: 2021-05-05

## 2021-03-15 RX ORDER — DILTIAZEM HYDROCHLORIDE 180 MG/1
360 CAPSULE, EXTENDED RELEASE ORAL DAILY
Qty: 60 CAPSULE | Refills: 0 | Status: SHIPPED | OUTPATIENT
Start: 2021-03-15 | End: 2021-04-15 | Stop reason: DRUGHIGH

## 2021-03-15 RX ORDER — TORSEMIDE 10 MG/1
20 TABLET ORAL DAILY
Qty: 30 TABLET | Refills: 0 | Status: SHIPPED | OUTPATIENT
Start: 2021-03-15 | End: 2021-03-19

## 2021-03-15 RX ORDER — HYDRALAZINE HYDROCHLORIDE 100 MG/1
100 TABLET, FILM COATED ORAL 3 TIMES DAILY
Qty: 90 TABLET | Refills: 0 | Status: SHIPPED | OUTPATIENT
Start: 2021-03-15 | End: 2021-04-15 | Stop reason: DRUGHIGH

## 2021-03-15 RX ORDER — MIRTAZAPINE 15 MG/1
15 TABLET, FILM COATED ORAL AT BEDTIME
Qty: 30 TABLET | Refills: 0 | Status: SHIPPED | OUTPATIENT
Start: 2021-03-15 | End: 2021-04-15

## 2021-03-15 RX ORDER — PREDNISONE 5 MG/1
5 TABLET ORAL DAILY
Qty: 30 TABLET | Refills: 0 | Status: SHIPPED | OUTPATIENT
Start: 2021-03-15 | End: 2021-04-15

## 2021-03-15 RX ORDER — LEVOTHYROXINE SODIUM 25 UG/1
25 TABLET ORAL DAILY
Qty: 30 TABLET | Refills: 0 | Status: SHIPPED | OUTPATIENT
Start: 2021-03-15 | End: 2021-06-07

## 2021-03-15 RX ORDER — PANTOPRAZOLE SODIUM 40 MG/1
40 TABLET, DELAYED RELEASE ORAL
Qty: 30 TABLET | Refills: 0 | Status: SHIPPED | OUTPATIENT
Start: 2021-03-15 | End: 2021-04-15

## 2021-03-15 RX ORDER — METOPROLOL SUCCINATE 100 MG/1
100 TABLET, EXTENDED RELEASE ORAL DAILY
Qty: 30 TABLET | Refills: 0 | Status: ON HOLD | OUTPATIENT
Start: 2021-03-15 | End: 2021-04-22

## 2021-03-15 RX ORDER — DILTIAZEM HYDROCHLORIDE 180 MG/1
360 CAPSULE, EXTENDED RELEASE ORAL DAILY
Start: 2021-03-15 | End: 2021-03-15

## 2021-03-15 RX ORDER — SULFASALAZINE 500 MG/1
TABLET ORAL
Qty: 60 TABLET | Refills: 0 | Status: SHIPPED | OUTPATIENT
Start: 2021-03-15 | End: 2021-04-15

## 2021-03-15 NOTE — PROGRESS NOTES
[place] [facility] DISCHARGE SUMMARY  PATIENT'S NAME: Ghislaine Walls  YOB: 1935  MEDICAL RECORD NUMBER:  [id number]  Place of Service where encounter took place:  [facility] ([facility]) [[id number]]    PRIMARY CARE PROVIDER AND CLINIC RESPONSIBLE AFTER TRANSFER:   Laurie Conteh MD, [address] / [place] [address]    Assisted Living: [facility]     Transferring providers: DHAVAL Altamirano CNP, M Arbabi, MD  Recent Hospitalization/ED:  [facility] stay 2/21/21 to 2/25/21.  Date of SNF Admission: February/25/2021  Date of SNF (anticipated) Discharge: March/15/2021  Discharged to: previous assisted living  Cognitive Scores: SLUMS: 22/30  Physical Function: Ambulating 50 ft with walker  DME: none    CODE STATUS/ADVANCE DIRECTIVES DISCUSSION:  DNR / DNI   ALLERGIES: Oxybutynin and Seasonal allergies    DISCHARGE DIAGNOSIS/NURSING FACILITY COURSE:   Patient in TCU since 2/25 following hospitalization due to shortness of breath. PMH includes HFpEF, AF, hypertension, covid recovered, anemia and hypothyroidism. During hospitalization she required thoracentesis. She required medication adjustment due to DRAGAN. She was started on antibiotics for UTI.   Since in TCU she continues to be weak. She is walking about 40' with therapy. She did go out to a [facility] clinic appointment on 3/4. She did have a bout of nausea with vomiting last week  Acute on chronic congestive heart failure, unspecified heart failure type (H)  Bilateral pleural effusion  Recent hospitalization due to weakness and shortness of breath. She was treated with bilateral thoracentesis (950cc from right and 450cc from left). She was also treated with IV lasix (down 3.4L)     Diuretics were restarted on 3/4 per [facility] clinic. Weight  seems to hover around 124# and is not trending down.  While in TCU  she has required some supplemental oxygen to keep O2 sats >90% but in past few days O2 sats 92% on  RA.   -follow up with [facility] as scheduled  -continue torsemide 20 mg q day  -metoprolol 100mg q day  -compression socks        Essential hypertension  Last few BPs in /70, 210/81, 152/53  Diltiazem increased 3/8. BPs still uncontrolled  -increase diltiazem 360mg q day  -continue hydralazine 100mg TID  -continue metoprolol  100mg q day     Paroxysmal atrial fibrillation (H)  Anticoagulated on eliquis     Stage 4 chronic kidney disease (H)  Baseline creat 1.4-1.9  7/22-8/11 Creatinine 0.98-1.15  8/17-8/31 Creatinine 1.19-1.25  Sept-Oct creatinine 1.49->1.81->1.42  Recent creat above  -avoid nephrotoxins.   -follow up with nephrology     Anemia due to CKD 4  Receives aranesp through hematology clinic. Last injection 3/3. hgb 9.8  -continue plan of care     Rheumatoid arthritis, involving unspecified site, unspecified whether rheumatoid factor present (H)  Chronic  -continue prednisone and sulfasalazine     Generalized muscle weakness  She is quite weak, she wants to sleep all day. Therapy report: Pt ambulated 1x75', 1x50' with 4WW and CGA. Pt ambulates with forward flexed posture and decreased jeancarlos. Pt able to complete turns without loss of balance. She does require assist with ADLs  SLUMs 22/30  She did walk to bathroom with walker today.    will file MAARC report upon discharge due to our concerns for patient safety.   -discharge back to [facility] with assistance from home care     Advanced directives, counseling/discussion  She told the [facility] clinic she does not want to return to hospital. She told admitting NP that she prefers comfort care.   Care conference on 3/11 to  discuss Ghislaine's wishes for her medical and nursing care. She does not want to do therapy any longer. She does not want to return to the hospital   Daughter asked if there is anything else that can improve her situation so she will feel better and want to participate in therapy.   She has met weekly with house psychologist, who recognizes  depressed state.   We agreed on a hospice consult.  At this point daughter is not interested in hospice         Past Medical History:  has a past medical history of Atrial fibrillation (H) (new 10/19), [place] (hard of hearing), Hyperlipidaemia, Hyperlipidaemia LDL goal < 130, Hypertension, Hypothyroid, PAD (peripheral artery disease) (H), Renal insufficiency, mild, and Uncontrolled hypertension (10/14/2019). She also has no past medical history of Sleep apnea.    Discharge Medications:    Current Outpatient Medications   Medication Sig Dispense Refill     diltiazem ER (DILT-XR) 180 MG 24 hr capsule Take 2 capsules (360 mg) by mouth daily       acetaminophen (TYLENOL) 500 MG tablet Take 1,000 mg by mouth 3 times daily       allopurinol (ZYLOPRIM) 100 MG tablet Take 1 tablet (100 mg) by mouth daily 90 tablet 3     apixaban ANTICOAGULANT (ELIQUIS) 2.5 MG tablet Take 1 tablet (2.5 mg) by mouth 2 times daily 180 tablet 3     hydrALAZINE (APRESOLINE) 100 MG tablet Take 1 tablet (100 mg) by mouth 3 times daily 270 tablet 3     levothyroxine (SYNTHROID/LEVOTHROID) 25 MCG tablet Take 1 tablet (25 mcg) by mouth daily 90 tablet 2     metoprolol succinate ER (TOPROL-XL) 100 MG 24 hr tablet Take 1 tablet (100 mg) by mouth daily 90 tablet 3     mirtazapine (REMERON) 15 MG tablet Take 1 tablet (15 mg) by mouth At Bedtime 90 tablet 3     pantoprazole (PROTONIX) 40 MG EC tablet Take 1 tablet (40 mg) by mouth every morning (before breakfast) 90 tablet 3     predniSONE (DELTASONE) 5 MG tablet Take 1 tablet (5 mg) by mouth daily 90 tablet 3     senna-docusate (SENOKOT-S/PERICOLACE) 8.6-50 MG tablet Take 1 tablet by mouth 2 times daily as needed for constipation       sulfaSALAzine (AZULFIDINE) 500 MG tablet TAKE ONE TABLET BY MOUTH TWICE DAILY AFTER MEALS.       torsemide (DEMADEX) 10 MG tablet Take 2 tablets (20 mg) by mouth daily 90 tablet 3       Medication Changes/Rationale:     Increase torsemide 20mg q day    Increase diltazem 360mg  q day    Controlled medications sent with patient:   not applicable/none     ROS:   4 point ROS including Respiratory, CV, GI and , other than that noted in the HPI,  is negative    Physical Exam:   Vitals: BP (!) 212/70   Pulse 92   Temp 98  F (36.7  C)   Resp 16   Wt 57 kg (125 lb 9.6 oz)   SpO2 92%   BMI 22.97 kg/m    BMI= Body mass index is 22.97 kg/m .  GENERAL APPEARANCE:  Alert, in no distress  ENT:  Mouth and posterior oropharynx normal, moist mucous membranes, hearing acuity adequate   EYES:  EOM, conjunctivae, lids, pupils and irises normal  RESP:  respiratory effort  normal, no respiratory distress,  CV:Edema trace  ABDOMEN:  normal bowel sounds, soft, nontender  M/S:   Gait and station unsteady with walker, Digits and nails normal   SKIN:  Inspection/Palpation of skin and subcutaneous tissue no rash  NEURO: 2-12 in normal limits and at patient's baseline  PSYCH:  insight and judgement, memory intact , affect and mood normal     SNF labs:   CBC RESULTS:   Recent Labs   Lab Test 03/11/21  0830 03/02/21  0701 03/01/21  0849 02/25/21  0539   WBC  --   --  3.9* 6.2   RBC  --   --  3.07* 2.86*   HGB 8.6* 9.2* 10.0* 9.5*   HCT  --   --  32.6* 30.3*   MCV  --   --  106* 106*   MCH  --   --  32.6 33.2*   MCHC  --   --  30.7* 31.4*   RDW  --   --  15.4* 15.2*   PLT  --   --  186 172       Last Basic Metabolic Panel:  Recent Labs   Lab Test 03/11/21  0830 03/02/21  0701    140   POTASSIUM 4.7 4.1   CHLORIDE 116* 110*   ALIVIA 8.8 9.0   CO2 22 21   BUN 46* 70*   CR 1.66* 1.89*   * 111*       Liver Function Studies -   Recent Labs   Lab Test 02/23/21  0902 12/01/20  1405 12/01/20  1354 07/25/20  0554 07/21/20  2038   PROTTOTAL 6.7*  --  7.9 6.2* 6.4*   ALBUMIN  --   --  3.5 1.7* 2.0*   BILITOTAL  --   --   --  0.4 0.4   ALKPHOS  --   --   --  90 69   AST  --  21  --  25 18   ALT  --  11  --  10 8       TSH   Date Value Ref Range Status   03/02/2021 2.37 0.40 - 4.00 mU/L Final   03/01/2021 1.86 0.40  - 4.00 mU/L Final   ]    No results found for: A1C        DISCHARGE PLAN:    Follow up labs: No labs orders/due    Medical Follow Up:      Follow up with primary care provider in 1 weeks    MTM referral needed and placed by this provider: No    Current [place] scheduled appointments:       Discharge Services: Home Care:  Occupational Therapy, Physical Therapy, Registered Nurse, Home Health Aide and     Discharge Instructions Verbalized to Patient at Discharge:     None      TOTAL DISCHARGE TIME:   Greater than 30 minutes  Electronically signed by:  DHAVAL Altamirano CNP     Home care Face to Face documentation done in [facility] attached to Home care orders for [facility].

## 2021-03-16 ENCOUNTER — DOCUMENTATION ONLY (OUTPATIENT)
Dept: OTHER | Facility: CLINIC | Age: 86
End: 2021-03-16

## 2021-03-18 ENCOUNTER — CARE COORDINATION (OUTPATIENT)
Dept: CARDIOLOGY | Facility: CLINIC | Age: 86
End: 2021-03-18

## 2021-03-18 NOTE — PROGRESS NOTES
"[facility]    Incoming call from patients daughter. She reports that the Rx sent for patients torsemide done incorrectly. Should be for torsemide 10mg/d, but instead was written for 10mg bid. This Rx sent by [facility]. Daughter then stated patient only took the 20mg X1 day. She since is on 10mg/d.     She then stated that patient is \"miserable\" on the torsemide. She urinates every hr and has difficulty getting to the BR in time. She wears Depends, but is concerned that she will get her furniture wet.     We talked about checking for UTI. Patient recently treated w/abx, but I suggested she get re-tested. I then talked about this as a consequence of being on a diuretic. I also asked about patients weights/sx and if there has been any improvement since starting the torsemide. Patients weight = 126# today. Daughter stated she continues to have significant TOBAR. \"She is panting by the time she gets to/from the BR. \"  Per her report patients breathing has not improved.     Patient has been DC'd from [facility]. She has home care and daughter has called to meet [facility] palliative/hospice staff. There is not yet a date set.     Will forward to Courtney Amador for review. Daughter verbalized understanding and had no other questions.  Harmony Nguyen RN on 3/18/2021 at 3:36 PM    "

## 2021-03-19 ENCOUNTER — TRANSFERRED RECORDS (OUTPATIENT)
Dept: HEALTH INFORMATION MANAGEMENT | Facility: CLINIC | Age: 86
End: 2021-03-19

## 2021-03-19 ENCOUNTER — VIRTUAL VISIT (OUTPATIENT)
Dept: FAMILY MEDICINE | Facility: CLINIC | Age: 86
End: 2021-03-19
Payer: MEDICARE

## 2021-03-19 ENCOUNTER — TELEPHONE (OUTPATIENT)
Dept: FAMILY MEDICINE | Facility: CLINIC | Age: 86
End: 2021-03-19

## 2021-03-19 DIAGNOSIS — N18.4 CKD (CHRONIC KIDNEY DISEASE) STAGE 4, GFR 15-29 ML/MIN (H): ICD-10-CM

## 2021-03-19 DIAGNOSIS — I10 UNCONTROLLED HYPERTENSION: ICD-10-CM

## 2021-03-19 DIAGNOSIS — R11.0 NAUSEA: ICD-10-CM

## 2021-03-19 DIAGNOSIS — I50.32 CHRONIC HEART FAILURE WITH PRESERVED EJECTION FRACTION (H): Primary | ICD-10-CM

## 2021-03-19 DIAGNOSIS — E55.9 VITAMIN D DEFICIENCY: ICD-10-CM

## 2021-03-19 PROCEDURE — 99443 PR PHYSICIAN TELEPHONE EVALUATION 21-30 MIN: CPT | Mod: 95 | Performed by: INTERNAL MEDICINE

## 2021-03-19 RX ORDER — ONDANSETRON 4 MG/1
4 TABLET, ORALLY DISINTEGRATING ORAL EVERY 8 HOURS PRN
Qty: 30 TABLET | Refills: 4 | Status: SHIPPED | OUTPATIENT
Start: 2021-03-19 | End: 2021-04-15

## 2021-03-19 RX ORDER — TORSEMIDE 10 MG/1
10 TABLET ORAL DAILY
Qty: 30 TABLET | Refills: 3
Start: 2021-03-19 | End: 2021-03-19

## 2021-03-19 RX ORDER — TORSEMIDE 10 MG/1
10 TABLET ORAL DAILY
Qty: 30 TABLET | Refills: 3 | Status: SHIPPED | OUTPATIENT
Start: 2021-03-19 | End: 2021-04-15

## 2021-03-19 NOTE — TELEPHONE ENCOUNTER
"Reason for Call:  Other appointment    Detailed comments: Pt has edu't today and home care states that the Pt should be on \"comfort care\" but the daughter does not understand that this is more hospice than home care. Pt is more sick than home care treats, and home care is recommending hospice.    Asking PCP to talk to daughter in today's visit about this.    Wanted to inform PCP where the Pt is at at this time    Phone Number [facility] can be reached at: [phone]    Best Time: any    Can we leave a detailed message on this number? YES    Call taken on 3/19/2021 at 8:50 AM by Nakita Rogers  "

## 2021-03-19 NOTE — PROGRESS NOTES
When I saw patient she was ready for hospice.  She's likely 10# up in volume causing sob, although I can't guarantee  wow much she'd improve with diuresis.  I understand the incontinence is difficult.  Option would be to have a dupont placed but this comes with own risks- and I would defer to NP at [place].  Other option is infusion clinic with [facility] so she wouldn't have to run to the bathroom.    The patient was clear on her wishes with me when I saw her for palliative/ hospiece.  These options are only if the PATIENT wants them, not daughter alone.      Courtney Amador PA-C 3/19/2021 10:28 AM

## 2021-03-19 NOTE — PROGRESS NOTES
Ghislaine is a 85 year old who is being evaluated via a billable telephone visit.      What phone number would you like to be contacted at? [phone]  How would you like to obtain your AVS? Mail a copy      Mich Scanlon is a 85 year old who presents for the following health issues    HPI       Hospital Follow-up Visit:    Hospital/Nursing Home/IP Rehab Facility: [facility]   Date of Admission: February/25/2021  Date of Discharge: March/15/2021  Reason(s) for Admission:      Acute on chronic congestive heart failure, unspecified heart failure type (H)  Bilateral pleural effusion  Essential hypertension  Paroxysmal atrial fibrillation (H)  Stage 4 chronic kidney disease (H)  Anemia due to stage 4 chronic kidney disease (H)  Rheumatoid arthritis, involving unspecified site, unspecified whether rheumatoid factor present (H)  Generalized muscle weakness  Advanced directives, counseling/discussion  PAF (paroxysmal atrial fibrillation) (H)  Chronic gout without tophus, unspecified cause, unspecified site  Hypothyroidism, unspecified type  Poor appetite  Gastroesophageal reflux disease with esophagitis, unspecified whether hemorrhage  Inflammatory arthritis  Chronic heart failure with preserved ejection fraction (H)        Was your hospitalization related to COVID-19? No   Problems taking medications regularly:  None  Medication changes since discharge: None  Problems adhering to non-medication therapy:  None    Summary of hospitalization:  [place] hospital discharge summary reviewed  Diagnostic Tests/Treatments reviewed.  Follow up needed: nephrology, rheumatology  Other Healthcare Providers Involved in Patient s Care:         Homecare, Hospice and Care Coordination  Update since discharge: stable.    Post Discharge Medication Reconciliation: discharge medications reconciled and changed, per note/orders.  Plan of care communicated with patient and family            Mich LAROSE  Titi is a 85 year old female who presents via phone visit today for the following health issues:    HPI         Post hospital discharge follow up on multiple concerns including CHF, nausea, CKD, hypertension          HPI:   Patient Ghislaine Walls is a very pleasant 85 year old female with history of CKD, hypertension, CHF, hyperlipidemia, atrial fibrillation today for telephone visit for post hospital discharge follow up of CHF exacerbation. Patient is followed by cardiology clinic going forward. Patient is compliant with her diuretic medication therapy. Regarding the patient's CKD, the patient has been referred to outpatient nephrology clinic for the worsening kidney function. Regarding the patient's hypertension in the setting of CKD, the patient's BP has been difficult to treat in the past and the patient has been followed on a routine basis by her local nephrology clinic at the [facility] nephrology clinic in [place]. Patient's BP medication regimen has been adjusted by the nephrology clinic lately. Regarding the patient's chronic gout, the patient denies any acute gout flare up symptoms at this time.     Current Medications:     Current Outpatient Medications   Medication Sig Dispense Refill     acetaminophen (TYLENOL) 500 MG tablet Take 1,000 mg by mouth 3 times daily       allopurinol (ZYLOPRIM) 100 MG tablet Take 1 tablet (100 mg) by mouth daily 30 tablet 0     apixaban ANTICOAGULANT (ELIQUIS) 2.5 MG tablet Take 1 tablet (2.5 mg) by mouth 2 times daily 60 tablet 0     diltiazem ER (DILT-XR) 180 MG 24 hr capsule Take 2 capsules (360 mg) by mouth daily 60 capsule 0     hydrALAZINE (APRESOLINE) 100 MG tablet Take 1 tablet (100 mg) by mouth 3 times daily 90 tablet 0     levothyroxine (SYNTHROID/LEVOTHROID) 25 MCG tablet Take 1 tablet (25 mcg) by mouth daily 30 tablet 0     metoprolol succinate ER (TOPROL-XL) 100 MG 24 hr tablet Take 1 tablet (100 mg) by mouth daily 30 tablet 0     mirtazapine  (REMERON) 15 MG tablet Take 1 tablet (15 mg) by mouth At Bedtime 30 tablet 0     ondansetron (ZOFRAN-ODT) 4 MG ODT tab Take 1 tablet (4 mg) by mouth every 8 hours as needed for nausea 30 tablet 4     pantoprazole (PROTONIX) 40 MG EC tablet Take 1 tablet (40 mg) by mouth every morning (before breakfast) 30 tablet 0     predniSONE (DELTASONE) 5 MG tablet Take 1 tablet (5 mg) by mouth daily 30 tablet 0     senna-docusate (SENOKOT-S/PERICOLACE) 8.6-50 MG tablet Take 1 tablet by mouth 2 times daily as needed for constipation       sulfaSALAzine (AZULFIDINE) 500 MG tablet TAKE ONE TABLET BY MOUTH TWICE DAILY AFTER MEALS. 60 tablet 0     torsemide (DEMADEX) 10 MG tablet Take 1 tablet (10 mg) by mouth daily 30 tablet 3         Allergies:      Allergies   Allergen Reactions     Oxybutynin Itching     Seasonal Allergies             Past Medical History:     Past Medical History:   Diagnosis Date     Atrial fibrillation (H) new 10/19     [place] (hard of hearing)     wears hearing aids     Hyperlipidaemia      Hyperlipidaemia LDL goal < 130      Hypertension      Hypothyroid      PAD (peripheral artery disease) (H)      Renal insufficiency, mild      Uncontrolled hypertension 10/14/2019         Past Surgical History:     Past Surgical History:   Procedure Laterality Date     APPENDECTOMY  1951     BONE MARROW BIOPSY, BONE SPECIMEN, NEEDLE/TROCAR N/A 7/31/2020    Procedure: bone marrow biopsy;  Surgeon: Iris Cameron MD;  Location:  GI     CATARACT IOL, RT/LT  2001    bilateral (laser - 5/2013)     DENTAL SURGERY  1962    wisdom     ESOPHAGOSCOPY, GASTROSCOPY, DUODENOSCOPY (EGD), COMBINED N/A 7/24/2020    Procedure: ESOPHAGOGASTRODUODENOSCOPY, WITH BIOPSY;  Surgeon: Humberto Bailon MD;  Location:  GI     EYE SURGERY  1956    Muscle release     TUBAL LIGATION  1971         Family Medical History:     Family History   Problem Relation Age of Onset     Heart Disease Mother      Respiratory Mother      Heart Disease  Father      Heart Disease Brother      Coronary Artery Disease Brother         CAB     Heart Disease Brother      Coronary Artery Disease Brother         CAB         Social History:     Social History     Socioeconomic History     Marital status:      Spouse name: Not on file     Number of children: Not on file     Years of education: Not on file     Highest education level: Not on file   Occupational History     Not on file   Social Needs     Financial resource strain: Not hard at all     Food insecurity     Worry: Never true     Inability: Never true     Transportation needs     Medical: No     Non-medical: No   Tobacco Use     Smoking status: Former Smoker     Packs/day: 0.50     Years: 5.00     Pack years: 2.50     Types: Cigarettes     Quit date: 1973     Years since quittin.2     Smokeless tobacco: Never Used   Substance and Sexual Activity     Alcohol use: Not Currently     Alcohol/week: 0.0 standard drinks     Drug use: No     Sexual activity: Not Currently     Partners: Male   Lifestyle     Physical activity     Days per week: 0 days     Minutes per session: 0 min     Stress: Not on file   Relationships     Social connections     Talks on phone: Twice a week     Gets together: Twice a week     Attends Uatsdin service: Never     Active member of club or organization: No     Attends meetings of clubs or organizations: Never     Relationship status: Not on file     Intimate partner violence     Fear of current or ex partner: Not on file     Emotionally abused: Not on file     Physically abused: Not on file     Forced sexual activity: Not on file   Other Topics Concern     Parent/sibling w/ CABG, MI or angioplasty before 65F 55M? No   Social History Narrative     Not on file           Review of System:     Constitutional: Negative for fever or chills, positive for recent weight loss, failure to thrive symptoms  Skin: Negative for rashes  Ears/Nose/Throat: Negative for nasal congestion, sore  throat  Respiratory: No shortness of breath, dyspnea on exertion, cough, or hemoptysis  Cardiovascular: Negative for chest pain, positive for CHF  Gastrointestinal: positive for nausea, negative for vomiting  Rheumatologic: positive for chronic gout  Genitourinary: negative  Musculoskeletal: Negative for myalgias  Neurologic: Negative for headaches  Psychiatric: Negative for depression, anxiety  Hematologic/Lymphatic/Immunologic: Negative  Endocrine: Negative  Behavioral: Negative for tobacco use       Physical Exam:   There were no vitals taken for this visit.    RESP: no cough over the phone  NEURO: Alert & Oriented x 3 over the phone.   PSYCH: mentation appears normal over the phone        Diagnostic Test Results:     Lab Results   Component Value Date    WBC 3.3 10/14/2019     Lab Results   Component Value Date    RBC 3.16 10/14/2019     Lab Results   Component Value Date    HGB 9.9 10/14/2019     Lab Results   Component Value Date    HCT 31.1 10/14/2019     Lab Results   Component Value Date    MCV 98 10/14/2019     Lab Results   Component Value Date    MCH 31.3 10/14/2019     Lab Results   Component Value Date    MCHC 31.8 10/14/2019     Lab Results   Component Value Date    RDW 15.7 10/14/2019     Lab Results   Component Value Date     10/14/2019         ASSESSMENT/PLAN:     (I50.32) Chronic heart failure with preserved ejection fraction (H)  Comment: post hospital discharge follow up of CHF exacerbation. Patient is followed by cardiology clinic going forward. Patient is compliant with her diuretic medication therapy. Patient is due for a refill  Of torsemide medication.  Plan: torsemide (DEMADEX) 10 MG table    (E55.9) Vitamin D deficiency  Comment: no recent falls or acute injuries  Plan: continue current therapy    (R11.0) Nausea  (primary encounter diagnosis)  Comment: stable. Patient is due for a refill of Zofran medication.  Plan: ondansetron (ZOFRAN-ODT) 4 MG ODT tab    (N18.4) CKD (chronic  kidney disease) stage 4, GFR 15-29 ml/min (H)  (I10) Uncontrolled hypertension  Comment: regarding the patient's CKD, the patient has been referred to outpatient nephrology clinic for the worsening kidney function. Regarding the patient's hypertension in the setting of CKD, the patient's BP has been difficult to treat in the past and the patient has been followed on a routine basis by her local nephrology clinic at the [facility] nephrology clinic in [place]. Patient's BP medication regimen has been adjusted by the nephrology clinic lately.  Plan: continue current BP medication regimen, outpatient nephrology clinic follow up going forward.       Follow Up Plan:     Patient is instructed to return to Internal Medicine clinic for follow-up visit in 1 month.      Phone call duration:  30 minutes      Laurie Conteh MD  Internal Medicine  [facility]

## 2021-03-19 NOTE — TELEPHONE ENCOUNTER
Reason for Call:  Home Health Care    Shavon with [facility] called regarding (reason for call):     Orders are needed for this patient.     PT: eval & treat    OT: eval & treat    Skilled [id number], 3 prn    : assessment     Phone Number Homecare Nurse can be reached at: [phone]    Can we leave a detailed message on this number? YES    Phone number patient can be reached at: [phone]    Best Time: any    Call taken on 3/19/2021 at 8:45 AM by Nakita Rogers

## 2021-03-23 ENCOUNTER — PATIENT OUTREACH (OUTPATIENT)
Dept: NURSING | Facility: CLINIC | Age: 86
End: 2021-03-23
Payer: MEDICARE

## 2021-03-23 NOTE — PROGRESS NOTES
"Clinic Care Coordination Contact    OUTREACH    Referral Information:  Referral Source: IP Handoff  Primary Diagnosis: CHF  Chief Complaint   Patient presents with     Clinic Care Coordination - Post Hospital     TCU discharge      Universal Utilization: reviewed on this date  Difficulty keeping appointments: No  Compliance Concerns: No  No-Show Concerns: No  No PCP office visit in Past Year: No  Utilization    Last refreshed: 3/23/2021  1:43 PM: Hospital Admissions 2           Last refreshed: 3/23/2021  1:43 PM: ED Visits 2           Last refreshed: 3/23/2021  1:43 PM: No Show Count (past year) 0              Current as of: 3/23/2021  1:43 PM          CC RN outreach to patient following TCU discharge; she was at [facility] from 2/25/21 to 3/15/21.  Patient previously hospitalized at [facility] from 2/21/21 to 2/25/21 for CHF exacerbation.    CC RN called and spoke with patient; introduced self, discussed role of Care Coordination, and explained reason for call.    Patient reported to be doing okay, but very tired.  Her respiratory status has been fairly stable but she noted still being short of breath with any exertion.  She denied any symptoms of concern at this time but is aware of when to notify clinic if this changes and has received education from home care RN to this effect.    Patient has attended follow-up appointments with both PCP and Nephrology since TCU discharge.  Patient also being followed by C[facility] clinic and had appointment with FANI Amador on 3/4/21.    Patient updated that she is receiving home care services through [facility] and has had RN visit so far.  She will be having a visit with [facility] today at 3:00 pm to discuss potential hospice admission.  Patient feels hopsice \"is probably the best option for her\" at this time.  Patient noted her daughter will also be attending the hospice appointment.    Patient currently " lives in assisted living.  She has [employer] and her daughter for transportation.    At this time, patient denied need for additional assistance from Care Coordination.  She noted she does intend to likely move forward with enrollment with hospice and feels they will provide the support/resources she is needing.  CC RN will send patient CC RN contact information for future reference and she agreed to call should things change.    Clinical Concerns:  Current Medical Concerns: CHF, CKD  Current Behavioral Concerns: none noted at this time  Education Provided to patient: reviewed discharge recommendations, provided information about Care Coordination     Pain  Pain (GOAL): No    Medication Management:  Medications reviewed and reconciled at PCP appointment 3/19/21; patient denied current medication questions or concerns.  Patient noted her daughter helps to manage her medications and sets them up for her in a weekly medication organizer.    Functional Status:  Dependent ADLs: Ambulation-walker  Dependent IADLs: Cleaning, Meal Preparation, Cooking  Mobility Status: Independent w/Device    Living Situation:  Current living arrangement: I live in assisted living    Lifestyle & Psychosocial Needs:  Lifestyle     Physical activity     Days per week: 0 days     Minutes per session: 0 min     Stress: Not on file     Social Needs     Financial resource strain: Not hard at all     Food insecurity     Worry: Never true     Inability: Never true     Transportation needs     Medical: No     Non-medical: No     Inadequate nutrition (GOAL): No  Tube Feeding: No  Inadequate activity/exercise (GOAL): Yes  Significant changes in sleep pattern (GOAL): No  Transportation means: Family, Medical transport     Informal Support system: Children   Socioeconomic History     Marital status:      Spouse name: Not on file     Number of children: Not on file     Years of education: Not on file     Highest education level: Not on file    Relationships     Social connections     Talks on phone: Twice a week     Gets together: Twice a week     Attends Voodoo service: Never     Active member of club or organization: No     Attends meetings of clubs or organizations: Never     Relationship status: Not on file     Intimate partner violence     Fear of current or ex partner: Not on file     Emotionally abused: Not on file     Physically abused: Not on file     Forced sexual activity: Not on file     Tobacco Use     Smoking status: Former Smoker     Packs/day: 0.50     Years: 5.00     Pack years: 2.50     Types: Cigarettes     Quit date: 1973     Years since quittin.2     Smokeless tobacco: Never Used   Substance and Sexual Activity     Alcohol use: Not Currently     Alcohol/week: 0.0 standard drinks     Drug use: No     Sexual activity: Not Currently     Partners: Male     Care Coordinator has reviewed patient's Social Determinants of Health (SDoH) on this date. Upon review, changes were not  made.      Resources and Interventions:  Skilled Home Care Services: Skilled Nursing, Physicial Therapy, Speech Therapy(SW)  Community Resources: Home Care, Transportation Services  Supplies used at home: None  Equipment Currently Used at Home: walker, rolling, shower chair, grab bar, tub/shower, grab bar, toilet  Employment Status: retired    Advance Care Plan/Directive  Advanced Care Plans/Directives on file: No  Advanced Care Plan/Directive Status: Considering Options    Referrals Placed: None    Patient/Caregiver understanding: Yes    Plan:    Patient will continue taking medications as prescribed.    Patient will attend upcoming appointments as scheduled.    Patient will work with [facility] as indicated and will participate in visit with Hospice to determine if she would like to proceed with hospice services instead.    CC RN will make no further scheduled patient outreaches at this time but will remain available should any  patient needs arise.    Milad Allison RN  Clinic Care Coordinator  [facility]  Esther Childs, [facility]  Ph: [phone]

## 2021-03-23 NOTE — LETTER
[facility] CARE COORDINATION  [facility]  [address], [place] [address]    March 23, 2021    Ghislaine Walls  [address]  [facility] [address]      Dear Ghislaine,    I am a clinic care coordinator who works with Laurie Conteh MD at [facility]. I wanted to thank you for spending the time to talk with me.  Below is a description of clinic care coordination and how I can further assist you.      The clinic care coordination team is made up of a registered nurse,  and community health worker who understand the health care system. The goal of clinic care coordination is to help you manage your health and improve access to the health care system in the most efficient manner. The team can assist you in meeting your health care goals by providing education, coordinating services, strengthening the communication among your providers and supporting you with any resource needs.    Please feel free to contact me with any questions or concerns. We are focused on providing you with the highest-quality healthcare experience possible and that all starts with you.     Sincerely,     Milad Allison RN  Clinic Care Coordinator  [facility]  [place] Childs, [facility]  Ph: [phone]

## 2021-03-24 ENCOUNTER — TELEPHONE (OUTPATIENT)
Dept: FAMILY MEDICINE | Facility: CLINIC | Age: 86
End: 2021-03-24

## 2021-03-24 NOTE — TELEPHONE ENCOUNTER
[facility] now requests orders and shares plan of care/discharge summaries for some patients through [employer].  Please REPLY TO THIS MESSAGE OR ROUTE BACK TO THE AUTHOR in order to give authorization for orders when needed.  This is considered a verbal order, you will still receive a faxed copy of orders for signature.  Thank you for your assistance in improving collaboration for our patients.    ORDER  skilled PT interventions starting 3/28/21 at a frequency of 2w1, 1w2 for transfer, gait, strength and endurance training    Dr. Paula Proctor, DPT, CLT  [email or web address]  [phone]

## 2021-04-12 ENCOUNTER — TRANSFERRED RECORDS (OUTPATIENT)
Dept: HEALTH INFORMATION MANAGEMENT | Facility: CLINIC | Age: 86
End: 2021-04-12

## 2021-04-13 ENCOUNTER — CARE COORDINATION (OUTPATIENT)
Dept: CARDIOLOGY | Facility: CLINIC | Age: 86
End: 2021-04-13

## 2021-04-13 NOTE — PROGRESS NOTES
"[facility]    Incoming message from patients daughter stating patient is now refusing to take torsemide. Dtr requesting call back. Call attempt - LM for call back.  Harmony Nguyen RN on 4/13/2021 at 9:48 AM    Called patients daughter. She stated her mother has stopped taking the torsemide \"a few days ago, \" because she is going to the BR too often. Daughter is asking for a \"alternative that won't make her urinate so often.\" she also mentioned that she is \"starting to have problems again.\"  However the daughter was not able to tell me any weights and denied that patient having any worsening sx.    I reviewed w/her the action of diuretics and the SE of frequent urination. I also stated that we can't force her mother to take medicines she doesn't want to.     I then asked the daughter what she would like us to do going forward. She responded \"nothing, I will have to talk to my mother.\"  Will forward to Courtney Amador.  Harmony Nguyen RN on 4/13/2021 at 2:44 PM      "

## 2021-04-13 NOTE — PROGRESS NOTES
We've discussed palliative/ hospice in the past- if pt doesn't want to take meds this is the appropriate next step.  Courtney Amador PA-C 4/13/2021 3:15 PM

## 2021-04-13 NOTE — PROGRESS NOTES
"[facility]    Called daughter lorrie/Courtney's response. Krupa stated she/family and her mother have discussed hospice, \"but we haven't made that decision yet.\" I reiterated our support to her with these discussions. She had no other questions/concerns.  Harmony Nguyen RN on 4/13/2021 at 3:45 PM    "

## 2021-04-15 ENCOUNTER — APPOINTMENT (OUTPATIENT)
Dept: GENERAL RADIOLOGY | Facility: CLINIC | Age: 86
DRG: 291 | End: 2021-04-15
Attending: PHYSICIAN ASSISTANT
Payer: MEDICARE

## 2021-04-15 ENCOUNTER — HOSPITAL ENCOUNTER (INPATIENT)
Facility: CLINIC | Age: 86
LOS: 7 days | Discharge: NURSING, REHAB OR LONG-TERM CARE | DRG: 291 | End: 2021-04-22
Attending: PHYSICIAN ASSISTANT | Admitting: HOSPITALIST
Payer: MEDICARE

## 2021-04-15 DIAGNOSIS — I50.9 CHF EXACERBATION (H): Primary | ICD-10-CM

## 2021-04-15 DIAGNOSIS — E87.5 HYPERKALEMIA: ICD-10-CM

## 2021-04-15 DIAGNOSIS — R00.1 BRADYCARDIA: ICD-10-CM

## 2021-04-15 DIAGNOSIS — I50.33 ACUTE ON CHRONIC DIASTOLIC CONGESTIVE HEART FAILURE (H): ICD-10-CM

## 2021-04-15 DIAGNOSIS — N18.9 CHRONIC KIDNEY DISEASE: ICD-10-CM

## 2021-04-15 DIAGNOSIS — I10 ESSENTIAL HYPERTENSION: ICD-10-CM

## 2021-04-15 DIAGNOSIS — R06.00 DYSPNEA: ICD-10-CM

## 2021-04-15 DIAGNOSIS — R09.02 HYPOXEMIA: ICD-10-CM

## 2021-04-15 LAB
ALBUMIN UR-MCNC: 300 MG/DL
AMORPH CRY #/AREA URNS HPF: ABNORMAL /HPF
ANION GAP SERPL CALCULATED.3IONS-SCNC: 4 MMOL/L (ref 3–14)
ANION GAP SERPL CALCULATED.3IONS-SCNC: 7 MMOL/L (ref 3–14)
APPEARANCE UR: ABNORMAL
BILIRUB UR QL STRIP: NEGATIVE
BUN SERPL-MCNC: 45 MG/DL (ref 7–30)
BUN SERPL-MCNC: 51 MG/DL (ref 7–30)
CALCIUM SERPL-MCNC: 8.7 MG/DL (ref 8.5–10.1)
CALCIUM SERPL-MCNC: 9.2 MG/DL (ref 8.5–10.1)
CHLORIDE SERPL-SCNC: 110 MMOL/L (ref 94–109)
CHLORIDE SERPL-SCNC: 113 MMOL/L (ref 94–109)
CO2 SERPL-SCNC: 22 MMOL/L (ref 20–32)
CO2 SERPL-SCNC: 23 MMOL/L (ref 20–32)
COLOR UR AUTO: YELLOW
CREAT SERPL-MCNC: 1.47 MG/DL (ref 0.52–1.04)
CREAT SERPL-MCNC: 2.09 MG/DL (ref 0.52–1.04)
FLUAV RNA RESP QL NAA+PROBE: NEGATIVE
FLUBV RNA RESP QL NAA+PROBE: NEGATIVE
GFR SERPL CREATININE-BSD FRML MDRD: 21 ML/MIN/{1.73_M2}
GFR SERPL CREATININE-BSD FRML MDRD: 32 ML/MIN/{1.73_M2}
GLUCOSE SERPL-MCNC: 106 MG/DL (ref 70–99)
GLUCOSE SERPL-MCNC: 92 MG/DL (ref 70–99)
GLUCOSE UR STRIP-MCNC: NEGATIVE MG/DL
HGB UR QL STRIP: NEGATIVE
HYALINE CASTS #/AREA URNS LPF: 1 /LPF (ref 0–2)
INTERPRETATION ECG - MUSE: NORMAL
KETONES UR STRIP-MCNC: NEGATIVE MG/DL
LABORATORY COMMENT REPORT: NORMAL
LACTATE BLD-SCNC: 1.4 MMOL/L (ref 0.7–2)
LEUKOCYTE ESTERASE UR QL STRIP: NEGATIVE
MUCOUS THREADS #/AREA URNS LPF: PRESENT /LPF
NITRATE UR QL: NEGATIVE
NT-PROBNP SERPL-MCNC: ABNORMAL PG/ML (ref 0–1800)
PH UR STRIP: 5.5 PH (ref 5–7)
POTASSIUM SERPL-SCNC: 5.5 MMOL/L (ref 3.4–5.3)
POTASSIUM SERPL-SCNC: 5.9 MMOL/L (ref 3.4–5.3)
PROCALCITONIN SERPL-MCNC: 0.6 NG/ML
RBC #/AREA URNS AUTO: 1 /HPF (ref 0–2)
RSV RNA SPEC QL NAA+PROBE: NORMAL
SARS-COV-2 RNA RESP QL NAA+PROBE: NEGATIVE
SODIUM SERPL-SCNC: 139 MMOL/L (ref 133–144)
SODIUM SERPL-SCNC: 140 MMOL/L (ref 133–144)
SOURCE: ABNORMAL
SP GR UR STRIP: 1.02 (ref 1–1.03)
SPECIMEN SOURCE: NORMAL
SQUAMOUS #/AREA URNS AUTO: 1 /HPF (ref 0–1)
TROPONIN I SERPL-MCNC: 0.02 UG/L (ref 0–0.04)
UROBILINOGEN UR STRIP-MCNC: 0 MG/DL (ref 0–2)
WBC #/AREA URNS AUTO: 4 /HPF (ref 0–5)

## 2021-04-15 PROCEDURE — 83605 ASSAY OF LACTIC ACID: CPT | Performed by: PHYSICIAN ASSISTANT

## 2021-04-15 PROCEDURE — 99291 CRITICAL CARE FIRST HOUR: CPT | Mod: 25

## 2021-04-15 PROCEDURE — 99223 1ST HOSP IP/OBS HIGH 75: CPT | Mod: AI | Performed by: HOSPITALIST

## 2021-04-15 PROCEDURE — 81001 URINALYSIS AUTO W/SCOPE: CPT | Performed by: HOSPITALIST

## 2021-04-15 PROCEDURE — 84484 ASSAY OF TROPONIN QUANT: CPT | Performed by: PHYSICIAN ASSISTANT

## 2021-04-15 PROCEDURE — 71046 X-RAY EXAM CHEST 2 VIEWS: CPT

## 2021-04-15 PROCEDURE — 250N000011 HC RX IP 250 OP 636: Performed by: PHYSICIAN ASSISTANT

## 2021-04-15 PROCEDURE — 87636 SARSCOV2 & INF A&B AMP PRB: CPT | Performed by: PHYSICIAN ASSISTANT

## 2021-04-15 PROCEDURE — 99207 PR CDG-CODE CATEGORY CHANGED: CPT | Performed by: HOSPITALIST

## 2021-04-15 PROCEDURE — 84145 PROCALCITONIN (PCT): CPT | Performed by: HOSPITALIST

## 2021-04-15 PROCEDURE — 80048 BASIC METABOLIC PNL TOTAL CA: CPT | Performed by: HOSPITALIST

## 2021-04-15 PROCEDURE — 93005 ELECTROCARDIOGRAM TRACING: CPT

## 2021-04-15 PROCEDURE — 85025 COMPLETE CBC W/AUTO DIFF WBC: CPT | Performed by: PHYSICIAN ASSISTANT

## 2021-04-15 PROCEDURE — 250N000011 HC RX IP 250 OP 636: Performed by: HOSPITALIST

## 2021-04-15 PROCEDURE — 96374 THER/PROPH/DIAG INJ IV PUSH: CPT

## 2021-04-15 PROCEDURE — 250N000013 HC RX MED GY IP 250 OP 250 PS 637: Performed by: HOSPITALIST

## 2021-04-15 PROCEDURE — 36415 COLL VENOUS BLD VENIPUNCTURE: CPT | Performed by: HOSPITALIST

## 2021-04-15 PROCEDURE — C9803 HOPD COVID-19 SPEC COLLECT: HCPCS

## 2021-04-15 PROCEDURE — 210N000002 HC R&B HEART CARE

## 2021-04-15 PROCEDURE — 99292 CRITICAL CARE ADDL 30 MIN: CPT

## 2021-04-15 PROCEDURE — 80048 BASIC METABOLIC PNL TOTAL CA: CPT | Performed by: PHYSICIAN ASSISTANT

## 2021-04-15 PROCEDURE — 83880 ASSAY OF NATRIURETIC PEPTIDE: CPT | Performed by: PHYSICIAN ASSISTANT

## 2021-04-15 RX ORDER — SULFASALAZINE 500 MG/1
500 TABLET, DELAYED RELEASE ORAL 2 TIMES DAILY
Status: DISCONTINUED | OUTPATIENT
Start: 2021-04-15 | End: 2021-04-22 | Stop reason: HOSPADM

## 2021-04-15 RX ORDER — MIRTAZAPINE 15 MG/1
15 TABLET, FILM COATED ORAL AT BEDTIME
Status: DISCONTINUED | OUTPATIENT
Start: 2021-04-15 | End: 2021-04-22 | Stop reason: HOSPADM

## 2021-04-15 RX ORDER — BUMETANIDE 0.25 MG/ML
1 INJECTION INTRAMUSCULAR; INTRAVENOUS ONCE
Status: COMPLETED | OUTPATIENT
Start: 2021-04-15 | End: 2021-04-15

## 2021-04-15 RX ORDER — LEVOTHYROXINE SODIUM 25 UG/1
25 TABLET ORAL DAILY
Status: DISCONTINUED | OUTPATIENT
Start: 2021-04-16 | End: 2021-04-22 | Stop reason: HOSPADM

## 2021-04-15 RX ORDER — PANTOPRAZOLE SODIUM 40 MG/1
40 TABLET, DELAYED RELEASE ORAL DAILY
COMMUNITY
End: 2021-05-05

## 2021-04-15 RX ORDER — AMOXICILLIN 250 MG
2 CAPSULE ORAL 2 TIMES DAILY PRN
Status: DISCONTINUED | OUTPATIENT
Start: 2021-04-15 | End: 2021-04-22 | Stop reason: HOSPADM

## 2021-04-15 RX ORDER — ONDANSETRON 4 MG/1
4 TABLET, ORALLY DISINTEGRATING ORAL EVERY 6 HOURS PRN
Status: DISCONTINUED | OUTPATIENT
Start: 2021-04-15 | End: 2021-04-22 | Stop reason: HOSPADM

## 2021-04-15 RX ORDER — AMOXICILLIN 250 MG
1 CAPSULE ORAL 2 TIMES DAILY PRN
Status: DISCONTINUED | OUTPATIENT
Start: 2021-04-15 | End: 2021-04-22 | Stop reason: HOSPADM

## 2021-04-15 RX ORDER — LISINOPRIL 10 MG/1
10 TABLET ORAL DAILY
COMMUNITY
End: 2021-04-15

## 2021-04-15 RX ORDER — SULFASALAZINE 500 MG/1
500 TABLET ORAL 2 TIMES DAILY
Status: DISCONTINUED | OUTPATIENT
Start: 2021-04-15 | End: 2021-04-15 | Stop reason: CLARIF

## 2021-04-15 RX ORDER — ONDANSETRON 4 MG/1
4 TABLET, ORALLY DISINTEGRATING ORAL EVERY 8 HOURS PRN
Status: DISCONTINUED | OUTPATIENT
Start: 2021-04-15 | End: 2021-04-15

## 2021-04-15 RX ORDER — ONDANSETRON 2 MG/ML
4 INJECTION INTRAMUSCULAR; INTRAVENOUS EVERY 6 HOURS PRN
Status: DISCONTINUED | OUTPATIENT
Start: 2021-04-15 | End: 2021-04-22 | Stop reason: HOSPADM

## 2021-04-15 RX ORDER — ALLOPURINOL 100 MG/1
100 TABLET ORAL DAILY
Status: DISCONTINUED | OUTPATIENT
Start: 2021-04-16 | End: 2021-04-22 | Stop reason: HOSPADM

## 2021-04-15 RX ORDER — DILTIAZEM HYDROCHLORIDE 120 MG/1
120 CAPSULE, EXTENDED RELEASE ORAL 3 TIMES DAILY
Status: ON HOLD | COMMUNITY
End: 2021-04-22

## 2021-04-15 RX ORDER — ONDANSETRON 4 MG/1
4 TABLET, ORALLY DISINTEGRATING ORAL EVERY 8 HOURS PRN
COMMUNITY

## 2021-04-15 RX ORDER — MIRTAZAPINE 15 MG/1
15 TABLET, FILM COATED ORAL AT BEDTIME
COMMUNITY
End: 2021-05-05

## 2021-04-15 RX ORDER — ACETAMINOPHEN 500 MG
1000 TABLET ORAL 3 TIMES DAILY PRN
Status: DISCONTINUED | OUTPATIENT
Start: 2021-04-15 | End: 2021-04-22 | Stop reason: HOSPADM

## 2021-04-15 RX ORDER — PREDNISONE 5 MG/1
5 TABLET ORAL DAILY
COMMUNITY
End: 2021-05-05

## 2021-04-15 RX ORDER — BUMETANIDE 0.25 MG/ML
1 INJECTION INTRAMUSCULAR; INTRAVENOUS EVERY 12 HOURS
Status: DISCONTINUED | OUTPATIENT
Start: 2021-04-16 | End: 2021-04-20

## 2021-04-15 RX ORDER — SULFASALAZINE 500 MG/1
500 TABLET ORAL 2 TIMES DAILY
COMMUNITY
End: 2021-06-08

## 2021-04-15 RX ORDER — HYDRALAZINE HYDROCHLORIDE 50 MG/1
50 TABLET, FILM COATED ORAL 3 TIMES DAILY
Status: ON HOLD | COMMUNITY
End: 2021-04-22

## 2021-04-15 RX ORDER — HYDRALAZINE HYDROCHLORIDE 25 MG/1
25 TABLET, FILM COATED ORAL 3 TIMES DAILY
COMMUNITY
End: 2021-04-15 | Stop reason: DRUGHIGH

## 2021-04-15 RX ORDER — LIDOCAINE 40 MG/G
CREAM TOPICAL
Status: DISCONTINUED | OUTPATIENT
Start: 2021-04-15 | End: 2021-04-22 | Stop reason: HOSPADM

## 2021-04-15 RX ORDER — PREDNISONE 5 MG/1
5 TABLET ORAL DAILY
Status: DISCONTINUED | OUTPATIENT
Start: 2021-04-16 | End: 2021-04-22 | Stop reason: HOSPADM

## 2021-04-15 RX ORDER — TORSEMIDE 10 MG/1
10 TABLET ORAL DAILY
Status: ON HOLD | COMMUNITY
End: 2021-04-22

## 2021-04-15 RX ORDER — CEFTRIAXONE 1 G/1
1 INJECTION, POWDER, FOR SOLUTION INTRAMUSCULAR; INTRAVENOUS EVERY 24 HOURS
Status: COMPLETED | OUTPATIENT
Start: 2021-04-15 | End: 2021-04-19

## 2021-04-15 RX ORDER — PANTOPRAZOLE SODIUM 40 MG/1
40 TABLET, DELAYED RELEASE ORAL DAILY
Status: DISCONTINUED | OUTPATIENT
Start: 2021-04-16 | End: 2021-04-22 | Stop reason: HOSPADM

## 2021-04-15 RX ORDER — CLONIDINE HYDROCHLORIDE 0.1 MG/1
0.1 TABLET ORAL 2 TIMES DAILY
COMMUNITY
End: 2021-05-05

## 2021-04-15 RX ORDER — DILTIAZEM HYDROCHLORIDE 120 MG/1
120 CAPSULE, EXTENDED RELEASE ORAL DAILY
COMMUNITY
End: 2021-04-15 | Stop reason: DRUGHIGH

## 2021-04-15 RX ADMIN — APIXABAN 2.5 MG: 2.5 TABLET, FILM COATED ORAL at 22:00

## 2021-04-15 RX ADMIN — MIRTAZAPINE 15 MG: 15 TABLET, FILM COATED ORAL at 22:00

## 2021-04-15 RX ADMIN — BUMETANIDE 1 MG: 0.25 INJECTION INTRAMUSCULAR; INTRAVENOUS at 14:57

## 2021-04-15 RX ADMIN — SULFASALAZINE 500 MG: 500 TABLET, DELAYED RELEASE ORAL at 22:00

## 2021-04-15 RX ADMIN — CEFTRIAXONE SODIUM 1 G: 1 INJECTION, POWDER, FOR SOLUTION INTRAMUSCULAR; INTRAVENOUS at 18:19

## 2021-04-15 ASSESSMENT — ACTIVITIES OF DAILY LIVING (ADL)
PATIENT'S_PREFERRED_MEANS_OF_COMMUNICATION: VERBAL
TOILETING_ISSUES: YES
WEAR_GLASSES_OR_BLIND: YES
FALL_HISTORY_WITHIN_LAST_SIX_MONTHS: YES
ADLS_ACUITY_SCORE: 28
DRESSING/BATHING_DIFFICULTY: YES
DIFFICULTY_COMMUNICATING: NO
NUMBER_OF_TIMES_PATIENT_HAS_FALLEN_WITHIN_LAST_SIX_MONTHS: 2
DIFFICULTY_EATING/SWALLOWING: NO
EQUIPMENT_CURRENTLY_USED_AT_HOME: WALKER, ROLLING
USE_OF_HEARING_ASSISTIVE_DEVICES: RIGHT HEARING AID;LEFT HEARING AID
WALKING_OR_CLIMBING_STAIRS_DIFFICULTY: YES
TOILETING_ASSISTANCE: TOILETING DIFFICULTY, REQUIRES EQUIPMENT
HEARING_DIFFICULTY_OR_DEAF: YES

## 2021-04-15 ASSESSMENT — ENCOUNTER SYMPTOMS
SHORTNESS OF BREATH: 1
DIFFICULTY URINATING: 0
DYSURIA: 0
ABDOMINAL PAIN: 0
NAUSEA: 1
HEMATURIA: 0

## 2021-04-15 ASSESSMENT — MIFFLIN-ST. JEOR: SCORE: 1010.62

## 2021-04-15 NOTE — ED TRIAGE NOTES
Patient arrives to the ED from [facility] due to having low o2 saturations when checked by staff earlier this  Morning. Upon arrival patients o2 was in the low to mid 80s on room air. Patient states that she is short of breath and has a heart rate that is in the mid 40s. Patient was placed on 4L o2 via nasal cannula to get saturations in the mid 90s. Patient reportedly had COVID in December and states that she has been fully vaccinated. Patient has a non productive infrequent cough.

## 2021-04-15 NOTE — PHARMACY-ADMISSION MEDICATION HISTORY
Pharmacy Medication History  Admission medication history interview status for the 4/15/2021  admission is complete. See EPIC admission navigator for prior to admission medications     Location of Interview: Phone  Medication history sources: Patient's family/friend (daughter), [facility] pharmacist, chart review, outside med dispense report, and [facility] nurse    Significant changes made to the medication list:  Changed Diltiazem ER to 120mg TID, Hydralazine 50mg TID  Added Clonidine 0.1mg BID and Orencia inj    In the past week, patient estimated taking medication this percent of the time: 50-90% due to adverse effect    Additional medication history information:   Once patient runs out of Diltiazem ER 120mg TID she will change to 180mg BID.   Verified Orencia dose with infusion nurse at [facility] on [address]    Medication reconciliation completed by provider prior to medication history? No    Time spent in this activity: 2 hours    Prior to Admission medications    Medication Sig Last Dose Taking? Auth Provider   Abatacept (ORENCIA IV) Inject 500mg into the vein every 4 weeks    Will start after q2w is completed. Dose will be due 5/24/21  Unknown, Entered By History   Abatacept (ORENCIA IV) Inject into the vein every 2 weeks 4/12/2021 Yes Unknown, Entered By History   acetaminophen (TYLENOL) 500 MG tablet Take 1,000 mg by mouth 3 times daily as needed  prn at prn Yes Unknown, Entered By History   allopurinol (ZYLOPRIM) 100 MG tablet Take 1 tablet (100 mg) by mouth daily Unknown Yes Radha Awan APRN CNP   apixaban ANTICOAGULANT (ELIQUIS) 2.5 MG tablet Take 2.5 mg by mouth 2 times daily  Unknown Yes Unknown, Entered By History   cloNIDine (CATAPRES) 0.1 MG tablet Take 0.1 mg by mouth 2 times daily Unknown Yes Unknown, Entered By History   darbepoetin gayathri-albumin (ARANESP injection) Take monthly as needed Unknown  Unknown, Entered By History   diltiazem ER (DILT-XR) 120 MG 24 hr  capsule Take 120 mg by mouth 3 times daily Unknown Yes Unknown, Entered By History   hydrALAZINE (APRESOLINE) 50 MG tablet Take 50 mg by mouth 3 times daily Unknown Yes Unknown, Entered By History   levothyroxine (SYNTHROID/LEVOTHROID) 25 MCG tablet Take 1 tablet (25 mcg) by mouth daily Unknown Yes Radha Awan APRN CNP   melatonin 5 MG tablet Take 10 mg by mouth nightly as needed for sleep prn at prn Yes Unknown, Entered By History   metoprolol succinate ER (TOPROL-XL) 100 MG 24 hr tablet Take 1 tablet (100 mg) by mouth daily Unknown Yes Radha Awan APRN CNP   mirtazapine (REMERON) 15 MG tablet Take 15 mg by mouth At Bedtime Unknown at hs Yes Unknown, Entered By History   ondansetron (ZOFRAN-ODT) 4 MG ODT tab Take 4 mg by mouth every 8 hours as needed for nausea prn at prn Yes Unknown, Entered By History   pantoprazole (PROTONIX) 40 MG EC tablet Take 40 mg by mouth daily Unknown Yes Unknown, Entered By History   predniSONE (DELTASONE) 5 MG tablet Take 5 mg by mouth daily Unknown Yes Unknown, Entered By History   sulfaSALAzine (AZULFIDINE) 500 MG tablet Take 500 mg by mouth 2 times daily Unknown Yes Unknown, Entered By History   torsemide (DEMADEX) 10 MG tablet Take 10 mg by mouth daily Unknown Yes Unknown, Entered By History       The information provided in this note is only as accurate as the sources available at the time of update(s)

## 2021-04-15 NOTE — ED NOTES
[facility]  ED Nurse Handoff Report    ED Chief complaint: Shortness of Breath      ED Diagnosis:   Final diagnoses:   None       Code Status: to be addressed by admitting MD    Allergies:   Allergies   Allergen Reactions     Oxybutynin Itching     Seasonal Allergies        Patient Story: pt presents from [facility] for increased SOB and cough  Focused Assessment:  Pt is A & O, pt hypertensive, HR in the 40's, afebrile, of note pt covid positive in December and currently vaccinated, on NC with o2 sats 97%    Treatments and/or interventions provided:   Patient's response to treatments and/or interventions:     To be done/followed up on inpatient unit:      Does this patient have any cognitive concerns?:     Activity level - Baseline/Home:  Independent with walker  Activity Level - Current:   Stand with Assist    Patient's Preferred language: English   Needed?: No    Isolation: COVID R/O  Infection:   COVID r/o and special precautions  Patient tested for COVID 19 prior to admission: YES  Bariatric?: No    Vital Signs:   Vitals:    04/15/21 1126 04/15/21 1128 04/15/21 1132 04/15/21 1200   BP:  (!) 184/65     Pulse:    (!) 46   Resp: 18   22   Temp:   98.5  F (36.9  C)    TempSrc:   Oral    SpO2: 95%   98%       Cardiac Rhythm:     Was the PSS-3 completed:   Yes  What interventions are required if any?               Family Comments: daughter at bedside  OBS brochure/video discussed/provided to patient/family: N/A              Name of person given brochure if not patient:               Relationship to patient:     For the majority of the shift this patient's behavior was Green.   Behavioral interventions performed were .    ED NURSE PHONE NUMBER: [id number]

## 2021-04-15 NOTE — ED NOTES
Bed: ED27  Expected date:   Expected time:   Means of arrival:   Comments:  Esther Bueno F SOB eta 1121

## 2021-04-15 NOTE — ED PROVIDER NOTES
History   Chief Complaint:  Shortness of Breath       HPI   Ghislaine Walls is a 85 year old anticoagulated female with history of A-fib, hypertension, and hyperlipidemia who presents with shortness of breath. The patients daughter reports that her oxygen levels dipped to around 79% this morning at her assisted living facility, prompting her visit. Breathing has been more difficult since the patient was diagnosed with Covid a couple of months ago. She stopped taking torsemide a week ago because she did not like how it made her constantly need to urinate. Both her daughter and her heart clinic are encouraging her to resume this regimen. She was nauseated upon waking up this morning and a temperature of 100.4 F was measured before her visit to the ED. Her daughter thinks that she seems very tired right now, as she does not usually mumble her words. The patient does not use oxygen at home. Current abdominal pain, chest pain, or urinary symptoms are denied.     Of note, the patient has received the Covid vaccine.     Review of Systems   Respiratory: Positive for shortness of breath.    Cardiovascular: Negative for chest pain.   Gastrointestinal: Positive for nausea. Negative for abdominal pain.   Genitourinary: Negative for difficulty urinating, dysuria and hematuria.   All other systems reviewed and are negative.      Allergies:  Oxybutynin    Medications:  Allopurinol  Eliquis  Diltiazem  Hydralazine  Levothyroxine  Metoprolol  Remeron  Ondansetron  Pantoprazole  Prednisone  Senokot  Sulfasalazine    Past Medical History:    Atrial fibrillation  [place]  Hyperlipidemia  Hypertension  Hypothyroid  PAD  Renal insufficiency  Rheumatoid arthritis  CKD  Bilateral pleural effusion  CHF exacerbation  Vitamin D deficiency  PAD  Lymphedema  Demand ischemia    Past Surgical History:    Appendectomy  Bone marrow biopsy  Cataract IOL  Dental surgery  EGD  Tubal ligation    Family History:    Heart disease, mother  Heart disease,  father  Heart disease, brother  CAD, brother    Social History:  Accompanied by daughter  Arrives from assisted living facility    Physical Exam     Patient Vitals for the past 24 hrs:   BP Temp Temp src Pulse Resp SpO2 Weight   04/15/21 1641 127/56 98.9  F (37.2  C) Oral (!) 39 14 92 % 59.1 kg (130 lb 6.4 oz)   04/15/21 1600 118/55 -- -- (!) 36 15 99 % --   04/15/21 1555 -- -- -- (!) 37 16 99 % --   04/15/21 1530 122/55 -- -- (!) 36 17 99 % --   04/15/21 1500 117/53 -- -- -- -- -- --   04/15/21 1430 116/62 -- -- (!) 38 16 100 % --   04/15/21 1400 132/50 -- -- -- -- -- --   04/15/21 1345 130/60 -- -- (!) 37 17 100 % --   04/15/21 1300 (!) 141/59 -- -- (!) 40 22 97 % --   04/15/21 1230 138/48 -- -- (!) 40 21 98 % --   04/15/21 1215 (!) 152/59 -- -- (!) 43 22 98 % --   04/15/21 1200 -- -- -- (!) 46 22 98 % --   04/15/21 1132 -- 98.5  F (36.9  C) Oral -- -- -- --   04/15/21 1128 (!) 184/65 -- -- -- -- -- --   04/15/21 1126 -- -- -- -- 18 95 % --   04/15/21 1124 -- -- -- 53 -- (!) 83 % --       Physical Exam  Constitutional: Pleasant. Cooperative.   Eyes: Pupils equally round and reactive  HENT: Head is normal in appearance. Oropharynx is normal with moist mucus membranes.  Cardiovascular: Regular rate and rhythm and without murmurs.  Respiratory: Decreased breath sounds bilaterally, no wheezing, crackles, rhonchi.  GI: Abdomen is soft, non-tender, non-distended. No guarding, rebound, or rigidity.  Musculoskeletal: 1+ pitting edema bilaterally.  Skin: Normal, without rash.  Neurologic: Cranial nerves grossly intact, normal cognition, no focal deficits. Alert and oriented x 3.   Psychiatric: Normal affect.  Nursing notes and vital signs reviewed.    Emergency Department Course   ECG  ECG taken at 1131, ECG read at 1131  Atrial fibrillation with slow ventricular response with a competing junctional pacemaker  Left anterior fascicular block   Anteroseptal infarct, age undetermined    Rate 51 bpm. NE interval * ms. QRS  duration 88 ms. QT/QTc 454/418 ms. P-R-T axes * -58 32.     Imaging:  XR Chest 2 Views  Small left pleural effusion. No significant right pleural  effusion. Bibasilar patchy opacities either aspiration, infection or  edema. Stable cardiomegaly. Tortuous aorta with atherosclerotic  calcifications. No pneumothorax.    STEVIE BEDOLLA MD    Read per radiology    Laboratory:  Symptomatic Influenza A/B & SARS-CoV2 (COVID19) Virus PCR Multiplex: pending     CBC: WBC 8.3, HGB 9.9 (L),   BMP: potassium 5.5 (H), chloride 110 (H), glucose 106 (H), BUN 45 (H), GFR 32 (L) o/w WNL (Creatinine 1.47 (H))     Lactic acid (result time 1139) 1.4    Troponin (Collected 1139): 0.018    BNP: 21,879 (H)    Emergency Department Course:    Reviewed:  I reviewed nursing notes, vitals, past medical history and care everywhere    Assessments:  1147 I obtained history and examined the patient as noted above.     Interventions:  1457 Bumex 1 mg IV    Disposition:  The patient was admitted to the hospital under the care of Dr. Oneal Carrera DO.       Impression & Plan     Medical Decision Making:  Ghislaine Walls is a 85 year old female who presents to the ED for evaluation of dyspnea and hypoxemia.  Patient is currently in assisted living facility and was noted to have O2 saturations at 79% this a.m.  This is in the setting of her stopping her torsemide a week ago because she did not like to have persistent urination.  See HPI as above for additional details.  Vitals and physical exam as above.  Differential was broad and included CHF exacerbation, ACS, pneumonia, PE, pneumothorax, among others.  Patient's BNP markedly elevated consistent with CHF exacerbation.  Chest x-ray reveals bibasilar patchy opacities likely consistent with edema over infection as patient has no leukocytosis.  Patient has detectable troponin, however suspect this is a product of demand from her fluid overload status as patient denies any chest pain.  Patient  also noted to have mild hyperkalemia.  Suspect this will resolve with diuresis.  Diuresis initiated here in the ED as above. Patient was noted to be bradycardic throughout her stay here. She appears to be one multiple BP medications that could induce bradycardia including metoprolol and diltiazem. Further evaluation regarding this will be indicated during admission. Advised admission to patient, she is in agreement. Discussed the case with the hospitalist, who agreed to admit the patient. All questions answered. Patient admitted in stable condition.    Covid-19  Ghislaine Walls was evaluated during a global COVID-19 pandemic, which necessitated consideration that the patient might be at risk for infection with the SARS-CoV-2 virus that causes COVID-19.   Applicable protocols for evaluation were followed during the patient's care.   COVID-19 was considered as part of the patient's evaluation. The plan for testing is:  a test was obtained during this visit.    Diagnosis:    ICD-10-CM    1. CHF exacerbation (H)  I50.9    2. Dyspnea  R06.00    3. Hypoxemia  R09.02    4. Hyperkalemia  E87.5    5. Chronic kidney disease  N18.9    6. Bradycardia  R00.1      Scribe Disclosure:  I, Noel Gandhi, am serving as a scribe at 11:52 AM on 4/15/2021 to document services personally performed by Narendra Melara PA-C based on my observations and the provider's statements to me.     This record was created at least in part using electronic voice recognition software, so please excuse any typographical errors.         Narendra Melara PA-C  04/15/21 1749

## 2021-04-15 NOTE — ED PROVIDER NOTES
Emergency Department Attending Supervision Note  4/15/2021  3:57 PM      I evaluated this patient in conjunction with Narendra Melara PA-C    Ghislaine Walls is a 85 year old female who presents to the ER with shortness of breath. The patient is a resident at [facility] and has had significant heart failure, has been on diarrhetics but about a week ago decided to stop her Torsemide because she was having to get up and urinate too much. She has been more lethargic and has developed increasing shortness of breath and edema and was sent here. She also has atrial fibrillation.     On examination she is sleeping but does easily wake up and open her eyes. She is on oxygen and has sats in the 90's. She does demonstrate neck pain distention on her head and neck exam and some course rales at her bases bilaterally. She is severely kyphotic in the upper thoracic spine. Her heart sounds are slightly irregular and slow. No obvious murmurs noted. Her abdomen is soft and non tender. There is trace edema in her legs.     Her labs have returned showing an elevated BNP, borderline potassium and mildly elevated renal function. Patient has been started on IV Bumex. Also the daughter stated there was a low grade temperature at the nursing home today and I recommended obtaining a cath urine to ensure no UTI. Patient will be admitted for heart failure hypoxia made worse by stopping her diarrhetics. Also her fibrillation rate is very low, she is on both toroprol and diltiazem and has likely medication induced bradycardia which should be corrected as well.            Diagnosis    ICD-10-CM    1. CHF exacerbation (H)  I50.9    2. Dyspnea  R06.00    3. Hypoxemia  R09.02    4. Hyperkalemia  E87.5    5. Chronic kidney disease  N18.9    6.      Bradycardia    Jamaal Arevalo MD  04/15/21 2693

## 2021-04-15 NOTE — H&P
[facility]    History and Physical - Hospitalist Service       Date of Admission:  4/15/2021    Assessment & Plan   Ghislaine Walls is a 85 year old female admitted on 4/15/2021    Acute hypoxemic respiratory failure  Acute on chronic CHF with preserved EF  R/O CAP  History of stopping her torsemide 1 week ago, now with generalized weakness and fall, found to have O2 saturations of 79%, without home O2 use  She did not hit her head per the nurses report from her assisted living  CXR read as nonspecific but could include infection, aspiration or edema, but other than small fever of 100.4  She does endorse mucus producing cough, but denies shortness of breath  Plan  - admit to inpatient  - would provide bumex 1 mg bid  - strict ins and outs  - salt restriction  - give 1 dose of ceftriaxone given questionable fever, productive cough. Suspicion is primarily for CHF, but would cover for PNA, especially given she has just started orencia  - obtain procalcitonin and if negative would stop the antibiotics.  -Daughter would like to consider an indwelling Dejesus catheter on discharge to help her from getting up to the bathroom.  We discussed how there is quite a bit of infection risk and how a Dejesus catheter would also minimize her need to get out of bed.  This would likely lead to further deconditioning and poor prognosis for her mother.  Palliative care was discussed and daughter would reach out to myself or the rounding provider tomorrow to see if that would be desire for them.    Hyperkalemia  Plan  - given the bumex and will then reassess this evening  - continue the telemetry    History of COVID 19 infection 8/2020  Negative on admission  - no need for special precautions    Afib with bradycardia  Heart rates from the high 30's to 50s in the ED, with maintained blood pressure  Daughter notes clonidine started in the last month  Could be contributing to her generalized weakness and fatigue  -  would hold her diltiazem/metoprolol/clonidine for now  - cardiology consultation, but suspect medication management  - continue metoprolol  - telemetry  - k and mg protocols  - continue eliquis    CKD IV  - recheck BMP daily    History of gout  -continue allopurinol    Chronic normocytic anemia  MGUS  THyroid lesion  - consider outpatient thyroid ultrasound on discharge    RA  - continue home meds  - recently started orencia as outpatient     Diet:   salt restriction  DVT Prophylaxis: Pneumatic Compression Devices  Dejesus Catheter: not present  Code Status:  DNR/DNI    Disposition Plan   Expected discharge: 2 - 3 days, recommended to prior living arrangement once safe disposition plan/ TCU bed available.  Entered: Oneal Carrera DO 04/15/2021, 2:48 PM     The patient's care was discussed with the Patient and Patient's Family.    Oneal Carrera DO  [facility]  Contact information available via [facility] Paging/Directory      ______________________________________________________________________    Chief Complaint   Low oxygen    History is obtained from the patient    History of Present Illness   Ghislaine Walls is a 85 year old female who was having O2 sats at 79% while at [facility], [place]. SHe discontinued torsemide 1 week ago because she did not like how it made her urinate all the time. BNP elevated at 21K. Given one dose of 1 mg torsemide in the ED. her heart rates on telemetry have been between the high 30s to 50s.    Daughter at the bedside provides most of the history.  Apparently she has been feeling very tired for about the last week.  There was discussion about hospice and the family is still in discussion with their outpatient providers and have not decided to pursue it yet.  She stopped taking her torsemide approximately 1 week ago because of the increased urination.  The only time she really get off of her couch was when she with get up to use the bathroom.  I  specifically asked the patient if she is given up and the patient says now but she is extremely tired and fatigued.    I spoke with [place] nurses who reports that she had a small temperature of 100.4.  She had a fall earlier this morning but basically slid to the floor and did not hit her head.  They did not notice any infectious signs.        Review of Systems    The 10 point Review of Systems is negative other than noted in the HPI or here.     Past Medical History    I have reviewed this patient's medical history and updated it with pertinent information if needed.   Past Medical History:   Diagnosis Date     Atrial fibrillation (H) new 10/19     [place] (hard of hearing)     wears hearing aids     Hyperlipidaemia      Hyperlipidaemia LDL goal < 130      Hypertension      Hypothyroid      PAD (peripheral artery disease) (H)      Renal insufficiency, mild      Uncontrolled hypertension 10/14/2019       Past Surgical History   I have reviewed this patient's surgical history and updated it with pertinent information if needed.  Past Surgical History:   Procedure Laterality Date     APPENDECTOMY       BONE MARROW BIOPSY, BONE SPECIMEN, NEEDLE/TROCAR N/A 2020    Procedure: bone marrow biopsy;  Surgeon: Iris Cameron MD;  Location:  GI     CATARACT IOL, RT/LT      bilateral (laser - 2013)     DENTAL SURGERY      wisdom     ESOPHAGOSCOPY, GASTROSCOPY, DUODENOSCOPY (EGD), COMBINED N/A 2020    Procedure: ESOPHAGOGASTRODUODENOSCOPY, WITH BIOPSY;  Surgeon: Humberto Bailon MD;  Location:  GI     EYE SURGERY      Muscle release     TUBAL LIGATION         Social History   I have reviewed this patient's social history and updated it with pertinent information if needed.  Social History     Tobacco Use     Smoking status: Former Smoker     Packs/day: 0.50     Years: 5.00     Pack years: 2.50     Types: Cigarettes     Quit date: 1973     Years since quittin.3     Smokeless  tobacco: Never Used   Substance Use Topics     Alcohol use: Not Currently     Alcohol/week: 0.0 standard drinks     Drug use: No       Family History   I have reviewed this patient's family history and updated it with pertinent information if needed.  Family History   Problem Relation Age of Onset     Heart Disease Mother      Respiratory Mother      Heart Disease Father      Heart Disease Brother      Coronary Artery Disease Brother         CAB     Heart Disease Brother      Coronary Artery Disease Brother         CAB       Prior to Admission Medications   Prior to Admission Medications   Prescriptions Last Dose Informant Patient Reported? Taking?   Abatacept (ORENCIA IV) 4/12/2021 Other Yes Yes   Sig: Every 2 weeks. Next dose due 4/26/21. After this dose change to every 4 weeks (dose will be due 5/24/21)   Abatacept (ORENCIA IV)   Yes No   Sig: Inject 500 mg into the vein every 30 days (dose will be due 5/24/21)   Darbepoetin Alonso-Albumin (ARANESP IJ) prn at prn  Yes Yes   Sig: Monthly as needed   acetaminophen (TYLENOL) 500 MG tablet prn at prn Daughter Yes Yes   Sig: Take 1,000 mg by mouth 3 times daily as needed    allopurinol (ZYLOPRIM) 100 MG tablet Unknown Daughter No Yes   Sig: Take 1 tablet (100 mg) by mouth daily   apixaban ANTICOAGULANT (ELIQUIS) 2.5 MG tablet Unknown Daughter Yes Yes   Sig: Take 2.5 mg by mouth 2 times daily    cloNIDine (CATAPRES) 0.1 MG tablet Unknown Daughter Yes Yes   Sig: Take 0.1 mg by mouth 2 times daily   diltiazem ER (DILT-XR) 120 MG 24 hr capsule Unknown Daughter Yes Yes   Sig: Take 120 mg by mouth 3 times daily   hydrALAZINE (APRESOLINE) 50 MG tablet Unknown Daughter Yes Yes   Sig: Take 50 mg by mouth 3 times daily   levothyroxine (SYNTHROID/LEVOTHROID) 25 MCG tablet Unknown Daughter No Yes   Sig: Take 1 tablet (25 mcg) by mouth daily   melatonin 5 MG tablet prn at prn Nursing Home Yes Yes   Sig: Take 10 mg by mouth nightly as needed for sleep   metoprolol succinate ER  (TOPROL-XL) 100 MG 24 hr tablet Unknown Daughter No Yes   Sig: Take 1 tablet (100 mg) by mouth daily   mirtazapine (REMERON) 15 MG tablet Unknown at hs Daughter Yes Yes   Sig: Take 15 mg by mouth At Bedtime   ondansetron (ZOFRAN-ODT) 4 MG ODT tab prn at prn Daughter Yes Yes   Sig: Take 4 mg by mouth every 8 hours as needed for nausea   pantoprazole (PROTONIX) 40 MG EC tablet Unknown Daughter Yes Yes   Sig: Take 40 mg by mouth daily   predniSONE (DELTASONE) 5 MG tablet Unknown Daughter Yes Yes   Sig: Take 5 mg by mouth daily   sulfaSALAzine (AZULFIDINE) 500 MG tablet Unknown Daughter Yes Yes   Sig: Take 500 mg by mouth 2 times daily   torsemide (DEMADEX) 10 MG tablet Unknown Daughter Yes Yes   Sig: Take 10 mg by mouth daily      Facility-Administered Medications: None     Allergies   Allergies   Allergen Reactions     Oxybutynin Itching     Seasonal Allergies        Physical Exam   Vital Signs: Temp: 98.5  F (36.9  C) Temp src: Oral BP: 130/60 Pulse: (!) 37   Resp: 17 SpO2: 100 % O2 Device: Nasal cannula Oxygen Delivery: 4 LPM  Weight: 0 lbs 0 oz    Physical Exam  Vitals signs and nursing note reviewed.   Constitutional:       General: She is not in acute distress.     Appearance: She is not ill-appearing.      Comments: Somnolent with slow speech   HENT:      Head: Normocephalic and atraumatic.      Mouth/Throat:      Mouth: Mucous membranes are moist.      Pharynx: Oropharynx is clear.   Eyes:      Conjunctiva/sclera: Conjunctivae normal.      Pupils: Pupils are equal, round, and reactive to light.   Neck:      Musculoskeletal: Normal range of motion and neck supple.   Cardiovascular:      Rate and Rhythm: Bradycardia present. Rhythm irregular.      Pulses: Normal pulses.      Heart sounds: Normal heart sounds.   Pulmonary:      Effort: Pulmonary effort is normal.      Comments: Diminished at the bases  Abdominal:      General: Abdomen is flat. Bowel sounds are normal.      Palpations: Abdomen is soft.   Skin:      General: Skin is warm and dry.      Capillary Refill: Capillary refill takes less than 2 seconds.   Neurological:      General: No focal deficit present.      Mental Status: She is oriented to person, place, and time.           Data   Data reviewed today: I reviewed all medications, new labs and imaging results over the last 24 hours. I personally reviewed the EKG tracing showing Slow A. fib and the chest x-ray image(s) showing Left pleural effusion with bibasilar patchy opacities concerning for aspiration, infection, or edema.    Recent Labs   Lab 04/15/21  1139   WBC 8.3   HGB 9.9*   *         POTASSIUM 5.5*   CHLORIDE 110*   CO2 23   BUN 45*   CR 1.47*   ANIONGAP 7   ALIVIA 9.2   *   TROPI 0.018     Most Recent 3 CBC's:  Recent Labs   Lab Test 04/15/21  1139 03/11/21  0830 03/02/21  0701 03/01/21  0849 02/25/21  0539   WBC 8.3  --   --  3.9* 6.2   HGB 9.9* 8.6* 9.2* 10.0* 9.5*   *  --   --  106* 106*     --   --  186 172     Most Recent 3 BMP's:  Recent Labs   Lab Test 04/15/21  1139 03/11/21  0830 03/02/21  0701    144 140   POTASSIUM 5.5* 4.7 4.1   CHLORIDE 110* 116* 110*   CO2 23 22 21   BUN 45* 46* 70*   CR 1.47* 1.66* 1.89*   ANIONGAP 7 6 9   ALIVIA 9.2 8.8 9.0   * 126* 111*     Most Recent 2 LFT's:  Recent Labs   Lab Test 12/01/20  1405 07/25/20  0554 07/21/20  2038   AST 21 25 18   ALT 11 10 8   ALKPHOS  --  90 69   BILITOTAL  --  0.4 0.4     Most Recent 3 INR's:  Recent Labs   Lab Test 02/24/21  0601 07/21/20  0849 04/24/20  1342   INR 1.16* 2.07* 2.00*     Recent Results (from the past 24 hour(s))   XR Chest 2 Views    Narrative    XR CHEST 2 VW 4/15/2021 1:43 PM    HISTORY: dyspnea, chest pain    COMPARISON: 2/21/2021      Impression    IMPRESSION: Small left pleural effusion. No significant right pleural  effusion. Bibasilar patchy opacities either aspiration, infection or  edema. Stable cardiomegaly. Tortuous aorta with atherosclerotic  calcifications.  No pneumothorax.    STEVIE BEDOLLA MD

## 2021-04-15 NOTE — PROGRESS NOTES
RECEIVING UNIT ED HANDOFF REVIEW    ED Nurse Handoff Report was reviewed by: Sarina Krishna RN on April 15, 2021 at 4:15 PM

## 2021-04-16 ENCOUNTER — APPOINTMENT (OUTPATIENT)
Dept: OCCUPATIONAL THERAPY | Facility: CLINIC | Age: 86
DRG: 291 | End: 2021-04-16
Attending: HOSPITALIST
Payer: MEDICARE

## 2021-04-16 DIAGNOSIS — I50.32 CHRONIC DIASTOLIC HEART FAILURE (H): Primary | ICD-10-CM

## 2021-04-16 LAB
ANION GAP SERPL CALCULATED.3IONS-SCNC: 3 MMOL/L (ref 3–14)
ANISOCYTOSIS BLD QL SMEAR: SLIGHT
BASOPHILS # BLD AUTO: 0 10E9/L (ref 0–0.2)
BASOPHILS NFR BLD AUTO: 0 %
BUN SERPL-MCNC: 55 MG/DL (ref 7–30)
CALCIUM SERPL-MCNC: 8.8 MG/DL (ref 8.5–10.1)
CHLORIDE SERPL-SCNC: 112 MMOL/L (ref 94–109)
CO2 SERPL-SCNC: 24 MMOL/L (ref 20–32)
CREAT SERPL-MCNC: 2.08 MG/DL (ref 0.52–1.04)
DACRYOCYTES BLD QL SMEAR: SLIGHT
DIFFERENTIAL METHOD BLD: ABNORMAL
DIFFERENTIAL METHOD BLD: ABNORMAL
EOSINOPHIL # BLD AUTO: 0 10E9/L (ref 0–0.7)
EOSINOPHIL NFR BLD AUTO: 1 %
ERYTHROCYTE [DISTWIDTH] IN BLOOD BY AUTOMATED COUNT: 16.6 % (ref 10–15)
ERYTHROCYTE [DISTWIDTH] IN BLOOD BY AUTOMATED COUNT: 16.8 % (ref 10–15)
GFR SERPL CREATININE-BSD FRML MDRD: 21 ML/MIN/{1.73_M2}
GLUCOSE BLDC GLUCOMTR-MCNC: 104 MG/DL (ref 70–99)
GLUCOSE BLDC GLUCOMTR-MCNC: 106 MG/DL (ref 70–99)
GLUCOSE SERPL-MCNC: 85 MG/DL (ref 70–99)
HCT VFR BLD AUTO: 29.8 % (ref 35–47)
HCT VFR BLD AUTO: 33.3 % (ref 35–47)
HGB BLD-MCNC: 8.9 G/DL (ref 11.7–15.7)
HGB BLD-MCNC: 9.9 G/DL (ref 11.7–15.7)
LYMPHOCYTES # BLD AUTO: 0.7 10E9/L (ref 0.8–5.3)
LYMPHOCYTES # BLD AUTO: 1.3 10E9/L (ref 0.8–5.3)
LYMPHOCYTES NFR BLD AUTO: 35 %
LYMPHOCYTES NFR BLD AUTO: 8 %
MACROCYTES BLD QL SMEAR: PRESENT
MCH RBC QN AUTO: 31.5 PG (ref 26.5–33)
MCH RBC QN AUTO: 31.6 PG (ref 26.5–33)
MCHC RBC AUTO-ENTMCNC: 29.7 G/DL (ref 31.5–36.5)
MCHC RBC AUTO-ENTMCNC: 29.9 G/DL (ref 31.5–36.5)
MCV RBC AUTO: 106 FL (ref 78–100)
MCV RBC AUTO: 106 FL (ref 78–100)
MONOCYTES # BLD AUTO: 0.8 10E9/L (ref 0–1.3)
MONOCYTES # BLD AUTO: 1.6 10E9/L (ref 0–1.3)
MONOCYTES NFR BLD AUTO: 19 %
MONOCYTES NFR BLD AUTO: 22 %
NEUTROPHILS # BLD AUTO: 1.6 10E9/L (ref 1.6–8.3)
NEUTROPHILS # BLD AUTO: 6 10E9/L (ref 1.6–8.3)
NEUTROPHILS NFR BLD AUTO: 42 %
NEUTROPHILS NFR BLD AUTO: 73 %
PLATELET # BLD AUTO: 149 10E9/L (ref 150–450)
PLATELET # BLD AUTO: 199 10E9/L (ref 150–450)
PLATELET # BLD EST: ABNORMAL 10*3/UL
POTASSIUM SERPL-SCNC: 5.6 MMOL/L (ref 3.4–5.3)
RBC # BLD AUTO: 2.82 10E12/L (ref 3.8–5.2)
RBC # BLD AUTO: 3.14 10E12/L (ref 3.8–5.2)
RBC MORPH BLD: ABNORMAL
SODIUM SERPL-SCNC: 139 MMOL/L (ref 133–144)
TSH SERPL DL<=0.005 MIU/L-ACNC: 1.25 MU/L (ref 0.4–4)
WBC # BLD AUTO: 3.8 10E9/L (ref 4–11)
WBC # BLD AUTO: 8.3 10E9/L (ref 4–11)

## 2021-04-16 PROCEDURE — 999N001017 HC STATISTIC GLUCOSE BY METER IP

## 2021-04-16 PROCEDURE — 85025 COMPLETE CBC W/AUTO DIFF WBC: CPT | Performed by: HOSPITALIST

## 2021-04-16 PROCEDURE — 250N000013 HC RX MED GY IP 250 OP 250 PS 637: Performed by: HOSPITALIST

## 2021-04-16 PROCEDURE — 97166 OT EVAL MOD COMPLEX 45 MIN: CPT | Mod: GO | Performed by: OCCUPATIONAL THERAPIST

## 2021-04-16 PROCEDURE — 250N000009 HC RX 250: Performed by: HOSPITALIST

## 2021-04-16 PROCEDURE — 80048 BASIC METABOLIC PNL TOTAL CA: CPT | Performed by: HOSPITALIST

## 2021-04-16 PROCEDURE — 97535 SELF CARE MNGMENT TRAINING: CPT | Mod: GO | Performed by: OCCUPATIONAL THERAPIST

## 2021-04-16 PROCEDURE — 99356 PR PROLONGED SERV,INPATIENT,1ST HR: CPT | Performed by: INTERNAL MEDICINE

## 2021-04-16 PROCEDURE — 250N000012 HC RX MED GY IP 250 OP 636 PS 637: Performed by: HOSPITALIST

## 2021-04-16 PROCEDURE — 99221 1ST HOSP IP/OBS SF/LOW 40: CPT | Performed by: INTERNAL MEDICINE

## 2021-04-16 PROCEDURE — 250N000013 HC RX MED GY IP 250 OP 250 PS 637: Performed by: PHYSICIAN ASSISTANT

## 2021-04-16 PROCEDURE — 99223 1ST HOSP IP/OBS HIGH 75: CPT | Performed by: INTERNAL MEDICINE

## 2021-04-16 PROCEDURE — 36415 COLL VENOUS BLD VENIPUNCTURE: CPT | Performed by: HOSPITALIST

## 2021-04-16 PROCEDURE — 99232 SBSQ HOSP IP/OBS MODERATE 35: CPT | Performed by: HOSPITALIST

## 2021-04-16 PROCEDURE — 210N000002 HC R&B HEART CARE

## 2021-04-16 PROCEDURE — 84443 ASSAY THYROID STIM HORMONE: CPT | Performed by: HOSPITALIST

## 2021-04-16 PROCEDURE — 250N000011 HC RX IP 250 OP 636: Performed by: HOSPITALIST

## 2021-04-16 RX ORDER — HYDRALAZINE HYDROCHLORIDE 50 MG/1
50 TABLET, FILM COATED ORAL 4 TIMES DAILY
Status: DISCONTINUED | OUTPATIENT
Start: 2021-04-16 | End: 2021-04-17

## 2021-04-16 RX ORDER — DOXYCYCLINE 100 MG/10ML
100 INJECTION, POWDER, LYOPHILIZED, FOR SOLUTION INTRAVENOUS EVERY 12 HOURS
Status: ACTIVE | OUTPATIENT
Start: 2021-04-16 | End: 2021-04-21

## 2021-04-16 RX ADMIN — SULFASALAZINE 500 MG: 500 TABLET, DELAYED RELEASE ORAL at 20:39

## 2021-04-16 RX ADMIN — LEVOTHYROXINE SODIUM 25 MCG: 25 TABLET ORAL at 08:12

## 2021-04-16 RX ADMIN — HYDRALAZINE HYDROCHLORIDE 50 MG: 50 TABLET, FILM COATED ORAL at 15:40

## 2021-04-16 RX ADMIN — MIRTAZAPINE 15 MG: 15 TABLET, FILM COATED ORAL at 22:28

## 2021-04-16 RX ADMIN — DOXYCYCLINE 100 MG: 100 INJECTION, POWDER, LYOPHILIZED, FOR SOLUTION INTRAVENOUS at 18:48

## 2021-04-16 RX ADMIN — HYDRALAZINE HYDROCHLORIDE 50 MG: 50 TABLET, FILM COATED ORAL at 18:20

## 2021-04-16 RX ADMIN — APIXABAN 2.5 MG: 2.5 TABLET, FILM COATED ORAL at 20:39

## 2021-04-16 RX ADMIN — APIXABAN 2.5 MG: 2.5 TABLET, FILM COATED ORAL at 08:12

## 2021-04-16 RX ADMIN — BUMETANIDE 1 MG: 0.25 INJECTION INTRAMUSCULAR; INTRAVENOUS at 08:11

## 2021-04-16 RX ADMIN — ALLOPURINOL 100 MG: 100 TABLET ORAL at 08:12

## 2021-04-16 RX ADMIN — PANTOPRAZOLE SODIUM 40 MG: 40 TABLET, DELAYED RELEASE ORAL at 08:12

## 2021-04-16 RX ADMIN — HYDRALAZINE HYDROCHLORIDE 50 MG: 50 TABLET, FILM COATED ORAL at 22:28

## 2021-04-16 RX ADMIN — BUMETANIDE 1 MG: 0.25 INJECTION INTRAMUSCULAR; INTRAVENOUS at 20:39

## 2021-04-16 RX ADMIN — PREDNISONE 5 MG: 5 TABLET ORAL at 08:12

## 2021-04-16 RX ADMIN — CEFTRIAXONE SODIUM 1 G: 1 INJECTION, POWDER, FOR SOLUTION INTRAMUSCULAR; INTRAVENOUS at 18:20

## 2021-04-16 RX ADMIN — SULFASALAZINE 500 MG: 500 TABLET, DELAYED RELEASE ORAL at 08:12

## 2021-04-16 ASSESSMENT — ACTIVITIES OF DAILY LIVING (ADL)
ADLS_ACUITY_SCORE: 24
PREVIOUS_RESPONSIBILITIES: HOUSEKEEPING;LAUNDRY;MEDICATION MANAGEMENT
ADLS_ACUITY_SCORE: 24
ADLS_ACUITY_SCORE: 24

## 2021-04-16 ASSESSMENT — MIFFLIN-ST. JEOR: SCORE: 996.56

## 2021-04-16 NOTE — CONSULTS
[facility]  Palliative Care Consultation Note    Patient: Ghislaine Walls  Date of Admission:  4/15/2021    Requesting Clinician / Team: Cardiology, Courtney Amador PA-C  Reason for consult: Goals of care  Decisional support    Recommendations:    Ghislaine not able to make a decision re: plan of care - daughter Krupa has been discussing, and thinks she'd accept taking diuretics with having a bedside commode    They have met with hospice and understand services - I recommended this if Krupa were to decide she didn't want to take the diuretics at all or did not want to return to the hospital.  Right now, they are also waiting to assess the effect of new RA treatment orencia, and this would preclude hospice at this time    I will set up an outpatient follow-up with me in [facility]     These recommendations have been discussed with Dr. Huynh, cardiology.      Thank you for the opportunity to participate in the care of this patient and family. Our team: will continue to follow.     During regular M-F work hours -- if you are not sure who specifically to contact -- please contact us at team pager [phone]    After regular work hours and on weekends/holidays, you can call our answering service at [phone]. Also, who's on call for us is available in [employer].       Assessments:  Ghislaine Walls is a 85 year old female with heart failure exacerbation after stopping diuretics in the setting of previous covid, chronic diastolic heart failure, CKD4, anemia on Arenesp, and significant HTN.  Seen for discussion of goals of care, hospice.      I met with Ghislaine at bedside, then spoke with montserrat Gentile over the phone.  Ghislaine expresses frustration with the frequent urination, would not want a catheter.  She feels her quality of life is poor due to the frequent urination and exhaustion, and has difficulty describing what would be a good quality of life or what she would like her care  to look like going forward to help her.    Her daughter thinks she may accept a bedside commode and that would be easier with her.  I discussed with them both the options ahead - we can try the diuretics with a catheter or bedside commode, or if she decided not to take diuretics at all, would recommend involving hospice and a comfort-only plan of care, described symptom management in that case.  If she decided she would not want to come back to the hospital were she to have another exacerbation or acute illness, hospice would make sense at that time also.  They have met with hospice, and do not feel ready for this yet.  She also recently started on orencia, which her rheumatologist feels may help with her fatigue.  Its difficult to tell how much of her fatigue and exhaustion are related to RA, after-effects of Covid, and heart failure, but as this is one of the primary contributors to her poor quality of life, I think giving this time to assess if it is helpful is reasonable.  Orencia wouldn't be covered under hospice.  At this point, Ghislaine would like to discuss how to move forward with Krupa.  I offered outpatient follow-up with palliative, and I can follow to help with decision making and symptom management going forward if she doesn't choose to start hospice at this time.     Today, the patient was seen for:  Goals of care  Acute on chronic diastolic heart failure    Prognosis, Goals, & Planning:      Functional Status just prior to hospitalization: 3 (Capable of only limited self-care; needs help with ADLs; in bed/chair >50% of waking hours)      Prognosis, Goals, and/or Advance Care Planning were addressed today: Yes        Summary/Comments:       Patient's decision making preferences: independently, includes her daughter but makes her own          Patient has decision-making capacity today for complex decisions: Yes            I have concerns about the patient/family's health literacy today: No            Patient has a completed Health Care Directive: No. Krupa helping complete      Code status: No CPR / No Intubation    POLST form is available and correct (same as current code status)    Coping, Meaning, & Spirituality:   Mood, coping, and/or meaning in the context of serious illness were addressed today: Yes  Summary/Comments:   - Says she accepts that she would die, feels that her quality of life is poor due to exhaustion when she tries to move, frequent urination with the diuretics, and isolation with the pandemic  - Believes in God, not interested in spiritual support    Social:     Living situation: Living in assisted living at [place]    Key family / caregivers: 3 children and 2 grandchildren, daughter Krupa involved    History of Present Illness:  History gathered today from: patient, family/loved ones, medical chart, medical team members    Ghislaine Walls is an 86yo F with Hx of RA, chronic HFpEF, CKD4, anemia on Arenesp, HTN, Afib on AC, HLT, hypothyroid, and PAD admitted for hypoxia from [place].  In last week had stopped taking her torsemide because of frustration with needing to urinate hourly with that and difficulty getting to the bathroom, occasional functional incontinence.  She is able to describe details of her health conditions, recent hospitalizations and illnesses, and other that exhaustion is having few other symptoms.  No pain, no nausea, felt SOB yesterday but improved now.  Has had chronic leg edema, has been worse this week.  +poor appetite, on mirtazapine.  Has occasional depressed thoughts, but doesn't feel depressed or anxious on a daily basis.      Key Palliative Symptom Data:  # Pain severity the last 12 hours: none  # Dyspnea severity the last 12 hours: none  # Nausea severity the last 12 hours: none  # Anxiety severity the last 12 hours: none    Patient is on opioids: bowels not assessed today.    ROS:  Comprehensive ROS is reviewed and is negative except as here & per HPI:  none     Past Medical History:  Past Medical History:   Diagnosis Date     Atrial fibrillation (H) new 10/19     [place] (hard of hearing)     wears hearing aids     Hyperlipidaemia      Hyperlipidaemia LDL goal < 130      Hypertension      Hypothyroid      PAD (peripheral artery disease) (H)      Renal insufficiency, mild      Uncontrolled hypertension 10/14/2019        Past Surgical History:  Past Surgical History:   Procedure Laterality Date     APPENDECTOMY  1951     BONE MARROW BIOPSY, BONE SPECIMEN, NEEDLE/TROCAR N/A 7/31/2020    Procedure: bone marrow biopsy;  Surgeon: Iris Cameron MD;  Location:  GI     CATARACT IOL, RT/LT  2001    bilateral (laser - 5/2013)     DENTAL SURGERY  1962    wisdom     ESOPHAGOSCOPY, GASTROSCOPY, DUODENOSCOPY (EGD), COMBINED N/A 7/24/2020    Procedure: ESOPHAGOGASTRODUODENOSCOPY, WITH BIOPSY;  Surgeon: Humberto Bailon MD;  Location:  GI     EYE SURGERY  1956    Muscle release     TUBAL LIGATION  1971         Family History:  Family History   Problem Relation Age of Onset     Heart Disease Mother      Respiratory Mother      Heart Disease Father      Heart Disease Brother      Coronary Artery Disease Brother         CAB     Heart Disease Brother      Coronary Artery Disease Brother         CAB         Allergies:  Allergies   Allergen Reactions     Oxybutynin Itching     Seasonal Allergies         Medications:  I have reviewed this patient's medication profile and medications from this hospitalization.   Noted scheduled meds are:  Bumex 1 mg BID  Doxycycline + CTX  Prednisone 5 mg daily  SSA    Noted PRN meds are:  Tylenol x0  Melatonin x0    Physical Exam:  Vital Signs: Temp: 97.7  F (36.5  C) Temp src: Oral BP: (!) 167/72 Pulse: 75   Resp: 16 SpO2: 93 % O2 Device: Nasal cannula Oxygen Delivery: 3 LPM  Weight: 127 lbs 4.8 oz    Constitutional: Lying in bed, appears stated age, fatigued-appearing, in no distress   Eyes: Conjunctiva clear. Sclera  anicteric  Cardiovascular: Normal rate, regular rhythm.   Respiratory:  Normal respiratory effort, decreased breath sounds anteriorly  Musculoskeletal: 2+ soft lower extremity edema.    Neuro: Conversational, decreased muscle bulk, no tremor.   Psych: Alert, appropriately interactive, blunted affect, clear sensorium.   Skin: Warm, no rash    Data reviewed:  Recent imaging reviewed, my comments on pertinents:   CXR 4/15/21  RESSION: Small left pleural effusion. No significant right pleural  effusion. Bibasilar patchy opacities either aspiration, infection or  edema. Stable cardiomegaly. Tortuous aorta with atherosclerotic  calcifications. No pneumothorax.    Recent lab data reviewed, my comments on pertinents:   Cre 2.08, up from 1.66 last month  K 5.6  ntBNP 80607, up from 23100 last month  Hb 8.9, Plt 149    110 minutes unit time spent -  45 minutes with patient examination and counseling, 35 minutes on the phone with daughter, 15 minutes coordinating care with hospitalist and cardiology, rest on reviewing record and documentation.  >50% spent in counseling re: symptom management, plan of care, and coordinating care with team.     Makenna Jaquez MD  Palliative Medicine  Pager [phone]

## 2021-04-16 NOTE — PROGRESS NOTES
[facility]    Medicine Progress Note - Hospitalist Service       Date of Admission:  4/15/2021  Assessment & Plan       Ghislaine Walls is a 85 year old female admitted on 4/15/2021.     Acute hypoxemic respiratory failure  Acute on chronic CHF with preserved EF  R/O CAP  History of stopping her torsemide 1 week ago, now with generalized weakness and fall, found to have O2 saturations of 79%, without home O2 use  She did not hit her head per the nurses report from her assisted living  CXR read as nonspecific but could include infection, aspiration or edema, but other than small fever of 100.4; procal suggesting moderate risk of infection  She does endorse mucus producing cough, but denies shortness of breath  Plan  - bumex 1 mg IV bid  - strict ins and outs  - salt restriction  - continue ceftriaxone, add doxycycline for CAP  - Palliative Care consulted to assist with goals of care, recs much appreciated     Hyperkalemia  Plan  - K remains mildly elevated but stable; continue with diuresis and daily monitoring  - continue the telemetry     History of COVID 19 infection 8/2020  Negative on admission  - no need for special precautions     Afib with bradycardia  Heart rates from the high 30's to 50s in the ED, with maintained blood pressure  Daughter notes clonidine started in the last month  Could be contributing to her generalized weakness and fatigue  - holding diltiazem/clonidine   - cardiology initiated hydralazine, recs appreicated  - continue metoprolol  - telemetry, bradycardia resolved today  - k and mg protocols  - continue eliquis    CKD IV  - recheck BMP daily     History of gout  -continue allopurinol     Chronic normocytic anemia  MGUS  THyroid lesion  - consider outpatient thyroid ultrasound on discharge  - check TSH     RA  - continue PTA prednisone  - recently started orencia as outpatient         Diet: Combination Diet Low Saturated Fat Na <2400mg Diet, No Caffeine  Diet  Snacks/Supplements Adult: Ensure Enlive; Between Meals    DVT Prophylaxis: Eliquis  Dejesus Catheter: not present  Code Status: No CPR- Do NOT Intubate           Disposition Plan   Expected discharge: 2 - 3 days, recommended to prior living arrangement once adequately diuresed, goals of care determined.  Entered: Billy Mai MD 04/16/2021, 2:47 PM       The patient's care was discussed with the Bedside Nurse and Patient.  Attempted to contact daughter but went to voicemail, left message.     Billy Mai MD  Hospitalist Service  [facility]  Contact information available via [facility] Paging/Directory    ______________________________________________________________________    Interval History   She reports she continues to feel generally weak which has not changed today.  Gets short of breath with activity but none at rest.  Denies any chest pain/pressure, lightheadedness.  Reports a chronic cough which is unchanged, denies subjective fever/chills, dysuria, nausea, diarrhea, abdominal pain or other infectious complaints.     Data reviewed today: I reviewed all medications, new labs and imaging results over the last 24 hours. I personally reviewed no images or EKG's today.    Physical Exam   Vital Signs: Temp: 97.7  F (36.5  C) Temp src: Oral BP: (!) 167/72 Pulse: 75   Resp: 16 SpO2: 93 % O2 Device: Nasal cannula Oxygen Delivery: 3 LPM  Weight: 127 lbs 4.8 oz  General Appearance: Well nourished elderly female in NAD  Respiratory: mild diffuse crackles and wheezes bilaterally, no tachypnea  Cardiovascular: RRR, normal s1/s2 without murmur  GI: abdomen soft, normal bowel sounds, nontender  Skin: no peripheral edema  Other: Alert and appropriate, cranial nerves grossly intact     Data   Recent Labs   Lab 04/16/21  0550 04/15/21  2238 04/15/21  1139   WBC 3.8*  --  8.3   HGB 8.9*  --  9.9*   *  --  106*   *  --  199    139 140   POTASSIUM 5.6* 5.9* 5.5*   CHLORIDE 112*  113* 110*   CO2 24 22 23   BUN 55* 51* 45*   CR 2.08* 2.09* 1.47*   ANIONGAP 3 4 7   ALIVIA 8.8 8.7 9.2   GLC 85 92 106*   TROPI  --   --  0.018

## 2021-04-16 NOTE — CONSULTS
"CLINICAL NUTRITION SERVICES  -  ASSESSMENT NOTE    Recommendations Ordered by Registered Dietitian (RD):   - Ensure Enlive BID between meals     Diet per MD- Recommend least restrictive    Malnutrition: (4/16)  % Weight Loss:  Up to 20% in 1 year (non-severe malnutrition)  % Intake:  </= 75% for >/= 1 month (severe malnutrition)  Subcutaneous Fat Loss:  Deferred  Muscle Loss:  Temporal region mild-moderate depletion  Fluid Retention:  R/t CHF    Malnutrition Diagnosis: Non-Severe malnutrition  In Context of:  Chronic illness or disease     REASON FOR ASSESSMENT  Ghislaine Walls is a 85 year old female seen by Registered Dietitian for Nutrition Admission Risk Screen Received -   Have you recently lost weight without trying in the last 6 months ? - \"yes \"50 pounds in past 18 months\"  Have you been eating poorly due to a decreased appetite ?- \"yes\"    NUTRITION HISTORY  - Information obtained from chart review and patient report.   - H/o afib, HTN, HLD. Presents with shortness of breath.   - Also endorsed nausea the morning of admission.     - Ghislaine seen in bed this morning. She is somewhat hard of hearing.   - Still lives at [facility] where meals are sent up to rooms. She typically just eats meals BID, and takes \"only a few bites\" of each meal.   - Drinks vanilla ensure, states \"its okay\" when asked if she likes it.   - Verbalizes a 50# wt loss since \"All this began\" (about 1.5 years)    CURRENT NUTRITION ORDERS  Diet Order:     Low Saturated Fat/2400 mg Sodium     Current Intake/Tolerance:  Pancake, scr eggs, banana bread ordered for breakfast this morning. Pt states she tolerated this fine.   Ate 75% of a large meal for dinner yesterday.     NUTRITION FOCUSED PHYSICAL ASSESSMENT FOR DIAGNOSING MALNUTRITION)         Observed:    Deferred full assessment as pt was resting and covered to chin with blankets. Temples w/ mild-moderate wasting observed.     Obtained from Chart/Interdisciplinary Team:  Trace edema " "1+    ANTHROPOMETRICS  Height: 5' 3\"  Weight: 127 lbs 4.8 oz (57.7 kg)   Body mass index is 22.55 kg/m .  Weight Status:  Normal BMI  IBW: 52.3 kg   % IBW: 110%  Weight History:  Dry wt likely closer to 120#. Wt of 151# in 2/20 indicates a wt loss of up to 31# in the past 14 months. (20.5%).     Wt Readings from Last 30 Encounters:   04/16/21 57.7 kg (127 lb 4.8 oz)   03/15/21 57 kg (125 lb 9.6 oz)   03/08/21 57.6 kg (127 lb)   03/07/21 56.9 kg (125 lb 6.4 oz)   02/25/21 54.7 kg (120 lb 9.6 oz)   09/14/20 59 kg (130 lb)   09/08/20 59.1 kg (130 lb 6.4 oz)   08/24/20 60.8 kg (134 lb)   08/03/20 63.7 kg (140 lb 6.4 oz)   02/10/20 68.6 kg (151 lb 4.8 oz)   11/04/19 72.6 kg (160 lb)     LABS  Labs reviewed  K 5.6 (H)  BUN 55, Cr 2.08 (H)    MEDICATIONS  Medications reviewed  Bumex BID for diuresis  Remeron 15 mg q HS    ASSESSED NUTRITION NEEDS PER APPROVED PRACTICE GUIDELINES:  Dosing Weight 57.7 kg  Estimated Energy Needs: 1231-0554 kcals (25-30 Kcal/Kg)  Justification: maintenance  Estimated Protein Needs: 69-87 grams protein (1.2-1.5 g pro/Kg)  Justification: preservation of lean body mass  Estimated Fluid Needs: 1 mL/kcal  Justification: maintenance    MALNUTRITION:  % Weight Loss:  Up to 20% in 1 year (non-severe malnutrition)  % Intake:  </= 75% for >/= 1 month (severe malnutrition)  Subcutaneous Fat Loss:  Deferred  Muscle Loss:  Temporal region mild-moderate depletion  Fluid Retention:  R/t CHF    Malnutrition Diagnosis: Non-Severe malnutrition  In Context of:  Chronic illness or disease    NUTRITION DIAGNOSIS:  Inadequate oral intake related to lack of appetite as evidenced by pt reports she takes just bites of meals BID, wt loss of up to 20% in 1 year.       NUTRITION INTERVENTIONS  Recommendations / Nutrition Prescription  Diet per MD- Recommend least restrictive   Ensure Enlive BID between meals       Implementation  Nutrition education: No education needs assessed at this time  Medical Food Supplement: as " above      Nutrition Goals  Intake of at least 50% for meals BID + 1-2 supplements daily.       MONITORING AND EVALUATION:  Progress towards goals will be monitored and evaluated per protocol and Practice Guidelines    Nithya White RD, LD  [facility] [place], [facility], [facility], [facility]   Pager: [phone]  Weekend Pager: [phone]

## 2021-04-16 NOTE — PROGRESS NOTES
[facility]  Patient is currently open to home care services with [facility]. The patient is currently receiving RN and HHA services.  and home health team have been notified of patient admission. [facility] liaison will continue to follow patient during stay. If appropriate provide orders to resume home care at time of discharge.

## 2021-04-16 NOTE — CONSULTS
[facility]    Cardiology Consultation     Date of Admission:  4/15/2021  Date of Consult (When I saw the patient): 04/16/21  Requesting Physician: Dr. Corona  Primary care physician: Laurie Conteh    Primary cardiologist: Dr. Manuel  Staff Cardiologist:  Dr. Sierra     Reason for consult/ Chief Complaint: bradycardia, hypoxia    History is obtained from the patient and her chart    History of Present Illness   Ghislaine Walls is a 85 year old female with PMH significant for:  1.  Paroxysmal atrial fibrillation with a rate control and anticoagulation approach.   2.  Heart failure with preserved EF.   3.  Hypertension that is difficult to control.   4.  CKD with stage III disease.   5.  Hypothyroidism.   6.  Gout.   7.  Chronic normocytic anemia with MGUS.   8.  COVID with the patient testing positive twice, once in August and once again in December.   9.  Recent thyroid lesion.   10.  Chronic pain and fatigue.     Patient is on a difficult urine testing positive for Covid twice once in August and again in December.  She had progressive worsening since December.  She currently lives in [place] and was last admitted for heart failure in February.  At that time she was found to be found to be hypoxic and had bilateral thoracenteses.  She was also treated for UTI.  I saw her in follow-up and she was very clear that she wanted hospice with me but or not it was not very interested.  Per notes they had a care conference also at [place] and they recommended comfort care but the daughter was not ready.    Most recently the patient has refused to taking her diuretics for at least 2 to 3 days.  Although, we have a call from nearly a month ago and the patient did not take her medications at that time either.      Yesterday, she was found to be hypoxic and brought to the ER.  Patient notes that about 2 to 3 weeks ago she had an episode of chest pain that was about 2 hours and resolved spontaneously.  She  has not had this consistently.  She does note she is short of breath.  She hates getting up going to the bathroom and having incontinence on the way to the bathroom.  She was also mildly febrile with  a temperature just over 100 on admission.  Her N-terminal proBNP was found to be 22,000 hemoglobin 9.9, chest x-ray with small left effusion and bibasilar patchy opacities, O2 sats were 83% on presentation and heart rate dropped into the 30s as well.    Code Status    No CPR- Do NOT Intubate    Past Medical History   I have reviewed this patient's medical history and updated it with pertinent information if needed.   Past Medical History:   Diagnosis Date     Atrial fibrillation (H) new 10/19     [place] (hard of hearing)     wears hearing aids     Hyperlipidaemia      Hyperlipidaemia LDL goal < 130      Hypertension      Hypothyroid      PAD (peripheral artery disease) (H)      Renal insufficiency, mild      Uncontrolled hypertension 10/14/2019       Past Surgical History   I have reviewed this patient's surgical history and updated it with pertinent information if needed.  Past Surgical History:   Procedure Laterality Date     APPENDECTOMY  1951     BONE MARROW BIOPSY, BONE SPECIMEN, NEEDLE/TROCAR N/A 7/31/2020    Procedure: bone marrow biopsy;  Surgeon: Iris Cameron MD;  Location:  GI     CATARACT IOL, RT/LT  2001    bilateral (laser - 5/2013)     DENTAL SURGERY  1962    [place]     ESOPHAGOSCOPY, GASTROSCOPY, DUODENOSCOPY (EGD), COMBINED N/A 7/24/2020    Procedure: ESOPHAGOGASTRODUODENOSCOPY, WITH BIOPSY;  Surgeon: Humberto Bailon MD;  Location:  GI     EYE SURGERY  1956    Muscle release     TUBAL LIGATION  1971       Prior to Admission Medications   Prior to Admission Medications   Prescriptions Last Dose Informant Patient Reported? Taking?   Abatacept (ORENCIA IV) 4/12/2021 Other Yes Yes   Sig: Every 2 weeks. Next dose due 4/26/21. After this dose change to every 4 weeks (dose will be due 5/24/21)    Abatacept (ORENCIA IV)   Yes No   Sig: Inject 500 mg into the vein every 30 days (dose will be due 5/24/21)   Darbepoetin Alonso-Albumin (ARANESP IJ) prn at prn  Yes Yes   Sig: Monthly as needed   acetaminophen (TYLENOL) 500 MG tablet prn at prn Daughter Yes Yes   Sig: Take 1,000 mg by mouth 3 times daily as needed    allopurinol (ZYLOPRIM) 100 MG tablet Unknown Daughter No Yes   Sig: Take 1 tablet (100 mg) by mouth daily   apixaban ANTICOAGULANT (ELIQUIS) 2.5 MG tablet Unknown Daughter Yes Yes   Sig: Take 2.5 mg by mouth 2 times daily    cloNIDine (CATAPRES) 0.1 MG tablet Unknown Daughter Yes Yes   Sig: Take 0.1 mg by mouth 2 times daily   diltiazem ER (DILT-XR) 120 MG 24 hr capsule Unknown Daughter Yes Yes   Sig: Take 120 mg by mouth 3 times daily   hydrALAZINE (APRESOLINE) 50 MG tablet Unknown Daughter Yes Yes   Sig: Take 50 mg by mouth 3 times daily   levothyroxine (SYNTHROID/LEVOTHROID) 25 MCG tablet Unknown Daughter No Yes   Sig: Take 1 tablet (25 mcg) by mouth daily   melatonin 5 MG tablet prn at prn Nursing Home Yes Yes   Sig: Take 10 mg by mouth nightly as needed for sleep   metoprolol succinate ER (TOPROL-XL) 100 MG 24 hr tablet Unknown Daughter No Yes   Sig: Take 1 tablet (100 mg) by mouth daily   mirtazapine (REMERON) 15 MG tablet Unknown at hs Daughter Yes Yes   Sig: Take 15 mg by mouth At Bedtime   ondansetron (ZOFRAN-ODT) 4 MG ODT tab prn at prn Daughter Yes Yes   Sig: Take 4 mg by mouth every 8 hours as needed for nausea   pantoprazole (PROTONIX) 40 MG EC tablet Unknown Daughter Yes Yes   Sig: Take 40 mg by mouth daily   predniSONE (DELTASONE) 5 MG tablet Unknown Daughter Yes Yes   Sig: Take 5 mg by mouth daily   sulfaSALAzine (AZULFIDINE) 500 MG tablet Unknown Daughter Yes Yes   Sig: Take 500 mg by mouth 2 times daily   torsemide (DEMADEX) 10 MG tablet Unknown Daughter Yes Yes   Sig: Take 10 mg by mouth daily      Facility-Administered Medications: None     Allergies   Allergies   Allergen Reactions      Oxybutynin Itching     Seasonal Allergies        Social History   I have reviewed this patient's social history and updated it with pertinent information if needed. Ghislaine Walls  reports that she quit smoking about 48 years ago. Her smoking use included cigarettes. She has a 2.50 pack-year smoking history. She has never used smokeless tobacco. She reports previous alcohol use. She reports that she does not use drugs.    Family History   Family history reviewed with patient and is noncontributory.    Review of Systems   The 5 point Review of Systems is negative other than noted in the HPI or here.     Physical Exam   Temp: 97.7  F (36.5  C) Temp src: Oral BP: (!) 167/72 Pulse: 75   Resp: 16 SpO2: 93 % O2 Device: Nasal cannula Oxygen Delivery: 3 LPM  Vital Signs with Ranges  Temp:  [97.7  F (36.5  C)-99.1  F (37.3  C)] 97.7  F (36.5  C)  Pulse:  [36-75] 75  Resp:  [14-17] 16  BP: (115-180)/(50-76) 167/72  SpO2:  [92 %-100 %] 93 %  127 lbs 4.8 oz  Elderly frail appearing woman kyphotic.  Heart is regular with a 3 out of 6 systolic murmur heard best at the lower left sternal border.  Her lungs are diminished left greater than right.  Extremities with trace peripheral edema JVP to her jaw.    Data   ECG- afib, HR 51, no st changes    Echo 2/21/21  The left ventricle is normal in size.  Left ventricular systolic function is normal.  The visual ejection fraction is estimated at 60-65%.  Diastolic Doppler findings (E/E' ratio and/or other parameters) suggest left ventricular filling pressures are increased.  Right ventricular systolic pressure is elevated, consistent with mild pulmonary hypertension.  Moderate left pleural effusion    CXR: Small left pleural effusion. No significant right pleural  effusion. Bibasilar patchy opacities either aspiration, infection or edema. Stable cardiomegaly. Tortuous aorta with atherosclerotic calcifications. No pneumothorax.    Labs reviewed    Assessment & Plan   Ms. Walls is a  pleasant 84 yo woman with PMH significant for PAF with a rate control and anticoagulation approach, HFpEF, HTN, CKD, anemia with mgus, hypothyroidism, thyroid lesion, who presents with hypoxia consistent with acute on chronic heart failure exacerbation as well as acute on chronic renal failure.   In addition, she was noted to be bradycardic and ER with heart rates in the 30s and 40s.  Prior to admission she was on metoprolol, clonidine and diltiazem, of which the diltiazem and clonidine have been held.  With that her heart rate has been in normal range in the 60s to 70s.  It is possible that the bradycardia has been contributing to her fatigue but is quite clear that her heart failure is doing to her not being on diuretics.    I had a rene conversation with the patient who tells me that she is not really interested in being diuresed here if it is not can fix her in the long run.  She indicated today as she has in the past that she prefers hospice and comfort care but no them when discussed in the past who did not feel time and is ready.  She is not interested in additional procedures here and will only continue with diuretics if she does not have to get out of bed.  The patient is willing to discuss this all with palliative and I appreciate their assistance.  Until further decisions are made, agree with ongoing diuresis as the patient has gained about 15 pounds of volume since her last hospitalization.  We'll add back hydralazine for bp control as it didn't likely contribute to bradycardia.      Thank you for this consultation.  This patient was discussed with Dr. Sierra.  Final recommendations are pending his documentation.      Courtney Amador PA-C  1:32 PM 4/16/2021   [facility]  Pager: [phone]

## 2021-04-16 NOTE — PROGRESS NOTES
04/16/21 1139   Quick Adds   Type of Visit Initial Occupational Therapy Evaluation   Living Environment   People in home alone   Current Living Arrangements assisted living   Home Accessibility no concerns   Self-Care   Equipment Currently Used at Home walker, rolling   General Information   Onset of Illness/Injury or Date of Surgery 04/15/21   Referring Physician Oneal Carrera,    Patient/Family Therapy Goal Statement (OT) none stated   Additional Occupational Profile Info/Pertinent History of Current Problem Ghislaine Walls is a 85 year old female who presents to the ER with shortness of breath. The patient is a resident at [facility] and has had significant heart failure, has been on diarrhetics but about a week ago decided to stop her Torsemide because she was having to get up and urinate too much. She has been more lethargic and has developed increasing shortness of breath and edema and was sent here. She also has atrial fibrillation. Acute on chronic CHF with preserved EF, respiratory failure.    Existing Precautions/Restrictions fall;oxygen therapy device and L/min  (3 L O2)   Cognitive Status Examination   Orientation Status person;place  (pt off by 1 month for current month, reports March initally)   Affect/Mental Status (Cognitive) WFL   Follows Commands WFL   Visual Perception   Visual Impairment/Limitations corrective lenses full-time   Sensory   Sensory Quick Adds No deficits were identified   Pain Assessment   Patient Currently in Pain No   Transfers   Transfer Comments Pt initially appeared to agree to moblize but then declined.    Instrumental Activities of Daily Living (IADL)   Previous Responsibilities housekeeping;laundry;medication management   Clinical Impression   Criteria for Skilled Therapeutic Interventions Met (OT) yes   OT Diagnosis impaired ADL's and functional mobiltiy   OT Problem List-Impairments impacting ADL problems related to;activity tolerance  impaired;cognition;strength   Assessment of Occupational Performance 3-5 Performance Deficits   Identified Performance Deficits impaired I with dressing, toilet and shower transfer, med mgmt, etc   Planned Therapy Interventions (OT) ADL retraining;transfer training;home program guidelines;progressive activity/exercise;cognition   Clinical Decision Making Complexity (OT) moderate complexity   Therapy Frequency (OT) 5x/week   Predicted Duration of Therapy 3 days   Risk & Benefits of therapy have been explained evaluation/treatment results reviewed;care plan/treatment goals reviewed;risks/benefits reviewed;current/potential barriers reviewed;participants voiced agreement with care plan;participants included;patient   OT Discharge Planning    OT Rationale for DC Rec Pt declined out of bed ADL's and functional mobility. pt at appeared to agree to try mobility but then declined due to fatigue, nursing attempted to encourage pt to particiapte and pt cont'd to decline.    Total Evaluation Time (Minutes)   Total Evaluation Time (Minutes) 10

## 2021-04-17 ENCOUNTER — APPOINTMENT (OUTPATIENT)
Dept: PHYSICAL THERAPY | Facility: CLINIC | Age: 86
DRG: 291 | End: 2021-04-17
Attending: HOSPITALIST
Payer: MEDICARE

## 2021-04-17 LAB
ANION GAP SERPL CALCULATED.3IONS-SCNC: 4 MMOL/L (ref 3–14)
BUN SERPL-MCNC: 63 MG/DL (ref 7–30)
CALCIUM SERPL-MCNC: 8.5 MG/DL (ref 8.5–10.1)
CHLORIDE SERPL-SCNC: 111 MMOL/L (ref 94–109)
CO2 SERPL-SCNC: 23 MMOL/L (ref 20–32)
CREAT SERPL-MCNC: 1.67 MG/DL (ref 0.52–1.04)
ERYTHROCYTE [DISTWIDTH] IN BLOOD BY AUTOMATED COUNT: 16.2 % (ref 10–15)
GFR SERPL CREATININE-BSD FRML MDRD: 28 ML/MIN/{1.73_M2}
GLUCOSE SERPL-MCNC: 109 MG/DL (ref 70–99)
HCT VFR BLD AUTO: 30.5 % (ref 35–47)
HGB BLD-MCNC: 9.2 G/DL (ref 11.7–15.7)
MCH RBC QN AUTO: 31.5 PG (ref 26.5–33)
MCHC RBC AUTO-ENTMCNC: 30.2 G/DL (ref 31.5–36.5)
MCV RBC AUTO: 105 FL (ref 78–100)
PLATELET # BLD AUTO: 163 10E9/L (ref 150–450)
POTASSIUM SERPL-SCNC: 5.9 MMOL/L (ref 3.4–5.3)
RBC # BLD AUTO: 2.92 10E12/L (ref 3.8–5.2)
SODIUM SERPL-SCNC: 138 MMOL/L (ref 133–144)
WBC # BLD AUTO: 3.2 10E9/L (ref 4–11)

## 2021-04-17 PROCEDURE — 250N000012 HC RX MED GY IP 250 OP 636 PS 637: Performed by: HOSPITALIST

## 2021-04-17 PROCEDURE — 250N000009 HC RX 250: Performed by: HOSPITALIST

## 2021-04-17 PROCEDURE — 250N000013 HC RX MED GY IP 250 OP 250 PS 637: Performed by: PHYSICIAN ASSISTANT

## 2021-04-17 PROCEDURE — 36415 COLL VENOUS BLD VENIPUNCTURE: CPT | Performed by: HOSPITALIST

## 2021-04-17 PROCEDURE — 250N000011 HC RX IP 250 OP 636: Performed by: INTERNAL MEDICINE

## 2021-04-17 PROCEDURE — 210N000002 HC R&B HEART CARE

## 2021-04-17 PROCEDURE — 250N000013 HC RX MED GY IP 250 OP 250 PS 637: Performed by: HOSPITALIST

## 2021-04-17 PROCEDURE — 85027 COMPLETE CBC AUTOMATED: CPT | Performed by: HOSPITALIST

## 2021-04-17 PROCEDURE — 97530 THERAPEUTIC ACTIVITIES: CPT | Mod: GP

## 2021-04-17 PROCEDURE — 250N000011 HC RX IP 250 OP 636: Performed by: HOSPITALIST

## 2021-04-17 PROCEDURE — 99233 SBSQ HOSP IP/OBS HIGH 50: CPT | Performed by: HOSPITALIST

## 2021-04-17 PROCEDURE — 97162 PT EVAL MOD COMPLEX 30 MIN: CPT | Mod: GP

## 2021-04-17 PROCEDURE — 80048 BASIC METABOLIC PNL TOTAL CA: CPT | Performed by: HOSPITALIST

## 2021-04-17 RX ORDER — HYDRALAZINE HYDROCHLORIDE 20 MG/ML
10 INJECTION INTRAMUSCULAR; INTRAVENOUS EVERY 4 HOURS PRN
Status: DISCONTINUED | OUTPATIENT
Start: 2021-04-17 | End: 2021-04-22 | Stop reason: HOSPADM

## 2021-04-17 RX ADMIN — HYDRALAZINE HYDROCHLORIDE 75 MG: 25 TABLET ORAL at 21:47

## 2021-04-17 RX ADMIN — SULFASALAZINE 500 MG: 500 TABLET, DELAYED RELEASE ORAL at 20:39

## 2021-04-17 RX ADMIN — HYDRALAZINE HYDROCHLORIDE 10 MG: 20 INJECTION INTRAMUSCULAR; INTRAVENOUS at 11:58

## 2021-04-17 RX ADMIN — PANTOPRAZOLE SODIUM 40 MG: 40 TABLET, DELAYED RELEASE ORAL at 08:05

## 2021-04-17 RX ADMIN — BUMETANIDE 1 MG: 0.25 INJECTION INTRAMUSCULAR; INTRAVENOUS at 20:39

## 2021-04-17 RX ADMIN — SULFASALAZINE 500 MG: 500 TABLET, DELAYED RELEASE ORAL at 08:05

## 2021-04-17 RX ADMIN — HYDRALAZINE HYDROCHLORIDE 75 MG: 25 TABLET ORAL at 17:15

## 2021-04-17 RX ADMIN — APIXABAN 2.5 MG: 2.5 TABLET, FILM COATED ORAL at 20:39

## 2021-04-17 RX ADMIN — DOXYCYCLINE 100 MG: 100 INJECTION, POWDER, LYOPHILIZED, FOR SOLUTION INTRAVENOUS at 17:15

## 2021-04-17 RX ADMIN — HYDRALAZINE HYDROCHLORIDE 50 MG: 50 TABLET, FILM COATED ORAL at 08:05

## 2021-04-17 RX ADMIN — PREDNISONE 5 MG: 5 TABLET ORAL at 08:05

## 2021-04-17 RX ADMIN — CEFTRIAXONE SODIUM 1 G: 1 INJECTION, POWDER, FOR SOLUTION INTRAMUSCULAR; INTRAVENOUS at 18:13

## 2021-04-17 RX ADMIN — LEVOTHYROXINE SODIUM 25 MCG: 25 TABLET ORAL at 08:05

## 2021-04-17 RX ADMIN — MIRTAZAPINE 15 MG: 15 TABLET, FILM COATED ORAL at 21:47

## 2021-04-17 RX ADMIN — HYDRALAZINE HYDROCHLORIDE 10 MG: 20 INJECTION INTRAMUSCULAR; INTRAVENOUS at 03:10

## 2021-04-17 RX ADMIN — ALLOPURINOL 100 MG: 100 TABLET ORAL at 08:05

## 2021-04-17 RX ADMIN — APIXABAN 2.5 MG: 2.5 TABLET, FILM COATED ORAL at 08:05

## 2021-04-17 RX ADMIN — HYDRALAZINE HYDROCHLORIDE 50 MG: 50 TABLET, FILM COATED ORAL at 14:11

## 2021-04-17 RX ADMIN — BUMETANIDE 1 MG: 0.25 INJECTION INTRAMUSCULAR; INTRAVENOUS at 08:04

## 2021-04-17 RX ADMIN — DOXYCYCLINE 100 MG: 100 INJECTION, POWDER, LYOPHILIZED, FOR SOLUTION INTRAVENOUS at 06:24

## 2021-04-17 ASSESSMENT — ACTIVITIES OF DAILY LIVING (ADL)
ADLS_ACUITY_SCORE: 23

## 2021-04-17 ASSESSMENT — MIFFLIN-ST. JEOR: SCORE: 992.93

## 2021-04-17 NOTE — PROGRESS NOTES
04/17/21 1330   Quick Adds   Type of Visit Initial PT Evaluation   Living Environment   People in home alone   Current Living Arrangements assisted living   Home Accessibility no concerns   Living Environment Comments reports lives at [facility].    Self-Care   Usual Activity Tolerance moderate   Current Activity Tolerance poor   Equipment Currently Used at Home walker, rolling  (with a seat)   Activity/Exercise/Self-Care Comment independent using walker with mobility in apartment, reports uses a WC for longer distance mobility    Disability/Function   Walking or Climbing Stairs Difficulty yes   Walking or Climbing Stairs ambulation difficulty, requires equipment   Toileting issues yes   Toileting Assistance toileting difficulty, requires equipment   Fall history within last six months yes   Number of times patient has fallen within last six months 2   Change in Functional Status Since Onset of Current Illness/Injury yes   General Information   Onset of Illness/Injury or Date of Surgery 04/15/21   Referring Physician Oneal Carrera, DO   Patient/Family Therapy Goals Statement (PT) none stated.    Pertinent History of Current Problem (include personal factors and/or comorbidities that impact the POC) 84y/o F   Existing Precautions/Restrictions fall   General Observations resting in bed, 3 L NC, kyphotic   Cognition   Orientation Status (Cognition) person;place   Pain Assessment   Patient Currently in Pain No  (offers no complaints during session )   Integumentary/Edema   Integumentary/Edema Comments 1+ pitting edema bilateral ankles and feet-pt reports chronic   Posture    Posture Kyphosis;Protracted shoulders;Forward head position   Posture Comments severe kyphosis with downward gaze, flexed forward trunk in standing    Range of Motion (ROM)   ROM Comment bilateral LE: WFL   Strength   Strength Comments able to move ext x 4 against gravity, significant functional weakness in LEs with standing and  transfers.    Bed Mobility   Comment (Bed Mobility) min A supine to sit with elevated HOB and railings   Transfers   Transfer Safety Comments mod A with sit to stand from EOB with FWW   Gait/Stairs (Locomotion)   Comment (Gait/Stairs) pt able to take a few steps with mod A and FWW, declined further ambulation due to fatigue   Balance   Balance Comments mod A with standing balance with FWW support, SBA with static sitting balance   Sensory Examination   Sensory Perception Comments intact to light touch bilateral LEs, pt denies any numbness/tingling   Clinical Impression   Criteria for Skilled Therapeutic Intervention yes, treatment indicated   PT Diagnosis (PT) impaired gait   Influenced by the following impairments weakness, impaired activity tolerance, SOB, impaired balance   Functional limitations due to impairments impaired IND with functional mobility   Clinical Presentation Evolving/Changing   Clinical Presentation Rationale based on pt's medical status, clinical judgement   Clinical Decision Making (Complexity) moderate complexity   Therapy Frequency (PT) 5x/week   Predicted Duration of Therapy Intervention (days/wks) 1 week   Planned Therapy Interventions (PT) balance training;bed mobility training;gait training;home exercise program;patient/family education;postural re-education;strengthening;transfer training   Risk & Benefits of therapy have been explained evaluation/treatment results reviewed;care plan/treatment goals reviewed;risks/benefits reviewed;current/potential barriers reviewed;participants voiced agreement with care plan;participants included;patient   PT Discharge Planning    PT Discharge Recommendation (DC Rec) Transitional Care Facility   PT Rationale for DC Rec at baseline pt reports mod IND with a walker in her apt, now significantly weak requires mod A with transfers with FWW, too fatigued for ambulation, RR 30-40 with activity. Recommend continued rehab at TCU to maximize her return to  independence.    Total Evaluation Time   Total Evaluation Time (Minutes) 10

## 2021-04-17 NOTE — PROVIDER NOTIFICATION
Brief update    Paged re: htn, no PRN available    On scheduled hydralazine, 50 QID    Added prn IV hydralazine; if requiring repeated doses, would increase scheduled oral med.    Ladarius Millan MD  1:58 AM

## 2021-04-18 LAB
ALBUMIN SERPL-MCNC: 2.6 G/DL (ref 3.4–5)
ANION GAP SERPL CALCULATED.3IONS-SCNC: 6 MMOL/L (ref 3–14)
BUN SERPL-MCNC: 66 MG/DL (ref 7–30)
CALCIUM SERPL-MCNC: 8.8 MG/DL (ref 8.5–10.1)
CHLORIDE SERPL-SCNC: 109 MMOL/L (ref 94–109)
CO2 SERPL-SCNC: 22 MMOL/L (ref 20–32)
CORTIS SERPL-MCNC: 9.5 UG/DL (ref 4–22)
CREAT SERPL-MCNC: 1.51 MG/DL (ref 0.52–1.04)
ERYTHROCYTE [DISTWIDTH] IN BLOOD BY AUTOMATED COUNT: 16.3 % (ref 10–15)
GFR SERPL CREATININE-BSD FRML MDRD: 31 ML/MIN/{1.73_M2}
GLUCOSE SERPL-MCNC: 80 MG/DL (ref 70–99)
HCT VFR BLD AUTO: 30.6 % (ref 35–47)
HGB BLD-MCNC: 9.2 G/DL (ref 11.7–15.7)
MAGNESIUM SERPL-MCNC: 1.7 MG/DL (ref 1.6–2.3)
MCH RBC QN AUTO: 30.9 PG (ref 26.5–33)
MCHC RBC AUTO-ENTMCNC: 30.1 G/DL (ref 31.5–36.5)
MCV RBC AUTO: 103 FL (ref 78–100)
PLATELET # BLD AUTO: 163 10E9/L (ref 150–450)
POTASSIUM SERPL-SCNC: 5 MMOL/L (ref 3.4–5.3)
RBC # BLD AUTO: 2.98 10E12/L (ref 3.8–5.2)
SODIUM SERPL-SCNC: 137 MMOL/L (ref 133–144)
WBC # BLD AUTO: 4.3 10E9/L (ref 4–11)

## 2021-04-18 PROCEDURE — 36415 COLL VENOUS BLD VENIPUNCTURE: CPT | Performed by: HOSPITALIST

## 2021-04-18 PROCEDURE — 83735 ASSAY OF MAGNESIUM: CPT | Performed by: HOSPITALIST

## 2021-04-18 PROCEDURE — 250N000011 HC RX IP 250 OP 636: Performed by: HOSPITALIST

## 2021-04-18 PROCEDURE — 99233 SBSQ HOSP IP/OBS HIGH 50: CPT | Performed by: HOSPITALIST

## 2021-04-18 PROCEDURE — 82040 ASSAY OF SERUM ALBUMIN: CPT | Performed by: HOSPITALIST

## 2021-04-18 PROCEDURE — 80048 BASIC METABOLIC PNL TOTAL CA: CPT | Performed by: HOSPITALIST

## 2021-04-18 PROCEDURE — 250N000013 HC RX MED GY IP 250 OP 250 PS 637: Performed by: HOSPITALIST

## 2021-04-18 PROCEDURE — 250N000009 HC RX 250: Performed by: HOSPITALIST

## 2021-04-18 PROCEDURE — 82533 TOTAL CORTISOL: CPT | Performed by: HOSPITALIST

## 2021-04-18 PROCEDURE — 99233 SBSQ HOSP IP/OBS HIGH 50: CPT | Performed by: INTERNAL MEDICINE

## 2021-04-18 PROCEDURE — 250N000013 HC RX MED GY IP 250 OP 250 PS 637: Performed by: INTERNAL MEDICINE

## 2021-04-18 PROCEDURE — 93005 ELECTROCARDIOGRAM TRACING: CPT

## 2021-04-18 PROCEDURE — 250N000012 HC RX MED GY IP 250 OP 636 PS 637: Performed by: HOSPITALIST

## 2021-04-18 PROCEDURE — 85027 COMPLETE CBC AUTOMATED: CPT | Performed by: HOSPITALIST

## 2021-04-18 PROCEDURE — 210N000002 HC R&B HEART CARE

## 2021-04-18 RX ORDER — METOPROLOL TARTRATE 1 MG/ML
5 INJECTION, SOLUTION INTRAVENOUS ONCE
Status: COMPLETED | OUTPATIENT
Start: 2021-04-18 | End: 2021-04-18

## 2021-04-18 RX ORDER — METOPROLOL TARTRATE 50 MG
50 TABLET ORAL 2 TIMES DAILY
Status: DISCONTINUED | OUTPATIENT
Start: 2021-04-18 | End: 2021-04-22 | Stop reason: HOSPADM

## 2021-04-18 RX ORDER — METOPROLOL TARTRATE 1 MG/ML
5 INJECTION, SOLUTION INTRAVENOUS EVERY 4 HOURS PRN
Status: DISCONTINUED | OUTPATIENT
Start: 2021-04-18 | End: 2021-04-22 | Stop reason: HOSPADM

## 2021-04-18 RX ADMIN — SULFASALAZINE 500 MG: 500 TABLET, DELAYED RELEASE ORAL at 09:53

## 2021-04-18 RX ADMIN — APIXABAN 2.5 MG: 2.5 TABLET, FILM COATED ORAL at 20:31

## 2021-04-18 RX ADMIN — CEFTRIAXONE SODIUM 1 G: 1 INJECTION, POWDER, FOR SOLUTION INTRAMUSCULAR; INTRAVENOUS at 18:10

## 2021-04-18 RX ADMIN — HYDRALAZINE HYDROCHLORIDE 75 MG: 25 TABLET ORAL at 09:52

## 2021-04-18 RX ADMIN — ALLOPURINOL 100 MG: 100 TABLET ORAL at 09:52

## 2021-04-18 RX ADMIN — METOPROLOL TARTRATE 50 MG: 50 TABLET, FILM COATED ORAL at 09:53

## 2021-04-18 RX ADMIN — BUMETANIDE 1 MG: 0.25 INJECTION INTRAMUSCULAR; INTRAVENOUS at 20:31

## 2021-04-18 RX ADMIN — DOXYCYCLINE 100 MG: 100 INJECTION, POWDER, LYOPHILIZED, FOR SOLUTION INTRAVENOUS at 18:45

## 2021-04-18 RX ADMIN — APIXABAN 2.5 MG: 2.5 TABLET, FILM COATED ORAL at 09:53

## 2021-04-18 RX ADMIN — HYDRALAZINE HYDROCHLORIDE 75 MG: 25 TABLET ORAL at 13:22

## 2021-04-18 RX ADMIN — SULFASALAZINE 500 MG: 500 TABLET, DELAYED RELEASE ORAL at 20:31

## 2021-04-18 RX ADMIN — HYDRALAZINE HYDROCHLORIDE 75 MG: 25 TABLET ORAL at 18:10

## 2021-04-18 RX ADMIN — DOXYCYCLINE 100 MG: 100 INJECTION, POWDER, LYOPHILIZED, FOR SOLUTION INTRAVENOUS at 06:27

## 2021-04-18 RX ADMIN — MIRTAZAPINE 15 MG: 15 TABLET, FILM COATED ORAL at 21:38

## 2021-04-18 RX ADMIN — METOPROLOL TARTRATE 5 MG: 1 INJECTION, SOLUTION INTRAVENOUS at 04:22

## 2021-04-18 RX ADMIN — PREDNISONE 5 MG: 5 TABLET ORAL at 09:53

## 2021-04-18 RX ADMIN — LEVOTHYROXINE SODIUM 25 MCG: 25 TABLET ORAL at 09:53

## 2021-04-18 RX ADMIN — METOPROLOL TARTRATE 5 MG: 1 INJECTION, SOLUTION INTRAVENOUS at 04:49

## 2021-04-18 RX ADMIN — METOPROLOL TARTRATE 50 MG: 50 TABLET, FILM COATED ORAL at 20:31

## 2021-04-18 RX ADMIN — HYDRALAZINE HYDROCHLORIDE 75 MG: 25 TABLET ORAL at 21:38

## 2021-04-18 RX ADMIN — BUMETANIDE 1 MG: 0.25 INJECTION INTRAMUSCULAR; INTRAVENOUS at 09:51

## 2021-04-18 RX ADMIN — PANTOPRAZOLE SODIUM 40 MG: 40 TABLET, DELAYED RELEASE ORAL at 09:52

## 2021-04-18 ASSESSMENT — ACTIVITIES OF DAILY LIVING (ADL)
ADLS_ACUITY_SCORE: 22
ADLS_ACUITY_SCORE: 23

## 2021-04-18 ASSESSMENT — MIFFLIN-ST. JEOR: SCORE: 993.38

## 2021-04-18 NOTE — PROGRESS NOTES
[facility]    Medicine Progress Note - Hospitalist Service       Date of Admission:  4/15/2021  Assessment & Plan         Ghislaine Walls is a 85 year old female admitted on 4/15/2021.       Goals of care  - Palliative Care assistance appreciated, see note 4/16; will outpatient follow up in [facility] clinic  - discussed goals again 4/18; she reports that at this time she wishes to continue with treatment/restorative cares with plan to follow up with outpatient Rheumatologist as they were fairly confident her fatigue would start to improve following her next Orencia infusion, however, she is quite clear that if symptoms do not start to improve soon that her quality of life just isn't there and she would want to proceed with hospice.     Acute hypoxemic respiratory failure  Acute on chronic CHF with preserved EF  R/O CAP  History of stopping her torsemide 1 week ago, now with generalized weakness and fall, found to have O2 saturations of 79%, without home O2 use.  CXR read as nonspecific but could include infection, aspiration or edema  Had mild fever of 100.4; procal suggesting moderate risk of infection and endorsing productive cough so empirically placed on tx for CAP    - continue bumex 1 mg IV bid; weight stable with equivalent I/O's past 24 hours - defer diuretics to Cards  - continue ceftriaxone and doxycycline for possible CAP  - wean oxygen as able, stable on 2L  - Cards recs appreciated     Hyperkalemia  - K wnl 4/18; continue to monitor daily  - continue the telemetry    CKD IV  Most recent baseline Cr 1.4 in 2/2021 but prior to that appears 1.1-1.2.  - Cr stable at 1.5 today; monitor with diuresis     Afib with bradycardia  HTN  Heart rates from the high 30's to 50s in the ED, with maintained blood pressure.  Rates normalized with holding clonidine and diltiazem but now hypertensive.   - increase hydralazine to 75 mg qid  - continue metoprolol 50 mg bid  - continue eliquis    History  of gout  - continue allopurinol     Chronic normocytic anemia  MGUS  Thyroid lesion  TSH wnl  - consider outpatient thyroid ultrasound on discharge     RA  - continue PTA prednisone  - recently started orencia as outpatient    History of COVID 19 infection 8/2020  Negative on admission  - no need for special precautions         Diet: Combination Diet Low Saturated Fat Na <2400mg Diet, No Caffeine Diet  Snacks/Supplements Adult: Ensure Enlive; Between Meals    DVT Prophylaxis: Eliquis  Dejesus Catheter: not present  Code Status: No CPR- Do NOT Intubate           Disposition Plan   Expected discharge: 2 - 3 days, recommended to prior living arrangement once adequately diuresed, goals of care determined.  Entered: Billy Mai MD 04/18/2021, 11:45 AM       The patient's care was discussed with the Bedside Nurse, Patient and Patient's Family.      Time Spent on this Encounter   I spent >35 minutes on the unit/floor managing the care of Ghislaine Walls. Over 50% of my time was spent on the following:   - Counseling the patient and/or family regarding: prognosis and risks and benefits of treatment options  - Coordination of care with the: nurse and daughter    See discussion regarding goals of care above.    MD Billy Valladares MD  Hospitalist Service  [facility]  Contact information available via [facility] Paging/Directory    ______________________________________________________________________    Interval History   She reports no change in symptoms today, ongoing fatigue and ongoing dyspnea.  No new symptoms to report.  Discussed goals of care and treatment options at length.      Data reviewed today: I reviewed all medications, new labs and imaging results over the last 24 hours. I personally reviewed no images or EKG's today.    Physical Exam   Vital Signs: Temp: 98.1  F (36.7  C) Temp src: Oral BP: (!) 168/77 Pulse: 85   Resp: 22 SpO2: 100 % O2 Device: Nasal cannula Oxygen  Delivery: 2 LPM  Weight: 126 lbs 9.6 oz     General Appearance: Well nourished elderly female in NAD, lying in bed  Respiratory: no significant crackles, no wheezing, no tachypnea  Cardiovascular: RRR, normal s1/s2 without murmur  GI: abdomen soft, normal bowel sounds, nontender  Skin: trace-1+ peripheral edema  Other: Alert and appropriate, cranial nerves grossly intact     Data   Recent Labs   Lab 04/18/21  0439 04/17/21  1634 04/16/21  0550 04/15/21  1139 04/15/21  1139   WBC 4.3 3.2* 3.8*  --  8.3   HGB 9.2* 9.2* 8.9*  --  9.9*   * 105* 106*  --  106*    163 149*  --  199    138 139   < > 140   POTASSIUM 5.0 5.9* 5.6*   < > 5.5*   CHLORIDE 109 111* 112*   < > 110*   CO2 22 23 24   < > 23   BUN 66* 63* 55*   < > 45*   CR 1.51* 1.67* 2.08*   < > 1.47*   ANIONGAP 6 4 3   < > 7   ALIVIA 8.8 8.5 8.8   < > 9.2   GLC 80 109* 85   < > 106*   ALBUMIN 2.6*  --   --   --   --    TROPI  --   --   --   --  0.018    < > = values in this interval not displayed.

## 2021-04-18 NOTE — PROGRESS NOTES
Cross Cover:  Notified that patient previously in sinus rhythm, went back into atrial fibrillation, but with fast rates near 155.  Reviewed notes from hospitalist, palliative care and cardiologist. Issues with bradycardia when she came in, but resolved after clonidine, diltiazem and metoprolol succinate held.  Recent notes report that metoprolol was continued, but this has not yet been ordered.    Plan:  Trial 5mg IV lopressor x1.  If tolerates and HR remains elevated, may consider additional one time dose (ordered PRN for time-being)    Consideration for resumption of PO beta blocker deferred to rounding hospitalist.    SAIRA Short, DO

## 2021-04-18 NOTE — PROGRESS NOTES
"Paged Dr Short \"Pt  -01 was Sinus Rhythm and now HR is 155   EKG shows Afib RVR   would you like an intervention?  Thank you.  Tess *[phone] \"  "

## 2021-04-18 NOTE — PROGRESS NOTES
[facility]    Cardiology Progress     Date of Admission:  4/15/2021  Reason for Consult:  HFpEF    Impression/plan:    1.  Heart failure with preserved EF with decompensation  2.  Atrial fibrillation with RVR and previous bradycardia  3.  Goals of care discussion  4.  Chronic kidney disease with hyperkalemia  5.  Hypertension  6.  Rheumatoid arthritis  7.  History of COVID-19 infection   8.  MGUS  9.  Hypoalbuminemia    Plan:  1.  Continue Bumex 1 mg IV every 12 hours.  She has made improvement with diuresis.  Based on previous records dry weight is around 115 pounds, currently 125 pounds.  She still has evidence of volume overload with oxygen requirement, elevated JVP.  2.  For now continue diuresis as family has chosen heart failure treatment and deferred hospice, continued goals of care and follow-up in the [facility] clinic  3.  Elevated heart rates overnight with atrial fibrillation, her outpatient metoprolol and diltiazem were held this admission due to previous bradycardia.  Received IV metoprolol with improvement of heart rate.  Will restart metoprolol 50 mg twice daily and continue holding diltiazem.  4.  Potassium and creatinine improving  5.  Can consider albumin bolus to help redistribute fluid to intravascular space if creatinine rises.  We will continue to follow    Keith Thakkar M.D.    Interval History/Subjective:    Has had good progress with diuresis, blood pressure better controlled today and oxygen requirement slowly decreasing.    Physical Exam   Temp: 98.9  F (37.2  C) Temp src: Oral BP: (!) 143/109 Pulse: 117   Resp: 20 SpO2: 98 % O2 Device: Nasal cannula Oxygen Delivery: 2 LPM  Vital Signs with Ranges  Temp:  [98.2  F (36.8  C)-98.9  F (37.2  C)] 98.9  F (37.2  C)  Pulse:  [] 117  Resp:  [16-20] 20  BP: (126-186)/() 143/109  SpO2:  [95 %-99 %] 98 %  126 lbs 9.6 oz    Constitutional: Awake, alert, cooperative, no apparent distress.  Respiratory: Clear  to auscultation bilaterally, no crackles or wheezing.  Decreased respiratory effort  Neck: Moderate JVD  Cardiovascular: Irregularly irregular rhythm, normal S1 and S2, 1 out of 6 systolic murmur at the left lower sternal border  Extremities: Trace lower extremity edema, no upper extremity edema  Vascular: Radial pulses 4+ and symmetric bilaterally    Data   Most Recent 3 CBC's:  Recent Labs   Lab Test 04/18/21  0439 04/17/21  1634 04/16/21  0550   WBC 4.3 3.2* 3.8*   HGB 9.2* 9.2* 8.9*   * 105* 106*    163 149*     Most Recent 3 BMP's:  Recent Labs   Lab Test 04/18/21  0439 04/17/21  1634 04/16/21  0550    138 139   POTASSIUM 5.0 5.9* 5.6*   CHLORIDE 109 111* 112*   CO2 22 23 24   BUN 66* 63* 55*   CR 1.51* 1.67* 2.08*   ANIONGAP 6 4 3   ALIVIA 8.8 8.5 8.8   GLC 80 109* 85     Most Recent 3 Troponin's:  Recent Labs   Lab Test 04/15/21  1139 02/21/21  1546 07/21/20  0849   TROPI 0.018 <0.015 <0.015     Most Recent 3 BNP's:  Recent Labs   Lab Test 04/15/21  1139 03/02/21  0701 03/01/21  0849 02/21/21  1546 06/14/19  1331 06/14/19  1331 06/08/19  1031   NTBNPI 21,879*  --  12,404* 17,600*   < >  --   --    NTBNP  --  10,548*  --   --   --  2,800* 2,104*    < > = values in this interval not displayed.     Most Recent Cholesterol Panel:  Recent Labs   Lab Test 10/02/19  0531   CHOL 123   LDL 74   HDL 27*   TRIG 111

## 2021-04-19 ENCOUNTER — APPOINTMENT (OUTPATIENT)
Dept: OCCUPATIONAL THERAPY | Facility: CLINIC | Age: 86
DRG: 291 | End: 2021-04-19
Payer: MEDICARE

## 2021-04-19 LAB
ANION GAP SERPL CALCULATED.3IONS-SCNC: 4 MMOL/L (ref 3–14)
BUN SERPL-MCNC: 58 MG/DL (ref 7–30)
CALCIUM SERPL-MCNC: 9 MG/DL (ref 8.5–10.1)
CHLORIDE SERPL-SCNC: 111 MMOL/L (ref 94–109)
CO2 SERPL-SCNC: 25 MMOL/L (ref 20–32)
CREAT SERPL-MCNC: 1.31 MG/DL (ref 0.52–1.04)
GFR SERPL CREATININE-BSD FRML MDRD: 37 ML/MIN/{1.73_M2}
GLUCOSE BLDC GLUCOMTR-MCNC: 135 MG/DL (ref 70–99)
GLUCOSE SERPL-MCNC: 83 MG/DL (ref 70–99)
POTASSIUM SERPL-SCNC: 4.7 MMOL/L (ref 3.4–5.3)
SODIUM SERPL-SCNC: 140 MMOL/L (ref 133–144)

## 2021-04-19 PROCEDURE — 250N000011 HC RX IP 250 OP 636: Performed by: HOSPITALIST

## 2021-04-19 PROCEDURE — 97535 SELF CARE MNGMENT TRAINING: CPT | Mod: GO

## 2021-04-19 PROCEDURE — 99232 SBSQ HOSP IP/OBS MODERATE 35: CPT | Performed by: HOSPITALIST

## 2021-04-19 PROCEDURE — 250N000013 HC RX MED GY IP 250 OP 250 PS 637: Performed by: HOSPITALIST

## 2021-04-19 PROCEDURE — 250N000011 HC RX IP 250 OP 636: Performed by: INTERNAL MEDICINE

## 2021-04-19 PROCEDURE — 250N000009 HC RX 250: Performed by: HOSPITALIST

## 2021-04-19 PROCEDURE — 36415 COLL VENOUS BLD VENIPUNCTURE: CPT | Performed by: HOSPITALIST

## 2021-04-19 PROCEDURE — 250N000012 HC RX MED GY IP 250 OP 636 PS 637: Performed by: HOSPITALIST

## 2021-04-19 PROCEDURE — 210N000002 HC R&B HEART CARE

## 2021-04-19 PROCEDURE — 99233 SBSQ HOSP IP/OBS HIGH 50: CPT | Performed by: INTERNAL MEDICINE

## 2021-04-19 PROCEDURE — 999N001017 HC STATISTIC GLUCOSE BY METER IP

## 2021-04-19 PROCEDURE — 250N000013 HC RX MED GY IP 250 OP 250 PS 637: Performed by: INTERNAL MEDICINE

## 2021-04-19 PROCEDURE — 80048 BASIC METABOLIC PNL TOTAL CA: CPT | Performed by: HOSPITALIST

## 2021-04-19 RX ORDER — HYDRALAZINE HYDROCHLORIDE 50 MG/1
100 TABLET, FILM COATED ORAL 4 TIMES DAILY
Status: DISCONTINUED | OUTPATIENT
Start: 2021-04-19 | End: 2021-04-22 | Stop reason: HOSPADM

## 2021-04-19 RX ADMIN — MIRTAZAPINE 15 MG: 15 TABLET, FILM COATED ORAL at 21:00

## 2021-04-19 RX ADMIN — BUMETANIDE 1 MG: 0.25 INJECTION INTRAMUSCULAR; INTRAVENOUS at 20:42

## 2021-04-19 RX ADMIN — PANTOPRAZOLE SODIUM 40 MG: 40 TABLET, DELAYED RELEASE ORAL at 11:29

## 2021-04-19 RX ADMIN — SULFASALAZINE 500 MG: 500 TABLET, DELAYED RELEASE ORAL at 11:28

## 2021-04-19 RX ADMIN — METOPROLOL TARTRATE 50 MG: 50 TABLET, FILM COATED ORAL at 11:29

## 2021-04-19 RX ADMIN — HYDRALAZINE HYDROCHLORIDE 10 MG: 20 INJECTION INTRAMUSCULAR; INTRAVENOUS at 05:44

## 2021-04-19 RX ADMIN — LEVOTHYROXINE SODIUM 25 MCG: 25 TABLET ORAL at 11:29

## 2021-04-19 RX ADMIN — APIXABAN 2.5 MG: 2.5 TABLET, FILM COATED ORAL at 11:29

## 2021-04-19 RX ADMIN — HYDRALAZINE HYDROCHLORIDE 100 MG: 50 TABLET, FILM COATED ORAL at 17:51

## 2021-04-19 RX ADMIN — CEFTRIAXONE SODIUM 1 G: 1 INJECTION, POWDER, FOR SOLUTION INTRAMUSCULAR; INTRAVENOUS at 17:50

## 2021-04-19 RX ADMIN — DOXYCYCLINE 100 MG: 100 INJECTION, POWDER, LYOPHILIZED, FOR SOLUTION INTRAVENOUS at 05:44

## 2021-04-19 RX ADMIN — SULFASALAZINE 500 MG: 500 TABLET, DELAYED RELEASE ORAL at 20:42

## 2021-04-19 RX ADMIN — HYDRALAZINE HYDROCHLORIDE 75 MG: 25 TABLET ORAL at 12:51

## 2021-04-19 RX ADMIN — METOPROLOL TARTRATE 50 MG: 50 TABLET, FILM COATED ORAL at 20:43

## 2021-04-19 RX ADMIN — HYDRALAZINE HYDROCHLORIDE 100 MG: 50 TABLET, FILM COATED ORAL at 21:00

## 2021-04-19 RX ADMIN — ALLOPURINOL 100 MG: 100 TABLET ORAL at 11:29

## 2021-04-19 RX ADMIN — BUMETANIDE 1 MG: 0.25 INJECTION INTRAMUSCULAR; INTRAVENOUS at 11:28

## 2021-04-19 RX ADMIN — DOXYCYCLINE 100 MG: 100 INJECTION, POWDER, LYOPHILIZED, FOR SOLUTION INTRAVENOUS at 17:52

## 2021-04-19 RX ADMIN — ONDANSETRON 4 MG: 4 TABLET, ORALLY DISINTEGRATING ORAL at 13:18

## 2021-04-19 RX ADMIN — HYDRALAZINE HYDROCHLORIDE 75 MG: 25 TABLET ORAL at 11:28

## 2021-04-19 RX ADMIN — PREDNISONE 5 MG: 5 TABLET ORAL at 11:29

## 2021-04-19 RX ADMIN — APIXABAN 2.5 MG: 2.5 TABLET, FILM COATED ORAL at 20:42

## 2021-04-19 ASSESSMENT — ACTIVITIES OF DAILY LIVING (ADL)
ADLS_ACUITY_SCORE: 22
ADLS_ACUITY_SCORE: 22
ADLS_ACUITY_SCORE: 26
ADLS_ACUITY_SCORE: 27
ADLS_ACUITY_SCORE: 22
ADLS_ACUITY_SCORE: 26

## 2021-04-19 ASSESSMENT — MIFFLIN-ST. JEOR: SCORE: 986.58

## 2021-04-19 NOTE — PROGRESS NOTES
[facility]    Medicine Progress Note - Hospitalist Service       Date of Admission:  4/15/2021  Assessment & Plan           Ghislaine Walls is a 85 year old female admitted on 4/15/2021.       Goals of care  * Palliative Care assistance appreciated, see note 4/16; will outpatient follow up in [facility] clinic  * Discussed goals again 4/18; she reports that at this time she wishes to continue with treatment/restorative cares with plan to follow up with outpatient Rheumatologist as they were fairly confident her fatigue would start to improve following her next Orencia infusion, however, she is quite clear that if symptoms do not start to improve soon that her quality of life just isn't there and she would want to proceed with hospice.     Acute hypoxemic respiratory failure  Acute on chronic CHF with preserved EF  R/O CAP  History of stopping her torsemide 1 week ago, now with generalized weakness and fall, found to have O2 saturations of 79%, without home O2 use.  CXR read as nonspecific but could include infection, aspiration or edema  Had mild fever of 100.4; procal suggesting moderate risk of infection and endorsing productive cough so empirically placed on tx for CAP    - continue bumex 1 mg IV bid; weight trending down  - continue ceftriaxone and doxycycline for possible CAP (completed 5-day course 4/19)  - wean oxygen as able, stable on 2L  - Cards recs appreciated     Hyperkalemia, resolved  Admission 5.5 with peak of 5.9, resolved with diuresis.     CKD IV  Most recent baseline Cr 1.4 in 2/2021 but prior to that appears 1.1-1.2.  - Cr trending down, is 1.3 on 4/19; daily BMP with diuresis     Afib with bradycardia  HTN  Heart rates from the high 30's to 50s in the ED, with maintained blood pressure.  Rates normalized with holding clonidine and diltiazem but now hypertensive.   - increase hydralazine to 100 mg qid 4/19  - note daughter reports that initiation of clonidine recently helped  most for blood pressure; may consider resuming if rates would tolerate and blood pressure remains uncontrolled  - continue metoprolol 50 mg bid  - continue eliquis    History of gout  - continue allopurinol     Chronic normocytic anemia  MGUS  Thyroid lesion  TSH wnl  - consider outpatient thyroid ultrasound on discharge     RA  - continue PTA prednisone  - recently started orencia as outpatient    History of COVID 19 infection 8/2020  Negative on admission  - no need for special precautions         Diet: Combination Diet Low Saturated Fat Na <2400mg Diet, No Caffeine Diet  Snacks/Supplements Adult: Ensure Enlive; Between Meals    DVT Prophylaxis: Eliquis  Dejesus Catheter: not present  Code Status: No CPR- Do NOT Intubate           Disposition Plan   Expected discharge: 1-2 days, recommended to prior living arrangement once adequately diuresed, transitioned to oral diuretics.  Entered: Billy Mai MD 04/19/2021, 1:52 PM       The patient's care was discussed with the Bedside Nurse, Patient and Patient's Family.      Billy Mai MD  Hospitalist Service  [facility]  Contact information available via [facility] Paging/Directory    ______________________________________________________________________    Interval History   She reports feeling unchanged today, denies any improvement in dyspnea or fatigue.  Reports some nausea today, denies any fever/chills, abdominal pain, diarrhea, dysuria or other infectious symptoms.     Data reviewed today: I reviewed all medications, new labs and imaging results over the last 24 hours. I personally reviewed no images or EKG's today.    Physical Exam   Vital Signs: Temp: 99.1  F (37.3  C) Temp src: Oral BP: (!) 178/84 Pulse: 83   Resp: 16 SpO2: 97 % O2 Device: Nasal cannula Oxygen Delivery: 2 LPM  Weight: 125 lbs 1.6 oz     General Appearance: Well nourished elderly female in NAD, lying in bed  Respiratory: lungs CTAB, no wheezes or crackles, no  tachypnea  Cardiovascular: RRR, normal s1/s2 without murmur  GI: abdomen soft, normal bowel sounds, nontender  Skin: no peripheral edema  Other: Alert and appropriate, cranial nerves grossly intact     Data   Recent Labs   Lab 04/19/21  0612 04/18/21  0439 04/17/21  1634 04/16/21  0550 04/15/21  1139 04/15/21  1139   WBC  --  4.3 3.2* 3.8*  --  8.3   HGB  --  9.2* 9.2* 8.9*  --  9.9*   MCV  --  103* 105* 106*  --  106*   PLT  --  163 163 149*  --  199    137 138 139   < > 140   POTASSIUM 4.7 5.0 5.9* 5.6*   < > 5.5*   CHLORIDE 111* 109 111* 112*   < > 110*   CO2 25 22 23 24   < > 23   BUN 58* 66* 63* 55*   < > 45*   CR 1.31* 1.51* 1.67* 2.08*   < > 1.47*   ANIONGAP 4 6 4 3   < > 7   ALIVIA 9.0 8.8 8.5 8.8   < > 9.2   GLC 83 80 109* 85   < > 106*   ALBUMIN  --  2.6*  --   --   --   --    TROPI  --   --   --   --   --  0.018    < > = values in this interval not displayed.

## 2021-04-19 NOTE — CONSULTS
Care Management Initial Consult    General Information  Assessment completed with: Patient, VM-chart review, (Ghislaine)  Type of CM/SW Visit: Initial Assessment    Primary Care Provider verified and updated as needed: Yes   Readmission within the last 30 days:           Advance Care Planning: Advance Care Planning Reviewed: no concerns identified          Communication Assessment  Patient's communication style: spoken language (English or Bilingual)    Hearing Difficulty or Deaf: yes   Wear Glasses or Blind: yes    Cognitive  Cognitive/Neuro/Behavioral: WDL  Level of Consciousness: alert  Arousal Level: opens eyes spontaneously  Orientation: oriented x 4  Mood/Behavior: calm, cooperative  Best Language: 0 - No aphasia  Speech: logical, clear    Living Environment:   People in home: alone     Current living Arrangements: assisted living([facility])  Name of Facility: [facility]   Able to return to prior arrangements: yes       Family/Social Support:  Care provided by: self  Provides care for: no one  Marital Status:   Children, Facility resident(s)/Staff          Description of Support System: Supportive, Involved         Current Resources:   Patient receiving home care services: No     Community Resources: None  Equipment currently used at home: walker, rolling, wheelchair, manual  Supplies currently used at home:      Employment/Financial:  Employment Status: retired        Financial Concerns: No concerns identified           Lifestyle & Psychosocial Needs:  Lifestyle     Physical activity     Days per week: 0 days     Minutes per session: 0 min     Stress: Not on file     Social Needs     Financial resource strain: Not hard at all     Food insecurity     Worry: Never true     Inability: Never true     Transportation needs     Medical: No     Non-medical: No     Socioeconomic History     Marital status:      Spouse name: Not on file     Number of children: Not on file     Years of education: Not on file      Highest education level: Not on file   Relationships     Social connections     Talks on phone: Twice a week     Gets together: Twice a week     Attends Jew service: Never     Active member of club or organization: No     Attends meetings of clubs or organizations: Never     Relationship status: Not on file     Intimate partner violence     Fear of current or ex partner: Not on file     Emotionally abused: Not on file     Physically abused: Not on file     Forced sexual activity: Not on file     Tobacco Use     Smoking status: Former Smoker     Packs/day: 0.50     Years: 5.00     Pack years: 2.50     Types: Cigarettes     Quit date: 1973     Years since quittin.3     Smokeless tobacco: Never Used   Substance and Sexual Activity     Alcohol use: Not Currently     Alcohol/week: 0.0 standard drinks     Drug use: No     Sexual activity: Not Currently     Partners: Male       Functional Status:  Prior to admission patient needed assistance:              Mental Health Status:          Chemical Dependency Status:                Values/Beliefs:  Spiritual, Cultural Beliefs, Taoist Practices, Values that affect care: no               Additional Information:  Per care management consult, chart was reviewed. Patient was admitted on 4/15/21 and is anticipating to discharge on 2021. Writer was informed by patient's doctor that he spoke with the patient and daughter regarding TCU, and they are wanting [facility]. Writer met with patient to discuss TCU, patient was agreeable. Writer attempted to further discuss case management role and complete other components of consult, however, patient was tired and did not want writer to call her daughter to complete consult. Writer did a chart review. Per chart, patient lives at [facility] and used a walker and wheelchair for long distances. Writer's daughter appears to be involved in her care per chart notes. Writer will follow up with [facility] admissions tomorrow as  that is anticipated discharge date as referral has been sent.     REJI FineW, LSW   Social Work Intern   [phone]  [facility]

## 2021-04-19 NOTE — PROGRESS NOTES
A/O x 4, vss, a-febrile, denies pain or shortness of breath, up to the bathroom, with one assist GB and walker, on oxygen at 3 L NC, diuresing well with bumex, pure wick in place, tele SR, continue to monitor.

## 2021-04-19 NOTE — PROGRESS NOTES
[facility]  Cardiology Progress Note    Date of Service (when I saw the patient): 04/19/2021  Primary Cardiologist: Dr. Manuel       Interval History:   Did not sleep well overnight. Denies worsening SOB, orthopnea, or peripheral edema. Has no new concerns.     ----------------------------------------------------------------------------------------    Assessment:  Ghislaine Walls is a 85 year old female who was admitted on 4/15/2021.    #Acute on Chronic HFpEF   -- Wt is trending down- 130 Ibs on admission and 125 Ibs today   -- Renal function is improving as well   -- Net output  -3.5 L (a few unmeasured ones)  -- On IV bumex 1 mg q 12 hrs  -- Palliative team was involved and pt/family deferred hospice at this time  -- pta diuretic regimen includes torsemide 10 mg (per reports pt stopped taking it for a week due to frequent urination)  -- dupont catheter in    # Paroxysmal atrial fibrillation  -- Better rate control with resuming pta BB  -- pta diltiazem stopped due to bradycardia     # Resistant HTN  -- pta clonidine and diltiazem held    # CKD with stage III disease.   # Chronic normocytic anemia with MGUS.   # Hx of COVID infection, negative on this admission   # Chronic pain and fatigue  # Previous recommendations for comfort care but family wanted to defer     ----------------------------------------------------------------------------------------    Plan:  -- Continue with IV bumex 1 mg BID and transition to torsemide 10 mg daily   -- Increase hydralazine to 100 mg TID   -- Follow up with Courtney Amador PA-C in 2 weeks with repeat BMP    ----------------------------------------------------------------------------------------  Physical Exam   Temp: 99.1  F (37.3  C) Temp src: Oral BP: (!) 162/72(pt felt dizzy after sitting in chair) Pulse: 86   Resp: 16 SpO2: 97 % O2 Device: Nasal cannula Oxygen Delivery: 2 LPM  Vitals:    04/17/21 0617 04/18/21 0404 04/19/21 0652   Weight: 57.4 kg (126 lb 8  oz) 57.4 kg (126 lb 9.6 oz) 56.7 kg (125 lb 1.6 oz)     GEN: NAD. Oxygen on nasal cannula.   HEENT: Mucous membranes moist.  NECK:  Mild JVD.  C/V:  Regular rate and rhythm, no murmur, rub or gallop.   RESP: CTA, mario.  GI: Abdomen soft, nontender, nondistended.    EXTREM: No pitting LE edema.   SKIN: Warm and dry.   VASC: 2+ radial and dorsalis pedis pulses bilaterally.      Medications       allopurinol  100 mg Oral Daily     apixaban ANTICOAGULANT  2.5 mg Oral BID     bumetanide  1 mg Intravenous Q12H     cefTRIAXone  1 g Intravenous Q24H     doxycycline (VIBRAMYCIN) IV  100 mg Intravenous Q12H     hydrALAZINE  75 mg Oral 4x Daily     levothyroxine  25 mcg Oral Daily     metoprolol tartrate  50 mg Oral BID     mirtazapine  15 mg Oral At Bedtime     pantoprazole  40 mg Oral Daily     predniSONE  5 mg Oral Daily     sodium chloride (PF)  3 mL Intracatheter Q8H     sulfaSALAzine ER  500 mg Oral BID       Data   Reviewed.     Tele: MARILYN Rodriguez PA-C   4/19/2021  Pager: [phone]

## 2021-04-20 ENCOUNTER — APPOINTMENT (OUTPATIENT)
Dept: PHYSICAL THERAPY | Facility: CLINIC | Age: 86
DRG: 291 | End: 2021-04-20
Payer: MEDICARE

## 2021-04-20 ENCOUNTER — APPOINTMENT (OUTPATIENT)
Dept: OCCUPATIONAL THERAPY | Facility: CLINIC | Age: 86
DRG: 291 | End: 2021-04-20
Payer: MEDICARE

## 2021-04-20 LAB
ANION GAP SERPL CALCULATED.3IONS-SCNC: 3 MMOL/L (ref 3–14)
BUN SERPL-MCNC: 60 MG/DL (ref 7–30)
CALCIUM SERPL-MCNC: 8.7 MG/DL (ref 8.5–10.1)
CHLORIDE SERPL-SCNC: 110 MMOL/L (ref 94–109)
CO2 SERPL-SCNC: 26 MMOL/L (ref 20–32)
CREAT SERPL-MCNC: 1.42 MG/DL (ref 0.52–1.04)
GFR SERPL CREATININE-BSD FRML MDRD: 34 ML/MIN/{1.73_M2}
GLUCOSE BLDC GLUCOMTR-MCNC: 83 MG/DL (ref 70–99)
GLUCOSE SERPL-MCNC: 82 MG/DL (ref 70–99)
INTERPRETATION ECG - MUSE: NORMAL
POTASSIUM SERPL-SCNC: 5.2 MMOL/L (ref 3.4–5.3)
SODIUM SERPL-SCNC: 139 MMOL/L (ref 133–144)

## 2021-04-20 PROCEDURE — 999N001017 HC STATISTIC GLUCOSE BY METER IP

## 2021-04-20 PROCEDURE — 210N000002 HC R&B HEART CARE

## 2021-04-20 PROCEDURE — 250N000012 HC RX MED GY IP 250 OP 636 PS 637: Performed by: HOSPITALIST

## 2021-04-20 PROCEDURE — 250N000013 HC RX MED GY IP 250 OP 250 PS 637: Performed by: HOSPITALIST

## 2021-04-20 PROCEDURE — 99232 SBSQ HOSP IP/OBS MODERATE 35: CPT | Performed by: INTERNAL MEDICINE

## 2021-04-20 PROCEDURE — 97116 GAIT TRAINING THERAPY: CPT | Mod: GP

## 2021-04-20 PROCEDURE — 250N000013 HC RX MED GY IP 250 OP 250 PS 637: Performed by: PHYSICIAN ASSISTANT

## 2021-04-20 PROCEDURE — 36415 COLL VENOUS BLD VENIPUNCTURE: CPT | Performed by: HOSPITALIST

## 2021-04-20 PROCEDURE — 250N000011 HC RX IP 250 OP 636: Performed by: HOSPITALIST

## 2021-04-20 PROCEDURE — 97535 SELF CARE MNGMENT TRAINING: CPT | Mod: GO

## 2021-04-20 PROCEDURE — 80048 BASIC METABOLIC PNL TOTAL CA: CPT | Performed by: HOSPITALIST

## 2021-04-20 PROCEDURE — 97530 THERAPEUTIC ACTIVITIES: CPT | Mod: GP

## 2021-04-20 PROCEDURE — 250N000013 HC RX MED GY IP 250 OP 250 PS 637: Performed by: INTERNAL MEDICINE

## 2021-04-20 PROCEDURE — 250N000009 HC RX 250: Performed by: HOSPITALIST

## 2021-04-20 RX ORDER — AMLODIPINE BESYLATE 5 MG/1
5 TABLET ORAL DAILY
Status: DISCONTINUED | OUTPATIENT
Start: 2021-04-20 | End: 2021-04-22 | Stop reason: HOSPADM

## 2021-04-20 RX ORDER — TORSEMIDE 20 MG/1
20 TABLET ORAL DAILY
Status: DISCONTINUED | OUTPATIENT
Start: 2021-04-20 | End: 2021-04-22 | Stop reason: HOSPADM

## 2021-04-20 RX ADMIN — ONDANSETRON 4 MG: 4 TABLET, ORALLY DISINTEGRATING ORAL at 13:01

## 2021-04-20 RX ADMIN — HYDRALAZINE HYDROCHLORIDE 100 MG: 50 TABLET, FILM COATED ORAL at 17:27

## 2021-04-20 RX ADMIN — PREDNISONE 5 MG: 5 TABLET ORAL at 08:37

## 2021-04-20 RX ADMIN — DOXYCYCLINE 100 MG: 100 INJECTION, POWDER, LYOPHILIZED, FOR SOLUTION INTRAVENOUS at 17:28

## 2021-04-20 RX ADMIN — APIXABAN 2.5 MG: 2.5 TABLET, FILM COATED ORAL at 20:56

## 2021-04-20 RX ADMIN — TORSEMIDE 20 MG: 20 TABLET ORAL at 12:58

## 2021-04-20 RX ADMIN — SULFASALAZINE 500 MG: 500 TABLET, DELAYED RELEASE ORAL at 08:37

## 2021-04-20 RX ADMIN — AMLODIPINE BESYLATE 5 MG: 5 TABLET ORAL at 12:59

## 2021-04-20 RX ADMIN — HYDRALAZINE HYDROCHLORIDE 100 MG: 50 TABLET, FILM COATED ORAL at 21:00

## 2021-04-20 RX ADMIN — DOXYCYCLINE 100 MG: 100 INJECTION, POWDER, LYOPHILIZED, FOR SOLUTION INTRAVENOUS at 05:12

## 2021-04-20 RX ADMIN — BUMETANIDE 1 MG: 0.25 INJECTION INTRAMUSCULAR; INTRAVENOUS at 08:37

## 2021-04-20 RX ADMIN — APIXABAN 2.5 MG: 2.5 TABLET, FILM COATED ORAL at 08:37

## 2021-04-20 RX ADMIN — SULFASALAZINE 500 MG: 500 TABLET, DELAYED RELEASE ORAL at 20:56

## 2021-04-20 RX ADMIN — METOPROLOL TARTRATE 50 MG: 50 TABLET, FILM COATED ORAL at 20:56

## 2021-04-20 RX ADMIN — HYDRALAZINE HYDROCHLORIDE 100 MG: 50 TABLET, FILM COATED ORAL at 12:59

## 2021-04-20 RX ADMIN — PANTOPRAZOLE SODIUM 40 MG: 40 TABLET, DELAYED RELEASE ORAL at 08:37

## 2021-04-20 RX ADMIN — ALLOPURINOL 100 MG: 100 TABLET ORAL at 08:37

## 2021-04-20 RX ADMIN — HYDRALAZINE HYDROCHLORIDE 100 MG: 50 TABLET, FILM COATED ORAL at 08:37

## 2021-04-20 RX ADMIN — MIRTAZAPINE 15 MG: 15 TABLET, FILM COATED ORAL at 21:00

## 2021-04-20 RX ADMIN — LEVOTHYROXINE SODIUM 25 MCG: 25 TABLET ORAL at 08:37

## 2021-04-20 RX ADMIN — METOPROLOL TARTRATE 50 MG: 50 TABLET, FILM COATED ORAL at 08:37

## 2021-04-20 ASSESSMENT — ACTIVITIES OF DAILY LIVING (ADL)
ADLS_ACUITY_SCORE: 27

## 2021-04-20 ASSESSMENT — MIFFLIN-ST. JEOR: SCORE: 1005.63

## 2021-04-20 NOTE — PROGRESS NOTES
4692-0400: Pt A/O x 4. No complaints of pain. Tolerating diet. Flat affect. Back to bed w/ assist of one. Purwick in place with large output. Unable to update daughter while at the bedside. Attempted to call daughter Krupa x 2. Plan to discharge to [place] tomorrow.

## 2021-04-20 NOTE — PROGRESS NOTES
[facility]    Hospitalist Progress Note    Assessment & Plan   Ghislaine Walls is a 85 year old female with PMHx of hypertension, afib on anticoagulation with Eliquis, stage IV CKD, RA, chronic normocytic anemia, MGUS, thyroid lesion and gout who was admitted on 4/15/2021 with acute hypoxic respiratory failure dt acute exacerbation of chronic HFpEF.     Acute hypoxic respiratory failure dt acute exacerbation of chronic HFpEF  Possible CAP, organism unknown  Had been maintained on torsemide previously but reported stopping 1wk prior to admission. Presented to ED with generalized weakness and fall. Found to be hypoxic with O2 sats 79% on RA. Does not use chronic O2. T 100.4 (see this documented in notes but not under VS), WBC 8.3, procal 0.6 (mod risk), lactate nl. ProBNP 21k.  CXR obtained and showed nonspecific changes thought to represent infection vs aspiration vs edema. May have had some productive cough. Was started on diuretics (Iv Bumex) and antibiotics (ceftriaxone) for possible CAP.   * Completed 5d course of ceftriaxone on 4/19, completed 5d course of doxycycline on 4/20  * Cardiology following and assisting with diuresis this stay  -- conts on Bumex 1mg IV BID  -- monitor Is/Os, daily wts (variable)  -- wean O2 as able -- remains stable on 2L NC, may need to cont O2 at discharge  -- follow up with [facility] after discharge    Afib, on chronic anticoagulation with Eliquis   Bradycardia on admission: Improved  Hypertension   In ED, HRs were 30-50s with stable BP. Diltiazem and clonidine held on admission. HRs improved but BPs became hypertensive.   -- conts on metoprolol 50mg BID, HRs stable  -- hydralazine increased to 100mg QID on 4/19  -- consider resumption of clonidine (0.1mg BID) as daughter noted it seemed to help with best with BPs  -- cont Eliquis    Stage IV CKD, with DRAGAN on admission which has since resolved  Baseline Cr had been 1.1 - 1.2, though more recently was 1.4  in 2/2021.   Cr 1.47 on admission but bumped to 2.09 on recheck.   Cr steadily improving with IV diuresis this stay -- 1.42 today (4/20)  -- daily BMP while diuresing     Hyperkalemia: Resolved  Admission 5.5 with peak of 5.9 on recheck later that day. K normalized with diuresis.      Gout  Chronic and stable on allopurinol     Chronic normocytic anemia  MGUS  Hypothyroidism  Thyroid lesion  TSH wnl this stay. Conts on levothyroxine  Consider outpatient thyroid ultrasound on discharge.     RA  Chronic and stable on prednisone 5mg daily  Had been recently started on Orencia as outpatient per her rhuematologist     History of COVID 19 infection 8/2020  Negative on admission.       Goals of care  * Patient met with pall care team this stay -- see note from 4/16/21.   * Discussed goals again 4/18; she reports that at this time she wishes to continue with treatment/restorative cares with plan to follow up with outpatient Rheumatologist as they were fairly confident her fatigue would start to improve following her next Orencia infusion, however, she is quite clear that if symptoms do not start to improve soon that her quality of life just isn't there and she would want to proceed with hospice.      FEN: no IVFs, lytes stable, cardiac/low Na diet  DVT Prophylaxis: on Eliquis  Code Status: No CPR- Do NOT Intubate    Disposition: Cont IV diuresis. Monitor renal function given slight upward trend in Cr. Wean O2 as able. Anticipate discharge to TCU in 1-2d on oral diuretics.    Christi Crystal    Interval History   Seen this morning. Seems a little tired. Breathing is about the same. Some cough. Denies cp. No abd pain/n/v. Appetite okay.     -Data reviewed today: I reviewed all new labs and imaging results over the last 24 hours. I personally reviewed no images or EKG's today.    Physical Exam   Temp: 98.7  F (37.1  C) Temp src: Oral BP: (!) 169/68 Pulse: 70   Resp: 14 SpO2: 97 % O2 Device: Nasal cannula Oxygen  Delivery: 2 LPM  Vitals:    04/18/21 0404 04/19/21 0652 04/20/21 0723   Weight: 57.4 kg (126 lb 9.6 oz) 56.7 kg (125 lb 1.6 oz) 58.7 kg (129 lb 4.8 oz)     Vital Signs with Ranges  Temp:  [98.6  F (37  C)-99.4  F (37.4  C)] 98.7  F (37.1  C)  Pulse:  [70-83] 70  Resp:  [14-18] 14  BP: (157-178)/(53-84) 169/68  SpO2:  [96 %-98 %] 97 %  I/O last 3 completed shifts:  In: 560 [P.O.:560]  Out: 700 [Urine:700]    Constitutional: Resting comfortably, alert and answering basic questions appropriately, NAD  Respiratory: +few scattered crackles, no wheeze, no increased work of breathing  Cardiovascular: HRRR, no MGR, no LE edema  GI: S, NT, ND, +BS  Skin/Integumen: warm/dry  Other:      Medications       allopurinol  100 mg Oral Daily     apixaban ANTICOAGULANT  2.5 mg Oral BID     bumetanide  1 mg Intravenous Q12H     doxycycline (VIBRAMYCIN) IV  100 mg Intravenous Q12H     hydrALAZINE  100 mg Oral 4x Daily     levothyroxine  25 mcg Oral Daily     metoprolol tartrate  50 mg Oral BID     mirtazapine  15 mg Oral At Bedtime     pantoprazole  40 mg Oral Daily     predniSONE  5 mg Oral Daily     sodium chloride (PF)  3 mL Intracatheter Q8H     sulfaSALAzine ER  500 mg Oral BID       Data   Recent Labs   Lab 04/20/21  0548 04/19/21  0612 04/18/21  0439 04/17/21  1634 04/16/21  0550 04/15/21  1139 04/15/21  1139   WBC  --   --  4.3 3.2* 3.8*  --  8.3   HGB  --   --  9.2* 9.2* 8.9*  --  9.9*   MCV  --   --  103* 105* 106*  --  106*   PLT  --   --  163 163 149*  --  199    140 137 138 139   < > 140   POTASSIUM 5.2 4.7 5.0 5.9* 5.6*   < > 5.5*   CHLORIDE 110* 111* 109 111* 112*   < > 110*   CO2 26 25 22 23 24   < > 23   BUN 60* 58* 66* 63* 55*   < > 45*   CR 1.42* 1.31* 1.51* 1.67* 2.08*   < > 1.47*   ANIONGAP 3 4 6 4 3   < > 7   ALIVIA 8.7 9.0 8.8 8.5 8.8   < > 9.2   GLC 82 83 80 109* 85   < > 106*   ALBUMIN  --   --  2.6*  --   --   --   --    TROPI  --   --   --   --   --   --  0.018    < > = values in this interval not  displayed.       No results found for this or any previous visit (from the past 24 hour(s)).

## 2021-04-20 NOTE — PROGRESS NOTES
[facility]  Cardiology Progress Note    Date of Service (when I saw the patient): 04/20/2021  Primary Cardiologist: Dr. Manuel       Interval History:   She feels tired and cold. Her SOB is better.     ----------------------------------------------------------------------------------------    Assessment:  Ghislaine Walls is a 85 year old female who was admitted on 4/15/2021.    #Acute on Chronic HFpEF   -- Wt had trended down to 125 Ibs but it is a higher today- unclear if this is accurat  -- Renal function has improved with diuresis  -- Net output  -3.5 L (pt incontinent intermittently)  -- On IV bumex 1 mg q 12 hrs  -- Palliative team was involved and pt/family deferred hospice at this time  -- pta diuretic regimen includes torsemide 10 mg (per reports pt had not been taking this consistently)    # Paroxysmal atrial fibrillation  -- Better rate control with resuming pta BB  -- pta diltiazem stopped due to bradycardia   -- on apixaban 2.5 mg BID    # Resistant HTN  -- pta clonidine and diltiazem held  -- hydralazine increased to 100 mg TID on 4/19 with no significant improvement  -- unable to add acei/arb due to borderline hyperkalemia     # CKD with stage III disease.   # Chronic normocytic anemia with MGUS.   # Hx of COVID infection, negative on this admission   # Chronic pain and fatigue  # Previous recommendations for comfort care but family wanted to defer        ----------------------------------------------------------------------------------------    Plan:  -- Transition to torsemide 10 mg daily today  -- Start amlodipine 5 mg daily for optimal blood pressure   -- Follow up with Courtney Amador PA-C in 2 weeks with repeat BMP  -- Also has follow upplanned with Dr. Jaquez   -- Cardiology will sign off    ----------------------------------------------------------------------------------------  Physical Exam   Temp: 98.7  F (37.1  C) Temp src: Oral BP: (!) 169/68 Pulse: 70   Resp: 14 SpO2: 97  % O2 Device: Nasal cannula Oxygen Delivery: 2 LPM  Vitals:    04/18/21 0404 04/19/21 0652 04/20/21 0723   Weight: 57.4 kg (126 lb 9.6 oz) 56.7 kg (125 lb 1.6 oz) 58.7 kg (129 lb 4.8 oz)     GEN: NAD. Oxygen on nasal cannula. Sleepy.  HEENT: Mucous membranes moist.  NECK:  No JVD.  C/V:  Regular rate and rhythm, no murmur, rub or gallop.   RESP: CTA, mario.  GI: Abdomen soft, nontender, nondistended.    EXTREM: No pitting LE edema.   SKIN: Warm and dry.   VASC: 2+ radial and dorsalis pedis pulses bilaterally.      Medications       allopurinol  100 mg Oral Daily     apixaban ANTICOAGULANT  2.5 mg Oral BID     bumetanide  1 mg Intravenous Q12H     doxycycline (VIBRAMYCIN) IV  100 mg Intravenous Q12H     hydrALAZINE  100 mg Oral 4x Daily     levothyroxine  25 mcg Oral Daily     metoprolol tartrate  50 mg Oral BID     mirtazapine  15 mg Oral At Bedtime     pantoprazole  40 mg Oral Daily     predniSONE  5 mg Oral Daily     sodium chloride (PF)  3 mL Intracatheter Q8H     sulfaSALAzine ER  500 mg Oral BID       Data   Reviewed.     Tele: MARILYN Rodriguez PA-C   4/20/2021  Pager: [phone]

## 2021-04-20 NOTE — PROGRESS NOTES
Care Management Follow Up    Length of Stay (days): 5    Expected Discharge Date: 04/20/21     Concerns to be Addressed: discharge planning     Patient plan of care discussed at interdisciplinary rounds: Yes    Anticipated Discharge Disposition: Transitional Care, Skilled Nursing Facilty     Anticipated Discharge Services:    Anticipated Discharge DME:      Patient/family educated on Medicare website which has current facility and service quality ratings: yes  Education Provided on the Discharge Plan:    Patient/Family in Agreement with the Plan: yes    Referrals Placed by CM/SW: Post Acute Facilities  Private pay costs discussed: Not applicable    Additional Information:  Writer received a call from [facility] asking if patient will be discharging today. Writer paged doctor at 10:09am to inquire about discharge. Writer spoke to  who stated patient probably would not be ready until Thursday. Writer called and let [facility] know.       Sosa Triplett, REJIW, LSW   Social Work Intern   [phone]  [facility]

## 2021-04-21 ENCOUNTER — APPOINTMENT (OUTPATIENT)
Dept: OCCUPATIONAL THERAPY | Facility: CLINIC | Age: 86
DRG: 291 | End: 2021-04-21
Payer: MEDICARE

## 2021-04-21 LAB
ANION GAP SERPL CALCULATED.3IONS-SCNC: 4 MMOL/L (ref 3–14)
BUN SERPL-MCNC: 70 MG/DL (ref 7–30)
CALCIUM SERPL-MCNC: 9.2 MG/DL (ref 8.5–10.1)
CHLORIDE SERPL-SCNC: 108 MMOL/L (ref 94–109)
CO2 SERPL-SCNC: 26 MMOL/L (ref 20–32)
CREAT SERPL-MCNC: 1.43 MG/DL (ref 0.52–1.04)
GFR SERPL CREATININE-BSD FRML MDRD: 33 ML/MIN/{1.73_M2}
GLUCOSE SERPL-MCNC: 83 MG/DL (ref 70–99)
LABORATORY COMMENT REPORT: NORMAL
POTASSIUM SERPL-SCNC: 4.5 MMOL/L (ref 3.4–5.3)
SARS-COV-2 RNA RESP QL NAA+PROBE: NEGATIVE
SODIUM SERPL-SCNC: 138 MMOL/L (ref 133–144)
SPECIMEN SOURCE: NORMAL

## 2021-04-21 PROCEDURE — 250N000011 HC RX IP 250 OP 636: Performed by: INTERNAL MEDICINE

## 2021-04-21 PROCEDURE — 210N000002 HC R&B HEART CARE

## 2021-04-21 PROCEDURE — 250N000013 HC RX MED GY IP 250 OP 250 PS 637: Performed by: PHYSICIAN ASSISTANT

## 2021-04-21 PROCEDURE — 250N000009 HC RX 250: Performed by: HOSPITALIST

## 2021-04-21 PROCEDURE — 80048 BASIC METABOLIC PNL TOTAL CA: CPT | Performed by: INTERNAL MEDICINE

## 2021-04-21 PROCEDURE — 97530 THERAPEUTIC ACTIVITIES: CPT | Mod: GO

## 2021-04-21 PROCEDURE — 250N000013 HC RX MED GY IP 250 OP 250 PS 637: Performed by: HOSPITALIST

## 2021-04-21 PROCEDURE — 99232 SBSQ HOSP IP/OBS MODERATE 35: CPT | Performed by: INTERNAL MEDICINE

## 2021-04-21 PROCEDURE — 250N000013 HC RX MED GY IP 250 OP 250 PS 637: Performed by: INTERNAL MEDICINE

## 2021-04-21 PROCEDURE — 36415 COLL VENOUS BLD VENIPUNCTURE: CPT | Performed by: INTERNAL MEDICINE

## 2021-04-21 PROCEDURE — 250N000012 HC RX MED GY IP 250 OP 636 PS 637: Performed by: HOSPITALIST

## 2021-04-21 PROCEDURE — 87635 SARS-COV-2 COVID-19 AMP PRB: CPT | Performed by: INTERNAL MEDICINE

## 2021-04-21 PROCEDURE — 250N000011 HC RX IP 250 OP 636: Performed by: HOSPITALIST

## 2021-04-21 RX ORDER — CLONIDINE HYDROCHLORIDE 0.1 MG/1
0.1 TABLET ORAL 2 TIMES DAILY
Status: DISCONTINUED | OUTPATIENT
Start: 2021-04-21 | End: 2021-04-22 | Stop reason: HOSPADM

## 2021-04-21 RX ORDER — PROCHLORPERAZINE MALEATE 5 MG
5 TABLET ORAL EVERY 6 HOURS PRN
Status: DISCONTINUED | OUTPATIENT
Start: 2021-04-21 | End: 2021-04-22 | Stop reason: HOSPADM

## 2021-04-21 RX ORDER — PROCHLORPERAZINE 25 MG
12.5 SUPPOSITORY, RECTAL RECTAL EVERY 12 HOURS PRN
Status: DISCONTINUED | OUTPATIENT
Start: 2021-04-21 | End: 2021-04-22 | Stop reason: HOSPADM

## 2021-04-21 RX ADMIN — HYDRALAZINE HYDROCHLORIDE 100 MG: 50 TABLET, FILM COATED ORAL at 12:01

## 2021-04-21 RX ADMIN — LEVOTHYROXINE SODIUM 25 MCG: 25 TABLET ORAL at 08:00

## 2021-04-21 RX ADMIN — CLONIDINE HYDROCHLORIDE 0.1 MG: 0.1 TABLET ORAL at 12:01

## 2021-04-21 RX ADMIN — PANTOPRAZOLE SODIUM 40 MG: 40 TABLET, DELAYED RELEASE ORAL at 08:00

## 2021-04-21 RX ADMIN — HYDRALAZINE HYDROCHLORIDE 10 MG: 20 INJECTION INTRAMUSCULAR; INTRAVENOUS at 05:37

## 2021-04-21 RX ADMIN — PROCHLORPERAZINE MALEATE 5 MG: 5 TABLET ORAL at 12:02

## 2021-04-21 RX ADMIN — SULFASALAZINE 500 MG: 500 TABLET, DELAYED RELEASE ORAL at 21:07

## 2021-04-21 RX ADMIN — HYDRALAZINE HYDROCHLORIDE 100 MG: 50 TABLET, FILM COATED ORAL at 07:58

## 2021-04-21 RX ADMIN — HYDRALAZINE HYDROCHLORIDE 100 MG: 50 TABLET, FILM COATED ORAL at 17:51

## 2021-04-21 RX ADMIN — APIXABAN 2.5 MG: 2.5 TABLET, FILM COATED ORAL at 21:07

## 2021-04-21 RX ADMIN — AMLODIPINE BESYLATE 5 MG: 5 TABLET ORAL at 07:58

## 2021-04-21 RX ADMIN — METOPROLOL TARTRATE 50 MG: 50 TABLET, FILM COATED ORAL at 07:58

## 2021-04-21 RX ADMIN — SULFASALAZINE 500 MG: 500 TABLET, DELAYED RELEASE ORAL at 08:00

## 2021-04-21 RX ADMIN — TORSEMIDE 20 MG: 20 TABLET ORAL at 08:00

## 2021-04-21 RX ADMIN — CLONIDINE HYDROCHLORIDE 0.1 MG: 0.1 TABLET ORAL at 21:07

## 2021-04-21 RX ADMIN — SENNOSIDES AND DOCUSATE SODIUM 1 TABLET: 8.6; 5 TABLET ORAL at 08:06

## 2021-04-21 RX ADMIN — PREDNISONE 5 MG: 5 TABLET ORAL at 08:00

## 2021-04-21 RX ADMIN — HYDRALAZINE HYDROCHLORIDE 100 MG: 50 TABLET, FILM COATED ORAL at 21:07

## 2021-04-21 RX ADMIN — ONDANSETRON 4 MG: 4 TABLET, ORALLY DISINTEGRATING ORAL at 08:06

## 2021-04-21 RX ADMIN — MIRTAZAPINE 15 MG: 15 TABLET, FILM COATED ORAL at 21:07

## 2021-04-21 RX ADMIN — ALLOPURINOL 100 MG: 100 TABLET ORAL at 08:00

## 2021-04-21 RX ADMIN — APIXABAN 2.5 MG: 2.5 TABLET, FILM COATED ORAL at 07:59

## 2021-04-21 ASSESSMENT — ACTIVITIES OF DAILY LIVING (ADL)
ADLS_ACUITY_SCORE: 27
ADLS_ACUITY_SCORE: 26
ADLS_ACUITY_SCORE: 26
ADLS_ACUITY_SCORE: 27
ADLS_ACUITY_SCORE: 26
ADLS_ACUITY_SCORE: 27

## 2021-04-21 ASSESSMENT — MIFFLIN-ST. JEOR: SCORE: 959.82

## 2021-04-21 NOTE — PROGRESS NOTES
"CLINICAL NUTRITION SERVICES - REASSESSMENT NOTE    Recommendations Ordered by Registered Dietitian (RD):   - recommend least restrictive diet. Consider liberalizing.   - continue ensure enlive BID between meals  - encourage intakes. Consider scheduling antiemetic prior to mealtimes.    Malnutrition: (4/21)  % Weight Loss:  Up to 20% in 1 year (non-severe malnutrition)  % Intake:  </= 75% for >/= 1 month (severe malnutrition)  Subcutaneous Fat Loss:  Orbital region mild depletion and Upper arm region moderate depletion  Muscle Loss:  Temporal region moderate depletion, Clavicle bone region moderate depletion and Acromion bone region moderaet depletion  Fluid Retention:  None noted    Malnutrition Diagnosis: Severe malnutrition  In Context of:  Acute illness or injury  Chronic illness or disease     EVALUATION OF PROGRESS TOWARD GOALS   Diet: Low Sat Fat, Na <2400 mg   Ensure Enlive BID between meals     Intake/Tolerance:   - Pt tells me that she \"feels terrible\" as soon as I entered the room this morning. She asked for chapstick, then reported feeling extremely nauseous. Breakfast is at bedside, but pt has no desire to eat. No PO yet this morning.   - Yesterday pt ate, but she states that her appetite was poor. Thinks she drank up to 1/2 of and Ensure (about 4 oz - 175 kcal, 10 g pro). She is ok with continuing to receive these.   - Recorded intakes are variable, ranging 25-% depending on the day. Meal review shows smaller meals are ordered 2-3 x daily. Flowsheet shows that she did eat 100% of her breakfast yesterday (eggs, veggie cecelia, and strawberry yogurt - 370 kcal, 29 g pro), but nothing is documented the rest of the day.     Aysha vallejo HS, noted zofran and senokot given this morning  Last BM x1 on 4/19  BUN 70 (H), Cr 1.43 (H)    Wt is 54.1 kg - consistent w/ lowest wt measured in Feb (54.7 kg)     ASSESSED NUTRITION NEEDS:  Dosing Weight 57.7 kg  Estimated Energy Needs: 9460-7418 kcals (25-30 " Kcal/Kg)  Justification: maintenance  Estimated Protein Needs: 69-87 grams protein (1.2-1.5 g pro/Kg)  Justification: preservation of lean body mass  Estimated Fluid Needs: 1 mL/kcal  Justification: maintenance    NEW FINDINGS:   discharge back to [facility]     Previous Goals:   Intake of at least 50% for meals BID + 1-2 supplements daily  Evaluation: Not met consistently     Previous Nutrition Diagnosis:   Inadequate oral intake related to lack of appetite as evidenced by pt reports she takes just bites of meals BID, wt loss of up to 20% in 1 year.   Evaluation: No change - updated below     MALNUTRITION  % Weight Loss:  Up to 20% in 1 year (non-severe malnutrition)  % Intake:  </= 75% for >/= 1 month (severe malnutrition)  Subcutaneous Fat Loss:  Orbital region mild depletion and Upper arm region moderate depletion  Muscle Loss:  Temporal region moderate depletion, Clavicle bone region moderate depletion and Acromion bone region moderaet depletion  Fluid Retention:  None noted    Malnutrition Diagnosis: Severe malnutrition  In Context of:  Acute illness or injury  Chronic illness or disease    CURRENT NUTRITION DIAGNOSIS  Inadequate oral intake related to poor appetite, nausea, overall feeling poorly as evidenced by variable intakes of % for small meals since admission 4/15.     INTERVENTIONS  Recommendations / Nutrition Prescription  Recommend least restrictive diet  Ensure Enlive BID between meals    Implementation  None new    Goals  Intake of 50% meals BID-TID + 1 supplement daily.       MONITORING AND EVALUATION:  Progress towards goals will be monitored and evaluated per protocol and Practice Guidelines    Nithya White RD, LD  [facility], 67, 56, MH   Pager: [phone]  Weekend Pager: [phone]

## 2021-04-21 NOTE — PROGRESS NOTES
Care Management Follow Up    Length of Stay (days): 6    Expected Discharge Date: 04/22/21     Concerns to be Addressed: discharge planning     Patient plan of care discussed at interdisciplinary rounds: Yes    Anticipated Discharge Disposition: Transitional Care, Skilled Nursing Facilty     Anticipated Discharge Services:    Anticipated Discharge DME:      Patient/family educated on Medicare website which has current facility and service quality ratings: yes  Education Provided on the Discharge Plan:    Patient/Family in Agreement with the Plan: yes    Referrals Placed by CM/SW: Post Acute Facilities  Private pay costs discussed: Not applicable    Additional Information:  Transport to [facility] scheduled for 1100 on 4/22/21.  [facility] requests a new COVID swab. Nurse notified.  MD notified.     PAS-RR    D: Per DHS regulation, SW completed and submitted PAS-RR to [place] [employer] via the [employer] Line.  PAS-RR confirmation # is : [id number]    I: SW spoke with pt and they are aware a PAS-RR has been submitted.  SW reviewed with pt that they may be contacted for a follow up appointment within 10 days of hospital discharge if their SNF stay is < 30 days.  Contact information for [employer] was also provided.    A: Pt  verbalized understanding.    P: Further questions may be directed to [employer] at #[phone], option #4 for PAS-RR staff.        Carmela Diez, LICSW

## 2021-04-21 NOTE — PROGRESS NOTES
[facility]    Hospitalist Progress Note    Assessment & Plan   Ghislaine Walls is a 85 year old female with PMHx of hypertension, afib on anticoagulation with Eliquis, stage IV CKD, RA, chronic normocytic anemia, MGUS, thyroid lesion and gout who was admitted on 4/15/2021 with acute hypoxic respiratory failure dt acute exacerbation of chronic HFpEF.     Acute hypoxic respiratory failure dt acute exacerbation of chronic HFpEF  Possible CAP, organism unknown  Had been maintained on torsemide previously but reported stopping 1wk prior to admission. Presented to ED with generalized weakness and fall. Found to be hypoxic with O2 sats 79% on RA. Does not use chronic O2. T 100.4 (see this documented in notes but not under VS), WBC 8.3, procal 0.6 (mod risk), lactate nl. ProBNP 21k.  CXR obtained and showed nonspecific changes thought to represent infection vs aspiration vs edema. May have had some productive cough. Was started on diuretics (Iv Bumex) and antibiotics (ceftriaxone) for possible CAP.   * Completed 5d course of ceftriaxone on 4/19, completed 5d course of doxycycline on 4/20  * Cardiology following and assisting with diuresis this stay, changed from IV Bumex to torsemide 20mg po daily on 4/20    -- cont torsemide 20mg po daily   -- monitor Is/Os, daily wts (variable)  -- wean O2 as able -- bounding between  and NC  -- follow up with [facility] after discharge    Afib, on chronic anticoagulation with Eliquis   Bradycardia on admission: Improved  Hypertension   In ED, HRs were 30-50s with stable BP. Diltiazem and clonidine held on admission. HRs improved but BPs became hypertensive.   -- conts on metoprolol 50mg BID, HRs stable  -- hydralazine increased to 100mg QID on 4/19  -- added back clonidine 0.1mg BID on 4/21 as BIS consistently high (160-190s systolic)  -- cont Eliquis    Stage IV CKD, with DRAGAN on admission which has since resolved  Baseline Cr had been 1.1 - 1.2, though more  recently was 1.4 in 2/2021.   Cr 1.47 on admission but bumped to 2.09 on recheck.   Cr steadily improving with IV diuresis this stay -- now stable at 1.4     Hyperkalemia: Resolved  Admission 5.5 with peak of 5.9 on recheck later that day. K normalized with diuresis.      Gout  Chronic and stable on allopurinol     Chronic normocytic anemia  MGUS  Hypothyroidism  Thyroid lesion  TSH wnl this stay. Conts on levothyroxine  Consider outpatient thyroid ultrasound on discharge.     RA  Chronic and stable on prednisone 5mg daily  Had been recently started on Orencia as outpatient per her rhuematologist     History of COVID 19 infection 8/2020  Negative on admission.       Goals of care  * Patient met with pall care team this stay -- see note from 4/16/21.   * Discussed goals again 4/18; she reports that at this time she wishes to continue with treatment/restorative cares with plan to follow up with outpatient Rheumatologist as they were fairly confident her fatigue would start to improve following her next Orencia infusion, however, she is quite clear that if symptoms do not start to improve soon that her quality of life just isn't there and she would want to proceed with hospice.      FEN: no IVFs, lytes stable, cardiac/low Na diet  DVT Prophylaxis: on Eliquis  Code Status: No CPR- Do NOT Intubate    Disposition: Restarted clonidine today. Prns added for nausea. Monitor O2 needs. Anticipate discharge to TCU tomorrow.     Christi Crystal    Interval History   BPs high overnigth -- 180-190s sytsolic. Seen this morning. Feeling poorly -- reports nausea but no other focal complaints. Denies headache, cp/sob/cough, abd pain.     -Data reviewed today: I reviewed all new labs and imaging results over the last 24 hours. I personally reviewed no images or EKG's today.    Physical Exam   Temp: 98.3  F (36.8  C) Temp src: Axillary BP: (!) 164/74 Pulse: 64   Resp: 16 SpO2: 93 % O2 Device: None (Room air) Oxygen Delivery: 2  LPM  Vitals:    04/19/21 0652 04/20/21 0723 04/21/21 0510   Weight: 56.7 kg (125 lb 1.6 oz) 58.7 kg (129 lb 4.8 oz) 54.1 kg (119 lb 3.2 oz)     Vital Signs with Ranges  Temp:  [98.2  F (36.8  C)-98.8  F (37.1  C)] 98.3  F (36.8  C)  Pulse:  [62-74] 64  Resp:  [14-20] 16  BP: (150-194)/(60-78) 164/74  SpO2:  [93 %-100 %] 93 %  I/O last 3 completed shifts:  In: 480 [P.O.:480]  Out: 700 [Urine:700]    Constitutional:  Appears tired but  alert and answering basic questions appropriately, NAD  Respiratory: CTA through anterior fields, no wheeze, no increased work of breathing  Cardiovascular: HRRR, no MGR, no LE edema  GI: S, NT, ND, +BS  Skin/Integumen: warm/dry  Other:      Medications       allopurinol  100 mg Oral Daily     amLODIPine  5 mg Oral Daily     apixaban ANTICOAGULANT  2.5 mg Oral BID     doxycycline (VIBRAMYCIN) IV  100 mg Intravenous Q12H     hydrALAZINE  100 mg Oral 4x Daily     levothyroxine  25 mcg Oral Daily     metoprolol tartrate  50 mg Oral BID     mirtazapine  15 mg Oral At Bedtime     pantoprazole  40 mg Oral Daily     predniSONE  5 mg Oral Daily     sodium chloride (PF)  3 mL Intracatheter Q8H     sulfaSALAzine ER  500 mg Oral BID     torsemide  20 mg Oral Daily       Data   Recent Labs   Lab 04/21/21  0631 04/20/21  0548 04/19/21  0612 04/18/21  0439 04/17/21  1634 04/16/21  0550 04/15/21  1139 04/15/21  1139   WBC  --   --   --  4.3 3.2* 3.8*  --  8.3   HGB  --   --   --  9.2* 9.2* 8.9*  --  9.9*   MCV  --   --   --  103* 105* 106*  --  106*   PLT  --   --   --  163 163 149*  --  199    139 140 137 138 139   < > 140   POTASSIUM 4.5 5.2 4.7 5.0 5.9* 5.6*   < > 5.5*   CHLORIDE 108 110* 111* 109 111* 112*   < > 110*   CO2 26 26 25 22 23 24   < > 23   BUN 70* 60* 58* 66* 63* 55*   < > 45*   CR 1.43* 1.42* 1.31* 1.51* 1.67* 2.08*   < > 1.47*   ANIONGAP 4 3 4 6 4 3   < > 7   ALIVIA 9.2 8.7 9.0 8.8 8.5 8.8   < > 9.2   GLC 83 82 83 80 109* 85   < > 106*   ALBUMIN  --   --   --  2.6*  --   --   --    --    TROPI  --   --   --   --   --   --   --  0.018    < > = values in this interval not displayed.       No results found for this or any previous visit (from the past 24 hour(s)).

## 2021-04-22 VITALS
WEIGHT: 120.8 LBS | RESPIRATION RATE: 18 BRPM | TEMPERATURE: 97.3 F | SYSTOLIC BLOOD PRESSURE: 152 MMHG | OXYGEN SATURATION: 95 % | DIASTOLIC BLOOD PRESSURE: 63 MMHG | BODY MASS INDEX: 21.4 KG/M2 | HEART RATE: 64 BPM

## 2021-04-22 VITALS
TEMPERATURE: 98.4 F | RESPIRATION RATE: 16 BRPM | BODY MASS INDEX: 20.54 KG/M2 | WEIGHT: 115.9 LBS | SYSTOLIC BLOOD PRESSURE: 164 MMHG | DIASTOLIC BLOOD PRESSURE: 75 MMHG | OXYGEN SATURATION: 93 % | HEART RATE: 62 BPM | HEIGHT: 63 IN

## 2021-04-22 LAB
ANION GAP SERPL CALCULATED.3IONS-SCNC: 8 MMOL/L (ref 3–14)
BUN SERPL-MCNC: 84 MG/DL (ref 7–30)
CALCIUM SERPL-MCNC: 8.7 MG/DL (ref 8.5–10.1)
CHLORIDE SERPL-SCNC: 107 MMOL/L (ref 94–109)
CO2 SERPL-SCNC: 24 MMOL/L (ref 20–32)
CREAT SERPL-MCNC: 1.66 MG/DL (ref 0.52–1.04)
GFR SERPL CREATININE-BSD FRML MDRD: 28 ML/MIN/{1.73_M2}
GLUCOSE SERPL-MCNC: 78 MG/DL (ref 70–99)
POTASSIUM SERPL-SCNC: 4.5 MMOL/L (ref 3.4–5.3)
SODIUM SERPL-SCNC: 139 MMOL/L (ref 133–144)

## 2021-04-22 PROCEDURE — 99239 HOSP IP/OBS DSCHRG MGMT >30: CPT | Performed by: INTERNAL MEDICINE

## 2021-04-22 PROCEDURE — 250N000013 HC RX MED GY IP 250 OP 250 PS 637: Performed by: PHYSICIAN ASSISTANT

## 2021-04-22 PROCEDURE — 250N000013 HC RX MED GY IP 250 OP 250 PS 637: Performed by: HOSPITALIST

## 2021-04-22 PROCEDURE — 250N000013 HC RX MED GY IP 250 OP 250 PS 637: Performed by: INTERNAL MEDICINE

## 2021-04-22 PROCEDURE — 80048 BASIC METABOLIC PNL TOTAL CA: CPT | Performed by: INTERNAL MEDICINE

## 2021-04-22 PROCEDURE — 250N000012 HC RX MED GY IP 250 OP 636 PS 637: Performed by: HOSPITALIST

## 2021-04-22 PROCEDURE — 36415 COLL VENOUS BLD VENIPUNCTURE: CPT | Performed by: INTERNAL MEDICINE

## 2021-04-22 RX ORDER — METOPROLOL TARTRATE 50 MG
50 TABLET ORAL 2 TIMES DAILY
DISCHARGE
Start: 2021-04-22 | End: 2021-05-05

## 2021-04-22 RX ORDER — AMLODIPINE BESYLATE 5 MG/1
5 TABLET ORAL DAILY
DISCHARGE
Start: 2021-04-22 | End: 2021-04-27

## 2021-04-22 RX ORDER — HYDRALAZINE HYDROCHLORIDE 50 MG/1
50 TABLET, FILM COATED ORAL 4 TIMES DAILY
DISCHARGE
Start: 2021-04-22 | End: 2021-04-22

## 2021-04-22 RX ORDER — TORSEMIDE 20 MG/1
20 TABLET ORAL DAILY
DISCHARGE
Start: 2021-04-22 | End: 2021-05-05

## 2021-04-22 RX ORDER — HYDRALAZINE HYDROCHLORIDE 100 MG/1
100 TABLET, FILM COATED ORAL 4 TIMES DAILY
DISCHARGE
Start: 2021-04-22 | End: 2021-05-05

## 2021-04-22 RX ADMIN — SULFASALAZINE 500 MG: 500 TABLET, DELAYED RELEASE ORAL at 08:40

## 2021-04-22 RX ADMIN — METOPROLOL TARTRATE 50 MG: 50 TABLET, FILM COATED ORAL at 08:40

## 2021-04-22 RX ADMIN — APIXABAN 2.5 MG: 2.5 TABLET, FILM COATED ORAL at 08:40

## 2021-04-22 RX ADMIN — CLONIDINE HYDROCHLORIDE 0.1 MG: 0.1 TABLET ORAL at 08:40

## 2021-04-22 RX ADMIN — HYDRALAZINE HYDROCHLORIDE 100 MG: 50 TABLET, FILM COATED ORAL at 08:40

## 2021-04-22 RX ADMIN — PREDNISONE 5 MG: 5 TABLET ORAL at 08:40

## 2021-04-22 RX ADMIN — AMLODIPINE BESYLATE 5 MG: 5 TABLET ORAL at 08:40

## 2021-04-22 RX ADMIN — PANTOPRAZOLE SODIUM 40 MG: 40 TABLET, DELAYED RELEASE ORAL at 08:40

## 2021-04-22 RX ADMIN — TORSEMIDE 20 MG: 20 TABLET ORAL at 08:40

## 2021-04-22 RX ADMIN — LEVOTHYROXINE SODIUM 25 MCG: 25 TABLET ORAL at 08:40

## 2021-04-22 RX ADMIN — ALLOPURINOL 100 MG: 100 TABLET ORAL at 08:40

## 2021-04-22 ASSESSMENT — ACTIVITIES OF DAILY LIVING (ADL)
ADLS_ACUITY_SCORE: 26

## 2021-04-22 ASSESSMENT — MIFFLIN-ST. JEOR: SCORE: 944.85

## 2021-04-22 NOTE — PROGRESS NOTES
Pt VSS on RA. Tele SR w/ first degree AVB. A&Ox4. Up with A 1 walker, using external cathetor. Mepliex intact. Denies pain. Slept most of am, bp imprved after pta meds given. Pt discharge instructions given, questions answered, Elenita picked up pt at 11am to transition to TCU.  Pt discharged with all of belongings, pt discharged safely.

## 2021-04-22 NOTE — DISCHARGE SUMMARY
[facility]    Discharge Summary  Hospitalist    Date of Admission:  4/15/2021  Date of Discharge:  4/22/2021  Discharging Provider: Christi Crystal    Discharge Diagnoses   Acute hypoxic respiratory failure dt acute exacerbation of chronic HFpEF  Possible CAP, organism unknown  Afib, on chronic anticoagulation with Eliquis   Bradycardia on admission: Improved  Hypertension   Stage IV CKD, with DRAGAN on admission which has since resolved  Hyperkalemia: Resolved  Gout  Chronic normocytic anemia  MGUS  Hypothyroidism  Thyroid lesion  RA  History of COVID 19 infection 8/2020  Severe malnutrition    History of Present Illness   Ghislaine Walls is a 85 year old female with PMHx of hypertension, afib on anticoagulation with Eliquis, stage IV CKD, RA, chronic normocytic anemia, MGUS, thyroid lesion and gout who was admitted on 4/15/2021 with acute hypoxic respiratory failure dt acute exacerbation of chronic HFpEF.    Hospital Course   Ghislaine Walls was admitted on 4/15/2021.  The following problems were addressed during her hospitalization:    Acute hypoxic respiratory failure dt acute exacerbation of chronic HFpEF  Possible CAP, organism unknown  Had been maintained on torsemide previously but reported stopping 1wk prior to admission. Presented to ED with generalized weakness and fall. Found to be hypoxic with O2 sats 79% on RA. Does not use chronic O2. T 100.4 (see this documented in notes but not under VS), WBC 8.3, procal 0.6 (mod risk), lactate nl. ProBNP 21k.  CXR obtained and showed nonspecific changes thought to represent infection vs aspiration vs edema. May have had some productive cough. Was started on diuretics (Iv Bumex) and antibiotics (ceftriaxone) for possible CAP.   * Completed 5d course of ceftriaxone on 4/19, completed 5d course of doxycycline on 4/20  * Cardiology following and assisting with diuresis this stay, changed from IV Bumex to torsemide 20mg po daily on 4/20.      Discharged on torsemide 20mg po daily.   Recommend to continue monitoring daily wts at TCU.   O2 needs stable on RA at discharge.   Should follow up with [facility] after discharge.     Afib, on chronic anticoagulation with Eliquis   Bradycardia on admission: Improved  Hypertension   In ED, HRs were 30-50s with stable BP. Diltiazem and clonidine held on admission. HRs improved but BPs became hypertensive.   Medications adjusted this stay.     At discharge will take:  -- metoprolol 50mg BID, HRs stable  -- amlodipine 5mg daily  -- [id number] QID   -- clonidine 0.1mg BID  -- continues on Eliquis     Stage IV CKD, with DRAGAN on admission which has since resolved  Baseline Cr had been 1.1 - 1.2, though more recently was 1.4 in 2/2021.   Cr 1.47 on admission but bumped to 2.09 on recheck.   Cr steadily improved with IV diuresis this stay. Had been stable at 1.4. On day of discharge, Cr noted to be 1.6. As such, recommend repeat BMP at TCU on 4/24 for repeat assessment.      Hyperkalemia: Resolved  Admission 5.5 with peak of 5.9 on recheck later that day. K normalized with diuresis.      Gout  Chronic and stable on allopurinol     Chronic normocytic anemia  MGUS  Hypothyroidism  Thyroid lesion  TSH wnl this stay. Conts on levothyroxine  Consider outpatient thyroid ultrasound on discharge.     RA  Chronic and stable on prednisone 5mg daily  Had been recently started on Orencia as outpatient per her rhuematologist     History of COVID 19 infection 8/2020  Negative on admission.      Severe Malnutrition  In the context of chronic illness. Noted per nutritionist.   Supplements recommended     Goals of care  * Patient met with pall care team this stay -- see note from 4/16/21.   * Discussed goals again 4/18; she reports that at this time she wishes to continue with treatment/restorative cares with plan to follow up with outpatient Rheumatologist as they were fairly confident her fatigue would start to improve  following her next Orencia infusion, however, she is quite clear that if symptoms do not start to improve soon that her quality of life just isn't there and she would want to proceed with hospice.     Christi Crystal DO    Code Status   DNR / DNI       Primary Care Physician   Laurie Conteh    Physical Exam   Temp: 98.4  F (36.9  C) Temp src: Oral BP: (!) 164/75 Pulse: 62   Resp: 16 SpO2: 93 % O2 Device: None (Room air)    Vitals:    04/20/21 0723 04/21/21 0510 04/22/21 0449   Weight: 58.7 kg (129 lb 4.8 oz) 54.1 kg (119 lb 3.2 oz) 52.6 kg (115 lb 14.4 oz)     Vital Signs with Ranges  Temp:  [97.9  F (36.6  C)-98.4  F (36.9  C)] 98.4  F (36.9  C)  Pulse:  [56-64] 62  Resp:  [16] 16  BP: (131-164)/(62-93) 164/75  SpO2:  [93 %-95 %] 93 %  I/O last 3 completed shifts:  In: 600 [P.O.:600]  Out: 1000 [Urine:1000]    General: Resting comfortably, alert, conversive, NAD  CVS: HRRR, no MGR, no LE edema  Respiratory: few faint bibasilar crackles, no wheeze, no increased work of breathing  GI: S, NT, ND, +BS  Skin: Warm/dry  Neuro: CNs 2-12 intact, no focal motor/sensory deficits    Discharge Disposition   Discharged to TCU  Condition at discharge: Stable    Consultations This Hospital Stay   CARDIOLOGY IP CONSULT  PALLIATIVE CARE ADULT IP CONSULT  -----------------------------------------------------  CARE MANAGEMENT / SOCIAL WORK IP CONSULT  PHYSICAL THERAPY ADULT IP CONSULT  OCCUPATIONAL THERAPY ADULT IP CONSULT    Time Spent on this Encounter   Christi BENITES DO, personally saw the patient today and spent greater than 30 minutes discharging this patient.    Discharge Orders      Basic metabolic panel     Discharge Order: F/U with Cardiac  CHRISTIAN      General info for SNF    Length of Stay Estimate: Short Term Care: Estimated # of Days <30  Condition at Discharge: Improving  Level of care:skilled   Rehabilitation Potential: Good  Admission H&P remains valid and up-to-date: Yes  Recent Chemotherapy:  N/A  Use Nursing Home Standing Orders: N/A     Mantoux instructions    Give two-step Mantoux (PPD) Per Facility Policy Yes     Reason for your hospital stay    Management of your breathing difficulties for which your diuretic medications and blood pressure medications were adjusted.     Daily weights    Call Provider for weight gain of more than 2 pounds per day or 5 pounds per week.     Follow Up and recommended labs and tests    Should have BMP checked on 4/24 with results to facility physician to monitor potassium level.   Follow up with facility physician in the next 1-2d.  Follow up with cardiology in clinic as advised.  Follow up with your rheumatologist as scheduled.     Activity - Up with nursing assistance     Physical Therapy Adult Consult    Evaluate and treat as clinically indicated.    Reason: Physical deconditioning     Occupational Therapy Adult Consult    Evaluate and treat as clinically indicated.    Reason:  Physical deconditioning     Advance Diet as Tolerated    Follow this diet upon discharge: 2g salt     Discharge Medications   Current Discharge Medication List      START taking these medications    Details   amLODIPine (NORVASC) 5 MG tablet Take 1 tablet (5 mg) by mouth daily  Qty:      Associated Diagnoses: Essential hypertension      metoprolol tartrate (LOPRESSOR) 50 MG tablet Take 1 tablet (50 mg) by mouth 2 times daily    Associated Diagnoses: Acute on chronic diastolic congestive heart failure (H)         CONTINUE these medications which have CHANGED    Details   hydrALAZINE (APRESOLINE) 100 MG tablet Take 1 tablet (100 mg) by mouth 4 times daily  Qty:      Associated Diagnoses: Essential hypertension      torsemide (DEMADEX) 20 MG tablet Take 1 tablet (20 mg) by mouth daily  Qty:      Associated Diagnoses: Acute on chronic diastolic congestive heart failure (H)         CONTINUE these medications which have NOT CHANGED    Details   !! Abatacept (ORENCIA IV) Every 2 weeks. Next dose due  4/26/21. After this dose change to every 4 weeks (dose will be due 5/24/21)      acetaminophen (TYLENOL) 500 MG tablet Take 1,000 mg by mouth 3 times daily as needed       allopurinol (ZYLOPRIM) 100 MG tablet Take 1 tablet (100 mg) by mouth daily  Qty: 30 tablet, Refills: 0    Associated Diagnoses: Chronic gout without tophus, unspecified cause, unspecified site      apixaban ANTICOAGULANT (ELIQUIS) 2.5 MG tablet Take 2.5 mg by mouth 2 times daily       cloNIDine (CATAPRES) 0.1 MG tablet Take 0.1 mg by mouth 2 times daily      Darbepoetin Alonso-Albumin (ARANESP IJ) Monthly as needed      levothyroxine (SYNTHROID/LEVOTHROID) 25 MCG tablet Take 1 tablet (25 mcg) by mouth daily  Qty: 30 tablet, Refills: 0    Associated Diagnoses: Hypothyroidism, unspecified type      melatonin 5 MG tablet Take 10 mg by mouth nightly as needed for sleep      mirtazapine (REMERON) 15 MG tablet Take 15 mg by mouth At Bedtime      ondansetron (ZOFRAN-ODT) 4 MG ODT tab Take 4 mg by mouth every 8 hours as needed for nausea      pantoprazole (PROTONIX) 40 MG EC tablet Take 40 mg by mouth daily      predniSONE (DELTASONE) 5 MG tablet Take 5 mg by mouth daily      sulfaSALAzine (AZULFIDINE) 500 MG tablet Take 500 mg by mouth 2 times daily      !! Abatacept (ORENCIA IV) Inject 500 mg into the vein every 30 days (dose will be due 5/24/21)       !! - Potential duplicate medications found. Please discuss with provider.      STOP taking these medications       diltiazem ER (DILT-XR) 120 MG 24 hr capsule Comments:   Reason for Stopping:         metoprolol succinate ER (TOPROL-XL) 100 MG 24 hr tablet Comments:   Reason for Stopping:             Allergies   Allergies   Allergen Reactions     Oxybutynin Itching     Seasonal Allergies      Data   Most Recent 3 CBC's:  Recent Labs   Lab Test 04/18/21  0439 04/17/21  1634 04/16/21  0550   WBC 4.3 3.2* 3.8*   HGB 9.2* 9.2* 8.9*   * 105* 106*    163 149*      Most Recent 3 BMP's:  Recent Labs    Lab Test 04/22/21  0546 04/21/21  0631 04/20/21  0548    138 139   POTASSIUM 4.5 4.5 5.2   CHLORIDE 107 108 110*   CO2 24 26 26   BUN 84* 70* 60*   CR 1.66* 1.43* 1.42*   ANIONGAP 8 4 3   ALIVIA 8.7 9.2 8.7   GLC 78 83 82     Most Recent 3 Troponin's:  Recent Labs   Lab Test 04/15/21  1139 02/21/21  1546 07/21/20  0849   TROPI 0.018 <0.015 <0.015     Most Recent TSH, T4 and A1c Labs:  Recent Labs   Lab Test 04/16/21  0550 03/02/21  0701   TSH 1.25 2.37   T4  --  7.3     Results for orders placed or performed during the hospital encounter of 04/15/21   XR Chest 2 Views    Narrative    XR CHEST 2 VW 4/15/2021 1:43 PM    HISTORY: dyspnea, chest pain    COMPARISON: 2/21/2021      Impression    IMPRESSION: Small left pleural effusion. No significant right pleural  effusion. Bibasilar patchy opacities either aspiration, infection or  edema. Stable cardiomegaly. Tortuous aorta with atherosclerotic  calcifications. No pneumothorax.    STEVIE BEDOLLA MD

## 2021-04-23 ENCOUNTER — NURSING HOME VISIT (OUTPATIENT)
Dept: GERIATRICS | Facility: CLINIC | Age: 86
End: 2021-04-23
Payer: MEDICARE

## 2021-04-23 ENCOUNTER — HOSPITAL LABORATORY (OUTPATIENT)
Dept: OTHER | Facility: CLINIC | Age: 86
End: 2021-04-23

## 2021-04-23 ENCOUNTER — TELEPHONE (OUTPATIENT)
Dept: CARDIOLOGY | Facility: CLINIC | Age: 86
End: 2021-04-23

## 2021-04-23 DIAGNOSIS — I50.32 CHRONIC HEART FAILURE WITH PRESERVED EJECTION FRACTION (H): ICD-10-CM

## 2021-04-23 DIAGNOSIS — R62.7 FAILURE TO THRIVE IN ADULT: ICD-10-CM

## 2021-04-23 DIAGNOSIS — R53.81 PHYSICAL DECONDITIONING: ICD-10-CM

## 2021-04-23 DIAGNOSIS — M10.9 GOUTY ARTHRITIS: ICD-10-CM

## 2021-04-23 DIAGNOSIS — Z71.89 OTHER SPECIFIED COUNSELING: ICD-10-CM

## 2021-04-23 DIAGNOSIS — J90 BILATERAL PLEURAL EFFUSION: Primary | ICD-10-CM

## 2021-04-23 DIAGNOSIS — I10 HYPERTENSION GOAL BP (BLOOD PRESSURE) < 140/90: Chronic | ICD-10-CM

## 2021-04-23 DIAGNOSIS — N18.4 CKD (CHRONIC KIDNEY DISEASE) STAGE 4, GFR 15-29 ML/MIN (H): ICD-10-CM

## 2021-04-23 DIAGNOSIS — I48.0 PAROXYSMAL ATRIAL FIBRILLATION (H): ICD-10-CM

## 2021-04-23 DIAGNOSIS — E03.9 HYPOTHYROIDISM, UNSPECIFIED TYPE: ICD-10-CM

## 2021-04-23 DIAGNOSIS — I50.32 CHRONIC DIASTOLIC HEART FAILURE (H): ICD-10-CM

## 2021-04-23 DIAGNOSIS — E55.9 VITAMIN D DEFICIENCY: ICD-10-CM

## 2021-04-23 DIAGNOSIS — M06.9 RHEUMATOID ARTHRITIS, INVOLVING UNSPECIFIED SITE, UNSPECIFIED WHETHER RHEUMATOID FACTOR PRESENT (H): ICD-10-CM

## 2021-04-23 PROCEDURE — 99310 SBSQ NF CARE HIGH MDM 45: CPT | Performed by: NURSE PRACTITIONER

## 2021-04-23 NOTE — PROGRESS NOTES
[facility]  PRIMARY CARE PROVIDER AND CLINIC:  Laurie Conteh MD, [address] / [place] [address]  Chief Complaint   Patient presents with     Hospital F/U     [facility] Record Number:  [id number]  Place of Service where encounter took place:  [facility] [[id number]]    Ghislaine Walls  is a 85 year old  (1935), admitted to the above facility from  [facility]. Hospital stay 4/15/21 through 4/22/21..  Admitted to this facility for  rehab, medical management and nursing care.    HPI:    HPI information obtained from: facility chart records, facility staff, patient report and [facility] chart review.   Brief Summary of Hospital Course:   This is a 85 year old female with PMHx of hypertension, afib on anticoagulation with Eliquis, stage IV CKD, RA, chronic normocytic anemia, MGUS, thyroid lesion and gout who was admitted on 4/15/2021 with acute hypoxic respiratory failure dt acute exacerbation of chronic HFpEF.  Apparently stopped taking her torsemide 1 week prior, presented with hypoxia with sats in the 79% on room air.  White count 8.3, Pro-Calc 0.6, lactate was normal with BNP +21K.  Had chest x-ray, showed nonspecific changes though thought to represent infection versus aspiration versus edema, with some productive cough.  Was diuresed with Bumex, given Rocephin for possible CAP.  She completed course of Rocephin on 4/19, completed 5-day course of doxycycline on 4/20, cardiology changed Bumex to torsemide 20 mg daily on 4/20.  She will follow up in core clinic outpatient.  Additionally heart rates were in the 30s to 50s, diltiazem and clonidine were held, became hypertensive, changed to metoprolol 50 mg twice daily, amlodipine 5 mg daily, hydralazine 100 mg 4 times daily, clonidine 0.1 mg twice daily and continued on her Eliquis.  She also had renal dysfunction, creatinine improved with diuresis.  Also had hyperkalemia normalized with  diuresis.  Also follows with rheumatology, started on Orencia infusion, developed weakness fatigue, waiting to see if symptoms improve, if not may pursue hospice.  She was then discharged to TCU for additional rehab.    Updates on Status Since Skilled nursing Admission:  weakness    CODE STATUS/ADVANCE DIRECTIVES DISCUSSION:   DNR / DNI  Patient's living condition: lives in an assisted living facility  ALLERGIES: Oxybutynin and Seasonal allergies  PAST MEDICAL HISTORY:  has a past medical history of Atrial fibrillation (H) (new 10/19), [place] (hard of hearing), Hyperlipidaemia, Hyperlipidaemia LDL goal < 130, Hypertension, Hypothyroid, PAD (peripheral artery disease) (H), Renal insufficiency, mild, and Uncontrolled hypertension (10/14/2019). She also has no past medical history of Sleep apnea.  PAST SURGICAL HISTORY:   has a past surgical history that includes appendectomy (1951); tubal ligation (1971); Eye surgery (1956); cataract iol, rt/lt (2001); Dental surgery (1962); Esophagoscopy, gastroscopy, duodenoscopy (EGD), combined (N/A, 7/24/2020); and Bone marrow biopsy, bone specimen, needle/trocar (N/A, 7/31/2020).  FAMILY HISTORY: family history includes Coronary Artery Disease in her brother and brother; Heart Disease in her brother, brother, father, and mother; Respiratory in her mother.  SOCIAL HISTORY:   reports that she quit smoking about 48 years ago. Her smoking use included cigarettes. She has a 2.50 pack-year smoking history. She has never used smokeless tobacco. She reports previous alcohol use. She reports that she does not use drugs.    Post Discharge Medication Reconciliation Status: discharge medications reconciled, continue medications without change    Current Outpatient Medications   Medication Sig Dispense Refill     Abatacept (ORENCIA IV) Every 2 weeks. Next dose due 4/26/21. After this dose change to every 4 weeks (dose will be due 5/24/21)       Abatacept (ORENCIA IV) Inject 500 mg into the vein  every 30 days (dose will be due 5/24/21)       acetaminophen (TYLENOL) 500 MG tablet Take 1,000 mg by mouth 3 times daily as needed        allopurinol (ZYLOPRIM) 100 MG tablet Take 1 tablet (100 mg) by mouth daily 30 tablet 0     amLODIPine (NORVASC) 5 MG tablet Take 1 tablet (5 mg) by mouth daily       apixaban ANTICOAGULANT (ELIQUIS) 2.5 MG tablet Take 2.5 mg by mouth 2 times daily        cloNIDine (CATAPRES) 0.1 MG tablet Take 0.1 mg by mouth 2 times daily       Darbepoetin Alonso-Albumin (ARANESP IJ) Monthly as needed       hydrALAZINE (APRESOLINE) 100 MG tablet Take 1 tablet (100 mg) by mouth 4 times daily       levothyroxine (SYNTHROID/LEVOTHROID) 25 MCG tablet Take 1 tablet (25 mcg) by mouth daily 30 tablet 0     melatonin 5 MG tablet Take 10 mg by mouth nightly as needed for sleep       metoprolol tartrate (LOPRESSOR) 50 MG tablet Take 1 tablet (50 mg) by mouth 2 times daily       mirtazapine (REMERON) 15 MG tablet Take 15 mg by mouth At Bedtime       ondansetron (ZOFRAN-ODT) 4 MG ODT tab Take 4 mg by mouth every 8 hours as needed for nausea       pantoprazole (PROTONIX) 40 MG EC tablet Take 40 mg by mouth daily       predniSONE (DELTASONE) 5 MG tablet Take 5 mg by mouth daily       sulfaSALAzine (AZULFIDINE) 500 MG tablet Take 500 mg by mouth 2 times daily       torsemide (DEMADEX) 20 MG tablet Take 1 tablet (20 mg) by mouth daily         ROS:  10 point ROS of systems including Constitutional, Eyes, Respiratory, Cardiovascular, Gastroenterology, Genitourinary, Integumentary, Musculoskeletal, Psychiatric were all negative except for pertinent positives noted in my HPI.    Vitals:  BP (!) 152/63   Pulse 64   Temp 97.3  F (36.3  C)   Resp 18   Wt 54.8 kg (120 lb 12.8 oz)   SpO2 95%   BMI 21.40 kg/m    Exam:  GENERAL APPEARANCE:  Alert, thin, cooperative, severe kyophotic, denies pain  ENT:  Mouth and posterior oropharynx normal, moist mucous membranes, normal hearing acuity  NECK:  No adenopathy,masses or  thyromegaly  RESP:  respiratory effort and palpation of chest normal, lungs clear to auscultation , no respiratory distress  CV:  Palpation and auscultation of heart done, irregular rate and rhythm, rate controlled, no murmur, rub, or gallop, no edema  CHEST:  intact  M/S:   able to move all extremities  SKIN:  Inspection of skin and subcutaneous tissue baseline  NEURO:   No focal deficits  PSYCH:  oriented X 3, memory impaired     Lab/Diagnostic data:    Most Recent 3 CBC's:  Recent Labs   Lab Test 04/18/21  0439 04/17/21  1634 04/16/21  0550   WBC 4.3 3.2* 3.8*   HGB 9.2* 9.2* 8.9*   * 105* 106*    163 149*     Most Recent 3 BMP's:  Recent Labs   Lab Test 04/22/21  0546 04/21/21  0631 04/20/21  0548    138 139   POTASSIUM 4.5 4.5 5.2   CHLORIDE 107 108 110*   CO2 24 26 26   BUN 84* 70* 60*   CR 1.66* 1.43* 1.42*   ANIONGAP 8 4 3   ALIVIA 8.7 9.2 8.7   GLC 78 83 82       ASSESSMENT/PLAN:    Bilateral pleural effusion  Chronic diastolic heart failure (H)  HFpEF  Acute hypoxic respiratory failure  Continue demadex 20mg daily, last 121lb. RA    Gouty arthritis  Continue allopurinol 100mg daily    Hypothyroidism, unspecified type  Thyroid lesion (new finding)  Continue synthroid 25mcg daily. PCP to consider US    Hypertension goal BP (blood pressure) < 140/90  Diltiazem changed to metoprolol tartrate 50mg BID, continue amlodipine 5mg daily, clonidine 0.1mg BID, hydralazine 100mg QID. monitor    Paroxysmal atrial fibrillation (H)  Continue apixaban 2.5mg BID    Rheumatoid arthritis, involving unspecified site, unspecified whether rheumatoid factor present (H)  Continue orencia q2wks, prednisone 5mg daily and sulfasalazine 500mg BID    CKD (chronic kidney disease) stage 4, GFR 15-29 ml/min (H)  Last Cr 1.66 with GFR 28 on 4/22, recheck BMP on 4/26. Encouraging fluid    Hx of hyperkalemia  Last K 4.5 on 4/22    Macrocytic anemia  Last Hgb 9.2 on 4/18, recheck    Insomnia  Continue melatonin 10mg at HS  PRN    GERD  Continue protonix 40mg daily    Failure to thrive  Continue remeron 15mg at HS. Last wt 121lb    Physical deconditioning  Walking with FWW 140ft      Total time spent with patient visit at the skilled nursing facility was 38 including patient visit and review of past records. Greater than 50% of total time spent with counseling and coordinating care due to BP monitoring per severe htn, encouraging fluid/nutrition, recheck labs and therapy, which lasted 20 minutes.     Electronically signed by:  Eric Wills NP

## 2021-04-23 NOTE — TELEPHONE ENCOUNTER
Patient was evaluated by cardiology while inpatient for SOB with acute on chronic heart failure with possible CAP. PMH: resistent hypertension, Afib on anticoagulation with Eliquis, stage IV CKD, RA, chronic normocytic anemia, MGUS, thyroid lesion and gout. IV Bumex gtt diuresed 3.5 L and transitioned back to PTA Torsemide. Started on Hydralazine and Metoprolol. PTA Diltiazem discontinued secondary to bradycardia. RN called [facility] to confirm the follow up plan for patient with Care Coordinator. RN confirmed with Care Coordinator that patient has a scheduled F/U lab on 5/10/21 at 0930 followed with an OV with  CHRISTINA Loretta Huddleston at 1030, both at our [facility]. RN confirmed with Care Coordinator that patient is weighed daily. KHANH Camargo RN.

## 2021-04-24 LAB
GAMMA INTERFERON BACKGROUND BLD IA-ACNC: 0 IU/ML
M TB IFN-G CD4+ BCKGRND COR BLD-ACNC: 10 IU/ML
M TB TUBERC IFN-G BLD QL: NEGATIVE
MITOGEN IGNF BCKGRD COR BLD-ACNC: 0 IU/ML
MITOGEN IGNF BCKGRD COR BLD-ACNC: 0.01 IU/ML

## 2021-04-26 ENCOUNTER — PATIENT OUTREACH (OUTPATIENT)
Dept: NURSING | Facility: CLINIC | Age: 86
End: 2021-04-26
Attending: INTERNAL MEDICINE
Payer: MEDICARE

## 2021-04-26 ENCOUNTER — HOSPITAL LABORATORY (OUTPATIENT)
Dept: OTHER | Facility: CLINIC | Age: 86
End: 2021-04-26

## 2021-04-26 LAB
ANION GAP SERPL CALCULATED.3IONS-SCNC: 10 MMOL/L (ref 3–14)
BUN SERPL-MCNC: 96 MG/DL (ref 7–30)
CALCIUM SERPL-MCNC: 8.5 MG/DL (ref 8.5–10.1)
CHLORIDE SERPL-SCNC: 105 MMOL/L (ref 94–109)
CO2 SERPL-SCNC: 24 MMOL/L (ref 20–32)
CREAT SERPL-MCNC: 1.92 MG/DL (ref 0.52–1.04)
ERYTHROCYTE [DISTWIDTH] IN BLOOD BY AUTOMATED COUNT: 15.9 % (ref 10–15)
GFR SERPL CREATININE-BSD FRML MDRD: 23 ML/MIN/{1.73_M2}
GLUCOSE SERPL-MCNC: 109 MG/DL (ref 70–99)
HCT VFR BLD AUTO: 28.7 % (ref 35–47)
HGB BLD-MCNC: 8.8 G/DL (ref 11.7–15.7)
MCH RBC QN AUTO: 31.2 PG (ref 26.5–33)
MCHC RBC AUTO-ENTMCNC: 30.7 G/DL (ref 31.5–36.5)
MCV RBC AUTO: 102 FL (ref 78–100)
PLATELET # BLD AUTO: 194 10E9/L (ref 150–450)
POTASSIUM SERPL-SCNC: 3.4 MMOL/L (ref 3.4–5.3)
RBC # BLD AUTO: 2.82 10E12/L (ref 3.8–5.2)
SODIUM SERPL-SCNC: 139 MMOL/L (ref 133–144)
WBC # BLD AUTO: 2.8 10E9/L (ref 4–11)

## 2021-04-26 NOTE — PROGRESS NOTES
Clinic Care Coordination Contact  Care Coordination Transition Communication    Referral Source: IP Handoff    Clinical Data: Patient was hospitalized at [facility] from 4/15/21 to 4/22/21 with diagnosis of acute hypoxic respiratory failure due to CHF exacerbation.     Transition to Facility:              [facility]              Contact name and phone number/fax: [phone]    Fax sent to facility containing CC RN contact information and request for notification when patient discharging.    Plan: RN/SW Care Coordinator will await notification from facility staff informing RN/SW Care Coordinator of patient's discharge plans/needs. RN/SW Care Coordinator will review chart and outreach to facility staff every 4 weeks and as needed.     Milad Allison, RN  Clinic Care Coordinator  [facility]  [facility]  Ph: [phone]

## 2021-04-26 NOTE — PROGRESS NOTES
[facility]  INITIAL VISIT NOTE  April 27, 2021    PRIMARY CARE PROVIDER AND CLINIC:  Laurie Contehshua [address] / [place] [address]    CHIEF COMPLAINT:  Hospital follow-up/Initial visit    HPI:    Ghislaine Walls is a 85 year old  (1935) female who [facility] for an initial visit.     Medical history is notable for PAF, HFpEF, hypertension, dyslipidemia, hypothyroidism, PAD, CKD stage IIIb-IV, chronic anemia, MGUS, rheumatoid arthritis, gout, and COVID-19 in December 2020.    Summary of hospital course:  Patient was hospitalized at [facility] from April 15 through April 22, 2021 for acute hypoxic respiratory failure due to acute exacerbation of chronic HFpEF.  She originally presented with generalized weakness and fall and found to be hypoxic.  Reportedly she had stopped taking torsemide 1 week prior to admission.  Blood work was significant for white count of 8.3, hemoglobin of 9.9, procalcitonin of 0.6, creatinine of 1.47, potassium of 5.5, lactic acid of 1.4, troponin I of 0.018, TSH of 1.25, and BNP of 21,879.  UA was unremarkable except for proteinuria.  Test for COVID-19 was negative.  EKG showed atrial fibrillation with a heart rate of 51.  Chest x-ray showed small left pleural effusion, no significant right pleural effusion, bibasilar patchy opacities suggesting aspiration versus infection versus edema, and stable cardiomegaly.  She was started on IV bumetanide and also treated with ceftriaxone and doxycycline for possible community-acquired pneumonia.  She completed 5 days of antibiotic therapy.  IV diuretic therapy eventually transitioned to oral torsemide 20 mg daily.  Respiratory status improved and she was discharged with no need for supplemental oxygen.  Notably, her cardiac medications were adjusted for bradycardia.  During the hospitalization, she developed acute kidney injury with a peak creatinine of 2.09  but renal function eventually improved and creatinine was down to 1.66 at the time of discharge from hospital.  She met with palliative care service to discuss her goals of care and elected to pursue restorative care. TCU was recommended by therapies.    Patient is admitted to this facility for medical management, nursing care, and rehab.     Of note, history was obtained from patient, facility RN, and extensive review of the chart necessitated by complex hospitalization.    Today's visit:  Patient was seen in her room, while lying in bed.  She appears extremely frail but in no acute distress.  She reports no cough, sputum, fever, chills, chest pain, palpitation, dyspnea, nausea, vomiting, abdominal pain, or urinary symptoms.  Her blood pressure is running moderately severely high.  She reports no bowel movement for several days.      CODE STATUS:   DNR / DNI    PAST MEDICAL HISTORY:   PAF, on Eliquis  Chronic HFpEF, LVEF 60-65% by echo on February 22, 2021  Hypertension  Dyslipidemia  CKD, stage IIIb-IV, baseline creatinine 1.4-1.7  Hypothyroidism  PAD  Chronic anemia, baseline Hgb around 9  GERD  MGUS  Rheumatoid arthritis  Gout  Impaired hearing  COVID-19 in December 2020  Right thyroid lesion, measuring 2.3 cm, noted incidentally on CT chest on February 22, 2021    Past Medical History:   Diagnosis Date     Atrial fibrillation (H) new 10/19     [place] (hard of hearing)     wears hearing aids     Hyperlipidaemia      Hyperlipidaemia LDL goal < 130      Hypertension      Hypothyroid      PAD (peripheral artery disease) (H)      Renal insufficiency, mild      Uncontrolled hypertension 10/14/2019       PAST SURGICAL HISTORY:   Past Surgical History:   Procedure Laterality Date     APPENDECTOMY  1951     BONE MARROW BIOPSY, BONE SPECIMEN, NEEDLE/TROCAR N/A 7/31/2020    Procedure: bone marrow biopsy;  Surgeon: Iris Cameron MD;  Location:  GI     CATARACT IOL, RT/LT  2001    bilateral (laser - 5/2013)     DENTAL  SURGERY      [place]     ESOPHAGOSCOPY, GASTROSCOPY, DUODENOSCOPY (EGD), COMBINED N/A 2020    Procedure: ESOPHAGOGASTRODUODENOSCOPY, WITH BIOPSY;  Surgeon: Humberto Bailon MD;  Location:  GI     EYE SURGERY      Muscle release     TUBAL LIGATION         FAMILY HISTORY:   Family History   Problem Relation Age of Onset     Heart Disease Mother      Respiratory Mother      Heart Disease Father      Heart Disease Brother      Coronary Artery Disease Brother         CAB     Heart Disease Brother      Coronary Artery Disease Brother         CAB       SOCIAL HISTORY:  Patient is .  She resides in [facility]. She normally uses a walker for ambulation.    Social History     Tobacco Use     Smoking status: Former Smoker     Packs/day: 0.50     Years: 5.00     Pack years: 2.50     Types: Cigarettes     Quit date: 1973     Years since quittin.3     Smokeless tobacco: Never Used   Substance Use Topics     Alcohol use: Not Currently     Alcohol/week: 0.0 standard drinks       MEDICATIONS:  Current Outpatient Medications   Medication Sig Dispense Refill     Abatacept (ORENCIA IV) Every 2 weeks. Next dose due 21. After this dose change to every 4 weeks (dose will be due 21)       Abatacept (ORENCIA IV) Inject 500 mg into the vein every 30 days (dose will be due 21)       acetaminophen (TYLENOL) 500 MG tablet Take 1,000 mg by mouth 3 times daily as needed        allopurinol (ZYLOPRIM) 100 MG tablet Take 1 tablet (100 mg) by mouth daily 30 tablet 0     amLODIPine (NORVASC) 5 MG tablet Take 1 tablet (5 mg) by mouth daily       apixaban ANTICOAGULANT (ELIQUIS) 2.5 MG tablet Take 2.5 mg by mouth 2 times daily        cloNIDine (CATAPRES) 0.1 MG tablet Take 0.1 mg by mouth 2 times daily       Darbepoetin Alonso-Albumin (ARANESP IJ) Monthly as needed       hydrALAZINE (APRESOLINE) 100 MG tablet Take 1 tablet (100 mg) by mouth 4 times daily       levothyroxine  (SYNTHROID/LEVOTHROID) 25 MCG tablet Take 1 tablet (25 mcg) by mouth daily 30 tablet 0     melatonin 5 MG tablet Take 10 mg by mouth nightly as needed for sleep       metoprolol tartrate (LOPRESSOR) 50 MG tablet Take 1 tablet (50 mg) by mouth 2 times daily       mirtazapine (REMERON) 15 MG tablet Take 15 mg by mouth At Bedtime       ondansetron (ZOFRAN-ODT) 4 MG ODT tab Take 4 mg by mouth every 8 hours as needed for nausea       pantoprazole (PROTONIX) 40 MG EC tablet Take 40 mg by mouth daily       predniSONE (DELTASONE) 5 MG tablet Take 5 mg by mouth daily       sulfaSALAzine (AZULFIDINE) 500 MG tablet Take 500 mg by mouth 2 times daily       torsemide (DEMADEX) 20 MG tablet Take 1 tablet (20 mg) by mouth daily         ALLERGIES:  Allergies   Allergen Reactions     Oxybutynin Itching     Seasonal Allergies        ROS:  10 point ROS were negative other than the symptoms noted above in the HPI.    PHYSICAL EXAM:  Vital signs were reviewed in the chart.  Vital Signs: Blood pressure 189/70, heart rate 54, respiratory rate 18, temperature 97.3  F, oxygen saturation 97%, weight 115.8 LBS  General: Extremely weak and frail appearing but no acute distress  HEENT: Normocephalic; oropharynx clear; conjunctival pallor  Cardiovascular: Normal S1, S2, regular  Respiratory: Lungs clear to auscultation bilaterally  GI: Abdomen soft, non-tender, non-distended, +BS  Extremities: No significant LE edema  Neuro: CX II-XII grossly intact; ROM in all four extremities grossly intact  Psych: Alert and oriented almost x3; normal affect  Skin: No acute rash    LABORATORY/IMAGING DATA:    Most Recent 3 CBC's:  Recent Labs   Lab Test 04/26/21  0900 04/18/21  0439 04/17/21  1634   WBC 2.8* 4.3 3.2*   HGB 8.8* 9.2* 9.2*   * 103* 105*    163 163     Most Recent 3 BMP's:  Recent Labs   Lab Test 04/26/21  0900 04/22/21  0546 04/21/21  0631    139 138   POTASSIUM 3.4 4.5 4.5   CHLORIDE 105 107 108   CO2 24 24 26   BUN 96* 84*  70*   CR 1.92* 1.66* 1.43*   ANIONGAP 10 8 4   ALIVIA 8.5 8.7 9.2   * 78 83     Most Recent 2 LFT's:  Recent Labs   Lab Test 12/01/20  1405 07/25/20  0554 07/21/20  2038   AST 21 25 18   ALT 11 10 8   ALKPHOS  --  90 69   BILITOTAL  --  0.4 0.4     Most Recent 3 Troponin's:  Recent Labs   Lab Test 04/15/21  1139 02/21/21  1546 07/21/20  0849   TROPI 0.018 <0.015 <0.015     Most Recent 3 BNP's:  Recent Labs   Lab Test 04/15/21  1139 03/02/21  0701 03/01/21  0849 02/21/21  1546 06/14/19  1331 06/14/19  1331 06/08/19  1031   NTBNPI 21,879*  --  12,404* 17,600*   < >  --   --    NTBNP  --  10,548*  --   --   --  2,800* 2,104*    < > = values in this interval not displayed.     Most Recent Cholesterol Panel:  Recent Labs   Lab Test 10/02/19  0531   CHOL 123   LDL 74   HDL 27*   TRIG 111     Most Recent 6 Bacteria Isolates From Any Culture (See EPIC Reports for Culture Details):  Recent Labs   Lab Test 02/25/21  0930 02/24/21  1045 12/01/20  1354 08/15/20  1228 08/15/20  1220 08/11/20  0955   CULT >100,000 colonies/mL  mixed urogenital christian  Multiple morphotypes present with no predominant organism.  Growth consistent with   probable contamination during collection.  Suggest repeat specimen if clinically   indicated.   No growth 50,000 to 100,000 colonies/mL  Klebsiella variicola/pneumoniae  *  <10,000 colonies/mL  urogenital christian  Susceptibility testing not routinely done   No growth No growth Cultured on the 1st day of incubation:  Staphylococcus pettenkoferi  *  Critical Value/Significant Value, preliminary result only, called to and read back by  Radha Awan CNP @ 9985. 8/12/20. AV    (Note)  POSITIVE for Staphylococci other than S.aureus, S.epidermidis and  S.lugdunensis, by [employer] multiplex nucleic acid test.  Coagulase-negative staphylococci are the most common venipuncture or  collection associated skin CONTAMINANTS grown in blood cultures.  Final identification and antimicrobial susceptibility testing  will be  verified by standard methods.    Specimen tested with "[employer]igene multiplex, gram-positive blood culture  nucleic acid test for the following targets: Staph aureus, Staph  epidermidis, Staph lugdunensis, other Staph species, Enterococcus  faecalis, Enterococcus faecium, Streptococcus species, S. agalactiae,  S. anginosus grp., S. pneumoniae, S. pyogenes, Listeria sp., mecA  (methicillin resistance) and Riri/B (vancomycin resistance).    Critical Value/Significant Value called to and read back by Radha Awan RN, @1930 08/12/20..       Most Recent TSH and T4:  Recent Labs   Lab Test 04/16/21  0550 03/02/21  0701   TSH 1.25 2.37   T4  --  7.3     Most Recent Hemoglobin A1c:No lab results found.    ASSESSMENT/PLAN:  Acute on chronic HFpEF, LVEF 60-65%,  Acute hypoxic respiratory failure,  Possible bilateral community-acquired pneumonia.  Patient was treated with IV bumetanide, IV ceftriaxone, and oral doxycycline.  She completed the course of antibiotic therapy on April 20.  She currently weighs 150.8 LBS and is stable from a respiratory standpoint.  Plan:  Continue metoprolol 50 mg p.o. twice daily and torsemide 20 mg p.o. daily  Closely monitor weight and volume status  Follow-up with cardiology as instructed     DRAGAN superimposed on CKD stage IIIb-IV  Baseline creatinine recently in the range of 1.4-1.7.    Creatinine increased to 2.09 on April 15, but renal function eventually improved and creatinine was down to 1.66 at the time of discharge from hospital.   Most recent creatinine was 1.94 on April 26.  Plan:  Avoid NSAIDs or other nephrotoxins  Closely monitor renal function  Next [facility] is scheduled for April 28     Paroxysmal atrial fibrillation.  PTA diltiazem was continued hospital due to bradycardia.  Plan:  Continue metoprolol 50 mg p.o. twice daily  Continue anticoagulation therapy with Eliquis 2.5 mg p.o. twice daily  Monitor heart rate     Benign essential hypertension.  Blood pressure is  moderate-severely elevated.  Cardiac regimen was adjusted in the hospital by the cardiology service.  Plan:  Increase amlodipine to 10 mg p.o. daily  Continue metoprolol 50 mg p.o. twice daily, hydralazine 100 mg p.o. 4 times daily, clonidine 0.1 mg p.o. twice daily, and torsemide 20 mg p.o. daily  Continue to monitor blood pressure  Adjust BP regimen as indicated     Chronic anemia,  MGUS.  Baseline hemoglobin around 9.  Last hemoglobin was 8.8 on April 26.  Plan:  Continue to monitor hemoglobin periodically    Rheumatoid arthritis.  Chronic.  Plan:  Continue PTA prednisone 5 mg p.o. daily and sulfasalazine 500 mg p.o. twice daily  Continue abatacept 500 mg IV every 4 weeks  Continue acetaminophen 1000 mg p.o. 3 times daily as needed     Gout.  Chronic.  Plan:  Continue prior to admission allopurinol 100 mg p.o. daily     Hypothyroidism.  Last TSH was  1.25 on April 16, 2021.  Plan:  Continue prior to admission levothyroxine 25 mcg p.o. daily    Right thyroid lesion.  Measuring 2.3 centimeters, noted incidentally on CT chest on February 22, 2021.  Plan:  Follow-up as outpatient    GERD.  Plan:  Continue PTA pantoprazole 40 mg p.o. daily     Poor appetite,  Nonsevere malnutrition,  FTT.   Plan:  Continue dietary supplement  Continue PTA mirtazapine 15 mg p.o. at bedtime  Facility dietitian to follow the patient      Physical deconditioning.  Plan:  Continue PT/OT evaluation and therapy         Orders written by provider at facility:  Increase amlodipine to 10 mg p.o. daily        Total unit/floor time of 45 minutes consisted of the following: examination of patient, reviewing the record including pertinent labs and imaging. More than 50% of this time was spent in coordination of care with the patient, nursing staff, and other healthcare providers. This time was spent on discussing the care plan including BP medication changes, discharge/safe placement, and specialty follow up.        Electronically signed  by:  Reinaldo Bashir MD

## 2021-04-26 NOTE — LETTER
[facility]   To:   [facility]          Please give to facility    From:   Milad Allison RN Care Coordinator [facility]     Patient Name:  Ghislaine Walsl  YOB: 1935   Admit date: 4/22/21      *Information Needed:  Please contact me when the patient will discharge (or if they will move to long term care)- include the discharge date, disposition, and main diagnosis   - If the patient is discharged with home care services, please provide the name of the agency    Also- Please inform me if a care conference is being held.   Phone, Fax or Email with information       Thank You,   Milad Allison, RN  Clinic Care Coordinator  [facility]  [facility]  Ph: [phone]    [email or web address]

## 2021-04-27 ENCOUNTER — NURSING HOME VISIT (OUTPATIENT)
Dept: GERIATRICS | Facility: CLINIC | Age: 86
End: 2021-04-27
Payer: MEDICARE

## 2021-04-27 VITALS
DIASTOLIC BLOOD PRESSURE: 67 MMHG | BODY MASS INDEX: 20.52 KG/M2 | RESPIRATION RATE: 16 BRPM | WEIGHT: 115.8 LBS | HEIGHT: 63 IN | HEART RATE: 61 BPM | TEMPERATURE: 97.1 F | OXYGEN SATURATION: 97 % | SYSTOLIC BLOOD PRESSURE: 183 MMHG

## 2021-04-27 DIAGNOSIS — D47.2 MGUS (MONOCLONAL GAMMOPATHY OF UNKNOWN SIGNIFICANCE): ICD-10-CM

## 2021-04-27 DIAGNOSIS — M06.9 RHEUMATOID ARTHRITIS, INVOLVING UNSPECIFIED SITE, UNSPECIFIED WHETHER RHEUMATOID FACTOR PRESENT (H): ICD-10-CM

## 2021-04-27 DIAGNOSIS — I50.33 ACUTE ON CHRONIC HEART FAILURE WITH PRESERVED EJECTION FRACTION (H): Primary | ICD-10-CM

## 2021-04-27 DIAGNOSIS — E46 MALNUTRITION, UNSPECIFIED TYPE (H): ICD-10-CM

## 2021-04-27 DIAGNOSIS — I10 ESSENTIAL HYPERTENSION: ICD-10-CM

## 2021-04-27 DIAGNOSIS — N17.9 ACUTE KIDNEY INJURY SUPERIMPOSED ON CHRONIC KIDNEY DISEASE (H): ICD-10-CM

## 2021-04-27 DIAGNOSIS — I48.0 PAF (PAROXYSMAL ATRIAL FIBRILLATION) (H): ICD-10-CM

## 2021-04-27 DIAGNOSIS — J96.01 ACUTE RESPIRATORY FAILURE WITH HYPOXIA (H): ICD-10-CM

## 2021-04-27 DIAGNOSIS — N18.9 ACUTE KIDNEY INJURY SUPERIMPOSED ON CHRONIC KIDNEY DISEASE (H): ICD-10-CM

## 2021-04-27 DIAGNOSIS — E03.9 HYPOTHYROIDISM, UNSPECIFIED TYPE: ICD-10-CM

## 2021-04-27 DIAGNOSIS — R62.7 ADULT FAILURE TO THRIVE: ICD-10-CM

## 2021-04-27 DIAGNOSIS — R53.81 PHYSICAL DECONDITIONING: ICD-10-CM

## 2021-04-27 PROCEDURE — 99306 1ST NF CARE HIGH MDM 50: CPT | Performed by: INTERNAL MEDICINE

## 2021-04-27 RX ORDER — AMLODIPINE BESYLATE 10 MG/1
10 TABLET ORAL DAILY
COMMUNITY
End: 2021-06-21

## 2021-04-27 ASSESSMENT — MIFFLIN-ST. JEOR: SCORE: 944.4

## 2021-04-28 ENCOUNTER — HOSPITAL LABORATORY (OUTPATIENT)
Dept: OTHER | Facility: CLINIC | Age: 86
End: 2021-04-28

## 2021-04-28 LAB
ANION GAP SERPL CALCULATED.3IONS-SCNC: 6 MMOL/L (ref 3–14)
BUN SERPL-MCNC: 91 MG/DL (ref 7–30)
CALCIUM SERPL-MCNC: 8.9 MG/DL (ref 8.5–10.1)
CHLORIDE SERPL-SCNC: 108 MMOL/L (ref 94–109)
CO2 SERPL-SCNC: 24 MMOL/L (ref 20–32)
CREAT SERPL-MCNC: 1.67 MG/DL (ref 0.52–1.04)
GFR SERPL CREATININE-BSD FRML MDRD: 28 ML/MIN/{1.73_M2}
GLUCOSE SERPL-MCNC: 116 MG/DL (ref 70–99)
POTASSIUM SERPL-SCNC: 3.7 MMOL/L (ref 3.4–5.3)
SODIUM SERPL-SCNC: 138 MMOL/L (ref 133–144)

## 2021-04-29 VITALS
OXYGEN SATURATION: 97 % | TEMPERATURE: 98.5 F | SYSTOLIC BLOOD PRESSURE: 147 MMHG | WEIGHT: 118.6 LBS | BODY MASS INDEX: 21.01 KG/M2 | DIASTOLIC BLOOD PRESSURE: 55 MMHG | HEART RATE: 72 BPM | RESPIRATION RATE: 18 BRPM

## 2021-04-29 LAB
LABORATORY COMMENT REPORT: NORMAL
SARS-COV-2 RNA RESP QL NAA+PROBE: NEGATIVE
SARS-COV-2 RNA RESP QL NAA+PROBE: NORMAL
SPECIMEN SOURCE: NORMAL
SPECIMEN SOURCE: NORMAL

## 2021-04-30 ENCOUNTER — NURSING HOME VISIT (OUTPATIENT)
Dept: GERIATRICS | Facility: CLINIC | Age: 86
End: 2021-04-30
Payer: MEDICARE

## 2021-04-30 DIAGNOSIS — N18.4 ANEMIA DUE TO STAGE 4 CHRONIC KIDNEY DISEASE (H): ICD-10-CM

## 2021-04-30 DIAGNOSIS — I50.32 CHRONIC HEART FAILURE WITH PRESERVED EJECTION FRACTION (H): ICD-10-CM

## 2021-04-30 DIAGNOSIS — N18.32 STAGE 3B CHRONIC KIDNEY DISEASE (H): Chronic | ICD-10-CM

## 2021-04-30 DIAGNOSIS — I10 ESSENTIAL HYPERTENSION: ICD-10-CM

## 2021-04-30 DIAGNOSIS — E55.9 VITAMIN D DEFICIENCY: ICD-10-CM

## 2021-04-30 DIAGNOSIS — D63.1 ANEMIA DUE TO STAGE 4 CHRONIC KIDNEY DISEASE (H): ICD-10-CM

## 2021-04-30 DIAGNOSIS — I10 HYPERTENSION GOAL BP (BLOOD PRESSURE) < 140/90: Chronic | ICD-10-CM

## 2021-04-30 DIAGNOSIS — R62.7 FAILURE TO THRIVE IN ADULT: Primary | ICD-10-CM

## 2021-04-30 DIAGNOSIS — E03.8 OTHER SPECIFIED HYPOTHYROIDISM: Chronic | ICD-10-CM

## 2021-04-30 DIAGNOSIS — M06.9 RHEUMATOID ARTHRITIS, INVOLVING UNSPECIFIED SITE, UNSPECIFIED WHETHER RHEUMATOID FACTOR PRESENT (H): ICD-10-CM

## 2021-04-30 PROCEDURE — 99309 SBSQ NF CARE MODERATE MDM 30: CPT | Performed by: NURSE PRACTITIONER

## 2021-04-30 NOTE — PROGRESS NOTES
[facility] GERIATRIC SERVICES    Chief Complaint   Patient presents with     RECHECK     HPI:  Ghislaine Walls is a 85 year old  (1935), who is being seen today for an episodic care visit at: [facility] ([facility]) [[id number]].     Brief Summary of Hospital Course:   This is a 85 year old female with PMHx of hypertension, afib on anticoagulation with Eliquis, stage IV CKD, RA, chronic normocytic anemia, MGUS, thyroid lesion and gout who was admitted on 4/15/2021 with acute hypoxic respiratory failure dt acute exacerbation of chronic HFpEF.  Apparently stopped taking her torsemide 1 week prior, presented with hypoxia with sats in the 79% on room air.  White count 8.3, Pro-Calc 0.6, lactate was normal with BNP +21K.  Had chest x-ray, showed nonspecific changes though thought to represent infection versus aspiration versus edema, with some productive cough.  Was diuresed with Bumex, given Rocephin for possible CAP.  She completed course of Rocephin on 4/19, completed 5-day course of doxycycline on 4/20, cardiology changed Bumex to torsemide 20 mg daily on 4/20.  She will follow up in core clinic outpatient.  Additionally heart rates were in the 30s to 50s, diltiazem and clonidine were held, became hypertensive, changed to metoprolol 50 mg twice daily, amlodipine 5 mg daily, hydralazine 100 mg 4 times daily, clonidine 0.1 mg twice daily and continued on her Eliquis.  She also had renal dysfunction, creatinine improved with diuresis.  Also had hyperkalemia normalized with diuresis.  Also follows with rheumatology, started on Orencia infusion, developed weakness fatigue, waiting to see if symptoms improve, if not may pursue hospice.  She was then discharged to TCU for additional rehab.    Today's concern is:   CKD, dehydration    Allergies, and PMH/PSH reviewed in EPIC today.  REVIEW OF SYSTEMS:  4 point ROS including Respiratory, CV, GI and , other than that noted in the HPI,  is negative    Objective:    BP (!) 147/55   Pulse 72   Temp 98.5  F (36.9  C)   Resp 18   Wt 53.8 kg (118 lb 9.6 oz)   SpO2 97%   BMI 21.01 kg/m    GENERAL APPEARANCE:  Alert, thin, cooperative, severe kyophotic, denies pain  RESP:  respiratory effort and palpation of chest normal, lungs clear to auscultation , no respiratory distress  CV:  Palpation and auscultation of heart done, irregular rate and rhythm, rate controlled, no murmur, rub, or gallop, no edema  PSYCH:  oriented X 3, memory impaired. SLUMS 25/30      Most Recent 3 CBC's:  Recent Labs   Lab Test 04/26/21  0900 04/18/21  0439 04/17/21  1634   WBC 2.8* 4.3 3.2*   HGB 8.8* 9.2* 9.2*   * 103* 105*    163 163     Most Recent 3 BMP's:  Recent Labs   Lab Test 04/28/21  0920 04/26/21  0900 04/22/21  0546    139 139   POTASSIUM 3.7 3.4 4.5   CHLORIDE 108 105 107   CO2 24 24 24   BUN 91* 96* 84*   CR 1.67* 1.92* 1.66*   ANIONGAP 6 10 8   ALIVIA 8.9 8.5 8.7   * 109* 78       Assessment/Plan:    Bilateral pleural effusion  Chronic diastolic heart failure (H)  HFpEF  Acute hypoxic respiratory failure  Continue demadex 20mg daily, last 121lb. RA     Gouty arthritis  Continue allopurinol 100mg daily     Hypothyroidism, unspecified type  Thyroid lesion (new finding)  Continue synthroid 25mcg daily. PCP to consider US     Hypertension goal BP (blood pressure) < 140/90  Diltiazem changed to metoprolol tartrate 50mg BID, continue amlodipine 5mg daily, clonidine 0.1mg BID, hydralazine 100mg QID.  SBP <     Paroxysmal atrial fibrillation (H)  Continue apixaban 2.5mg BID     Rheumatoid arthritis, involving unspecified site, unspecified whether rheumatoid factor present (H)  Continue orencia q2wks, prednisone 5mg daily and sulfasalazine 500mg BID     CKD (chronic kidney disease) stage 4, GFR 15-29 ml/min (H)  Last Cr 1.67 with GFR 28 on 4/28. Encouraging fluid     Hx of hyperkalemia  Last K 3.7 on 4/28     Macrocytic anemia  Last Hgb 8.8 on 4/26     Insomnia  Continue  melatonin 10mg at HS PRN     GERD  Continue protonix 40mg daily     Failure to thrive  Continue remeron 15mg at HS. Last wt 118lb (-3lb)     Physical deconditioning  Walking with FWW 140ft    Orders:  Encourage fluids    Electronically signed by: Eric Wills NP

## 2021-05-04 VITALS
SYSTOLIC BLOOD PRESSURE: 129 MMHG | HEART RATE: 67 BPM | RESPIRATION RATE: 18 BRPM | DIASTOLIC BLOOD PRESSURE: 52 MMHG | OXYGEN SATURATION: 96 % | BODY MASS INDEX: 20.69 KG/M2 | TEMPERATURE: 98.5 F | WEIGHT: 116.8 LBS

## 2021-05-05 ENCOUNTER — DISCHARGE SUMMARY NURSING HOME (OUTPATIENT)
Dept: GERIATRICS | Facility: CLINIC | Age: 86
End: 2021-05-05
Payer: MEDICARE

## 2021-05-05 DIAGNOSIS — M06.9 RHEUMATOID ARTHRITIS, INVOLVING UNSPECIFIED SITE, UNSPECIFIED WHETHER RHEUMATOID FACTOR PRESENT (H): ICD-10-CM

## 2021-05-05 DIAGNOSIS — I10 ACCELERATED HYPERTENSION: ICD-10-CM

## 2021-05-05 DIAGNOSIS — I10 ESSENTIAL HYPERTENSION: ICD-10-CM

## 2021-05-05 DIAGNOSIS — N18.4 CKD (CHRONIC KIDNEY DISEASE) STAGE 4, GFR 15-29 ML/MIN (H): ICD-10-CM

## 2021-05-05 DIAGNOSIS — K21.00 GASTROESOPHAGEAL REFLUX DISEASE WITH ESOPHAGITIS, UNSPECIFIED WHETHER HEMORRHAGE: ICD-10-CM

## 2021-05-05 DIAGNOSIS — M1A.9XX0 CHRONIC GOUT WITHOUT TOPHUS, UNSPECIFIED CAUSE, UNSPECIFIED SITE: ICD-10-CM

## 2021-05-05 DIAGNOSIS — E03.8 OTHER SPECIFIED HYPOTHYROIDISM: Chronic | ICD-10-CM

## 2021-05-05 DIAGNOSIS — E55.9 VITAMIN D DEFICIENCY: ICD-10-CM

## 2021-05-05 DIAGNOSIS — E43 SEVERE PROTEIN-CALORIE MALNUTRITION (H): ICD-10-CM

## 2021-05-05 DIAGNOSIS — I50.32 CHRONIC HEART FAILURE WITH PRESERVED EJECTION FRACTION (H): ICD-10-CM

## 2021-05-05 DIAGNOSIS — R62.7 FAILURE TO THRIVE IN ADULT: Primary | ICD-10-CM

## 2021-05-05 DIAGNOSIS — I10 HYPERTENSION GOAL BP (BLOOD PRESSURE) < 140/90: Chronic | ICD-10-CM

## 2021-05-05 DIAGNOSIS — R53.81 PHYSICAL DECONDITIONING: ICD-10-CM

## 2021-05-05 DIAGNOSIS — I50.33 ACUTE ON CHRONIC DIASTOLIC CONGESTIVE HEART FAILURE (H): ICD-10-CM

## 2021-05-05 PROCEDURE — 99316 NF DSCHRG MGMT 30 MIN+: CPT | Performed by: NURSE PRACTITIONER

## 2021-05-05 RX ORDER — METOPROLOL TARTRATE 50 MG
50 TABLET ORAL 2 TIMES DAILY
Qty: 60 TABLET | Refills: 0 | Status: SHIPPED | OUTPATIENT
Start: 2021-05-05 | End: 2021-07-07

## 2021-05-05 RX ORDER — PREDNISONE 5 MG/1
5 TABLET ORAL DAILY
Qty: 30 TABLET | Refills: 0 | Status: SHIPPED | OUTPATIENT
Start: 2021-05-05 | End: 2023-01-01

## 2021-05-05 RX ORDER — MIRTAZAPINE 15 MG/1
15 TABLET, FILM COATED ORAL AT BEDTIME
Qty: 30 TABLET | Refills: 0 | Status: SHIPPED | OUTPATIENT
Start: 2021-05-05 | End: 2021-06-15

## 2021-05-05 RX ORDER — CLONIDINE HYDROCHLORIDE 0.1 MG/1
0.1 TABLET ORAL 2 TIMES DAILY
Qty: 60 TABLET | Refills: 0 | Status: SHIPPED | OUTPATIENT
Start: 2021-05-05 | End: 2021-07-07

## 2021-05-05 RX ORDER — TORSEMIDE 20 MG/1
20 TABLET ORAL DAILY
Qty: 30 TABLET | Refills: 0 | Status: SHIPPED | OUTPATIENT
Start: 2021-05-05 | End: 2021-05-11

## 2021-05-05 RX ORDER — HYDRALAZINE HYDROCHLORIDE 100 MG/1
100 TABLET, FILM COATED ORAL 4 TIMES DAILY
Qty: 120 TABLET | Refills: 0 | Status: SHIPPED | OUTPATIENT
Start: 2021-05-05 | End: 2021-07-07

## 2021-05-05 RX ORDER — PANTOPRAZOLE SODIUM 40 MG/1
40 TABLET, DELAYED RELEASE ORAL DAILY
Qty: 30 TABLET | Refills: 0 | Status: SHIPPED | OUTPATIENT
Start: 2021-05-05 | End: 2021-06-07

## 2021-05-05 RX ORDER — ALLOPURINOL 100 MG/1
100 TABLET ORAL DAILY
Qty: 30 TABLET | Refills: 0 | Status: SHIPPED | OUTPATIENT
Start: 2021-05-05 | End: 2021-05-31

## 2021-05-05 NOTE — PROGRESS NOTES
[facility] GERIATRIC SERVICES DISCHARGE SUMMARY  PATIENT'S NAME: Ghislaine Walls  YOB: 1935  MEDICAL RECORD NUMBER:  [id number]  [facility] of Service where encounter took place:  [facility] [[id number]]    PRIMARY CARE PROVIDER AND CLINIC RESPONSIBLE AFTER TRANSFER:   Laurie Conteh MD, [address] / [place] [address]    [facility] Provider     Transferring providers: Eric Wills NP, Dr. Krysten MD  Recent Hospitalization/ED:  [facility] Hospital stay 4/15/21 to 4/22/21.  Date of SNF Admission: 4/22/21  Date of SNF (anticipated) Discharge: May 05, 2021  Discharged to: previous residence  Cognitive Scores: SLUMS: 25/30  Physical Function: Ambulating 44 ft with FWW  DME: NA    CODE STATUS/ADVANCE DIRECTIVES DISCUSSION:  Prior   ALLERGIES: Oxybutynin and Seasonal allergies    NURSING FACILITY COURSE   Medication Changes/Rationale:     Increased hydralazine per htn    Brief Summary of Hospital Course:   This is a 85 year old female with PMHx of hypertension, afib on anticoagulation with Eliquis, stage IV CKD, RA, chronic normocytic anemia, MGUS, thyroid lesion and gout who was admitted on 4/15/2021 with acute hypoxic respiratory failure dt acute exacerbation of chronic HFpEF.  Apparently stopped taking her torsemide 1 week prior, presented with hypoxia with sats in the 79% on room air.  White count 8.3, Pro-Calc 0.6, lactate was normal with BNP +21K.  Had chest x-ray, showed nonspecific changes though thought to represent infection versus aspiration versus edema, with some productive cough.  Was diuresed with Bumex, given Rocephin for possible CAP.  She completed course of Rocephin on 4/19, completed 5-day course of doxycycline on 4/20, cardiology changed Bumex to torsemide 20 mg daily on 4/20.  She will follow up in core clinic outpatient.  Additionally heart rates were in the 30s to 50s, diltiazem and clonidine were held, became hypertensive, changed to  metoprolol 50 mg twice daily, amlodipine 5 mg daily, hydralazine 100 mg 4 times daily, clonidine 0.1 mg twice daily and continued on her Eliquis.  She also had renal dysfunction, creatinine improved with diuresis.  Also had hyperkalemia normalized with diuresis.  Also follows with rheumatology, started on Orencia infusion, developed weakness fatigue, waiting to see if symptoms improve, if not may pursue hospice.  She was then discharged to TCU for additional rehab.    Summary of nursing facility stay:     Bilateral pleural effusion  Chronic diastolic heart failure (H)  HFpEF  Acute hypoxic respiratory failure  Continue demadex 20mg daily, last 116lb. (-5lb) RA     Gouty arthritis  Continue allopurinol 100mg daily     Hypothyroidism, unspecified type  Thyroid lesion (new finding)  Continue synthroid 25mcg daily. PCP to consider US     Hypertension goal BP (blood pressure) < 140/90  Diltiazem changed to metoprolol tartrate 50mg BID, continue amlodipine 5mg daily, clonidine 0.1mg BID, hydralazine 100mg QID.  SBP <150 in evening     Paroxysmal atrial fibrillation (H)  Continue apixaban 2.5mg BID     Rheumatoid arthritis, involving unspecified site, unspecified whether rheumatoid factor present (H)  Continue orencia q2wks, prednisone 5mg daily and sulfasalazine 500mg BID     CKD (chronic kidney disease) stage 4, GFR 15-29 ml/min (H)  Last Cr 1.67 with GFR 28 on 4/28. Encouraging fluid     Hx of hyperkalemia  Last K 3.7 on 4/28     Macrocytic anemia  Last Hgb 8.8 on 4/26     Insomnia  Continue melatonin 10mg at HS PRN     GERD  Continue protonix 40mg daily     Failure to thrive  Continue remeron 15mg at HS. Last wt 118lb (-3lb)    Discharge Medications:    Current Outpatient Medications   Medication Sig Dispense Refill     Abatacept (ORENCIA IV) Every 2 weeks. Next dose due 4/26/21. After this dose change to every 4 weeks (dose will be due 5/24/21)       Abatacept (ORENCIA IV) Inject 500 mg into the vein every 30 days  (dose will be due 5/24/21)       acetaminophen (TYLENOL) 500 MG tablet Take 1,000 mg by mouth 3 times daily as needed        allopurinol (ZYLOPRIM) 100 MG tablet Take 1 tablet (100 mg) by mouth daily 30 tablet 0     amLODIPine (NORVASC) 10 MG tablet Take 10 mg by mouth daily       apixaban ANTICOAGULANT (ELIQUIS) 2.5 MG tablet Take 2.5 mg by mouth 2 times daily        cloNIDine (CATAPRES) 0.1 MG tablet Take 0.1 mg by mouth 2 times daily       Darbepoetin Alonso-Albumin (ARANESP IJ) Monthly as needed       hydrALAZINE (APRESOLINE) 100 MG tablet Take 1 tablet (100 mg) by mouth 4 times daily       levothyroxine (SYNTHROID/LEVOTHROID) 25 MCG tablet Take 1 tablet (25 mcg) by mouth daily 30 tablet 0     melatonin 5 MG tablet Take 10 mg by mouth nightly as needed for sleep       metoprolol tartrate (LOPRESSOR) 50 MG tablet Take 1 tablet (50 mg) by mouth 2 times daily       mirtazapine (REMERON) 15 MG tablet Take 15 mg by mouth At Bedtime       ondansetron (ZOFRAN-ODT) 4 MG ODT tab Take 4 mg by mouth every 8 hours as needed for nausea       pantoprazole (PROTONIX) 40 MG EC tablet Take 40 mg by mouth daily       predniSONE (DELTASONE) 5 MG tablet Take 5 mg by mouth daily       sulfaSALAzine (AZULFIDINE) 500 MG tablet Take 500 mg by mouth 2 times daily       torsemide (DEMADEX) 20 MG tablet Take 1 tablet (20 mg) by mouth daily         Controlled medications:   not applicable/none     Past Medical History:   Past Medical History:   Diagnosis Date     Atrial fibrillation (H) new 10/19     [place] (hard of hearing)     wears hearing aids     Hyperlipidaemia      Hyperlipidaemia LDL goal < 130      Hypertension      Hypothyroid      PAD (peripheral artery disease) (H)      Renal insufficiency, mild      Uncontrolled hypertension 10/14/2019     Physical Exam:   Vitals: /52   Pulse 67   Temp 98.5  F (36.9  C)   Resp 18   Wt 53 kg (116 lb 12.8 oz)   SpO2 96%   BMI 20.69 kg/m    BMI= Body mass index is 20.69 kg/m .  GENERAL  APPEARANCE:  Alert, thin, cooperative, severe kyophotic, denies pain  RESP:  respiratory effort and palpation of chest normal, lungs clear to auscultation, no respiratory distress  CV:  Palpation and auscultation of heart done, irregular rate and rhythm, rate controlled, no murmur, rub, or gallop, no edema  PSYCH:  oriented X 3, memory impaired. SLUMS 25/30     SNF labs:   Most Recent 3 CBC's:  Recent Labs   Lab Test 04/26/21  0900 04/18/21  0439 04/17/21  1634   WBC 2.8* 4.3 3.2*   HGB 8.8* 9.2* 9.2*   * 103* 105*    163 163     Most Recent 3 BMP's:  Recent Labs   Lab Test 04/28/21  0920 04/26/21  0900 04/22/21  0546    139 139   POTASSIUM 3.7 3.4 4.5   CHLORIDE 108 105 107   CO2 24 24 24   BUN 91* 96* 84*   CR 1.67* 1.92* 1.66*   ANIONGAP 6 10 8   ALIVIA 8.9 8.5 8.7   * 109* 78       DISCHARGE PLAN:    Follow up labs: No labs orders/due    Medical Follow Up:      Follow up with primary care provider in 1-2 weeks    [facility] scheduled appointments:  Next 5 appointments (look out 90 days)    May 10, 2021 10:30 AM  Return Visit with DHAVAL Cannon CNP  [facility] [place] ([facility] ) [address] [place] [id number]  [phone]           Discharge Services: Services declined    Discharge Instructions Verbalized to Patient at Discharge:     None    TOTAL DISCHARGE TIME:   Greater than 30 minutes  Electronically signed by:  Eric Wills NP

## 2021-05-09 NOTE — PROGRESS NOTES
Cardiology Clinic Progress Note    Service Date: 05/11/21    Primary Cardiologist: Dr Fuentes      Reason for Visit: Heart failure    HPI:   I had the pleasure of seeing Ms. Ghislaine Walls in the clinic today. she is a very pleasant 85 year old female with a past medical history notable for    1. Paroxysmal atrial fibrillation with rate control  2. Heart failure with preserved ejection fraction  3. Hypertension  4. Stage III CKD  5. Hypothyroidism  6. Gout  7. Chronic normal Citic anemia with MGUS  8. Covid positive.  Testing once in August 2020 and again in December 2020  9. Thyroid lesion  10. Chronic pain and fatigue    Diagnostics    Echo (2/22/2021) reviewed EF 60-65%. Diastolic Doppler findings (E/E' ratio and/or other parameters) suggest left ventricular filling pressures are increased. Right ventricular systolic pressure is elevated, consistent with mild pulmonary hypertension. Moderate left pleural effusion.  Compared to prior study, there is no significant change.    Patient was Covid positive in December and had progressively worsening    In February 2021 she was hospitalized for heart failure at that time she was found to be hypoxic and underwent bilateral thoracentesis.  She was treated for a UTI in follow-up she was very clear and wanted hospice however in follow-up she was not interested.  While staying at [facility] a care conference was held they recommended comfort care but the daughter was not ready at that time    In 4/16/2021, patient was hospitalized after refusing to take her diuretics for 2 to 3 days and had gained 15 pounds since her prior hospitalization, she was found to be hypoxic and brought to the emergency room.  Patient was frustrated with frequent urination she hates getting up to the bathroom and having incontinence on the way.  Her pro BNP was 22,000, hemoglobin 9.9, chest x-ray had a small effusion and her oxygen sats were 83% in addition her heart rate dropped to the 30s.    "Palliative care was asked to consult and family and patient deferred hospice.  She was treated with antibiotics for CAP, she was diuresed with IV Bumex which transitioned to torsemide 20 mg daily, she remained on metoprolol 50 mg twice daily, amlodipine 5 mg daily, hydralazine 100 mg 4 times a day, clonidine 0.1 mg daily, and Eliquis    Today, she reports she feels fine until \"I have to do something\" Daughter states she is tired and weak. She started Orencia approximately 6 weeks ago and is excepting improvement in fatigue.  She feels slightly bloated and denies orthopnea and PND.   She weighs herself weekly and dislikes the frequent urination with the diuretics.   She denies chest pain, shortness of breath at rest, PND, palpitations, dizziness, presyncope, or syncope.   Reports taking medications as prescribed    ASSESSMENT AND PLAN:    Heart failure with preserved ejection fraction secondary to hypertension    On 2/22 while hospitalized patient's weight was 115 pounds    On 3/4/2021 patient's weight was 127 pounds at her facility    On 4/22/201, pt's weight at discharge was 129 pounds    On 05/11/21, pt's weight is 120 pounds.     Pt appear euvolemic on exam with trace LE edema.  She denies other HF symptoms.  She is walking to the dining room at [facility].     Decrease torsemide to 10 mg daily, daily weights and follow up in 3 weeks with BMP and office visit.     Will ask [facility] clinic to call pt/[place] in 1 week to obtain weights.     Hypertension    controlled while taking amlodipine 10 mg, clonidine 0.1 mg, metoprolol tartrate 50 mg twice daily,       CKD    Creatinine elevated to 1.76 (5/10/21)    End-of-life discussion.    Patient has had conversations with FANI Thrasher regarding end-of-life and patient clearly told Courtney on 3/4/2021 she would like to not be hospitalized and pursue hospice however her daughter Krupa feels her mother should try to get better.      Plan:    Decrease torsemide to 10 mg " daily    Daily weights    Follow up in 3 weeks with BMP and office visit    Will ask [facility] to call pt[facility] in 1 week for weights    Please call the clinic if questions or problems arise      Thank you for the opportunity to participate in this pleasant patient's care. We would be happy to see her sooner if needed for any concerns in the meantime.        DHAVAL Zavala CNP  [facility]  Text Page  (M-F 8:00 am - 4:30 pm)    Orders this Visit:  Orders Placed This Encounter   Procedures     Basic metabolic panel     Follow-Up with Cardiac Advanced Practice Provider     Orders Placed This Encounter   Medications     Darbepoetin Alonso (ARANESP, ALBUMIN FREE, IJ)     Sig: PRN dose unknown     torsemide (DEMADEX) 10 MG tablet     Sig: Take 1 tablet (10 mg) by mouth daily     Dispense:  30 tablet     Refill:  11     Medications Discontinued During This Encounter   Medication Reason     torsemide (DEMADEX) 20 MG tablet      Encounter Diagnoses   Name Primary?     CHF exacerbation (H)      Acute on chronic diastolic congestive heart failure (H)        CURRENT MEDICATIONS:  Current Outpatient Medications   Medication Sig Dispense Refill     Abatacept (ORENCIA IV) Every 2 weeks. Next dose due 4/26/21. After this dose change to every 4 weeks (dose will be due 5/24/21)       Abatacept (ORENCIA IV) Inject 500 mg into the vein every 30 days (dose will be due 5/24/21)       acetaminophen (TYLENOL) 500 MG tablet Take 1,000 mg by mouth 3 times daily as needed        allopurinol (ZYLOPRIM) 100 MG tablet Take 1 tablet (100 mg) by mouth daily 30 tablet 0     amLODIPine (NORVASC) 10 MG tablet Take 10 mg by mouth daily       apixaban ANTICOAGULANT (ELIQUIS) 2.5 MG tablet Take 1 tablet (2.5 mg) by mouth 2 times daily 60 tablet 0     cloNIDine (CATAPRES) 0.1 MG tablet Take 1 tablet (0.1 mg) by mouth 2 times daily 60 tablet 0     Darbepoetin Alonso (ARANESP, ALBUMIN FREE, IJ) PRN dose unknown       hydrALAZINE (APRESOLINE) 100 MG  tablet Take 1 tablet (100 mg) by mouth 4 times daily 120 tablet 0     levothyroxine (SYNTHROID/LEVOTHROID) 25 MCG tablet Take 1 tablet (25 mcg) by mouth daily 30 tablet 0     melatonin 5 MG tablet Take 10 mg by mouth nightly as needed for sleep       metoprolol tartrate (LOPRESSOR) 50 MG tablet Take 1 tablet (50 mg) by mouth 2 times daily 60 tablet 0     mirtazapine (REMERON) 15 MG tablet Take 1 tablet (15 mg) by mouth At Bedtime 30 tablet 0     ondansetron (ZOFRAN-ODT) 4 MG ODT tab Take 4 mg by mouth every 8 hours as needed for nausea       pantoprazole (PROTONIX) 40 MG EC tablet Take 1 tablet (40 mg) by mouth daily 30 tablet 0     predniSONE (DELTASONE) 5 MG tablet Take 1 tablet (5 mg) by mouth daily 30 tablet 0     sulfaSALAzine (AZULFIDINE) 500 MG tablet Take 500 mg by mouth 2 times daily       torsemide (DEMADEX) 10 MG tablet Take 1 tablet (10 mg) by mouth daily 30 tablet 11     Darbepoetin Alonso-Albumin (ARANESP IJ) Monthly as needed         ALLERGIES  Allergies   Allergen Reactions     Oxybutynin Itching     Seasonal Allergies        PAST MEDICAL, SURGICAL, FAMILY HISTORY:  History was reviewed and updated as needed, see medical record.    SOCIAL HISTORY:  Social History     Socioeconomic History     Marital status:      Spouse name: Not on file     Number of children: Not on file     Years of education: Not on file     Highest education level: Not on file   Occupational History     Not on file   Social Needs     Financial resource strain: Not hard at all     Food insecurity     Worry: Never true     Inability: Never true     Transportation needs     Medical: No     Non-medical: No   Tobacco Use     Smoking status: Former Smoker     Packs/day: 0.50     Years: 5.00     Pack years: 2.50     Types: Cigarettes     Quit date: 1973     Years since quittin.3     Smokeless tobacco: Never Used   Substance and Sexual Activity     Alcohol use: Not Currently     Alcohol/week: 0.0 standard drinks     Drug  "use: No     Sexual activity: Not Currently     Partners: Male   Lifestyle     Physical activity     Days per week: 0 days     Minutes per session: 0 min     Stress: Not on file   Relationships     Social connections     Talks on phone: Twice a week     Gets together: Twice a week     Attends [employer] service: Never     Active member of club or organization: No     Attends meetings of clubs or organizations: Never     Relationship status: Not on file     Intimate partner violence     Fear of current or ex partner: Not on file     Emotionally abused: Not on file     Physically abused: Not on file     Forced sexual activity: Not on file   Other Topics Concern     Parent/sibling w/ CABG, MI or angioplasty before 65F 55M? No   Social History Narrative     Not on file       Review of Systems:  Skin:  Positive for bruising   Eyes:  Positive for glasses  ENT:  Negative    Respiratory:  Positive for dyspnea on exertion;cough  Cardiovascular:  Negative;palpitations;chest pain;lightheadedness;dizziness;syncope or near-syncope;cyanosis Positive for;edema;fatigue  Gastroenterology: Positive for nausea  Genitourinary:  Positive for urinary frequency  Musculoskeletal:  Positive for arthritis  Neurologic:  Positive for memory problems  Psychiatric:  Negative    Heme/Lymph/Imm:  Positive for allergies;easy bruising  Endocrine:  Positive for thyroid disorder     Physical Exam:  Vitals: BP (!) 146/60   Pulse 63   Ht 1.53 m (5' 0.25\")   Wt 54.4 kg (120 lb)   SpO2 (!) 87%   BMI 23.24 kg/m     Wt Readings from Last 4 Encounters:   05/11/21 54.4 kg (120 lb)   05/04/21 53 kg (116 lb 12.8 oz)   04/29/21 53.8 kg (118 lb 9.6 oz)   04/27/21 52.5 kg (115 lb 12.8 oz)     CONSTITUTIONAL: Appears her stated age, well nourished, and in no acute distress.  HEENT: Pupils equal, round. Sclerae nonicteric.    NECK: Supple, no masses appreciated. Carotid pulses full and equal with no bruit bilaterally.   C/V:  Regular rate and rhythm, normal S1 " and S2, no S3 or S4, no murmur, rub or gallop.   RESP: Respirations are unlabored. Lungs are clear to auscultation bilaterally without wheezing, rales, or rhonchi.  GI: Abdomen soft, non-tender, non-distended.  EXTREM:  No clubbing, cyanosis, or trace lower extremity edema bilaterally.   NEURO: Alert and oriented, cooperative. Gait steady. No gross focal deficits.   PSYCH: Affect appropriate. Mentation normal. Responds to questions appropriately.  SKIN: Warm and dry. No apparent rashes or bruising.     Recent Lab Results:  LIVER ENZYME RESULTS:  Lab Results   Component Value Date    AST 21 12/01/2020    ALT 11 12/01/2020     CBC RESULTS:  Lab Results   Component Value Date    WBC 2.8 (L) 04/26/2021    RBC 2.82 (L) 04/26/2021    HGB 8.8 (L) 04/26/2021    HCT 28.7 (L) 04/26/2021     (H) 04/26/2021    MCH 31.2 04/26/2021    MCHC 30.7 (L) 04/26/2021    RDW 15.9 (H) 04/26/2021     04/26/2021     BMP RESULTS:  Lab Results   Component Value Date     05/10/2021    POTASSIUM 4.8 05/10/2021    CHLORIDE 107 05/10/2021    CO2 24 05/10/2021    ANIONGAP 8 05/10/2021     (H) 05/10/2021    BUN 70 (H) 05/10/2021    CR 1.76 (H) 05/10/2021    GFRESTIMATED 26 (L) 05/10/2021    GFRESTBLACK 30 (L) 05/10/2021    ALIVIA 9.0 05/10/2021      A1C RESULTS:  No results found for: A1C  INR RESULTS:  Lab Results   Component Value Date    INR 1.16 (H) 02/24/2021    INR 2.07 (H) 07/21/2020       LENNY Rodriguez PA-C  [id number] [id number]    This note was completed in part using Dragon voice recognition software. Although reviewed after completion, some word and grammatical errors may occur.

## 2021-05-10 ENCOUNTER — PATIENT OUTREACH (OUTPATIENT)
Dept: CARE COORDINATION | Facility: CLINIC | Age: 86
End: 2021-05-10

## 2021-05-10 ENCOUNTER — PATIENT OUTREACH (OUTPATIENT)
Dept: NURSING | Facility: CLINIC | Age: 86
End: 2021-05-10
Payer: MEDICARE

## 2021-05-10 DIAGNOSIS — I50.9 CHF EXACERBATION (H): ICD-10-CM

## 2021-05-10 LAB
ANION GAP SERPL CALCULATED.3IONS-SCNC: 8 MMOL/L (ref 3–14)
BUN SERPL-MCNC: 70 MG/DL (ref 7–30)
CALCIUM SERPL-MCNC: 9 MG/DL (ref 8.5–10.1)
CHLORIDE SERPL-SCNC: 107 MMOL/L (ref 94–109)
CO2 SERPL-SCNC: 24 MMOL/L (ref 20–32)
CREAT SERPL-MCNC: 1.76 MG/DL (ref 0.52–1.04)
GFR SERPL CREATININE-BSD FRML MDRD: 26 ML/MIN/{1.73_M2}
GLUCOSE SERPL-MCNC: 131 MG/DL (ref 70–99)
POTASSIUM SERPL-SCNC: 4.8 MMOL/L (ref 3.4–5.3)
SODIUM SERPL-SCNC: 139 MMOL/L (ref 133–144)

## 2021-05-10 PROCEDURE — 80048 BASIC METABOLIC PNL TOTAL CA: CPT | Performed by: PHYSICIAN ASSISTANT

## 2021-05-10 PROCEDURE — 36415 COLL VENOUS BLD VENIPUNCTURE: CPT | Performed by: PHYSICIAN ASSISTANT

## 2021-05-10 NOTE — PROGRESS NOTES
Clinic Care Coordination Contact  Community Health Worker Initial Outreach    CHW Initial Information Gathering:  Referral Source: SNF/TCU    CHW introduced self and the CC program.     Patient accepts CC: No, Not at this time. Patient will be sent Care Coordination introduction letter for future reference.   Plan: Care Coordinator will send care coordination introduction letter with care coordinator contact information and explanation of care coordination services via mail. Care Coordinator will do no further outreaches at this time.      CHW Delegation:   1)  Due Date: 5/12/21        Delegation: CHW to outreach to patient for TCU discharge follow-up to inquire if patient has need for additional assistance from Care Coordination at this time; CHW to schedule patient with CC RN/SW if indicated.    Delegation Complete    JENNA Ayala  Clinic Care Coordination  [facility]: [facility] [place]  Phone: [phone]

## 2021-05-10 NOTE — PROGRESS NOTES
Clinic Care Coordination Contact    Situation: Patient chart reviewed by care coordinator.    Background: Patient was hospitalized at [facility] from 4/15/21 to 4/22/21 with diagnosis of acute hypoxic respiratory failure due to CHF exacerbation.  Patient discharged to [facility] for ongoing medical management and rehab. CC RN following patient's TCU admission.    Assessment: Per chart review, patient discharged from TCU on 5/5/21; patient declined discharge services.    Plan/Recommendations: CHW to outreach to patient for TCU discharge follow-up to inquire if patient has need for Care Coordination at this time; see delegation below.  CHW to involve CC RN/SW as indicated.    CHW Delegation:   1)  Due Date: 5/12/21       Delegation: CHW to outreach to patient for TCU discharge follow-up to inquire if patient has need for additional assistance from Care Coordination at this time; CHW to schedule patient with CC RN/SW if indicated.    Milad Allison, RN  Clinic Care Coordinator  [facility]  [facility] [place] [facility]  Ph: [phone]

## 2021-05-10 NOTE — LETTER
[facility] CARE COORDINATION  [address]  [facility] [address]      May 10, 2021      Ghislaine Walls  [address]  [facility] [address]      Dear Ghislaine,    I am a clinic community health worker who works with Laurie Conteh MD at [facility]. I wanted to thank you for spending the time to talk with me.  Below is a description of clinic care coordination and how I can further assist you.      The clinic care coordination team is made up of a registered nurse,  and community health worker who understand the health care system. The goal of clinic care coordination is to help you manage your health and improve access to the health care system in the most efficient manner. The team can assist you in meeting your health care goals by providing education, coordinating services, strengthening the communication among your providers and supporting you with any resource needs.    Please feel free to contact me at [phone] with any questions or concerns. We are focused on providing you with the highest-quality healthcare experience possible and that all starts with you.     Sincerely,     JENNA Ayala  Clinic Care Coordination  [facility] Clin[facility]: [place], [place], [facility], and [facility]  Phone: [phone]

## 2021-05-11 ENCOUNTER — TELEPHONE (OUTPATIENT)
Dept: CARDIOLOGY | Facility: CLINIC | Age: 86
End: 2021-05-11

## 2021-05-11 ENCOUNTER — OFFICE VISIT (OUTPATIENT)
Dept: CARDIOLOGY | Facility: CLINIC | Age: 86
End: 2021-05-11
Attending: PHYSICIAN ASSISTANT
Payer: MEDICARE

## 2021-05-11 VITALS
WEIGHT: 120 LBS | SYSTOLIC BLOOD PRESSURE: 146 MMHG | HEIGHT: 60 IN | DIASTOLIC BLOOD PRESSURE: 60 MMHG | OXYGEN SATURATION: 87 % | HEART RATE: 63 BPM | BODY MASS INDEX: 23.56 KG/M2

## 2021-05-11 DIAGNOSIS — I10 ESSENTIAL HYPERTENSION: ICD-10-CM

## 2021-05-11 DIAGNOSIS — I50.32 CHRONIC HEART FAILURE WITH PRESERVED EJECTION FRACTION (H): Primary | ICD-10-CM

## 2021-05-11 DIAGNOSIS — Z91.199 PATIENT NON ADHERENCE: ICD-10-CM

## 2021-05-11 DIAGNOSIS — N18.9 CHRONIC KIDNEY DISEASE, UNSPECIFIED CKD STAGE: ICD-10-CM

## 2021-05-11 PROCEDURE — 99214 OFFICE O/P EST MOD 30 MIN: CPT | Performed by: NURSE PRACTITIONER

## 2021-05-11 RX ORDER — TORSEMIDE 10 MG/1
10 TABLET ORAL DAILY
Qty: 30 TABLET | Refills: 11 | Status: SHIPPED | OUTPATIENT
Start: 2021-05-11 | End: 2021-05-19 | Stop reason: DRUGHIGH

## 2021-05-11 ASSESSMENT — MIFFLIN-ST. JEOR: SCORE: 919.79

## 2021-05-11 NOTE — PATIENT INSTRUCTIONS
Please take your weight daily.      I will review with Courtney and call you with recommendation.

## 2021-05-11 NOTE — LETTER
5/11/2021    Laurie Conteh MD  [address]  [facility] [address]    RE: Ghislaine Walls       Dear Colleague,    I had the pleasure of seeing Ghislaine Walls in the [facility].    Cardiology Clinic Progress Note    Service Date: 05/11/21    Primary Cardiologist: Dr Fuentes      Reason for Visit: Heart failure    HPI:   I had the pleasure of seeing Ms. Ghislaine Walls in the clinic today. she is a very pleasant 85 year old female with a past medical history notable for    1. Paroxysmal atrial fibrillation with rate control  2. Heart failure with preserved ejection fraction  3. Hypertension  4. Stage III CKD  5. Hypothyroidism  6. Gout  7. Chronic normal Citic anemia with MGUS  8. Covid positive.  Testing once in August 2020 and again in December 2020  9. Thyroid lesion  10. Chronic pain and fatigue    Diagnostics    Echo (2/22/2021) reviewed EF 60-65%. Diastolic Doppler findings (E/E' ratio and/or other parameters) suggest left ventricular filling pressures are increased. Right ventricular systolic pressure is elevated, consistent with mild pulmonary hypertension. Moderate left pleural effusion.  Compared to prior study, there is no significant change.    Patient was Covid positive in December and had progressively worsening    In February 2021 she was hospitalized for heart failure at that time she was found to be hypoxic and underwent bilateral thoracentesis.  She was treated for a UTI in follow-up she was very clear and wanted hospice however in follow-up she was not interested.  While staying at [facility] a care conference was held they recommended comfort care but the daughter was not ready at that time    In 4/16/2021, patient was hospitalized after refusing to take her diuretics for 2 to 3 days and had gained 15 pounds since her prior hospitalization, she was found to be hypoxic and brought to the emergency room.  Patient was frustrated with frequent  "urination she hates getting up to the bathroom and having incontinence on the way.  Her pro BNP was 22,000, hemoglobin 9.9, chest x-ray had a small effusion and her oxygen sats were 83% in addition her heart rate dropped to the 30s.   Palliative care was asked to consult and family and patient deferred hospice.  She was treated with antibiotics for CAP, she was diuresed with IV Bumex which transitioned to torsemide 20 mg daily, she remained on metoprolol 50 mg twice daily, amlodipine 5 mg daily, hydralazine 100 mg 4 times a day, clonidine 0.1 mg daily, and Eliquis    Today, she reports she feels fine until \"I have to do something\" Daughter states she is tired and weak. She started Orencia approximately 6 weeks ago and is excepting improvement in fatigue.  She feels slightly bloated and denies orthopnea and PND.   She weighs herself weekly and dislikes the frequent urination with the diuretics.   She denies chest pain, shortness of breath at rest, PND, palpitations, dizziness, presyncope, or syncope.   Reports taking medications as prescribed    ASSESSMENT AND PLAN:    Heart failure with preserved ejection fraction secondary to hypertension    On 2/22 while hospitalized patient's weight was 115 pounds    On 3/4/2021 patient's weight was 127 pounds at her facility    On 4/22/201, pt's weight at discharge was 129 pounds    On 05/11/21, pt's weight is 120 pounds.     Pt appear euvolemic on exam with trace LE edema.  She denies other HF symptoms.  She is walking to the dining room at [place].     Decrease torsemide to 10 mg daily, daily weights and follow up in 3 weeks with BMP and office visit.     Will ask C[facility] clinic to call pt/[place] in 1 week to obtain weights.     Hypertension    controlled while taking amlodipine 10 mg, clonidine 0.1 mg, metoprolol tartrate 50 mg twice daily,       CKD    Creatinine elevated to 1.76 (5/10/21)    End-of-life discussion.    Patient has had conversations with FANI Thrasher regarding " end-of-life and patient clearly told Courtney on 3/4/2021 she would like to not be hospitalized and pursue hospice however her daughter Krupa feels her mother should try to get better.      Plan:    Decrease torsemide to 10 mg daily    Daily weights    Follow up in 3 weeks with BMP and office visit    Will ask [facility] clinic to call pt/[facility] in 1 week for weights    Please call the clinic if questions or problems arise      Thank you for the opportunity to participate in this pleasant patient's care. We would be happy to see her sooner if needed for any concerns in the meantime.        DHAVAL Zavala CNP  [facility]  Text Page  (M-F 8:00 am - 4:30 pm)    Orders this Visit:  Orders Placed This Encounter   Procedures     Basic metabolic panel     Follow-Up with Cardiac Advanced Practice Provider     Orders Placed This Encounter   Medications     Darbepoetin Alonso (ARANESP, ALBUMIN FREE, IJ)     Sig: PRN dose unknown     torsemide (DEMADEX) 10 MG tablet     Sig: Take 1 tablet (10 mg) by mouth daily     Dispense:  30 tablet     Refill:  11     Medications Discontinued During This Encounter   Medication Reason     torsemide (DEMADEX) 20 MG tablet      Encounter Diagnoses   Name Primary?     CHF exacerbation (H)      Acute on chronic diastolic congestive heart failure (H)        CURRENT MEDICATIONS:  Current Outpatient Medications   Medication Sig Dispense Refill     Abatacept (ORENCIA IV) Every 2 weeks. Next dose due 4/26/21. After this dose change to every 4 weeks (dose will be due 5/24/21)       Abatacept (ORENCIA IV) Inject 500 mg into the vein every 30 days (dose will be due 5/24/21)       acetaminophen (TYLENOL) 500 MG tablet Take 1,000 mg by mouth 3 times daily as needed        allopurinol (ZYLOPRIM) 100 MG tablet Take 1 tablet (100 mg) by mouth daily 30 tablet 0     amLODIPine (NORVASC) 10 MG tablet Take 10 mg by mouth daily       apixaban ANTICOAGULANT (ELIQUIS) 2.5 MG tablet Take 1 tablet (2.5 mg) by mouth 2  times daily 60 tablet 0     cloNIDine (CATAPRES) 0.1 MG tablet Take 1 tablet (0.1 mg) by mouth 2 times daily 60 tablet 0     Darbepoetin Alonso (ARANESP, ALBUMIN FREE, IJ) PRN dose unknown       hydrALAZINE (APRESOLINE) 100 MG tablet Take 1 tablet (100 mg) by mouth 4 times daily 120 tablet 0     levothyroxine (SYNTHROID/LEVOTHROID) 25 MCG tablet Take 1 tablet (25 mcg) by mouth daily 30 tablet 0     melatonin 5 MG tablet Take 10 mg by mouth nightly as needed for sleep       metoprolol tartrate (LOPRESSOR) 50 MG tablet Take 1 tablet (50 mg) by mouth 2 times daily 60 tablet 0     mirtazapine (REMERON) 15 MG tablet Take 1 tablet (15 mg) by mouth At Bedtime 30 tablet 0     ondansetron (ZOFRAN-ODT) 4 MG ODT tab Take 4 mg by mouth every 8 hours as needed for nausea       pantoprazole (PROTONIX) 40 MG EC tablet Take 1 tablet (40 mg) by mouth daily 30 tablet 0     predniSONE (DELTASONE) 5 MG tablet Take 1 tablet (5 mg) by mouth daily 30 tablet 0     sulfaSALAzine (AZULFIDINE) 500 MG tablet Take 500 mg by mouth 2 times daily       torsemide (DEMADEX) 10 MG tablet Take 1 tablet (10 mg) by mouth daily 30 tablet 11     Darbepoetin Alonso-Albumin (ARANESP IJ) Monthly as needed         ALLERGIES  Allergies   Allergen Reactions     Oxybutynin Itching     Seasonal Allergies        PAST MEDICAL, SURGICAL, FAMILY HISTORY:  History was reviewed and updated as needed, see medical record.    SOCIAL HISTORY:  Social History     Socioeconomic History     Marital status:      Spouse name: Not on file     Number of children: Not on file     Years of education: Not on file     Highest education level: Not on file   Occupational History     Not on file   Social Needs     Financial resource strain: Not hard at all     Food insecurity     Worry: Never true     Inability: Never true     Transportation needs     Medical: No     Non-medical: No   Tobacco Use     Smoking status: Former Smoker     Packs/day: 0.50     Years: 5.00     Pack years:  "2.50     Types: Cigarettes     Quit date: 1973     Years since quittin.3     Smokeless tobacco: Never Used   Substance and Sexual Activity     Alcohol use: Not Currently     Alcohol/week: 0.0 standard drinks     Drug use: No     Sexual activity: Not Currently     Partners: Male   Lifestyle     Physical activity     Days per week: 0 days     Minutes per session: 0 min     Stress: Not on file   Relationships     Social connections     Talks on phone: Twice a week     Gets together: Twice a week     Attends Synagogue service: Never     Active member of club or organization: No     Attends meetings of clubs or organizations: Never     Relationship status: Not on file     Intimate partner violence     Fear of current or ex partner: Not on file     Emotionally abused: Not on file     Physically abused: Not on file     Forced sexual activity: Not on file   Other Topics Concern     Parent/sibling w/ CABG, MI or angioplasty before 65F 55M? No   Social History Narrative     Not on file       Review of Systems:  Skin:  Positive for bruising   Eyes:  Positive for glasses  ENT:  Negative    Respiratory:  Positive for dyspnea on exertion;cough  Cardiovascular:  Negative;palpitations;chest pain;lightheadedness;dizziness;syncope or near-syncope;cyanosis Positive for;edema;fatigue  Gastroenterology: Positive for nausea  Genitourinary:  Positive for urinary frequency  Musculoskeletal:  Positive for arthritis  Neurologic:  Positive for memory problems  Psychiatric:  Negative    Heme/Lymph/Imm:  Positive for allergies;easy bruising  Endocrine:  Positive for thyroid disorder     Physical Exam:  Vitals: BP (!) 146/60   Pulse 63   Ht 1.53 m (5' 0.25\")   Wt 54.4 kg (120 lb)   SpO2 (!) 87%   BMI 23.24 kg/m     Wt Readings from Last 4 Encounters:   21 54.4 kg (120 lb)   21 53 kg (116 lb 12.8 oz)   21 53.8 kg (118 lb 9.6 oz)   21 52.5 kg (115 lb 12.8 oz)     CONSTITUTIONAL: Appears her stated age, well " nourished, and in no acute distress.  HEENT: Pupils equal, round. Sclerae nonicteric.    NECK: Supple, no masses appreciated. Carotid pulses full and equal with no bruit bilaterally.   C/V:  Regular rate and rhythm, normal S1 and S2, no S3 or S4, no murmur, rub or gallop.   RESP: Respirations are unlabored. Lungs are clear to auscultation bilaterally without wheezing, rales, or rhonchi.  GI: Abdomen soft, non-tender, non-distended.  EXTREM:  No clubbing, cyanosis, or trace lower extremity edema bilaterally.   NEURO: Alert and oriented, cooperative. Gait steady. No gross focal deficits.   PSYCH: Affect appropriate. Mentation normal. Responds to questions appropriately.  SKIN: Warm and dry. No apparent rashes or bruising.     Recent Lab Results:  LIVER ENZYME RESULTS:  Lab Results   Component Value Date    AST 21 12/01/2020    ALT 11 12/01/2020     CBC RESULTS:  Lab Results   Component Value Date    WBC 2.8 (L) 04/26/2021    RBC 2.82 (L) 04/26/2021    HGB 8.8 (L) 04/26/2021    HCT 28.7 (L) 04/26/2021     (H) 04/26/2021    MCH 31.2 04/26/2021    MCHC 30.7 (L) 04/26/2021    RDW 15.9 (H) 04/26/2021     04/26/2021     BMP RESULTS:  Lab Results   Component Value Date     05/10/2021    POTASSIUM 4.8 05/10/2021    CHLORIDE 107 05/10/2021    CO2 24 05/10/2021    ANIONGAP 8 05/10/2021     (H) 05/10/2021    BUN 70 (H) 05/10/2021    CR 1.76 (H) 05/10/2021    GFRESTIMATED 26 (L) 05/10/2021    GFRESTBLACK 30 (L) 05/10/2021    ALIVIA 9.0 05/10/2021      A1C RESULTS:  No results found for: A1C  INR RESULTS:  Lab Results   Component Value Date    INR 1.16 (H) 02/24/2021    INR 2.07 (H) 07/21/2020     This note was completed in part using Dragon voice recognition software. Although reviewed after completion, some word and grammatical errors may occur.    Thank you for allowing me to participate in the care of your patient.      Sincerely,     DHAVAL Zavala CNP [facility]    cc:   Tashi Rodriguez PA-C  [address] [address]

## 2021-05-11 NOTE — TELEPHONE ENCOUNTER
Today I saw Ghislaine Walls who is a [facility]ORE pt of FANI Thrasher.      Can you call her or lEenita in 1 week to find out her weights.  I decreased her torsemide from 20 mg daily to 10 mg daily 05/11/21      Thank you,    Loretta Huddleston, APRN CNP on 5/11/2021 at 3:57 PM

## 2021-05-18 NOTE — TELEPHONE ENCOUNTER
Called [facility] living to get weight update after 1 week of decreased Torsemide per Loretta's request. Nurse Abby states they do not weigh pt daily. Last weight they have is from 3/19.     Called pt, she weighs daily, but does not write it down. Weight today was 124#. Home weight was documented as 115# on 5/11 when she saw Loretta in clinic. Asked pt if that sounded accurate, that she was up 9# in the last week and she said yes. Pt denies trouble breathing. She reports she has swelling in legs. Denies bloating. Pt states she sets up her own medications.     Per chart review, pt has follow up again with Loretta on 6/7, as FANI Guerra did not have openings.     Sent to both Loretta and FANI Guerra for review.     Katia Denny RN, BSN, CHFN  05/18/21 at 2:00 PM     Future Appointments   Date Time Provider Department Center   6/7/2021  1:30 PM [facility] CL[facility]   6/7/2021  3:30 PM [facility]

## 2021-05-18 NOTE — TELEPHONE ENCOUNTER
Can you call pt or Elenita and have the pt increase her torsemide back to 20 mg dialy.   Continue to weigh her self daily.       Can we call her again on Monday May 24 to see if her weight went back down.    She has follow up on 6/7 with labs prior.     Thank you,    Loretta Huddleston, DHAVAL CNP on 5/18/2021 at 4:21 PM

## 2021-05-18 NOTE — TELEPHONE ENCOUNTER
Loretta- you are seeing this patient back- I suggest you increase torsemide again to 20 and tolerate higher Cr and BUN.  Since you're currently following though, I'll defer to you.

## 2021-05-19 RX ORDER — TORSEMIDE 20 MG/1
20 TABLET ORAL DAILY
Qty: 90 TABLET | Refills: 3 | Status: ON HOLD | OUTPATIENT
Start: 2021-05-19 | End: 2022-01-15

## 2021-05-19 NOTE — TELEPHONE ENCOUNTER
"[facility] - [facility] Clinic    Called patient w/instructions to increase torsemide to 20mg every day. Patient responded, \"I don't like it but I understand.\"  Updated Rx sent to her preferred pharmacy. Patient reminded to continue w/daily weights and told her CORE RN will call early next week to assess response. She had no questions.  Harmony Nguyen, RN on 5/19/2021 at 10:58 AM    "

## 2021-05-19 NOTE — TELEPHONE ENCOUNTER
[facility] - [facility] Clinic    Called Elenita re:torsemide increase -  for call back.  Harmony Nguyen RN on 5/19/2021 at 8:36 AM

## 2021-05-24 ENCOUNTER — TRANSFERRED RECORDS (OUTPATIENT)
Dept: HEALTH INFORMATION MANAGEMENT | Facility: CLINIC | Age: 86
End: 2021-05-24

## 2021-05-24 NOTE — TELEPHONE ENCOUNTER
"[facility] Clinic  HF follow up phone assessment    Background: follow-up visit 5/11 with Loretta Means CNP, torsemide decreased d/t worsened renal function  -5/19 torsemide returned to former dosing of 20 mg daily due to wt gain    Recent weights (lbs):    5/24 124.5   5/18 124.0    Current diuretic plan:   Daughter assists Ghislaine with med set up; Ghislaine confirmed she's been taking 20 mg daily    Ghislaine said she's feeling \"not a whole lot different\"   Breathing: denied TOBAR, orthopnea  Edema: maybe \"a little\" puffiness in ankles, not worse than previous  Diet:  Appetite returned and has been eating more, she wonders if this could be influencing weight    Assessment and Plan:    Weight and symptoms stable despite diuretic changes. Ghislaine said she'd prefer to be on less diuretic due to inconvenience of frequent urination.  --I advised her to continue as is and alert us if she has weight gain/increased symptoms and keep follow-up as below.       Future Appointments   Date Time Provider Department Center   6/7/2021  1:30 PM [facility]   6/7/2021  3:30 PM Loretta Huddleston APRN CNP [facility] CL[facility]     Lorna Britton RN BSN   10:03 AM 05/24/21            "

## 2021-05-24 NOTE — TELEPHONE ENCOUNTER
Continue with current regimen.   Follow up with labs planned.     DHAVAL Zavala CNP on 5/24/2021 at 1:37 PM

## 2021-05-30 DIAGNOSIS — M1A.9XX0 CHRONIC GOUT WITHOUT TOPHUS, UNSPECIFIED CAUSE, UNSPECIFIED SITE: ICD-10-CM

## 2021-05-31 RX ORDER — ALLOPURINOL 100 MG/1
TABLET ORAL
Qty: 30 TABLET | Refills: 0 | Status: SHIPPED | OUTPATIENT
Start: 2021-05-31 | End: 2021-06-01

## 2021-06-01 DIAGNOSIS — M1A.9XX0 CHRONIC GOUT WITHOUT TOPHUS, UNSPECIFIED CAUSE, UNSPECIFIED SITE: ICD-10-CM

## 2021-06-01 NOTE — TELEPHONE ENCOUNTER
Pt called, requesting Allopurinol refill     Declined to schedule visit at this time     Call back: [phone],  No answering machine   Zuri DENISE RN

## 2021-06-03 ENCOUNTER — TRANSFERRED RECORDS (OUTPATIENT)
Dept: HEALTH INFORMATION MANAGEMENT | Facility: CLINIC | Age: 86
End: 2021-06-03

## 2021-06-03 RX ORDER — ALLOPURINOL 100 MG/1
100 TABLET ORAL DAILY
Qty: 90 TABLET | Refills: 3 | Status: SHIPPED | OUTPATIENT
Start: 2021-06-03 | End: 2022-01-01

## 2021-06-03 NOTE — TELEPHONE ENCOUNTER
"Routing refill request to provider for review/approval because:  Labs out of range:  Creatinine       Requested Prescriptions   Pending Prescriptions Disp Refills     allopurinol (ZYLOPRIM) 100 MG tablet 30 tablet 0     Sig: Take 1 tablet (100 mg) by mouth daily       Gout Agents Protocol Failed - 6/1/2021 10:34 AM        Failed - Normal serum creatinine on file in the past 12 months     Recent Labs   Lab Test 05/10/21  1557   CR 1.76*       Ok to refill medication if creatinine is low          Passed - CBC on file in past 12 months     Recent Labs   Lab Test 04/26/21  0900   WBC 2.8*   RBC 2.82*   HGB 8.8*   HCT 28.7*                    Passed - ALT on file in past 12 months     Recent Labs   Lab Test 12/01/20  1405   ALT 11             Passed - Has Uric Acid on file in past 12 months and value is less than 6     Recent Labs   Lab Test 07/28/20  0923   URIC 5.2     If level is 6mg/dL or greater, ok to refill one time and refer to provider.           Passed - Recent (12 mo) or future (30 days) visit within the authorizing provider's specialty     Patient has had an office visit with the authorizing provider or a provider within the authorizing providers department within the previous 12 mos or has a future within next 30 days. See \"Patient Info\" tab in inbasket, or \"Choose Columns\" in Meds & Orders section of the refill encounter.              Passed - Medication is active on med list        Passed - Patient is age 18 or older        Passed - No active pregnancy on record        Passed - No positive pregnancy test in past year             "

## 2021-06-07 ENCOUNTER — HOSPITAL ENCOUNTER (EMERGENCY)
Facility: CLINIC | Age: 86
Discharge: HOME | End: 2021-06-07
Attending: EMERGENCY MEDICINE | Admitting: EMERGENCY MEDICINE
Payer: MEDICARE

## 2021-06-07 ENCOUNTER — APPOINTMENT (OUTPATIENT)
Dept: GENERAL RADIOLOGY | Facility: CLINIC | Age: 86
End: 2021-06-07
Attending: EMERGENCY MEDICINE
Payer: MEDICARE

## 2021-06-07 ENCOUNTER — VIRTUAL VISIT (OUTPATIENT)
Dept: FAMILY MEDICINE | Facility: CLINIC | Age: 86
End: 2021-06-07
Payer: MEDICARE

## 2021-06-07 VITALS
WEIGHT: 120 LBS | RESPIRATION RATE: 16 BRPM | OXYGEN SATURATION: 97 % | DIASTOLIC BLOOD PRESSURE: 53 MMHG | HEIGHT: 61 IN | HEART RATE: 58 BPM | TEMPERATURE: 98.5 F | BODY MASS INDEX: 22.66 KG/M2 | SYSTOLIC BLOOD PRESSURE: 147 MMHG

## 2021-06-07 DIAGNOSIS — M62.81 GENERALIZED MUSCLE WEAKNESS: ICD-10-CM

## 2021-06-07 DIAGNOSIS — A49.9 UTI (URINARY TRACT INFECTION), BACTERIAL: ICD-10-CM

## 2021-06-07 DIAGNOSIS — N39.0 UTI (URINARY TRACT INFECTION), BACTERIAL: ICD-10-CM

## 2021-06-07 DIAGNOSIS — E03.9 HYPOTHYROIDISM, UNSPECIFIED TYPE: ICD-10-CM

## 2021-06-07 DIAGNOSIS — R06.00 DYSPNEA, UNSPECIFIED TYPE: Primary | ICD-10-CM

## 2021-06-07 DIAGNOSIS — K21.00 GASTROESOPHAGEAL REFLUX DISEASE WITH ESOPHAGITIS, UNSPECIFIED WHETHER HEMORRHAGE: ICD-10-CM

## 2021-06-07 DIAGNOSIS — R62.7 FAILURE TO THRIVE IN ADULT: ICD-10-CM

## 2021-06-07 DIAGNOSIS — I50.9 CONGESTIVE HEART FAILURE, UNSPECIFIED HF CHRONICITY, UNSPECIFIED HEART FAILURE TYPE (H): ICD-10-CM

## 2021-06-07 LAB
ALBUMIN SERPL-MCNC: 3.5 G/DL (ref 3.4–5)
ALBUMIN UR-MCNC: 30 MG/DL
ALP SERPL-CCNC: 119 U/L (ref 40–150)
ALT SERPL W P-5'-P-CCNC: 35 U/L (ref 0–50)
ANION GAP SERPL CALCULATED.3IONS-SCNC: 4 MMOL/L (ref 3–14)
ANISOCYTOSIS BLD QL SMEAR: SLIGHT
APPEARANCE UR: CLEAR
AST SERPL W P-5'-P-CCNC: 40 U/L (ref 0–45)
BACTERIA #/AREA URNS HPF: ABNORMAL /HPF
BASOPHILS # BLD AUTO: 0 10E9/L (ref 0–0.2)
BASOPHILS NFR BLD AUTO: 1 %
BILIRUB SERPL-MCNC: 0.7 MG/DL (ref 0.2–1.3)
BILIRUB UR QL STRIP: NEGATIVE
BUN SERPL-MCNC: 77 MG/DL (ref 7–30)
CALCIUM SERPL-MCNC: 9.4 MG/DL (ref 8.5–10.1)
CHLORIDE SERPL-SCNC: 114 MMOL/L (ref 94–109)
CO2 SERPL-SCNC: 23 MMOL/L (ref 20–32)
COLOR UR AUTO: ABNORMAL
CREAT SERPL-MCNC: 1.61 MG/DL (ref 0.52–1.04)
DEPRECATED S PYO AG THROAT QL EIA: NEGATIVE
DIFFERENTIAL METHOD BLD: ABNORMAL
EOSINOPHIL # BLD AUTO: 0 10E9/L (ref 0–0.7)
EOSINOPHIL NFR BLD AUTO: 1 %
ERYTHROCYTE [DISTWIDTH] IN BLOOD BY AUTOMATED COUNT: 17.6 % (ref 10–15)
GFR SERPL CREATININE-BSD FRML MDRD: 29 ML/MIN/{1.73_M2}
GLUCOSE SERPL-MCNC: 94 MG/DL (ref 70–99)
GLUCOSE UR STRIP-MCNC: NEGATIVE MG/DL
HCT VFR BLD AUTO: 32.5 % (ref 35–47)
HGB BLD-MCNC: 9.8 G/DL (ref 11.7–15.7)
HGB UR QL STRIP: NEGATIVE
INTERPRETATION ECG - MUSE: NORMAL
KETONES UR STRIP-MCNC: NEGATIVE MG/DL
LABORATORY COMMENT REPORT: NORMAL
LEUKOCYTE ESTERASE UR QL STRIP: ABNORMAL
LYMPHOCYTES # BLD AUTO: 1.3 10E9/L (ref 0.8–5.3)
LYMPHOCYTES NFR BLD AUTO: 36 %
MACROCYTES BLD QL SMEAR: PRESENT
MCH RBC QN AUTO: 30.1 PG (ref 26.5–33)
MCHC RBC AUTO-ENTMCNC: 30.2 G/DL (ref 31.5–36.5)
MCV RBC AUTO: 100 FL (ref 78–100)
METAMYELOCYTES # BLD: 0 10E9/L
METAMYELOCYTES NFR BLD MANUAL: 1 %
MONOCYTES # BLD AUTO: 0.4 10E9/L (ref 0–1.3)
MONOCYTES NFR BLD AUTO: 12 %
MUCOUS THREADS #/AREA URNS LPF: PRESENT /LPF
NEUTROPHILS # BLD AUTO: 1.7 10E9/L (ref 1.6–8.3)
NEUTROPHILS NFR BLD AUTO: 49 %
NITRATE UR QL: NEGATIVE
NT-PROBNP SERPL-MCNC: 8504 PG/ML (ref 0–1800)
PH UR STRIP: 5.5 PH (ref 5–7)
PLATELET # BLD AUTO: 160 10E9/L (ref 150–450)
PLATELET # BLD EST: ABNORMAL 10*3/UL
POTASSIUM SERPL-SCNC: 4.9 MMOL/L (ref 3.4–5.3)
PROT SERPL-MCNC: 8 G/DL (ref 6.8–8.8)
RBC # BLD AUTO: 3.26 10E12/L (ref 3.8–5.2)
RBC #/AREA URNS AUTO: 1 /HPF (ref 0–2)
SARS-COV-2 RNA RESP QL NAA+PROBE: NEGATIVE
SODIUM SERPL-SCNC: 141 MMOL/L (ref 133–144)
SOURCE: ABNORMAL
SP GR UR STRIP: 1.01 (ref 1–1.03)
SPECIMEN SOURCE: NORMAL
SQUAMOUS #/AREA URNS AUTO: 11 /HPF (ref 0–1)
STREP GROUP A PCR: NOT DETECTED
TROPONIN I SERPL-MCNC: <0.015 UG/L (ref 0–0.04)
UROBILINOGEN UR STRIP-MCNC: 0 MG/DL (ref 0–2)
WBC # BLD AUTO: 3.5 10E9/L (ref 4–11)
WBC #/AREA URNS AUTO: 7 /HPF (ref 0–5)

## 2021-06-07 PROCEDURE — 80053 COMPREHEN METABOLIC PANEL: CPT | Performed by: EMERGENCY MEDICINE

## 2021-06-07 PROCEDURE — 999N001174 HC STATISTIC STREP A RAPID: Performed by: EMERGENCY MEDICINE

## 2021-06-07 PROCEDURE — 87651 STREP A DNA AMP PROBE: CPT | Performed by: EMERGENCY MEDICINE

## 2021-06-07 PROCEDURE — 81001 URINALYSIS AUTO W/SCOPE: CPT | Performed by: EMERGENCY MEDICINE

## 2021-06-07 PROCEDURE — 85025 COMPLETE CBC W/AUTO DIFF WBC: CPT | Performed by: EMERGENCY MEDICINE

## 2021-06-07 PROCEDURE — 99214 OFFICE O/P EST MOD 30 MIN: CPT | Mod: 95 | Performed by: INTERNAL MEDICINE

## 2021-06-07 PROCEDURE — 84484 ASSAY OF TROPONIN QUANT: CPT | Performed by: EMERGENCY MEDICINE

## 2021-06-07 PROCEDURE — 71045 X-RAY EXAM CHEST 1 VIEW: CPT

## 2021-06-07 PROCEDURE — 99285 EMERGENCY DEPT VISIT HI MDM: CPT | Mod: 25

## 2021-06-07 PROCEDURE — 93005 ELECTROCARDIOGRAM TRACING: CPT

## 2021-06-07 PROCEDURE — 87635 SARS-COV-2 COVID-19 AMP PRB: CPT | Performed by: EMERGENCY MEDICINE

## 2021-06-07 PROCEDURE — C9803 HOPD COVID-19 SPEC COLLECT: HCPCS

## 2021-06-07 PROCEDURE — 83880 ASSAY OF NATRIURETIC PEPTIDE: CPT | Performed by: EMERGENCY MEDICINE

## 2021-06-07 RX ORDER — LEVOTHYROXINE SODIUM 25 UG/1
25 TABLET ORAL DAILY
Qty: 90 TABLET | Refills: 3 | Status: SHIPPED | OUTPATIENT
Start: 2021-06-07 | End: 2022-01-01

## 2021-06-07 RX ORDER — PANTOPRAZOLE SODIUM 40 MG/1
40 TABLET, DELAYED RELEASE ORAL DAILY
Qty: 90 TABLET | Refills: 3 | Status: SHIPPED | OUTPATIENT
Start: 2021-06-07 | End: 2022-01-01

## 2021-06-07 ASSESSMENT — ENCOUNTER SYMPTOMS
TROUBLE SWALLOWING: 0
FEVER: 0
NAUSEA: 0
DYSURIA: 0
ABDOMINAL PAIN: 0
FATIGUE: 1
APPETITE CHANGE: 1
WEAKNESS: 1
SHORTNESS OF BREATH: 1

## 2021-06-07 ASSESSMENT — MIFFLIN-ST. JEOR: SCORE: 931.7

## 2021-06-07 NOTE — ED PROVIDER NOTES
History   Chief Complaint:  Generalized Weakness       HPI   Ghislaine Walls is a 85 year old female with history of atrial fibrillation, congestive heart failure, chronic kidney disease, and peripheral artery disease who presents with generalized weakness. The patient's daughter states that for the last few days the patient has not felt well and has experienced lack of appetite, generalized weakness and fatigue, and mild increased shortness of breath with exertion. The daughter also states that overnight the patient's neck glands became swollen, and the patient says they feel big to her. The patient's feet are also more swollen than usual. The patient had COVID-19 in December 2020 and has been in and out of the hospital and TCU since then, but now she has received her COVID-19 vaccine. She is on Torsemide so has urinary frequency at baseline, and her dose has been changed from 20 mg to 10 mg and then recently back up to 20 mg. They were at the hematologist last week and had a hemoglobin check (and she received Epo), and they also recently had an appointment with the rheumatologist who noted the patient to have a low white blood cell count so her sulfasalazine was discontinued. The daughter says that Elenita has not checked the patient's temperature in the past few days, though they used to check it daily. They were sent here by her primary care physician for further evaluation. The patient denies fever, pain or difficulty swallowing, abdominal pain, nausea, or dysuria.    Review of Systems   Constitutional: Positive for appetite change and fatigue. Negative for fever.   HENT: Negative for trouble swallowing.    Respiratory: Positive for shortness of breath.    Cardiovascular: Positive for leg swelling.   Gastrointestinal: Negative for abdominal pain and nausea.   Genitourinary: Negative for dysuria.   Neurological: Positive for weakness.   All other systems reviewed and are  "negative.      Allergies:  Oxybutynin      Medications:  Abatacept  Allopurinol  Amlodipine  Eliquis  Catapres  Darbepoetin  Hydralazine  Levothyroxine  Melatonin  Metoprolol tartrate  Remeron  Protonix  Prednisone  Sulfasalazine  Torsemide      Past Medical History:    Atrial fibrillation  Hard of hearing  Hyperlipidemia  Hypertension  Hypothyroid  Peripheral artery disease  Renal insufficiency, mild  Chronic kidney disease, stage 4  Dyspnea  Rheumatoid arthritis  Bilateral pleural effusion  Congestive heart failure  Inflammatory polyarthropathy  Pressure injury of buttock, stage 2  Anemia of chronic renal failure, stage 3  Gouty arthritis  Cerebrovascular accident       Past Surgical History:    Appendectomy  Bone marrow biopsy  Cataract IOL, bilateral  [place] teeth  Muscle release eye surgery  Tubal ligation       Family History:    Respiratory disease  Coronary artery disease  Diabetes mellitus      Social History:  Presents with daughter.  Lives in [place].    Physical Exam     Patient Vitals for the past 24 hrs:   BP Temp Temp src Pulse Resp SpO2 Height Weight   06/07/21 1445 (!) 147/53 98.5  F (36.9  C) Temporal 58 16 97 % 1.549 m (5' 1\") 54.4 kg (120 lb)       Physical Exam  .General/Appearance: appears stated age, well-groomed, appears comfortable but fatigued  Eyes: EOMI, no scleral injection, no icterus  ENT: MMM, bilateral tonsils erythematous  Neck: supple, nl ROM, no stiffness, mild LAD  Cardiovascular: RRR, nl S1S2, no m/r/g, 2+ pulses in all 4 extremities, cap refill <2sec  Respiratory: mild crackles at the bases, good air movement throughout, no increased WOB, no retractions  Back: no lesions  GI: abd soft, non-distended, nttp,  no HSM, no rebound, no guarding, nl BS  MSK: DIAS, good tone, no bony abnormality  Skin: warm and well-perfused, no rash, 1+ pitting edema to b/l shins, no ecchymosis, nl turgor  Neuro: GCS 15, alert and oriented, no gross focal neuro deficits  Psych: interacts " appropriately  Heme: no petechia, no purpura, no active bleeding        Emergency Department Course   ECG  ECG taken at 1626, ECG read at 1703  Sinus rhythm with 1st degree AV block  Left axis deviation  Minimal voltage criteria for LVH, may be normal variant  Septal infarct, age undetermined  Abnormal ECG   Rate 62 bpm. ND interval 254 ms. QRS duration 84 ms. QT/QTc 456/462 ms. P-R-T axes 66 -38 21.     Imaging:  XR Chest Port 1 View  No pneumothorax. Apical scarring is noted. Trace pleural  fluid is suspected. No confluent airspace disease identified. The  cardiomediastinal silhouette is mildly enlarged. There is aortic  calcification.    LESTER J FAHRNER, MD  Reading per radiology      Laboratory:  CBC: WBC 3.5 (L), HGB 9.8 (L),    CMP: Chloride 114 (H), BUN 77 (H), GFR 29 (L) o/w WNL (Creatinine 1.61 (H))     Troponin (Collected 1615): <0.015  BNP: 8504 (H)    Rapid Strep: Negative  Culture: pending    UA with microscopic: Protein albumin 30 (A), Leukocyte esterase moderate (A), WBC/HPF 7 (H), Bacteria few (A), Squamous epithelial/HPF 11 (H), Mucous present (A) o/w WNL     Symptomatic SARS-CoV2 (COVID19) Virus PCR: Negative     Emergency Department Course:    Reviewed:  I reviewed nursing notes, vitals, past medical history and care everywhere    Assessments:  1601 I obtained history and examined the patient as noted above.   1822 I rechecked the patient and explained findings.     Disposition:  The patient was discharged to home.     Impression & Plan   Medical Decision Making:  This patient is a pleasant 85-year-old female with multiple medical problems including chronic kidney disease and congestive heart failure who presents today with generalized weakness and fatigue as well as very mild increased shortness of breath with exertion.  I considered ACS, CHF exacerbation is high on the differential.  EKG however was unremarkable, her troponin was negative, and her BNP was actually better than its been  since a year ago.  It is possible that a little bit of heart failure is playing a role but I do not think it is the primary cause.  We did get urinalysis from a pure wick.  This does suggest that there may be a UTI although unfortunately there is also several squamous cells.  I discussed these results with the patient and her family member.  I explained that I think it is reasonable to try to treat it as a UTI however, with the contamination of the squamous cells, it also could look like a UTI from local skin christian contaminants.  They already have a prescription for Cipro from a previous visit that they never started so they were willing to start that.  I think that is reasonable.  Did discuss the option of staying in the hospital overnight but both of them wish for her to be discharged.  I think at this point in time I am not finding anything that necessitates admission.  I just want her watched closely and if things start worsening would want her to return to the ER for reconsideration of inpatient.    Covid-19  Ghislaine Walls was evaluated during a global COVID-19 pandemic, which necessitated consideration that the patient might be at risk for infection with the SARS-CoV-2 virus that causes COVID-19.   Applicable protocols for evaluation were followed during the patient's care.   COVID-19 was considered as part of the patient's evaluation. The plan for testing is:  a test was obtained during this visit.     Diagnosis:    ICD-10-CM    1. UTI (urinary tract infection), bacterial  N39.0     A49.9    2. Congestive heart failure, unspecified HF chronicity, unspecified heart failure type (H)  I50.9    3. Generalized muscle weakness  M62.81        Scribe Disclosure:  I, Mahsa Beckman, am serving as a scribe at 4:00 PM on 6/7/2021 to document services personally performed by Lori Seay MD based on my observations and the provider's statements to me.      Lori Seay MD  06/07/21  1921

## 2021-06-07 NOTE — DISCHARGE INSTRUCTIONS
Please take the ciprofloxacin that you already have prescribed for the next 5 days.  As mentioned above please also contact the heart failure clinic to try to get in sooner than July as I think it is imperative that you have urgent follow-up to ensure that worsening heart failure is not contributing to the symptoms that brought you into the ER today.

## 2021-06-07 NOTE — PROGRESS NOTES
Ghislaine is a 85 year old who is being evaluated via a billable telephone visit.      What phone number would you like to be contacted at? [phone]  How would you like to obtain your AVS? Albert Scanlon is a 85 year old who presents for the following health issues     HPI       Chief Complaint   Patient presents with     Fatigue     Follow up on multiple concerns including weakness, dyspnea on exertion, weight loss, failure to thrive symptoms      HPI:   Patient Ghislaine Walls is a very pleasant 85 year old female with history of CKD, hypertension, hyperlipidemia, atrial fibrillation today for telephone visit for follow up of multiple concerns including weakness, dyspnea on exertion, weight loss, failure to thrive symptoms.  Regarding the patient's CKD, the patient was noted to have worsening of kidney function with elevation of Creatinine at her outpatient Rheumatology specialist clinic recently. Patient has been referred to outpatient nephrology clinic for the worsening kidney function. the patient has been followed on a routine basis by her local nephrology clinic at the [facility] nephrology clinic in [place]. Regarding the patient's chronic gout, the patient denies any acute gout flare up symptoms at this time. Patient also has been losing some weight due to poor appetite with meals recently consisting only of drinking Ensure.       Current Medications:     Current Outpatient Medications   Medication Sig Dispense Refill     Abatacept (ORENCIA IV) Inject 500 mg into the vein every 30 days (dose will be due 5/24/21)       acetaminophen (TYLENOL) 500 MG tablet Take 1,000 mg by mouth 3 times daily as needed        allopurinol (ZYLOPRIM) 100 MG tablet Take 1 tablet (100 mg) by mouth daily 90 tablet 3     amLODIPine (NORVASC) 10 MG tablet Take 10 mg by mouth daily       apixaban ANTICOAGULANT (ELIQUIS) 2.5 MG tablet Take 1 tablet (2.5 mg) by mouth 2 times daily 60 tablet 0      cloNIDine (CATAPRES) 0.1 MG tablet Take 1 tablet (0.1 mg) by mouth 2 times daily 60 tablet 0     Darbepoetin Alonso-Albumin (ARANESP IJ) Monthly as needed       hydrALAZINE (APRESOLINE) 100 MG tablet Take 1 tablet (100 mg) by mouth 4 times daily 120 tablet 0     levothyroxine (SYNTHROID/LEVOTHROID) 25 MCG tablet Take 1 tablet (25 mcg) by mouth daily 90 tablet 3     melatonin 5 MG tablet Take 10 mg by mouth nightly as needed for sleep       metoprolol tartrate (LOPRESSOR) 50 MG tablet Take 1 tablet (50 mg) by mouth 2 times daily 60 tablet 0     mirtazapine (REMERON) 15 MG tablet Take 1 tablet (15 mg) by mouth At Bedtime 30 tablet 0     ondansetron (ZOFRAN-ODT) 4 MG ODT tab Take 4 mg by mouth every 8 hours as needed for nausea       pantoprazole (PROTONIX) 40 MG EC tablet Take 1 tablet (40 mg) by mouth daily 90 tablet 3     predniSONE (DELTASONE) 5 MG tablet Take 1 tablet (5 mg) by mouth daily (Patient taking differently: Take 5 mg by mouth daily Week of 6/6/21 3 mg daily  Week of 6/13/21 2 mg daily  Weekj of 6/20/21 1 mg daily  Done on 6/27/21) 30 tablet 0     torsemide (DEMADEX) 20 MG tablet Take 1 tablet (20 mg) by mouth daily 90 tablet 3         Allergies:      Allergies   Allergen Reactions     Oxybutynin Itching     Seasonal Allergies             Past Medical History:     Past Medical History:   Diagnosis Date     Atrial fibrillation (H) new 10/19     [place] (hard of hearing)     wears hearing aids     Hyperlipidaemia      Hyperlipidaemia LDL goal < 130      Hypertension      Hypothyroid      PAD (peripheral artery disease) (H)      Renal insufficiency, mild      Uncontrolled hypertension 10/14/2019         Past Surgical History:     Past Surgical History:   Procedure Laterality Date     APPENDECTOMY  1951     BONE MARROW BIOPSY, BONE SPECIMEN, NEEDLE/TROCAR N/A 7/31/2020    Procedure: bone marrow biopsy;  Surgeon: Iris Cameron MD;  Location:  GI     CATARACT IOL, RT/LT  2001    bilateral (laser - 5/2013)      DENTAL SURGERY      wisdom     ESOPHAGOSCOPY, GASTROSCOPY, DUODENOSCOPY (EGD), COMBINED N/A 2020    Procedure: ESOPHAGOGASTRODUODENOSCOPY, WITH BIOPSY;  Surgeon: Humberto Bailon MD;  Location:  GI     EYE SURGERY      Muscle release     TUBAL LIGATION           Family Medical History:     Family History   Problem Relation Age of Onset     Heart Disease Mother      Respiratory Mother      Heart Disease Father      Heart Disease Brother      Coronary Artery Disease Brother         CAB     Heart Disease Brother      Coronary Artery Disease Brother         CAB         Social History:     Social History     Socioeconomic History     Marital status:      Spouse name: Not on file     Number of children: Not on file     Years of education: Not on file     Highest education level: Not on file   Occupational History     Not on file   Social Needs     Financial resource strain: Not hard at all     Food insecurity     Worry: Never true     Inability: Never true     Transportation needs     Medical: No     Non-medical: No   Tobacco Use     Smoking status: Former Smoker     Packs/day: 0.50     Years: 5.00     Pack years: 2.50     Types: Cigarettes     Quit date: 1973     Years since quittin.4     Smokeless tobacco: Never Used   Substance and Sexual Activity     Alcohol use: Yes     Alcohol/week: 0.0 standard drinks     Comment: one light beer of  occ     Drug use: No     Sexual activity: Not Currently     Partners: Male   Lifestyle     Physical activity     Days per week: 0 days     Minutes per session: 0 min     Stress: Not on file   Relationships     Social connections     Talks on phone: Twice a week     Gets together: Twice a week     Attends Muslim service: Never     Active member of club or organization: No     Attends meetings of clubs or organizations: Never     Relationship status: Not on file     Intimate partner violence     Fear of current or ex partner: Not on  file     Emotionally abused: Not on file     Physically abused: Not on file     Forced sexual activity: Not on file   Other Topics Concern     Parent/sibling w/ CABG, MI or angioplasty before 65F 55M? No   Social History Narrative     Not on file           Review of System:     Constitutional: Negative for fever or chills, positive for recent weight loss, failure to thrive symptoms, positive for weakness  Skin: Negative for rashes  Ears/Nose/Throat: Negative for nasal congestion, sore throat  Respiratory: positive for dyspnea on exertion  Cardiovascular: Negative for chest pain  Gastrointestinal: Negative for nausea, vomiting  Rheumatologic: positive for chronic gout  Genitourinary: positive for recent UTI  Musculoskeletal: Negative for myalgias  Neurologic: Negative for headaches  Psychiatric: Negative for depression, anxiety  Hematologic/Lymphatic/Immunologic: Negative  Endocrine: Negative  Behavioral: Negative for tobacco use       Physical Exam:   There were no vitals taken for this visit.    RESP: no cough over the phone  NEURO: Alert & Oriented x 3 over the phone.   PSYCH: mentation appears normal over the phone        Diagnostic Test Results:     Lab Results   Component Value Date    WBC 3.3 10/14/2019     Lab Results   Component Value Date    RBC 3.16 10/14/2019     Lab Results   Component Value Date    HGB 9.9 10/14/2019     Lab Results   Component Value Date    HCT 31.1 10/14/2019     Lab Results   Component Value Date    MCV 98 10/14/2019     Lab Results   Component Value Date    MCH 31.3 10/14/2019     Lab Results   Component Value Date    MCHC 31.8 10/14/2019     Lab Results   Component Value Date    RDW 15.7 10/14/2019     Lab Results   Component Value Date     10/14/2019         ASSESSMENT/PLAN:     (R06.00) Dyspnea, unspecified type  (primary encounter diagnosis)  (M62.81) Generalized muscle weakness  (R63.4) Weight loss  (R62.7) Failure to thrive in adult  (R63.0) Poor appetite  Comment:  worsening dyspnea on exertion, diffuse weakness symptoms concerning for possible CHF exacerbation  Plan: I have advised the patient to go to the ER for further evaluation of CHF exacerbation symptoms going forward.       (E55.9) Vitamin D deficiency  Comment: no recent falls or acute injuries  Plan: continue current therapy      (E03.9) Hypothyroidism, unspecified type  Comment: stable, patient is due for a refill of Levothyroxine.   Plan: levothyroxine (SYNTHROID/LEVOTHROID) 25 MCG         tablet      (K21.00) Gastroesophageal reflux disease with esophagitis, unspecified whether hemorrhage  Comment: stable. Patient is due for a refill of Protonix  Plan: pantoprazole (PROTONIX) 40 MG EC tablet      Follow Up Plan:     Patient is instructed to return to Internal Medicine clinic for follow-up visit in 1 month.      Phone call duration:  30 minutes      Laurie Conteh MD  Internal Medicine  [facility]

## 2021-06-07 NOTE — ED TRIAGE NOTES
Generalized weakness last 2 days, feeing fatigued. Poor po intake last few day. Was told to come to ED per PMD.

## 2021-06-08 ENCOUNTER — OFFICE VISIT (OUTPATIENT)
Dept: CARDIOLOGY | Facility: CLINIC | Age: 86
End: 2021-06-08
Attending: NURSE PRACTITIONER
Payer: MEDICARE

## 2021-06-08 VITALS
HEART RATE: 58 BPM | WEIGHT: 124.5 LBS | SYSTOLIC BLOOD PRESSURE: 146 MMHG | HEIGHT: 60 IN | OXYGEN SATURATION: 95 % | BODY MASS INDEX: 24.44 KG/M2 | DIASTOLIC BLOOD PRESSURE: 42 MMHG

## 2021-06-08 DIAGNOSIS — I50.32 CHRONIC HEART FAILURE WITH PRESERVED EJECTION FRACTION (H): Primary | ICD-10-CM

## 2021-06-08 DIAGNOSIS — I10 ESSENTIAL HYPERTENSION: ICD-10-CM

## 2021-06-08 DIAGNOSIS — Z71.89 OTHER SPECIFIED COUNSELING: ICD-10-CM

## 2021-06-08 PROCEDURE — 99214 OFFICE O/P EST MOD 30 MIN: CPT | Performed by: NURSE PRACTITIONER

## 2021-06-08 ASSESSMENT — MIFFLIN-ST. JEOR: SCORE: 940.2

## 2021-06-08 NOTE — PATIENT INSTRUCTIONS
continue with low sodium diet and when you rest get your feet above your heart.   If you can compression stockings.

## 2021-06-08 NOTE — PROGRESS NOTES
"    Cardiology Clinic Progress Note    Service Date:  6/8/2021    Primary Cardiologist: Dr. Fuentes      Reason for Visit: Heart failure follow-up    HPI:   I had the pleasure of seeing Ms. Ghislaine Walls in the clinic today. She is a very pleasant 85 year old female with a past medical history notable for    1. Paroxysmal atrial fibrillation with rate control  2. Heart failure with preserved ejection fraction  3. Hypertension  4. Stage III CKD  5. Hypothyroidism  6. Gout  7. Chronic anemia with MGUS  8. Covid positive.  Testing once in August 2020 and again in December 2020  9. Thyroid lesion  10. Chronic pain and fatigue     Diagnostics    Echo (2/22/2021) reviewed EF 60-65%. Diastolic Doppler findings (E/E' ratio and/or other parameters) suggest left ventricular filling pressures are increased. Right ventricular systolic pressure is elevated, consistent with mild pulmonary hypertension. Moderate left pleural effusion.  Compared to prior study, there is no significant change.    In February 2021, patient was hospitalized for heart failure at the time she was hypoxic and underwent bilateral thoracentesis. She was treated for UTI and was very clear she wanted hospice however in follow-up she was not interested. While at [facility] a conference was held, comfort care was recommended but her daughter was not ready.    On 4/16/2021, patient was hospitalized after refusing to take her diuretics due to frequent urination and gained 15 pounds, she was found to be hypoxic. Palliative care was consulted however patient and family deferred.   She was treated for community-acquired pneumonia, diuresed, and discharged with torsemide 20 mg daily.    On 5/11/2021 she was in the clinic and was feeling fine until \"she had to do something\" she had started Orencia 6 weeks ago with the hope of improving her symptoms. Her torsemide was decreased to 10 mg daily due to elevated creatinine however her weight increased and her " torsemide was increased back to 20 mg daily.    Today, she reports her glands are less swollen since starting the antibiotic.  She states she is more tired.... She was discharged from TCU on 5/5/2021.  Her weight today was 120 at home and her feet and legs swollen and in the past week her weight was 125#.  She states she had not followed a low sodium diet.  She denies chest pain, shortness of breath, dyspnea on exertion, orthopnea, PND, lower extremity edema, palpitations, dizziness, presyncope, or syncope.   Reports taking medications as prescribed.     ASSESSMENT AND PLAN:  Heart failure with preserved ejection fraction secondary to hypertension    On 2/22 while hospitalized patient's weight was 115 pounds    On 3/4/2021 patient's weight was 127 pounds at her facility    On 4/22/201, pt's weight at discharge was 129 pounds    On 05/11/21, pt's weight is 120 pounds.     On 6/8/2021, pt's weight was 120#    Pt appear euvolemic on exam with trace LE edema.  She denies other HF symptoms.  She is walking to the dining room at [place].     Currently taking torsemide to 20 mg daily        Hypertension    Slightly elevated while taking amlodipine 10 mg, clonidine 0.1 mg, metoprolol tartrate 50 mg twice daily,         CKD    Creatinine elevated to 1.76 (5/10/21)    Creatinine=1.61 (6/7/2021)      End-of-life discussion.    Patient has had conversations with FANI Thrasher regarding end-of-life and patient clearly told Courtney on 3/4/2021 she would like to not be hospitalized and pursue hospice however her daughter Krupa feels her mother should try to get better.    Plan:  Today make no changes in medication  Continue with low sodium diet and daily weights.       Thank you for the opportunity to participate in this pleasant patient's care. We would be happy to see her sooner if needed for any concerns in the meantime.        DHAVAL Zavala CNP  [facility]  Text Page  (M-F 8:00 am - 4:30 pm)    Orders this Visit:  Orders  Placed This Encounter   Procedures     Basic metabolic panel     Follow-Up with Cardiologist     No orders of the defined types were placed in this encounter.    Medications Discontinued During This Encounter   Medication Reason     sulfaSALAzine (AZULFIDINE) 500 MG tablet Discontinued by another Health Care Provider     Abatacept (ORENCIA IV) Therapy completed     Darbepoetin Alonso (ARANESP, ALBUMIN FREE, IJ) Medication Reconciliation Clean Up     Encounter Diagnoses   Name Primary?     Chronic heart failure with preserved ejection fraction (H) Yes     Essential hypertension        CURRENT MEDICATIONS:  Current Outpatient Medications   Medication Sig Dispense Refill     Abatacept (ORENCIA IV) Inject 500 mg into the vein every 30 days (dose will be due 5/24/21)       acetaminophen (TYLENOL) 500 MG tablet Take 1,000 mg by mouth 3 times daily as needed        allopurinol (ZYLOPRIM) 100 MG tablet Take 1 tablet (100 mg) by mouth daily 90 tablet 3     amLODIPine (NORVASC) 10 MG tablet Take 10 mg by mouth daily       apixaban ANTICOAGULANT (ELIQUIS) 2.5 MG tablet Take 1 tablet (2.5 mg) by mouth 2 times daily 60 tablet 0     cloNIDine (CATAPRES) 0.1 MG tablet Take 1 tablet (0.1 mg) by mouth 2 times daily 60 tablet 0     Darbepoetin Alonso-Albumin (ARANESP IJ) Monthly as needed       hydrALAZINE (APRESOLINE) 100 MG tablet Take 1 tablet (100 mg) by mouth 4 times daily 120 tablet 0     levothyroxine (SYNTHROID/LEVOTHROID) 25 MCG tablet Take 1 tablet (25 mcg) by mouth daily 90 tablet 3     melatonin 5 MG tablet Take 10 mg by mouth nightly as needed for sleep       metoprolol tartrate (LOPRESSOR) 50 MG tablet Take 1 tablet (50 mg) by mouth 2 times daily 60 tablet 0     ondansetron (ZOFRAN-ODT) 4 MG ODT tab Take 4 mg by mouth every 8 hours as needed for nausea       pantoprazole (PROTONIX) 40 MG EC tablet Take 1 tablet (40 mg) by mouth daily 90 tablet 3     predniSONE (DELTASONE) 5 MG tablet Take 1 tablet (5 mg) by mouth daily  (Patient taking differently: Take 5 mg by mouth daily Week of 21 3 mg daily  Week of 21 2 mg daily  Weekj of 21 1 mg daily  Done on 21) 30 tablet 0     torsemide (DEMADEX) 20 MG tablet Take 1 tablet (20 mg) by mouth daily 90 tablet 3     mirtazapine (REMERON) 15 MG tablet Take 1 tablet by mouth daily At Bedtime. 30 tablet 0       ALLERGIES  Allergies   Allergen Reactions     Oxybutynin Itching     Seasonal Allergies        PAST MEDICAL, SURGICAL, FAMILY HISTORY:  History was reviewed and updated as needed, see medical record.    SOCIAL HISTORY:  Social History     Socioeconomic History     Marital status:      Spouse name: Not on file     Number of children: Not on file     Years of education: Not on file     Highest education level: Not on file   Occupational History     Not on file   Social Needs     Financial resource strain: Not hard at all     Food insecurity     Worry: Never true     Inability: Never true     Transportation needs     Medical: No     Non-medical: No   Tobacco Use     Smoking status: Former Smoker     Packs/day: 0.50     Years: 5.00     Pack years: 2.50     Types: Cigarettes     Quit date: 1973     Years since quittin.4     Smokeless tobacco: Never Used   Substance and Sexual Activity     Alcohol use: Yes     Alcohol/week: 0.0 standard drinks     Comment: one light beer of  occ     Drug use: No     Sexual activity: Not Currently     Partners: Male   Lifestyle     Physical activity     Days per week: 0 days     Minutes per session: 0 min     Stress: Not on file   Relationships     Social connections     Talks on phone: Twice a week     Gets together: Twice a week     Attends Islam service: Never     Active member of club or organization: No     Attends meetings of clubs or organizations: Never     Relationship status: Not on file     Intimate partner violence     Fear of current or ex partner: Not on file     Emotionally abused: Not on file      "Physically abused: Not on file     Forced sexual activity: Not on file   Other Topics Concern     Parent/sibling w/ CABG, MI or angioplasty before 65F 55M? No   Social History Narrative     Not on file       Review of Systems:  Skin:  Positive for bruising   Eyes:  Positive for glasses  ENT:  Positive for hearing loss  Respiratory:  Positive for dyspnea on exertion;cough  Cardiovascular:    Positive for;edema;fatigue  Gastroenterology: Positive for nausea  Genitourinary:  Positive for urinary frequency  Musculoskeletal:  Positive for arthritis  Neurologic:  Positive for memory problems  Psychiatric:  Negative    Heme/Lymph/Imm:  Positive for allergies;easy bruising  Endocrine:  Positive for thyroid disorder     Physical Exam:  Vitals: BP (!) 146/42   Pulse 58   Ht 1.53 m (5' 0.25\")   Wt 56.5 kg (124 lb 8 oz)   SpO2 95%   BMI 24.11 kg/m     Wt Readings from Last 4 Encounters:   06/08/21 56.5 kg (124 lb 8 oz)   06/07/21 54.4 kg (120 lb)   05/11/21 54.4 kg (120 lb)   05/04/21 53 kg (116 lb 12.8 oz)     CONSTITUTIONAL: Appears her stated age, thin, and in no acute distress.  HEENT: Pupils equal, round. Sclerae nonicteric.    NECK: Supple, no masses appreciated. Carotid pulses full and equal with no bruit bilaterally.   C/V:  Regular rate and rhythm,   RESP: Respirations are unlabored. Lungs are clear to auscultation bilaterally without wheezing, rales, or rhonchi.  GI: Abdomen soft, non-tender, non-distended.  EXTREM: No clubbing, cyanosis, or lower extremity edema bilaterally.   NEURO: Alert and oriented, cooperative. Gait steady. No gross focal deficits.   PSYCH: Affect appropriate. Mentation normal. Responds to questions appropriately.  SKIN: Warm and dry. No apparent rashes or bruising.     Recent Lab Results:  LIPID RESULTS:  Lab Results   Component Value Date    CHOL 123 10/02/2019    HDL 27 (L) 10/02/2019    LDL 74 10/02/2019    TRIG 111 10/02/2019    CHOLHDLRATIO 3.9 06/15/2015     LIVER ENZYME " RESULTS:  Lab Results   Component Value Date    AST 40 06/07/2021    ALT 35 06/07/2021     CBC RESULTS:  Lab Results   Component Value Date    WBC 3.5 (L) 06/07/2021    RBC 3.26 (L) 06/07/2021    HGB 9.8 (L) 06/07/2021    HCT 32.5 (L) 06/07/2021     06/07/2021    MCH 30.1 06/07/2021    MCHC 30.2 (L) 06/07/2021    RDW 17.6 (H) 06/07/2021     06/07/2021     BMP RESULTS:  Lab Results   Component Value Date     06/07/2021    POTASSIUM 4.9 06/07/2021    CHLORIDE 114 (H) 06/07/2021    CO2 23 06/07/2021    ANIONGAP 4 06/07/2021    GLC 94 06/07/2021    BUN 77 (H) 06/07/2021    CR 1.61 (H) 06/07/2021    GFRESTIMATED 29 (L) 06/07/2021    GFRESTBLACK 34 (L) 06/07/2021    ALIVIA 9.4 06/07/2021      A1C RESULTS:  No results found for: A1C  INR RESULTS:  Lab Results   Component Value Date    INR 1.16 (H) 02/24/2021    INR 2.07 (H) 07/21/2020       LENNY Huddleston, APRN CNP  [address]  [place] [address]    This note was completed in part using Dragon voice recognition software. Although reviewed after completion, some word and grammatical errors may occur.

## 2021-06-08 NOTE — LETTER
6/8/2021    Laurie Conteh MD  [address] [facility] [address]  [facility] [address]    RE: Ghislaine Walls       Dear Colleague,    I had the pleasure of seeing Ghislaine Walls in the [facility].    Cardiology Clinic Progress Note    Service Date:  6/8/2021    Primary Cardiologist: Dr. Fuentes      Reason for Visit: Heart failure follow-up    HPI:   I had the pleasure of seeing Ms. Ghislaine Walls in the clinic today. She is a very pleasant 85 year old female with a past medical history notable for    1. Paroxysmal atrial fibrillation with rate control  2. Heart failure with preserved ejection fraction  3. Hypertension  4. Stage III CKD  5. Hypothyroidism  6. Gout  7. Chronic anemia with MGUS  8. Covid positive.  Testing once in August 2020 and again in December 2020  9. Thyroid lesion  10. Chronic pain and fatigue     Diagnostics    Echo (2/22/2021) reviewed EF 60-65%. Diastolic Doppler findings (E/E' ratio and/or other parameters) suggest left ventricular filling pressures are increased. Right ventricular systolic pressure is elevated, consistent with mild pulmonary hypertension. Moderate left pleural effusion.  Compared to prior study, there is no significant change.    In February 2021, patient was hospitalized for heart failure at the time she was hypoxic and underwent bilateral thoracentesis. She was treated for UTI and was very clear she wanted hospice however in follow-up she was not interested. While at [facility] a conference was held, comfort care was recommended but her daughter was not ready.    On 4/16/2021, patient was hospitalized after refusing to take her diuretics due to frequent urination and gained 15 pounds, she was found to be hypoxic. Palliative care was consulted however patient and family deferred.   She was treated for community-acquired pneumonia, diuresed, and discharged with torsemide 20 mg daily.    On 5/11/2021 she was in the clinic  "and was feeling fine until \"she had to do something\" she had started Orencia 6 weeks ago with the hope of improving her symptoms. Her torsemide was decreased to 10 mg daily due to elevated creatinine however her weight increased and her torsemide was increased back to 20 mg daily.    Today, she reports her glands are less swollen since starting the antibiotic.  She states she is more tired.... She was discharged from TCU on 5/5/2021.  Her weight today was 120 at home and her feet and legs swollen and in the past week her weight was 125#.  She states she had not followed a low sodium diet.  She denies chest pain, shortness of breath, dyspnea on exertion, orthopnea, PND, lower extremity edema, palpitations, dizziness, presyncope, or syncope.   Reports taking medications as prescribed.     ASSESSMENT AND PLAN:  Heart failure with preserved ejection fraction secondary to hypertension    On 2/22 while hospitalized patient's weight was 115 pounds    On 3/4/2021 patient's weight was 127 pounds at her facility    On 4/22/201, pt's weight at discharge was 129 pounds    On 05/11/21, pt's weight is 120 pounds.     On 6/8/2021, pt's weight was 120#    Pt appear euvolemic on exam with trace LE edema.  She denies other HF symptoms.  She is walking to the dining room at [place].     Currently taking torsemide to 20 mg daily        Hypertension    Slightly elevated while taking amlodipine 10 mg, clonidine 0.1 mg, metoprolol tartrate 50 mg twice daily,         CKD    Creatinine elevated to 1.76 (5/10/21)    Creatinine=1.61 (6/7/2021)      End-of-life discussion.    Patient has had conversations with FANI Thrasher regarding end-of-life and patient clearly told Courtney on 3/4/2021 she would like to not be hospitalized and pursue hospice however her daughter Krupa feels her mother should try to get better.    Plan:  Today make no changes in medication  Continue with low sodium diet and daily weights.       Thank you for the opportunity to " participate in this pleasant patient's care. We would be happy to see her sooner if needed for any concerns in the meantime.        DHAVAL Zavala [facility]  Text Page  (M-F 8:00 am - 4:30 pm)    Orders this Visit:  Orders Placed This Encounter   Procedures     Basic metabolic panel     Follow-Up with Cardiologist     No orders of the defined types were placed in this encounter.    Medications Discontinued During This Encounter   Medication Reason     sulfaSALAzine (AZULFIDINE) 500 MG tablet Discontinued by another Health Care Provider     Abatacept (ORENCIA IV) Therapy completed     Darbepoetin Alonso (ARANESP, ALBUMIN FREE, IJ) Medication Reconciliation Clean Up     Encounter Diagnoses   Name Primary?     Chronic heart failure with preserved ejection fraction (H) Yes     Essential hypertension        CURRENT MEDICATIONS:  Current Outpatient Medications   Medication Sig Dispense Refill     Abatacept (ORENCIA IV) Inject 500 mg into the vein every 30 days (dose will be due 5/24/21)       acetaminophen (TYLENOL) 500 MG tablet Take 1,000 mg by mouth 3 times daily as needed        allopurinol (ZYLOPRIM) 100 MG tablet Take 1 tablet (100 mg) by mouth daily 90 tablet 3     amLODIPine (NORVASC) 10 MG tablet Take 10 mg by mouth daily       apixaban ANTICOAGULANT (ELIQUIS) 2.5 MG tablet Take 1 tablet (2.5 mg) by mouth 2 times daily 60 tablet 0     cloNIDine (CATAPRES) 0.1 MG tablet Take 1 tablet (0.1 mg) by mouth 2 times daily 60 tablet 0     Darbepoetin Alonso-Albumin (ARANESP IJ) Monthly as needed       hydrALAZINE (APRESOLINE) 100 MG tablet Take 1 tablet (100 mg) by mouth 4 times daily 120 tablet 0     levothyroxine (SYNTHROID/LEVOTHROID) 25 MCG tablet Take 1 tablet (25 mcg) by mouth daily 90 tablet 3     melatonin 5 MG tablet Take 10 mg by mouth nightly as needed for sleep       metoprolol tartrate (LOPRESSOR) 50 MG tablet Take 1 tablet (50 mg) by mouth 2 times daily 60 tablet 0     ondansetron (ZOFRAN-ODT) 4 MG  ODT tab Take 4 mg by mouth every 8 hours as needed for nausea       pantoprazole (PROTONIX) 40 MG EC tablet Take 1 tablet (40 mg) by mouth daily 90 tablet 3     predniSONE (DELTASONE) 5 MG tablet Take 1 tablet (5 mg) by mouth daily (Patient taking differently: Take 5 mg by mouth daily Week of 21 3 mg daily  Week of 21 2 mg daily  Weekj of 21 1 mg daily  Done on 21) 30 tablet 0     torsemide (DEMADEX) 20 MG tablet Take 1 tablet (20 mg) by mouth daily 90 tablet 3     mirtazapine (REMERON) 15 MG tablet Take 1 tablet by mouth daily At Bedtime. 30 tablet 0       ALLERGIES  Allergies   Allergen Reactions     Oxybutynin Itching     Seasonal Allergies        PAST MEDICAL, SURGICAL, FAMILY HISTORY:  History was reviewed and updated as needed, see medical record.    SOCIAL HISTORY:  Social History     Socioeconomic History     Marital status:      Spouse name: Not on file     Number of children: Not on file     Years of education: Not on file     Highest education level: Not on file   Occupational History     Not on file   Social Needs     Financial resource strain: Not hard at all     Food insecurity     Worry: Never true     Inability: Never true     Transportation needs     Medical: No     Non-medical: No   Tobacco Use     Smoking status: Former Smoker     Packs/day: 0.50     Years: 5.00     Pack years: 2.50     Types: Cigarettes     Quit date: 1973     Years since quittin.4     Smokeless tobacco: Never Used   Substance and Sexual Activity     Alcohol use: Yes     Alcohol/week: 0.0 standard drinks     Comment: one light beer of  occ     Drug use: No     Sexual activity: Not Currently     Partners: Male   Lifestyle     Physical activity     Days per week: 0 days     Minutes per session: 0 min     Stress: Not on file   Relationships     Social connections     Talks on phone: Twice a week     Gets together: Twice a week     Attends Orthodox service: Never     Active member of club  "or organization: No     Attends meetings of clubs or organizations: Never     Relationship status: Not on file     Intimate partner violence     Fear of current or ex partner: Not on file     Emotionally abused: Not on file     Physically abused: Not on file     Forced sexual activity: Not on file   Other Topics Concern     Parent/sibling w/ CABG, MI or angioplasty before 65F 55M? No   Social History Narrative     Not on file       Review of Systems:  Skin:  Positive for bruising   Eyes:  Positive for glasses  ENT:  Positive for hearing loss  Respiratory:  Positive for dyspnea on exertion;cough  Cardiovascular:    Positive for;edema;fatigue  Gastroenterology: Positive for nausea  Genitourinary:  Positive for urinary frequency  Musculoskeletal:  Positive for arthritis  Neurologic:  Positive for memory problems  Psychiatric:  Negative    Heme/Lymph/Imm:  Positive for allergies;easy bruising  Endocrine:  Positive for thyroid disorder     Physical Exam:  Vitals: BP (!) 146/42   Pulse 58   Ht 1.53 m (5' 0.25\")   Wt 56.5 kg (124 lb 8 oz)   SpO2 95%   BMI 24.11 kg/m     Wt Readings from Last 4 Encounters:   06/08/21 56.5 kg (124 lb 8 oz)   06/07/21 54.4 kg (120 lb)   05/11/21 54.4 kg (120 lb)   05/04/21 53 kg (116 lb 12.8 oz)     CONSTITUTIONAL: Appears her stated age, thin, and in no acute distress.  HEENT: Pupils equal, round. Sclerae nonicteric.    NECK: Supple, no masses appreciated. Carotid pulses full and equal with no bruit bilaterally.   C/V:  Regular rate and rhythm,   RESP: Respirations are unlabored. Lungs are clear to auscultation bilaterally without wheezing, rales, or rhonchi.  GI: Abdomen soft, non-tender, non-distended.  EXTREM: No clubbing, cyanosis, or lower extremity edema bilaterally.   NEURO: Alert and oriented, cooperative. Gait steady. No gross focal deficits.   PSYCH: Affect appropriate. Mentation normal. Responds to questions appropriately.  SKIN: Warm and dry. No apparent rashes or bruising. "     Recent Lab Results:  LIPID RESULTS:  Lab Results   Component Value Date    CHOL 123 10/02/2019    HDL 27 (L) 10/02/2019    LDL 74 10/02/2019    TRIG 111 10/02/2019    CHOLHDLRATIO 3.9 06/15/2015     LIVER ENZYME RESULTS:  Lab Results   Component Value Date    AST 40 06/07/2021    ALT 35 06/07/2021     CBC RESULTS:  Lab Results   Component Value Date    WBC 3.5 (L) 06/07/2021    RBC 3.26 (L) 06/07/2021    HGB 9.8 (L) 06/07/2021    HCT 32.5 (L) 06/07/2021     06/07/2021    MCH 30.1 06/07/2021    MCHC 30.2 (L) 06/07/2021    RDW 17.6 (H) 06/07/2021     06/07/2021     BMP RESULTS:  Lab Results   Component Value Date     06/07/2021    POTASSIUM 4.9 06/07/2021    CHLORIDE 114 (H) 06/07/2021    CO2 23 06/07/2021    ANIONGAP 4 06/07/2021    GLC 94 06/07/2021    BUN 77 (H) 06/07/2021    CR 1.61 (H) 06/07/2021    GFRESTIMATED 29 (L) 06/07/2021    GFRESTBLACK 34 (L) 06/07/2021    ALIVIA 9.4 06/07/2021      A1C RESULTS:  No results found for: A1C  INR RESULTS:  Lab Results   Component Value Date    INR 1.16 (H) 02/24/2021    INR 2.07 (H) 07/21/2020     This note was completed in part using Dragon voice recognition software. Although reviewed after completion, some word and grammatical errors may occur.    Thank you for allowing me to participate in the care of your patient.      Sincerely,     DHAVAL Zavala CNP     [facility]    cc:   DHAVAL Cannon CNP  [address]  [place] [id number]

## 2021-06-08 NOTE — RESULT ENCOUNTER NOTE
Group A Streptococcus PCR is NEGATIVE   No treatment or change in treatment [facility] ED lab result Strep Group A protocol.

## 2021-06-09 ENCOUNTER — PATIENT OUTREACH (OUTPATIENT)
Dept: CARE COORDINATION | Facility: CLINIC | Age: 86
End: 2021-06-09

## 2021-06-14 ENCOUNTER — TELEPHONE (OUTPATIENT)
Dept: CARDIOLOGY | Facility: CLINIC | Age: 86
End: 2021-06-14

## 2021-06-14 DIAGNOSIS — R62.7 FAILURE TO THRIVE IN ADULT: ICD-10-CM

## 2021-06-14 NOTE — TELEPHONE ENCOUNTER
Daughter called scheduling about a lab that was ordered by Loretta.  Lab ordered was a BMP to be done in October. LM for call back. Yamil        torsemide to 20 mg daily, daily weights and follow up in 3 weeks with BMP and office visit.     Will ask [facility] to call pt/marylu in 1 week to obtain weights.

## 2021-06-15 RX ORDER — MIRTAZAPINE 15 MG/1
TABLET, FILM COATED ORAL
Qty: 30 TABLET | Refills: 0 | Status: SHIPPED | OUTPATIENT
Start: 2021-06-15 | End: 2021-07-12

## 2021-06-17 NOTE — TELEPHONE ENCOUNTER
Pt daughter calling about the Lab OV that was scheduled in September, that has now been changed to October.  [facility]oret[facility] OV note notes that pt is to f/u in 3 weeks with a BMP.  Will clarify if this was to have been a 3 month f/u with Dr Fuentes, which is scheduled in October.  Pt does have a BMP in July.  Need to clarify all these appointments with Loretta, who is off today and Pt Daughter Krupa made aware of this. Do not see that [facility] called to get a weight on pt. Yamil

## 2021-06-21 DIAGNOSIS — I10 ESSENTIAL HYPERTENSION: Primary | ICD-10-CM

## 2021-06-21 DIAGNOSIS — I50.32 CHRONIC HEART FAILURE WITH PRESERVED EJECTION FRACTION (H): ICD-10-CM

## 2021-06-21 NOTE — TELEPHONE ENCOUNTER
Amlodipine historical in chart  Eliquis last Rx'd by geriatrics @ [facility]cu discharge    LOV 6-7-2021 [facility] virtual  No future OV scheduled    Elvi Miller RT (R)

## 2021-06-22 NOTE — TELEPHONE ENCOUNTER
Routing refill request to provider for review/approval because:  Labs out of range:    Creatinine   Date Value Ref Range Status   06/07/2021 1.61 (H) 0.52 - 1.04 mg/dL Final       Tayler Boss RN  [facility] Triage

## 2021-06-23 RX ORDER — AMLODIPINE BESYLATE 10 MG/1
10 TABLET ORAL DAILY
Qty: 90 TABLET | Refills: 3 | Status: SHIPPED | OUTPATIENT
Start: 2021-06-23 | End: 2022-01-01

## 2021-07-07 ENCOUNTER — PATIENT OUTREACH (OUTPATIENT)
Dept: NURSING | Facility: CLINIC | Age: 86
End: 2021-07-07
Payer: MEDICARE

## 2021-07-07 DIAGNOSIS — I10 ESSENTIAL HYPERTENSION: ICD-10-CM

## 2021-07-07 DIAGNOSIS — I50.33 ACUTE ON CHRONIC DIASTOLIC CONGESTIVE HEART FAILURE (H): ICD-10-CM

## 2021-07-07 DIAGNOSIS — I10 HYPERTENSION GOAL BP (BLOOD PRESSURE) < 140/90: Chronic | ICD-10-CM

## 2021-07-07 RX ORDER — CLONIDINE HYDROCHLORIDE 0.1 MG/1
0.1 TABLET ORAL 2 TIMES DAILY
Qty: 60 TABLET | Refills: 3 | Status: SHIPPED | OUTPATIENT
Start: 2021-07-07 | End: 2021-11-08

## 2021-07-07 RX ORDER — METOPROLOL TARTRATE 50 MG
TABLET ORAL
Qty: 180 TABLET | Refills: 3 | Status: SHIPPED | OUTPATIENT
Start: 2021-07-07 | End: 2021-12-29 | Stop reason: ALTCHOICE

## 2021-07-07 RX ORDER — HYDRALAZINE HYDROCHLORIDE 100 MG/1
100 TABLET, FILM COATED ORAL 4 TIMES DAILY
Qty: 120 TABLET | Refills: 3 | Status: SHIPPED | OUTPATIENT
Start: 2021-07-07 | End: 2021-11-05

## 2021-07-07 NOTE — TELEPHONE ENCOUNTER
Routing refill request to provider for review/approval because:  Labs out of range:    Creatinine   Date Value Ref Range Status   06/07/2021 1.61 (H) 0.52 - 1.04 mg/dL Final       Tayler Boss RN  [facility] Triage

## 2021-07-07 NOTE — TELEPHONE ENCOUNTER
Routing refill request to provider for review/approval because:  Prior Rx from TCU doctor     Now requesting from PCP    Zuri DENISE RN

## 2021-07-07 NOTE — TELEPHONE ENCOUNTER
Reason for Call:  Medication or medication refill:    Do you use a [facility]?  Name of the pharmacy and phone number for the current request:     [id number] - [place], [place] - [address]    Name of the medication requested: hydrALAZINE (APRESOLINE) 100 MG tablet   &  cloNIDine (CATAPRES) 0.1 MG tablet     Other request:     Can we leave a detailed message on this number? YES    Phone number patient's daughter Krupa can be reached at: [phone]  *No C2C on file for caller    Best Time: any    Call taken on 7/7/2021 at 11:23 AM by Nakita Rogers

## 2021-07-07 NOTE — PROGRESS NOTES
Clinic Care Coordination Contact    RNCC received call from patient's daughter (patient is not enrolled in care coordination, unknown how she had RNCC contact information) who expressed irritation with not yet receiving refills for her Mother that she submitted 3 weeks ago. She reports she has contacted the pharmacy directly and the pharmacy reports they have not received authorization to fill the prescriptions. Patient's daughter ended the call before RNCC could get the names of medications that she is requesting.   RNCC will route her concerns to appropriate pools at the [facility].     Elizabeth Crespo, RN Care Coordinator     [facility] Management  [facility]  [email or web address]

## 2021-07-12 ENCOUNTER — DOCUMENTATION ONLY (OUTPATIENT)
Dept: CARDIOLOGY | Facility: CLINIC | Age: 86
End: 2021-07-12

## 2021-07-12 ENCOUNTER — LAB (OUTPATIENT)
Dept: LAB | Facility: CLINIC | Age: 86
End: 2021-07-12
Payer: MEDICARE

## 2021-07-12 DIAGNOSIS — I50.32 CHRONIC HEART FAILURE WITH PRESERVED EJECTION FRACTION (H): ICD-10-CM

## 2021-07-12 DIAGNOSIS — R62.7 FAILURE TO THRIVE IN ADULT: ICD-10-CM

## 2021-07-12 LAB
ANION GAP SERPL CALCULATED.3IONS-SCNC: 4 MMOL/L (ref 3–14)
BUN SERPL-MCNC: 88 MG/DL (ref 7–30)
CALCIUM SERPL-MCNC: 9 MG/DL (ref 8.5–10.1)
CHLORIDE BLD-SCNC: 108 MMOL/L (ref 94–109)
CO2 SERPL-SCNC: 25 MMOL/L (ref 20–32)
CREAT SERPL-MCNC: 2.11 MG/DL (ref 0.52–1.04)
GFR SERPL CREATININE-BSD FRML MDRD: 21 ML/MIN/1.73M2
GLUCOSE BLD-MCNC: 144 MG/DL (ref 70–99)
POTASSIUM BLD-SCNC: 5.2 MMOL/L (ref 3.4–5.3)
SODIUM SERPL-SCNC: 137 MMOL/L (ref 133–144)

## 2021-07-12 PROCEDURE — 36415 COLL VENOUS BLD VENIPUNCTURE: CPT | Performed by: NURSE PRACTITIONER

## 2021-07-12 PROCEDURE — 80048 BASIC METABOLIC PNL TOTAL CA: CPT | Performed by: NURSE PRACTITIONER

## 2021-07-12 NOTE — PROGRESS NOTES
Pt showed up for f/u appt with Loretta that had been cancelled. Daughter is concerned about her fatigue. Stated she is too tired to come to the lower level during the day for meals. She lives at [place]. States her wt is stable at 119. She is not SOB just tired. She was on prednisone for arthritis and it had been tapered off. She developed more arthritic pain and has been recently put back on the prednisone. Explained that the pain and change in meds can make her tired. She does not want to come back on Friday ( r/s) to see Loretta. If she feels better she will r/s f/u with Loretta for 2 weeks from Friday.

## 2021-07-13 RX ORDER — MIRTAZAPINE 15 MG/1
TABLET, FILM COATED ORAL
Qty: 30 TABLET | Refills: 0 | Status: SHIPPED | OUTPATIENT
Start: 2021-07-13 | End: 2021-12-29

## 2021-07-13 NOTE — TELEPHONE ENCOUNTER
Called Pt to clarify what Rx is still missing. Pt was unsure but wanted me to speak with her daughter. Told her no C2C on file and that this verbal would only last for this call. Called Krupa and MICHELINE stating Rx was sent in today, if other Rx's are still needed to please call back to clarify.

## 2021-07-16 ENCOUNTER — OFFICE VISIT (OUTPATIENT)
Dept: CARDIOLOGY | Facility: CLINIC | Age: 86
End: 2021-07-16
Payer: MEDICARE

## 2021-07-16 VITALS
SYSTOLIC BLOOD PRESSURE: 129 MMHG | HEART RATE: 69 BPM | OXYGEN SATURATION: 97 % | BODY MASS INDEX: 23.69 KG/M2 | DIASTOLIC BLOOD PRESSURE: 68 MMHG | WEIGHT: 122.3 LBS

## 2021-07-16 DIAGNOSIS — N18.30 STAGE 3 CHRONIC KIDNEY DISEASE, UNSPECIFIED WHETHER STAGE 3A OR 3B CKD (H): ICD-10-CM

## 2021-07-16 DIAGNOSIS — I10 ESSENTIAL HYPERTENSION: ICD-10-CM

## 2021-07-16 DIAGNOSIS — R53.82 CHRONIC FATIGUE: ICD-10-CM

## 2021-07-16 DIAGNOSIS — I50.32 CHRONIC DIASTOLIC HEART FAILURE (H): Primary | ICD-10-CM

## 2021-07-16 DIAGNOSIS — I48.0 PAROXYSMAL ATRIAL FIBRILLATION (H): ICD-10-CM

## 2021-07-16 PROCEDURE — 99214 OFFICE O/P EST MOD 30 MIN: CPT | Performed by: NURSE PRACTITIONER

## 2021-07-16 NOTE — LETTER
7/16/2021    Laurie Conteh MD  [address] [facility] [address]  [place] [address]    RE: Ghislaine Walls       Dear Colleague,    I had the pleasure of seeing Ghislaine Walls in the [facility].        Cardiology Clinic Progress Note    Service Date: 7/16/21    Primary Cardiologist: Dr. Fuentes      Reason for Visit: follow up per family     HPI:   I had the pleasure of seeing Ms. Ghislaine Walls in the clinic today. she is a very pleasant 85 year old female with a past medical history notable for    1. Paroxysmal atrial fibrillation with rate control  2. Heart failure with preserved ejection fraction  3. Hypertension  4. Stage III CKD  5. Hypothyroidism  6. Gout  7. Chronic anemia with MGUS  8. Covid positive.  Testing once in August 2020 and again in December 2020  9. Thyroid lesion  10. Chronic pain and fa      Today, she presents with her daughter.  They are concerned that Ghislaine is fatigued and at times does not want to participate in meals anymore.  Her prednisone has been discontinued for short time but reinstated per her rheumatologist.   She denies chest pain, shortness of breath, dyspnea on exertion, orthopnea, PND, lower extremity edema, palpitations, dizziness, presyncope, or syncope.   Reports taking medications as prescribed.     ASSESSMENT AND PLAN:  Heart failure with preserved ejection fraction secondary to hypertension    On 2/22 while hospitalized patient's weight was 115 pounds    On 3/4/2021 patient's weight was 127 pounds at her facility    On 4/22/201, pt's weight at discharge was 129 pounds    On 05/11/21, pt's weight is 120 pounds.     On 6/8/2021, pt's weight was 120#    On 7/16/2021, pt's weight 122#    Pt appear euvolemic on exam.  She denies other HF symptoms.      Currently taking torsemide to 20 mg daily         Hypertension    Slightly elevated while taking amlodipine 10 mg, clonidine 0.1 mg, metoprolol tartrate 50 mg twice  daily,      Chronic fatigue    This is an ongoing issue.  Unfortunately from a cardiovascular standpoint to provide no intervention.  In the past Courtney Amador, PA has discussed palliative care/hospice and family has deferred.  Today this was discussed again and her daughter states insurance will not pay for palliative care only hospice.  I encouraged her to call them directly and about benefits.    CKD    Creatinine elevated to 1.76 (5/10/21)    Creatinine=1.61 (6/7/2021)      Creatinine = 2.11 (7/12/2021)    Continue with current medication and will tolerate a higher creatinine as when her torsemide was decreased she had more HF symptoms.    Follows with nephrology, hematology, and rheumatology       Paroxysmal atrial fibrillation    For rate control patient is currently taking metoprolol tartrate 50 mg twice daily    For anticoagulation for CHADS VASC 4 (age++, female, HTN) she is currently taking apixaban 2.5 mg twice daily.  Her last hemoglobin was 9.8 (6/7/2021).  She follows with hematology    Plan:    Follow up with Dr. Fuentes in Oct with bmp prior.    Please call the clinic if questions or problems arise      Thank you for the opportunity to participate in this pleasant patient's care. We would be happy to see her sooner if needed for any concerns in the meantime.         DHAVAL Zavala [facility]  Text Page  (M-F 8:00 am - 4:30 pm)    Orders this Visit:  No orders of the defined types were placed in this encounter.    No orders of the defined types were placed in this encounter.    There are no discontinued medications.  No diagnosis found.    CURRENT MEDICATIONS:  Current Outpatient Medications   Medication Sig Dispense Refill     Abatacept (ORENCIA IV) Inject 500 mg into the vein every 30 days (dose will be due 5/24/21)       acetaminophen (TYLENOL) 500 MG tablet Take 1,000 mg by mouth 3 times daily as needed        allopurinol (ZYLOPRIM) 100 MG tablet Take 1 tablet (100 mg) by mouth daily 90  tablet 3     amLODIPine (NORVASC) 10 MG tablet Take 1 tablet (10 mg) by mouth daily 90 tablet 3     apixaban ANTICOAGULANT (ELIQUIS) 2.5 MG tablet Take 1 tablet (2.5 mg) by mouth 2 times daily 180 tablet 3     cloNIDine (CATAPRES) 0.1 MG tablet Take 1 tablet (0.1 mg) by mouth 2 times daily 60 tablet 3     Darbepoetin Alonso-Albumin (ARANESP IJ) Monthly as needed       hydrALAZINE (APRESOLINE) 100 MG tablet Take 1 tablet (100 mg) by mouth 4 times daily 120 tablet 3     levothyroxine (SYNTHROID/LEVOTHROID) 25 MCG tablet Take 1 tablet (25 mcg) by mouth daily 90 tablet 3     metoprolol tartrate (LOPRESSOR) 50 MG tablet Take 1 tablet by mouth twice daily. 180 tablet 3     mirtazapine (REMERON) 15 MG tablet Take 1 tablet by mouth daily At Bedtime. 30 tablet 0     ondansetron (ZOFRAN-ODT) 4 MG ODT tab Take 4 mg by mouth every 8 hours as needed for nausea       pantoprazole (PROTONIX) 40 MG EC tablet Take 1 tablet (40 mg) by mouth daily 90 tablet 3     predniSONE (DELTASONE) 5 MG tablet Take 1 tablet (5 mg) by mouth daily (Patient taking differently: Take 5 mg by mouth daily Week of 6/6/21 3 mg daily  Week of 6/13/21 2 mg daily  Weekj of 6/20/21 1 mg daily  Done on 6/27/21) 30 tablet 0     torsemide (DEMADEX) 20 MG tablet Take 1 tablet (20 mg) by mouth daily 90 tablet 3     melatonin 5 MG tablet Take 10 mg by mouth nightly as needed for sleep (Patient not taking: Reported on 7/16/2021)         ALLERGIES  Allergies   Allergen Reactions     Oxybutynin Itching     Seasonal Allergies        PAST MEDICAL, SURGICAL, FAMILY HISTORY:  History was reviewed and updated as needed, see medical record.    SOCIAL HISTORY:  Social History     Socioeconomic History     Marital status:      Spouse name: Not on file     Number of children: Not on file     Years of education: Not on file     Highest education level: Not on file   Occupational History     Not on file   Tobacco Use     Smoking status: Former Smoker     Packs/day: 0.50      Years: 5.00     Pack years: 2.50     Types: Cigarettes     Quit date: 1973     Years since quittin.5     Smokeless tobacco: Never Used   Substance and Sexual Activity     Alcohol use: Yes     Alcohol/week: 0.0 standard drinks     Comment: one light beer of  occ     Drug use: No     Sexual activity: Not Currently     Partners: Male   Other Topics Concern     Parent/sibling w/ CABG, MI or angioplasty before 65F 55M? No   Social History Narrative     Not on file     Social Determinants of Health     Financial Resource Strain: Low Risk      Difficulty of Paying Living Expenses: Not hard at all   Food Insecurity: No Food Insecurity     Worried About Running Out of Food in the Last Year: Never true     Ran Out of Food in the Last Year: Never true   Transportation Needs: No Transportation Needs     Lack of Transportation (Medical): No     Lack of Transportation (Non-Medical): No   Physical Activity: Inactive     Days of Exercise per Week: 0 days     Minutes of Exercise per Session: 0 min   Stress:      Feeling of Stress :    Social Connections: Unknown     Frequency of Communication with Friends and Family: Twice a week     Frequency of Social Gatherings with Friends and Family: Twice a week     Attends Rastafari Services: Never     Active Member of Clubs or Organizations: No     Attends Club or Organization Meetings: Never     Marital Status: Not on file   Intimate Partner Violence:      Fear of Current or Ex-Partner:      Emotionally Abused:      Physically Abused:      Sexually Abused:        Review of Systems:  Skin:  Positive for bruising   Eyes:  Positive for glasses  ENT:  Positive for hearing loss  Respiratory:  Positive for dyspnea on exertion;cough  Cardiovascular:    Positive for;edema;fatigue  Gastroenterology: Positive for nausea;poor appetite  Genitourinary:  Negative    Musculoskeletal:  Positive for arthritis  Neurologic:  Positive for memory problems  Psychiatric:  Negative     Heme/Lymph/Imm:  Positive for allergies;easy bruising  Endocrine:  Positive for thyroid disorder     Physical Exam:  Vitals: /68   Pulse 69   Wt 55.5 kg (122 lb 4.8 oz)   SpO2 97%   BMI 23.69 kg/m     Wt Readings from Last 4 Encounters:   07/16/21 55.5 kg (122 lb 4.8 oz)   06/08/21 56.5 kg (124 lb 8 oz)   06/07/21 54.4 kg (120 lb)   05/11/21 54.4 kg (120 lb)     CONSTITUTIONAL: Appears her stated age, thin, wheelchair  HEENT: Pupils equal, round. Sclerae nonicteric.    NECK: Supple, no masses appreciated.   C/V:  Regular rate and rhythm,  RESP: Respirations are unlabored. Lungs are clear to auscultation bilaterally without wheezing, rales, or rhonchi.  GI: Abdomen soft,.  EXTREM:  No clubbing, cyanosis, or lower extremity edema bilaterally.   NEURO: Alert and oriented, cooperative. Gait steady. No gross focal deficits.   PSYCH: Affect fat. Mentation normal. Responds to questions appropriately.  SKIN: Warm and dry. No apparent rashes or bruising.     Recent Lab Results:  L  LIVER ENZYME RESULTS:  Lab Results   Component Value Date    AST 40 06/07/2021    ALT 35 06/07/2021     CBC RESULTS:  Lab Results   Component Value Date    WBC 3.5 (L) 06/07/2021    RBC 3.26 (L) 06/07/2021    HGB 9.8 (L) 06/07/2021    HCT 32.5 (L) 06/07/2021     06/07/2021    MCH 30.1 06/07/2021    MCHC 30.2 (L) 06/07/2021    RDW 17.6 (H) 06/07/2021     06/07/2021     BMP RESULTS:  Lab Results   Component Value Date     07/12/2021     06/07/2021    POTASSIUM 5.2 07/12/2021    POTASSIUM 4.9 06/07/2021    CHLORIDE 108 07/12/2021    CHLORIDE 114 (H) 06/07/2021    CO2 25 07/12/2021    CO2 23 06/07/2021    ANIONGAP 4 07/12/2021    ANIONGAP 4 06/07/2021     (H) 07/12/2021    GLC 94 06/07/2021    BUN 88 (H) 07/12/2021    BUN 77 (H) 06/07/2021    CR 2.11 (H) 07/12/2021    CR 1.61 (H) 06/07/2021    GFRESTIMATED 21 (L) 07/12/2021    GFRESTIMATED 29 (L) 06/07/2021    GFRESTBLACK 34 (L) 06/07/2021    ALIVIA 9.0  07/12/2021    ALIVIA 9.4 06/07/2021      CC  No referring provider defined for this encounter.    This note was completed in part using Dragon voice recognition software. Although reviewed after completion, some word and grammatical errors may occur.      Thank you for allowing me to participate in the care of your patient.      Sincerely,     DHAVAL Zavala [facility]  cc:   No referring provider defined for this encounter.

## 2021-07-16 NOTE — PROGRESS NOTES
Cardiology Clinic Progress Note    Service Date: 7/16/21    Primary Cardiologist: Dr. Fuentes      Reason for Visit: follow up per family     HPI:   I had the pleasure of seeing Ms. Ghislaine Walls in the clinic today. she is a very pleasant 85 year old female with a past medical history notable for    1. Paroxysmal atrial fibrillation with rate control  2. Heart failure with preserved ejection fraction  3. Hypertension  4. Stage III CKD  5. Hypothyroidism  6. Gout  7. Chronic anemia with MGUS  8. Covid positive.  Testing once in August 2020 and again in December 2020  9. Thyroid lesion  10. Chronic pain and fa      Today, she presents with her daughter.  They are concerned that Ghislaine is fatigued and at times does not want to participate in meals anymore.  Her prednisone has been discontinued for short time but reinstated per her rheumatologist.   She denies chest pain, shortness of breath, dyspnea on exertion, orthopnea, PND, lower extremity edema, palpitations, dizziness, presyncope, or syncope.   Reports taking medications as prescribed.     ASSESSMENT AND PLAN:  Heart failure with preserved ejection fraction secondary to hypertension    On 2/22 while hospitalized patient's weight was 115 pounds    On 3/4/2021 patient's weight was 127 pounds at her facility    On 4/22/201, pt's weight at discharge was 129 pounds    On 05/11/21, pt's weight is 120 pounds.     On 6/8/2021, pt's weight was 120#    On 7/16/2021, pt's weight 122#    Pt appear euvolemic on exam.  She denies other HF symptoms.      Currently taking torsemide to 20 mg daily         Hypertension    Slightly elevated while taking amlodipine 10 mg, clonidine 0.1 mg, metoprolol tartrate 50 mg twice daily,      Chronic fatigue    This is an ongoing issue.  Unfortunately from a cardiovascular standpoint to provide no intervention.  In the past Courtney Amador, PA has discussed palliative care/hospice and family has deferred.  Today this was discussed again and  her daughter states insurance will not pay for palliative care only hospice.  I encouraged her to call them directly and about benefits.    CKD    Creatinine elevated to 1.76 (5/10/21)    Creatinine=1.61 (6/7/2021)      Creatinine = 2.11 (7/12/2021)    Continue with current medication and will tolerate a higher creatinine as when her torsemide was decreased she had more HF symptoms.    Follows with nephrology, hematology, and rheumatology       Paroxysmal atrial fibrillation    For rate control patient is currently taking metoprolol tartrate 50 mg twice daily    For anticoagulation for CHADS VASC 4 (age++, female, HTN) she is currently taking apixaban 2.5 mg twice daily.  Her last hemoglobin was 9.8 (6/7/2021).  She follows with hematology    Plan:    Follow up with Dr. Fuentes in Oct with bmp prior.    Please call the clinic if questions or problems arise      Thank you for the opportunity to participate in this pleasant patient's care. We would be happy to see her sooner if needed for any concerns in the meantime.         DHAVAL Zavala CNP  [facility]  Text Page  (M-F 8:00 am - 4:30 pm)    Orders this Visit:  No orders of the defined types were placed in this encounter.    No orders of the defined types were placed in this encounter.    There are no discontinued medications.  No diagnosis found.    CURRENT MEDICATIONS:  Current Outpatient Medications   Medication Sig Dispense Refill     Abatacept (ORENCIA IV) Inject 500 mg into the vein every 30 days (dose will be due 5/24/21)       acetaminophen (TYLENOL) 500 MG tablet Take 1,000 mg by mouth 3 times daily as needed        allopurinol (ZYLOPRIM) 100 MG tablet Take 1 tablet (100 mg) by mouth daily 90 tablet 3     amLODIPine (NORVASC) 10 MG tablet Take 1 tablet (10 mg) by mouth daily 90 tablet 3     apixaban ANTICOAGULANT (ELIQUIS) 2.5 MG tablet Take 1 tablet (2.5 mg) by mouth 2 times daily 180 tablet 3     cloNIDine (CATAPRES) 0.1 MG tablet Take 1 tablet (0.1  mg) by mouth 2 times daily 60 tablet 3     Darbepoetin Alonso-Albumin (ARANESP IJ) Monthly as needed       hydrALAZINE (APRESOLINE) 100 MG tablet Take 1 tablet (100 mg) by mouth 4 times daily 120 tablet 3     levothyroxine (SYNTHROID/LEVOTHROID) 25 MCG tablet Take 1 tablet (25 mcg) by mouth daily 90 tablet 3     metoprolol tartrate (LOPRESSOR) 50 MG tablet Take 1 tablet by mouth twice daily. 180 tablet 3     mirtazapine (REMERON) 15 MG tablet Take 1 tablet by mouth daily At Bedtime. 30 tablet 0     ondansetron (ZOFRAN-ODT) 4 MG ODT tab Take 4 mg by mouth every 8 hours as needed for nausea       pantoprazole (PROTONIX) 40 MG EC tablet Take 1 tablet (40 mg) by mouth daily 90 tablet 3     predniSONE (DELTASONE) 5 MG tablet Take 1 tablet (5 mg) by mouth daily (Patient taking differently: Take 5 mg by mouth daily Week of 21 3 mg daily  Week of 21 2 mg daily  Weekj of 21 1 mg daily  Done on 21) 30 tablet 0     torsemide (DEMADEX) 20 MG tablet Take 1 tablet (20 mg) by mouth daily 90 tablet 3     melatonin 5 MG tablet Take 10 mg by mouth nightly as needed for sleep (Patient not taking: Reported on 2021)         ALLERGIES  Allergies   Allergen Reactions     Oxybutynin Itching     Seasonal Allergies        PAST MEDICAL, SURGICAL, FAMILY HISTORY:  History was reviewed and updated as needed, see medical record.    SOCIAL HISTORY:  Social History     Socioeconomic History     Marital status:      Spouse name: Not on file     Number of children: Not on file     Years of education: Not on file     Highest education level: Not on file   Occupational History     Not on file   Tobacco Use     Smoking status: Former Smoker     Packs/day: 0.50     Years: 5.00     Pack years: 2.50     Types: Cigarettes     Quit date: 1973     Years since quittin.5     Smokeless tobacco: Never Used   Substance and Sexual Activity     Alcohol use: Yes     Alcohol/week: 0.0 standard drinks     Comment: one light beer  of Fridays occ     Drug use: No     Sexual activity: Not Currently     Partners: Male   Other Topics Concern     Parent/sibling w/ CABG, MI or angioplasty before 65F 55M? No   Social History Narrative     Not on file     Social Determinants of Health     Financial Resource Strain: Low Risk      Difficulty of Paying Living Expenses: Not hard at all   Food Insecurity: No Food Insecurity     Worried About Running Out of Food in the Last Year: Never true     Ran Out of Food in the Last Year: Never true   Transportation Needs: No Transportation Needs     Lack of Transportation (Medical): No     Lack of Transportation (Non-Medical): No   Physical Activity: Inactive     Days of Exercise per Week: 0 days     Minutes of Exercise per Session: 0 min   Stress:      Feeling of Stress :    Social Connections: Unknown     Frequency of Communication with Friends and Family: Twice a week     Frequency of Social Gatherings with Friends and Family: Twice a week     Attends Caodaism Services: Never     Active Member of Clubs or Organizations: No     Attends Club or Organization Meetings: Never     Marital Status: Not on file   Intimate Partner Violence:      Fear of Current or Ex-Partner:      Emotionally Abused:      Physically Abused:      Sexually Abused:        Review of Systems:  Skin:  Positive for bruising   Eyes:  Positive for glasses  ENT:  Positive for hearing loss  Respiratory:  Positive for dyspnea on exertion;cough  Cardiovascular:    Positive for;edema;fatigue  Gastroenterology: Positive for nausea;poor appetite  Genitourinary:  Negative    Musculoskeletal:  Positive for arthritis  Neurologic:  Positive for memory problems  Psychiatric:  Negative    Heme/Lymph/Imm:  Positive for allergies;easy bruising  Endocrine:  Positive for thyroid disorder     Physical Exam:  Vitals: /68   Pulse 69   Wt 55.5 kg (122 lb 4.8 oz)   SpO2 97%   BMI 23.69 kg/m     Wt Readings from Last 4 Encounters:   07/16/21 55.5 kg (122 lb  4.8 oz)   06/08/21 56.5 kg (124 lb 8 oz)   06/07/21 54.4 kg (120 lb)   05/11/21 54.4 kg (120 lb)     CONSTITUTIONAL: Appears her stated age, thin, wheelchair  HEENT: Pupils equal, round. Sclerae nonicteric.    NECK: Supple, no masses appreciated.   C/V:  Regular rate and rhythm,  RESP: Respirations are unlabored. Lungs are clear to auscultation bilaterally without wheezing, rales, or rhonchi.  GI: Abdomen soft,.  EXTREM:  No clubbing, cyanosis, or lower extremity edema bilaterally.   NEURO: Alert and oriented, cooperative. Gait steady. No gross focal deficits.   PSYCH: Affect fat. Mentation normal. Responds to questions appropriately.  SKIN: Warm and dry. No apparent rashes or bruising.     Recent Lab Results:  L  LIVER ENZYME RESULTS:  Lab Results   Component Value Date    AST 40 06/07/2021    ALT 35 06/07/2021     CBC RESULTS:  Lab Results   Component Value Date    WBC 3.5 (L) 06/07/2021    RBC 3.26 (L) 06/07/2021    HGB 9.8 (L) 06/07/2021    HCT 32.5 (L) 06/07/2021     06/07/2021    MCH 30.1 06/07/2021    MCHC 30.2 (L) 06/07/2021    RDW 17.6 (H) 06/07/2021     06/07/2021     BMP RESULTS:  Lab Results   Component Value Date     07/12/2021     06/07/2021    POTASSIUM 5.2 07/12/2021    POTASSIUM 4.9 06/07/2021    CHLORIDE 108 07/12/2021    CHLORIDE 114 (H) 06/07/2021    CO2 25 07/12/2021    CO2 23 06/07/2021    ANIONGAP 4 07/12/2021    ANIONGAP 4 06/07/2021     (H) 07/12/2021    GLC 94 06/07/2021    BUN 88 (H) 07/12/2021    BUN 77 (H) 06/07/2021    CR 2.11 (H) 07/12/2021    CR 1.61 (H) 06/07/2021    GFRESTIMATED 21 (L) 07/12/2021    GFRESTIMATED 29 (L) 06/07/2021    GFRESTBLACK 34 (L) 06/07/2021    ALIVIA 9.0 07/12/2021    ALIVIA 9.4 06/07/2021      CC  No referring provider defined for this encounter.    This note was completed in part using Dragon voice recognition software. Although reviewed after completion, some word and grammatical errors may occur.

## 2021-07-16 NOTE — PATIENT INSTRUCTIONS
If you have problems or questions please call the RNs (Cathryn Cotto, & Nancy) at [phone]      When you see Dr. Fuentes you are scheduled for labs prior.   THe lab is basic metobolic panel (BMP) which is basically electrolytes.

## 2021-08-02 ENCOUNTER — LAB REQUISITION (OUTPATIENT)
Dept: LAB | Facility: CLINIC | Age: 86
End: 2021-08-02
Payer: MEDICARE

## 2021-08-02 DIAGNOSIS — U07.1 COVID-19: ICD-10-CM

## 2021-08-03 PROCEDURE — U0005 INFEC AGEN DETEC AMPLI PROBE: HCPCS | Mod: ORL | Performed by: FAMILY MEDICINE

## 2021-08-04 LAB — SARS-COV-2 RNA RESP QL NAA+PROBE: NEGATIVE

## 2021-08-18 ENCOUNTER — TRANSFERRED RECORDS (OUTPATIENT)
Dept: HEALTH INFORMATION MANAGEMENT | Facility: CLINIC | Age: 86
End: 2021-08-18

## 2021-08-18 LAB
ALT SERPL-CCNC: 16 IU/L (ref 5–35)
AST SERPL-CCNC: 20 U/L (ref 5–34)
CREATININE (EXTERNAL): 1.48 MG/DL (ref 0.5–1.3)
GFR ESTIMATED (EXTERNAL): 35.7 ML/MIN/1.73M2

## 2021-09-24 ENCOUNTER — TRANSFERRED RECORDS (OUTPATIENT)
Dept: HEALTH INFORMATION MANAGEMENT | Facility: CLINIC | Age: 86
End: 2021-09-24

## 2021-09-27 DIAGNOSIS — I50.32 CHRONIC DIASTOLIC HEART FAILURE (H): Primary | ICD-10-CM

## 2021-10-05 ENCOUNTER — LAB (OUTPATIENT)
Dept: LAB | Facility: CLINIC | Age: 86
End: 2021-10-05
Payer: MEDICARE

## 2021-10-05 DIAGNOSIS — I50.32 CHRONIC DIASTOLIC HEART FAILURE (H): ICD-10-CM

## 2021-10-05 LAB
ANION GAP SERPL CALCULATED.3IONS-SCNC: 8 MMOL/L (ref 3–14)
BUN SERPL-MCNC: 57 MG/DL (ref 7–30)
CALCIUM SERPL-MCNC: 9 MG/DL (ref 8.5–10.1)
CHLORIDE BLD-SCNC: 110 MMOL/L (ref 94–109)
CO2 SERPL-SCNC: 24 MMOL/L (ref 20–32)
CREAT SERPL-MCNC: 2.28 MG/DL (ref 0.52–1.04)
GFR SERPL CREATININE-BSD FRML MDRD: 19 ML/MIN/1.73M2
GLUCOSE BLD-MCNC: 140 MG/DL (ref 70–99)
POTASSIUM BLD-SCNC: 4.2 MMOL/L (ref 3.4–5.3)
SODIUM SERPL-SCNC: 142 MMOL/L (ref 133–144)

## 2021-10-05 PROCEDURE — 36415 COLL VENOUS BLD VENIPUNCTURE: CPT

## 2021-10-05 PROCEDURE — 80048 BASIC METABOLIC PNL TOTAL CA: CPT

## 2021-10-07 ENCOUNTER — OFFICE VISIT (OUTPATIENT)
Dept: CARDIOLOGY | Facility: CLINIC | Age: 86
End: 2021-10-07
Attending: NURSE PRACTITIONER
Payer: MEDICARE

## 2021-10-07 VITALS
SYSTOLIC BLOOD PRESSURE: 131 MMHG | DIASTOLIC BLOOD PRESSURE: 62 MMHG | WEIGHT: 133 LBS | BODY MASS INDEX: 25.11 KG/M2 | HEART RATE: 51 BPM | HEIGHT: 61 IN

## 2021-10-07 DIAGNOSIS — I50.32 CHRONIC HEART FAILURE WITH PRESERVED EJECTION FRACTION (H): ICD-10-CM

## 2021-10-07 DIAGNOSIS — I10 ESSENTIAL HYPERTENSION: ICD-10-CM

## 2021-10-07 PROCEDURE — 99214 OFFICE O/P EST MOD 30 MIN: CPT | Performed by: INTERNAL MEDICINE

## 2021-10-07 RX ORDER — HYDROXYCHLOROQUINE SULFATE 200 MG/1
200 TABLET, FILM COATED ORAL DAILY
COMMUNITY
End: 2023-01-01

## 2021-10-07 RX ORDER — FERROUS GLUCONATE 324(37.5)
TABLET ORAL DAILY
COMMUNITY
End: 2022-01-12

## 2021-10-07 RX ORDER — SODIUM BICARBONATE 650 MG/1
650 TABLET ORAL 2 TIMES DAILY
COMMUNITY
End: 2023-01-01

## 2021-10-07 ASSESSMENT — MIFFLIN-ST. JEOR: SCORE: 985.66

## 2021-10-07 NOTE — LETTER
10/7/2021    Laurie Conteh MD  [address] [facility] [address]  [facility] [address]    RE: Ghislaine Walls       Dear Colleague,    I had the pleasure of seeing Ghislaine Walls in the [facility].    HPI and Plan:     I had the pleasure of discussing Ms. Walls's care with her today in the form of a telephone visit.  She is a very pleasant, 86-year-old female whom I last saw in 03/2020.  She has a history of a previous admission to [facility] with generalized weakness and was found to be in acute renal failure with atrial fibrillation with rapid ventricular response.  She was also noted to have mildly elevated troponins, which were flat in trajectory.  Her other comorbidities include chronic renal insufficiency and rheumatoid arthritis.     At the time I saw her, an echocardiogram demonstrated normal left ventricular systolic function with no significant valvular disease.  Moderate left atrial enlargement was noted.  She converted spontaneously to normal sinus rhythm, and we recommended continuation of rate control with diltiazem and metoprolol.  She was also started on Eliquis initially for thromboembolic prophylaxis.    At her last office visit with me in 2020 she was doing well overall from a cardiovascular standpoint.  Unfortunately she was admitted to [facility] in April of this year with acute hypoxemic respiratory failure due to an exacerbation of chronic heart failure with preserved ejection fraction.  She had previously been on torsemide but I discontinued it a week prior to admission.    She was treated with IV diuresis as well as antibiotic therapy for presumed pneumonia.  An echocardiogram in February of this year had demonstrated normal left ventricular systolic function with mild pulmonary hypertension and evidence of diastolic dysfunction.  She was subsequently transitioned to torsemide as an outpatient.    Since then she has been at [place]  Patient's INR is subtherapeutic at 1.8.  Reports missing x 1 dose.  Risk factors explained, when missing doses - patient voiced understanding.  Instructed to take 7.5 mg (1 1/2 tablet) today - only, then resume on regular scheduled dose.  Recheck in 4 weeks.   and has been stable overall from a cardiovascular standpoint, although her main complaint today is that of generalized fatigue.  She also complains of a lack of motivation to exercise or participate in exercise classes.  She does complain of exertional dyspnea on occasion although she denies any chest discomfort, palpitations, syncope or presyncope.    Her physical exam is dictated below.     Labs on 10/5/2021 demonstrated a sodium of 142 potassium of 4.2 and creatinine of 2.28.     IMPRESSION:   1.  Paroxysmal atrial fibrillation, treated with a rate control and anticoagulation approach.   2.  Chronic fatigue.   3.  Chronic renal insufficiency, stage IV.  4.  Hypertension, which appears to be well controlled on her current regimen of amlodipine 10 mg daily, clonidine 0.1 mg twice daily, hydralazine 100 mg 4 times daily and metoprolol 50 mg p.o. twice daily.    The patient appears to be doing well overall from a cardiovascular standpoint, although her generalized fatigue still persists.  I have recommended she decrease her metoprolol to 25 mg p.o. twice daily since this is a possible contributor.  I also offered the option of stress perfusion imaging to rule out ischemia, but her symptoms are not suggestive of angina and she wishes to defer this for now.    It was a pleasure seeing her in follow-up today.  Assuming her clinical status remains stable, I will have her seen in our clinic in approximately 6 months.                         CURRENT MEDICATIONS:  Current Outpatient Medications   Medication Sig Dispense Refill     Abatacept (ORENCIA IV) Inject 500 mg into the vein every 30 days (dose will be due 5/24/21)       acetaminophen (TYLENOL) 500 MG tablet Take 1,000 mg by mouth 3 times daily as needed        allopurinol (ZYLOPRIM) 100 MG tablet Take 1 tablet (100 mg) by mouth daily 90 tablet 3     amLODIPine (NORVASC) 10 MG tablet Take 1 tablet (10 mg) by mouth daily 90 tablet 3     apixaban ANTICOAGULANT (ELIQUIS)  2.5 MG tablet Take 1 tablet (2.5 mg) by mouth 2 times daily 180 tablet 3     cloNIDine (CATAPRES) 0.1 MG tablet Take 1 tablet (0.1 mg) by mouth 2 times daily 60 tablet 3     Darbepoetin Alonso-Albumin (ARANESP IJ) Monthly as needed       hydrALAZINE (APRESOLINE) 100 MG tablet Take 1 tablet (100 mg) by mouth 4 times daily 120 tablet 3     levothyroxine (SYNTHROID/LEVOTHROID) 25 MCG tablet Take 1 tablet (25 mcg) by mouth daily 90 tablet 3     melatonin 5 MG tablet Take 10 mg by mouth nightly as needed for sleep (Patient not taking: Reported on 7/16/2021)       metoprolol tartrate (LOPRESSOR) 50 MG tablet Take 1 tablet by mouth twice daily. 180 tablet 3     mirtazapine (REMERON) 15 MG tablet Take 1 tablet by mouth daily At Bedtime. 30 tablet 0     ondansetron (ZOFRAN-ODT) 4 MG ODT tab Take 4 mg by mouth every 8 hours as needed for nausea       pantoprazole (PROTONIX) 40 MG EC tablet Take 1 tablet (40 mg) by mouth daily 90 tablet 3     predniSONE (DELTASONE) 5 MG tablet Take 1 tablet (5 mg) by mouth daily (Patient taking differently: Take 5 mg by mouth daily Week of 6/6/21 3 mg daily  Week of 6/13/21 2 mg daily  Weekj of 6/20/21 1 mg daily  Done on 6/27/21) 30 tablet 0     torsemide (DEMADEX) 20 MG tablet Take 1 tablet (20 mg) by mouth daily 90 tablet 3       ALLERGIES     Allergies   Allergen Reactions     Oxybutynin Itching     Seasonal Allergies        PAST MEDICAL HISTORY:  Past Medical History:   Diagnosis Date     Atrial fibrillation (H) new 10/19     [place] (hard of hearing)     wears hearing aids     Hyperlipidaemia      Hyperlipidaemia LDL goal < 130      Hypertension      Hypothyroid      PAD (peripheral artery disease) (H)      Renal insufficiency, mild      Uncontrolled hypertension 10/14/2019       PAST SURGICAL HISTORY:  Past Surgical History:   Procedure Laterality Date     APPENDECTOMY  1951     BONE MARROW BIOPSY, BONE SPECIMEN, NEEDLE/TROCAR N/A 7/31/2020    Procedure: bone marrow biopsy;  Surgeon: Rakesh  Iris ZAFAR MD;  Location:  GI     CATARACT IOL, RT/LT      bilateral (laser - 2013)     DENTAL SURGERY      [facility]     ESOPHAGOSCOPY, GASTROSCOPY, DUODENOSCOPY (EGD), COMBINED N/A 2020    Procedure: ESOPHAGOGASTRODUODENOSCOPY, WITH BIOPSY;  Surgeon: Humberto Bailon MD;  Location:  GI     EYE SURGERY      Muscle release     TUBAL LIGATION         FAMILY HISTORY:  Family History   Problem Relation Age of Onset     Heart Disease Mother      Respiratory Mother      Heart Disease Father      Heart Disease Brother      Coronary Artery Disease Brother         CAB     Heart Disease Brother      Coronary Artery Disease Brother         CAB       SOCIAL HISTORY:  Social History     Socioeconomic History     Marital status:      Spouse name: Not on file     Number of children: Not on file     Years of education: Not on file     Highest education level: Not on file   Occupational History     Not on file   Tobacco Use     Smoking status: Former Smoker     Packs/day: 0.50     Years: 5.00     Pack years: 2.50     Types: Cigarettes     Quit date: 1973     Years since quittin.7     Smokeless tobacco: Never Used   Substance and Sexual Activity     Alcohol use: Yes     Alcohol/week: 0.0 standard drinks     Comment: one light beer of  occ     Drug use: No     Sexual activity: Not Currently     Partners: Male   Other Topics Concern     Parent/sibling w/ CABG, MI or angioplasty before 65F 55M? No   Social History Narrative     Not on file     Social Determinants of Health     Financial Resource Strain: Low Risk      Difficulty of Paying Living Expenses: Not hard at all   Food Insecurity: No Food Insecurity     Worried About Running Out of Food in the Last Year: Never true     Ran Out of Food in the Last Year: Never true   Transportation Needs: No Transportation Needs     Lack of Transportation (Medical): No     Lack of Transportation (Non-Medical): No   Physical Activity:      Days  of Exercise per Week:      Minutes of Exercise per Session:    Stress:      Feeling of Stress :    Social Connections:      Frequency of Communication with Friends and Family:      Frequency of Social Gatherings with Friends and Family:      Attends Faith Services:      Active Member of Clubs or Organizations:      Attends Club or Organization Meetings:      Marital Status:    Intimate Partner Violence:      Fear of Current or Ex-Partner:      Emotionally Abused:      Physically Abused:      Sexually Abused:        Review of Systems:  Skin:          Eyes:         ENT:         Respiratory:          Cardiovascular:         Gastroenterology:        Genitourinary:         Musculoskeletal:         Neurologic:         Psychiatric:         Heme/Lymph/Imm:         Endocrine:           Physical Exam:  Vitals: There were no vitals taken for this visit.    Constitutional:           Skin:             Head:           Eyes:           Lymph:      ENT:           Neck:           Respiratory:            Cardiac:                                                           GI:           Extremities and Muscular Skeletal:                 Neurological:           Psych:           CC  DHAVAL Cannon CNP  [address]  [place] [address]                  Thank you for allowing me to participate in the care of your patient.      Sincerely,     Seema Fuentes MD     [facility]  cc:   DHAVAL Cannon CNP  [address]  [place] [address]

## 2021-10-07 NOTE — PROGRESS NOTES
HPI and Plan:     I had the pleasure of discussing Ms. Walls's care with her today in the form of a telephone visit.  She is a very pleasant, 86-year-old female whom I last saw in 03/2020.  She has a history of a previous admission to [facility] with generalized weakness and was found to be in acute renal failure with atrial fibrillation with rapid ventricular response.  She was also noted to have mildly elevated troponins, which were flat in trajectory.  Her other comorbidities include chronic renal insufficiency and rheumatoid arthritis.     At the time I saw her, an echocardiogram demonstrated normal left ventricular systolic function with no significant valvular disease.  Moderate left atrial enlargement was noted.  She converted spontaneously to normal sinus rhythm, and we recommended continuation of rate control with diltiazem and metoprolol.  She was also started on Eliquis initially for thromboembolic prophylaxis.    At her last office visit with me in 2020 she was doing well overall from a cardiovascular standpoint.  Unfortunately she was admitted to [facility] in April of this year with acute hypoxemic respiratory failure due to an exacerbation of chronic heart failure with preserved ejection fraction.  She had previously been on torsemide but I discontinued it a week prior to admission.    She was treated with IV diuresis as well as antibiotic therapy for presumed pneumonia.  An echocardiogram in February of this year had demonstrated normal left ventricular systolic function with mild pulmonary hypertension and evidence of diastolic dysfunction.  She was subsequently transitioned to torsemide as an outpatient.    Since then she has been at [facility] and has been stable overall from a cardiovascular standpoint, although her main complaint today is that of generalized fatigue.  She also complains of a lack of motivation to exercise or participate in exercise classes.  She does complain of  exertional dyspnea on occasion although she denies any chest discomfort, palpitations, syncope or presyncope.    Her physical exam is dictated below.     Labs on 10/5/2021 demonstrated a sodium of 142 potassium of 4.2 and creatinine of 2.28.     IMPRESSION:   1.  Paroxysmal atrial fibrillation, treated with a rate control and anticoagulation approach.   2.  Chronic fatigue.   3.  Chronic renal insufficiency, stage IV.  4.  Hypertension, which appears to be well controlled on her current regimen of amlodipine 10 mg daily, clonidine 0.1 mg twice daily, hydralazine 100 mg 4 times daily and metoprolol 50 mg p.o. twice daily.    The patient appears to be doing well overall from a cardiovascular standpoint, although her generalized fatigue still persists.  I have recommended she decrease her metoprolol to 25 mg p.o. twice daily since this is a possible contributor.  I also offered the option of stress perfusion imaging to rule out ischemia, but her symptoms are not suggestive of angina and she wishes to defer this for now.    It was a pleasure seeing her in follow-up today.  Assuming her clinical status remains stable, I will have her seen in our clinic in approximately 6 months.                         CURRENT MEDICATIONS:  Current Outpatient Medications   Medication Sig Dispense Refill     Abatacept (ORENCIA IV) Inject 500 mg into the vein every 30 days (dose will be due 5/24/21)       acetaminophen (TYLENOL) 500 MG tablet Take 1,000 mg by mouth 3 times daily as needed        allopurinol (ZYLOPRIM) 100 MG tablet Take 1 tablet (100 mg) by mouth daily 90 tablet 3     amLODIPine (NORVASC) 10 MG tablet Take 1 tablet (10 mg) by mouth daily 90 tablet 3     apixaban ANTICOAGULANT (ELIQUIS) 2.5 MG tablet Take 1 tablet (2.5 mg) by mouth 2 times daily 180 tablet 3     cloNIDine (CATAPRES) 0.1 MG tablet Take 1 tablet (0.1 mg) by mouth 2 times daily 60 tablet 3     Darbepoetin Alonso-Albumin (ARANESP IJ) Monthly as needed        hydrALAZINE (APRESOLINE) 100 MG tablet Take 1 tablet (100 mg) by mouth 4 times daily 120 tablet 3     levothyroxine (SYNTHROID/LEVOTHROID) 25 MCG tablet Take 1 tablet (25 mcg) by mouth daily 90 tablet 3     melatonin 5 MG tablet Take 10 mg by mouth nightly as needed for sleep (Patient not taking: Reported on 7/16/2021)       metoprolol tartrate (LOPRESSOR) 50 MG tablet Take 1 tablet by mouth twice daily. 180 tablet 3     mirtazapine (REMERON) 15 MG tablet Take 1 tablet by mouth daily At Bedtime. 30 tablet 0     ondansetron (ZOFRAN-ODT) 4 MG ODT tab Take 4 mg by mouth every 8 hours as needed for nausea       pantoprazole (PROTONIX) 40 MG EC tablet Take 1 tablet (40 mg) by mouth daily 90 tablet 3     predniSONE (DELTASONE) 5 MG tablet Take 1 tablet (5 mg) by mouth daily (Patient taking differently: Take 5 mg by mouth daily Week of 6/6/21 3 mg daily  Week of 6/13/21 2 mg daily  Weekj of 6/20/21 1 mg daily  Done on 6/27/21) 30 tablet 0     torsemide (DEMADEX) 20 MG tablet Take 1 tablet (20 mg) by mouth daily 90 tablet 3       ALLERGIES     Allergies   Allergen Reactions     Oxybutynin Itching     Seasonal Allergies        PAST MEDICAL HISTORY:  Past Medical History:   Diagnosis Date     Atrial fibrillation (H) new 10/19     [place] (hard of hearing)     wears hearing aids     Hyperlipidaemia      Hyperlipidaemia LDL goal < 130      Hypertension      Hypothyroid      PAD (peripheral artery disease) (H)      Renal insufficiency, mild      Uncontrolled hypertension 10/14/2019       PAST SURGICAL HISTORY:  Past Surgical History:   Procedure Laterality Date     APPENDECTOMY  1951     BONE MARROW BIOPSY, BONE SPECIMEN, NEEDLE/TROCAR N/A 7/31/2020    Procedure: bone marrow biopsy;  Surgeon: Iris Cameron MD;  Location:  GI     CATARACT IOL, RT/LT  2001    bilateral (laser - 5/2013)     DENTAL SURGERY  1962    [place]     ESOPHAGOSCOPY, GASTROSCOPY, DUODENOSCOPY (EGD), COMBINED N/A 7/24/2020    Procedure:  ESOPHAGOGASTRODUODENOSCOPY, WITH BIOPSY;  Surgeon: Humberto Bailon MD;  Location:  GI     EYE SURGERY      Muscle release     TUBAL LIGATION         FAMILY HISTORY:  Family History   Problem Relation Age of Onset     Heart Disease Mother      Respiratory Mother      Heart Disease Father      Heart Disease Brother      Coronary Artery Disease Brother         CAB     Heart Disease Brother      Coronary Artery Disease Brother         CAB       SOCIAL HISTORY:  Social History     Socioeconomic History     Marital status:      Spouse name: Not on file     Number of children: Not on file     Years of education: Not on file     Highest education level: Not on file   Occupational History     Not on file   Tobacco Use     Smoking status: Former Smoker     Packs/day: 0.50     Years: 5.00     Pack years: 2.50     Types: Cigarettes     Quit date: 1973     Years since quittin.7     Smokeless tobacco: Never Used   Substance and Sexual Activity     Alcohol use: Yes     Alcohol/week: 0.0 standard drinks     Comment: one light beer of  occ     Drug use: No     Sexual activity: Not Currently     Partners: Male   Other Topics Concern     Parent/sibling w/ CABG, MI or angioplasty before 65F 55M? No   Social History Narrative     Not on file     Social Determinants of Health     Financial Resource Strain: Low Risk      Difficulty of Paying Living Expenses: Not hard at all   Food Insecurity: No Food Insecurity     Worried About Running Out of Food in the Last Year: Never true     Ran Out of Food in the Last Year: Never true   Transportation Needs: No Transportation Needs     Lack of Transportation (Medical): No     Lack of Transportation (Non-Medical): No   Physical Activity:      Days of Exercise per Week:      Minutes of Exercise per Session:    Stress:      Feeling of Stress :    Social Connections:      Frequency of Communication with Friends and Family:      Frequency of Social Gatherings  with Friends and Family:      Attends Anglican Services:      Active Member of Clubs or Organizations:      Attends Club or Organization Meetings:      Marital Status:    Intimate Partner Violence:      Fear of Current or Ex-Partner:      Emotionally Abused:      Physically Abused:      Sexually Abused:        Review of Systems:  Skin:          Eyes:         ENT:         Respiratory:          Cardiovascular:         Gastroenterology:        Genitourinary:         Musculoskeletal:         Neurologic:         Psychiatric:         Heme/Lymph/Imm:         Endocrine:           Physical Exam:  Vitals: There were no vitals taken for this visit.    Constitutional:           Skin:             Head:           Eyes:           Lymph:      ENT:           Neck:           Respiratory:            Cardiac:                                                           GI:           Extremities and Muscular Skeletal:                 Neurological:           Psych:           CC  Loretta Huddleston, APRN CNP  [address]  [place] [address]

## 2021-11-03 DIAGNOSIS — I10 ESSENTIAL HYPERTENSION: ICD-10-CM

## 2021-11-03 DIAGNOSIS — I10 HYPERTENSION GOAL BP (BLOOD PRESSURE) < 140/90: Chronic | ICD-10-CM

## 2021-11-05 RX ORDER — HYDRALAZINE HYDROCHLORIDE 100 MG/1
TABLET, FILM COATED ORAL
Qty: 360 TABLET | Refills: 1 | Status: SHIPPED | OUTPATIENT
Start: 2021-11-05 | End: 2022-05-11

## 2021-11-05 NOTE — TELEPHONE ENCOUNTER
Routing refill request to provider for review/approval because:  Labs out of range:    Creatinine   Date Value Ref Range Status   10/05/2021 2.28 (H) 0.52 - 1.04 mg/dL Final   06/07/2021 1.61 (H) 0.52 - 1.04 mg/dL Final

## 2021-11-08 RX ORDER — CLONIDINE HYDROCHLORIDE 0.1 MG/1
0.1 TABLET ORAL 2 TIMES DAILY
Qty: 60 TABLET | Refills: 3 | Status: SHIPPED | OUTPATIENT
Start: 2021-11-08 | End: 2021-12-06

## 2021-11-17 ENCOUNTER — TRANSFERRED RECORDS (OUTPATIENT)
Dept: HEALTH INFORMATION MANAGEMENT | Facility: CLINIC | Age: 86
End: 2021-11-17
Payer: MEDICARE

## 2021-12-06 ENCOUNTER — TELEPHONE (OUTPATIENT)
Dept: FAMILY MEDICINE | Facility: CLINIC | Age: 86
End: 2021-12-06
Payer: MEDICARE

## 2021-12-06 DIAGNOSIS — I10 HYPERTENSION GOAL BP (BLOOD PRESSURE) < 140/90: Chronic | ICD-10-CM

## 2021-12-06 RX ORDER — CLONIDINE HYDROCHLORIDE 0.1 MG/1
0.1 TABLET ORAL 2 TIMES DAILY
Qty: 60 TABLET | Refills: 3 | Status: SHIPPED | OUTPATIENT
Start: 2021-12-06 | End: 2021-12-07

## 2021-12-06 NOTE — TELEPHONE ENCOUNTER
cloNIDine (CATAPRES) 0.1 MG tablet 60 tablet 3 11/8/2021  No   Sig - Route: Take 1 tablet (0.1 mg) by mouth 2 times daily - Oral   Sent to pharmacy as: cloNIDine HCl 0.1 MG Oral Tablet (CATAPRES)     Fax from pharmacy: patient would like 90 day supply    Last filled 12-5-2021 per dispensing report    LOV 6-7-2021 [facility] virtual    Appointments in Next Year    Dec 29, 2021  2:00 PM  (Arrive by 1:40 PM)  Provider Visit with Laurie Conteh MD  [facility] ([facility] ) [phone]        Elvi Miller RT (R)

## 2021-12-06 NOTE — TELEPHONE ENCOUNTER
Patient  would like 90 tables of her Clonidine vs. 60.     Could you please change her sig and approve.     Tayler Boss RN  -[facility]

## 2021-12-07 RX ORDER — CLONIDINE HYDROCHLORIDE 0.1 MG/1
0.1 TABLET ORAL 2 TIMES DAILY
Qty: 60 TABLET | Refills: 3 | Status: SHIPPED | OUTPATIENT
Start: 2021-12-07 | End: 2022-02-03

## 2021-12-13 NOTE — PROGRESS NOTES
Discussed with patient in/out cath techniques and sterile techniques. Went over to wash hand with warm soap and water prior to catherizing. Patient wife was able to cath patient with success prior to leaving office . Bladder was drained 250 ml of clear yellow urine from bladder. I gave patient 1 box of coloplast 16f coude samples. I instructed patient to cath 3x a day per Dr Yury Cates orders and to keep a diary of output each time. I went over that if patient was getting more than 300 ml each cath time He: He would need to increase the amount of times a day to cath. I did not send order for supplies as patient will follow up next week to see how he is doing. At that time it will be determined if he should continue in/out cath. [facility]    Hospitalist Progress Note      Assessment & Plan   Ghislaine Walls is a 84 year old female who was admitted on 9/30/2019 with deconditioning and generalized weakness.    Elevated BNP  Elevated troponin, type 2 MI secondary to demand with ongoing DRAGAN on CKD  Presented with lethargy, lower extremity edema (slightly worse than her baseline) and weakness.  Her Lasix dose was increased following her recent visit with her nephrologist.  BNP is 18,000 in the ER with the mildly elevated troponins (likely secondary to renal disease). Echocardiogram shows normal EF no diastolic dysfunction. Troponins  (0.093--0.082--0.107>0.4) without chest pain or discomfort. EKG with incomplete left bundle branch. Recently had increase in her diuretic dose (40mg AM with 20mg bedtime)  - Continue metoprolol- will discuss dosing with Cardiology tomorrow as this may contribute to fatigue  - PT/OT/Social work to evaluate.  - Hold further lasix per Nephrology  --Cardiology following, do not recommend ischemic workup at this time in absence of chest pain.     New Atrial Fibrillation with RVR  New diagnosis for patient. Developed afib with RVR overnight 10/2 and placed on diltiazem drip. She converted to sinus rhythm afternoon of 10/2.   --Eliquis started this admission, will discuss cost  --Cardiology following, appreciate assistance   --dilt drip stopped, oral diltiazem started 10/2 per Cardiology      Acute kidney injury  CKD (chronic kidney disease) stage 3, GFR 30-59 ml/min (H)  Baseline creatinine is near 1.5 her creatinine on admission was 3.27. Likely secondary to her diuretic change  - Nephrology consult appreciated. Hold lasix, monitor Cr in AM    Generalized weakness  Physical deconditioning  Patient reports limited mobility for some time with generalized weakness and pain limiting arm movement. She had a cortisone injection in both shoulders back in June with significant improvement but reports this has  warn off. Denies focal weakness or significant acute changes in functional status. She has not tried physical therapy. TSH recently checked and ok. Possible that afib contributing to some of her fatigue.   --PT following, anticipate need for TCU  --SW following        Lower extremity lymphedema  Lower extremity dopplers without DVT. She has received treatment for this in the past, not currently wearing any stockings has stated she does not like compression stockings in the past  - Consult lymphedema for wraps, perhaps there is a middle ground that she would tolerate.     Hyperlipidemia LDL goal <130  Hypertension goal BP (blood pressure) < 140/90  - Continue PTA metoprolol, hydralazine  - Not on statin PTA, lipid profile in AM     Hypothyroidism  - Continue Synthroid     Gout arthritis  - Continue allopurinol     Normocytic Anemia  Hgb 10 on admit>9.2. no evidence of acute bleeding. Ferritin is high 410, may be acute phase reactant.      Bilateral buttock pressure wound  Present on admission, wound nurse consulted    DVT Prophylaxis: Eliquis  Code Status: DNR/DNI    Disposition: Expected discharge in 1-2 days pending improvement in renal function, plan for afib. Anticipate she will discharge to TCU     Ashley Hemphill PA-C    Interval History   Feeling fatigued this afternoon, reports not much more than an average day. Denies new focal weakness. She is not having chest pain or shortness of breath. Denies dizziness. Unsure whether swelling in legs is any better. No nausea. Tolerating po though marginal appetite. Unsure about plan for anticoagulation but willing to try for now.     -Data reviewed today: I reviewed all new labs and imaging results over the last 24 hours. I personally reviewed no images or EKG's today.    Physical Exam   Temp: 97.6  F (36.4  C) Temp src: Oral BP: 116/60 Pulse: 134 Heart Rate: 115 Resp: 17 SpO2: 100 % O2 Device: None (Room air)    Vitals:    09/30/19 2303 10/02/19 0553   Weight: 73  kg (161 lb) 71.2 kg (156 lb 15.5 oz)     Vital Signs with Ranges  Temp:  [97.6  F (36.4  C)-98  F (36.7  C)] 97.6  F (36.4  C)  Pulse:  [134] 134  Heart Rate:  [] 115  Resp:  [14-17] 17  BP: (100-159)/(46-74) 116/60  SpO2:  [98 %-100 %] 100 %  I/O last 3 completed shifts:  In: 560 [P.O.:560]  Out: 1275 [Urine:1275]    Constitutional: Awake, alert, cooperative. Appears fatigued, but comfortable overall. appropropriately conversant   ENT: normal cephalic, moist mucous membranes  Respiratory: Lungs clear to auscultation bilaterally, no increased work of breathing or wheezing   Cardiovascular: Regular rate and rhythm, no murmur, 2+ bilateral edema- currently in ACE wraps, distal pulses +2/4  GI:  active bowel sounds, abdomen soft, non-tender  Skin/Integumen: warm, dry  MSK:  Bilateral thenar atrophy. Mild swelling of bilateral hands. She has reduced ROM in bilateral shoulders which she states is unchanged. She can dorsi and plantar flex. Can bend both knees but has difficulty with straight leg raise- reports also unchanged.   Neuro:  Speech is clear. Face symmetric. Tongue midline. Limited ROM in shoulders makes neuro exam difficulty but  strength is +4/5 and equal.  Sensation intact.     Medications     - MEDICATION INSTRUCTIONS -       diltiazem (CARDIZEM) infusion ADULT 10 mg/hr (10/02/19 0811)       allopurinol  100 mg Oral Daily     apixaban ANTICOAGULANT  2.5 mg Oral BID     hydrALAZINE  25 mg Oral TID     levothyroxine  25 mcg Oral Daily     metoprolol succinate ER  100 mg Oral Daily     sodium chloride (PF)  3 mL Intracatheter Q8H       Data   Recent Labs   Lab 10/02/19  0531 10/01/19  1008 10/01/19  0557 09/30/19  2310   WBC 4.9  --   --  6.0   HGB 9.2*  --   --  10.0*   MCV 96  --   --  96     --   --  415     --   --  133   POTASSIUM 3.2*  --   --  3.6   CHLORIDE 115*  --   --  105   CO2 18*  --   --  14*   *  --   --  111*   CR 2.00*  --   --  3.27*   ANIONGAP 10  --   --  14    ALIVIA 8.7  --   --  9.5   *  --   --  96   TROPI 0.435* 0.107* 0.082* 0.093*       Recent Results (from the past 24 hour(s))   US Lower Extremity Venous Duplex Bilateral    Narrative    PROCEDURE:  Venous Doppler ultrasound of the bilateral lower extremities    DATE OF PROCEDURE:  10/1/2019 12:16 PM    CLINICAL HISTORY/INDICATION:   bilateral lower extremity edema.  Eval for DVT.    COMPARISON:   Prior lower extremity venous duplex ultrasound 2/27/2018    TECHNIQUE:   Grayscale, color-flow, and spectral waveform analysis were performed  of the deep veins of the bilateral lower extremities    FINDINGS:   The right common femoral vein, superficial femoral vein and popliteal  vein demonstrate normal compressibility, spectral waveform, color flow  and augmentation.    The left common femoral vein, superficial femoral vein and popliteal  vein demonstrate normal compressibility, spectral waveform, color flow  and augmentation.    Significant edema/swelling below the popliteal fossa bilaterally  limits evaluation, the tibial and peroneal veins are not well seen  bilaterally as a result.      Impression    IMPRESSION:   1. No evidence of deep venous thrombosis in the visualized right lower  extremity  2. No evidence of deep venous thrombosis in the visualized left lower  extremity    AREN SHARMA MD

## 2021-12-29 ENCOUNTER — OFFICE VISIT (OUTPATIENT)
Dept: FAMILY MEDICINE | Facility: CLINIC | Age: 86
End: 2021-12-29
Payer: MEDICARE

## 2021-12-29 VITALS
OXYGEN SATURATION: 96 % | DIASTOLIC BLOOD PRESSURE: 61 MMHG | SYSTOLIC BLOOD PRESSURE: 155 MMHG | BODY MASS INDEX: 26.26 KG/M2 | TEMPERATURE: 97.1 F | WEIGHT: 139 LBS | RESPIRATION RATE: 16 BRPM | HEART RATE: 56 BPM

## 2021-12-29 DIAGNOSIS — R62.7 FAILURE TO THRIVE IN ADULT: ICD-10-CM

## 2021-12-29 DIAGNOSIS — I50.33 ACUTE ON CHRONIC DIASTOLIC CONGESTIVE HEART FAILURE (H): ICD-10-CM

## 2021-12-29 DIAGNOSIS — I10 ESSENTIAL HYPERTENSION: ICD-10-CM

## 2021-12-29 DIAGNOSIS — E55.9 VITAMIN D DEFICIENCY: ICD-10-CM

## 2021-12-29 DIAGNOSIS — R22.1 NECK MASS: Primary | ICD-10-CM

## 2021-12-29 DIAGNOSIS — E03.9 HYPOTHYROIDISM, UNSPECIFIED TYPE: ICD-10-CM

## 2021-12-29 PROCEDURE — 99214 OFFICE O/P EST MOD 30 MIN: CPT | Performed by: INTERNAL MEDICINE

## 2021-12-29 RX ORDER — METOPROLOL TARTRATE 25 MG/1
25 TABLET, FILM COATED ORAL 2 TIMES DAILY
Qty: 180 TABLET | Refills: 3 | Status: SHIPPED | OUTPATIENT
Start: 2021-12-29 | End: 2021-12-29

## 2021-12-29 RX ORDER — METOPROLOL TARTRATE 25 MG/1
50 TABLET, FILM COATED ORAL 2 TIMES DAILY
Qty: 180 TABLET | Refills: 3
Start: 2021-12-29 | End: 2021-12-29

## 2021-12-29 RX ORDER — MIRTAZAPINE 15 MG/1
TABLET, FILM COATED ORAL
Qty: 90 TABLET | Refills: 3 | Status: SHIPPED | OUTPATIENT
Start: 2021-12-29 | End: 2022-01-21

## 2021-12-29 RX ORDER — METOPROLOL TARTRATE 50 MG
25 TABLET ORAL 2 TIMES DAILY
Status: ON HOLD | COMMUNITY
End: 2022-01-15

## 2021-12-29 ASSESSMENT — PAIN SCALES - GENERAL: PAINLEVEL: NO PAIN (0)

## 2021-12-29 NOTE — PROGRESS NOTES
Subjective   Ghislaine is a 86 year old who presents for the following health issues  accompanied by her daughter.    HPI           Chief Complaint:       Ghislaine Walls is a 84 year old female who presents to clinic today for the following health issues:      Follow up on multiple concerns including soft tissue mass of the neck skin    HPI:   Patient Ghislaine Walls is a very pleasant 84 year old female with history of atrial fibrillation on long term anticoagulation therapy with warfarin who presents to Internal Medicine clinic today for follow up of multiple concerns. Regarding the patient's atrial fibrillation, the patient is compliant with her cardiac medications. Regarding the patient's hypothyroidism, the patient is compliant with Levothyroxine medication at this time. Patient is also followed by the [facility] cardiology clinic in [place] going forward for her atrial fibrillation. No chest pain, headaches, fever or chills. Patient also complains of a painless soft tissue lump/mass within the skin of the neck, noticed 1 month and 1/2 ago.      Current Medications:     Current Outpatient Medications   Medication Sig Dispense Refill     Abatacept (ORENCIA IV) Inject 500 mg into the vein every 30 days        acetaminophen (TYLENOL) 500 MG tablet Take 1,000 mg by mouth 3 times daily as needed        allopurinol (ZYLOPRIM) 100 MG tablet Take 1 tablet (100 mg) by mouth daily 90 tablet 3     amLODIPine (NORVASC) 10 MG tablet Take 1 tablet (10 mg) by mouth daily 90 tablet 3     apixaban ANTICOAGULANT (ELIQUIS) 2.5 MG tablet Take 1 tablet (2.5 mg) by mouth 2 times daily 180 tablet 3     calcium carbonate-vitamin D (OS-ALIVIA) 500-400 MG-UNIT tablet Take 1 tablet by mouth daily 1000 vit d, calcuim 600        cloNIDine (CATAPRES) 0.1 MG tablet Take 1 tablet (0.1 mg) by mouth 2 times daily 60 tablet 3     Darbepoetin Alonso-Albumin (ARANESP IJ) Monthly as needed       Ferrous Gluconate 324 (37.5 Fe) MG TABS Take by mouth daily         hydrALAZINE (APRESOLINE) 100 MG tablet Take 1 tablet by mouth 4 times daily. 360 tablet 1     hydroxychloroquine (PLAQUENIL) 200 MG tablet Take 200 mg by mouth daily       levothyroxine (SYNTHROID/LEVOTHROID) 25 MCG tablet Take 1 tablet (25 mcg) by mouth daily 90 tablet 3     melatonin 5 MG tablet Take 10 mg by mouth nightly as needed for sleep        metoprolol tartrate (LOPRESSOR) 25 MG tablet Take 25 mg by mouth 2 times daily       mirtazapine (REMERON) 15 MG tablet Take 1 tablet by mouth daily At Bedtime. 90 tablet 3     ondansetron (ZOFRAN-ODT) 4 MG ODT tab Take 4 mg by mouth every 8 hours as needed for nausea       pantoprazole (PROTONIX) 40 MG EC tablet Take 1 tablet (40 mg) by mouth daily 90 tablet 3     predniSONE (DELTASONE) 5 MG tablet Take 1 tablet (5 mg) by mouth daily (Patient taking differently: Take 5 mg by mouth daily Week of 6/6/21 3 mg daily  Week of 6/13/21 2 mg daily  Weekj of 6/20/21 1 mg daily  Done on 6/27/21) 30 tablet 0     sodium bicarbonate 650 MG tablet Take 650 mg by mouth 2 times daily        torsemide (DEMADEX) 20 MG tablet Take 1 tablet (20 mg) by mouth daily 90 tablet 3         Allergies:      Allergies   Allergen Reactions     Oxybutynin Itching     Seasonal Allergies             Past Medical History:     Past Medical History:   Diagnosis Date     Atrial fibrillation (H) new 10/19     [place] (hard of hearing)     wears hearing aids     Hyperlipidaemia      Hyperlipidaemia LDL goal < 130      Hypertension      Hypothyroid      PAD (peripheral artery disease) (H)      Renal insufficiency, mild      Uncontrolled hypertension 10/14/2019         Past Surgical History:     Past Surgical History:   Procedure Laterality Date     APPENDECTOMY  1951     BONE MARROW BIOPSY, BONE SPECIMEN, NEEDLE/TROCAR N/A 7/31/2020    Procedure: bone marrow biopsy;  Surgeon: Iris Cameron MD;  Location:  GI     CATARACT IOL, RT/LT  2001    bilateral (laser - 5/2013)     DENTAL SURGERY  1962    naye      ESOPHAGOSCOPY, GASTROSCOPY, DUODENOSCOPY (EGD), COMBINED N/A 2020    Procedure: ESOPHAGOGASTRODUODENOSCOPY, WITH BIOPSY;  Surgeon: Humberto Bailon MD;  Location:  GI     EYE SURGERY      Muscle release     TUBAL LIGATION           Family Medical History:     Family History   Problem Relation Age of Onset     Heart Disease Mother      Respiratory Mother      Heart Disease Father      Heart Disease Brother      Coronary Artery Disease Brother         CAB     Heart Disease Brother      Coronary Artery Disease Brother         CAB         Social History:     Social History     Socioeconomic History     Marital status:      Spouse name: Not on file     Number of children: Not on file     Years of education: Not on file     Highest education level: Not on file   Occupational History     Not on file   Tobacco Use     Smoking status: Former Smoker     Packs/day: 0.50     Years: 5.00     Pack years: 2.50     Types: Cigarettes     Quit date: 1973     Years since quittin.0     Smokeless tobacco: Never Used   Substance and Sexual Activity     Alcohol use: Yes     Alcohol/week: 0.0 standard drinks     Comment: one light beer of  occ     Drug use: No     Sexual activity: Not Currently     Partners: Male   Other Topics Concern     Parent/sibling w/ CABG, MI or angioplasty before 65F 55M? No   Social History Narrative     Not on file     Social Determinants of Health     Financial Resource Strain: Low Risk      Difficulty of Paying Living Expenses: Not hard at all   Food Insecurity: No Food Insecurity     Worried About Running Out of Food in the Last Year: Never true     Ran Out of Food in the Last Year: Never true   Transportation Needs: No Transportation Needs     Lack of Transportation (Medical): No     Lack of Transportation (Non-Medical): No   Physical Activity: Not on file   Stress: Not on file   Social Connections: Not on file   Intimate Partner Violence: Not on file    Housing Stability: Not on file           Review of System:     Constitutional: Negative for fever or chills  Skin: Negative for rashes  Ears/Nose/Throat: Negative for nasal congestion, sore throat  Respiratory: No shortness of breath, dyspnea on exertion, cough, or hemoptysis  Cardiovascular: Negative for chest pain, positive for atrial fibrillation  Gastrointestinal: Negative for nausea, vomiting  Genitourinary: Negative for dysuria, hematuria  Musculoskeletal: Negative for myalgias  Neurologic: Negative for headaches  Psychiatric: Negative for depression, anxiety  Hematologic/Lymphatic/Immunologic: Negative  Endocrine: positive for hypothyroidism  Behavioral: Negative for tobacco use       Physical Exam:   BP (!) 155/61 (BP Location: Left arm, Patient Position: Sitting, Cuff Size: Adult Regular)   Pulse 56   Temp 97.1  F (36.2  C) (Temporal)   Resp 16   Wt 63 kg (139 lb)   SpO2 96%   Breastfeeding No   BMI 26.26 kg/m      GENERAL: chronically ill appearing elderly femaley, alert and no acute distress  EYES: eyes grossly normal to inspection, and conjunctivae and sclerae normal  HENT: Normocephalic atraumatic. Nose and mouth without ulcers or lesions  NECK: supple, painless soft tissue mass noted in the excessive skin of the neck  RESP: lungs clear to auscultation   CV: regular rate and rhythm  LYMPH: no peripheral edema   ABDOMEN: nondistended  MS: no gross musculoskeletal defects noted  SKIN: no rashes  NEURO: Alert & Oriented x 3.   PSYCH: mentation appears normal, affect normal        Diagnostic Test Results:     Diagnostic Test Results:  Labs reviewed in Epic    ASSESSMENT/PLAN:     Ghislaine was seen today for mass.    Diagnoses and all orders for this visit:    Neck mass  -     Otolaryngology Referral; Future    Acute on chronic diastolic congestive heart failure (H)  -     Stable, continue metoprolol tartrate (LOPRESSOR) 25 MG tablet    Failure to thrive in adult  -     mirtazapine (REMERON) 15 MG  tablet; Take 1 tablet by mouth daily At Bedtime.    Vitamin D deficiency  - continue current therapy    Hypothyroidism, unspecified type  - continue current therapy      Essential hypertension   - BP is not at goal, metoprolol tartrate (LOPRESSOR) 100 MG tablet      Follow Up Plan:     Patient is instructed to return to Internal Medicine clinic for follow-up visit in 1 month.        Laurie Conteh MD  Internal Medicine  [facility]

## 2022-01-01 ENCOUNTER — TELEPHONE (OUTPATIENT)
Dept: FAMILY MEDICINE | Facility: CLINIC | Age: 87
End: 2022-01-01

## 2022-01-01 ENCOUNTER — VIRTUAL VISIT (OUTPATIENT)
Dept: PHARMACY | Facility: CLINIC | Age: 87
End: 2022-01-01
Payer: COMMERCIAL

## 2022-01-01 ENCOUNTER — TRANSFERRED RECORDS (OUTPATIENT)
Dept: HEALTH INFORMATION MANAGEMENT | Facility: CLINIC | Age: 87
End: 2022-01-01

## 2022-01-01 ENCOUNTER — OFFICE VISIT (OUTPATIENT)
Dept: CARDIOLOGY | Facility: CLINIC | Age: 87
End: 2022-01-01
Attending: NURSE PRACTITIONER
Payer: MEDICARE

## 2022-01-01 ENCOUNTER — TELEPHONE (OUTPATIENT)
Dept: CARDIOLOGY | Facility: CLINIC | Age: 87
End: 2022-01-01

## 2022-01-01 ENCOUNTER — LAB (OUTPATIENT)
Dept: LAB | Facility: CLINIC | Age: 87
End: 2022-01-01
Payer: MEDICARE

## 2022-01-01 ENCOUNTER — OFFICE VISIT (OUTPATIENT)
Dept: FAMILY MEDICINE | Facility: CLINIC | Age: 87
End: 2022-01-01
Payer: MEDICARE

## 2022-01-01 ENCOUNTER — LAB (OUTPATIENT)
Dept: LAB | Facility: CLINIC | Age: 87
End: 2022-01-01

## 2022-01-01 VITALS
HEIGHT: 61 IN | DIASTOLIC BLOOD PRESSURE: 59 MMHG | SYSTOLIC BLOOD PRESSURE: 142 MMHG | WEIGHT: 130 LBS | HEART RATE: 54 BPM | BODY MASS INDEX: 24.55 KG/M2

## 2022-01-01 VITALS
DIASTOLIC BLOOD PRESSURE: 72 MMHG | WEIGHT: 130 LBS | SYSTOLIC BLOOD PRESSURE: 157 MMHG | OXYGEN SATURATION: 96 % | BODY MASS INDEX: 24.55 KG/M2 | HEIGHT: 61 IN | HEART RATE: 61 BPM

## 2022-01-01 VITALS
HEIGHT: 61 IN | DIASTOLIC BLOOD PRESSURE: 66 MMHG | TEMPERATURE: 97.7 F | WEIGHT: 121 LBS | BODY MASS INDEX: 22.84 KG/M2 | HEART RATE: 60 BPM | OXYGEN SATURATION: 97 % | RESPIRATION RATE: 16 BRPM | SYSTOLIC BLOOD PRESSURE: 155 MMHG

## 2022-01-01 DIAGNOSIS — I50.32 CHRONIC HEART FAILURE WITH PRESERVED EJECTION FRACTION (H): ICD-10-CM

## 2022-01-01 DIAGNOSIS — I50.32 CHRONIC HEART FAILURE WITH PRESERVED EJECTION FRACTION (H): Primary | ICD-10-CM

## 2022-01-01 DIAGNOSIS — I50.9 ACUTE ON CHRONIC CONGESTIVE HEART FAILURE, UNSPECIFIED HEART FAILURE TYPE (H): ICD-10-CM

## 2022-01-01 DIAGNOSIS — I10 ESSENTIAL HYPERTENSION: ICD-10-CM

## 2022-01-01 DIAGNOSIS — R53.82 CHRONIC FATIGUE: ICD-10-CM

## 2022-01-01 DIAGNOSIS — M1A.9XX0 CHRONIC GOUT WITHOUT TOPHUS, UNSPECIFIED CAUSE, UNSPECIFIED SITE: ICD-10-CM

## 2022-01-01 DIAGNOSIS — R63.4 WEIGHT LOSS: ICD-10-CM

## 2022-01-01 DIAGNOSIS — R53.83 FATIGUE, UNSPECIFIED TYPE: Primary | ICD-10-CM

## 2022-01-01 DIAGNOSIS — N18.4 CKD (CHRONIC KIDNEY DISEASE) STAGE 4, GFR 15-29 ML/MIN (H): ICD-10-CM

## 2022-01-01 DIAGNOSIS — R53.83 FATIGUE, UNSPECIFIED TYPE: ICD-10-CM

## 2022-01-01 DIAGNOSIS — E03.9 HYPOTHYROIDISM, UNSPECIFIED TYPE: ICD-10-CM

## 2022-01-01 DIAGNOSIS — D64.9 ANEMIA, UNSPECIFIED TYPE: ICD-10-CM

## 2022-01-01 DIAGNOSIS — R63.0 POOR APPETITE: Primary | ICD-10-CM

## 2022-01-01 DIAGNOSIS — Z79.899 ENCOUNTER FOR LONG-TERM (CURRENT) USE OF MEDICATIONS: ICD-10-CM

## 2022-01-01 DIAGNOSIS — K21.00 GASTROESOPHAGEAL REFLUX DISEASE WITH ESOPHAGITIS, UNSPECIFIED WHETHER HEMORRHAGE: ICD-10-CM

## 2022-01-01 DIAGNOSIS — R62.7 FAILURE TO THRIVE IN ADULT: ICD-10-CM

## 2022-01-01 DIAGNOSIS — I10 HYPERTENSION GOAL BP (BLOOD PRESSURE) < 140/90: Chronic | ICD-10-CM

## 2022-01-01 DIAGNOSIS — I48.0 PAROXYSMAL ATRIAL FIBRILLATION (H): ICD-10-CM

## 2022-01-01 DIAGNOSIS — R62.7 FAILURE TO THRIVE IN ADULT: Primary | ICD-10-CM

## 2022-01-01 DIAGNOSIS — E03.9 HYPOTHYROIDISM, UNSPECIFIED TYPE: Primary | ICD-10-CM

## 2022-01-01 DIAGNOSIS — J44.9 CHRONIC OBSTRUCTIVE PULMONARY DISEASE, UNSPECIFIED COPD TYPE (H): ICD-10-CM

## 2022-01-01 LAB
ALT SERPL W P-5'-P-CCNC: 9 U/L (ref 0–50)
ANION GAP SERPL CALCULATED.3IONS-SCNC: 4 MMOL/L (ref 3–14)
ANION GAP SERPL CALCULATED.3IONS-SCNC: 5 MMOL/L (ref 3–14)
ANION GAP SERPL CALCULATED.3IONS-SCNC: 8 MMOL/L (ref 3–14)
BUN SERPL-MCNC: 50 MG/DL (ref 7–30)
BUN SERPL-MCNC: 51 MG/DL (ref 7–30)
BUN SERPL-MCNC: 52 MG/DL (ref 7–30)
CALCIUM SERPL-MCNC: 10.6 MG/DL (ref 8.5–10.1)
CALCIUM SERPL-MCNC: 9.3 MG/DL (ref 8.5–10.1)
CALCIUM SERPL-MCNC: 9.4 MG/DL (ref 8.5–10.1)
CHLORIDE BLD-SCNC: 107 MMOL/L (ref 94–109)
CHLORIDE BLD-SCNC: 107 MMOL/L (ref 94–109)
CHLORIDE BLD-SCNC: 109 MMOL/L (ref 94–109)
CHOLEST SERPL-MCNC: 131 MG/DL
CO2 SERPL-SCNC: 25 MMOL/L (ref 20–32)
CO2 SERPL-SCNC: 27 MMOL/L (ref 20–32)
CO2 SERPL-SCNC: 28 MMOL/L (ref 20–32)
CREAT SERPL-MCNC: 2.3 MG/DL (ref 0.52–1.04)
CREAT SERPL-MCNC: 2.46 MG/DL (ref 0.52–1.04)
CREAT SERPL-MCNC: 2.8 MG/DL (ref 0.52–1.04)
FASTING STATUS PATIENT QL REPORTED: YES
GFR SERPL CREATININE-BSD FRML MDRD: 16 ML/MIN/1.73M2
GFR SERPL CREATININE-BSD FRML MDRD: 19 ML/MIN/1.73M2
GFR SERPL CREATININE-BSD FRML MDRD: 20 ML/MIN/1.73M2
GLUCOSE BLD-MCNC: 104 MG/DL (ref 70–99)
GLUCOSE BLD-MCNC: 118 MG/DL (ref 70–99)
GLUCOSE BLD-MCNC: 95 MG/DL (ref 70–99)
HDLC SERPL-MCNC: 48 MG/DL
LDLC SERPL CALC-MCNC: 65 MG/DL
NONHDLC SERPL-MCNC: 83 MG/DL
NT-PROBNP SERPL-MCNC: 6960 PG/ML (ref 0–1800)
POTASSIUM BLD-SCNC: 3.9 MMOL/L (ref 3.4–5.3)
POTASSIUM BLD-SCNC: 4 MMOL/L (ref 3.4–5.3)
POTASSIUM BLD-SCNC: 4.8 MMOL/L (ref 3.4–5.3)
SODIUM SERPL-SCNC: 138 MMOL/L (ref 133–144)
SODIUM SERPL-SCNC: 140 MMOL/L (ref 133–144)
SODIUM SERPL-SCNC: 142 MMOL/L (ref 133–144)
TRIGL SERPL-MCNC: 92 MG/DL
TSH SERPL DL<=0.005 MIU/L-ACNC: 1.31 MU/L (ref 0.4–4)

## 2022-01-01 PROCEDURE — 99607 MTMS BY PHARM ADDL 15 MIN: CPT | Performed by: PHARMACIST

## 2022-01-01 PROCEDURE — 99215 OFFICE O/P EST HI 40 MIN: CPT | Performed by: NURSE PRACTITIONER

## 2022-01-01 PROCEDURE — 99606 MTMS BY PHARM EST 15 MIN: CPT | Performed by: PHARMACIST

## 2022-01-01 PROCEDURE — 80048 BASIC METABOLIC PNL TOTAL CA: CPT | Performed by: INTERNAL MEDICINE

## 2022-01-01 PROCEDURE — 99214 OFFICE O/P EST MOD 30 MIN: CPT | Performed by: INTERNAL MEDICINE

## 2022-01-01 PROCEDURE — 36415 COLL VENOUS BLD VENIPUNCTURE: CPT | Performed by: NURSE PRACTITIONER

## 2022-01-01 PROCEDURE — 83880 ASSAY OF NATRIURETIC PEPTIDE: CPT | Performed by: INTERNAL MEDICINE

## 2022-01-01 PROCEDURE — 84443 ASSAY THYROID STIM HORMONE: CPT | Performed by: INTERNAL MEDICINE

## 2022-01-01 PROCEDURE — 80048 BASIC METABOLIC PNL TOTAL CA: CPT | Performed by: NURSE PRACTITIONER

## 2022-01-01 PROCEDURE — 36415 COLL VENOUS BLD VENIPUNCTURE: CPT | Performed by: INTERNAL MEDICINE

## 2022-01-01 PROCEDURE — 84460 ALANINE AMINO (ALT) (SGPT): CPT | Performed by: NURSE PRACTITIONER

## 2022-01-01 PROCEDURE — 80061 LIPID PANEL: CPT | Performed by: NURSE PRACTITIONER

## 2022-01-01 RX ORDER — TORSEMIDE 20 MG/1
20 TABLET ORAL DAILY
Qty: 100 TABLET | Refills: 0 | Status: SHIPPED | OUTPATIENT
Start: 2022-01-01 | End: 2022-01-01

## 2022-01-01 RX ORDER — CARVEDILOL 6.25 MG/1
6.25 TABLET ORAL 2 TIMES DAILY WITH MEALS
Qty: 180 TABLET | Refills: 0 | Status: SHIPPED | OUTPATIENT
Start: 2022-01-01 | End: 2023-01-01

## 2022-01-01 RX ORDER — AMLODIPINE BESYLATE 10 MG/1
TABLET ORAL
Qty: 90 TABLET | Refills: 0 | Status: SHIPPED | OUTPATIENT
Start: 2022-01-01 | End: 2023-01-01

## 2022-01-01 RX ORDER — AMOXICILLIN 250 MG
1-2 CAPSULE ORAL DAILY PRN
Status: ON HOLD | COMMUNITY
End: 2023-01-01

## 2022-01-01 RX ORDER — TORSEMIDE 20 MG/1
20 TABLET ORAL DAILY
Qty: 100 TABLET | Refills: 3 | Status: SHIPPED | OUTPATIENT
Start: 2022-01-01 | End: 2023-01-01

## 2022-01-01 RX ORDER — MULTIVIT-MIN/IRON/FOLIC ACID/K 18-600-40
1 CAPSULE ORAL 2 TIMES DAILY
COMMUNITY
End: 2023-01-01

## 2022-01-01 RX ORDER — CLONIDINE HYDROCHLORIDE 0.1 MG/1
0.1 TABLET ORAL 2 TIMES DAILY
Qty: 180 TABLET | Refills: 0 | Status: SHIPPED | OUTPATIENT
Start: 2022-01-01 | End: 2023-01-01

## 2022-01-01 RX ORDER — DRONABINOL 2.5 MG/1
2.5 CAPSULE ORAL
Qty: 60 CAPSULE | Refills: 2 | Status: SHIPPED | OUTPATIENT
Start: 2022-01-01 | End: 2023-01-01 | Stop reason: SINTOL

## 2022-01-01 RX ORDER — ISOSORBIDE DINITRATE 20 MG/1
20 TABLET ORAL
Qty: 270 TABLET | Refills: 0 | Status: SHIPPED | OUTPATIENT
Start: 2022-01-01 | End: 2023-01-01

## 2022-01-01 RX ORDER — APIXABAN 2.5 MG/1
TABLET, FILM COATED ORAL
Qty: 180 TABLET | Refills: 0 | Status: SHIPPED | OUTPATIENT
Start: 2022-01-01 | End: 2022-01-01

## 2022-01-01 RX ORDER — ALLOPURINOL 100 MG/1
100 TABLET ORAL DAILY
Qty: 90 TABLET | Refills: 1 | Status: SHIPPED | OUTPATIENT
Start: 2022-01-01 | End: 2022-01-01

## 2022-01-01 RX ORDER — APIXABAN 2.5 MG/1
TABLET, FILM COATED ORAL
Qty: 180 TABLET | Refills: 0 | Status: SHIPPED | OUTPATIENT
Start: 2022-01-01 | End: 2023-01-01

## 2022-01-01 RX ORDER — PANTOPRAZOLE SODIUM 40 MG/1
TABLET, DELAYED RELEASE ORAL
Qty: 90 TABLET | Refills: 0 | Status: SHIPPED | OUTPATIENT
Start: 2022-01-01 | End: 2022-01-01

## 2022-01-01 RX ORDER — ALLOPURINOL 100 MG/1
100 TABLET ORAL DAILY
Qty: 90 TABLET | Refills: 0 | Status: SHIPPED | OUTPATIENT
Start: 2022-01-01 | End: 2023-01-01

## 2022-01-01 RX ORDER — HYDRALAZINE HYDROCHLORIDE 100 MG/1
TABLET, FILM COATED ORAL
Qty: 360 TABLET | Refills: 0 | Status: SHIPPED | OUTPATIENT
Start: 2022-01-01 | End: 2023-01-01

## 2022-01-01 RX ORDER — AMLODIPINE BESYLATE 10 MG/1
10 TABLET ORAL DAILY
Qty: 90 TABLET | Refills: 3 | Status: SHIPPED | OUTPATIENT
Start: 2022-01-01 | End: 2022-01-01

## 2022-01-01 ASSESSMENT — PAIN SCALES - GENERAL: PAINLEVEL: NO PAIN (0)

## 2022-01-11 ENCOUNTER — TRANSFERRED RECORDS (OUTPATIENT)
Dept: HEALTH INFORMATION MANAGEMENT | Facility: CLINIC | Age: 87
End: 2022-01-11
Payer: MEDICARE

## 2022-01-12 ENCOUNTER — APPOINTMENT (OUTPATIENT)
Dept: GENERAL RADIOLOGY | Facility: CLINIC | Age: 87
DRG: 291 | End: 2022-01-12
Attending: EMERGENCY MEDICINE
Payer: MEDICARE

## 2022-01-12 ENCOUNTER — HOSPITAL ENCOUNTER (INPATIENT)
Facility: CLINIC | Age: 87
LOS: 3 days | Discharge: HOME HEALTH | DRG: 291 | End: 2022-01-15
Attending: EMERGENCY MEDICINE | Admitting: HOSPITALIST
Payer: MEDICARE

## 2022-01-12 DIAGNOSIS — I50.33 ACUTE ON CHRONIC HEART FAILURE WITH PRESERVED EJECTION FRACTION (HFPEF) (H): Primary | ICD-10-CM

## 2022-01-12 DIAGNOSIS — R09.02 HYPOXIA: ICD-10-CM

## 2022-01-12 DIAGNOSIS — I50.9 ACUTE ON CHRONIC CONGESTIVE HEART FAILURE, UNSPECIFIED HEART FAILURE TYPE (H): ICD-10-CM

## 2022-01-12 LAB
ANION GAP SERPL CALCULATED.3IONS-SCNC: 6 MMOL/L (ref 3–14)
ATRIAL RATE - MUSE: 58 BPM
BASOPHILS # BLD AUTO: 0 10E3/UL (ref 0–0.2)
BASOPHILS NFR BLD AUTO: 0 %
BUN SERPL-MCNC: 43 MG/DL (ref 7–30)
CALCIUM SERPL-MCNC: 9.8 MG/DL (ref 8.5–10.1)
CHLORIDE BLD-SCNC: 110 MMOL/L (ref 94–109)
CO2 SERPL-SCNC: 24 MMOL/L (ref 20–32)
CREAT SERPL-MCNC: 1.99 MG/DL (ref 0.52–1.04)
DIASTOLIC BLOOD PRESSURE - MUSE: NORMAL MMHG
EOSINOPHIL # BLD AUTO: 0 10E3/UL (ref 0–0.7)
EOSINOPHIL NFR BLD AUTO: 0 %
ERYTHROCYTE [DISTWIDTH] IN BLOOD BY AUTOMATED COUNT: 14.9 % (ref 10–15)
FLUAV RNA SPEC QL NAA+PROBE: NEGATIVE
FLUBV RNA RESP QL NAA+PROBE: NEGATIVE
GFR SERPL CREATININE-BSD FRML MDRD: 24 ML/MIN/1.73M2
GLUCOSE BLD-MCNC: 99 MG/DL (ref 70–99)
HCT VFR BLD AUTO: 33.5 % (ref 35–47)
HGB BLD-MCNC: 10.5 G/DL (ref 11.7–15.7)
HOLD SPECIMEN: NORMAL
IMM GRANULOCYTES # BLD: 0 10E3/UL
IMM GRANULOCYTES NFR BLD: 1 %
INTERPRETATION ECG - MUSE: NORMAL
LYMPHOCYTES # BLD AUTO: 0.7 10E3/UL (ref 0.8–5.3)
LYMPHOCYTES NFR BLD AUTO: 12 %
MCH RBC QN AUTO: 32.7 PG (ref 26.5–33)
MCHC RBC AUTO-ENTMCNC: 31.3 G/DL (ref 31.5–36.5)
MCV RBC AUTO: 104 FL (ref 78–100)
MONOCYTES # BLD AUTO: 1.1 10E3/UL (ref 0–1.3)
MONOCYTES NFR BLD AUTO: 20 %
NEUTROPHILS # BLD AUTO: 3.8 10E3/UL (ref 1.6–8.3)
NEUTROPHILS NFR BLD AUTO: 67 %
NRBC # BLD AUTO: 0 10E3/UL
NRBC BLD AUTO-RTO: 0 /100
NT-PROBNP SERPL-MCNC: ABNORMAL PG/ML (ref 0–1800)
P AXIS - MUSE: 21 DEGREES
PLATELET # BLD AUTO: 124 10E3/UL (ref 150–450)
POTASSIUM BLD-SCNC: 4.2 MMOL/L (ref 3.4–5.3)
POTASSIUM BLD-SCNC: 4.6 MMOL/L (ref 3.4–5.3)
PR INTERVAL - MUSE: 210 MS
QRS DURATION - MUSE: 90 MS
QT - MUSE: 456 MS
QTC - MUSE: 447 MS
R AXIS - MUSE: -36 DEGREES
RBC # BLD AUTO: 3.21 10E6/UL (ref 3.8–5.2)
SARS-COV-2 RNA RESP QL NAA+PROBE: NEGATIVE
SODIUM SERPL-SCNC: 140 MMOL/L (ref 133–144)
SYSTOLIC BLOOD PRESSURE - MUSE: NORMAL MMHG
T AXIS - MUSE: 36 DEGREES
TROPONIN I SERPL HS-MCNC: 21 NG/L
VENTRICULAR RATE- MUSE: 58 BPM
WBC # BLD AUTO: 5.6 10E3/UL (ref 4–11)

## 2022-01-12 PROCEDURE — 210N000002 HC R&B HEART CARE

## 2022-01-12 PROCEDURE — 80048 BASIC METABOLIC PNL TOTAL CA: CPT | Performed by: EMERGENCY MEDICINE

## 2022-01-12 PROCEDURE — 99285 EMERGENCY DEPT VISIT HI MDM: CPT | Mod: 25

## 2022-01-12 PROCEDURE — 93005 ELECTROCARDIOGRAM TRACING: CPT

## 2022-01-12 PROCEDURE — 71046 X-RAY EXAM CHEST 2 VIEWS: CPT

## 2022-01-12 PROCEDURE — 84484 ASSAY OF TROPONIN QUANT: CPT | Performed by: EMERGENCY MEDICINE

## 2022-01-12 PROCEDURE — C9803 HOPD COVID-19 SPEC COLLECT: HCPCS

## 2022-01-12 PROCEDURE — 85025 COMPLETE CBC W/AUTO DIFF WBC: CPT | Performed by: EMERGENCY MEDICINE

## 2022-01-12 PROCEDURE — 84132 ASSAY OF SERUM POTASSIUM: CPT | Performed by: HOSPITALIST

## 2022-01-12 PROCEDURE — 87636 SARSCOV2 & INF A&B AMP PRB: CPT | Performed by: EMERGENCY MEDICINE

## 2022-01-12 PROCEDURE — 83880 ASSAY OF NATRIURETIC PEPTIDE: CPT | Performed by: EMERGENCY MEDICINE

## 2022-01-12 PROCEDURE — 250N000013 HC RX MED GY IP 250 OP 250 PS 637: Performed by: HOSPITALIST

## 2022-01-12 PROCEDURE — 36415 COLL VENOUS BLD VENIPUNCTURE: CPT | Performed by: HOSPITALIST

## 2022-01-12 PROCEDURE — 99223 1ST HOSP IP/OBS HIGH 75: CPT | Mod: AI | Performed by: HOSPITALIST

## 2022-01-12 PROCEDURE — 250N000011 HC RX IP 250 OP 636: Performed by: EMERGENCY MEDICINE

## 2022-01-12 PROCEDURE — 96374 THER/PROPH/DIAG INJ IV PUSH: CPT

## 2022-01-12 PROCEDURE — 36415 COLL VENOUS BLD VENIPUNCTURE: CPT | Performed by: EMERGENCY MEDICINE

## 2022-01-12 RX ORDER — AMOXICILLIN 250 MG
2 CAPSULE ORAL 2 TIMES DAILY
Status: DISCONTINUED | OUTPATIENT
Start: 2022-01-12 | End: 2022-01-15 | Stop reason: HOSPADM

## 2022-01-12 RX ORDER — FERROUS SULFATE 325(65) MG
325 TABLET ORAL EVERY OTHER DAY
COMMUNITY
End: 2023-01-01

## 2022-01-12 RX ORDER — METOPROLOL TARTRATE 25 MG/1
25 TABLET, FILM COATED ORAL 2 TIMES DAILY
Status: DISCONTINUED | OUTPATIENT
Start: 2022-01-12 | End: 2022-01-14

## 2022-01-12 RX ORDER — SODIUM BICARBONATE 650 MG/1
650 TABLET ORAL 2 TIMES DAILY
Status: DISCONTINUED | OUTPATIENT
Start: 2022-01-12 | End: 2022-01-15 | Stop reason: HOSPADM

## 2022-01-12 RX ORDER — LIDOCAINE 40 MG/G
CREAM TOPICAL
Status: DISCONTINUED | OUTPATIENT
Start: 2022-01-12 | End: 2022-01-15 | Stop reason: HOSPADM

## 2022-01-12 RX ORDER — CLONIDINE HYDROCHLORIDE 0.1 MG/1
0.1 TABLET ORAL 2 TIMES DAILY
Status: DISCONTINUED | OUTPATIENT
Start: 2022-01-12 | End: 2022-01-15 | Stop reason: HOSPADM

## 2022-01-12 RX ORDER — HYDRALAZINE HYDROCHLORIDE 50 MG/1
100 TABLET, FILM COATED ORAL 4 TIMES DAILY
Status: DISCONTINUED | OUTPATIENT
Start: 2022-01-12 | End: 2022-01-15 | Stop reason: HOSPADM

## 2022-01-12 RX ORDER — FUROSEMIDE 10 MG/ML
40 INJECTION INTRAMUSCULAR; INTRAVENOUS ONCE
Status: COMPLETED | OUTPATIENT
Start: 2022-01-12 | End: 2022-01-12

## 2022-01-12 RX ORDER — HYDRALAZINE HYDROCHLORIDE 20 MG/ML
10 INJECTION INTRAMUSCULAR; INTRAVENOUS EVERY 4 HOURS PRN
Status: DISCONTINUED | OUTPATIENT
Start: 2022-01-12 | End: 2022-01-15 | Stop reason: HOSPADM

## 2022-01-12 RX ORDER — AMOXICILLIN 250 MG
1 CAPSULE ORAL 2 TIMES DAILY
Status: DISCONTINUED | OUTPATIENT
Start: 2022-01-12 | End: 2022-01-15 | Stop reason: HOSPADM

## 2022-01-12 RX ORDER — ONDANSETRON 2 MG/ML
4 INJECTION INTRAMUSCULAR; INTRAVENOUS EVERY 6 HOURS PRN
Status: DISCONTINUED | OUTPATIENT
Start: 2022-01-12 | End: 2022-01-15 | Stop reason: HOSPADM

## 2022-01-12 RX ORDER — FUROSEMIDE 10 MG/ML
40 INJECTION INTRAMUSCULAR; INTRAVENOUS
Status: DISCONTINUED | OUTPATIENT
Start: 2022-01-13 | End: 2022-01-13

## 2022-01-12 RX ORDER — MIRTAZAPINE 15 MG/1
15 TABLET, FILM COATED ORAL AT BEDTIME
Status: DISCONTINUED | OUTPATIENT
Start: 2022-01-12 | End: 2022-01-15 | Stop reason: HOSPADM

## 2022-01-12 RX ORDER — ONDANSETRON 4 MG/1
4 TABLET, ORALLY DISINTEGRATING ORAL EVERY 6 HOURS PRN
Status: DISCONTINUED | OUTPATIENT
Start: 2022-01-12 | End: 2022-01-15 | Stop reason: HOSPADM

## 2022-01-12 RX ADMIN — MIRTAZAPINE 15 MG: 15 TABLET, FILM COATED ORAL at 21:36

## 2022-01-12 RX ADMIN — SODIUM BICARBONATE 650 MG TABLET 650 MG: at 21:36

## 2022-01-12 RX ADMIN — METOPROLOL TARTRATE 25 MG: 25 TABLET, FILM COATED ORAL at 21:36

## 2022-01-12 RX ADMIN — CLONIDINE HYDROCHLORIDE 0.1 MG: 0.1 TABLET ORAL at 21:36

## 2022-01-12 RX ADMIN — HYDRALAZINE HYDROCHLORIDE 100 MG: 50 TABLET, FILM COATED ORAL at 21:36

## 2022-01-12 RX ADMIN — APIXABAN 2.5 MG: 2.5 TABLET, FILM COATED ORAL at 21:36

## 2022-01-12 RX ADMIN — FUROSEMIDE 40 MG: 10 INJECTION, SOLUTION INTRAVENOUS at 17:41

## 2022-01-12 ASSESSMENT — ENCOUNTER SYMPTOMS
COUGH: 0
FEVER: 0
ABDOMINAL PAIN: 0
SHORTNESS OF BREATH: 1
BACK PAIN: 1

## 2022-01-12 ASSESSMENT — ACTIVITIES OF DAILY LIVING (ADL)
ADLS_ACUITY_SCORE: 8

## 2022-01-12 NOTE — ED TRIAGE NOTES
Pt stated she was feeling SOB last night and the staff at [facility] took her SPO2 and it was 89%. SPO2 94% in triage on room air. Denies chest pain.

## 2022-01-12 NOTE — ED PROVIDER NOTES
History   Chief Complaint:  Shortness of Breath       HPI   Ghislaine Walls is a 86 year old female with history of atrial fibrillation anticoagulated on Eliquis, hypertension, peripheral artery disease, and stage 4 chronic kidney disease,  who presents with shortness of breath. The patient reports that she was feeling short of breath last night, with spO2 89% measured by [facility] assisted [facility]. No home oxygen. She also has left leg swelling and intermittent back pain. Over the past few months, her weight has been increased from 120 to 140 lbs. She is current taking Torsemide. Otherwise, she denies any chest pain, fever, coughing, or congestion.  She is fully COVID vaccinated with booster shot. Not a current smoker.      Review of Systems   Constitutional: Negative for fever.   HENT: Negative for congestion.    Respiratory: Positive for shortness of breath. Negative for cough.    Cardiovascular: Positive for leg swelling. Negative for chest pain.   Gastrointestinal: Negative for abdominal pain.   Musculoskeletal: Positive for back pain (chronic).   All other systems reviewed and are negative.      Allergies:  Oxybutynin  Seasonal allergies    Medications:  Abatacept  Allopurinol  Amlodipine  Eliquis  Clonidine  Aranesp  Hydralazine  Hydroxychloroquine  Levothyroxine  Metoprolol  Mirtazapine  Zofran  Protonix  Prednisone   Torsemide      Past Medical History:     Atrial fibrillation  Hyperlipidemia  Hypertension  Hypothyroid  Peripheral artery disease  Chronic kidney disease stage 4  Gouty arthritis  Lymphedema  Inflammatory polyarthropathy  Vitamin D deficiency  Bilateral pleural effusion  Rheumatoid arthritis (H)  Hyperkalemia    Metabolic acidosis    Past Surgical History:    Appendectomy  Cataract bilateral laswer  [place] teeth  Esophagoscopy, gastroscopy, duodenoscopy, combined  Tubal ligation     Family History:    Mother: heart disease, respiratory  Father: heart disease  Brother: Coronary artery disease,  kidney disease, diabetes  Sibling: gout    Social History:  The patient has been in [facility] assisted living for 2 years.   Present with her daughter.    Physical Exam     Patient Vitals for the past 24 hrs:   BP Temp Temp src Pulse Resp SpO2 Weight   01/12/22 1900 -- -- -- 60 16 95 % --   01/12/22 1845 -- -- -- 61 21 96 % --   01/12/22 1832 (!) 183/74 -- -- 62 28 96 % --   01/12/22 1815 -- -- -- 65 16 96 % --   01/12/22 1800 -- -- -- 65 22 95 % --   01/12/22 1745 -- -- -- 65 23 90 % --   01/12/22 1730 -- -- -- 64 26 93 % --   01/12/22 1700 -- -- -- 62 20 93 % --   01/12/22 1650 (!) 195/82 -- -- 64 22 92 % --   01/12/22 1645 (!) 194/76 -- -- 64 24 93 % --   01/12/22 1640 (!) 196/79 -- -- 65 21 91 % --   01/12/22 1636 -- -- -- -- -- -- 64.1 kg (141 lb 4 oz)   01/12/22 1242 (!) 172/61 -- -- -- -- -- --   01/12/22 1238 -- 98  F (36.7  C) Oral 60 16 94 % --   01/12/22 1228 -- -- -- 61 17 94 % --       Physical Exam  General: Alert and cooperative with exam. Patient in mild distress. Normal mentation. Frail, elderly appearance.  Head:  Scalp is NC/AT  Eyes:  No scleral icterus, PERRL  ENT:  The external nose and ears are normal.   Neck:  Normal range of motion without rigidity.  CV:  Regular rate and rhythm  Resp:  Breath sounds are clear bilaterally    Non-labored, no retractions or accessory muscle use  GI:  Abdomen is soft, no distension, no tenderness. No peritoneal signs  MS:  +2 pitting edema to lower extremities bilaterally  Skin:  Warm and dry, No rash or lesions noted.  Neuro: Oriented x 3. No gross motor deficits.       Emergency Department Course   ECG  ECG obtained at 1300, ECG read at 1320  Sinus bradycardia with 1st degree A-V block.  Left axis deviation  Minimal voltage criteria for LVH, may be normal variant  Anterolateral infarct, age undetermined  No significant change as compared to prior, dated 06/07/2021.  Rate 58 bpm. MN interval 210 ms. QRS duration 90 ms. QT/QTc 456/447 ms. P-R-T axes 21 -36 36.      Imaging:  Chest XR,  PA & LAT   Final Result   IMPRESSION: Cardiomegaly and vascular congestion are suggestive of   CHF. Bibasilar airspace opacities, left worse than right, could   represent atelectasis or pneumonia. Small left pleural effusion.       GILBERT FRANK MD            SYSTEM ID:  [id number]      Echocardiogram Complete    (Results Pending)     Report per radiology    Laboratory:  Labs Ordered and Resulted from Time of ED Arrival to Time of ED Departure   BASIC METABOLIC PANEL - Abnormal       Result Value    Sodium 140      Potassium 4.6      Chloride 110 (*)     Carbon Dioxide (CO2) 24      Anion Gap 6      Urea Nitrogen 43 (*)     Creatinine 1.99 (*)     Calcium 9.8      Glucose 99      GFR Estimate 24 (*)    CBC WITH PLATELETS AND DIFFERENTIAL - Abnormal    WBC Count 5.6      RBC Count 3.21 (*)     Hemoglobin 10.5 (*)     Hematocrit 33.5 (*)      (*)     MCH 32.7      MCHC 31.3 (*)     RDW 14.9      Platelet Count 124 (*)     % Neutrophils 67      % Lymphocytes 12      % Monocytes 20      % Eosinophils 0      % Basophils 0      % Immature Granulocytes 1      NRBCs per 100 WBC 0      Absolute Neutrophils 3.8      Absolute Lymphocytes 0.7 (*)     Absolute Monocytes 1.1      Absolute Eosinophils 0.0      Absolute Basophils 0.0      Absolute Immature Granulocytes 0.0      Absolute NRBCs 0.0     NT PROBNP INPATIENT - Abnormal    N terminal Pro BNP Inpatient 12,009 (*)    TROPONIN I - Normal    Troponin I High Sensitivity 21     INFLUENZA A/B & SARS-COV2 PCR MULTIPLEX - Normal    Influenza A PCR Negative      Influenza B PCR Negative      SARS CoV2 PCR Negative     POTASSIUM      Emergency Department Course:    Reviewed:  I reviewed nursing notes, vitals, past medical history and Care Everywhere    Assessments:  1615 I obtained history and examined the patient as noted above.   1648 I rechecked the patient and explained findings.       Interventions:  1741 Lasix 40mg IV    Disposition:  The  patient was discharged to home.     Impression & Plan     Medical Decision Making:    Ghislaine Walls is a 86 year old female with a PMH of atrial fibrillation anticoagulated on Eliquis, hypertension, peripheral artery disease, and chronic kidney disease stage 4, who presents for evaluation of shortness of breath.  I considered a broad differential of their dyspnea including CHF exacerbation, PE, dissection, hemothorax, pleural effusion, pneumonia, acute coronary syndrome, reactive airway disease, bronchitis, upper airway obstruction, foreign body, etc.  Given the history and exam plus CXR findings I feel heart failure exacerbation is the most likely etiology.  Patient has lower extremity edema with significant weight gain and elevated BNP.  She was noted to drop her oxygen saturations to 88% with minimal exertion.  The patient received IV diuretics in ED.  Will admit at this time for further inpatient treatment.  At this time, the patient is stable from a respiratory standpoint and does not require Bipap or intubation and I feel stable for the telemetry floor.      Diagnosis:    ICD-10-CM    1. Acute on chronic congestive heart failure, unspecified heart failure type (H)  I50.9    2. Hypoxia  R09.02        Scribe Disclosure:  I, Darshan Sparks, am serving as a scribe at 4:04 PM on 1/12/2022 to document services personally performed by Waylon Sutherland DO based on my observations and the provider's statements to me.            Waylon Sutherland DO  01/12/22 2801

## 2022-01-13 ENCOUNTER — APPOINTMENT (OUTPATIENT)
Dept: ULTRASOUND IMAGING | Facility: CLINIC | Age: 87
DRG: 291 | End: 2022-01-13
Attending: HOSPITALIST
Payer: MEDICARE

## 2022-01-13 ENCOUNTER — APPOINTMENT (OUTPATIENT)
Dept: CARDIOLOGY | Facility: CLINIC | Age: 87
DRG: 291 | End: 2022-01-13
Attending: HOSPITALIST
Payer: MEDICARE

## 2022-01-13 LAB
% LINING CELLS, BODY FLUID: 31 %
ANION GAP SERPL CALCULATED.3IONS-SCNC: 7 MMOL/L (ref 3–14)
APPEARANCE FLD: ABNORMAL
BUN SERPL-MCNC: 44 MG/DL (ref 7–30)
CALCIUM SERPL-MCNC: 9.1 MG/DL (ref 8.5–10.1)
CHLORIDE BLD-SCNC: 111 MMOL/L (ref 94–109)
CO2 SERPL-SCNC: 25 MMOL/L (ref 20–32)
COLOR FLD: YELLOW
CREAT SERPL-MCNC: 2.01 MG/DL (ref 0.52–1.04)
GFR SERPL CREATININE-BSD FRML MDRD: 24 ML/MIN/1.73M2
GLUCOSE BLD-MCNC: 73 MG/DL (ref 70–99)
GLUCOSE FLD-MCNC: 147 MG/DL
LDH FLD L TO P-CCNC: 93 U/L
LDH SERPL L TO P-CCNC: 219 U/L (ref 81–234)
LVEF ECHO: NORMAL
LYMPHOCYTES NFR FLD MANUAL: 23 %
MONOS+MACROS NFR FLD MANUAL: 42 %
NEUTS BAND NFR FLD MANUAL: 4 %
POTASSIUM BLD-SCNC: 3.8 MMOL/L (ref 3.4–5.3)
POTASSIUM BLD-SCNC: 4 MMOL/L (ref 3.4–5.3)
PROT FLD-MCNC: 2.7 G/DL
PROT SERPL-MCNC: 6.6 G/DL (ref 6.8–8.8)
SODIUM SERPL-SCNC: 143 MMOL/L (ref 133–144)
WBC # FLD AUTO: 229 /UL

## 2022-01-13 PROCEDURE — 82945 GLUCOSE OTHER FLUID: CPT | Performed by: HOSPITALIST

## 2022-01-13 PROCEDURE — 84132 ASSAY OF SERUM POTASSIUM: CPT | Performed by: HOSPITALIST

## 2022-01-13 PROCEDURE — 84157 ASSAY OF PROTEIN OTHER: CPT | Performed by: HOSPITALIST

## 2022-01-13 PROCEDURE — 999N000154 HC STATISTIC RADIOLOGY XRAY, US, CT, MAR, NM

## 2022-01-13 PROCEDURE — 83615 LACTATE (LD) (LDH) ENZYME: CPT | Performed by: HOSPITALIST

## 2022-01-13 PROCEDURE — 87205 SMEAR GRAM STAIN: CPT | Performed by: HOSPITALIST

## 2022-01-13 PROCEDURE — 36415 COLL VENOUS BLD VENIPUNCTURE: CPT | Performed by: HOSPITALIST

## 2022-01-13 PROCEDURE — 99222 1ST HOSP IP/OBS MODERATE 55: CPT | Mod: 25 | Performed by: PHYSICIAN ASSISTANT

## 2022-01-13 PROCEDURE — 88305 TISSUE EXAM BY PATHOLOGIST: CPT | Mod: TC | Performed by: HOSPITALIST

## 2022-01-13 PROCEDURE — 250N000012 HC RX MED GY IP 250 OP 636 PS 637: Performed by: HOSPITALIST

## 2022-01-13 PROCEDURE — 210N000002 HC R&B HEART CARE

## 2022-01-13 PROCEDURE — 0W9B3ZZ DRAINAGE OF LEFT PLEURAL CAVITY, PERCUTANEOUS APPROACH: ICD-10-PCS | Performed by: RADIOLOGY

## 2022-01-13 PROCEDURE — 250N000011 HC RX IP 250 OP 636: Performed by: HOSPITALIST

## 2022-01-13 PROCEDURE — 93306 TTE W/DOPPLER COMPLETE: CPT | Mod: 26 | Performed by: INTERNAL MEDICINE

## 2022-01-13 PROCEDURE — 999N000208 ECHOCARDIOGRAM COMPLETE

## 2022-01-13 PROCEDURE — 99232 SBSQ HOSP IP/OBS MODERATE 35: CPT | Performed by: HOSPITALIST

## 2022-01-13 PROCEDURE — 250N000013 HC RX MED GY IP 250 OP 250 PS 637: Performed by: HOSPITALIST

## 2022-01-13 PROCEDURE — 84155 ASSAY OF PROTEIN SERUM: CPT | Performed by: HOSPITALIST

## 2022-01-13 PROCEDURE — 272N000706 US THORACENTESIS

## 2022-01-13 PROCEDURE — 80048 BASIC METABOLIC PNL TOTAL CA: CPT | Performed by: HOSPITALIST

## 2022-01-13 PROCEDURE — 255N000002 HC RX 255 OP 636: Performed by: HOSPITALIST

## 2022-01-13 PROCEDURE — 89051 BODY FLUID CELL COUNT: CPT | Performed by: HOSPITALIST

## 2022-01-13 RX ORDER — LIDOCAINE 40 MG/G
CREAM TOPICAL
Status: DISCONTINUED | OUTPATIENT
Start: 2022-01-13 | End: 2022-01-13

## 2022-01-13 RX ORDER — LIDOCAINE HYDROCHLORIDE 10 MG/ML
10 INJECTION, SOLUTION EPIDURAL; INFILTRATION; INTRACAUDAL; PERINEURAL ONCE
Status: COMPLETED | OUTPATIENT
Start: 2022-01-13 | End: 2022-01-13

## 2022-01-13 RX ORDER — AMLODIPINE BESYLATE 10 MG/1
10 TABLET ORAL DAILY
Status: DISCONTINUED | OUTPATIENT
Start: 2022-01-13 | End: 2022-01-15 | Stop reason: HOSPADM

## 2022-01-13 RX ORDER — ALLOPURINOL 100 MG/1
100 TABLET ORAL DAILY
Status: DISCONTINUED | OUTPATIENT
Start: 2022-01-13 | End: 2022-01-15 | Stop reason: HOSPADM

## 2022-01-13 RX ORDER — LEVOTHYROXINE SODIUM 25 UG/1
25 TABLET ORAL DAILY
Status: DISCONTINUED | OUTPATIENT
Start: 2022-01-13 | End: 2022-01-15 | Stop reason: HOSPADM

## 2022-01-13 RX ORDER — BUMETANIDE 0.25 MG/ML
1 INJECTION INTRAMUSCULAR; INTRAVENOUS EVERY 12 HOURS
Status: DISCONTINUED | OUTPATIENT
Start: 2022-01-13 | End: 2022-01-14

## 2022-01-13 RX ORDER — PANTOPRAZOLE SODIUM 40 MG/1
40 TABLET, DELAYED RELEASE ORAL DAILY
Status: DISCONTINUED | OUTPATIENT
Start: 2022-01-13 | End: 2022-01-15 | Stop reason: HOSPADM

## 2022-01-13 RX ORDER — FERROUS SULFATE 325(65) MG
325 TABLET ORAL
Status: DISCONTINUED | OUTPATIENT
Start: 2022-01-13 | End: 2022-01-15 | Stop reason: HOSPADM

## 2022-01-13 RX ORDER — PREDNISONE 5 MG/1
5 TABLET ORAL DAILY
Status: DISCONTINUED | OUTPATIENT
Start: 2022-01-13 | End: 2022-01-15 | Stop reason: HOSPADM

## 2022-01-13 RX ORDER — HYDROXYCHLOROQUINE SULFATE 200 MG/1
200 TABLET, FILM COATED ORAL DAILY
Status: DISCONTINUED | OUTPATIENT
Start: 2022-01-13 | End: 2022-01-15 | Stop reason: HOSPADM

## 2022-01-13 RX ADMIN — HYDRALAZINE HYDROCHLORIDE 100 MG: 50 TABLET, FILM COATED ORAL at 08:44

## 2022-01-13 RX ADMIN — MIRTAZAPINE 15 MG: 15 TABLET, FILM COATED ORAL at 21:58

## 2022-01-13 RX ADMIN — HYDRALAZINE HYDROCHLORIDE 100 MG: 50 TABLET, FILM COATED ORAL at 21:58

## 2022-01-13 RX ADMIN — AMLODIPINE BESYLATE 10 MG: 10 TABLET ORAL at 08:44

## 2022-01-13 RX ADMIN — FUROSEMIDE 40 MG: 10 INJECTION, SOLUTION INTRAVENOUS at 08:44

## 2022-01-13 RX ADMIN — PREDNISONE 5 MG: 5 TABLET ORAL at 08:43

## 2022-01-13 RX ADMIN — SODIUM BICARBONATE 650 MG TABLET 650 MG: at 21:56

## 2022-01-13 RX ADMIN — BUMETANIDE 1 MG: 0.25 INJECTION, SOLUTION INTRAMUSCULAR; INTRAVENOUS at 17:06

## 2022-01-13 RX ADMIN — Medication 1 TABLET: at 08:44

## 2022-01-13 RX ADMIN — APIXABAN 2.5 MG: 2.5 TABLET, FILM COATED ORAL at 08:44

## 2022-01-13 RX ADMIN — FERROUS SULFATE TAB 325 MG (65 MG ELEMENTAL FE) 325 MG: 325 (65 FE) TAB at 08:43

## 2022-01-13 RX ADMIN — METOPROLOL TARTRATE 25 MG: 25 TABLET, FILM COATED ORAL at 08:43

## 2022-01-13 RX ADMIN — PANTOPRAZOLE SODIUM 40 MG: 40 TABLET, DELAYED RELEASE ORAL at 08:43

## 2022-01-13 RX ADMIN — SENNOSIDES AND DOCUSATE SODIUM 1 TABLET: 50; 8.6 TABLET ORAL at 21:57

## 2022-01-13 RX ADMIN — CLONIDINE HYDROCHLORIDE 0.1 MG: 0.1 TABLET ORAL at 08:44

## 2022-01-13 RX ADMIN — HYDRALAZINE HYDROCHLORIDE 100 MG: 50 TABLET, FILM COATED ORAL at 17:06

## 2022-01-13 RX ADMIN — SODIUM BICARBONATE 650 MG TABLET 650 MG: at 08:44

## 2022-01-13 RX ADMIN — ALLOPURINOL 100 MG: 100 TABLET ORAL at 08:45

## 2022-01-13 RX ADMIN — CLONIDINE HYDROCHLORIDE 0.1 MG: 0.1 TABLET ORAL at 21:57

## 2022-01-13 RX ADMIN — LIDOCAINE HYDROCHLORIDE 10 ML: 10 INJECTION, SOLUTION EPIDURAL; INFILTRATION; INTRACAUDAL; PERINEURAL at 15:24

## 2022-01-13 RX ADMIN — LEVOTHYROXINE SODIUM 25 MCG: 25 TABLET ORAL at 08:44

## 2022-01-13 RX ADMIN — HYDRALAZINE HYDROCHLORIDE 100 MG: 50 TABLET, FILM COATED ORAL at 13:35

## 2022-01-13 RX ADMIN — SENNOSIDES AND DOCUSATE SODIUM 1 TABLET: 50; 8.6 TABLET ORAL at 08:44

## 2022-01-13 RX ADMIN — HUMAN ALBUMIN MICROSPHERES AND PERFLUTREN 9 ML: 10; .22 INJECTION, SOLUTION INTRAVENOUS at 10:13

## 2022-01-13 RX ADMIN — HYDROXYCHLOROQUINE SULFATE 200 MG: 200 TABLET ORAL at 08:44

## 2022-01-13 ASSESSMENT — ACTIVITIES OF DAILY LIVING (ADL)
ADLS_ACUITY_SCORE: 17
ADLS_ACUITY_SCORE: 17
ADLS_ACUITY_SCORE: 19
ADLS_ACUITY_SCORE: 8
ADLS_ACUITY_SCORE: 17
ADLS_ACUITY_SCORE: 14
ADLS_ACUITY_SCORE: 17
ADLS_ACUITY_SCORE: 17
ADLS_ACUITY_SCORE: 18
ADLS_ACUITY_SCORE: 17
ADLS_ACUITY_SCORE: 5
ADLS_ACUITY_SCORE: 17
ADLS_ACUITY_SCORE: 5
ADLS_ACUITY_SCORE: 17
ADLS_ACUITY_SCORE: 19
ADLS_ACUITY_SCORE: 17
ADLS_ACUITY_SCORE: 8
ADLS_ACUITY_SCORE: 17

## 2022-01-13 NOTE — H&P
[facility]  History and Physical   Hospitalist  Mele Leung MD       Ghislaine Walls MRN# [id number]   YOB: 1935 Age: 86 year old      Date of Admission:  1/12/2022         Assessment and Plan:   Ghislaine Walls is a 86 year old female with PMH significant for diastolic CHF, h/o COVID pneumonia (08/2020), paroxysmal atrial fibrillation (on Eliquis), hypertension, stage IV CKD, gout, anemia of chronic disease, hypothyroidism, rheumatoid arthritis was brought to ED with shortness of breath/dyspnea on exertion and hypoxia with exertion.    Acute on chronic diastolic CHF exacerbation  Left pleural effusion  H/o COVID pneumonia (08/2020)--tested negative on admission 1/12/22  paroxysmal atrial fibrillation (on Eliquis)  Accelerated Hypertension  -will admit as inpatient  -BNP on admission elevated at 12,009; chest x-ray noted with vascular congestion suggestive of CHF, also noted by basilar airspace opacities, left>right and small left pleural effusion; BP markedly elevated to 190s on admission  -last echo from 2/21 with EF 60 to 65%, grade 2 diastolic dysfunction; will repeat ECHO  -reports being compliant with her medications including PTA torsemide  -40 mg IV Lasix given in ED; will continue 40 mg IV BID, hold PTA torsemide  -fluid restriction to 1500 ML per day; monitor strict intake and output; daily weight  - saturation 90s while resting, dips down to mid 80s with exertion  -continue PTA Eliquis  -will resume PTA amlodipine, clonidine, hydralazine, Lopressor when verifed by pharmacy; hydralazine IV PRN for SBP >180    Stage IV CKD  -recent baseline creatinine around 1.6 to 2.2; follows-up with neurology as outpatient  - Cr on admission 1.99; will monitor BMP with diuresis  -resume PTA sodium bicarbonate    Anemia of chronic disease  chronic thrombocytopenia  -baseline hemoglobin around 9 to 10, and platelets baseline around 140-160s  -on admission hemoglobin 10.5, platelet  124  -monitor counts    rheumatoid arthritis  Gout  -chronic and stable  -[facility] PTA allopurinol, Plaquenil, prednisone    Hypothyroidism  -continue PT Synthroid    GERD: continue PTA Protonix    Anxiety: continue PTA Remeron    Clinically Significant Risk Factors Present on Admission             # Coagulation Defect: home medication list includes an anticoagulant medication  # Thrombocytopenia: Plts = 124 10e3/uL (Ref range: 150 - 450 10e3/uL) on admission, will monitor for bleeding          COVID status: H/o COVID pneumonia (08/2020)--tested negative on admission 1/12/22    # DVT prophylaxis: on Eliquis as above  # Code status: DNR/ DNI      Care plan discussed with ED provider, patient and her daughter present in room           Primary Care Physician:   Laurie Conteh [phone]         Chief Complaint:     Dyspnea on exertion, weight gain    History is obtained from the patient and her daughter present in room         History of Present Illness:     Ghislaine Walls is a 86 year old female with PMH significant for diastolic CHF, h/o COVID pneumonia (08/2020), paroxysmal atrial fibrillation (on Eliquis), hypertension, stage IV CKD, gout, anemia of chronic disease, hypothyroidism, rheumatoid arthritis was brought to ED with shortness of breath/dyspnea on exertion and hypoxia with exertion. She was last admitted in April 2021 with diastolic CHF exacerbation and probable CAP. She resides at assisted living facility at [place], reports being compliant with her medications including diuretics. Does have some chronic TOBAR but over the past few days has been feeling more short of breath , needing oxygen with exertion and reports about 10 to 12 pounds weight gain recently.     In ED patient was seen by Dr Montero; she was noted with markedly elevated blood pressure in 190s, elevated BNP, chest x-ray with suggestion of fluid overload; she was given 40 mg IV Lasix and hospitalist was requested admission for further  evaluation.    The patient denies any fever, chills, rigors, chest pain or shortness of breath.  Denies pain abdomen.  No bowel or bladder disturbances.           Past Medical History:     diastolic CHF  h/o COVID pneumonia (08/2020)  paroxysmal atrial fibrillation (on Eliquis)  Hypertension  stage IV CKD  Gout  anemia of chronic disease  Hypothyroidism  rheumatoid arthritis          Past Surgical History:     Past Surgical History:   Procedure Laterality Date     APPENDECTOMY  1951     BONE MARROW BIOPSY, BONE SPECIMEN, NEEDLE/TROCAR N/A 7/31/2020    Procedure: bone marrow biopsy;  Surgeon: Iris Cameron MD;  Location:  GI     CATARACT IOL, RT/LT  2001    bilateral (laser - 5/2013)     DENTAL SURGERY  1962    [facility]     ESOPHAGOSCOPY, GASTROSCOPY, DUODENOSCOPY (EGD), COMBINED N/A 7/24/2020    Procedure: ESOPHAGOGASTRODUODENOSCOPY, WITH BIOPSY;  Surgeon: Humberto Bailon MD;  Location:  GI     EYE SURGERY  1956    Muscle release     TUBAL LIGATION  1971              Home Medications:     Prior to Admission Medications   Prescriptions Last Dose Informant Patient Reported? Taking?   Abatacept (ORENCIA IV)   Yes No   Sig: Inject 500 mg into the vein every 30 days    Darbepoetin Alonso-Albumin (ARANESP IJ)   Yes No   Sig: Monthly as needed   Ferrous Gluconate 324 (37.5 Fe) MG TABS   Yes No   Sig: Take by mouth daily    acetaminophen (TYLENOL) 500 MG tablet  Daughter Yes No   Sig: Take 1,000 mg by mouth 3 times daily as needed    allopurinol (ZYLOPRIM) 100 MG tablet   No No   Sig: Take 1 tablet (100 mg) by mouth daily   amLODIPine (NORVASC) 10 MG tablet   No No   Sig: Take 1 tablet (10 mg) by mouth daily   apixaban ANTICOAGULANT (ELIQUIS) 2.5 MG tablet   No No   Sig: Take 1 tablet (2.5 mg) by mouth 2 times daily   calcium carbonate-vitamin D (OS-ALIVIA) 500-400 MG-UNIT tablet   Yes No   Sig: Take 1 tablet by mouth daily 1000 vit d, calcuim 600    cloNIDine (CATAPRES) 0.1 MG tablet   No No   Sig: Take 1 tablet  (0.1 mg) by mouth 2 times daily   hydrALAZINE (APRESOLINE) 100 MG tablet   No No   Sig: Take 1 tablet by mouth 4 times daily.   hydroxychloroquine (PLAQUENIL) 200 MG tablet   Yes No   Sig: Take 200 mg by mouth daily   levothyroxine (SYNTHROID/LEVOTHROID) 25 MCG tablet   No No   Sig: Take 1 tablet (25 mcg) by mouth daily   melatonin 5 MG tablet  Nursing Home Yes No   Sig: Take 10 mg by mouth nightly as needed for sleep    metoprolol tartrate (LOPRESSOR) 25 MG tablet   Yes No   Sig: Take 25 mg by mouth 2 times daily   mirtazapine (REMERON) 15 MG tablet   No No   Sig: Take 1 tablet by mouth daily At Bedtime.   ondansetron (ZOFRAN-ODT) 4 MG ODT tab  Daughter Yes No   Sig: Take 4 mg by mouth every 8 hours as needed for nausea   pantoprazole (PROTONIX) 40 MG EC tablet   No No   Sig: Take 1 tablet (40 mg) by mouth daily   predniSONE (DELTASONE) 5 MG tablet   No No   Sig: Take 1 tablet (5 mg) by mouth daily   Patient taking differently: Take 5 mg by mouth daily Week of 6/6/21 3 mg daily  Week of 6/13/21 2 mg daily  Weekj of 6/20/21 1 mg daily  Done on 6/27/21   sodium bicarbonate 650 MG tablet   Yes No   Sig: Take 650 mg by mouth 2 times daily    torsemide (DEMADEX) 20 MG tablet   No No   Sig: Take 1 tablet (20 mg) by mouth daily      Facility-Administered Medications: None            Allergies:     Allergies   Allergen Reactions     Oxybutynin Itching     Seasonal Allergies             Social History:   Ghislaine Walls  reports that she quit smoking about 49 years ago. Her smoking use included cigarettes. She has a 2.50 pack-year smoking history. She has never used smokeless tobacco. She reports current alcohol use. She reports that she does not use drugs.              Family History:   Ghislaine Walls family history includes Coronary Artery Disease in her brother and brother; Heart Disease in her brother, brother, father, and mother; Respiratory in her mother.    Family history was reviewed by myself and not pertinent  to current presentation.           Review of Systems:   A10 point Review of Systems was done and were negative other than noted in the HPI.             Physical Exam:   Blood pressure (!) 195/82, pulse 65, temperature 98  F (36.7  C), temperature source Oral, resp. rate 22, weight 64.1 kg (141 lb 4 oz), SpO2 95 %, not currently breastfeeding.  141 lbs 4 oz        Constitutional: Alert, awake and oriented X 3; lying comfortably in bed in no apparent distress   HEENT: Pupils equal and reactive to light and accomodation, EOMI intact; neck supple no raised JVD or rigidity    Oral cavity: Moist mucosa   Cardiovascular: Normal s1 s2, regular rate and rhythm, no murmur   Lungs: B/l crepts +; decreased breath sounds on bases; no wheezes    Abdomen: Soft, nt, nd, no guarding, rigidity or rebound; BS +   LE : B/l edema ++, left>right   Musculoskeletal: Power 5/5 in all extremities   Neuro: No focal neurological deficits noted, CN II to XII grossly intact   Psychiatry: normal mood and affect  Skin: No obvious skin rashes or ulcers             Data:   All new lab and imaging data was reviewed in Epic.   Significant labs and imagings include:    CMP notable for BUN 43, creatinine 1.99  BNP 75535  CBC with hemoglobin 10.5, platelet 124  negative Influenza and COVID  EKG revealed by me shows sinus bradycardia  chest x-ray by myself, noted with likely pul edema:  IMPRESSION: Cardiomegaly and vascular congestion are suggestive of  CHF. Bibasilar airspace opacities, left worse than right, could  represent atelectasis or pneumonia. Small left pleural effusion.                Mele Leung MD  Hospitalist

## 2022-01-13 NOTE — CONSULTS
[facility] Cardiology Consultation     Date of Admission:  1/12/2022  Date of Consult: 01/13/22  Requesting Physician: Dr. Short  Primary care physician: Laurie Conteh  Primary cardiologist: Dr. Fuentes  Staff Cardiologist:  Dr. Montes     Reason for consult: Acute on chronic heart failure    Assessment:   Ghislaine Walls is a 86 year old female who was admitted on 1/12/2022 with acute on chronic hypoxic respiratory failure, imaging and history suggestive of acute HFpEF without a clear trigger.     1. Acute hypoxic respiratory failure.   2. Acute on chronic HFpEF.   3. Normal biventricular function and no significant VHD.  4. L Pleural effusion with minimal fluid removed on 1/13 thoracentesis.  5. Negative troponin x 1.  6. Chronic advanced renal disease, baseline creat ~2.   7. Chronic stable anemia.   8. Hypertension, mildly uncontrolled.   9. PAF, currently sinus bradycardia. Anticoagulated.     Plan:  1. Agree with IV diuresis. Ultimate transition to torsemide 40 mg daily (on 20 mg PTA).  2. Monitor I/O's, daily weights.   3. Continue tele monitoring.   4. No other cardiology recommendations at this time.     Thank you very much for this consultation. We will follow.     Daphne Cm PA-C  [facility]  Pager: [phone]  Text Page  (7:30am - 4pm M-F)   ________________________________________________________________________  History of Present Illness   Ghislaine Walls is a 86 year old female with a history of PAF anticoagulated with Eliquis, HFpEF, hypertension, stage IV CKD, hypothyroidism, chronic anemia with MGUS, history of COVID-19 infection and COVID PNA in 2020, and chronic fatigue who presents with acute hypoxic respiratory failure.    She presented to the ER yesterday with progressive exertional dyspnea and a 10 pound weight gain, despite reported compliance with her cardiac medications.  She was found to be markedly hypertensive and mildly hypoxic  with pulmonary vascular congestion with a left-sided pleural effusion on x-ray and a proBNP of 12,000.  She was given IV Lasix and admitted for further evaluation and management.  Given lack of significant output, she was switched to IV Bumex 1 mg twice daily today.  Also today, she underwent thoracentesis with just 250 cc of alejandro fluid removed.  Echocardiogram reveals normal biventricular size and function and no significant VHD.  The IVC appears normal in size.  E/E' average is 18, suggestive of diastolic dysfunction. BUN and creatinine are stable at 44/2. Tele shows SR/sinus bradycardia, HR's 50's-60's. BP is now better-controlled in the 140's/60's on her PTA meds.     At present, she says her breathing has started to improve. Denies chest pain, palpitations, cough, fevers/chills. Has a scale at [place] but doesn't monitor her weights routinely. Denies eating high sodium foods but admits to drinking a lot of fluid. Has had admissions with volume overload in the past in settings of med non-compliance, rapid AF and uncontrolled HTN. States compliance with her meds recently.   _____________________________________________________________  Code Status    No CPR- Do NOT Intubate    Past Medical History   I have reviewed this patient's medical history and updated it with pertinent information if needed.   Past Medical History:   Diagnosis Date     Atrial fibrillation (H) new 10/19     [place] (hard of hearing)     wears hearing aids     Hyperlipidaemia      Hyperlipidaemia LDL goal < 130      Hypertension      Hypothyroid      PAD (peripheral artery disease) (H)      Renal insufficiency, mild      Uncontrolled hypertension 10/14/2019       Past Surgical History   I have reviewed this patient's surgical history and updated it with pertinent information if needed.  Past Surgical History:   Procedure Laterality Date     APPENDECTOMY  1951     BONE MARROW BIOPSY, BONE SPECIMEN, NEEDLE/TROCAR N/A 7/31/2020    Procedure: bone  marrow biopsy;  Surgeon: Iris Cameron MD;  Location:  GI     CATARACT IOL, RT/LT  2001    bilateral (laser - 5/2013)     DENTAL SURGERY  1962    [facility]     ESOPHAGOSCOPY, GASTROSCOPY, DUODENOSCOPY (EGD), COMBINED N/A 7/24/2020    Procedure: ESOPHAGOGASTRODUODENOSCOPY, WITH BIOPSY;  Surgeon: Humberto Bailon MD;  Location:  GI     EYE SURGERY  1956    Muscle release     TUBAL LIGATION  1971       Prior to Admission Medications   Prior to Admission Medications   Prescriptions Last Dose Informant Patient Reported? Taking?   Abatacept (ORENCIA IV) Past Month at Unknown time  Yes Yes   Sig: Inject 500 mg into the vein every 30 days    Darbepoetin Alonso-Albumin (ARANESP IJ) Past Month at Unknown time  Yes Yes   Sig: Monthly as needed   acetaminophen (TYLENOL) 500 MG tablet Past Week at Unknown time Daughter Yes Yes   Sig: Take 1,000 mg by mouth nightly as needed    allopurinol (ZYLOPRIM) 100 MG tablet 1/12/2022 at am  No Yes   Sig: Take 1 tablet (100 mg) by mouth daily   amLODIPine (NORVASC) 10 MG tablet 1/12/2022 at am  No Yes   Sig: Take 1 tablet (10 mg) by mouth daily   apixaban ANTICOAGULANT (ELIQUIS) 2.5 MG tablet 1/12/2022 at am x1  No Yes   Sig: Take 1 tablet (2.5 mg) by mouth 2 times daily   calcium carbonate 600 mg-vitamin D 400 units (CALTRATE) 600-400 MG-UNIT per tablet 1/12/2022 at am  Yes Yes   Sig: Take 1 tablet by mouth daily 1000 vit d, calcuim 600    cloNIDine (CATAPRES) 0.1 MG tablet 1/12/2022 at am x1  No Yes   Sig: Take 1 tablet (0.1 mg) by mouth 2 times daily   ferrous sulfate (FEROSUL) 325 (65 Fe) MG tablet 1/12/2022 at am  Yes Yes   Sig: Take 325 mg by mouth daily (with breakfast)   hydrALAZINE (APRESOLINE) 100 MG tablet 1/12/2022 at am x1  No Yes   Sig: Take 1 tablet by mouth 4 times daily.   hydroxychloroquine (PLAQUENIL) 200 MG tablet 1/12/2022 at am  Yes Yes   Sig: Take 200 mg by mouth daily   levothyroxine (SYNTHROID/LEVOTHROID) 25 MCG tablet 1/12/2022 at am  No Yes   Sig: Take 1  tablet (25 mcg) by mouth daily   melatonin 5 MG tablet  at prn Nursing Home Yes Yes   Sig: Take 10 mg by mouth nightly as needed for sleep    metoprolol tartrate (LOPRESSOR) 50 MG tablet 1/12/2022 at am x1  Yes Yes   Sig: Take 25 mg by mouth 2 times daily    mirtazapine (REMERON) 15 MG tablet 1/11/2022 at Unknown time  No Yes   Sig: Take 1 tablet by mouth daily At Bedtime.   ondansetron (ZOFRAN-ODT) 4 MG ODT tab  at prn Daughter Yes Yes   Sig: Take 4 mg by mouth every 8 hours as needed for nausea   pantoprazole (PROTONIX) 40 MG EC tablet 1/12/2022 at am  No Yes   Sig: Take 1 tablet (40 mg) by mouth daily   predniSONE (DELTASONE) 5 MG tablet 1/12/2022 at am  No Yes   Sig: Take 1 tablet (5 mg) by mouth daily   sodium bicarbonate 650 MG tablet 1/12/2022 at am x1  Yes Yes   Sig: Take 650 mg by mouth 2 times daily    torsemide (DEMADEX) 20 MG tablet 1/12/2022 at am  No Yes   Sig: Take 1 tablet (20 mg) by mouth daily      Facility-Administered Medications: None     Allergies   Allergies   Allergen Reactions     Oxybutynin Itching     Seasonal Allergies        Social History   I have reviewed this patient's social history and updated it with pertinent information if needed. Ghislaine Walls  reports that she quit smoking about 49 years ago. Her smoking use included cigarettes. She has a 2.50 pack-year smoking history. She has never used smokeless tobacco. She reports current alcohol use. She reports that she does not use drugs.    Family History   I have reviewed this patient's family history and updated it with pertinent information if needed.   Family History   Problem Relation Age of Onset     Heart Disease Mother      Respiratory Mother      Heart Disease Father      Heart Disease Brother      Coronary Artery Disease Brother         CAB     Heart Disease Brother      Coronary Artery Disease Brother         CAB       Review of Systems   The 10 point Review of Systems is negative other than noted in the HPI or here.      Physical Exam   Temp: 99.2  F (37.3  C) Temp src: Oral BP: (!) 146/60 Pulse: 54   Resp: 20 SpO2: 95 % O2 Device: None (Room air) Oxygen Delivery: 2 LPM  Vital Signs with Ranges  Temp:  [98.2  F (36.8  C)-99.2  F (37.3  C)] 99.2  F (37.3  C)  Pulse:  [51-72] 54  Resp:  [16-28] 20  BP: (133-196)/(45-83) 146/60  SpO2:  [90 %-97 %] 95 %  136 lbs 14.4 oz    GENERAL:  Alert, oriented, in no apparent distress.   HEENT: NC/AT, sclera non-icteric, teeth in normal repair   NECK: CVP appears normal, no masses or thyromegaly, no carotid bruits  PULMONARY:  There is a normal respiratory effort. Bibasilar crackles.  CHEST: Non-tender, normal A/P diameter  CARDIOVASCULAR:  RRR, normal S1 S2, grade 3/6 sm at the LSB.  GI:  Non tender abdomen with normoactive bowel sounds and no hepatosplenomegaly. There are no masses palpable.   EXTREMITIES:  1+ bilateral LE edema  VASCULAR: 2+ Pulses bilaterally in upper and lower extremities   NEURO: No gross motor or sensory deficits.   PSYCH: Appropriate affect  DERM: No rashes or lesions    Data   Most Recent 3 CBC's:Recent Labs   Lab Test 01/12/22  1249 06/07/21  1615 04/26/21  0900   WBC 5.6 3.5* 2.8*   HGB 10.5* 9.8* 8.8*   * 100 102*   * 160 194     Most Recent 3 BMP's:Recent Labs   Lab Test 01/13/22  1427 01/13/22  0612 01/12/22  2345 01/12/22  1249 10/05/21  1459   NA  --  143  --  140 142   POTASSIUM 4.0 3.8 4.2 4.6 4.2   CHLORIDE  --  111*  --  110* 110*   CO2  --  25  --  24 24   BUN  --  44*  --  43* 57*   CR  --  2.01*  --  1.99* 2.28*   ANIONGAP  --  7  --  6 8   ALIVIA  --  9.1  --  9.8 9.0   GLC  --  73  --  99 140*     Most Recent 3 BNP's:Recent Labs   Lab Test 01/12/22  1249 06/07/21  1615 04/15/21  1139 03/02/21  0701 09/30/19  2310 06/14/19  1331 06/08/19  1031   NTBNPI 12,009* 8,504* 21,879*  --    < >  --   --    NTBNP  --   --   --  10,548*  --  2,800* 2,104*    < > = values in this interval not displayed.

## 2022-01-13 NOTE — PHARMACY-ADMISSION MEDICATION HISTORY
Pharmacy Medication History  Admission medication history interview status for the 1/12/2022  admission is complete. See EPIC admission navigator for prior to admission medications     Location of Interview: Patient room  Medication history sources: Patient, Patient's family/friend (daughter), Surescripts and chart review    Significant changes made to the medication list:  Patient was told to decrease metoprolol to 25 mg BID, currently cutting 50 mg tablet in half.     In the past week, patient estimated taking medication this percent of the time: greater than 90%    Additional medication history information:   None    Medication reconciliation completed by provider prior to medication history? No    Time spent in this activity: 15 min     Prior to Admission medications    Medication Sig Last Dose Taking? Auth Provider   Abatacept (ORENCIA IV) Inject 500 mg into the vein every 30 days  Past Month at Unknown time Yes Unknown, Entered By History   acetaminophen (TYLENOL) 500 MG tablet Take 1,000 mg by mouth nightly as needed  Past Week at Unknown time Yes Unknown, Entered By History   allopurinol (ZYLOPRIM) 100 MG tablet Take 1 tablet (100 mg) by mouth daily 1/12/2022 at am Yes Laurie Conteh MD   amLODIPine (NORVASC) 10 MG tablet Take 1 tablet (10 mg) by mouth daily 1/12/2022 at am Yes Laurie Conteh MD   apixaban ANTICOAGULANT (ELIQUIS) 2.5 MG tablet Take 1 tablet (2.5 mg) by mouth 2 times daily 1/12/2022 at am x1 Yes Laurie Conteh MD   calcium carbonate 600 mg-vitamin D 400 units (CALTRATE) 600-400 MG-UNIT per tablet Take 1 tablet by mouth daily 1000 vit d, calcuim 600  1/12/2022 at am Yes Reported, Patient   cloNIDine (CATAPRES) 0.1 MG tablet Take 1 tablet (0.1 mg) by mouth 2 times daily 1/12/2022 at am x1 Yes Laurie Conteh MD   Darbepoetin Alonso-Albumin (ARANESP IJ) Monthly as needed Past Month at Unknown time Yes Unknown, Entered By History   ferrous sulfate (FEROSUL) 325 (65 Fe) MG tablet  Take 325 mg by mouth daily (with breakfast) 1/12/2022 at am Yes Unknown, Entered By History   hydrALAZINE (APRESOLINE) 100 MG tablet Take 1 tablet by mouth 4 times daily. 1/12/2022 at am x1 Yes Laurie Conteh MD   hydroxychloroquine (PLAQUENIL) 200 MG tablet Take 200 mg by mouth daily 1/12/2022 at am Yes Reported, Patient   levothyroxine (SYNTHROID/LEVOTHROID) 25 MCG tablet Take 1 tablet (25 mcg) by mouth daily 1/12/2022 at am Yes Laurie Conteh MD   melatonin 5 MG tablet Take 10 mg by mouth nightly as needed for sleep   at prn Yes Unknown, Entered By History   metoprolol tartrate (LOPRESSOR) 50 MG tablet Take 25 mg by mouth 2 times daily  1/12/2022 at am x1 Yes Reported, Patient   mirtazapine (REMERON) 15 MG tablet Take 1 tablet by mouth daily At Bedtime. 1/11/2022 at Unknown time Yes Laurie Conteh MD   ondansetron (ZOFRAN-ODT) 4 MG ODT tab Take 4 mg by mouth every 8 hours as needed for nausea  at prn Yes Unknown, Entered By History   pantoprazole (PROTONIX) 40 MG EC tablet Take 1 tablet (40 mg) by mouth daily 1/12/2022 at am Yes Laurie Conteh MD   predniSONE (DELTASONE) 5 MG tablet Take 1 tablet (5 mg) by mouth daily 1/12/2022 at am Yes Eric Wills NP   sodium bicarbonate 650 MG tablet Take 650 mg by mouth 2 times daily  1/12/2022 at am x1 Yes Reported, Patient   torsemide (DEMADEX) 20 MG tablet Take 1 tablet (20 mg) by mouth daily 1/12/2022 at am Yes Loretta Huddleston APRN CNP       The information provided in this note is only as accurate as the sources available at the time of update(s)

## 2022-01-13 NOTE — PROGRESS NOTES
[facility]    Hospitalist Progress Note      Assessment & Plan   Ghislaine Walls is a 86 year old female who was admitted on 1/12/2022 with shortness of breath/dyspnea on exertion and hypoxia with exertion.    Acute on chronic diastolic CHF exacerbation  Left pleural effusion  H/o COVID pneumonia (08/2020)--tested negative on admission 1/12/22  Paroxysmal atrial fibrillation (on Eliquis)  Accelerated Hypertension  BNP on admission elevated at 12,009; chest x-ray noted with vascular congestion suggestive of CHF, also noted by basilar airspace opacities, left>right and small left pleural effusion; BP markedly elevated to 190s on admission  * Echo from 2/21 with EF 60 to 65%, grade 2 diastolic dysfunction; repeat echo 1/13 unchanged.  * left sided thoracentesis performed on 1/13 with 250ml removed  -reports being compliant with her medications including PTA torsemide  - PTA amlodipine, clonidine, hydralazine, lopressor resumed  -40 mg IV Lasix initially, but not much urine output--switch to IV bumex 1mg q12h and monitor  -fluid restriction to 1500 ML per day, patient reports drinking as much water as she can at home.  - monitor strict intake and output; daily weight  - saturation 90s while resting, dips down to mid 80s with exertion  - continue PTA Eliquis  - PT eval  - she does not tolerate compression stockings  - cardiology consult given CORE involvement in the past     Stage IV CKD  Recent baseline creatinine around 1.6 to 2.2; follows-up with nephrology as outpatient  - Cr on admission 1.99; monitor BMP with diuresis  - resume PTA sodium bicarbonate     Anemia of chronic disease  chronic thrombocytopenia  -baseline hemoglobin around 9 to 10, and platelets baseline around 140-160s  -on admission hemoglobin 10.5, platelet 124  -monitor counts     Rheumatoid arthritis  Gout  -chronic and stable  -resume PTA allopurinol, Plaquenil, prednisone     Hypothyroidism  -continue PT  Synthroid     GERD: continue PTA Protonix     Anxiety: continue PTA Remeron    Clinically Significant Risk Factors Present on Admission             # Coagulation Defect: home medication list includes an anticoagulant medication  # Thrombocytopenia: Plts = 124 10e3/uL (Ref range: 150 - 450 10e3/uL) on admission, will monitor for bleeding         DVT Prophylaxis: DOAC  Code Status: No CPR- Do NOT Intubate  Expected Discharge: 01/15/2022     Anticipated discharge location:  Awaiting care coordination huddle  Delays:     Administering IV medications  Oxygen Needs   pending stable respiratory status, able to ambulate without dyspnea       Karol Short, DO  Hospitalist Service  [facility]  Securely message with the Vocera Web Console (learn more here)  Text Page (7am - 6pm) via SpiderSuite Paging/Directory      Interval History   Patient seen and examined. She was weaned off oxygen at rest. She is resting comfortably, but did not have great output after IV lasix earlier today. Discussed fluid restriction and she states that she had been trying to drink as much as possible. Agreeable to thoracentesis to see if it could help her dyspnea.    -Data reviewed today: I reviewed all new labs and imaging results over the last 24 hours. I personally reviewed no images or EKG's today.    Physical Exam   Temp: 99.2  F (37.3  C) Temp src: Oral BP: (!) 146/60 Pulse: 54   Resp: 20 SpO2: 95 % O2 Device: None (Room air) Oxygen Delivery: 2 LPM  Vitals:    01/12/22 1636 01/13/22 0128   Weight: 64.1 kg (141 lb 4 oz) 62.1 kg (136 lb 14.4 oz)     Vital Signs with Ranges  Temp:  [98.2  F (36.8  C)-99.2  F (37.3  C)] 99.2  F (37.3  C)  Pulse:  [51-72] 54  Resp:  [16-28] 20  BP: (133-196)/(45-83) 146/60  SpO2:  [90 %-97 %] 95 %  I/O last 3 completed shifts:  In: -   Out: 500 [Urine:500]    Constitutional: Awake, alert, cooperative, no apparent distress, overall fatigued which she says is her baseline  Respiratory: Crackles  left base, none on right.  Cardiovascular: Regular rhythm, rate near 50, normal S1 and S2, and no murmur noted  GI: Normal bowel sounds, soft, non-distended, non-tender  Skin/Integumen: No rashes, no cyanosis, trace lower extremity edema  Other:     Medications     - MEDICATION INSTRUCTIONS -         allopurinol  100 mg Oral Daily     amLODIPine  10 mg Oral Daily     apixaban ANTICOAGULANT  2.5 mg Oral BID     bumetanide  1 mg Intravenous Q12H     calcium carbonate 600 mg-vitamin D 400 units  1 tablet Oral Daily     cloNIDine  0.1 mg Oral BID     ferrous sulfate  325 mg Oral Daily with breakfast     hydrALAZINE  100 mg Oral 4x Daily     hydroxychloroquine  200 mg Oral Daily     levothyroxine  25 mcg Oral Daily     metoprolol tartrate  25 mg Oral BID     mirtazapine  15 mg Oral At Bedtime     pantoprazole  40 mg Oral Daily     predniSONE  5 mg Oral Daily     senna-docusate  1 tablet Oral BID    Or     senna-docusate  2 tablet Oral BID     sodium bicarbonate  650 mg Oral BID     sodium chloride (PF)  3 mL Intracatheter Q8H       Data   Recent Labs   Lab 01/13/22  1427 01/13/22  0612 01/12/22  2345 01/12/22  1249   WBC  --   --   --  5.6   HGB  --   --   --  10.5*   MCV  --   --   --  104*   PLT  --   --   --  124*   NA  --  143  --  140   POTASSIUM 4.0 3.8 4.2 4.6   CHLORIDE  --  111*  --  110*   CO2  --  25  --  24   BUN  --  44*  --  43*   CR  --  2.01*  --  1.99*   ANIONGAP  --  7  --  6   ALIVIA  --  9.1  --  9.8   GLC  --  73  --  99   PROTTOTAL 6.6*  --   --   --        Recent Results (from the past 24 hour(s))   Chest XR,  PA & LAT    Narrative    CHEST TWO VIEWS 1/12/2022 4:28 PM     HISTORY: SOB.    COMPARISON: 6/7/2021    FINDINGS: Abnormal airspace opacity left lung base associated with  small effusion. There is also abnormal opacity at the right  costophrenic angle. No pneumothorax. Heart size enlarged but similar  to prior. Vascular congestion noted.       Impression    IMPRESSION: Cardiomegaly and vascular  congestion are suggestive of  CHF. Bibasilar airspace opacities, left worse than right, could  represent atelectasis or pneumonia. Small left pleural effusion.     GILBERT FRANK MD         SYSTEM ID:  [id number]   Echocardiogram Complete   Result Value    LVEF  55%    Narrative    [id number]  [id number]  [id number]  [id number]^FELIPE^SUSANA^LUIS     [facility]  Echocardiography Laboratory  [address] [place] [address]     Name: ROSIO YOUNG  MRN: [id number]  : 1935  Study Date: 2022 09:33 AM  Age: 86 yrs  Gender: Female  Patient Location: [facility]  Reason For Study: CHF  Ordering Physician: SUSANA WANG  Referring Physician: SUSANA WANG  Performed By: Chel Celis RDCS     BSA: 1.6 m2  Height: 61 in  Weight: 141 lb  HR: 60  BP: 183/74 mmHg  ______________________________________________________________________________  Procedure  Complete Portable Echo Adult. Optison (NDC #7186-2194) given intravenously.  ______________________________________________________________________________  Interpretation Summary     1. The left ventricle is normal in structure, function and size. The visual  ejection fraction is estimated at 55%.  2. The right ventricle is normal in structure, function and size.  3. No significant valve disease.  4. Moderate left pleural effusion     No changes from echo 2021.  ______________________________________________________________________________  Left Ventricle  The left ventricle is normal in structure, function and size. There is  moderate eccentric left ventricular hypertrophy. The visual ejection fraction  is estimated at 55%. Grade I or early diastolic dysfunction. Normal left  ventricular wall motion.     Right Ventricle  The right ventricle is normal in structure, function and size.     Atria  The left atrium is severely dilated. Right atrial size is normal. There is no  atrial shunt seen.     Mitral Valve  There is mild mitral  annular calcification. There is mild (1+) mitral  regurgitation.     Tricuspid Valve  There is mild (1+) tricuspid regurgitation. The right ventricular systolic  pressure is approximated at 33.2 mmHg plus the right atrial pressure.     Aortic Valve  There is mild (1+) aortic regurgitation.     Pulmonic Valve  The pulmonic valve is normal in structure and function.     Vessels  Normal ascending, transverse (arch), and descending aorta. The inferior vena  cava was normal in size with preserved respiratory variability.     Pericardium  There is no pericardial effusion. Moderate left pleural effusion.     Rhythm  Sinus rhythm was noted.  ______________________________________________________________________________  MMode/2D Measurements & Calculations  IVSd: 1.8 cm     LVIDd: 4.4 cm  LVIDs: 2.7 cm  LVPWd: 1.1 cm  FS: 37.6 %  LV mass(C)d: 257.7 grams  LV mass(C)dI: 158.3 grams/m2  Ao root diam: 3.1 cm  LA dimension: 4.2 cm  asc Aorta Diam: 2.9 cm  LA/Ao: 1.4  LA Volume (BP): 105.0 ml  LA Volume Index (BP): 64.4 ml/m2  RWT: 0.49     Doppler Measurements & Calculations  MV E max cheikh: 129.0 cm/sec  MV A max cheikh: 103.7 cm/sec  MV E/A: 1.2  MV max P.0 mmHg  MV mean P.5 mmHg  MV V2 VTI: 41.0 cm  MV dec slope: 544.9 cm/sec2  MV dec time: 0.24 sec  PA acc time: 0.12 sec  TR max cheikh: 287.8 cm/sec  TR max P.2 mmHg  E/E' av.9  Lateral E/e': 18.1  Medial E/e': 41.7     ______________________________________________________________________________  Report approved by: Javier Holt 2022 11:04 AM         US Thoracentesis    Narrative    EXAM:  1. LEFT THORACENTESIS  2. ULTRASOUND GUIDANCE  LOCATION: [facility]  DATE/TIME: 2022 3:43 PM    INDICATION: Pleural effusion.    PROCEDURE: Informed consent obtained. Time out performed. The chest  was prepped and draped in a sterile fashion. 10 mL of 1% lidocaine was  infused into local soft tissues. A 5 Irish catheter system was  introduced into  the pleural effusion under ultrasound guidance.    0.25 liters of clear fluid were removed and sent to lab if requested.    Patient tolerated procedure well.    Ultrasound images have been permanently captured for documentation.      Impression    IMPRESSION:  Status post ultrasound-guided left thoracentesis.    AREN SPIVEY MD         SYSTEM ID:  [id number]

## 2022-01-13 NOTE — PROGRESS NOTES
Thoracentesis: Pt tolerated well.  VSS. 250 cc alejadnro fluid removed without difficulty. Bandaid applied to site - CDI. No CXR post procedure per Dr Carbajal.    7848 Detailed report called to Mary Rose RN.  7454 Pt back to rm [id number] per cart & transport.        Walk in

## 2022-01-13 NOTE — PLAN OF CARE
VSS, no c/o pain, L arnold completed today for 250cc out-site CDI.  Diuresing on IV bumex now, purewick in place.  Plan is to continue to diurese, continue to monitor closely.

## 2022-01-13 NOTE — ED NOTES
Krupa, patient's daughter, would like to be contacted with update on new room assignment when obtained. Debbies contact info- [phone] (home) or [phone] (cell)

## 2022-01-13 NOTE — PLAN OF CARE
Arrived to room 244 from ER at 0115 via cart, transferred to bed via hover mat without difficulty, alert and oriented x 4, oriented to room and call system, rates pain 0/10, reviewed welcome folder.    A/Ox4. Pt follows commands and cooperative. Pt has dim lung sounds - on 2 LPM NC. SR. Fluid restriction diet. External catheter in place. Plan for echo.      Heart Failure Care Map  GOALS TO BE MET BEFORE DISCHARGE:    1. Decrease congestion and/or edema with diuretic therapy to achieve near optimal volume status.     Dyspnea improved: No, further care required to meet this goal. Please explain Still needing supplemental O2   Edema improved: No, further care required to meet this goal. Please explain Just admitted - edema on LE bilaterally        Net I/O and Weights since admission:   No intake/output data recorded.     Vitals:    01/12/22 1636 01/13/22 0128   Weight: 64.1 kg (141 lb 4 oz) 62.1 kg (136 lb 14.4 oz)       2.  O2 sats > 90% on room air, or at prior home O2 therapy level.      Able to wean O2 this shift to keep sats above 90%?: No, further care required to meet this goal. Please explain Needs further treatment   Does patient use Home O2? No          Current oxygenation status:   SpO2: 95 %     O2 Device: Nasal cannula, Oxygen Delivery: 2 LPM    3.  Tolerates ambulation and mobility near baseline.     Ambulation: No, further care required to meet this goal. Please explain Needs PT consult - pt lives at assisted living on more independent living with help from family and staff   Times patient ambulated with staff this shift: 0    Please review the Heart Failure Care Map for additional HF goal outcomes.    Karel Carlos RN  1/13/2022

## 2022-01-13 NOTE — ED NOTES
[facility]  ED Nurse Handoff Report    ED Chief complaint: Shortness of Breath      ED Diagnosis:   Final diagnoses:   Acute on chronic congestive heart failure, unspecified heart failure type (H)   Hypoxia       Code Status:  MD to discuss    Allergies:   Allergies   Allergen Reactions     Oxybutynin Itching     Seasonal Allergies        Patient Story: [facility] with increased SOB since last night. Staff found hypoxic with SPO2 around 88%. Pt brought to ED by daughter. Hx of CHF.  Focused Assessment: A & O x4, resting in bed. XRAY found vascular congestion. Elevated BNP. Sinus rhythm. Hypertensive. Placed on 2L O2 after road test.   Labs Ordered and Resulted from Time of ED Arrival to Time of ED Departure   BASIC METABOLIC PANEL - Abnormal       Result Value    Sodium 140      Potassium 4.6      Chloride 110 (*)     Carbon Dioxide (CO2) 24      Anion Gap 6      Urea Nitrogen 43 (*)     Creatinine 1.99 (*)     Calcium 9.8      Glucose 99      GFR Estimate 24 (*)    CBC WITH PLATELETS AND DIFFERENTIAL - Abnormal    WBC Count 5.6      RBC Count 3.21 (*)     Hemoglobin 10.5 (*)     Hematocrit 33.5 (*)      (*)     MCH 32.7      MCHC 31.3 (*)     RDW 14.9      Platelet Count 124 (*)     % Neutrophils 67      % Lymphocytes 12      % Monocytes 20      % Eosinophils 0      % Basophils 0      % Immature Granulocytes 1      NRBCs per 100 WBC 0      Absolute Neutrophils 3.8      Absolute Lymphocytes 0.7 (*)     Absolute Monocytes 1.1      Absolute Eosinophils 0.0      Absolute Basophils 0.0      Absolute Immature Granulocytes 0.0      Absolute NRBCs 0.0     NT PROBNP INPATIENT - Abnormal    N terminal Pro BNP Inpatient 12,009 (*)    TROPONIN I - Normal    Troponin I High Sensitivity 21     INFLUENZA A/B & SARS-COV2 PCR MULTIPLEX - Normal    Influenza A PCR Negative      Influenza B PCR Negative      SARS CoV2 PCR Negative       Chest XR,  PA & LAT   Final Result   IMPRESSION:  Cardiomegaly and vascular congestion are suggestive of   CHF. Bibasilar airspace opacities, left worse than right, could   represent atelectasis or pneumonia. Small left pleural effusion.       GILBERT FRANK MD            SYSTEM ID:  [id number]          Treatments and/or interventions provided: Lasix, Oxygen, Labs, Purewick, Chest XRay  Patient's response to treatments and/or interventions: Tolerated well    To be done/followed up on inpatient unit:  Admitting MD orders    Does this patient have any cognitive concerns?: none    Activity level - Baseline/Home:  Walker  Activity Level - Current:   Walker    Patient's Preferred language: English   Needed?: No    Isolation: None  Infection: Not Applicable  Patient tested for COVID 19 prior to admission: YES  Bariatric?: No    Vital Signs:   Vitals:    01/12/22 1700 01/12/22 1730 01/12/22 1745 01/12/22 1800   BP:       Pulse: 62 64 65 65   Resp: 20 26 23 22   Temp:       TempSrc:       SpO2: 93% 93% 90% 95%   Weight:           Cardiac Rhythm:     Was the PSS-3 completed:   Yes  What interventions are required if any?     n/a          Family Comments: Daughter present at bedside.   OBS brochure/video discussed/provided to patient/family: N/A              Name of person given brochure if not patient:               Relationship to patient:     For the majority of the shift this patient's behavior was Green.   Behavioral interventions performed were none    ED NURSE PHONE NUMBER: *[id number]

## 2022-01-14 ENCOUNTER — APPOINTMENT (OUTPATIENT)
Dept: PHYSICAL THERAPY | Facility: CLINIC | Age: 87
DRG: 291 | End: 2022-01-14
Attending: HOSPITALIST
Payer: MEDICARE

## 2022-01-14 ENCOUNTER — APPOINTMENT (OUTPATIENT)
Dept: GENERAL RADIOLOGY | Facility: CLINIC | Age: 87
DRG: 291 | End: 2022-01-14
Attending: PHYSICIAN ASSISTANT
Payer: MEDICARE

## 2022-01-14 LAB
ANION GAP SERPL CALCULATED.3IONS-SCNC: 4 MMOL/L (ref 3–14)
BUN SERPL-MCNC: 51 MG/DL (ref 7–30)
CALCIUM SERPL-MCNC: 9 MG/DL (ref 8.5–10.1)
CHLORIDE BLD-SCNC: 110 MMOL/L (ref 94–109)
CO2 SERPL-SCNC: 26 MMOL/L (ref 20–32)
CREAT SERPL-MCNC: 2.37 MG/DL (ref 0.52–1.04)
ERYTHROCYTE [DISTWIDTH] IN BLOOD BY AUTOMATED COUNT: 14.4 % (ref 10–15)
GFR SERPL CREATININE-BSD FRML MDRD: 20 ML/MIN/1.73M2
GLUCOSE BLD-MCNC: 90 MG/DL (ref 70–99)
HCT VFR BLD AUTO: 27.9 % (ref 35–47)
HGB BLD-MCNC: 8.6 G/DL (ref 11.7–15.7)
MAGNESIUM SERPL-MCNC: 1.9 MG/DL (ref 1.6–2.3)
MCH RBC QN AUTO: 32.2 PG (ref 26.5–33)
MCHC RBC AUTO-ENTMCNC: 30.8 G/DL (ref 31.5–36.5)
MCV RBC AUTO: 105 FL (ref 78–100)
PHOSPHATE SERPL-MCNC: 3.4 MG/DL (ref 2.5–4.5)
PLATELET # BLD AUTO: 109 10E3/UL (ref 150–450)
POTASSIUM BLD-SCNC: 3.7 MMOL/L (ref 3.4–5.3)
RBC # BLD AUTO: 2.67 10E6/UL (ref 3.8–5.2)
SODIUM SERPL-SCNC: 140 MMOL/L (ref 133–144)
WBC # BLD AUTO: 4.6 10E3/UL (ref 4–11)

## 2022-01-14 PROCEDURE — 36415 COLL VENOUS BLD VENIPUNCTURE: CPT | Performed by: HOSPITALIST

## 2022-01-14 PROCEDURE — 85027 COMPLETE CBC AUTOMATED: CPT | Performed by: HOSPITALIST

## 2022-01-14 PROCEDURE — 97116 GAIT TRAINING THERAPY: CPT | Mod: GP

## 2022-01-14 PROCEDURE — 250N000012 HC RX MED GY IP 250 OP 636 PS 637: Performed by: HOSPITALIST

## 2022-01-14 PROCEDURE — 250N000011 HC RX IP 250 OP 636: Performed by: INTERNAL MEDICINE

## 2022-01-14 PROCEDURE — 258N000003 HC RX IP 258 OP 636: Performed by: INTERNAL MEDICINE

## 2022-01-14 PROCEDURE — 71046 X-RAY EXAM CHEST 2 VIEWS: CPT

## 2022-01-14 PROCEDURE — 250N000013 HC RX MED GY IP 250 OP 250 PS 637: Performed by: HOSPITALIST

## 2022-01-14 PROCEDURE — 97530 THERAPEUTIC ACTIVITIES: CPT | Mod: GP

## 2022-01-14 PROCEDURE — 99232 SBSQ HOSP IP/OBS MODERATE 35: CPT | Performed by: HOSPITALIST

## 2022-01-14 PROCEDURE — 97161 PT EVAL LOW COMPLEX 20 MIN: CPT | Mod: GP

## 2022-01-14 PROCEDURE — 80048 BASIC METABOLIC PNL TOTAL CA: CPT | Performed by: HOSPITALIST

## 2022-01-14 PROCEDURE — 250N000013 HC RX MED GY IP 250 OP 250 PS 637: Performed by: PHYSICIAN ASSISTANT

## 2022-01-14 PROCEDURE — 99232 SBSQ HOSP IP/OBS MODERATE 35: CPT | Performed by: INTERNAL MEDICINE

## 2022-01-14 PROCEDURE — 250N000011 HC RX IP 250 OP 636: Performed by: HOSPITALIST

## 2022-01-14 PROCEDURE — 99223 1ST HOSP IP/OBS HIGH 75: CPT | Performed by: INTERNAL MEDICINE

## 2022-01-14 PROCEDURE — 210N000002 HC R&B HEART CARE

## 2022-01-14 PROCEDURE — 84100 ASSAY OF PHOSPHORUS: CPT | Performed by: HOSPITALIST

## 2022-01-14 PROCEDURE — 83735 ASSAY OF MAGNESIUM: CPT | Performed by: HOSPITALIST

## 2022-01-14 RX ORDER — CYANOCOBALAMIN 1000 UG/ML
1000 INJECTION, SOLUTION INTRAMUSCULAR; SUBCUTANEOUS ONCE
Status: COMPLETED | OUTPATIENT
Start: 2022-01-14 | End: 2022-01-14

## 2022-01-14 RX ORDER — ISOSORBIDE DINITRATE 20 MG/1
20 TABLET ORAL
Status: DISCONTINUED | OUTPATIENT
Start: 2022-01-14 | End: 2022-01-15 | Stop reason: HOSPADM

## 2022-01-14 RX ORDER — TORSEMIDE 20 MG/1
40 TABLET ORAL DAILY
Status: DISCONTINUED | OUTPATIENT
Start: 2022-01-15 | End: 2022-01-15 | Stop reason: HOSPADM

## 2022-01-14 RX ORDER — CARVEDILOL 6.25 MG/1
6.25 TABLET ORAL 2 TIMES DAILY WITH MEALS
Status: DISCONTINUED | OUTPATIENT
Start: 2022-01-14 | End: 2022-01-15 | Stop reason: HOSPADM

## 2022-01-14 RX ADMIN — CARVEDILOL 6.25 MG: 6.25 TABLET, FILM COATED ORAL at 17:21

## 2022-01-14 RX ADMIN — SENNOSIDES AND DOCUSATE SODIUM 1 TABLET: 50; 8.6 TABLET ORAL at 20:15

## 2022-01-14 RX ADMIN — AMLODIPINE BESYLATE 10 MG: 10 TABLET ORAL at 10:01

## 2022-01-14 RX ADMIN — APIXABAN 2.5 MG: 2.5 TABLET, FILM COATED ORAL at 10:00

## 2022-01-14 RX ADMIN — Medication 1 TABLET: at 10:01

## 2022-01-14 RX ADMIN — APIXABAN 2.5 MG: 2.5 TABLET, FILM COATED ORAL at 20:15

## 2022-01-14 RX ADMIN — BUMETANIDE 1 MG: 0.25 INJECTION, SOLUTION INTRAMUSCULAR; INTRAVENOUS at 04:06

## 2022-01-14 RX ADMIN — ISOSORBIDE DINITRATE 20 MG: 20 TABLET ORAL at 17:22

## 2022-01-14 RX ADMIN — PREDNISONE 5 MG: 5 TABLET ORAL at 10:02

## 2022-01-14 RX ADMIN — DARBEPOETIN ALFA 100 MCG: 100 INJECTION, SOLUTION INTRAVENOUS; SUBCUTANEOUS at 14:59

## 2022-01-14 RX ADMIN — HYDRALAZINE HYDROCHLORIDE 100 MG: 50 TABLET, FILM COATED ORAL at 12:19

## 2022-01-14 RX ADMIN — HYDRALAZINE HYDROCHLORIDE 100 MG: 50 TABLET, FILM COATED ORAL at 20:15

## 2022-01-14 RX ADMIN — SODIUM BICARBONATE 650 MG TABLET 650 MG: at 09:59

## 2022-01-14 RX ADMIN — METOPROLOL TARTRATE 25 MG: 25 TABLET, FILM COATED ORAL at 10:01

## 2022-01-14 RX ADMIN — LEVOTHYROXINE SODIUM 25 MCG: 25 TABLET ORAL at 07:01

## 2022-01-14 RX ADMIN — SENNOSIDES AND DOCUSATE SODIUM 1 TABLET: 50; 8.6 TABLET ORAL at 10:00

## 2022-01-14 RX ADMIN — SODIUM BICARBONATE 650 MG TABLET 650 MG: at 20:15

## 2022-01-14 RX ADMIN — CLONIDINE HYDROCHLORIDE 0.1 MG: 0.1 TABLET ORAL at 10:00

## 2022-01-14 RX ADMIN — HYDRALAZINE HYDROCHLORIDE 100 MG: 50 TABLET, FILM COATED ORAL at 10:00

## 2022-01-14 RX ADMIN — HYDRALAZINE HYDROCHLORIDE 100 MG: 50 TABLET, FILM COATED ORAL at 17:21

## 2022-01-14 RX ADMIN — IRON SUCROSE 300 MG: 20 INJECTION, SOLUTION INTRAVENOUS at 14:59

## 2022-01-14 RX ADMIN — CLONIDINE HYDROCHLORIDE 0.1 MG: 0.1 TABLET ORAL at 20:14

## 2022-01-14 RX ADMIN — MIRTAZAPINE 15 MG: 15 TABLET, FILM COATED ORAL at 20:15

## 2022-01-14 RX ADMIN — PANTOPRAZOLE SODIUM 40 MG: 40 TABLET, DELAYED RELEASE ORAL at 10:02

## 2022-01-14 RX ADMIN — HYDROXYCHLOROQUINE SULFATE 200 MG: 200 TABLET ORAL at 10:01

## 2022-01-14 RX ADMIN — ISOSORBIDE DINITRATE 20 MG: 20 TABLET ORAL at 12:19

## 2022-01-14 RX ADMIN — ALLOPURINOL 100 MG: 100 TABLET ORAL at 10:02

## 2022-01-14 RX ADMIN — CYANOCOBALAMIN 1000 MCG: 1000 INJECTION, SOLUTION INTRAMUSCULAR at 14:59

## 2022-01-14 RX ADMIN — FERROUS SULFATE TAB 325 MG (65 MG ELEMENTAL FE) 325 MG: 325 (65 FE) TAB at 10:02

## 2022-01-14 ASSESSMENT — ACTIVITIES OF DAILY LIVING (ADL)
ADLS_ACUITY_SCORE: 14
ADLS_ACUITY_SCORE: 12
ADLS_ACUITY_SCORE: 14
ADLS_ACUITY_SCORE: 12
ADLS_ACUITY_SCORE: 14

## 2022-01-14 NOTE — PROGRESS NOTES
"[facility]    Hospitalist Progress Note      Assessment & Plan   Ghislaine Walls is a 86 year old female who was admitted on 1/12/2022 with shortness of breath/dyspnea on exertion and hypoxia with exertion.    Acute on chronic diastolic CHF exacerbation  Left pleural effusion  H/o COVID pneumonia (08/2020)--tested negative on admission 1/12/22  Paroxysmal atrial fibrillation (on Eliquis)  Accelerated Hypertension  BNP on admission elevated at 12,009; chest x-ray noted with vascular congestion suggestive of CHF, also noted by basilar airspace opacities, left>right and small left pleural effusion; BP markedly elevated to 190s on admission  * Echo from 2/21 with EF 60 to 65%, grade 2 diastolic dysfunction; repeat echo 1/13 unchanged.  * left sided thoracentesis performed on 1/13 with 250ml removed  -reports being compliant with her medications including PTA torsemide  - PTA amlodipine, clonidine, hydralazine continued  - PTA metoprolol changed to coreg 6.25mg BID and isordil 20mg TID added 1/14 due to elevated BPs  - received intermittent IV diuresis, planning to switch to torsemide 40mg daily on 1/15  -fluid restriction to 1500 ML per day, patient reports drinking as much water as she can at home (daughter reports that \"as much as she can\" isn't all that much)  - monitor strict intake and output; daily weights  - low sodium renal diet in hospital (she does not have ability to get specialized meals (Low sodium etc) at [place])  - hypoxia and dyspnea with activity resolved 1/14  - continue PTA Eliquis  - PT eval recommends home  - she does not tolerate compression stockings  - cardiology appreciated, follow up in clinic in 1-2 weeks     Stage IV CKD  Recent baseline creatinine around 1.6 to 2.2; follows-up with nephrology as outpatient  - Cr on admission 1.99; Cr up to 2.37 on 1/14  - BMP in AM  - resume PTA sodium bicarbonate  - Nephrology consult appreeciated     Anemia of chronic " disease  chronic thrombocytopenia  -baseline hemoglobin around 9 to 10, and platelets baseline around 140-160s. on admission hemoglobin 10.5, platelet 124  - Hgb drop to 8.6 with Platelets 109 on 1/14  - Nephrology gave venofer, B12 injection and aranesp on 1/14  - CBC in AM     Rheumatoid arthritis  Gout  -chronic and stable  -resume PTA allopurinol, Plaquenil, prednisone     Hypothyroidism  -continue PT Synthroid     GERD: continue PTA Protonix     Anxiety: continue PTA Remeron    Goals of care  Patient noncommittal to continuing or stopping CHF treatment. Makes statements consistent with both options. Daughter reports that patient goes back and forth depending on how she feels  - encouraged further goals of care discussion between daughter and patient as patient will have further CHF exacerbation admissions in setting of noncompliance    Clinically Significant Risk Factors Present on Admission                    DVT Prophylaxis: DOAC  Code Status: No CPR- Do NOT Intubate  Expected Discharge: 01/16/2022     Anticipated discharge location:  Awaiting care coordination huddle  Delays:     Administering IV medications  Oxygen Needs   pending BP stability with new meds      Karol Short DO  Hospitalist Service  [facility]  Securely message with the Vocera Web Console (learn more here)  Text Page (7am - 6pm) via [employer] Paging/Directory      Interval History   Patient seen and examined. Weaned off oxygen. Walked with therapy and had no issues with dyspnea. Her breathing feels fine. Her only annoyance is that physical therapy got her up when she was comfortable. Discussed how she feels about torsemide, fluid restriction. She hates urinating and getting up frequently. She wishes there was another way to get fluid off. She says she doesn't want to do this anymore, but her daughter wants her to do it. Then states that it isn't up to her to decide whether to stop CHF treatments, but that it is  God's will. Discussed care plan with patient's daughter Krupa via phone x16 minutes. She states her mother goes back and forth depending on her mood and that patient feels poorly now and that is why she says things like that. She also reports that her mother cognition is getting worse. I emphasized that together they should come up with a plan as her CHF exacerbation admissions will become more frequent if she is not compliant with her medications/dietary restrictions. Daughter reports that dietary restrictions are hard for patient to follow given meals provided by [facility]  Spent 45 minutes with >50% time speaking with patient, discussing goals of care, evaluating patient, discussing care plan with RN, cardiology team and with patient's daughter Krupa.    -Data reviewed today: I reviewed all new labs and imaging results over the last 24 hours. I personally reviewed CXR shows nearly resolved pleural effusion, trace right pleural effusion, mild atelectasis. Lungs clear otherwise    Physical Exam   Temp: 98.7  F (37.1  C) Temp src: Oral BP: (!) 186/76 Pulse: 63   Resp: 18 SpO2: 93 % O2 Device: None (Room air)    Vitals:    01/12/22 1636 01/13/22 0128 01/14/22 0602   Weight: 64.1 kg (141 lb 4 oz) 62.1 kg (136 lb 14.4 oz) 61.6 kg (135 lb 14.4 oz)     Vital Signs with Ranges  Temp:  [98.3  F (36.8  C)-99.2  F (37.3  C)] 98.7  F (37.1  C)  Pulse:  [54-63] 63  Resp:  [18-20] 18  BP: (133-186)/(45-76) 186/76  SpO2:  [91 %-95 %] 93 %  I/O last 3 completed shifts:  In: 680 [P.O.:680]  Out: 1050 [Urine:1050]    Constitutional: Awake, alert, cooperative, no apparent distress, overall fatigued which she says is her baseline  Respiratory: few crackles bases, otherwise no crackles auscultated  Cardiovascular: Regular rhythm, rate near 50, normal S1 and S2, and no murmur noted  GI: Normal bowel sounds, soft, non-distended, non-tender  Skin/Integumen: No rashes, no cyanosis, trace lower extremity edema  Other:     Medications     -  MEDICATION INSTRUCTIONS -         allopurinol  100 mg Oral Daily     amLODIPine  10 mg Oral Daily     apixaban ANTICOAGULANT  2.5 mg Oral BID     calcium carbonate 600 mg-vitamin D 400 units  1 tablet Oral Daily     carvedilol  6.25 mg Oral BID w/meals     cloNIDine  0.1 mg Oral BID     cyanocobalamin  1,000 mcg Intramuscular Once     darbepoetin alpha non-ESRD  100 mcg Subcutaneous Once     ferrous sulfate  325 mg Oral Daily with breakfast     hydrALAZINE  100 mg Oral 4x Daily     hydroxychloroquine  200 mg Oral Daily     iron sucrose  300 mg Intravenous Once     isosorbide dinitrate  20 mg Oral TID     levothyroxine  25 mcg Oral Daily     mirtazapine  15 mg Oral At Bedtime     pantoprazole  40 mg Oral Daily     predniSONE  5 mg Oral Daily     senna-docusate  1 tablet Oral BID    Or     senna-docusate  2 tablet Oral BID     sodium bicarbonate  650 mg Oral BID     sodium chloride (PF)  3 mL Intracatheter Q8H     [START ON 1/15/2022] torsemide  40 mg Oral Daily       Data   Recent Labs   Lab 01/14/22  0608 01/13/22  1427 01/13/22  0612 01/12/22  2345 01/12/22  1249   WBC 4.6  --   --   --  5.6   HGB 8.6*  --   --   --  10.5*   *  --   --   --  104*   *  --   --   --  124*     --  143  --  140   POTASSIUM 3.7 4.0 3.8   < > 4.6   CHLORIDE 110*  --  111*  --  110*   CO2 26  --  25  --  24   BUN 51*  --  44*  --  43*   CR 2.37*  --  2.01*  --  1.99*   ANIONGAP 4  --  7  --  6   ALIVIA 9.0  --  9.1  --  9.8   GLC 90  --  73  --  99   PROTTOTAL  --  6.6*  --   --   --     < > = values in this interval not displayed.       Recent Results (from the past 24 hour(s))   US Thoracentesis    Narrative    EXAM:  1. LEFT THORACENTESIS  2. ULTRASOUND GUIDANCE  LOCATION: [facility]  DATE/TIME: 1/13/2022 3:43 PM    INDICATION: Pleural effusion.    PROCEDURE: Informed consent obtained. Time out performed. The chest  was prepped and draped in a sterile fashion. 10 mL of 1% lidocaine was  infused into local soft  tissues. A 5 [place] catheter system was  introduced into the pleural effusion under ultrasound guidance.    0.25 liters of clear fluid were removed and sent to lab if requested.    Patient tolerated procedure well.    Ultrasound images have been permanently captured for documentation.      Impression    IMPRESSION:  Status post ultrasound-guided left thoracentesis.    AREN SPIVEY MD         SYSTEM ID:  [id number]   XR Chest 2 Views    Narrative    CHEST TWO VIEWS    1/14/2022 12:35 PM       INDICATION: Acute hypoxic respiratory failure.  COMPARISON: 1/12/2022.       Impression    IMPRESSION: Left pleural effusion nearly completely resolved status  post interval thoracentesis. Trace right pleural effusion similar.  Mild atelectasis or infiltrate in the left lower lobe. Lungs are  otherwise clear.

## 2022-01-14 NOTE — CONSULTS
Care Management Initial Consult    General Information  Assessment completed with: Patient,  (Patient)  Type of CM/SW Visit: Initial Assessment    Primary Care Provider verified and updated as needed: Yes   Readmission within the last 30 days:        Reason for Consult: discharge planning  Advance Care Planning: Advance Care Planning Reviewed:  (She has a POLST)          Communication Assessment  Patient's communication style: spoken language (English or Bilingual)    Hearing Difficulty or Deaf: yes   Wear Glasses or Blind: yes    Cognitive  Cognitive/Neuro/Behavioral: WDL                      Living Environment:   People in home: alone,facility resident     Current living Arrangements: assisted living  Name of Facility:  ([facility])   Able to return to prior arrangements: yes       Family/Social Support:  Care provided by: self,other (see comments) (Facility Staff)  Provides care for: no one  Marital Status:   Children          Description of Support System: Supportive,Involved    Support Assessment: Adequate family and caregiver support    Current Resources:   Patient receiving home care services: No     Community Resources: None  Equipment currently used at home: walker, rolling  Supplies currently used at home: None    Employment/Financial:  Employment Status: retired        Financial Concerns: No concerns identified           Lifestyle & Psychosocial Needs:  Social Determinants of Health     Tobacco Use: Medium Risk     Smoking Tobacco Use: Former Smoker     Smokeless Tobacco Use: Never Used   Alcohol Use: Not on file   Financial Resource Strain: Low Risk      Difficulty of Paying Living Expenses: Not hard at all   Food Insecurity: No Food Insecurity     Worried About Running Out of Food in the Last Year: Never true     Ran Out of Food in the Last Year: Never true   Transportation Needs: No Transportation Needs     Lack of Transportation (Medical): No     Lack of Transportation  (Non-Medical): No   Physical Activity: Not on file   Stress: Not on file   Social Connections: Not on file   Intimate Partner Violence: Not on file   Depression: Not at risk     PHQ-2 Score: 0   Housing Stability: Not on file       Functional Status:  Prior to admission patient needed assistance:              Mental Health Status:          Chemical Dependency Status:                Values/Beliefs:  Spiritual, Cultural Beliefs, Catholic Practices, Values that affect care: no               Additional Information:  Per care management/social work consult for discharge planning.  Patient was admitted on 1-12-22 with shortness of breath and dyspnea on exertion and hypoxia with exertion.  The tentative date of discharge is 1-16-22.  Reviewed chart and spoke with patient regarding discharge plans.  Per patient report, she lives alone in an apartment at [facility].   Patient uses a rolling walker at baseline.  Reviewed the therapy discharge recommendations of returning home upon discharge and patient is in agreement.  Patient anticipates no needs.      Will continue to follow and assist, as needed, with a safe discharge plan.      DELORES Licea, [facility]    [phone]  [facility]

## 2022-01-14 NOTE — PLAN OF CARE
VSS, on room air. Alert/oriented, able to express needs. Assist of 1 to the bathroom, purewick in place. Bed alarm for safety. Will continue to monitor.    BP (!) 159/58   Pulse 56   Temp 98.9  F (37.2  C)   Resp 18   Wt 62.1 kg (136 lb 14.4 oz)   SpO2 93%   BMI 25.87 kg/m

## 2022-01-14 NOTE — CONSULTS
Consult Date: 01/14/2022    RENAL CONSULTATION    REQUESTING PHYSICIAN:  MD Xochitl    CONSULTANT:  Dr. Chaudhary.    REASON FOR CONSULTATION:  Chronic kidney disease, stage IV and congestive heart failure.    HISTORY OF PRESENT ILLNESS:  This is an 86-year-old female who has chronic kidney disease, stage IV, and is followed at [facility].  This is associated with anemia and mild metabolic acidosis.  She was admitted on 01/12/2022 with shortness of breath and hypoxia.  She was COVID and influenza negative.  Chest x-ray showed an enlarged heart, bibasilar infiltrates, left pleural effusion, and vascular congestion.  She was diagnosed with CHF exacerbation and severe hypertension.  Prior to admission, she had been on amlodipine, clonidine, hydralazine, metoprolol, and torsemide.  She was also on Aranesp intermittently, but oral iron.  She was taking sodium bicarbonate, calcium plus D and Eliquis.    In the hospital, she has been given IV Bumex, IV Lasix, and is now on oral torsemide.  She underwent, yesterday, a thoracentesis for 250 mL.    Currently, she feels somewhat improved in terms of her breathing.  She is lying in bed and not ambulating much.  She is taking p.o. well.    PAST MEDICAL HISTORY:  Congestive heart failure with atrial fibrillation.  COVID recovered 08/20, hypertension, gout, rheumatoid arthritis, and hypothyroidism.    SOCIAL HISTORY:  She lives in the [facility] living.  She has her own apartment.  She gets around with a walker.  She is a former smoker.    FAMILY HISTORY:  Noncontributory.    REVIEW OF SYSTEMS:  Negative except as noted above.    PHYSICAL EXAMINATION:  GENERAL:  She is an elderly female, lying comfortably in bed.  She has normal speech, mood, and affect.  Her weight on admission was 64.1 and her weight today is 61.6 kg, down to 2.5 kg.  She is afebrile, pulse 65, blood pressure 186/76, respiratory rate 18.  SKIN:  Negative.   HEENT:  Head shows no  trauma.  The eyes show pupils round and reactive to light.  She has bilateral arcus senilis.  NECK:  Supple.  There is mild jugular venous distention.  LUNGS:  Clear breath sounds bilaterally.  HEART:  Regular rhythm, normal S1, S2.  There is no murmur.  ABDOMEN:  Soft and nontender.  EXTREMITIES:  No edema and good pulses in the lower extremities.  Upper extremities show normal nails and joints.  NEUROLOGIC:  Normal mental status, symmetric cranial nerves.    LABORATORY DATA:  In 2021, the creatinine was 2.11, estimated GFR 21.  In 10/2021, creatinine 2.28, estimated GFR 19.  On admission, her creatinine was 1.99 and yesterday it was 2.01.  Today, her creatinine is 2.37, estimated GFR 20.  Her hemoglobin is 8.6.  Repeat chest x-ray after thoracentesis shows a smaller left pleural effusion, and there is less vascular congestion.    IMPRESSION AND PLAN:  1.  Congestive heart failure.  We will put her on a low sodium diet.  She is on multiple blood pressure medications and cardiovascular medications including carvedilol, hydralazine, isosorbide, and torsemide.  We will continue these for now.  She is on a higher dose of torsemide than is her baseline, and she is apprehensive about this.  She says on high dose torsemide, she develops urinary frequency and nocturia, but she is willing to try it.  Her creatinine has gone up with diuresis, and we will have to monitor this.  2.  Chronic kidney disease stage IV.  We will put her on a renal diet.  She should come back to our clinic in 2 weeks to see the PA for followup and to draw labs.  Again, she has a mild increase in creatinine associated with diuresis.  3.  Anemia.  While she is here, we will give her Aranesp injection, also IV iron and IM B12.    Leonardo Chaudhary MD        D: 2022   T: 2022   MT: LELE    Name:     ROSIO YOUNG  MRN:      [id number]        Account:      [id number]   :      1935           Consult Date: 2022      Document: [id number]

## 2022-01-14 NOTE — PROGRESS NOTES
[facility] Progress Note  Date of Service: 01/14/2022  Primary Cardiologist: Dr. Gina LAROSE Titi is a 86 year old female with a history of PAF anticoagulated with Eliquis, HFpEF, hypertension, stage IV CKD, hypothyroidism, chronic anemia with MGUS, history of COVID-19 infection and COVID PNA in 2020, and chronic fatigue. She was admitted on 1/12/2022 with acute on chronic hypoxic respiratory failure, imaging and history suggestive of acute HFpEF without a clear trigger, possibly uncontrolled hypertension.     Interim Hx: Slow diuresis overnight with IV bumex 1 mg BID. Creat bump today (2-->2.37).  Subjective: Tells me she has no change in symptoms from yesterday, but tells hospitalist she feels great. O2 low-normal at rest, no significant desats walking with PT today.    Assessment:  1. Acute hypoxic respiratory failure.   2. Acute on chronic HFpEF.   3. Normal biventricular function and no significant VHD.  4. L Pleural effusion with minimal fluid removed on 1/13 thoracentesis.  5. Negative troponin x 1.  6. DRAGAN / CKD-IV, baseline creat ~1.6-2.   7. Acute on Chronic anemia, with 2-pt hgb decline over past 2 days (10.5 --> 8.6). Denies bleeding diathesis.   8. Hypertension, mildly uncontrolled.   9. Hx of PAF, currently in sinus bradycardia. Anticoagulated.     Plan:   1. Repeat CXR.   2. Needs better BP control - change metoprolol to Coreg, and add Isordil.  3. Stop IV bumex. Tomorrow, start oral torsemide 40 mg daily (was on 20 mg PTA).   4. Monitor anemia.   5. Recommend her intermediate ([facility]) manage her medication administrations (suspect pt noncompliance).   6. Dr. Short will have goals of care conversation with pt/daughter.   7. Will arrange follow up in the cardiology clinic in 1-2 weeks.    Daphne Cm PA-C  [facility]  Pager: [phone]  Text Page  (7:30am - 4pm M-F)    __________________________________________________________________________    Physical Exam   Temp: 98.7  F (37.1  C) Temp src: Oral BP: (!) 166/63 Pulse: 61   Resp: 18 SpO2: 91 % O2 Device: None (Room air)    Vitals:    01/12/22 1636 01/13/22 0128 01/14/22 0602   Weight: 64.1 kg (141 lb 4 oz) 62.1 kg (136 lb 14.4 oz) 61.6 kg (135 lb 14.4 oz)       GENERAL:  The patient is in no apparent distress.   NECK: CVP appears normal, no masses or thyromegaly.  PULMONARY:  There is a normal respiratory effort. Bibasilar crackles.    CARDIOVASCULAR:  RRR, normal S1 S2, grade 3/6 sm a the LSB.  GI:  Non tender abdomen with normoactive bowel sounds and no hepatosplenomegaly. There are no masses palpable.   EXTREMITIES:  1+ pretibial edema bilaterally.  VASCULAR: 2+ Pulses bilaterally in upper and lower extremities.    Medications     - MEDICATION INSTRUCTIONS -         allopurinol  100 mg Oral Daily     amLODIPine  10 mg Oral Daily     apixaban ANTICOAGULANT  2.5 mg Oral BID     calcium carbonate 600 mg-vitamin D 400 units  1 tablet Oral Daily     cloNIDine  0.1 mg Oral BID     ferrous sulfate  325 mg Oral Daily with breakfast     hydrALAZINE  100 mg Oral 4x Daily     hydroxychloroquine  200 mg Oral Daily     levothyroxine  25 mcg Oral Daily     metoprolol tartrate  25 mg Oral BID     mirtazapine  15 mg Oral At Bedtime     pantoprazole  40 mg Oral Daily     predniSONE  5 mg Oral Daily     senna-docusate  1 tablet Oral BID    Or     senna-docusate  2 tablet Oral BID     sodium bicarbonate  650 mg Oral BID     sodium chloride (PF)  3 mL Intracatheter Q8H       Data   Most Recent 3 CBC's:Recent Labs   Lab Test 01/14/22  0608 01/12/22  1249 06/07/21  1615   WBC 4.6 5.6 3.5*   HGB 8.6* 10.5* 9.8*   * 104* 100   * 124* 160     Most Recent 3 BMP's:Recent Labs   Lab Test 01/14/22  0608 01/13/22  1427 01/13/22  0612 01/12/22  2345 01/12/22  1249     --  143  --  140   POTASSIUM 3.7 4.0 3.8   < > 4.6   CHLORIDE 110*   --  111*  --  110*   CO2 26  --  25  --  24   BUN 51*  --  44*  --  43*   CR 2.37*  --  2.01*  --  1.99*   ANIONGAP 4  --  7  --  6   ALIVIA 9.0  --  9.1  --  9.8   GLC 90  --  73  --  99    < > = values in this interval not displayed.     Most Recent 3 BNP's:Recent Labs   Lab Test 01/12/22  1249 06/07/21  1615 04/15/21  1139 03/02/21  0701 09/30/19  2310 06/14/19  1331 06/08/19  1031   NTBNPI 12,009* 8,504* 21,879*  --    < >  --   --    NTBNP  --   --   --  10,548*  --  2,800* 2,104*    < > = values in this interval not displayed.

## 2022-01-14 NOTE — PROGRESS NOTES
01/14/22 0800   Quick Adds   Type of Visit Initial PT Evaluation   Living Environment   People in home alone   Current Living Arrangements assisted living   Home Accessibility no concerns   Transportation Anticipated family or friend will provide   Self-Care   Usual Activity Tolerance moderate   Current Activity Tolerance fair   Regular Exercise No   Equipment Currently Used at Home walker, rolling   Disability/Function   Fall history within last six months no   General Information   Onset of Illness/Injury or Date of Surgery 01/12/22   Referring Physician Dr. Leung   Patient/Family Therapy Goals Statement (PT) To go home   Pertinent History of Current Problem (include personal factors and/or comorbidities that impact the POC) Pt is an 86 year old female admitted with respiratory failure.   Existing Precautions/Restrictions fall   Cognition   Orientation Status (Cognition) oriented x 4   Affect/Mental Status (Cognition) WFL   Pain Assessment   Patient Currently in Pain No   Range of Motion (ROM)   ROM Quick Adds ROM WFL   Strength   Strength Comments Gross LE strength 4/5 bilaterally   Bed Mobility   Comment (Bed Mobility) SBA   Transfers   Transfer Safety Comments CGA   Gait/Stairs (Locomotion)   Comment (Gait/Stairs) 25' 4ww CGA, decreased jeancarlos   Balance   Balance Comments Good in sitting, fair in standing   Clinical Impression   Criteria for Skilled Therapeutic Intervention yes, treatment indicated   PT Diagnosis (PT) Impaired ambulation   Influenced by the following impairments Decreased strength, decreased endurance, decreased balance   Functional limitations due to impairments Difficulty with bed mobility, transfers, ambulation   Clinical Presentation Stable/Uncomplicated   Clinical Presentation Rationale VSS, pain controlled   Clinical Decision Making (Complexity) low complexity   Therapy Frequency (PT) Daily   Predicted Duration of Therapy Intervention (days/wks) 5 days   Planned Therapy  Interventions (PT) balance training;bed mobility training;gait training;patient/family education;strengthening;transfer training   Risk & Benefits of therapy have been explained evaluation/treatment results reviewed;care plan/treatment goals reviewed;risks/benefits reviewed;current/potential barriers reviewed;participants voiced agreement with care plan;participants included;patient   PT Discharge Planning    PT Discharge Recommendation (DC Rec) home   PT Rationale for DC Rec At baseline, pt lives alone in an assisted living apartment. Pt reports independence with mobility, use of 4ww for ambulation. Anticipate with further medical management and therapy, patient will progress to modified independence and be safe to discharge home.   PT Brief overview of current status  SBA to CG   Total Evaluation Time   Total Evaluation Time (Minutes) 10

## 2022-01-15 VITALS
SYSTOLIC BLOOD PRESSURE: 170 MMHG | HEART RATE: 64 BPM | OXYGEN SATURATION: 94 % | BODY MASS INDEX: 25.68 KG/M2 | DIASTOLIC BLOOD PRESSURE: 72 MMHG | WEIGHT: 135.9 LBS | TEMPERATURE: 98.1 F | RESPIRATION RATE: 16 BRPM

## 2022-01-15 LAB
ANION GAP SERPL CALCULATED.3IONS-SCNC: 5 MMOL/L (ref 3–14)
BUN SERPL-MCNC: 53 MG/DL (ref 7–30)
CALCIUM SERPL-MCNC: 8.7 MG/DL (ref 8.5–10.1)
CHLORIDE BLD-SCNC: 111 MMOL/L (ref 94–109)
CO2 SERPL-SCNC: 27 MMOL/L (ref 20–32)
CREAT SERPL-MCNC: 2.44 MG/DL (ref 0.52–1.04)
ERYTHROCYTE [DISTWIDTH] IN BLOOD BY AUTOMATED COUNT: 14.3 % (ref 10–15)
GFR SERPL CREATININE-BSD FRML MDRD: 19 ML/MIN/1.73M2
GLUCOSE BLD-MCNC: 87 MG/DL (ref 70–99)
HCT VFR BLD AUTO: 26.9 % (ref 35–47)
HGB BLD-MCNC: 8.4 G/DL (ref 11.7–15.7)
MAGNESIUM SERPL-MCNC: 2 MG/DL (ref 1.6–2.3)
MCH RBC QN AUTO: 32.4 PG (ref 26.5–33)
MCHC RBC AUTO-ENTMCNC: 31.2 G/DL (ref 31.5–36.5)
MCV RBC AUTO: 104 FL (ref 78–100)
PHOSPHATE SERPL-MCNC: 3.7 MG/DL (ref 2.5–4.5)
PLATELET # BLD AUTO: 117 10E3/UL (ref 150–450)
POTASSIUM BLD-SCNC: 3.9 MMOL/L (ref 3.4–5.3)
RBC # BLD AUTO: 2.59 10E6/UL (ref 3.8–5.2)
SODIUM SERPL-SCNC: 143 MMOL/L (ref 133–144)
WBC # BLD AUTO: 3.6 10E3/UL (ref 4–11)

## 2022-01-15 PROCEDURE — 84100 ASSAY OF PHOSPHORUS: CPT | Performed by: HOSPITALIST

## 2022-01-15 PROCEDURE — 36415 COLL VENOUS BLD VENIPUNCTURE: CPT | Performed by: HOSPITALIST

## 2022-01-15 PROCEDURE — 250N000013 HC RX MED GY IP 250 OP 250 PS 637: Performed by: PHYSICIAN ASSISTANT

## 2022-01-15 PROCEDURE — 99239 HOSP IP/OBS DSCHRG MGMT >30: CPT | Performed by: INTERNAL MEDICINE

## 2022-01-15 PROCEDURE — 83735 ASSAY OF MAGNESIUM: CPT | Performed by: HOSPITALIST

## 2022-01-15 PROCEDURE — 250N000012 HC RX MED GY IP 250 OP 636 PS 637: Performed by: HOSPITALIST

## 2022-01-15 PROCEDURE — 250N000013 HC RX MED GY IP 250 OP 250 PS 637: Performed by: HOSPITALIST

## 2022-01-15 PROCEDURE — 99232 SBSQ HOSP IP/OBS MODERATE 35: CPT | Performed by: INTERNAL MEDICINE

## 2022-01-15 PROCEDURE — 85027 COMPLETE CBC AUTOMATED: CPT | Performed by: HOSPITALIST

## 2022-01-15 PROCEDURE — 80048 BASIC METABOLIC PNL TOTAL CA: CPT | Performed by: HOSPITALIST

## 2022-01-15 RX ORDER — CARVEDILOL 6.25 MG/1
6.25 TABLET ORAL 2 TIMES DAILY WITH MEALS
Qty: 60 TABLET | Refills: 0 | Status: SHIPPED | OUTPATIENT
Start: 2022-01-15 | End: 2022-02-03

## 2022-01-15 RX ORDER — ISOSORBIDE DINITRATE 20 MG/1
20 TABLET ORAL
Qty: 90 TABLET | Refills: 0 | Status: SHIPPED | OUTPATIENT
Start: 2022-01-15 | End: 2022-02-14

## 2022-01-15 RX ORDER — TORSEMIDE 20 MG/1
40 TABLET ORAL DAILY
Qty: 60 TABLET | Refills: 0 | Status: SHIPPED | OUTPATIENT
Start: 2022-01-16 | End: 2022-01-20

## 2022-01-15 RX ADMIN — SENNOSIDES AND DOCUSATE SODIUM 1 TABLET: 50; 8.6 TABLET ORAL at 09:03

## 2022-01-15 RX ADMIN — ISOSORBIDE DINITRATE 20 MG: 20 TABLET ORAL at 09:03

## 2022-01-15 RX ADMIN — CLONIDINE HYDROCHLORIDE 0.1 MG: 0.1 TABLET ORAL at 09:03

## 2022-01-15 RX ADMIN — HYDRALAZINE HYDROCHLORIDE 100 MG: 50 TABLET, FILM COATED ORAL at 11:56

## 2022-01-15 RX ADMIN — TORSEMIDE 40 MG: 20 TABLET ORAL at 09:02

## 2022-01-15 RX ADMIN — ALLOPURINOL 100 MG: 100 TABLET ORAL at 09:03

## 2022-01-15 RX ADMIN — Medication 1 TABLET: at 09:03

## 2022-01-15 RX ADMIN — ISOSORBIDE DINITRATE 20 MG: 20 TABLET ORAL at 11:56

## 2022-01-15 RX ADMIN — SODIUM BICARBONATE 650 MG TABLET 650 MG: at 09:03

## 2022-01-15 RX ADMIN — LEVOTHYROXINE SODIUM 25 MCG: 25 TABLET ORAL at 09:02

## 2022-01-15 RX ADMIN — PREDNISONE 5 MG: 5 TABLET ORAL at 09:02

## 2022-01-15 RX ADMIN — APIXABAN 2.5 MG: 2.5 TABLET, FILM COATED ORAL at 09:02

## 2022-01-15 RX ADMIN — HYDROXYCHLOROQUINE SULFATE 200 MG: 200 TABLET ORAL at 09:02

## 2022-01-15 RX ADMIN — HYDRALAZINE HYDROCHLORIDE 100 MG: 50 TABLET, FILM COATED ORAL at 09:02

## 2022-01-15 RX ADMIN — CARVEDILOL 6.25 MG: 6.25 TABLET, FILM COATED ORAL at 09:02

## 2022-01-15 RX ADMIN — AMLODIPINE BESYLATE 10 MG: 10 TABLET ORAL at 09:02

## 2022-01-15 RX ADMIN — PANTOPRAZOLE SODIUM 40 MG: 40 TABLET, DELAYED RELEASE ORAL at 09:03

## 2022-01-15 RX ADMIN — FERROUS SULFATE TAB 325 MG (65 MG ELEMENTAL FE) 325 MG: 325 (65 FE) TAB at 09:03

## 2022-01-15 ASSESSMENT — ACTIVITIES OF DAILY LIVING (ADL)
ADLS_ACUITY_SCORE: 12

## 2022-01-15 NOTE — PLAN OF CARE
A+O x 4. Patient very flat. CHF exacerbation. Pure wick in place. L side thoracentesis yesterday. Chest x-ray today shows better lung expansion and pleural effusion almost resolved. A x 1/GB/W. Tele shows SR w/1st AVB. VSS on RA. 1500 fluid restriction. About 250ml left before midnight. Renal diet. DNR/DNI. May be able to go back to [facility] assisted living tomorrow. Was given Iron Sucrose, B12, and Aranesp today by nephrology.

## 2022-01-15 NOTE — PROGRESS NOTES
Care Management Discharge Note    Discharge Date: 01/15/2022  Expected Time of Departure: 14:00    Discharge Disposition: Assisted Living,Home Care    Discharge Services: Other (see comment) (Homecare RN)    Discharge DME: None    Discharge Transportation: family or friend will provide    Private pay costs discussed: Not applicable    PAS Confirmation Code:  N/A  Patient/family educated on Medicare website which has current facility and service quality ratings: no    Education Provided on the Discharge Plan:  yes  Persons Notified of Discharge Plans: patient  Patient/Family in Agreement with the Plan: yes    Handoff Referral Completed: Yes    Additional Information:  Received discharge orders for patient.  Patient is planning on returning home upon discharge with homecare services for nursing.   Offered a choice of agencies.  Chose [facility].  Email referral sent and process  Explained.  Patient informed of the plan and in agreement to the plan. Call placed to [facility], [phone], to update them that patient is being discharged.  Per Rocío the nurse, they are asking for the discharge orders to be faxed to [phone].      Received an email back from [facility].  They can accept patient with a start of care on Wednesday, 1-19-22.        DELORES Licea, [facility]    [phone]  [facility]

## 2022-01-15 NOTE — DISCHARGE INSTRUCTIONS
You will be discharge home with [facility].  [facility] will contact you directly to arrange the first visit.      [facility]'s phone number is [phone].       It is okay to delay the start of care until January 19, 2022.

## 2022-01-15 NOTE — PROGRESS NOTES
Assessment and Plan:   CKD-4: lab today shows Na 143, K 3.9, HCO3 27. Cr: 2.37 > 2.44.   I/O 1240/1450.     Mild bump in Cr with diuresis.    Ok for discharge.    Follow up [facility] in 2 weeks with NP/PA, labs with primary MD in one week (JAIME).                 Interval History:   CHF: adm with fluid overload. Initially given IV diuretics, now on oral torsemide.    HT:  BP remains elevated. Cont current meds.   Anemia: Hgb 8.4. Treated with IM B12, Subcut aranesp and IV venofer yesterday.            Review of Systems:   Alert and responsive. Up with walker. No pain.           Medications:       allopurinol  100 mg Oral Daily     amLODIPine  10 mg Oral Daily     apixaban ANTICOAGULANT  2.5 mg Oral BID     calcium carbonate 600 mg-vitamin D 400 units  1 tablet Oral Daily     carvedilol  6.25 mg Oral BID w/meals     cloNIDine  0.1 mg Oral BID     ferrous sulfate  325 mg Oral Daily with breakfast     hydrALAZINE  100 mg Oral 4x Daily     hydroxychloroquine  200 mg Oral Daily     isosorbide dinitrate  20 mg Oral TID     levothyroxine  25 mcg Oral Daily     mirtazapine  15 mg Oral At Bedtime     pantoprazole  40 mg Oral Daily     predniSONE  5 mg Oral Daily     senna-docusate  1 tablet Oral BID    Or     senna-docusate  2 tablet Oral BID     sodium bicarbonate  650 mg Oral BID     sodium chloride (PF)  3 mL Intracatheter Q8H     torsemide  40 mg Oral Daily       - MEDICATION INSTRUCTIONS -       Current active medications and PTA medications reviewed, see medication list for details.            Physical Exam:   Vitals were reviewed  Patient Vitals for the past 24 hrs:   BP Temp Temp src Pulse Resp SpO2 Weight   01/15/22 0840 (!) 174/70 98.1  F (36.7  C) Oral 65 16 94 % --   01/15/22 0500 -- -- -- -- -- -- 61.6 kg (135 lb 14.4 oz)   01/15/22 0000 (!) 163/63 98.6  F (37  C) Oral 59 16 92 % --   01/14/22 1942 135/61 98.5  F (36.9  C) Oral 56 18 93 % --   01/14/22 1721 -- -- -- 61 -- -- --   01/14/22 1610  (!) 161/70 98.8  F (37.1  C) Oral 57 18 92 % --   22 1213 (!) 186/76 -- -- 63 18 93 % --       Temp:  [98.1  F (36.7  C)-98.8  F (37.1  C)] 98.1  F (36.7  C)  Pulse:  [56-65] 65  Resp:  [16-18] 16  BP: (135-186)/(61-76) 174/70  SpO2:  [92 %-94 %] 94 %    Temperatures:  Current - Temp: 98.1  F (36.7  C); Max - Temp  Av.5  F (36.9  C)  Min: 98.1  F (36.7  C)  Max: 98.8  F (37.1  C)  Respiration range: Resp  Av.2  Min: 16  Max: 18  Pulse range: Pulse  Av.2  Min: 56  Max: 65  Blood pressure range: Systolic (24hrs), Av , Min:135 , Max:186   ; Diastolic (24hrs), Av, Min:61, Max:76    Pulse oximetry range: SpO2  Av.8 %  Min: 92 %  Max: 94 %    I/O last 3 completed shifts:  In: 1240 [P.O.:1240]  Out: 700 [Urine:700]      Intake/Output Summary (Last 24 hours) at 1/15/2022 0925  Last data filed at 1/15/2022 0841  Gross per 24 hour   Intake 880 ml   Output 700 ml   Net 180 ml       Alert and responsive  Resting comfortably in bed  Not on O2       Wt Readings from Last 4 Encounters:   01/15/22 61.6 kg (135 lb 14.4 oz)   21 63 kg (139 lb)   10/07/21 60.3 kg (133 lb)   21 55.5 kg (122 lb 4.8 oz)          Data:          Lab Results   Component Value Date     01/15/2022     2022     2022     2021     05/10/2021     2021    Lab Results   Component Value Date    CHLORIDE 111 01/15/2022    CHLORIDE 110 2022    CHLORIDE 111 2022    CHLORIDE 114 2021    CHLORIDE 107 05/10/2021    CHLORIDE 108 2021    Lab Results   Component Value Date    BUN 53 01/15/2022    BUN 51 2022    BUN 44 2022    BUN 77 2021    BUN 70 05/10/2021    BUN 91 2021      Lab Results   Component Value Date    POTASSIUM 3.9 01/15/2022    POTASSIUM 3.7 2022    POTASSIUM 4.0 2022    POTASSIUM 4.9 2021    POTASSIUM 4.8 05/10/2021    POTASSIUM 3.7 2021    Lab Results   Component Value Date    CO2  27 01/15/2022    CO2 26 01/14/2022    CO2 25 01/13/2022    CO2 23 06/07/2021    CO2 24 05/10/2021    CO2 24 04/28/2021    Lab Results   Component Value Date    CR 2.44 01/15/2022    CR 2.37 01/14/2022    CR 2.01 01/13/2022    CR 1.61 06/07/2021    CR 1.76 05/10/2021    CR 1.67 04/28/2021        Recent Labs   Lab Test 01/15/22  0602 01/14/22  0608 01/12/22  1249   WBC 3.6* 4.6 5.6   HGB 8.4* 8.6* 10.5*   HCT 26.9* 27.9* 33.5*   * 105* 104*   * 109* 124*     Recent Labs   Lab Test 06/07/21  1615 12/01/20  1405 07/25/20  0554 07/21/20  2038   AST 40 21 25 18   ALT 35 11 10 8   ALKPHOS 119  --  90 69   BILITOTAL 0.7  --  0.4 0.4       Recent Labs   Lab Test 01/15/22  0602 01/14/22  0608 04/18/21  0439   MAG 2.0 1.9 1.7     Recent Labs   Lab Test 01/15/22  0602 01/14/22  0608 12/01/20  1354   PHOS 3.7 3.4 3.8     Recent Labs   Lab Test 01/15/22  0602 01/14/22  0608 01/13/22  0612   ALIVIA 8.7 9.0 9.1       Lab Results   Component Value Date    ALIVIA 8.7 01/15/2022     Lab Results   Component Value Date    WBC 3.6 (L) 01/15/2022    HGB 8.4 (L) 01/15/2022    HCT 26.9 (L) 01/15/2022     (H) 01/15/2022     (L) 01/15/2022     Lab Results   Component Value Date     01/15/2022    POTASSIUM 3.9 01/15/2022    CHLORIDE 111 (H) 01/15/2022    CO2 27 01/15/2022    GLC 87 01/15/2022     Lab Results   Component Value Date    BUN 53 (H) 01/15/2022    CR 2.44 (H) 01/15/2022     Lab Results   Component Value Date    MAG 2.0 01/15/2022     Lab Results   Component Value Date    PHOS 3.7 01/15/2022       Creatinine   Date Value Ref Range Status   01/15/2022 2.44 (H) 0.52 - 1.04 mg/dL Final   01/14/2022 2.37 (H) 0.52 - 1.04 mg/dL Final   01/13/2022 2.01 (H) 0.52 - 1.04 mg/dL Final   01/12/2022 1.99 (H) 0.52 - 1.04 mg/dL Final   10/05/2021 2.28 (H) 0.52 - 1.04 mg/dL Final   07/12/2021 2.11 (H) 0.52 - 1.04 mg/dL Final   06/07/2021 1.61 (H) 0.52 - 1.04 mg/dL Final   05/10/2021 1.76 (H) 0.52 - 1.04 mg/dL Final    04/28/2021 1.67 (H) 0.52 - 1.04 mg/dL Final   04/26/2021 1.92 (H) 0.52 - 1.04 mg/dL Final   04/22/2021 1.66 (H) 0.52 - 1.04 mg/dL Final   04/21/2021 1.43 (H) 0.52 - 1.04 mg/dL Final       Attestation:  I have reviewed today's vital signs, notes, medications, labs and imaging.     Leonardo Chaudhary MD

## 2022-01-15 NOTE — PLAN OF CARE
Ok to discharge per provider. Pt daughter to transport to [facility]. Discharge instructions reviewed by previous nurse. Reiterated to daughter about discharge medications. Pt left with all belongings.

## 2022-01-15 NOTE — PLAN OF CARE
Pt here with CHF exacerbation. A&O.x4. VSS. Tele SR. Renal diet, thin liquids, 1500 fluid restriction in placed. L sided thoracentesis site CDI. Takes pills whole. Up with A1/GB rolling walker. Pure wick in placed. Denies pain. Pt scoring green on the Aggression Stop Light Tool. Discharge pending.

## 2022-01-15 NOTE — DISCHARGE SUMMARY
[facility]  Hospitalist Discharge Summary      Date of Admission:  1/12/2022  Date of Discharge:  1/15/2022  Discharging Provider: Margarita Duran MD      Discharge Diagnoses   Acute on chronic diastolic heart failure, euvolemic at discharge  Left pleural effusion  Acute hypoxic respiratory failure, likely secondary to above, resolved prior to discharge  DRAGAN on chronic kidney disease stage IV  Acute on chronic anemia, anemia of chronic disease  Chronic thrombocytopenia  Hypertension, accelerated  History of paroxysmal atrial fibrillation  History of COVID-pneumonia in August 2020  Rheumatoid arthritis  Gout  Medical noncompliance  Gastroesophageal reflux disease  Anxiety    Follow-ups Needed After Discharge   Follow-up Appointments     Follow-up and recommended labs and tests       Follow up with primary care provider, Laurie Conteh, within 7 days for   hospital follow- up.  The following labs/tests are recommended: BMP,   hemoglobin, proBNP.   Follow up [facility] in 2 weeks with NP/PA, labs with primary   MD in one week (BMP).  Follow up in the cardiology clinic in 1-2 weeks.             Unresulted Labs Ordered in the Past 30 Days of this Admission     Date and Time Order Name Status Description    1/13/2022  2:15 PM Cytology, non-gynecologic In process     1/13/2022  2:15 PM Pleural fluid Aerobic Bacterial Culture Routine with Gram Stain Preliminary           Discharge Disposition   Discharged to home  Condition at discharge: Stable      Hospital Course   Ghislaine Walls is a 86 year old female who was admitted on 1/12/2022 with shortness of breath/dyspnea on exertion and hypoxia with exertion.     Acute on chronic diastolic CHF exacerbation  Left pleural effusion  H/o COVID pneumonia (08/2020)--tested negative on admission 1/12/22  Paroxysmal atrial fibrillation (on Eliquis)  Accelerated Hypertension  BNP on admission elevated at 12,009; chest x-ray noted with vascular  congestion suggestive of CHF, also noted by basilar airspace opacities, left>right and small left pleural effusion; BP markedly elevated to 190s on admission  * Echo from 2/21 with EF 60 to 65%, grade 2 diastolic dysfunction; repeat echo 1/13 unchanged.  * left sided thoracentesis performed on 1/13 with 250ml removed  -reports being compliant with her medications including PTA torsemide  - PTA amlodipine, clonidine, hydralazine continued  - PTA metoprolol changed to coreg 6.25mg BID and isordil 20mg TID added 1/14 due to elevated BPs  - received intermittent IV diuresis, patient was switched to torsemide 40mg daily (PTA on 20 mg daily) and tolerated it  -fluid restriction to 1500 ML per day, patient reports drinking as much water as she can at home .  There is also report of none compliance-see progress note by previous provider for details on goals of care/medical noncompliance  - low sodium renal diet in hospital (she does not have ability to get specialized meals (Low sodium etc) at [facility])  - hypoxia and dyspnea with activity resolved 1/14  - continue PTA Eliquis  - PT eval recommends home  - she does not tolerate compression stockings  - cardiology follow while in-house, follow up in clinic in 1-2 weeks     Stage IV CKD  Recent baseline creatinine around 1.6 to 2.2; follows-up with nephrology as outpatient  - Cr on admission 1.99; Cr up to 2.37 on 1/14  -Nephrology followed the patient while in-house  -Creatinine remained stable around 2.44  -Plan for outpatient nephrology follow-up in 2 weeks with BMP prior  -Avoid any nephrotoxins     Anemia of chronic disease  chronic thrombocytopenia  -baseline hemoglobin around 9 to 10, and platelets baseline around 140-160s. on admission hemoglobin 10.5, platelet 124  - Hgb drop to 8.6 with Platelets 109 on 1/14  - Nephrology gave venofer, B12 injection and aranesp on 1/14  -No acute bleeding, hemoglobin remained stable for 24 hours prior to discharge     Rheumatoid  arthritis  Gout  -chronic and stable  -Continue PTA allopurinol, Plaquenil, prednisone     Hypothyroidism  -continue PT Synthroid     GERD: continue PTA Protonix     Anxiety: continue PTA Remeron       Consultations This Hospital Stay   CARE MANAGEMENT / SOCIAL WORK IP CONSULT  PHYSICAL THERAPY ADULT IP CONSULT  CARDIOLOGY IP CONSULT  NEPHROLOGY IP CONSULT  NEPHROLOGY IP CONSULT    Code Status   No CPR- Do NOT Intubate    Time Spent on this Encounter   I, Margarita Duran MD, personally saw the patient today and spent greater than 30 minutes discharging this patient.       Margarita Duran MD  [facility]ART [facility]ARE  [address] [place] [address], [address]  [facility] [address]  Phone: [phone]  ______________________________________________________________________    Physical Exam   Vital Signs: Temp: 98.1  F (36.7  C) Temp src: Oral BP: (!) 174/70 Pulse: 65   Resp: 16 SpO2: 94 % O2 Device: None (Room air)    Weight: 135 lbs 14.4 oz  Exam:  Constitutional: Awake, alert and no distress. Appears comfortable  Head: Normocephalic. No masses, lesions, tenderness or abnormalities  ENT: ENT exam normal, no neck nodes or sinus tenderness  Cardiovascular: RRR.  2+ murmurs, no rubs or JVD  Respiratory: Normal WOB,b/l equal air entry, no wheezes or crackles   Gastrointestinal: Abdomen soft, non-tender. BS normal. No masses, organomegaly  : Deferred  Extremities : No edema , no clubbing or cyanosis        Primary Care Physician   Laurie Conteh    Discharge Orders      Basic metabolic panel     Hemoglobin     N terminal pro BNP outpatient     Discharge Order: F/U with Cardiac  CHRISTINA      Medication Therapy Management Referral      Home care nursing referral      Reason for your hospital stay    CHF exacerbation, acute kidney injury on chronic kidney disease     Follow-up and recommended labs and tests     Follow up with primary care provider, Laurie Conteh, within 7 days for hospital follow- up.  The following  labs/tests are recommended: BMP, hemoglobin, proBNP.   Follow up [facility] in 2 weeks with NP/PA, labs with primary MD in one week (BMP).  Follow up in the cardiology clinic in 1-2 weeks.     Activity    Your activity upon discharge: activity as tolerated     Monitor and record    blood pressure daily and readings: Nephrology/cardiology/PCP for any medication adjustment  fluid intake and output daily  Limit intake to  1500 cc per day   pulse daily  weight every day, inform cardiology if weight gain more than 2 pounds per day and/or more than 5 pounds per week     MD face to face encounter    Documentation of Face to Face and Certification for Home Health Services    I certify that patient: Ghislaine Walls is under my care and that I, or a nurse practitioner or physician's assistant working with me, had a face-to-face encounter that meets the physician face-to-face encounter requirements with this patient on: 1/15/2022.    This encounter with the patient was in whole, or in part, for the following medical condition, which is the primary reason for home health care: Recurrent CHF exacerbation.    I certify that, based on my findings, the following services are medically necessary home health services: Nursing.    My clinical findings support the need for the above services because: Nurse is needed: To assess medical condition after changes in medications or other medical regimen..    Further, I certify that my clinical findings support that this patient is homebound (i.e. absences from home require considerable and taxing effort and are for medical reasons or [facility] services or infrequently or of short duration when for other reasons) because: Requires assistance of another person or specialized equipment to access medical services because patient: Requires supervision of another for safe transfer...    Based on the above findings. I certify that this patient is confined to the home and needs  intermittent skilled nursing care, physical therapy and/or speech therapy.  The patient is under my care, and I have initiated the establishment of the plan of care.  This patient will be followed by a physician who will periodically review the plan of care.  Physician/Provider to provide follow up care: Laurie Conteh    Attending hospital physician (the Medicare certified [facility] provider): Margarita Duran MD  Physician Signature: See electronic signature associated with these discharge orders.  Date: 1/15/2022     Diet    Follow this diet upon discharge: Orders Placed This Encounter      Fluid restriction 1500 ML FLUID      Combination Diet Renal Diet; 2 gm NA Diet       Significant Results and Procedures   Results for orders placed or performed during the hospital encounter of 01/12/22   Chest XR,  PA & LAT    Narrative    CHEST TWO VIEWS 1/12/2022 4:28 PM     HISTORY: SOB.    COMPARISON: 6/7/2021    FINDINGS: Abnormal airspace opacity left lung base associated with  small effusion. There is also abnormal opacity at the right  costophrenic angle. No pneumothorax. Heart size enlarged but similar  to prior. Vascular congestion noted.       Impression    IMPRESSION: Cardiomegaly and vascular congestion are suggestive of  CHF. Bibasilar airspace opacities, left worse than right, could  represent atelectasis or pneumonia. Small left pleural effusion.     GILBERT FRANK MD         SYSTEM ID:  [id number]   US Thoracentesis    Narrative    EXAM:  1. LEFT THORACENTESIS  2. ULTRASOUND GUIDANCE  LOCATION: [facility]  DATE/TIME: 1/13/2022 3:43 PM    INDICATION: Pleural effusion.    PROCEDURE: Informed consent obtained. Time out performed. The chest  was prepped and draped in a sterile fashion. 10 mL of 1% lidocaine was  infused into local soft tissues. A 5 Beninese catheter system was  introduced into the pleural effusion under ultrasound guidance.    0.25 liters of clear fluid were removed and sent to lab if  requested.    Patient tolerated procedure well.    Ultrasound images have been permanently captured for documentation.      Impression    IMPRESSION:  Status post ultrasound-guided left thoracentesis.    AREN SPIVEY MD         SYSTEM ID:  [id number]   XR Chest 2 Views    Narrative    CHEST TWO VIEWS    2022 12:35 PM       INDICATION: Acute hypoxic respiratory failure.  COMPARISON: 2022.       Impression    IMPRESSION: Left pleural effusion nearly completely resolved status  post interval thoracentesis. Trace right pleural effusion similar.  Mild atelectasis or infiltrate in the left lower lobe. Lungs are  otherwise clear.    AREN SPIVEY MD         SYSTEM ID:  [id number]   Echocardiogram Complete     Value    LVEF  55%    Narrative    [id number]  [id number]  [id number]  [id number]^FELIPE^SUSANA^LUIS     [facility]  Echocardiography Laboratory  [address] [place] [address]     Name: ROSIO YOUNG  MRN: [id number]  : 1935  Study Date: 2022 09:33 AM  Age: 86 yrs  Gender: Female  Patient Location: [facility]  Reason For Study: CHF  Ordering Physician: SUSANA WANG  Referring Physician: SUSANA WANG  Performed By: Chel Celis RDCS     BSA: 1.6 m2  Height: 61 in  Weight: 141 lb  HR: 60  BP: 183/74 mmHg  ______________________________________________________________________________  Procedure  Complete Portable Echo Adult. Optison (NDC #7638-6659) given intravenously.  ______________________________________________________________________________  Interpretation Summary     1. The left ventricle is normal in structure, function and size. The visual  ejection fraction is estimated at 55%.  2. The right ventricle is normal in structure, function and size.  3. No significant valve disease.  4. Moderate left pleural effusion     No changes from echo 2021.  ______________________________________________________________________________  Left Ventricle  The left  ventricle is normal in structure, function and size. There is  moderate eccentric left ventricular hypertrophy. The visual ejection fraction  is estimated at 55%. Grade I or early diastolic dysfunction. Normal left  ventricular wall motion.     Right Ventricle  The right ventricle is normal in structure, function and size.     Atria  The left atrium is severely dilated. Right atrial size is normal. There is no  atrial shunt seen.     Mitral Valve  There is mild mitral annular calcification. There is mild (1+) mitral  regurgitation.     Tricuspid Valve  There is mild (1+) tricuspid regurgitation. The right ventricular systolic  pressure is approximated at 33.2 mmHg plus the right atrial pressure.     Aortic Valve  There is mild (1+) aortic regurgitation.     Pulmonic Valve  The pulmonic valve is normal in structure and function.     Vessels  Normal ascending, transverse (arch), and descending aorta. The inferior vena  cava was normal in size with preserved respiratory variability.     Pericardium  There is no pericardial effusion. Moderate left pleural effusion.     Rhythm  Sinus rhythm was noted.  ______________________________________________________________________________  MMode/2D Measurements & Calculations  IVSd: 1.8 cm     LVIDd: 4.4 cm  LVIDs: 2.7 cm  LVPWd: 1.1 cm  FS: 37.6 %  LV mass(C)d: 257.7 grams  LV mass(C)dI: 158.3 grams/m2  Ao root diam: 3.1 cm  LA dimension: 4.2 cm  asc Aorta Diam: 2.9 cm  LA/Ao: 1.4  LA Volume (BP): 105.0 ml  LA Volume Index (BP): 64.4 ml/m2  RWT: 0.49     Doppler Measurements & Calculations  MV E max cheikh: 129.0 cm/sec  MV A max cheikh: 103.7 cm/sec  MV E/A: 1.2  MV max P.0 mmHg  MV mean P.5 mmHg  MV V2 VTI: 41.0 cm  MV dec slope: 544.9 cm/sec2  MV dec time: 0.24 sec  PA acc time: 0.12 sec  TR max cheikh: 287.8 cm/sec  TR max P.2 mmHg  E/E' av.9  Lateral E/e': 18.1  Medial E/e': 41.7      ______________________________________________________________________________  Report approved by: Javier Holt 01/13/2022 11:04 AM               Discharge Medications   Current Discharge Medication List      START taking these medications    Details   carvedilol (COREG) 6.25 MG tablet Take 1 tablet (6.25 mg) by mouth 2 times daily (with meals)  Qty: 60 tablet, Refills: 0    Comments: Future refills by PCP Dr. Laurie Conteh with phone number [phone].  Associated Diagnoses: Acute on chronic congestive heart failure, unspecified heart failure type (H)      isosorbide dinitrate (ISORDIL) 20 MG tablet Take 1 tablet (20 mg) by mouth 3 times daily  Qty: 90 tablet, Refills: 0    Comments: Future refills by PCP Dr. Laurie Conteh with phone number [phone].  Associated Diagnoses: Acute on chronic congestive heart failure, unspecified heart failure type (H)         CONTINUE these medications which have CHANGED    Details   torsemide (DEMADEX) 20 MG tablet Take 2 tablets (40 mg) by mouth daily  Qty: 60 tablet, Refills: 0    Associated Diagnoses: Acute on chronic congestive heart failure, unspecified heart failure type (H)         CONTINUE these medications which have NOT CHANGED    Details   Abatacept (ORENCIA IV) Inject 500 mg into the vein every 30 days       acetaminophen (TYLENOL) 500 MG tablet Take 1,000 mg by mouth nightly as needed       allopurinol (ZYLOPRIM) 100 MG tablet Take 1 tablet (100 mg) by mouth daily  Qty: 90 tablet, Refills: 3    Associated Diagnoses: Chronic gout without tophus, unspecified cause, unspecified site      amLODIPine (NORVASC) 10 MG tablet Take 1 tablet (10 mg) by mouth daily  Qty: 90 tablet, Refills: 3    Associated Diagnoses: Essential hypertension      apixaban ANTICOAGULANT (ELIQUIS) 2.5 MG tablet Take 1 tablet (2.5 mg) by mouth 2 times daily  Qty: 180 tablet, Refills: 3    Associated Diagnoses: Chronic heart failure with preserved ejection fraction (H)      calcium  carbonate 600 mg-vitamin D 400 units (CALTRATE) 600-400 MG-UNIT per tablet Take 1 tablet by mouth daily 1000 vit d, calcuim 600       cloNIDine (CATAPRES) 0.1 MG tablet Take 1 tablet (0.1 mg) by mouth 2 times daily  Qty: 60 tablet, Refills: 3    Associated Diagnoses: Hypertension goal BP (blood pressure) < 140/90      Darbepoetin Alonso-Albumin (ARANESP IJ) Monthly as needed      ferrous sulfate (FEROSUL) 325 (65 Fe) MG tablet Take 325 mg by mouth daily (with breakfast)      hydrALAZINE (APRESOLINE) 100 MG tablet Take 1 tablet by mouth 4 times daily.  Qty: 360 tablet, Refills: 1    Associated Diagnoses: Essential hypertension      hydroxychloroquine (PLAQUENIL) 200 MG tablet Take 200 mg by mouth daily      levothyroxine (SYNTHROID/LEVOTHROID) 25 MCG tablet Take 1 tablet (25 mcg) by mouth daily  Qty: 90 tablet, Refills: 3    Associated Diagnoses: Hypothyroidism, unspecified type      melatonin 5 MG tablet Take 10 mg by mouth nightly as needed for sleep       mirtazapine (REMERON) 15 MG tablet Take 1 tablet by mouth daily At Bedtime.  Qty: 90 tablet, Refills: 3    Associated Diagnoses: Failure to thrive in adult      ondansetron (ZOFRAN-ODT) 4 MG ODT tab Take 4 mg by mouth every 8 hours as needed for nausea      pantoprazole (PROTONIX) 40 MG EC tablet Take 1 tablet (40 mg) by mouth daily  Qty: 90 tablet, Refills: 3    Associated Diagnoses: Gastroesophageal reflux disease with esophagitis, unspecified whether hemorrhage      predniSONE (DELTASONE) 5 MG tablet Take 1 tablet (5 mg) by mouth daily  Qty: 30 tablet, Refills: 0    Associated Diagnoses: Rheumatoid arthritis, involving unspecified site, unspecified whether rheumatoid factor present (H)      sodium bicarbonate 650 MG tablet Take 650 mg by mouth 2 times daily          STOP taking these medications       metoprolol tartrate (LOPRESSOR) 50 MG tablet Comments:   Reason for Stopping:             Allergies   Allergies   Allergen Reactions     Oxybutynin Itching      Seasonal Allergies

## 2022-01-15 NOTE — PLAN OF CARE
Physical Therapy Discharge Summary    Reason for therapy discharge:    Discharged to home.    Progress towards therapy goal(s). See goals on Care Plan in [facility] electronic health record for goal details.  Goals partially met.  Barriers to achieving goals:   discharge from facility.    Therapy recommendation(s):    No further therapy is recommended.

## 2022-01-17 ENCOUNTER — PATIENT OUTREACH (OUTPATIENT)
Dept: CARE COORDINATION | Facility: CLINIC | Age: 87
End: 2022-01-17
Payer: MEDICARE

## 2022-01-17 ENCOUNTER — TELEPHONE (OUTPATIENT)
Dept: CARDIOLOGY | Facility: CLINIC | Age: 87
End: 2022-01-17

## 2022-01-17 LAB
PATH REPORT.COMMENTS IMP SPEC: NORMAL
PATH REPORT.FINAL DX SPEC: NORMAL
PATH REPORT.GROSS SPEC: NORMAL
PATH REPORT.MICROSCOPIC SPEC OTHER STN: NORMAL
PATH REPORT.RELEVANT HX SPEC: NORMAL

## 2022-01-17 PROCEDURE — 88305 TISSUE EXAM BY PATHOLOGIST: CPT | Mod: 26

## 2022-01-17 PROCEDURE — 88112 CYTOPATH CELL ENHANCE TECH: CPT | Mod: 26

## 2022-01-17 NOTE — PROGRESS NOTES
"Clinic Care Coordination Contact  [facility]/Voicemail    Referral Source: IP Report  Clinical Data: Care Coordinator Outreach  Outreach attempted x 1.  RNCC reached out and spoke with patient; introduced self, discussed role of Care Coordination and explained reason for call.  Per patient, \"No, this is not a good time, call back some other time.\"    Plan: Care Coordinator will send care coordination introduction letter with care coordinator contact information and explanation of care coordination services via mail. Care Coordinator will try to reach patient again in 1-2 business days.    Ayanna Reid RN, BSN, PHN  Primary Care / Care Coordinator   [facility]  E-mail [email or web address]   [phone]        "

## 2022-01-17 NOTE — TELEPHONE ENCOUNTER
Patient was evaluated by cardiology while inpatient for acute hypoxic resp failure, acute on chronic HFpEF in presence of hypertension and possible medication noncompliance. PMH: PAF- Eliquis, HFpEF, HTN, stage IV CKD, hypothyroidism, chronic anemia with MGUS, history of COVID-19 infection and COVID PNA in 2020, and chronic fatigue. Gentle IV Bumex diuresed and transitioned to po Torsemide at higher PTA dosage. Started on Coreg and Isordil at time of discharge for better BP control. PTA Metoprolol was discontinued. Writer attempted to call patient to discuss any post hospital d/c questions, review discharge medication, and confirm f/u appts, but no answer and no VM available. Will try back at a later date. RN will confirm with patient that she has labs scheduled on 1/20/22 at 1430 followed with an OV at 1530 scheduled with CHRISTINA Elle Mariano at our [place] [facility]. Will remind patient to weigh self every AM, after waking and using the restroom, but before breakfast and medications. Call clinic for a weight gain of 2 lbs overnight or 5 lbs in a week. Low Na+ diet to be encouraged. Pt will be instructed to bring daily wt/BP diary and medications with to f/u OV. Pt was discharged back to [facility] with [facility] services. KHANH Camargo RN.

## 2022-01-18 ENCOUNTER — TELEPHONE (OUTPATIENT)
Dept: FAMILY MEDICINE | Facility: CLINIC | Age: 87
End: 2022-01-18
Payer: MEDICARE

## 2022-01-18 ENCOUNTER — PATIENT OUTREACH (OUTPATIENT)
Dept: NURSING | Facility: CLINIC | Age: 87
End: 2022-01-18
Payer: MEDICARE

## 2022-01-18 ENCOUNTER — TELEPHONE (OUTPATIENT)
Dept: FAMILY MEDICINE | Facility: CLINIC | Age: 87
End: 2022-01-18

## 2022-01-18 ENCOUNTER — TRANSFERRED RECORDS (OUTPATIENT)
Dept: HEALTH INFORMATION MANAGEMENT | Facility: CLINIC | Age: 87
End: 2022-01-18

## 2022-01-18 LAB
BACTERIA PLR CULT: NO GROWTH
GRAM STAIN RESULT: NORMAL
GRAM STAIN RESULT: NORMAL

## 2022-01-18 NOTE — LETTER
January 18, 2022      Ghislaine Walls  [address]  [place] [address]        Dear Ghislaine,     Your hospital team has recommended you schedule a Medication Therapy Management (MTM) appointment. MTM is designed to help you get the most of out of your medicines.     During an MTM appointment a specially trained pharmacist will review all of your medicines, both prescription and over-the-counter. They will make sure your medicines are the best choice for you and are safe and convenient for you.  MTM pharmacists work together with you and your doctor to help you understand your medicines, solve any problems related to your medicines and help you get the best results from taking your medicines.     At [facility] we strongly believe in a team approach to health care. We want to help you understand your medicines and health conditions. To learn more about how you might benefit from MTM services, watch the patient video at [email or web address].     To make an appointment, please call the clinic at [phone] or the MTM scheduling line at [phone] (toll-free at [phone]).    We look forward to hearing from you!        Julissa Baptiste PharmD, [facility]  Medication Therapy Management Provider  Pager: [phone]     Becky Hernandez, PharmD  Medication Therapy Management Resident  Pager: [phone]

## 2022-01-18 NOTE — PROGRESS NOTES
"Clinic Care Coordination Contact    Clinic Care Coordination Contact  OUTREACH with Post Discharge Assessment    Referral Information:  Referral Source: IP Report    Primary Diagnosis: Respiratory Disorders - other (Acute on chronic diastolic heart failure, euvolemic at dischargeLeft pleural effusion)    Chief Complaint   Patient presents with     Clinic Care Coordination - Initial     Clinic Care Coordination - Post Hospital      [place] Utilization:   [facility]  Clinic Utilization:  [facility]  Difficulty keeping appointments:: No  Compliance Concerns: No  No-Show Concerns: No  No PCP office visit in Past Year: No  Utilization    Hospital Admissions  3             ED Visits  4             No Show Count (past year)  0                Current as of: 1/17/2022  8:44 PM            Clinical Concerns:  Current Medical Concerns:  Recent discharge from hospital; advanced age    Current Behavioral Concerns: None    Education Provided to patient:   RNCC called and spoke with patient; introduced self, discussed role of Care Coordination and explained reason for call  Pain  Pain (GOAL):: No  Health Maintenance Reviewed:    Clinical Pathway: None    Admission:    Admission Date: 01/12/22   Reason for Admission: Acute on chronic diastolic heart failure, euvolemic at dischargeLeft pleural effusion  Discharge:   Discharge Date: 01/15/22  Discharge Diagnosis: Acute on chronic diastolic heart failure, euvolemic at dischargeLeft pleural effusion    Enrollment  Primary Care Care Coordination Status: Declined    Discharge Assessment  How are you doing now that you are home?: \"I'm ok\"  How are your symptoms? (Red Flag symptoms escalate to triage hotline per guidelines): Improved  Do you feel your condition is stable enough to be safe at home until your provider visit?: Yes  Does the patient have their discharge instructions? : Yes  Does the patient have questions regarding their discharge " instructions? : No  Were you started on any new medications or were there changes to any of your previous medications? : No  Does the patient have all of their medications?: Yes  Do you have questions regarding any of your medications? : No  Do you have all of your needed medical supplies or equipment (DME)?  (i.e. oxygen tank, CPAP, cane, etc.): Yes  Discharge follow-up appointment scheduled within 14 calendar days? : Yes  Discharge Follow Up Appointment Date: 01/21/22  Discharge Follow Up Appointment Scheduled with?: Primary Care Provider    Post-op (Clinicians Only)  Did the patient have surgery or a procedure: No  Fever: No  Chills: No  Eating & Drinking: eating and drinking without complaints/concerns  PO Intake: low fat diet (Low Sodium diet)  Additional Symptoms:  (Denies)  Bowel Function: normal (Does c/o occasional constipation)  Urinary Status: voiding without complaint/concerns    Medication Management:  Medication review status: Medications reviewed and no changes reported per patient.        Current Outpatient Medications   Medication     acetaminophen (TYLENOL) 500 MG tablet     allopurinol (ZYLOPRIM) 100 MG tablet     amLODIPine (NORVASC) 10 MG tablet     apixaban ANTICOAGULANT (ELIQUIS) 2.5 MG tablet     calcium carbonate 600 mg-vitamin D 400 units (CALTRATE) 600-400 MG-UNIT per tablet     cloNIDine (CATAPRES) 0.1 MG tablet     ferrous sulfate (FEROSUL) 325 (65 Fe) MG tablet     hydrALAZINE (APRESOLINE) 100 MG tablet     hydroxychloroquine (PLAQUENIL) 200 MG tablet     levothyroxine (SYNTHROID/LEVOTHROID) 25 MCG tablet     pantoprazole (PROTONIX) 40 MG EC tablet     predniSONE (DELTASONE) 5 MG tablet     torsemide (DEMADEX) 20 MG tablet     Abatacept (ORENCIA IV)     carvedilol (COREG) 6.25 MG tablet     Darbepoetin Alonso-Albumin (ARANESP IJ)     isosorbide dinitrate (ISORDIL) 20 MG tablet     melatonin 5 MG tablet     mirtazapine (REMERON) 15 MG tablet     ondansetron (ZOFRAN-ODT) 4 MG ODT tab      sodium bicarbonate 650 MG tablet     No current facility-administered medications for this visit.     Functional Status:  Dependent ADLs:: Ambulation-walker  Dependent IADLs:: Transportation,Incontinence,Medication Management,Laundry  Bed or wheelchair confined:: No  Mobility Status: Independent w/Device  Any fall with injury in the past year?: No    Living Situation:  Current living arrangement:: I live in assisted living ([facility])  Type of residence:: Assisted living    Lifestyle & Psychosocial Needs:    Social Determinants of Health     Tobacco Use: Medium Risk     Smoking Tobacco Use: Former Smoker     Smokeless Tobacco Use: Never Used   Alcohol Use: Not on file   Financial Resource Strain: Low Risk      Difficulty of Paying Living Expenses: Not hard at all   Food Insecurity: No Food Insecurity     Worried About Running Out of Food in the Last Year: Never true     Ran Out of Food in the Last Year: Never true   Transportation Needs: No Transportation Needs     Lack of Transportation (Medical): No     Lack of Transportation (Non-Medical): No   Physical Activity: Not on file   Stress: Not on file   Social Connections: Not on file   Intimate Partner Violence: Not on file   Depression: Not at risk     PHQ-2 Score: 0   Housing Stability: Not on file     Diet:: Low saturated fat,No added salt  Inadequate nutrition (GOAL):: No  Tube Feeding: No  Inadequate activity/exercise (GOAL):: No  Significant changes in sleep pattern (GOAL): No  Transportation means:: Regular car,Family     Shinto or spiritual beliefs that impact treatment:: No  Mental health DX:: No  Mental health management concern (GOAL):: No  Chemical Dependency Status: No Current Concerns  Chemical Dependency Management:  (NA)  Informal Support system:: Children,Neighbors      Resources and Interventions:  Current Resources:   Skilled Home Care Services: Skilled Nursing  Community Resources: None  Supplies used at home:: None,Incontinence  Supplies (Pads)  Equipment Currently Used at Home: walker, rolling,cane, straight,wheelchair, manual  Employment Status: retired (Office work)     Advance Care Plan/Directive  Advanced Care Plans/Directives on file:: Yes  Type Advanced Care Plans/Directives: DNR/DNI    Referrals Placed: None     Goals:     Patient/Caregiver understanding: Yes     Future Appointments              In 2 days [facility] [place] PEDRO    In 2 days Elle Mariano APRN CNP [facility], [facility] CLIN    In 3 days Laurie Conteh MD [facility], CS        Plan:   -Patient will contact the care team with questions, concerns, support needs   -Patient will use the clinic as a resource and understands (s)he can contact the [facility] with 24/7 after hours services available  -Care Coordinator will remain available as needed  -No further care coordination at this time    Ayanna Reid RN, BSN, PHN  Primary Care / Care Coordinator   [facility]  E-mail [email or web address]   [phone]

## 2022-01-18 NOTE — TELEPHONE ENCOUNTER
Lindsey from [facility] called requesting approval for delay of start of care visit per patients request to 01/19/2022.     Verbal approval given.

## 2022-01-18 NOTE — TELEPHONE ENCOUNTER
Sent referral letter.    Lien McelroyD, [facility]  Medication Therapy Management Provider  Pager: [phone]

## 2022-01-18 NOTE — LETTER
[facility] CARE COORDINATION  [address]  [facility] [address]    January 18, 2022    Ghislaine Walls  [address]  [place] [place] [address]      Dear Ghislaine,    I am a clinic care coordinator who works with Laurie Conteh MD at [facility]. I wanted to thank you for spending the time to talk with me.  Below is a description of clinic care coordination and how I can further assist you.      The clinic care coordination team is made up of a registered nurse,  and community health worker who understand the health care system. The goal of clinic care coordination is to help you manage your health and improve access to the health care system in the most efficient manner. The team can assist you in meeting your health care goals by providing education, coordinating services, strengthening the communication among your providers and supporting you with any resource needs.    Please feel free to contact me with any questions or concerns. We are focused on providing you with the highest-quality healthcare experience possible and that all starts with you.     Sincerely,     Ayanna Reid RN, BSN, PHN  Primary Care / Care Coordinator   [facility]  E-mail [email or web address]   [phone]

## 2022-01-18 NOTE — TELEPHONE ENCOUNTER
MTM referral from: Transitions of Care (recent hospital discharge or ED visit)    MTM referral outreach attempt #2 on January 18, 2022 at 2:15 PM      Outcome: Patient not reachable after several attempts, will route to MTM Pharmacist/Provider as an FYI.  [facility] scheduling number is [phone].  Thank you for the referral.    Bowen Perales, MT coordinator

## 2022-01-19 ENCOUNTER — LAB REQUISITION (OUTPATIENT)
Dept: LAB | Facility: CLINIC | Age: 87
End: 2022-01-19
Payer: MEDICARE

## 2022-01-19 ENCOUNTER — TELEPHONE (OUTPATIENT)
Dept: FAMILY MEDICINE | Facility: CLINIC | Age: 87
End: 2022-01-19
Payer: MEDICARE

## 2022-01-19 ENCOUNTER — MEDICAL CORRESPONDENCE (OUTPATIENT)
Dept: HEALTH INFORMATION MANAGEMENT | Facility: CLINIC | Age: 87
End: 2022-01-19
Payer: MEDICARE

## 2022-01-19 DIAGNOSIS — U07.1 COVID-19: ICD-10-CM

## 2022-01-19 PROCEDURE — U0003 INFECTIOUS AGENT DETECTION BY NUCLEIC ACID (DNA OR RNA); SEVERE ACUTE RESPIRATORY SYNDROME CORONAVIRUS 2 (SARS-COV-2) (CORONAVIRUS DISEASE [COVID-19]), AMPLIFIED PROBE TECHNIQUE, MAKING USE OF HIGH THROUGHPUT TECHNOLOGIES AS DESCRIBED BY CMS-2020-01-R: HCPCS | Mod: ORL | Performed by: INTERNAL MEDICINE

## 2022-01-19 NOTE — TELEPHONE ENCOUNTER
MOJGAN Reyes called requesting approval of SN order 2 x a wk 1 wk; 1 x a wk for 3 wks and 1 visit x every other week for 4 weeks.     Verbal approval given.     RN wan'kirstie PCP aware pt's last BM was Friday. Has active bowel sounds. No complains of pain or other symptoms at this time. Triage advised pt or RN follow up with clinic is any new symptoms or unable to have a BM. RN verbalized understanding and agreemet with plan.

## 2022-01-19 NOTE — PROGRESS NOTES
Cardiology Clinic Progress Note  Ghislaine Walls MRN# [id number]   YOB: 1935 Age: 86 year old   Primary Cardiologist: Dr. Fuentes  Reason for visit: Post hospital follow up            Assessment and Plan:   Ghislaine Walls is a very pleasant 86 year old female with a history of HFpEF, CKD, hypertension, paroxsymal atrial fibrillation on eliquis, chronic anemia, rheumatoid arthritis and hypothyroidism.     1. Chronic diastolic heart failure - LVEF 55%, RV normal size/function.    - NYHA class III, stage C   - Fluid status : euvolemic   - Diuretic regimen : DECREASE torsemide to 20mg daily with additional 20mg daily for weight gain > 2# overnight.    - Aldosterone antagonist : none due to CKD  2. Atrial fibrillation, paroxsymal - in sinus rhythm on exam today, on eliquis for thromboembolic prophylaxis due to elevated [id number] score.   3. CKD IV - baseline creatinine 1.6-2.4, decline in renal function noted on labs today, creatinine 2.82 which is up from 2.44 at hospital discharge on 1/15/22.   4. Hypertension  5. Chronic anemia - stable, hemoglobin 9.9    I saw Ghislaine today for post hospital follow up. She is doing well from a heart failure standpoint her weight is down from 136# which was her first home weight after discharge to 128#. Unfortunately her renal function has further declined. Creatinine increased from 2.44 -> 2.82. Her daughter shares with me today that she has been helping her mom make significant dietary changes. No longer eating meals at [place] given no way to manage salt and started getting healthy choice meals that are less than 500mg. I also provided them with a list of companies that provide low sodium meals.     Due to her decline in renal function recommended decreasing her torsemide to 20mg daily with additional 20mg as needed for weight gain > 2#. Reinforced the importance of taking PRN doses as needed. Reviewed consideration for scheduled 40mg dose days but decided to try  PRN and see how she does.     Her fatigue is multifactorial likely secondary to chronic medical conditions and deconditioning/recent hospitalization for decompensated HF.     Patient and her daughter bring up concerns about blood draws and not wanting to come to clinic visits. I further explored goals of cares with them today, introducing hospice/comfort based approach to care. The daughter states they have talked about this. She then asks me if less frequent visits is possible, we reviewed that to help manage her conditions and prevent rehospitalization due to decompensated heart failure close monitoring especially after recent hospitalization is needed. Wish to continue restorative care at this time.     Changes today: DECREASE torsemide to 20mg daily with additional 20mg PRN for weight gain > 2#    Follow up plan:     Cardiology follow up with me in 2 weeks with BMP prior    Advised to schedule follow up with nephrology as requested during hospitalization        History of Presenting Illness:    Ghislaine Walls is a very pleasant 86 year old female with a history of HFpEF, CKD, hypertension, paroxsymal atrial fibrillation on eliquis, chronic anemia, rheumatoid arthritis and hypothyroidism.     Primary cardiologist Dr. Fuentes. In October 2021 at which time she complained of generalized fatigue. Her metoprolol was decreased to 25mg BID. She was offered the option of stress perfusion imaging to rule out ischemic, but her symptoms were not suggestive of angina and she wished to defer. She had a HF hospitalization in April 2021.     Most recently Ghislaine was hospitalized 1/12-1/15/2022 with acute on chronic diastolic heart failure exacerbation. NTproBNP on admission 12,000. CXR with vascular congestion and small left pleural effusion s/p thoracentesis 1/13 with 250ml removed. Her metoprolol was changed to carvedilol 6.25mg BID and she was started on isordil 20mg TID due to elevated blood pressures. Her PTA torsemide was  increased from 20mg -> 40mg daily. Some concerns for non-compliance contributing to HF exacerbation/admission. Admission weight 141#. Discharge weight 135#.     Echocardiogram 1/13/22 showed LVEF 55%, RV normal size/function, no significant valvular disease and moderate L pleural effusion.     Patient is here today for post hospital follow up.     Patient reports feeling okay, biggest complaint is feeling fatigued. Patients daughtersandro is present for visit today. Monitoring weights daily at [facility], weight post hospital discharge 136# -> 131# ->128#. Weight today in clinic is 128#. Some LE edema but states well controlled. Denies shortness of breath at rest. Some exertional dyspnea with more strenuous activity. Denies orthopnea or PND. Denies chest pain or chest tightness. Denies dizziness, lightheadedness or other presyncopal symptoms. Denies tachycardia or palpitations. Taking medications daily at home.     Labs today showed decline in renal function, creatinine 2.82 (up from 2.44 at hospital discharge), BUN 44, stable electrolytes. Hemoglobin 9.9. NTproBNP 7,801 which is down from hospital stay where it was 12,000. Blood pressure 145/68 and HR 60 in clinic today.    Daughter notes, they have changed her diet drastically, daughter has since been helping meals and getting healthy choice meals with less than 500mg sodium. She has limiting her fluid intake. No set exercise routine. Occasional beer at happy hour. Denies tobacco use.         Social History    Living in assisted living at [facility].   Social History     Socioeconomic History     Marital status:      Spouse name: Not on file     Number of children: Not on file     Years of education: Not on file     Highest education level: Not on file   Occupational History     Not on file   Tobacco Use     Smoking status: Former Smoker     Packs/day: 0.50     Years: 5.00     Pack years: 2.50     Types: Cigarettes     Quit date: 1/1/1973     Years since  quittin.0     Smokeless tobacco: Never Used   Substance and Sexual Activity     Alcohol use: Yes     Alcohol/week: 0.0 standard drinks     Comment: one light beer of  occ     Drug use: No     Sexual activity: Not Currently     Partners: Male   Other Topics Concern     Parent/sibling w/ CABG, MI or angioplasty before 65F 55M? No   Social History Narrative     Not on file     Social Determinants of Health     Financial Resource Strain: Low Risk      Difficulty of Paying Living Expenses: Not hard at all   Food Insecurity: No Food Insecurity     Worried About Running Out of Food in the Last Year: Never true     Ran Out of Food in the Last Year: Never true   Transportation Needs: No Transportation Needs     Lack of Transportation (Medical): No     Lack of Transportation (Non-Medical): No   Physical Activity: Not on file   Stress: Not on file   Social Connections: Not on file   Intimate Partner Violence: Not on file   Housing Stability: Not on file            Review of Systems:   Skin:  Positive for bruising   Eyes:  Positive for glasses  ENT:  Positive for hearing loss  Respiratory:  Negative dyspnea on exertion;shortness of breath  Cardiovascular:  palpitations;chest pain;lightheadedness;dizziness;Negative for Positive for;edema;fatigue  Gastroenterology: Positive for nausea  Genitourinary:  Positive for urinary frequency  Musculoskeletal:  Positive for arthritis  Neurologic:  not assessed memory problems  Psychiatric:  Negative    Heme/Lymph/Imm:  Positive for allergies;easy bruising  Endocrine:  Positive for thyroid disorder         Physical Exam:   Vitals: There were no vitals taken for this visit.   Wt Readings from Last 4 Encounters:   01/15/22 61.6 kg (135 lb 14.4 oz)   21 63 kg (139 lb)   10/07/21 60.3 kg (133 lb)   21 55.5 kg (122 lb 4.8 oz)     GEN: well nourished, in no acute distress.  HEENT:  Pupils equal, round. Sclerae nonicteric.   NECK: Supple, no masses appreciated. JVP difficult  to assess due to body stature.   C/V:  Regular rate and rhythm, no murmur, rub or gallop.    RESP: Respirations are unlabored. Clear to auscultation bilaterally without wheezing, rales, or rhonchi.  GI: Abdomen soft, nontender.  EXTREM: No right LE edema and trace left LE edema.   NEURO: Alert and oriented, cooperative.  SKIN: Warm and dry       Data:     LIPID RESULTS:  Lab Results   Component Value Date    CHOL 123 10/02/2019    HDL 27 (L) 10/02/2019    LDL 74 10/02/2019    TRIG 111 10/02/2019    CHOLHDLRATIO 3.9 06/15/2015     LIVER ENZYME RESULTS:  Lab Results   Component Value Date    AST 40 06/07/2021    ALT 35 06/07/2021     CBC RESULTS:  Lab Results   Component Value Date    WBC 3.6 (L) 01/15/2022    WBC 3.5 (L) 06/07/2021    RBC 2.59 (L) 01/15/2022    RBC 3.26 (L) 06/07/2021    HGB 9.9 (L) 01/20/2022    HGB 9.8 (L) 06/07/2021    HCT 26.9 (L) 01/15/2022    HCT 32.5 (L) 06/07/2021     (H) 01/15/2022     06/07/2021    MCH 32.4 01/15/2022    MCH 30.1 06/07/2021    MCHC 31.2 (L) 01/15/2022    MCHC 30.2 (L) 06/07/2021    RDW 14.3 01/15/2022    RDW 17.6 (H) 06/07/2021     (L) 01/15/2022     06/07/2021     BMP RESULTS:  Lab Results   Component Value Date     01/20/2022     06/07/2021    POTASSIUM 4.1 01/20/2022    POTASSIUM 4.9 06/07/2021    CHLORIDE 107 01/20/2022    CHLORIDE 114 (H) 06/07/2021    CO2 28 01/20/2022    CO2 23 06/07/2021    ANIONGAP 6 01/20/2022    ANIONGAP 4 06/07/2021     (H) 01/20/2022    GLC 94 06/07/2021    BUN 44 (H) 01/20/2022    BUN 77 (H) 06/07/2021    CR 2.82 (H) 01/20/2022    CR 1.61 (H) 06/07/2021    GFRESTIMATED 16 (L) 01/20/2022    GFRESTIMATED 29 (L) 06/07/2021    GFRESTBLACK 34 (L) 06/07/2021    ALIVIA 9.4 01/20/2022    ALIVIA 9.4 06/07/2021      INR RESULTS:  Lab Results   Component Value Date    INR 1.16 (H) 02/24/2021    INR 2.07 (H) 07/21/2020            Medications     Current Outpatient Medications   Medication Sig Dispense Refill      Abatacept (ORENCIA IV) Inject 500 mg into the vein every 30 days        acetaminophen (TYLENOL) 500 MG tablet Take 1,000 mg by mouth nightly as needed        allopurinol (ZYLOPRIM) 100 MG tablet Take 1 tablet (100 mg) by mouth daily 90 tablet 3     amLODIPine (NORVASC) 10 MG tablet Take 1 tablet (10 mg) by mouth daily 90 tablet 3     apixaban ANTICOAGULANT (ELIQUIS) 2.5 MG tablet Take 1 tablet (2.5 mg) by mouth 2 times daily 180 tablet 3     calcium carbonate 600 mg-vitamin D 400 units (CALTRATE) 600-400 MG-UNIT per tablet Take 1 tablet by mouth daily 1000 vit d, calcuim 600        carvedilol (COREG) 6.25 MG tablet Take 1 tablet (6.25 mg) by mouth 2 times daily (with meals) 60 tablet 0     cloNIDine (CATAPRES) 0.1 MG tablet Take 1 tablet (0.1 mg) by mouth 2 times daily 60 tablet 3     Darbepoetin Alonso-Albumin (ARANESP IJ) Monthly as needed       ferrous sulfate (FEROSUL) 325 (65 Fe) MG tablet Take 325 mg by mouth daily (with breakfast)       hydrALAZINE (APRESOLINE) 100 MG tablet Take 1 tablet by mouth 4 times daily. 360 tablet 1     hydroxychloroquine (PLAQUENIL) 200 MG tablet Take 200 mg by mouth daily       isosorbide dinitrate (ISORDIL) 20 MG tablet Take 1 tablet (20 mg) by mouth 3 times daily 90 tablet 0     levothyroxine (SYNTHROID/LEVOTHROID) 25 MCG tablet Take 1 tablet (25 mcg) by mouth daily 90 tablet 3     mirtazapine (REMERON) 15 MG tablet Take 1 tablet by mouth daily At Bedtime. 90 tablet 3     ondansetron (ZOFRAN-ODT) 4 MG ODT tab Take 4 mg by mouth every 8 hours as needed for nausea       pantoprazole (PROTONIX) 40 MG EC tablet Take 1 tablet (40 mg) by mouth daily 90 tablet 3     predniSONE (DELTASONE) 5 MG tablet Take 1 tablet (5 mg) by mouth daily 30 tablet 0     Sennosides (SENNA LAXATIVE PO)        sodium bicarbonate 650 MG tablet Take 650 mg by mouth 2 times daily        torsemide (DEMADEX) 20 MG tablet Take 2 tablets (40 mg) by mouth daily 60 tablet 0          Past Medical History     Past  Medical History:   Diagnosis Date     Atrial fibrillation (H) new 10/19     [place] (hard of hearing)     wears hearing aids     Hyperlipidaemia      Hyperlipidaemia LDL goal < 130      Hypertension      Hypothyroid      PAD (peripheral artery disease) (H)      Renal insufficiency, mild      Uncontrolled hypertension 10/14/2019     Past Surgical History:   Procedure Laterality Date     APPENDECTOMY  1951     BONE MARROW BIOPSY, BONE SPECIMEN, NEEDLE/TROCAR N/A 7/31/2020    Procedure: bone marrow biopsy;  Surgeon: Iris Cameron MD;  Location:  GI     CATARACT IOL, RT/LT  2001    bilateral (laser - 5/2013)     DENTAL SURGERY  1962    wisdom     ESOPHAGOSCOPY, GASTROSCOPY, DUODENOSCOPY (EGD), COMBINED N/A 7/24/2020    Procedure: ESOPHAGOGASTRODUODENOSCOPY, WITH BIOPSY;  Surgeon: Humberto Bailon MD;  Location:  GI     EYE SURGERY  1956    Muscle release     TUBAL LIGATION  1971     Family History   Problem Relation Age of Onset     Heart Disease Mother      Respiratory Mother      Heart Disease Father      Heart Disease Brother      Coronary Artery Disease Brother         CAB     Heart Disease Brother      Coronary Artery Disease Brother         CAB            Allergies   Oxybutynin and Seasonal allergies    60 minutes spent on the date of the encounter doing chart review, history and exam, documentation and further activities as noted above      DHAVAL [facility]  Pager: [phone]

## 2022-01-20 ENCOUNTER — LAB (OUTPATIENT)
Dept: LAB | Facility: CLINIC | Age: 87
End: 2022-01-20
Payer: MEDICARE

## 2022-01-20 ENCOUNTER — OFFICE VISIT (OUTPATIENT)
Dept: CARDIOLOGY | Facility: CLINIC | Age: 87
End: 2022-01-20
Attending: PHYSICIAN ASSISTANT
Payer: MEDICARE

## 2022-01-20 VITALS
DIASTOLIC BLOOD PRESSURE: 68 MMHG | WEIGHT: 128 LBS | BODY MASS INDEX: 24.17 KG/M2 | OXYGEN SATURATION: 96 % | HEIGHT: 61 IN | HEART RATE: 60 BPM | SYSTOLIC BLOOD PRESSURE: 145 MMHG

## 2022-01-20 DIAGNOSIS — I50.32 CHRONIC HEART FAILURE WITH PRESERVED EJECTION FRACTION (H): Primary | ICD-10-CM

## 2022-01-20 DIAGNOSIS — I50.33 ACUTE ON CHRONIC HEART FAILURE WITH PRESERVED EJECTION FRACTION (HFPEF) (H): ICD-10-CM

## 2022-01-20 DIAGNOSIS — I10 ESSENTIAL HYPERTENSION: ICD-10-CM

## 2022-01-20 DIAGNOSIS — I48.0 PAROXYSMAL ATRIAL FIBRILLATION (H): ICD-10-CM

## 2022-01-20 DIAGNOSIS — N18.4 CKD (CHRONIC KIDNEY DISEASE) STAGE 4, GFR 15-29 ML/MIN (H): ICD-10-CM

## 2022-01-20 LAB
ANION GAP SERPL CALCULATED.3IONS-SCNC: 6 MMOL/L (ref 3–14)
BUN SERPL-MCNC: 44 MG/DL (ref 7–30)
CALCIUM SERPL-MCNC: 9.4 MG/DL (ref 8.5–10.1)
CHLORIDE BLD-SCNC: 107 MMOL/L (ref 94–109)
CO2 SERPL-SCNC: 28 MMOL/L (ref 20–32)
CREAT SERPL-MCNC: 2.82 MG/DL (ref 0.52–1.04)
GFR SERPL CREATININE-BSD FRML MDRD: 16 ML/MIN/1.73M2
GLUCOSE BLD-MCNC: 119 MG/DL (ref 70–99)
HGB BLD-MCNC: 9.9 G/DL (ref 11.7–15.7)
NT-PROBNP SERPL-MCNC: 7801 PG/ML (ref 0–450)
POTASSIUM BLD-SCNC: 4.1 MMOL/L (ref 3.4–5.3)
SARS-COV-2 RNA RESP QL NAA+PROBE: NEGATIVE
SODIUM SERPL-SCNC: 141 MMOL/L (ref 133–144)

## 2022-01-20 PROCEDURE — 99215 OFFICE O/P EST HI 40 MIN: CPT | Performed by: NURSE PRACTITIONER

## 2022-01-20 PROCEDURE — 85018 HEMOGLOBIN: CPT | Performed by: PHYSICIAN ASSISTANT

## 2022-01-20 PROCEDURE — 80048 BASIC METABOLIC PNL TOTAL CA: CPT | Performed by: PHYSICIAN ASSISTANT

## 2022-01-20 PROCEDURE — 83880 ASSAY OF NATRIURETIC PEPTIDE: CPT | Performed by: PHYSICIAN ASSISTANT

## 2022-01-20 PROCEDURE — 36415 COLL VENOUS BLD VENIPUNCTURE: CPT | Performed by: PHYSICIAN ASSISTANT

## 2022-01-20 RX ORDER — TORSEMIDE 20 MG/1
20 TABLET ORAL DAILY
Qty: 100 TABLET | Refills: 0 | Status: SHIPPED | OUTPATIENT
Start: 2022-01-20 | End: 2022-01-01

## 2022-01-20 ASSESSMENT — MIFFLIN-ST. JEOR: SCORE: 962.98

## 2022-01-20 NOTE — LETTER
1/20/2022    Laurie Conteh MD  [address]  [place] [address]    RE: Ghislaine Walls       Dear Colleague,     I had the pleasure of seeing Ghislaine Walls in the [facility].  Cardiology Clinic Progress Note  Ghislaine Walls MRN# [id number]   YOB: 1935 Age: 86 year old   Primary Cardiologist: Dr. Fuentes  Reason for visit: Post hospital follow up            Assessment and Plan:   Ghislaine Walls is a very pleasant 86 year old female with a history of HFpEF, CKD, hypertension, paroxsymal atrial fibrillation on eliquis, chronic anemia, rheumatoid arthritis and hypothyroidism.     1. Chronic diastolic heart failure - LVEF 55%, RV normal size/function.    - NYHA class III, stage C   - Fluid status : euvolemic   - Diuretic regimen : DECREASE torsemide to 20mg daily with additional 20mg daily for weight gain > 2# overnight.    - Aldosterone antagonist : none due to CKD  2. Atrial fibrillation, paroxsymal - in sinus rhythm on exam today, on eliquis for thromboembolic prophylaxis due to elevated [id number] score.   3. CKD IV - baseline creatinine 1.6-2.4, decline in renal function noted on labs today, creatinine 2.82 which is up from 2.44 at hospital discharge on 1/15/22.   4. Hypertension  5. Chronic anemia - stable, hemoglobin 9.9    I saw Ghislaine today for post hospital follow up. She is doing well from a heart failure standpoint her weight is down from 136# which was her first home weight after discharge to 128#. Unfortunately her renal function has further declined. Creatinine increased from 2.44 -> 2.82. Her daughter shares with me today that she has been helping her mom make significant dietary changes. No longer eating meals at [facility] given no way to manage salt and started getting healthy choice meals that are less than 500mg. I also provided them with a list of companies that provide low sodium meals.     Due to her decline in renal function recommended decreasing her  torsemide to 20mg daily with additional 20mg as needed for weight gain > 2#. Reinforced the importance of taking PRN doses as needed. Reviewed consideration for scheduled 40mg dose days but decided to try PRN and see how she does.     Her fatigue is multifactorial likely secondary to chronic medical conditions and deconditioning/recent hospitalization for decompensated HF.     Patient and her daughter bring up concerns about blood draws and not wanting to come to clinic visits. I further explored goals of cares with them today, introducing hospice/comfort based approach to care. The daughter states they have talked about this. She then asks me if less frequent visits is possible, we reviewed that to help manage her conditions and prevent rehospitalization due to decompensated heart failure close monitoring especially after recent hospitalization is needed. Wish to continue restorative care at this time.     Changes today: DECREASE torsemide to 20mg daily with additional 20mg PRN for weight gain > 2#    Follow up plan:     Cardiology follow up with me in 2 weeks with BMP prior    Advised to schedule follow up with nephrology as requested during hospitalization        History of Presenting Illness:    Ghislaine Walls is a very pleasant 86 year old female with a history of HFpEF, CKD, hypertension, paroxsymal atrial fibrillation on eliquis, chronic anemia, rheumatoid arthritis and hypothyroidism.     Primary cardiologist Dr. Fuentes. In October 2021 at which time she complained of generalized fatigue. Her metoprolol was decreased to 25mg BID. She was offered the option of stress perfusion imaging to rule out ischemic, but her symptoms were not suggestive of angina and she wished to defer. She had a HF hospitalization in April 2021.     Most recently Ghislaine was hospitalized 1/12-1/15/2022 with acute on chronic diastolic heart failure exacerbation. NTproBNP on admission 12,000. CXR with vascular congestion and small left  pleural effusion s/p thoracentesis 1/13 with 250ml removed. Her metoprolol was changed to carvedilol 6.25mg BID and she was started on isordil 20mg TID due to elevated blood pressures. Her PTA torsemide was increased from 20mg -> 40mg daily. Some concerns for non-compliance contributing to HF exacerbation/admission. Admission weight 141#. Discharge weight 135#.     Echocardiogram 1/13/22 showed LVEF 55%, RV normal size/function, no significant valvular disease and moderate L pleural effusion.     Patient is here today for post hospital follow up.     Patient reports feeling okay, biggest complaint is feeling fatigued. Patients daughtersandro is present for visit today. Monitoring weights daily at [facility], weight post hospital discharge 136# -> 131# ->128#. Weight today in clinic is 128#. Some LE edema but states well controlled. Denies shortness of breath at rest. Some exertional dyspnea with more strenuous activity. Denies orthopnea or PND. Denies chest pain or chest tightness. Denies dizziness, lightheadedness or other presyncopal symptoms. Denies tachycardia or palpitations. Taking medications daily at home.     Labs today showed decline in renal function, creatinine 2.82 (up from 2.44 at hospital discharge), BUN 44, stable electrolytes. Hemoglobin 9.9. NTproBNP 7,801 which is down from hospital stay where it was 12,000. Blood pressure 145/68 and HR 60 in clinic today.    Daughter notes, they have changed her diet drastically, daughter has since been helping meals and getting healthy choice meals with less than 500mg sodium. She has limiting her fluid intake. No set exercise routine. Occasional beer at happy hour. Denies tobacco use.         Social History    Living in assisted living at [facility].   Social History     Socioeconomic History     Marital status:      Spouse name: Not on file     Number of children: Not on file     Years of education: Not on file     Highest education level: Not on file    Occupational History     Not on file   Tobacco Use     Smoking status: Former Smoker     Packs/day: 0.50     Years: 5.00     Pack years: 2.50     Types: Cigarettes     Quit date: 1973     Years since quittin.0     Smokeless tobacco: Never Used   Substance and Sexual Activity     Alcohol use: Yes     Alcohol/week: 0.0 standard drinks     Comment: one light beer of  occ     Drug use: No     Sexual activity: Not Currently     Partners: Male   Other Topics Concern     Parent/sibling w/ CABG, MI or angioplasty before 65F 55M? No   Social History Narrative     Not on file     Social Determinants of Health     Financial Resource Strain: Low Risk      Difficulty of Paying Living Expenses: Not hard at all   Food Insecurity: No Food Insecurity     Worried About Running Out of Food in the Last Year: Never true     Ran Out of Food in the Last Year: Never true   Transportation Needs: No Transportation Needs     Lack of Transportation (Medical): No     Lack of Transportation (Non-Medical): No   Physical Activity: Not on file   Stress: Not on file   Social Connections: Not on file   Intimate Partner Violence: Not on file   Housing Stability: Not on file            Review of Systems:   Skin:  Positive for bruising   Eyes:  Positive for glasses  ENT:  Positive for hearing loss  Respiratory:  Negative dyspnea on exertion;shortness of breath  Cardiovascular:  palpitations;chest pain;lightheadedness;dizziness;Negative for Positive for;edema;fatigue  Gastroenterology: Positive for nausea  Genitourinary:  Positive for urinary frequency  Musculoskeletal:  Positive for arthritis  Neurologic:  not assessed memory problems  Psychiatric:  Negative    Heme/Lymph/Imm:  Positive for allergies;easy bruising  Endocrine:  Positive for thyroid disorder         Physical Exam:   Vitals: There were no vitals taken for this visit.   Wt Readings from Last 4 Encounters:   01/15/22 61.6 kg (135 lb 14.4 oz)   21 63 kg (139 lb)    10/07/21 60.3 kg (133 lb)   07/16/21 55.5 kg (122 lb 4.8 oz)     GEN: well nourished, in no acute distress.  HEENT:  Pupils equal, round. Sclerae nonicteric.   NECK: Supple, no masses appreciated. JVP difficult to assess due to body stature.   C/V:  Regular rate and rhythm, no murmur, rub or gallop.    RESP: Respirations are unlabored. Clear to auscultation bilaterally without wheezing, rales, or rhonchi.  GI: Abdomen soft, nontender.  EXTREM: No right LE edema and trace left LE edema.   NEURO: Alert and oriented, cooperative.  SKIN: Warm and dry       Data:     LIPID RESULTS:  Lab Results   Component Value Date    CHOL 123 10/02/2019    HDL 27 (L) 10/02/2019    LDL 74 10/02/2019    TRIG 111 10/02/2019    CHOLHDLRATIO 3.9 06/15/2015     LIVER ENZYME RESULTS:  Lab Results   Component Value Date    AST 40 06/07/2021    ALT 35 06/07/2021     CBC RESULTS:  Lab Results   Component Value Date    WBC 3.6 (L) 01/15/2022    WBC 3.5 (L) 06/07/2021    RBC 2.59 (L) 01/15/2022    RBC 3.26 (L) 06/07/2021    HGB 9.9 (L) 01/20/2022    HGB 9.8 (L) 06/07/2021    HCT 26.9 (L) 01/15/2022    HCT 32.5 (L) 06/07/2021     (H) 01/15/2022     06/07/2021    MCH 32.4 01/15/2022    MCH 30.1 06/07/2021    MCHC 31.2 (L) 01/15/2022    MCHC 30.2 (L) 06/07/2021    RDW 14.3 01/15/2022    RDW 17.6 (H) 06/07/2021     (L) 01/15/2022     06/07/2021     BMP RESULTS:  Lab Results   Component Value Date     01/20/2022     06/07/2021    POTASSIUM 4.1 01/20/2022    POTASSIUM 4.9 06/07/2021    CHLORIDE 107 01/20/2022    CHLORIDE 114 (H) 06/07/2021    CO2 28 01/20/2022    CO2 23 06/07/2021    ANIONGAP 6 01/20/2022    ANIONGAP 4 06/07/2021     (H) 01/20/2022    GLC 94 06/07/2021    BUN 44 (H) 01/20/2022    BUN 77 (H) 06/07/2021    CR 2.82 (H) 01/20/2022    CR 1.61 (H) 06/07/2021    GFRESTIMATED 16 (L) 01/20/2022    GFRESTIMATED 29 (L) 06/07/2021    GFRESTBLACK 34 (L) 06/07/2021    ALIVIA 9.4 01/20/2022    ALIVIA 9.4  06/07/2021      INR RESULTS:  Lab Results   Component Value Date    INR 1.16 (H) 02/24/2021    INR 2.07 (H) 07/21/2020            Medications     Current Outpatient Medications   Medication Sig Dispense Refill     Abatacept (ORENCIA IV) Inject 500 mg into the vein every 30 days        acetaminophen (TYLENOL) 500 MG tablet Take 1,000 mg by mouth nightly as needed        allopurinol (ZYLOPRIM) 100 MG tablet Take 1 tablet (100 mg) by mouth daily 90 tablet 3     amLODIPine (NORVASC) 10 MG tablet Take 1 tablet (10 mg) by mouth daily 90 tablet 3     apixaban ANTICOAGULANT (ELIQUIS) 2.5 MG tablet Take 1 tablet (2.5 mg) by mouth 2 times daily 180 tablet 3     calcium carbonate 600 mg-vitamin D 400 units (CALTRATE) 600-400 MG-UNIT per tablet Take 1 tablet by mouth daily 1000 vit d, calcuim 600        carvedilol (COREG) 6.25 MG tablet Take 1 tablet (6.25 mg) by mouth 2 times daily (with meals) 60 tablet 0     cloNIDine (CATAPRES) 0.1 MG tablet Take 1 tablet (0.1 mg) by mouth 2 times daily 60 tablet 3     Darbepoetin Alonso-Albumin (ARANESP IJ) Monthly as needed       ferrous sulfate (FEROSUL) 325 (65 Fe) MG tablet Take 325 mg by mouth daily (with breakfast)       hydrALAZINE (APRESOLINE) 100 MG tablet Take 1 tablet by mouth 4 times daily. 360 tablet 1     hydroxychloroquine (PLAQUENIL) 200 MG tablet Take 200 mg by mouth daily       isosorbide dinitrate (ISORDIL) 20 MG tablet Take 1 tablet (20 mg) by mouth 3 times daily 90 tablet 0     levothyroxine (SYNTHROID/LEVOTHROID) 25 MCG tablet Take 1 tablet (25 mcg) by mouth daily 90 tablet 3     mirtazapine (REMERON) 15 MG tablet Take 1 tablet by mouth daily At Bedtime. 90 tablet 3     ondansetron (ZOFRAN-ODT) 4 MG ODT tab Take 4 mg by mouth every 8 hours as needed for nausea       pantoprazole (PROTONIX) 40 MG EC tablet Take 1 tablet (40 mg) by mouth daily 90 tablet 3     predniSONE (DELTASONE) 5 MG tablet Take 1 tablet (5 mg) by mouth daily 30 tablet 0     Sennosides (SENNA LAXATIVE  PO)        sodium bicarbonate 650 MG tablet Take 650 mg by mouth 2 times daily        torsemide (DEMADEX) 20 MG tablet Take 2 tablets (40 mg) by mouth daily 60 tablet 0          Past Medical History     Past Medical History:   Diagnosis Date     Atrial fibrillation (H) new 10/19     [place] (hard of hearing)     wears hearing aids     Hyperlipidaemia      Hyperlipidaemia LDL goal < 130      Hypertension      Hypothyroid      PAD (peripheral artery disease) (H)      Renal insufficiency, mild      Uncontrolled hypertension 10/14/2019     Past Surgical History:   Procedure Laterality Date     APPENDECTOMY  1951     BONE MARROW BIOPSY, BONE SPECIMEN, NEEDLE/TROCAR N/A 7/31/2020    Procedure: bone marrow biopsy;  Surgeon: Iris Cameron MD;  Location:  GI     CATARACT IOL, RT/LT  2001    bilateral (laser - 5/2013)     DENTAL SURGERY  1962    [facility]isdom     ESOPHAGOSCOPY, GASTROSCOPY, DUODENOSCOPY (EGD), COMBINED N/A 7/24/2020    Procedure: ESOPHAGOGASTRODUODENOSCOPY, WITH BIOPSY;  Surgeon: Humberto Bailon MD;  Location:  GI     EYE SURGERY  1956    Muscle release     TUBAL LIGATION  1971     Family History   Problem Relation Age of Onset     Heart Disease Mother      Respiratory Mother      Heart Disease Father      Heart Disease Brother      Coronary Artery Disease Brother         CAB     Heart Disease Brother      Coronary Artery Disease Brother         CAB            Allergies   Oxybutynin and Seasonal allergies    60 minutes spent on the date of the encounter doing chart review, history and exam, documentation and further activities as noted above      DHAVAL Cormier [facility]  Pager: [phone]      cc:   Daphne Cm PA-C  [id number]  [place] [address]

## 2022-01-20 NOTE — PATIENT INSTRUCTIONS
1. DECREASE torsemide to 20mg daily (1 tablet), additional 20mg for weight gain > 2 pounds.   2. Monitor weight daily at home  3. Limit fluid intake to 2L or less daily  4. Follow up with cardiology in 2 weeks with labs prior  5. Please call with other questions/concerns [phone]

## 2022-01-21 ENCOUNTER — OFFICE VISIT (OUTPATIENT)
Dept: FAMILY MEDICINE | Facility: CLINIC | Age: 87
End: 2022-01-21
Payer: MEDICARE

## 2022-01-21 VITALS
RESPIRATION RATE: 16 BRPM | BODY MASS INDEX: 24.56 KG/M2 | SYSTOLIC BLOOD PRESSURE: 113 MMHG | WEIGHT: 130 LBS | HEART RATE: 55 BPM | OXYGEN SATURATION: 98 % | TEMPERATURE: 96.9 F | DIASTOLIC BLOOD PRESSURE: 66 MMHG

## 2022-01-21 DIAGNOSIS — R62.7 FAILURE TO THRIVE IN ADULT: ICD-10-CM

## 2022-01-21 DIAGNOSIS — I10 ESSENTIAL HYPERTENSION: ICD-10-CM

## 2022-01-21 DIAGNOSIS — M06.9 RHEUMATOID ARTHRITIS, INVOLVING UNSPECIFIED SITE, UNSPECIFIED WHETHER RHEUMATOID FACTOR PRESENT (H): ICD-10-CM

## 2022-01-21 DIAGNOSIS — J44.9 CHRONIC OBSTRUCTIVE PULMONARY DISEASE, UNSPECIFIED COPD TYPE (H): ICD-10-CM

## 2022-01-21 DIAGNOSIS — I50.33 ACUTE ON CHRONIC DIASTOLIC CONGESTIVE HEART FAILURE (H): Primary | ICD-10-CM

## 2022-01-21 DIAGNOSIS — N25.81 HYPERPARATHYROIDISM DUE TO RENAL INSUFFICIENCY (H): ICD-10-CM

## 2022-01-21 DIAGNOSIS — N18.4 CKD (CHRONIC KIDNEY DISEASE) STAGE 4, GFR 15-29 ML/MIN (H): ICD-10-CM

## 2022-01-21 DIAGNOSIS — E46 MALNUTRITION, UNSPECIFIED TYPE (H): ICD-10-CM

## 2022-01-21 DIAGNOSIS — J96.01 ACUTE RESPIRATORY FAILURE WITH HYPOXIA (H): ICD-10-CM

## 2022-01-21 DIAGNOSIS — I73.9 PAD (PERIPHERAL ARTERY DISEASE) (H): ICD-10-CM

## 2022-01-21 PROBLEM — L89.302 PRESSURE INJURY OF BUTTOCK, STAGE 2, UNSPECIFIED LATERALITY (H): Status: RESOLVED | Noted: 2019-10-01 | Resolved: 2022-01-21

## 2022-01-21 PROCEDURE — 99495 TRANSJ CARE MGMT MOD F2F 14D: CPT | Performed by: INTERNAL MEDICINE

## 2022-01-21 RX ORDER — MIRTAZAPINE 15 MG/1
TABLET, FILM COATED ORAL
Qty: 90 TABLET | Refills: 3 | Status: SHIPPED | OUTPATIENT
Start: 2022-01-21 | End: 2023-01-01

## 2022-01-21 ASSESSMENT — PAIN SCALES - GENERAL: PAINLEVEL: NO PAIN (0)

## 2022-01-21 NOTE — PROGRESS NOTES
"  Mich Scanlon is a 86 year old who presents for the following health issues     HPI     Post Discharge Outreach 1/18/2022   Admission Date 1/12/2022   Reason for Admission Acute on chronic diastolic heart failure, euvolemic at dischargeLeft pleural effusion   Discharge Date 1/15/2022   Discharge Diagnosis Acute on chronic diastolic heart failure, euvolemic at dischargeLeft pleural effusion   How are you doing now that you are home? \"I'm ok\"   How are your symptoms? (Red Flag symptoms escalate to triage hotline per guidelines) Improved   Do you feel your condition is stable enough to be safe at home until your provider visit? Yes   Does the patient have their discharge instructions?  Yes   Does the patient have questions regarding their discharge instructions?  No   Were you started on any new medications or were there changes to any of your previous medications?  No   Does the patient have all of their medications? Yes   Do you have questions regarding any of your medications?  No   Do you have all of your needed medical supplies or equipment (DME)?  (i.e. oxygen tank, CPAP, cane, etc.) Yes   Discharge follow-up appointment scheduled within 14 calendar days?  Yes   Discharge Follow Up Appointment Date 1/21/2022   Discharge Follow Up Appointment Scheduled with? Primary Care Provider     Hospital Follow-up Visit:    Hospital/Nursing Home/IP Rehab Facility: [facility]  Date of Admission: 1/12/2022  Date of Discharge: 1/15/2022  Reason(s) for Admission:     Acute on chronic diastolic heart failure, euvolemic at discharge  Left pleural effusion  Acute hypoxic respiratory failure, likely secondary to above, resolved prior to discharge  DRAGAN on chronic kidney disease stage IV  Acute on chronic anemia, anemia of chronic disease  Chronic thrombocytopenia  Hypertension, accelerated  History of paroxysmal atrial fibrillation  History of COVID-pneumonia in August 2020  Rheumatoid " arthritis  Gout  Medical noncompliance  Gastroesophageal reflux disease  Anxiety            Was your hospitalization related to COVID-19? No   Problems taking medications regularly:  None  Medication changes since discharge: None  Problems adhering to non-medication therapy:  None    Summary of hospitalization:  [facility] discharge summary reviewed  Diagnostic Tests/Treatments reviewed.  Follow up needed: cardiology  Other Healthcare Providers Involved in Patient s Care:         nephrology, rheumatology  Update since discharge: stable.  Post Discharge Medication Reconciliation: discharge medications reconciled and changed, per note/orders.  Plan of care communicated with patient and family            Subjective   Ghislaine is a 86 year old who presents for the following health issues  accompanied by her daughter.    HPI         Chief Complaint:       Post hospital discharge follow up on multiple concerns including acute on chronic CHF exacerbation, CKD    HPI:   Patient Ghislaine Walls is a very pleasant 86 year old female with history of atrial fibrillation on long term anticoagulation therapy with warfarin who presents to Internal Medicine clinic today for post hospital discharge follow up of multiple concerns including recent CHF exacerbation hospitalization. Regarding the patient's atrial fibrillation, the patient is compliant with her cardiac medications. Regarding the patient's hypothyroidism, the patient is compliant with Levothyroxine medication at this time. Patient is also followed by the [facility] cardiology clinic in [place] going forward for her atrial fibrillation. No chest pain, headaches, fever or chills.    Current Medications:     Current Outpatient Medications   Medication Sig Dispense Refill     Abatacept (ORENCIA IV) Inject 500 mg into the vein every 30 days        acetaminophen (TYLENOL) 500 MG tablet Take 1,000 mg by mouth nightly as needed        allopurinol (ZYLOPRIM) 100 MG tablet  Take 1 tablet (100 mg) by mouth daily 90 tablet 3     amLODIPine (NORVASC) 10 MG tablet Take 1 tablet (10 mg) by mouth daily 90 tablet 3     apixaban ANTICOAGULANT (ELIQUIS) 2.5 MG tablet Take 1 tablet (2.5 mg) by mouth 2 times daily 180 tablet 3     calcium carbonate 600 mg-vitamin D 400 units (CALTRATE) 600-400 MG-UNIT per tablet Take 1 tablet by mouth daily 1000 vit d, calcuim 600        carvedilol (COREG) 6.25 MG tablet Take 1 tablet (6.25 mg) by mouth 2 times daily (with meals) 60 tablet 0     cloNIDine (CATAPRES) 0.1 MG tablet Take 1 tablet (0.1 mg) by mouth 2 times daily 60 tablet 3     Darbepoetin Alonso-Albumin (ARANESP IJ) Monthly as needed       ferrous sulfate (FEROSUL) 325 (65 Fe) MG tablet Take 325 mg by mouth daily (with breakfast)       hydrALAZINE (APRESOLINE) 100 MG tablet Take 1 tablet by mouth 4 times daily. 360 tablet 1     hydroxychloroquine (PLAQUENIL) 200 MG tablet Take 200 mg by mouth daily       isosorbide dinitrate (ISORDIL) 20 MG tablet Take 1 tablet (20 mg) by mouth 3 times daily 90 tablet 0     levothyroxine (SYNTHROID/LEVOTHROID) 25 MCG tablet Take 1 tablet (25 mcg) by mouth daily 90 tablet 3     mirtazapine (REMERON) 15 MG tablet Take 1 tablet by mouth daily At Bedtime. 90 tablet 3     ondansetron (ZOFRAN-ODT) 4 MG ODT tab Take 4 mg by mouth every 8 hours as needed for nausea       pantoprazole (PROTONIX) 40 MG EC tablet Take 1 tablet (40 mg) by mouth daily 90 tablet 3     predniSONE (DELTASONE) 5 MG tablet Take 1 tablet (5 mg) by mouth daily 30 tablet 0     Sennosides (SENNA LAXATIVE PO)        sodium bicarbonate 650 MG tablet Take 650 mg by mouth 2 times daily        torsemide (DEMADEX) 20 MG tablet Take 1 tablet (20 mg) by mouth daily Take additional 20mg torsemide (1 tablet) for weight gain > 2 pounds overnight. 100 tablet 0         Allergies:      Allergies   Allergen Reactions     Oxybutynin Itching     Seasonal Allergies             Past Medical History:     Past Medical History:    Diagnosis Date     Atrial fibrillation (H) new 10/19     [place] (hard of hearing)     wears hearing aids     Hyperlipidaemia      Hyperlipidaemia LDL goal < 130      Hypertension      Hypothyroid      PAD (peripheral artery disease) (H)      Renal insufficiency, mild      Uncontrolled hypertension 10/14/2019         Past Surgical History:     Past Surgical History:   Procedure Laterality Date     APPENDECTOMY       BONE MARROW BIOPSY, BONE SPECIMEN, NEEDLE/TROCAR N/A 2020    Procedure: bone marrow biopsy;  Surgeon: Iris Cameron MD;  Location:  GI     CATARACT IOL, RT/LT      bilateral (laser - 2013)     DENTAL SURGERY      wisdom     ESOPHAGOSCOPY, GASTROSCOPY, DUODENOSCOPY (EGD), COMBINED N/A 2020    Procedure: ESOPHAGOGASTRODUODENOSCOPY, WITH BIOPSY;  Surgeon: Humberto Bailon MD;  Location:  GI     EYE SURGERY      Muscle release     TUBAL LIGATION           Family Medical History:     Family History   Problem Relation Age of Onset     Heart Disease Mother      Respiratory Mother      Heart Disease Father      Heart Disease Brother      Coronary Artery Disease Brother         CAB     Heart Disease Brother      Coronary Artery Disease Brother         CAB         Social History:     Social History     Socioeconomic History     Marital status:      Spouse name: Not on file     Number of children: Not on file     Years of education: Not on file     Highest education level: Not on file   Occupational History     Not on file   Tobacco Use     Smoking status: Former Smoker     Packs/day: 0.50     Years: 5.00     Pack years: 2.50     Types: Cigarettes     Quit date: 1973     Years since quittin.0     Smokeless tobacco: Never Used   Substance and Sexual Activity     Alcohol use: Yes     Alcohol/week: 0.0 standard drinks     Comment: one light beer of  occ     Drug use: No     Sexual activity: Not Currently     Partners: Male   Other Topics Concern      Parent/sibling w/ CABG, MI or angioplasty before 65F 55M? No   Social History Narrative     Not on file     Social Determinants of Health     Financial Resource Strain: Low Risk      Difficulty of Paying Living Expenses: Not hard at all   Food Insecurity: No Food Insecurity     Worried About Running Out of Food in the Last Year: Never true     Ran Out of Food in the Last Year: Never true   Transportation Needs: No Transportation Needs     Lack of Transportation (Medical): No     Lack of Transportation (Non-Medical): No   Physical Activity: Not on file   Stress: Not on file   Social Connections: Not on file   Intimate Partner Violence: Not on file   Housing Stability: Not on file           Review of System:     Constitutional: Negative for fever or chills  Skin: Negative for rashes  Ears/Nose/Throat: Negative for nasal congestion, sore throat  Respiratory: No shortness of breath, dyspnea on exertion, cough, or hemoptysis  Cardiovascular: Negative for chest pain, positive for atrial fibrillation  Gastrointestinal: Negative for nausea, vomiting  Genitourinary: Negative for dysuria, hematuria, positive for CKD  Musculoskeletal: Negative for myalgias  Neurologic: Negative for headaches  Psychiatric: Negative for depression, anxiety  Hematologic/Lymphatic/Immunologic: Negative  Endocrine: positive for hypothyroidism  Behavioral: Negative for tobacco use       Physical Exam:   /66 (BP Location: Left arm, Patient Position: Sitting, Cuff Size: Adult Regular)   Pulse 55   Temp 96.9  F (36.1  C) (Temporal)   Resp 16   Wt 59 kg (130 lb)   SpO2 98%   Breastfeeding No   BMI 24.56 kg/m      GENERAL: chronically ill appearing elderly femaley, alert and no acute distress  EYES: eyes grossly normal to inspection, and conjunctivae and sclerae normal  HENT: Normocephalic atraumatic. Nose and mouth without ulcers or lesions  NECK: supple  RESP: lungs clear to auscultation   CV: heart rate is well controlled  LYMPH: no  peripheral edema   ABDOMEN: nondistended  MS: no gross musculoskeletal defects noted  SKIN: no rashes  NEURO: Alert & Oriented x 3.   PSYCH: mentation appears normal, affect normal        Diagnostic Test Results:     Diagnostic Test Results:  Labs reviewed in Epic    ASSESSMENT/PLAN:       Ghislaine was seen today for hospital f/u.    Diagnoses and all orders for this visit:  Acute respiratory failure with hypoxia (H)  Acute on chronic diastolic congestive heart failure (H)  PAD (peripheral artery disease) (H)  -   Post hospital discharge follow up of recent hospitalization for acute on chronic CHF exacerbation symptoms, symptoms currently stable, no chest pains or acute shortness of breath in clinic today. Respiratory failure symptoms have resolved.  - continue current cardiac medications including Torsemide diuretic therapy  - continue outpatient cardiology clinic follow up going forward    Rheumatoid arthritis, involving unspecified site, unspecified whether rheumatoid factor present (H)  - stable, continue outpatient rheumatology clinic follow up going forward.      Chronic obstructive pulmonary disease, unspecified COPD type (H)  - stable, continue current therapy      Essential hypertension   Hyperparathyroidism due to renal insufficiency (H)  CKD (chronic kidney disease) stage 4, GFR 15-29 ml/min (H)  -     ACE/ARB/ARNI NOT PRESCRIBED (INTENTIONAL); Please choose reason not prescribed from choices below.  - continue outpatient nephrology specialist clinic follow up going forward.    Malnutrition, unspecified type (H)  Failure to thrive in adult  -     mirtazapine (REMERON) 15 MG tablet; Take 1 tablet by mouth daily At Bedtime.    Hypothyroidism, unspecified type  - continue current therapy        Follow Up Plan:     Patient is instructed to return to Internal Medicine clinic for follow-up visit in 1 month.        Laurie Conteh MD  Internal Medicine  [facility]

## 2022-01-25 ENCOUNTER — LAB REQUISITION (OUTPATIENT)
Dept: LAB | Facility: CLINIC | Age: 87
End: 2022-01-25
Payer: MEDICARE

## 2022-01-25 DIAGNOSIS — U07.1 COVID-19: ICD-10-CM

## 2022-01-25 LAB — SARS-COV-2 RNA RESP QL NAA+PROBE: NORMAL

## 2022-01-25 PROCEDURE — U0003 INFECTIOUS AGENT DETECTION BY NUCLEIC ACID (DNA OR RNA); SEVERE ACUTE RESPIRATORY SYNDROME CORONAVIRUS 2 (SARS-COV-2) (CORONAVIRUS DISEASE [COVID-19]), AMPLIFIED PROBE TECHNIQUE, MAKING USE OF HIGH THROUGHPUT TECHNOLOGIES AS DESCRIBED BY CMS-2020-01-R: HCPCS | Mod: ORL | Performed by: INTERNAL MEDICINE

## 2022-01-26 LAB — SARS-COV-2 RNA RESP QL NAA+PROBE: NOT DETECTED

## 2022-01-27 DIAGNOSIS — Z53.9 DIAGNOSIS NOT YET DEFINED: Primary | ICD-10-CM

## 2022-01-27 PROCEDURE — G0180 MD CERTIFICATION HHA PATIENT: HCPCS | Performed by: INTERNAL MEDICINE

## 2022-01-28 DIAGNOSIS — Z53.9 DIAGNOSIS NOT YET DEFINED: Primary | ICD-10-CM

## 2022-01-28 PROCEDURE — 99207 PR MD CERTIFICATION HHA PATIENT: CPT | Performed by: INTERNAL MEDICINE

## 2022-02-01 ENCOUNTER — LAB REQUISITION (OUTPATIENT)
Dept: LAB | Facility: CLINIC | Age: 87
End: 2022-02-01
Payer: MEDICARE

## 2022-02-01 DIAGNOSIS — U07.1 COVID-19: ICD-10-CM

## 2022-02-01 PROCEDURE — U0005 INFEC AGEN DETEC AMPLI PROBE: HCPCS | Mod: ORL | Performed by: INTERNAL MEDICINE

## 2022-02-02 LAB — SARS-COV-2 RNA RESP QL NAA+PROBE: NOT DETECTED

## 2022-02-03 ENCOUNTER — OFFICE VISIT (OUTPATIENT)
Dept: CARDIOLOGY | Facility: CLINIC | Age: 87
End: 2022-02-03
Attending: NURSE PRACTITIONER
Payer: MEDICARE

## 2022-02-03 ENCOUNTER — TELEPHONE (OUTPATIENT)
Dept: CARDIOLOGY | Facility: CLINIC | Age: 87
End: 2022-02-03

## 2022-02-03 VITALS
DIASTOLIC BLOOD PRESSURE: 60 MMHG | HEART RATE: 60 BPM | WEIGHT: 130 LBS | OXYGEN SATURATION: 97 % | HEIGHT: 61 IN | BODY MASS INDEX: 24.55 KG/M2 | SYSTOLIC BLOOD PRESSURE: 142 MMHG

## 2022-02-03 DIAGNOSIS — I10 HYPERTENSION GOAL BP (BLOOD PRESSURE) < 140/90: Chronic | ICD-10-CM

## 2022-02-03 DIAGNOSIS — N18.4 CKD (CHRONIC KIDNEY DISEASE) STAGE 4, GFR 15-29 ML/MIN (H): ICD-10-CM

## 2022-02-03 DIAGNOSIS — I48.0 PAROXYSMAL ATRIAL FIBRILLATION (H): ICD-10-CM

## 2022-02-03 DIAGNOSIS — I50.32 CHRONIC HEART FAILURE WITH PRESERVED EJECTION FRACTION (H): Primary | ICD-10-CM

## 2022-02-03 DIAGNOSIS — I50.9 ACUTE ON CHRONIC CONGESTIVE HEART FAILURE, UNSPECIFIED HEART FAILURE TYPE (H): ICD-10-CM

## 2022-02-03 PROCEDURE — 99215 OFFICE O/P EST HI 40 MIN: CPT | Performed by: NURSE PRACTITIONER

## 2022-02-03 RX ORDER — CARVEDILOL 6.25 MG/1
6.25 TABLET ORAL 2 TIMES DAILY WITH MEALS
Qty: 180 TABLET | Refills: 2 | Status: SHIPPED | OUTPATIENT
Start: 2022-02-03 | End: 2022-01-01

## 2022-02-03 RX ORDER — CLONIDINE HYDROCHLORIDE 0.1 MG/1
0.1 TABLET ORAL 2 TIMES DAILY
Qty: 60 TABLET | Refills: 3 | Status: SHIPPED | OUTPATIENT
Start: 2022-02-03 | End: 2022-02-09

## 2022-02-03 ASSESSMENT — MIFFLIN-ST. JEOR: SCORE: 972.06

## 2022-02-03 NOTE — PROGRESS NOTES
Cardiology Clinic Progress Note  Ghislaine Walls MRN# [id number]   YOB: 1935 Age: 86 year old   Primary Cardiologist: Dr. Fuentes  Reason for visit: Cardiology follow up             Assessment and Plan:   Ghislaine Walls is a very pleasant 86 year old female with a history of HFpEF, CKD, hypertension, paroxsymal atrial fibrillation on eliquis, chronic anemia, rheumatoid arthritis and hypothyroidism.      1. Chronic diastolic heart failure - LVEF 55%, RV normal size/function.               - NYHA class III, stage C              - Fluid status : euvolemic   - Dry weight : ~ 130#              - Diuretic regimen : Continue torsemide to 20mg daily with additional 20mg daily for weight gain > 2# overnight.               - Aldosterone antagonist : none due to CKD  2. Atrial fibrillation, paroxsymal - in sinus rhythm on exam today, on eliquis for thromboembolic prophylaxis due to elevated [id number] score.   3. CKD IV - baseline creatinine 1.6-2.4, some improvement in renal function noted on labs completed at nephrology 2/1, creatinine 2.7 still up from baseline, will continue to monitor.   - Follows with nephrology, plans for repeat BMP in 1 month.   4. Hypertension  5. Chronic anemia - stable, hemoglobin 9.9     I saw Ghislaine and daughter for cardiology follow up. Ghislaine shares with me today that she is feeling significantly better since hospitalization. She appears compensated and euvolemic from a heart failure standpoint. No breathing complaints and feels her fatigue has improved. Weight has remained stable, she has needed 2 PRN doses torsemide since our last visit for weight gain. BMP today shows some improvement in renal function but still not back to baseline. Creatinine 2.7. Plan to continue current medication regimen. Blood pressure slightly elevated in clinic today, reviewed home blood pressures the last couple days which have been controlled. Recommended continue monitoring at home.     Reinforced  low sodium diet and 2L fluid restriction.      Changes today: None    Follow up plan:     Cardiology follow up with Elle in 6-8 weeks - plans to have BMP checked in 1 month at nephrology. Will try to avoid blood draw prior to our visit as she is wishing to limit blood draws.         History of Presenting Illness:    Ghislaine Walls is a very pleasant 86 year old female with a history of HFpEF, CKD, hypertension, paroxsymal atrial fibrillation on eliquis, chronic anemia, rheumatoid arthritis and hypothyroidism.      Primary cardiologist Dr. Fuentes. In October 2021 at which time she complained of generalized fatigue. Her metoprolol was decreased to 25mg BID. She was offered the option of stress perfusion imaging to rule out ischemic, but her symptoms were not suggestive of angina and she wished to defer. She had a HF hospitalization in April 2021.      Most recently Ghisliane was hospitalized 1/12-1/15/2022 with acute on chronic diastolic heart failure exacerbation. NTproBNP on admission 12,000. CXR with vascular congestion and small left pleural effusion s/p thoracentesis 1/13 with 250ml removed. Her metoprolol was changed to carvedilol 6.25mg BID and she was started on isordil 20mg TID due to elevated blood pressures. Her PTA torsemide was increased from 20mg -> 40mg daily. Some concerns for non-compliance contributing to HF exacerbation/admission. Admission weight 141#. Discharge weight 135#.      Echocardiogram 1/13/22 showed LVEF 55%, RV normal size/function, no significant valvular disease and moderate L pleural effusion.     I saw Ghislaine and her daughter for post hospital followup, at that time her weight had decreased another 8# since hospital discharge to 128#. Unfortunately her renal function had further declined. Creatinine increased from 2.44 -> 2.82. Recommended decreasing her torsemide to 20mg daily with additional 20mg as needed for weight gain > 2#.     She had visit with nephrology on 2/1/22 no  medications were changed. Plan for 3 month follow up.     Patient is here today for cardiology follow up.     Patient reports feeling good. Notes that she has been feeling better, feels her fatigued has improved. Monitoring weights daily a home. Clinic weight today 130 which is stable from last visit on  when weight was 128#. States she has needed to take 2 tablets of PRN torsemide due to increased weight. Notes LE overall controlled. Denies shortness of breath at rest. Denies exertional dyspnea with her routine activities, notes occasional with more strenuous activity. Sleeping good. Denies orthopnea or PND. Denies chest pain or chest tightness. Denies dizziness, lightheadedness or other presyncopal symptoms. Denies tachycardia or palpitations. Taking medications daily. Daughter notes she missed 2 days of afternoon doses last week.     BMP completed at nephrology on 22 showed some improvement in renal function, creatinine 2.72, BUN 60. Electrolytes stable. Blood pressure 142/60 and HR 60 in clinic today. Monitoring BP at home, last couple days 129/63.     Daughter has been helping meals and getting healthy choice meals with less than 500mg sodium, eating at least one of these daily. Only have occasional meals at [place]. She has limiting her fluid intake. No set exercise routine. Occasional beer at happy hour. Denies tobacco use. Using walker for support in her apartment and wheelchair for support out of the building.         Social History    Living in assisted living at [facility].   Social History     Socioeconomic History     Marital status:      Spouse name: Not on file     Number of children: Not on file     Years of education: Not on file     Highest education level: Not on file   Occupational History     Not on file   Tobacco Use     Smoking status: Former Smoker     Packs/day: 0.50     Years: 5.00     Pack years: 2.50     Types: Cigarettes     Quit date: 1973     Years since quittin.1  "    Smokeless tobacco: Never Used   Substance and Sexual Activity     Alcohol use: Yes     Alcohol/week: 0.0 standard drinks     Comment: one light beer of Fridays occ     Drug use: No     Sexual activity: Not Currently     Partners: Male   Other Topics Concern     Parent/sibling w/ CABG, MI or angioplasty before 65F 55M? No   Social History Narrative     Not on file     Social Determinants of Health     Financial Resource Strain: Low Risk      Difficulty of Paying Living Expenses: Not hard at all   Food Insecurity: No Food Insecurity     Worried About Running Out of Food in the Last Year: Never true     Ran Out of Food in the Last Year: Never true   Transportation Needs: No Transportation Needs     Lack of Transportation (Medical): No     Lack of Transportation (Non-Medical): No   Physical Activity: Not on file   Stress: Not on file   Social Connections: Not on file   Intimate Partner Violence: Not on file   Housing Stability: Not on file            Review of Systems:   Skin:  Positive for bruising   Eyes:  Positive for glasses  ENT:  Positive for hearing loss  Respiratory:  Negative dyspnea on exertion;shortness of breath  Cardiovascular:  palpitations;chest pain;lightheadedness;dizziness;Negative for Positive for;edema;fatigue  Gastroenterology: Positive for nausea  Genitourinary:  Positive for urinary frequency  Musculoskeletal:  Positive for arthritis  Neurologic:  not assessed memory problems  Psychiatric:  Negative    Heme/Lymph/Imm:  Positive for allergies;easy bruising  Endocrine:  Positive for thyroid disorder         Physical Exam:   Vitals: BP (!) 142/60   Pulse 60   Ht 1.549 m (5' 1\")   Wt 59 kg (130 lb)   SpO2 97%   BMI 24.56 kg/m     Wt Readings from Last 4 Encounters:   02/03/22 59 kg (130 lb)   01/21/22 59 kg (130 lb)   01/20/22 58.1 kg (128 lb)   01/15/22 61.6 kg (135 lb 14.4 oz)     GEN: well nourished, in no acute distress.  HEENT:  Pupils equal, round. Sclerae nonicteric.   NECK: Supple, no " masses appreciated.  C/V:  Regular rate and rhythm, no murmur, rub or gallop.    RESP: Respirations are unlabored. Clear to auscultation bilaterally without wheezing, rales, or rhonchi.  GI: Abdomen soft, nontender.  EXTREM: No LE edema.  NEURO: Alert and oriented, cooperative.  SKIN: Warm and dry       Data:     LIPID RESULTS:  Lab Results   Component Value Date    CHOL 123 10/02/2019    HDL 27 (L) 10/02/2019    LDL 74 10/02/2019    TRIG 111 10/02/2019    CHOLHDLRATIO 3.9 06/15/2015     LIVER ENZYME RESULTS:  Lab Results   Component Value Date    AST 40 06/07/2021    ALT 35 06/07/2021     CBC RESULTS:  Lab Results   Component Value Date    WBC 3.6 (L) 01/15/2022    WBC 3.5 (L) 06/07/2021    RBC 2.59 (L) 01/15/2022    RBC 3.26 (L) 06/07/2021    HGB 9.9 (L) 01/20/2022    HGB 9.8 (L) 06/07/2021    HCT 26.9 (L) 01/15/2022    HCT 32.5 (L) 06/07/2021     (H) 01/15/2022     06/07/2021    MCH 32.4 01/15/2022    MCH 30.1 06/07/2021    MCHC 31.2 (L) 01/15/2022    MCHC 30.2 (L) 06/07/2021    RDW 14.3 01/15/2022    RDW 17.6 (H) 06/07/2021     (L) 01/15/2022     06/07/2021     BMP RESULTS:  Lab Results   Component Value Date     01/20/2022     06/07/2021    POTASSIUM 4.1 01/20/2022    POTASSIUM 4.9 06/07/2021    CHLORIDE 107 01/20/2022    CHLORIDE 114 (H) 06/07/2021    CO2 28 01/20/2022    CO2 23 06/07/2021    ANIONGAP 6 01/20/2022    ANIONGAP 4 06/07/2021     (H) 01/20/2022    GLC 94 06/07/2021    BUN 44 (H) 01/20/2022    BUN 77 (H) 06/07/2021    CR 2.82 (H) 01/20/2022    CR 1.61 (H) 06/07/2021    GFRESTIMATED 16 (L) 01/20/2022    GFRESTIMATED 29 (L) 06/07/2021    GFRESTBLACK 34 (L) 06/07/2021    ALIVIA 9.4 01/20/2022    ALIVIA 9.4 06/07/2021      INR RESULTS:  Lab Results   Component Value Date    INR 1.16 (H) 02/24/2021    INR 2.07 (H) 07/21/2020            Medications     Current Outpatient Medications   Medication Sig Dispense Refill     Abatacept (ORENCIA IV) Inject 500 mg into the  vein every 30 days        ACE/ARB/ARNI NOT PRESCRIBED (INTENTIONAL) Please choose reason not prescribed from choices below.       acetaminophen (TYLENOL) 500 MG tablet Take 1,000 mg by mouth nightly as needed        allopurinol (ZYLOPRIM) 100 MG tablet Take 1 tablet (100 mg) by mouth daily 90 tablet 3     amLODIPine (NORVASC) 10 MG tablet Take 1 tablet (10 mg) by mouth daily 90 tablet 3     apixaban ANTICOAGULANT (ELIQUIS) 2.5 MG tablet Take 1 tablet (2.5 mg) by mouth 2 times daily 180 tablet 3     calcium carbonate 600 mg-vitamin D 400 units (CALTRATE) 600-400 MG-UNIT per tablet Take 1 tablet by mouth daily 1000 vit d, calcuim 600        carvedilol (COREG) 6.25 MG tablet Take 1 tablet (6.25 mg) by mouth 2 times daily (with meals) 180 tablet 2     cloNIDine (CATAPRES) 0.1 MG tablet Take 1 tablet (0.1 mg) by mouth 2 times daily 60 tablet 3     Darbepoetin Alonso-Albumin (ARANESP IJ) Monthly as needed       ferrous sulfate (FEROSUL) 325 (65 Fe) MG tablet Take 325 mg by mouth daily (with breakfast)       hydrALAZINE (APRESOLINE) 100 MG tablet Take 1 tablet by mouth 4 times daily. 360 tablet 1     hydroxychloroquine (PLAQUENIL) 200 MG tablet Take 200 mg by mouth daily       isosorbide dinitrate (ISORDIL) 20 MG tablet Take 1 tablet (20 mg) by mouth 3 times daily 90 tablet 0     levothyroxine (SYNTHROID/LEVOTHROID) 25 MCG tablet Take 1 tablet (25 mcg) by mouth daily 90 tablet 3     mirtazapine (REMERON) 15 MG tablet Take 1 tablet by mouth daily At Bedtime. 90 tablet 3     ondansetron (ZOFRAN-ODT) 4 MG ODT tab Take 4 mg by mouth every 8 hours as needed for nausea       pantoprazole (PROTONIX) 40 MG EC tablet Take 1 tablet (40 mg) by mouth daily 90 tablet 3     predniSONE (DELTASONE) 5 MG tablet Take 1 tablet (5 mg) by mouth daily 30 tablet 0     Sennosides (SENNA LAXATIVE PO)        sodium bicarbonate 650 MG tablet Take 650 mg by mouth 2 times daily        torsemide (DEMADEX) 20 MG tablet Take 1 tablet (20 mg) by mouth daily  Take additional 20mg torsemide (1 tablet) for weight gain > 2 pounds overnight. 100 tablet 0          Past Medical History     Past Medical History:   Diagnosis Date     Atrial fibrillation (H) new 10/19     [place] (hard of hearing)     wears hearing aids     Hyperlipidaemia      Hyperlipidaemia LDL goal < 130      Hypertension      Hypothyroid      PAD (peripheral artery disease) (H)      Renal insufficiency, mild      Uncontrolled hypertension 10/14/2019     Past Surgical History:   Procedure Laterality Date     APPENDECTOMY  1951     BONE MARROW BIOPSY, BONE SPECIMEN, NEEDLE/TROCAR N/A 7/31/2020    Procedure: bone marrow biopsy;  Surgeon: Iris Cameron MD;  Location:  GI     CATARACT IOL, RT/LT  2001    bilateral (laser - 5/2013)     DENTAL SURGERY  1962    wisdom     ESOPHAGOSCOPY, GASTROSCOPY, DUODENOSCOPY (EGD), COMBINED N/A 7/24/2020    Procedure: ESOPHAGOGASTRODUODENOSCOPY, WITH BIOPSY;  Surgeon: Humberto Bailon MD;  Location:  GI     EYE SURGERY  1956    Muscle release     TUBAL LIGATION  1971     Family History   Problem Relation Age of Onset     Heart Disease Mother      Respiratory Mother      Heart Disease Father      Heart Disease Brother      Coronary Artery Disease Brother         CAB     Heart Disease Brother      Coronary Artery Disease Brother         CAB            Allergies   Oxybutynin and Seasonal allergies    40 minutes spent on the date of the encounter doing chart review, history and exam, documentation and further activities as noted above      DHAVAL Cormier [facility]  Pager: [phone]

## 2022-02-03 NOTE — LETTER
2/3/2022    Laurie Conteh MD  [address]  [place] [address]    RE: Ghislaine Walls       Dear Colleague,     I had the pleasure of seeing Ghislaine Walls in the [facility].  Cardiology Clinic Progress Note  Ghislaine Walls MRN# [id number]   YOB: 1935 Age: 86 year old   Primary Cardiologist: Dr. Fuentes  Reason for visit: Cardiology follow up             Assessment and Plan:   Ghislaine Walls is a very pleasant 86 year old female with a history of HFpEF, CKD, hypertension, paroxsymal atrial fibrillation on eliquis, chronic anemia, rheumatoid arthritis and hypothyroidism.      1. Chronic diastolic heart failure - LVEF 55%, RV normal size/function.               - NYHA class III, stage C              - Fluid status : euvolemic   - Dry weight : ~ 130#              - Diuretic regimen : Continue torsemide to 20mg daily with additional 20mg daily for weight gain > 2# overnight.               - Aldosterone antagonist : none due to CKD  2. Atrial fibrillation, paroxsymal - in sinus rhythm on exam today, on eliquis for thromboembolic prophylaxis due to elevated [id number] score.   3. CKD IV - baseline creatinine 1.6-2.4, some improvement in renal function noted on labs completed at nephrology 2/1, creatinine 2.7 still up from baseline, will continue to monitor.   - Follows with nephrology, plans for repeat BMP in 1 month.   4. Hypertension  5. Chronic anemia - stable, hemoglobin 9.9     I saw Ghislaine and daughter for cardiology follow up. Ghislaine shares with me today that she is feeling significantly better since hospitalization. She appears compensated and euvolemic from a heart failure standpoint. No breathing complaints and feels her fatigue has improved. Weight has remained stable, she has needed 2 PRN doses torsemide since our last visit for weight gain. BMP today shows some improvement in renal function but still not back to baseline. Creatinine 2.7. Plan to continue  current medication regimen. Blood pressure slightly elevated in clinic today, reviewed home blood pressures the last couple days which have been controlled. Recommended continue monitoring at home.     Reinforced low sodium diet and 2L fluid restriction.      Changes today: None    Follow up plan:     Cardiology follow up with Elle in 6-8 weeks - plans to have BMP checked in 1 month at nephrology. Will try to avoid blood draw prior to our visit as she is wishing to limit blood draws.         History of Presenting Illness:    Ghislaine Walls is a very pleasant 86 year old female with a history of HFpEF, CKD, hypertension, paroxsymal atrial fibrillation on eliquis, chronic anemia, rheumatoid arthritis and hypothyroidism.      Primary cardiologist Dr. Fuentes. In October 2021 at which time she complained of generalized fatigue. Her metoprolol was decreased to 25mg BID. She was offered the option of stress perfusion imaging to rule out ischemic, but her symptoms were not suggestive of angina and she wished to defer. She had a HF hospitalization in April 2021.      Most recently Ghislaine was hospitalized 1/12-1/15/2022 with acute on chronic diastolic heart failure exacerbation. NTproBNP on admission 12,000. CXR with vascular congestion and small left pleural effusion s/p thoracentesis 1/13 with 250ml removed. Her metoprolol was changed to carvedilol 6.25mg BID and she was started on isordil 20mg TID due to elevated blood pressures. Her PTA torsemide was increased from 20mg -> 40mg daily. Some concerns for non-compliance contributing to HF exacerbation/admission. Admission weight 141#. Discharge weight 135#.      Echocardiogram 1/13/22 showed LVEF 55%, RV normal size/function, no significant valvular disease and moderate L pleural effusion.     I saw Ghislaine and her daughter for post hospital followup, at that time her weight had decreased another 8# since hospital discharge to 128#. Unfortunately her renal function had  further declined. Creatinine increased from 2.44 -> 2.82. Recommended decreasing her torsemide to 20mg daily with additional 20mg as needed for weight gain > 2#.     She had visit with nephrology on 2/1/22 no medications were changed. Plan for 3 month follow up.     Patient is here today for cardiology follow up.     Patient reports feeling good. Notes that she has been feeling better, feels her fatigued has improved. Monitoring weights daily a home. Clinic weight today 130 which is stable from last visit on 1/20 when weight was 128#. States she has needed to take 2 tablets of PRN torsemide due to increased weight. Notes LE overall controlled. Denies shortness of breath at rest. Denies exertional dyspnea with her routine activities, notes occasional with more strenuous activity. Sleeping good. Denies orthopnea or PND. Denies chest pain or chest tightness. Denies dizziness, lightheadedness or other presyncopal symptoms. Denies tachycardia or palpitations. Taking medications daily. Daughter notes she missed 2 days of afternoon doses last week.     BMP completed at nephrology on 2/1/22 showed some improvement in renal function, creatinine 2.72, BUN 60. Electrolytes stable. Blood pressure 142/60 and HR 60 in clinic today. Monitoring BP at home, last couple days 129/63.     Daughter has been helping meals and getting healthy choice meals with less than 500mg sodium, eating at least one of these daily. Only have occasional meals at [place]. She has limiting her fluid intake. No set exercise routine. Occasional beer at happy hour. Denies tobacco use. Using walker for support in her apartment and wheelchair for support out of the building.         Social History    Living in assisted living at [facility].   Social History     Socioeconomic History     Marital status:      Spouse name: Not on file     Number of children: Not on file     Years of education: Not on file     Highest education level: Not on file  "  Occupational History     Not on file   Tobacco Use     Smoking status: Former Smoker     Packs/day: 0.50     Years: 5.00     Pack years: 2.50     Types: Cigarettes     Quit date: 1973     Years since quittin.1     Smokeless tobacco: Never Used   Substance and Sexual Activity     Alcohol use: Yes     Alcohol/week: 0.0 standard drinks     Comment: one light beer of  occ     Drug use: No     Sexual activity: Not Currently     Partners: Male   Other Topics Concern     Parent/sibling w/ CABG, MI or angioplasty before 65F 55M? No   Social History Narrative     Not on file     Social Determinants of Health     Financial Resource Strain: Low Risk      Difficulty of Paying Living Expenses: Not hard at all   Food Insecurity: No Food Insecurity     Worried About Running Out of Food in the Last Year: Never true     Ran Out of Food in the Last Year: Never true   Transportation Needs: No Transportation Needs     Lack of Transportation (Medical): No     Lack of Transportation (Non-Medical): No   Physical Activity: Not on file   Stress: Not on file   Social Connections: Not on file   Intimate Partner Violence: Not on file   Housing Stability: Not on file            Review of Systems:   Skin:  Positive for bruising   Eyes:  Positive for glasses  ENT:  Positive for hearing loss  Respiratory:  Negative dyspnea on exertion;shortness of breath  Cardiovascular:  palpitations;chest pain;lightheadedness;dizziness;Negative for Positive for;edema;fatigue  Gastroenterology: Positive for nausea  Genitourinary:  Positive for urinary frequency  Musculoskeletal:  Positive for arthritis  Neurologic:  not assessed memory problems  Psychiatric:  Negative    Heme/Lymph/Imm:  Positive for allergies;easy bruising  Endocrine:  Positive for thyroid disorder         Physical Exam:   Vitals: BP (!) 142/60   Pulse 60   Ht 1.549 m (5' 1\")   Wt 59 kg (130 lb)   SpO2 97%   BMI 24.56 kg/m     Wt Readings from Last 4 Encounters:   22 " 59 kg (130 lb)   01/21/22 59 kg (130 lb)   01/20/22 58.1 kg (128 lb)   01/15/22 61.6 kg (135 lb 14.4 oz)     GEN: well nourished, in no acute distress.  HEENT:  Pupils equal, round. Sclerae nonicteric.   NECK: Supple, no masses appreciated.  C/V:  Regular rate and rhythm, no murmur, rub or gallop.    RESP: Respirations are unlabored. Clear to auscultation bilaterally without wheezing, rales, or rhonchi.  GI: Abdomen soft, nontender.  EXTREM: No LE edema.  NEURO: Alert and oriented, cooperative.  SKIN: Warm and dry       Data:     LIPID RESULTS:  Lab Results   Component Value Date    CHOL 123 10/02/2019    HDL 27 (L) 10/02/2019    LDL 74 10/02/2019    TRIG 111 10/02/2019    CHOLHDLRATIO 3.9 06/15/2015     LIVER ENZYME RESULTS:  Lab Results   Component Value Date    AST 40 06/07/2021    ALT 35 06/07/2021     CBC RESULTS:  Lab Results   Component Value Date    WBC 3.6 (L) 01/15/2022    WBC 3.5 (L) 06/07/2021    RBC 2.59 (L) 01/15/2022    RBC 3.26 (L) 06/07/2021    HGB 9.9 (L) 01/20/2022    HGB 9.8 (L) 06/07/2021    HCT 26.9 (L) 01/15/2022    HCT 32.5 (L) 06/07/2021     (H) 01/15/2022     06/07/2021    MCH 32.4 01/15/2022    MCH 30.1 06/07/2021    MCHC 31.2 (L) 01/15/2022    MCHC 30.2 (L) 06/07/2021    RDW 14.3 01/15/2022    RDW 17.6 (H) 06/07/2021     (L) 01/15/2022     06/07/2021     BMP RESULTS:  Lab Results   Component Value Date     01/20/2022     06/07/2021    POTASSIUM 4.1 01/20/2022    POTASSIUM 4.9 06/07/2021    CHLORIDE 107 01/20/2022    CHLORIDE 114 (H) 06/07/2021    CO2 28 01/20/2022    CO2 23 06/07/2021    ANIONGAP 6 01/20/2022    ANIONGAP 4 06/07/2021     (H) 01/20/2022    GLC 94 06/07/2021    BUN 44 (H) 01/20/2022    BUN 77 (H) 06/07/2021    CR 2.82 (H) 01/20/2022    CR 1.61 (H) 06/07/2021    GFRESTIMATED 16 (L) 01/20/2022    GFRESTIMATED 29 (L) 06/07/2021    GFRESTBLACK 34 (L) 06/07/2021    ALIVIA 9.4 01/20/2022    ALIVIA 9.4 06/07/2021      INR RESULTS:  Lab  Results   Component Value Date    INR 1.16 (H) 02/24/2021    INR 2.07 (H) 07/21/2020            Medications     Current Outpatient Medications   Medication Sig Dispense Refill     Abatacept (ORENCIA IV) Inject 500 mg into the vein every 30 days        ACE/ARB/ARNI NOT PRESCRIBED (INTENTIONAL) Please choose reason not prescribed from choices below.       acetaminophen (TYLENOL) 500 MG tablet Take 1,000 mg by mouth nightly as needed        allopurinol (ZYLOPRIM) 100 MG tablet Take 1 tablet (100 mg) by mouth daily 90 tablet 3     amLODIPine (NORVASC) 10 MG tablet Take 1 tablet (10 mg) by mouth daily 90 tablet 3     apixaban ANTICOAGULANT (ELIQUIS) 2.5 MG tablet Take 1 tablet (2.5 mg) by mouth 2 times daily 180 tablet 3     calcium carbonate 600 mg-vitamin D 400 units (CALTRATE) 600-400 MG-UNIT per tablet Take 1 tablet by mouth daily 1000 vit d, calcuim 600        carvedilol (COREG) 6.25 MG tablet Take 1 tablet (6.25 mg) by mouth 2 times daily (with meals) 180 tablet 2     cloNIDine (CATAPRES) 0.1 MG tablet Take 1 tablet (0.1 mg) by mouth 2 times daily 60 tablet 3     Darbepoetin Alonso-Albumin (ARANESP IJ) Monthly as needed       ferrous sulfate (FEROSUL) 325 (65 Fe) MG tablet Take 325 mg by mouth daily (with breakfast)       hydrALAZINE (APRESOLINE) 100 MG tablet Take 1 tablet by mouth 4 times daily. 360 tablet 1     hydroxychloroquine (PLAQUENIL) 200 MG tablet Take 200 mg by mouth daily       isosorbide dinitrate (ISORDIL) 20 MG tablet Take 1 tablet (20 mg) by mouth 3 times daily 90 tablet 0     levothyroxine (SYNTHROID/LEVOTHROID) 25 MCG tablet Take 1 tablet (25 mcg) by mouth daily 90 tablet 3     mirtazapine (REMERON) 15 MG tablet Take 1 tablet by mouth daily At Bedtime. 90 tablet 3     ondansetron (ZOFRAN-ODT) 4 MG ODT tab Take 4 mg by mouth every 8 hours as needed for nausea       pantoprazole (PROTONIX) 40 MG EC tablet Take 1 tablet (40 mg) by mouth daily 90 tablet 3     predniSONE (DELTASONE) 5 MG tablet Take 1  tablet (5 mg) by mouth daily 30 tablet 0     Sennosides (SENNA LAXATIVE PO)        sodium bicarbonate 650 MG tablet Take 650 mg by mouth 2 times daily        torsemide (DEMADEX) 20 MG tablet Take 1 tablet (20 mg) by mouth daily Take additional 20mg torsemide (1 tablet) for weight gain > 2 pounds overnight. 100 tablet 0          Past Medical History     Past Medical History:   Diagnosis Date     Atrial fibrillation (H) new 10/19     [place] hearing[place]     wears hearing aids     Hyperlipidaemia      Hyperlipidaemia LDL goal < 130      Hypertension      Hypothyroid      PAD (peripheral artery disease) (H)      Renal insufficiency, mild      Uncontrolled hypertension 10/14/2019     Past Surgical History:   Procedure Laterality Date     APPENDECTOMY  1951     BONE MARROW BIOPSY, BONE SPECIMEN, NEEDLE/TROCAR N/A 7/31/2020    Procedure: bone marrow biopsy;  Surgeon: Iris Cameron MD;  Location:  GI     CATARACT IOL, RT/LT  2001    bilateral (laser - 5/2013)     DENTAL SURGERY  1962    wisdom     ESOPHAGOSCOPY, GASTROSCOPY, DUODENOSCOPY (EGD), COMBINED N/A 7/24/2020    Procedure: ESOPHAGOGASTRODUODENOSCOPY, WITH BIOPSY;  Surgeon: Humberto Bailon MD;  Location:  GI     EYE SURGERY  1956    Muscle release     TUBAL LIGATION  1971     Family History   Problem Relation Age of Onset     Heart Disease Mother      Respiratory Mother      Heart Disease Father      Heart Disease Brother      Coronary Artery Disease Brother         CAB     Heart Disease Brother      Coronary Artery Disease Brother         CAB            Allergies   Oxybutynin and Seasonal allergies    40 minutes spent on the date of the encounter doing chart review, history and exam, documentation and further activities as noted above      DHAVAL Cormier CNP  [facility]  Pager: [phone]    Thank you for allowing me to participate in the care of your patient.      Sincerely,     DHAVAL Cormier CNP       [facility]  cc:   Elle Mariano, DHAVAL CNP  [address]  [place] [id number]

## 2022-02-03 NOTE — PATIENT INSTRUCTIONS
1. No medications changes  2. Continue to monitor weight and blood pressure at home  3. Please call any questions/concerns [phone]

## 2022-02-03 NOTE — TELEPHONE ENCOUNTER
Spoke with Krupa and she wanted to make sure Elle can see the labs that were drawn at [facility] on 2/1/22.   Labs are available in care everywhere, will update Elle.

## 2022-02-03 NOTE — TELEPHONE ENCOUNTER
Routing refill request to provider for review/approval because:  Labs out of range:    Creatinine   Date Value Ref Range Status   01/20/2022 2.82 (H) 0.52 - 1.04 mg/dL Final   01/15/2022 2.44 (H) 0.52 - 1.04 mg/dL Final   01/14/2022 2.37 (H) 0.52 - 1.04 mg/dL Final   01/13/2022 2.01 (H) 0.52 - 1.04 mg/dL Final   01/12/2022 1.99 (H) 0.52 - 1.04 mg/dL Final   10/05/2021 2.28 (H) 0.52 - 1.04 mg/dL Final   06/07/2021 1.61 (H) 0.52 - 1.04 mg/dL Final   05/10/2021 1.76 (H) 0.52 - 1.04 mg/dL Final   04/28/2021 1.67 (H) 0.52 - 1.04 mg/dL Final   04/26/2021 1.92 (H) 0.52 - 1.04 mg/dL Final   04/22/2021 1.66 (H) 0.52 - 1.04 mg/dL Final   04/21/2021 1.43 (H) 0.52 - 1.04 mg/dL Final      Lady Marshall RN

## 2022-02-03 NOTE — TELEPHONE ENCOUNTER
Prescription approved per [facility] Refill Protocol.  Lady Marshall RN  [facility]

## 2022-02-03 NOTE — TELEPHONE ENCOUNTER
[facility] Call Center    Phone Message    May a detailed message be left on voicemail: yes     Reason for Call: Other: Ghislaine's daughter Krupa called stating that Ghislaine had labs done on 2/1/22 and wants to make sure that Elle Mariano has received those results. Please advise. Thank you.      Action Taken: Message routed to:  Other: [place]    Travel Screening: Not Applicable

## 2022-02-07 DIAGNOSIS — I10 HYPERTENSION GOAL BP (BLOOD PRESSURE) < 140/90: Chronic | ICD-10-CM

## 2022-02-08 NOTE — TELEPHONE ENCOUNTER
Clonidine 0.1 mg tablet    Summary: Take 1 tablet (0.1 mg) by mouth 2 times daily, Disp-60 tablet, R-3, E-Prescribe   Dose, Route, Frequency: 0.1 mg, Oral, 2 TIMES DAILY  Start: 2/3/2022  Ord/Sold: 2/3/2022

## 2022-02-09 DIAGNOSIS — I10 HYPERTENSION GOAL BP (BLOOD PRESSURE) < 140/90: Chronic | ICD-10-CM

## 2022-02-09 RX ORDER — CLONIDINE HYDROCHLORIDE 0.1 MG/1
0.1 TABLET ORAL 2 TIMES DAILY
Qty: 180 TABLET | Refills: 1 | Status: SHIPPED | OUTPATIENT
Start: 2022-02-09 | End: 2022-01-01

## 2022-02-09 RX ORDER — CLONIDINE HYDROCHLORIDE 0.1 MG/1
0.1 TABLET ORAL 2 TIMES DAILY
Qty: 60 TABLET | Refills: 3 | Status: SHIPPED | OUTPATIENT
Start: 2022-02-09 | End: 2022-02-09

## 2022-02-09 NOTE — TELEPHONE ENCOUNTER
Pharmacist calling to request increased quantity for Clonidine. Patient requesting 90 day supply instead of the granted 30 day supply.  Pharmacy would like note included with script if there is a reason patient is only able to have 30 day supply given.    Medication pended with updated quantity of 90 day supply with 1 refill.  Routing to PCP

## 2022-02-09 NOTE — TELEPHONE ENCOUNTER
Routing refill request to provider for review/approval because:  Labs out of range:    Creatinine   Date Value Ref Range Status   01/20/2022 2.82 (H) 0.52 - 1.04 mg/dL Final   06/07/2021 1.61 (H) 0.52 - 1.04 mg/dL Final      BP Readings from Last 3 Encounters:   02/03/22 (!) 142/60   01/21/22 113/66   01/20/22 (!) 145/68        Tayler Boss RN  [facility] Triage

## 2022-02-11 DIAGNOSIS — I50.9 ACUTE ON CHRONIC CONGESTIVE HEART FAILURE, UNSPECIFIED HEART FAILURE TYPE (H): ICD-10-CM

## 2022-02-14 RX ORDER — ISOSORBIDE DINITRATE 20 MG/1
20 TABLET ORAL
Qty: 270 TABLET | Refills: 0 | Status: SHIPPED | OUTPATIENT
Start: 2022-02-14 | End: 2022-05-11

## 2022-02-14 NOTE — TELEPHONE ENCOUNTER
Medication is being filled for 1 time refill only due to:  pt will be due for f/u with provider x 3mths

## 2022-02-15 NOTE — PLAN OF CARE
A&O x 4, forgetful at times. Tele: SR 1st degree AVB. VSS, on 2L O2 NC. Denies pain & SOB at rest. Assist x 1-2, repositioning in bed. Purewick in place, good UOP. Incontinent at times. Possible discharge to [place] today. Will continue w/plan of care.  
A&O x 4. Denies pain. VSS, on 2L O2 NC. Tele: SR w/1st Degree AVB. Assist x 1 w/GB & walker/repositions q2hrs. Purewick in place, good UOP. Will continue w/plan of care.  
A&O x4. Pt denied pain. VSS, on 2L NC. Up w/ 1+ gb+walker. Tele: SR 1st degree AVB. Lethargic. Slept most of the day. Purwick in place. Alarms on. Plan for transition to PO lasix tomorrow. Continue to monitor.    
A&Ox4, forgetful at times, VSS on RA. Denies pain. Tele SR with 1st deg AVB. Ax1 with walker, needs encouragement. Turn/repo while in bed. Low sat fat diet, voiding adequately via purewick.  IV SL. Plan to DC at 11am to [place]. Palliative following.   
Heart Failure Care Map  GOALS TO BE MET BEFORE DISCHARGE:    1. Decrease congestion and/or edema with diuretic therapy to achieve near optimal volume status.     Dyspnea improved: Yes, satisfactory for discharge.   Edema improved: Yes, satisfactory for discharge.        Net I/O and Weights since admission:   03/22 1500 - 04/21 1459  In: 3680 [P.O.:3680]  Out: 7525 [Urine:7525]  Net: -3845     Vitals:    04/15/21 1641 04/16/21 0500 04/17/21 0617 04/18/21 0404   Weight: 59.1 kg (130 lb 6.4 oz) 57.7 kg (127 lb 4.8 oz) 57.4 kg (126 lb 8 oz) 57.4 kg (126 lb 9.6 oz)    04/19/21 0652 04/20/21 0723 04/21/21 0510   Weight: 56.7 kg (125 lb 1.6 oz) 58.7 kg (129 lb 4.8 oz) 54.1 kg (119 lb 3.2 oz)       2.  O2 sats > 90% on room air, or at prior home O2 therapy level.      Able to wean O2 this shift to keep sats above 90%?: Yes, satisfactory for discharge.   Does patient use Home O2? No          Current oxygenation status:   SpO2: 93 %     O2 Device: None (Room air),     3.  Tolerates ambulation and mobility near baseline.     Ambulation: No, further care required to meet this goal. Please explain pt weak and did not feel up to walking   Times patient ambulated with staff this shift: 0    Please review the Heart Failure Care Map for additional HF goal outcomes.    Pt A/O. Denies pain, however c/o nausea all shift. Received zofran and compazine. Drank water but did not eats meals. Vitals stable, however BP elevated, added back clonidine. Weaned to RA today.  Turning and repositioning. Pt did not feel up to walking this shift. Tele SR w/ 1st degree AVB. Planing discharge to [place] tomorrow.     Zuly Chairez RN  4/21/2021    
Neuro- Alert x4- confused at times  Tele/Cardiac- A fib SVR   Resp- Dyspnea upon exertion, 2 lpm per nc  Activity- assist of 2  Pain- denies  Drips- none  Drains/Tubes- purewick in place  Skin- blanchable reddened coccyx, turns promoted  GI/- WDL  Aggression Color- green  COVID status- negative  Plan- consult with cards and palliative on treatment for respiratory needs      Heart Failure Care Map  GOALS TO BE MET BEFORE DISCHARGE:    1. Decrease congestion and/or edema with diuretic therapy to achieve near optimal volume status.     Dyspnea improved: unchanged   Edema improved: unchanged        Net I/O and Weights since admission:   03/17 0700 - 04/16 0659  In: 240 [P.O.:240]  Out: 200 [Urine:200]  Net: 40     Vitals:    04/15/21 1641   Weight: 59.1 kg (130 lb 6.4 oz)       2.  O2 sats > 90% on room air, or at prior home O2 therapy level.      Able to wean O2 this shift to keep sats above 90%?: No, further care required to meet this goal. Please explain patient stayed on 2 lpm   Does patient use Home O2? No          Current oxygenation status:   SpO2: 93 %     O2 Device: Nasal cannula, Oxygen Delivery: 2 LPM    3.  Tolerates ambulation and mobility near baseline.     Ambulation: No, further care required to meet this goal. Stayed in bed for shift   Times patient ambulated with staff this shift: 0    Please review the Heart Failure Care Map for additional HF goal outcomes.    Gregory Bashir, RN  4/16/2021    
Occupational Therapy Discharge Summary    Reason for therapy discharge:    Discharged to transitional care facility.    Progress towards therapy goal(s). See goals on Care Plan in [facility] electronic health record for goal details.  Goals partially met.  Barriers to achieving goals:   discharge from facility.    Therapy recommendation(s):    Continued therapy is recommended.  Rationale/Recommendations:  Per POC:TCU recommended;Skilled OT in TCU setting to address independence in I/ADLs, as pt is below baseline. Pt. discharged to TCU 4/22/21..      
PT: Attempted to see for PT and pt is sleeping. RN reports pt has been extremely fatigued today.  
PT: Attempted to see pt for PT evaluation. Pt declining participation as she wants to stay in the bed and rest. Pt was agreeable for this service to return tomorrow.  RN reports pt has been declining OOB all day today. Note palliative care consult placed today.     
Physical Therapy Discharge Summary    Reason for therapy discharge:    Discharged to transitional care facility.    Progress towards therapy goal(s). See goals on Care Plan in [facility] electronic health record for goal details.  Goals not met.  Barriers to achieving goals:   limited tolerance for therapy and discharge from facility.    Therapy recommendation(s):    Continued therapy is recommended.  Rationale/Recommendations:  TCU to maximize return to PLOF.      
Pt A&Ox4; forgetful, Ax2, T/R Q2H while in bed. Refused to get OOB today. Cardiac diet. PIV SL. Tele: SR w/ 1st degree AV block. Purewick in place-- good output.coccyx on mepilex- nonblanchable redness. Cr 2.08, Hgb 8.9. Possible palliative consult.     Addendum [id number]: Palliative consulted. PO hydralazine 4x/day. Started on IV abx.         Heart Failure Care Map  GOALS TO BE MET BEFORE DISCHARGE:     1. Decrease congestion and/or edema with diuretic therapy to achieve near optimal volume status.                 Dyspnea improved: unchanged              Edema improved: unchanged         Net I/O and Weights since admission:              03/17 0700 - 04/16 0659  In: 240 [P.O.:240]  Out: 200 [Urine:200]  Net: 40                    Vitals:     04/15/21 1641   Weight: 59.1 kg (130 lb 6.4 oz)         2.  O2 sats > 90% on room air, or at prior home O2 therapy level.                  Able to wean O2 this shift to keep sats above 90%?: No, further care required to meet this goal. Please explain patient stayed on 2 lpm              Does patient use Home O2? No           Current oxygenation status:              SpO2: 93 %                O2 Device: Nasal cannula, Oxygen Delivery: 2 LPM     3.  Tolerates ambulation and mobility near baseline.                 Ambulation: No, further care required to meet this goal. Stayed in bed for shift              Times patient ambulated with staff this shift: 0-- patient refusing to get OOB.      Please review the Heart Failure Care Map for additional HF goal outcomes.]        
Pt A&Ox4; forgetful, Ax2, T/R Q2H while in bed. VSS ex tachy on 3L02 NC. PRN IV hydralazine given 1x, PO hydralazine dose increased today. Up to bedside commode and up to chair each x1 during shift. Doesn't tolerate activity very well. Refused further OOB. Cardiac diet. PIV SL. Tele: SR w/ 1st degree AV block. Purewick in place-- good output. Coccyx on mepilex, nonblanchable redness. Cr 1.67- trending down. Palliative following.        Heart Failure Care Map  GOALS TO BE MET BEFORE DISCHARGE:    1. Decrease congestion and/or edema with diuretic therapy to achieve near optimal volume status.     Dyspnea improved: No further care needed- pt still on 3L 02 NC.    Edema improved: No, further care required to meet this goal. Please explain improving. still 1+ edema bilat LE.         Net I/O and Weights since admission:   03/18 2300 - 04/17 2259  In: 1420 [P.O.:1420]  Out: 3525 [Urine:3525]  Net: -2105     Vitals:    04/15/21 1641 04/16/21 0500 04/17/21 0617   Weight: 59.1 kg (130 lb 6.4 oz) 57.7 kg (127 lb 4.8 oz) 57.4 kg (126 lb 8 oz)       2.  O2 sats > 90% on room air, or at prior home O2 therapy level.      Able to wean O2 this shift to keep sats above 90%?: No, further care required to meet this goal. Please explain still on 3L 02 NC.    Does patient use Home O2? No          Current oxygenation status:   SpO2: 97 %     O2 Device: Nasal cannula, Oxygen Delivery: 3 LPM    3.  Tolerates ambulation and mobility near baseline.     Ambulation: No, further care required to meet this goal. Please explain OOB x2 during shift. Not ambulating (Pt is refusing)   Times patient ambulated with staff this shift: 0    Please review the Heart Failure Care Map for additional HF goal outcomes.    Gloria Hollingsworth RN  4/17/2021     
Pt A+Ox4. Flat affect. VSS on 2L NC. HTN controlled using scheduled medication. Denies SOB at rest but reports TOBAR. Tele: SR with 1st degree AV block with OCC PAC's. Denies Pain. Moving in bed with A1-2 dependent on cares. Diuresing, Incontinent of urine, purewick in place, good output. Nausea present intermittently, improved with PRN zofran. Will continue to monitor.   
Pt is A&Ox4, cardiac diet, strong Ax2, from [facility] hypertensive. VSS on 2-3L of O2 via NC. Redness to coccyx, mepilex in place. On Doxycycline and Ancef for UTI and poss pneumonia refer to chest xray. PIV, SL. Purewick in place with adequate output. Has scheduled Hydralazine that was last given at 2230, but provider wants SBP under 180 and had no PRN available. Was checked around 0130 and was 200/80. MD tee, Millan ordered PRN Hydralazine 10mg if SBP over 180. Given once this shift. Had palliative consult yesterday, see note. Flat affect, calm and cooperative all shift. Denied pain.   
Pt is a/o x 4. Up with ast x 1, gait belt and walker.  Awake most of night, slept the last couple hours of shift.  Refused turning/repositioning, stating she is comfortable and doesn't want to move. Gave PRN IV hydralazine for /71. Recheck: 172/64.  Purewick in place overnight. Tele: SR/sinus dysrhythmia.  Denies pain and SOB. Mepilex on bottom.  
Pt is a/o.  Tele: SR, and then Afib RVR overnight.  Patient received IV metoprolol x 2 for Afib RVR -150. HR did go down to 100-120's after second dose.  Patient asymptomatic. Denies pain. 3L O2. Purewick in place. Mepilex over coccyx.    
VSS except BP's elevated to 160's this AM. Room air. Tele SR with first degree AVB. Denies pain. A&Ox4. Flat affect. Up with one assist, gait belt and walker. Q2 turns.  Purewick in place. Urinary incontinence. No BM this shift. Plan for discharge to [place] today.     Heart Failure Care Map  GOALS TO BE MET BEFORE DISCHARGE:    1. Decrease congestion and/or edema with diuretic therapy to achieve near optimal volume status.     Dyspnea improved: Yes, satisfactory for discharge.   Edema improved: Yes, satisfactory for discharge.        Net I/O and Weights since admission:   03/23 0700 - 04/22 0659  In: 3680 [P.O.:3680]  Out: 7925 [Urine:7925]  Net: -4245     Vitals:    04/15/21 1641 04/16/21 0500 04/17/21 0617 04/18/21 0404   Weight: 59.1 kg (130 lb 6.4 oz) 57.7 kg (127 lb 4.8 oz) 57.4 kg (126 lb 8 oz) 57.4 kg (126 lb 9.6 oz)    04/19/21 0652 04/20/21 0723 04/21/21 0510 04/22/21 0449   Weight: 56.7 kg (125 lb 1.6 oz) 58.7 kg (129 lb 4.8 oz) 54.1 kg (119 lb 3.2 oz) 52.6 kg (115 lb 14.4 oz)       2.  O2 sats > 90% on room air, or at prior home O2 therapy level.      Able to wean O2 this shift to keep sats above 90%?: Yes, satisfactory for discharge.   Does patient use Home O2? No          Current oxygenation status:   SpO2: 93 %     O2 Device: None (Room air), Oxygen Delivery: 2 LPM    3.  Tolerates ambulation and mobility near baseline.     Ambulation: Yes, satisfactory for discharge.   Times patient ambulated with staff this shift: 1    Please review the Heart Failure Care Map for additional HF goal outcomes.    Courtney De La Rosa RN  4/22/2021     
SILVIA Mcleod

## 2022-02-16 ENCOUNTER — VIRTUAL VISIT (OUTPATIENT)
Dept: PHARMACY | Facility: CLINIC | Age: 87
End: 2022-02-16
Payer: COMMERCIAL

## 2022-02-16 DIAGNOSIS — I50.32 CHRONIC HEART FAILURE WITH PRESERVED EJECTION FRACTION (H): Primary | ICD-10-CM

## 2022-02-16 DIAGNOSIS — M06.9 RHEUMATOID ARTHRITIS, INVOLVING UNSPECIFIED SITE, UNSPECIFIED WHETHER RHEUMATOID FACTOR PRESENT (H): ICD-10-CM

## 2022-02-16 DIAGNOSIS — M70.20 OLECRANON BURSITIS, UNSPECIFIED LATERALITY: ICD-10-CM

## 2022-02-16 DIAGNOSIS — I48.20 CHRONIC ATRIAL FIBRILLATION (H): ICD-10-CM

## 2022-02-16 DIAGNOSIS — K59.00 CONSTIPATION, UNSPECIFIED CONSTIPATION TYPE: ICD-10-CM

## 2022-02-16 DIAGNOSIS — Z78.9 TAKES DIETARY SUPPLEMENTS: ICD-10-CM

## 2022-02-16 DIAGNOSIS — M1A.9XX0 CHRONIC GOUT WITHOUT TOPHUS, UNSPECIFIED CAUSE, UNSPECIFIED SITE: ICD-10-CM

## 2022-02-16 DIAGNOSIS — N18.9 CHRONIC KIDNEY DISEASE, UNSPECIFIED CKD STAGE: ICD-10-CM

## 2022-02-16 DIAGNOSIS — I73.9 PAD (PERIPHERAL ARTERY DISEASE) (H): ICD-10-CM

## 2022-02-16 DIAGNOSIS — K21.00 GASTROESOPHAGEAL REFLUX DISEASE WITH ESOPHAGITIS, UNSPECIFIED WHETHER HEMORRHAGE: ICD-10-CM

## 2022-02-16 DIAGNOSIS — R62.7 FAILURE TO THRIVE IN ADULT: ICD-10-CM

## 2022-02-16 DIAGNOSIS — I10 BENIGN ESSENTIAL HYPERTENSION: ICD-10-CM

## 2022-02-16 DIAGNOSIS — E03.9 HYPOTHYROIDISM, UNSPECIFIED TYPE: ICD-10-CM

## 2022-02-16 PROCEDURE — 99605 MTMS BY PHARM NP 15 MIN: CPT | Performed by: PHARMACIST

## 2022-02-16 PROCEDURE — 99607 MTMS BY PHARM ADDL 15 MIN: CPT | Performed by: PHARMACIST

## 2022-02-16 RX ORDER — SENNOSIDES 8.6 MG
650 CAPSULE ORAL 2 TIMES DAILY
COMMUNITY
End: 2023-01-01

## 2022-02-16 NOTE — PROGRESS NOTES
Medication Therapy Management (MTM) Encounter    ASSESSMENT:                            Medication Adherence/Access: No issues identified    HFpEF/Hypertension/A. Fib/PVD: Improved since hospital admission.  Home blood pressure is not at goal <140/90mmHg, will continue to monitor in clinic.  Of note, she is not on statin, lipids have not been checked for several years so may benefit from re-checking.    Chronic Kidney Disease: Plan in place with nephrology.    Constipation: Stable.    RA/Bursitis:  Stable.    GERD: Stable.     Hypothyroidism: Stable. Last TSH is within normal limits.      Failure to Thrive:  Stable.    Gout: Stable.  Continued therapy is appropriate given prior frequency of gout attacks.  Risk is also elevated due to diuretic use.    Supplements:  Preferred calcium formulation given concurrent PPI use would be calcium citrate, needed additional education regarding this.    PLAN:                            1.  Future order placed for lipids to be checked with next set of labs.  2.  Recommended changing from calcium carbonate/vitamin D to calcium citrate/vitamin D 2 tablets daily.      Follow-up: Return in about 6 months (around 8/16/2022) for Follow-up Medication Review.    SUBJECTIVE/OBJECTIVE:                          Ghislaine Walls is a 86 year old female called for a transitions of care visit. She was discharged from [facility] on 1/15/2022 for acute on chronic heart failure with preserved ejection fraction. Patient was accompanied by her daughter, Krupa, for our visit today.     Reason for visit: Comprehensive medication review.    Allergies/ADRs: Reviewed in chart  Past Medical History: Reviewed in chart  Tobacco: She reports that she quit smoking about 49 years ago. Her smoking use included cigarettes. She has a 2.50 pack-year smoking history. She has never used smokeless tobacco.  Alcohol: Less than 1 beverage / month  Caffeine: 1-2 cups of coffee/day  Activity: None    Medication  "Adherence/Access: Patient uses pill box(es), set up by Krupa.  Patient takes medications 4 time(s) per day.  Per patient, misses medication 0-2 times per week.     HFpEF/Hypertension/A. Fib/PVD:  Patient reports feeling much better since her recent hospitalization; she's working closely with cardiology.  Current medications include amlodipine 10mg daily, Eliquis 2.5mg twice daily, carvedilol 6.25 mg twice daily, clonidine 0.1mg twice daily, hydralazine 100mg four times daily, isosorbide dinitrate 20mg three times daily and torsemide 20 mg daily (and an additional dose if needed for weight gain).  She had stopped taking torsemide a few months ago due to polyuria, which ended in hospital admission.  Patient reports no current medication side effects - in fact she reports she's feeling better than she has in months.  Patient did have history of worsening renal function with ACE/ARB therapy.  Nephrology note from 1/6/22 indicates \"She is on 5 blood pressure medications her systolic is mildly elevated but that might be okay given her advanced age. We do not want to decrease cerebral perfusion too much.\"  ECHO:  Date 1/13/2022, EF 55%  Patient is not complaining of HF symptoms.    Patient is measuring daily weights and reports stable.    Patient does self-monitor blood pressure. Home BP monitoring in range of 140-150's systolic, does not recall diastolic readings.  HR is usually around 60bpm.  Patient is following a low sodium diet, is avoiding EtOH.  She is also on fluid restriction.  BP Readings from Last 3 Encounters:   02/03/22 (!) 142/60   01/21/22 113/66   01/20/22 (!) 145/68      Potassium   Date Value Ref Range Status   01/20/2022 4.1 3.4 - 5.3 mmol/L Final   06/07/2021 4.9 3.4 - 5.3 mmol/L Final      Recent Labs   Lab Test 10/02/19  0531 06/28/17  1351 06/20/16  1511 06/15/15  1124 05/27/14  1514   CHOL 123 173   < > 188 200*   HDL 27* 45*   < > 48* 53   LDL 74 109*   < > 118 133*   TRIG 111 93   < > 112 68 "   CHOLHDLRATIO  --   --   --  3.9 3.8    < > = values in this interval not displayed.      Chronic Kidney Disease: Current medications include Aranesp monthly as needed, sodium bicarbonate 650 mg twice daily, ferrous sulfate 325 mg daily with breakfast.  She denies side effects of therapy.  She reports being told she'll need to be on iron life long.  Creatinine   Date Value Ref Range Status   01/20/2022 2.82 (H) 0.52 - 1.04 mg/dL Final   06/07/2021 1.61 (H) 0.52 - 1.04 mg/dL Final      Ferritin   Date Value Ref Range Status   07/22/2020 484 (H) 8 - 252 ng/mL Final     Iron   Date Value Ref Range Status   07/22/2020 9 (L) 35 - 180 ug/dL Final     Iron Binding Cap   Date Value Ref Range Status   07/22/2020 167 (L) 240 - 430 ug/dL Final     Estimated Creatinine Clearance: 12 mL/min (A) (based on SCr of 2.82 mg/dL (H)).     GFR Estimate   Date Value Ref Range Status   01/20/2022 16 (L) >60 mL/min/1.73m2 Final     Comment:     Effective December 21, 2021 eGFRcr in adults is calculated using the 2021 CKD-EPI creatinine equation which includes age and gender (Olegario et al., NE, DOI: 10.1056/UCXBgo5185205)   06/07/2021 29 (L) >60 mL/min/[1.73_m2] Final     Comment:     Non  GFR Calc  Starting 12/18/2018, serum creatinine based estimated GFR (eGFR) will be   calculated using the Chronic Kidney Disease Epidemiology Collaboration   (CKD-EPI) equation.       Constipation:  Patient takes senna 2 tablets daily as needed, which she reports is effective.  No side effects reported.      RA/Bursitis: Current medications include acetaminophen 650mg at bedtime as needed, prednisone 5 mg daily, Orencia IV every 30 days (dose unknown), hydroxychloroquine 200 mg daily. Patient reports symptoms are stable, improved since starting current regimen.  No side effects reported.  Sulfasalazine was stopped about 1 year ago.  They reports she's tried tapering down on prednisone without success.    GERD: Current medications  include: Protonix (pantoprazole) 40 mg once daily. Pt reports heartburn after eating acidic foods (rarely at this point).  Patient feels that current regimen is effective.  She has Zofran available for use, but does not take this often.    Hypothyroidism: Patient is taking levothyroxine 25 mcg daily. Patient is having the following symptoms: none.   TSH   Date Value Ref Range Status   04/16/2021 1.25 0.40 - 4.00 mU/L Final     Failure to Thrive:  Ghislaine is taking mirtazapine 15mg at bedtime - prescribed due to poor appetite and for mood.  Ghislaine reports she's not depressed, but Krupa and others have felt she has some depression symptoms.  Mirtazapine has helped to increase appetite.  She's supposed to drink Ensure, but rarely does.    Gout: Currently taking allopurinol 100mg daily. Patient reports no current pain concerns. Patient is experiencing the following medication side effects: none.  She reports having several gout flares for several weeks (multiple sites) - was started on allopurinol at that time.  She wonders if she needs to stay on this.  Uric Acid   Date Value Ref Range Status   07/28/2020 5.2 2.6 - 6.0 mg/dL Final   ]   Supplements: Currently taking calcium carbonate/vitamin D 600mg/800 international unit(s) daily.  She had a recent DEXA scan and reports having normal bone density.  She does not drink milk (might eat on cereal), she eats pudding, cheese occasionally.    Vitamin D Deficiency Screening Results:  Lab Results   Component Value Date    VITDT 31 10/19/2020    VITDT 30 10/08/2020    VITDT 27 10/02/2019    VITDT 51 05/27/2014      Today's Vitals: There were no vitals taken for this visit.  ----------------  Post Discharge Medication Reconciliation Status: discharge medications reconciled and changed, per note/orders.    I spent 63 minutes with this patient today. All changes were made via collaborative practice agreement with Dr. Conteh. A copy of the visit note was provided to the patient's  provider(s).    The patient was mailed a summary of these recommendations.     Julissa Baptiste, PharmD, [facility]  Medication Therapy Management Provider  Pager: [phone]     Telemedicine Visit Details  Type of service:  Telephone visit  Start Time: 3:04 PM  End Time: 4:07 PM  Originating Location (patient location): [place]  Distant Location (provider location):  [facility]     Medication Therapy Recommendations  Takes dietary supplements    Current Medication: calcium carbonate 600 mg-vitamin D 400 units (CALTRATE) 600-400 MG-UNIT per tablet   Rationale: More effective medication available - Ineffective medication - Effectiveness   Recommendation: Change Medication Formulation  - Calcium Citrate + 315-200 MG-UNIT Tabs   Status: Accepted - no CPA Needed

## 2022-02-18 NOTE — PATIENT INSTRUCTIONS
Recommendations from today's MTM visit:                                                    MTM (medication therapy management) is a service provided by a clinical pharmacist designed to help you get the most of out of your medicines.   Today we reviewed what your medicines are for, how to know if they are working, that your medicines are safe and how to make your medicine regimen as easy as possible.      1.  Future order placed for lipids to be checked with next set of labs.    2.  Recommended changing from calcium carbonate/vitamin D to calcium citrate/vitamin D 2 tablets daily.      Follow-up: Return in about 6 months (around 8/16/2022) for Follow-up Medication Review.    It was great to speak with you today.  I value your experience and would be very thankful for your time with providing feedback on our clinic survey. You may receive a survey via email or text message in the next few days.     To schedule another MTM appointment, please call the clinic directly or you may call the MTM scheduling line at [phone] or toll-free at [phone].     My Clinical Pharmacist's contact information:                                                      Please feel free to contact me with any questions or concerns you have.      Julissa Baptiste, PharmD, [facility]  Medication Therapy Management Provider  Pager: [phone]     Becky Hernandez PharmD  Medication Therapy Management Resident  Pager: [phone]

## 2022-03-14 ENCOUNTER — TRANSFERRED RECORDS (OUTPATIENT)
Dept: HEALTH INFORMATION MANAGEMENT | Facility: CLINIC | Age: 87
End: 2022-03-14
Payer: MEDICARE

## 2022-03-14 LAB
ALT SERPL-CCNC: 9 IU/L (ref 5–35)
AST SERPL-CCNC: 22 U/L (ref 5–34)
CREATININE (EXTERNAL): 2.39 MG/DL (ref 0.5–1.3)
GFR ESTIMATED (EXTERNAL): 20.5 ML/MIN/1.73M2

## 2022-03-16 ENCOUNTER — TELEPHONE (OUTPATIENT)
Dept: FAMILY MEDICINE | Facility: CLINIC | Age: 87
End: 2022-03-16
Payer: MEDICARE

## 2022-03-16 NOTE — TELEPHONE ENCOUNTER
Marsha RN - [facility] called requesting SN 1x/wk x 3 wks and then plan is to discharge - unless pt's condition drastically changes. Working on monitoring constipation. Verbal okay given and will send the clinic orders for provider's signature.

## 2022-03-17 ENCOUNTER — TELEPHONE (OUTPATIENT)
Dept: CARDIOLOGY | Facility: CLINIC | Age: 87
End: 2022-03-17
Payer: MEDICARE

## 2022-03-17 DIAGNOSIS — I50.32 CHRONIC HEART FAILURE WITH PRESERVED EJECTION FRACTION (H): ICD-10-CM

## 2022-03-17 DIAGNOSIS — N18.4 CKD (CHRONIC KIDNEY DISEASE) STAGE 4, GFR 15-29 ML/MIN (H): Primary | ICD-10-CM

## 2022-03-17 NOTE — TELEPHONE ENCOUNTER
[facility] Call Center    Phone Message    May a detailed message be left on voicemail: yes     Reason for Call: Other: Krupa called in wanting to know if Elle is going to want labs drawn before Pt's appt next week. Please review and c/b to discuss     Action Taken: Message routed to:  Other: [facility]    Travel Screening: Not Applicable

## 2022-03-17 NOTE — TELEPHONE ENCOUNTER
Spoke with Krupa about labs for pt and she has not had a recent blood draw through nephrology.   Scheduled pt to have CMP to also cover renal labs on 3/21/22.

## 2022-03-20 ENCOUNTER — MEDICAL CORRESPONDENCE (OUTPATIENT)
Dept: HEALTH INFORMATION MANAGEMENT | Facility: CLINIC | Age: 87
End: 2022-03-20
Payer: MEDICARE

## 2022-03-21 ENCOUNTER — LAB (OUTPATIENT)
Dept: LAB | Facility: CLINIC | Age: 87
End: 2022-03-21
Payer: MEDICARE

## 2022-03-21 DIAGNOSIS — N18.4 CKD (CHRONIC KIDNEY DISEASE) STAGE 4, GFR 15-29 ML/MIN (H): ICD-10-CM

## 2022-03-21 DIAGNOSIS — I50.32 CHRONIC HEART FAILURE WITH PRESERVED EJECTION FRACTION (H): ICD-10-CM

## 2022-03-21 LAB
ALBUMIN SERPL-MCNC: 3.8 G/DL (ref 3.4–5)
ALP SERPL-CCNC: 65 U/L (ref 40–150)
ALT SERPL W P-5'-P-CCNC: 15 U/L (ref 0–50)
ANION GAP SERPL CALCULATED.3IONS-SCNC: 6 MMOL/L (ref 3–14)
AST SERPL W P-5'-P-CCNC: 18 U/L (ref 0–45)
BILIRUB SERPL-MCNC: 0.5 MG/DL (ref 0.2–1.3)
BUN SERPL-MCNC: 60 MG/DL (ref 7–30)
CALCIUM SERPL-MCNC: 9.7 MG/DL (ref 8.5–10.1)
CHLORIDE BLD-SCNC: 108 MMOL/L (ref 94–109)
CO2 SERPL-SCNC: 25 MMOL/L (ref 20–32)
CREAT SERPL-MCNC: 2.68 MG/DL (ref 0.52–1.04)
GFR SERPL CREATININE-BSD FRML MDRD: 17 ML/MIN/1.73M2
GLUCOSE BLD-MCNC: 118 MG/DL (ref 70–99)
POTASSIUM BLD-SCNC: 4.5 MMOL/L (ref 3.4–5.3)
PROT SERPL-MCNC: 7.4 G/DL (ref 6.8–8.8)
SODIUM SERPL-SCNC: 139 MMOL/L (ref 133–144)

## 2022-03-21 PROCEDURE — 36415 COLL VENOUS BLD VENIPUNCTURE: CPT | Performed by: NURSE PRACTITIONER

## 2022-03-21 PROCEDURE — 80053 COMPREHEN METABOLIC PANEL: CPT | Performed by: NURSE PRACTITIONER

## 2022-03-22 ENCOUNTER — OFFICE VISIT (OUTPATIENT)
Dept: CARDIOLOGY | Facility: CLINIC | Age: 87
End: 2022-03-22
Attending: NURSE PRACTITIONER
Payer: MEDICARE

## 2022-03-22 VITALS
HEIGHT: 61 IN | OXYGEN SATURATION: 97 % | WEIGHT: 132 LBS | DIASTOLIC BLOOD PRESSURE: 60 MMHG | BODY MASS INDEX: 24.92 KG/M2 | HEART RATE: 66 BPM | SYSTOLIC BLOOD PRESSURE: 136 MMHG

## 2022-03-22 DIAGNOSIS — I10 ESSENTIAL HYPERTENSION: ICD-10-CM

## 2022-03-22 DIAGNOSIS — N18.4 CKD (CHRONIC KIDNEY DISEASE) STAGE 4, GFR 15-29 ML/MIN (H): ICD-10-CM

## 2022-03-22 DIAGNOSIS — I50.32 CHRONIC HEART FAILURE WITH PRESERVED EJECTION FRACTION (H): Primary | ICD-10-CM

## 2022-03-22 DIAGNOSIS — I10 HYPERTENSION GOAL BP (BLOOD PRESSURE) < 140/90: Chronic | ICD-10-CM

## 2022-03-22 DIAGNOSIS — I48.0 PAROXYSMAL ATRIAL FIBRILLATION (H): ICD-10-CM

## 2022-03-22 PROCEDURE — 99215 OFFICE O/P EST HI 40 MIN: CPT | Performed by: NURSE PRACTITIONER

## 2022-03-22 NOTE — PROGRESS NOTES
Cardiology Clinic Progress Note  Ghislaine Walls MRN# [id number]   YOB: 1935 Age: 86 year old   Primary Cardiologist: Dr. Fuentes  Reason for visit: Cardiology follow up             Assessment and Plan:   Ghislaine Walls is a very pleasant 86 year old female with a history of HFpEF, CKD, hypertension, paroxsymal atrial fibrillation on eliquis, chronic anemia, rheumatoid arthritis and hypothyroidism.      1. Chronic diastolic heart failure - LVEF 55%, RV normal size/function.               - NYHA class III, stage C              - Fluid status : euvolemic              - Dry weight : ~ 130#              - Diuretic regimen : Continue torsemide to 20mg daily with additional 20mg daily for weight gain > 2# overnight.               - Aldosterone antagonist : none due to CKD  2. Atrial fibrillation, paroxsymal - in sinus rhythm on exam today, on eliquis for thromboembolic prophylaxis due to elevated [id number] score.   3. CKD IV - baseline creatinine 1.6-2.4, some improvement in renal function noted on labs today, appears likely some chronic worsening of renal function. Electrolytes stable and HF well controlled.               - Follows with nephrology plans for follow up in May  4. Hypertension - initially elevated, controlled on recheck   - Continue amlodipine, carvedilol, clonidine, hydralazine and isordil  5. Chronic anemia - stable, hemoglobin 9.9 1/2020     I saw Ghislaine for cardiology follow up with her daughter today. Overall she continues to do well from a heart failure perspective. Noting weight is typically stable ~ 130#. Today weight was up 2# so she took extra dose of PRN torsemide. Noting in past month she has needed to take 2 PRN doses. HF symptoms controlled. BMP today showed overall stable renal function, creatinine with some improvement to 2.68. Appears likely some chronic decline in renal function. Has nephrology follow up in May.     Recommended continuing current medications. Cardiology  follow up in 3 months, sooner if needed.     Changes today: none    Follow up plan:     Follow up with nephrology as scheduled 5/12/22    Cardiology follow up with me in 3 months with labs prior (patient daughter requested fasting lipid panel added to labs).         History of Presenting Illness:    Ghislaine Walls is a very pleasant 86 year old female with a history of HFpEF, CKD, hypertension, paroxsymal atrial fibrillation on eliquis, chronic anemia, rheumatoid arthritis and hypothyroidism.      Primary cardiologist Dr. Fuentes. In October 2021 at which time she complained of generalized fatigue. Her metoprolol was decreased to 25mg BID. She was offered the option of stress perfusion imaging to rule out ischemic, but her symptoms were not suggestive of angina and she wished to defer. She had a HF hospitalization in April 2021.      Most recently Ghislaine was hospitalized 1/12-1/15/2022 with acute on chronic diastolic heart failure exacerbation. NTproBNP on admission 12,000. CXR with vascular congestion and small left pleural effusion s/p thoracentesis 1/13 with 250ml removed. Her metoprolol was changed to carvedilol 6.25mg BID and she was started on isordil 20mg TID due to elevated blood pressures. Her PTA torsemide was increased from 20mg -> 40mg daily. Some concerns for non-compliance contributing to HF exacerbation/admission. Admission weight 141#. Discharge weight 135#.      Echocardiogram 1/13/22 showed LVEF 55%, RV normal size/function, no significant valvular disease and moderate L pleural effusion.      I saw Ghislaine and her daughter on 1/20/22 for post hospital followup, at that time her weight had decreased another 8# since hospital discharge to 128#. Unfortunately her renal function had further declined. Creatinine increased from 2.44 -> 2.82. Recommended decreasing her torsemide to 20mg daily with additional 20mg as needed for weight gain > 2#.      She had visit with nephrology on 2/1/22 no medications  were changed. Plan for 3 month follow up.     I most recently saw her for follow up on 2/3/22 at which time she shared she was feeling significantly better since hospital discharge. She was compensated and euvolemic. Weight had remained stable and she needed 2 PRN doses of torsemide. BMP had shown some improvement in renal function with a creatinine of 2.7 which was drawn a couple days prior at nephrology appt. No medication changes were made and recommended follow up in 4-6 weeks.     Patient is here today for cardiology follow up.     Patient reports feeling good. Monitoring weights daily a home, today 132#. Typically ~ 130#. Notes with 2# weight gain took PRN dose of torsemide today. Feels LE edema is about the same. Denies shortness of breath at rest. Denies exertional dyspnea with her routine activities. Denies orthopnea or PND. Denies chest pain or chest tightness. Denies dizziness, lightheadedness or other presyncopal symptoms. Denies tachycardia or palpitations. Taking medications daily.     Labs today show overall stable renal function, creatinine with some improvement to 2.68. Appears likely some chronic decline in renal function. Has nephrology follow up in May. Stable electrolytes and stable liver function. Blood pressure 148/67 and HR 66 in clinic today, manual recheck 136/60. Has checked blood pressure a few times at home - 134/67, 160/66, 130/58.     Daughter has been helping meals and getting healthy choice meals with less than 500mg sodium, eating at least one of these daily. Only have occasional meals at [employer]. She has limiting her fluid intake. Attending exercise class at [place] 3 x a week. Occasional beer at happy hour. Denies tobacco use. Using walker for support in her apartment and wheelchair for support out of the building.         Social History    Living in assisted living at [facility].   Social History     Socioeconomic History     Marital status:      Spouse name: Not on file      "Number of children: Not on file     Years of education: Not on file     Highest education level: Not on file   Occupational History     Not on file   Tobacco Use     Smoking status: Former Smoker     Packs/day: 0.50     Years: 5.00     Pack years: 2.50     Types: Cigarettes     Quit date: 1973     Years since quittin.2     Smokeless tobacco: Never Used   Substance and Sexual Activity     Alcohol use: Not Currently     Alcohol/week: 0.0 standard drinks     Comment: one light beer of  occ     Drug use: No     Sexual activity: Not Currently     Partners: Male   Other Topics Concern     Parent/sibling w/ CABG, MI or angioplasty before 65F 55M? No   Social History Narrative     Not on file     Social Determinants of Health     Financial Resource Strain: Low Risk      Difficulty of Paying Living Expenses: Not hard at all   Food Insecurity: No Food Insecurity     Worried About Running Out of Food in the Last Year: Never true     Ran Out of Food in the Last Year: Never true   Transportation Needs: No Transportation Needs     Lack of Transportation (Medical): No     Lack of Transportation (Non-Medical): No   Physical Activity: Not on file   Stress: Not on file   Social Connections: Not on file   Intimate Partner Violence: Not on file   Housing Stability: Not on file            Review of Systems:   Skin:  Negative     Eyes:  Positive for glasses  ENT:  Negative    Respiratory:  Negative shortness of breath;dyspnea on exertion  Cardiovascular:  chest pain;palpitations;Negative;dizziness;lightheadedness;fatigue Positive for;edema  Gastroenterology: Negative    Genitourinary:  Positive for urinary frequency;nocturia  Musculoskeletal:  Negative    Neurologic:  Negative headaches  Psychiatric:       Heme/Lymph/Imm:  Positive for allergies  Endocrine:  Positive for thyroid disorder         Physical Exam:   Vitals: BP (!) 148/67   Pulse 66   Ht 1.549 m (5' 1\")   Wt 59.9 kg (132 lb)   SpO2 97%   BMI 24.94 kg/m   "   Wt Readings from Last 4 Encounters:   03/22/22 59.9 kg (132 lb)   02/03/22 59 kg (130 lb)   01/21/22 59 kg (130 lb)   01/20/22 58.1 kg (128 lb)     GEN: elderly, in no acute distress.  HEENT:  Pupils equal, round. Sclerae nonicteric.   NECK: Supple, no masses appreciated. JVP appears normal.   C/V:  Regular rate and rhythm, no murmur, rub or gallop.    RESP: Respirations are unlabored. Clear to auscultation bilaterally without wheezing, rales, or rhonchi.  GI: Abdomen soft, nontender.  EXTREM: Trace bilateral LE edema.  NEURO: Alert and oriented, cooperative.  SKIN: Warm, pale and dry.        Data:     LIPID RESULTS:  Lab Results   Component Value Date    CHOL 123 10/02/2019    HDL 27 (L) 10/02/2019    LDL 74 10/02/2019    TRIG 111 10/02/2019    CHOLHDLRATIO 3.9 06/15/2015     LIVER ENZYME RESULTS:  Lab Results   Component Value Date    AST 18 03/21/2022    AST 40 06/07/2021    ALT 15 03/21/2022    ALT 35 06/07/2021     CBC RESULTS:  Lab Results   Component Value Date    WBC 3.6 (L) 01/15/2022    WBC 3.5 (L) 06/07/2021    RBC 2.59 (L) 01/15/2022    RBC 3.26 (L) 06/07/2021    HGB 9.9 (L) 01/20/2022    HGB 9.8 (L) 06/07/2021    HCT 26.9 (L) 01/15/2022    HCT 32.5 (L) 06/07/2021     (H) 01/15/2022     06/07/2021    MCH 32.4 01/15/2022    MCH 30.1 06/07/2021    MCHC 31.2 (L) 01/15/2022    MCHC 30.2 (L) 06/07/2021    RDW 14.3 01/15/2022    RDW 17.6 (H) 06/07/2021     (L) 01/15/2022     06/07/2021     BMP RESULTS:  Lab Results   Component Value Date     03/21/2022     06/07/2021    POTASSIUM 4.5 03/21/2022    POTASSIUM 4.9 06/07/2021    CHLORIDE 108 03/21/2022    CHLORIDE 114 (H) 06/07/2021    CO2 25 03/21/2022    CO2 23 06/07/2021    ANIONGAP 6 03/21/2022    ANIONGAP 4 06/07/2021     (H) 03/21/2022    GLC 94 06/07/2021    BUN 60 (H) 03/21/2022    BUN 77 (H) 06/07/2021    CR 2.68 (H) 03/21/2022    CR 1.61 (H) 06/07/2021    GFRESTIMATED 17 (L) 03/21/2022    GFRESTIMATED 29  (L) 06/07/2021    GFRESTBLACK 34 (L) 06/07/2021    ALIVIA 9.7 03/21/2022    ALIVIA 9.4 06/07/2021      INR RESULTS:  Lab Results   Component Value Date    INR 1.16 (H) 02/24/2021    INR 2.07 (H) 07/21/2020            Medications     Current Outpatient Medications   Medication Sig Dispense Refill     Abatacept (ORENCIA IV) Inject into the vein every 30 days Dose unknown       acetaminophen (ACETAMINOPHEN 8 HOUR) 650 MG CR tablet Take 650-1,300 mg by mouth every 8 hours as needed for mild pain or fever       allopurinol (ZYLOPRIM) 100 MG tablet Take 1 tablet (100 mg) by mouth daily 90 tablet 3     amLODIPine (NORVASC) 10 MG tablet Take 1 tablet (10 mg) by mouth daily 90 tablet 3     apixaban ANTICOAGULANT (ELIQUIS) 2.5 MG tablet Take 1 tablet (2.5 mg) by mouth 2 times daily 180 tablet 3     calcium carbonate 600 mg-vitamin D 400 units (CALTRATE) 600-400 MG-UNIT per tablet Take 1 tablet by mouth daily 800 international unit(s) vit d, calcuim 600       carvedilol (COREG) 6.25 MG tablet Take 1 tablet (6.25 mg) by mouth 2 times daily (with meals) 180 tablet 2     cloNIDine (CATAPRES) 0.1 MG tablet Take 1 tablet (0.1 mg) by mouth 2 times daily 180 tablet 1     Darbepoetin Alonso-Albumin (ARANESP IJ) Monthly as needed       ferrous sulfate (FEROSUL) 325 (65 Fe) MG tablet Take 325 mg by mouth daily (with breakfast)       hydrALAZINE (APRESOLINE) 100 MG tablet Take 1 tablet by mouth 4 times daily. 360 tablet 1     hydroxychloroquine (PLAQUENIL) 200 MG tablet Take 200 mg by mouth daily       isosorbide dinitrate (ISORDIL) 20 MG tablet Take 1 tablet (20 mg) by mouth 3 times daily 270 tablet 0     levothyroxine (SYNTHROID/LEVOTHROID) 25 MCG tablet Take 1 tablet (25 mcg) by mouth daily 90 tablet 3     mirtazapine (REMERON) 15 MG tablet Take 1 tablet by mouth daily At Bedtime. 90 tablet 3     pantoprazole (PROTONIX) 40 MG EC tablet Take 1 tablet (40 mg) by mouth daily 90 tablet 3     predniSONE (DELTASONE) 5 MG tablet Take 1 tablet (5 mg)  by mouth daily 30 tablet 0     Sennosides (SENNA LAXATIVE PO) Take 2 tablets by mouth daily as needed        sodium bicarbonate 650 MG tablet Take 650 mg by mouth 2 times daily        torsemide (DEMADEX) 20 MG tablet Take 1 tablet (20 mg) by mouth daily Take additional 20mg torsemide (1 tablet) for weight gain > 2 pounds overnight. 100 tablet 0     ACE/ARB/ARNI NOT PRESCRIBED (INTENTIONAL) Please choose reason not prescribed from choices below. (Patient not taking: Reported on 2/16/2022)       ondansetron (ZOFRAN-ODT) 4 MG ODT tab Take 4 mg by mouth every 8 hours as needed for nausea (Patient not taking: Reported on 2/16/2022)            Past Medical History     Past Medical History:   Diagnosis Date     Atrial fibrillation (H) new 10/19     [place] (hard of hearing)     wears hearing aids     Hyperlipidaemia      Hyperlipidaemia LDL goal < 130      Hypertension      Hypothyroid      PAD (peripheral artery disease) (H)      Renal insufficiency, mild      Uncontrolled hypertension 10/14/2019     Past Surgical History:   Procedure Laterality Date     APPENDECTOMY  1951     BONE MARROW BIOPSY, BONE SPECIMEN, NEEDLE/TROCAR N/A 7/31/2020    Procedure: bone marrow biopsy;  Surgeon: Iris Cameron MD;  Location:  GI     CATARACT IOL, RT/LT  2001    bilateral (laser - 5/2013)     DENTAL SURGERY  1962    [facility] [place]     ESOPHAGOSCOPY, GASTROSCOPY, DUODENOSCOPY (EGD), COMBINED N/A 7/24/2020    Procedure: ESOPHAGOGASTRODUODENOSCOPY, WITH BIOPSY;  Surgeon: Humberto Bailon MD;  Location:  GI     EYE SURGERY  1956    Muscle release     TUBAL LIGATION  1971     Family History   Problem Relation Age of Onset     Heart Disease Mother      Respiratory Mother      Heart Disease Father      Heart Disease Brother      Coronary Artery Disease Brother         CAB     Heart Disease Brother      Coronary Artery Disease Brother         CAB            Allergies   Oxybutynin and Seasonal allergies    40 minutes spent on the date of the  encounter doing chart review, history and exam, documentation and further activities as noted above      DHAVAL Cormier [facility] HEART CARE  Pager: [phone]

## 2022-03-22 NOTE — LETTER
3/22/2022    Laurie Conteh MD  [address]  [place] [address]    RE: Ghislaine Walls       Dear Colleague,     I had the pleasure of seeing [facility].  Cardiology Clinic Progress Note  Ghislaine Walls MRN# [id number]   YOB: 1935 Age: 86 year old   Primary Cardiologist: Dr. Fuentes  Reason for visit: Cardiology follow up             Assessment and Plan:   Ghislaine Walls is a very pleasant 86 year old female with a history of HFpEF, CKD, hypertension, paroxsymal atrial fibrillation on eliquis, chronic anemia, rheumatoid arthritis and hypothyroidism.      1. Chronic diastolic heart failure - LVEF 55%, RV normal size/function.               - NYHA class III, stage C              - Fluid status : euvolemic              - Dry weight : ~ 130#              - Diuretic regimen : Continue torsemide to 20mg daily with additional 20mg daily for weight gain > 2# overnight.               - Aldosterone antagonist : none due to CKD  2. Atrial fibrillation, paroxsymal - in sinus rhythm on exam today, on eliquis for thromboembolic prophylaxis due to elevated [id number] score.   3. CKD IV - baseline creatinine 1.6-2.4, some improvement in renal function noted on labs today, appears likely some chronic worsening of renal function. Electrolytes stable and HF well controlled.               - Follows with nephrology plans for follow up in May  4. Hypertension - initially elevated, controlled on recheck   - Continue amlodipine, carvedilol, clonidine, hydralazine and isordil  5. Chronic anemia - stable, hemoglobin 9.9 1/2020     I saw Ghislaine for cardiology follow up with her daughter today. Overall she continues to do well from a heart failure perspective. Noting weight is typically stable ~ 130#. Today weight was up 2# so she took extra dose of PRN torsemide. Noting in past month she has needed to take 2 PRN doses. HF symptoms controlled. BMP today showed overall stable  renal function, creatinine with some improvement to 2.68. Appears likely some chronic decline in renal function. Has nephrology follow up in May.     Recommended continuing current medications. Cardiology follow up in 3 months, sooner if needed.     Changes today: none    Follow up plan:     Follow up with nephrology as scheduled 5/12/22    Cardiology follow up with me in 3 months with labs prior (patient daughter requested fasting lipid panel added to labs).         History of Presenting Illness:    Ghislaine Walls is a very pleasant 86 year old female with a history of HFpEF, CKD, hypertension, paroxsymal atrial fibrillation on eliquis, chronic anemia, rheumatoid arthritis and hypothyroidism.      Primary cardiologist Dr. Fuentes. In October 2021 at which time she complained of generalized fatigue. Her metoprolol was decreased to 25mg BID. She was offered the option of stress perfusion imaging to rule out ischemic, but her symptoms were not suggestive of angina and she wished to defer. She had a HF hospitalization in April 2021.      Most recently Ghislaine was hospitalized 1/12-1/15/2022 with acute on chronic diastolic heart failure exacerbation. NTproBNP on admission 12,000. CXR with vascular congestion and small left pleural effusion s/p thoracentesis 1/13 with 250ml removed. Her metoprolol was changed to carvedilol 6.25mg BID and she was started on isordil 20mg TID due to elevated blood pressures. Her PTA torsemide was increased from 20mg -> 40mg daily. Some concerns for non-compliance contributing to HF exacerbation/admission. Admission weight 141#. Discharge weight 135#.      Echocardiogram 1/13/22 showed LVEF 55%, RV normal size/function, no significant valvular disease and moderate L pleural effusion.      I saw Ghislaine and her daughter on 1/20/22 for post hospital followup, at that time her weight had decreased another 8# since hospital discharge to 128#. Unfortunately her renal function had further declined.  Creatinine increased from 2.44 -> 2.82. Recommended decreasing her torsemide to 20mg daily with additional 20mg as needed for weight gain > 2#.      She had visit with nephrology on 2/1/22 no medications were changed. Plan for 3 month follow up.     I most recently saw her for follow up on 2/3/22 at which time she shared she was feeling significantly better since hospital discharge. She was compensated and euvolemic. Weight had remained stable and she needed 2 PRN doses of torsemide. BMP had shown some improvement in renal function with a creatinine of 2.7 which was drawn a couple days prior at nephrology appt. No medication changes were made and recommended follow up in 4-6 weeks.     Patient is here today for cardiology follow up.     Patient reports feeling good. Monitoring weights daily a home, today 132#. Typically ~ 130#. Notes with 2# weight gain took PRN dose of torsemide today. Feels LE edema is about the same. Denies shortness of breath at rest. Denies exertional dyspnea with her routine activities. Denies orthopnea or PND. Denies chest pain or chest tightness. Denies dizziness, lightheadedness or other presyncopal symptoms. Denies tachycardia or palpitations. Taking medications daily.     Labs today show overall stable renal function, creatinine with some improvement to 2.68. Appears likely some chronic decline in renal function. Has nephrology follow up in May. Stable electrolytes and stable liver function. Blood pressure 148/67 and HR 66 in clinic today, manual recheck 136/60. Has checked blood pressure a few times at home - 134/67, 160/66, 130/58.     Daughter has been helping meals and getting healthy choice meals with less than 500mg sodium, eating at least one of these daily. Only have occasional meals at [place]. She has limiting her fluid intake. Attending exercise class at [place] 3 x a week. Occasional beer at happy hour. Denies tobacco use. Using walker for support in her apartment and wheelchair  for support out of the building.         Social History    Living in assisted living at [facility].   Social History     Socioeconomic History     Marital status:      Spouse name: Not on file     Number of children: Not on file     Years of education: Not on file     Highest education level: Not on file   Occupational History     Not on file   Tobacco Use     Smoking status: Former Smoker     Packs/day: 0.50     Years: 5.00     Pack years: 2.50     Types: Cigarettes     Quit date: 1973     Years since quittin.2     Smokeless tobacco: Never Used   Substance and Sexual Activity     Alcohol use: Not Currently     Alcohol/week: 0.0 standard drinks     Comment: one light beer of  occ     Drug use: No     Sexual activity: Not Currently     Partners: Male   Other Topics Concern     Parent/sibling w/ CABG, MI or angioplasty before 65F 55M? No   Social History Narrative     Not on file     Social Determinants of Health     Financial Resource Strain: Low Risk      Difficulty of Paying Living Expenses: Not hard at all   Food Insecurity: No Food Insecurity     Worried About Running Out of Food in the Last Year: Never true     Ran Out of Food in the Last Year: Never true   Transportation Needs: No Transportation Needs     Lack of Transportation (Medical): No     Lack of Transportation (Non-Medical): No   Physical Activity: Not on file   Stress: Not on file   Social Connections: Not on file   Intimate Partner Violence: Not on file   Housing Stability: Not on file            Review of Systems:   Skin:  Negative     Eyes:  Positive for glasses  ENT:  Negative    Respiratory:  Negative shortness of breath;dyspnea on exertion  Cardiovascular:  chest pain;palpitations;Negative;dizziness;lightheadedness;fatigue Positive for;edema  Gastroenterology: Negative    Genitourinary:  Positive for urinary frequency;nocturia  Musculoskeletal:  Negative    Neurologic:  Negative headaches  Psychiatric:       Heme/Lymph/Imm:   "Positive for allergies  Endocrine:  Positive for thyroid disorder         Physical Exam:   Vitals: BP (!) 148/67   Pulse 66   Ht 1.549 m (5' 1\")   Wt 59.9 kg (132 lb)   SpO2 97%   BMI 24.94 kg/m     Wt Readings from Last 4 Encounters:   03/22/22 59.9 kg (132 lb)   02/03/22 59 kg (130 lb)   01/21/22 59 kg (130 lb)   01/20/22 58.1 kg (128 lb)     GEN: elderly, in no acute distress.  HEENT:  Pupils equal, round. Sclerae nonicteric.   NECK: Supple, no masses appreciated. JVP appears normal.   C/V:  Regular rate and rhythm, no murmur, rub or gallop.    RESP: Respirations are unlabored. Clear to auscultation bilaterally without wheezing, rales, or rhonchi.  GI: Abdomen soft, nontender.  EXTREM: Trace bilateral LE edema.  NEURO: Alert and oriented, cooperative.  SKIN: Warm, pale and dry.        Data:     LIPID RESULTS:  Lab Results   Component Value Date    CHOL 123 10/02/2019    HDL 27 (L) 10/02/2019    LDL 74 10/02/2019    TRIG 111 10/02/2019    CHOLHDLRATIO 3.9 06/15/2015     LIVER ENZYME RESULTS:  Lab Results   Component Value Date    AST 18 03/21/2022    AST 40 06/07/2021    ALT 15 03/21/2022    ALT 35 06/07/2021     CBC RESULTS:  Lab Results   Component Value Date    WBC 3.6 (L) 01/15/2022    WBC 3.5 (L) 06/07/2021    RBC 2.59 (L) 01/15/2022    RBC 3.26 (L) 06/07/2021    HGB 9.9 (L) 01/20/2022    HGB 9.8 (L) 06/07/2021    HCT 26.9 (L) 01/15/2022    HCT 32.5 (L) 06/07/2021     (H) 01/15/2022     06/07/2021    MCH 32.4 01/15/2022    MCH 30.1 06/07/2021    MCHC 31.2 (L) 01/15/2022    MCHC 30.2 (L) 06/07/2021    RDW 14.3 01/15/2022    RDW 17.6 (H) 06/07/2021     (L) 01/15/2022     06/07/2021     BMP RESULTS:  Lab Results   Component Value Date     03/21/2022     06/07/2021    POTASSIUM 4.5 03/21/2022    POTASSIUM 4.9 06/07/2021    CHLORIDE 108 03/21/2022    CHLORIDE 114 (H) 06/07/2021    CO2 25 03/21/2022    CO2 23 06/07/2021    ANIONGAP 6 03/21/2022    ANIONGAP 4 06/07/2021 "     (H) 03/21/2022    GLC 94 06/07/2021    BUN 60 (H) 03/21/2022    BUN 77 (H) 06/07/2021    CR 2.68 (H) 03/21/2022    CR 1.61 (H) 06/07/2021    GFRESTIMATED 17 (L) 03/21/2022    GFRESTIMATED 29 (L) 06/07/2021    GFRESTBLACK 34 (L) 06/07/2021    ALIVIA 9.7 03/21/2022    ALIVIA 9.4 06/07/2021      INR RESULTS:  Lab Results   Component Value Date    INR 1.16 (H) 02/24/2021    INR 2.07 (H) 07/21/2020            Medications     Current Outpatient Medications   Medication Sig Dispense Refill     Abatacept (ORENCIA IV) Inject into the vein every 30 days Dose unknown       acetaminophen (ACETAMINOPHEN 8 HOUR) 650 MG CR tablet Take 650-1,300 mg by mouth every 8 hours as needed for mild pain or fever       allopurinol (ZYLOPRIM) 100 MG tablet Take 1 tablet (100 mg) by mouth daily 90 tablet 3     amLODIPine (NORVASC) 10 MG tablet Take 1 tablet (10 mg) by mouth daily 90 tablet 3     apixaban ANTICOAGULANT (ELIQUIS) 2.5 MG tablet Take 1 tablet (2.5 mg) by mouth 2 times daily 180 tablet 3     calcium carbonate 600 mg-vitamin D 400 units (CALTRATE) 600-400 MG-UNIT per tablet Take 1 tablet by mouth daily 800 international unit(s) vit d, calcuim 600       carvedilol (COREG) 6.25 MG tablet Take 1 tablet (6.25 mg) by mouth 2 times daily (with meals) 180 tablet 2     cloNIDine (CATAPRES) 0.1 MG tablet Take 1 tablet (0.1 mg) by mouth 2 times daily 180 tablet 1     Darbepoetin Alonso-Albumin (ARANESP IJ) Monthly as needed       ferrous sulfate (FEROSUL) 325 (65 Fe) MG tablet Take 325 mg by mouth daily (with breakfast)       hydrALAZINE (APRESOLINE) 100 MG tablet Take 1 tablet by mouth 4 times daily. 360 tablet 1     hydroxychloroquine (PLAQUENIL) 200 MG tablet Take 200 mg by mouth daily       isosorbide dinitrate (ISORDIL) 20 MG tablet Take 1 tablet (20 mg) by mouth 3 times daily 270 tablet 0     levothyroxine (SYNTHROID/LEVOTHROID) 25 MCG tablet Take 1 tablet (25 mcg) by mouth daily 90 tablet 3     mirtazapine (REMERON) 15 MG tablet  Take 1 tablet by mouth daily At Bedtime. 90 tablet 3     pantoprazole (PROTONIX) 40 MG EC tablet Take 1 tablet (40 mg) by mouth daily 90 tablet 3     predniSONE (DELTASONE) 5 MG tablet Take 1 tablet (5 mg) by mouth daily 30 tablet 0     Sennosides (SENNA LAXATIVE PO) Take 2 tablets by mouth daily as needed        sodium bicarbonate 650 MG tablet Take 650 mg by mouth 2 times daily        torsemide (DEMADEX) 20 MG tablet Take 1 tablet (20 mg) by mouth daily Take additional 20mg torsemide (1 tablet) for weight gain > 2 pounds overnight. 100 tablet 0     ACE/ARB/ARNI NOT PRESCRIBED (INTENTIONAL) Please choose reason not prescribed from choices below. (Patient not taking: Reported on 2/16/2022)       ondansetron (ZOFRAN-ODT) 4 MG ODT tab Take 4 mg by mouth every 8 hours as needed for nausea (Patient not taking: Reported on 2/16/2022)            Past Medical History     Past Medical History:   Diagnosis Date     Atrial fibrillation (H) new 10/19     [place] (hard of hearing)     wears hearing aids     Hyperlipidaemia      Hyperlipidaemia LDL goal < 130      Hypertension      Hypothyroid      PAD (peripheral artery disease) (H)      Renal insufficiency, mild      Uncontrolled hypertension 10/14/2019     Past Surgical History:   Procedure Laterality Date     APPENDECTOMY  1951     BONE MARROW BIOPSY, BONE SPECIMEN, NEEDLE/TROCAR N/A 7/31/2020    Procedure: bone marrow biopsy;  Surgeon: Iris Cameron MD;  Location:  GI     CATARACT IOL, RT/LT  2001    bilateral (laser - 5/2013)     DENTAL SURGERY  1962    [facility]     ESOPHAGOSCOPY, GASTROSCOPY, DUODENOSCOPY (EGD), COMBINED N/A 7/24/2020    Procedure: ESOPHAGOGASTRODUODENOSCOPY, WITH BIOPSY;  Surgeon: Humberto Bailon MD;  Location:  GI     EYE SURGERY  1956    Muscle release     TUBAL LIGATION  1971     Family History   Problem Relation Age of Onset     Heart Disease Mother      Respiratory Mother      Heart Disease Father      Heart Disease Brother      Coronary  Artery Disease Brother         CAB     Heart Disease Brother      Coronary Artery Disease Brother         CAB            Allergies   Oxybutynin and Seasonal allergies    40 minutes spent on the date of the encounter doing chart review, history and exam, documentation and further activities as noted above      DHAAVL Cormier CNP  [facility]  Pager: [phone]    Thank you for allowing me to participate in the care of your patient.      Sincerely,     DHAVAL Cormier CNP     [facility]  cc:   DHAVAL Raoy CNP  [address]  [place] [address]

## 2022-03-22 NOTE — PATIENT INSTRUCTIONS
1. No medication changes  2. Continue to monitor weight  3. Routinely check blood pressure at home.   4. Follow up with Nephrology as scheduled in May  5. Follow up with Elle in 3 months with labs prior  6. Please call with any questions/concerns [phone]

## 2022-03-25 DIAGNOSIS — Z53.9 DIAGNOSIS NOT YET DEFINED: Primary | ICD-10-CM

## 2022-03-25 PROCEDURE — G0179 MD RECERTIFICATION HHA PT: HCPCS | Performed by: INTERNAL MEDICINE

## 2022-04-19 ENCOUNTER — TRANSFERRED RECORDS (OUTPATIENT)
Dept: HEALTH INFORMATION MANAGEMENT | Facility: CLINIC | Age: 87
End: 2022-04-19
Payer: MEDICARE

## 2022-04-25 ENCOUNTER — NURSE TRIAGE (OUTPATIENT)
Dept: FAMILY MEDICINE | Facility: CLINIC | Age: 87
End: 2022-04-25
Payer: MEDICARE

## 2022-04-25 NOTE — TELEPHONE ENCOUNTER
"Nurse Triage SBAR    Is this a 2nd Level Triage? NO      Daughter, Krupa, calling in for patient. No C2C on chart, Daughter connected phone call with patient. Patient gives verbal Consent to Communicate over phone.     Patient has had loose stools on/off 7 days, last loose stool 4/24 afternoon. Patient reports stools have been looser, not watery. Patient took Imodium and Pepto Bismol as needed, total of 3 doses.  Patient took last Imodium 4/24 early afternoon, has not had loose stools since. Patient denies symptoms of dehydration and is able to drink fluids. Last urinated 30 mins ago, usual color. Daughter states Marybeth went through Assisted Living facility recently.     Per disposition, this can be treated at home. Care advice given, patient and daughter will call back if loose stool returns or signs of dehydration appear.     Protocol Recommended Disposition:   Home Care      1. DIARRHEA SEVERITY: \"How bad is the diarrhea?\" \"How many extra stools have you had in the past 24 hours than normal?\"     - NO DIARRHEA (SCALE 0)    - MILD (SCALE 1-3): Few loose or mushy BMs; increase of 1-3 stools over normal daily number of stools; mild increase in ostomy output.    -  MODERATE (SCALE 4-7): Increase of 4-6 stools daily over normal; moderate increase in ostomy output.  * SEVERE (SCALE 8-10; OR 'WORST POSSIBLE'): Increase of 7 or more stools daily over normal; moderate increase in ostomy output; incontinence.      3 loose BM's yesterday  2. ONSET: \"When did the diarrhea begin?\"       7 days ago  3. BM CONSISTENCY: \"How loose or watery is the diarrhea?\"       Loose, not watery  4. VOMITING: \"Are you also vomiting?\" If so, ask: \"How many times in the past 24 hours?\"       No  5. ABDOMINAL PAIN: \"Are you having any abdominal pain?\" If yes: \"What does it feel like?\" (e.g., crampy, dull, intermittent, constant)       No  6. ABDOMINAL PAIN SEVERITY: If present, ask: \"How bad is the pain?\"  (e.g., Scale 1-10; mild, moderate, " "or severe)    - MILD (1-3): doesn't interfere with normal activities, abdomen soft and not tender to touch     - MODERATE (4-7): interferes with normal activities or awakens from sleep, tender to touch     - SEVERE (8-10): excruciating pain, doubled over, unable to do any normal activities        No  7. ORAL INTAKE: If vomiting, \"Have you been able to drink liquids?\" \"How much fluids have you had in the past 24 hours?\"      Patient is able to take in fluids- Patient has drank 3 cups of water.   8. HYDRATION: \"Any signs of dehydration?\" (e.g., dry mouth [not just dry lips], too weak to stand, dizziness, new weight loss) \"When did you last urinate?\"      Patient denies signs of dehydration  9. EXPOSURE: \"Have you traveled to a foreign country recently?\" \"Have you been exposed to anyone with diarrhea?\" \"Could you have eaten any food that was spoiled?\"      No  10. ANTIBIOTIC USE: \"Are you taking antibiotics now or have you taken antibiotics in the past 2 months?\"          11. OTHER SYMPTOMS: \"Do you have any other symptoms?\" (e.g., fever, blood in stool)              Reason for Disposition    MILD-MODERATE diarrhea (e.g., 1-6 times / day more than normal)    Additional Information    Negative: Shock suspected (e.g., cold/pale/clammy skin, too weak to stand, low BP, rapid pulse)    Negative: Difficult to awaken or acting confused (e.g., disoriented, slurred speech)    Negative: Sounds like a life-threatening emergency to the triager    Negative: Vomiting also present and worse than the diarrhea    Negative: Blood in stool and without diarrhea    Negative: SEVERE abdominal pain (e.g., excruciating) and present > 1 hour    Negative: SEVERE abdominal pain and age > 60    Negative: Bloody, black, or tarry bowel movements (Exception: chronic-unchanged black-grey bowel movements and is taking iron pills or Pepto-bismol)    Negative: SEVERE diarrhea (e.g., 7 or more times / day more than normal) and age > 60 years    Negative: " Constant abdominal pain lasting > 2 hours    Negative: Drinking very little and has signs of dehydration (e.g., no urine > 12 hours, very dry mouth, very lightheaded)    Negative: Patient sounds very sick or weak to the triager    Negative: SEVERE diarrhea (e.g., 7 or more times / day more than normal) and present > 24 hours (1 day)    Negative: MODERATE diarrhea (e.g., 4-6 times / day more than normal) and present > 48 hours (2 days)    Negative: MODERATE diarrhea (e.g., 4-6 times / day more than normal) and age > 70 years    Negative: Abdominal pain  (Exception: pain clears completely with each passage of diarrhea stool)    Negative: Fever > 101 F (38.3 C)    Negative: Blood in the stool    Negative: Mucus or pus in stool has been present > 2 days and diarrhea is more than mild    Negative: Weak immune system (e.g., HIV positive, cancer chemo, splenectomy, organ transplant, chronic steroids)    Negative: Travel to a foreign country in past month    Negative: Recent antibiotic therapy (i.e., within last 2 months) and diarrhea present > 3 days since antibiotic was stopped    Negative: Recent hospitalization and diarrhea present > 3 days    Negative: Tube feedings (e.g., nasogastric, g-tube, j-tube)    Negative: MILD diarrhea (e.g., 1-3 or more stools than normal in past 24 hours) diarrhea without known cause and present > 7 days    Negative: Patient wants to be seen    Negative: Diarrhea is a chronic symptom (recurrent or ongoing AND lasting > 4 weeks)    Negative: SEVERE diarrhea (e.g., 7 or more times / day more than normal)    Protocols used: DIARRHEA-A-OH

## 2022-06-24 NOTE — TELEPHONE ENCOUNTER
Pts daughter called the clinic (C2C on file) asking about the status of the prescriptions (levothyroxine and amlodipine) and for a 90 day supply of both medications. Offered to supply a 30 day supply of the levothyroxine  (per protocol) but daughter is requesting a 90 day supply. Per daughter pt has 5 days left worth of medication beginning today 6/24/22.     Informed pts daughter that pt will need a TSH test done since pts previous TSH test is out of date. Pts daughter stated the pt had lab work done today at the heart clinic and they were not informed of this. TSH lab pended for provider approval. Routing to provider for approval.     Pts daughter requesting call back.  Can we leave a detailed message on this number? YES  Phone number patient can be reached at: [phone]    Tara Reed RN  [facility] Triage

## 2022-06-27 NOTE — TELEPHONE ENCOUNTER
Routing refill request to provider for review/approval because:      LOV: 1/21/22    Pended 90 day supply & 1 refill    Maria A Macedo RN  [facility]

## 2022-06-27 NOTE — PROGRESS NOTES
Cardiology Clinic Progress Note  Ghislaine Walls MRN# [id number]   YOB: 1935 Age: 86 year old   Primary Cardiologist: Dr. Fuentes  Reason for visit: Cardiology follow up             Assessment and Plan:   Ghislaine Walls is a very pleasant 86 year old female whom I am seeing today for cardiology follow up.     1. Chronic diastolic heart failure - LVEF 55%, RV normal size/function.               - NYHA class III, stage C              - Fluid status : mildly volume up              - Dry weight : ~ 130#              - Diuretic regimen : Continue torsemide to 20mg daily with additional 20mg daily for weight gain > 2# overnight.               - Aldosterone antagonist : none due to CKD  2. Atrial fibrillation, paroxsymal - in sinus rhythm on exam today, on eliquis for thromboembolic prophylaxis due to elevated [id number] score.   3. CKD IV - baseline creatinine 1.6-2.4, BMP drawn last week showed further increase in creatinine to 2.8.               - Follows with nephrology, last visit in May, at which time renal function noted to be stable with creatinine of 2.5, was started on calcium and vit D.   4. Hypertension - controlled              - Continue amlodipine, carvedilol, clonidine, hydralazine and isordil  5. Chronic anemia - stable, hemoglobin 10.5 6/3/22    I saw patient and her daughter today for cardiology follow up. She reports doing okay but on exam does appear to be carrying extra fluid, though no significant change in weight. She has likely had some muscle loss  This appears secondary to dietary indiscretions and eating more meals at the facility [place]. Her BMP from 6/24 showed some decline in renal function, creatinine 2.8. This appears secondary to cardio-renal and increased volume overload, but also component of further decline of her chronic kidney disease. We explored this extensively today. Patient adamantly declined further titration in diuretic. We reviewed my worries about risk  for hospitalization and/or worsening renal function. She verbalized understanding but remained adamant about not increasing her torsemide.     Reviewed low sodium diet and trying to limit her meals in the facility dining [place]. Will plan to check BMP and NTProBNP next week. Cardiology follow up with Dr. Fuentes in 3 months with labs prior while I am out on leave of absence.         Changes today: none     Follow up plan:     BMP and NTproBNP next week     Cardiology follow up in 3 months with labs prior         History of Presenting Illness:    Ghislaine Walls is a very pleasant 86 year old female with a history of  HFpEF, CKD, hypertension, paroxsymal atrial fibrillation on eliquis, chronic anemia, rheumatoid arthritis and hypothyroidism.      Primary cardiologist Dr. Fuentes. In October 2021 at which time she complained of generalized fatigue. Her metoprolol was decreased to 25mg BID. She was offered the option of stress perfusion imaging to rule out ischemic, but her symptoms were not suggestive of angina and she wished to defer. She had a HF hospitalization in April 2021.      Most recently Ghislaine was hospitalized 1/12-1/15/2022 with acute on chronic diastolic heart failure exacerbation. NTproBNP on admission 12,000. CXR with vascular congestion and small left pleural effusion s/p thoracentesis 1/13 with 250ml removed. Her metoprolol was changed to carvedilol 6.25mg BID and she was started on isordil 20mg TID due to elevated blood pressures. Her PTA torsemide was increased from 20mg -> 40mg daily. Some concerns for non-compliance contributing to HF exacerbation/admission. Admission weight 141#. Discharge weight 135#.      Echocardiogram 1/13/22 showed LVEF 55%, RV normal size/function, no significant valvular disease and moderate L pleural effusion.      I saw Ghislaine and her daughter on 1/20/22 for post hospital followup, at that time her weight had decreased another 8# since hospital discharge to 128#. Unfortunately  "her renal function had further declined. Creatinine increased from 2.44 -> 2.82. Recommended decreasing her torsemide to 20mg daily with additional 20mg as needed for weight gain > 2#.      She had visit with nephrology on 6/3/22 she was started on calcium and vit d supplements. Plans for 4 month follow up.      I last saw Ghislaine for cardiology follow up in March 2020 at which time she was overall doing okay from a heart failure perspective, her weight was stable around 130#. No medication changes were made.     Patient is here today for cardiology follow up.     Patient reports feeling good. Daughter notes she needs to eat the lower sodium meals she brings vs the food at the facility. Monitoring weights daily a home, notes has been as low as 126# but not above 130#. Reports some chronic LE edema, denies worsening, but will notice more after eating meals in facility dining [place]. Denies shortness of breath at rest. Denies exertional dyspnea. Denies orthopnea or PND. Denies chest pain or chest tightness. Denies dizziness, lightheadedness or other presyncopal symptoms. Denies tachycardia or palpitations. Taking medications daily, states she hasn't taken an extra torsemide in \"a long time\". Reports feeling very tired as of recently.     Labs from 6/24/22 showed some decline in renal function, creatinine 2.8 which was an increase from 2.68. Electrolytes stable. Total cholesterol 131, HDl 48, LDL 65 and triglycerides 92. Blood pressure 136/22 and HR 66 in clinic today.    Daughter has been helping meals and getting healthy choice meals with less than 500mg sodium, she has now started to eat more meals at facility dining [place]. Trying to attend exercise class most 3-4x during the week. Occasional beer at happy hour. Denies tobacco use. Using walker for support in her apartment and wheelchair for support out of the building.         Social History    Living in assisted living at [facility].  Social History     Socioeconomic " "History     Marital status:      Spouse name: Not on file     Number of children: Not on file     Years of education: Not on file     Highest education level: Not on file   Occupational History     Not on file   Tobacco Use     Smoking status: Former Smoker     Packs/day: 0.50     Years: 5.00     Pack years: 2.50     Types: Cigarettes     Quit date: 1973     Years since quittin.5     Smokeless tobacco: Never Used   Substance and Sexual Activity     Alcohol use: Not Currently     Alcohol/week: 0.0 standard drinks     Comment: one light beer of  occ     Drug use: No     Sexual activity: Not Currently     Partners: Male   Other Topics Concern     Parent/sibling w/ CABG, MI or angioplasty before 65F 55M? No   Social History Narrative     Not on file     Social Determinants of Health     Financial Resource Strain: Low Risk      Difficulty of Paying Living Expenses: Not hard at all   Food Insecurity: No Food Insecurity     Worried About Running Out of Food in the Last Year: Never true     Ran Out of Food in the Last Year: Never true   Transportation Needs: No Transportation Needs     Lack of Transportation (Medical): No     Lack of Transportation (Non-Medical): No   Physical Activity: Not on file   Stress: Not on file   Social Connections: Not on file   Intimate Partner Violence: Not on file   Housing Stability: Not on file            Review of Systems:    ROS: 10 point ROS neg other than the symptoms noted above in the HPI.         Physical Exam:   Vitals: BP (!) 157/72   Pulse 61   Ht 1.549 m (5' 1\")   Wt 59 kg (130 lb)   SpO2 96%   BMI 24.56 kg/m     Wt Readings from Last 4 Encounters:   22 59 kg (130 lb)   22 59.9 kg (132 lb)   22 59 kg (130 lb)   22 59 kg (130 lb)     GEN: well nourished, in no acute distress.  HEENT:  Pupils equal, round. Sclerae nonicteric.   NECK: Supple, no masses appreciated. JVP elevated to just below the jaw.   C/V:  Regular rate and rhythm, " no murmur, rub or gallop.    RESP: Respirations are unlabored. Clear to auscultation bilaterally without wheezing, rales, or rhonchi.  GI: Abdomen soft, nontender.  EXTREM: +1 bilateral LE edema up to 3 inches above the ankle.  NEURO: Alert and oriented, cooperative.  SKIN: Warm and dry       Data:     LIPID RESULTS:  Lab Results   Component Value Date    CHOL 131 06/24/2022    CHOL 123 10/02/2019    HDL 48 (L) 06/24/2022    HDL 27 (L) 10/02/2019    LDL 65 06/24/2022    LDL 74 10/02/2019    TRIG 92 06/24/2022    TRIG 111 10/02/2019    CHOLHDLRATIO 3.9 06/15/2015     LIVER ENZYME RESULTS:  Lab Results   Component Value Date    AST 18 03/21/2022    AST 40 06/07/2021    ALT 9 06/24/2022    ALT 35 06/07/2021     CBC RESULTS:  Lab Results   Component Value Date    WBC 3.6 (L) 01/15/2022    WBC 3.5 (L) 06/07/2021    RBC 2.59 (L) 01/15/2022    RBC 3.26 (L) 06/07/2021    HGB 9.9 (L) 01/20/2022    HGB 9.8 (L) 06/07/2021    HCT 26.9 (L) 01/15/2022    HCT 32.5 (L) 06/07/2021     (H) 01/15/2022     06/07/2021    MCH 32.4 01/15/2022    MCH 30.1 06/07/2021    MCHC 31.2 (L) 01/15/2022    MCHC 30.2 (L) 06/07/2021    RDW 14.3 01/15/2022    RDW 17.6 (H) 06/07/2021     (L) 01/15/2022     06/07/2021     BMP RESULTS:  Lab Results   Component Value Date     06/24/2022     06/07/2021    POTASSIUM 4.0 06/24/2022    POTASSIUM 4.9 06/07/2021    CHLORIDE 107 06/24/2022    CHLORIDE 114 (H) 06/07/2021    CO2 27 06/24/2022    CO2 23 06/07/2021    ANIONGAP 8 06/24/2022    ANIONGAP 4 06/07/2021    GLC 95 06/24/2022    GLC 94 06/07/2021    BUN 51 (H) 06/24/2022    BUN 77 (H) 06/07/2021    CR 2.80 (H) 06/24/2022    CR 1.61 (H) 06/07/2021    GFRESTIMATED 16 (L) 06/24/2022    GFRESTIMATED 29 (L) 06/07/2021    GFRESTBLACK 34 (L) 06/07/2021    ALIVIA 10.6 (H) 06/24/2022    ALIVIA 9.4 06/07/2021     INR RESULTS:  Lab Results   Component Value Date    INR 1.16 (H) 02/24/2021    INR 2.07 (H) 07/21/2020          Medications      Current Outpatient Medications   Medication Sig Dispense Refill     Abatacept (ORENCIA IV) Inject into the vein every 30 days Dose unknown       acetaminophen (TYLENOL) 650 MG CR tablet Take 650-1,300 mg by mouth every 8 hours as needed for mild pain or fever       allopurinol (ZYLOPRIM) 100 MG tablet Take 1 tablet (100 mg) by mouth daily 90 tablet 1     amLODIPine (NORVASC) 10 MG tablet Take 1 tablet by mouth daily. 90 tablet 3     apixaban ANTICOAGULANT (ELIQUIS) 2.5 MG tablet Take 1 tablet (2.5 mg) by mouth 2 times daily 180 tablet 3     calcium carbonate 600 mg-vitamin D 400 units (CALTRATE) 600-400 MG-UNIT per tablet Take 1 tablet by mouth daily 800 international unit(s) vit d, calcuim 600       carvedilol (COREG) 6.25 MG tablet Take 1 tablet (6.25 mg) by mouth 2 times daily (with meals) 180 tablet 2     cloNIDine (CATAPRES) 0.1 MG tablet Take 1 tablet (0.1 mg) by mouth 2 times daily 180 tablet 1     Darbepoetin Alonso-Albumin (ARANESP IJ) Monthly as needed       ferrous sulfate (FEROSUL) 325 (65 Fe) MG tablet Take 325 mg by mouth daily (with breakfast)       hydrALAZINE (APRESOLINE) 100 MG tablet Take 1 tablet by mouth 4 times daily. 360 tablet 1     hydroxychloroquine (PLAQUENIL) 200 MG tablet Take 200 mg by mouth daily       isosorbide dinitrate (ISORDIL) 20 MG tablet Take 1 tablet (20 mg) by mouth 3 times daily. 270 tablet 1     levothyroxine (SYNTHROID/LEVOTHROID) 25 MCG tablet Take 1 tablet by mouth daily. 90 tablet 3     mirtazapine (REMERON) 15 MG tablet Take 1 tablet by mouth daily At Bedtime. 90 tablet 3     ondansetron (ZOFRAN-ODT) 4 MG ODT tab Take 4 mg by mouth every 8 hours as needed for nausea       pantoprazole (PROTONIX) 40 MG EC tablet Take 1 tablet (40 mg) by mouth daily 90 tablet 3     predniSONE (DELTASONE) 5 MG tablet Take 1 tablet (5 mg) by mouth daily 30 tablet 0     Sennosides (SENNA LAXATIVE PO) Take 2 tablets by mouth daily as needed        sodium bicarbonate 650 MG tablet Take 650  mg by mouth 2 times daily        torsemide (DEMADEX) 20 MG tablet Take 1 tablet (20 mg) by mouth daily Take additional 20mg torsemide (1 tablet) for weight gain > 2 pounds overnight. 100 tablet 0     Vitamin D, Cholecalciferol, 25 MCG (1000 UT) TABS 1 tablet twice daily       ACE/ARB/ARNI NOT PRESCRIBED (INTENTIONAL) Please choose reason not prescribed from choices below. (Patient not taking: Reported on 2/16/2022)       amLODIPine (NORVASC) 10 MG tablet Take 1 tablet (10 mg) by mouth daily 90 tablet 3          Past Medical History     Past Medical History:   Diagnosis Date     Atrial fibrillation (H) new 10/19     [place] (hard of hearing)     wears hearing aids     Hyperlipidaemia      Hyperlipidaemia LDL goal < 130      Hypertension      Hypothyroid      PAD (peripheral artery disease) (H)      Renal insufficiency, mild      Uncontrolled hypertension 10/14/2019     Past Surgical History:   Procedure Laterality Date     APPENDECTOMY  1951     BONE MARROW BIOPSY, BONE SPECIMEN, NEEDLE/TROCAR N/A 7/31/2020    Procedure: bone marrow biopsy;  Surgeon: Iris Cameron MD;  Location:  GI     CATARACT IOL, RT/LT  2001    bilateral (laser - 5/2013)     DENTAL SURGERY  1962    wisdom     ESOPHAGOSCOPY, GASTROSCOPY, DUODENOSCOPY (EGD), COMBINED N/A 7/24/2020    Procedure: ESOPHAGOGASTRODUODENOSCOPY, WITH BIOPSY;  Surgeon: Humberto Bailon MD;  Location:  GI     EYE SURGERY  1956    Muscle release     TUBAL LIGATION  1971     Family History   Problem Relation Age of Onset     Heart Disease Mother      Respiratory Mother      Heart Disease Father      Heart Disease Brother      Coronary Artery Disease Brother         CAB     Heart Disease Brother      Coronary Artery Disease Brother         CAB            Allergies   Oxybutynin and Seasonal allergies    40 minutes spent on the date of the encounter doing chart review, history and exam, documentation and further activities as noted above      Elle Mariano, APRN  CNP  [facility]  Pager: [phone]

## 2022-06-27 NOTE — PATIENT INSTRUCTIONS
No medication changes  Get labs drawn next week to monitor kidney function  Follow up with Dr. Fuentes in 3 months with labs prior  Please call with any questions/concerns [phone]

## 2022-06-27 NOTE — LETTER
6/27/2022    Laurie Conteh MD  [address]  [place] [address]    RE: Ghislaine Walls       Dear Colleague,     I had the pleasure of seeing Ghislaine Walls in the [facility].  Cardiology Clinic Progress Note  Ghislaine Walls MRN# [id number]   YOB: 1935 Age: 86 year old   Primary Cardiologist: Dr. Fuentes  Reason for visit: Cardiology follow up             Assessment and Plan:   Ghislaine Walls is a very pleasant 86 year old female whom I am seeing today for cardiology follow up.     1. Chronic diastolic heart failure - LVEF 55%, RV normal size/function.               - NYHA class III, stage C              - Fluid status : mildly volume up              - Dry weight : ~ 130#              - Diuretic regimen : Continue torsemide to 20mg daily with additional 20mg daily for weight gain > 2# overnight.               - Aldosterone antagonist : none due to CKD  2. Atrial fibrillation, paroxsymal - in sinus rhythm on exam today, on eliquis for thromboembolic prophylaxis due to elevated [id number] score.   3. CKD IV - baseline creatinine 1.6-2.4, BMP drawn last week showed further increase in creatinine to 2.8.               - Follows with nephrology, last visit in May, at which time renal function noted to be stable with creatinine of 2.5, was started on calcium and vit D.   4. Hypertension - controlled              - Continue amlodipine, carvedilol, clonidine, hydralazine and isordil  5. Chronic anemia - stable, hemoglobin 10.5 6/3/22    I saw patient and her daughter today for cardiology follow up. She reports doing okay but on exam does appear to be carrying extra fluid, though no significant change in weight. She has likely had some muscle loss  This appears secondary to dietary indiscretions and eating more meals at the facility dining [place]. Her BMP from 6/24 showed some decline in renal function, creatinine 2.8. This appears secondary to cardio-renal and increased volume  overload, but also component of further decline of her chronic kidney disease. We explored this extensively today. Patient adamantly declined further titration in diuretic. We reviewed my worries about risk for hospitalization and/or worsening renal function. She verbalized understanding but remained adamant about not increasing her torsemide.     Reviewed low sodium diet and trying to limit her meals in the facility dining [place]. Will plan to check BMP and NTProBNP next week. Cardiology follow up with Dr. Fuentes in 3 months with labs prior while I am out on leave of absence.         Changes today: none     Follow up plan:     BMP and NTproBNP next week     Cardiology follow up in 3 months with labs prior         History of Presenting Illness:    Ghislaine Walls is a very pleasant 86 year old female with a history of  HFpEF, CKD, hypertension, paroxsymal atrial fibrillation on eliquis, chronic anemia, rheumatoid arthritis and hypothyroidism.      Primary cardiologist Dr. Fuentes. In October 2021 at which time she complained of generalized fatigue. Her metoprolol was decreased to 25mg BID. She was offered the option of stress perfusion imaging to rule out ischemic, but her symptoms were not suggestive of angina and she wished to defer. She had a HF hospitalization in April 2021.      Most recently Ghislaine was hospitalized 1/12-1/15/2022 with acute on chronic diastolic heart failure exacerbation. NTproBNP on admission 12,000. CXR with vascular congestion and small left pleural effusion s/p thoracentesis 1/13 with 250ml removed. Her metoprolol was changed to carvedilol 6.25mg BID and she was started on isordil 20mg TID due to elevated blood pressures. Her PTA torsemide was increased from 20mg -> 40mg daily. Some concerns for non-compliance contributing to HF exacerbation/admission. Admission weight 141#. Discharge weight 135#.      Echocardiogram 1/13/22 showed LVEF 55%, RV normal size/function, no significant valvular  "disease and moderate L pleural effusion.      I saw Ghislaine and her daughter on 1/20/22 for post hospital followup, at that time her weight had decreased another 8# since hospital discharge to 128#. Unfortunately her renal function had further declined. Creatinine increased from 2.44 -> 2.82. Recommended decreasing her torsemide to 20mg daily with additional 20mg as needed for weight gain > 2#.      She had visit with nephrology on 6/3/22 she was started on calcium and vit d supplements. Plans for 4 month follow up.      I last saw Ghislaine for cardiology follow up in March 2020 at which time she was overall doing okay from a heart failure perspective, her weight was stable around 130#. No medication changes were made.     Patient is here today for cardiology follow up.     Patient reports feeling good. Daughter notes she needs to eat the lower sodium meals she brings vs the food at the facility. Monitoring weights daily a home, notes has been as low as 126# but not above 130#. Reports some chronic LE edema, denies worsening, but will notice more after eating meals in facility dining [place]. Denies shortness of breath at rest. Denies exertional dyspnea. Denies orthopnea or PND. Denies chest pain or chest tightness. Denies dizziness, lightheadedness or other presyncopal symptoms. Denies tachycardia or palpitations. Taking medications daily, states she hasn't taken an extra torsemide in \"a long time\". Reports feeling very tired as of recently.     Labs from 6/24/22 showed some decline in renal function, creatinine 2.8 which was an increase from 2.68. Electrolytes stable. Total cholesterol 131, HDl 48, LDL 65 and triglycerides 92. Blood pressure 136/22 and HR 66 in clinic today.    Daughter has been helping meals and getting healthy choice meals with less than 500mg sodium, she has now started to eat more meals at facility dining [place]. Trying to attend exercise class most 3-4x during the week. Occasional beer at happy " "hour. Denies tobacco use. Using walker for support in her apartment and wheelchair for support out of the building.         Social History    Living in assisted living at [place].  Social History     Socioeconomic History     Marital status:      Spouse name: Not on file     Number of children: Not on file     Years of education: Not on file     Highest education level: Not on file   Occupational History     Not on file   Tobacco Use     Smoking status: Former Smoker     Packs/day: 0.50     Years: 5.00     Pack years: 2.50     Types: Cigarettes     Quit date: 1973     Years since quittin.5     Smokeless tobacco: Never Used   Substance and Sexual Activity     Alcohol use: Not Currently     Alcohol/week: 0.0 standard drinks     Comment: one light beer of  occ     Drug use: No     Sexual activity: Not Currently     Partners: Male   Other Topics Concern     Parent/sibling w/ CABG, MI or angioplasty before 65F 55M? No   Social History Narrative     Not on file     Social Determinants of Health     Financial Resource Strain: Low Risk      Difficulty of Paying Living Expenses: Not hard at all   Food Insecurity: No Food Insecurity     Worried About Running Out of Food in the Last Year: Never true     Ran Out of Food in the Last Year: Never true   Transportation Needs: No Transportation Needs     Lack of Transportation (Medical): No     Lack of Transportation (Non-Medical): No   Physical Activity: Not on file   Stress: Not on file   Social Connections: Not on file   Intimate Partner Violence: Not on file   Housing Stability: Not on file            Review of Systems:    ROS: 10 point ROS neg other than the symptoms noted above in the HPI.         Physical Exam:   Vitals: BP (!) 157/72   Pulse 61   Ht 1.549 m (5' 1\")   Wt 59 kg (130 lb)   SpO2 96%   BMI 24.56 kg/m     Wt Readings from Last 4 Encounters:   22 59 kg (130 lb)   22 59.9 kg (132 lb)   22 59 kg (130 lb)   22 59 kg " (130 lb)     GEN: well nourished, in no acute distress.  HEENT:  Pupils equal, round. Sclerae nonicteric.   NECK: Supple, no masses appreciated. JVP elevated to just below the jaw.   C/V:  Regular rate and rhythm, no murmur, rub or gallop.    RESP: Respirations are unlabored. Clear to auscultation bilaterally without wheezing, rales, or rhonchi.  GI: Abdomen soft, nontender.  EXTREM: +1 bilateral LE edema up to 3 inches above the ankle.  NEURO: Alert and oriented, cooperative.  SKIN: Warm and dry       Data:     LIPID RESULTS:  Lab Results   Component Value Date    CHOL 131 06/24/2022    CHOL 123 10/02/2019    HDL 48 (L) 06/24/2022    HDL 27 (L) 10/02/2019    LDL 65 06/24/2022    LDL 74 10/02/2019    TRIG 92 06/24/2022    TRIG 111 10/02/2019    CHOLHDLRATIO 3.9 06/15/2015     LIVER ENZYME RESULTS:  Lab Results   Component Value Date    AST 18 03/21/2022    AST 40 06/07/2021    ALT 9 06/24/2022    ALT 35 06/07/2021     CBC RESULTS:  Lab Results   Component Value Date    WBC 3.6 (L) 01/15/2022    WBC 3.5 (L) 06/07/2021    RBC 2.59 (L) 01/15/2022    RBC 3.26 (L) 06/07/2021    HGB 9.9 (L) 01/20/2022    HGB 9.8 (L) 06/07/2021    HCT 26.9 (L) 01/15/2022    HCT 32.5 (L) 06/07/2021     (H) 01/15/2022     06/07/2021    MCH 32.4 01/15/2022    MCH 30.1 06/07/2021    MCHC 31.2 (L) 01/15/2022    MCHC 30.2 (L) 06/07/2021    RDW 14.3 01/15/2022    RDW 17.6 (H) 06/07/2021     (L) 01/15/2022     06/07/2021     BMP RESULTS:  Lab Results   Component Value Date     06/24/2022     06/07/2021    POTASSIUM 4.0 06/24/2022    POTASSIUM 4.9 06/07/2021    CHLORIDE 107 06/24/2022    CHLORIDE 114 (H) 06/07/2021    CO2 27 06/24/2022    CO2 23 06/07/2021    ANIONGAP 8 06/24/2022    ANIONGAP 4 06/07/2021    GLC 95 06/24/2022    GLC 94 06/07/2021    BUN 51 (H) 06/24/2022    BUN 77 (H) 06/07/2021    CR 2.80 (H) 06/24/2022    CR 1.61 (H) 06/07/2021    GFRESTIMATED 16 (L) 06/24/2022    GFRESTIMATED 29 (L)  06/07/2021    GFRESTBLACK 34 (L) 06/07/2021    ALIVIA 10.6 (H) 06/24/2022    ALIVIA 9.4 06/07/2021     INR RESULTS:  Lab Results   Component Value Date    INR 1.16 (H) 02/24/2021    INR 2.07 (H) 07/21/2020          Medications     Current Outpatient Medications   Medication Sig Dispense Refill     Abatacept (ORENCIA IV) Inject into the vein every 30 days Dose unknown       acetaminophen (TYLENOL) 650 MG CR tablet Take 650-1,300 mg by mouth every 8 hours as needed for mild pain or fever       allopurinol (ZYLOPRIM) 100 MG tablet Take 1 tablet (100 mg) by mouth daily 90 tablet 1     amLODIPine (NORVASC) 10 MG tablet Take 1 tablet by mouth daily. 90 tablet 3     apixaban ANTICOAGULANT (ELIQUIS) 2.5 MG tablet Take 1 tablet (2.5 mg) by mouth 2 times daily 180 tablet 3     calcium carbonate 600 mg-vitamin D 400 units (CALTRATE) 600-400 MG-UNIT per tablet Take 1 tablet by mouth daily 800 international unit(s) vit d, calcuim 600       carvedilol (COREG) 6.25 MG tablet Take 1 tablet (6.25 mg) by mouth 2 times daily (with meals) 180 tablet 2     cloNIDine (CATAPRES) 0.1 MG tablet Take 1 tablet (0.1 mg) by mouth 2 times daily 180 tablet 1     Darbepoetin Alonso-Albumin (ARANESP IJ) Monthly as needed       ferrous sulfate (FEROSUL) 325 (65 Fe) MG tablet Take 325 mg by mouth daily (with breakfast)       hydrALAZINE (APRESOLINE) 100 MG tablet Take 1 tablet by mouth 4 times daily. 360 tablet 1     hydroxychloroquine (PLAQUENIL) 200 MG tablet Take 200 mg by mouth daily       isosorbide dinitrate (ISORDIL) 20 MG tablet Take 1 tablet (20 mg) by mouth 3 times daily. 270 tablet 1     levothyroxine (SYNTHROID/LEVOTHROID) 25 MCG tablet Take 1 tablet by mouth daily. 90 tablet 3     mirtazapine (REMERON) 15 MG tablet Take 1 tablet by mouth daily At Bedtime. 90 tablet 3     ondansetron (ZOFRAN-ODT) 4 MG ODT tab Take 4 mg by mouth every 8 hours as needed for nausea       pantoprazole (PROTONIX) 40 MG EC tablet Take 1 tablet (40 mg) by mouth daily 90  tablet 3     predniSONE (DELTASONE) 5 MG tablet Take 1 tablet (5 mg) by mouth daily 30 tablet 0     Sennosides (SENNA LAXATIVE PO) Take 2 tablets by mouth daily as needed        sodium bicarbonate 650 MG tablet Take 650 mg by mouth 2 times daily        torsemide (DEMADEX) 20 MG tablet Take 1 tablet (20 mg) by mouth daily Take additional 20mg torsemide (1 tablet) for weight gain > 2 pounds overnight. 100 tablet 0     Vitamin D, Cholecalciferol, 25 MCG (1000 UT) TABS 1 tablet twice daily       ACE/ARB/ARNI NOT PRESCRIBED (INTENTIONAL) Please choose reason not prescribed from choices below. (Patient not taking: Reported on 2/16/2022)       amLODIPine (NORVASC) 10 MG tablet Take 1 tablet (10 mg) by mouth daily 90 tablet 3          Past Medical History     Past Medical History:   Diagnosis Date     Atrial fibrillation (H) new 10/19     [place] (hard of hearing)     wears hearing aids     Hyperlipidaemia      Hyperlipidaemia LDL goal < 130      Hypertension      Hypothyroid      PAD (peripheral artery disease) (H)      Renal insufficiency, mild      Uncontrolled hypertension 10/14/2019     Past Surgical History:   Procedure Laterality Date     APPENDECTOMY  1951     BONE MARROW BIOPSY, BONE SPECIMEN, NEEDLE/TROCAR N/A 7/31/2020    Procedure: bone marrow biopsy;  Surgeon: Iris Cameron MD;  Location:  GI     CATARACT IOL, RT/LT  2001    bilateral (laser - 5/2013)     DENTAL SURGERY  1962    wisdom     ESOPHAGOSCOPY, GASTROSCOPY, DUODENOSCOPY (EGD), COMBINED N/A 7/24/2020    Procedure: ESOPHAGOGASTRODUODENOSCOPY, WITH BIOPSY;  Surgeon: Humberto Bailon MD;  Location:  GI     EYE SURGERY  1956    Muscle release     TUBAL LIGATION  1971     Family History   Problem Relation Age of Onset     Heart Disease Mother      Respiratory Mother      Heart Disease Father      Heart Disease Brother      Coronary Artery Disease Brother         CAB     Heart Disease Brother      Coronary Artery Disease Brother         CAB             Allergies   Oxybutynin and Seasonal allergies    40 minutes spent on the date of the encounter doing chart review, history and exam, documentation and further activities as noted above      DHAVAL Cormier CNP  [facility]  Pager: [phone]        Thank you for allowing me to participate in the care of your patient.      Sincerely,     DHAVAL Cormier CNP     [facility]  cc:   DHAVAL Rayo CNP  [address]  [place] [address]

## 2022-09-08 NOTE — TELEPHONE ENCOUNTER
Routing refill request to provider for review/approval because:     Blood pressure under 140/90 in past 12 months     Normal serum creatinine on file within past 12 months     Patient is 6 years of age or older     Recent (12 mo) or future (30 days) visit within the authorizing provider's specialty     Medication is active on med list     Patient not pregnant     No positive pregnancy test on file in past 12 months      BP Readings from Last 3 Encounters:   06/27/22 (!) 157/72   03/22/22 136/60   02/03/22 (!) 142/60     Creatinine   Date Value Ref Range Status   07/06/2022 2.30 (H) 0.52 - 1.04 mg/dL Final   06/07/2021 1.61 (H) 0.52 - 1.04 mg/dL Final       Last office vist: 1/21/22    Next office visit: none      Shirley Suárez RN  [facility]

## 2022-09-09 NOTE — TELEPHONE ENCOUNTER
Pt's daughter called requesting Clonidine refill. Pt has only one pill left. Please advice on Rx approval.

## 2022-09-13 NOTE — PROGRESS NOTES
MD Notification    Notified Person: MD    Notified Person Name: Carlee    Notification Date/Time: 1936; 7/21/20    Notification Interaction: Web based paging    Purpose of Notification: Emesis x2. Administered prn Zofran. Minimal intake. Pt stating she is having trouble keeping anything down. Advise as needed. Thank you!    Orders Received: SEE MAR    Comments:   Xavier 45 Transitions Initial Follow Up Call    Call within 2 business days of discharge: Yes    Patient: Marvin Griffin Patient : 1954   MRN: [id number]  Reason for Admission: [facility] -9/10/2022 COVID   Discharge Date: 9/10/22 RARS: Readmission Risk Score: 8.1      Last Discharge [facility]       Date Complaint Diagnosis Description Type Department Provider    22 Shortness of Breath COVID-19 . .. ED to Hosp-Admission (Discharged) (ADMITTED) [facility] Michael Hric, DO; Steve Sensor. .. Spoke with: Unable to contact pt left hipaa compliant message updating pt to follow up with pcp regarding anything further. Ctn will sign off     Facility: [facility]    Care Transitions 24 Hour Call    Care Transitions Interventions         Follow Up  No future appointments.     Yudy Mercer LPN

## 2022-09-25 NOTE — PROGRESS NOTES
HPI and Plan:     I had the pleasure of seeing Ms. Walls today.      She is a very pleasant, 87-year-old female whom I last spoke with in 10/2021.  She has a history of acute renal failure with atrial fibrillation with rapid ventricular response.  She was also initially noted to have mildly elevated troponins, which were flat in trajectory.  Her other comorbidities include chronic renal insufficiency and rheumatoid arthritis.     At the time I saw her, an echocardiogram demonstrated normal left ventricular systolic function with no significant valvular disease.  Moderate left atrial enlargement was noted.  She converted spontaneously to normal sinus rhythm, and we recommended continuation of rate control with diltiazem and metoprolol.  She was also started on Eliquis initially for thromboembolic prophylaxis.    Unfortunately she was most recently admitted to [facility] in January 2022 with acute hypoxemic respiratory failure due to an exacerbation of chronic heart failure with preserved ejection fraction.  She was seen by cardiology as an inpatient and treated appropriately with intravenous diuretic therapy.  She had previously been hospitalized in April 2021 for a similar presentation.    Since then she has been following with Elle Mariano in our cardiology clinic, having last been seen in June 2022.  Fortunately she has remained euvolemic and her blood pressure has been better controlled.  Today she denies any exertional dyspnea, palpitations, syncope or presyncope.  Her weight has remained stable.    Her main complaints today are a lack of motivation to exercise and generalized fatigue.  She is on medications that could contribute to this, including clonidine and carvedilol, but these are needed for blood pressure control in the setting of her chronic renal insufficiency.  She denies any PND orthopnea.  She denies any syncope or presyncope.    Her physical exam is dictated below.     A basic metabolic  panel obtained today was reviewed.  It demonstrated a sodium 140, potassium of 3.9 and a creatinine of 2.46 which is within her normal range.    Her last echocardiogram on 1/13/2022 demonstrated normal left ventricular size and systolic function.  Right-sided systolic function was also within normal limits, with the RVSP being 33 mmHg plus right atrial pressure.  The latter was within normal limits.       IMPRESSION:   1.  Paroxysmal atrial fibrillation, treated with a rate control and anticoagulation approach.   2.  Heart failure with preserved ejection fraction.  Most recently hospitalized in January 2022 as described above.  3.  Chronic renal insufficiency, stage IV.  Creatinine of 2.46 today.  4.  Hypertension, which appears to be well controlled on her current regimen of amlodipine 10 mg daily, clonidine 0.1 mg twice daily, hydralazine 100 mg 4 times daily and carvedilol 6.25 mg p.o. twice daily.    Ms. Walls appears to be euvolemic on exam today without evidence of angina or congestive heart failure.  She is in sinus rhythm and is on appropriate thromboembolic prophylaxis.    She is scheduled to discuss her generalized fatigue and medication list with a pharmacist in the near future which I think is appropriate.  Given that she appears euvolemic with stable renal function her diuretics will remain unchanged.    I have encouraged her to utilize the exercise facility at [place] at least once or twice a week.  She states that she will consider this.    It was a pleasure seeing her today.                       CURRENT MEDICATIONS:  Current Outpatient Medications   Medication Sig Dispense Refill     Abatacept (ORENCIA IV) Inject into the vein every 30 days Dose unknown       ACE/ARB/ARNI NOT PRESCRIBED (INTENTIONAL) Please choose reason not prescribed from choices below. (Patient not taking: Reported on 2/16/2022)       acetaminophen (TYLENOL) 650 MG CR tablet Take 650-1,300 mg by mouth every 8 hours as needed for  mild pain or fever       allopurinol (ZYLOPRIM) 100 MG tablet Take 1 tablet (100 mg) by mouth daily 90 tablet 1     amLODIPine (NORVASC) 10 MG tablet Take 1 tablet by mouth daily. 90 tablet 3     amLODIPine (NORVASC) 10 MG tablet Take 1 tablet (10 mg) by mouth daily 90 tablet 3     calcium carbonate 600 mg-vitamin D 400 units (CALTRATE) 600-400 MG-UNIT per tablet Take 1 tablet by mouth daily 800 international unit(s) vit d, calcuim 600       carvedilol (COREG) 6.25 MG tablet Take 1 tablet (6.25 mg) by mouth 2 times daily (with meals) 180 tablet 2     cloNIDine (CATAPRES) 0.1 MG tablet Take 1 tablet (0.1 mg) by mouth 2 times daily 180 tablet 0     Darbepoetin Alonso-Albumin (ARANESP IJ) Monthly as needed       ELIQUIS ANTICOAGULANT 2.5 MG tablet Take 1 tablet by mouth 2 times daily. 180 tablet 0     ferrous sulfate (FEROSUL) 325 (65 Fe) MG tablet Take 325 mg by mouth daily (with breakfast)       hydrALAZINE (APRESOLINE) 100 MG tablet Take 1 tablet by mouth 4 times daily. 360 tablet 1     hydroxychloroquine (PLAQUENIL) 200 MG tablet Take 200 mg by mouth daily       isosorbide dinitrate (ISORDIL) 20 MG tablet Take 1 tablet (20 mg) by mouth 3 times daily. 270 tablet 1     levothyroxine (SYNTHROID/LEVOTHROID) 25 MCG tablet Take 1 tablet by mouth daily. 90 tablet 3     mirtazapine (REMERON) 15 MG tablet Take 1 tablet by mouth daily At Bedtime. 90 tablet 3     ondansetron (ZOFRAN-ODT) 4 MG ODT tab Take 4 mg by mouth every 8 hours as needed for nausea       pantoprazole (PROTONIX) 40 MG EC tablet Take 1 tablet by mouth daily. 90 tablet 0     predniSONE (DELTASONE) 5 MG tablet Take 1 tablet (5 mg) by mouth daily 30 tablet 0     Sennosides (SENNA LAXATIVE PO) Take 2 tablets by mouth daily as needed        sodium bicarbonate 650 MG tablet Take 650 mg by mouth 2 times daily        torsemide (DEMADEX) 20 MG tablet Take 1 tablet (20 mg) by mouth daily Take additional 20mg torsemide (1 tablet) for weight gain > 2 pounds overnight.  100 tablet 0     Vitamin D, Cholecalciferol, 25 MCG (1000 UT) TABS 1 tablet twice daily         ALLERGIES     Allergies   Allergen Reactions     Oxybutynin Itching     Seasonal Allergies        PAST MEDICAL HISTORY:  Past Medical History:   Diagnosis Date     Atrial fibrillation (H) new 10/19     [place] (hard of hearing)     wears hearing aids     Hyperlipidaemia      Hyperlipidaemia LDL goal < 130      Hypertension      Hypothyroid      PAD (peripheral artery disease) (H)      Renal insufficiency, mild      Uncontrolled hypertension 10/14/2019       PAST SURGICAL HISTORY:  Past Surgical History:   Procedure Laterality Date     APPENDECTOMY       BONE MARROW BIOPSY, BONE SPECIMEN, NEEDLE/TROCAR N/A 2020    Procedure: bone marrow biopsy;  Surgeon: Iris Cameron MD;  Location:  GI     CATARACT IOL, RT/LT      bilateral (laser - 2013)     DENTAL SURGERY      wisdom     ESOPHAGOSCOPY, GASTROSCOPY, DUODENOSCOPY (EGD), COMBINED N/A 2020    Procedure: ESOPHAGOGASTRODUODENOSCOPY, WITH BIOPSY;  Surgeon: Humberto Bailon MD;  Location:  GI     EYE SURGERY      Muscle release     TUBAL LIGATION         FAMILY HISTORY:  Family History   Problem Relation Age of Onset     Heart Disease Mother      Respiratory Mother      Heart Disease Father      Heart Disease Brother      Coronary Artery Disease Brother         CAB     Heart Disease Brother      Coronary Artery Disease Brother         CAB       SOCIAL HISTORY:  Social History     Socioeconomic History     Marital status:    Tobacco Use     Smoking status: Former Smoker     Packs/day: 0.50     Years: 5.00     Pack years: 2.50     Types: Cigarettes     Quit date: 1973     Years since quittin.7     Smokeless tobacco: Never Used   Substance and Sexual Activity     Alcohol use: Not Currently     Alcohol/week: 0.0 standard drinks     Comment: one light beer of  occ     Drug use: No     Sexual activity: Not Currently      Partners: Male   Other Topics Concern     Parent/sibling w/ CABG, MI or angioplasty before 65F 55M? No       Review of Systems:  Skin:          Eyes:         ENT:         Respiratory:          Cardiovascular:         Gastroenterology:        Genitourinary:         Musculoskeletal:         Neurologic:         Psychiatric:         Heme/Lymph/Imm:         Endocrine:           Physical Exam:  Vitals: There were no vitals taken for this visit.    Constitutional:  cooperative, alert and oriented, well developed, well nourished, in no acute distress        Skin:  warm and dry to the touch, no apparent skin lesions or masses noted          Head:  normocephalic, no masses or lesions        Eyes:  pupils equal and round        Lymph:      ENT:  no pallor or cyanosis        Neck:           Respiratory:  clear to auscultation         Cardiac: regular rhythm                                                         GI:  abdomen soft        Extremities and Muscular Skeletal:  no edema              Neurological:           Psych:  Alert and Oriented x 3        CC  Elle Mariano, APRN CNP  [address]  [place]  [place] [address]

## 2022-09-26 NOTE — TELEPHONE ENCOUNTER
Routing refill request to provider for review/approval because:    Ryanne PASCUAL RN  [facility]

## 2022-09-28 NOTE — LETTER
9/28/2022    Laurie Conteh MD  [address] [facility] [address]  [facility] [place] [address]    RE: Ghislaine Walls       Dear Colleague,     I had the pleasure of seeing Ghislaine Walls in the [facility].  HPI and Plan:     I had the pleasure of seeing Ms. Walls today.      She is a very pleasant, 87-year-old female whom I last spoke with in 10/2021.  She has a history of acute renal failure with atrial fibrillation with rapid ventricular response.  She was also initially noted to have mildly elevated troponins, which were flat in trajectory.  Her other comorbidities include chronic renal insufficiency and rheumatoid arthritis.     At the time I saw her, an echocardiogram demonstrated normal left ventricular systolic function with no significant valvular disease.  Moderate left atrial enlargement was noted.  She converted spontaneously to normal sinus rhythm, and we recommended continuation of rate control with diltiazem and metoprolol.  She was also started on Eliquis initially for thromboembolic prophylaxis.    Unfortunately she was most recently admitted to [facility] in January 2022 with acute hypoxemic respiratory failure due to an exacerbation of chronic heart failure with preserved ejection fraction.  She was seen by cardiology as an inpatient and treated appropriately with intravenous diuretic therapy.  She had previously been hospitalized in April 2021 for a similar presentation.    Since then she has been following with Elle Mariano in our cardiology clinic, having last been seen in June 2022.  Fortunately she has remained euvolemic and her blood pressure has been better controlled.  Today she denies any exertional dyspnea, palpitations, syncope or presyncope.  Her weight has remained stable.    Her main complaints today are a lack of motivation to exercise and generalized fatigue.  She is on medications that could contribute to this, including clonidine and carvedilol, but these are needed for  blood pressure control in the setting of her chronic renal insufficiency.  She denies any PND orthopnea.  She denies any syncope or presyncope.    Her physical exam is dictated below.     A basic metabolic panel obtained today was reviewed.  It demonstrated a sodium 140, potassium of 3.9 and a creatinine of 2.46 which is within her normal range.    Her last echocardiogram on 1/13/2022 demonstrated normal left ventricular size and systolic function.  Right-sided systolic function was also within normal limits, with the RVSP being 33 mmHg plus right atrial pressure.  The latter was within normal limits.       IMPRESSION:   1.  Paroxysmal atrial fibrillation, treated with a rate control and anticoagulation approach.   2.  Heart failure with preserved ejection fraction.  Most recently hospitalized in January 2022 as described above.  3.  Chronic renal insufficiency, stage IV.  Creatinine of 2.46 today.  4.  Hypertension, which appears to be well controlled on her current regimen of amlodipine 10 mg daily, clonidine 0.1 mg twice daily, hydralazine 100 mg 4 times daily and carvedilol 6.25 mg p.o. twice daily.    Ms. Walls appears to be euvolemic on exam today without evidence of angina or congestive heart failure.  She is in sinus rhythm and is on appropriate thromboembolic prophylaxis.    She is scheduled to discuss her generalized fatigue and medication list with a pharmacist in the near future which I think is appropriate.  Given that she appears euvolemic with stable renal function her diuretics will remain unchanged.    I have encouraged her to utilize the exercise facility at [place] at least once or twice a week.  She states that she will consider this.    It was a pleasure seeing her today.                       CURRENT MEDICATIONS:  Current Outpatient Medications   Medication Sig Dispense Refill     Abatacept (ORENCIA IV) Inject into the vein every 30 days Dose unknown       ACE/ARB/ARNI NOT PRESCRIBED  (INTENTIONAL) Please choose reason not prescribed from choices below. (Patient not taking: Reported on 2/16/2022)       acetaminophen (TYLENOL) 650 MG CR tablet Take 650-1,300 mg by mouth every 8 hours as needed for mild pain or fever       allopurinol (ZYLOPRIM) 100 MG tablet Take 1 tablet (100 mg) by mouth daily 90 tablet 1     amLODIPine (NORVASC) 10 MG tablet Take 1 tablet by mouth daily. 90 tablet 3     amLODIPine (NORVASC) 10 MG tablet Take 1 tablet (10 mg) by mouth daily 90 tablet 3     calcium carbonate 600 mg-vitamin D 400 units (CALTRATE) 600-400 MG-UNIT per tablet Take 1 tablet by mouth daily 800 international unit(s) vit d, calcuim 600       carvedilol (COREG) 6.25 MG tablet Take 1 tablet (6.25 mg) by mouth 2 times daily (with meals) 180 tablet 2     cloNIDine (CATAPRES) 0.1 MG tablet Take 1 tablet (0.1 mg) by mouth 2 times daily 180 tablet 0     Darbepoetin Alonso-Albumin (ARANESP IJ) Monthly as needed       ELIQUIS ANTICOAGULANT 2.5 MG tablet Take 1 tablet by mouth 2 times daily. 180 tablet 0     ferrous sulfate (FEROSUL) 325 (65 Fe) MG tablet Take 325 mg by mouth daily (with breakfast)       hydrALAZINE (APRESOLINE) 100 MG tablet Take 1 tablet by mouth 4 times daily. 360 tablet 1     hydroxychloroquine (PLAQUENIL) 200 MG tablet Take 200 mg by mouth daily       isosorbide dinitrate (ISORDIL) 20 MG tablet Take 1 tablet (20 mg) by mouth 3 times daily. 270 tablet 1     levothyroxine (SYNTHROID/LEVOTHROID) 25 MCG tablet Take 1 tablet by mouth daily. 90 tablet 3     mirtazapine (REMERON) 15 MG tablet Take 1 tablet by mouth daily At Bedtime. 90 tablet 3     ondansetron (ZOFRAN-ODT) 4 MG ODT tab Take 4 mg by mouth every 8 hours as needed for nausea       pantoprazole (PROTONIX) 40 MG EC tablet Take 1 tablet by mouth daily. 90 tablet 0     predniSONE (DELTASONE) 5 MG tablet Take 1 tablet (5 mg) by mouth daily 30 tablet 0     Sennosides (SENNA LAXATIVE PO) Take 2 tablets by mouth daily as needed        sodium  bicarbonate 650 MG tablet Take 650 mg by mouth 2 times daily        torsemide (DEMADEX) 20 MG tablet Take 1 tablet (20 mg) by mouth daily Take additional 20mg torsemide (1 tablet) for weight gain > 2 pounds overnight. 100 tablet 0     Vitamin D, Cholecalciferol, 25 MCG (1000 UT) TABS 1 tablet twice daily         ALLERGIES     Allergies   Allergen Reactions     Oxybutynin Itching     Seasonal Allergies        PAST MEDICAL HISTORY:  Past Medical History:   Diagnosis Date     Atrial fibrillation (H) new 10/19     [place] (hard of hearing)     wears hearing aids     Hyperlipidaemia      Hyperlipidaemia LDL goal < 130      Hypertension      Hypothyroid      PAD (peripheral artery disease) (H)      Renal insufficiency, mild      Uncontrolled hypertension 10/14/2019       PAST SURGICAL HISTORY:  Past Surgical History:   Procedure Laterality Date     APPENDECTOMY       BONE MARROW BIOPSY, BONE SPECIMEN, NEEDLE/TROCAR N/A 2020    Procedure: bone marrow biopsy;  Surgeon: Iris Cameron MD;  Location:  GI     CATARACT IOL, RT/LT      bilateral (laser - 2013)     DENTAL SURGERY      wis     ESOPHAGOSCOPY, GASTROSCOPY, DUODENOSCOPY (EGD), COMBINED N/A 2020    Procedure: ESOPHAGOGASTRODUODENOSCOPY, WITH BIOPSY;  Surgeon: Humberto Bailon MD;  Location:  GI     EYE SURGERY      Muscle release     TUBAL LIGATION         FAMILY HISTORY:  Family History   Problem Relation Age of Onset     Heart Disease Mother      Respiratory Mother      Heart Disease Father      Heart Disease Brother      Coronary Artery Disease Brother         CAB     Heart Disease Brother      Coronary Artery Disease Brother         CAB       SOCIAL HISTORY:  Social History     Socioeconomic History     Marital status:    Tobacco Use     Smoking status: Former Smoker     Packs/day: 0.50     Years: 5.00     Pack years: 2.50     Types: Cigarettes     Quit date: 1973     Years since quittin.7     Smokeless  tobacco: Never Used   Substance and Sexual Activity     Alcohol use: Not Currently     Alcohol/week: 0.0 standard drinks     Comment: one light beer of Fridays occ     Drug use: No     Sexual activity: Not Currently     Partners: Male   Other Topics Concern     Parent/sibling w/ CABG, MI or angioplasty before 65F 55M? No       Review of Systems:  Skin:          Eyes:         ENT:         Respiratory:          Cardiovascular:         Gastroenterology:        Genitourinary:         Musculoskeletal:         Neurologic:         Psychiatric:         Heme/Lymph/Imm:         Endocrine:           Physical Exam:  Vitals: There were no vitals taken for this visit.    Constitutional:  cooperative, alert and oriented, well developed, well nourished, in no acute distress        Skin:  warm and dry to the touch, no apparent skin lesions or masses noted          Head:  normocephalic, no masses or lesions        Eyes:  pupils equal and round        Lymph:      ENT:  no pallor or cyanosis        Neck:           Respiratory:  clear to auscultation         Cardiac: regular rhythm                                                         GI:  abdomen soft        Extremities and Muscular Skeletal:  no edema              Neurological:           Psych:  Alert and Oriented x 3        CC  Elle Mariano, APRN CNP  [address]  [place] [address]    Thank you for allowing me to participate in the care of your patient.      Sincerely,     Seema Fuentes MD     [facility]

## 2022-10-03 NOTE — TELEPHONE ENCOUNTER
Prescription approved per [employer] Refill Protocol.    Tasha Rodriguez, RN BSN MSN  [facility]

## 2022-10-10 NOTE — TELEPHONE ENCOUNTER
Received refill request for:  Torsemide    [facility] Cardiology Refill Guideline reviewed.  Medication meets criteria for refill.    Kaita Denny RN, BSN  10/10/22 at 1:25 PM

## 2022-11-03 NOTE — PROGRESS NOTES
Medication Therapy Management (MTM) Encounter    ASSESSMENT:                            Medication Adherence/Access: No issues identified    Fatigue: If medication related - could be from clonidine, carvedilol or mirtazapine.  Given that she's stopped most of her activities, I do question if uncontrolled depression symptoms are contributing as well, she does not feel this is the case.  Agree with plan to reduce clonidine to see if symptoms improve.    Failure to Thrive: I agree that mirtazapine is good to have on board for appetite stimulation and mood.  Could even consider increasing the dose given that she's sleeping well.  It also sounds as though she's not eating a very balanced diet - may benefit from trying to drink Ensure daily.  This has also been recommended by other team members.    PLAN:                            1.  Recommended trying to have one Ensure per day.  2.  Could consider increasing mirtazapine to 30mg at bedtime at next visit.    Follow-up: Return in about 4 weeks (around 12/1/2022) for Follow-up Medication Review.    SUBJECTIVE/OBJECTIVE:                          Ghislaine Walls is a 87 year old female called for a follow-up visit. Patient was accompanied by her daughter, Krupa, for our visit today. Today's visit is a follow-up MTM visit from 2/16/2022.     Reason for visit: Wondering if medications are contributing to fatigue.    Tobacco: She reports that she quit smoking about 49 years ago. Her smoking use included cigarettes. She has a 2.50 pack-year smoking history. She has never used smokeless tobacco.  Alcohol: Less than 1 beverage / month    Medication Adherence/Access: no issues reported    Fatigue:  Has been very fatigued for the last 1-2 months - stopped going to exercise classes, stopped going out much.   They report she also has long-COVID.  Nephrology did suggest reducing clonidine to 0.1mg at bedtime instead of twice daily, this change was just made in the last 1-2 days so too  soon for her to say if it's been helpful.  She reports sleeping well at night, naps during the day which she finds helpful.     Failure to Thrive:  Ghislaine is taking mirtazapine 15mg at bedtime - prescribed due to poor appetite and for mood.  Ghislaine reports her mood is fine, doesn't feel she has any symptoms of depression or anxiety.  Mirtazapine has helped to increase appetite.  She's supposed to drink Ensure, but rarely does despite having this at home and frequent reminders from Krupa.  Wt Readings from Last 4 Encounters:   09/28/22 130 lb (59 kg)   06/27/22 130 lb (59 kg)   03/22/22 132 lb (59.9 kg)   02/03/22 130 lb (59 kg)       Today's Vitals: There were no vitals taken for this visit.  ----------------    I spent 47 minutes with this patient today. A copy of the visit note was provided to the patient's provider(s).    The patient declined a summary of these recommendations.     Julissa Baptiste, PharmD, [facility]  Medication Therapy Management Provider  Pager: [phone]     Telemedicine Visit Details  Type of service:  Telephone visit  Start Time: 3:32 PM  End Time: 4:19 PM  Originating Location (pt. Location): Home      Distant Location (provider location):  Off-site  Provider has received verbal consent for a visit from the patient? Yes     Medication Therapy Recommendations  No medication therapy recommendations to display

## 2022-11-04 NOTE — TELEPHONE ENCOUNTER
Routing refill request to provider for review/approval because:  Labs out of range:    BP Readings from Last 3 Encounters:   09/28/22 (!) 142/59   06/27/22 (!) 157/72   03/22/22 136/60      Elenita Martínez RN

## 2022-11-08 NOTE — PATIENT INSTRUCTIONS
"Recommendations from today's MTM visit:                                                    MTM (medication therapy management) is a service provided by a clinical pharmacist designed to help you get the most of out of your medicines.   Today we reviewed what your medicines are for, how to know if they are working, that your medicines are safe and how to make your medicine regimen as easy as possible.      Try to drink 1 Ensure per day    Follow-up: Return in about 4 weeks (around 12/1/2022) for Follow-up Medication Review.    It was great speaking with you today.  I value your experience and would be very thankful for your time in providing feedback in our clinic survey. In the next few days, you may receive an email or text message from [employer] with a link to a survey related to your  clinical pharmacist.\"     To schedule another MTM appointment, please call the clinic directly or you may call the MTM scheduling line at [phone] or toll-free at [phone].     My Clinical Pharmacist's contact information:                                                      Please feel free to contact me with any questions or concerns you have.      Julissa Baptiste, [place], [facility]  Medication Therapy Management Provider  Pager: [phone]    "

## 2022-11-14 NOTE — TELEPHONE ENCOUNTER
Routing refill request to provider for review/approval because:  BP out of range:        BP Readings from Last 3 Encounters:   09/28/22 (!) 142/59   06/27/22 (!) 157/72   03/22/22 136/60     Future Appointments 11/14/2022 - 5/13/2023      Date Visit Type Length Department Provider     12/1/2022  3:00 PM RETURN - MTM 60 min Julissa Mccallum, PharmD

## 2022-11-22 NOTE — TELEPHONE ENCOUNTER
"Last seen 9/28/22 by Dr Fuentes, no changes made to medications. Spoke to to patient's daughter, says patient is down 8lbs in the last few weeks but 3lbs in 1 day. She weighs herself first thing when she gets up after using the restroom. She does not check her BP regularly, daughter says it is usually high. No lightheadedness/dizziness. Krupa isn't sure what her fluid intake is like or her urine output but she is urinating frequently due to the torsemide. Dose has not changed recently. She has ongoing fatigue but it isn't necessarily worse. Daughter mentions her various comorbidities seem to contribute and she wonders if she has long-COVID.   Her appetite is \"not good\" per daughter, frequently discuss that she needs to eat more. Krupa says she probably eats only 1/3 of what she should.   She also notes she had constipation for 3-4 days last week but ultimately got relief so she wonders if that was part of it. She will fluctuate between diarrhea/constipation.     Krupa will check with patient again for specifics about her BP, fluid intake and urine output and call back. Recommended also following up with PCP about weight loss if her diet is poor. Routed to Dr Fuentes.       Addendum 1702: Per [facility], no protocols for holding diuretics for weight loss.  Spoke to patient directly, she says he drinks about 6 cups of liquids a day, usually water or some coffee. She says she pees a good amount, urine is light in color. She does not think she is dehydrated. She says she has stable edema in her hands and toes. No lightheadedness/dizziness. BP today was 164/64, HR 61. She thinks her weight loss is due to her appetite. She states she is very tired.  Recommended PCP follow up but will route to Dr Fuentes to review.   "

## 2022-11-22 NOTE — TELEPHONE ENCOUNTER
[facility] Call Center    Phone Message    May a detailed message be left on voicemail: yes     Reason for Call: Other: Pt daughter Krupa would like a call back asap as pt has lost 8 lbs over the course of a few weeks but 3 lbs was lost in one day      Action Taken: Message routed to:  [facility] Cardio    Travel Screening: Not Applicable

## 2022-11-22 NOTE — TELEPHONE ENCOUNTER
Reason for Call:  Other appointment    Detailed comments: Daughter, Krupa, called to schedule an annual and med check appointment, but soonest available was not until March of 2023. I suggested scheduling then, so that there would be an appointment in the books, but Krupa declined.    A call back is requested to discuss possibility of being seen sooner and to go over current concerns.    Phone Number Patient can be reached at: Other phone number:  [phone]    Best Time: Any time, but not too early.    Can we leave a detailed message on this number? YES    Call taken on 11/22/2022 at 2:15 PM by Phillip Hernandez

## 2022-11-23 NOTE — TELEPHONE ENCOUNTER
Wero, MD Florina Andrade Daria, RN; P Cs Triage Im  Caller: Unspecified (Yesterday,  2:14 PM)  OK to schedule virtual visit with me next Monday or in clinic appointment later next week. OK to use next day or same day appointments.        Called Krupa on C2C and scheduled     Appointments in Next Year    Dec 01, 2022  3:00 PM  Pharmacist Visit with Julissa Baptiste PharmD  [facility] ) [phone]   Dec 02, 2022  3:30 PM  (Arrive by 3:10 PM)  Provider Visit with Laurie Conteh MD  [facility] ) [phone]        Garcia Chavarria RN  [facility]

## 2022-11-23 NOTE — TELEPHONE ENCOUNTER
Spoke to patient's daughter, Krupa, and reviewed message from Dr Fuentes. She verbalized understanding and will call for an appointment. Recommended calling cardiology back if the patient starts to have low BP, lightheadedness/dizziness or signs of dehydration.

## 2022-11-23 NOTE — TELEPHONE ENCOUNTER
I agree with decreasing the clonidine as suggested by the pharmacy team and reviewing the weight loss with her PCP for now. Thanks.

## 2022-11-23 NOTE — TELEPHONE ENCOUNTER
Daughter called back to follow up on the message she gave yesterday. Daughter stated that the patient has seen and been in communication regarding symptoms that she has been having (losing weight, no appetite, and fatigue). Daughter stated that the cardiologist does not believe it is cardiology related and is recommending she sees her PCP regarding this. Please review and advise. Do you want patient to have virtual or in person and is same day okay to use?    [facility] Triage Team

## 2022-12-01 NOTE — PROGRESS NOTES
Medication Therapy Management (MTM) Encounter    ASSESSMENT:                            Medication Adherence/Access: No issues identified    Fatigue: Plan in place for further evaluation, which I agree is the next step.  May benefit from more consistent intake of Ensure.    PLAN:                            Encouraged Ghislaine to follow-up with Dr. Conteh as planned tomorrow.  I suspect lab work will be done, will review after those have resulted.    Follow-up: Return in about 1 day (around 12/2/2022) for Physical Exam, Lab Work with Dr. Conteh.    SUBJECTIVE/OBJECTIVE:                          Ghislaine Walls is a 87 year old female called for a follow-up visit. Patient was accompanied by her daughter, Krupa. Today's visit is a follow-up MTM visit from 11/3/2022.     Reason for visit: Follow-up on fatigue.    Tobacco: She reports that she quit smoking about 49 years ago. Her smoking use included cigarettes. She has a 2.50 pack-year smoking history. She has never used smokeless tobacco.  Alcohol: Less than 1 beverage / month    Medication Adherence/Access: no issues reported    Fatigue:  Ghislaine and Krupa report that fatigue remains about the same.  At our last visit, we set a plan of trying to drink one Ensure per day - she reports she's drinking 2-3 per week, Krupa indicates this is closer to 1-1.5 per week.  She did reduce clonidine to 0.1mg at bedtime as discussed at our last visit (approved by nephrology), she doesn't think this has made any difference in her symptoms.  She has had some additional weight loss (although rebounded a bit in the last few days).  She's eating more meals at [employer] which she's found helpful for socializing, but Krupa expresses concern about the sodium content.  She does have an appt scheduled with Dr. Conteh tomorrow for further evaluation of fatigue and weight loss.    Home weight reported at 124lbs, was 122lbs last week  Wt Readings from Last 4 Encounters:   09/28/22 130 lb (59 kg)    06/27/22 130 lb (59 kg)   03/22/22 132 lb (59.9 kg)   02/03/22 130 lb (59 kg)       Today's Vitals: There were no vitals taken for this visit.  ----------------    I spent 18 minutes with this patient today. A copy of the visit note was provided to the patient's provider(s).    A summary of these recommendations was declined by the patient.    Lien McelroyD, [facility]  Medication Therapy Management Provider  Pager: [phone]     Telemedicine Visit Details  Type of service:  Telephone visit  Start Time: 3:00 PM  End Time: 3:18 PM  Originating Location (pt. Location): Home      Distant Location (provider location):  Off-site  Provider has received verbal consent for a visit from the patient? Yes     Medication Therapy Recommendations  No medication therapy recommendations to display

## 2022-12-01 NOTE — PATIENT INSTRUCTIONS
"Recommendations from today's MTM visit:                                                    MTM (medication therapy management) is a service provided by a clinical pharmacist designed to help you get the most of out of your medicines.   Today we reviewed what your medicines are for, how to know if they are working, that your medicines are safe and how to make your medicine regimen as easy as possible.      Please follow-up with Dr. Conteh as planned tomorrow.    Follow-up: Return in about 1 day (around 12/2/2022) for Physical Exam, Lab Work with Dr. Conteh.    It was great speaking with you today.  I value your experience and would be very thankful for your time in providing feedback in our clinic survey. In the next few days, you may receive an email or text message from [employer] with a link to a survey related to your  clinical pharmacist.\"     To schedule another MTM appointment, please call the clinic directly or you may call the MTM scheduling line at [phone] or toll-free at [phone].     My Clinical Pharmacist's contact information:                                                      Please feel free to contact me with any questions or concerns you have.      Julissa Baptiste, [place], BCACP  Medication Therapy Management Provider  Pager: [phone]    "

## 2022-12-07 NOTE — PROGRESS NOTES
Subjective   Ghislaine is a 87 year old accompanied by her daughter, presenting for the following health issues:    HPI       Chief Complaint:     Chronic weakness, weight loss, poor appetite    HPI:   Patient Ghislaine Walls is a very pleasant 87 year old female with history of CKD who presents to Internal Medicine clinic today brought in by her daughter from home for evaluation of multiple concerns including chronic weakness, weight loss, poor appetite. No chest pain, headaches, fever or chills at this time.     Current Medications:     Current Outpatient Medications   Medication Sig Dispense Refill     Abatacept (ORENCIA IV) Inject 500 mg into the vein every 30 days       ACE/ARB/ARNI NOT PRESCRIBED (INTENTIONAL) Please choose reason not prescribed from choices below.       acetaminophen (TYLENOL) 650 MG CR tablet Take 650-1,300 mg by mouth every 8 hours as needed for mild pain or fever       allopurinol (ZYLOPRIM) 100 MG tablet Take 1 tablet (100 mg) by mouth daily 90 tablet 1     amLODIPine (NORVASC) 10 MG tablet Take 1 tablet by mouth daily. 90 tablet 3     calcium citrate-vitamin D (CITRACAL) 315-250 MG-UNIT TABS per tablet Take 1 tablet by mouth 2 times daily       carvedilol (COREG) 6.25 MG tablet Take 1 tablet (6.25 mg) by mouth 2 times daily (with meals) 180 tablet 0     cloNIDine (CATAPRES) 0.1 MG tablet Take 1 tablet (0.1 mg) by mouth 2 times daily (Patient taking differently: Take 0.1 mg by mouth At Bedtime) 180 tablet 0     Darbepoetin Alonso-Albumin (ARANESP IJ) Inject 10 mcg as directed Monthly as needed       dronabinol (MARINOL) 2.5 MG capsule Take 1 capsule (2.5 mg) by mouth 2 times daily (before meals) 60 capsule 2     ELIQUIS ANTICOAGULANT 2.5 MG tablet Take 1 tablet by mouth 2 times daily. 180 tablet 0     ferrous sulfate (FEROSUL) 325 (65 Fe) MG tablet Take 325 mg by mouth every other day       hydrALAZINE (APRESOLINE) 100 MG tablet Take 1 tablet by mouth 4 times daily. 360 tablet 0      hydroxychloroquine (PLAQUENIL) 200 MG tablet Take 200 mg by mouth daily       isosorbide dinitrate (ISORDIL) 20 MG tablet Take 1 tablet (20 mg) by mouth 3 times daily. 270 tablet 0     levothyroxine (SYNTHROID/LEVOTHROID) 25 MCG tablet Take 1 tablet by mouth daily. 90 tablet 3     mirtazapine (REMERON) 15 MG tablet Take 1 tablet by mouth daily At Bedtime. 90 tablet 3     ondansetron (ZOFRAN-ODT) 4 MG ODT tab Take 4 mg by mouth every 8 hours as needed for nausea       pantoprazole (PROTONIX) 40 MG EC tablet Take 1 tablet by mouth daily. 90 tablet 0     predniSONE (DELTASONE) 5 MG tablet Take 1 tablet (5 mg) by mouth daily 30 tablet 0     senna-docusate (SENOKOT-S/PERICOLACE) 8.6-50 MG tablet Take 2 tablets by mouth daily       sodium bicarbonate 650 MG tablet Take 650 mg by mouth 2 times daily        torsemide (DEMADEX) 20 MG tablet Take 1 tablet (20 mg) by mouth daily Take additional 20mg torsemide (1 tablet) for weight gain > 2 pounds overnight. 100 tablet 3     Vitamin D (Cholecalciferol) 25 MCG (1000 UT) TABS 1 tablet twice daily           Allergies:      Allergies   Allergen Reactions     Oxybutynin Itching     Seasonal Allergies             Past Medical History:     Past Medical History:   Diagnosis Date     Atrial fibrillation (H) new 10/19     [place] (hard of hearing)     wears hearing aids     Hyperlipidaemia      Hyperlipidaemia LDL goal < 130      Hypertension      Hypothyroid      PAD (peripheral artery disease) (H)      Renal insufficiency, mild      Uncontrolled hypertension 10/14/2019         Past Surgical History:     Past Surgical History:   Procedure Laterality Date     APPENDECTOMY  1951     BONE MARROW BIOPSY, BONE SPECIMEN, NEEDLE/TROCAR N/A 7/31/2020    Procedure: bone marrow biopsy;  Surgeon: Iris Cameron MD;  Location:  GI     CATARACT IOL, RT/LT  2001    bilateral (laser - 5/2013)     DENTAL SURGERY  1962    [place]     ESOPHAGOSCOPY, GASTROSCOPY, DUODENOSCOPY (EGD), COMBINED N/A 7/24/2020     Procedure: ESOPHAGOGASTRODUODENOSCOPY, WITH BIOPSY;  Surgeon: Humberto Bailon MD;  Location:  GI     EYE SURGERY      Muscle release     TUBAL LIGATION           Family Medical History:     Family History   Problem Relation Age of Onset     Heart Disease Mother      Respiratory Mother      Heart Disease Father      Heart Disease Brother      Coronary Artery Disease Brother         CAB     Heart Disease Brother      Coronary Artery Disease Brother         CAB         Social History:     Social History     Socioeconomic History     Marital status:      Spouse name: Not on file     Number of children: Not on file     Years of education: Not on file     Highest education level: Not on file   Occupational History     Not on file   Tobacco Use     Smoking status: Former     Packs/day: 0.50     Years: 5.00     Pack years: 2.50     Types: Cigarettes     Quit date: 1973     Years since quittin.9     Smokeless tobacco: Never   Substance and Sexual Activity     Alcohol use: Yes     Comment: one light beer of  occ     Drug use: No     Sexual activity: Not Currently     Partners: Male   Other Topics Concern     Parent/sibling w/ CABG, MI or angioplasty before 65F 55M? No   Social History Narrative     Not on file     Social Determinants of Health     Financial Resource Strain: Not on file   Food Insecurity: Not on file   Transportation Needs: Not on file   Physical Activity: Not on file   Stress: Not on file   Social Connections: Not on file   Intimate Partner Violence: Not on file   Housing Stability: Not on file           Review of System:     Constitutional: Negative for fever or chills, positive for weight loss  Skin: Negative for rashes  Ears/Nose/Throat: Negative for nasal congestion, sore throat  Respiratory: No shortness of breath, dyspnea on exertion, cough, or hemoptysis  Cardiovascular: Negative for chest pain  Gastrointestinal: Negative for nausea, vomiting, positive for  "chronic poor appetite  Genitourinary: Negative for dysuria, hematuria, positive for CKD  Musculoskeletal: positive for diffuse weakness  Neurologic: Negative for headaches  Psychiatric: Negative for depression, anxiety  Hematologic/Lymphatic/Immunologic: Negative  Endocrine: Negative  Behavioral: Negative for tobacco use       Physical Exam:   BP (!) 155/66 (BP Location: Right arm, Patient Position: Sitting, Cuff Size: Adult Regular)   Pulse 60   Temp 97.7  F (36.5  C) (Temporal)   Resp 16   Ht 1.549 m (5' 1\")   Wt 54.9 kg (121 lb)   SpO2 97%   BMI 22.86 kg/m      GENERAL: chronically ill appearing elderly female, alert and no acute distress  EYES: eyes grossly normal to inspection, and conjunctivae and sclerae normal  HENT: Normocephalic atraumatic. Nose and mouth without ulcers or lesions  NECK: supple  RESP: lungs clear to auscultation   CV: regular rate and rhythm  LYMPH: no peripheral edema   ABDOMEN: nondistended  MS: patient is utilizing a wheelchair to help with transportation   SKIN: no suspicious lesions or rashes  NEURO: Alert & Oriented x 3.   PSYCH: mentation appears normal, affect normal        Diagnostic Test Results:     Diagnostic Test Results:  Labs reviewed in Epic    ASSESSMENT/PLAN:       Ghislaine was seen today for fatigue and weight loss.    Diagnoses and all orders for this visit:  Chronic fatigue  Failure to thrive in adult  Weight loss  Poor appetite  -     REVIEW OF HEALTH MAINTENANCE PROTOCOL ORDERS  -     dronabinol (MARINOL) 2.5 MG capsule; Take 1 capsule (2.5 mg) by mouth 2 times daily (before meals)    Chronic obstructive pulmonary disease, unspecified COPD type (H)  - stable, continue current therapy    CKD (chronic kidney disease) stage 4, GFR 15-29 ml/min (H)  - continue follow up with nephrology clinic going forward      Follow Up Plan:     Patient is instructed to return to Internal Medicine clinic for follow-up visit in 1 month.        Laurie Conteh MD  Internal " [facility]

## 2023-01-01 ENCOUNTER — APPOINTMENT (OUTPATIENT)
Dept: SPEECH THERAPY | Facility: CLINIC | Age: 88
DRG: 177 | End: 2023-01-01
Payer: MEDICARE

## 2023-01-01 ENCOUNTER — OFFICE VISIT (OUTPATIENT)
Dept: CARDIOLOGY | Facility: CLINIC | Age: 88
End: 2023-01-01
Attending: INTERNAL MEDICINE
Payer: MEDICARE

## 2023-01-01 ENCOUNTER — TRANSFERRED RECORDS (OUTPATIENT)
Dept: HEALTH INFORMATION MANAGEMENT | Facility: CLINIC | Age: 88
End: 2023-01-01
Payer: MEDICARE

## 2023-01-01 ENCOUNTER — APPOINTMENT (OUTPATIENT)
Dept: PHYSICAL THERAPY | Facility: CLINIC | Age: 88
DRG: 177 | End: 2023-01-01
Payer: MEDICARE

## 2023-01-01 ENCOUNTER — APPOINTMENT (OUTPATIENT)
Dept: GENERAL RADIOLOGY | Facility: CLINIC | Age: 88
DRG: 177 | End: 2023-01-01
Attending: INTERNAL MEDICINE
Payer: MEDICARE

## 2023-01-01 ENCOUNTER — APPOINTMENT (OUTPATIENT)
Dept: SPEECH THERAPY | Facility: CLINIC | Age: 88
DRG: 177 | End: 2023-01-01
Attending: INTERNAL MEDICINE
Payer: MEDICARE

## 2023-01-01 ENCOUNTER — LAB (OUTPATIENT)
Dept: LAB | Facility: CLINIC | Age: 88
End: 2023-01-01
Payer: MEDICARE

## 2023-01-01 ENCOUNTER — ANCILLARY PROCEDURE (OUTPATIENT)
Dept: GENERAL RADIOLOGY | Facility: CLINIC | Age: 88
End: 2023-01-01
Attending: INTERNAL MEDICINE
Payer: MEDICARE

## 2023-01-01 ENCOUNTER — TELEPHONE (OUTPATIENT)
Dept: FAMILY MEDICINE | Facility: CLINIC | Age: 88
End: 2023-01-01
Payer: MEDICARE

## 2023-01-01 ENCOUNTER — APPOINTMENT (OUTPATIENT)
Dept: GENERAL RADIOLOGY | Facility: CLINIC | Age: 88
DRG: 177 | End: 2023-01-01
Attending: HOSPITALIST
Payer: MEDICARE

## 2023-01-01 ENCOUNTER — HOSPITAL ENCOUNTER (OUTPATIENT)
Dept: CARDIOLOGY | Facility: CLINIC | Age: 88
Discharge: HOME | End: 2023-03-31
Attending: INTERNAL MEDICINE | Admitting: INTERNAL MEDICINE
Payer: MEDICARE

## 2023-01-01 ENCOUNTER — TRANSITIONAL CARE UNIT VISIT (OUTPATIENT)
Dept: GERIATRICS | Facility: CLINIC | Age: 88
End: 2023-01-01
Payer: MEDICARE

## 2023-01-01 ENCOUNTER — VIRTUAL VISIT (OUTPATIENT)
Dept: FAMILY MEDICINE | Facility: CLINIC | Age: 88
End: 2023-01-01
Payer: MEDICARE

## 2023-01-01 ENCOUNTER — LAB REQUISITION (OUTPATIENT)
Dept: LAB | Facility: CLINIC | Age: 88
End: 2023-01-01
Payer: OTHER MISCELLANEOUS

## 2023-01-01 ENCOUNTER — OFFICE VISIT (OUTPATIENT)
Dept: URGENT CARE | Facility: URGENT CARE | Age: 88
End: 2023-01-01
Payer: MEDICARE

## 2023-01-01 ENCOUNTER — HOSPITAL ENCOUNTER (INPATIENT)
Facility: CLINIC | Age: 88
LOS: 15 days | Discharge: HOSPICE | DRG: 177 | End: 2023-06-15
Attending: EMERGENCY MEDICINE | Admitting: INTERNAL MEDICINE
Payer: MEDICARE

## 2023-01-01 ENCOUNTER — APPOINTMENT (OUTPATIENT)
Dept: CT IMAGING | Facility: CLINIC | Age: 88
DRG: 177 | End: 2023-01-01
Attending: EMERGENCY MEDICINE
Payer: MEDICARE

## 2023-01-01 ENCOUNTER — APPOINTMENT (OUTPATIENT)
Dept: GENERAL RADIOLOGY | Facility: CLINIC | Age: 88
DRG: 177 | End: 2023-01-01
Attending: PHYSICIAN ASSISTANT
Payer: MEDICARE

## 2023-01-01 ENCOUNTER — DOCUMENTATION ONLY (OUTPATIENT)
Dept: OTHER | Facility: CLINIC | Age: 88
End: 2023-01-01
Payer: MEDICARE

## 2023-01-01 ENCOUNTER — APPOINTMENT (OUTPATIENT)
Dept: CARDIOLOGY | Facility: CLINIC | Age: 88
DRG: 177 | End: 2023-01-01
Attending: HOSPITALIST
Payer: MEDICARE

## 2023-01-01 ENCOUNTER — TELEPHONE (OUTPATIENT)
Dept: PHARMACY | Facility: CLINIC | Age: 88
End: 2023-01-01
Payer: MEDICARE

## 2023-01-01 ENCOUNTER — APPOINTMENT (OUTPATIENT)
Dept: GENERAL RADIOLOGY | Facility: CLINIC | Age: 88
DRG: 177 | End: 2023-01-01
Attending: EMERGENCY MEDICINE
Payer: MEDICARE

## 2023-01-01 ENCOUNTER — APPOINTMENT (OUTPATIENT)
Dept: ULTRASOUND IMAGING | Facility: CLINIC | Age: 88
DRG: 177 | End: 2023-01-01
Attending: INTERNAL MEDICINE
Payer: MEDICARE

## 2023-01-01 ENCOUNTER — OFFICE VISIT (OUTPATIENT)
Dept: FAMILY MEDICINE | Facility: CLINIC | Age: 88
End: 2023-01-01
Payer: MEDICARE

## 2023-01-01 ENCOUNTER — OFFICE VISIT (OUTPATIENT)
Dept: CARDIOLOGY | Facility: CLINIC | Age: 88
End: 2023-01-01
Attending: NURSE PRACTITIONER
Payer: MEDICARE

## 2023-01-01 ENCOUNTER — TRANSITIONAL CARE UNIT VISIT (OUTPATIENT)
Dept: GERIATRICS | Facility: CLINIC | Age: 88
End: 2023-01-01
Payer: OTHER MISCELLANEOUS

## 2023-01-01 ENCOUNTER — TRANSFERRED RECORDS (OUTPATIENT)
Dept: FAMILY MEDICINE | Facility: CLINIC | Age: 88
End: 2023-01-01

## 2023-01-01 ENCOUNTER — DISCHARGE SUMMARY NURSING HOME (OUTPATIENT)
Dept: GERIATRICS | Facility: CLINIC | Age: 88
End: 2023-01-01
Payer: MEDICARE

## 2023-01-01 ENCOUNTER — TELEPHONE (OUTPATIENT)
Dept: FAMILY MEDICINE | Facility: CLINIC | Age: 88
End: 2023-01-01

## 2023-01-01 ENCOUNTER — TELEPHONE (OUTPATIENT)
Dept: CARDIOLOGY | Facility: CLINIC | Age: 88
End: 2023-01-01
Payer: MEDICARE

## 2023-01-01 VITALS
WEIGHT: 118 LBS | HEART RATE: 60 BPM | BODY MASS INDEX: 22.28 KG/M2 | HEIGHT: 61 IN | SYSTOLIC BLOOD PRESSURE: 160 MMHG | OXYGEN SATURATION: 98 % | DIASTOLIC BLOOD PRESSURE: 64 MMHG

## 2023-01-01 VITALS
BODY MASS INDEX: 25.26 KG/M2 | TEMPERATURE: 98.6 F | RESPIRATION RATE: 16 BRPM | OXYGEN SATURATION: 94 % | DIASTOLIC BLOOD PRESSURE: 86 MMHG | HEART RATE: 109 BPM | WEIGHT: 133.7 LBS | SYSTOLIC BLOOD PRESSURE: 156 MMHG

## 2023-01-01 VITALS
DIASTOLIC BLOOD PRESSURE: 69 MMHG | SYSTOLIC BLOOD PRESSURE: 164 MMHG | TEMPERATURE: 98.3 F | OXYGEN SATURATION: 97 % | HEART RATE: 62 BPM

## 2023-01-01 VITALS
TEMPERATURE: 98.2 F | HEART RATE: 61 BPM | OXYGEN SATURATION: 98 % | SYSTOLIC BLOOD PRESSURE: 149 MMHG | RESPIRATION RATE: 14 BRPM | BODY MASS INDEX: 21.9 KG/M2 | WEIGHT: 116 LBS | HEIGHT: 61 IN | DIASTOLIC BLOOD PRESSURE: 69 MMHG

## 2023-01-01 VITALS
DIASTOLIC BLOOD PRESSURE: 77 MMHG | TEMPERATURE: 97.6 F | BODY MASS INDEX: 21.92 KG/M2 | SYSTOLIC BLOOD PRESSURE: 115 MMHG | RESPIRATION RATE: 18 BRPM | HEART RATE: 92 BPM | OXYGEN SATURATION: 92 % | HEIGHT: 61 IN

## 2023-01-01 VITALS
DIASTOLIC BLOOD PRESSURE: 63 MMHG | HEIGHT: 61 IN | HEART RATE: 110 BPM | WEIGHT: 116 LBS | TEMPERATURE: 98 F | BODY MASS INDEX: 21.9 KG/M2 | SYSTOLIC BLOOD PRESSURE: 122 MMHG | RESPIRATION RATE: 22 BRPM | OXYGEN SATURATION: 94 %

## 2023-01-01 VITALS
SYSTOLIC BLOOD PRESSURE: 100 MMHG | OXYGEN SATURATION: 92 % | HEART RATE: 69 BPM | WEIGHT: 116 LBS | TEMPERATURE: 97.3 F | BODY MASS INDEX: 21.9 KG/M2 | RESPIRATION RATE: 18 BRPM | DIASTOLIC BLOOD PRESSURE: 74 MMHG | HEIGHT: 61 IN

## 2023-01-01 VITALS
OXYGEN SATURATION: 98 % | DIASTOLIC BLOOD PRESSURE: 83 MMHG | HEART RATE: 61 BPM | SYSTOLIC BLOOD PRESSURE: 183 MMHG | TEMPERATURE: 98.2 F | WEIGHT: 116 LBS | BODY MASS INDEX: 21.92 KG/M2

## 2023-01-01 DIAGNOSIS — I48.0 PAROXYSMAL ATRIAL FIBRILLATION (H): ICD-10-CM

## 2023-01-01 DIAGNOSIS — I50.32 CHRONIC HEART FAILURE WITH PRESERVED EJECTION FRACTION (H): ICD-10-CM

## 2023-01-01 DIAGNOSIS — E86.0 DEHYDRATION: ICD-10-CM

## 2023-01-01 DIAGNOSIS — M06.9 RHEUMATOID ARTHRITIS, INVOLVING UNSPECIFIED SITE, UNSPECIFIED WHETHER RHEUMATOID FACTOR PRESENT (H): ICD-10-CM

## 2023-01-01 DIAGNOSIS — N18.4 STAGE 4 CHRONIC KIDNEY DISEASE (H): ICD-10-CM

## 2023-01-01 DIAGNOSIS — I50.9 ACUTE ON CHRONIC CONGESTIVE HEART FAILURE, UNSPECIFIED HEART FAILURE TYPE (H): ICD-10-CM

## 2023-01-01 DIAGNOSIS — R62.7 FAILURE TO THRIVE IN ADULT: ICD-10-CM

## 2023-01-01 DIAGNOSIS — I10 HYPERTENSION GOAL BP (BLOOD PRESSURE) < 140/90: Chronic | ICD-10-CM

## 2023-01-01 DIAGNOSIS — R63.4 WEIGHT LOSS: ICD-10-CM

## 2023-01-01 DIAGNOSIS — J44.9 CHRONIC OBSTRUCTIVE PULMONARY DISEASE, UNSPECIFIED COPD TYPE (H): ICD-10-CM

## 2023-01-01 DIAGNOSIS — N18.9 CHRONIC KIDNEY DISEASE, UNSPECIFIED CKD STAGE: ICD-10-CM

## 2023-01-01 DIAGNOSIS — N18.4 CHRONIC KIDNEY DISEASE, STAGE IV (SEVERE) (H): ICD-10-CM

## 2023-01-01 DIAGNOSIS — R62.7 ADULT FAILURE TO THRIVE: Primary | ICD-10-CM

## 2023-01-01 DIAGNOSIS — D63.1 ANEMIA OF CHRONIC RENAL FAILURE: ICD-10-CM

## 2023-01-01 DIAGNOSIS — J69.0 ASPIRATION PNEUMONIA, UNSPECIFIED ASPIRATION PNEUMONIA TYPE, UNSPECIFIED LATERALITY, UNSPECIFIED PART OF LUNG (H): ICD-10-CM

## 2023-01-01 DIAGNOSIS — N18.4 CKD (CHRONIC KIDNEY DISEASE) STAGE 4, GFR 15-29 ML/MIN (H): ICD-10-CM

## 2023-01-01 DIAGNOSIS — J44.9 CHRONIC OBSTRUCTIVE PULMONARY DISEASE, UNSPECIFIED COPD TYPE (H): Primary | ICD-10-CM

## 2023-01-01 DIAGNOSIS — I10 ESSENTIAL HYPERTENSION: Primary | ICD-10-CM

## 2023-01-01 DIAGNOSIS — D64.9 CHRONIC ANEMIA: ICD-10-CM

## 2023-01-01 DIAGNOSIS — D47.2 MGUS (MONOCLONAL GAMMOPATHY OF UNKNOWN SIGNIFICANCE): ICD-10-CM

## 2023-01-01 DIAGNOSIS — I10 ESSENTIAL HYPERTENSION: ICD-10-CM

## 2023-01-01 DIAGNOSIS — I10 HYPERTENSION GOAL BP (BLOOD PRESSURE) < 140/90: Primary | Chronic | ICD-10-CM

## 2023-01-01 DIAGNOSIS — N18.4 CHRONIC KIDNEY DISEASE, STAGE IV (SEVERE) (H): Primary | ICD-10-CM

## 2023-01-01 DIAGNOSIS — I50.33 ACUTE ON CHRONIC DIASTOLIC CONGESTIVE HEART FAILURE (H): ICD-10-CM

## 2023-01-01 DIAGNOSIS — R63.0 POOR APPETITE: ICD-10-CM

## 2023-01-01 DIAGNOSIS — Z51.5 HOSPICE CARE PATIENT: ICD-10-CM

## 2023-01-01 DIAGNOSIS — N25.81 SECONDARY HYPERPARATHYROIDISM OF RENAL ORIGIN (H): ICD-10-CM

## 2023-01-01 DIAGNOSIS — J90 BILATERAL PLEURAL EFFUSION: Primary | ICD-10-CM

## 2023-01-01 DIAGNOSIS — I50.33 ACUTE ON CHRONIC HEART FAILURE WITH PRESERVED EJECTION FRACTION (HFPEF) (H): ICD-10-CM

## 2023-01-01 DIAGNOSIS — N18.9 ANEMIA OF CHRONIC RENAL FAILURE: ICD-10-CM

## 2023-01-01 DIAGNOSIS — K21.00 GASTROESOPHAGEAL REFLUX DISEASE WITH ESOPHAGITIS, UNSPECIFIED WHETHER HEMORRHAGE: ICD-10-CM

## 2023-01-01 DIAGNOSIS — J96.01 ACUTE RESPIRATORY FAILURE WITH HYPOXIA (H): ICD-10-CM

## 2023-01-01 DIAGNOSIS — R63.0 POOR APPETITE: Primary | ICD-10-CM

## 2023-01-01 DIAGNOSIS — K59.00 CONSTIPATION, UNSPECIFIED CONSTIPATION TYPE: ICD-10-CM

## 2023-01-01 DIAGNOSIS — N17.9 ACUTE KIDNEY INJURY (H): ICD-10-CM

## 2023-01-01 DIAGNOSIS — M54.50 ACUTE MIDLINE LOW BACK PAIN WITHOUT SCIATICA: ICD-10-CM

## 2023-01-01 DIAGNOSIS — M54.50 ACUTE MIDLINE LOW BACK PAIN WITHOUT SCIATICA: Primary | ICD-10-CM

## 2023-01-01 DIAGNOSIS — M62.81 GENERALIZED MUSCLE WEAKNESS: ICD-10-CM

## 2023-01-01 DIAGNOSIS — R53.82 CHRONIC FATIGUE: ICD-10-CM

## 2023-01-01 DIAGNOSIS — Z00.01 ENCOUNTER FOR GENERAL ADULT MEDICAL EXAMINATION WITH ABNORMAL FINDINGS: ICD-10-CM

## 2023-01-01 DIAGNOSIS — M1A.9XX0 CHRONIC GOUT WITHOUT TOPHUS, UNSPECIFIED CAUSE, UNSPECIFIED SITE: ICD-10-CM

## 2023-01-01 DIAGNOSIS — R41.89 COGNITIVE IMPAIRMENT: ICD-10-CM

## 2023-01-01 DIAGNOSIS — T14.8XXA ABRASION: Primary | ICD-10-CM

## 2023-01-01 DIAGNOSIS — E03.9 HYPOTHYROIDISM, UNSPECIFIED TYPE: ICD-10-CM

## 2023-01-01 DIAGNOSIS — R62.7 FAILURE TO THRIVE IN ADULT: Primary | ICD-10-CM

## 2023-01-01 DIAGNOSIS — I48.0 PAF (PAROXYSMAL ATRIAL FIBRILLATION) (H): ICD-10-CM

## 2023-01-01 DIAGNOSIS — J69.0 ASPIRATION PNEUMONIA DUE TO REGURGITATED FOOD, UNSPECIFIED LATERALITY, UNSPECIFIED PART OF LUNG (H): ICD-10-CM

## 2023-01-01 LAB
% LINING CELLS, BODY FLUID: 2 %
ALBUMIN SERPL BCG-MCNC: 3.1 G/DL (ref 3.5–5.2)
ALBUMIN SERPL BCG-MCNC: 4 G/DL (ref 3.5–5.2)
ALBUMIN SERPL BCG-MCNC: 4 G/DL (ref 3.5–5.2)
ALBUMIN UR-MCNC: 100 MG/DL
ALP SERPL-CCNC: 41 U/L (ref 35–104)
ALP SERPL-CCNC: 49 U/L (ref 35–104)
ALT SERPL W P-5'-P-CCNC: 10 U/L (ref 10–35)
ALT SERPL W P-5'-P-CCNC: 15 U/L (ref 10–35)
ANION GAP SERPL CALCULATED.3IONS-SCNC: 10 MMOL/L (ref 7–15)
ANION GAP SERPL CALCULATED.3IONS-SCNC: 10 MMOL/L (ref 7–15)
ANION GAP SERPL CALCULATED.3IONS-SCNC: 11 MMOL/L (ref 7–15)
ANION GAP SERPL CALCULATED.3IONS-SCNC: 12 MMOL/L (ref 7–15)
ANION GAP SERPL CALCULATED.3IONS-SCNC: 13 MMOL/L (ref 7–15)
ANION GAP SERPL CALCULATED.3IONS-SCNC: 15 MMOL/L (ref 7–15)
APPEARANCE FLD: ABNORMAL
APPEARANCE UR: CLEAR
AST SERPL W P-5'-P-CCNC: 19 U/L (ref 10–35)
AST SERPL W P-5'-P-CCNC: 25 U/L (ref 10–35)
ATRIAL RATE - MUSE: 119 BPM
ATRIAL RATE - MUSE: 138 BPM
ATRIAL RATE - MUSE: 150 BPM
ATRIAL RATE - MUSE: 48 BPM
ATRIAL RATE - MUSE: 62 BPM
ATRIAL RATE - MUSE: 64 BPM
BACTERIA PLR CULT: NO GROWTH
BASE EXCESS BLDV CALC-SCNC: -0.1 MMOL/L (ref -7.7–1.9)
BASOPHILS # BLD AUTO: 0 10E3/UL (ref 0–0.2)
BASOPHILS # BLD MANUAL: 0 10E3/UL (ref 0–0.2)
BASOPHILS # BLD MANUAL: 0 10E3/UL (ref 0–0.2)
BASOPHILS NFR BLD AUTO: 0 %
BASOPHILS NFR BLD MANUAL: 0 %
BASOPHILS NFR BLD MANUAL: 1 %
BILIRUB DIRECT SERPL-MCNC: 0.23 MG/DL (ref 0–0.3)
BILIRUB SERPL-MCNC: 0.9 MG/DL
BILIRUB SERPL-MCNC: 1 MG/DL
BILIRUB UR QL STRIP: NEGATIVE
BUN SERPL-MCNC: 34.7 MG/DL (ref 8–23)
BUN SERPL-MCNC: 35.1 MG/DL (ref 8–23)
BUN SERPL-MCNC: 35.7 MG/DL (ref 8–23)
BUN SERPL-MCNC: 36.6 MG/DL (ref 8–23)
BUN SERPL-MCNC: 37.2 MG/DL (ref 8–23)
BUN SERPL-MCNC: 37.6 MG/DL (ref 8–23)
BUN SERPL-MCNC: 37.9 MG/DL (ref 8–23)
BUN SERPL-MCNC: 39.2 MG/DL (ref 8–23)
BUN SERPL-MCNC: 42.1 MG/DL (ref 8–23)
BUN SERPL-MCNC: 43.5 MG/DL (ref 8–23)
BUN SERPL-MCNC: 43.7 MG/DL (ref 8–23)
BUN SERPL-MCNC: 50.1 MG/DL (ref 8–23)
BUN SERPL-MCNC: 54.2 MG/DL (ref 8–23)
CALCIUM SERPL-MCNC: 10.1 MG/DL (ref 8.8–10.2)
CALCIUM SERPL-MCNC: 10.1 MG/DL (ref 8.8–10.2)
CALCIUM SERPL-MCNC: 10.2 MG/DL (ref 8.8–10.2)
CALCIUM SERPL-MCNC: 10.7 MG/DL (ref 8.8–10.2)
CALCIUM SERPL-MCNC: 11.9 MG/DL (ref 8.8–10.2)
CALCIUM SERPL-MCNC: 8.3 MG/DL (ref 8.8–10.2)
CALCIUM SERPL-MCNC: 8.5 MG/DL (ref 8.8–10.2)
CALCIUM SERPL-MCNC: 8.5 MG/DL (ref 8.8–10.2)
CALCIUM SERPL-MCNC: 8.6 MG/DL (ref 8.8–10.2)
CALCIUM SERPL-MCNC: 8.7 MG/DL (ref 8.8–10.2)
CALCIUM SERPL-MCNC: 8.8 MG/DL (ref 8.8–10.2)
CALCIUM SERPL-MCNC: 9.1 MG/DL (ref 8.8–10.2)
CALCIUM SERPL-MCNC: 9.7 MG/DL (ref 8.8–10.2)
CELL COUNT BODY FLUID SOURCE: ABNORMAL
CHLORIDE SERPL-SCNC: 103 MMOL/L (ref 98–107)
CHLORIDE SERPL-SCNC: 104 MMOL/L (ref 98–107)
CHLORIDE SERPL-SCNC: 105 MMOL/L (ref 98–107)
CHLORIDE SERPL-SCNC: 107 MMOL/L (ref 98–107)
CHLORIDE SERPL-SCNC: 107 MMOL/L (ref 98–107)
CHLORIDE SERPL-SCNC: 108 MMOL/L (ref 98–107)
CHLORIDE SERPL-SCNC: 109 MMOL/L (ref 98–107)
CHLORIDE SERPL-SCNC: 110 MMOL/L (ref 98–107)
CHLORIDE SERPL-SCNC: 111 MMOL/L (ref 98–107)
CHLORIDE SERPL-SCNC: 113 MMOL/L (ref 98–107)
CHLORIDE SERPL-SCNC: 114 MMOL/L (ref 98–107)
CHLORIDE SERPL-SCNC: 115 MMOL/L (ref 98–107)
CHLORIDE SERPL-SCNC: 116 MMOL/L (ref 98–107)
COLOR FLD: YELLOW
COLOR UR AUTO: ABNORMAL
CREAT SERPL-MCNC: 1.84 MG/DL (ref 0.51–0.95)
CREAT SERPL-MCNC: 2.01 MG/DL (ref 0.51–0.95)
CREAT SERPL-MCNC: 2.02 MG/DL (ref 0.51–0.95)
CREAT SERPL-MCNC: 2.06 MG/DL (ref 0.51–0.95)
CREAT SERPL-MCNC: 2.12 MG/DL (ref 0.51–0.95)
CREAT SERPL-MCNC: 2.2 MG/DL (ref 0.51–0.95)
CREAT SERPL-MCNC: 2.24 MG/DL (ref 0.51–0.95)
CREAT SERPL-MCNC: 2.26 MG/DL (ref 0.51–0.95)
CREAT SERPL-MCNC: 2.47 MG/DL (ref 0.51–0.95)
CREAT SERPL-MCNC: 2.67 MG/DL (ref 0.51–0.95)
CREAT SERPL-MCNC: 2.78 MG/DL (ref 0.51–0.95)
CREAT SERPL-MCNC: 2.91 MG/DL (ref 0.51–0.95)
CREAT SERPL-MCNC: 2.92 MG/DL (ref 0.51–0.95)
CRP SERPL-MCNC: 71.91 MG/L
DEPRECATED HCO3 PLAS-SCNC: 17 MMOL/L (ref 22–29)
DEPRECATED HCO3 PLAS-SCNC: 18 MMOL/L (ref 22–29)
DEPRECATED HCO3 PLAS-SCNC: 19 MMOL/L (ref 22–29)
DEPRECATED HCO3 PLAS-SCNC: 21 MMOL/L (ref 22–29)
DEPRECATED HCO3 PLAS-SCNC: 22 MMOL/L (ref 22–29)
DEPRECATED HCO3 PLAS-SCNC: 22 MMOL/L (ref 22–29)
DEPRECATED HCO3 PLAS-SCNC: 25 MMOL/L (ref 22–29)
DEPRECATED HCO3 PLAS-SCNC: 27 MMOL/L (ref 22–29)
DIASTOLIC BLOOD PRESSURE - MUSE: NORMAL MMHG
EOSINOPHIL # BLD AUTO: 0 10E3/UL (ref 0–0.7)
EOSINOPHIL # BLD MANUAL: 0 10E3/UL (ref 0–0.7)
EOSINOPHIL # BLD MANUAL: 0 10E3/UL (ref 0–0.7)
EOSINOPHIL NFR BLD AUTO: 0 %
EOSINOPHIL NFR BLD MANUAL: 0 %
EOSINOPHIL NFR BLD MANUAL: 0 %
ERYTHROCYTE [DISTWIDTH] IN BLOOD BY AUTOMATED COUNT: 15.4 % (ref 10–15)
ERYTHROCYTE [DISTWIDTH] IN BLOOD BY AUTOMATED COUNT: 15.4 % (ref 10–15)
ERYTHROCYTE [DISTWIDTH] IN BLOOD BY AUTOMATED COUNT: 15.8 % (ref 10–15)
ERYTHROCYTE [DISTWIDTH] IN BLOOD BY AUTOMATED COUNT: 15.9 % (ref 10–15)
ERYTHROCYTE [DISTWIDTH] IN BLOOD BY AUTOMATED COUNT: 15.9 % (ref 10–15)
ERYTHROCYTE [DISTWIDTH] IN BLOOD BY AUTOMATED COUNT: 16 % (ref 10–15)
ERYTHROCYTE [DISTWIDTH] IN BLOOD BY AUTOMATED COUNT: 16.1 % (ref 10–15)
FERRITIN SERPL-MCNC: 502 NG/ML (ref 11–328)
FLUAV RNA SPEC QL NAA+PROBE: NEGATIVE
FLUBV RNA RESP QL NAA+PROBE: NEGATIVE
GAMMA INTERFERON BACKGROUND BLD IA-ACNC: 0 IU/ML
GFR SERPL CREATININE-BSD FRML MDRD: 15 ML/MIN/1.73M2
GFR SERPL CREATININE-BSD FRML MDRD: 15 ML/MIN/1.73M2
GFR SERPL CREATININE-BSD FRML MDRD: 16 ML/MIN/1.73M2
GFR SERPL CREATININE-BSD FRML MDRD: 17 ML/MIN/1.73M2
GFR SERPL CREATININE-BSD FRML MDRD: 18 ML/MIN/1.73M2
GFR SERPL CREATININE-BSD FRML MDRD: 21 ML/MIN/1.73M2
GFR SERPL CREATININE-BSD FRML MDRD: 22 ML/MIN/1.73M2
GFR SERPL CREATININE-BSD FRML MDRD: 23 ML/MIN/1.73M2
GFR SERPL CREATININE-BSD FRML MDRD: 23 ML/MIN/1.73M2
GFR SERPL CREATININE-BSD FRML MDRD: 24 ML/MIN/1.73M2
GFR SERPL CREATININE-BSD FRML MDRD: 26 ML/MIN/1.73M2
GLUCOSE BLDC GLUCOMTR-MCNC: 116 MG/DL (ref 70–99)
GLUCOSE BODY FLUID SOURCE: NORMAL
GLUCOSE FLD-MCNC: 103 MG/DL
GLUCOSE SERPL-MCNC: 102 MG/DL (ref 70–99)
GLUCOSE SERPL-MCNC: 104 MG/DL (ref 70–99)
GLUCOSE SERPL-MCNC: 108 MG/DL (ref 70–99)
GLUCOSE SERPL-MCNC: 121 MG/DL (ref 70–99)
GLUCOSE SERPL-MCNC: 122 MG/DL (ref 70–99)
GLUCOSE SERPL-MCNC: 122 MG/DL (ref 70–99)
GLUCOSE SERPL-MCNC: 131 MG/DL (ref 70–99)
GLUCOSE SERPL-MCNC: 84 MG/DL (ref 70–99)
GLUCOSE SERPL-MCNC: 88 MG/DL (ref 70–99)
GLUCOSE SERPL-MCNC: 91 MG/DL (ref 70–99)
GLUCOSE SERPL-MCNC: 92 MG/DL (ref 70–99)
GLUCOSE SERPL-MCNC: 93 MG/DL (ref 70–99)
GLUCOSE SERPL-MCNC: 98 MG/DL (ref 70–99)
GLUCOSE UR STRIP-MCNC: NEGATIVE MG/DL
GRAM STAIN RESULT: NORMAL
GRAM STAIN RESULT: NORMAL
HCO3 BLDV-SCNC: 24 MMOL/L (ref 21–28)
HCT VFR BLD AUTO: 29.2 % (ref 35–47)
HCT VFR BLD AUTO: 30.2 % (ref 35–47)
HCT VFR BLD AUTO: 31.3 % (ref 35–47)
HCT VFR BLD AUTO: 31.4 % (ref 35–47)
HCT VFR BLD AUTO: 31.5 % (ref 35–47)
HCT VFR BLD AUTO: 31.8 % (ref 35–47)
HCT VFR BLD AUTO: 32.1 % (ref 35–47)
HGB BLD-MCNC: 10 G/DL (ref 11.7–15.7)
HGB BLD-MCNC: 10.1 G/DL (ref 11.7–15.7)
HGB BLD-MCNC: 10.5 G/DL (ref 11.7–15.7)
HGB BLD-MCNC: 11.1 G/DL (ref 11.7–15.7)
HGB BLD-MCNC: 9.1 G/DL (ref 11.7–15.7)
HGB BLD-MCNC: 9.5 G/DL (ref 11.7–15.7)
HGB BLD-MCNC: 9.7 G/DL (ref 11.7–15.7)
HGB BLD-MCNC: 9.8 G/DL (ref 11.7–15.7)
HGB UR QL STRIP: NEGATIVE
HYALINE CASTS: 4 /LPF
IMM GRANULOCYTES # BLD: 0.1 10E3/UL
IMM GRANULOCYTES NFR BLD: 1 %
INTERPRETATION ECG - MUSE: NORMAL
KETONES UR STRIP-MCNC: NEGATIVE MG/DL
LACTATE SERPL-SCNC: 0.7 MMOL/L (ref 0.7–2)
LACTATE SERPL-SCNC: 0.7 MMOL/L (ref 0.7–2)
LACTATE SERPL-SCNC: 0.8 MMOL/L (ref 0.7–2)
LACTATE SERPL-SCNC: 0.9 MMOL/L (ref 0.7–2)
LACTATE SERPL-SCNC: 1 MMOL/L (ref 0.7–2)
LACTATE SERPL-SCNC: 1.2 MMOL/L (ref 0.7–2)
LACTATE SERPL-SCNC: 1.4 MMOL/L (ref 0.7–2)
LD BODY BODY FLUID SOURCE: NORMAL
LDH FLD L TO P-CCNC: 89 U/L
LDH SERPL L TO P-CCNC: 266 U/L (ref 0–250)
LEUKOCYTE ESTERASE UR QL STRIP: NEGATIVE
LVEF ECHO: NORMAL
LVEF ECHO: NORMAL
LYMPHOCYTES # BLD AUTO: 0.6 10E3/UL (ref 0.8–5.3)
LYMPHOCYTES # BLD AUTO: 0.8 10E3/UL (ref 0.8–5.3)
LYMPHOCYTES # BLD AUTO: 0.8 10E3/UL (ref 0.8–5.3)
LYMPHOCYTES # BLD MANUAL: 1.2 10E3/UL (ref 0.8–5.3)
LYMPHOCYTES # BLD MANUAL: 1.4 10E3/UL (ref 0.8–5.3)
LYMPHOCYTES NFR BLD AUTO: 10 %
LYMPHOCYTES NFR BLD AUTO: 10 %
LYMPHOCYTES NFR BLD AUTO: 5 %
LYMPHOCYTES NFR BLD MANUAL: 31 %
LYMPHOCYTES NFR BLD MANUAL: 4 %
LYMPHOCYTES NFR FLD MANUAL: 28 %
M TB IFN-G BLD-IMP: NEGATIVE
M TB IFN-G CD4+ BCKGRND COR BLD-ACNC: 3.81 IU/ML
MAGNESIUM SERPL-MCNC: 1.6 MG/DL (ref 1.7–2.3)
MAGNESIUM SERPL-MCNC: 1.6 MG/DL (ref 1.7–2.3)
MAGNESIUM SERPL-MCNC: 1.9 MG/DL (ref 1.7–2.3)
MAGNESIUM SERPL-MCNC: 1.9 MG/DL (ref 1.7–2.3)
MAGNESIUM SERPL-MCNC: 2 MG/DL (ref 1.7–2.3)
MAGNESIUM SERPL-MCNC: 2.1 MG/DL (ref 1.7–2.3)
MAGNESIUM SERPL-MCNC: 2.2 MG/DL (ref 1.7–2.3)
MAGNESIUM SERPL-MCNC: 2.3 MG/DL (ref 1.7–2.3)
MAGNESIUM SERPL-MCNC: 2.4 MG/DL (ref 1.7–2.3)
MCH RBC QN AUTO: 32.1 PG (ref 26.5–33)
MCH RBC QN AUTO: 32.2 PG (ref 26.5–33)
MCH RBC QN AUTO: 32.3 PG (ref 26.5–33)
MCH RBC QN AUTO: 33.1 PG (ref 26.5–33)
MCH RBC QN AUTO: 33.1 PG (ref 26.5–33)
MCH RBC QN AUTO: 33.2 PG (ref 26.5–33)
MCH RBC QN AUTO: 33.7 PG (ref 26.5–33)
MCHC RBC AUTO-ENTMCNC: 31 G/DL (ref 31.5–36.5)
MCHC RBC AUTO-ENTMCNC: 31.1 G/DL (ref 31.5–36.5)
MCHC RBC AUTO-ENTMCNC: 31.2 G/DL (ref 31.5–36.5)
MCHC RBC AUTO-ENTMCNC: 31.5 G/DL (ref 31.5–36.5)
MCHC RBC AUTO-ENTMCNC: 31.8 G/DL (ref 31.5–36.5)
MCHC RBC AUTO-ENTMCNC: 31.8 G/DL (ref 31.5–36.5)
MCHC RBC AUTO-ENTMCNC: 32.7 G/DL (ref 31.5–36.5)
MCV RBC AUTO: 103 FL (ref 78–100)
MCV RBC AUTO: 103 FL (ref 78–100)
MCV RBC AUTO: 104 FL (ref 78–100)
MCV RBC AUTO: 105 FL (ref 78–100)
MITOGEN IGNF BCKGRD COR BLD-ACNC: 0 IU/ML
MITOGEN IGNF BCKGRD COR BLD-ACNC: 0.03 IU/ML
MONOCYTES # BLD AUTO: 1.4 10E3/UL (ref 0–1.3)
MONOCYTES # BLD AUTO: 1.6 10E3/UL (ref 0–1.3)
MONOCYTES # BLD AUTO: 2.2 10E3/UL (ref 0–1.3)
MONOCYTES # BLD MANUAL: 1 10E3/UL (ref 0–1.3)
MONOCYTES # BLD MANUAL: 2.4 10E3/UL (ref 0–1.3)
MONOCYTES NFR BLD AUTO: 18 %
MONOCYTES NFR BLD AUTO: 20 %
MONOCYTES NFR BLD AUTO: 20 %
MONOCYTES NFR BLD MANUAL: 22 %
MONOCYTES NFR BLD MANUAL: 8 %
MONOS+MACROS NFR FLD MANUAL: 4 %
MUCOUS THREADS #/AREA URNS LPF: PRESENT /LPF
NEUTROPHILS # BLD AUTO: 5.5 10E3/UL (ref 1.6–8.3)
NEUTROPHILS # BLD AUTO: 5.7 10E3/UL (ref 1.6–8.3)
NEUTROPHILS # BLD AUTO: 8.1 10E3/UL (ref 1.6–8.3)
NEUTROPHILS # BLD MANUAL: 2.1 10E3/UL (ref 1.6–8.3)
NEUTROPHILS # BLD MANUAL: 26.4 10E3/UL (ref 1.6–8.3)
NEUTROPHILS NFR BLD AUTO: 69 %
NEUTROPHILS NFR BLD AUTO: 71 %
NEUTROPHILS NFR BLD AUTO: 74 %
NEUTROPHILS NFR BLD MANUAL: 46 %
NEUTROPHILS NFR BLD MANUAL: 88 %
NEUTS BAND NFR FLD MANUAL: 66 %
NITRATE UR QL: NEGATIVE
NRBC # BLD AUTO: 0 10E3/UL
NRBC BLD AUTO-RTO: 0 /100
NT-PROBNP SERPL-MCNC: 6915 PG/ML (ref 0–1800)
NT-PROBNP SERPL-MCNC: ABNORMAL PG/ML (ref 0–1800)
O2/TOTAL GAS SETTING VFR VENT: 50 %
OXYHGB MFR BLDV: 89 % (ref 70–75)
P AXIS - MUSE: 62 DEGREES
P AXIS - MUSE: 69 DEGREES
P AXIS - MUSE: 81 DEGREES
P AXIS - MUSE: NORMAL DEGREES
PATH REPORT.COMMENTS IMP SPEC: NORMAL
PATH REPORT.FINAL DX SPEC: NORMAL
PATH REPORT.GROSS SPEC: NORMAL
PATH REPORT.MICROSCOPIC SPEC OTHER STN: NORMAL
PATH REPORT.RELEVANT HX SPEC: NORMAL
PATH REV: ABNORMAL
PCO2 BLDV: 36 MM HG (ref 40–50)
PH BLDV: 7.44 [PH] (ref 7.32–7.43)
PH UR STRIP: 7.5 [PH] (ref 5–7)
PHOSPHATE SERPL-MCNC: 1.9 MG/DL (ref 2.5–4.5)
PHOSPHATE SERPL-MCNC: 2.4 MG/DL (ref 2.5–4.5)
PHOSPHATE SERPL-MCNC: 2.5 MG/DL (ref 2.5–4.5)
PHOSPHATE SERPL-MCNC: 2.5 MG/DL (ref 2.5–4.5)
PHOSPHATE SERPL-MCNC: 2.7 MG/DL (ref 2.5–4.5)
PHOSPHATE SERPL-MCNC: 2.8 MG/DL (ref 2.5–4.5)
PHOSPHATE SERPL-MCNC: 2.8 MG/DL (ref 2.5–4.5)
PHOSPHATE SERPL-MCNC: 3 MG/DL (ref 2.5–4.5)
PHOSPHATE SERPL-MCNC: 3.1 MG/DL (ref 2.5–4.5)
PHOSPHATE SERPL-MCNC: 3.3 MG/DL (ref 2.5–4.5)
PHOSPHATE SERPL-MCNC: 3.4 MG/DL (ref 2.5–4.5)
PHOSPHATE SERPL-MCNC: 3.8 MG/DL (ref 2.5–4.5)
PLAT MORPH BLD: ABNORMAL
PLAT MORPH BLD: ABNORMAL
PLATELET # BLD AUTO: 108 10E3/UL (ref 150–450)
PLATELET # BLD AUTO: 113 10E3/UL (ref 150–450)
PLATELET # BLD AUTO: 133 10E3/UL (ref 150–450)
PLATELET # BLD AUTO: 146 10E3/UL (ref 150–450)
PLATELET # BLD AUTO: 148 10E3/UL (ref 150–450)
PLATELET # BLD AUTO: 152 10E3/UL (ref 150–450)
PLATELET # BLD AUTO: 185 10E3/UL (ref 150–450)
PO2 BLDV: 55 MM HG (ref 25–47)
POLYCHROMASIA BLD QL SMEAR: SLIGHT
POLYCHROMASIA BLD QL SMEAR: SLIGHT
POTASSIUM SERPL-SCNC: 3.2 MMOL/L (ref 3.4–5.3)
POTASSIUM SERPL-SCNC: 3.4 MMOL/L (ref 3.4–5.3)
POTASSIUM SERPL-SCNC: 3.5 MMOL/L (ref 3.4–5.3)
POTASSIUM SERPL-SCNC: 3.5 MMOL/L (ref 3.4–5.3)
POTASSIUM SERPL-SCNC: 3.6 MMOL/L (ref 3.4–5.3)
POTASSIUM SERPL-SCNC: 3.7 MMOL/L (ref 3.4–5.3)
POTASSIUM SERPL-SCNC: 3.7 MMOL/L (ref 3.4–5.3)
POTASSIUM SERPL-SCNC: 3.8 MMOL/L (ref 3.4–5.3)
POTASSIUM SERPL-SCNC: 3.9 MMOL/L (ref 3.4–5.3)
POTASSIUM SERPL-SCNC: 4.2 MMOL/L (ref 3.4–5.3)
POTASSIUM SERPL-SCNC: 4.7 MMOL/L (ref 3.4–5.3)
POTASSIUM SERPL-SCNC: 4.8 MMOL/L (ref 3.4–5.3)
PR INTERVAL - MUSE: 230 MS
PR INTERVAL - MUSE: 242 MS
PR INTERVAL - MUSE: 254 MS
PR INTERVAL - MUSE: NORMAL MS
PROCALCITONIN SERPL IA-MCNC: 0.38 NG/ML
PROCALCITONIN SERPL IA-MCNC: 1.42 NG/ML
PROT FLD-MCNC: 1.8 G/DL
PROT SERPL-MCNC: 5.8 G/DL (ref 6.4–8.3)
PROT SERPL-MCNC: 6 G/DL (ref 6.4–8.3)
PROT SERPL-MCNC: 6.8 G/DL (ref 6.4–8.3)
PROTEIN BODY FLUID SOURCE: NORMAL
PTH-INTACT SERPL-MCNC: 57 PG/ML (ref 15–65)
QRS DURATION - MUSE: 86 MS
QRS DURATION - MUSE: 90 MS
QRS DURATION - MUSE: 92 MS
QRS DURATION - MUSE: 92 MS
QRS DURATION - MUSE: 94 MS
QRS DURATION - MUSE: 98 MS
QT - MUSE: 328 MS
QT - MUSE: 336 MS
QT - MUSE: 346 MS
QT - MUSE: 510 MS
QT - MUSE: 510 MS
QT - MUSE: 540 MS
QTC - MUSE: 480 MS
QTC - MUSE: 481 MS
QTC - MUSE: 482 MS
QTC - MUSE: 504 MS
QTC - MUSE: 517 MS
QTC - MUSE: 526 MS
QUANTIFERON MITOGEN: 3.81 IU/ML
QUANTIFERON NIL TUBE: 0 IU/ML
QUANTIFERON TB1 TUBE: 0.03 IU/ML
QUANTIFERON TB2 TUBE: 0
R AXIS - MUSE: -32 DEGREES
R AXIS - MUSE: -42 DEGREES
R AXIS - MUSE: -55 DEGREES
R AXIS - MUSE: -59 DEGREES
R AXIS - MUSE: -65 DEGREES
R AXIS - MUSE: -68 DEGREES
RBC # BLD AUTO: 2.83 10E6/UL (ref 3.8–5.2)
RBC # BLD AUTO: 2.87 10E6/UL (ref 3.8–5.2)
RBC # BLD AUTO: 3.01 10E6/UL (ref 3.8–5.2)
RBC # BLD AUTO: 3.02 10E6/UL (ref 3.8–5.2)
RBC # BLD AUTO: 3.03 10E6/UL (ref 3.8–5.2)
RBC # BLD AUTO: 3.05 10E6/UL (ref 3.8–5.2)
RBC # BLD AUTO: 3.12 10E6/UL (ref 3.8–5.2)
RBC MORPH BLD: ABNORMAL
RBC MORPH BLD: ABNORMAL
RBC URINE: 1 /HPF
RSV RNA SPEC NAA+PROBE: NEGATIVE
SARS-COV-2 RNA RESP QL NAA+PROBE: NEGATIVE
SODIUM SERPL-SCNC: 139 MMOL/L (ref 136–145)
SODIUM SERPL-SCNC: 139 MMOL/L (ref 136–145)
SODIUM SERPL-SCNC: 140 MMOL/L (ref 136–145)
SODIUM SERPL-SCNC: 142 MMOL/L (ref 136–145)
SODIUM SERPL-SCNC: 143 MMOL/L (ref 136–145)
SODIUM SERPL-SCNC: 144 MMOL/L (ref 136–145)
SODIUM SERPL-SCNC: 147 MMOL/L (ref 136–145)
SODIUM SERPL-SCNC: 147 MMOL/L (ref 136–145)
SODIUM SERPL-SCNC: 148 MMOL/L (ref 136–145)
SP GR UR STRIP: 1.02 (ref 1–1.03)
SQUAMOUS EPITHELIAL: <1 /HPF
SYSTOLIC BLOOD PRESSURE - MUSE: NORMAL MMHG
T AXIS - MUSE: -10 DEGREES
T AXIS - MUSE: 108 DEGREES
T AXIS - MUSE: 13 DEGREES
T AXIS - MUSE: 150 DEGREES
T AXIS - MUSE: 158 DEGREES
T AXIS - MUSE: 50 DEGREES
TROPONIN T SERPL HS-MCNC: 100 NG/L
TROPONIN T SERPL HS-MCNC: 78 NG/L
TROPONIN T SERPL HS-MCNC: 88 NG/L
TSH SERPL DL<=0.005 MIU/L-ACNC: 0.98 UIU/ML (ref 0.3–4.2)
UROBILINOGEN UR STRIP-MCNC: NORMAL MG/DL
VENTRICULAR RATE- MUSE: 116 BPM
VENTRICULAR RATE- MUSE: 123 BPM
VENTRICULAR RATE- MUSE: 142 BPM
VENTRICULAR RATE- MUSE: 48 BPM
VENTRICULAR RATE- MUSE: 62 BPM
VENTRICULAR RATE- MUSE: 64 BPM
WBC # BLD AUTO: 11 10E3/UL (ref 4–11)
WBC # BLD AUTO: 30 10E3/UL (ref 4–11)
WBC # BLD AUTO: 4.6 10E3/UL (ref 4–11)
WBC # BLD AUTO: 4.9 10E3/UL (ref 4–11)
WBC # BLD AUTO: 7.9 10E3/UL (ref 4–11)
WBC # BLD AUTO: 8 10E3/UL (ref 4–11)
WBC # BLD AUTO: 8.5 10E3/UL (ref 4–11)
WBC # FLD AUTO: 92 /UL
WBC URINE: 2 /HPF

## 2023-01-01 PROCEDURE — 84132 ASSAY OF SERUM POTASSIUM: CPT | Performed by: HOSPITALIST

## 2023-01-01 PROCEDURE — 94640 AIRWAY INHALATION TREATMENT: CPT

## 2023-01-01 PROCEDURE — 94640 AIRWAY INHALATION TREATMENT: CPT | Mod: 76

## 2023-01-01 PROCEDURE — 93005 ELECTROCARDIOGRAM TRACING: CPT

## 2023-01-01 PROCEDURE — 250N000013 HC RX MED GY IP 250 OP 250 PS 637: Performed by: INTERNAL MEDICINE

## 2023-01-01 PROCEDURE — 71045 X-RAY EXAM CHEST 1 VIEW: CPT

## 2023-01-01 PROCEDURE — 250N000012 HC RX MED GY IP 250 OP 636 PS 637: Performed by: INTERNAL MEDICINE

## 2023-01-01 PROCEDURE — 36415 COLL VENOUS BLD VENIPUNCTURE: CPT | Performed by: INTERNAL MEDICINE

## 2023-01-01 PROCEDURE — 83735 ASSAY OF MAGNESIUM: CPT | Performed by: INTERNAL MEDICINE

## 2023-01-01 PROCEDURE — 99232 SBSQ HOSP IP/OBS MODERATE 35: CPT | Performed by: INTERNAL MEDICINE

## 2023-01-01 PROCEDURE — 250N000011 HC RX IP 250 OP 636: Performed by: INTERNAL MEDICINE

## 2023-01-01 PROCEDURE — 250N000013 HC RX MED GY IP 250 OP 250 PS 637: Performed by: HOSPITALIST

## 2023-01-01 PROCEDURE — 99214 OFFICE O/P EST MOD 30 MIN: CPT | Performed by: NURSE PRACTITIONER

## 2023-01-01 PROCEDURE — 92526 ORAL FUNCTION THERAPY: CPT | Mod: GN

## 2023-01-01 PROCEDURE — 250N000013 HC RX MED GY IP 250 OP 250 PS 637: Performed by: PHYSICIAN ASSISTANT

## 2023-01-01 PROCEDURE — 250N000009 HC RX 250: Performed by: INTERNAL MEDICINE

## 2023-01-01 PROCEDURE — 99223 1ST HOSP IP/OBS HIGH 75: CPT | Performed by: INTERNAL MEDICINE

## 2023-01-01 PROCEDURE — 83880 ASSAY OF NATRIURETIC PEPTIDE: CPT | Performed by: HOSPITALIST

## 2023-01-01 PROCEDURE — 999N000157 HC STATISTIC RCP TIME EA 10 MIN

## 2023-01-01 PROCEDURE — 85025 COMPLETE CBC W/AUTO DIFF WBC: CPT | Performed by: EMERGENCY MEDICINE

## 2023-01-01 PROCEDURE — 84132 ASSAY OF SERUM POTASSIUM: CPT | Performed by: INTERNAL MEDICINE

## 2023-01-01 PROCEDURE — 99214 OFFICE O/P EST MOD 30 MIN: CPT | Performed by: INTERNAL MEDICINE

## 2023-01-01 PROCEDURE — 84100 ASSAY OF PHOSPHORUS: CPT | Performed by: INTERNAL MEDICINE

## 2023-01-01 PROCEDURE — 85025 COMPLETE CBC W/AUTO DIFF WBC: CPT | Performed by: INTERNAL MEDICINE

## 2023-01-01 PROCEDURE — 84145 PROCALCITONIN (PCT): CPT | Performed by: HOSPITALIST

## 2023-01-01 PROCEDURE — 99213 OFFICE O/P EST LOW 20 MIN: CPT | Performed by: PHYSICIAN ASSISTANT

## 2023-01-01 PROCEDURE — 73562 X-RAY EXAM OF KNEE 3: CPT | Mod: LT

## 2023-01-01 PROCEDURE — 99233 SBSQ HOSP IP/OBS HIGH 50: CPT | Performed by: INTERNAL MEDICINE

## 2023-01-01 PROCEDURE — 272N000706 US THORACENTESIS

## 2023-01-01 PROCEDURE — 120N000013 HC R&B IMCU

## 2023-01-01 PROCEDURE — 250N000009 HC RX 250: Performed by: HOSPITALIST

## 2023-01-01 PROCEDURE — 99233 SBSQ HOSP IP/OBS HIGH 50: CPT | Performed by: HOSPITALIST

## 2023-01-01 PROCEDURE — 70450 CT HEAD/BRAIN W/O DYE: CPT | Mod: MA

## 2023-01-01 PROCEDURE — 83605 ASSAY OF LACTIC ACID: CPT | Performed by: INTERNAL MEDICINE

## 2023-01-01 PROCEDURE — 250N000013 HC RX MED GY IP 250 OP 250 PS 637: Performed by: NURSE PRACTITIONER

## 2023-01-01 PROCEDURE — 93010 ELECTROCARDIOGRAM REPORT: CPT | Performed by: INTERNAL MEDICINE

## 2023-01-01 PROCEDURE — 258N000003 HC RX IP 258 OP 636: Performed by: HOSPITALIST

## 2023-01-01 PROCEDURE — 84155 ASSAY OF PROTEIN SERUM: CPT | Performed by: INTERNAL MEDICINE

## 2023-01-01 PROCEDURE — 83615 LACTATE (LD) (LDH) ENZYME: CPT | Performed by: INTERNAL MEDICINE

## 2023-01-01 PROCEDURE — C9113 INJ PANTOPRAZOLE SODIUM, VIA: HCPCS | Performed by: INTERNAL MEDICINE

## 2023-01-01 PROCEDURE — 93308 TTE F-UP OR LMTD: CPT | Mod: 26 | Performed by: INTERNAL MEDICINE

## 2023-01-01 PROCEDURE — 99233 SBSQ HOSP IP/OBS HIGH 50: CPT | Mod: FS | Performed by: PHYSICIAN ASSISTANT

## 2023-01-01 PROCEDURE — 84484 ASSAY OF TROPONIN QUANT: CPT | Mod: 91 | Performed by: INTERNAL MEDICINE

## 2023-01-01 PROCEDURE — 80053 COMPREHEN METABOLIC PANEL: CPT | Performed by: INTERNAL MEDICINE

## 2023-01-01 PROCEDURE — 36415 COLL VENOUS BLD VENIPUNCTURE: CPT | Performed by: PHYSICIAN ASSISTANT

## 2023-01-01 PROCEDURE — 88112 CYTOPATH CELL ENHANCE TECH: CPT | Mod: 26 | Performed by: PATHOLOGY

## 2023-01-01 PROCEDURE — 88112 CYTOPATH CELL ENHANCE TECH: CPT | Mod: TC | Performed by: INTERNAL MEDICINE

## 2023-01-01 PROCEDURE — G0378 HOSPITAL OBSERVATION PER HR: HCPCS

## 2023-01-01 PROCEDURE — 86481 TB AG RESPONSE T-CELL SUSP: CPT | Performed by: NURSE PRACTITIONER

## 2023-01-01 PROCEDURE — 97530 THERAPEUTIC ACTIVITIES: CPT | Mod: GP

## 2023-01-01 PROCEDURE — 83605 ASSAY OF LACTIC ACID: CPT | Performed by: EMERGENCY MEDICINE

## 2023-01-01 PROCEDURE — 80048 BASIC METABOLIC PNL TOTAL CA: CPT | Performed by: INTERNAL MEDICINE

## 2023-01-01 PROCEDURE — 82374 ASSAY BLOOD CARBON DIOXIDE: CPT | Performed by: INTERNAL MEDICINE

## 2023-01-01 PROCEDURE — 94642 AEROSOL INHALATION TREATMENT: CPT

## 2023-01-01 PROCEDURE — 99233 SBSQ HOSP IP/OBS HIGH 50: CPT | Performed by: NURSE PRACTITIONER

## 2023-01-01 PROCEDURE — 999N000040 HC STATISTIC CONSULT NO CHARGE VASC ACCESS

## 2023-01-01 PROCEDURE — 97110 THERAPEUTIC EXERCISES: CPT | Mod: GP | Performed by: PHYSICAL THERAPIST

## 2023-01-01 PROCEDURE — 84484 ASSAY OF TROPONIN QUANT: CPT | Performed by: HOSPITALIST

## 2023-01-01 PROCEDURE — 99497 ADVNCD CARE PLAN 30 MIN: CPT | Mod: 25 | Performed by: NURSE PRACTITIONER

## 2023-01-01 PROCEDURE — 72100 X-RAY EXAM L-S SPINE 2/3 VWS: CPT | Mod: TC | Performed by: RADIOLOGY

## 2023-01-01 PROCEDURE — 85007 BL SMEAR W/DIFF WBC COUNT: CPT | Performed by: INTERNAL MEDICINE

## 2023-01-01 PROCEDURE — 86140 C-REACTIVE PROTEIN: CPT | Performed by: PHYSICIAN ASSISTANT

## 2023-01-01 PROCEDURE — 92610 EVALUATE SWALLOWING FUNCTION: CPT | Mod: GN

## 2023-01-01 PROCEDURE — 84443 ASSAY THYROID STIM HORMONE: CPT | Performed by: EMERGENCY MEDICINE

## 2023-01-01 PROCEDURE — 92526 ORAL FUNCTION THERAPY: CPT | Mod: GN | Performed by: SPEECH-LANGUAGE PATHOLOGIST

## 2023-01-01 PROCEDURE — 250N000011 HC RX IP 250 OP 636: Performed by: NURSE PRACTITIONER

## 2023-01-01 PROCEDURE — 84145 PROCALCITONIN (PCT): CPT | Performed by: PHYSICIAN ASSISTANT

## 2023-01-01 PROCEDURE — 36415 COLL VENOUS BLD VENIPUNCTURE: CPT | Performed by: NURSE PRACTITIONER

## 2023-01-01 PROCEDURE — 88305 TISSUE EXAM BY PATHOLOGIST: CPT | Mod: 26 | Performed by: PATHOLOGY

## 2023-01-01 PROCEDURE — 99214 OFFICE O/P EST MOD 30 MIN: CPT | Mod: 95 | Performed by: INTERNAL MEDICINE

## 2023-01-01 PROCEDURE — 258N000003 HC RX IP 258 OP 636: Performed by: INTERNAL MEDICINE

## 2023-01-01 PROCEDURE — 83970 ASSAY OF PARATHORMONE: CPT | Performed by: INTERNAL MEDICINE

## 2023-01-01 PROCEDURE — 83605 ASSAY OF LACTIC ACID: CPT | Performed by: HOSPITALIST

## 2023-01-01 PROCEDURE — 99223 1ST HOSP IP/OBS HIGH 75: CPT | Mod: AI | Performed by: INTERNAL MEDICINE

## 2023-01-01 PROCEDURE — 81001 URINALYSIS AUTO W/SCOPE: CPT | Performed by: EMERGENCY MEDICINE

## 2023-01-01 PROCEDURE — 97161 PT EVAL LOW COMPLEX 20 MIN: CPT | Mod: GP

## 2023-01-01 PROCEDURE — 85027 COMPLETE CBC AUTOMATED: CPT | Performed by: INTERNAL MEDICINE

## 2023-01-01 PROCEDURE — P9604 ONE-WAY ALLOW PRORATED TRIP: HCPCS | Mod: ORL | Performed by: NURSE PRACTITIONER

## 2023-01-01 PROCEDURE — 99285 EMERGENCY DEPT VISIT HI MDM: CPT | Mod: 25

## 2023-01-01 PROCEDURE — 84100 ASSAY OF PHOSPHORUS: CPT | Performed by: HOSPITALIST

## 2023-01-01 PROCEDURE — 94660 CPAP INITIATION&MGMT: CPT

## 2023-01-01 PROCEDURE — 96361 HYDRATE IV INFUSION ADD-ON: CPT

## 2023-01-01 PROCEDURE — 82310 ASSAY OF CALCIUM: CPT | Performed by: INTERNAL MEDICINE

## 2023-01-01 PROCEDURE — 93325 DOPPLER ECHO COLOR FLOW MAPG: CPT | Mod: 26 | Performed by: INTERNAL MEDICINE

## 2023-01-01 PROCEDURE — 83880 ASSAY OF NATRIURETIC PEPTIDE: CPT | Performed by: NURSE PRACTITIONER

## 2023-01-01 PROCEDURE — 80048 BASIC METABOLIC PNL TOTAL CA: CPT | Performed by: NURSE PRACTITIONER

## 2023-01-01 PROCEDURE — 99305 1ST NF CARE MODERATE MDM 35: CPT | Mod: GV | Performed by: INTERNAL MEDICINE

## 2023-01-01 PROCEDURE — 82805 BLOOD GASES W/O2 SATURATION: CPT | Performed by: INTERNAL MEDICINE

## 2023-01-01 PROCEDURE — 120N000001 HC R&B MED SURG/OB

## 2023-01-01 PROCEDURE — 93306 TTE W/DOPPLER COMPLETE: CPT

## 2023-01-01 PROCEDURE — 36415 COLL VENOUS BLD VENIPUNCTURE: CPT | Performed by: EMERGENCY MEDICINE

## 2023-01-01 PROCEDURE — 72100 X-RAY EXAM L-S SPINE 2/3 VWS: CPT

## 2023-01-01 PROCEDURE — 99315 NF DSCHRG MGMT 30 MIN/LESS: CPT | Mod: GV | Performed by: NURSE PRACTITIONER

## 2023-01-01 PROCEDURE — 93010 ELECTROCARDIOGRAM REPORT: CPT | Mod: 59 | Performed by: INTERNAL MEDICINE

## 2023-01-01 PROCEDURE — 82248 BILIRUBIN DIRECT: CPT | Performed by: HOSPITALIST

## 2023-01-01 PROCEDURE — 250N000011 HC RX IP 250 OP 636: Performed by: HOSPITALIST

## 2023-01-01 PROCEDURE — 99233 SBSQ HOSP IP/OBS HIGH 50: CPT | Mod: 25 | Performed by: PHYSICIAN ASSISTANT

## 2023-01-01 PROCEDURE — 87637 SARSCOV2&INF A&B&RSV AMP PRB: CPT | Performed by: HOSPITALIST

## 2023-01-01 PROCEDURE — 84295 ASSAY OF SERUM SODIUM: CPT | Performed by: INTERNAL MEDICINE

## 2023-01-01 PROCEDURE — 85018 HEMOGLOBIN: CPT | Performed by: INTERNAL MEDICINE

## 2023-01-01 PROCEDURE — 74230 X-RAY XM SWLNG FUNCJ C+: CPT

## 2023-01-01 PROCEDURE — 87070 CULTURE OTHR SPECIMN AEROBIC: CPT | Performed by: INTERNAL MEDICINE

## 2023-01-01 PROCEDURE — 250N000009 HC RX 250: Performed by: RADIOLOGY

## 2023-01-01 PROCEDURE — 258N000003 HC RX IP 258 OP 636: Performed by: EMERGENCY MEDICINE

## 2023-01-01 PROCEDURE — 36415 COLL VENOUS BLD VENIPUNCTURE: CPT | Mod: ORL | Performed by: NURSE PRACTITIONER

## 2023-01-01 PROCEDURE — 80069 RENAL FUNCTION PANEL: CPT | Performed by: INTERNAL MEDICINE

## 2023-01-01 PROCEDURE — 80048 BASIC METABOLIC PNL TOTAL CA: CPT | Performed by: PHYSICIAN ASSISTANT

## 2023-01-01 PROCEDURE — 82728 ASSAY OF FERRITIN: CPT | Performed by: INTERNAL MEDICINE

## 2023-01-01 PROCEDURE — 999N000215 HC STATISTIC HFNC ADULT NON-CPAP

## 2023-01-01 PROCEDURE — 93321 DOPPLER ECHO F-UP/LMTD STD: CPT | Mod: 26 | Performed by: INTERNAL MEDICINE

## 2023-01-01 PROCEDURE — 99310 SBSQ NF CARE HIGH MDM 45: CPT | Performed by: NURSE PRACTITIONER

## 2023-01-01 PROCEDURE — 83735 ASSAY OF MAGNESIUM: CPT | Performed by: EMERGENCY MEDICINE

## 2023-01-01 PROCEDURE — 99223 1ST HOSP IP/OBS HIGH 75: CPT | Performed by: NURSE PRACTITIONER

## 2023-01-01 PROCEDURE — 82945 GLUCOSE OTHER FLUID: CPT | Performed by: INTERNAL MEDICINE

## 2023-01-01 PROCEDURE — 36415 COLL VENOUS BLD VENIPUNCTURE: CPT | Performed by: HOSPITALIST

## 2023-01-01 PROCEDURE — 83735 ASSAY OF MAGNESIUM: CPT | Performed by: HOSPITALIST

## 2023-01-01 PROCEDURE — 96360 HYDRATION IV INFUSION INIT: CPT

## 2023-01-01 PROCEDURE — 255N000002 HC RX 255 OP 636: Performed by: INTERNAL MEDICINE

## 2023-01-01 PROCEDURE — 85027 COMPLETE CBC AUTOMATED: CPT | Performed by: PHYSICIAN ASSISTANT

## 2023-01-01 PROCEDURE — 96374 THER/PROPH/DIAG INJ IV PUSH: CPT

## 2023-01-01 PROCEDURE — 999N000208 ECHOCARDIOGRAM COMPLETE

## 2023-01-01 PROCEDURE — 85014 HEMATOCRIT: CPT | Performed by: INTERNAL MEDICINE

## 2023-01-01 PROCEDURE — G0463 HOSPITAL OUTPT CLINIC VISIT: HCPCS

## 2023-01-01 PROCEDURE — 84484 ASSAY OF TROPONIN QUANT: CPT | Performed by: EMERGENCY MEDICINE

## 2023-01-01 PROCEDURE — 92611 MOTION FLUOROSCOPY/SWALLOW: CPT | Mod: GN

## 2023-01-01 PROCEDURE — 82947 ASSAY GLUCOSE BLOOD QUANT: CPT | Performed by: INTERNAL MEDICINE

## 2023-01-01 PROCEDURE — 88305 TISSUE EXAM BY PATHOLOGIST: CPT | Mod: TC | Performed by: INTERNAL MEDICINE

## 2023-01-01 PROCEDURE — 84157 ASSAY OF PROTEIN OTHER: CPT | Performed by: INTERNAL MEDICINE

## 2023-01-01 PROCEDURE — 80053 COMPREHEN METABOLIC PANEL: CPT | Performed by: EMERGENCY MEDICINE

## 2023-01-01 PROCEDURE — 31720 CLEARANCE OF AIRWAYS: CPT

## 2023-01-01 PROCEDURE — 89051 BODY FLUID CELL COUNT: CPT | Performed by: INTERNAL MEDICINE

## 2023-01-01 PROCEDURE — 93306 TTE W/DOPPLER COMPLETE: CPT | Mod: 26 | Performed by: INTERNAL MEDICINE

## 2023-01-01 PROCEDURE — 5A09357 ASSISTANCE WITH RESPIRATORY VENTILATION, LESS THAN 24 CONSECUTIVE HOURS, CONTINUOUS POSITIVE AIRWAY PRESSURE: ICD-10-PCS | Performed by: INTERNAL MEDICINE

## 2023-01-01 PROCEDURE — 999N000128 HC STATISTIC PERIPHERAL IV START W/O US GUIDANCE

## 2023-01-01 RX ORDER — FUROSEMIDE 10 MG/ML
40 INJECTION INTRAMUSCULAR; INTRAVENOUS ONCE
Status: COMPLETED | OUTPATIENT
Start: 2023-01-01 | End: 2023-01-01

## 2023-01-01 RX ORDER — HYDROXYCHLOROQUINE SULFATE 200 MG/1
200 TABLET, FILM COATED ORAL DAILY
Status: DISCONTINUED | OUTPATIENT
Start: 2023-01-01 | End: 2023-01-01

## 2023-01-01 RX ORDER — LORAZEPAM 2 MG/ML
.5-1 INJECTION INTRAMUSCULAR
Status: DISCONTINUED | OUTPATIENT
Start: 2023-01-01 | End: 2023-01-01 | Stop reason: HOSPADM

## 2023-01-01 RX ORDER — MAGNESIUM SULFATE HEPTAHYDRATE 40 MG/ML
2 INJECTION, SOLUTION INTRAVENOUS ONCE
Status: COMPLETED | OUTPATIENT
Start: 2023-01-01 | End: 2023-01-01

## 2023-01-01 RX ORDER — TORSEMIDE 20 MG/1
20 TABLET ORAL ONCE
Status: COMPLETED | OUTPATIENT
Start: 2023-01-01 | End: 2023-01-01

## 2023-01-01 RX ORDER — ACETAMINOPHEN 325 MG/1
650 TABLET ORAL 2 TIMES DAILY PRN
Qty: 6 TABLET | Refills: 0 | Status: SHIPPED | OUTPATIENT
Start: 2023-01-01 | End: 2023-01-01

## 2023-01-01 RX ORDER — AMLODIPINE BESYLATE 10 MG/1
TABLET ORAL
Qty: 90 TABLET | Refills: 0 | Status: SHIPPED | OUTPATIENT
Start: 2023-01-01 | End: 2023-01-01

## 2023-01-01 RX ORDER — IPRATROPIUM BROMIDE AND ALBUTEROL SULFATE 2.5; .5 MG/3ML; MG/3ML
3 SOLUTION RESPIRATORY (INHALATION) 3 TIMES DAILY
Status: DISCONTINUED | OUTPATIENT
Start: 2023-01-01 | End: 2023-01-01

## 2023-01-01 RX ORDER — MAGNESIUM OXIDE 400 MG/1
400 TABLET ORAL EVERY 4 HOURS
Status: COMPLETED | OUTPATIENT
Start: 2023-01-01 | End: 2023-01-01

## 2023-01-01 RX ORDER — PREDNISONE 5 MG/1
5 TABLET ORAL DAILY
Status: DISCONTINUED | OUTPATIENT
Start: 2023-01-01 | End: 2023-01-01

## 2023-01-01 RX ORDER — LABETALOL HYDROCHLORIDE 5 MG/ML
10 INJECTION, SOLUTION INTRAVENOUS
Status: DISCONTINUED | OUTPATIENT
Start: 2023-01-01 | End: 2023-01-01

## 2023-01-01 RX ORDER — PREDNISOLONE 5 MG/1
5 TABLET ORAL DAILY
COMMUNITY
End: 2023-01-01

## 2023-01-01 RX ORDER — TORSEMIDE 20 MG/1
40 TABLET ORAL DAILY
Status: DISCONTINUED | OUTPATIENT
Start: 2023-01-01 | End: 2023-01-01

## 2023-01-01 RX ORDER — PREDNISONE 5 MG/1
5 TABLET ORAL DAILY
Status: DISCONTINUED | OUTPATIENT
Start: 2023-01-01 | End: 2023-01-01 | Stop reason: HOSPADM

## 2023-01-01 RX ORDER — POTASSIUM CHLORIDE 750 MG/1
10 TABLET, EXTENDED RELEASE ORAL ONCE
Status: COMPLETED | OUTPATIENT
Start: 2023-01-01 | End: 2023-01-01

## 2023-01-01 RX ORDER — CARVEDILOL 6.25 MG/1
6.25 TABLET ORAL 2 TIMES DAILY WITH MEALS
Status: DISCONTINUED | OUTPATIENT
Start: 2023-01-01 | End: 2023-01-01

## 2023-01-01 RX ORDER — METHYLPREDNISOLONE SODIUM SUCCINATE 125 MG/2ML
6.25 INJECTION, POWDER, LYOPHILIZED, FOR SOLUTION INTRAMUSCULAR; INTRAVENOUS DAILY
Status: DISCONTINUED | OUTPATIENT
Start: 2023-01-01 | End: 2023-01-01

## 2023-01-01 RX ORDER — DEXTROSE MONOHYDRATE 50 MG/ML
INJECTION, SOLUTION INTRAVENOUS CONTINUOUS
Status: DISCONTINUED | OUTPATIENT
Start: 2023-01-01 | End: 2023-01-01

## 2023-01-01 RX ORDER — ACETAMINOPHEN 325 MG/1
650 TABLET ORAL 2 TIMES DAILY PRN
Status: DISCONTINUED | OUTPATIENT
Start: 2023-01-01 | End: 2023-01-01 | Stop reason: HOSPADM

## 2023-01-01 RX ORDER — METOPROLOL TARTRATE 25 MG/1
25 TABLET, FILM COATED ORAL 3 TIMES DAILY
Status: DISCONTINUED | OUTPATIENT
Start: 2023-01-01 | End: 2023-01-01

## 2023-01-01 RX ORDER — LORAZEPAM 2 MG/ML
.5-1 CONCENTRATE ORAL
Qty: 12 ML | Refills: 0 | Status: SHIPPED | DISCHARGE
Start: 2023-01-01

## 2023-01-01 RX ORDER — METOPROLOL TARTRATE 1 MG/ML
2.5 INJECTION, SOLUTION INTRAVENOUS EVERY 6 HOURS PRN
Status: DISCONTINUED | OUTPATIENT
Start: 2023-01-01 | End: 2023-01-01

## 2023-01-01 RX ORDER — BARIUM SULFATE 400 MG/ML
SUSPENSION ORAL ONCE
Status: COMPLETED | OUTPATIENT
Start: 2023-01-01 | End: 2023-01-01

## 2023-01-01 RX ORDER — MIRTAZAPINE 15 MG/1
15 TABLET, FILM COATED ORAL AT BEDTIME
Status: DISCONTINUED | OUTPATIENT
Start: 2023-01-01 | End: 2023-01-01

## 2023-01-01 RX ORDER — LIDOCAINE 4 G/G
1 PATCH TOPICAL EVERY 24 HOURS
Status: DISCONTINUED | OUTPATIENT
Start: 2023-01-01 | End: 2023-01-01

## 2023-01-01 RX ORDER — CARVEDILOL 6.25 MG/1
12.5 TABLET ORAL 2 TIMES DAILY WITH MEALS
Status: DISCONTINUED | OUTPATIENT
Start: 2023-01-01 | End: 2023-01-01

## 2023-01-01 RX ORDER — NALOXONE HYDROCHLORIDE 0.4 MG/ML
0.1 INJECTION, SOLUTION INTRAMUSCULAR; INTRAVENOUS; SUBCUTANEOUS
Status: DISCONTINUED | OUTPATIENT
Start: 2023-01-01 | End: 2023-01-01 | Stop reason: HOSPADM

## 2023-01-01 RX ORDER — METOPROLOL TARTRATE 50 MG
100 TABLET ORAL 2 TIMES DAILY
Status: DISCONTINUED | OUTPATIENT
Start: 2023-01-01 | End: 2023-01-01

## 2023-01-01 RX ORDER — PREDNISONE 5 MG/1
5 TABLET ORAL DAILY
Status: ON HOLD | COMMUNITY
End: 2023-01-01

## 2023-01-01 RX ORDER — LIDOCAINE 4 G/G
2 PATCH TOPICAL
Status: DISCONTINUED | OUTPATIENT
Start: 2023-01-01 | End: 2023-01-01 | Stop reason: HOSPADM

## 2023-01-01 RX ORDER — AMOXICILLIN 250 MG
1 CAPSULE ORAL AT BEDTIME
Status: DISCONTINUED | OUTPATIENT
Start: 2023-01-01 | End: 2023-01-01

## 2023-01-01 RX ORDER — ISOSORBIDE DINITRATE 20 MG/1
20 TABLET ORAL
Qty: 270 TABLET | Refills: 0 | Status: SHIPPED | OUTPATIENT
Start: 2023-01-01 | End: 2023-01-01

## 2023-01-01 RX ORDER — POTASSIUM CHLORIDE 7.45 MG/ML
10 INJECTION INTRAVENOUS ONCE
Status: COMPLETED | OUTPATIENT
Start: 2023-01-01 | End: 2023-01-01

## 2023-01-01 RX ORDER — HYDROMORPHONE HYDROCHLORIDE 1 MG/ML
.3-.5 INJECTION, SOLUTION INTRAMUSCULAR; INTRAVENOUS; SUBCUTANEOUS
Status: DISCONTINUED | OUTPATIENT
Start: 2023-01-01 | End: 2023-01-01 | Stop reason: HOSPADM

## 2023-01-01 RX ORDER — DILTIAZEM HCL 60 MG
60 TABLET ORAL EVERY 8 HOURS SCHEDULED
Status: DISCONTINUED | OUTPATIENT
Start: 2023-01-01 | End: 2023-01-01

## 2023-01-01 RX ORDER — HYDRALAZINE HYDROCHLORIDE 100 MG/1
100 TABLET, FILM COATED ORAL 3 TIMES DAILY
Qty: 360 TABLET | Refills: 0 | Status: SHIPPED | OUTPATIENT
Start: 2023-01-01 | End: 2023-01-01

## 2023-01-01 RX ORDER — CARBOXYMETHYLCELLULOSE SODIUM 5 MG/ML
1 SOLUTION/ DROPS OPHTHALMIC
Status: DISCONTINUED | OUTPATIENT
Start: 2023-01-01 | End: 2023-01-01 | Stop reason: HOSPADM

## 2023-01-01 RX ORDER — FERROUS SULFATE 325(65) MG
325 TABLET ORAL EVERY OTHER DAY
Status: DISCONTINUED | OUTPATIENT
Start: 2023-01-01 | End: 2023-01-01

## 2023-01-01 RX ORDER — CYCLOBENZAPRINE HYDROCHLORIDE 5 MG/1
2.5-5 TABLET, FILM COATED ORAL 2 TIMES DAILY PRN
Status: DISCONTINUED | OUTPATIENT
Start: 2023-01-01 | End: 2023-01-01

## 2023-01-01 RX ORDER — PANTOPRAZOLE SODIUM 40 MG/1
40 TABLET, DELAYED RELEASE ORAL DAILY
Status: DISCONTINUED | OUTPATIENT
Start: 2023-01-01 | End: 2023-01-01

## 2023-01-01 RX ORDER — HYDRALAZINE HYDROCHLORIDE 20 MG/ML
10 INJECTION INTRAMUSCULAR; INTRAVENOUS EVERY 6 HOURS PRN
Status: DISCONTINUED | OUTPATIENT
Start: 2023-01-01 | End: 2023-01-01

## 2023-01-01 RX ORDER — OLANZAPINE 5 MG/1
5 TABLET, ORALLY DISINTEGRATING ORAL EVERY 6 HOURS PRN
Qty: 6 TABLET | Refills: 0 | Status: SHIPPED | OUTPATIENT
Start: 2023-01-01 | End: 2023-01-01

## 2023-01-01 RX ORDER — LEVOTHYROXINE SODIUM 25 UG/1
25 TABLET ORAL
Status: DISCONTINUED | OUTPATIENT
Start: 2023-01-01 | End: 2023-01-01

## 2023-01-01 RX ORDER — NITROGLYCERIN 0.4 MG/1
0.4 TABLET SUBLINGUAL EVERY 5 MIN PRN
Status: DISCONTINUED | OUTPATIENT
Start: 2023-01-01 | End: 2023-01-01

## 2023-01-01 RX ORDER — ONDANSETRON 2 MG/ML
4 INJECTION INTRAMUSCULAR; INTRAVENOUS EVERY 6 HOURS PRN
Status: DISCONTINUED | OUTPATIENT
Start: 2023-01-01 | End: 2023-01-01

## 2023-01-01 RX ORDER — SODIUM CHLORIDE 9 MG/ML
INJECTION, SOLUTION INTRAVENOUS CONTINUOUS
Status: DISCONTINUED | OUTPATIENT
Start: 2023-01-01 | End: 2023-01-01

## 2023-01-01 RX ORDER — PANTOPRAZOLE SODIUM 40 MG/1
40 TABLET, DELAYED RELEASE ORAL DAILY
Qty: 90 TABLET | Refills: 2 | Status: SHIPPED | OUTPATIENT
Start: 2023-01-01 | End: 2023-01-01

## 2023-01-01 RX ORDER — CARVEDILOL 25 MG/1
25 TABLET ORAL 2 TIMES DAILY WITH MEALS
Status: DISCONTINUED | OUTPATIENT
Start: 2023-01-01 | End: 2023-01-01

## 2023-01-01 RX ORDER — TORSEMIDE 20 MG/1
20 TABLET ORAL DAILY
Status: DISCONTINUED | OUTPATIENT
Start: 2023-01-01 | End: 2023-01-01

## 2023-01-01 RX ORDER — LIDOCAINE 4 G/G
1 PATCH TOPICAL EVERY 24 HOURS
Qty: 30 PATCH | Refills: 3 | Status: ON HOLD | OUTPATIENT
Start: 2023-01-01 | End: 2023-01-01

## 2023-01-01 RX ORDER — SODIUM BICARBONATE 650 MG/1
650 TABLET ORAL 2 TIMES DAILY
Status: DISCONTINUED | OUTPATIENT
Start: 2023-01-01 | End: 2023-01-01

## 2023-01-01 RX ORDER — HYDRALAZINE HYDROCHLORIDE 100 MG/1
TABLET, FILM COATED ORAL
Qty: 360 TABLET | Refills: 0 | Status: SHIPPED | OUTPATIENT
Start: 2023-01-01 | End: 2023-01-01

## 2023-01-01 RX ORDER — ACETAMINOPHEN 325 MG/1
650 TABLET ORAL EVERY 6 HOURS PRN
Qty: 24 TABLET | Refills: 0 | Status: SHIPPED | OUTPATIENT
Start: 2023-01-01

## 2023-01-01 RX ORDER — BISACODYL 10 MG
10 SUPPOSITORY, RECTAL RECTAL DAILY PRN
Qty: 3 SUPPOSITORY | Refills: 0 | Status: SHIPPED | OUTPATIENT
Start: 2023-01-01

## 2023-01-01 RX ORDER — HYDROMORPHONE HYDROCHLORIDE 1 MG/ML
1-2 SOLUTION ORAL
Status: DISCONTINUED | OUTPATIENT
Start: 2023-01-01 | End: 2023-01-01 | Stop reason: HOSPADM

## 2023-01-01 RX ORDER — PROCHLORPERAZINE MALEATE 5 MG
5 TABLET ORAL EVERY 6 HOURS PRN
Qty: 6 TABLET | Refills: 0 | Status: SHIPPED | OUTPATIENT
Start: 2023-01-01

## 2023-01-01 RX ORDER — METOPROLOL TARTRATE 1 MG/ML
5 INJECTION, SOLUTION INTRAVENOUS EVERY 5 MIN PRN
Status: DISCONTINUED | OUTPATIENT
Start: 2023-01-01 | End: 2023-01-01

## 2023-01-01 RX ORDER — ACETAMINOPHEN 325 MG/1
650 TABLET ORAL 2 TIMES DAILY
Status: ON HOLD | COMMUNITY
End: 2023-01-01

## 2023-01-01 RX ORDER — DRONABINOL 2.5 MG/1
2.5 CAPSULE ORAL
Qty: 60 CAPSULE | Refills: 2 | Status: CANCELLED | OUTPATIENT
Start: 2023-01-01

## 2023-01-01 RX ORDER — NALOXONE HYDROCHLORIDE 0.4 MG/ML
0.2 INJECTION, SOLUTION INTRAMUSCULAR; INTRAVENOUS; SUBCUTANEOUS
Status: DISCONTINUED | OUTPATIENT
Start: 2023-01-01 | End: 2023-01-01 | Stop reason: HOSPADM

## 2023-01-01 RX ORDER — CARBOXYMETHYLCELLULOSE SODIUM 5 MG/ML
1 SOLUTION/ DROPS OPHTHALMIC
Qty: 1 EACH | Refills: 0 | Status: SHIPPED | OUTPATIENT
Start: 2023-01-01

## 2023-01-01 RX ORDER — DILTIAZEM HYDROCHLORIDE 30 MG/1
30 TABLET, FILM COATED ORAL EVERY 6 HOURS SCHEDULED
Status: DISCONTINUED | OUTPATIENT
Start: 2023-01-01 | End: 2023-01-01

## 2023-01-01 RX ORDER — ISOSORBIDE DINITRATE 10 MG/1
30 TABLET ORAL
Status: DISCONTINUED | OUTPATIENT
Start: 2023-01-01 | End: 2023-01-01

## 2023-01-01 RX ORDER — ATROPINE SULFATE 10 MG/ML
2 SOLUTION/ DROPS OPHTHALMIC EVERY 4 HOURS PRN
Status: DISCONTINUED | OUTPATIENT
Start: 2023-01-01 | End: 2023-01-01 | Stop reason: HOSPADM

## 2023-01-01 RX ORDER — FUROSEMIDE 10 MG/ML
20 INJECTION INTRAMUSCULAR; INTRAVENOUS ONCE
Status: COMPLETED | OUTPATIENT
Start: 2023-01-01 | End: 2023-01-01

## 2023-01-01 RX ORDER — ACETAMINOPHEN 325 MG/1
650 TABLET ORAL EVERY 4 HOURS PRN
Status: DISCONTINUED | OUTPATIENT
Start: 2023-01-01 | End: 2023-01-01 | Stop reason: HOSPADM

## 2023-01-01 RX ORDER — DOCUSATE SODIUM 100 MG/1
100 CAPSULE, LIQUID FILLED ORAL 2 TIMES DAILY
Status: DISCONTINUED | OUTPATIENT
Start: 2023-01-01 | End: 2023-01-01

## 2023-01-01 RX ORDER — LIDOCAINE HYDROCHLORIDE 10 MG/ML
10 INJECTION, SOLUTION EPIDURAL; INFILTRATION; INTRACAUDAL; PERINEURAL ONCE
Status: COMPLETED | OUTPATIENT
Start: 2023-01-01 | End: 2023-01-01

## 2023-01-01 RX ORDER — HYDRALAZINE HYDROCHLORIDE 50 MG/1
50 TABLET, FILM COATED ORAL 3 TIMES DAILY
Status: DISCONTINUED | OUTPATIENT
Start: 2023-01-01 | End: 2023-01-01

## 2023-01-01 RX ORDER — AMIODARONE HYDROCHLORIDE 200 MG/1
400 TABLET ORAL DAILY
Status: DISCONTINUED | OUTPATIENT
Start: 2023-01-01 | End: 2023-01-01

## 2023-01-01 RX ORDER — METOPROLOL TARTRATE 1 MG/ML
5 INJECTION, SOLUTION INTRAVENOUS ONCE
Status: COMPLETED | OUTPATIENT
Start: 2023-01-01 | End: 2023-01-01

## 2023-01-01 RX ORDER — POTASSIUM CHLORIDE 7.45 MG/ML
10 INJECTION INTRAVENOUS
Status: COMPLETED | OUTPATIENT
Start: 2023-01-01 | End: 2023-01-01

## 2023-01-01 RX ORDER — MUPIROCIN 20 MG/G
OINTMENT TOPICAL 3 TIMES DAILY
Qty: 30 G | Refills: 0 | Status: SHIPPED | OUTPATIENT
Start: 2023-01-01 | End: 2023-01-01

## 2023-01-01 RX ORDER — AMLODIPINE BESYLATE 10 MG/1
10 TABLET ORAL DAILY
Status: DISCONTINUED | OUTPATIENT
Start: 2023-01-01 | End: 2023-01-01

## 2023-01-01 RX ORDER — AMLODIPINE BESYLATE 5 MG/1
5 TABLET ORAL DAILY
Status: DISCONTINUED | OUTPATIENT
Start: 2023-01-01 | End: 2023-01-01

## 2023-01-01 RX ORDER — CARVEDILOL 6.25 MG/1
18.75 TABLET ORAL 2 TIMES DAILY WITH MEALS
Status: DISCONTINUED | OUTPATIENT
Start: 2023-01-01 | End: 2023-01-01

## 2023-01-01 RX ORDER — METOPROLOL TARTRATE 1 MG/ML
2.5 INJECTION, SOLUTION INTRAVENOUS EVERY 6 HOURS
Status: DISCONTINUED | OUTPATIENT
Start: 2023-01-01 | End: 2023-01-01

## 2023-01-01 RX ORDER — FUROSEMIDE 10 MG/ML
40 INJECTION INTRAMUSCULAR; INTRAVENOUS
Status: DISCONTINUED | OUTPATIENT
Start: 2023-01-01 | End: 2023-01-01

## 2023-01-01 RX ORDER — ACETAMINOPHEN 325 MG/1
650 TABLET ORAL 2 TIMES DAILY
Status: DISCONTINUED | OUTPATIENT
Start: 2023-01-01 | End: 2023-01-01

## 2023-01-01 RX ORDER — PROCHLORPERAZINE 25 MG
12.5 SUPPOSITORY, RECTAL RECTAL EVERY 12 HOURS PRN
Status: DISCONTINUED | OUTPATIENT
Start: 2023-01-01 | End: 2023-01-01 | Stop reason: HOSPADM

## 2023-01-01 RX ORDER — GLYCOPYRROLATE 0.2 MG/ML
0.2 INJECTION, SOLUTION INTRAMUSCULAR; INTRAVENOUS EVERY 4 HOURS PRN
Status: DISCONTINUED | OUTPATIENT
Start: 2023-01-01 | End: 2023-01-01 | Stop reason: HOSPADM

## 2023-01-01 RX ORDER — DILTIAZEM HYDROCHLORIDE 5 MG/ML
15 INJECTION INTRAVENOUS ONCE
Status: COMPLETED | OUTPATIENT
Start: 2023-01-01 | End: 2023-01-01

## 2023-01-01 RX ORDER — HYDRALAZINE HYDROCHLORIDE 25 MG/1
25 TABLET, FILM COATED ORAL ONCE
Status: DISCONTINUED | OUTPATIENT
Start: 2023-01-01 | End: 2023-01-01

## 2023-01-01 RX ORDER — DOCUSATE SODIUM 100 MG/1
100 CAPSULE, LIQUID FILLED ORAL 2 TIMES DAILY PRN
Status: DISCONTINUED | OUTPATIENT
Start: 2023-01-01 | End: 2023-01-01

## 2023-01-01 RX ORDER — AMOXICILLIN 250 MG
1 CAPSULE ORAL 2 TIMES DAILY PRN
Status: DISCONTINUED | OUTPATIENT
Start: 2023-01-01 | End: 2023-01-01 | Stop reason: HOSPADM

## 2023-01-01 RX ORDER — BISACODYL 10 MG
10 SUPPOSITORY, RECTAL RECTAL DAILY PRN
Status: DISCONTINUED | OUTPATIENT
Start: 2023-01-01 | End: 2023-01-01

## 2023-01-01 RX ORDER — IPRATROPIUM BROMIDE AND ALBUTEROL SULFATE 2.5; .5 MG/3ML; MG/3ML
3 SOLUTION RESPIRATORY (INHALATION) EVERY 4 HOURS PRN
Qty: 54 ML | Refills: 0 | Status: SHIPPED | OUTPATIENT
Start: 2023-01-01

## 2023-01-01 RX ORDER — LORAZEPAM 2 MG/ML
.5-1 CONCENTRATE ORAL
Status: DISCONTINUED | OUTPATIENT
Start: 2023-01-01 | End: 2023-01-01 | Stop reason: HOSPADM

## 2023-01-01 RX ORDER — LIDOCAINE 40 MG/G
CREAM TOPICAL
Status: DISCONTINUED | OUTPATIENT
Start: 2023-01-01 | End: 2023-01-01

## 2023-01-01 RX ORDER — POLYETHYLENE GLYCOL 3350 17 G/17G
17 POWDER, FOR SOLUTION ORAL DAILY PRN
Status: DISCONTINUED | OUTPATIENT
Start: 2023-01-01 | End: 2023-01-01

## 2023-01-01 RX ORDER — POTASSIUM CHLORIDE 20MEQ/15ML
10 LIQUID (ML) ORAL ONCE
Status: COMPLETED | OUTPATIENT
Start: 2023-01-01 | End: 2023-01-01

## 2023-01-01 RX ORDER — POTASSIUM CHLORIDE 1.5 G/1.58G
20 POWDER, FOR SOLUTION ORAL ONCE
Status: DISCONTINUED | OUTPATIENT
Start: 2023-01-01 | End: 2023-01-01

## 2023-01-01 RX ORDER — ALLOPURINOL 100 MG/1
100 TABLET ORAL DAILY
Qty: 90 TABLET | Refills: 0 | Status: SHIPPED | OUTPATIENT
Start: 2023-01-01 | End: 2023-01-01

## 2023-01-01 RX ORDER — IPRATROPIUM BROMIDE AND ALBUTEROL SULFATE 2.5; .5 MG/3ML; MG/3ML
3 SOLUTION RESPIRATORY (INHALATION) EVERY 4 HOURS PRN
Status: DISCONTINUED | OUTPATIENT
Start: 2023-01-01 | End: 2023-01-01 | Stop reason: HOSPADM

## 2023-01-01 RX ORDER — AMOXICILLIN 250 MG
2 CAPSULE ORAL 2 TIMES DAILY PRN
Status: DISCONTINUED | OUTPATIENT
Start: 2023-01-01 | End: 2023-01-01 | Stop reason: HOSPADM

## 2023-01-01 RX ORDER — DRONABINOL 2.5 MG/1
2.5 CAPSULE ORAL
Qty: 60 CAPSULE | Refills: 1 | Status: SHIPPED | OUTPATIENT
Start: 2023-01-01 | End: 2023-01-01

## 2023-01-01 RX ORDER — NITROGLYCERIN 80 MG/1
1 PATCH TRANSDERMAL DAILY
Status: DISCONTINUED | OUTPATIENT
Start: 2023-01-01 | End: 2023-01-01

## 2023-01-01 RX ORDER — CARVEDILOL 6.25 MG/1
6.25 TABLET ORAL 2 TIMES DAILY WITH MEALS
Qty: 180 TABLET | Refills: 0 | Status: SHIPPED | OUTPATIENT
Start: 2023-01-01 | End: 2023-01-01

## 2023-01-01 RX ORDER — MORPHINE SULFATE 30 MG/1
2.5 TABLET ORAL
COMMUNITY

## 2023-01-01 RX ORDER — HYDRALAZINE HYDROCHLORIDE 20 MG/ML
10 INJECTION INTRAMUSCULAR; INTRAVENOUS EVERY 8 HOURS
Status: DISCONTINUED | OUTPATIENT
Start: 2023-01-01 | End: 2023-01-01

## 2023-01-01 RX ORDER — MAGNESIUM OXIDE 400 MG/1
400 TABLET ORAL EVERY 4 HOURS
Status: DISCONTINUED | OUTPATIENT
Start: 2023-01-01 | End: 2023-01-01

## 2023-01-01 RX ORDER — ALLOPURINOL 100 MG/1
100 TABLET ORAL DAILY
Status: DISCONTINUED | OUTPATIENT
Start: 2023-01-01 | End: 2023-01-01

## 2023-01-01 RX ORDER — LIDOCAINE 4 G/G
2 PATCH TOPICAL EVERY 24 HOURS
Qty: 6 PATCH | Refills: 0 | Status: SHIPPED | OUTPATIENT
Start: 2023-01-01 | End: 2023-01-01

## 2023-01-01 RX ORDER — PREDNISOLONE 5 MG/1
5 TABLET ORAL DAILY
Status: DISCONTINUED | OUTPATIENT
Start: 2023-01-01 | End: 2023-01-01

## 2023-01-01 RX ORDER — POTASSIUM CHLORIDE 750 MG/1
10 TABLET, EXTENDED RELEASE ORAL ONCE
Status: DISCONTINUED | OUTPATIENT
Start: 2023-01-01 | End: 2023-01-01

## 2023-01-01 RX ORDER — AMOXICILLIN 250 MG
1 CAPSULE ORAL 2 TIMES DAILY PRN
Qty: 6 TABLET | Refills: 0 | Status: SHIPPED | OUTPATIENT
Start: 2023-01-01

## 2023-01-01 RX ORDER — CLONIDINE HYDROCHLORIDE 0.1 MG/1
0.1 TABLET ORAL AT BEDTIME
Qty: 90 TABLET | Refills: 3 | Status: SHIPPED | OUTPATIENT
Start: 2023-01-01 | End: 2023-01-01

## 2023-01-01 RX ORDER — LEVOTHYROXINE SODIUM ANHYDROUS 100 UG/5ML
18.75 INJECTION, POWDER, LYOPHILIZED, FOR SOLUTION INTRAVENOUS DAILY
Status: DISCONTINUED | OUTPATIENT
Start: 2023-01-01 | End: 2023-01-01

## 2023-01-01 RX ORDER — DRONABINOL 2.5 MG/1
2.5 CAPSULE ORAL
Status: DISCONTINUED | OUTPATIENT
Start: 2023-01-01 | End: 2023-01-01

## 2023-01-01 RX ORDER — PIPERACILLIN SODIUM, TAZOBACTAM SODIUM 2; .25 G/10ML; G/10ML
2.25 INJECTION, POWDER, LYOPHILIZED, FOR SOLUTION INTRAVENOUS EVERY 6 HOURS
Status: DISCONTINUED | OUTPATIENT
Start: 2023-01-01 | End: 2023-01-01

## 2023-01-01 RX ORDER — METOPROLOL TARTRATE 1 MG/ML
2.5 INJECTION, SOLUTION INTRAVENOUS EVERY 8 HOURS PRN
Status: DISCONTINUED | OUTPATIENT
Start: 2023-01-01 | End: 2023-01-01

## 2023-01-01 RX ORDER — HYDRALAZINE HYDROCHLORIDE 50 MG/1
100 TABLET, FILM COATED ORAL 3 TIMES DAILY
Status: DISCONTINUED | OUTPATIENT
Start: 2023-01-01 | End: 2023-01-01

## 2023-01-01 RX ORDER — CARVEDILOL 6.25 MG/1
6.25 TABLET ORAL 2 TIMES DAILY WITH MEALS
Qty: 180 TABLET | Refills: 0 | Status: SHIPPED | OUTPATIENT
Start: 2023-01-01

## 2023-01-01 RX ORDER — CALCIUM CARBONATE/VITAMIN D3 500 MG-10
1 TABLET ORAL 3 TIMES DAILY
COMMUNITY
End: 2023-01-01

## 2023-01-01 RX ORDER — BISACODYL 10 MG
10 SUPPOSITORY, RECTAL RECTAL DAILY PRN
Status: DISCONTINUED | OUTPATIENT
Start: 2023-01-01 | End: 2023-01-01 | Stop reason: HOSPADM

## 2023-01-01 RX ORDER — DILTIAZEM HYDROCHLORIDE 30 MG/1
60 TABLET, FILM COATED ORAL EVERY 6 HOURS SCHEDULED
Status: DISCONTINUED | OUTPATIENT
Start: 2023-01-01 | End: 2023-01-01

## 2023-01-01 RX ORDER — ATROPINE SULFATE 10 MG/ML
2 SOLUTION/ DROPS OPHTHALMIC EVERY 4 HOURS PRN
Qty: 2 ML | Refills: 0 | Status: SHIPPED | OUTPATIENT
Start: 2023-01-01

## 2023-01-01 RX ORDER — ONDANSETRON 4 MG/1
4 TABLET, ORALLY DISINTEGRATING ORAL EVERY 6 HOURS PRN
Status: DISCONTINUED | OUTPATIENT
Start: 2023-01-01 | End: 2023-01-01

## 2023-01-01 RX ORDER — ACETAMINOPHEN 650 MG/1
650 SUPPOSITORY RECTAL EVERY 4 HOURS PRN
Status: DISCONTINUED | OUTPATIENT
Start: 2023-01-01 | End: 2023-01-01 | Stop reason: HOSPADM

## 2023-01-01 RX ORDER — ACETAMINOPHEN 650 MG/1
650 SUPPOSITORY RECTAL EVERY 6 HOURS PRN
Status: CANCELLED | OUTPATIENT
Start: 2023-01-01

## 2023-01-01 RX ORDER — ISOSORBIDE DINITRATE 30 MG/1
30 TABLET ORAL
Qty: 270 TABLET | Refills: 1 | Status: SHIPPED | OUTPATIENT
Start: 2023-01-01 | End: 2023-01-01

## 2023-01-01 RX ORDER — MIRTAZAPINE 15 MG/1
TABLET, FILM COATED ORAL
Qty: 90 TABLET | Refills: 3 | Status: SHIPPED | OUTPATIENT
Start: 2023-01-01

## 2023-01-01 RX ORDER — PROCHLORPERAZINE MALEATE 5 MG
5 TABLET ORAL EVERY 6 HOURS PRN
Status: DISCONTINUED | OUTPATIENT
Start: 2023-01-01 | End: 2023-01-01 | Stop reason: HOSPADM

## 2023-01-01 RX ORDER — CARVEDILOL 6.25 MG/1
6.25 TABLET ORAL ONCE
Status: COMPLETED | OUTPATIENT
Start: 2023-01-01 | End: 2023-01-01

## 2023-01-01 RX ORDER — POLYETHYLENE GLYCOL 3350 17 G/17G
17 POWDER, FOR SOLUTION ORAL DAILY
Status: DISCONTINUED | OUTPATIENT
Start: 2023-01-01 | End: 2023-01-01

## 2023-01-01 RX ADMIN — IPRATROPIUM BROMIDE AND ALBUTEROL SULFATE 3 ML: .5; 3 SOLUTION RESPIRATORY (INHALATION) at 20:39

## 2023-01-01 RX ADMIN — CYCLOBENZAPRINE 2.5 MG: 5 TABLET, FILM COATED ORAL at 18:16

## 2023-01-01 RX ADMIN — ISOSORBIDE DINITRATE 30 MG: 10 TABLET ORAL at 08:55

## 2023-01-01 RX ADMIN — HYDROMORPHONE HYDROCHLORIDE 1 MG: 1 SOLUTION ORAL at 21:20

## 2023-01-01 RX ADMIN — BARIUM SULFATE 25 ML: 400 SUSPENSION ORAL at 11:15

## 2023-01-01 RX ADMIN — ISOSORBIDE DINITRATE 30 MG: 10 TABLET ORAL at 12:55

## 2023-01-01 RX ADMIN — PREDNISONE 5 MG: 5 TABLET ORAL at 08:48

## 2023-01-01 RX ADMIN — METOPROLOL TARTRATE 100 MG: 100 TABLET, FILM COATED ORAL at 09:04

## 2023-01-01 RX ADMIN — ISOSORBIDE DINITRATE 30 MG: 10 TABLET ORAL at 17:41

## 2023-01-01 RX ADMIN — PREDNISONE 5 MG: 5 TABLET ORAL at 08:57

## 2023-01-01 RX ADMIN — HYDRALAZINE HYDROCHLORIDE 75 MG: 25 TABLET ORAL at 13:45

## 2023-01-01 RX ADMIN — AMLODIPINE BESYLATE 10 MG: 10 TABLET ORAL at 08:48

## 2023-01-01 RX ADMIN — ISOSORBIDE DINITRATE 30 MG: 10 TABLET ORAL at 17:32

## 2023-01-01 RX ADMIN — LIDOCAINE 2 PATCH: 560 PATCH PERCUTANEOUS; TOPICAL; TRANSDERMAL at 12:08

## 2023-01-01 RX ADMIN — ISOSORBIDE DINITRATE 30 MG: 10 TABLET ORAL at 17:36

## 2023-01-01 RX ADMIN — IPRATROPIUM BROMIDE AND ALBUTEROL SULFATE 3 ML: .5; 3 SOLUTION RESPIRATORY (INHALATION) at 11:43

## 2023-01-01 RX ADMIN — DOCUSATE SODIUM 100 MG: 100 CAPSULE, LIQUID FILLED ORAL at 20:09

## 2023-01-01 RX ADMIN — FUROSEMIDE 20 MG: 10 INJECTION, SOLUTION INTRAMUSCULAR; INTRAVENOUS at 10:41

## 2023-01-01 RX ADMIN — ALLOPURINOL 100 MG: 100 TABLET ORAL at 08:56

## 2023-01-01 RX ADMIN — APIXABAN 2.5 MG: 2.5 TABLET, FILM COATED ORAL at 08:42

## 2023-01-01 RX ADMIN — HYDROXYCHLOROQUINE SULFATE 200 MG: 200 TABLET ORAL at 08:42

## 2023-01-01 RX ADMIN — ISOSORBIDE DINITRATE 30 MG: 10 TABLET ORAL at 08:42

## 2023-01-01 RX ADMIN — ISOSORBIDE DINITRATE 30 MG: 10 TABLET ORAL at 12:08

## 2023-01-01 RX ADMIN — LORAZEPAM 0.5 MG: 2 LIQUID ORAL at 06:24

## 2023-01-01 RX ADMIN — FUROSEMIDE 40 MG: 10 INJECTION, SOLUTION INTRAMUSCULAR; INTRAVENOUS at 08:40

## 2023-01-01 RX ADMIN — DILTIAZEM HYDROCHLORIDE 60 MG: 30 TABLET, FILM COATED ORAL at 12:22

## 2023-01-01 RX ADMIN — PREDNISONE 5 MG: 5 TABLET ORAL at 07:45

## 2023-01-01 RX ADMIN — SODIUM CHLORIDE 250 ML: 9 INJECTION, SOLUTION INTRAVENOUS at 16:29

## 2023-01-01 RX ADMIN — DILTIAZEM HYDROCHLORIDE 5 MG/HR: 5 INJECTION, SOLUTION INTRAVENOUS at 04:34

## 2023-01-01 RX ADMIN — PIPERACILLIN AND TAZOBACTAM 2.25 G: 2; .25 INJECTION, POWDER, FOR SOLUTION INTRAVENOUS at 16:26

## 2023-01-01 RX ADMIN — HYDRALAZINE HYDROCHLORIDE 75 MG: 25 TABLET ORAL at 13:26

## 2023-01-01 RX ADMIN — IPRATROPIUM BROMIDE AND ALBUTEROL SULFATE 3 ML: .5; 3 SOLUTION RESPIRATORY (INHALATION) at 12:22

## 2023-01-01 RX ADMIN — PANTOPRAZOLE SODIUM 40 MG: 40 TABLET, DELAYED RELEASE ORAL at 09:04

## 2023-01-01 RX ADMIN — CARVEDILOL 25 MG: 25 TABLET, FILM COATED ORAL at 17:28

## 2023-01-01 RX ADMIN — CARVEDILOL 12.5 MG: 6.25 TABLET, FILM COATED ORAL at 17:15

## 2023-01-01 RX ADMIN — SODIUM BICARBONATE 650 MG TABLET 650 MG: at 08:54

## 2023-01-01 RX ADMIN — LEVOTHYROXINE SODIUM 25 MCG: 25 TABLET ORAL at 08:46

## 2023-01-01 RX ADMIN — PIPERACILLIN AND TAZOBACTAM 2.25 G: 2; .25 INJECTION, POWDER, FOR SOLUTION INTRAVENOUS at 05:50

## 2023-01-01 RX ADMIN — ISOSORBIDE DINITRATE 30 MG: 10 TABLET ORAL at 18:15

## 2023-01-01 RX ADMIN — LABETALOL HYDROCHLORIDE 10 MG: 5 INJECTION, SOLUTION INTRAVENOUS at 05:50

## 2023-01-01 RX ADMIN — HYDRALAZINE HYDROCHLORIDE 10 MG: 20 INJECTION INTRAMUSCULAR; INTRAVENOUS at 22:15

## 2023-01-01 RX ADMIN — PIPERACILLIN AND TAZOBACTAM 2.25 G: 2; .25 INJECTION, POWDER, FOR SOLUTION INTRAVENOUS at 04:40

## 2023-01-01 RX ADMIN — POLYETHYLENE GLYCOL 3350 17 G: 17 POWDER, FOR SOLUTION ORAL at 08:13

## 2023-01-01 RX ADMIN — METOPROLOL TARTRATE 5 MG: 5 INJECTION INTRAVENOUS at 19:58

## 2023-01-01 RX ADMIN — LABETALOL HYDROCHLORIDE 10 MG: 5 INJECTION, SOLUTION INTRAVENOUS at 22:08

## 2023-01-01 RX ADMIN — FUROSEMIDE 20 MG: 10 INJECTION, SOLUTION INTRAMUSCULAR; INTRAVENOUS at 13:57

## 2023-01-01 RX ADMIN — LIDOCAINE 1 PATCH: 560 PATCH PERCUTANEOUS; TOPICAL; TRANSDERMAL at 19:53

## 2023-01-01 RX ADMIN — LIDOCAINE 1 PATCH: 560 PATCH PERCUTANEOUS; TOPICAL; TRANSDERMAL at 21:04

## 2023-01-01 RX ADMIN — POTASSIUM CHLORIDE 10 MEQ: 7.46 INJECTION, SOLUTION INTRAVENOUS at 05:43

## 2023-01-01 RX ADMIN — CARVEDILOL 25 MG: 25 TABLET, FILM COATED ORAL at 17:32

## 2023-01-01 RX ADMIN — SODIUM CHLORIDE: 9 INJECTION, SOLUTION INTRAVENOUS at 17:05

## 2023-01-01 RX ADMIN — HYDRALAZINE HYDROCHLORIDE 10 MG: 20 INJECTION INTRAMUSCULAR; INTRAVENOUS at 00:19

## 2023-01-01 RX ADMIN — LEVOTHYROXINE SODIUM 25 MCG: 25 TABLET ORAL at 06:38

## 2023-01-01 RX ADMIN — DEXTROSE MONOHYDRATE: 50 INJECTION, SOLUTION INTRAVENOUS at 13:47

## 2023-01-01 RX ADMIN — DILTIAZEM HYDROCHLORIDE 15 MG/HR: 5 INJECTION, SOLUTION INTRAVENOUS at 06:54

## 2023-01-01 RX ADMIN — CARVEDILOL 6.25 MG: 6.25 TABLET, FILM COATED ORAL at 21:57

## 2023-01-01 RX ADMIN — METOPROLOL TARTRATE 2.5 MG: 5 INJECTION INTRAVENOUS at 17:57

## 2023-01-01 RX ADMIN — ACETAMINOPHEN 650 MG: 325 TABLET ORAL at 16:28

## 2023-01-01 RX ADMIN — ACETAMINOPHEN 650 MG: 325 TABLET ORAL at 21:47

## 2023-01-01 RX ADMIN — PREDNISONE 5 MG: 5 TABLET ORAL at 08:14

## 2023-01-01 RX ADMIN — HYDRALAZINE HYDROCHLORIDE 50 MG: 50 TABLET, FILM COATED ORAL at 20:46

## 2023-01-01 RX ADMIN — POLYETHYLENE GLYCOL 3350 17 G: 17 POWDER, FOR SOLUTION ORAL at 08:28

## 2023-01-01 RX ADMIN — DRONABINOL 2.5 MG: 2.5 CAPSULE ORAL at 06:30

## 2023-01-01 RX ADMIN — LIDOCAINE 1 PATCH: 560 PATCH PERCUTANEOUS; TOPICAL; TRANSDERMAL at 21:00

## 2023-01-01 RX ADMIN — CYCLOBENZAPRINE 2.5 MG: 5 TABLET, FILM COATED ORAL at 08:27

## 2023-01-01 RX ADMIN — ISOSORBIDE DINITRATE 30 MG: 10 TABLET ORAL at 12:40

## 2023-01-01 RX ADMIN — AMLODIPINE BESYLATE 10 MG: 10 TABLET ORAL at 08:42

## 2023-01-01 RX ADMIN — APIXABAN 2.5 MG: 2.5 TABLET, FILM COATED ORAL at 08:57

## 2023-01-01 RX ADMIN — AMLODIPINE BESYLATE 10 MG: 10 TABLET ORAL at 14:34

## 2023-01-01 RX ADMIN — PIPERACILLIN AND TAZOBACTAM 2.25 G: 2; .25 INJECTION, POWDER, FOR SOLUTION INTRAVENOUS at 11:56

## 2023-01-01 RX ADMIN — DOCUSATE SODIUM 100 MG: 100 CAPSULE, LIQUID FILLED ORAL at 08:48

## 2023-01-01 RX ADMIN — ALLOPURINOL 100 MG: 100 TABLET ORAL at 08:19

## 2023-01-01 RX ADMIN — ACETAMINOPHEN 650 MG: 325 TABLET ORAL at 06:18

## 2023-01-01 RX ADMIN — PANTOPRAZOLE SODIUM 40 MG: 40 INJECTION, POWDER, FOR SOLUTION INTRAVENOUS at 08:41

## 2023-01-01 RX ADMIN — PANTOPRAZOLE SODIUM 40 MG: 40 TABLET, DELAYED RELEASE ORAL at 08:55

## 2023-01-01 RX ADMIN — ACETAMINOPHEN 650 MG: 325 TABLET ORAL at 16:32

## 2023-01-01 RX ADMIN — SODIUM BICARBONATE 650 MG TABLET 650 MG: at 09:04

## 2023-01-01 RX ADMIN — METOPROLOL TARTRATE 100 MG: 100 TABLET, FILM COATED ORAL at 11:00

## 2023-01-01 RX ADMIN — ISOSORBIDE DINITRATE 30 MG: 10 TABLET ORAL at 13:26

## 2023-01-01 RX ADMIN — POLYETHYLENE GLYCOL 3350 17 G: 17 POWDER, FOR SOLUTION ORAL at 09:02

## 2023-01-01 RX ADMIN — IPRATROPIUM BROMIDE AND ALBUTEROL SULFATE 3 ML: .5; 3 SOLUTION RESPIRATORY (INHALATION) at 13:09

## 2023-01-01 RX ADMIN — HYDRALAZINE HYDROCHLORIDE 10 MG: 20 INJECTION INTRAMUSCULAR; INTRAVENOUS at 09:48

## 2023-01-01 RX ADMIN — APIXABAN 2.5 MG: 2.5 TABLET, FILM COATED ORAL at 08:14

## 2023-01-01 RX ADMIN — PIPERACILLIN AND TAZOBACTAM 2.25 G: 2; .25 INJECTION, POWDER, FOR SOLUTION INTRAVENOUS at 22:15

## 2023-01-01 RX ADMIN — APIXABAN 2.5 MG: 2.5 TABLET, FILM COATED ORAL at 19:32

## 2023-01-01 RX ADMIN — CARVEDILOL 12.5 MG: 6.25 TABLET, FILM COATED ORAL at 08:59

## 2023-01-01 RX ADMIN — LEVOTHYROXINE SODIUM 25 MCG: 25 TABLET ORAL at 06:43

## 2023-01-01 RX ADMIN — TORSEMIDE 40 MG: 20 TABLET ORAL at 08:27

## 2023-01-01 RX ADMIN — CARVEDILOL 6.25 MG: 6.25 TABLET, FILM COATED ORAL at 19:23

## 2023-01-01 RX ADMIN — DICLOFENAC SODIUM 4 G: 10 GEL TOPICAL at 09:02

## 2023-01-01 RX ADMIN — DEXTROSE AND SODIUM CHLORIDE: 5; 450 INJECTION, SOLUTION INTRAVENOUS at 13:08

## 2023-01-01 RX ADMIN — MAGNESIUM SULFATE HEPTAHYDRATE 2 G: 40 INJECTION, SOLUTION INTRAVENOUS at 00:15

## 2023-01-01 RX ADMIN — LIDOCAINE 1 PATCH: 560 PATCH PERCUTANEOUS; TOPICAL; TRANSDERMAL at 20:45

## 2023-01-01 RX ADMIN — POLYETHYLENE GLYCOL 3350 17 G: 17 POWDER, FOR SOLUTION ORAL at 17:16

## 2023-01-01 RX ADMIN — ISOSORBIDE DINITRATE 30 MG: 10 TABLET ORAL at 08:47

## 2023-01-01 RX ADMIN — MIRTAZAPINE 15 MG: 15 TABLET, FILM COATED ORAL at 21:57

## 2023-01-01 RX ADMIN — SODIUM PHOSPHATE, MONOBASIC, MONOHYDRATE AND SODIUM PHOSPHATE, DIBASIC, ANHYDROUS 9 MMOL: 142; 276 INJECTION, SOLUTION INTRAVENOUS at 10:00

## 2023-01-01 RX ADMIN — TORSEMIDE 40 MG: 20 TABLET ORAL at 14:05

## 2023-01-01 RX ADMIN — CARVEDILOL 6.25 MG: 6.25 TABLET, FILM COATED ORAL at 08:44

## 2023-01-01 RX ADMIN — SODIUM BICARBONATE 650 MG TABLET 650 MG: at 08:13

## 2023-01-01 RX ADMIN — LABETALOL HYDROCHLORIDE 10 MG: 5 INJECTION INTRAVENOUS at 06:34

## 2023-01-01 RX ADMIN — DICLOFENAC SODIUM 4 G: 10 GEL TOPICAL at 18:00

## 2023-01-01 RX ADMIN — POTASSIUM CHLORIDE 10 MEQ: 750 TABLET, EXTENDED RELEASE ORAL at 16:06

## 2023-01-01 RX ADMIN — CARVEDILOL 18.75 MG: 6.25 TABLET, FILM COATED ORAL at 17:36

## 2023-01-01 RX ADMIN — IPRATROPIUM BROMIDE AND ALBUTEROL SULFATE 3 ML: .5; 3 SOLUTION RESPIRATORY (INHALATION) at 19:51

## 2023-01-01 RX ADMIN — DILTIAZEM HYDROCHLORIDE 30 MG: 30 TABLET, FILM COATED ORAL at 06:38

## 2023-01-01 RX ADMIN — DOCUSATE SODIUM LIQUID 100 MG: 100 LIQUID ORAL at 09:03

## 2023-01-01 RX ADMIN — PIPERACILLIN AND TAZOBACTAM 2.25 G: 2; .25 INJECTION, POWDER, FOR SOLUTION INTRAVENOUS at 04:30

## 2023-01-01 RX ADMIN — POTASSIUM CHLORIDE 10 MEQ: 7.46 INJECTION, SOLUTION INTRAVENOUS at 08:40

## 2023-01-01 RX ADMIN — PREDNISONE 5 MG: 5 TABLET ORAL at 08:56

## 2023-01-01 RX ADMIN — SODIUM BICARBONATE 650 MG TABLET 650 MG: at 21:06

## 2023-01-01 RX ADMIN — LIDOCAINE 2 PATCH: 560 PATCH PERCUTANEOUS; TOPICAL; TRANSDERMAL at 08:54

## 2023-01-01 RX ADMIN — PANTOPRAZOLE SODIUM 40 MG: 40 TABLET, DELAYED RELEASE ORAL at 08:42

## 2023-01-01 RX ADMIN — HYDROMORPHONE HYDROCHLORIDE 1 MG: 1 SOLUTION ORAL at 01:06

## 2023-01-01 RX ADMIN — ALLOPURINOL 100 MG: 100 TABLET ORAL at 08:27

## 2023-01-01 RX ADMIN — ALLOPURINOL 100 MG: 100 TABLET ORAL at 08:42

## 2023-01-01 RX ADMIN — PIPERACILLIN AND TAZOBACTAM 2.25 G: 2; .25 INJECTION, POWDER, FOR SOLUTION INTRAVENOUS at 17:38

## 2023-01-01 RX ADMIN — DICLOFENAC SODIUM 4 G: 10 GEL TOPICAL at 21:36

## 2023-01-01 RX ADMIN — HYDRALAZINE HYDROCHLORIDE 50 MG: 50 TABLET, FILM COATED ORAL at 14:34

## 2023-01-01 RX ADMIN — HYDRALAZINE HYDROCHLORIDE 75 MG: 25 TABLET ORAL at 14:03

## 2023-01-01 RX ADMIN — APIXABAN 2.5 MG: 2.5 TABLET, FILM COATED ORAL at 09:54

## 2023-01-01 RX ADMIN — DOCUSATE SODIUM 100 MG: 100 CAPSULE, LIQUID FILLED ORAL at 18:16

## 2023-01-01 RX ADMIN — DILTIAZEM HYDROCHLORIDE 10 MG/HR: 5 INJECTION, SOLUTION INTRAVENOUS at 15:03

## 2023-01-01 RX ADMIN — APIXABAN 2.5 MG: 2.5 TABLET, FILM COATED ORAL at 08:40

## 2023-01-01 RX ADMIN — HYDRALAZINE HYDROCHLORIDE 75 MG: 25 TABLET ORAL at 09:04

## 2023-01-01 RX ADMIN — PIPERACILLIN AND TAZOBACTAM 2.25 G: 2; .25 INJECTION, POWDER, FOR SOLUTION INTRAVENOUS at 17:14

## 2023-01-01 RX ADMIN — METOPROLOL TARTRATE 2.5 MG: 5 INJECTION INTRAVENOUS at 01:36

## 2023-01-01 RX ADMIN — IPRATROPIUM BROMIDE AND ALBUTEROL SULFATE 3 ML: .5; 3 SOLUTION RESPIRATORY (INHALATION) at 07:38

## 2023-01-01 RX ADMIN — IPRATROPIUM BROMIDE AND ALBUTEROL SULFATE 3 ML: .5; 3 SOLUTION RESPIRATORY (INHALATION) at 19:37

## 2023-01-01 RX ADMIN — HYDRALAZINE HYDROCHLORIDE 75 MG: 25 TABLET ORAL at 08:55

## 2023-01-01 RX ADMIN — HYDRALAZINE HYDROCHLORIDE 75 MG: 25 TABLET ORAL at 20:38

## 2023-01-01 RX ADMIN — DILTIAZEM HYDROCHLORIDE 5 MG/HR: 5 INJECTION, SOLUTION INTRAVENOUS at 18:18

## 2023-01-01 RX ADMIN — PREDNISONE 5 MG: 5 TABLET ORAL at 08:42

## 2023-01-01 RX ADMIN — DEXTROSE AND SODIUM CHLORIDE: 5; 450 INJECTION, SOLUTION INTRAVENOUS at 08:42

## 2023-01-01 RX ADMIN — AMIODARONE HYDROCHLORIDE 400 MG: 200 TABLET ORAL at 10:33

## 2023-01-01 RX ADMIN — IPRATROPIUM BROMIDE AND ALBUTEROL SULFATE 3 ML: .5; 3 SOLUTION RESPIRATORY (INHALATION) at 19:49

## 2023-01-01 RX ADMIN — HYDRALAZINE HYDROCHLORIDE 75 MG: 25 TABLET ORAL at 08:39

## 2023-01-01 RX ADMIN — IPRATROPIUM BROMIDE AND ALBUTEROL SULFATE 3 ML: .5; 3 SOLUTION RESPIRATORY (INHALATION) at 07:39

## 2023-01-01 RX ADMIN — PIPERACILLIN AND TAZOBACTAM 2.25 G: 2; .25 INJECTION, POWDER, FOR SOLUTION INTRAVENOUS at 04:33

## 2023-01-01 RX ADMIN — DILTIAZEM HYDROCHLORIDE 60 MG: 60 TABLET, FILM COATED ORAL at 06:43

## 2023-01-01 RX ADMIN — HYDROXYCHLOROQUINE SULFATE 200 MG: 200 TABLET ORAL at 08:55

## 2023-01-01 RX ADMIN — CARVEDILOL 25 MG: 25 TABLET, FILM COATED ORAL at 17:45

## 2023-01-01 RX ADMIN — IPRATROPIUM BROMIDE AND ALBUTEROL SULFATE 3 ML: .5; 3 SOLUTION RESPIRATORY (INHALATION) at 19:48

## 2023-01-01 RX ADMIN — DICLOFENAC SODIUM 4 G: 10 GEL TOPICAL at 12:08

## 2023-01-01 RX ADMIN — ALLOPURINOL 100 MG: 100 TABLET ORAL at 08:40

## 2023-01-01 RX ADMIN — ALLOPURINOL 100 MG: 100 TABLET ORAL at 09:04

## 2023-01-01 RX ADMIN — APIXABAN 2.5 MG: 2.5 TABLET, FILM COATED ORAL at 08:27

## 2023-01-01 RX ADMIN — IPRATROPIUM BROMIDE AND ALBUTEROL SULFATE 3 ML: .5; 3 SOLUTION RESPIRATORY (INHALATION) at 18:58

## 2023-01-01 RX ADMIN — HYDROXYCHLOROQUINE SULFATE 200 MG: 200 TABLET ORAL at 08:20

## 2023-01-01 RX ADMIN — HYDRALAZINE HYDROCHLORIDE 75 MG: 25 TABLET ORAL at 08:27

## 2023-01-01 RX ADMIN — SODIUM PHOSPHATE, DIBASIC AND SODIUM PHOSPHATE, MONOBASIC 1 ENEMA: 7; 19 ENEMA RECTAL at 17:16

## 2023-01-01 RX ADMIN — PROCHLORPERAZINE MALEATE 5 MG: 5 TABLET ORAL at 20:31

## 2023-01-01 RX ADMIN — HYDRALAZINE HYDROCHLORIDE 50 MG: 50 TABLET, FILM COATED ORAL at 19:53

## 2023-01-01 RX ADMIN — PREDNISONE 5 MG: 5 TABLET ORAL at 09:54

## 2023-01-01 RX ADMIN — HYDRALAZINE HYDROCHLORIDE 75 MG: 25 TABLET ORAL at 08:46

## 2023-01-01 RX ADMIN — SODIUM BICARBONATE 650 MG TABLET 650 MG: at 08:42

## 2023-01-01 RX ADMIN — HYDRALAZINE HYDROCHLORIDE 100 MG: 50 TABLET, FILM COATED ORAL at 21:57

## 2023-01-01 RX ADMIN — POTASSIUM CHLORIDE 10 MEQ: 7.46 INJECTION, SOLUTION INTRAVENOUS at 08:41

## 2023-01-01 RX ADMIN — HYDRALAZINE HYDROCHLORIDE 10 MG: 20 INJECTION INTRAMUSCULAR; INTRAVENOUS at 00:42

## 2023-01-01 RX ADMIN — ALLOPURINOL 100 MG: 100 TABLET ORAL at 08:55

## 2023-01-01 RX ADMIN — HUMAN ALBUMIN MICROSPHERES AND PERFLUTREN 3 ML: 10; .22 INJECTION, SOLUTION INTRAVENOUS at 08:18

## 2023-01-01 RX ADMIN — ACETAMINOPHEN 650 MG: 325 TABLET ORAL at 23:19

## 2023-01-01 RX ADMIN — ISOSORBIDE DINITRATE 30 MG: 10 TABLET ORAL at 09:05

## 2023-01-01 RX ADMIN — APIXABAN 2.5 MG: 2.5 TABLET, FILM COATED ORAL at 21:47

## 2023-01-01 RX ADMIN — PANTOPRAZOLE SODIUM 40 MG: 40 TABLET, DELAYED RELEASE ORAL at 08:39

## 2023-01-01 RX ADMIN — BISACODYL 10 MG: 10 SUPPOSITORY RECTAL at 10:36

## 2023-01-01 RX ADMIN — HYDRALAZINE HYDROCHLORIDE 75 MG: 25 TABLET ORAL at 20:15

## 2023-01-01 RX ADMIN — DEXTROSE AND SODIUM CHLORIDE: 5; 450 INJECTION, SOLUTION INTRAVENOUS at 19:22

## 2023-01-01 RX ADMIN — BARIUM SULFATE 15 ML: 400 SUSPENSION ORAL at 11:13

## 2023-01-01 RX ADMIN — APIXABAN 2.5 MG: 2.5 TABLET, FILM COATED ORAL at 20:50

## 2023-01-01 RX ADMIN — HYDRALAZINE HYDROCHLORIDE 100 MG: 50 TABLET, FILM COATED ORAL at 17:15

## 2023-01-01 RX ADMIN — POLYETHYLENE GLYCOL 3350 17 G: 17 POWDER, FOR SOLUTION ORAL at 08:58

## 2023-01-01 RX ADMIN — POTASSIUM CHLORIDE 10 MEQ: 750 TABLET, EXTENDED RELEASE ORAL at 08:26

## 2023-01-01 RX ADMIN — PIPERACILLIN AND TAZOBACTAM 2.25 G: 2; .25 INJECTION, POWDER, FOR SOLUTION INTRAVENOUS at 16:29

## 2023-01-01 RX ADMIN — Medication 400 MG: at 12:07

## 2023-01-01 RX ADMIN — ALLOPURINOL 100 MG: 100 TABLET ORAL at 09:54

## 2023-01-01 RX ADMIN — SODIUM CHLORIDE: 9 INJECTION, SOLUTION INTRAVENOUS at 16:29

## 2023-01-01 RX ADMIN — LABETALOL HYDROCHLORIDE 10 MG: 5 INJECTION INTRAVENOUS at 07:06

## 2023-01-01 RX ADMIN — SODIUM BICARBONATE 650 MG TABLET 650 MG: at 20:50

## 2023-01-01 RX ADMIN — IPRATROPIUM BROMIDE AND ALBUTEROL SULFATE 3 ML: .5; 3 SOLUTION RESPIRATORY (INHALATION) at 12:40

## 2023-01-01 RX ADMIN — MAGNESIUM SULFATE HEPTAHYDRATE 2 G: 40 INJECTION, SOLUTION INTRAVENOUS at 06:54

## 2023-01-01 RX ADMIN — TORSEMIDE 20 MG: 20 TABLET ORAL at 09:31

## 2023-01-01 RX ADMIN — IPRATROPIUM BROMIDE AND ALBUTEROL SULFATE 3 ML: .5; 3 SOLUTION RESPIRATORY (INHALATION) at 08:20

## 2023-01-01 RX ADMIN — HYDRALAZINE HYDROCHLORIDE 50 MG: 50 TABLET, FILM COATED ORAL at 13:30

## 2023-01-01 RX ADMIN — IPRATROPIUM BROMIDE AND ALBUTEROL SULFATE 3 ML: .5; 3 SOLUTION RESPIRATORY (INHALATION) at 13:45

## 2023-01-01 RX ADMIN — ISOSORBIDE DINITRATE 30 MG: 10 TABLET ORAL at 09:55

## 2023-01-01 RX ADMIN — POTASSIUM CHLORIDE 10 MEQ: 750 TABLET, EXTENDED RELEASE ORAL at 08:13

## 2023-01-01 RX ADMIN — LEVOTHYROXINE SODIUM 25 MCG: 25 TABLET ORAL at 08:55

## 2023-01-01 RX ADMIN — HYDRALAZINE HYDROCHLORIDE 50 MG: 50 TABLET, FILM COATED ORAL at 15:47

## 2023-01-01 RX ADMIN — PIPERACILLIN AND TAZOBACTAM 2.25 G: 2; .25 INJECTION, POWDER, FOR SOLUTION INTRAVENOUS at 22:46

## 2023-01-01 RX ADMIN — ALLOPURINOL 100 MG: 100 TABLET ORAL at 08:48

## 2023-01-01 RX ADMIN — DEXTROSE MONOHYDRATE: 50 INJECTION, SOLUTION INTRAVENOUS at 08:53

## 2023-01-01 RX ADMIN — LORAZEPAM 0.5 MG: 2 INJECTION INTRAMUSCULAR; INTRAVENOUS at 18:40

## 2023-01-01 RX ADMIN — LIDOCAINE 2 PATCH: 560 PATCH PERCUTANEOUS; TOPICAL; TRANSDERMAL at 10:13

## 2023-01-01 RX ADMIN — HYDRALAZINE HYDROCHLORIDE 50 MG: 50 TABLET, FILM COATED ORAL at 08:42

## 2023-01-01 RX ADMIN — ACETAMINOPHEN 650 MG: 325 TABLET ORAL at 12:38

## 2023-01-01 RX ADMIN — ISOSORBIDE DINITRATE 30 MG: 10 TABLET ORAL at 12:38

## 2023-01-01 RX ADMIN — SODIUM BICARBONATE 650 MG TABLET 650 MG: at 20:16

## 2023-01-01 RX ADMIN — DOCUSATE SODIUM 100 MG: 100 CAPSULE, LIQUID FILLED ORAL at 09:54

## 2023-01-01 RX ADMIN — IPRATROPIUM BROMIDE AND ALBUTEROL SULFATE 3 ML: .5; 3 SOLUTION RESPIRATORY (INHALATION) at 07:58

## 2023-01-01 RX ADMIN — ISOSORBIDE DINITRATE 30 MG: 10 TABLET ORAL at 12:20

## 2023-01-01 RX ADMIN — ISOSORBIDE DINITRATE 30 MG: 10 TABLET ORAL at 11:48

## 2023-01-01 RX ADMIN — METOPROLOL TARTRATE 2.5 MG: 5 INJECTION INTRAVENOUS at 21:01

## 2023-01-01 RX ADMIN — IPRATROPIUM BROMIDE AND ALBUTEROL SULFATE 3 ML: .5; 3 SOLUTION RESPIRATORY (INHALATION) at 07:28

## 2023-01-01 RX ADMIN — SODIUM BICARBONATE 650 MG TABLET 650 MG: at 09:55

## 2023-01-01 RX ADMIN — IPRATROPIUM BROMIDE AND ALBUTEROL SULFATE 3 ML: .5; 3 SOLUTION RESPIRATORY (INHALATION) at 20:44

## 2023-01-01 RX ADMIN — SODIUM BICARBONATE 650 MG TABLET 650 MG: at 20:22

## 2023-01-01 RX ADMIN — POTASSIUM CHLORIDE 10 MEQ: 7.46 INJECTION, SOLUTION INTRAVENOUS at 11:35

## 2023-01-01 RX ADMIN — DICLOFENAC SODIUM 4 G: 10 GEL TOPICAL at 12:33

## 2023-01-01 RX ADMIN — LIDOCAINE 1 PATCH: 560 PATCH PERCUTANEOUS; TOPICAL; TRANSDERMAL at 20:08

## 2023-01-01 RX ADMIN — HYDRALAZINE HYDROCHLORIDE 10 MG: 20 INJECTION INTRAMUSCULAR; INTRAVENOUS at 17:14

## 2023-01-01 RX ADMIN — APIXABAN 2.5 MG: 2.5 TABLET, FILM COATED ORAL at 08:55

## 2023-01-01 RX ADMIN — APIXABAN 2.5 MG: 2.5 TABLET, FILM COATED ORAL at 19:23

## 2023-01-01 RX ADMIN — APIXABAN 2.5 MG: 2.5 TABLET, FILM COATED ORAL at 20:39

## 2023-01-01 RX ADMIN — POTASSIUM CHLORIDE 10 MEQ: 7.46 INJECTION, SOLUTION INTRAVENOUS at 06:22

## 2023-01-01 RX ADMIN — HYDRALAZINE HYDROCHLORIDE 100 MG: 50 TABLET, FILM COATED ORAL at 08:42

## 2023-01-01 RX ADMIN — LORAZEPAM 1 MG: 2 INJECTION INTRAMUSCULAR; INTRAVENOUS at 14:02

## 2023-01-01 RX ADMIN — PIPERACILLIN AND TAZOBACTAM 2.25 G: 2; .25 INJECTION, POWDER, FOR SOLUTION INTRAVENOUS at 11:00

## 2023-01-01 RX ADMIN — METOPROLOL TARTRATE 2.5 MG: 5 INJECTION INTRAVENOUS at 08:16

## 2023-01-01 RX ADMIN — APIXABAN 2.5 MG: 2.5 TABLET, FILM COATED ORAL at 09:04

## 2023-01-01 RX ADMIN — LIDOCAINE 1 PATCH: 560 PATCH PERCUTANEOUS; TOPICAL; TRANSDERMAL at 19:49

## 2023-01-01 RX ADMIN — SODIUM BICARBONATE 650 MG TABLET 650 MG: at 19:30

## 2023-01-01 RX ADMIN — HYDRALAZINE HYDROCHLORIDE 10 MG: 20 INJECTION INTRAMUSCULAR; INTRAVENOUS at 04:36

## 2023-01-01 RX ADMIN — AMLODIPINE BESYLATE 5 MG: 5 TABLET ORAL at 15:16

## 2023-01-01 RX ADMIN — ISOSORBIDE DINITRATE 30 MG: 10 TABLET ORAL at 08:40

## 2023-01-01 RX ADMIN — PIPERACILLIN AND TAZOBACTAM 2.25 G: 2; .25 INJECTION, POWDER, FOR SOLUTION INTRAVENOUS at 00:15

## 2023-01-01 RX ADMIN — DILTIAZEM HYDROCHLORIDE 60 MG: 60 TABLET, FILM COATED ORAL at 09:54

## 2023-01-01 RX ADMIN — SODIUM BICARBONATE 650 MG TABLET 650 MG: at 08:46

## 2023-01-01 RX ADMIN — PIPERACILLIN AND TAZOBACTAM 2.25 G: 2; .25 INJECTION, POWDER, FOR SOLUTION INTRAVENOUS at 16:09

## 2023-01-01 RX ADMIN — HYDRALAZINE HYDROCHLORIDE 75 MG: 25 TABLET ORAL at 19:31

## 2023-01-01 RX ADMIN — PIPERACILLIN AND TAZOBACTAM 2.25 G: 2; .25 INJECTION, POWDER, FOR SOLUTION INTRAVENOUS at 16:43

## 2023-01-01 RX ADMIN — HYDRALAZINE HYDROCHLORIDE 75 MG: 25 TABLET ORAL at 20:50

## 2023-01-01 RX ADMIN — ISOSORBIDE DINITRATE 30 MG: 10 TABLET ORAL at 08:54

## 2023-01-01 RX ADMIN — DICLOFENAC SODIUM 4 G: 10 GEL TOPICAL at 21:59

## 2023-01-01 RX ADMIN — CARVEDILOL 6.25 MG: 6.25 TABLET, FILM COATED ORAL at 12:38

## 2023-01-01 RX ADMIN — SENNOSIDES AND DOCUSATE SODIUM 1 TABLET: 50; 8.6 TABLET ORAL at 19:57

## 2023-01-01 RX ADMIN — IPRATROPIUM BROMIDE AND ALBUTEROL SULFATE 3 ML: .5; 3 SOLUTION RESPIRATORY (INHALATION) at 08:09

## 2023-01-01 RX ADMIN — SODIUM PHOSPHATE, MONOBASIC, MONOHYDRATE AND SODIUM PHOSPHATE, DIBASIC, ANHYDROUS 9 MMOL: 142; 276 INJECTION, SOLUTION INTRAVENOUS at 23:37

## 2023-01-01 RX ADMIN — AMIODARONE HYDROCHLORIDE 400 MG: 200 TABLET ORAL at 08:54

## 2023-01-01 RX ADMIN — PROCHLORPERAZINE MALEATE 5 MG: 5 TABLET ORAL at 11:27

## 2023-01-01 RX ADMIN — POTASSIUM CHLORIDE 10 MEQ: 7.46 INJECTION, SOLUTION INTRAVENOUS at 10:02

## 2023-01-01 RX ADMIN — HYDRALAZINE HYDROCHLORIDE 50 MG: 50 TABLET, FILM COATED ORAL at 20:22

## 2023-01-01 RX ADMIN — SODIUM BICARBONATE 650 MG TABLET 650 MG: at 08:28

## 2023-01-01 RX ADMIN — DICLOFENAC SODIUM 4 G: 10 GEL TOPICAL at 12:00

## 2023-01-01 RX ADMIN — IPRATROPIUM BROMIDE AND ALBUTEROL SULFATE 3 ML: .5; 3 SOLUTION RESPIRATORY (INHALATION) at 12:58

## 2023-01-01 RX ADMIN — HYDRALAZINE HYDROCHLORIDE 10 MG: 20 INJECTION INTRAMUSCULAR; INTRAVENOUS at 00:51

## 2023-01-01 RX ADMIN — METOPROLOL TARTRATE 2.5 MG: 5 INJECTION INTRAVENOUS at 10:25

## 2023-01-01 RX ADMIN — LIDOCAINE 1 PATCH: 560 PATCH PERCUTANEOUS; TOPICAL; TRANSDERMAL at 21:56

## 2023-01-01 RX ADMIN — SODIUM BICARBONATE 650 MG TABLET 650 MG: at 19:53

## 2023-01-01 RX ADMIN — LIDOCAINE 1 PATCH: 560 PATCH PERCUTANEOUS; TOPICAL; TRANSDERMAL at 07:44

## 2023-01-01 RX ADMIN — IPRATROPIUM BROMIDE AND ALBUTEROL SULFATE 3 ML: .5; 3 SOLUTION RESPIRATORY (INHALATION) at 20:49

## 2023-01-01 RX ADMIN — PANTOPRAZOLE SODIUM 40 MG: 40 TABLET, DELAYED RELEASE ORAL at 08:19

## 2023-01-01 RX ADMIN — BISACODYL 10 MG: 10 SUPPOSITORY RECTAL at 17:42

## 2023-01-01 RX ADMIN — PANTOPRAZOLE SODIUM 40 MG: 40 TABLET, DELAYED RELEASE ORAL at 08:57

## 2023-01-01 RX ADMIN — POTASSIUM CHLORIDE 10 MEQ: 20 SOLUTION ORAL at 17:44

## 2023-01-01 RX ADMIN — LEVOTHYROXINE SODIUM 25 MCG: 25 TABLET ORAL at 09:54

## 2023-01-01 RX ADMIN — DEXTROSE AND SODIUM CHLORIDE: 5; 450 INJECTION, SOLUTION INTRAVENOUS at 04:30

## 2023-01-01 RX ADMIN — PIPERACILLIN AND TAZOBACTAM 2.25 G: 2; .25 INJECTION, POWDER, FOR SOLUTION INTRAVENOUS at 12:07

## 2023-01-01 RX ADMIN — ISOSORBIDE DINITRATE 30 MG: 10 TABLET ORAL at 12:07

## 2023-01-01 RX ADMIN — SODIUM BICARBONATE 650 MG TABLET 650 MG: at 21:47

## 2023-01-01 RX ADMIN — LEVOTHYROXINE SODIUM 25 MCG: 25 TABLET ORAL at 06:30

## 2023-01-01 RX ADMIN — APIXABAN 2.5 MG: 2.5 TABLET, FILM COATED ORAL at 20:22

## 2023-01-01 RX ADMIN — SODIUM BICARBONATE 650 MG TABLET 650 MG: at 21:57

## 2023-01-01 RX ADMIN — DEXTROSE AND SODIUM CHLORIDE: 5; 450 INJECTION, SOLUTION INTRAVENOUS at 22:15

## 2023-01-01 RX ADMIN — CARVEDILOL 25 MG: 25 TABLET, FILM COATED ORAL at 17:41

## 2023-01-01 RX ADMIN — PIPERACILLIN AND TAZOBACTAM 2.25 G: 2; .25 INJECTION, POWDER, FOR SOLUTION INTRAVENOUS at 22:50

## 2023-01-01 RX ADMIN — HYDROXYCHLOROQUINE SULFATE 200 MG: 200 TABLET ORAL at 09:04

## 2023-01-01 RX ADMIN — HYDRALAZINE HYDROCHLORIDE 75 MG: 25 TABLET ORAL at 21:47

## 2023-01-01 RX ADMIN — HYDRALAZINE HYDROCHLORIDE 75 MG: 25 TABLET ORAL at 14:14

## 2023-01-01 RX ADMIN — LABETALOL HYDROCHLORIDE 10 MG: 5 INJECTION INTRAVENOUS at 16:23

## 2023-01-01 RX ADMIN — HYDRALAZINE HYDROCHLORIDE 100 MG: 50 TABLET, FILM COATED ORAL at 14:14

## 2023-01-01 RX ADMIN — AMIODARONE HYDROCHLORIDE 400 MG: 200 TABLET ORAL at 09:04

## 2023-01-01 RX ADMIN — DILTIAZEM HYDROCHLORIDE 10 MG/HR: 5 INJECTION, SOLUTION INTRAVENOUS at 06:39

## 2023-01-01 RX ADMIN — ISOSORBIDE DINITRATE 30 MG: 10 TABLET ORAL at 17:45

## 2023-01-01 RX ADMIN — ACETAMINOPHEN 650 MG: 325 TABLET ORAL at 19:57

## 2023-01-01 RX ADMIN — PANTOPRAZOLE SODIUM 40 MG: 40 TABLET, DELAYED RELEASE ORAL at 08:28

## 2023-01-01 RX ADMIN — Medication 1 MG: at 01:33

## 2023-01-01 RX ADMIN — METOPROLOL TARTRATE 100 MG: 100 TABLET, FILM COATED ORAL at 20:43

## 2023-01-01 RX ADMIN — PROCHLORPERAZINE EDISYLATE 5 MG: 5 INJECTION INTRAMUSCULAR; INTRAVENOUS at 11:27

## 2023-01-01 RX ADMIN — MAGNESIUM SULFATE HEPTAHYDRATE 2 G: 40 INJECTION, SOLUTION INTRAVENOUS at 06:58

## 2023-01-01 RX ADMIN — PREDNISONE 5 MG: 5 TABLET ORAL at 08:40

## 2023-01-01 RX ADMIN — ACETAMINOPHEN 650 MG: 325 TABLET ORAL at 10:37

## 2023-01-01 RX ADMIN — ISOSORBIDE DINITRATE 30 MG: 10 TABLET ORAL at 17:15

## 2023-01-01 RX ADMIN — HYDRALAZINE HYDROCHLORIDE 10 MG: 20 INJECTION INTRAMUSCULAR; INTRAVENOUS at 05:06

## 2023-01-01 RX ADMIN — PIPERACILLIN AND TAZOBACTAM 2.25 G: 2; .25 INJECTION, POWDER, FOR SOLUTION INTRAVENOUS at 04:42

## 2023-01-01 RX ADMIN — SODIUM BICARBONATE 650 MG TABLET 650 MG: at 08:39

## 2023-01-01 RX ADMIN — PREDNISONE 5 MG: 5 TABLET ORAL at 08:30

## 2023-01-01 RX ADMIN — DILTIAZEM HYDROCHLORIDE 5 MG/HR: 5 INJECTION, SOLUTION INTRAVENOUS at 20:04

## 2023-01-01 RX ADMIN — ACETAMINOPHEN 650 MG: 325 TABLET ORAL at 20:16

## 2023-01-01 RX ADMIN — SODIUM CHLORIDE 1000 ML: 9 INJECTION, SOLUTION INTRAVENOUS at 13:57

## 2023-01-01 RX ADMIN — Medication 400 MG: at 09:01

## 2023-01-01 RX ADMIN — DILTIAZEM HYDROCHLORIDE 30 MG: 30 TABLET, FILM COATED ORAL at 23:38

## 2023-01-01 RX ADMIN — PROCHLORPERAZINE MALEATE 5 MG: 5 TABLET ORAL at 09:31

## 2023-01-01 RX ADMIN — APIXABAN 2.5 MG: 2.5 TABLET, FILM COATED ORAL at 20:46

## 2023-01-01 RX ADMIN — PANTOPRAZOLE SODIUM 40 MG: 40 INJECTION, POWDER, FOR SOLUTION INTRAVENOUS at 08:16

## 2023-01-01 RX ADMIN — ACETAMINOPHEN 650 MG: 325 TABLET ORAL at 13:45

## 2023-01-01 RX ADMIN — DICLOFENAC SODIUM 4 G: 10 GEL TOPICAL at 21:30

## 2023-01-01 RX ADMIN — CARVEDILOL 25 MG: 25 TABLET, FILM COATED ORAL at 08:55

## 2023-01-01 RX ADMIN — HYDRALAZINE HYDROCHLORIDE 10 MG: 20 INJECTION INTRAMUSCULAR; INTRAVENOUS at 01:07

## 2023-01-01 RX ADMIN — ISOSORBIDE DINITRATE 30 MG: 10 TABLET ORAL at 08:27

## 2023-01-01 RX ADMIN — PROCHLORPERAZINE MALEATE 5 MG: 5 TABLET ORAL at 15:16

## 2023-01-01 RX ADMIN — PIPERACILLIN AND TAZOBACTAM 2.25 G: 2; .25 INJECTION, POWDER, FOR SOLUTION INTRAVENOUS at 11:34

## 2023-01-01 RX ADMIN — LEVOTHYROXINE SODIUM 25 MCG: 25 TABLET ORAL at 08:42

## 2023-01-01 RX ADMIN — PANTOPRAZOLE SODIUM 40 MG: 40 TABLET, DELAYED RELEASE ORAL at 08:48

## 2023-01-01 RX ADMIN — IPRATROPIUM BROMIDE AND ALBUTEROL SULFATE 3 ML: .5; 3 SOLUTION RESPIRATORY (INHALATION) at 20:12

## 2023-01-01 RX ADMIN — CARVEDILOL 6.25 MG: 6.25 TABLET, FILM COATED ORAL at 08:45

## 2023-01-01 RX ADMIN — CARVEDILOL 18.75 MG: 6.25 TABLET, FILM COATED ORAL at 08:39

## 2023-01-01 RX ADMIN — PREDNISONE 5 MG: 5 TABLET ORAL at 08:28

## 2023-01-01 RX ADMIN — PREDNISONE 5 MG: 5 TABLET ORAL at 12:38

## 2023-01-01 RX ADMIN — SENNOSIDES AND DOCUSATE SODIUM 2 TABLET: 50; 8.6 TABLET ORAL at 08:41

## 2023-01-01 RX ADMIN — HYDROMORPHONE HYDROCHLORIDE 1 MG: 1 SOLUTION ORAL at 04:19

## 2023-01-01 RX ADMIN — DOCUSATE SODIUM 100 MG: 100 CAPSULE, LIQUID FILLED ORAL at 08:57

## 2023-01-01 RX ADMIN — HYDRALAZINE HYDROCHLORIDE 10 MG: 20 INJECTION INTRAMUSCULAR; INTRAVENOUS at 04:49

## 2023-01-01 RX ADMIN — PIPERACILLIN AND TAZOBACTAM 2.25 G: 2; .25 INJECTION, POWDER, FOR SOLUTION INTRAVENOUS at 05:31

## 2023-01-01 RX ADMIN — PANTOPRAZOLE SODIUM 40 MG: 40 TABLET, DELAYED RELEASE ORAL at 09:54

## 2023-01-01 RX ADMIN — METOPROLOL TARTRATE 100 MG: 100 TABLET, FILM COATED ORAL at 20:50

## 2023-01-01 RX ADMIN — DILTIAZEM HYDROCHLORIDE 10 MG/HR: 5 INJECTION, SOLUTION INTRAVENOUS at 19:28

## 2023-01-01 RX ADMIN — IPRATROPIUM BROMIDE AND ALBUTEROL SULFATE 3 ML: .5; 3 SOLUTION RESPIRATORY (INHALATION) at 12:33

## 2023-01-01 RX ADMIN — PREDNISONE 5 MG: 5 TABLET ORAL at 09:04

## 2023-01-01 RX ADMIN — HYDRALAZINE HYDROCHLORIDE 75 MG: 25 TABLET ORAL at 14:05

## 2023-01-01 RX ADMIN — HYDRALAZINE HYDROCHLORIDE 10 MG: 20 INJECTION INTRAMUSCULAR; INTRAVENOUS at 16:14

## 2023-01-01 RX ADMIN — PIPERACILLIN AND TAZOBACTAM 2.25 G: 2; .25 INJECTION, POWDER, FOR SOLUTION INTRAVENOUS at 23:37

## 2023-01-01 RX ADMIN — PIPERACILLIN AND TAZOBACTAM 2.25 G: 2; .25 INJECTION, POWDER, FOR SOLUTION INTRAVENOUS at 05:12

## 2023-01-01 RX ADMIN — ISOSORBIDE DINITRATE 30 MG: 10 TABLET ORAL at 08:38

## 2023-01-01 RX ADMIN — ACETAMINOPHEN 650 MG: 325 TABLET ORAL at 02:52

## 2023-01-01 RX ADMIN — HYDRALAZINE HYDROCHLORIDE 50 MG: 50 TABLET, FILM COATED ORAL at 19:23

## 2023-01-01 RX ADMIN — DILTIAZEM HYDROCHLORIDE 15 MG: 5 INJECTION INTRAVENOUS at 03:11

## 2023-01-01 RX ADMIN — LORAZEPAM 0.5 MG: 2 INJECTION INTRAMUSCULAR; INTRAVENOUS at 04:10

## 2023-01-01 RX ADMIN — HYDRALAZINE HYDROCHLORIDE 10 MG: 20 INJECTION INTRAMUSCULAR; INTRAVENOUS at 16:09

## 2023-01-01 RX ADMIN — APIXABAN 2.5 MG: 2.5 TABLET, FILM COATED ORAL at 20:16

## 2023-01-01 RX ADMIN — DOCUSATE SODIUM 100 MG: 100 CAPSULE, LIQUID FILLED ORAL at 20:47

## 2023-01-01 RX ADMIN — LEVOTHYROXINE SODIUM 25 MCG: 25 TABLET ORAL at 08:39

## 2023-01-01 RX ADMIN — LIDOCAINE 2 PATCH: 560 PATCH PERCUTANEOUS; TOPICAL; TRANSDERMAL at 09:00

## 2023-01-01 RX ADMIN — PIPERACILLIN AND TAZOBACTAM 2.25 G: 2; .25 INJECTION, POWDER, FOR SOLUTION INTRAVENOUS at 23:42

## 2023-01-01 RX ADMIN — LEVOTHYROXINE SODIUM 25 MCG: 25 TABLET ORAL at 06:08

## 2023-01-01 RX ADMIN — CARVEDILOL 12.5 MG: 6.25 TABLET, FILM COATED ORAL at 09:54

## 2023-01-01 RX ADMIN — ACETAMINOPHEN 650 MG: 325 TABLET ORAL at 05:10

## 2023-01-01 RX ADMIN — LABETALOL HYDROCHLORIDE 10 MG: 5 INJECTION, SOLUTION INTRAVENOUS at 00:07

## 2023-01-01 RX ADMIN — SODIUM BICARBONATE 650 MG TABLET 650 MG: at 20:39

## 2023-01-01 RX ADMIN — ISOSORBIDE DINITRATE 30 MG: 10 TABLET ORAL at 17:28

## 2023-01-01 RX ADMIN — MIRTAZAPINE 15 MG: 15 TABLET, FILM COATED ORAL at 23:38

## 2023-01-01 RX ADMIN — IPRATROPIUM BROMIDE AND ALBUTEROL SULFATE 3 ML: .5; 3 SOLUTION RESPIRATORY (INHALATION) at 07:15

## 2023-01-01 RX ADMIN — LIDOCAINE HYDROCHLORIDE 10 ML: 10 INJECTION, SOLUTION EPIDURAL; INFILTRATION; INTRACAUDAL; PERINEURAL at 14:14

## 2023-01-01 RX ADMIN — METOPROLOL TARTRATE 2.5 MG: 5 INJECTION INTRAVENOUS at 02:29

## 2023-01-01 RX ADMIN — SODIUM CHLORIDE: 9 INJECTION, SOLUTION INTRAVENOUS at 04:44

## 2023-01-01 RX ADMIN — Medication 400 MG: at 09:54

## 2023-01-01 RX ADMIN — AMLODIPINE BESYLATE 5 MG: 5 TABLET ORAL at 08:40

## 2023-01-01 RX ADMIN — HYDRALAZINE HYDROCHLORIDE 50 MG: 50 TABLET, FILM COATED ORAL at 08:40

## 2023-01-01 RX ADMIN — TORSEMIDE 40 MG: 20 TABLET ORAL at 09:04

## 2023-01-01 RX ADMIN — HYDROXYCHLOROQUINE SULFATE 200 MG: 200 TABLET ORAL at 08:28

## 2023-01-01 RX ADMIN — DEXTROSE AND SODIUM CHLORIDE: 5; 450 INJECTION, SOLUTION INTRAVENOUS at 09:11

## 2023-01-01 RX ADMIN — DEXTROSE AND SODIUM CHLORIDE: 5; 450 INJECTION, SOLUTION INTRAVENOUS at 21:56

## 2023-01-01 RX ADMIN — DOCUSATE SODIUM 100 MG: 100 CAPSULE, LIQUID FILLED ORAL at 21:06

## 2023-01-01 RX ADMIN — CARVEDILOL 6.25 MG: 6.25 TABLET, FILM COATED ORAL at 11:03

## 2023-01-01 RX ADMIN — DILTIAZEM HYDROCHLORIDE 10 MG/HR: 5 INJECTION, SOLUTION INTRAVENOUS at 06:53

## 2023-01-01 RX ADMIN — LEVOTHYROXINE SODIUM 25 MCG: 25 TABLET ORAL at 06:16

## 2023-01-01 RX ADMIN — ACETAMINOPHEN 650 MG: 325 TABLET ORAL at 07:00

## 2023-01-01 RX ADMIN — APIXABAN 2.5 MG: 2.5 TABLET, FILM COATED ORAL at 08:48

## 2023-01-01 RX ADMIN — CARVEDILOL 25 MG: 25 TABLET, FILM COATED ORAL at 08:48

## 2023-01-01 RX ADMIN — DILTIAZEM HYDROCHLORIDE 5 MG/HR: 5 INJECTION INTRAVENOUS at 08:16

## 2023-01-01 RX ADMIN — DILTIAZEM HYDROCHLORIDE 60 MG: 60 TABLET, FILM COATED ORAL at 17:32

## 2023-01-01 RX ADMIN — METOPROLOL TARTRATE 2.5 MG: 5 INJECTION INTRAVENOUS at 15:12

## 2023-01-01 RX ADMIN — PIPERACILLIN AND TAZOBACTAM 2.25 G: 2; .25 INJECTION, POWDER, FOR SOLUTION INTRAVENOUS at 17:41

## 2023-01-01 RX ADMIN — FUROSEMIDE 40 MG: 10 INJECTION, SOLUTION INTRAMUSCULAR; INTRAVENOUS at 15:16

## 2023-01-01 RX ADMIN — TORSEMIDE 40 MG: 20 TABLET ORAL at 08:55

## 2023-01-01 RX ADMIN — DICLOFENAC SODIUM 4 G: 10 GEL TOPICAL at 18:14

## 2023-01-01 RX ADMIN — ISOSORBIDE DINITRATE 30 MG: 10 TABLET ORAL at 18:06

## 2023-01-01 RX ADMIN — PREDNISONE 5 MG: 5 TABLET ORAL at 08:55

## 2023-01-01 RX ADMIN — METOPROLOL TARTRATE 5 MG: 5 INJECTION INTRAVENOUS at 05:19

## 2023-01-01 RX ADMIN — IPRATROPIUM BROMIDE AND ALBUTEROL SULFATE 3 ML: .5; 3 SOLUTION RESPIRATORY (INHALATION) at 13:22

## 2023-01-01 RX ADMIN — IPRATROPIUM BROMIDE AND ALBUTEROL SULFATE 3 ML: .5; 3 SOLUTION RESPIRATORY (INHALATION) at 20:20

## 2023-01-01 RX ADMIN — DILTIAZEM HYDROCHLORIDE 30 MG: 30 TABLET, FILM COATED ORAL at 18:16

## 2023-01-01 RX ADMIN — HYDROXYCHLOROQUINE SULFATE 200 MG: 200 TABLET ORAL at 09:54

## 2023-01-01 RX ADMIN — LEVOTHYROXINE SODIUM 25 MCG: 25 TABLET ORAL at 08:28

## 2023-01-01 RX ADMIN — IPRATROPIUM BROMIDE AND ALBUTEROL SULFATE 3 ML: .5; 3 SOLUTION RESPIRATORY (INHALATION) at 12:30

## 2023-01-01 RX ADMIN — IPRATROPIUM BROMIDE AND ALBUTEROL SULFATE 3 ML: .5; 3 SOLUTION RESPIRATORY (INHALATION) at 09:05

## 2023-01-01 RX ADMIN — Medication 1 MG: at 21:57

## 2023-01-01 RX ADMIN — HYDROXYCHLOROQUINE SULFATE 200 MG: 200 TABLET ORAL at 08:40

## 2023-01-01 RX ADMIN — HYDRALAZINE HYDROCHLORIDE 10 MG: 20 INJECTION INTRAMUSCULAR; INTRAVENOUS at 10:26

## 2023-01-01 RX ADMIN — BISACODYL 10 MG: 10 SUPPOSITORY RECTAL at 11:56

## 2023-01-01 RX ADMIN — APIXABAN 2.5 MG: 2.5 TABLET, FILM COATED ORAL at 21:06

## 2023-01-01 RX ADMIN — DOCUSATE SODIUM LIQUID 100 MG: 100 LIQUID ORAL at 11:01

## 2023-01-01 RX ADMIN — IPRATROPIUM BROMIDE AND ALBUTEROL SULFATE 3 ML: .5; 3 SOLUTION RESPIRATORY (INHALATION) at 07:23

## 2023-01-01 RX ADMIN — CARVEDILOL 25 MG: 25 TABLET, FILM COATED ORAL at 08:27

## 2023-01-01 RX ADMIN — HYDROXYCHLOROQUINE SULFATE 200 MG: 200 TABLET ORAL at 08:57

## 2023-01-01 RX ADMIN — PIPERACILLIN AND TAZOBACTAM 2.25 G: 2; .25 INJECTION, POWDER, FOR SOLUTION INTRAVENOUS at 11:06

## 2023-01-01 RX ADMIN — METHYLPREDNISOLONE SODIUM SUCCINATE 6.25 MG: 125 INJECTION, POWDER, FOR SOLUTION INTRAMUSCULAR; INTRAVENOUS at 10:22

## 2023-01-01 RX ADMIN — PANTOPRAZOLE SODIUM 40 MG: 40 INJECTION, POWDER, FOR SOLUTION INTRAVENOUS at 10:21

## 2023-01-01 RX ADMIN — DICLOFENAC SODIUM 4 G: 10 GEL TOPICAL at 21:23

## 2023-01-01 RX ADMIN — PREDNISONE 5 MG: 5 TABLET ORAL at 10:10

## 2023-01-01 RX ADMIN — METHYLPREDNISOLONE SODIUM SUCCINATE 6.25 MG: 125 INJECTION, POWDER, FOR SOLUTION INTRAMUSCULAR; INTRAVENOUS at 08:16

## 2023-01-01 RX ADMIN — PIPERACILLIN AND TAZOBACTAM 2.25 G: 2; .25 INJECTION, POWDER, FOR SOLUTION INTRAVENOUS at 23:13

## 2023-01-01 RX ADMIN — CARVEDILOL 12.5 MG: 6.25 TABLET, FILM COATED ORAL at 18:16

## 2023-01-01 RX ADMIN — IPRATROPIUM BROMIDE AND ALBUTEROL SULFATE 3 ML: .5; 3 SOLUTION RESPIRATORY (INHALATION) at 11:19

## 2023-01-01 RX ADMIN — TORSEMIDE 20 MG: 20 TABLET ORAL at 08:56

## 2023-01-01 RX ADMIN — APIXABAN 2.5 MG: 2.5 TABLET, FILM COATED ORAL at 19:57

## 2023-01-01 RX ADMIN — POTASSIUM CHLORIDE 10 MEQ: 7.46 INJECTION, SOLUTION INTRAVENOUS at 12:43

## 2023-01-01 RX ADMIN — IPRATROPIUM BROMIDE AND ALBUTEROL SULFATE 3 ML: .5; 3 SOLUTION RESPIRATORY (INHALATION) at 08:32

## 2023-01-01 RX ADMIN — PROCHLORPERAZINE EDISYLATE 5 MG: 5 INJECTION INTRAMUSCULAR; INTRAVENOUS at 19:50

## 2023-01-01 RX ADMIN — HYDROXYCHLOROQUINE SULFATE 200 MG: 200 TABLET ORAL at 08:47

## 2023-01-01 RX ADMIN — NITROGLYCERIN 1 PATCH: 0.4 PATCH TRANSDERMAL at 10:28

## 2023-01-01 RX ADMIN — LABETALOL HYDROCHLORIDE 10 MG: 5 INJECTION, SOLUTION INTRAVENOUS at 16:44

## 2023-01-01 RX ADMIN — APIXABAN 2.5 MG: 2.5 TABLET, FILM COATED ORAL at 08:56

## 2023-01-01 RX ADMIN — IPRATROPIUM BROMIDE AND ALBUTEROL SULFATE 3 ML: .5; 3 SOLUTION RESPIRATORY (INHALATION) at 19:50

## 2023-01-01 RX ADMIN — PIPERACILLIN AND TAZOBACTAM 2.25 G: 2; .25 INJECTION, POWDER, FOR SOLUTION INTRAVENOUS at 11:29

## 2023-01-01 RX ADMIN — APIXABAN 2.5 MG: 2.5 TABLET, FILM COATED ORAL at 19:53

## 2023-01-01 ASSESSMENT — ACTIVITIES OF DAILY LIVING (ADL)
HEARING_DIFFICULTY_OR_DEAF: YES
ADLS_ACUITY_SCORE: 46
ADLS_ACUITY_SCORE: 50
ADLS_ACUITY_SCORE: 54
ADLS_ACUITY_SCORE: 43
ADLS_ACUITY_SCORE: 50
FALL_HISTORY_WITHIN_LAST_SIX_MONTHS: YES
ADLS_ACUITY_SCORE: 43
ADLS_ACUITY_SCORE: 48
ADLS_ACUITY_SCORE: 41
ADLS_ACUITY_SCORE: 61
ADLS_ACUITY_SCORE: 46
ADLS_ACUITY_SCORE: 54
ADLS_ACUITY_SCORE: 46
ADLS_ACUITY_SCORE: 54
ADLS_ACUITY_SCORE: 35
ADLS_ACUITY_SCORE: 48
ADLS_ACUITY_SCORE: 54
ADLS_ACUITY_SCORE: 50
ADLS_ACUITY_SCORE: 50
ADLS_ACUITY_SCORE: 52
ADLS_ACUITY_SCORE: 52
ADLS_ACUITY_SCORE: 47
ADLS_ACUITY_SCORE: 48
ADLS_ACUITY_SCORE: 45
DRESSING/BATHING_DIFFICULTY: YES
ADLS_ACUITY_SCORE: 35
WEAR_GLASSES_OR_BLIND: YES
ADLS_ACUITY_SCORE: 48
ADLS_ACUITY_SCORE: 52
ADLS_ACUITY_SCORE: 41
ADLS_ACUITY_SCORE: 50
DIFFICULTY_EATING/SWALLOWING: YES
ADLS_ACUITY_SCORE: 32
ADLS_ACUITY_SCORE: 48
ADLS_ACUITY_SCORE: 50
ADLS_ACUITY_SCORE: 50
ADLS_ACUITY_SCORE: 45
ADLS_ACUITY_SCORE: 67
ADLS_ACUITY_SCORE: 68
ADLS_ACUITY_SCORE: 46
ADLS_ACUITY_SCORE: 69
ADLS_ACUITY_SCORE: 46
ADLS_ACUITY_SCORE: 52
ADLS_ACUITY_SCORE: 68
ADLS_ACUITY_SCORE: 68
ADLS_ACUITY_SCORE: 46
ADLS_ACUITY_SCORE: 74
ADLS_ACUITY_SCORE: 68
ADLS_ACUITY_SCORE: 74
ADLS_ACUITY_SCORE: 43
ADLS_ACUITY_SCORE: 74
WALKING_OR_CLIMBING_STAIRS_DIFFICULTY: YES
ADLS_ACUITY_SCORE: 43
ADLS_ACUITY_SCORE: 47
TOILETING_ISSUES: YES
ADLS_ACUITY_SCORE: 65
ADLS_ACUITY_SCORE: 52
ADLS_ACUITY_SCORE: 76
ADLS_ACUITY_SCORE: 43
ADLS_ACUITY_SCORE: 52
ADLS_ACUITY_SCORE: 46
ADLS_ACUITY_SCORE: 54
ADLS_ACUITY_SCORE: 54
ADLS_ACUITY_SCORE: 32
ADLS_ACUITY_SCORE: 45
PATIENT'S_PREFERRED_MEANS_OF_COMMUNICATION: VERBAL
ADLS_ACUITY_SCORE: 65
ADLS_ACUITY_SCORE: 46
ADLS_ACUITY_SCORE: 43
ADLS_ACUITY_SCORE: 46
DIFFICULTY_COMMUNICATING: NO
ADLS_ACUITY_SCORE: 48
ADLS_ACUITY_SCORE: 48
ADLS_ACUITY_SCORE: 46
NUMBER_OF_TIMES_PATIENT_HAS_FALLEN_WITHIN_LAST_SIX_MONTHS: 1
ADLS_ACUITY_SCORE: 68
ADLS_ACUITY_SCORE: 50
ADLS_ACUITY_SCORE: 61
ADLS_ACUITY_SCORE: 69
ADLS_ACUITY_SCORE: 47
TOILETING_ASSISTANCE: TOILETING DIFFICULTY, DEPENDENT
ADLS_ACUITY_SCORE: 54
ADLS_ACUITY_SCORE: 74
ADLS_ACUITY_SCORE: 50
ADLS_ACUITY_SCORE: 52
ADLS_ACUITY_SCORE: 48
ADLS_ACUITY_SCORE: 61
ADLS_ACUITY_SCORE: 64
ADLS_ACUITY_SCORE: 65
ADLS_ACUITY_SCORE: 46
DRESSING/BATHING: BATHING DIFFICULTY, DEPENDENT;DRESSING DIFFICULTY, DEPENDENT
ADLS_ACUITY_SCORE: 74
ADLS_ACUITY_SCORE: 43
ADLS_ACUITY_SCORE: 52
CHANGE_IN_FUNCTIONAL_STATUS_SINCE_ONSET_OF_CURRENT_ILLNESS/INJURY: YES
ADLS_ACUITY_SCORE: 32
ADLS_ACUITY_SCORE: 69
ADLS_ACUITY_SCORE: 50
ADLS_ACUITY_SCORE: 67
ADLS_ACUITY_SCORE: 46
ADLS_ACUITY_SCORE: 67
ADLS_ACUITY_SCORE: 43
ADLS_ACUITY_SCORE: 50
ADLS_ACUITY_SCORE: 69
ADLS_ACUITY_SCORE: 48
ADLS_ACUITY_SCORE: 44
ADLS_ACUITY_SCORE: 74
ADLS_ACUITY_SCORE: 46
ADLS_ACUITY_SCORE: 52
ADLS_ACUITY_SCORE: 52
ADLS_ACUITY_SCORE: 48
ADLS_ACUITY_SCORE: 46
ADLS_ACUITY_SCORE: 52
ADLS_ACUITY_SCORE: 52
ADLS_ACUITY_SCORE: 43
ADLS_ACUITY_SCORE: 50
ADLS_ACUITY_SCORE: 54
ADLS_ACUITY_SCORE: 52
ADLS_ACUITY_SCORE: 45
ADLS_ACUITY_SCORE: 44
ADLS_ACUITY_SCORE: 46
TOILETING_MANAGEMENT: INCONTINENT
EATING/SWALLOWING: SWALLOWING LIQUIDS;SWALLOWING SOLID FOOD
ADLS_ACUITY_SCORE: 52
ADLS_ACUITY_SCORE: 67
ADLS_ACUITY_SCORE: 50
ADLS_ACUITY_SCORE: 54
DESCRIBE_HEARING_LOSS: BILATERAL HEARING LOSS
EQUIPMENT_CURRENTLY_USED_AT_HOME: WALKER, ROLLING;WHEELCHAIR, MANUAL
DOING_ERRANDS_INDEPENDENTLY_DIFFICULTY: YES
ADLS_ACUITY_SCORE: 69
ADLS_ACUITY_SCORE: 52
ADLS_ACUITY_SCORE: 50
ADLS_ACUITY_SCORE: 54
ADLS_ACUITY_SCORE: 65
ADLS_ACUITY_SCORE: 50
ADLS_ACUITY_SCORE: 71
ADLS_ACUITY_SCORE: 52
DEPENDENT_IADLS:: TRANSPORTATION
WALKING_OR_CLIMBING_STAIRS: AMBULATION DIFFICULTY, DEPENDENT
ADLS_ACUITY_SCORE: 54
ADLS_ACUITY_SCORE: 48
ADLS_ACUITY_SCORE: 48
ADLS_ACUITY_SCORE: 43
ADLS_ACUITY_SCORE: 46
ADLS_ACUITY_SCORE: 68
ADLS_ACUITY_SCORE: 65
ADLS_ACUITY_SCORE: 51
ADLS_ACUITY_SCORE: 43
CONCENTRATING,_REMEMBERING_OR_MAKING_DECISIONS_DIFFICULTY: NO
ADLS_ACUITY_SCORE: 52
ADLS_ACUITY_SCORE: 48
ADLS_ACUITY_SCORE: 74
ADLS_ACUITY_SCORE: 50
ADLS_ACUITY_SCORE: 48
ADLS_ACUITY_SCORE: 52
ADLS_ACUITY_SCORE: 50
ADLS_ACUITY_SCORE: 48
ADLS_ACUITY_SCORE: 52
ADLS_ACUITY_SCORE: 52
ADLS_ACUITY_SCORE: 43
ADLS_ACUITY_SCORE: 46
ADLS_ACUITY_SCORE: 41
WERE_AUXILIARY_AIDS_OFFERED?: NO
ADLS_ACUITY_SCORE: 67
ADLS_ACUITY_SCORE: 50
ADLS_ACUITY_SCORE: 61
ADLS_ACUITY_SCORE: 74
ADLS_ACUITY_SCORE: 47
ADLS_ACUITY_SCORE: 68
ADLS_ACUITY_SCORE: 50
ADLS_ACUITY_SCORE: 48
ADLS_ACUITY_SCORE: 46
ADLS_ACUITY_SCORE: 50
ADLS_ACUITY_SCORE: 47
ADLS_ACUITY_SCORE: 48
ADLS_ACUITY_SCORE: 71
ADLS_ACUITY_SCORE: 43
ADLS_ACUITY_SCORE: 54
ADLS_ACUITY_SCORE: 43
ADLS_ACUITY_SCORE: 50
ADLS_ACUITY_SCORE: 65
ADLS_ACUITY_SCORE: 47
ADLS_ACUITY_SCORE: 43
ADLS_ACUITY_SCORE: 48
ADLS_ACUITY_SCORE: 68
ADLS_ACUITY_SCORE: 65
USE_OF_HEARING_ASSISTIVE_DEVICES: BILATERAL HEARING AIDS
ADLS_ACUITY_SCORE: 52
ADLS_ACUITY_SCORE: 43
ADLS_ACUITY_SCORE: 43
ADLS_ACUITY_SCORE: 69
DRESSING/BATHING_MANAGEMENT: ASSISTANCE
ADLS_ACUITY_SCORE: 46
ADLS_ACUITY_SCORE: 43
ADLS_ACUITY_SCORE: 43
ADLS_ACUITY_SCORE: 50
ADLS_ACUITY_SCORE: 67
ADLS_ACUITY_SCORE: 61
ADLS_ACUITY_SCORE: 68
ADLS_ACUITY_SCORE: 46
ADLS_ACUITY_SCORE: 43
ADLS_ACUITY_SCORE: 46
ADLS_ACUITY_SCORE: 50
ADLS_ACUITY_SCORE: 65
ADLS_ACUITY_SCORE: 52
ADLS_ACUITY_SCORE: 54
ADLS_ACUITY_SCORE: 68
ADLS_ACUITY_SCORE: 50
ADLS_ACUITY_SCORE: 68
ADLS_ACUITY_SCORE: 76
VISION_MANAGEMENT: GLASSES

## 2023-01-01 ASSESSMENT — PAIN SCALES - GENERAL: PAINLEVEL: MODERATE PAIN (4)

## 2023-01-25 NOTE — TELEPHONE ENCOUNTER
Routing to PA team Danyel    Pt daughter called regarding 2 PA requests.   dronabinol (marinol)  And Eliquis price is going up dramatically so will need PA for cost coverage.     Well care is prescription coverage- Krupa,daughter can be contacted for forms/questions   Her contact is [phone] and ok for voicemail.

## 2023-01-29 NOTE — TELEPHONE ENCOUNTER
PA Initiation    Medication: dronabinol (MARINOL) 2.5 MG capsule   Insurance Company: [employer] - Phone [phone] Fax [phone]  Pharmacy Filling the Rx:    Filling Pharmacy Phone:    Filling Pharmacy Fax:    Start Date: 1/29/2023

## 2023-01-29 NOTE — TELEPHONE ENCOUNTER
PA Initiation    Medication: ELIQUIS ANTICOAGULANT 2.5 MG tablet TIER EXCEPTION  Insurance Company: [employer] - Phone [phone] Fax [phone]  Pharmacy Filling the Rx:    Filling Pharmacy Phone:    Filling Pharmacy Fax:    Start Date: 1/29/2023

## 2023-01-30 NOTE — TELEPHONE ENCOUNTER
PRIOR AUTHORIZATION DENIED    Medication: dronabinol (MARINOL) 2.5 MG capsule--DENIED    Denial Date: 1/30/2023    Denial Rational: Excluded    Appeal Information:

## 2023-01-30 NOTE — TELEPHONE ENCOUNTER
Prior Authorization Approval    Authorization Effective Date: 1/29/2023  Authorization Expiration Date: 12/31/2023  Medication: ELIQUIS ANTICOAGULANT 2.5 MG tablet TIER EXCEPTION--APPROVED  Approved Dose/Quantity:   Reference #:     Insurance Company: [employer] - Phone [phone] Fax [phone]  Expected CoPay:       CoPay Card Available:      Foundation Assistance Needed:    Which Pharmacy is filling the prescription (Not needed for infusion/clinic administered):    Pharmacy Notified:    Patient Notified: Yes

## 2023-02-07 NOTE — TELEPHONE ENCOUNTER
Marinol medication discontinued    Will await result of family's meeting with nurse at patient's living facility [place] to further discuss medication administration.

## 2023-02-07 NOTE — TELEPHONE ENCOUNTER
VANDANA Spoke with Krupa and notified of response below     Tomorrow they will be having a care conference with the nurses at patient's facility. She is currently doing assisted living. But needs more care, so they will be evaluating the level of care needed. They won't be discussing medications then     Advised scheduling a follow up with PCP after the care conference/advised per last visit notes due for follow up visit.     She will call back to set up appointment     Garcia Chavarria RN  [facility]

## 2023-02-07 NOTE — TELEPHONE ENCOUNTER
"Patient's daughter Krupa calling (on C2C) to report after patient had taken her third dose of marinol 2.5 mg script that patient had an episode last night where \"she was turning around in her walker then suddenly she found herself sitting on her cabinet\". Daughter stated she was unsure what patient meant by this statement but this is what patient reported to daughter. Daughter advised patient to stop taking this medication.    Daughter also stated she has noticed patient has lost more weight due to poor appetite. Daughter stated she is uncertain of exact weight but believes patient is around 114 lbs.     Daughter also stated that patient is becoming more forgetful. Daughter noticed that last week patient had forgotten to take her medications \"9 times last week\" and is concerned about patient's memory. Daughter and family will be meeting with nurse at patient's living facility [facility] to further discuss.     Routing to PCP for recommendations.   "

## 2023-02-08 NOTE — TELEPHONE ENCOUNTER
Reason for Call:  Appointment Request    Patient requesting this type of appt:  Reaction to medication     Requested provider: Laurie Conteh    Reason patient unable to be scheduled: Not within requested timeframe    When does patient want to be seen/preferred time: 1-2 days 10 am or later     Comments: Pt daughter calling to schedule an in person visit with Dr. Conteh to be seen for reaction to medication. She did speak with a triage nurse yesterday who reccommended an appointment be scheduled. Next available isn't until April, we did not schedule. Please call Krupa (pt daughter) back to discuss options.     Okay to leave a detailed message?: Yes at Other phone number:  [phone]    Call taken on 2/8/2023 at 10:33 AM by Nina Haley

## 2023-02-08 NOTE — TELEPHONE ENCOUNTER
Returned call, spoke to Krupa over the phone today. Answered questions. The next step in the plan is for me to call Krupa on the phone again Monday next week for follow up after today's conference at nursing [place].

## 2023-02-13 NOTE — PROGRESS NOTES
Ghislaine is a 87 year old who is being evaluated via a billable telephone visit.      What phone number would you like to be contacted at? [phone]  How would you like to obtain your AVS? Albert  Distant Location (provider location):  Off-site      Subjective   Ghislaine is a 87 year old accompanied by her daughter, presenting for the following health issues:    HPI       Chief Complaint:     Follow up on chronic weakness, weight loss, poor appetite    HPI:   Telephone visit today for evaluation of multiple concerns including chronic weakness, weight loss, poor appetite. No chest pain, headaches, fever or chills at this time.     Current Medications:     Current Outpatient Medications   Medication Sig Dispense Refill     Abatacept (ORENCIA IV) Inject 500 mg into the vein every 30 days       ACE/ARB/ARNI NOT PRESCRIBED (INTENTIONAL) Please choose reason not prescribed from choices below.       acetaminophen (TYLENOL) 650 MG CR tablet Take 650-1,300 mg by mouth every 8 hours as needed for mild pain or fever       allopurinol (ZYLOPRIM) 100 MG tablet Take 1 tablet (100 mg) by mouth daily 90 tablet 0     amLODIPine (NORVASC) 10 MG tablet TAKE 1 TABLET BY MOUTH ONE TIME DAILY 90 tablet 0     calcium citrate-vitamin D (CITRACAL) 315-250 MG-UNIT TABS per tablet Take 1 tablet by mouth 2 times daily       carvedilol (COREG) 6.25 MG tablet Take 1 tablet (6.25 mg) by mouth 2 times daily (with meals) 180 tablet 0     cloNIDine (CATAPRES) 0.1 MG tablet Take 1 tablet (0.1 mg) by mouth At Bedtime 90 tablet 3     Darbepoetin Alonso-Albumin (ARANESP IJ) Inject 10 mcg as directed Monthly as needed       ELIQUIS ANTICOAGULANT 2.5 MG tablet Take 1 tablet by mouth 2 times daily. 180 tablet 0     ferrous sulfate (FEROSUL) 325 (65 Fe) MG tablet Take 325 mg by mouth every other day       hydrALAZINE (APRESOLINE) 100 MG tablet Take 1 tablet by mouth 4 times daily. 360 tablet 0     hydroxychloroquine (PLAQUENIL) 200 MG tablet Take 200 mg by  mouth daily       isosorbide dinitrate (ISORDIL) 20 MG tablet Take 1 tablet (20 mg) by mouth 3 times daily. 270 tablet 0     levothyroxine (SYNTHROID/LEVOTHROID) 25 MCG tablet Take 1 tablet by mouth daily. 90 tablet 3     mirtazapine (REMERON) 15 MG tablet Take 1 tablet by mouth daily At Bedtime. 90 tablet 3     ondansetron (ZOFRAN-ODT) 4 MG ODT tab Take 4 mg by mouth every 8 hours as needed for nausea       pantoprazole (PROTONIX) 40 MG EC tablet Take 1 tablet by mouth daily. 90 tablet 2     predniSONE (DELTASONE) 5 MG tablet Take 1 tablet (5 mg) by mouth daily 30 tablet 0     senna-docusate (SENOKOT-S/PERICOLACE) 8.6-50 MG tablet Take 2 tablets by mouth daily       sodium bicarbonate 650 MG tablet Take 650 mg by mouth 2 times daily        torsemide (DEMADEX) 20 MG tablet Take 1 tablet (20 mg) by mouth daily Take additional 20mg torsemide (1 tablet) for weight gain > 2 pounds overnight. 100 tablet 3     Vitamin D (Cholecalciferol) 25 MCG (1000 UT) TABS 1 tablet twice daily           Allergies:      Allergies   Allergen Reactions     Oxybutynin Itching     Seasonal Allergies             Past Medical History:     Past Medical History:   Diagnosis Date     Atrial fibrillation (H) new 10/19     [place] (hard of hearing)     wears hearing aids     Hyperlipidaemia      Hyperlipidaemia LDL goal < 130      Hypertension      Hypothyroid      PAD (peripheral artery disease) (H)      Renal insufficiency, mild      Uncontrolled hypertension 10/14/2019         Past Surgical History:     Past Surgical History:   Procedure Laterality Date     APPENDECTOMY  1951     BONE MARROW BIOPSY, BONE SPECIMEN, NEEDLE/TROCAR N/A 7/31/2020    Procedure: bone marrow biopsy;  Surgeon: Iris Cameron MD;  Location:  GI     CATARACT IOL, RT/LT  2001    bilateral (laser - 5/2013)     [facility]  [facility]     ESOPHAGOSCOPY, GASTROSCOPY, DUODENOSCOPY (EGD), COMBINED N/A 7/24/2020    Procedure: ESOPHAGOGASTRODUODENOSCOPY, WITH BIOPSY;   Surgeon: Humberto Bailon MD;  Location:  GI     EYE SURGERY      Muscle release     TUBAL LIGATION           Family Medical History:     Family History   Problem Relation Age of Onset     Heart Disease Mother      Respiratory Mother      Heart Disease Father      Heart Disease Brother      Coronary Artery Disease Brother         CAB     Heart Disease Brother      Coronary Artery Disease Brother         CAB         Social History:     Social History     Socioeconomic History     Marital status:      Spouse name: Not on file     Number of children: Not on file     Years of education: Not on file     Highest education level: Not on file   Occupational History     Not on file   Tobacco Use     Smoking status: Former     Packs/day: 0.50     Years: 5.00     Pack years: 2.50     Types: Cigarettes     Quit date: 1973     Years since quittin.1     Smokeless tobacco: Never   Substance and Sexual Activity     Alcohol use: Yes     Comment: one light beer of  occ     Drug use: No     Sexual activity: Not Currently     Partners: Male   Other Topics Concern     Parent/sibling w/ CABG, MI or angioplasty before 65F 55M? No   Social History Narrative     Not on file     Social Determinants of Health     Financial Resource Strain: Not on file   Food Insecurity: Not on file   Transportation Needs: Not on file   Physical Activity: Not on file   Stress: Not on file   Social Connections: Not on file   Intimate Partner Violence: Not on file   Housing Stability: Not on file           Review of System:     Constitutional: Negative for fever or chills, positive for chronic weight loss with some mild recent weight regain  Skin: Negative for rashes  Ears/Nose/Throat: Negative for nasal congestion, sore throat  Respiratory: No shortness of breath, dyspnea on exertion, cough, or hemoptysis  Cardiovascular: Negative for chest pain  Gastrointestinal: Negative for nausea, vomiting, positive for chronic poor  appetite  Genitourinary: Negative for dysuria, hematuria, positive for CKD  Musculoskeletal: positive for diffuse weakness  Neurologic: Negative for headaches  Psychiatric: Negative for depression, anxiety  Hematologic/Lymphatic/Immunologic: Negative  Endocrine: Negative  Behavioral: Negative for tobacco use       Physical Exam:   There were no vitals taken for this visit.      Diagnostic Test Results:     Diagnostic Test Results:  Labs reviewed in Epic    ASSESSMENT/PLAN:       Ghislaine was seen today for fatigue and weight loss.    Diagnoses and all orders for this visit:  Chronic fatigue  Failure to thrive in adult  Weight loss  Poor appetite  -   Symptoms slightly improved. Patient's weight increased from about 113/114 lbs to 118 lbs recently  -   REVIEW OF HEALTH MAINTENANCE PROTOCOL ORDERS  -   continue  dronabinol (MARINOL) 2.5 MG capsule; Take 1 capsule (2.5 mg) by mouth 1 time daily (before meals)    Chronic obstructive pulmonary disease, unspecified COPD type (H)  - stable, continue current therapy    CKD (chronic kidney disease) stage 4, GFR 15-29 ml/min (H)  - continue follow up with nephrology clinic going forward      Follow Up Plan:     Patient is instructed to return to Internal Medicine clinic for follow-up visit in 1 month.        Laurie Conteh MD  Internal Medicine  [facility]    telephone visit duration including chart review, medication management: 30 minutes

## 2023-02-24 NOTE — TELEPHONE ENCOUNTER
Health Call Center    Phone Message    May a detailed message be left on voicemail: no     Reason for Call: Other: Krupa called to request clarification if a lab draw needs to be done prior to Ghislaine's appointment with Elle on 4/6/23. Also, Krupa stated she would like a renal panel per request of Ghislaine's Nephrologist. Please reach out to Krupa at [phone].     Action Taken: Other: [facility]    Travel Screening: Not Applicable

## 2023-02-24 NOTE — TELEPHONE ENCOUNTER
Informed Krupa that labs could be drawn on 3/31/23 when pt is having echo completed.   Labs ordered by nephrology were entered into pt's chart and can be drawn that same day.     Will update appt notes.   Krupa gave verbal understanding.

## 2023-03-29 NOTE — TELEPHONE ENCOUNTER
Received call from daughter Krupa,  C2C on file.     Reports that pt has continued to take dronabinol one capsule daily. Daughter states there are some days where pt will eat more but overall pt does not have increased appetite. Daughter states pt is not eating as much as she should.     Daughter would like PCP recommendation if appropriate to now increase dose? Pt will also need refill. Please update Rx if change in dose.     Routing to provider for review.     Can we leave a detailed message on this number? YES  Phone number patient can be reached at: Other phone number:  [phone]*    Nav Morales, RN  [facility] Triage

## 2023-03-31 NOTE — TELEPHONE ENCOUNTER
Wero, MD Carmen Andrade Itzell, RN; Cs Triage Im 2 days ago     XH  Please help pt schedule telephone visit with me next Monday for further evaluation including medication refills/changes.       Appointments in Next Year    Mar 31, 2023  2:30 PM  LAB with [facility]  [facility] ([facility] ) [phone]   Mar 31, 2023  3:00 PM  ECHO COMPLETE with [id number]  [facility] ([facility] ) [phone]   Apr 03, 2023 11:30 AM  (Arrive by 11:10 AM)  Provider Visit with Laurie Conteh MD  [facility] ([facility] ) [phone]   Apr 06, 2023  4:00 PM  (Arrive by 3:55 PM)  RETURN HEART FAILURE with DHAVAL Rayo CNP  [facility] ([facility] ) [phone]        Terrie Mckeon, RN on 3/31/2023 at 11:55 AM

## 2023-04-03 NOTE — PROGRESS NOTES
Ghislaine is a 87 year old who is being evaluated via a billable telephone visit.      What phone number would you like to be contacted at? [phone]  How would you like to obtain your AVS? Albert  Distant Location (provider location):  Off-site      Subjective   Ghislaine is a 87 year old accompanied by her daughter, presenting for the following health issues:    HPI       Chief Complaint:     Follow up on chronic weakness, weight loss, poor appetite    HPI:   Patient presents for follow up telephone visit today for evaluation of multiple concerns including chronic weakness, weight loss, poor appetite. No chest pain, headaches, fever or chills at this time.     Current Medications:     Current Outpatient Medications   Medication Sig Dispense Refill     Abatacept (ORENCIA IV) Inject 500 mg into the vein every 30 days       acetaminophen (TYLENOL) 650 MG CR tablet Take 650-1,300 mg by mouth every 8 hours as needed for mild pain or fever       allopurinol (ZYLOPRIM) 100 MG tablet Take 1 tablet (100 mg) by mouth daily 90 tablet 0     amLODIPine (NORVASC) 10 MG tablet TAKE 1 TABLET BY MOUTH ONE TIME DAILY 90 tablet 0     apixaban ANTICOAGULANT (ELIQUIS ANTICOAGULANT) 2.5 MG tablet Take 1 tablet (2.5 mg) by mouth 2 times daily 180 tablet 0     calcium citrate-vitamin D (CITRACAL) 315-250 MG-UNIT TABS per tablet Take 1 tablet by mouth 2 times daily       carvedilol (COREG) 6.25 MG tablet Take 1 tablet (6.25 mg) by mouth 2 times daily (with meals) 180 tablet 0     cloNIDine (CATAPRES) 0.1 MG tablet Take 1 tablet (0.1 mg) by mouth At Bedtime 90 tablet 3     Darbepoetin Alonso-Albumin (ARANESP IJ) Inject 10 mcg as directed Monthly as needed       dronabinol (MARINOL) 2.5 MG capsule Take 1 capsule (2.5 mg) by mouth 2 times daily (before meals) 60 capsule 1     ferrous sulfate (FEROSUL) 325 (65 Fe) MG tablet Take 325 mg by mouth every other day       hydrALAZINE (APRESOLINE) 100 MG tablet Take 1 tablet by mouth 4 times daily.  360 tablet 0     hydroxychloroquine (PLAQUENIL) 200 MG tablet Take 200 mg by mouth daily       isosorbide dinitrate (ISORDIL) 20 MG tablet Take 1 tablet (20 mg) by mouth 3 times daily. 270 tablet 0     levothyroxine (SYNTHROID/LEVOTHROID) 25 MCG tablet Take 1 tablet by mouth daily. 90 tablet 3     mirtazapine (REMERON) 15 MG tablet Take 1 tablet by mouth daily At Bedtime. 90 tablet 3     ondansetron (ZOFRAN-ODT) 4 MG ODT tab Take 4 mg by mouth every 8 hours as needed for nausea       pantoprazole (PROTONIX) 40 MG EC tablet Take 1 tablet (40 mg) by mouth daily 90 tablet 2     senna-docusate (SENOKOT-S/PERICOLACE) 8.6-50 MG tablet Take 2 tablets by mouth daily       sodium bicarbonate 650 MG tablet Take 650 mg by mouth 2 times daily        torsemide (DEMADEX) 20 MG tablet Take 1 tablet (20 mg) by mouth daily Take additional 20mg torsemide (1 tablet) for weight gain > 2 pounds overnight. 100 tablet 3     Vitamin D (Cholecalciferol) 25 MCG (1000 UT) TABS 1 tablet twice daily       ACE/ARB/ARNI NOT PRESCRIBED (INTENTIONAL) Please choose reason not prescribed from choices below. (Patient not taking: Reported on 4/3/2023)           Allergies:      Allergies   Allergen Reactions     Oxybutynin Itching     Seasonal Allergies             Past Medical History:     Past Medical History:   Diagnosis Date     Atrial fibrillation (H) new 10/19     [place] (hard of hearing)     wears hearing aids     Hyperlipidaemia      Hyperlipidaemia LDL goal < 130      Hypertension      Hypothyroid      PAD (peripheral artery disease) (H)      Renal insufficiency, mild      Uncontrolled hypertension 10/14/2019         Past Surgical History:     Past Surgical History:   Procedure Laterality Date     APPENDECTOMY  1951     BONE MARROW BIOPSY, BONE SPECIMEN, NEEDLE/TROCAR N/A 7/31/2020    Procedure: bone marrow biopsy;  Surgeon: Iris Cameron MD;  Location:  GI     CATARACT IOL, RT/LT  2001    bilateral (laser - 5/2013)     DENTAL SURGERY  1962     [facility]     ESOPHAGOSCOPY, GASTROSCOPY, DUODENOSCOPY (EGD), COMBINED N/A 2020    Procedure: ESOPHAGOGASTRODUODENOSCOPY, WITH BIOPSY;  Surgeon: Humberto Bailon MD;  Location:  GI     EYE SURGERY      Muscle release     TUBAL LIGATION           Family Medical History:     Family History   Problem Relation Age of Onset     Heart Disease Mother      Respiratory Mother      Heart Disease Father      Heart Disease Brother      Coronary Artery Disease Brother         CAB     Heart Disease Brother      Coronary Artery Disease Brother         CAB         Social History:     Social History     Socioeconomic History     Marital status:      Spouse name: Not on file     Number of children: Not on file     Years of education: Not on file     Highest education level: Not on file   Occupational History     Not on file   Tobacco Use     Smoking status: Former     Packs/day: 0.50     Years: 5.00     Pack years: 2.50     Types: Cigarettes     Quit date: 1973     Years since quittin.2     Smokeless tobacco: Never   Vaping Use     Vaping status: Never Used   Substance and Sexual Activity     Alcohol use: Yes     Comment: one light beer of  occ     Drug use: No     Sexual activity: Not Currently     Partners: Male   Other Topics Concern     Parent/sibling w/ CABG, MI or angioplasty before 65F 55M? No   Social History Narrative     Not on file     Social Determinants of Health     Financial Resource Strain: Low Risk  (2020)    Overall Financial Resource Strain (CARDIA)      Difficulty of Paying Living Expenses: Not hard at all   Food Insecurity: No Food Insecurity (2020)    Hunger Vital Sign      Worried About Running Out of Food in the Last Year: Never true      Ran Out of Food in the Last Year: Never true   Transportation Needs: No Transportation Needs (2020)    PRAPARE - Transportation      Lack of Transportation (Medical): No      Lack of Transportation (Non-Medical): No    Physical Activity: Inactive (9/21/2020)    Exercise Vital Sign      Days of Exercise per Week: 0 days      Minutes of Exercise per Session: 0 min   Stress: Not on file   Social Connections: Unknown (9/21/2020)    Social Connection and Isolation Panel [NHANES]      Frequency of Communication with Friends and Family: Twice a week      Frequency of Social Gatherings with Friends and Family: Twice a week      Attends Yazidism Services: Never      Active Member of Clubs or Organizations: No      Attends Club or Organization Meetings: Never      Marital Status: Not on file   Intimate Partner Violence: Not on file   Housing Stability: Not on file           Review of System:     Constitutional: Negative for fever or chills, positive for chronic weight loss with some mild recent weight regain  Skin: Negative for rashes  Ears/Nose/Throat: Negative for nasal congestion, sore throat  Respiratory: No shortness of breath, dyspnea on exertion, cough, or hemoptysis  Cardiovascular: Negative for chest pain  Gastrointestinal: Negative for nausea, vomiting, positive for chronic poor appetite  Genitourinary: Negative for dysuria, hematuria, positive for CKD  Musculoskeletal: positive for diffuse weakness  Neurologic: Negative for headaches  Psychiatric: Negative for depression, anxiety  Hematologic/Lymphatic/Immunologic: Negative  Endocrine: Negative  Behavioral: Negative for tobacco use       Physical Exam:   There were no vitals taken for this visit.    Respiratory: no cough over the phone  Neurologic: patient is alert  Psychiatric: Normal affect    Diagnostic Test Results:     Diagnostic Test Results:  Labs reviewed in Epic    ASSESSMENT/PLAN:       Ghislaine was seen today for fatigue and weight loss.    Diagnoses and all orders for this visit:  Chronic fatigue  Failure to thrive in adult  Weight loss  Poor appetite  -   Symptoms slightly improved from prior baseline recently  -   increase dronabinol (MARINOL) 2.5 MG capsule; Take  1 capsule (2.5 mg) by mouth from 1 time daily to 2xDay (before meals)    Chronic obstructive pulmonary disease, unspecified COPD type (H)  - stable, continue current therapy    CKD (chronic kidney disease) stage 4, GFR 15-29 ml/min (H)  - continue follow up with nephrology clinic going forward      Follow Up Plan:     Patient is instructed to return to Internal Medicine clinic for follow-up visit in 1 month.        Laurie Conteh MD  Internal Medicine  [facility]    telephone visit duration including chart review, medication management: 30 minutes

## 2023-04-06 PROBLEM — Z79.01 LONG TERM (CURRENT) USE OF ANTICOAGULANTS: Status: ACTIVE | Noted: 2020-07-31

## 2023-04-06 PROBLEM — E21.3 HYPERPARATHYROIDISM (H): Status: ACTIVE | Noted: 2019-09-22

## 2023-04-06 PROBLEM — N17.9 ACUTE RENAL INJURY DUE TO HYPOVOLEMIA (H): Status: ACTIVE | Noted: 2019-12-10

## 2023-04-06 PROBLEM — Z86.16 PERSONAL HISTORY OF COVID-19: Status: ACTIVE | Noted: 2021-02-25

## 2023-04-06 PROBLEM — Z87.891 PERSONAL HISTORY OF NICOTINE DEPENDENCE: Status: ACTIVE | Noted: 2021-04-28

## 2023-04-06 PROBLEM — E86.1 ACUTE RENAL INJURY DUE TO HYPOVOLEMIA (H): Status: ACTIVE | Noted: 2019-12-10

## 2023-04-06 PROBLEM — E87.20 METABOLIC ACIDOSIS: Status: ACTIVE | Noted: 2020-02-13

## 2023-04-06 PROBLEM — D47.2 MONOCLONAL GAMMOPATHY: Status: ACTIVE | Noted: 2021-02-25

## 2023-04-06 PROBLEM — M10.9 GOUT, UNSPECIFIED: Status: ACTIVE | Noted: 2019-10-05

## 2023-04-06 PROBLEM — K59.00 CONSTIPATION, UNSPECIFIED: Status: ACTIVE | Noted: 2020-08-17

## 2023-04-06 PROBLEM — K21.01: Status: ACTIVE | Noted: 2021-02-25

## 2023-04-06 NOTE — PATIENT INSTRUCTIONS
INCREASE isordil to 30mg 3x a day  Monitor BP at home, bring readings to next clinic visit  Follow up with Elle in 4-6 weeks   Please call with any questions/concerns [phone]

## 2023-04-06 NOTE — PROGRESS NOTES
Subjective   Ghislaine is a 87 year old accompanied by her daughter, presenting for the following health issues:    HPI       Chief Complaint:     Chronic weakness, weight loss, poor appetite, recent low back pains    HPI:   Patient Ghislaine Walls is a very pleasant 87 year old female with history of CKD who presents to Internal Medicine clinic today brought in by her daughter from home for evaluation of multiple concerns including chronic weakness, weight loss, poor appetite, recent mechanical low back pains. No chest pain, headaches, fever or chills at this time.     Current Medications:     Current Outpatient Medications   Medication Sig Dispense Refill     Abatacept (ORENCIA IV) Inject 500 mg into the vein every 30 days       ACE/ARB/ARNI NOT PRESCRIBED (INTENTIONAL) Please choose reason not prescribed from choices below.       acetaminophen (TYLENOL) 650 MG CR tablet Take 650-1,300 mg by mouth every 8 hours as needed for mild pain or fever       allopurinol (ZYLOPRIM) 100 MG tablet Take 1 tablet (100 mg) by mouth daily 90 tablet 0     amLODIPine (NORVASC) 10 MG tablet TAKE 1 TABLET BY MOUTH ONE TIME DAILY 90 tablet 0     apixaban ANTICOAGULANT (ELIQUIS ANTICOAGULANT) 2.5 MG tablet Take 1 tablet (2.5 mg) by mouth 2 times daily 180 tablet 0     calcium citrate-vitamin D (CITRACAL) 315-250 MG-UNIT TABS per tablet Take 1 tablet by mouth 2 times daily       carvedilol (COREG) 6.25 MG tablet Take 1 tablet (6.25 mg) by mouth 2 times daily (with meals) 180 tablet 0     Darbepoetin Alonso-Albumin (ARANESP IJ) Inject 10 mcg as directed Monthly as needed       dronabinol (MARINOL) 2.5 MG capsule Take 1 capsule (2.5 mg) by mouth 2 times daily (before meals) 60 capsule 1     ferrous sulfate (FEROSUL) 325 (65 Fe) MG tablet Take 325 mg by mouth every other day       hydrALAZINE (APRESOLINE) 100 MG tablet Take 1 tablet by mouth 4 times daily. 360 tablet 0     hydroxychloroquine (PLAQUENIL) 200 MG tablet Take 200 mg by mouth  daily       levothyroxine (SYNTHROID/LEVOTHROID) 25 MCG tablet Take 1 tablet by mouth daily. 90 tablet 3     Lidocaine (LIDOCARE) 4 % Patch Place 1 patch onto the skin every 24 hours 30 patch 3     mirtazapine (REMERON) 15 MG tablet Take 1 tablet by mouth daily At Bedtime. 90 tablet 3     ondansetron (ZOFRAN-ODT) 4 MG ODT tab Take 4 mg by mouth every 8 hours as needed for nausea       pantoprazole (PROTONIX) 40 MG EC tablet Take 1 tablet (40 mg) by mouth daily 90 tablet 2     senna-docusate (SENOKOT-S/PERICOLACE) 8.6-50 MG tablet Take 2 tablets by mouth daily       sodium bicarbonate 650 MG tablet Take 650 mg by mouth 2 times daily        torsemide (DEMADEX) 20 MG tablet Take 1 tablet (20 mg) by mouth daily Take additional 20mg torsemide (1 tablet) for weight gain > 2 pounds overnight. 100 tablet 3     Vitamin D (Cholecalciferol) 25 MCG (1000 UT) TABS 1 tablet twice daily       isosorbide dinitrate (ISORDIL) 30 MG tablet Take 1 tablet (30 mg) by mouth 3 times daily 270 tablet 1         Allergies:      Allergies   Allergen Reactions     Oxybutynin Itching     Seasonal Allergies             Past Medical History:     Past Medical History:   Diagnosis Date     Atrial fibrillation (H) new 10/19     [place] (hard of hearing)     wears hearing aids     Hyperlipidaemia      Hyperlipidaemia LDL goal < 130      Hypertension      Hypothyroid      PAD (peripheral artery disease) (H)      Renal insufficiency, mild      Uncontrolled hypertension 10/14/2019         Past Surgical History:     Past Surgical History:   Procedure Laterality Date     APPENDECTOMY  1951     BONE MARROW BIOPSY, BONE SPECIMEN, NEEDLE/TROCAR N/A 7/31/2020    Procedure: bone marrow biopsy;  Surgeon: Iris Cameron MD;  Location:  GI     CATARACT IOL, RT/LT  2001    bilateral (laser - 5/2013)     DENTAL SURGERY  1962    [place]     ESOPHAGOSCOPY, GASTROSCOPY, DUODENOSCOPY (EGD), COMBINED N/A 7/24/2020    Procedure: ESOPHAGOGASTRODUODENOSCOPY, WITH BIOPSY;   Surgeon: Humberto Bailon MD;  Location:  GI     EYE SURGERY      Muscle release     TUBAL LIGATION           Family Medical History:     Family History   Problem Relation Age of Onset     Heart Disease Mother      Respiratory Mother      Heart Disease Father      Heart Disease Brother      Coronary Artery Disease Brother         CAB     Heart Disease Brother      Coronary Artery Disease Brother         CAB         Social History:     Social History     Socioeconomic History     Marital status:      Spouse name: Not on file     Number of children: Not on file     Years of education: Not on file     Highest education level: Not on file   Occupational History     Not on file   Tobacco Use     Smoking status: Former     Packs/day: 0.50     Years: 5.00     Pack years: 2.50     Types: Cigarettes     Quit date: 1973     Years since quittin.2     Smokeless tobacco: Never   Vaping Use     Vaping status: Never Used   Substance and Sexual Activity     Alcohol use: Yes     Comment: one light beer of  occ     Drug use: No     Sexual activity: Not Currently     Partners: Male   Other Topics Concern     Parent/sibling w/ CABG, MI or angioplasty before 65F 55M? No   Social History Narrative     Not on file     Social Determinants of Health     Financial Resource Strain: Low Risk  (2020)    Overall Financial Resource Strain (CARDIA)      Difficulty of Paying Living Expenses: Not hard at all   Food Insecurity: No Food Insecurity (2020)    Hunger Vital Sign      Worried About Running Out of Food in the Last Year: Never true      Ran Out of Food in the Last Year: Never true   Transportation Needs: No Transportation Needs (2020)    PRAPARE - Transportation      Lack of Transportation (Medical): No      Lack of Transportation (Non-Medical): No   Physical Activity: Inactive (2020)    Exercise Vital Sign      Days of Exercise per Week: 0 days      Minutes of Exercise per  "Session: 0 min   Stress: Not on file   Social Connections: Unknown (9/21/2020)    Social Connection and Isolation Panel [NHANES]      Frequency of Communication with Friends and Family: Twice a week      Frequency of Social Gatherings with Friends and Family: Twice a week      Attends Denominational Services: Never      Active Member of Clubs or Organizations: No      Attends Club or Organization Meetings: Never      Marital Status: Not on file   Intimate Partner Violence: Not on file   Housing Stability: Not on file           Review of System:     Constitutional: Negative for fever or chills, positive for weight loss  Skin: Negative for rashes  Ears/Nose/Throat: Negative for nasal congestion, sore throat  Respiratory: No shortness of breath, dyspnea on exertion, cough, or hemoptysis  Cardiovascular: Negative for chest pain  Gastrointestinal: Negative for nausea, vomiting, positive for chronic poor appetite  Genitourinary: Negative for dysuria, hematuria, positive for CKD  Musculoskeletal: positive for diffuse weakness  Neurologic: Negative for headaches  Psychiatric: Negative for depression, anxiety  Hematologic/Lymphatic/Immunologic: Negative  Endocrine: Negative  Behavioral: Negative for tobacco use       Physical Exam:   BP (!) 149/69 (BP Location: Right arm, Patient Position: Sitting, Cuff Size: Adult Regular)   Pulse 61   Temp 98.2  F (36.8  C) (Oral)   Resp 14   Ht 1.549 m (5' 1\")   Wt 52.6 kg (116 lb)   LMP  (LMP Unknown)   SpO2 98%   Breastfeeding No   BMI 21.92 kg/m      GENERAL: chronically ill appearing elderly female, alert and no acute distress  EYES: eyes grossly normal to inspection, and conjunctivae and sclerae normal  HENT: Normocephalic atraumatic. Nose and mouth without ulcers or lesions  NECK: supple  RESP: lungs clear to auscultation   CV: regular rate and rhythm  LYMPH: no peripheral edema   ABDOMEN: nondistended  MS: patient is utilizing a wheelchair to help with transportation, " mechanical low back pains noted  SKIN: no suspicious lesions or rashes  NEURO: Alert & Oriented x 3.   PSYCH: mentation appears normal, affect normal        Diagnostic Test Results:     Diagnostic Test Results:  Labs reviewed in Epic  Results for orders placed or performed in visit on 04/06/23   XR Lumbar Spine 2/3 Views     Status: None    Narrative    LUMBAR SPINE 2/3 VIEWS 4/6/2023 2:29 PM    INDICATION: Acute midline low back pain without sciatica.    COMPARISON: None      Impression    IMPRESSION: 5 lumbar vertebral bodies. Convex right upper and convex  left lower lumbar curvature. Slight rotation. No subluxation. Normal  vertebral body heights, no compression deformity or fracture. Mild  interspace, endplate and facet degeneration.    GANGA PEREZ MD         SYSTEM ID:  [id number]       ASSESSMENT/PLAN:       Ghislaine was seen today for fatigue and weight loss.    Diagnoses and all orders for this visit:  Chronic fatigue  Failure to thrive in adult  Weight loss  Poor appetite  -     REVIEW OF HEALTH MAINTENANCE PROTOCOL ORDERS  -     dronabinol (MARINOL) 2.5 MG capsule; Take 1 capsule (2.5 mg) by mouth 2 times daily (before meals)    Chronic obstructive pulmonary disease, unspecified COPD type (H)  - stable, continue current therapy    CKD (chronic kidney disease) stage 4, GFR 15-29 ml/min (H)  - continue follow up with nephrology clinic going forward    Acute midline low back pain without sciatica  -     XR Lumbar Spine 2/3 Views; Future  -     Lidocaine (LIDOCARE) 4 % Patch; Place 1 patch onto the skin every 24 hours        Follow Up Plan:     Patient is instructed to return to Internal Medicine clinic for follow-up visit in 1 month.        Laurie Conteh MD  Internal Medicine  [facility]

## 2023-04-06 NOTE — Clinical Note
4/6/2023    Laurie Conteh MD  [address]  [place] [address]    RE: Ghislaine Walls       Dear Colleague,     I had the pleasure of seeing Ghislaine Walls in the [facility].  Cardiology Clinic Progress Note  Ghislaine Walls MRN# [id number]   YOB: 1935 Age: 87 year old   Primary Cardiologist: Dr. Fuentes  Reason for visit: Cardiology follow up             Assessment and Plan:   Ghislaine Walls is a very pleasant 87 year old female here for cardiology follow up.     1. Chronic diastolic heart failure - LVEF 55%, RV normal size/function. ***              - NYHA class III, stage C              - Fluid status : mildly volume up***              - Dry weight : ~ 130#              - Diuretic regimen : Continue torsemide to 20mg daily with additional 20mg daily for weight gain > 2# overnight.               - Aldosterone antagonist : none due to CKD  2. Atrial fibrillation, paroxsymal - in sinus rhythm on exam today, on eliquis for thromboembolic prophylaxis due to elevated [id number] score***.   3. CKD IV - baseline creatinine 1.6-2.4***              - Follows with nephrology, ***  4. Hypertension - controlled***              - Continue amlodipine, carvedilol, clonidine, hydralazine and isordil  5. Chronic anemia - stable, hemoglobin 10.5 6/3/22***    ***  Changes today: ***    Follow up plan: ***        History of Presenting Illness:    Ghislaine Walls is a very pleasant 87 year old female with a history of HFpEF, CKD, hypertension, paroxsymal atrial fibrillation on eliquis, chronic anemia, rheumatoid arthritis and hypothyroidism.      Primary cardiologist Dr. Fuentes. In October 2021 at which time she complained of generalized fatigue. Her metoprolol was decreased to 25mg BID. She was offered the option of stress perfusion imaging to rule out ischemic, but her symptoms were not suggestive of angina and she wished to defer. She had a HF hospitalization in April 2021.      Most  recently Ghislaine was hospitalized 1/12-1/15/2022 with acute on chronic diastolic heart failure exacerbation. NTproBNP on admission 12,000. CXR with vascular congestion and small left pleural effusion s/p thoracentesis 1/13 with 250ml removed. Her metoprolol was changed to carvedilol 6.25mg BID and she was started on isordil 20mg TID due to elevated blood pressures. Her PTA torsemide was increased from 20mg -> 40mg daily. Some concerns for non-compliance contributing to HF exacerbation/admission. Admission weight 141#. Discharge weight 135#.      Echocardiogram 1/13/22 showed LVEF 55%, RV normal size/function, no significant valvular disease and moderate L pleural effusion.      I saw Ghislaine and her daughter on 1/20/22 for post hospital followup, at that time her weight had decreased another 8# since hospital discharge to 128#. Unfortunately her renal function had further declined. Creatinine increased from 2.44 -> 2.82. Recommended decreasing her torsemide to 20mg daily with additional 20mg as needed for weight gain > 2#. ***    Most recently saw Dr. Fuentes ***    Echocardiogram ***        Patient is here today for cardiology follow up.     Patient reports feeling good. Monitoring weights daily a home.     Patient denies chest pain or chest tightness. Denies dizziness, lightheadedness or other presyncopal symptoms. Denies tachycardia or palpitations.     Labs today show ***. Blood pressure 183/83 and HR 61 in clinic today. Recheck ***.      Daughter has been helping meals and getting healthy choice meals with less than 500mg sodium, she has now started to eat more meals at facility dining [place]. Trying to attend exercise class most 3-4x during the week. Occasional beer at happy hour. Denies tobacco use. Using walker for support in her apartment and wheelchair for support out of the building.         Social History    Living in assisted living at [place].  Social History     Socioeconomic History     Marital status:       Spouse name: Not on file     Number of children: Not on file     Years of education: Not on file     Highest education level: Not on file   Occupational History     Not on file   Tobacco Use     Smoking status: Former     Packs/day: 0.50     Years: 5.00     Pack years: 2.50     Types: Cigarettes     Quit date: 1973     Years since quittin.2     Smokeless tobacco: Never   Vaping Use     Vaping status: Never Used   Substance and Sexual Activity     Alcohol use: Yes     Comment: one light beer of  occ     Drug use: No     Sexual activity: Not Currently     Partners: Male   Other Topics Concern     Parent/sibling w/ CABG, MI or angioplasty before 65F 55M? No   Social History Narrative     Not on file     Social Determinants of Health     Financial Resource Strain: Low Risk  (2020)    Overall Financial Resource Strain (CARDIA)      Difficulty of Paying Living Expenses: Not hard at all   Food Insecurity: No Food Insecurity (2020)    Hunger Vital Sign      Worried About Running Out of Food in the Last Year: Never true      Ran Out of Food in the Last Year: Never true   Transportation Needs: No Transportation Needs (2020)    PRAPARE - Transportation      Lack of Transportation (Medical): No      Lack of Transportation (Non-Medical): No   Physical Activity: Inactive (2020)    Exercise Vital Sign      Days of Exercise per Week: 0 days      Minutes of Exercise per Session: 0 min   Stress: Not on file   Social Connections: Unknown (2020)    Social Connection and Isolation Panel [NHANES]      Frequency of Communication with Friends and Family: Twice a week      Frequency of Social Gatherings with Friends and Family: Twice a week      Attends Alevism Services: Never      Active Member of Clubs or Organizations: No      Attends Club or Organization Meetings: Never      Marital Status: Not on file   Intimate Partner Violence: Not on file   Housing Stability: Not on file             Review of Systems:   10 point ROS neg other than the symptoms noted above in the HPI.         Physical Exam:   Vitals: BP (!) 183/83   Pulse 61   Temp 98.2  F (36.8  C)   Wt 52.6 kg (116 lb)   SpO2 98%   BMI 21.92 kg/m     Wt Readings from Last 4 Encounters:   04/06/23 52.6 kg (116 lb)   04/06/23 52.6 kg (116 lb)   12/07/22 54.9 kg (121 lb)   09/28/22 59 kg (130 lb)     GEN: well nourished, in no acute distress.  HEENT:  Pupils equal, round. Sclerae nonicteric.   NECK: Supple, no masses appreciated. ***JVD with patient ***.  C/V:  Regular rate and rhythm, no murmur, rub or gallop.  ***  RESP: Respirations are unlabored. Clear to auscultation bilaterally without wheezing, rales, or rhonchi.  GI: Abdomen soft, nontender.  EXTREM: *** LE edema.  NEURO: Alert and oriented, cooperative.  SKIN: Warm and dry. ***       Data:     LIPID RESULTS:  Lab Results   Component Value Date    CHOL 131 06/24/2022    CHOL 123 10/02/2019    HDL 48 (L) 06/24/2022    HDL 27 (L) 10/02/2019    LDL 65 06/24/2022    LDL 74 10/02/2019    TRIG 92 06/24/2022    TRIG 111 10/02/2019    CHOLHDLRATIO 3.9 06/15/2015     LIVER ENZYME RESULTS:  Lab Results   Component Value Date    AST 18 03/21/2022    AST 40 06/07/2021    ALT 9 06/24/2022    ALT 35 06/07/2021     CBC RESULTS:  Lab Results   Component Value Date    WBC 3.6 (L) 01/15/2022    WBC 3.5 (L) 06/07/2021    RBC 2.59 (L) 01/15/2022    RBC 3.26 (L) 06/07/2021    HGB 11.1 (L) 03/31/2023    HGB 9.8 (L) 06/07/2021    HCT 26.9 (L) 01/15/2022    HCT 32.5 (L) 06/07/2021     (H) 01/15/2022     06/07/2021    MCH 32.4 01/15/2022    MCH 30.1 06/07/2021    MCHC 31.2 (L) 01/15/2022    MCHC 30.2 (L) 06/07/2021    RDW 14.3 01/15/2022    RDW 17.6 (H) 06/07/2021     (L) 01/15/2022     06/07/2021     BMP RESULTS:  Lab Results   Component Value Date     03/31/2023     06/07/2021    POTASSIUM 4.8 03/31/2023    POTASSIUM 3.9 09/28/2022    POTASSIUM 4.9 06/07/2021     CHLORIDE 103 03/31/2023    CHLORIDE 107 09/28/2022    CHLORIDE 114 (H) 06/07/2021    CO2 25 03/31/2023    CO2 28 09/28/2022    CO2 23 06/07/2021    ANIONGAP 11 03/31/2023    ANIONGAP 5 09/28/2022    ANIONGAP 4 06/07/2021     (H) 03/31/2023     (H) 09/28/2022    GLC 94 06/07/2021    BUN 54.2 (H) 03/31/2023    BUN 52 (H) 09/28/2022    BUN 77 (H) 06/07/2021    CR 2.92 (H) 03/31/2023    CR 1.61 (H) 06/07/2021    GFRESTIMATED 15 (L) 03/31/2023    GFRESTIMATED 29 (L) 06/07/2021    GFRESTBLACK 34 (L) 06/07/2021    ALIVIA 10.2 03/31/2023    ALIVIA 9.4 06/07/2021      INR RESULTS:  Lab Results   Component Value Date    INR 1.16 (H) 02/24/2021    INR 2.07 (H) 07/21/2020            Medications     Current Outpatient Medications   Medication Sig Dispense Refill     Abatacept (ORENCIA IV) Inject 500 mg into the vein every 30 days       ACE/ARB/ARNI NOT PRESCRIBED (INTENTIONAL) Please choose reason not prescribed from choices below.       acetaminophen (TYLENOL) 650 MG CR tablet Take 650-1,300 mg by mouth every 8 hours as needed for mild pain or fever       allopurinol (ZYLOPRIM) 100 MG tablet Take 1 tablet (100 mg) by mouth daily 90 tablet 0     amLODIPine (NORVASC) 10 MG tablet TAKE 1 TABLET BY MOUTH ONE TIME DAILY 90 tablet 0     apixaban ANTICOAGULANT (ELIQUIS ANTICOAGULANT) 2.5 MG tablet Take 1 tablet (2.5 mg) by mouth 2 times daily 180 tablet 0     calcium citrate-vitamin D (CITRACAL) 315-250 MG-UNIT TABS per tablet Take 1 tablet by mouth 2 times daily       carvedilol (COREG) 6.25 MG tablet Take 1 tablet (6.25 mg) by mouth 2 times daily (with meals) 180 tablet 0     Darbepoetin Alonso-Albumin (ARANESP IJ) Inject 10 mcg as directed Monthly as needed       dronabinol (MARINOL) 2.5 MG capsule Take 1 capsule (2.5 mg) by mouth 2 times daily (before meals) 60 capsule 1     ferrous sulfate (FEROSUL) 325 (65 Fe) MG tablet Take 325 mg by mouth every other day       hydrALAZINE (APRESOLINE) 100 MG tablet Take 1 tablet by mouth  4 times daily. 360 tablet 0     hydroxychloroquine (PLAQUENIL) 200 MG tablet Take 200 mg by mouth daily       isosorbide dinitrate (ISORDIL) 20 MG tablet Take 1 tablet (20 mg) by mouth 3 times daily. 270 tablet 0     levothyroxine (SYNTHROID/LEVOTHROID) 25 MCG tablet Take 1 tablet by mouth daily. 90 tablet 3     Lidocaine (LIDOCARE) 4 % Patch Place 1 patch onto the skin every 24 hours 30 patch 3     mirtazapine (REMERON) 15 MG tablet Take 1 tablet by mouth daily At Bedtime. 90 tablet 3     ondansetron (ZOFRAN-ODT) 4 MG ODT tab Take 4 mg by mouth every 8 hours as needed for nausea       pantoprazole (PROTONIX) 40 MG EC tablet Take 1 tablet (40 mg) by mouth daily 90 tablet 2     senna-docusate (SENOKOT-S/PERICOLACE) 8.6-50 MG tablet Take 2 tablets by mouth daily       sodium bicarbonate 650 MG tablet Take 650 mg by mouth 2 times daily        torsemide (DEMADEX) 20 MG tablet Take 1 tablet (20 mg) by mouth daily Take additional 20mg torsemide (1 tablet) for weight gain > 2 pounds overnight. 100 tablet 3     Vitamin D (Cholecalciferol) 25 MCG (1000 UT) TABS 1 tablet twice daily       cloNIDine (CATAPRES) 0.1 MG tablet Take 1 tablet (0.1 mg) by mouth At Bedtime (Patient not taking: Reported on 4/6/2023) 90 tablet 3        Past Medical History     Past Medical History:   Diagnosis Date     Atrial fibrillation (H) new 10/19     [place] (hard of hearing)     wears hearing aids     Hyperlipidaemia      Hyperlipidaemia LDL goal < 130      Hypertension      Hypothyroid      PAD (peripheral artery disease) (H)      Renal insufficiency, mild      Uncontrolled hypertension 10/14/2019     Past Surgical History:   Procedure Laterality Date     APPENDECTOMY  1951     BONE MARROW BIOPSY, BONE SPECIMEN, NEEDLE/TROCAR N/A 7/31/2020    Procedure: bone marrow biopsy;  Surgeon: Iris Cameron MD;  Location:  GI     CATARACT IOL, RT/LT  2001    bilateral (laser - 5/2013)     DENTAL SURGERY  1962    wisdom     ESOPHAGOSCOPY, GASTROSCOPY,  DUODENOSCOPY (EGD), COMBINED N/A 7/24/2020    Procedure: ESOPHAGOGASTRODUODENOSCOPY, WITH BIOPSY;  Surgeon: Humberto Bailon MD;  Location:  GI     EYE SURGERY  1956    Muscle release     TUBAL LIGATION  1971     Family History   Problem Relation Age of Onset     Heart Disease Mother      Respiratory Mother      Heart Disease Father      Heart Disease Brother      Coronary Artery Disease Brother         CAB     Heart Disease Brother      Coronary Artery Disease Brother         CAB            Allergies   Oxybutynin and Seasonal allergies    *** minutes spent on the date of the encounter doing chart review, history and exam, documentation and further activities as noted above      [facility]  Pager: [phone]      Cardiology Clinic Progress Note  Ghislaine Walls MRN# [id number]   YOB: 1935 Age: 87 year old   Primary Cardiologist: Dr. Fuentes  Reason for visit: Cardiology follow up             Assessment and Plan:   Ghislaine Walsl is a very pleasant 87 year old female here for cardiology follow up.     1. Chronic diastolic heart failure - LVEF 55%, RV normal size/function. ***              - NYHA class III, stage C              - Fluid status : mildly volume up***              - Dry weight : ~ 130#              - Diuretic regimen : Continue torsemide to 20mg daily with additional 20mg daily for weight gain > 2# overnight.               - Aldosterone antagonist : none due to CKD  2. Atrial fibrillation, paroxsymal - in sinus rhythm on exam today, on eliquis for thromboembolic prophylaxis due to elevated [id number] score***.   3. CKD IV - baseline creatinine 1.6-2.4***              - Follows with nephrology, ***  4. Hypertension - controlled***              - Continue amlodipine, carvedilol, clonidine, hydralazine and isordil  5. Chronic anemia - stable, hemoglobin 10.5 6/3/22***    ***    Changes today: increase isordil to 30mg  TID    Follow up plan: ***    Ensure Nephrology gets a copy of lab results from today.     Cardiology follow up in 4-6 weeks for blood pressure check with BMP prior        History of Presenting Illness:    Ghislaine Walls is a very pleasant 87 year old female with a history of HFpEF, CKD, hypertension, paroxsymal atrial fibrillation on eliquis, chronic anemia, rheumatoid arthritis and hypothyroidism.      Primary cardiologist Dr. Fuentes. In October 2021 at which time she complained of generalized fatigue. Her metoprolol was decreased to 25mg BID. She was offered the option of stress perfusion imaging to rule out ischemic, but her symptoms were not suggestive of angina and she wished to defer. She had a HF hospitalization in April 2021.      Most recently Ghislaine was hospitalized 1/12-1/15/2022 with acute on chronic diastolic heart failure exacerbation. NTproBNP on admission 12,000. CXR with vascular congestion and small left pleural effusion s/p thoracentesis 1/13 with 250ml removed. Her metoprolol was changed to carvedilol 6.25mg BID and she was started on isordil 20mg TID due to elevated blood pressures. Her PTA torsemide was increased from 20mg -> 40mg daily. Some concerns for non-compliance contributing to HF exacerbation/admission. Admission weight 141#. Discharge weight 135#.      Echocardiogram 1/13/22 showed LVEF 55%, RV normal size/function, no significant valvular disease and moderate L pleural effusion.      I saw Ghislaine and her daughter on 1/20/22 for post hospital followup, at that time her weight had decreased another 8# since hospital discharge to 128#. Unfortunately her renal function had further declined. Creatinine increased from 2.44 -> 2.82. Recommended decreasing her torsemide to 20mg daily with additional 20mg as needed for weight gain > 2#. ***    Most recently saw Dr. Fuentes ***    Echocardiogram ***        Patient is here today for cardiology follow up.     Patient reports feeling good.  Monitoring weights daily a home, typically ranging ~116-118#, has had a slow decline in weight. Will take a PRN dose of her torsemide 3 x per week secondary to LE edema and/or weight gain. Denies shortness of breath at rest. Denies exertional dyspnea. Denies orthopnea or PND. Denies chest pain or chest tightness. Denies dizziness, lightheadedness or other presyncopal symptoms. Denies tachycardia or palpitations. Notes worsening back pain which she saw PCP about today, diagnosed with scoliosis, started on lidocaine patches. She sits hunched over in her wheel chair today secondary to pain. Daughter reports that nephrology stopped clonidine in February.     Labs today show ***. Blood pressure 183/83 and HR 61 in clinic today, at PCP visit earlier today 149/69. Recheck ***. Occasional blood pressure at home recalls typically ***     Eating most meals at facility, at least 1 meal daily. No exercise routine, lifestyle is sedentary. Occasional beer at happy hour. Denies tobacco use. Using walker for support in her apartment and wheelchair for support out of the building.         Social History    Living in assisted living at [place].  Social History     Socioeconomic History     Marital status:      Spouse name: Not on file     Number of children: Not on file     Years of education: Not on file     Highest education level: Not on file   Occupational History     Not on file   Tobacco Use     Smoking status: Former     Packs/day: 0.50     Years: 5.00     Pack years: 2.50     Types: Cigarettes     Quit date: 1973     Years since quittin.2     Smokeless tobacco: Never   Vaping Use     Vaping status: Never Used   Substance and Sexual Activity     Alcohol use: Yes     Comment: one light beer of  occ     Drug use: No     Sexual activity: Not Currently     Partners: Male   Other Topics Concern     Parent/sibling w/ CABG, MI or angioplasty before 65F 55M? No   Social History Narrative     Not on file      Social Determinants of Health     Financial Resource Strain: Low Risk  (9/21/2020)    Overall Financial Resource Strain (CARDIA)      Difficulty of Paying Living Expenses: Not hard at all   Food Insecurity: No Food Insecurity (9/21/2020)    Hunger Vital Sign      Worried About Running Out of Food in the Last Year: Never true      Ran Out of Food in the Last Year: Never true   Transportation Needs: No Transportation Needs (9/21/2020)    PRAPARE - Transportation      Lack of Transportation (Medical): No      Lack of Transportation (Non-Medical): No   Physical Activity: Inactive (9/21/2020)    Exercise Vital Sign      Days of Exercise per Week: 0 days      Minutes of Exercise per Session: 0 min   Stress: Not on file   Social Connections: Unknown (9/21/2020)    Social Connection and Isolation Panel [NHANES]      Frequency of Communication with Friends and Family: Twice a week      Frequency of Social Gatherings with Friends and Family: Twice a week      Attends Mormonism Services: Never      Active Member of Clubs or Organizations: No      Attends Club or Organization Meetings: Never      Marital Status: Not on file   Intimate Partner Violence: Not on file   Housing Stability: Not on file            Review of Systems:   10 point ROS neg other than the symptoms noted above in the HPI.         Physical Exam:   Vitals: BP (!) 183/83   Pulse 61   Temp 98.2  F (36.8  C)   Wt 52.6 kg (116 lb)   SpO2 98%   BMI 21.92 kg/m     Wt Readings from Last 4 Encounters:   04/06/23 52.6 kg (116 lb)   04/06/23 52.6 kg (116 lb)   12/07/22 54.9 kg (121 lb)   09/28/22 59 kg (130 lb)     GEN: well nourished, in no acute distress.  HEENT:  Pupils equal, round. Sclerae nonicteric.   NECK: Supple, no masses appreciated. ***JVD with patient ***.  C/V:  Regular rate and rhythm, no murmur, rub or gallop.  ***  RESP: Respirations are unlabored. Clear to auscultation bilaterally without wheezing, rales, or rhonchi.  GI: Abdomen soft,  nontender.  EXTREM: *** LE edema.  NEURO: Alert and oriented, cooperative.  SKIN: Warm and dry. ***       Data:     LIPID RESULTS:  Lab Results   Component Value Date    CHOL 131 06/24/2022    CHOL 123 10/02/2019    HDL 48 (L) 06/24/2022    HDL 27 (L) 10/02/2019    LDL 65 06/24/2022    LDL 74 10/02/2019    TRIG 92 06/24/2022    TRIG 111 10/02/2019    CHOLHDLRATIO 3.9 06/15/2015     LIVER ENZYME RESULTS:  Lab Results   Component Value Date    AST 18 03/21/2022    AST 40 06/07/2021    ALT 9 06/24/2022    ALT 35 06/07/2021     CBC RESULTS:  Lab Results   Component Value Date    WBC 3.6 (L) 01/15/2022    WBC 3.5 (L) 06/07/2021    RBC 2.59 (L) 01/15/2022    RBC 3.26 (L) 06/07/2021    HGB 11.1 (L) 03/31/2023    HGB 9.8 (L) 06/07/2021    HCT 26.9 (L) 01/15/2022    HCT 32.5 (L) 06/07/2021     (H) 01/15/2022     06/07/2021    MCH 32.4 01/15/2022    MCH 30.1 06/07/2021    MCHC 31.2 (L) 01/15/2022    MCHC 30.2 (L) 06/07/2021    RDW 14.3 01/15/2022    RDW 17.6 (H) 06/07/2021     (L) 01/15/2022     06/07/2021     BMP RESULTS:  Lab Results   Component Value Date     03/31/2023     06/07/2021    POTASSIUM 4.8 03/31/2023    POTASSIUM 3.9 09/28/2022    POTASSIUM 4.9 06/07/2021    CHLORIDE 103 03/31/2023    CHLORIDE 107 09/28/2022    CHLORIDE 114 (H) 06/07/2021    CO2 25 03/31/2023    CO2 28 09/28/2022    CO2 23 06/07/2021    ANIONGAP 11 03/31/2023    ANIONGAP 5 09/28/2022    ANIONGAP 4 06/07/2021     (H) 03/31/2023     (H) 09/28/2022    GLC 94 06/07/2021    BUN 54.2 (H) 03/31/2023    BUN 52 (H) 09/28/2022    BUN 77 (H) 06/07/2021    CR 2.92 (H) 03/31/2023    CR 1.61 (H) 06/07/2021    GFRESTIMATED 15 (L) 03/31/2023    GFRESTIMATED 29 (L) 06/07/2021    GFRESTBLACK 34 (L) 06/07/2021    ALIVIA 10.2 03/31/2023    ALIVIA 9.4 06/07/2021      INR RESULTS:  Lab Results   Component Value Date    INR 1.16 (H) 02/24/2021    INR 2.07 (H) 07/21/2020            Medications     Current Outpatient  Medications   Medication Sig Dispense Refill     Abatacept (ORENCIA IV) Inject 500 mg into the vein every 30 days       ACE/ARB/ARNI NOT PRESCRIBED (INTENTIONAL) Please choose reason not prescribed from choices below.       acetaminophen (TYLENOL) 650 MG CR tablet Take 650-1,300 mg by mouth every 8 hours as needed for mild pain or fever       allopurinol (ZYLOPRIM) 100 MG tablet Take 1 tablet (100 mg) by mouth daily 90 tablet 0     amLODIPine (NORVASC) 10 MG tablet TAKE 1 TABLET BY MOUTH ONE TIME DAILY 90 tablet 0     apixaban ANTICOAGULANT (ELIQUIS ANTICOAGULANT) 2.5 MG tablet Take 1 tablet (2.5 mg) by mouth 2 times daily 180 tablet 0     calcium citrate-vitamin D (CITRACAL) 315-250 MG-UNIT TABS per tablet Take 1 tablet by mouth 2 times daily       carvedilol (COREG) 6.25 MG tablet Take 1 tablet (6.25 mg) by mouth 2 times daily (with meals) 180 tablet 0     Darbepoetin Alonso-Albumin (ARANESP IJ) Inject 10 mcg as directed Monthly as needed       dronabinol (MARINOL) 2.5 MG capsule Take 1 capsule (2.5 mg) by mouth 2 times daily (before meals) 60 capsule 1     ferrous sulfate (FEROSUL) 325 (65 Fe) MG tablet Take 325 mg by mouth every other day       hydrALAZINE (APRESOLINE) 100 MG tablet Take 1 tablet by mouth 4 times daily. 360 tablet 0     hydroxychloroquine (PLAQUENIL) 200 MG tablet Take 200 mg by mouth daily       isosorbide dinitrate (ISORDIL) 20 MG tablet Take 1 tablet (20 mg) by mouth 3 times daily. 270 tablet 0     levothyroxine (SYNTHROID/LEVOTHROID) 25 MCG tablet Take 1 tablet by mouth daily. 90 tablet 3     Lidocaine (LIDOCARE) 4 % Patch Place 1 patch onto the skin every 24 hours 30 patch 3     mirtazapine (REMERON) 15 MG tablet Take 1 tablet by mouth daily At Bedtime. 90 tablet 3     ondansetron (ZOFRAN-ODT) 4 MG ODT tab Take 4 mg by mouth every 8 hours as needed for nausea       pantoprazole (PROTONIX) 40 MG EC tablet Take 1 tablet (40 mg) by mouth daily 90 tablet 2     senna-docusate  (SENOKOT-S/PERICOLACE) 8.6-50 MG tablet Take 2 tablets by mouth daily       sodium bicarbonate 650 MG tablet Take 650 mg by mouth 2 times daily        torsemide (DEMADEX) 20 MG tablet Take 1 tablet (20 mg) by mouth daily Take additional 20mg torsemide (1 tablet) for weight gain > 2 pounds overnight. 100 tablet 3     Vitamin D (Cholecalciferol) 25 MCG (1000 UT) TABS 1 tablet twice daily       cloNIDine (CATAPRES) 0.1 MG tablet Take 1 tablet (0.1 mg) by mouth At Bedtime (Patient not taking: Reported on 4/6/2023) 90 tablet 3        Past Medical History     Past Medical History:   Diagnosis Date     Atrial fibrillation (H) new 10/19     [place] (hard of hearing)     wears hearing aids     Hyperlipidaemia      Hyperlipidaemia LDL goal < 130      Hypertension      Hypothyroid      PAD (peripheral artery disease) (H)      Renal insufficiency, mild      Uncontrolled hypertension 10/14/2019     Past Surgical History:   Procedure Laterality Date     APPENDECTOMY  1951     BONE MARROW BIOPSY, BONE SPECIMEN, NEEDLE/TROCAR N/A 7/31/2020    Procedure: bone marrow biopsy;  Surgeon: Iris Cameron MD;  Location:  GI     CATARACT IOL, RT/LT  2001    bilateral (laser - 5/2013)     DENTAL SURGERY  1962    [facility]     ESOPHAGOSCOPY, GASTROSCOPY, DUODENOSCOPY (EGD), COMBINED N/A 7/24/2020    Procedure: ESOPHAGOGASTRODUODENOSCOPY, WITH BIOPSY;  Surgeon: Humberto Bailon MD;  Location:  GI     EYE SURGERY  1956    Muscle release     TUBAL LIGATION  1971     Family History   Problem Relation Age of Onset     Heart Disease Mother      Respiratory Mother      Heart Disease Father      Heart Disease Brother      Coronary Artery Disease Brother         CAB     Heart Disease Brother      Coronary Artery Disease Brother         CAB            Allergies   Oxybutynin and Seasonal allergies    *** minutes spent on the date of the encounter doing chart review, history and exam, documentation and further activities as noted above      Elle NAVA  DHAVAL Mariano CNP  [facility]  Pager: [phone]        Thank you for allowing me to participate in the care of your patient.      Sincerely,     DHAVAL Cormier CNP     [facility]  cc:   Seema Fuentes MD  [address]  [place] [id number]

## 2023-04-06 NOTE — PROGRESS NOTES
Cardiology Clinic Progress Note  Ghislaine Walls MRN# [id number]   YOB: 1935 Age: 87 year old   Primary Cardiologist: Dr. Fuentes  Reason for visit: Cardiology follow up             Assessment and Plan:   Ghislaine Walls is a very pleasant 87 year old female here for cardiology follow up.     1. Chronic diastolic heart failure - LVEF 55-60%, RV normal size/function.              - NYHA class III, stage C              - Fluid status : euvolemic              - Dry weight : ~ 115-120#              - Diuretic regimen : Continue torsemide to 20mg daily with additional 20mg daily for weight gain > 2# overnight.               - Aldosterone antagonist : none due to CKD  2. Atrial fibrillation, paroxsymal - in sinus rhythm on exam today, on eliquis for thromboembolic prophylaxis due to elevated [id number] score.   3. CKD IV - baseline creatinine 1.6-2.4, most recent creatinine 2.92 3/31              - Follows with nephrology, requested labs get sent to nephrology for review.   4. Hypertension - sub-optimal control.               - Continue amlodipine, carvedilol, hydralazine and isordil  5. Chronic anemia - stable, hemoglobin 11.1 3/31/23    I saw Ghislaine and her daughter for cardiology follow up today. Reviewed recent echocardiogram from 3/31 which showed no significant change, LVEF 55-60%. She appears compensated and euvolemic today on exam. BMP from 3/31 showed decline in renal function with a creatinine and 2.92 and blood pressures are elevated today. Daughter reported that nephrology stopped clonidine in February.     Recommend increasing isordil to 30mg TID and requested copy of BMP be faxed to her nephrology team for review.     Encouraged to monitor blood pressure at home and bring log to next clinic visit.     Changes today: INCREASE isordil to 30mg TID    Follow up plan:     Ensure Nephrology gets a copy of lab results from today.     Cardiology follow up in 4-6 weeks for blood pressure check with  BMP prior        History of Presenting Illness:    Ghislaine Walls is a very pleasant 87 year old female with a history of HFpEF, CKD, hypertension, paroxsymal atrial fibrillation on eliquis, chronic anemia, rheumatoid arthritis and hypothyroidism.      Primary cardiologist Dr. Fuentes. In October 2021 at which time she complained of generalized fatigue. Her metoprolol was decreased to 25mg BID. She was offered the option of stress perfusion imaging to rule out ischemic, but her symptoms were not suggestive of angina and she wished to defer. She had a HF hospitalization in April 2021.      Ghislaine was hospitalized 1/12-1/15/2022 with acute on chronic diastolic heart failure exacerbation. NTproBNP on admission 12,000. CXR with vascular congestion and small left pleural effusion s/p thoracentesis 1/13 with 250ml removed. Her metoprolol was changed to carvedilol 6.25mg BID and she was started on isordil 20mg TID due to elevated blood pressures. Her PTA torsemide was increased from 20mg -> 40mg daily. Some concerns for non-compliance contributing to HF exacerbation/admission. Admission weight 141#. Discharge weight 135#.      Echocardiogram 1/13/22 showed LVEF 55%, RV normal size/function, no significant valvular disease and moderate L pleural effusion.      Most recently saw Dr. Fuentes in September 2022 at which time her biggest complaint was lack of motivation to exercise and generalized fatigue, maybe secondary to medications. MTM visit scheduled. She was compensated/euvolemic. No medication changes.     Echocardiogram 3/31/23 showed LVEF 55-60%, RV normal size/function, mild mitral stenosis, wall motion abnormalities couldn't be excluded due to limited visualization.       Patient is here today for cardiology follow up.     Patient reports feeling good. Monitoring weights daily a home, typically ranging ~116-118#, has had a slow decline in weight. Will take a PRN dose of her torsemide 3 x per week secondary to LE edema  and/or weight gain. Denies shortness of breath at rest. Denies exertional dyspnea. Denies orthopnea or PND. Denies chest pain or chest tightness. Denies dizziness, lightheadedness or other presyncopal symptoms. Denies tachycardia or palpitations. Notes worsening back pain which she saw PCP about today, diagnosed with scoliosis, started on lidocaine patches. She sits hunched over in her wheel chair today secondary to pain. Daughter reports that nephrology stopped clonidine in February.     Labs from 3/31 showed decline in renal function, creatinine 2.92. Blood pressure 183/83 and HR 61 in clinic today, at PCP visit earlier today 149/69. Repeat 183/83. Occasional blood pressure at home but unable to recall readings.      Eating most meals at facility, at least 1 meal daily. No exercise routine, lifestyle is sedentary. Occasional beer at happy hour. Denies tobacco use. Using walker for support in her apartment and wheelchair for support out of the building.         Social History    Living in assisted living at [facility].  Social History     Socioeconomic History     Marital status:      Spouse name: Not on file     Number of children: Not on file     Years of education: Not on file     Highest education level: Not on file   Occupational History     Not on file   Tobacco Use     Smoking status: Former     Packs/day: 0.50     Years: 5.00     Pack years: 2.50     Types: Cigarettes     Quit date: 1973     Years since quittin.2     Smokeless tobacco: Never   Vaping Use     Vaping status: Never Used   Substance and Sexual Activity     Alcohol use: Yes     Comment: one light beer of  occ     Drug use: No     Sexual activity: Not Currently     Partners: Male   Other Topics Concern     Parent/sibling w/ CABG, MI or angioplasty before 65F 55M? No   Social History Narrative     Not on file     Social Determinants of Health     Financial Resource Strain: Low Risk  (2020)    Overall Financial Resource  Strain (CARDIA)      Difficulty of Paying Living Expenses: Not hard at all   Food Insecurity: No Food Insecurity (9/21/2020)    Hunger Vital Sign      Worried About Running Out of Food in the Last Year: Never true      Ran Out of Food in the Last Year: Never true   Transportation Needs: No Transportation Needs (9/21/2020)    PRAPARE - Transportation      Lack of Transportation (Medical): No      Lack of Transportation (Non-Medical): No   Physical Activity: Inactive (9/21/2020)    Exercise Vital Sign      Days of Exercise per Week: 0 days      Minutes of Exercise per Session: 0 min   Stress: Not on file   Social Connections: Unknown (9/21/2020)    Social Connection and Isolation Panel [NHANES]      Frequency of Communication with Friends and Family: Twice a week      Frequency of Social Gatherings with Friends and Family: Twice a week      Attends Islam Services: Never      Active Member of Clubs or Organizations: No      Attends Club or Organization Meetings: Never      Marital Status: Not on file   Intimate Partner Violence: Not on file   Housing Stability: Not on file            Review of Systems:   10 point ROS neg other than the symptoms noted above in the HPI.         Physical Exam:   Vitals: BP (!) 183/83   Pulse 61   Temp 98.2  F (36.8  C)   Wt 52.6 kg (116 lb)   SpO2 98%   BMI 21.92 kg/m     Wt Readings from Last 4 Encounters:   04/06/23 52.6 kg (116 lb)   04/06/23 52.6 kg (116 lb)   12/07/22 54.9 kg (121 lb)   09/28/22 59 kg (130 lb)     GEN: well nourished, in no acute distress.  HEENT:  Pupils equal, round. Sclerae nonicteric.   NECK: Supple, no masses appreciated.  C/V:  Regular rate and rhythm  RESP: Respirations are unlabored. Clear to auscultation bilaterally without wheezing, rales, or rhonchi.  GI: Abdomen soft, nontender.  EXTREM: Trace bilateral LE edema.  NEURO: Alert and oriented, cooperative.  SKIN: Warm and dry.       Data:     LIPID RESULTS:  Lab Results   Component Value Date     CHOL 131 06/24/2022    CHOL 123 10/02/2019    HDL 48 (L) 06/24/2022    HDL 27 (L) 10/02/2019    LDL 65 06/24/2022    LDL 74 10/02/2019    TRIG 92 06/24/2022    TRIG 111 10/02/2019    CHOLHDLRATIO 3.9 06/15/2015     LIVER ENZYME RESULTS:  Lab Results   Component Value Date    AST 18 03/21/2022    AST 40 06/07/2021    ALT 9 06/24/2022    ALT 35 06/07/2021     CBC RESULTS:  Lab Results   Component Value Date    WBC 3.6 (L) 01/15/2022    WBC 3.5 (L) 06/07/2021    RBC 2.59 (L) 01/15/2022    RBC 3.26 (L) 06/07/2021    HGB 11.1 (L) 03/31/2023    HGB 9.8 (L) 06/07/2021    HCT 26.9 (L) 01/15/2022    HCT 32.5 (L) 06/07/2021     (H) 01/15/2022     06/07/2021    MCH 32.4 01/15/2022    MCH 30.1 06/07/2021    MCHC 31.2 (L) 01/15/2022    MCHC 30.2 (L) 06/07/2021    RDW 14.3 01/15/2022    RDW 17.6 (H) 06/07/2021     (L) 01/15/2022     06/07/2021     BMP RESULTS:  Lab Results   Component Value Date     03/31/2023     06/07/2021    POTASSIUM 4.8 03/31/2023    POTASSIUM 3.9 09/28/2022    POTASSIUM 4.9 06/07/2021    CHLORIDE 103 03/31/2023    CHLORIDE 107 09/28/2022    CHLORIDE 114 (H) 06/07/2021    CO2 25 03/31/2023    CO2 28 09/28/2022    CO2 23 06/07/2021    ANIONGAP 11 03/31/2023    ANIONGAP 5 09/28/2022    ANIONGAP 4 06/07/2021     (H) 03/31/2023     (H) 09/28/2022    GLC 94 06/07/2021    BUN 54.2 (H) 03/31/2023    BUN 52 (H) 09/28/2022    BUN 77 (H) 06/07/2021    CR 2.92 (H) 03/31/2023    CR 1.61 (H) 06/07/2021    GFRESTIMATED 15 (L) 03/31/2023    GFRESTIMATED 29 (L) 06/07/2021    GFRESTBLACK 34 (L) 06/07/2021    ALIVIA 10.2 03/31/2023    ALIVIA 9.4 06/07/2021      INR RESULTS:  Lab Results   Component Value Date    INR 1.16 (H) 02/24/2021    INR 2.07 (H) 07/21/2020            Medications     Current Outpatient Medications   Medication Sig Dispense Refill     Abatacept (ORENCIA IV) Inject 500 mg into the vein every 30 days       ACE/ARB/ARNI NOT PRESCRIBED (INTENTIONAL) Please  choose reason not prescribed from choices below.       acetaminophen (TYLENOL) 650 MG CR tablet Take 650-1,300 mg by mouth every 8 hours as needed for mild pain or fever       allopurinol (ZYLOPRIM) 100 MG tablet Take 1 tablet (100 mg) by mouth daily 90 tablet 0     amLODIPine (NORVASC) 10 MG tablet TAKE 1 TABLET BY MOUTH ONE TIME DAILY 90 tablet 0     apixaban ANTICOAGULANT (ELIQUIS ANTICOAGULANT) 2.5 MG tablet Take 1 tablet (2.5 mg) by mouth 2 times daily 180 tablet 0     calcium citrate-vitamin D (CITRACAL) 315-250 MG-UNIT TABS per tablet Take 1 tablet by mouth 2 times daily       carvedilol (COREG) 6.25 MG tablet Take 1 tablet (6.25 mg) by mouth 2 times daily (with meals) 180 tablet 0     Darbepoetin Alonso-Albumin (ARANESP IJ) Inject 10 mcg as directed Monthly as needed       dronabinol (MARINOL) 2.5 MG capsule Take 1 capsule (2.5 mg) by mouth 2 times daily (before meals) 60 capsule 1     ferrous sulfate (FEROSUL) 325 (65 Fe) MG tablet Take 325 mg by mouth every other day       hydrALAZINE (APRESOLINE) 100 MG tablet Take 1 tablet by mouth 4 times daily. 360 tablet 0     hydroxychloroquine (PLAQUENIL) 200 MG tablet Take 200 mg by mouth daily       isosorbide dinitrate (ISORDIL) 20 MG tablet Take 1 tablet (20 mg) by mouth 3 times daily. 270 tablet 0     levothyroxine (SYNTHROID/LEVOTHROID) 25 MCG tablet Take 1 tablet by mouth daily. 90 tablet 3     Lidocaine (LIDOCARE) 4 % Patch Place 1 patch onto the skin every 24 hours 30 patch 3     mirtazapine (REMERON) 15 MG tablet Take 1 tablet by mouth daily At Bedtime. 90 tablet 3     ondansetron (ZOFRAN-ODT) 4 MG ODT tab Take 4 mg by mouth every 8 hours as needed for nausea       pantoprazole (PROTONIX) 40 MG EC tablet Take 1 tablet (40 mg) by mouth daily 90 tablet 2     senna-docusate (SENOKOT-S/PERICOLACE) 8.6-50 MG tablet Take 2 tablets by mouth daily       sodium bicarbonate 650 MG tablet Take 650 mg by mouth 2 times daily        torsemide (DEMADEX) 20 MG tablet Take 1  tablet (20 mg) by mouth daily Take additional 20mg torsemide (1 tablet) for weight gain > 2 pounds overnight. 100 tablet 3     Vitamin D (Cholecalciferol) 25 MCG (1000 UT) TABS 1 tablet twice daily       cloNIDine (CATAPRES) 0.1 MG tablet Take 1 tablet (0.1 mg) by mouth At Bedtime (Patient not taking: Reported on 4/6/2023) 90 tablet 3        Past Medical History     Past Medical History:   Diagnosis Date     Atrial fibrillation (H) new 10/19     [place] (hard of hearing)     wears hearing aids     Hyperlipidaemia      Hyperlipidaemia LDL goal < 130      Hypertension      Hypothyroid      PAD (peripheral artery disease) (H)      Renal insufficiency, mild      Uncontrolled hypertension 10/14/2019     Past Surgical History:   Procedure Laterality Date     APPENDECTOMY  1951     BONE MARROW BIOPSY, BONE SPECIMEN, NEEDLE/TROCAR N/A 7/31/2020    Procedure: bone marrow biopsy;  Surgeon: Iris Cameron MD;  Location:  GI     CATARACT IOL, RT/LT  2001    bilateral (laser - 5/2013)     DENTAL SURGERY  1962    [facility]     ESOPHAGOSCOPY, GASTROSCOPY, DUODENOSCOPY (EGD), COMBINED N/A 7/24/2020    Procedure: ESOPHAGOGASTRODUODENOSCOPY, WITH BIOPSY;  Surgeon: Humberto Bailon MD;  Location:  GI     EYE SURGERY  1956    Muscle release     TUBAL LIGATION  1971     Family History   Problem Relation Age of Onset     Heart Disease Mother      Respiratory Mother      Heart Disease Father      Heart Disease Brother      Coronary Artery Disease Brother         CAB     Heart Disease Brother      Coronary Artery Disease Brother         CAB            Allergies   Oxybutynin and Seasonal allergies    30 minutes spent on the date of the encounter doing chart review, history and exam, documentation and further activities as noted above      DHAVAL Cormier [facility]  Pager: [phone]

## 2023-05-10 NOTE — TELEPHONE ENCOUNTER
We have been unable to reach this patient for MTM follow-up after several attempts. We will stop reaching out to the patient at this time. Please let us know if we can assist in this patient's care in the future.    Routing to PCP as Lien EvansD, [facility]  Medication Therapy Management Provider  Pager: [phone]

## 2023-05-16 NOTE — PATIENT INSTRUCTIONS
Start monitoring blood pressure at home  Do your best to take your medications daily   Limit salt in diet  Follow up with Elle in cardiology in 4 months  Please call with any questions/concerns [phone]

## 2023-05-16 NOTE — PROGRESS NOTES
Cardiology Clinic Progress Note  Ghislaine Walls MRN# [id number]   YOB: 1935 Age: 87 year old   Primary Cardiologist: Dr. Fuentes Reason for visit: Cardiology follow up             Assessment and Plan:   Ghislaine Walls is a very pleasant 87 year old female here for cardiology follow up.      1. Chronic diastolic heart failure - LVEF 55-60%, RV normal size/function.              - NYHA class III, stage C              - Fluid status : euvolemic              - Dry weight : ~ 115-120#              - Diuretic regimen : Continue torsemide to 20mg daily with additional 20mg daily for weight gain > 2# overnight.               - Aldosterone antagonist : none due to CKD  2. Atrial fibrillation, paroxsymal - in sinus rhythm on exam today, on eliquis for thromboembolic prophylaxis due to elevated [id number] score.   3. CKD IV - baseline creatinine 1.6-2.4, most recent creatinine 2.78 today, improved from previous.               - Follows with nephrology   4. Hypertension - sub-optimal control.               - Continue amlodipine, carvedilol, hydralazine and isordil  5. Chronic anemia - stable, hemoglobin 11.1 3/31/23    I saw patient and her daughter today for cardiology follow up. She is stable from a cardiac standpoint, appearing close to euvolemic. Her blood pressures remain elevated. She has not been monitoring her BP at home and shares that medication compliance has been an issue. Reinforced medication compliance. Advised to start monitoring blood pressures at home. Counseled on lifestyle modifications to help manage blood pressures including low sodium diet. Hopeful with medication compliance her blood pressures will be better controlled.     Changes today: none    Follow up plan:     Cardiology follow up in 4 months with labs prior    Follow up with nephrology as scheduled in July        History of Presenting Illness:    Ghislaine Walls is a very pleasant 87 year old female with a history of HFpEF, CKD,  hypertension, paroxsymal atrial fibrillation on eliquis, chronic anemia, rheumatoid arthritis and hypothyroidism.      Primary cardiologist Dr. Fuentes. In October 2021 at which time she complained of generalized fatigue. Her metoprolol was decreased to 25mg BID. She was offered the option of stress perfusion imaging to rule out ischemic, but her symptoms were not suggestive of angina and she wished to defer. She had a HF hospitalization in April 2021.      Ghislaine was hospitalized 1/12-1/15/2022 with acute on chronic diastolic heart failure exacerbation. NTproBNP on admission 12,000. CXR with vascular congestion and small left pleural effusion s/p thoracentesis 1/13 with 250ml removed. Her metoprolol was changed to carvedilol 6.25mg BID and she was started on isordil 20mg TID due to elevated blood pressures. Her PTA torsemide was increased from 20mg -> 40mg daily. Some concerns for non-compliance contributing to HF exacerbation/admission. Admission weight 141#. Discharge weight 135#.      Echocardiogram 1/13/22 showed LVEF 55%, RV normal size/function, no significant valvular disease and moderate L pleural effusion.      She saw Dr. Fuentes in September 2022 at which time her biggest complaint was lack of motivation to exercise and generalized fatigue, maybe secondary to medications. MTM visit scheduled. She was compensated/euvolemic. No medication changes.      Echocardiogram 3/31/23 showed LVEF 55-60%, RV normal size/function, mild mitral stenosis, wall motion abnormalities couldn't be excluded due to limited visualization.     I last saw patient in April, at which time her blood pressures were elevated and decline in renal function was noted. BMP results were sent to her nephrologist for review. Recommended increasing her isordil to 30mg TID.     Patient is here today for cardiology follow up. Daughter present for visit.     Patient reports feeling good. Daughter comes with a list of non-cardiac questions including  concerns about sore behind her ear. Monitoring weights daily a home, overall stable, slight increase this week. Clinic weight is 118# today which is similar to prior readings. Has noted swelling in her feet. Has been taking torsemide 20mg daily with additional 20mg PRN (has taken 2 PRN doses this week). Denies shortness of breath at rest. Denies exertional dyspnea. Denies orthopnea or PND. Denies chest pain or chest tightness. Denies dizziness, lightheadedness or other presyncopal symptoms. Denies tachycardia or palpitations. Taking medications but often missing evening dose.     Labs today show overall stable renal function, creatinine still above her prior but improved from prior. Stable electrolytes. Blood pressure 160/64 and HR 60 in clinic today. Manual recheck 164/66.     Eating most meals at facility, at least 1 meal daily. Daughter notes she has been eating a little more than usual. No exercise routine, lifestyle is sedentary. Occasional beer at happy hour. Denies tobacco use. Using walker for support in her apartment and wheelchair for support out of the building.         Social History    Living in assisted living at [facility].  Social History     Socioeconomic History     Marital status:      Spouse name: Not on file     Number of children: Not on file     Years of education: Not on file     Highest education level: Not on file   Occupational History     Not on file   Tobacco Use     Smoking status: Former     Packs/day: 0.50     Years: 5.00     Pack years: 2.50     Types: Cigarettes     Quit date: 1973     Years since quittin.4     Smokeless tobacco: Never   Vaping Use     Vaping status: Never Used   Substance and Sexual Activity     Alcohol use: Yes     Comment: one light beer of  occ     Drug use: No     Sexual activity: Not Currently     Partners: Male   Other Topics Concern     Parent/sibling w/ CABG, MI or angioplasty before 65F 55M? No   Social History Narrative     Not on file  "    Social Determinants of Health     Financial Resource Strain: Low Risk  (9/21/2020)    Overall Financial Resource Strain (CARDIA)      Difficulty of Paying Living Expenses: Not hard at all   Food Insecurity: No Food Insecurity (9/21/2020)    Hunger Vital Sign      Worried About Running Out of Food in the Last Year: Never true      Ran Out of Food in the Last Year: Never true   Transportation Needs: No Transportation Needs (9/21/2020)    PRAPARE - Transportation      Lack of Transportation (Medical): No      Lack of Transportation (Non-Medical): No   Physical Activity: Inactive (9/21/2020)    Exercise Vital Sign      Days of Exercise per Week: 0 days      Minutes of Exercise per Session: 0 min   Stress: Not on file   Social Connections: Unknown (9/21/2020)    Social Connection and Isolation Panel [NHANES]      Frequency of Communication with Friends and Family: Twice a week      Frequency of Social Gatherings with Friends and Family: Twice a week      Attends [employer] Services: Never      Active Member of Clubs or Organizations: No      Attends Club or Organization Meetings: Never      Marital Status: Not on file   Intimate Partner Violence: Not on file   Housing Stability: Not on file            Review of Systems:   10 point ROS neg other than the symptoms noted above in the HPI.         Physical Exam:   Vitals: BP (!) 160/64   Pulse 60   Ht 1.549 m (5' 1\")   Wt 53.5 kg (118 lb)   LMP  (LMP Unknown)   SpO2 98%   BMI 22.30 kg/m     Wt Readings from Last 4 Encounters:   05/16/23 53.5 kg (118 lb)   04/06/23 52.6 kg (116 lb)   04/06/23 52.6 kg (116 lb)   12/07/22 54.9 kg (121 lb)     GEN: well nourished, in no acute distress.  HEENT:  Pupils equal, round. Sclerae nonicteric.   NECK: Supple, no masses appreciated. JVP hard to assess due to body habitus/posture  C/V:  Regular rate and rhythm  RESP: Respirations are unlabored. Clear to auscultation bilaterally without wheezing, rales, or rhonchi.  GI: Abdomen " soft, nontender.  EXTREM: Trace bilateral LE edema.  NEURO: Alert and oriented, cooperative.  SKIN: Warm and dry.        Data:     LIPID RESULTS:  Lab Results   Component Value Date    CHOL 131 06/24/2022    CHOL 123 10/02/2019    HDL 48 (L) 06/24/2022    HDL 27 (L) 10/02/2019    LDL 65 06/24/2022    LDL 74 10/02/2019    TRIG 92 06/24/2022    TRIG 111 10/02/2019    CHOLHDLRATIO 3.9 06/15/2015     LIVER ENZYME RESULTS:  Lab Results   Component Value Date    AST 18 03/21/2022    AST 40 06/07/2021    ALT 9 06/24/2022    ALT 35 06/07/2021     CBC RESULTS:  Lab Results   Component Value Date    WBC 3.6 (L) 01/15/2022    WBC 3.5 (L) 06/07/2021    RBC 2.59 (L) 01/15/2022    RBC 3.26 (L) 06/07/2021    HGB 11.1 (L) 03/31/2023    HGB 9.8 (L) 06/07/2021    HCT 26.9 (L) 01/15/2022    HCT 32.5 (L) 06/07/2021     (H) 01/15/2022     06/07/2021    MCH 32.4 01/15/2022    MCH 30.1 06/07/2021    MCHC 31.2 (L) 01/15/2022    MCHC 30.2 (L) 06/07/2021    RDW 14.3 01/15/2022    RDW 17.6 (H) 06/07/2021     (L) 01/15/2022     06/07/2021     BMP RESULTS:  Lab Results   Component Value Date     03/31/2023     06/07/2021    POTASSIUM 4.8 03/31/2023    POTASSIUM 3.9 09/28/2022    POTASSIUM 4.9 06/07/2021    CHLORIDE 103 03/31/2023    CHLORIDE 107 09/28/2022    CHLORIDE 114 (H) 06/07/2021    CO2 25 03/31/2023    CO2 28 09/28/2022    CO2 23 06/07/2021    ANIONGAP 11 03/31/2023    ANIONGAP 5 09/28/2022    ANIONGAP 4 06/07/2021     (H) 03/31/2023     (H) 09/28/2022    GLC 94 06/07/2021    BUN 54.2 (H) 03/31/2023    BUN 52 (H) 09/28/2022    BUN 77 (H) 06/07/2021    CR 2.92 (H) 03/31/2023    CR 1.61 (H) 06/07/2021    GFRESTIMATED 15 (L) 03/31/2023    GFRESTIMATED 29 (L) 06/07/2021    GFRESTBLACK 34 (L) 06/07/2021    ALIVIA 10.2 03/31/2023    ALIVIA 9.4 06/07/2021    INR RESULTS:  Lab Results   Component Value Date    INR 1.16 (H) 02/24/2021    INR 2.07 (H) 07/21/2020            Medications     Current  Outpatient Medications   Medication Sig Dispense Refill     Abatacept (ORENCIA IV) Inject 500 mg into the vein every 30 days       ACE/ARB/ARNI NOT PRESCRIBED (INTENTIONAL) Please choose reason not prescribed from choices below.       acetaminophen (TYLENOL) 650 MG CR tablet Take 650-1,300 mg by mouth every 8 hours as needed for mild pain or fever       allopurinol (ZYLOPRIM) 100 MG tablet Take 1 tablet (100 mg) by mouth daily 90 tablet 0     amLODIPine (NORVASC) 10 MG tablet TAKE 1 TABLET BY MOUTH ONE TIME DAILY 90 tablet 0     apixaban ANTICOAGULANT (ELIQUIS ANTICOAGULANT) 2.5 MG tablet Take 1 tablet (2.5 mg) by mouth 2 times daily 180 tablet 0     calcium citrate-vitamin D (CITRACAL) 315-250 MG-UNIT TABS per tablet Take 1 tablet by mouth 2 times daily       carvedilol (COREG) 6.25 MG tablet Take 1 tablet (6.25 mg) by mouth 2 times daily (with meals) 180 tablet 0     Darbepoetin Alonso-Albumin (ARANESP IJ) Inject 10 mcg as directed Monthly as needed       dronabinol (MARINOL) 2.5 MG capsule Take 1 capsule (2.5 mg) by mouth 2 times daily (before meals) 60 capsule 1     ferrous sulfate (FEROSUL) 325 (65 Fe) MG tablet Take 325 mg by mouth every other day       hydrALAZINE (APRESOLINE) 100 MG tablet Take 1 tablet by mouth 4 times daily. 360 tablet 0     hydroxychloroquine (PLAQUENIL) 200 MG tablet Take 200 mg by mouth daily       isosorbide dinitrate (ISORDIL) 30 MG tablet Take 1 tablet (30 mg) by mouth 3 times daily 270 tablet 1     levothyroxine (SYNTHROID/LEVOTHROID) 25 MCG tablet Take 1 tablet by mouth daily. 90 tablet 3     Lidocaine (LIDOCARE) 4 % Patch Place 1 patch onto the skin every 24 hours 30 patch 3     mirtazapine (REMERON) 15 MG tablet Take 1 tablet by mouth daily At Bedtime. 90 tablet 3     ondansetron (ZOFRAN-ODT) 4 MG ODT tab Take 4 mg by mouth every 8 hours as needed for nausea       pantoprazole (PROTONIX) 40 MG EC tablet Take 1 tablet (40 mg) by mouth daily 90 tablet 2     prednisoLONE 5 MG tablet  Take 5 mg by mouth daily       senna-docusate (SENOKOT-S/PERICOLACE) 8.6-50 MG tablet Take 2 tablets by mouth daily       sodium bicarbonate 650 MG tablet Take 650 mg by mouth 2 times daily        torsemide (DEMADEX) 20 MG tablet Take 1 tablet (20 mg) by mouth daily Take additional 20mg torsemide (1 tablet) for weight gain > 2 pounds overnight. 100 tablet 3     Vitamin D (Cholecalciferol) 25 MCG (1000 UT) TABS 1 tablet twice daily            Past Medical History     Past Medical History:   Diagnosis Date     Atrial fibrillation (H) new 10/19     [place] (hard of hearing)     wears hearing aids     Hyperlipidaemia      Hyperlipidaemia LDL goal < 130      Hypertension      Hypothyroid      PAD (peripheral artery disease) (H)      Renal insufficiency, mild      Uncontrolled hypertension 10/14/2019     Past Surgical History:   Procedure Laterality Date     APPENDECTOMY  1951     BONE MARROW BIOPSY, BONE SPECIMEN, NEEDLE/TROCAR N/A 7/31/2020    Procedure: bone marrow biopsy;  Surgeon: Iris Cameron MD;  Location:  GI     CATARACT IOL, RT/LT  2001    bilateral (laser - 5/2013)     DENTAL SURGERY  1962    wisdom     ESOPHAGOSCOPY, GASTROSCOPY, DUODENOSCOPY (EGD), COMBINED N/A 7/24/2020    Procedure: ESOPHAGOGASTRODUODENOSCOPY, WITH BIOPSY;  Surgeon: Humberto Bailon MD;  Location:  GI     EYE SURGERY  1956    Muscle release     TUBAL LIGATION  1971     Family History   Problem Relation Age of Onset     Heart Disease Mother      Respiratory Mother      Heart Disease Father      Heart Disease Brother      Coronary Artery Disease Brother         CAB     Heart Disease Brother      Coronary Artery Disease Brother         CAB          Allergies   Oxybutynin and Seasonal allergies    30 minutes spent on the date of the encounter doing chart review, history and exam, documentation and further activities as noted above      DHAVAL Cormier [facility]  Pager: [phone]

## 2023-05-16 NOTE — LETTER
5/16/2023    Laurie Conteh MD  [address]  [place] [address]    RE: Ghislaine Walls       Dear Colleague,     I had the pleasure of seeing Ghislaine Walls in the [facility].  Cardiology Clinic Progress Note  Ghislaine Walls MRN# [id number]   YOB: 1935 Age: 87 year old   Primary Cardiologist: Dr. Fuentes Reason for visit: Cardiology follow up             Assessment and Plan:   Ghislaine Walls is a very pleasant 87 year old female here for cardiology follow up.      1. Chronic diastolic heart failure - LVEF 55-60%, RV normal size/function.              - NYHA class III, stage C              - Fluid status : euvolemic              - Dry weight : ~ 115-120#              - Diuretic regimen : Continue torsemide to 20mg daily with additional 20mg daily for weight gain > 2# overnight.               - Aldosterone antagonist : none due to CKD  2. Atrial fibrillation, paroxsymal - in sinus rhythm on exam today, on eliquis for thromboembolic prophylaxis due to elevated [id number] score.   3. CKD IV - baseline creatinine 1.6-2.4, most recent creatinine 2.78 today, improved from previous.               - Follows with nephrology   4. Hypertension - sub-optimal control.               - Continue amlodipine, carvedilol, hydralazine and isordil  5. Chronic anemia - stable, hemoglobin 11.1 3/31/23    I saw patient and her daughter today for cardiology follow up. She is stable from a cardiac standpoint, appearing close to euvolemic. Her blood pressures remain elevated. She has not been monitoring her BP at home and shares that medication compliance has been an issue. Reinforced medication compliance. Advised to start monitoring blood pressures at home. Counseled on lifestyle modifications to help manage blood pressures including low sodium diet. Hopeful with medication compliance her blood pressures will be better controlled.     Changes today: none    Follow up plan:     Cardiology follow  up in 4 months with labs prior    Follow up with nephrology as scheduled in July        History of Presenting Illness:    Ghislaine Walls is a very pleasant 87 year old female with a history of HFpEF, CKD, hypertension, paroxsymal atrial fibrillation on eliquis, chronic anemia, rheumatoid arthritis and hypothyroidism.      Primary cardiologist Dr. Fuentes. In October 2021 at which time she complained of generalized fatigue. Her metoprolol was decreased to 25mg BID. She was offered the option of stress perfusion imaging to rule out ischemic, but her symptoms were not suggestive of angina and she wished to defer. She had a HF hospitalization in April 2021.      Ghislaine was hospitalized 1/12-1/15/2022 with acute on chronic diastolic heart failure exacerbation. NTproBNP on admission 12,000. CXR with vascular congestion and small left pleural effusion s/p thoracentesis 1/13 with 250ml removed. Her metoprolol was changed to carvedilol 6.25mg BID and she was started on isordil 20mg TID due to elevated blood pressures. Her PTA torsemide was increased from 20mg -> 40mg daily. Some concerns for non-compliance contributing to HF exacerbation/admission. Admission weight 141#. Discharge weight 135#.      Echocardiogram 1/13/22 showed LVEF 55%, RV normal size/function, no significant valvular disease and moderate L pleural effusion.      She saw Dr. Fuentes in September 2022 at which time her biggest complaint was lack of motivation to exercise and generalized fatigue, maybe secondary to medications. MTM visit scheduled. She was compensated/euvolemic. No medication changes.      Echocardiogram 3/31/23 showed LVEF 55-60%, RV normal size/function, mild mitral stenosis, wall motion abnormalities couldn't be excluded due to limited visualization.     I last saw patient in April, at which time her blood pressures were elevated and decline in renal function was noted. BMP results were sent to her nephrologist for review. Recommended  increasing her isordil to 30mg TID.     Patient is here today for cardiology follow up. Daughter present for visit.     Patient reports feeling good. Daughter comes with a list of non-cardiac questions including concerns about sore behind her ear. Monitoring weights daily a home, overall stable, slight increase this week. Clinic weight is 118# today which is similar to prior readings. Has noted swelling in her feet. Has been taking torsemide 20mg daily with additional 20mg PRN (has taken 2 PRN doses this week). Denies shortness of breath at rest. Denies exertional dyspnea. Denies orthopnea or PND. Denies chest pain or chest tightness. Denies dizziness, lightheadedness or other presyncopal symptoms. Denies tachycardia or palpitations. Taking medications but often missing evening dose.     Labs today show overall stable renal function, creatinine still above her prior but improved from prior. Stable electrolytes. Blood pressure 160/64 and HR 60 in clinic today. Manual recheck 164/66.     Eating most meals at facility, at least 1 meal daily. Daughter notes she has been eating a little more than usual. No exercise routine, lifestyle is sedentary. Occasional beer at happy hour. Denies tobacco use. Using walker for support in her apartment and wheelchair for support out of the building.         Social History    Living in assisted living at [place].  Social History     Socioeconomic History    Marital status:      Spouse name: Not on file    Number of children: Not on file    Years of education: Not on file    Highest education level: Not on file   Occupational History    Not on file   Tobacco Use    Smoking status: Former     Packs/day: 0.50     Years: 5.00     Pack years: 2.50     Types: Cigarettes     Quit date: 1973     Years since quittin.4    Smokeless tobacco: Never   Vaping Use    Vaping status: Never Used   Substance and Sexual Activity    Alcohol use: Yes     Comment: one light beer of   "occ    Drug use: No    Sexual activity: Not Currently     Partners: Male   Other Topics Concern    Parent/sibling w/ CABG, MI or angioplasty before 65F 55M? No   Social History Narrative    Not on file     Social Determinants of Health     Financial Resource Strain: Low Risk  (9/21/2020)    Overall Financial Resource Strain (CARDIA)     Difficulty of Paying Living Expenses: Not hard at all   Food Insecurity: No Food Insecurity (9/21/2020)    Hunger Vital Sign     Worried About Running Out of Food in the Last Year: Never true     Ran Out of Food in the Last Year: Never true   Transportation Needs: No Transportation Needs (9/21/2020)    PRAPARE - Transportation     Lack of Transportation (Medical): No     Lack of Transportation (Non-Medical): No   Physical Activity: Inactive (9/21/2020)    Exercise Vital Sign     Days of Exercise per Week: 0 days     Minutes of Exercise per Session: 0 min   Stress: Not on file   Social Connections: Unknown (9/21/2020)    Social Connection and Isolation Panel [NHANES]     Frequency of Communication with Friends and Family: Twice a week     Frequency of Social Gatherings with Friends and Family: Twice a week     Attends [employer] Services: Never     Active Member of Clubs or Organizations: No     Attends Club or Organization Meetings: Never     Marital Status: Not on file   Intimate Partner Violence: Not on file   Housing Stability: Not on file            Review of Systems:   10 point ROS neg other than the symptoms noted above in the HPI.         Physical Exam:   Vitals: BP (!) 160/64   Pulse 60   Ht 1.549 m (5' 1\")   Wt 53.5 kg (118 lb)   LMP  (LMP Unknown)   SpO2 98%   BMI 22.30 kg/m     Wt Readings from Last 4 Encounters:   05/16/23 53.5 kg (118 lb)   04/06/23 52.6 kg (116 lb)   04/06/23 52.6 kg (116 lb)   12/07/22 54.9 kg (121 lb)     GEN: well nourished, in no acute distress.  HEENT:  Pupils equal, round. Sclerae nonicteric.   NECK: Supple, no masses appreciated. JVP hard to " assess due to body habitus/posture  C/V:  Regular rate and rhythm  RESP: Respirations are unlabored. Clear to auscultation bilaterally without wheezing, rales, or rhonchi.  GI: Abdomen soft, nontender.  EXTREM: Trace bilateral LE edema.  NEURO: Alert and oriented, cooperative.  SKIN: Warm and dry.        Data:     LIPID RESULTS:  Lab Results   Component Value Date    CHOL 131 06/24/2022    CHOL 123 10/02/2019    HDL 48 (L) 06/24/2022    HDL 27 (L) 10/02/2019    LDL 65 06/24/2022    LDL 74 10/02/2019    TRIG 92 06/24/2022    TRIG 111 10/02/2019    CHOLHDLRATIO 3.9 06/15/2015     LIVER ENZYME RESULTS:  Lab Results   Component Value Date    AST 18 03/21/2022    AST 40 06/07/2021    ALT 9 06/24/2022    ALT 35 06/07/2021     CBC RESULTS:  Lab Results   Component Value Date    WBC 3.6 (L) 01/15/2022    WBC 3.5 (L) 06/07/2021    RBC 2.59 (L) 01/15/2022    RBC 3.26 (L) 06/07/2021    HGB 11.1 (L) 03/31/2023    HGB 9.8 (L) 06/07/2021    HCT 26.9 (L) 01/15/2022    HCT 32.5 (L) 06/07/2021     (H) 01/15/2022     06/07/2021    MCH 32.4 01/15/2022    MCH 30.1 06/07/2021    MCHC 31.2 (L) 01/15/2022    MCHC 30.2 (L) 06/07/2021    RDW 14.3 01/15/2022    RDW 17.6 (H) 06/07/2021     (L) 01/15/2022     06/07/2021     BMP RESULTS:  Lab Results   Component Value Date     03/31/2023     06/07/2021    POTASSIUM 4.8 03/31/2023    POTASSIUM 3.9 09/28/2022    POTASSIUM 4.9 06/07/2021    CHLORIDE 103 03/31/2023    CHLORIDE 107 09/28/2022    CHLORIDE 114 (H) 06/07/2021    CO2 25 03/31/2023    CO2 28 09/28/2022    CO2 23 06/07/2021    ANIONGAP 11 03/31/2023    ANIONGAP 5 09/28/2022    ANIONGAP 4 06/07/2021     (H) 03/31/2023     (H) 09/28/2022    GLC 94 06/07/2021    BUN 54.2 (H) 03/31/2023    BUN 52 (H) 09/28/2022    BUN 77 (H) 06/07/2021    CR 2.92 (H) 03/31/2023    CR 1.61 (H) 06/07/2021    GFRESTIMATED 15 (L) 03/31/2023    GFRESTIMATED 29 (L) 06/07/2021    GFRESTBLACK 34 (L) 06/07/2021     ALIVIA 10.2 03/31/2023    ALIVIA 9.4 06/07/2021    INR RESULTS:  Lab Results   Component Value Date    INR 1.16 (H) 02/24/2021    INR 2.07 (H) 07/21/2020            Medications     Current Outpatient Medications   Medication Sig Dispense Refill    Abatacept (ORENCIA IV) Inject 500 mg into the vein every 30 days      ACE/ARB/ARNI NOT PRESCRIBED (INTENTIONAL) Please choose reason not prescribed from choices below.      acetaminophen (TYLENOL) 650 MG CR tablet Take 650-1,300 mg by mouth every 8 hours as needed for mild pain or fever      allopurinol (ZYLOPRIM) 100 MG tablet Take 1 tablet (100 mg) by mouth daily 90 tablet 0    amLODIPine (NORVASC) 10 MG tablet TAKE 1 TABLET BY MOUTH ONE TIME DAILY 90 tablet 0    apixaban ANTICOAGULANT (ELIQUIS ANTICOAGULANT) 2.5 MG tablet Take 1 tablet (2.5 mg) by mouth 2 times daily 180 tablet 0    calcium citrate-vitamin D (CITRACAL) 315-250 MG-UNIT TABS per tablet Take 1 tablet by mouth 2 times daily      carvedilol (COREG) 6.25 MG tablet Take 1 tablet (6.25 mg) by mouth 2 times daily (with meals) 180 tablet 0    Darbepoetin Alonso-Albumin (ARANESP IJ) Inject 10 mcg as directed Monthly as needed      dronabinol (MARINOL) 2.5 MG capsule Take 1 capsule (2.5 mg) by mouth 2 times daily (before meals) 60 capsule 1    ferrous sulfate (FEROSUL) 325 (65 Fe) MG tablet Take 325 mg by mouth every other day      hydrALAZINE (APRESOLINE) 100 MG tablet Take 1 tablet by mouth 4 times daily. 360 tablet 0    hydroxychloroquine (PLAQUENIL) 200 MG tablet Take 200 mg by mouth daily      isosorbide dinitrate (ISORDIL) 30 MG tablet Take 1 tablet (30 mg) by mouth 3 times daily 270 tablet 1    levothyroxine (SYNTHROID/LEVOTHROID) 25 MCG tablet Take 1 tablet by mouth daily. 90 tablet 3    Lidocaine (LIDOCARE) 4 % Patch Place 1 patch onto the skin every 24 hours 30 patch 3    mirtazapine (REMERON) 15 MG tablet Take 1 tablet by mouth daily At Bedtime. 90 tablet 3    ondansetron (ZOFRAN-ODT) 4 MG ODT tab Take 4 mg by  mouth every 8 hours as needed for nausea      pantoprazole (PROTONIX) 40 MG EC tablet Take 1 tablet (40 mg) by mouth daily 90 tablet 2    prednisoLONE 5 MG tablet Take 5 mg by mouth daily      senna-docusate (SENOKOT-S/PERICOLACE) 8.6-50 MG tablet Take 2 tablets by mouth daily      sodium bicarbonate 650 MG tablet Take 650 mg by mouth 2 times daily       torsemide (DEMADEX) 20 MG tablet Take 1 tablet (20 mg) by mouth daily Take additional 20mg torsemide (1 tablet) for weight gain > 2 pounds overnight. 100 tablet 3    Vitamin D (Cholecalciferol) 25 MCG (1000 UT) TABS 1 tablet twice daily            Past Medical History     Past Medical History:   Diagnosis Date    Atrial fibrillation (H) new 10/19    [place] (hard of hearing)     wears hearing aids    Hyperlipidaemia     Hyperlipidaemia LDL goal < 130     Hypertension     Hypothyroid     PAD (peripheral artery disease) (H)     Renal insufficiency, mild     Uncontrolled hypertension 10/14/2019     Past Surgical History:   Procedure Laterality Date    APPENDECTOMY  1951    BONE MARROW BIOPSY, BONE SPECIMEN, NEEDLE/TROCAR N/A 7/31/2020    Procedure: bone marrow biopsy;  Surgeon: Iris Cameron MD;  Location:  GI    CATARACT IOL, RT/LT  2001    bilateral (laser - 5/2013)    DENTAL SURGERY  1962    [place]    ESOPHAGOSCOPY, GASTROSCOPY, DUODENOSCOPY (EGD), COMBINED N/A 7/24/2020    Procedure: ESOPHAGOGASTRODUODENOSCOPY, WITH BIOPSY;  Surgeon: Humberto Bailon MD;  Location:  GI    EYE SURGERY  1956    Muscle release    TUBAL LIGATION  1971     Family History   Problem Relation Age of Onset    Heart Disease Mother     Respiratory Mother     Heart Disease Father     Heart Disease Brother     Coronary Artery Disease Brother         CAB    Heart Disease Brother     Coronary Artery Disease Brother         CAB          Allergies   Oxybutynin and Seasonal allergies    30 minutes spent on the date of the encounter doing chart review, history and exam, documentation and  further activities as noted above      DHAVAL Cormier CNP  [facility]  Pager: [phone]        Thank you for allowing me to participate in the care of your patient.      Sincerely,     DHAVAL Cormier CNP     [facility]  cc:   DHAVAL Rayo CNP  [address] [place] [address]  [place] [id number]

## 2023-05-16 NOTE — PROGRESS NOTES
Chief Complaint   Patient presents with     Wound Check     Right ear x 2 days, has some drainage that's yellow        ASSESSMENT/PLAN:  Ghislaine was seen today for wound check.    Diagnoses and all orders for this visit:    Abrasion  -     mupirocin (BACTROBAN) 2 % external ointment; Apply topically 3 times daily for 7 days    Ghislaine was seen today for wound check.    Diagnoses and all orders for this visit:    Abrasion  -     mupirocin (BACTROBAN) 2 % external ointment; Apply topically 3 times daily for 7 days    s and all orders for this visit:    Abrasion  -     mupirocin (BACTROBAN) 2 % external ointment; Apply topically 3 times daily for 7 days    No evidence of cellulitis but does have some superficial abrasions behind her ears likely from her glasses.  Start Bactroban, bandage daily, have glasses adjusted    Karel Chou PA-C      SUBJECTIVE:  Ghislaine is a 87 year old female who presents to urgent care with her daughter concerned about sores behind her ear that he believes is from the glasses she wears.  She feels otherwise well.    ROS: Pertinent ROS neg other than the symptoms noted above in the HPI.     OBJECTIVE:  BP (!) 164/69 (BP Location: Left arm, Patient Position: Sitting, Cuff Size: Adult Regular)   Pulse 62   Temp 98.3  F (36.8  C) (Tympanic)   LMP  (LMP Unknown)   SpO2 97%    GENERAL: healthy, alert and no distress  SKIN: Superficial abrasions just behind patient's ears bilaterally with right side being worse than the left, mild erythema around the right side abrasion, no purulent discharge or crust    DIAGNOSTICS    Results for orders placed or performed in visit on 05/16/23   Basic metabolic panel     Status: Abnormal   Result Value Ref Range    Sodium 142 136 - 145 mmol/L    Potassium 4.7 3.4 - 5.3 mmol/L    Chloride 105 98 - 107 mmol/L    Carbon Dioxide (CO2) 27 22 - 29 mmol/L    Anion Gap 10 7 - 15 mmol/L    Urea Nitrogen 50.1 (H) 8.0 - 23.0 mg/dL    Creatinine 2.78 (H) 0.51 - 0.95  mg/dL    Calcium 10.1 8.8 - 10.2 mg/dL    Glucose 121 (H) 70 - 99 mg/dL    GFR Estimate 16 (L) >60 mL/min/1.73m2        Current Outpatient Medications   Medication     Abatacept (ORENCIA IV)     ACE/ARB/ARNI NOT PRESCRIBED (INTENTIONAL)     acetaminophen (TYLENOL) 650 MG CR tablet     allopurinol (ZYLOPRIM) 100 MG tablet     amLODIPine (NORVASC) 10 MG tablet     apixaban ANTICOAGULANT (ELIQUIS ANTICOAGULANT) 2.5 MG tablet     calcium citrate-vitamin D (CITRACAL) 315-250 MG-UNIT TABS per tablet     carvedilol (COREG) 6.25 MG tablet     Darbepoetin Alonso-Albumin (ARANESP IJ)     dronabinol (MARINOL) 2.5 MG capsule     ferrous sulfate (FEROSUL) 325 (65 Fe) MG tablet     hydrALAZINE (APRESOLINE) 100 MG tablet     hydroxychloroquine (PLAQUENIL) 200 MG tablet     isosorbide dinitrate (ISORDIL) 30 MG tablet     levothyroxine (SYNTHROID/LEVOTHROID) 25 MCG tablet     Lidocaine (LIDOCARE) 4 % Patch     mirtazapine (REMERON) 15 MG tablet     ondansetron (ZOFRAN-ODT) 4 MG ODT tab     pantoprazole (PROTONIX) 40 MG EC tablet     prednisoLONE 5 MG tablet     senna-docusate (SENOKOT-S/PERICOLACE) 8.6-50 MG tablet     sodium bicarbonate 650 MG tablet     torsemide (DEMADEX) 20 MG tablet     Vitamin D (Cholecalciferol) 25 MCG (1000 UT) TABS     No current facility-administered medications for this visit.      Patient Active Problem List   Diagnosis     Hyperlipidemia LDL goal <130     Hypertension goal BP (blood pressure) < 140/90     [place] (hard of hearing)     Urgency incontinence     Other specified hypothyroidism     History of CVA (cerebrovascular accident)     CKD (chronic kidney disease) stage 3, GFR 30-59 ml/min (H)     Gouty arthritis     Lymphedema     Anemia of chronic renal failure, stage 3 (moderate) (H)     Weight loss     Physical deconditioning     Fatigue, unspecified type     Generalized muscle weakness     Demand ischemia (H)     Essential hypertension     Chronic diastolic heart failure (H)     Debility     Chronic  heart failure with preserved ejection fraction (H)     Lymphedema of both lower extremities     Atrial fibrillation (H)     Cognitive impairment     Chronic pain of both shoulders     PAD (peripheral artery disease) (H)     Hypothyroid     Generalized weakness     Conductive hearing loss, bilateral     Anemia     Chills     Inflammatory polyarthropathy (H)     Accelerated hypertension     Failure to thrive in adult     Poor appetite     Vitamin D deficiency     Shortness of breath     Hypervolemia, unspecified hypervolemia type     CHF exacerbation (H)     Severe protein-calorie malnutrition (H)     CKD (chronic kidney disease) stage 4, GFR 15-29 ml/min (H)     Bilateral pleural effusion     Rheumatoid arthritis (H)     Non-intractable vomiting with nausea     Hypoxia     Advanced directives, counseling/discussion     Hypoxemia     Hyperkalemia     Chronic kidney disease     Dyspnea     Acute respiratory failure with hypoxia (H)     Hyperparathyroidism due to renal insufficiency (H)     COPD (chronic obstructive pulmonary disease) (H)     Acute renal injury due to hypovolemia (H)     Body mass index (BMI) 21.0-21.9, adult     Constipation, unspecified     Gastro-esophageal reflux disease with esophagitis, with bleeding     Gout, unspecified     Hyperparathyroidism (H)     Long term (current) use of anticoagulants     Metabolic acidosis     Monoclonal gammopathy     Personal history of COVID-19     Personal history of nicotine dependence      Past Medical History:   Diagnosis Date     Atrial fibrillation (H) new 10/19     [place] (hard of hearing)     wears hearing aids     Hyperlipidaemia      Hyperlipidaemia LDL goal < 130      Hypertension      Hypothyroid      PAD (peripheral artery disease) (H)      Renal insufficiency, mild      Uncontrolled hypertension 10/14/2019     Past Surgical History:   Procedure Laterality Date     APPENDECTOMY  1951     BONE MARROW BIOPSY, BONE SPECIMEN, NEEDLE/TROCAR N/A 7/31/2020     Procedure: bone marrow biopsy;  Surgeon: Iris Cameron MD;  Location:  GI     CATARACT IOL, RT/LT      bilateral (laser - 2013)     DENTAL SURGERY      [place]     ESOPHAGOSCOPY, GASTROSCOPY, DUODENOSCOPY (EGD), COMBINED N/A 2020    Procedure: ESOPHAGOGASTRODUODENOSCOPY, WITH BIOPSY;  Surgeon: Humberto Bailon MD;  Location:  GI     EYE SURGERY      Muscle release     TUBAL LIGATION  1971     Family History   Problem Relation Age of Onset     Heart Disease Mother      Respiratory Mother      Heart Disease Father      Heart Disease Brother      Coronary Artery Disease Brother         CAB     Heart Disease Brother      Coronary Artery Disease Brother         CAB     Social History     Tobacco Use     Smoking status: Former     Packs/day: 0.50     Years: 5.00     Pack years: 2.50     Types: Cigarettes     Quit date: 1973     Years since quittin.4     Smokeless tobacco: Never   Vaping Use     Vaping status: Never Used   Substance Use Topics     Alcohol use: Yes     Comment: one light beer of  occ              The plan of care was discussed with the patient. They understand and agree with the course of treatment prescribed. A printed summary was given including instructions and medications.  The use of Dragon/Broadband Voice dictation services may have been used to construct the content in this note; any grammatical or spelling errors are non-intentional. Please contact the author of this note directly if you are in need of any clarification.

## 2023-05-30 PROBLEM — R62.7 FAILURE TO THRIVE IN ADULT: Status: ACTIVE | Noted: 2020-12-08

## 2023-05-30 PROBLEM — M62.81 GENERALIZED MUSCLE WEAKNESS: Status: ACTIVE | Noted: 2019-10-07

## 2023-05-30 NOTE — PROGRESS NOTES
Observation goals  PRIOR TO DISCHARGE          -diagnostic tests and consults completed and resulted - Not met  -vital signs normal or at patient baseline - Not net  - Safe disposition identified - Not met     Nurse to notify provider when observation goals have been met and patient is ready for discharge.

## 2023-05-30 NOTE — ED NOTES
Bed: ED03  Expected date:   Expected time:   Means of arrival:   Comments:  [place] [facility] 85 F weakness eta 1310

## 2023-05-30 NOTE — ED TRIAGE NOTES
Patient arrives from assisted living facility with c/o increased weakness in the past 48 hours.

## 2023-05-30 NOTE — H&P
INTERNAL MEDICINE HISTORY AND PHYSICAL  [facility]      Ghislaine Walls [MR#: [id number]  : 1935  87 year old female]  Date of Admission:  2023  Primary Care Provider:  Laurie Conteh      Chief Complaint:   Weakness.    History of Present Illness:   Ms. Ghislaine Walls is an 86 yo female with history including RA; HTN; CHF; PAF; gout; HLD; CKD4; MGUS; chronic anemia and TCP; who presents from her [facility] with weakness.     Per patient's daughter, patient has been weaker over the past several days.   She had a mild fall on  where she fell back onto her bottom.  She has needed more help at the assisted living for mobilization and her daughter had arranged for increased services at that time.  Patient did have some pain in the back of her head (patient denied hitting her head but does not recollect much about the fall). Pt noted with back pain, but has had this for about 1 month.  Overall, given the patient's weakness and failure to thrive, she was brought to [facility] for further evaluation.    On initial evaluation, patient was afebrile, hemodynamically stable, oxygen saturations 97%.  ECG showed sinus rhythm with first-degree AV block; Q waves in the inferior limb leads and V2 and V3; diffuse nonspecific T wave changes; no acute ischemic findings. Labs notable for CBC with hemoglobin 10.1 and platelets 148; BMP with creatinine 2.91 (was 2.78 5/16); calcium 11.9; LFTs were normal; troponin was 100; TSH 0.98.    X-rays of her lumbar spine did not show any acute findings.  Head CT without contrast did not show any acute findings.    Overall, given patient's weakness and failure to thrive, request for observation admission was made.    Patient's daughter had concerns about decreased oral intake.  She has been eating and drinking suboptimally for last several months.  Patient's daughter noted that the patient has lost approximately 15 pounds (from 130 pounds to  "around 115 pounds) over the past 6 months.  She says that the patient simply \"does not want to eat\" and has decreased appetite.  Has some trouble swallowing dry foods.  Noted that she did have a bout of vomiting yesterday perhaps related to food being too dry.        Past Medical History:   1. Rheumatoid arthritis.  2. Hypertension (benign essential).  3. CHF (HFpEF). Echo 3/2023 showed LVEF 55-60%; RV OK; mild MS.  4. PAF.  5. Hypothyroidism.  6. GERD. Noted h/o hiatal hernia.  7. Gout.  8. HLD.  9. Depression/anxiety.  10. CKD IV. Baseline cr around mid to upper 2 range over the past year.  11. MGUS. Follows with Dr. Adriano HENRIQUEZ.  12. Anemia of chronic disease/CKD. Has received ANGELIC in the past.  13. Chronic thrombocytopenia.   14. Hearing loss.    Past Medical History:   Diagnosis Date     Atrial fibrillation (H) new 10/19     [place] (hard of hearing)     wears hearing aids     Hyperlipidaemia      Hyperlipidaemia LDL goal < 130      Hypertension      Hypothyroid      PAD (peripheral artery disease) (H)      Renal insufficiency, mild      Uncontrolled hypertension 10/14/2019     Above past medical history reviewed and edited and/or added to as necessary.    Past Surgical History:   :  Past Surgical History:   Procedure Laterality Date     APPENDECTOMY  1951     BONE MARROW BIOPSY, BONE SPECIMEN, NEEDLE/TROCAR N/A 7/31/2020    Procedure: bone marrow biopsy;  Surgeon: Iris Cameron MD;  Location:  GI     CATARACT IOL, RT/LT  2001    bilateral (laser - 5/2013)     DENTAL SURGERY  1962    [place]     ESOPHAGOSCOPY, GASTROSCOPY, DUODENOSCOPY (EGD), COMBINED N/A 7/24/2020    Procedure: ESOPHAGOGASTRODUODENOSCOPY, WITH BIOPSY;  Surgeon: Humberto Bailon MD;  Location:  GI     EYE SURGERY  1956    Muscle release     TUBAL LIGATION  1971        Allergies:     Allergies   Allergen Reactions     Oxybutynin Itching     Seasonal Allergies         Medications:     Prior to Admission Medications   Prescriptions " Last Dose Informant Patient Reported? Taking?   ACE/ARB/ARNI NOT PRESCRIBED (INTENTIONAL)   No No   Sig: Please choose reason not prescribed from choices below.   Abatacept (ORENCIA IV)   Yes No   Sig: Inject 500 mg into the vein every 30 days   Darbepoetin Alonso-Albumin (ARANESP IJ)   Yes No   Sig: Inject 10 mcg as directed Monthly as needed   Lidocaine (LIDOCARE) 4 % Patch   No No   Sig: Place 1 patch onto the skin every 24 hours   Vitamin D (Cholecalciferol) 25 MCG (1000 UT) TABS   Yes No   Si tablet twice daily   acetaminophen (TYLENOL) 650 MG CR tablet   Yes No   Sig: Take 650-1,300 mg by mouth every 8 hours as needed for mild pain or fever   allopurinol (ZYLOPRIM) 100 MG tablet   No No   Sig: Take 1 tablet (100 mg) by mouth daily   amLODIPine (NORVASC) 10 MG tablet   No No   Sig: TAKE 1 TABLET BY MOUTH ONE TIME DAILY   apixaban ANTICOAGULANT (ELIQUIS ANTICOAGULANT) 2.5 MG tablet   No No   Sig: Take 1 tablet (2.5 mg) by mouth 2 times daily   calcium citrate-vitamin D (CITRACAL) 315-250 MG-UNIT TABS per tablet   Yes No   Sig: Take 1 tablet by mouth 2 times daily   carvedilol (COREG) 6.25 MG tablet   No No   Sig: Take 1 tablet (6.25 mg) by mouth 2 times daily (with meals)   dronabinol (MARINOL) 2.5 MG capsule   No No   Sig: Take 1 capsule (2.5 mg) by mouth 2 times daily (before meals)   ferrous sulfate (FEROSUL) 325 (65 Fe) MG tablet   Yes No   Sig: Take 325 mg by mouth every other day   hydrALAZINE (APRESOLINE) 100 MG tablet   No No   Sig: Take 1 tablet (100 mg) by mouth 3 times daily   hydroxychloroquine (PLAQUENIL) 200 MG tablet   Yes No   Sig: Take 200 mg by mouth daily   isosorbide dinitrate (ISORDIL) 30 MG tablet   No No   Sig: Take 1 tablet (30 mg) by mouth 3 times daily   levothyroxine (SYNTHROID/LEVOTHROID) 25 MCG tablet   No No   Sig: Take 1 tablet by mouth daily.   mirtazapine (REMERON) 15 MG tablet   No No   Sig: Take 1 tablet by mouth daily At Bedtime.   ondansetron (ZOFRAN-ODT) 4 MG ODT tab   Daughter Yes No   Sig: Take 4 mg by mouth every 8 hours as needed for nausea   pantoprazole (PROTONIX) 40 MG EC tablet   No No   Sig: Take 1 tablet (40 mg) by mouth daily   prednisoLONE 5 MG tablet   Yes No   Sig: Take 5 mg by mouth daily   senna-docusate (SENOKOT-S/PERICOLACE) 8.6-50 MG tablet   Yes No   Sig: Take 2 tablets by mouth daily   sodium bicarbonate 650 MG tablet   Yes No   Sig: Take 650 mg by mouth 2 times daily    torsemide (DEMADEX) 20 MG tablet   No No   Sig: Take 1 tablet (20 mg) by mouth daily Take additional 20mg torsemide (1 tablet) for weight gain > 2 pounds overnight.      Facility-Administered Medications: None       Social History:   . 3 children. Does not smoke. No significant EtOH use. Lives in [facility]. Typically uses a walker.  Social History     Socioeconomic History     Marital status:      Spouse name: Not on file     Number of children: Not on file     Years of education: Not on file     Highest education level: Not on file   Occupational History     Not on file   Tobacco Use     Smoking status: Former     Packs/day: 0.50     Years: 5.00     Pack years: 2.50     Types: Cigarettes     Quit date: 1973     Years since quittin.4     Smokeless tobacco: Never   Vaping Use     Vaping status: Never Used   Substance and Sexual Activity     Alcohol use: Yes     Comment: one light beer of  occ     Drug use: No     Sexual activity: Not Currently     Partners: Male   Other Topics Concern     Parent/sibling w/ CABG, MI or angioplasty before 65F 55M? No   Social History Narrative     Not on file     Social Determinants of Health     Financial Resource Strain: Low Risk  (2020)    Overall Financial Resource Strain (CARDIA)      Difficulty of Paying Living Expenses: Not hard at all   Food Insecurity: No Food Insecurity (2020)    Hunger Vital Sign      Worried About Running Out of Food in the Last Year: Never true      Ran Out of Food in the Last Year: Never true    Transportation Needs: No Transportation Needs (9/21/2020)    PRAPARE - Transportation      Lack of Transportation (Medical): No      Lack of Transportation (Non-Medical): No   Physical Activity: Inactive (9/21/2020)    Exercise Vital Sign      Days of Exercise per Week: 0 days      Minutes of Exercise per Session: 0 min   Stress: Not on file   Social Connections: Unknown (9/21/2020)    Social Connection and Isolation Panel [NHANES]      Frequency of Communication with Friends and Family: Twice a week      Frequency of Social Gatherings with Friends and Family: Twice a week      Attends Synagogue Services: Never      Active Member of Clubs or Organizations: No      Attends Club or Organization Meetings: Never      Marital Status: Not on file   Intimate Partner Violence: Not on file   Housing Stability: Not on file       Family History:   Reviewed.  Family History   Problem Relation Age of Onset     Heart Disease Mother      Respiratory Mother      Heart Disease Father      Heart Disease Brother      Coronary Artery Disease Brother         CAB     Heart Disease Brother      Coronary Artery Disease Brother         CAB       Review of Systems:   As noted in the HPI; otherwise 10 point review of systems was negative.     Physical Exam:   VITALS:  Blood pressure 101/44, pulse 64, temperature 98  F (36.7  C), temperature source Oral, resp. rate 16, SpO2 97 %, not currently breastfeeding.  General: Awake, alert, oriented, fatigued.  HEENT:  PERRL, no scleral icterus/conjunctival injection. Oropharynx no erythema or exudate. Dry mucous membranes.  Neck:  No bruits or JVD.  Regular in rhythm  Heart/Chest: Without murmurs, rubs or gallops.  Lungs: Diminished in bases, no crackles or wheeze.  Abdomen: Soft, nontender, nondistended, possible sounds are  Extremities/Musculoskeletal: Trace leg edema.  Skin:    Neurologic:       Labs, Imaging & Other Data:     Results for orders placed or performed during the hospital encounter  of 05/30/23   CT Head w/o Contrast     Status: None (Preliminary result)    Narrative    CT SCAN OF THE HEAD WITHOUT CONTRAST May 30, 2023 3:05 PM     HISTORY: Fall, head injury.    TECHNIQUE: Axial images of the head and coronal reformations without  IV contrast material. Radiation dose for this scan was reduced using  automated exposure control, adjustment of the mA and/or kV according  to patient size, or iterative reconstruction technique.    COMPARISON: None.    FINDINGS: There is no evidence of intracranial hemorrhage, mass, acute  infarct or anomaly. The ventricles are normal in size, shape and  configuration. Mild patchy periventricular white matter hypodensities  which are nonspecific, but likely related to chronic microvascular  ischemic disease.     The visualized portions of the sinuses and mastoids appear normal. The  bony calvarium and bones of the skull base appear intact.       Impression    IMPRESSION:     1. No evidence of acute intracranial hemorrhage, mass, or herniation.  2. Mild nonspecific white matter changes likely due to chronic  microvascular ischemic disease.     Comprehensive metabolic panel     Status: Abnormal   Result Value Ref Range    Sodium 142 136 - 145 mmol/L    Potassium 4.2 3.4 - 5.3 mmol/L    Chloride 104 98 - 107 mmol/L    Carbon Dioxide (CO2) 25 22 - 29 mmol/L    Anion Gap 13 7 - 15 mmol/L    Urea Nitrogen 36.6 (H) 8.0 - 23.0 mg/dL    Creatinine 2.91 (H) 0.51 - 0.95 mg/dL    Calcium 11.9 (H) 8.8 - 10.2 mg/dL    Glucose 91 70 - 99 mg/dL    Alkaline Phosphatase 41 35 - 104 U/L    AST 25 10 - 35 U/L    ALT 10 10 - 35 U/L    Protein Total 6.8 6.4 - 8.3 g/dL    Albumin 4.0 3.5 - 5.2 g/dL    Bilirubin Total 0.9 <=1.2 mg/dL    GFR Estimate 15 (L) >60 mL/min/1.73m2   Lactic acid whole blood     Status: Normal   Result Value Ref Range    Lactic Acid 0.7 0.7 - 2.0 mmol/L   Troponin T, High Sensitivity     Status: Abnormal   Result Value Ref Range    Troponin T, High Sensitivity 100  (HH) <=14 ng/L   Magnesium     Status: Normal   Result Value Ref Range    Magnesium 1.9 1.7 - 2.3 mg/dL   TSH with free T4 reflex     Status: Normal   Result Value Ref Range    TSH 0.98 0.30 - 4.20 uIU/mL   UA with Microscopic reflex to Culture     Status: Abnormal    Specimen: Urine, Midstream   Result Value Ref Range    Color Urine Light Yellow Colorless, Straw, Light Yellow, Yellow    Appearance Urine Clear Clear    Glucose Urine Negative Negative mg/dL    Bilirubin Urine Negative Negative    Ketones Urine Negative Negative mg/dL    Specific Gravity Urine 1.016 1.003 - 1.035    Blood Urine Negative Negative    pH Urine 7.5 (H) 5.0 - 7.0    Protein Albumin Urine 100 (A) Negative mg/dL    Urobilinogen Urine Normal Normal, 2.0 mg/dL    Nitrite Urine Negative Negative    Leukocyte Esterase Urine Negative Negative    Mucus Urine Present (A) None Seen /LPF    RBC Urine 1 <=2 /HPF    WBC Urine 2 <=5 /HPF    Squamous Epithelials Urine <1 <=1 /HPF    Hyaline Casts Urine 4 (H) <=2 /LPF    Narrative    Urine Culture not indicated   CBC with platelets and differential     Status: Abnormal   Result Value Ref Range    WBC Count 7.9 4.0 - 11.0 10e3/uL    RBC Count 3.05 (L) 3.80 - 5.20 10e6/uL    Hemoglobin 10.1 (L) 11.7 - 15.7 g/dL    Hematocrit 31.8 (L) 35.0 - 47.0 %     (H) 78 - 100 fL    MCH 33.1 (H) 26.5 - 33.0 pg    MCHC 31.8 31.5 - 36.5 g/dL    RDW 15.4 (H) 10.0 - 15.0 %    Platelet Count 148 (L) 150 - 450 10e3/uL    % Neutrophils 69 %    % Lymphocytes 10 %    % Monocytes 20 %    % Eosinophils 0 %    % Basophils 0 %    % Immature Granulocytes 1 %    NRBCs per 100 WBC 0 <1 /100    Absolute Neutrophils 5.5 1.6 - 8.3 10e3/uL    Absolute Lymphocytes 0.8 0.8 - 5.3 10e3/uL    Absolute Monocytes 1.6 (H) 0.0 - 1.3 10e3/uL    Absolute Eosinophils 0.0 0.0 - 0.7 10e3/uL    Absolute Basophils 0.0 0.0 - 0.2 10e3/uL    Absolute Immature Granulocytes 0.1 <=0.4 10e3/uL    Absolute NRBCs 0.0 10e3/uL   EKG 12-lead, tracing only      Status: None (Preliminary result)   Result Value Ref Range    Systolic Blood Pressure  mmHg    Diastolic Blood Pressure  mmHg    Ventricular Rate 62 BPM    Atrial Rate 62 BPM    MO Interval 242 ms    QRS Duration 98 ms     ms    QTc 517 ms    P Axis 81 degrees    R AXIS -59 degrees    T Axis 50 degrees    Interpretation ECG       Sinus rhythm with 1st degree A-V block  Left axis deviation  Pulmonary disease pattern  Moderate voltage criteria for LVH, may be normal variant ( R in aVL , Celestino product )  Septal infarct (cited on or before 21-FEB-2021)  Inferior infarct , age undetermined  Prolonged QT  Abnormal ECG  When compared with ECG of 12-JAN-2022 13:00,  Questionable change in initial forces of Lateral leads  QT has lengthened     CBC with platelets differential     Status: Abnormal    Narrative    The following orders were created for panel order CBC with platelets differential.  Procedure                               Abnormality         Status                     ---------                               -----------         ------                     CBC with platelets and d...[[id number]]  Abnormal            Final result                 Please view results for these tests on the individual orders.     ECG which was personally reviewed as described above.    ASSESSMENT & PLAN:   Ms. Ghislaine Walls is an 86 yo female with history including RA; HTN; CHF; PAF; gout; HLD; CKD4; MGUS; chronic anemia and TCP; who presents from her [facility] with weakness.     On initial evaluation, patient was afebrile, hemodynamically stable, oxygen saturations 97%.  ECG showed sinus rhythm with first-degree AV block; Q waves in the inferior limb leads and V2 and V3; diffuse nonspecific T wave changes; no acute ischemic findings. Labs notable for CBC with hemoglobin 10.1 and platelets 148; BMP with creatinine 2.91 (was 2.78 5/16); calcium 11.9; LFTs were normal; troponin was 100; TSH 0.98.    X-rays of her lumbar spine did not  "show any acute findings.  Head CT without contrast did not show any acute findings.      Weakness, suspect related to DRAGAN/hypovolemia and multiple underlying chronic medical issues.  * Lives in an assited living.  * Initial presentation as above.  - Continue to treat other issues as noted.  - PT, OT, SW evaluation.    DRAGAN on CKD stage IV, suspect prerenal related to decreased PO intake, diuretic.  * Baseline cr around mid to upper 2 range over the past year.  * Cr 2.91 on admit. Pt was given a NS bolus in the ED.  - Order D5 1/2 NS at 75 ml/hr.  - Continue PTA sodium bicarb.  - Continue to monitor BMP.  - Avoid nephrotoxic medications.    Decreased PO intake, unclear etiology.  Concern for malnutrition.  * Pt with decreased oral intake over the past few months PTA. Pt's daughter notes that pt has lost 15 pounds in the last 6 months.  Patient is simply not wanting to eat, with little appetite. Was prescribed dronabinol and mirtazapine to try to stimulate appetite, with no improvement.  - Order D5 1/2 NS at 75 ml/hr.  - Continue diet as tolerated.  - Continue dronabinol and mirtazapine.  - Order Nutrition consult.    Troponin elevation, suspect demand ischemia (type 2 NSTEMI).  * Initial presentation as above. Trop 100 on admit.   - Trend trops.  - If trops \"flat\", then no further workup for now.    Anemia of chronic disease/CKD.  Chronic TCP.  MGUS.  * Follows with [facility]. Has received ANGELIC in the past.  * Hgb 10.1 and plts 148 on admit. No overt clinical signs of major bleeding.  - Continue to monitor CBC.  - Consider prbc transfusion if hgb </= 7.0 or if significant bleeding with hemodynamic instability or if symptomatic.  - Consider platelet transfusion if needs a procedure and less than 50,000; or if less than 10,000.    Hypertension (benign essential).  CHF (HFpEF).   * Echo 3/2023 showed LVEF 55-60%; RV OK; mild MS.  - Continue amlodipine, carvedilol, hydralazine, ISDN.   - Hold torsemide for now " given DRAGAN.  - Monitor i/o's, daily wts.'    Rheumatoid arthritis.  - Continue hydroxychloroquine and prednisolone.  - Continue outpatient abatacept.    PAF.  - Continue apixaban, carvedilol.    Gout.  - Continue allopurinol.    Depression/anxiety.  - Continue mirtazapine.    Prophylaxis.  - PCD's, ambulation.  - On apixaban.    CODE STATUS: DNR-DNI (per prior POLST and d/w pt's daughter).      Ganga Mccullough Jr., MD  [phone] (p)  Text Page  [place]

## 2023-05-30 NOTE — ED PROVIDER NOTES
History     Chief Complaint:  Generalized Weakness       The history is provided by a relative.      Ghislaine Walls is an 87 year old female who presents for generalized weakness over the past few days. Patient's daughter reports that she was doing okay last week but has been getting gradually weaker and more tired. Patient fell a few days ago as well, denies hitting her head, but cannot remember the fall. Patient has bruising to the right leg. Patient vomited yesterday. Little to no appetite, has been on appetite stimulant medication but they have not helped. Lives in assisted living, and came by ambulance. Patient has had lower back pain for approximately 1 month as well, and she uses Aspercreme and a lidocaine patch for the pain.  She may have injured that more when she fell the other day.  She denies any chest or abdominal pain, and she has not had any nausea, vomiting, or diarrhea.  She also does not have any urinary symptoms.    Independent Historian:   Patient's daughter provided above history.    Review of External Notes:   3/29/23 Oncology clinic note.    Medications:    Ferosul  Zyloprim  Norvasc  Eliquis  Coreg  Apresoline  Plaquenil  Isordil  Levothyroxine   Remeron  Demadex    Past Medical History:    Atrial fibrillation   Hearing loss  Hyperlipidemia   Hypertension   Hypothyroidism   Renal insufficiency    Past Surgical History:    Appendectomy   Bone marrow biopsy   Cataract surgery, bilateral   [place] teeth extraction   Esophagogastroduodenoscopy   Eye surgery, muscle release  Tubal ligation     Physical Exam     Patient Vitals for the past 24 hrs:   BP Temp Temp src Pulse Resp SpO2   05/30/23 1323 101/44 98  F (36.7  C) Oral 64 16 97 %        Physical Exam  Nursing note and vitals reviewed.  Constitutional:  Oriented to person, place, and time. Cooperative.   HENT:   Nose:    Nose normal.   Mouth/Throat:   Mucous membranes are normal.   Eyes:    Conjunctivae normal and EOM are normal.       Pupils are equal, round, and reactive to light.   Neck:    Trachea normal.   Cardiovascular:  Normal rate, regular rhythm, normal heart sounds and normal pulses. No murmur heard.  Pulmonary/Chest:  Effort normal and breath sounds normal.   Abdominal:   Soft. Normal appearance and bowel sounds are normal.      There is no tenderness.      There is no rebound and no CVA tenderness.   Musculoskeletal:  Extremities atraumatic x 4.  Some tenderness to palpation to the low back region.  Lymphadenopathy:  No cervical adenopathy.   Neurological:   Alert and oriented to person, place, and time. Normal strength.      No cranial nerve deficit or sensory deficit. GCS eye subscore is 4. GCS verbal subscore is 5. GCS motor subscore is 6.   Skin:    Contusions to the right mid lower leg.   Psychiatric:   Normal mood and affect.      Emergency Department Course   ECG  ECG taken at 1336, ECG read at 1420  Sinus rhythm with 1st AV block. Left axis deviation. Pulmonary disease pattern. Moderate voltage criteria for LVH (R in aVL, Chauncey product). Septal infarct, age undetermined. Inferior infarct, age undetermined. Prolonged QT.   No significant change as compared to prior, dated 1/12/22.  Rate 62 bpm. CT interval 242 ms. QRS duration 98 ms. QT/QTc 510/517 ms. P-R-T axes 81 -59 50.     Imaging:  CT Head w/o Contrast   Final Result   IMPRESSION:      1. No evidence of acute intracranial hemorrhage, mass, or herniation.   2. Mild nonspecific white matter changes likely due to chronic   microvascular ischemic disease.         DARVIN MATIAS MD            SYSTEM ID:  [id number]      XR Lumbar Spine 2/3 Views   Final Result   IMPRESSION: Bones appear demineralized which limits evaluation. Lumbar   spine alignment is grossly within normal limits. No obvious loss of   vertebral body height. Mild degenerative endplate changes and loss of   disc height at L2-L3.      DARVIN MATIAS MD            SYSTEM ID:  [id number]         Report per  radiology    Laboratory:  Labs Ordered and Resulted from Time of ED Arrival to Time of ED Departure   COMPREHENSIVE METABOLIC PANEL - Abnormal       Result Value    Sodium 142      Potassium 4.2      Chloride 104      Carbon Dioxide (CO2) 25      Anion Gap 13      Urea Nitrogen 36.6 (*)     Creatinine 2.91 (*)     Calcium 11.9 (*)     Glucose 91      Alkaline Phosphatase 41      AST 25      ALT 10      Protein Total 6.8      Albumin 4.0      Bilirubin Total 0.9      GFR Estimate 15 (*)    TROPONIN T, HIGH SENSITIVITY - Abnormal    Troponin T, High Sensitivity 100 (*)    ROUTINE UA WITH MICROSCOPIC REFLEX TO CULTURE - Abnormal    Color Urine Light Yellow      Appearance Urine Clear      Glucose Urine Negative      Bilirubin Urine Negative      Ketones Urine Negative      Specific Gravity Urine 1.016      Blood Urine Negative      pH Urine 7.5 (*)     Protein Albumin Urine 100 (*)     Urobilinogen Urine Normal      Nitrite Urine Negative      Leukocyte Esterase Urine Negative      Mucus Urine Present (*)     RBC Urine 1      WBC Urine 2      Squamous Epithelials Urine <1      Hyaline Casts Urine 4 (*)    CBC WITH PLATELETS AND DIFFERENTIAL - Abnormal    WBC Count 7.9      RBC Count 3.05 (*)     Hemoglobin 10.1 (*)     Hematocrit 31.8 (*)      (*)     MCH 33.1 (*)     MCHC 31.8      RDW 15.4 (*)     Platelet Count 148 (*)     % Neutrophils 69      % Lymphocytes 10      % Monocytes 20      % Eosinophils 0      % Basophils 0      % Immature Granulocytes 1      NRBCs per 100 WBC 0      Absolute Neutrophils 5.5      Absolute Lymphocytes 0.8      Absolute Monocytes 1.6 (*)     Absolute Eosinophils 0.0      Absolute Basophils 0.0      Absolute Immature Granulocytes 0.1      Absolute NRBCs 0.0     LACTIC ACID WHOLE BLOOD - Normal    Lactic Acid 0.7     MAGNESIUM - Normal    Magnesium 1.9     TSH WITH FREE T4 REFLEX - Normal    TSH 0.98           Emergency Department Course & Assessments:        Interventions:  Medications   0.9% sodium chloride BOLUS (1,000 mLs Intravenous $New Bag 5/30/23 7154)     Followed by   sodium chloride 0.9% infusion (has no administration in time range)        Independent Interpretation (X-rays, CTs, rhythm strip):  I reviewed XR lumbar spine and agree with the negative reading for fracture.    Assessments/Consultations/Discussion of Management or Tests:   ED Course as of 05/30/23 1528   Tue May 30, 2023   1420 I obtained the history and examined the patient as noted above.    1522 I consulted with Dr. Mccullough, of the hospitalist team, regarding the patient. They accepted the patient for admission.        Social Determinants of Health affecting care:   None    Disposition:  The patient was admitted to the hospital under the care of Dr. Mccullough.     Impression & Plan    Medical Decision Making:  This is an 87-year-old female brought in by EMS from her assisted living facility due to increasing weakness.  She does have a history of failure to thrive and not wanting to eat or drink apparently.  I was concerned that she might have worsening renal impairment or possibly anemia or an electrolyte disturbance.  I also considered the possibility of a UTI, intracranial hemorrhage, or spine fracture.  I proceeded with the above work-up here including EKG, blood work, urine, and CT scan of her head, as well as x-rays of the lumbar spine.  Her work-up here is for the most part unremarkable or minimally changed, although she appears to have a slightly worsening creatinine.  She also has a slightly elevated troponin, however that is likely from her elevated creatinine and dehydration.  However she is not even able to sit up on her own without assistance, and therefore she requires coming to the hospital for further evaluation and management.  I subsequently spoke with Dr. Mccullough, who will be taking care of her.    Diagnosis:    ICD-10-CM    1. Generalized muscle weakness  M62.81       2. Failure to  thrive in adult  R62.7       3. Chronic kidney disease, unspecified CKD stage  N18.9       4. Dehydration  E86.0           Scribe Disclosure:  I, Tiffany Cruz, am serving as a scribe at 3:30 PM on 5/30/2023 to document services personally performed by Adarsh Enriquez MD based on my observations and the provider's statements to me.     Scribe Disclosure:  I, Maddy Simmons, am serving as a scribe at 3:41 PM on 5/30/2023 to document services personally performed by Adarsh Enriquez MD based on my observations and the provider's statements to me.      5/30/2023   Adarsh Enriquez MD Lashkowitz, Seth H, MD  05/30/23 1445

## 2023-05-30 NOTE — PHARMACY-ADMISSION MEDICATION HISTORY
Pharmacist Admission Medication History    Admission medication history is complete. The information provided in this note is only as accurate as the sources available at the time of the update.    Medication reconciliation/reorder completed by provider prior to medication history? No    Information Source(s): Family member and CareEverywhere/SureScripts via in-person    Pertinent Information: None    Changes made to PTA medication list:    Added: None    Deleted: None    Changed: lidocaine patches (was daily), senna-docusate (was daily), calcium-vitamin D (was twice daily)    Allergies reviewed with patient and updates made in EHR: yes    Medication History Completed By: Makenna Wolf [facility] 5/30/2023 4:16 PM    Prior to Admission medications    Medication Sig Last Dose Taking? Auth Provider Long Term End Date   Abatacept (ORENCIA IV) Inject 500 mg into the vein every 30 days 5/25/2023 Yes Unknown, Entered By History     acetaminophen (TYLENOL) 325 MG tablet Take 650 mg by mouth 2 times daily At 2 PM an bedtime 5/29/2023 Yes Unknown, Entered By History     allopurinol (ZYLOPRIM) 100 MG tablet Take 1 tablet (100 mg) by mouth daily 5/30/2023 Yes Laurie Conteh MD     amLODIPine (NORVASC) 10 MG tablet TAKE 1 TABLET BY MOUTH ONE TIME DAILY 5/30/2023 Yes Laurie Conteh MD Yes    apixaban ANTICOAGULANT (ELIQUIS ANTICOAGULANT) 2.5 MG tablet Take 1 tablet (2.5 mg) by mouth 2 times daily 5/30/2023 at AM Yes Laurie Conteh MD     Calcium Carb-Cholecalciferol 500-10 MG-MCG TABS Take 1 tablet by mouth 3 times daily 5/30/2023 at AM Yes Unknown, Entered By History     carvedilol (COREG) 6.25 MG tablet Take 1 tablet (6.25 mg) by mouth 2 times daily (with meals) 5/30/2023 at AM Yes Laurie Conteh MD Yes    Darbepoetin Alonso-Albumin (ARANESP IJ) Inject 10 mcg as directed Monthly as needed 5/24/2023 Yes Unknown, Entered By History     dronabinol (MARINOL) 2.5 MG capsule Take 1 capsule (2.5 mg) by mouth 2 times daily  (before meals) 5/29/2023 Yes Laurie Conteh MD     ferrous sulfate (FEROSUL) 325 (65 Fe) MG tablet Take 325 mg by mouth every other day 5/30/2023 Yes Unknown, Entered By History     hydrALAZINE (APRESOLINE) 100 MG tablet Take 1 tablet (100 mg) by mouth 3 times daily 5/30/2023 at AM Yes Elle Mariano APRN CNP Yes    hydroxychloroquine (PLAQUENIL) 200 MG tablet Take 200 mg by mouth daily 5/30/2023 Yes Reported, Patient     isosorbide dinitrate (ISORDIL) 30 MG tablet Take 1 tablet (30 mg) by mouth 3 times daily 5/30/2023 at AM Yes Elle Mariano APRN CNP Yes    levothyroxine (SYNTHROID/LEVOTHROID) 25 MCG tablet Take 1 tablet by mouth daily. 5/30/2023 Yes Laurie Conteh MD Yes    Lidocaine (LIDOCARE) 4 % Patch Place 1 patch onto the skin every 24 hours  Patient taking differently: Place 1 patch onto the skin daily as needed for moderate pain PRN Yes Laurie Conteh MD     mirtazapine (REMERON) 15 MG tablet Take 1 tablet by mouth daily At Bedtime. 5/29/2023 Yes Laurie Conteh MD Yes    ondansetron (ZOFRAN-ODT) 4 MG ODT tab Take 4 mg by mouth every 8 hours as needed for nausea PRN Yes Unknown, Entered By History     pantoprazole (PROTONIX) 40 MG EC tablet Take 1 tablet (40 mg) by mouth daily 5/30/2023 Yes Laurie Conteh MD     predniSONE (DELTASONE) 5 MG tablet Take 5 mg by mouth daily 5/30/2023 Yes Unknown, Entered By History     senna-docusate (SENOKOT-S/PERICOLACE) 8.6-50 MG tablet Take 1-2 tablets by mouth daily as needed for constipation PRN Yes Reported, Patient     sodium bicarbonate 650 MG tablet Take 650 mg by mouth 2 times daily  5/30/2023 at AM Yes Reported, Patient     torsemide (DEMADEX) 20 MG tablet Take 1 tablet (20 mg) by mouth daily Take additional 20mg torsemide (1 tablet) for weight gain > 2 pounds overnight. 5/30/2023 Yes Seema Fuentes MD Yes    Vitamin D (Cholecalciferol) 25 MCG (1000 UT) TABS Take 1 tablet by mouth 2 times daily 5/30/2023 Yes Reported, Patient      ACE/ARB/ARNI NOT PRESCRIBED (INTENTIONAL) Please choose reason not prescribed from choices below.   Laurie Conteh MD Yes

## 2023-05-30 NOTE — ED NOTES
[facility]  ED Nurse Handoff Report    ED Chief complaint: Generalized Weakness      ED Diagnosis:   Final diagnoses:   Generalized muscle weakness   Failure to thrive in adult   Chronic kidney disease, unspecified CKD stage   Dehydration       Code Status: Admitting provider to address    Allergies:   Allergies   Allergen Reactions     Oxybutynin Itching     Seasonal Allergies        Patient Story:  Pt  is an 87 year old female who was brought in by EMS for generalized weakness and has progressively been getting weaker over the past few days.Patient fell a few days ago as well, denies hitting her head, but cannot remember the fall. Patient has bruising to the right leg.    Focused Assessment:  Pt appears lethargic, responds to verbal stimuli and follows command.     Treatments and/or interventions provided: Labs, CT, Xray    Patient's response to treatments and/or interventions: Pt tolerated well     To be done/followed up on inpatient unit:      Does this patient have any cognitive concerns?: Forgetful    Activity level - Baseline/Home:  Unknown  Activity Level - Current:   Unknown    Patient's Preferred language: English   Needed?: No    Isolation: None  Infection: Not Applicable  Patient tested for COVID 19 prior to admission: NO  Bariatric?: No    Vital Signs:   Vitals:    05/30/23 1323   BP: 101/44   BP Location: Right arm   Pulse: 64   Resp: 16   Temp: 98  F (36.7  C)   TempSrc: Oral   SpO2: 97%       Cardiac Rhythm:     Was the PSS-3 completed:   No -unable to assess  What interventions are required if any?               Family Comments: Daughter at bedside   OBS brochure/video discussed/provided to patient/family: No              Name of person given brochure if not patient:               Relationship to patient:     For the majority of the shift this patient's behavior was Green.   Behavioral interventions performed were .    ED NURSE PHONE NUMBER: *[phone]

## 2023-05-31 PROBLEM — J69.0 ASPIRATION PNEUMONIA (H): Status: ACTIVE | Noted: 2023-01-01

## 2023-05-31 NOTE — PLAN OF CARE
Goal Outcome Evaluation:  Orientation/Cognitive: Disoriented to time  Observation Goals (Met/ Not Met): Met  Mobility Level/Assist Equipment: Ax2/GB/W or Lift. Not OOB  Fall Risk (Y/N): Yes  Behavior Concerns: Green  Pain Management: Denies   Tele/VS/O2: VSS on RA  ABNL Lab/BG: Elevated Trops, trending down. Crt 2.91, Hgb 10.1   Diet: Cardiac   Bowel/Bladder: Incontinent, purewick in place  Skin Concerns: scattered bruising   Drains/Devices: IVF   Tests/Procedures for next shift: SW/PT/OT, Nutrition consult  Anticipated DC date & active delays: TBD  Patient Stated Goal for Today: Rest      Goal Outcome Evaluation:     Observation goals  PRIOR TO DISCHARGE        Comments:   -diagnostic tests and consults completed and resulted: not met  -vital signs normal or at patient baseline: met      Nurse to notify provider when observation goals have been met and patient is ready for discharge.

## 2023-05-31 NOTE — PROGRESS NOTES
INTERNAL MEDICINE UPDATE    Called re: aspiration    Pt noted to have aspirated after eating earlier this am. Subsequently coughing and required up to 2L O2. Later this afternoon, pt again had a coughing spell after drinking water. Pt with much worse coughing, sounded coarse and congested, needing up to 15L O2 with sats in 80's.     I assessed pt urgently. On exam, pt dyspneic; heart RRR; lungs coarse throughout, diminished throughout.    A/P:  Acute hypoxic respiratory failure, suspect due to acute aspiration pneumonia/pneumonitis.  - Continue O2, wean as able.  - CXR.  - NPO for now; SLP evaluation.  - Order ipratropium-albuterol nebs.  - RT for suctioning.  - Change to inpatient status.    Ganga Mccullough Jr., MD  [phone] (p)  Text Page  [place]    ADDENDUM 16:50:  * [place] RN able to suction out some food material earlier and sats improved to low 90's on 15L O2.  * CXR showed: question some minimal pleural fluid on the left with  minimal retrocardiac atelectasis and/or infiltrate; right lung clear.    PLAN:  - Start pipericillin-tazobactam for now.  - Schedule duonebs TID.  - Change scheduled meds to IV where able.    Patient has a significant probability of imminent or life-threatening deterioration due to acute illness, which required my direct attention, intervention, and personal management. Critical care time approximately 40 minutes.      Ganga Mccullough Jr., MD  [phone] (p)  Text Page  [place]

## 2023-05-31 NOTE — PROGRESS NOTES
MD Notification    Notified Person: MD    Notified Person Name: Sadia    Notification Date/Time: 5/31 at 11:43 am    Notification Interaction: Vocera page    Purpose of Notification: Pt was eating breakfast, had an episode of vomiting, thinking she may have aspirated. Now coughing, gurgling, cannot clear throat. Chest Xray? Thank you.     Orders Received: CXR and speech eval ordered    Comments:

## 2023-05-31 NOTE — PROGRESS NOTES
[facility]    Internal Medicine Hospitalist Progress Note  05/31/2023  I evaluated patient on the above date.    Ganga Mccullough Jr., MD  [phone] (p)  Text Page  Vocera        Assessment & Plan New actions/orders today (05/31/2023) are underlined. All lab results in the assessment and plan were reviewed.    Ms. Ghislaine Walls is an 88 yo female with history including RA; HTN; CHF; PAF; gout; HLD; CKD4; MGUS; chronic anemia and TCP; who presented 5/30/2023 from her shelter with weakness.     On initial evaluation, patient was afebrile, hemodynamically stable, oxygen saturations 97%.  ECG showed sinus rhythm with first-degree AV block; Q waves in the inferior limb leads and V2 and V3; diffuse nonspecific T wave changes; no acute ischemic findings. Labs notable for CBC with hemoglobin 10.1 and platelets 148; BMP with creatinine 2.91 (was 2.78 5/16); calcium 11.9; LFTs were normal; troponin was 100; TSH 0.98.    X-rays of her lumbar spine did not show any acute findings.  Head CT without contrast did not show any acute findings.      Weakness, suspect related to DRAGAN/hypovolemia and multiple underlying chronic medical issues.  * Lives in an assited living.  * Initial presentation as above.  - Continue to treat other issues as noted.  - PT, OT, SW evaluation ordered.    DRAGAN on CKD stage IV, suspect prerenal related to decreased PO intake, diuretic.  * Baseline cr around mid to upper 2 range over the past year.  * Cr 2.91 on admit. Pt was given a NS bolus in the ED. Started on maintenance IVF's on admit.  Recent Labs   Lab 05/31/23  0646 05/30/23  1339   CR 2.67* 2.91*   - Continue D5 1/2 NS at 75 ml/hr.  - Continue PTA sodium bicarb.  - Continue to monitor BMP.  - Avoid nephrotoxic medications.    Decreased PO intake, unclear etiology.  Concern for malnutrition.  * Pt with decreased oral intake over the past few months PTA. Pt's daughter notes that pt has lost 15 pounds in the last 6 months.   Patient is simply not wanting to eat, with little appetite. Was prescribed dronabinol and mirtazapine to try to stimulate appetite, with no improvement.  - Continue D5 1/2 NS at 75 ml/hr.  - Continue diet as tolerated.  - Continue dronabinol and mirtazapine.  - Nutrition consult pending.    Troponin elevation, suspect demand ischemia (type 2 NSTEMI).  * Initial presentation as above. No chest pain or dyspnea on admit. Trop 100 on admit.   Recent Labs   Lab 05/30/23  1935 05/30/23  1339   CTROPT 88* 100*   - Continue to monitor clinically.  - No further workup for now.    Anemia of chronic disease/CKD.  Chronic TCP.  MGUS.  * Follows with [facility]. Has received ANGELIC in the past.  * Hgb 10.1 and plts 148 on admit. No overt clinical signs of major bleeding.  Recent Labs   Lab 05/31/23  0646 05/30/23  1339   WBC 4.6 7.9   HGB 10.0* 10.1*   * 148*   - Continue to monitor CBC.  - Consider prbc transfusion if hgb </= 7.0 or if significant bleeding with hemodynamic instability or if symptomatic.  - Consider platelet transfusion if needs a procedure and less than 50,000; or if less than 10,000.    Hypertension (benign essential).  CHF (HFpEF).   * Echo 3/2023 showed LVEF 55-60%; RV OK; mild MS.  - Continue amlodipine, carvedilol, hydralazine, ISDN.   - Hold torsemide for now given DRAGAN.  - Monitor i/o's, daily wts.'    Rheumatoid arthritis.  - Continue hydroxychloroquine and prednisolone.  - Continue outpatient abatacept.    PAF.  - Continue apixaban, carvedilol.    Gout.  - Continue allopurinol.    Depression/anxiety.  - Continue mirtazapine.      Clinically Significant Risk Factors Present on Admission           # Hypercalcemia: Highest Ca = 11.9 mg/dL in last 2 days, will monitor as appropriate     # Drug Induced Coagulation Defect: home medication list includes an anticoagulant medication    # Hypertension: Noted on problem list                 COVID-19 testing.  COVID-19 PCR Results        6/7/2021     16:15 8/3/2021    09:00 1/12/2022    12:53 1/19/2022    15:00 1/25/2022    13:00   COVID-19 PCR Results   SARS-CoV-2 Virus Specimen Source Nasopharyngeal         SARS-CoV-2 PCR Result NEGATIVE         SARS CoV2 PCR  Negative   Negative   Negative   Testing sent to reference lab. Results will be returned via unsolicited result     COVID-19 Virus PCR - Result     NOT DETECTED             2/1/2022    14:40   COVID-19 PCR Results   SARS-CoV-2 Virus Specimen Source    SARS-CoV-2 PCR Result    SARS CoV2 PCR    COVID-19 Virus PCR - Result NOT DETECTED     COVID-19 Antibody Results, Testing for Immunity         No data to display                Diet: Combination Diet Low Saturated Fat Na <2400mg Diet, No Caffeine Diet    Prophylaxis: PCD's, ambulation. Apixaban.  Dejesus Catheter: Not present  Lines: None     Code Status: No CPR- Do NOT Intubate    Disposition Plan   Expected discharge: 1-2d recommended to assisted living vs TCU pending above.  Entered: Ganga Mccullough MD 05/31/2023, 8:39 AM         Interval History   Offers no specific complaints.  Feels a bit better today.    -Data reviewed today: I reviewed all new labs and imaging over the last 24 hours. I personally reviewed no images or EKG's today.    Physical Exam    , Blood pressure 116/54, pulse 64, temperature 98.1  F (36.7  C), temperature source Oral, resp. rate 16, SpO2 96 %, not currently breastfeeding. O2 Device: None (Room air)    There were no vitals filed for this visit.  Vital Signs with Ranges  Temp:  [98  F (36.7  C)-99.5  F (37.5  C)] 98.1  F (36.7  C)  Pulse:  [64-75] 64  Resp:  [15-22] 16  BP: (101-156)/(44-68) 116/54  SpO2:  [95 %-97 %] 96 %  Patient Vitals for the past 24 hrs:   BP Temp Temp src Pulse Resp SpO2   05/31/23 0340 116/54 98.1  F (36.7  C) Oral 64 16 96 %   05/30/23 2330 123/54 98  F (36.7  C) Oral 66 18 95 %   05/30/23 1918 (!) 150/68 99.5  F (37.5  C) Oral 75 16 95 %   05/30/23 1830 137/59 98.9  F (37.2  C) Oral 70 16 96 %    05/30/23 1615 -- -- -- 74 15 96 %   05/30/23 1600 -- -- -- 75 20 95 %   05/30/23 1515 (!) 156/67 -- -- -- -- --   05/30/23 1400 118/55 -- -- -- 22 97 %   05/30/23 1323 101/44 98  F (36.7  C) Oral 64 16 97 %     I/O's Last 24 hours  I/O last 3 completed shifts:  In: -   Out: 400 [Urine:400]    Constitutional: Awake, alert, oriented.  Respiratory: Diminished in bases. No crackles or wheezes.  Cardiovascular: RRR, no m/r/g.  GI: Soft, nt, nd, +BS.  Skin/Integumen:  No pitting edema in legs.  Other:        Data    Labs reviewed.  Recent Labs   Lab 05/31/23  0646 05/30/23  1339   WBC 4.6 7.9   HGB 10.0* 10.1*   * 104*   * 148*    142   POTASSIUM 3.8 4.2   CHLORIDE 108* 104   CO2 22 25   BUN 37.2* 36.6*   CR 2.67* 2.91*   ANIONGAP 12 13   ALIVIA 10.7* 11.9*   GLC 98 91   ALBUMIN  --  4.0   PROTTOTAL  --  6.8   BILITOTAL  --  0.9   ALKPHOS  --  41   ALT  --  10   AST  --  25     Recent Labs   Lab Test 05/30/23  1935 05/30/23  1339 01/12/22  1249 06/07/21  1615 04/15/21  1139   NT-PROBNP, INPATIENT  --   --  12,009* 8,504* 21,879*   TROPONIN T HIGH SENSITIVITY 88* 100*  --   --   --      Recent Labs   Lab 05/31/23  0646 05/30/23  1339   GLC 98 91     No lab results found.  No results for input(s): INR, [id number] in the last 168 hours.  Recent Labs   Lab 05/31/23  0646 05/30/23  1339   WBC 4.6 7.9   LACT  --  0.7       MICRO:  CULTURES (INCLUDING BLOOD AND URINE):  No lab results found in last 7 days.    Recent Results (from the past 24 hour(s))   XR Lumbar Spine 2/3 Views    Narrative    LUMBAR SPINE TWO TO THREE VIEWS May 30, 2023 3:04 PM     HISTORY: Fall, back pain.    COMPARISON: Lumbar spine x-ray 4/6/2023.       Impression    IMPRESSION: Bones appear demineralized which limits evaluation. Lumbar  spine alignment is grossly within normal limits. No obvious loss of  vertebral body height. Mild degenerative endplate changes and loss of  disc height at L2-L3.    DARVIN MATIAS MD         SYSTEM ID:   [id number]   CT Head w/o Contrast    Narrative    CT SCAN OF THE HEAD WITHOUT CONTRAST May 30, 2023 3:05 PM     HISTORY: Fall, head injury.    TECHNIQUE: Axial images of the head and coronal reformations without  IV contrast material. Radiation dose for this scan was reduced using  automated exposure control, adjustment of the mA and/or kV according  to patient size, or iterative reconstruction technique.    COMPARISON: None.    FINDINGS: There is no evidence of intracranial hemorrhage, mass, acute  infarct or anomaly. The ventricles are normal in size, shape and  configuration. Mild patchy periventricular white matter hypodensities  which are nonspecific, but likely related to chronic microvascular  ischemic disease.     The visualized portions of the sinuses and mastoids appear normal. The  bony calvarium and bones of the skull base appear intact.       Impression    IMPRESSION:     1. No evidence of acute intracranial hemorrhage, mass, or herniation.  2. Mild nonspecific white matter changes likely due to chronic  microvascular ischemic disease.      DARVIN MATIAS MD         SYSTEM ID:  [id number]       Medications   All medications were reviewed.    Infusions:    dextrose 5% and 0.45% NaCl 75 mL/hr at 05/30/23 2156     sodium chloride Stopped (05/30/23 2156)     Scheduled Medications:    acetaminophen  650 mg Oral BID     allopurinol  100 mg Oral Daily     amLODIPine  10 mg Oral Daily     apixaban ANTICOAGULANT  2.5 mg Oral BID     carvedilol  6.25 mg Oral BID w/meals     dronabinol  2.5 mg Oral BID AC     [START ON 6/1/2023] ferrous sulfate  325 mg Oral Every Other Day     hydrALAZINE  100 mg Oral TID     hydroxychloroquine  200 mg Oral Daily     isosorbide dinitrate  30 mg Oral TID     levothyroxine  25 mcg Oral QAM AC     Lidocaine  1 patch Transdermal Q24H     lidocaine   Transdermal Q8H KEVEN     mirtazapine  15 mg Oral At Bedtime     pantoprazole  40 mg Oral Daily     predniSONE  5 mg Oral Daily     sodium  bicarbonate  650 mg Oral BID     PRN Medications:  acetaminophen, melatonin, polyethylene glycol, prochlorperazine **OR** prochlorperazine **OR** prochlorperazine, senna-docusate **OR** senna-docusate

## 2023-05-31 NOTE — PLAN OF CARE
Goal Outcome Evaluation:    Overall Patient Progress: no changeOverall Patient Progress: no change    Outcome Evaluation: Pt currently NPO. Once able to adv diet, order nutrition supplements. Recommend SLP consult to assess for dysphagia. See RD progress note for details.    Shona Womack RD, LD

## 2023-05-31 NOTE — PLAN OF CARE
Goal Outcome Evaluation:    Observation goals  PRIOR TO DISCHARGE        Comments:   -diagnostic tests and consults completed and resulted: not met  -vital signs normal or at patient baseline: met     Nurse to notify provider when observation goals have been met and patient is ready for discharge.

## 2023-05-31 NOTE — CONSULTS
Care Management Initial Consult    General Information  Assessment completed with: luz elena Alvarez  Type of CM/SW Visit: Initial Assessment    Primary Care Provider verified and updated as needed: Yes, Dr. Conteh  Readmission within the last 30 days: no previous admission in last 30 days      Reason for Consult: discharge planning  Advance Care Planning:            Communication Assessment  Patient's communication style: spoken language (English or Bilingual)    Hearing Difficulty or Deaf: yes   Wear Glasses or Blind: yes    Cognitive  Cognitive/Neuro/Behavioral: .WDL except, orientation  Level of Consciousness: alert  Arousal Level: opens eyes spontaneously  Orientation: disoriented to, time  Mood/Behavior: calm, cooperative  Best Language: 0 - No aphasia  Speech: clear, spontaneous    Living Environment:   People in home: alone     Current living Arrangements: assisted living  Name of Facility: [facility]   Able to return to prior arrangements: other (see comments)       Family/Social Support:  Care provided by: self, other (see comments)  Provides care for: no one  Marital Status:              Description of Support System:           Current Resources:   Patient receiving home care services: No (has had [facility] in the past)     Community Resources: None  Equipment currently used at home:    Supplies currently used at home: Incontinence Supplies    Employment/Financial:  Employment Status: retired        Financial Concerns: No concerns identified           Does the patient's insurance plan have a 3 day qualifying hospital stay waiver?  No    Lifestyle & Psychosocial Needs:  Social Determinants of Health     Tobacco Use: Medium Risk (5/16/2023)    Patient History      Smoking Tobacco Use: Former      Smokeless Tobacco Use: Never      Passive Exposure: Not on file   Alcohol Use: Not on file   Financial Resource Strain: Low Risk  (9/21/2020)    Overall Financial Resource Strain (CARDIA)      Difficulty  of Paying Living Expenses: Not hard at all   Food Insecurity: No Food Insecurity (9/21/2020)    Hunger Vital Sign      Worried About Running Out of Food in the Last Year: Never true      Ran Out of Food in the Last Year: Never true   Transportation Needs: No Transportation Needs (9/21/2020)    PRAPARE - Transportation      Lack of Transportation (Medical): No      Lack of Transportation (Non-Medical): No   Physical Activity: Inactive (9/21/2020)    Exercise Vital Sign      Days of Exercise per Week: 0 days      Minutes of Exercise per Session: 0 min   Stress: Not on file   Social Connections: Unknown (9/21/2020)    Social Connection and Isolation Panel [NHANES]      Frequency of Communication with Friends and Family: Twice a week      Frequency of Social Gatherings with Friends and Family: Twice a week      Attends Yazidi Services: Never      Active Member of Clubs or Organizations: No      Attends Club or Organization Meetings: Never      Marital Status: Not on file   Intimate Partner Violence: Not on file   Depression: Not at risk (4/3/2023)    PHQ-2      PHQ-2 Score: 0   Housing Stability: Not on file       Functional Status:  Prior to admission patient needed assistance:   Dependent ADLs:: Ambulation-walker  Dependent IADLs:: Transportation       Mental Health Status:      None noted    Chemical Dependency Status:      unknown          Values/Beliefs:  Spiritual, Cultural Beliefs, Yazidi Practices, Values that affect care: no               Additional Information:  Writer met with patinet and son at bedside. Went over Medicare Outpatient Observation Notice. Patient noted to be nauseated and sleepy during conversation. Will call long-term to see what services patient was getting.   Writer spoke to Andree at [place]([phone]) . She told writer that patient was assist of 1, just recently added kwabenaing, transfers escorts to meals or meal deliveries. Patient was administrating her own medications and the  daughter would call patient as a reminder to take her meds but patient might have been mixing them up and taking medications wrong. Elenita offerd to help with medications but daughter declined this. Writer gave update on patient's condition as of this afternoon and will update as needed.    Ruthie Gomes RN

## 2023-05-31 NOTE — PLAN OF CARE
Orientation/Cognitive: A&Ox3, disoriented to time  Observation Goals (Met/ Not Met): Met  Mobility Level/Assist Equipment: Ax2/GB/W or Lift.   Fall Risk (Y/N): Yes  Behavior Concerns: Green  Pain Management: Denies   Tele/VS/O2: VSS on RA ex htn  ABNL Lab/BG: Elevated Trops, trending down. Crt 2.91, Hgb 10.1   Diet: Cardiac   Bowel/Bladder: Incontinent, purewick in place  Skin Concerns: scattered bruising   Drains/Devices: IVF 75 mL/hr D5 1/2NS  Tests/Procedures for next shift: SW/PT/OT, Nutrition consult  Anticipated DC date & active delays: TBD  Patient Stated Goal for Today: Rest

## 2023-05-31 NOTE — PLAN OF CARE
Goal Outcome Evaluation:    Shift: 5/30/23 5159-2675  Summary:  Failure to thrive, weakness  Orientation: A& O x3-4  Observation Goals (met & not met): not met  Activity Level: Not oob this shift, baseline Ax1 W/GB per daughter but unable to do last couple days  Fall Risk: Y  Behavior & Aggression Tool Color: Green  Pain Management: Scheduled Tylenol and lidocaine patch to low back  ABNL VS/O2: VSS RA with Htn  ABNL Lab/BG: Trop 100, Cr 2.91, Hgb 10.1 see chart  Diet: Low sat fat, Na <2400 mg, no caffeine  Bowel/Bladder: Incontinent at times, purewick in place  Drains/Devices: R forearm IV infusing D5 1/2 NS @ 75 ml/hr  Tests/Procedures for next shift: consults  Anticipated DC date: TBD  Other Important Info: Case management, PT/OT, and Nutrition consults placed on admit

## 2023-05-31 NOTE — PROGRESS NOTES
MD Notification    Notified Person: MD    Notified Person Name: Liza    Notification Date/Time: 5/31/23 at 6:32 pm     Notification Interaction: Ancom    Purpose of Notification: 5532   RT assessed patient for aspiration pneumonia. Has been on 15 L oxymask for several hours with now improvement, needing orders for high flow O2 and deep suction. Also VBG's? Please advise, thank you.    Orders Received: Orders received     Comments:

## 2023-05-31 NOTE — PLAN OF CARE
Goal Outcome Evaluation:      Plan of Care Reviewed With: patient    Orientation/Cognitive: AOx4  Observation Goals (Met/ Not Met): Inpatient  Mobility Level/Assist Equipment: A2/GB/W  Fall Risk (Y/N): Y  Behavior Concerns: Green  Pain Management: C/o back pain, ice packs applied   Tele/VS/O2: VSS on High Flow   ABNL Lab/BG: Cr 2.67; GFR 17; Hgb 10  Diet: NPO. Several emesis throughout the day   Bowel/Bladder: Incontinent using purewick  Skin Concerns: Scattered bruising   Drains/Devices: D5 1/2 NS infusing at 75   Tests/Procedures for next shift: Speech/OT/PT/CC  Anticipated DC date & active delays: TBD

## 2023-05-31 NOTE — PLAN OF CARE
Goal Outcome Evaluation:    Observation goals  PRIOR TO DISCHARGE           -diagnostic tests and consults completed and resulted: not met  -vital signs normal or at patient baseline: partially met     Nurse to notify provider when observation goals have been met and patient is ready for discharge.

## 2023-05-31 NOTE — PROGRESS NOTES
"CLINICAL NUTRITION SERVICES - ASSESSMENT NOTE     Nutrition Prescription    RECOMMENDATIONS FOR MDs/PROVIDERS TO ORDER:  Recommend SLP evaluation    Malnutrition Status:  Severe malnutrition in the context of chronic illness  % Intake: </=75% for >/= 1 month (severe)  % Weight Loss: Weight loss does not meet criteria  Subcutaneous Fat Loss: Facial region:  mild, Upper arm: mild and Lower arm:  mild  Muscle Loss: Thoracic region (clavicle, acromium bone, deltoid, trapezius, pectoral):  severe, Upper arm (bicep, tricep):  severe and Lower arm  (forearm):  severe  Fluid Accumulation/Edema: None noted    Future/Additional Recommendations:  Diet adv per SLP.  Order nutrition supplements as able once diet advances.  Monitor nutrition-related findings and follow pt per protocol     REASON FOR ASSESSMENT  Ghislaine Walls is a/an 87 year old female assessed by the dietitian for Provider Order - \"concern for malnutrition\"    CLINICAL HISTORY  PMHx of RA; HTN; CHF; PAF; gout; HLD; CKD4; MGUS; chronic anemia and TCP. Came to ED with weakness and poor PO over the past few months.    NUTRITION HISTORY  Per chart review: pt \"does not want to eat\" and has decreased appetite. Has some trouble swallowing dry foods. Noted that she did have a bout of vomiting yesterday perhaps related to food being too dry.  Was prescribed dronabinol and mirtazapine to try to stimulate appetite, with no improvement.    Pt reports no appetite on-going for the past several weeks (unable to specify exactly how long). Pt unable to recall if she receives meals at her [facility]. Pt confirms difficulty with swallowing dry foods. RD educated pt on role of SLP in assessing swallowing and recommended dysphagia diets.    CURRENT NUTRITION ORDERS  Diet: NPO  Intake/Tolerance: no intake documented this admit    LABS  Labs reviewed - includes   BUN 37.2 (H), Cr 2.67 (H), GFR 17 (L)   BG 98 (WNL)    MEDICATIONS  Medications reviewed - Marinol BID, PPI, prednisone, sodium " "bicarb, compazine   IVF - D5 1/2NS @ 75 ml/hr    ANTHROPOMETRICS  Height: 154.9 cm (5' 1\")    Most Recent Weight:    05/16/23 53.5 kg (118 lb)   IBW: 47.7 kg  Normal BMI    Weight History: 2% wt loss x 6 months, 9% wt loss x 1 year  Wt Readings from Last 15 Encounters:   05/16/23 53.5 kg (118 lb)   04/06/23 52.6 kg (116 lb)   04/06/23 52.6 kg (116 lb)   12/07/22 54.9 kg (121 lb)   09/28/22 59 kg (130 lb)   06/27/22 59 kg (130 lb)   03/22/22 59.9 kg (132 lb)   02/03/22 59 kg (130 lb)     Dosing Weight: 53.5 kg (ABW)    ASSESSED NUTRITION NEEDS  Estimated Energy Needs: 1513-6197 kcals/day (25 - 30 kcals/kg)  Justification: Maintenance  Estimated Protein Needs: 50-60 grams protein/day (1 grams of pro/kg)  Justification: Maintenance  Estimated Fluid Needs: 1.4-1.6 L/day (1 mL/kcal)   Justification: Maintenance or Per provider pending fluid status    PHYSICAL FINDINGS/OTHER FINDINGS  GI: no BMs recorded this admit  See malnutrition section below.    MALNUTRITION  % Intake: </=75% for >/= 1 month (severe)  % Weight Loss: Weight loss does not meet criteria  Subcutaneous Fat Loss: Facial region:  mild, Upper arm: mild and Lower arm:  mild  Muscle Loss: Thoracic region (clavicle, acromium bone, deltoid, trapezius, pectoral):  severe, Upper arm (bicep, tricep):  severe and Lower arm  (forearm):  severe  Fluid Accumulation/Edema: None noted  Malnutrition Diagnosis: Severe malnutrition in the context of chronic illness    NUTRITION DIAGNOSIS  Inadequate oral intake related to difficulty swallowing and poor appetite as evidenced by pt report, 9% wt loss x 1 year.      INTERVENTIONS  Implementation  Nutrition education for recommended modifications     Goals  Diet adv v nutrition support within 2-3 days.     Monitoring/Evaluation  Progress toward goals will be monitored and evaluated per protocol.       Shona Womack RD, LUZ   "

## 2023-05-31 NOTE — PROGRESS NOTES
Pt had aspiration event today. PRN duoneb given. Breath sounds still course after a few hours so NT suctioned moderate amount of creamy, yellow secretions. O2 sats at 89% on 15 L oxymask. Placed on HFNC 80% and 40 LPM. Vitals signs stable on HFNC. RT will continue to monitor.     Stephie Roldan,  6:54 PM 05/31/23

## 2023-05-31 NOTE — PROGRESS NOTES
Goal Outcome Evaluation:     Observation goals  PRIOR TO DISCHARGE        Comments:   -diagnostic tests and consults completed and resulted: not met  -vital signs normal or at patient baseline: met      Nurse to notify provider when observation goals have been met and patient is ready for discharge

## 2023-05-31 NOTE — PLAN OF CARE
SLP: Orders received and chart reviewed. Patient with an aspiration event today. Currently on 15 liters facemask with RN suctioning her at bedside. Patient not appropriate to evaluate today and nurse in agreement. Will reschedule evaluation tomorrow.

## 2023-05-31 NOTE — PROGRESS NOTES
MD Notification    Notified Person: MD    Notified Person Name: Sadia    Notification Date/Time: 5/31 at 2:49 pm    Notification Interaction: Vocera page    Purpose of Notification: Pt nauseous, gave Compazine at 11:30 am. No orders for Zofran. Can we get orders for this please?    Orders Received: Prolonged OT, contraindicated, no new orders.     Comments:

## 2023-06-01 NOTE — PROGRESS NOTES
"Clinical Swallow Evaluation (CSE):     06/01/23 0979   Appointment Info   Signing Clinician's Name / Credentials (SLP) Onelia Reeves MS CCC-SLP   General Information   Onset of Illness/Injury or Date of Surgery 05/30/23   Referring Physician Ganga Mccullough MD   Patient/Family Therapy Goal Statement (SLP) Did not state   Pertinent History of Current Problem   Per provider \"Ms. Ghislaine Walls is an 88 yo female with history including RA; HTN; CHF; PAF; hypothyroidism; GERD; gout; HLD; CKD4; MGUS; chronic anemia and TCP; who presented 5/30/2023 from her [facility] with weakness.\"     Pt had aspiration events yesterday, noted to be coughing with water which subsequently resulted in congestion, coughing, respiratory decline and need for both oral and NT suctioning. CxR from today shows \"New right base infiltrate. Retrocardiac density similar possibly related to small hiatal hernia.\" Pt now on HFNC (35 L, 40% Fi02) and has not needed suction from RT or RN this AM.     General Observations Pt alert, upright, HFNC, periodic congested cough prior to any PO   Pain Assessment   Patient Currently in Pain No   Type of Evaluation   Type of Evaluation Swallow Evaluation   Oral Motor   Oral Musculature generally intact   Structural Abnormalities none present   Mucosal Quality good   Vocal Quality/Secretion Management (Oral Motor)   Vocal Quality (Oral Motor) WNL   Comment, Vocal Quality/Secretion Management (Oral Motor) periodic cough but appears to be managing secretions   General Swallowing Observations   Past History of Dysphagia   No hx oropharyngeal dysphagia services per my EMR, though PMHx GERD and hiatal hernia with pt reporting chronic difficulty swallowing, specifically with dryer foods but sometimes occuring with liquids + coughing/choking.     Per hospitalist's note \"pt's daughter noted that pt lost 15 pounds in the last 6 months.  Patient simply not wanting to eat, with little appetite. Was prescribed dronabinol and " "mirtazapine to try to stimulate appetite, with no improvement.\"     EGD 7/24/2020: \"normal esophagus, medium-sized hiatal hernia.\"     Baseline facility paperwork ([facility]) diet of [place] (no added salt). Their paperwork also identifies pt as comfort cares/hospice.     Respiratory Support (General Swallowing Observations)   (HFNC: 35 L 40% Fi02)   Current Diet/Method of Nutritional Intake (General Swallowing Observations, NIS) NPO   Swallowing Evaluation Clinical swallow evaluation   Clinical Swallow Evaluation   Clinical Swallow Evaluation Textures Trialed thin liquids;mildly thick liquids;moderately thick liquids/liquidized;pureed   Clinical Swallow Eval: Thin Liquid Texture Trial   Mode of Presentation, Thin Liquids   (swabs)   Diagnostic Statement wet vocal quality, coughing with swabs   Clinical Swallow Eval: Mildly Thick Liquids   Mode of Presentation spoon;fed by clinician   Volume Presented x5 tsps   Oral Phase delayed AP movement;effortful AP movement;premature pharyngeal entry   Pharyngeal Phase impaired;reduction in laryngeal movement;coughing/choking;throat clearing   Diagnostic Statement throat clear on 2/5, cough on 1/5   Clinical Swallow Eval: Moderately Thick Liquids   Mode of Presentation spoon;fed by clinician   Volume Presented x6 tsps   Oral Phase delayed AP movement;effortful AP movement;premature pharyngeal entry   Pharyngeal Phase impaired;reduction in laryngeal movement;throat clearing;coughing/choking   Diagnostic Statement throat clear on 1/5, cough on 1/5   Clinical Swallow Evaluation: Puree Solid Texture Trial   Mode of Presentation, Puree spoon;fed by clinician   Volume of Puree Presented x2 bites   Oral Phase, Puree delayed AP movement;effortful AP movement;premature pharyngeal entry   Pharyngeal Phase, Puree impaired;reduction in laryngeal movement;coughing/choking   Diagnostic Statement cough on 1/2   Esophageal Phase of Swallow   Patient reports or presents with symptoms of " esophageal dysphagia Yes   Esophageal comments see above re: baseline symptoms; belching x3 across minimal PO today during assessment which can be suggestive of esophageal dysmotility/dysfunction.   Swallowing Recommendations   Diet Consistency Recommendations NPO   Medication Administration Recommendations, Swallowing (SLP) non-oral if able; can consider very cautiously essential meds crushed with puree when upright, extra swallow   Instrumental Assessment Recommendations VFSS (videofluoroscopic swallowing study)  (see below..)   General Therapy Interventions   Planned Therapy Interventions Dysphagia Treatment   Clinical Impression   Criteria for Skilled Therapeutic Interventions Met (SLP Sony) Yes, treatment indicated   SLP Diagnosis mild oral, suspected pharyngeal vs esopahgeal vs pharyngoesophageal dysphagia   Risks & Benefits of therapy have been explained evaluation/treatment results reviewed;care plan/treatment goals reviewed;risks/benefits reviewed;current/potential barriers reviewed;participants voiced agreement with care plan;participants included;patient   Clinical Impression Comments   Clinical swallow evaluation completed. Pt assessed with thin liquids/swabs, mildly thick liquids, moderately thick liquids, puree solids and currently presents with mild oral, suspected pharyngeal vs esopahgeal vs pharyngoesophageal dysphagia. Pt currently stable on 35 L 40% Fi02 HFNC, mild increased WOB/use of clavicular breathing with PO tasks. Periodic cough prior to any during PO intake today.  Oral phase notable for WFL labial seal/oral containment, mild-mod delay/effortful oral propulsion. Notable laryngeal elevation to palpation, though initiation does appear delay (swallowing initiation 5-8 seconds after presenting). Single swallows per bolus and pt denies sticking sensation when asked.  Signs/sx potential dysphagia/aspiration noted across all consistencies today, including: wet vocal quality, throat clearing,  "coughing. Pt judged a high aspiration risk.    Educated pt on current deficits observed, concern for dysphagia/aspiration, potential complications of aspiration. Recommending VFSS if pt able to wean off of HFNC - discussed this with pt who reports she would not want to pursue. She states she has been living with this for years and \"does not see the point.\" Re-education re: the aforementioned provided. Would recommend further discussion with pt/family re: wishes, goals.     SLP Total Evaluation Time   Eval: oral/pharyngeal swallow function, clinical swallow Minutes (80682) 25   SLP Goals   Therapy Frequency (SLP Eval) daily   SLP Predicted Duration/Target Date for Goal Attainment 06/08/23   SLP Goals Swallow   SLP: Safely tolerate diet without signs/symptoms of aspiration Pureed diet;Mildly thick liquids;With use of swallow precautions;With assistance/supervision   SLP Discharge Planning   SLP Plan PO readiness, VFSS if off HFNC and within POC/GOC.   SLP Discharge Recommendation Transitional Care Facility   SLP Rationale for DC Rec Chronic dysphagia per pt report, though currently demonstrating significant dysfunction, needing NPO status and SLP intervention   SLP Brief overview of current status    Mild oral, suspected pharyngeal vs esopahgeal vs pharyngoesophageal dysphagia -- signs/sx dysphagia and aspiration across all consistencies at bedside. Recommend: NPO with frequent oral cares for moisture/comfort.     Can consider VFSS if/when pt weaned off HFNC, though on 6/1 pt stating she would not want to pursue further testing.     Would recommend further discussion re: wishes, goals of care with provider, pt, family. Text paged hospitalist with update via [employer] system. Updated RN.     Total Session Time   Total Session Time (sum of timed and untimed services) 25       "

## 2023-06-01 NOTE — PLAN OF CARE
Goal Outcome Evaluation:    Orientation/Cognitive: A/O x4  Observation Goals (Met/ Not Met): Inpatient  Mobility Level/Assist Equipment: A2/GB/W  Fall Risk (Y/N): Y  Behavior Concerns: Green  Pain Management: C/o back pain, Lido patch in place   Tele/VS/O2: VSS ex HTN, MD notified. Pt on Bipap  ABNL Lab/BG: Cr 2.67; GFR 17; Hgb 10  Diet: NPO. Suctioned at bedside  Bowel/Bladder: Incontinent using purewick  Skin Concerns: Scattered bruising   Drains/Devices: D5 1/2 NS infusing at 75   Tests/Procedures for next shift:   Anticipated DC date & active delays: TBD

## 2023-06-01 NOTE — PROVIDER NOTIFICATION
Brief update:    Paged re: elevated BP    Oral BP meds held on BIPAP and awaiting swallow eval    Added PRN IV labetalol (home carvedilol on hold)    Ladarius Millan MD  5:34 AM

## 2023-06-01 NOTE — PROGRESS NOTES
Pt remains on Bipap. switched between full face mask and under the nose mask. Skin intact.BS coarse crackles. Tolerated Bipap. Will continue to monitor.    Memo Devlin, RT

## 2023-06-01 NOTE — PROVIDER NOTIFICATION
MD Notification    Notified Person: MD    Notified Person Name: Ladarius Millan    Notification Date/Time: 6/1/2023 0519     Notification Interaction: paged    Purpose of Notification: BP back to 195/73, too soon for prn iv hydralazine. please advise     Orders Received: labetolol orders placed and given     Comments:

## 2023-06-01 NOTE — PROVIDER NOTIFICATION
MD Notification    Notified Person: MD    Notified Person Name: Salih, Mohsin, MD    Notification Date/Time: 6/1/23 @ 2009    Notification Interaction: Text page & call    Purpose of Notification: Rhythm changed to afib/RVR rate 120-160 bpm at 1807. 12 lead EKG confirmed. Pt has hx PAF.  /81    Orders Received: Metoprolol 5 mg IV once    Comments:

## 2023-06-01 NOTE — PROVIDER NOTIFICATION
Brief update:    Paged regarding low urine output over last shift.  Appears that she had low urine output day prior as well    With respiratory failure and prior pleural effusion, hesitant to reflexively add fluid bolus    Will await rounding team volume assessment regarding additional fluid administration    Discussed with nursing    Ladarius Millan MD  6:36 AM

## 2023-06-01 NOTE — PROVIDER NOTIFICATION
MD Notification    Notified Person: MD    Notified Person Name: Ladarius Millan    Notification Date/Time: 6/1/2023 9880    Notification Interaction: paged    Purpose of Notification: Pt has low urine output, 150mL out in 8 hours. Bladder scanned for 95mL. IV fluids infusing at 75cc/hr.     Orders Received: Morning rounder to address     Comments:

## 2023-06-01 NOTE — PROGRESS NOTES
[facility]    Internal Medicine Hospitalist Progress Note  06/01/2023  I evaluated patient on the above date.    Ganga Mccullough Jr., MD  [phone] (p)  Text Page  Vocera        Assessment & Plan New actions/orders today (06/01/2023) are underlined. All lab results in the assessment and plan were reviewed.    Ms. Ghislaine Walls is an 88 yo female with history including RA; HTN; CHF; PAF; hypothyroidism; GERD; gout; HLD; CKD4; MGUS; chronic anemia and TCP; who presented 5/30/2023 from her [facility] with weakness.     On initial evaluation, patient was afebrile, hemodynamically stable, oxygen saturations 97%.  ECG showed sinus rhythm with first-degree AV block; Q waves in the inferior limb leads and V2 and V3; diffuse nonspecific T wave changes; no acute ischemic findings. Labs notable for CBC with hemoglobin 10.1 and platelets 148; BMP with creatinine 2.91 (was 2.78 5/16); calcium 11.9; LFTs were normal; troponin was 100; TSH 0.98.    X-rays of her lumbar spine did not show any acute findings.  Head CT without contrast did not show any acute findings.      Aspiration pneumonia with acute hypoxic respiratory failure.  Leukocytosis, suspect related to pneumonia as well as steroids.  * On 5/31, pt noted to have aspirated in the morning with coughing and new O2 requirement while eating. Later in the afternoon, pt again had a coughing spell after drinking water coarse, congested cough, needing up to 15L O2 with sats in 80's. RN able to suction food material with sats to 90's. CXR showed showed question some minimal pleural fluid on the left with minimal retrocardiac atelectasis and/or infiltrate; right lung clear. Started on pipericillin-tazobactam. Made NPO. Continued high O2 requirements and transferred to [facility]. Subsequently required high flow O2, then BiPAP overnight.  * On 6/1, WBC to 30 (but received IV steroids 5/31). Off BiPAP, back on HFNC in am.  Recent Labs   Lab 06/01/23  0548 05/31/23  0646  "05/30/23  1339   WBC 30.0* 4.6 7.9   LACT  --   --  0.7   - Continue HFNC, wean as able.  - Continue PRN BiPAP.  - Continue pipericillin-tazobactam (started 5/31).  - Continue ipratropium-albuterol nebs TID and PRN.  - Repeat CBC in am.  - Dysphagia management as noted.    Decreased oral intake PTA.  Severe malnutrition.  Dysphagia.  * Pt with decreased oral intake over the past few months PTA. On admit, pt's daughter noted that pt lost 15 pounds in the last 6 months.  Patient simply not wanting to eat, with little appetite. Was prescribed dronabinol and mirtazapine to try to stimulate appetite, with no improvement. On admit, pt also noted that PTA pt had coughing/vomiting at times with food if \"too dry\"; she noted pt with hiatal hernia which she felt contributed.   * Nutrition consulted on admit and noted with severe malnutrition.  * Issues with aspiration 5/31 as above, subsequently made NPO; SLP consulted.  - Continue D5 1/2 NS at 75 ml/hr.  - Continue NPO; SLP to see.  - Resume dronabinol and mirtazapine as able.    ADDENDUM 15:47:  - SLP evaluated and still recommending NPO; plan video swallow.  - I d/w pt's daughter about goals of care, if pt would want a feeding tube. Pt's daughter will d/w pt and her siblings.    Hypertension (benign essential).  CHF (HFpEF).   [PTA: amlodipine 10 mg daily; carvedilol 6.25 mg BID; hydralazine 100 mg TID; ISDN 30 mg TID; torsemide 20 mg daily.]  * Echo 3/2023 showed LVEF 55-60%; RV OK; mild MS.  * Initial presentation as above. Torsemide (given decreased PO intake with hypovolemia) held and other BP meds resumed on admit.  * On 5/31, made NPO for dysphagia as noted. Started on scheduled IV hydralazine.  * On 6/1, BP's uncontrolled.  - Start metoprolol 2.5 mg IV q6h.  - Add nitroglycerin patch 0.4 mg/24h daily.  - Continue hydralazine 10 mg IV q8h.  - Continue PRN IV hydralazine and PRN IV labetalol.  - Continue to hold amlodipine, carvedilol, hydralazine, ISDN given " dysphagia.  - Continue to hold torsemide for now given DRAGAN and dysphagia.  - Monitor i/o's, daily wts.    DRAGAN on CKD stage IV, suspect prerenal related to decreased PO intake, diuretic.  * Baseline cr around mid to upper 2 range over the past year.  * Cr 2.91 on admit. Pt was given a NS bolus in the ED. Started on maintenance IVF's on admit.  Recent Labs   Lab 06/01/23  0548 05/31/23  0646 05/30/23  1339   CR 2.47* 2.67* 2.91*   - Continue D5 1/2 NS at 75 ml/hr.  - Continue PTA sodium bicarb.  - Continue to monitor BMP - repeat in am.  - Avoid nephrotoxic medications.    Troponin elevation, suspect demand ischemia (type 2 NSTEMI).  * Initial presentation as above. No chest pain or dyspnea on admit. Trop 100 on admit.   Recent Labs   Lab 05/30/23  1935 05/30/23  1339   CTROPT 88* 100*   - Continue to monitor clinically.  - No further workup for now.    Anemia of chronic disease/CKD.  Chronic TCP.  MGUS.  * Follows with Dr. Oh [facility]. Has received ANGELIC in the past.  * Hgb 10.1 and plts 148 on admit. No overt clinical signs of major bleeding.  Recent Labs   Lab 06/01/23  0548 05/31/23  0646 05/30/23  1339   WBC 30.0* 4.6 7.9   HGB 10.5* 10.0* 10.1*   * 133* 148*   - Continue to monitor CBC - repeat in am.  - Consider prbc transfusion if hgb </= 7.0 or if significant bleeding with hemodynamic instability or if symptomatic.  - Consider platelet transfusion if needs a procedure and less than 50,000; or if less than 10,000.    Weakness on admission, suspect related to DRAGAN/hypovolemia and multiple underlying chronic medical issues.  * Lives in an assited living.  * Initial presentation as above. PT, OT, SW consulted on admit.  * Issues with pneumonia with respiratory failure as above.  - Continue to treat other issues as noted.  - Continue PT, OT as able.    Rheumatoid arthritis.  * Chronically on hydroxychloroquine and prednisolone 5 mg daily.  * Made NPO 5/31 for dysphagia. Started on IV  methylprednisolone.  - Continue IV methylprednisolone 6.25 mg daily.  - Resume hydroxychloroquine and prednisolone as able.  - Continue outpatient abatacept.    PAF.  * Made NPO 5/31 for dysphagia.  - Start metoprolol 2.5 mg IV q6h.  - Resume apixaban and carvedilol as able.    Hypothyroidism.  * Made NPO 5/31 for dysphagia. Started on IV levothyroxine.  - Continue IV levothyroxine.  - Resume PO levothyroxine as able.    GERD with h/o hiatal hernia.  * Made NPO 5/31 for dysphagia. Started on IV pantoprazole .  - Continue IV pantoprazole.  - Resume PO pantoprazole as able.    Gout.  - Resume allopurinol as able.    Depression/anxiety.  - Resume mirtazapine as able.      Clinically Significant Risk Factors Present on Admission           # Hypercalcemia: Highest Ca = 11.9 mg/dL in last 2 days, will monitor as appropriate     # Drug Induced Coagulation Defect: home medication list includes an anticoagulant medication    # Hypertension: Noted on problem list   # Non-Invasive mechanical ventilation: current O2 Device: BiPAP/CPAP  # Acute hypoxic respiratory failure: continue supplemental O2 as needed      # Severe Malnutrition: based on nutrition assessment            COVID-19 testing.  COVID-19 PCR Results        6/7/2021    16:15 8/3/2021    09:00 1/12/2022    12:53 1/19/2022    15:00 1/25/2022    13:00   COVID-19 PCR Results   SARS-CoV-2 Virus Specimen Source Nasopharyngeal         SARS-CoV-2 PCR Result NEGATIVE         SARS CoV2 PCR  Negative   Negative   Negative   Testing sent to reference lab. Results will be returned via unsolicited result     COVID-19 Virus PCR - Result     NOT DETECTED             2/1/2022    14:40   COVID-19 PCR Results   SARS-CoV-2 Virus Specimen Source    SARS-CoV-2 PCR Result    SARS CoV2 PCR    COVID-19 Virus PCR - Result NOT DETECTED     COVID-19 Antibody Results, Testing for Immunity         No data to display                Diet: NPO for Medical/Clinical Reasons Except for: No Exceptions     Prophylaxis: PCD's, ambulation.  Dejesus Catheter: Not present  Lines: None     Code Status: No CPR- Do NOT Intubate    Disposition Plan   Expected discharge: 1-2d recommended to assisted living vs TCU pending above.  Entered: Ganga Mccullough MD 06/01/2023, 7:21 AM      Communication.  - I d/w pt's daughter 6/1.        Interval History   Needing HFNC yesterday evening.  [place] [facility] [place]  On BiPAP overnight.  Back on HFNC this am.  Pt feeling better. Slightly confused.    -Data reviewed today: I reviewed all new labs and imaging over the last 24 hours. I personally reviewed no images or EKG's today.    Physical Exam    , Blood pressure (!) 195/73, pulse 72, temperature 98.8  F (37.1  C), temperature source Axillary, resp. rate 25, SpO2 99 %, not currently breastfeeding. O2 Device: BiPAP/CPAP Oxygen Delivery: 40 LPM  There were no vitals filed for this visit.  Vital Signs with Ranges  Temp:  [97.8  F (36.6  C)-99.2  F (37.3  C)] 98.8  F (37.1  C)  Pulse:  [56-83] 72  Resp:  [16-30] 25  BP: (137-198)/() 195/73  FiO2 (%):  [40 %-80 %] 40 %  SpO2:  [89 %-99 %] 99 %  Patient Vitals for the past 24 hrs:   BP Temp Temp src Pulse Resp SpO2   06/01/23 0500 (!) 195/73 -- -- 72 25 99 %   06/01/23 0400 137/57 -- -- 68 18 98 %   06/01/23 0300 (!) 155/61 -- -- 71 17 98 %   06/01/23 0240 -- -- -- -- -- 98 %   06/01/23 0214 -- 98.8  F (37.1  C) Axillary -- -- --   06/01/23 0200 (!) 170/103 -- -- 81 26 98 %   06/01/23 0100 (!) 193/81 -- -- 77 22 99 %   06/01/23 0005 -- 98.4  F (36.9  C) -- -- -- --   06/01/23 0000 (!) 190/75 -- -- 80 30 98 %   05/31/23 2215 (!) 198/88 -- -- 75 -- 98 %   05/31/23 2159 -- 99.2  F (37.3  C) Axillary -- -- --   05/31/23 2057 (!) 178/75 -- -- 78 -- 94 %   05/31/23 1947 (!) 195/77 97.9  F (36.6  C) Axillary 83 -- 92 %   05/31/23 1859 -- -- -- -- -- 96 %   05/31/23 1849 -- -- -- -- -- 94 %   05/31/23 1714 (!) 181/73 -- -- -- -- --   05/31/23 1558 (!) 161/64 97.9  F (36.6  C)  Axillary 64 20 93 %   05/31/23 1339 -- -- -- -- -- 95 %   05/31/23 1326 (!) 152/64 98.5  F (36.9  C) Axillary 70 27 91 %   05/31/23 1319 (!) 150/60 -- -- 70 -- (!) 89 %   05/31/23 1143 (!) 161/58 97.8  F (36.6  C) Axillary 73 16 90 %   05/31/23 0842 (!) 172/77 98.7  F (37.1  C) Axillary 56 16 96 %     I/O's Last 24 hours  I/O last 3 completed shifts:  In: -   Out: 150 [Urine:150]    Constitutional: Awake, alert, oriented, cooperative.  Respiratory: Diminished throughout. Scattered coarse breath sounds, no wheezes.  Cardiovascular: RRR, no m/r/g.  GI: Soft, nt, nd, +BS.  Skin/Integumen:  Trace pitting edema in legs. Tender to palpation.  Other:        Data    Labs reviewed.  Recent Labs   Lab 06/01/23  0548 05/31/23  1651 05/31/23  0646 05/30/23  1339   WBC 30.0*  --  4.6 7.9   HGB 10.5*  --  10.0* 10.1*   *  --  104* 104*   *  --  133* 148*     --  142 142   POTASSIUM 3.6  --  3.8 4.2   CHLORIDE 107  --  108* 104   CO2 25  --  22 25   BUN 35.7*  --  37.2* 36.6*   CR 2.47*  --  2.67* 2.91*   ANIONGAP 10  --  12 13   ALIVIA 10.1  --  10.7* 11.9*   * 116* 98 91   ALBUMIN  --   --   --  4.0   PROTTOTAL  --   --   --  6.8   BILITOTAL  --   --   --  0.9   ALKPHOS  --   --   --  41   ALT  --   --   --  10   AST  --   --   --  25     Recent Labs   Lab Test 05/30/23  1935 05/30/23  1339 01/12/22  1249 06/07/21  1615 04/15/21  1139   NT-PROBNP, INPATIENT  --   --  12,009* 8,504* 21,879*   TROPONIN T HIGH SENSITIVITY 88* 100*  --   --   --      Recent Labs   Lab 06/01/23  0548 05/31/23  1651 05/31/23  0646 05/30/23  1339   * 116* 98 91     No lab results found.  No results for input(s): INR, [id number] in the last 168 hours.  Recent Labs   Lab 06/01/23  0548 05/31/23  0646 05/30/23  1339   WBC 30.0* 4.6 7.9   LACT  --   --  0.7       MICRO:  CULTURES (INCLUDING BLOOD AND URINE):  No lab results found in last 7 days.    Recent Results (from the past 24 hour(s))   XR Chest Port 1 View     Narrative    CHEST ONE VIEW  5/31/2023 3:41 PM     HISTORY: Dyspnea, hypoxia, evaluate for focal infiltrates/edema.    COMPARISON: January 14, 2022      Impression    IMPRESSION: Question some minimal pleural fluid on the left with  minimal retrocardiac atelectasis and/or infiltrate. Right lung clear.    HIREN ARVIZU MD         SYSTEM ID:  [id number]       Medications   All medications were reviewed.    Infusions:    dextrose 5% and 0.45% NaCl 75 mL/hr at 05/31/23 2215     - MEDICATION INSTRUCTIONS -       sodium chloride Stopped (05/30/23 2156)     Scheduled Medications:    [Held by provider] acetaminophen  650 mg Oral BID     [Held by provider] allopurinol  100 mg Oral Daily     [Held by provider] amLODIPine  10 mg Oral Daily     [Held by provider] apixaban ANTICOAGULANT  2.5 mg Oral BID     [Held by provider] carvedilol  6.25 mg Oral BID w/meals     [Held by provider] dronabinol  2.5 mg Oral BID AC     [Held by provider] ferrous sulfate  325 mg Oral Every Other Day     hydrALAZINE  10 mg Intravenous Q8H     [Held by provider] hydrALAZINE  100 mg Oral TID     [Held by provider] hydroxychloroquine  200 mg Oral Daily     ipratropium - albuterol 0.5 mg/2.5 mg/3 mL  3 mL Nebulization TID     [Held by provider] isosorbide dinitrate  30 mg Oral TID     [START ON 6/4/2023] levothyroxine  18.75 mcg Intravenous Daily     [Held by provider] levothyroxine  25 mcg Oral QAM AC     Lidocaine  1 patch Transdermal Q24H     lidocaine   Transdermal Q8H KEVEN     methylPREDNISolone sodium succinate  6.25 mg Intravenous Daily     [Held by provider] mirtazapine  15 mg Oral At Bedtime     [Held by provider] pantoprazole  40 mg Oral Daily     pantoprazole  40 mg Intravenous Daily with breakfast     piperacillin-tazobactam  2.25 g Intravenous Q6H     [Held by provider] predniSONE  5 mg Oral Daily     [Held by provider] sodium bicarbonate  650 mg Oral BID     sodium chloride (PF)  3 mL Intracatheter Q8H     PRN Medications:  acetaminophen,  artificial tears, HOLD MEDICATION, hydrALAZINE, ipratropium - albuterol 0.5 mg/2.5 mg/3 mL, labetalol, lidocaine 4%, lidocaine (buffered or not buffered), melatonin, nitroGLYcerin, - MEDICATION INSTRUCTIONS -, polyethylene glycol, prochlorperazine **OR** prochlorperazine **OR** prochlorperazine, senna-docusate **OR** senna-docusate, sodium chloride (PF)

## 2023-06-01 NOTE — PLAN OF CARE
Goal Outcome Evaluation:    COGNITION/MENTATION: A/O x 2, disoriented to situation and time. [place]. Wears R hearing aide.   NEURO/CMS: Mild BLE edema   ABNL. VITALS: Elevated BP, managed w/scheduled and PRN hydralazine and PRN labetolol   CARDIAC/TELE: SR w/1* AVB  RESPIRATORY: LLL crackles otherwise diminished. On BiPAP at 40%. Infrequent non-productive congested cough  GI: BS+, no BM this shift  : Incontinent, external catheter in place, w/low urine output, 150cc in 8 hours, bladder scanned for 95cc   PAIN: Denies, no non-verbal signs of pain noted  SKIN: Scattered bruising  DRAINS/LINES: PIV infusing D5 1/2 NS at 75cc/hr, intermittent IV abx  ACTIVITY: A of 2, T&R  DIET: NPO, frequent oral cares

## 2023-06-02 NOTE — PROGRESS NOTES
Sepsis protocol fired potentially due to elevated HR and low BP. Patient is actively in afib RVR. Protocol ordered and to be followed.     Lactic acid 1.4, BP increasing as well.

## 2023-06-02 NOTE — PLAN OF CARE
[id number]:    ORIENTATION/NEURO: Alert, oriented to self only. Calm and cooperative  VITALS/TELE: VSS except hypertensive for most of shift. Tele NSR with 1st degree AVB until 1807 rhythm changed to afib/RVR with rate 120-160s. BP dropped (normalized to 115/81) during afib.  PAIN: Denies  RESP: HFNC 35% 30L, fine crackles in lung bases, occasional congested couth  DIET: NPO  GI/: Purewick in place, good UO. Pt c/o constipation. PRN dulcolax supp resulted in small hard BM.  LINES/DRAINS: R PIV infusing D5 1/2 NS @ 75 ml/hr.  LABS: Creat 2.47, WBC 30.0, Hgb 10.5  SKIN: Scattered bruising  ASSIST/AMBULATION: Total assist, turn/repo q2h, assisted to commode via lift x1 to help facilitate BM  FALL RISK: Yes, bed alarm active  PLAN/DISPOSITION: Wean O2 as able. Possible video swallow study tomorrow. Keep NPO. Manage HTN and afib/RVR.

## 2023-06-02 NOTE — PROGRESS NOTES
[facility]    Internal Medicine Hospitalist Progress Note  06/02/2023  I evaluated patient on the above date.    Ganga Mccullough Jr., MD  [phone] (p)  Text Page  Vocera        Assessment & Plan New actions/orders today (06/02/2023) are underlined. All lab results in the assessment and plan were reviewed.    Ms. Ghislaine Walls is an 86 yo female with history including RA; HTN; CHF; PAF; hypothyroidism; GERD; gout; HLD; CKD4; MGUS; chronic anemia and TCP; who presented 5/30/2023 from her [facility] with weakness.     On initial evaluation 5/30, patient was afebrile, hemodynamically stable, oxygen saturations 97%.  ECG showed sinus rhythm with first-degree AV block; Q waves in the inferior limb leads and V2 and V3; diffuse nonspecific T wave changes; no acute ischemic findings. Labs notable for CBC with hemoglobin 10.1 and platelets 148; BMP with creatinine 2.91 (was 2.78 5/16); calcium 11.9; LFTs were normal; troponin was 100; TSH 0.98. X-rays of her lumbar spine did not show any acute findings.  Head CT without contrast did not show any acute findings.    Hospitalization complicated by development of aspiration pneumonia with acute hypoxic respiratory failure on 5/31/2023.       Aspiration pneumonia with acute hypoxic respiratory failure.  Leukocytosis, suspect related to pneumonia as well as steroids.  * On 5/31, pt noted to have aspirated in the morning with coughing and new O2 requirement while eating. Later in the afternoon, pt again had a coughing spell after drinking water coarse, congested cough, needing up to 15L O2 with sats in 80's. RN able to suction food material with sats to 90's. CXR showed showed question some minimal pleural fluid on the left with minimal retrocardiac atelectasis and/or infiltrate; right lung clear. Started on pipericillin-tazobactam. Made NPO. Continued high O2 requirements and transferred to [facility]. Subsequently required high flow O2, then BiPAP overnight.  * On  "6/1, WBC to 30 (but received IV steroids 5/31). Off BiPAP, back on HFNC in am. CXR showed new right base infiltrate; retrocardiac density similar, possibly related to small hiatal hernia.   * On 6/2, down to 30L HFNC.  Recent Labs   Lab 06/02/23  0513 06/01/23  1954 06/01/23  0548 05/31/23  0646 05/30/23  1339   WBC 11.0  --  30.0* 4.6 7.9   LACT  --  0.8  --   --  0.7   - Continue HFNC, wean as able.  - Continue PRN BiPAP.  - Continue pipericillin-tazobactam (started 5/31).  - Continue ipratropium-albuterol nebs TID and PRN.  - Dysphagia management as noted.    Decreased oral intake PTA.  Severe malnutrition.  Dysphagia.  * Pt with decreased oral intake over the past few months PTA. On admit, pt's daughter noted that pt lost 15 pounds in the last 6 months.  Patient simply not wanting to eat, with little appetite. Was prescribed dronabinol and mirtazapine to try to stimulate appetite, with no improvement. On admit, pt also noted that PTA pt had coughing/vomiting at times with food if \"too dry\"; she noted pt with hiatal hernia which she felt contributed.   * Nutrition consulted on admit and noted with severe malnutrition.  * Issues with aspiration 5/31 as above, subsequently made NPO; SLP consulted.  * On 6/1, SLP evaluated and still recommending NPO; plan video swallow. I d/w pt's daughter about goals of care, if pt would want a feeding tube. Pt's daughter said she would discuss pt and her siblings.  * On 6/2, slightly improved per SLP.  - Cautiously start dysphagia diet 4 (pureed) with mildly thickened liquids.  - Advance diet as able per SLP.  - Continue D5 1/2 NS at 75 ml/hr.  - Continue to hold PTA dronabinol and mirtazapine for now.    Afib with RVR.  H/o PAF.  * ECG on admit showed sinus rhythm with 1st degree AVB.  * Made NPO 5/31 for dysphagia.   * Started IV metoprolol 6/1. Later on 6/1, pt noted to be tachycardic and ECG showed afib with RVR w/o acute ischemic changes.  * Swallowing improved 6/2.  - " Restart carvedilol 6.25 mg BID.  - Stop IV metoprolol 2.5 mg q6h.  - Resume apixaban.    Hypertensive urgency related to being off PO meds (NPO).  CHF (HFpEF).   [PTA: amlodipine 10 mg daily; carvedilol 6.25 mg BID; hydralazine 100 mg TID; ISDN 30 mg TID; torsemide 20 mg daily.]  * Echo 3/2023 showed LVEF 55-60%; RV OK; mild MS.  * Initial presentation as above. Torsemide (given decreased PO intake with hypovolemia) held and other BP meds resumed on admit.  * On 5/31, made NPO for dysphagia as noted. Started on scheduled IV hydralazine.  * On 6/1, BP's uncontrolled. Started on scheduled IV metoprolol and NTG patch.  * On 6/2, BP's improved. Swallowing improved.  - Restart carvedilol 6.25 mg BID, hydralazine 100 mg TID and ISDN 30 mg TID.  - Discontinue metoprolol 2.5 mg IV q6h, hydralazine 10 mg IV q8h and NTG patch.  - Continue PRN IV hydralazine and PRN IV labetalol.  - Continue to hold amlodipine for now and can restart 6/3 if BP's stable and if continues to tolerate PO.  - Continue to hold torsemide for now given DRAGAN and dysphagia.  - Monitor i/o's, daily wts.    ADDENDUM 16:29:  * BP's soft in 90's at times.  - Decrease hydralazine from 100 mg --> 50 mg TID.  - Hold ISDN.    DRAGAN on CKD stage IV, suspect prerenal related to decreased PO intake, diuretic.  * Baseline cr around mid to upper 2 range over the past year.  * Cr 2.91 on admit. Pt was given a NS bolus in the ED. Started on maintenance IVF's on admit.  Recent Labs   Lab 06/02/23  0513 06/01/23  0548 05/31/23  0646 05/30/23  1339   CR 2.06* 2.47* 2.67* 2.91*   - Continue D5 1/2 NS at 75 ml/hr.  - Continue to hold PTA sodium bicarb for now.  - Continue to monitor BMP - repeat in am.  - Avoid nephrotoxic medications.    Troponin elevation, suspect demand ischemia (type 2 NSTEMI).  * Initial presentation as above. No chest pain or dyspnea on admit. Trop 100 on admit.   Recent Labs   Lab 05/30/23  1935 05/30/23  1339   CTROPT 88* 100*   - Continue to monitor  clinically.  - No further workup for now.    Anemia of chronic disease/CKD.  Chronic TCP.  MGUS.  * Follows with [facility]. Has received ANGELIC in the past.  * Hgb 10.1 and plts 148 on admit. No overt clinical signs of major bleeding.  Recent Labs   Lab 06/02/23  0513 06/01/23  0548 05/31/23  0646 05/30/23  1339   WBC 11.0 30.0* 4.6 7.9   HGB 9.8* 10.5* 10.0* 10.1*   * 146* 133* 148*   - Continue to monitor CBC - repeat in am.  - Consider prbc transfusion if hgb </= 7.0 or if significant bleeding with hemodynamic instability or if symptomatic.  - Consider platelet transfusion if needs a procedure and less than 50,000; or if less than 10,000.    Mild confusion, suspect metabolic encephalopathy related to hospitalization, advanced age.  * On 6/2, noted to be confused at times prior evening/overnight.  - Re-orient as needed.  - Maintain normal day/night, sleep/wake cycles.  - Minimize sedating medications as able.    Goals of care.  - Discussed with pt's daughter 6/2 regarding goals of care. Overall, they pt and family wish to continue restorative cares for now, but if pt were to acutely decline further or fail to improve or have subsequent recurrent hospitalization, then would be open to considering hospice.    Weakness, suspect related to DRAGAN/hypovolemia on admit and subsequent multiple acute issues with deconditioning.  * Lives in an assited living.  * Initial presentation as above. PT, OT, SW consulted on admit.  * Issues with pneumonia with respiratory failure as above.  - Continue to treat other issues as noted.  - Continue PT, OT.  - Anticipate will need TCU.    Rheumatoid arthritis.  * Chronically on hydroxychloroquine and prednisolone 5 mg daily.  * Made NPO 5/31 for dysphagia. Started on IV methylprednisolone.  * Swallowing improved 6/2.  - Restart prednisolone 5 mg daily.  - Resume hydroxychloroquine.   - Discontinue IV methylprednisolone 6.25 mg daily.  - Continue outpatient  abatacept.    Hypothyroidism.  * Made NPO 5/31 for dysphagia. Started on IV levothyroxine.  - Resume PO levothyroxine.  - Discontinue IV levothyroxine.    GERD with h/o hiatal hernia.  * Made NPO 5/31 for dysphagia. Started on IV pantoprazole .  - Continue IV pantoprazole.  - Resume PO pantoprazole if tolerating other PO pills.    Gout.  - Resume allopurinol if tolerating other PO pills.    Depression/anxiety.  - Resume mirtazapine if tolerating other PO pills.      Clinically Significant Risk Factors                # Thrombocytopenia: Lowest platelets = 113 in last 2 days, will monitor for bleeding   # Hypertension: Noted on problem list         # Severe Malnutrition: based on nutrition assessment, PRESENT ON ADMISSION          COVID-19 testing.  COVID-19 PCR Results        6/7/2021    16:15 8/3/2021    09:00 1/12/2022    12:53 1/19/2022    15:00 1/25/2022    13:00   COVID-19 PCR Results   SARS-CoV-2 Virus Specimen Source Nasopharyngeal         SARS-CoV-2 PCR Result NEGATIVE         SARS CoV2 PCR  Negative   Negative   Negative   Testing sent to reference lab. Results will be returned via unsolicited result     COVID-19 Virus PCR - Result     NOT DETECTED             2/1/2022    14:40   COVID-19 PCR Results   SARS-CoV-2 Virus Specimen Source    SARS-CoV-2 PCR Result    SARS CoV2 PCR    COVID-19 Virus PCR - Result NOT DETECTED     COVID-19 Antibody Results, Testing for Immunity         No data to display                Diet: NPO for Medical/Clinical Reasons Except for: No Exceptions    Prophylaxis: PCD's, ambulation. On apixaban.  Dejesus Catheter: Not present  Lines: None     Code Status: No CPR- Do NOT Intubate    Disposition Plan   Expected discharge: 2-3d recommended to likely to TCU pending above.  Entered: Ganga Mccullough MD 06/02/2023, 11:03 AM      Communication.  - I d/w pt's daughter 6/2.        Interval History    Noted to be confused at times overnight.  Stable at 30L HFNC.  Pt doing  better.  Swallowing improved with SLP.  Wondering when she can eat.    -Data reviewed today: I reviewed all new labs and imaging over the last 24 hours. I personally reviewed no images or EKG's today.    Physical Exam    , Blood pressure (!) 139/95, pulse 112, temperature 98.7  F (37.1  C), temperature source Oral, resp. rate 20, SpO2 97 %, not currently breastfeeding. O2 Device: High Flow Nasal Cannula (HFNC) Oxygen Delivery: 30 LPM  There were no vitals filed for this visit.  Vital Signs with Ranges  Temp:  [97.3  F (36.3  C)-98.7  F (37.1  C)] 98.7  F (37.1  C)  Pulse:  [] 112  Resp:  [10-45] 20  BP: (115-188)/() 139/95  FiO2 (%):  [30 %-35 %] 30 %  SpO2:  [95 %-100 %] 97 %  Patient Vitals for the past 24 hrs:   BP Temp Temp src Pulse Resp SpO2   06/02/23 0716 -- -- -- 112 20 97 %   06/02/23 0500 -- -- -- -- -- 97 %   06/02/23 0400 (!) 139/95 -- -- 115 15 98 %   06/02/23 0100 (!) 162/97 98.7  F (37.1  C) Oral (!) 131 19 95 %   06/02/23 0000 (!) 152/91 -- -- 109 12 99 %   06/01/23 2355 -- -- -- -- -- 98 %   06/01/23 2350 (!) 162/102 -- -- (!) 129 19 98 %   06/01/23 2340 -- -- -- 111 30 97 %   06/01/23 2330 (!) 130/101 -- -- (!) 124 29 98 %   06/01/23 2320 (!) 148/87 -- -- 113 24 99 %   06/01/23 2310 -- -- -- (!) 121 21 98 %   06/01/23 2300 139/81 -- -- 103 17 98 %   06/01/23 2250 (!) 156/90 -- -- 117 14 99 %   06/01/23 2240 -- -- -- 117 17 99 %   06/01/23 2230 (!) 140/102 -- -- 113 16 100 %   06/01/23 2220 (!) 156/87 -- -- 118 20 97 %   06/01/23 2210 -- -- -- 112 (!) 40 99 %   06/01/23 2200 (!) 154/92 97.3  F (36.3  C) Oral 117 21 98 %   06/01/23 2105 (!) 146/88 -- -- 117 21 --   06/01/23 2100 (!) 153/100 -- -- 112 10 98 %   06/01/23 2057 -- -- -- (!) 144 22 99 %   06/01/23 2056 -- -- -- (!) 129 19 99 %   06/01/23 2055 -- -- -- (!) 128 15 99 %   06/01/23 2054 -- -- -- 112 13 98 %   06/01/23 2053 -- -- -- 117 13 98 %   06/01/23 2052 -- -- -- (!) 135 15 98 %   06/01/23 2050 -- -- -- 114 25 98 %    06/01/23 2049 -- -- -- (!) 123 (!) 45 98 %   06/01/23 2047 -- -- -- (!) 126 20 99 %   06/01/23 2046 -- -- -- 105 22 99 %   06/01/23 2045 (!) 153/116 -- -- 116 20 99 %   06/01/23 2044 -- -- -- (!) 137 20 99 %   06/01/23 2043 -- -- -- (!) 123 10 100 %   06/01/23 2041 -- -- -- 112 21 99 %   06/01/23 2040 -- -- -- 111 12 99 %   06/01/23 2039 -- -- -- 112 16 99 %   06/01/23 2038 -- -- -- 120 17 99 %   06/01/23 2037 -- -- -- 113 16 99 %   06/01/23 2036 -- -- -- 113 18 99 %   06/01/23 2035 -- -- -- 108 19 99 %   06/01/23 2034 -- -- -- (!) 122 20 99 %   06/01/23 2033 -- -- -- (!) 124 21 99 %   06/01/23 2032 -- -- -- 115 18 99 %   06/01/23 2031 -- -- -- 109 20 99 %   06/01/23 2030 (!) 159/89 -- -- 106 16 99 %   06/01/23 2029 -- -- -- 120 21 --   06/01/23 2028 -- -- -- 113 30 98 %   06/01/23 2027 -- -- -- (!) 128 17 99 %   06/01/23 2026 -- -- -- 108 16 99 %   06/01/23 2024 -- -- -- 116 20 99 %   06/01/23 2023 -- -- -- 107 13 99 %   06/01/23 2022 -- 98  F (36.7  C) Oral 120 19 99 %   06/01/23 2020 -- -- -- (!) 121 21 99 %   06/01/23 2019 -- -- -- 110 21 99 %   06/01/23 2018 -- -- -- 114 10 99 %   06/01/23 2017 -- -- -- (!) 121 25 99 %   06/01/23 2016 -- -- -- 104 19 99 %   06/01/23 2015 (!) 140/83 -- -- 113 20 99 %   06/01/23 2014 -- -- -- 115 20 --   06/01/23 2013 -- -- -- 117 18 99 %   06/01/23 2012 -- -- -- 112 20 --   06/01/23 2011 (!) 158/91 -- -- 109 17 99 %   06/01/23 2009 -- -- -- (!) 124 16 99 %   06/01/23 2008 -- -- -- 115 19 99 %   06/01/23 2007 -- -- -- 108 18 99 %   06/01/23 2006 -- -- -- (!) 121 10 99 %   06/01/23 2005 -- -- -- 106 22 99 %   06/01/23 2004 -- -- -- 112 20 99 %   06/01/23 2002 -- -- -- 117 20 99 %   06/01/23 2001 -- -- -- 116 17 99 %   06/01/23 2000 (!) 148/76 -- -- 116 17 99 %   06/01/23 1948 -- -- -- -- -- 97 %   06/01/23 1846 115/81 -- -- (!) 144 -- 99 %   06/01/23 1822 -- -- -- (!) 128 28 99 %   06/01/23 1800 (!) 173/87 -- -- 79 20 99 %   06/01/23 1619 (!) 175/76 98.5  F (36.9  C) Oral 79 18  98 %   06/01/23 1609 (!) 188/71 -- -- -- -- --   06/01/23 1600 (!) 188/71 -- -- 80 -- 98 %   06/01/23 1523 (!) 180/84 -- -- -- -- --   06/01/23 1408 (!) 186/66 -- -- 80 -- 98 %   06/01/23 1200 (!) 174/70 -- -- 77 -- 99 %     I/O's Last 24 hours  I/O last 3 completed shifts:  In: -   Out: 800 [Urine:800]    Constitutional: Awake, alert, pleasant.  Respiratory: Diminished throughout, much less coarse, no crackles/wheezes.  Cardiovascular: RRR, no m/r/g.  GI: Soft, nt, nd, +BS.  Skin/Integumen:  Trace pitting edema in legs, stable.  Other:        Data    Labs reviewed.  Recent Labs   Lab 06/02/23  0513 06/01/23  0548 05/31/23  1651 05/31/23  0646 05/30/23  1339   WBC 11.0 30.0*  --  4.6 7.9   HGB 9.8* 10.5*  --  10.0* 10.1*   * 103*  --  104* 104*   * 146*  --  133* 148*    142  --  142 142   POTASSIUM 3.5 3.6  --  3.8 4.2   CHLORIDE 107 107  --  108* 104   CO2 22 25  --  22 25   BUN 34.7* 35.7*  --  37.2* 36.6*   CR 2.06* 2.47*  --  2.67* 2.91*   ANIONGAP 13 10  --  12 13   ALIVIA 9.7 10.1  --  10.7* 11.9*   * 108* 116* 98 91   ALBUMIN  --   --   --   --  4.0   PROTTOTAL  --   --   --   --  6.8   BILITOTAL  --   --   --   --  0.9   ALKPHOS  --   --   --   --  41   ALT  --   --   --   --  10   AST  --   --   --   --  25     Recent Labs   Lab Test 05/30/23  1935 05/30/23  1339 01/12/22  1249 06/07/21  1615 04/15/21  1139   NT-PROBNP, INPATIENT  --   --  12,009* 8,504* 21,879*   TROPONIN T HIGH SENSITIVITY 88* 100*  --   --   --      Recent Labs   Lab 06/02/23  0513 06/01/23  0548 05/31/23  1651 05/31/23  0646 05/30/23  1339   * 108* 116* 98 91     No lab results found.  No results for input(s): INR, [id number] in the last 168 hours.  Recent Labs   Lab 06/02/23 0513 06/01/23  1954 06/01/23  0548 05/31/23  0646 05/30/23  1339   WBC 11.0  --  30.0* 4.6 7.9   LACT  --  0.8  --   --  0.7       MICRO:  CULTURES (INCLUDING BLOOD AND URINE):  No lab results found in last 7 days.    No results  found for this or any previous visit (from the past 24 hour(s)).    Medications   All medications were reviewed.    Infusions:    dextrose 5% and 0.45% NaCl 75 mL/hr at 06/02/23 0430     - MEDICATION INSTRUCTIONS -       Scheduled Medications:    [Held by provider] acetaminophen  650 mg Oral BID     [Held by provider] allopurinol  100 mg Oral Daily     [Held by provider] amLODIPine  10 mg Oral Daily     [Held by provider] apixaban ANTICOAGULANT  2.5 mg Oral BID     [Held by provider] carvedilol  6.25 mg Oral BID w/meals     [Held by provider] dronabinol  2.5 mg Oral BID AC     [Held by provider] ferrous sulfate  325 mg Oral Every Other Day     hydrALAZINE  10 mg Intravenous Q8H     [Held by provider] hydrALAZINE  100 mg Oral TID     [Held by provider] hydroxychloroquine  200 mg Oral Daily     ipratropium - albuterol 0.5 mg/2.5 mg/3 mL  3 mL Nebulization TID     [Held by provider] isosorbide dinitrate  30 mg Oral TID     [START ON 6/4/2023] levothyroxine  18.75 mcg Intravenous Daily     [Held by provider] levothyroxine  25 mcg Oral QAM AC     Lidocaine  1 patch Transdermal Q24H     lidocaine   Transdermal Q8H KEVEN     methylPREDNISolone sodium succinate  6.25 mg Intravenous Daily     metoprolol  2.5 mg Intravenous Q6H     [Held by provider] mirtazapine  15 mg Oral At Bedtime     nitroGLYcerin  1 patch Transdermal Daily     nitroGLYcerin   Transdermal Q8H KEVEN     [Held by provider] pantoprazole  40 mg Oral Daily     pantoprazole  40 mg Intravenous Daily with breakfast     piperacillin-tazobactam  2.25 g Intravenous Q6H     [Held by provider] predniSONE  5 mg Oral Daily     [Held by provider] sodium bicarbonate  650 mg Oral BID     sodium chloride (PF)  3 mL Intracatheter Q8H     PRN Medications:  acetaminophen, bisacodyl, artificial tears, HOLD MEDICATION, hydrALAZINE, ipratropium - albuterol 0.5 mg/2.5 mg/3 mL, labetalol, lidocaine 4%, lidocaine (buffered or not buffered), melatonin, nitroGLYcerin, - MEDICATION  INSTRUCTIONS -, polyethylene glycol, prochlorperazine **OR** prochlorperazine **OR** prochlorperazine, senna-docusate **OR** senna-docusate, sodium chloride (PF)

## 2023-06-02 NOTE — PLAN OF CARE
A&O to self and place. VSS ex occasional hypotension on 2 LPM NC. Tele afib RVR. CMS intact, mild edema in BLEs . Lungs diminished. BS+, BM smear this evening, flatus+. Skin with scattered bruising. Tolerating puree diet with mild thickened liquids. Denies N/V. Voiding incontinently with purewick, see separate note about low UOP, Dr. Phipps notified at [id number]. Denies pain. Turn q2. D5 1/2NS at 75 into RT PIV.

## 2023-06-02 NOTE — PLAN OF CARE
Orientations: x1-2  Vitals/Pain: Tachycardia and HTN noted, otherwise VSS on 30L 32%FiO2 via HFNC  Tele: A-fib RVR  Lines/Drains: R PIV running D5W 0.45%NS @ 75mL/hr  Skin/Wounds: scattered bruising  GI/: small BM at beginning of shift, AUOP via purewick  Labs: Abnormal/Trends, Electrolyte Replacement- AM labs pending  Ambulation/Assist: Bedrest, lift  Sleep Quality: poor, did not fall asleep until roughly 430  Plan: possible video swallow today, keep NPO, monitor and address a-fib rvr as ordered. Wean O2 as able.

## 2023-06-02 NOTE — PROGRESS NOTES
0700 - 1530 RN Shift Summary    Patient alert, partially disoriented to situation; disoriented to time. Calm, cooperative. Verbally appropriate; Symmetrical movements, follows all commands. Continues to be in A-Fib RVR; Hypertensive, good response to scheduled BP meds. On high flow nasal cannula, sats WDL. One small BM; Ate 100% of lunch, good appetite; Does not take in adequate fluids dispite encouragement. External urinary catheter in use; 200 mL output in container + one large incontinence episode. Denies pain. Blanchable redness in perineal / perianal area; coccyx offloaded with repositioning.

## 2023-06-03 NOTE — PROGRESS NOTES
06/03/23 2678   Appointment Info   Signing Clinician's Name / Credentials (PT) Maricel Nunez, PT, DPT   Living Environment   People in Home facility resident   Current Living Arrangements assisted living   Home Accessibility no concerns   Transportation Anticipated family or friend will provide   Living Environment Comments Pt currently lives in [facility]   Self-Care   Usual Activity Tolerance moderate   Current Activity Tolerance poor   Regular Exercise No   Equipment Currently Used at Home walker, rolling   Fall history within last six months yes   Number of times patient has fallen within last six months 1   Activity/Exercise/Self-Care Comment Pt has assist of 1 with toileting, has escort to meals or has meals delivered, pt and family and manage own medications.   General Information   Onset of Illness/Injury or Date of Surgery 05/30/23   Referring Physician Ganga Mccullough MD   Patient/Family Therapy Goals Statement (PT) None stated   Pertinent History of Current Problem (include personal factors and/or comorbidities that impact the POC) Pt is 87 year old female adm on 5/30/23 from [facility] with weakness, decreased PO intake, adm for management of DRAGAN and NSTEMI type II. Pt had a slight fall on 5/28/23 and has had decreasing intake since then. PMH includes RA, HTN, CHF, JOSE, hypothyroidism, GERD, gout, HLD, CKD 4, MGUS, chronic anemia, TCP.   Existing Precautions/Restrictions fall;oxygen therapy device and L/min   Cognition   Affect/Mental Status (Cognition) low arousal/lethargic   Orientation Status (Cognition) oriented to;person;place   Follows Commands (Cognition) WFL   Pain Assessment   Patient Currently in Pain No   Posture    Posture Forward head position;Protracted shoulders   Range of Motion (ROM)   Range of Motion ROM is WFL   Strength (Manual Muscle Testing)   Strength (Manual Muscle Testing) Deficits observed during functional mobility   Strength Comments Pt is generally  deconditioned, no focal weakness noted   Bed Mobility   Comment, (Bed Mobility) Mod assist   Transfers   Comment, (Transfers) Max assist of 1   Gait/Stairs (Locomotion)   Comment, (Gait/Stairs) Unable to tolerate ambulation at this time   Balance   Balance Comments Min assist for sitting balance   Clinical Impression   Criteria for Skilled Therapeutic Intervention Yes, treatment indicated   PT Diagnosis (PT) Impaired mobility   Influenced by the following impairments Decreased strength, decreased balance, decreased activity tolerance   Functional limitations due to impairments Decreased ability to participate in daily tasks   Clinical Presentation (PT Evaluation Complexity) Evolving/Changing   Clinical Presentation Rationale Current presentation, [facility]   Clinical Decision Making (Complexity) low complexity   Planned Therapy Interventions (PT) balance training;bed mobility training;gait training;home exercise program;patient/family education;strengthening;transfer training   Risk & Benefits of therapy have been explained evaluation/treatment results reviewed;care plan/treatment goals reviewed;risks/benefits reviewed;current/potential barriers reviewed;participants voiced agreement with care plan;participants included;patient   PT Total Evaluation Time   PT Eval, Low Complexity Minutes (98097) 10   Physical Therapy Goals   PT Frequency 5x/week   PT Predicted Duration/Target Date for Goal Attainment 06/10/23   PT Goals Bed Mobility;Transfers;Gait   PT: Bed Mobility Supervision/stand-by assist;Supine to/from sit;Rolling   PT: Transfers Minimal assist;Sit to/from stand;Bed to/from chair;Assistive device   PT: Gait Minimal assist;50 feet;Rolling walker   PT Discharge Planning   PT Plan General strengthening, sitting balance, sit to stands, transfer to chair as yakelin   PT Discharge Recommendation (DC Rec) Transitional Care Facility   PT Rationale for DC Rec Pt is below baseline level of function, needs assist of 2 to safely  mobilize, anticipate need for continued inpatient rehab to optimize functional independence and safety if restorative plan of care is chosen by pt/family.   PT Brief overview of current status Mod to max assist of 2   Total Session Time   Total Session Time (sum of timed and untimed services) 10

## 2023-06-03 NOTE — PROGRESS NOTES
Care Management Follow Up    Length of Stay (days): 3    Expected Discharge Date: 06/04/2023     Concerns to be Addressed:       Patient plan of care discussed at interdisciplinary rounds: Yes    Anticipated Discharge Disposition:       Anticipated Discharge Services:    Anticipated Discharge DME:      Patient/family educated on Medicare website which has current facility and service quality ratings:    Education Provided on the Discharge Plan:    Patient/Family in Agreement with the Plan: yes    Referrals Placed by CM/SW:    Private pay costs discussed: Not applicable    Additional Information:  Writer sent TCU referral to [facility] [address]

## 2023-06-03 NOTE — PROGRESS NOTES
[facility]    Medicine Progress Note - Hospitalist Service    Date of Admission:  5/30/2023    Assessment & Plan      Ms. Ghislaine Walls is an 88 yo female with history including RA; HTN; CHF; PAF; hypothyroidism; GERD; gout; HLD; CKD4; MGUS; chronic anemia and TCP; who presented 5/30/2023 from her [facility] with weakness.      On initial evaluation 5/30, patient was afebrile, hemodynamically stable, oxygen saturations 97%.  ECG showed sinus rhythm with first-degree AV block; Q waves in the inferior limb leads and V2 and V3; diffuse nonspecific T wave changes; no acute ischemic findings. Labs notable for CBC with hemoglobin 10.1 and platelets 148; BMP with creatinine 2.91 (was 2.78 5/16); calcium 11.9; LFTs were normal; troponin was 100; TSH 0.98. X-rays of her lumbar spine did not show any acute findings.  Head CT without contrast did not show any acute findings.  No respiratory virus testing.     Hospitalization complicated by development of aspiration pneumonia with acute hypoxic respiratory failure on 5/31/2023.      1. Aspiration pneumonia with acute hypoxic respiratory failure.  Leukocytosis, suspect related to pneumonia as well as steroids.  * On 5/31, pt noted to have aspirated in the morning with coughing and new O2 requirement while eating. Later in the afternoon, pt again had a coughing spell after drinking water coarse, congested cough, needing up to 15L O2 with sats in 80's. RN able to suction food material with sats to 90's. CXR showed showed question some minimal pleural fluid on the left with minimal retrocardiac atelectasis and/or infiltrate; right lung clear. Started on pipericillin-tazobactam. Made NPO. Continued high O2 requirements and [place] [facility] [place] required [place] flow O2, then BiPAP overnight.  * On 6/1, WBC to 30 (but received IV steroids 5/31). Off BiPAP, back on HFNC in am. CXR showed new right base infiltrate; retrocardiac density similar, possibly  "related to small hiatal hernia.   * On 6/2, down to 30L HFNC.    6/3/23 -  Currently on nasal cannula - no HFNC or BIPAP    - Continue pipericillin-tazobactam (started 5/31).  - Continue ipratropium-albuterol nebs TID and PRN.  - Dysphagia management as noted.     2. Continued decrease po intake  Severe malnutrition.  Dysphagia.  * Pt with decreased oral intake over the past few months PTA. On admit, pt's daughter noted that pt lost 15 pounds in the last 6 months.  Patient simply not wanting to eat, with little appetite. Was prescribed dronabinol and mirtazapine to try to stimulate appetite, with no improvement. On admit, pt also noted that PTA pt had coughing/vomiting at times with food if \"too dry\"; she noted pt with hiatal hernia which she felt contributed.   * Nutrition consulted on admit and noted with severe malnutrition.  * Issues with aspiration 5/31 as above, subsequently made NPO; SLP consulted.  * On 6/1, SLP evaluated and still recommending NPO; plan video swallow. I d/w pt's daughter about goals of care, if pt would want a feeding tube. Pt's daughter said she would discuss pt and her siblings.  * On 6/2, slightly improved per SLP.  - Cautiously start dysphagia diet 4 (pureed) with mildly thickened liquids.  - Advance diet as able per SLP.  - Continue D5 1/2 NS at 75 ml/hr and monitor po intake closely  Urine outpt also down  - Continue to hold PTA dronabinol and mirtazapine for now.    Will need to continue to monitor po intake and nutrition as continues to be poor with current thickened liquids and overall guarded clinical state     3. Afib with RVR.  H/o PAF.  * ECG on admit showed sinus rhythm with 1st degree AVB.  * Made NPO 5/31 for dysphagia.   * Started IV metoprolol 6/1. Later on 6/1, pt noted to be tachycardic and ECG showed afib with RVR w/o acute ischemic changes.  * Swallowing improved 6/2.  .  - Restart carvedilol 6.25 mg BID (6/2/23)  Still with RVR but, overall, variable  HR  " ('s)  Will increase coreg dosing to 12.5mg po bid this afternoon if still having difficulty with rate control  Will restart prn IV metoprolol, as needed    - Resumed apixaban.    Will check magnesium, phos levels this am   TSH 0.98 (5/30/23)    Addendum - 1650  Pt miserable having rectal spasms and very constipated  Min hard stool out despite miralax, senna and suppos  Will order enema x 1    HR now more consistent 120-130's while she is in pain  Plan -  Try enema and get her more comfortable  Small dose po prn flexeril cautiously  - although I think relieving constipation will likely improve  Symptoms    For elevated HR - told RN to give bigger dose of po coreg (12.5mg) now.     Still have IV prn metoprolol  Will start po cardizem 30mg po qid with holding parameters    If HR still difficult to control - can start diltiazem gtt, consider dig and/or cardiology input    Last echo reviewed -  3/2023  - EF 55-60%,  LA dilatation and evidence of diastolic CHF    4. Hypertensive urgency related to being off PO meds (NPO).  CHF (HFpEF).   [PTA: amlodipine 10 mg daily; carvedilol 6.25 mg BID; hydralazine 100 mg TID; ISDN 30 mg TID; torsemide 20 mg daily.]  * Echo 3/2023 showed LVEF 55-60%; RV OK; mild MS.  * Initial presentation as above. Torsemide (given decreased PO intake with hypovolemia) held and other BP meds resumed on admit.  * On 5/31, made NPO for dysphagia as noted. Started on scheduled IV hydralazine.  * On 6/1, BP's uncontrolled. Started on scheduled IV metoprolol and NTG patch.  * On 6/2, BP's improved. Swallowing improved.  - 6/2/23 - Restarted carvedilol 6.25 mg BID, hydralazine 50mg TID (reduced from home dose) and ISDN 30 mg TID.  Will resume amlodipine 10mg po daily today as /100's  - Continue PRN IV hydralazine and PRN IV metoprolol    - Continue to hold torsemide for now given DRAGAN and dysphagia.  - Monitor i/o's, daily wts.     5. DRAGAN on CKD stage IV -  worsening  suspect prerenal related  to decreased PO intake, diuretic    * Baseline creat around mid to upper 2 range over the past year.  * Cr 2.91 on admit. Pt was given a NS bolus in the ED. Started on maintenance IVF's on admit.    BUN/Creat increased today  Suspect  May be clinically intravascular volume down  Not sure I/O's are  Accurate on my review  Po  Intake  Has been minimal  Decreased urine outpt reported per RN; bladder scan without retention  Will give NS bolus 250cc x 1  Switch IVF to NS after the above bolus    - Avoid nephrotoxic medications.  BMP in the am     6. Troponin elevation, suspect demand ischemia (type 2 NSTEMI).  * Initial presentation as above. No chest pain or dyspnea on admit. Trop 100 on admit.        Recent Labs   Lab 05/30/23  1935 05/30/23  1339   CTROPT 88* 100*   - Continue to monitor clinically.  - No further workup for now.     7. Anemia of chronic disease/CKD.  Chronic TCP.  MGUS.  * Follows with Dr. [facility]. Has received ANGELIC in the past.  * Hgb 10.1 and plts 148 on admit. No overt clinical signs of major bleeding.   Continue to monitor CBC - repeat in am.  - Consider prbc transfusion if hgb </= 7.0 or if significant bleeding with hemodynamic instability or if symptomatic.  - Consider platelet transfusion if needs a procedure and less than 50,000; or if less than 10,000.     8. Mild confusion, suspect metabolic encephalopathy related to hospitalization, advanced age.    - Re-orient as needed.  - Maintain normal day/night, sleep/wake cycles.  - Minimize sedating medications as able.     9. Weakness, suspect related to DRAGAN/hypovolemia on admit and subsequent multiple acute issues with deconditioning.  * Lives in an assited living.  * Initial presentation as above. PT, OT, SW consulted on admit.  * Issues with pneumonia with respiratory failure as above.  - Continue to treat other issues as noted.  - Continue PT, OT.  - Anticipate will need TCU as below her baseline physical function     10. Rheumatoid  "arthritis.  * Chronically on hydroxychloroquine and prednisolone 5 mg daily.  * Made NPO 5/31 for dysphagia. Started on IV methylprednisolone.  * Swallowing improved 6/2.  - Restart prednisolone 5 mg daily.  - Resume hydroxychloroquine.   - Discontinued IV methylprednisolone 6.25 mg daily.  - Continue outpatient abatacept.     11. Hypothyroidism.  * Made NPO 5/31 for dysphagia. Started on IV levothyroxine.  - Resume PO levothyroxine.  - Discontinued IV levothyroxine.     12. GERD with h/o hiatal hernia.  * Made NPO 5/31 for dysphagia. Started on IV pantoprazole .  - Resume PO pantoprazole today (6/3/23)     13. Gout.  - continue to hold allopurinol today d/t  Worsening renal failure.     14. Depression/anxiety.  Hold mirtazapine, for now      15.  Constipation  This is her main concern this am  Prn and scheduled bowel meds ordered    Medical Decision Making     54 MINUTES SPENT BY ME on the date of service doing chart review, history, exam, documentation & further activities per the note.        PPE Worn:  Mask, gloves     Diet: Pureed Diet (level 4) Mildly Thick (level 2)    DVT Prophylaxis: DOAC  Dejesus Catheter: Not present  Lines: None     Cardiac Monitoring: None  Code Status: No CPR- Do NOT Intubate      Clinically Significant Risk Factors                # Thrombocytopenia: Lowest platelets = 108 in last 2 days, will monitor for bleeding   # Hypertension: Noted on problem list         # Severe Malnutrition: based on nutrition assessment, PRESENT ON ADMISSION        Disposition Plan   TCU - requiring several more days of ongoing hospital treatment and  monitoring          Rupinder Deleon DO  Hospitalist Service  [facility]  Securely message with [employer] (more info)  Text page via [facility] Paging/Directory   ______________________________________________________________________    Interval History     \"I am all stuffed up\"  - worried about being constipated; currently having rectal " fullness.   Not sure if her breathing is better or not.    Still with coughing.    Not a big fan of  Thickened liquids and po intake  Poor.  Tolerating pills orally.  RN reports continues to have poor urine outpt despite IVF given.       No F/C, HA, CP noted.     Has continued to be in a fib with RVR  - although HR has continued to be somewhat variable.      Physical Exam   Vital Signs: Temp: 96.8  F (36  C) Temp src: Oral BP: (!) 141/88 Pulse: (!) 129   Resp: 14 SpO2: 97 % O2 Device: Nasal cannula Oxygen Delivery: 2 LPM  Weight: 0 lbs 0 oz    GEN:  Alert, elderly female, ill-appearing.  Pale. Does not appear to  have significant conversational dyspnea or be in acute respiratory distress  HEENT:  Normocephalic/atraumatic, no scleral icterus, no nasal discharge, mouth and membranes moist  CV:  irregular rate and tachy, no clear loud murmur  S1 + S2 noted, no S3 or S4.  LUNGS:  Diminished air exchange at the bases.  No  Rhonchi or wheezes ausc bilaterally.  ? Mild bilateral dry crackles at bases.    No significant costal retractions noted. Symmetric chest rise on inhalation noted.  ABD:  Active bowel sounds, soft, non-tender/non-distended.  No clear rebound/guarding/rigidity.  EXT:  No significant pretibial edema or cyanosis bilaterally.  No acute joint synovitis noted.  SKIN:  Dry to touch, no exanthems noted in the visualized areas.    Medications     - MEDICATION INSTRUCTIONS -       sodium chloride         sodium chloride 0.9%  250 mL Intravenous Once     [Held by provider] acetaminophen  650 mg Oral BID     [Held by provider] allopurinol  100 mg Oral Daily     amLODIPine  10 mg Oral Daily     apixaban ANTICOAGULANT  2.5 mg Oral BID     carvedilol  12.5 mg Oral BID w/meals     [Held by provider] dronabinol  2.5 mg Oral BID AC     [Held by provider] ferrous sulfate  325 mg Oral Every Other Day     hydrALAZINE  50 mg Oral TID     hydroxychloroquine  200 mg Oral Daily     ipratropium - albuterol 0.5 mg/2.5 mg/3 mL  3  mL Nebulization TID     levothyroxine  25 mcg Oral QAM AC     Lidocaine  1 patch Transdermal Q24H     lidocaine   Transdermal Q8H KEVEN     [Held by provider] mirtazapine  15 mg Oral At Bedtime     pantoprazole  40 mg Oral Daily     piperacillin-tazobactam  2.25 g Intravenous Q6H     predniSONE  5 mg Oral Daily     [Held by provider] sodium bicarbonate  650 mg Oral BID     sodium chloride (PF)  3 mL Intracatheter Q8H       Data     Labs and Imaging results below reviewed today.  Recent Labs   Lab 06/03/23  0529 06/02/23  0513 06/01/23  0548   WBC 8.0 11.0 30.0*   HGB 9.5* 9.8* 10.5*   HCT 30.2* 31.5* 32.1*   * 104* 103*   * 113* 146*          Lab Results   Component Value Date     06/03/2023     06/02/2023     06/01/2023     06/07/2021     05/10/2021     04/28/2021    Lab Results   Component Value Date    CHLORIDE 109 06/03/2023    CHLORIDE 107 06/02/2023    CHLORIDE 107 06/01/2023    CHLORIDE 107 09/28/2022    CHLORIDE 109 07/06/2022    CHLORIDE 107 06/24/2022    CHLORIDE 114 06/07/2021    CHLORIDE 107 05/10/2021    CHLORIDE 108 04/28/2021    Lab Results   Component Value Date    BUN 43.5 06/03/2023    BUN 34.7 06/02/2023    BUN 35.7 06/01/2023    BUN 52 09/28/2022    BUN 50 07/06/2022    BUN 51 06/24/2022    BUN 77 06/07/2021    BUN 70 05/10/2021    BUN 91 04/28/2021      Lab Results   Component Value Date    POTASSIUM 3.7 06/03/2023    POTASSIUM 3.5 06/02/2023    POTASSIUM 3.6 06/01/2023    POTASSIUM 3.9 09/28/2022    POTASSIUM 4.8 07/06/2022    POTASSIUM 4.0 06/24/2022    POTASSIUM 4.9 06/07/2021    POTASSIUM 4.8 05/10/2021    POTASSIUM 3.7 04/28/2021    Lab Results   Component Value Date    CO2 19 06/03/2023    CO2 22 06/02/2023    CO2 25 06/01/2023    CO2 28 09/28/2022    CO2 25 07/06/2022    CO2 27 06/24/2022    CO2 23 06/07/2021    CO2 24 05/10/2021    CO2 24 04/28/2021    Lab Results   Component Value Date    CR 2.24 06/03/2023    CR 2.06 06/02/2023    CR 2.47  06/01/2023    CR 1.61 06/07/2021    CR 1.76 05/10/2021    CR 1.67 04/28/2021        7-Day Micro Results     No results found for the last 168 hours.          No results found for this or any previous visit (from the past 24 hour(s)).

## 2023-06-03 NOTE — PLAN OF CARE
A&OX2, disoriented to time and situation, forgetful, 2 L NC, Tele- Afib RVR, assist of 2  with cares, IVF infusing, on pureed diet. On zosyn, purewick used. Turned and repositioned as pt allowed. Bladder scanned for 148 ml, low urine output.

## 2023-06-03 NOTE — PROVIDER NOTIFICATION
Dr. Phipps paged: Patient only voided 100cc and small amount in brief since 1500. Bladder scanned for 135cc at 2030 and nothing voided since. Please advise.     Continue to monitor UOP as no signs of urine retention per Dr. Phipps.

## 2023-06-04 NOTE — PLAN OF CARE
Pt A&Ox 1. VSS on 5 NC. Lungs coarse. Weak productive cough. Tele, Afib RVR. Dil drip is running at 10 mg/ hr. HR is less than 100 and lows of 49. Procal and NT prbnp high. Given two dose of lasix and Isosorbide. Total urine output 600cc. Fluid restriction 2000, so far 800cc. Large BM. Up with 2 lift, repo Q2h, full liquid diet with mildly thick liquid.  poor low appetite. Denies pain. Calls appropriately.

## 2023-06-04 NOTE — PROVIDER NOTIFICATION
Updated Dr. Vicente that pt c/o SOB and has required O2 increase up to 5-6L to maintain sats, crackles in bases. Fluids stopped for now.  CXR ordered.  Scheduled neb changed to Xopenex d/t tachycardia.

## 2023-06-04 NOTE — PLAN OF CARE
SLP: New ordered placed due to RN concern for coughing (new order not needed as SLP team was already following). Pt with significant coughing with and without PO.     Recommend completion of VFSS tomorrow given minimal improvements at bedside with PO intake, pneumonia, and ongoing coughing unable to rule out aspiration. Recommend full mildly thick liquid diet. 1-1 supervision, small bites/sips, must be sitting upright.

## 2023-06-04 NOTE — PROGRESS NOTES
[facility]  Hospitalist Progress Note   06/04/2023          Assessment and Plan:       Ghislaine Walls is a 87 year old female with RA; HTN; CHF; PAF; hypothyroidism; GERD; gout; HLD; CKD4; MGUS; chronic anemia and TCP; who presented 5/30/2023 from her halfway with weakness.      On initial evaluation 5/30,  afebrile, hemodynamically stable with oxygen saturation 97% on room air.  ECG showed sinus rhythm with first-degree AV block; Q waves in the inferior limb leads and V2 and V3; diffuse nonspecific T wave changes; no acute ischemic findings.   Hemoglobin 10.1 and platelets 148; BMP with creatinine 2.91 (was 2.78 5/16); calcium 11.9;   LFTs were normal; troponin was 100; TSH 0.98.   X-rays of her lumbar spine did not show any acute findings.    Head CT without contrast did not show any acute findings.     Hospitalization complicated by development of aspiration pneumonia with acute hypoxic respiratory failure, volume overload, A-fib with RVR.     Aspiration pneumonia.   Acute hypoxic respiratory failure likely multifactorial from aspiration pneumonia, volume overload, deconditioning.  Leukocytosis, suspect related to pneumonia as well as steroids - improved   * On 5/31, pt noted to have aspirated in the morning with coughing and new O2 requirement while eating. Later in the afternoon, pt again had a coughing spell after drinking water coarse, congested cough, needing up to 15L O2 with sats in 80's. RN able to suction food material with sats to 90's.   --CXR showed showed question some minimal pleural fluid on the left with minimal retrocardiac atelectasis and/or infiltrate; right lung clear. Started on pipericillin-tazobactam. Made NPO. Continued high O2 requirements and [place] [facility] [place] required high flow O2, then BiPAP overnight.  * On 6/1, WBC to 30 (but received IV steroids 5/31). Off BiPAP, back on HFNC in am. CXR showed new right base infiltrate; retrocardiac density similar,  possibly related to small hiatal hernia.   * On 6/2, down to 30L HFNC.  *On 6/4 down to 4 L nasal oxygen.  Has been having IV fluids, crackles present. (Pending weights).  PTA torsemide on hold since admission.  Follow proBNP.  Will order diuresis accordingly  --- Continue IV Zosyn (5/31).  Follow procalcitonin levels.    --Continue ipratropium-albuterol nebs TID and PRN.  -- Continue to wean oxygen as able to.  -- Manage medical issues as below   COVID-19 PCR, influenza PCR ordered  Strict aspiration precaution.  Aggressive incentive spirometry.  Encourage ambulation out of bed.  Will need follow-up imaging for resolution.    Afib with RVR likely in the setting of volume overload, aspiration.  H/o PAF on anticoagulation.  * ECG on admit showed sinus rhythm with 1st degree AVB.  * Made NPO 5/31 for dysphagia.   * Started IV metoprolol 6/1. Later on 6/1, pt noted to be tachycardic and ECG showed afib with RVR w/o acute ischemic changes.  * Swallowing improved 6/2.  Restarted Coreg 6.25 mg twice daily.  -6/3- increased Coreg to 12.5 mg twice daily.  _6/4 -A-fib with RVR.  Continue Coreg 12.5 mg twice daily.  Hold PTA amlodipine.   Started on IV Cardizem drip.  Diuresis as below  Continue PTA Eliquis  Telemetry monitoring.  Echocardiogram.  Consider cardiology evaluation in AM or earlier if symptomatic pending echocardiogram results, clinical improvement overnight   Keep potassium around 4, magnesium around 2    Volume overload likely multifactorial from fluid resuscitation, holding diuretic, heart failure, renal disease.    Troponin elevation suspect demand ischemia.  History of heart failure with preserved EF [LVEF of 55 to 60%]  S/p Hypertensive urgency related to being off PO meds (NPO).  [PTA: amlodipine 10 mg daily; carvedilol 6.25 mg BID; hydralazine 100 mg TID; ISDN 30 mg TID; torsemide 20 mg daily.]  * Echo 3/2023 showed LVEF 55-60%; RV OK; mild MS.  * Initial presentation as above.   Troponin 100 > 88 > 78    Torsemide held on admission, other blood pressure medications resumed  * On 5/31, made NPO for dysphagia. Started on scheduled IV hydralazine.  * On 6/1, BP's uncontrolled. Started on scheduled IV metoprolol and NTG patch.  * On 6/2, BP's improved. Swallowing improved.  Restarted PTA antihypertensives.  --On 6/4 - patient with increased oxygen needs, shortness of breath, A-fib with RVR.    Discontinue IV fluids.  Follow proBNP.  40 mg IV Lasix x1.  Continue to hold PTA oral torsemide.  Monitor renal function in a.m. and accordingly further diuresis.  Continue increased dose of Coreg at 12.5 mg twice daily.  Resume PTA Isordil 30 mg 3 times daily [6/4]  Holding PTA hydralazine, Amlodipine to allow for diuresis, rate control.  Telemetry monitoring.  Echocardiogram pending.  Will consider cardiology evaluation pending echo results  Strict input output monitoring.  Daily weights.  2000 mL fluid restriction.    DRAGAN on CKD stage IV, suspect prerenal related to decreased PO intake, diuretic.  CKD stage IV.  * Baseline cr around mid to upper 2 range over the past year.  * Cr 2.91 on admit.  Receiving IV fluids, diuresis held with which sodium improving to 1.84.  This morning increased oxygen requirements, creatinine improved to 1.84.   Discontinued IV fluids.  Follow proBNP significantly elevated, 40 mg IV Lasix x1.  Continue PTA with sodium bicarbonate.  Monitor renal function in am., accordingly further diuresis.  Avoid nephrotoxic drugs.  Consider nephrology evaluation if creatinine worsening    Acute encephalopathy likely multifactorial from metabolic, delirium from hospitalization, cognitive impairment.  Concern for cognitive impairment at baseline.  Per report confusion.  Treat medical issues as above.  - Re-orient as needed.  - Maintain normal day/night, sleep/wake cycles.  - Minimize sedating medications as able.  Cognitive screening as outpatient.    Physical deconditioning from medical illness, senile  frailty.  Severe malnutrition in the setting of poor oral intake  Dysphagia.   Goals of care.  --Resident of assisted living facility. Presented with poor oral intake over the past few months prior to admission, weight loss of 15 pounds.--Patient simply not wanting to eat, prescribed dronabinol and Remeron to try to stimulate appetite with no improvement.  * Nutrition consulted on admit and noted with severe malnutrition.  * Issues with aspiration 5/31 as above, subsequently made NPO; SLP consulted.  * On 6/1, SLP evaluated and still recommending NPO  * On 6/2, slightly improved per SLP.  *On 6/4 plan for reevaluation by speech therapy.  Continue dysphagia diet. Plan for video swallow.   Discontinue IV fluids.   Continue to hold PTA dronabinol and mirtazapine for now.  --Hospitalist team had discussed with patient's family regarding goals of care-restorative care for now, but if pt were to acutely decline further or fail to improve or have subsequent recurrent hospitalization, then would be open to considering hospice.  PT, OT following.  Will likely need TCU.    Addendum 1:20 PM.  -Able to reach out to patient's daughter, did discuss multiple ongoing medical issues, expressed understanding.  Continue restorative care at this time    Hypokalemia, hypomagnesemia-poor intake  Potassium 3.2, magnesium 1.6.  Replacement protocol ordered, monitor.    Constipation.  Per report constipation, receiving scheduled, as needed bowel meds.  Per nursing report today had bowel movement yesterday.  Abdominal soft, nontender, bowel sounds heard.    Continue scheduled, as needed bowel meds.  Monitor    GERD with h/o hiatal hernia.  Continue oral pantoprazole.    Anemia of chronic disease/CKD.  Chronic TCP.  MGUS.  * Follows with [facility]. Has received ANGELIC in the past.  * Hgb 10.1 and plts 148 on admit. No overt clinical signs of major bleeding.  Monitor CBC level in AM.  - Consider prbc transfusion if hgb </= 7.0 or if  significant bleeding with hemodynamic instability or if symptomatic.  - Consider platelet transfusion if needs a procedure and less than 50,000; or if less than 10,000.     Rheumatoid arthritis.  Continue PTA hydroxychloroquine and prednisolone 5 mg daily.  Hold outpatient abatacept.     Hypothyroidism.  Continue oral levothyroxine    Gout.  Continue PTA allopurinol.     Depression/anxiety.  Hold PTA Remeron as drowsy     Orders Placed This Encounter      Full Liquid Diet Mildly Thick (level 2)      DVT Prophylaxis: SCDs, on oral anticoagulation.  Code Status: No CPR- Do NOT Intubate  Disposition: Expected discharge in greater than 2 days pending clinical improvement    Discussed with patient, bedside RN-multiple times.  Updated patient's daughter over the telephone 6/4.  > 50 minutes spent by me on the date of service doing chart review, history, exam, documentation & further activities per the note.      Piotr Vicente MD        Interval History:      Patient lying in bed.  Drowsy but arousable, falls asleep.  Overnight report of A-fib with RVR, received to oral Cardizem.  Elevated blood pressures, received IV hydralazine.  Per report this morning increased oxygen needs.  On IV fluids.  Receiving Tylenol as needed.  Per report assistance of 2 with lift.         Physical Exam:        Physical Exam   Temp:  [97.6  F (36.4  C)-98.2  F (36.8  C)] 98.2  F (36.8  C)  Pulse:  [] 77  Resp:  [14-47] 21  BP: (125-185)/() 166/72  SpO2:  [88 %-97 %] 94 %    Intake/Output Summary (Last 24 hours) at 6/4/2023 1240  Last data filed at 6/4/2023 0652  Gross per 24 hour   Intake 1558.75 ml   Output 200 ml   Net 1358.75 ml       Admission Weight: 59.2 kg (130 lb 8.2 oz)  Current Weight: 59.2 kg (130 lb 8.2 oz)    PHYSICAL EXAM  GENERAL: Patient drowsy, arousable and falls asleep.  HEENT: Oropharynx pink  HEART: irregular rate and rhythm. S1S2.   LUNGS: Bilateral decreased breath sounds, crackles present  Respirations  unlabored  ABDOMEN: Soft, no abdominal tenderness, bowel sounds heard   No guarding or rigidity  NEURO: Moving all extremities.  EXTREMITIES: 1+ pedal edema  SKIN: Warm, dry. + bruising.  PSYCHIATRY drowsy.       Medications:          allopurinol  100 mg Oral Daily     amLODIPine  10 mg Oral Daily     apixaban ANTICOAGULANT  2.5 mg Oral BID     carvedilol  12.5 mg Oral BID w/meals     docusate sodium  100 mg Oral BID     [Held by provider] dronabinol  2.5 mg Oral BID AC     [Held by provider] ferrous sulfate  325 mg Oral Every Other Day     furosemide  20 mg Intravenous Once     [Held by provider] hydrALAZINE  50 mg Oral TID     hydroxychloroquine  200 mg Oral Daily     ipratropium - albuterol 0.5 mg/2.5 mg/3 mL  3 mL Nebulization TID     levothyroxine  25 mcg Oral QAM AC     Lidocaine  1 patch Transdermal Q24H     lidocaine   Transdermal Q8H KEVEN     mirtazapine  15 mg Oral At Bedtime     pantoprazole  40 mg Oral Daily     piperacillin-tazobactam  2.25 g Intravenous Q6H     polyethylene glycol  17 g Oral Daily     potassium chloride  10 mEq Intravenous Q1H     predniSONE  5 mg Oral Daily     [Held by provider] sodium bicarbonate  650 mg Oral BID     sodium chloride (PF)  3 mL Intracatheter Q8H     acetaminophen, bisacodyl, artificial tears, cyclobenzaprine, HOLD MEDICATION, hydrALAZINE, ipratropium - albuterol 0.5 mg/2.5 mg/3 mL, labetalol, lidocaine 4%, lidocaine (buffered or not buffered), melatonin, metoprolol, nitroGLYcerin, Reason anticoagulant not prescribed for atrial fibrillation, - MEDICATION INSTRUCTIONS -, prochlorperazine **OR** prochlorperazine **OR** prochlorperazine, senna-docusate **OR** senna-docusate, sodium chloride (PF)         Data:      All new lab and imaging data was reviewed.

## 2023-06-04 NOTE — PROGRESS NOTES
BP (!) 143/94   Pulse (!) 135   Temp 96.8  F (36  C) (Oral)   Resp 24   LMP  (LMP Unknown)   SpO2 91%  .    Assessment: Patient is alert and oriented x4, intermittent confusion near bedtime. Tele afib RVR, -140's, Dr. Deleon adjusting medications. Tolerating 1L NC. Purewick used throughout the day, currently no purewick in place. Patient reporting constipation with spasm pain. PRN miralax, senna, suppository and enema given, with minimal results. Stool is dark green and hard brenda. BLE with slight edema. Turns q2. Patient eating very small amount and refusing fluids. IV NS 75ml/hr.     Pain management: muscle relaxant given and tylenol for back and spasm pain.     Rae Clifford RN on 6/3/2023 at 7:14 PM

## 2023-06-04 NOTE — PLAN OF CARE
Goal Outcome Evaluation:    Alert, oriented to self, intermittently to place.  Afib, RVR all shift, was mostly low 100's-115's but has been sustaining 120-130 over the past couple hours.  Will initiate Diltiazim gtt if does not improve with this am dose of PO Dilt.  HTN, hydralazine x1 with improvement.  LS dim, coarse this am.  C/o SOB, increased O2 needs, see MD notification note.  No BM overnight but reports less discomfort than yesterday following her enema yesterday afternoon.  Borderline UOP per Purewick cath.  Generalized edema.  Moderate pain in mid back and back of head from fall PTA, PRN Tylenol this am.  Q2h repo, Ax2 w/ lift.

## 2023-06-04 NOTE — PLAN OF CARE
OT orders received chart reviewed and in consult with Physical Therapy noted patient with poor activity tolerance at this time, requiring A of 2 with mobilities and plan is likely TCU. Therefore, PT will follow during hospital stay to advance mobility and OT will defer to next level of care when patient may be better able to tolerate out of bed ADL activity and multiple disciplines.

## 2023-06-05 NOTE — PLAN OF CARE
A&O to self and place, pt lethargic slept most of day.VSS on 6-8L oximyzer, hypertensive systolic in 150-160s, tachy HR  occasionally jumps 120's but does not sustain.TELE afib CVR.Lung Sounds diminished, fine crackles.Bowel Sounds active, 4 loose bm today, compazine given once for nausea with relief.Voiding adequately, incontinent of urine. Scattered bruising. Assist 2 with lift, frequent turn/repo.tylenol given once for headache.tolerating pureed, moderately thick liquid diet 2000 ml FR, appetite very poor refused most intake today. K, mag and phos replaced today rechecks in for am. Diltiazem infusing at 10mg/hr.

## 2023-06-05 NOTE — PROGRESS NOTES
Video Fluoroscopic Swallow Study (VFSS)     06/05/23 1143   Appointment Info   Signing Clinician's Name / Credentials (SLP) Onelia Reeves MS CCC-sLP   General Information   Onset of Illness/Injury or Date of Surgery 05/30/23   Referring Physician Piotr Vicente MD   Patient/Family Therapy Goal Statement (SLP) Did not state   Pertinent History of Current Problem   Ghislaine Walls is a 87 year old female with RA; HTN; CHF; PAF; hypothyroidism; GERD; gout; HLD; CKD4; MGUS; chronic anemia and TCP; who presented 5/30/2023 from her [facility] with weakness. Admitted with Acute hypoxic respiratory failure likely multifactorial from aspiration pneumonia, volume overload, deconditioning. VFSS pursued for further assessment re: oropharyngeal swallow mechanism given signs/sx aspiration at bedside, ongoing fluctuating 02 needs.     General Observations Pt alert, upright in VFSS chair, stable RR/SP02 on oxymizer canula   Type of Evaluation   Type of Evaluation Swallow Evaluation   General Swallowing Observations   Swallowing Evaluation Videofluoroscopic swallow study (VFSS)   VFSS Evaluation   Radiologist Dr. Trivedi   Views Taken left lateral   Physical Location of Procedure [facility], Radiology Dept. Fluoroscpy Suite   VFSS Textures Trialed thin liquids;slightly thick liquids;mildly thick liquids;moderately thick liquids/liquidized;pureed;soft & bite-sized   VFSS Eval: Thin Liquid Texture Trial   Mode of Presentation, Thin Liquid spoon   Order of Presentation 11 tsp   Preparatory Phase poor bolus control   Oral Phase, Thin Liquid impaired AP movement;premature pharyngeal entry   Bolus Location When Swallow Triggered pyriforms   Pharyngeal Phase, Thin Liquid impaired hyolaryngeal excursion;impaired epiglottic movement;impaired tongue base retraction   Rosenbek's Penetration Aspiration Scale: Thin Liquid Trial Results 5 - contrast contacts vocal cords, visible residue remains (penetration)   Diagnostic  Statement Mild before/during laryngeal penetration to the vocal cords occurring 2/2 reduced oral control, premature bolus spillage, reduced laryngeal closure (epiglottis is short/thick, reduced inversion) -- cued cough/supgraglottic swallow clearing glottic residue though not all supgraglottic residue   VFSS Eval: Slightly Thick Liquids   Mode of Presentation spoon   Order of Presentation 7 tsp   Preparatory Phase poor bolus control   Oral Phase impaired AP movement;premature pharyngeal entry   Bolus Location When Swallow Triggered pyriforms   Pharyngeal Phase impaired hyolaryngel excursion;impaired epiglottic movement;impaired tongue base retraction   Rosenbek's Penetration Aspiration Scale 3 - contrast remains above the vocal cords, visable residue remains (penetration)   Diagnostic Statement Mild before/during laryngeal penetration above the vocal cords occurring 2/2 reduced oral control, premature bolus spillage, reduced laryngeal closure (epiglottis is short/thick, reduced inversion) -- cued cough/supgraglottic swallow did not clear all supgraglottic residue   VFSS Eval: Mildly Thick Liquids   Mode of Presentation spoon;cup   Order of Presentation 1 tsp, 2 tsp, 3 cup   Preparatory Phase poor bolus control   Oral Phase impaired AP movement;premature pharyngeal entry   Bolus Location When Swallow Triggered pyriforms   Pharyngeal Phase impaired hyolaryngel excursion;impaired epiglottic movement;impaired tongue base retraction   Rosenbek's Penetration Aspiration Scale 3 - contrast remains above the vocal cords, visible residue remains (penetration)   Diagnostic Statement Mild before/during laryngeal penetration above the vocal cords occurring 2/2 reduced oral control, premature bolus spillage, reduced laryngeal closure (epiglottis is short/thick, reduced inversion) -- cued cough/supgraglottic swallow did not clear all supgraglottic residue   VFSS Eval: Moderately Thick Liquids   Mode of Presentation spoon;cup    Order of Presentation 4 tsp, 5 tsp, 6 cup, 10 tsp   Preparatory Phase poor bolus control   Oral Phase impaired AP movement;premature pharyngeal entry   Bolus Location When Swallow Triggered valleculae;pyriforms  (valleculae with tsp, pyriform sinuses with cup)   Pharyngeal Phase impaired hyolaryngel excursion;impaired epiglottic movement;impaired tongue base retraction   Rosenbek's Penetration Aspiration Scale 3 - contrast remains above the vocal cords, visible residue remains (penetration)   Diagnostic Statement Mild before/during laryngeal penetration above the vocal cords occurring  with CUP only 2/2 reduced oral control, premature bolus spillage, reduced laryngeal closure (epiglottis is short/thick, reduced inversion) -- cued cough/supgraglottic swallow did not clear all supgraglottic residue; NO laryngeal penetration or aspiration with tsp boluses x3.   VFSS Evaluation: Puree Solid Texture Trial   Mode of Presentation, Puree spoon   Order of Presentation 8   Preparatory Phase WFL   Oral Phase, Puree impaired AP movement   Bolus Location When Swallow Triggered valleculae   Pharyngeal Phase, Puree impaired epiglottic movement;impaired hyolaryngel excursion;impaired tongue base retraction   Rosenbek's Penetration Aspiration Scale: Puree Food Trial Results 1 - no aspiration, contrast does not enter airway   VFSS Eval: Soft & Bite Sized   Mode of Presentation spoon   Order of presentation 9   Preparatory Phase prolonged bolus preparation  (prolonged mastication)   Oral Phase impaired AP movement   Bolus Location When Swallow Triggered valleculae   Pharyngeal Phase impaired hyolaryngel excursion;impaired epiglottic movement;impaired tongue base retraction   Rosenbek's Penetration Aspiration Scale 1 - no aspiration, contrast does not enter airway   Swallowing Recommendations   Diet Consistency Recommendations pureed (level 4);moderately thick liquids/liquidized (level 3)   Supervision Level for Intake 1:1  supervision needed   Mode of Delivery Recommendations bolus size, small;liquids via spoon only;slow rate of intake   Swallowing Maneuver Recommendations alternate food and liquid intake;supraglottic swallow  (Periodic)   Monitoring/Assistance Required (Eating/Swallowing) check mouth frequently for oral residue/pocketing;cue for finger/lingual sweep if oral pocketing present;stop eating activities when fatigue is present;monitor for cough or change in vocal quality with intake   Recommended Feeding/Eating Techniques (Swallow Eval) maintain upright sitting position for eating;maintain upright posture during/after eating for 30 minutes;minimize distractions during oral intake;moisten oral mucosa prior to intake;provide assist with feeding   Medication Administration Recommendations, Swallowing (SLP) crushed with puree if able; can try small medication whole with puree   General Therapy Interventions   Planned Therapy Interventions Dysphagia Treatment   Dysphagia treatment Oropharyngeal exercise training;Modified diet education;Instruction of safe swallow strategies;Compensatory strategies for swallowing   Clinical Impression   Criteria for Skilled Therapeutic Interventions Met (SLP Eval) Yes, treatment indicated   SLP Diagnosis mild-moderate oropharyngeal dysphagia   Risks & Benefits of therapy have been explained evaluation/treatment results reviewed;participants included;patient   Clinical Impression Comments   Video fluoroscopic swallow study completed with thin liquids, slightly thick liquids, mildly thick liquids, moderately thick liquids, puree solids, soft/bite sized solids: pt currently presents with mild-moderate oropharyngeal dysphagia. Oral phase notable for prolonged anterior propulsion, prolonged mastication of chewable solids, reduced oral control resulting in premature bolus spillage to the pyriform sinuses with thinner viscosities/larger bolus sizes; improved timing with thicker/smaller. Base of tongue  retraction is mildly reduced, reduced pharyngeal constriction, hyolaryngeal elevation mild-moderately reduced, egpilottis is short/thick with reduced inversion/retroflexion. Adequate opening of pharyngoesophageal segment during the swallow. No significant oropharyngeal bolus retention observed.     Laryngeal penetration:   - Mild before/during laryngeal penetration to the vocal cords (with thin via tsp) and above the vocal cords (slightly thick via tsp, mildly thick via tsp/cup, moderately thick via cup) occurring 2/2 reduced oral control, premature bolus spillage, reduced laryngeal closure. Cued cough/supgraglottic swallow did not clear all supgraglottic residue within laryngeal vestibule.   - Improved control/timing with moderately thick via tsp and solids, where no laryngeal penetration noted.     SLP Total Evaluation Time   Evaluation, videofluoroscopic eval of swallow function Minutes (16256) 21   SLP Goals   Therapy Frequency (SLP Eval) daily   SLP Predicted Duration/Target Date for Goal Attainment 06/12/23   SLP Goals Swallow   SLP: Safely tolerate diet without signs/symptoms of aspiration Soft & bite sized diet;Moderately thick liquids;With use of swallow precautions;With assistance/supervision   SLP Discharge Planning   SLP Plan PO tolerance, strategies, try chewable solids at bedside   SLP Discharge Recommendation Transitional Care Facility   SLP Rationale for DC Rec Suspect acute on chronic dysphagia; below baseline for swallowing currently requiring 1:1 assist, diet modifications, stratewgies   SLP Brief overview of current status  Recommendations: Pureed Diet (IDDSI level 4); Moderately Thick liquids (IDDSI level 3) with 1:1 assist, fully upright, slow rate, liquids via tsp only, alternate between solids/liquids every 2-3 bites, periodic cough and re-swallow.   Total Session Time   Total Session Time (sum of timed and untimed services) 21

## 2023-06-05 NOTE — PROVIDER NOTIFICATION
MD Notification    Notified Person: MD    Notified Person Name: Dr. Nichelle Carbajal    Notification Date/Time: 0830    Notification Interaction: vocera message    Purpose of Notification:   pt o2 needs increased overnight pt now on 15L oxymask respiratory came and assessed might try her on highflow again concerned she aspirated again would you like her NPO till after her swallow study?        Orders Received: ok to give meds likely due to fluid    Comments:

## 2023-06-05 NOTE — PROGRESS NOTES
CLINICAL NUTRITION SERVICES - REASSESSMENT NOTE      RECOMMENDATIONS FOR MD/PROVIDER TO ORDER:   If PO intake remains minimal, may consider TF for supplemental nutrition if within GOC    Recommendations Ordered by Registered Dietitian (RD):   RD offered ONS to provide increased nutrition. Pt declined. Asking for a coke over ice. RD discussed diet and that soda cannot be thickened unfortunately.    Future/Additional Recommendations:   Re-offer ONS if pt willing   Malnutrition:   % Weight Loss:  Weight loss does not meet criteria for malnutrition (5/31)  % Intake:  </=75% for >/= 1 month (severe) (5/31)  Subcutaneous Fat Loss: Facial region:  mild, Upper arm: mild and Lower arm:  mild (5/31)  Muscle Loss: Thoracic region (clavicle, acromium bone, deltoid, trapezius, pectoral):  severe, Upper arm (bicep, tricep):  severe and Lower arm  (forearm):  severe (5/31)  Fluid Retention:  Trace to mild generalized edema     Malnutrition Diagnosis: Severe malnutrition in the context of -  Acute illness or injury       EVALUATION OF PROGRESS TOWARD GOALS   Diet: Pureed Diet (level 4); Liquidized/Moderately Thick (level 3)  Fluid restriction 2000 ML FLUID    6/1: NPO  6/2: Pureed, mildly thick liquids  6/4: NPO --> FLD, mildly thick liquids     Intake/Tolerance:  - visited with pt this morning. Pt reported she has not had much to eat since previous RD visit on 5/31. Appetite remains minimal. RD offered ONS to provide increased nutrition. Pt declined. Asking for a coke over ice. RD discussed diet and that soda cannot be thickened unfortunately.   - per chart review, intake has been minimal   - per nursing flow sheet, % intakes documented. Mostly 25% recently   - per health touch, pt has received 1-2 small meals/day       ASSESSED NUTRITION NEEDS:  Dosing wt: 50.6 kg (adjusted, based on 59.2 kg-- 6/3)  Estimated Energy Needs: 6641-6153 kcals/day (25 - 30 kcals/kg)  Justification: Maintenance  Estimated Protein Needs: 51-61  grams protein/day (1-1.2 grams of pro/kg)  Justification: preservation of muscle mass  Estimated Fluid Needs: 1 mL/kcal  Justification: Maintenance or Per provider pending fluid status       NEW FINDINGS:   Per MD note, GOC remain restorative     Weight: 1 wt on file this admit, unable to evaluate wt trends this admit  06/03/23 2100 59.2 kg (130 lb 8.2 oz) Bed scale     Labs: reviewed. Phos being replaced    BUN 37.9 (H) 06/04/2023    CR 1.84 (H) 06/04/2023    PHOS 1.9 (L) 06/05/2023     Medications: reviewed     docusate sodium  100 mg Oral BID     levothyroxine  25 mcg Oral QAM AC     magnesium oxide  400 mg Oral Q4H     pantoprazole  40 mg Oral Daily     polyethylene glycol  17 g Oral Daily     predniSONE  5 mg Oral Daily     sodium bicarbonate  650 mg Oral BID     sodium phosphate  9 mmol Intravenous Once     I/O: Net IO Since Admission: 6,253 mL [06/05/23 1128]  - 1x BM today, 1x yesterday, 3x on 6/3, 1x on 6/2, 1x on 6/1    Resp.: worsening respiratory status. Currently using 15L oxymask       Previous Goals:   Diet adv v nutrition support within 2-3 days.  Evaluation: Met-- diet advanced     Previous Nutrition Diagnosis:   Inadequate oral intake related to difficulty swallowing and poor appetite as evidenced by pt report, 9% wt loss x 1 year.    Evaluation: No change      MALNUTRITION  % Weight Loss:  Weight loss does not meet criteria for malnutrition (5/31)  % Intake:  </=75% for >/= 1 month (severe) (5/31)  Subcutaneous Fat Loss: Facial region:  mild, Upper arm: mild and Lower arm:  mild (5/31)  Muscle Loss: Thoracic region (clavicle, acromium bone, deltoid, trapezius, pectoral):  severe, Upper arm (bicep, tricep):  severe and Lower arm  (forearm):  severe (5/31)  Fluid Retention:  Trace to mild generalized edema     Malnutrition Diagnosis: Severe malnutrition in the context of -  Acute illness or injury      CURRENT NUTRITION DIAGNOSIS  Inadequate oral intake related to difficulty swallowing and poor  appetite as evidenced by pt report, 9% wt loss x 1 year, minimal intake this admit x6 days       INTERVENTIONS  Recommendations / Nutrition Prescription  RD offered ONS to provide increased nutrition. Pt declined. Asking for a coke over ice. RD discussed diet and that soda cannot be thickened unfortunately.     Implementation  Nutrition Education     Goals  Pt to consume >50% of at least 2 meals/day   Wt >59 kg       MONITORING AND EVALUATION:  Progress towards goals will be monitored and evaluated per protocol and Practice Guidelines      Kira Yang RD, LD

## 2023-06-05 NOTE — PROGRESS NOTES
"Sepsis BPA fired at 0125, STAT lactic acid ordered.  Pt declined draw x2, once with lab + once with RN + lab.  Attempted to educate patient on importance of the lab draw and reason for lab draw.  Pt not receptive to education and confused, stating that she doesn't \"interact with people sneaking into my apartment in the middle of the night\". Unable to reorient patient.    Most recent lactic acid drawn 6/4 @ 0640 = 0.9. Will re-attempt lactic acid draw with AM labs.    Edit: Lactic drawn with AM labs  "

## 2023-06-05 NOTE — PROGRESS NOTES
[facility]    Hospitalist Progress Note    Assessment & Plan   Ghislaine Walls is a 87 year old female with RA; HTN; CHF; PAF; hypothyroidism; GERD; gout; HLD; CKD4; MGUS; chronic anemia and TCP; who presented 5/30/2023 from her [facility] with weakness.      On initial evaluation 5/30,  afebrile, hemodynamically stable with oxygen saturation 97% on room air.  ECG showed sinus rhythm with first-degree AV block; Q waves in the inferior limb leads and V2 and V3; diffuse nonspecific T wave changes; no acute ischemic findings.   Hemoglobin 10.1 and platelets 148; BMP with creatinine 2.91 (was 2.78 5/16); calcium 11.9;   LFTs were normal; troponin was 100; TSH 0.98.   X-rays of her lumbar spine did not show any acute findings.    Head CT without contrast did not show any acute findings.     Hospitalization complicated by development of aspiration pneumonia with acute hypoxic respiratory failure, volume overload, A-fib with RVR.     Aspiration pneumonia.   Acute hypoxic respiratory failure likely multifactorial from aspiration pneumonia, volume overload, deconditioning.  Leukocytosis, suspect related to pneumonia as well as steroids - improved   * On 5/31, pt noted to have aspirated in the morning with coughing and new O2 requirement while eating. Later in the afternoon, pt again had a coughing spell after drinking water coarse, congested cough, needing up to 15L O2 with sats in 80's. RN able to suction food material with sats to 90's.   --CXR showed showed question some minimal pleural fluid on the left with minimal retrocardiac atelectasis and/or infiltrate; right lung clear. Started on pipericillin-tazobactam. Made NPO. Continued high O2 requirements and transferred to [facility]. Subsequently required high flow O2, then BiPAP overnight.  * On 6/1, WBC to 30 (but received IV steroids 5/31). Off BiPAP, back on HFNC in am. CXR showed new right base infiltrate; retrocardiac density similar, possibly related  to small hiatal hernia.   * On 6/2, down to 30L HFNC.  *On 6/4 down to 4 L nasal oxygen.  Has been having IV fluids, crackles present. (Pending weights).  PTA torsemide on hold since admission.  Follow proBNP.    --- Continue IV Zosyn (5/31).  Follow procalcitonin levels.    --Continue ipratropium-albuterol nebs TID and PRN.  -- Continue to wean oxygen as able to.  -- Manage medical issues as below   COVID-19 PCR, influenza PCR ordered  Strict aspiration precaution.  Aggressive incentive spirometry.  Encourage ambulation out of bed.  Will need follow-up imaging for resolution.     Afib with RVR likely in the setting of volume overload, aspiration.  H/o PAF on anticoagulation.  * ECG on admit showed sinus rhythm with 1st degree AVB.  * Made NPO 5/31 for dysphagia.   * Started IV metoprolol 6/1. Later on 6/1, pt noted to be tachycardic and ECG showed afib with RVR w/o acute ischemic changes.  * Swallowing improved 6/2.  Restarted Coreg 6.25 mg twice daily.  -6/3- increased Coreg to 12.5 mg twice daily.  _6/4 -A-fib with RVR.  Continue Coreg 12.5 mg twice daily.  Hold PTA amlodipine.   Continue on Cardizem drip, wean down as possible.  -Patient received a dose of IV Lasix on 6/4, another dose given 6/5 we will continue with twice daily dosage of IV Lasix while speech evaluation ongoing.  Continue PTA Eliquis  -Echocardiogram still pending.  We will request cardiology evaluation today.  Keep potassium around 4, magnesium around 2     Volume overload likely multifactorial from fluid resuscitation, holding diuretic, heart failure, renal disease.    Troponin elevation suspect demand ischemia.  History of heart failure with preserved EF [LVEF of 55 to 60%]  S/p Hypertensive urgency related to being off PO meds (NPO).  [PTA: amlodipine 10 mg daily; carvedilol 6.25 mg BID; hydralazine 100 mg TID; ISDN 30 mg TID; torsemide 20 mg daily.]  * Echo 3/2023 showed LVEF 55-60%; RV OK; mild MS.  * Initial presentation as above.    Troponin 100 > 88 > 78   Torsemide held on admission, other blood pressure medications resumed  * On 5/31, made NPO for dysphagia. Started on scheduled IV hydralazine.  * On 6/1, BP's uncontrolled. Started on scheduled IV metoprolol and NTG patch.  * On 6/2, BP's improved. Swallowing improved.  Restarted PTA antihypertensives.  --On 6/4 - patient with increased oxygen needs, shortness of breath, A-fib with RVR.    Discontinue IV fluids.  Follow proBNP.  Dose of Lasix was given on 6/4, another dose given on 6/5.  -  Continue to hold PTA oral torsemide.  Monitor renal function in a.m. and accordingly further diuresis.  Continue increased dose of Coreg at 12.5 mg twice daily.  Resumed PTA Isordil 30 mg 3 times daily [6/4]  Holding PTA hydralazine, Amlodipine to allow for diuresis, rate control.  Telemetry monitoring.  Echocardiogram pending.  Will consider cardiology evaluation pending echo results  Strict input output monitoring.  Daily weights.  2000 mL fluid restriction.     DRAGAN on CKD stage IV, suspect prerenal related to decreased PO intake, diuretic.  CKD stage IV.  * Baseline cr around mid to upper 2 range over the past year.  * Cr 2.91 on admit.  Receiving IV fluids, diuresis held with which sodium improving to 1.84.  This morning increased oxygen requirements, creatinine improved to 1.84.   Discontinued IV fluids.  Follow proBNP significantly elevated, 40 mg IV Lasix x1.  Continue PTA with sodium bicarbonate.  Monitor renal function in am., accordingly further diuresis.  Avoid nephrotoxic drugs.  Consider nephrology evaluation if creatinine worsening     Acute encephalopathy likely multifactorial from metabolic, delirium from hospitalization, cognitive impairment.  Concern for cognitive impairment at baseline.  Per report confusion.  Treat medical issues as above.  - Re-orient as needed.  - Maintain normal day/night, sleep/wake cycles.  - Minimize sedating medications as able.  Cognitive screening as  outpatient.     Physical deconditioning from medical illness, senile frailty.  Severe malnutrition in the setting of poor oral intake  Dysphagia.   Goals of care.  --Resident of assisted living facility. Presented with poor oral intake over the past few months prior to admission, weight loss of 15 pounds.--Patient simply not wanting to eat, prescribed dronabinol and Remeron to try to stimulate appetite with no improvement.  * Nutrition consulted on admit and noted with severe malnutrition.  * Issues with aspiration 5/31 as above, subsequently made NPO; SLP consulted.  * On 6/1, SLP evaluated and still recommending NPO  * On 6/2, slightly improved per SLP.  *On 6/4 plan for reevaluation by speech therapy.  Continue dysphagia diet. Plan for video swallow.   Discontinue IV fluids.   Continue to hold PTA dronabinol and mirtazapine for now.  --Hospitalist team had discussed with patient's family regarding goals of care-restorative care for now, but if pt were to acutely decline further or fail to improve or have subsequent recurrent hospitalization, then would be open to considering hospice.  Latest discussion was on 6/4, for now to continue to monitor restorative cares.  PT, OT following.  Will likely need TCU.          Hypokalemia, hypomagnesemia-poor intake  Potassium 3.2, magnesium 1.6.  Replacement protocol ordered, monitor.     Constipation.  Per report constipation, receiving scheduled, as needed bowel meds.  Per nursing report today had bowel movement yesterday.  Abdominal soft, nontender, bowel sounds heard.    Continue scheduled, as needed bowel meds.  Monitor     GERD with h/o hiatal hernia.  Continue oral pantoprazole.     Anemia of chronic disease/CKD.  Chronic TCP.  MGUS.  * Follows with [facility]. Has received ANGELIC in the past.  * Hgb 10.1 and plts 148 on admit. No overt clinical signs of major bleeding.  Monitor CBC level in AM.  - Consider prbc transfusion if hgb </= 7.0 or if significant  bleeding with hemodynamic instability or if symptomatic.  - Consider platelet transfusion if needs a procedure and less than 50,000; or if less than 10,000.     Rheumatoid arthritis.  Continue PTA hydroxychloroquine and prednisolone 5 mg daily.  Hold outpatient abatacept.     Hypothyroidism.  Continue oral levothyroxine     Gout.  Continue PTA allopurinol.     Depression/anxiety.  Hold PTA Remeron as drowsy     DVT Prophylaxis: DOAC  Code Status: No CPR- Do NOT Intubate     52 MINUTES SPENT BY ME on the date of service doing chart review, history, exam, documentation & further activities per the note.  Disposition: Expected discharge in few days, will need TCU..  Clinically Significant Risk Factors        # Hypokalemia: Lowest K = 3.2 mmol/L in last 2 days, will replace as needed     # Hypomagnesemia: Lowest Mg = 1.6 mg/dL in last 2 days, will replace as needed   # Hypoalbuminemia: Lowest albumin = 3.1 g/dL at 6/4/2023  5:56 AM, will monitor as appropriate     # Hypertension: Noted on problem list         # Severe Malnutrition: based on nutrition assessment         Nichelle Carbajal MD  Text Page   (7am to 6pm)    Interval History   Patient is on 10 L of O2 today, she was temporarily placed on high flow earlier, she had a acute decompensation overnight, patient was on 4-5 L of oxygen prior to that there was concern about another episode of aspiration.  Patient is going to have a video swallow evaluation later today.    -Data reviewed today: I reviewed all new labs and imaging results over the last 24 hours.     Physical Exam     Vital Signs with Ranges  Temp:  [98  F (36.7  C)-98.5  F (36.9  C)] 98.2  F (36.8  C)  Pulse:  [] 125  Resp:  [16-83] 22  BP: (107-181)/() 165/124  SpO2:  [85 %-96 %] 92 %  I/O last 3 completed shifts:  In: 2063 [P.O.:1600; I.V.:463]  Out: 1300 [Urine:1300]    Constitutional: Awake, alert, cooperative, no apparent distress  Respiratory: Bilateral basilar crackles  noted  Cardiovascular: Regular rate and rhythm, normal S1 and S2, and no murmur noted  GI: Normal bowel sounds, soft, non-distended, non-tender  Skin/Integumen: No rashes, no cyanosis, 2+ edema noted lower extremity   Neuro : moving all 4 extremities, no focal deficit noted     Medications     dilTIAZem 10 mg/hr (06/05/23 0639)     Reason anticoagulant not prescribed for atrial fibrillation       - MEDICATION INSTRUCTIONS -         allopurinol  100 mg Oral Daily     [Held by provider] amLODIPine  10 mg Oral Daily     apixaban ANTICOAGULANT  2.5 mg Oral BID     carvedilol  12.5 mg Oral BID w/meals     docusate sodium  100 mg Oral BID     [Held by provider] dronabinol  2.5 mg Oral BID AC     [Held by provider] ferrous sulfate  325 mg Oral Every Other Day     [Held by provider] hydrALAZINE  50 mg Oral TID     hydroxychloroquine  200 mg Oral Daily     ipratropium - albuterol 0.5 mg/2.5 mg/3 mL  3 mL Nebulization TID     isosorbide dinitrate  30 mg Oral TID     levothyroxine  25 mcg Oral QAM AC     Lidocaine  1 patch Transdermal Q24H     lidocaine   Transdermal Q8H KEVEN     magnesium oxide  400 mg Oral Q4H     [Held by provider] mirtazapine  15 mg Oral At Bedtime     pantoprazole  40 mg Oral Daily     piperacillin-tazobactam  2.25 g Intravenous Q6H     polyethylene glycol  17 g Oral Daily     predniSONE  5 mg Oral Daily     sodium bicarbonate  650 mg Oral BID     sodium chloride (PF)  3 mL Intracatheter Q8H     sodium phosphate  9 mmol Intravenous Once       Data   Recent Labs   Lab 06/05/23  0643 06/04/23  1435 06/04/23  0556 06/03/23  0529 06/02/23  0513 06/01/23  0548 05/31/23  0646 05/30/23  1339   WBC  --   --   --  8.0 11.0 30.0*   < > 7.9   HGB  --   --   --  9.5* 9.8* 10.5*   < > 10.1*   MCV  --   --   --  105* 104* 103*   < > 104*   PLT  --   --   --  108* 113* 146*   < > 148*   NA  --   --  143 139 142 142   < > 142   POTASSIUM 3.6 3.5 3.2* 3.7 3.5 3.6   < > 4.2   CHLORIDE  --   --  114* 109* 107 107   < > 104    CO2  --   --  18* 19* 22 25   < > 25   BUN  --   --  37.9* 43.5* 34.7* 35.7*   < > 36.6*   CR  --   --  1.84* 2.24* 2.06* 2.47*   < > 2.91*   ANIONGAP  --   --  11 11 13 10   < > 13   ALIVIA  --   --  8.6* 8.8 9.7 10.1   < > 11.9*   GLC  --   --  88 131* 122* 108*   < > 91   ALBUMIN  --   --  3.1*  --   --   --   --  4.0   PROTTOTAL  --   --  5.8*  --   --   --   --  6.8   BILITOTAL  --   --  1.0  --   --   --   --  0.9   ALKPHOS  --   --  49  --   --   --   --  41   ALT  --   --  15  --   --   --   --  10   AST  --   --  19  --   --   --   --  25    < > = values in this interval not displayed.     Recent Labs   Lab 06/04/23  0556 06/03/23  0529 06/02/23  0513 06/01/23  0548 05/31/23  1651   GLC 88 131* 122* 108* 116*       Imaging:   No results found for this or any previous visit (from the past 24 hour(s)).

## 2023-06-05 NOTE — PLAN OF CARE
AxOx2, disoriented to situation + partially time / place.  Intermittently irritable, declining cares. Tele = afib CVR / RVR.    VSS on 5LNC except HTN (SBP ~150's-170's). Assist x2 w/ lift; turn and repo q2. Full liquid diet with mildly thick liquids; pills given crushed with puree. Incontinent of B+B, 2 small BM this shift + adequate output from Purewick.    PIV x2, 1 SL + 1 infusing Diltiazem @ 10mg. Scheduled abx.    Scattered bruises; overall fragile skin.  No other skin issues noted.    Denies pain / nausea.    Noted that Mg-replacement not done; ran protocol + replaced per orders. Recheck with AM labs.    Sepsis BPA fired x2 this shift, see Progress Note by this RN.    Plan for swallow study today. Continue plan of care.    Edit: Pts O2 requirements increased ~0650.  Pt placed on Oxymask 15L; RT notified.

## 2023-06-05 NOTE — PLAN OF CARE
Problem: Malnutrition  Goal: Improved Nutritional Intake  Outcome: Not Progressing   Goal Outcome Evaluation:    Intake remains minimal. Pt declined ONS today.    Kira Yang RD, LD

## 2023-06-05 NOTE — PROGRESS NOTES
Antimicrobial Stewardship Team Note    Antimicrobial Stewardship Program - A joint venture between [facility] and [facility] to optimize antibiotic management.     Patient: Ghislaine Walls  MRN: [id number]  Allergies: Oxybutynin and Seasonal allergies    Brief Summary: Ghislaine Walls is a 87 year old female admitted on 5/30/23 from [facility] with weakness over several days and mild fall on 5/28. PMH significant for RA, HTN, CHF, PAF, gout, HLD, CKD4, and chronic anemia and thrombocytopenia.    On 5/31, patient noted to aspirate in the morning with new O2 requirement and coughing while eating. She also had a coughing spell in the afternoon requiring up to 15 LPM O2 with sats in 80s. RN able to suction food material with sats improving to 90s. CXR with question minimal pleural fluid on the left with minimal retrocardiac atelectasis and/or infiltrate; right lung clear. She was started on Zosyn with concern for aspiration pneumonia. On 6/1, patient's WBC count increased to 30 (in setting of IV steroid administration 5/31), normalized to 11.0 the next day. Afebrile this hospitalization. Required 40 LPM O2 5/31, improved to 2 LPM on 6/2 and 6/3, now back up to 8 LPM today. Procalcitonin 1.42 on 6/4 in setting of CKD. CXR on 6/4 with perihilar predominant airspace opacity and left greater than right small pleural effusions; findings favor ulnar edema, correlate to exclude superimposed infection. COVID, RSV, influenza negative.         Active Anti-infective Medications   (From admission, onward)                 Start     Stop    05/31/23 1700  piperacillin-tazobactam  2.25 g,   Intravenous,   EVERY 6 HOURS        Aspiration Pneumonia, Hospital-Acquired Pneumonia        --    05/31/23 0800  [Held by provider]  hydroxychloroquine  200 mg,   Oral,   DAILY        (Held by provider since Wed 5/31/2023 at 1650 by Ganga Mccullough MD.Hold Reason: NPO)   Rheumatoid arthritis        --                   Assessment: Possible aspiration pneumonia vs pneumonitis  Patient presented with generalized weakness, acute hypoxic respiratory failure likely from volume overload and deconditioning vs possible aspiration pneumonia. Patient has been afebrile with normal WBC count other than 6/1 which may be explained by IV steroid administration making bacterial infection less likely. She had quick improvement in O2 requirements initially but now has required increased O2 on day 6 of antibiotics indicating non-bacterial cause of respiratory failure. Recommend completing 7 day course of broad spectrum antibiotics.    Recommendations:  Complete 7 day course of Zosyn on 6/6.    Discussed with ID Staff MD Leti Walsh, PharmD, BCIDP    Vital Signs/Clinical Features:  Vitals         06/03 0700  06/04 0659 06/04 0700  06/05 0659 06/05 0700  06/05 1505   Most Recent      Temp ( F) 97.6 -  98    98 -  98.5    97 -  98.2     97 (36.1) 06/05 1137    Pulse 92 -  138    49 -  151    80 -  125     101 06/05 1400    Resp 14 -  31    12 -  83    19 -  40     21 06/05 1400    /86 -  185/125    107/103 -  175/88    136/78 -  181/109     136/78 06/05 1400    SpO2 (%) 88 -  97    85 -  96    92 -  96     96 06/05 1400            Labs  Estimated Creatinine Clearance: 18.2 mL/min (A) (based on SCr of 1.84 mg/dL (H)).  Recent Labs   Lab Test 05/30/23  1339 05/31/23  0646 06/01/23  0548 06/02/23  0513 06/03/23  0529 06/04/23  0556   CR 2.91* 2.67* 2.47* 2.06* 2.24* 1.84*       Recent Labs   Lab Test 12/01/20  1405 02/21/21  1546 02/22/21  0632 04/15/21  1139 04/16/21  0550 04/17/21  1634 06/07/21  1615 01/12/22  1249 01/15/22  0602 01/20/22  1426 03/31/23  1428 05/30/23  1339 05/31/23  0646 06/01/23  0548 06/02/23  0513 06/03/23  0529   WBC 4.6 3.3*   < > 8.3 3.8*   < > 3.5*   < > 3.6*  --   --  7.9 4.6 30.0* 11.0 8.0   ANEU 2.0 2.0  --  6.0 1.6  --  1.7  --   --   --   --   --  2.1 26.4*  --   --    ALYM 1.3 0.7*  --   0.7* 1.3  --  1.3  --   --   --   --   --  1.4 1.2  --   --    ENIO 1.1 0.6  --  1.6* 0.8  --  0.4  --   --   --   --   --  1.0 2.4*  --   --    AEOS 0.1 0.0  --   --  0.0  --  0.0  --   --   --   --   --  0.0 0.0  --   --    HGB 10.8* 9.8*   < > 9.9* 8.9*   < > 9.8*   < > 8.4*   < > 11.1* 10.1* 10.0* 10.5* 9.8* 9.5*   HCT 34.4* 32.6*   < > 33.3* 29.8*   < > 32.5*   < > 26.9*  --   --  31.8* 31.4* 32.1* 31.5* 30.2*   * 109*   < > 106* 106*   < > 100   < > 104*  --   --  104* 104* 103* 104* 105*    166   < > 199 149*   < > 160   < > 117*  --   --  148* 133* 146* 113* 108*    < > = values in this interval not displayed.       Recent Labs   Lab Test 07/21/20 2038 07/25/20  0554 12/01/20  1354 12/01/20  1405 04/18/21  0439 06/07/21  1615 03/21/22  1615 06/24/22  0943 03/31/23  1428 05/30/23  1339 06/04/23  0556   BILITOTAL 0.4 0.4  --   --   --  0.7 0.5  --   --  0.9 1.0   ALKPHOS 69 90  --   --   --  119 65  --   --  41 49   ALBUMIN 2.0* 1.7*   < >  --  2.6* 3.5 3.8  --  4.0 4.0 3.1*   AST 18 25  --  21  --  40 18  --   --  25 19   ALT 8 10  --  11  --  35 15 9  --  10 15    < > = values in this interval not displayed.       Recent Labs   Lab Test 10/02/19  0531 07/27/20  1907 07/28/20  0923 04/15/21  1139 04/15/21  2238 05/30/23  1339 06/01/23  1954 06/02/23  1843 06/03/23  1057 06/04/23  0556 06/04/23  0639 06/05/23  0643   PCAL  --   --   --   --  0.60  --   --   --   --  1.42*  --   --    LACT  --   --   --    < >  --  0.7 0.8 1.4 1.2  --  0.9 0.7   CRP 65.9* 86.0* 110.0*  --   --   --   --   --   --   --   --   --     < > = values in this interval not displayed.             Culture Results:  7-Day Micro Results       ** No results found for the last 168 hours. **            Recent Labs   Lab Test 12/01/20  1354 02/25/21  0930 04/15/21  2045 06/07/21  1740 05/30/23  1356   URINEPH 5.0 5.5 5.5 5.5 7.5*   NITRITE Negative Negative Negative Negative Negative   LEUKEST Negative Large* Negative Moderate*  Negative   WBCU >100* 87* 4 7* 2                   Recent Labs   Lab Test 10/12/19  2330   CDBPCT Negative       Imaging: XR Chest Port 1 View    Result Date: 6/4/2023  EXAM: XR CHEST PORT 1 VIEW LOCATION: [facility] DATE/TIME: 6/4/2023 8:10 AM CDT INDICATION: SOB, INCREASED o2 Needs COMPARISON: None.     IMPRESSION: Engorged indistinct central pulmonary vasculature with perihilar predominant airspace opacity and left greater than right small pleural effusions, findings favor ulnar edema, correlate to exclude superimposed infection. Recommend follow-up to  resolution. No pneumothorax. Borderline enlarged heart. Mildly atherosclerotic aorta.    XR Chest Port 1 View    Result Date: 6/1/2023  CHEST ONE VIEW  6/1/2023 7:39 AM HISTORY: Dyspnea, hypoxia, evaluate for focal infiltrates/edema. COMPARISON: None.     IMPRESSION: New right base infiltrate. Retrocardiac density similar possibly related to small hiatal hernia. HIREN ARVIZU MD   SYSTEM ID:  [id number]    XR Chest Port 1 View    Result Date: 5/31/2023  CHEST ONE VIEW  5/31/2023 3:41 PM HISTORY: Dyspnea, hypoxia, evaluate for focal infiltrates/edema. COMPARISON: January 14, 2022     IMPRESSION: Question some minimal pleural fluid on the left with minimal retrocardiac atelectasis and/or infiltrate. Right lung clear. HIREN ARVIZU MD   SYSTEM ID:  [id number]    XR Lumbar Spine 2/3 Views    Result Date: 5/30/2023  LUMBAR SPINE TWO TO THREE VIEWS May 30, 2023 3:04 PM HISTORY: Fall, back pain. COMPARISON: Lumbar spine x-ray 4/6/2023.     IMPRESSION: Bones appear demineralized which limits evaluation. Lumbar spine alignment is grossly within normal limits. No obvious loss of vertebral body height. Mild degenerative endplate changes and loss of disc height at L2-L3. DARVIN MATIAS MD   SYSTEM ID:  [id number]    CT Head w/o Contrast    Result Date: 5/30/2023  CT SCAN OF THE HEAD WITHOUT CONTRAST May 30, 2023 3:05 PM HISTORY: Fall, head injury.  TECHNIQUE: Axial images of the head and coronal reformations without IV contrast material. Radiation dose for this scan was reduced using automated exposure control, adjustment of the mA and/or kV according to patient size, or iterative reconstruction technique. COMPARISON: None. FINDINGS: There is no evidence of intracranial hemorrhage, mass, acute infarct or anomaly. The ventricles are normal in size, shape and configuration. Mild patchy periventricular white matter hypodensities which are nonspecific, but likely related to chronic microvascular ischemic disease. The visualized portions of the sinuses and mastoids appear normal. The bony calvarium and bones of the skull base appear intact.     IMPRESSION:   1. No evidence of acute intracranial hemorrhage, mass, or herniation. 2. Mild nonspecific white matter changes likely due to chronic microvascular ischemic disease. DARVIN MATIAS MD   SYSTEM ID:  [id number]

## 2023-06-05 NOTE — PROGRESS NOTES
Care Management Follow Up    Length of Stay (days): 5    Expected Discharge Date: 06/06/2023     Concerns to be Addressed:       Patient plan of care discussed at interdisciplinary rounds: Yes    Anticipated Discharge Disposition:       Anticipated Discharge Services:    Anticipated Discharge DME:      Patient/family educated on Medicare website which has current facility and service quality ratings:    Education Provided on the Discharge Plan:    Patient/Family in Agreement with the Plan: yes    Referrals Placed by CM/SW:    Private pay costs discussed: Not applicable    Additional Information:  Writer called Nicholas in admissions at [facility]  to determine if they can accept patient for TCU when medically ready. Nicholas reports that they still need to review referral and will get back to writer.     Addendum 1225: Spoke with Nicholas in admissions at [facility]. They can clinically accept patient when she is medically ready. Patient is currently on 8L of O2. Nicholas confirmed that they cannot take patient above 5 L of O2. Care management will follow along as patient becomes more medically stable and update [facility] admissions when patient is ready for discharge.     Sosa Triplett, MSW, LGSW   Social Work   [facility]

## 2023-06-05 NOTE — CONSULTS
[facility]    Cardiology Consultation     Date of Admission:  5/30/2023    Review of the result(s) of each unique test -  Blood work- CMP, CBC, troponin  Echocardiogram- normal LV systolic motion, mild mitral stenosis  EKG- 6/2023- A-fib with RVR ventricular rate 142 bpm  Chest x-ray 6/4/2023- pulmonary congestion    Assessment & Plan   Ghislaine Walls is a 87 year old female who was admitted on 5/30/2023.    1.  Atrial fibrillation with rapid ventricular response-high [id number] score-on anticoagulation  2.  Heart failure with preserved ejection fraction-on chronic diuretic therapy  3.  Acute volume overload due to IV fluids and discontinuation of diuretics  4.  Failure to thrive-albumin 3.1  5.  Chronic anemia  6.  Poorly controlled hypertension  7.  DRAGAN on CKD    Recommendation  1.  Agree with reinitiation of diuretics.  For now, we will continue IV Lasix 40 mg twice daily until we get her weight close to the baseline weight.  Would recommend strict ins and outs and daily standing weights.  Fluid restrict to less than 1.5 L/day.  2.  Restart Norvasc at 5 mg daily.  3.  Continue Coreg, hydralazine and Isordil at the current dose.  4. Titrate Dilt to keep resting HR under 110 bpm.    High complexity     SARAH Mcdermott  Staff Cardiologist  [facility]    History of Present Illness   Ghislaine Walls is a 87 year old female with past medical history of HFpEF, paroxysmal atrial fibrillation [on anticoagulation, CKD, chronic anemia, failure to thrive, hypertension who presented to the hospital for complaints of failure to thrive and generalized weakness.  She is known to us and is seen in our clinic by Gloria as well as Dr. Fuentes.  Her last visit was a month ago. During that visit the patient was doing well and was overall stable and changes were made.  Her weight during that visit was 118 pounds which is close to her dry weight.  She was taking 20 mg of torsemide in the morning and  another 20 mg on a as needed basis.  She was requiring it 1-2 times a week.    The patient was now brought to the hospital for complaints of generalized weakness which has gotten worse as well as failure to thrive and poor p.o. appetite.  She was initially complaining of dysphagia and hence she was made n.p.o. and all her p.o. meds were held.  This included diuretic.  She was then started on IV fluids for maintenance.  Over the next few days she developed atrial fibrillation with rapid ventricular response and was recently started on IV rate control medications with limited benefit.  She was also started on diltiazem drip for the same reason.  Yesterday she noted shortness of breath and had to be started on high flow oxygen.  Chest x-ray showed worsening pulmonary congestion +/- infiltrates.  IV fluids were stopped and she was given IV dose of Lasix for fluid overload.  Of note, the only weight I have on her is from 6/3/2023 which is 130 pounds.  As noted before her dry weight is around 115-120 pounds.  With this her BNP has shot up to 34,000.  In 03/2023 it was 7000.  She is up 6L. Troponin is also slightly elevated at 100 but is now downtrending.  No complaints of chest pain.  Creatinine had increased to 2.4 but has now started to trend down after diuresis it is currently 1.84.    Past Medical History   Past Medical History:   Diagnosis Date     Atrial fibrillation (H) new 10/19     [place] (hard of hearing)     wears hearing aids     Hyperlipidaemia      Hyperlipidaemia LDL goal < 130      Hypertension      Hypothyroid      PAD (peripheral artery disease) (H)      Renal insufficiency, mild      Uncontrolled hypertension 10/14/2019       Past Surgical History   Past Surgical History:   Procedure Laterality Date     APPENDECTOMY  1951     BONE MARROW BIOPSY, BONE SPECIMEN, NEEDLE/TROCAR N/A 7/31/2020    Procedure: bone marrow biopsy;  Surgeon: Iris Cameron MD;  Location: [facility]     CATARACT IOL, RT/LT  2001     bilateral (laser - 5/2013)     DENTAL SURGERY  1962    wisdom     ESOPHAGOSCOPY, GASTROSCOPY, DUODENOSCOPY (EGD), COMBINED N/A 7/24/2020    Procedure: ESOPHAGOGASTRODUODENOSCOPY, WITH BIOPSY;  Surgeon: Humberto Bailon MD;  Location:  GI     EYE SURGERY  1956    Muscle release     TUBAL LIGATION  1971       Prior to Admission Medications   Prior to Admission Medications   Prescriptions Last Dose Informant Patient Reported? Taking?   ACE/ARB/ARNI NOT PRESCRIBED (INTENTIONAL)   No No   Sig: Please choose reason not prescribed from choices below.   Abatacept (ORENCIA IV) 5/25/2023  Yes Yes   Sig: Inject 500 mg into the vein every 30 days   Calcium Carb-Cholecalciferol 500-10 MG-MCG TABS 5/30/2023 at AM  Yes Yes   Sig: Take 1 tablet by mouth 3 times daily   Darbepoetin Alonso-Albumin (ARANESP IJ) 5/24/2023  Yes Yes   Sig: Inject 10 mcg as directed Monthly as needed   Lidocaine (LIDOCARE) 4 % Patch PRN  No Yes   Sig: Place 1 patch onto the skin every 24 hours   Patient taking differently: Place 1 patch onto the skin daily as needed for moderate pain   Vitamin D (Cholecalciferol) 25 MCG (1000 UT) TABS 5/30/2023  Yes Yes   Sig: Take 1 tablet by mouth 2 times daily   acetaminophen (TYLENOL) 325 MG tablet 5/29/2023  Yes Yes   Sig: Take 650 mg by mouth 2 times daily At 2 PM an bedtime   allopurinol (ZYLOPRIM) 100 MG tablet 5/30/2023  No Yes   Sig: Take 1 tablet (100 mg) by mouth daily   amLODIPine (NORVASC) 10 MG tablet 5/30/2023  No Yes   Sig: TAKE 1 TABLET BY MOUTH ONE TIME DAILY   apixaban ANTICOAGULANT (ELIQUIS ANTICOAGULANT) 2.5 MG tablet 5/30/2023 at AM  No Yes   Sig: Take 1 tablet (2.5 mg) by mouth 2 times daily   carvedilol (COREG) 6.25 MG tablet 5/30/2023 at AM  No Yes   Sig: Take 1 tablet (6.25 mg) by mouth 2 times daily (with meals)   dronabinol (MARINOL) 2.5 MG capsule 5/29/2023  No Yes   Sig: Take 1 capsule (2.5 mg) by mouth 2 times daily (before meals)   ferrous sulfate (FEROSUL) 325 (65 Fe) MG tablet  5/30/2023  Yes Yes   Sig: Take 325 mg by mouth every other day   hydrALAZINE (APRESOLINE) 100 MG tablet 5/30/2023 at AM  No Yes   Sig: Take 1 tablet (100 mg) by mouth 3 times daily   hydroxychloroquine (PLAQUENIL) 200 MG tablet 5/30/2023  Yes Yes   Sig: Take 200 mg by mouth daily   isosorbide dinitrate (ISORDIL) 30 MG tablet 5/30/2023 at AM  No Yes   Sig: Take 1 tablet (30 mg) by mouth 3 times daily   levothyroxine (SYNTHROID/LEVOTHROID) 25 MCG tablet 5/30/2023  No Yes   Sig: Take 1 tablet by mouth daily.   mirtazapine (REMERON) 15 MG tablet 5/29/2023  No Yes   Sig: Take 1 tablet by mouth daily At Bedtime.   ondansetron (ZOFRAN-ODT) 4 MG ODT tab PRN Daughter Yes Yes   Sig: Take 4 mg by mouth every 8 hours as needed for nausea   pantoprazole (PROTONIX) 40 MG EC tablet 5/30/2023  No Yes   Sig: Take 1 tablet (40 mg) by mouth daily   predniSONE (DELTASONE) 5 MG tablet 5/30/2023  Yes Yes   Sig: Take 5 mg by mouth daily   senna-docusate (SENOKOT-S/PERICOLACE) 8.6-50 MG tablet PRN  Yes Yes   Sig: Take 1-2 tablets by mouth daily as needed for constipation   sodium bicarbonate 650 MG tablet 5/30/2023 at AM  Yes Yes   Sig: Take 650 mg by mouth 2 times daily    torsemide (DEMADEX) 20 MG tablet 5/30/2023  No Yes   Sig: Take 1 tablet (20 mg) by mouth daily Take additional 20mg torsemide (1 tablet) for weight gain > 2 pounds overnight.      Facility-Administered Medications: None     Current Facility-Administered Medications   Medication Dose Route Frequency     allopurinol  100 mg Oral Daily     [Held by provider] amLODIPine  10 mg Oral Daily     apixaban ANTICOAGULANT  2.5 mg Oral BID     carvedilol  12.5 mg Oral BID w/meals     docusate sodium  100 mg Oral BID     [Held by provider] dronabinol  2.5 mg Oral BID AC     [Held by provider] ferrous sulfate  325 mg Oral Every Other Day     hydrALAZINE  50 mg Oral TID     hydroxychloroquine  200 mg Oral Daily     ipratropium - albuterol 0.5 mg/2.5 mg/3 mL  3 mL Nebulization TID      isosorbide dinitrate  30 mg Oral TID     levothyroxine  25 mcg Oral QAM AC     Lidocaine  1 patch Transdermal Q24H     lidocaine   Transdermal Q8H [facility]     [Held by provider] mirtazapine  15 mg Oral At Bedtime     pantoprazole  40 mg Oral Daily     piperacillin-tazobactam  2.25 g Intravenous Q6H     polyethylene glycol  17 g Oral Daily     predniSONE  5 mg Oral Daily     sodium bicarbonate  650 mg Oral BID     sodium chloride (PF)  3 mL Intracatheter Q8H     sodium phosphate  9 mmol Intravenous Once     Current Facility-Administered Medications   Medication Last Rate     dilTIAZem 10 mg/hr (06/05/23 0639)     Reason anticoagulant not prescribed for atrial fibrillation       - MEDICATION INSTRUCTIONS -       Allergies   Allergies   Allergen Reactions     Oxybutynin Itching     Seasonal Allergies        Social History    reports that she quit smoking about 50 years ago. Her smoking use included cigarettes. She has a 2.50 pack-year smoking history. She has never used smokeless tobacco. She reports current alcohol use. She reports that she does not use drugs.    Family History   I have reviewed this patient's family history and updated it with pertinent information if needed.  Family History   Problem Relation Age of Onset     Heart Disease Mother      Respiratory Mother      Heart Disease Father      Heart Disease Brother      Coronary Artery Disease Brother         CAB     Heart Disease Brother      Coronary Artery Disease Brother         CAB          Review of Systems   A comprehensive review of system was performed and is negative other than that noted in the HPI or here.     Physical Exam   Vital Signs with Ranges  Temp:  [97  F (36.1  C)-98.5  F (36.9  C)] 97  F (36.1  C)  Pulse:  [] 98  Resp:  [16-83] 22  BP: (107-181)/() 152/79  SpO2:  [85 %-96 %] 94 %  Wt Readings from Last 4 Encounters:   06/03/23 59.2 kg (130 lb 8.2 oz)   05/16/23 53.5 kg (118 lb)   04/06/23 52.6 kg (116 lb)   04/06/23 52.6 kg (116  lb)     I/O last 3 completed shifts:  In: 2063 [P.O.:1600; I.V.:463]  Out: 1300 [Urine:1300]      Vitals: BP (!) 152/79   Pulse 98   Temp 97  F (36.1  C) (Axillary)   Resp 22   Wt 59.2 kg (130 lb 8.2 oz)   LMP  (LMP Unknown)   SpO2 94%   BMI 24.66 kg/m      Physical Exam:   General - Alert and in no acute distress  Respiratory -bilateral basal crackles  Cardiovascular -irregularly S1-S2 normal, systolic murmur  Gastrointestinal - Non distended  Psych - Appropriate affect   Extremities- bilateral edema    No lab results found in last 7 days.    Invalid input(s): TROPONINIES    Recent Labs   Lab 06/05/23  0643 06/04/23  1435 06/04/23  0556 06/03/23  0529 06/02/23  0513 06/01/23  0548 05/31/23  0646 05/30/23  1339   WBC  --   --   --  8.0 11.0 30.0*   < > 7.9   HGB  --   --   --  9.5* 9.8* 10.5*   < > 10.1*   MCV  --   --   --  105* 104* 103*   < > 104*   PLT  --   --   --  108* 113* 146*   < > 148*   NA  --   --  143 139 142 142   < > 142   POTASSIUM 3.6 3.5 3.2* 3.7 3.5 3.6   < > 4.2   CHLORIDE  --   --  114* 109* 107 107   < > 104   CO2  --   --  18* 19* 22 25   < > 25   BUN  --   --  37.9* 43.5* 34.7* 35.7*   < > 36.6*   CR  --   --  1.84* 2.24* 2.06* 2.47*   < > 2.91*   GFRESTIMATED  --   --  26* 21* 23* 18*   < > 15*   ANIONGAP  --   --  11 11 13 10   < > 13   ALIVIA  --   --  8.6* 8.8 9.7 10.1   < > 11.9*   GLC  --   --  88 131* 122* 108*   < > 91   ALBUMIN  --   --  3.1*  --   --   --   --  4.0   PROTTOTAL  --   --  5.8*  --   --   --   --  6.8   BILITOTAL  --   --  1.0  --   --   --   --  0.9   ALKPHOS  --   --  49  --   --   --   --  41   ALT  --   --  15  --   --   --   --  10   AST  --   --  19  --   --   --   --  25    < > = values in this interval not displayed.     Recent Labs   Lab Test 06/24/22  0943 10/02/19  0531 06/20/16  1511 06/15/15  1124   CHOL 131 123   < > 188   HDL 48* 27*   < > 48*   LDL 65 74   < > 118   TRIG 92 111   < > 112   CHOLHDLRATIO  --   --   --  3.9    < > = values in this  interval not displayed.     Recent Labs   Lab 06/03/23  0529 06/02/23  0513 06/01/23  0548   WBC 8.0 11.0 30.0*   HGB 9.5* 9.8* 10.5*   HCT 30.2* 31.5* 32.1*   * 104* 103*   * 113* 146*     Recent Labs   Lab 05/31/23  2041   PHV 7.44*   PO2V 55*   PCO2V 36*   HCO3V 24     Recent Labs   Lab 06/04/23  0556   NTBNPI 33,729*     No results for input(s): DD in the last 168 hours.  No results for input(s): SED, CRP in the last 168 hours.  Recent Labs   Lab 06/03/23  0529 06/02/23  0513 06/01/23  0548   * 113* 146*     Recent Labs   Lab 05/30/23  1339   TSH 0.98     Recent Labs   Lab 05/30/23  1356   COLOR Light Yellow   APPEARANCE Clear   URINEGLC Negative   URINEBILI Negative   URINEKETONE Negative   SG 1.016   UBLD Negative   URINEPH 7.5*   PROTEIN 100*   NITRITE Negative   LEUKEST Negative   RBCU 1   WBCU 2       Imaging:  Recent Results (from the past 48 hour(s))   XR Chest Port 1 View    Narrative    EXAM: XR CHEST PORT 1 VIEW  LOCATION: [facility]  DATE/TIME: 6/4/2023 8:10 AM CDT    INDICATION: SOB, INCREASED o2 Needs  COMPARISON: None.      Impression    IMPRESSION: Engorged indistinct central pulmonary vasculature with perihilar predominant airspace opacity and left greater than right small pleural effusions, findings favor ulnar edema, correlate to exclude superimposed infection. Recommend follow-up to   resolution. No pneumothorax. Borderline enlarged heart. Mildly atherosclerotic aorta.       Echo:  No results found for this or any previous visit (from the past 4320 hour(s)).    Clinically Significant Risk Factors        # Hypokalemia: Lowest K = 3.2 mmol/L in last 2 days, will replace as needed     # Hypomagnesemia: Lowest Mg = 1.6 mg/dL in last 2 days, will replace as needed   # Hypoalbuminemia: Lowest albumin = 3.1 g/dL at 6/4/2023  5:56 AM, will monitor as appropriate     # Hypertension: Noted on problem list         # Severe Malnutrition: based on nutrition  assessment        Diastolic acute        Fluid overload, unspecified            Acute kidney failure, unspecified        Pneumonia        Chronic Fatigue and Other Debilities: Age-related physical debility           This note was completed in part using dictation via the Dragon voice recognition software. Some word and grammatical errors may occur and must be interpreted in the appropriate clinical context.  If there are any questions pertaining to this issue, please contact me for further clarification.

## 2023-06-06 NOTE — PROGRESS NOTES
[facility]  Cardiology Progress Note    Date of Service (when I saw the patient): 06/06/2023  Summary: Ghislaine Walls is a 87 year old female with history of  HFpEF, paroxysmal atrial fibrillation [on anticoagulation, CKD, chronic anemia, failure to thrive, hypertension who presented to the hospital for complaints of failure to thrive who was admitted on 5/30/2023 with weakness and dysphagia.   Interval History   HRs reasonable on telemetry. She had good UOP with IV lasix yesterday. Weight if accurate is down 7 lbs to 120 lbs which is near her dry weight. O2 requirements improved. She is asking when she can drink think liquids again.   Assessment & Plan    1.  Atrial fibrillation with rapid ventricular response. [id number] score is elevated. On anticoagulation with Eliquis.   -  Continue with rate control strategy. Currently on carvedilol 18.75 mg BID and diltiazem drip at 10mg/hr.  2.  Heart failure with preserved ejection fraction.  On Torsemide 20 mg daily. Dry weight felt to be around 115 - 120 lbs.   3.  Acute hypoxic respiratory failure secondary to volume overload due to IV fluids and discontinuation of diuretics.   -  Diuresed with IV lasix 40 mg BID with good response.   4.  Failure to thrive. albumin 3.1  5.  Chronic anemia. Hemoglobin 11 at baseline.   6.  Hypertension. Uncontrolled.   7.  DRAGAN on CKD    Plan:  1.  Continue rate control. Increase carvedilol to 25 mg BID.  2.  Discussed with nursing, will attempt to decreased diltiazem drip to 5 mg/hr today.  3.  May need to switch amlodipine to diltiazem for better rate control.   4.  Will stop IV lasix. Resume Torsemide 20 mg daily starting tomorrow.   5.  Daily weights, BMP.  6.  Continue hydralazine 50 mg TID, isordil 30 mg TID, amlodipine 5 mg daily     Casie TOSHA Blanco PA-C    Physical Exam   Temp: 97.9  F (36.6  C) Temp src: Axillary BP: (!) 161/88 Pulse: 68   Resp: 19 SpO2: 93 % O2 Device: Oxymizer cannula Oxygen  Delivery: 3 LPM  Vitals:    06/03/23 2100 06/05/23 2300 06/06/23 0538   Weight: 59.2 kg (130 lb 8.2 oz) 57.8 kg (127 lb 6.8 oz) 54.7 kg (120 lb 9.5 oz)     Vital Signs with Ranges  Temp:  [97  F (36.1  C)-98.6  F (37  C)] 97.9  F (36.6  C)  Pulse:  [] 68  Resp:  [16-23] 19  BP: (100-171)/() 161/88  SpO2:  [90 %-97 %] 93 %  06/01 0700 - 06/06 0659  In: 9853 [P.O.:2200; I.V.:7653]  Out: 2950 [Urine:2950]  Net: 6903  Constitutional: NAD.   Respiratory: anterior breath sounds are clear.  Cardiovascular: IRR, s1s2  GI: soft, BS+  Skin: warm, no rashes  Musculoskeletal: trace edema.   Neurologic: Alert, oriented x 3  Neuropsychiatric: Normal affect   Data   Results for orders placed or performed during the hospital encounter of 05/30/23 (from the past 24 hour(s))   XR Video Swallow with SLP or OT    Narrative    VIDEO SWALLOWING EVALUATION   6/5/2023 11:26 AM     HISTORY: Acute hypoxic respiratory failure, multifactorial, reported  possible aspiration pneumonia, coughing at the bedside, clinical  concern for aspiration.    COMPARISON: None.    FLUOROSCOPY TIME: 1.8 minutes.  SPOT IMAGES OR CINE RUNS: 11      Impression    IMPRESSION:  Thin: Deep penetration to the level of the vocal cords.    Slightly thick: Moderate penetration.    Mildly thick: Deep penetration with residue along the undersurface of  the epiglottis.    Moderately thick: Deep penetration observed with a cup. No penetration  with teaspoon.    Puree: No penetration or aspiration.    Semisolid: No penetration or aspiration.    This study only includes the cervical esophagus. Please see separate  report from speech pathology for additional detail.     HIREN PUENTE MD         SYSTEM ID:  [id number]   Phosphorus   Result Value Ref Range    Phosphorus 2.5 2.5 - 4.5 mg/dL   Magnesium   Result Value Ref Range    Magnesium 2.0 1.7 - 2.3 mg/dL   Basic metabolic panel   Result Value Ref Range    Sodium 147 (H) 136 - 145 mmol/L    Potassium 3.4 3.4 - 5.3  mmol/L    Chloride 116 (H) 98 - 107 mmol/L    Carbon Dioxide (CO2) 18 (L) 22 - 29 mmol/L    Anion Gap 13 7 - 15 mmol/L    Urea Nitrogen 35.1 (H) 8.0 - 23.0 mg/dL    Creatinine 2.01 (H) 0.51 - 0.95 mg/dL    Calcium 8.5 (L) 8.8 - 10.2 mg/dL    Glucose 84 70 - 99 mg/dL    GFR Estimate 24 (L) >60 mL/min/1.73m2   CBC with platelets   Result Value Ref Range    WBC Count 8.5 4.0 - 11.0 10e3/uL    RBC Count 2.83 (L) 3.80 - 5.20 10e6/uL    Hemoglobin 9.1 (L) 11.7 - 15.7 g/dL    Hematocrit 29.2 (L) 35.0 - 47.0 %     (H) 78 - 100 fL    MCH 32.2 26.5 - 33.0 pg    MCHC 31.2 (L) 31.5 - 36.5 g/dL    RDW 15.8 (H) 10.0 - 15.0 %    Platelet Count 152 150 - 450 10e3/uL   Phosphorus   Result Value Ref Range    Phosphorus 2.8 2.5 - 4.5 mg/dL   Echo Limited   Result Value Ref Range    LVEF  55-60%     [facility]    [id number]  [id number]  [id number]  [id number]^CODY^ANURAG     [facility]  Echocardiography Laboratory  [address] [place], [place] [id number]     Name: ROSIO YOUNG  MRN: [id number]  : 1935  Study Date: 2023 08:02 AM  Age: 87 yrs  Gender: Female  Patient Location: [facility]  Reason For Study: CHF  Ordering Physician: ANURAG ROSS  Performed By: Re Cm RDCS     BSA: 1.5 m2  Height: 61 in  Weight: 120 lb  HR: 84  BP: 146/76 mmHg  ______________________________________________________________________________  Procedure  Limited Portable Echo Adult. Optison (NDC #[id number]) given intravenously.  ______________________________________________________________________________  Interpretation Summary     Technically challenging study even with the use of contrast imaging.     Left ventricular systolic function is normal. The visual ejection fraction is  55-60%.  Septal motion is consistent with conduction abnormality. Flattened septum is  consistent with RV pressure/volume overload.  Right ventricle is not well visualized, however global systolic function is  probably normal.  There is  severe mitral annular calcification. The mitral valve leaflets are  moderately thickened. There is mild mitral stenosis. Mean gradient 4 mmHg at  68 bpm. There is mild (1+) mitral regurgitation.  The aortic valve is trileaflet with aortic valve sclerosis. There is trace  aortic regurgitation.  Pulmonary hypertension present. The right ventricular systolic pressure is  approximated at 38mmHg plus the right atrial pressure.  There is no pericardial effusion.  Moderate left pleural effusion.     This study was compared to a TTE from 3/31/2023. There is now a left pleural  effusion present. Estimated right-sided pressures are higher on this study.  Global LV function is stable.  ______________________________________________________________________________  Left Ventricle  Left ventricular systolic function is normal. The visual ejection fraction is  55-60%. Septal motion is consistent with conduction abnormality. Flattened  septum is consistent with RV pressure/volume overload.     Right Ventricle  The right ventricle is not well visualized. Right ventricle is not well  visualized, however global systolic function is probably normal.     Mitral Valve  There is severe mitral annular calcification. The mitral valve leaflets are  moderately thickened. There is mild (1+) mitral regurgitation. There is mild  mitral stenosis. Mean gradient 4 mmHg at 68 bpm.     Tricuspid Valve  There is mild (1+) tricuspid regurgitation. The right ventricular systolic  pressure is approximated at 38mmHg plus the right atrial pressure. Pulmonary  hypertension.     Aortic Valve  The aortic valve is trileaflet with aortic valve sclerosis. There is trace  aortic regurgitation.     Pulmonic Valve  The pulmonic valve is not well seen, but is grossly normal.     Vessels  The aortic root is normal size. IVC diameter and respiratory changes fall into  an intermediate range suggesting an RA pressure of 8 mmHg.     Pericardium  There is no pericardial  effusion. Moderate left pleural effusion.     Rhythm  The rhythm was atrial fibrillation.     ______________________________________________________________________________  MMode/2D Measurements & Calculations  Ao root diam: 3.1 cm  LVOT diam: 2.1 cm  LVOT area: 3.3 cm2     Doppler Measurements & Calculations  MV max P.1 mmHg  MV mean PG: 3.9 mmHg  MV V2 VTI: 43.1 cm  TR max edilberto: 308.6 cm/sec  TR max P.1 mmHg  RV S Edilberto: 7.1 cm/sec     ______________________________________________________________________________  Report approved by: Javier Delacruz 2023 10:13 AM              Tele AF     Medications     dilTIAZem 5 mg/hr (23 1104)     Reason anticoagulant not prescribed for atrial fibrillation       - MEDICATION INSTRUCTIONS -         allopurinol  100 mg Oral Daily     [Held by provider] amLODIPine  10 mg Oral Daily     amLODIPine  5 mg Oral Daily     apixaban ANTICOAGULANT  2.5 mg Oral BID     carvedilol  25 mg Oral BID w/meals     docusate sodium  100 mg Oral BID     [Held by provider] dronabinol  2.5 mg Oral BID AC     [Held by provider] ferrous sulfate  325 mg Oral Every Other Day     hydrALAZINE  50 mg Oral TID     hydroxychloroquine  200 mg Oral Daily     ipratropium - albuterol 0.5 mg/2.5 mg/3 mL  3 mL Nebulization TID     isosorbide dinitrate  30 mg Oral TID     levothyroxine  25 mcg Oral QAM AC     Lidocaine  1 patch Transdermal Q24H     lidocaine   Transdermal Q8H KEVEN     [Held by provider] mirtazapine  15 mg Oral At Bedtime     pantoprazole  40 mg Oral Daily     piperacillin-tazobactam  2.25 g Intravenous Q6H     polyethylene glycol  17 g Oral Daily     predniSONE  5 mg Oral Daily     sodium bicarbonate  650 mg Oral BID     sodium chloride (PF)  3 mL Intracatheter Q8H     [START ON 2023] torsemide  20 mg Oral Daily

## 2023-06-06 NOTE — PROGRESS NOTES
[facility]    Hospitalist Progress Note    Assessment & Plan   Ghislaine Walls is a 87 year old female with RA; HTN; CHF; PAF; hypothyroidism; GERD; gout; HLD; CKD4; MGUS; chronic anemia and TCP; who presented 5/30/2023 from her [facility] with weakness.      On initial evaluation 5/30,  afebrile, hemodynamically stable with oxygen saturation 97% on room air.  ECG showed sinus rhythm with first-degree AV block; Q waves in the inferior limb leads and V2 and V3; diffuse nonspecific T wave changes; no acute ischemic findings.   Hemoglobin 10.1 and platelets 148; BMP with creatinine 2.91 (was 2.78 5/16); calcium 11.9;   LFTs were normal; troponin was 100; TSH 0.98.   X-rays of her lumbar spine did not show any acute findings.    Head CT without contrast did not show any acute findings.     Hospitalization complicated by development of aspiration pneumonia with acute hypoxic respiratory failure, volume overload, A-fib with RVR.     Aspiration pneumonia.   Acute hypoxic respiratory failure likely multifactorial from aspiration pneumonia, volume overload, deconditioning.  Leukocytosis, suspect related to pneumonia as well as steroids - improved   * On 5/31, pt noted to have aspirated in the morning with coughing and new O2 requirement while eating. Later in the afternoon, pt again had a coughing spell after drinking water coarse, congested cough, needing up to 15L O2 with sats in 80's. RN able to suction food material with sats to 90's.   --CXR showed showed question some minimal pleural fluid on the left with minimal retrocardiac atelectasis and/or infiltrate; right lung clear. Started on pipericillin-tazobactam. Made NPO. Continued high O2 requirements and transferred to [facility]. Subsequently required high flow O2, then BiPAP overnight.  * On 6/1, WBC to 30 (but received IV steroids 5/31). Off BiPAP, back on HFNC in am. CXR showed new right base infiltrate; retrocardiac density similar, possibly related  to small hiatal hernia.   * On 6/2, down to 30L HFNC.  *On 6/4 down to 4 L nasal oxygen.  Has been having IV fluids, crackles present. (Pending weights).  PTA torsemide on hold since admission.  Follow proBNP.    --- Continue IV Zosyn (5/31).  Follow procalcitonin levels.    --Continue ipratropium-albuterol nebs TID and PRN.  -- Continue to wean oxygen as able to.  --Speech had reevaluated the patient, her diet has been further adjusted, patient is not happy about having to eat take thickened liquids.  Her sodium levels are going up.  -- Manage medical issues as below   COVID-19 PCR, influenza PCR ordered  Strict aspiration precaution.  Aggressive incentive spirometry.  Encourage ambulation out of bed.  Will need follow-up imaging for resolution.     Afib with RVR likely in the setting of volume overload, aspiration.  H/o PAF on anticoagulation.  * ECG on admit showed sinus rhythm with 1st degree AVB.  * Made NPO 5/31 for dysphagia.   * Started IV metoprolol 6/1. Later on 6/1, pt noted to be tachycardic and ECG showed afib with RVR w/o acute ischemic changes.  * Swallowing improved 6/2.  Restarted Coreg 6.25 mg twice daily.  -6/3- increased Coreg to 12.5 mg twice daily.  _6/4 -A-fib with RVR.    Patient is currently on diltiazem drip, will need to change that to p.o. Dilt, pending cardiology input.  -Requested cardiology input, pending input.,  Patient did receive a dose of IV Lasix on 6/4, another dose was given on 6/5, possibly will start twice daily IV Lasix and monitor intake and output.  Continue PTA Eliquis  -Echocardiogram results noted, patient noted to have a left-sided pleural effusion, global LV function is similar to prior echo but there is increased right-sided pressures.  Keep potassium around 4, magnesium around 2     Volume overload likely multifactorial from fluid resuscitation, holding diuretic, heart failure, renal disease.    Troponin elevation suspect demand ischemia.  History of heart failure  with preserved EF [LVEF of 55 to 60%]  S/p Hypertensive urgency related to being off PO meds (NPO).  [PTA: amlodipine 10 mg daily; carvedilol 6.25 mg BID; hydralazine 100 mg TID; ISDN 30 mg TID; torsemide 20 mg daily.]  * Echo 3/2023 showed LVEF 55-60%; RV OK; mild MS.  * Initial presentation as above.   Troponin 100 > 88 > 78   Torsemide held on admission, other blood pressure medications resumed  * On 5/31, made NPO for dysphagia. Started on scheduled IV hydralazine.  * On 6/1, BP's uncontrolled. Started on scheduled IV metoprolol and NTG patch.  * On 6/2, BP's improved. Swallowing improved.  Restarted PTA antihypertensives.  --On 6/4 - patient with increased oxygen needs, shortness of breath, A-fib with RVR.    Discontinue IV fluids.  Follow proBNP.  Dose of Lasix was given on 6/4, another dose given on 6/5.  -  Continue to hold PTA oral torsemide.  Monitor renal function in a.m. and accordingly further diuresis.  Continue increased dose of Coreg at 12.5 mg twice daily.  Has been restarted on PTA Isordil, hydralazine, amlodipine is temporarily on hold.  See echocardiogram results below.  Isordil doses were restarted on 6/3, will need to decide on scheduling diuresis.  Strict input output monitoring.  Daily weights.  2000 mL fluid restriction.  --Will order thoracentesis for left side.  Hypernatremia  --Monitor sodium levels, encouraged free water intake.  Patient has reduced intake due to thickening which he does not like.  DRAGAN on CKD stage IV, suspect prerenal related to decreased PO intake, diuretic.  CKD stage IV.  * Baseline cr around mid to upper 2 range over the past year.  * Cr 2.91 on admit.  Receiving IV fluids, diuresis held with which sodium improving to 1.84.gone to 2.01 with diuresis .  Continue PTA with sodium bicarbonate.    Avoid nephrotoxic drugs.  Consider nephrology evaluation if creatinine worsening     Acute encephalopathy likely multifactorial from metabolic, delirium from hospitalization,  cognitive impairment.  Concern for cognitive impairment at baseline.  Per report confusion.  Treat medical issues as above.  - Re-orient as needed.  - Maintain normal day/night, sleep/wake cycles.  - Minimize sedating medications as able.  Cognitive screening as outpatient.     Physical deconditioning from medical illness, senile frailty.  Severe malnutrition in the setting of poor oral intake  Dysphagia.   Goals of care.  --Resident of assisted living facility. Presented with poor oral intake over the past few months prior to admission, weight loss of 15 pounds.--Patient simply not wanting to eat, prescribed dronabinol and Remeron to try to stimulate appetite with no improvement.  * Nutrition consulted on admit and noted with severe malnutrition.  * Issues with aspiration 5/31 as above, subsequently made NPO; SLP consulted.  * On 6/1, SLP evaluated and still recommending NPO 6/5 patient underwent video swallow evaluation and diet has been further adjusted.   Continue to hold PTA dronabinol and mirtazapine for now.  --Hospitalist team had discussed with patient's family regarding goals of care-restorative care for now, but if pt were to acutely decline further or fail to improve or have subsequent recurrent hospitalization, then would be open to considering hospice.  Latest discussion was on 6/4, for now to continue to monitor restorative cares.  PT, OT following.  Will likely need TCU.  Left-sided pleural effusion  --Will order thoracentesis       Hypokalemia, hypomagnesemia-poor intake  Potassium 3.2, magnesium 1.6.  Replacement protocol ordered, monitor.     Constipation.  Per report constipation, receiving scheduled, as needed bowel meds.  Per nursing report today had bowel movement yesterday.  Abdominal soft, nontender, bowel sounds heard.    Continue scheduled, as needed bowel meds.  Monitor     GERD with h/o hiatal hernia.  Continue oral pantoprazole.     Anemia of chronic disease/CKD.  Chronic TCP.  MGUS.  *  Follows with [facility]. Has received ANGELIC in the past.  * Hgb 10.1 and plts 148 on admit. No overt clinical signs of major bleeding.  Monitor CBC level in AM.  - Consider prbc transfusion if hgb </= 7.0 or if significant bleeding with hemodynamic instability or if symptomatic.  - Consider platelet transfusion if needs a procedure and less than 50,000; or if less than 10,000.     Rheumatoid arthritis.  Continue PTA hydroxychloroquine and prednisolone 5 mg daily.  Hold outpatient abatacept.     Hypothyroidism.  Continue oral levothyroxine     Gout.  Continue PTA allopurinol.     Depression/anxiety.  Hold PTA Remeron as drowsy     DVT Prophylaxis: DOAC  Code Status: No CPR- Do NOT Intubate     52 MINUTES SPENT BY ME on the date of service doing chart review, history, exam, documentation & further activities per the note.  Disposition: Expected discharge in few days, will need TCU..  Clinically Significant Risk Factors         # Hypernatremia: Highest Na = 147 mmol/L in last 2 days, will monitor as appropriate      # Hypoalbuminemia: Lowest albumin = 3.1 g/dL at 6/4/2023  5:56 AM, will monitor as appropriate     # Hypertension: Noted on problem list         # Severe Malnutrition: based on nutrition assessment         Nichelle Carbajal MD  Text Page   (7am to 6pm)    Interval History   Her oxygen needs have come down, currently on 3 L of oxygen, discussed with nursing, there concerned about the ongoing diltiazem drip, will wait for cardiology input prior to transitioning it into p.o. options, with 10 mg/h dill drip her heart rate is reasonably controlled.  Patient is feeling better than yesterday, we were able to wean oxygen down, blood pressures are elevated, echocardiogram results available, echo did indicate left-sided pleural effusion, will order thoracentesis.    -Data reviewed today: I reviewed all new labs and imaging results over the last 24 hours.     Physical Exam     Vital Signs with Ranges  Temp:  [97   F (36.1  C)-98.6  F (37  C)] 97.9  F (36.6  C)  Pulse:  [] 90  Resp:  [16-23] 20  BP: (100-171)/() 154/70  SpO2:  [90 %-97 %] 90 %  I/O last 3 completed shifts:  In: 100 [P.O.:100]  Out: -     Constitutional: Awake, alert, cooperative, no apparent distress  Respiratory: Bilateral basilar crackles noted  Cardiovascular: Regular rate and rhythm, normal S1 and S2, and no murmur noted  GI: Normal bowel sounds, soft, non-distended, non-tender  Skin/Integumen: No rashes, no cyanosis, 2+ edema noted lower extremity   Neuro : moving all 4 extremities, no focal deficit noted     Medications     dilTIAZem 10 mg/hr (06/06/23 0653)     Reason anticoagulant not prescribed for atrial fibrillation       - MEDICATION INSTRUCTIONS -         allopurinol  100 mg Oral Daily     [Held by provider] amLODIPine  10 mg Oral Daily     amLODIPine  5 mg Oral Daily     apixaban ANTICOAGULANT  2.5 mg Oral BID     carvedilol  18.75 mg Oral BID w/meals     docusate sodium  100 mg Oral BID     [Held by provider] dronabinol  2.5 mg Oral BID AC     [Held by provider] ferrous sulfate  325 mg Oral Every Other Day     furosemide  40 mg Intravenous BID     hydrALAZINE  50 mg Oral TID     hydroxychloroquine  200 mg Oral Daily     ipratropium - albuterol 0.5 mg/2.5 mg/3 mL  3 mL Nebulization TID     isosorbide dinitrate  30 mg Oral TID     levothyroxine  25 mcg Oral QAM AC     Lidocaine  1 patch Transdermal Q24H     lidocaine   Transdermal Q8H KEVEN     [Held by provider] mirtazapine  15 mg Oral At Bedtime     pantoprazole  40 mg Oral Daily     piperacillin-tazobactam  2.25 g Intravenous Q6H     polyethylene glycol  17 g Oral Daily     potassium chloride  10 mEq Intravenous Q1H     predniSONE  5 mg Oral Daily     sodium bicarbonate  650 mg Oral BID     sodium chloride (PF)  3 mL Intracatheter Q8H       Data   Recent Labs   Lab 06/06/23  0531 06/05/23  0643 06/04/23  1435 06/04/23  0556 06/03/23  0529 06/02/23  0513 05/31/23  0646 05/30/23  3007    WBC 8.5  --   --   --  8.0 11.0   < > 7.9   HGB 9.1*  --   --   --  9.5* 9.8*   < > 10.1*   *  --   --   --  105* 104*   < > 104*     --   --   --  108* 113*   < > 148*   *  --   --  143 139 142   < > 142   POTASSIUM 3.4 3.6 3.5 3.2* 3.7 3.5   < > 4.2   CHLORIDE 116*  --   --  114* 109* 107   < > 104   CO2 18*  --   --  18* 19* 22   < > 25   BUN 35.1*  --   --  37.9* 43.5* 34.7*   < > 36.6*   CR 2.01*  --   --  1.84* 2.24* 2.06*   < > 2.91*   ANIONGAP 13  --   --  11 11 13   < > 13   ALIVIA 8.5*  --   --  8.6* 8.8 9.7   < > 11.9*   GLC 84  --   --  88 131* 122*   < > 91   ALBUMIN  --   --   --  3.1*  --   --   --  4.0   PROTTOTAL  --   --   --  5.8*  --   --   --  6.8   BILITOTAL  --   --   --  1.0  --   --   --  0.9   ALKPHOS  --   --   --  49  --   --   --  41   ALT  --   --   --  15  --   --   --  10   AST  --   --   --  19  --   --   --  25    < > = values in this interval not displayed.     Recent Labs   Lab 06/06/23  0531 06/04/23  0556 06/03/23  0529 06/02/23  0513 06/01/23  0548   GLC 84 88 131* 122* 108*       Imaging:   Recent Results (from the past 24 hour(s))   XR Video Swallow with SLP or OT    Narrative    VIDEO SWALLOWING EVALUATION   6/5/2023 11:26 AM     HISTORY: Acute hypoxic respiratory failure, multifactorial, reported  possible aspiration pneumonia, coughing at the bedside, clinical  concern for aspiration.    COMPARISON: None.    FLUOROSCOPY TIME: 1.8 minutes.  SPOT IMAGES OR CINE RUNS: 11      Impression    IMPRESSION:  Thin: Deep penetration to the level of the vocal cords.    Slightly thick: Moderate penetration.    Mildly thick: Deep penetration with residue along the undersurface of  the epiglottis.    Moderately thick: Deep penetration observed with a cup. No penetration  with teaspoon.    Puree: No penetration or aspiration.    Semisolid: No penetration or aspiration.    This study only includes the cervical esophagus. Please see separate  report from speech pathology  for additional detail.     HIREN PUENTE MD         SYSTEM ID:  [id number]

## 2023-06-06 NOTE — PLAN OF CARE
A&O 4, forgetful.VSS on 2-3L NC, HTN occasionally systolic 140-150's.TELE afib CVR.Lung Sounds diminished.Bowel Sounds active, no bm.Voiding adequately, purewick in place. Scattered bruising. Assist 2 with lift, frequent turn/repo.tylenol given once for back pain with relief. Tolerating minced and moist diet with moderately thick liquids. Diltiazem infusing at 5mg/hr, HR 70-90's. Mag and K replaced rechecks in for am. Thoracentesis done today pt reports decreased shortness of breath after.

## 2023-06-06 NOTE — PLAN OF CARE
Goal Outcome Evaluation:      Plan of Care Reviewed With: patient    Overall Patient Progress: improvingOverall Patient Progress: improving    Orientations: A/Ox 1-2 to self and location. Needed some prompting from daughter for orientating questions.   Vitals/Pain: Hypertensive, all other VSS on 3L oxymizer NC   Tele: afib CVR   LS: fine crackles   Lines/Drains: 2 L PIV, running diltiazem at 10 mg/hr  Skin/Wounds: none   GI/: Adequate uo, small BM. Low appetite, does not particularly like new diet, finds it difficult to eat/consistency. Refused oral fluids overnight.  Labs: K+ (3.4) mag(2.0) Phos (2.8)  Ambulation/Assist: Turn and repo  Sleep Quality: Good, slept between cares   Plan: Back to [facility] when medically stable.

## 2023-06-06 NOTE — PROGRESS NOTES
Care Management Follow Up    Length of Stay (days): 6    Expected Discharge Date: 06/06/2023     Concerns to be Addressed:       Patient plan of care discussed at interdisciplinary rounds: Yes    Anticipated Discharge Disposition:       Anticipated Discharge Services:    Anticipated Discharge DME:      Patient/family educated on Medicare website which has current facility and service quality ratings:    Education Provided on the Discharge Plan:    Patient/Family in Agreement with the Plan: yes    Referrals Placed by CM/SW:    Private pay costs discussed: Not applicable    Additional Information:  Writer went into the patient's room to speak with the nurse and update the patient on discharge planning.  RN stated that she was about to switch the patient to a standard nasal cannula and maintain the O2 flow to 3L as the patient was doing well. RN stated that the patient is currently receiving Diltiazem and will figure out when we can take her off of it. Writer will continue to follow and will talk with Nicholas at [facility] as well via teams.      JONATHAN Meng  [facility]  Social Work

## 2023-06-07 NOTE — PLAN OF CARE
Goal Outcome Evaluation:      Plan of Care Reviewed With: patient    Overall Patient Progress: improvingOverall Patient Progress: improving    Orientations: A/Ox4  Vitals/Pain: Hypertensive- hydralazine x1, all other VSS. On RA briefly in the evening, now on 3L NC   Tele: afib CVR converted to SR around 0226  LS:diminished  Lines/Drains: Bicep PIV running diltiazem 5 mg/hr. Forearm PIV SL w intermittent abx  Skin/Wounds: blanchable redness on sacrum  GI/: Purewick in place, BM-BS+  Labs: K(3.6) mag(2.3) phos(2.8)  Ambulation/Assist: turn and repo  Sleep Quality: Poor, reports little to no sleep.   Plan: Transition from dilt drip to oral. Transition [facility] when medically stable.

## 2023-06-07 NOTE — CONSULTS
"Palliative Care Consultation Note  [facility]      Patient: Ghislaine Walls  Date of Admission:  5/30/2023    Requesting Clinician / Team: Dr. Carbajal/Hospitalist   Reason for consult: Goals of care     Recommendations & Counseling     GOALS OF CARE:     Undetermined and requires more discussion with family    Ghislaine was able to verbalize to me (as well as to Jazmine ULRICH and Dr. Carbajal separately today) that she desires regular ice water. We discussed the risk of aspiration and she would like to eat and drink for pleasure and comfort, accepting the risk of aspiration. She declines a PEG. She understands that this is a hospice plan of care, and she is clear that comfort is what she wants. She would like to go to [facility] with hospice    Ghislaine states that \"dying is a lot of work,\" reflecting on the difficult choices she is facing and the prolonged dying journey. We acknowledge her courage in verbalizing her wishes and we note that with a comfort plan of care, stepping away from medical interventions that are interfering with her dying process will allow a more natural decline in her health as it is meant to be      In communicating this conversation to dtr Krupa today, dtr seemed upset and made it clear that Ghislaine's wishes fluctuate from day to day. Reportedly 2 days ago, this was not the plan that Ghislaine verbalized to Krupa     Given this conflict, dtr has requested a family meeting to include Krupa, son Scooby, and pt's youngest daughter, Bibiana. Bibiana is reportedly working a lot and not visiting Ghislaine, despite family's encouragement. Expressed to Krupa that delaying a family meeting to ensure Bibiana be present may not be realistic or fair, particularly if Ghislaine is consistently expressing a desire for a comfort focused plan of care    Dtr Krupa has my number and will call me with a date/time when family can meet with Ghislaine and me     ADVANCE CARE PLANNING:    No health " care directive on file. Per  informed consent policy next of kin should be involved if patient becomes unable. Presently, Ghislaine's complex medical decision making appears intact.     There is a POLST form which is reviewed and current.    Code status: No CPR- Do NOT Intubate    MEDICAL MANAGEMENT:     I am not actively managing medications at this time     PSYCHOSOCIAL/SPIRITUAL SUPPORT:    Family - 3 children, oldest daughter Krupa very involved. Son Scooby visited this AM. Pt's youngest daughter, Bibiana, is busy working in a nursery. Family has been encouraging her to visit Ghislaine, but she hasn't for unclear reasons.     Palliative Care will continue to follow. Thank you for the consult and allowing us to aid in the care of Ghislaine Walls.    These recommendations have been discussed with bedside MOJGAN Lucero and Dr. Carbajal.    DHAVAL Balderrama CNP  Securely message with inFreeDA (more info)  Text page via [facility] Paging/Directory       Palliative Summary/HPI     Ghislaine Walls is a 87 year old female with a past medical history significant for RA, HTN, HFpEF, PAF on chronic anticoagulation, hypothyroidism, GERD, gout, HLD, CKD stage IV, MGUS, chronic anemia and TCP who presents with weakness, reduced PO intake and overall failure to thrive. Her hospital course has been complicated by acute hypoxic respiratory failure with aspiration PNA. She has dysphagia, SLP recommended modified textured diet and thickened liquids. She developed a.fib with RVR in the setting of volume overload and aspiration. She developed DRAGAN on CKD. She has severe malnutrition in the setting of acute illness.     Today, the patient was seen for:  Goals of care     Palliative Care Summary:   Met with Ghislaine.   I introduced our role as an extra layer of support and how we help patients and families dealing with serious, potentially life-limiting illnesses. I explained the composition of the palliative care team.  Palliative care helps  patients and families navigate their care while focusing on the whole person; providing emotional, social and spiritual support  Palliative care often assists with symptom management, information sharing about what to expect from the illness, available treatment options and what effect those options may have on the disease course, and provide effective communication and caring support.    Prognosis, Goals, & Planning:      Functional Status just prior to this current hospitalization:    There has been a decline in daily function including weakness, reduced PO intake, failure to thrive .      ECOG3 (Capable of only limited self-care; needs help with ADLs; in bed/chair >50% of waking hours)      Prognosis, Goals, and/or Advance Care Planning:    Advance Care Planning Discussion 6/7/2023. Casie BENITES APRN CNP met with Patient today at the hospital to discuss Advance Care Planning. Ghislaine LAROSE Titi does have decisional capacity and was present for this discussion.  Those present were informed of the voluntary nature of this discussion and wished to proceed. This discussion began at 1157 and ended at 1222 for a total of 25 minutes.    We discussed general treatment options (full/restorative, selective/conservatives, and comfort only/hospice). We then discussed how these specifically apply to Ghislaine. Based on this discussion:  Ghislaine's preference is for comfort measures only.  Education provided on transition to comfort-focused goals of care would be including discontinuation of IV fluids, cardiac monitoring, labs, and other interventions that do not directly promote comfort.  Anticipatory guidance was given regarding feeding, hunger, fluids at end of life. We discussed utilization of medications to ease air hunger, agitation and restlessness. Discussed that this process is very purposeful in terms of ensuring patient is as comfortable as possible and that family wishes are honored.  - Ghislaine was able to  "verbalize to me that she desires regular ice water. We discussed the risk of aspiration and she would like to eat and drink for pleasure and comfort, accepting the risk of aspiration. She declines a PEG. She understands that this is a hospice plan of care, and she is clear that comfort is what she wants. She would like to go to [facility] with hospice  - Ghislaine states that \"dying is a lot of work,\" reflecting on the difficult choices she is facing and the prolonged dying journey. We acknowledge her courage in verbalizing her wishes and we note that with a comfort plan of care, stepping away from medical interventions that are interfering with her dying process will allow a more natural decline in her health as it is meant to be    - In communicating this conversation to dtr Krupa today, dtr seemed upset and made it clear that Ghislaine's wishes fluctuate from day to day. Reportedly 2 days ago, this was not the plan that Ghislaine verbalized to Krupa   - Given this conflict, dtr has requested a family meeting to include Krupa, son Scooby, and pt's youngest daughter, Bibiana. Bibiana is reportedly working a lot and not visiting Ghislaine, despite family's encouragement. Expressed to Krupa that delaying a family meeting to ensure Bibiana be present may not be realistic or fair, particularly if Ghislaine is consistently expressing a desire for a comfort focused plan of care  - Dtr Krupa has my number and will call me with a date/time when family can meet with Ghislaine and me       Code Status was addressed today:     No already DNR/DNI      Patient's decision making preferences: independently          Patient has decision-making capacity today for complex decisions: Intact          Coping, Meaning, & Spirituality:     Mood, coping, and/or meaning in the context of serious illness were addressed today: Yes    Social:   Living situation:resides in assisted living [facility]  Important relationships/caregivers:3 adult children, " dtr Krupa involved, son Scooby visited this AM. Dtr Bibiana busy working. 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 Notable medications:  Allopurinol   Norvasc  Eliquis   Coreg  Docusate   Marinol on hold   Plaquenil   Duonebs   Isordil   Synthroid   Lidoderm patch   Remeron on hold   PPI  Zosyn   Miralax daily  Prednisone 5mg PO daily   Torsemide 40mg PO daily     ROS:  Comprehensive ROS is reviewed and is negative except as here & per HPI:     Physical Exam   Vital Signs with Ranges  Temp:  [97.3  F (36.3  C)-98.7  F (37.1  C)] 98.3  F (36.8  C)  Pulse:  [58-89] 75  Resp:  [15-41] 21  BP: (126-182)/() 174/75  SpO2:  [91 %-97 %] 94 %  147 lbs 11.33 oz  CONSTITUTIONAL: Chronically ill woman seen resting in bed in NAD, A&Ox3, quiet voice. Calm and cooperative.  HEENT: NCAT  NECK: Supple  CARDIOVASCULAR: RRR  RESPIRATORY: NL respiratory effort on NC 3L   NEUROLOGIC: Appropriately responsive during interview  PSYCH: Affect engaged, congruent     Data reviewed:  Recent imaging independently reviewed, my comments on pertinents:   6/4 CXR with pulm edema, opacities in both lung bases     Recent lab data independently reviewed, my comments on pertinents:   Na 147  K 3.7  Creat 2.12  Mg 2.3  Phos 2.8  Albumin 3.1  WBC 8.5  Hgb 9.1  Plt 152    Medical Decision Making   Please see A&P for additional details of medical decision making.  MANAGEMENT DISCUSSED with the following over the past 24 hours: Jazmine ULRICH, Dr. Carbajal, ct Gentile   NOTE(S)/MEDICAL RECORDS REVIEWED over the past 24 hours: H&P, hospitalist notes, nursing notes, nutrition note, former palliative note 2021  Tests personally interpreted in the past 24 hours:  - CHEST XRAY showing pulm congestion, bilateral infiltrates  Tests REVIEWED in the past 24 hours:  -  See lab/imaging results included in the data section of the note  SUPPLEMENTAL HISTORY, in addition to the patient's history, over the past 24 hours obtained from:   - Jazmine ULRICH, Dr. Carbajal, dtr Krupa  Medical complexity over the past 24 hours:  - Decision to DE-ESCALATE CARE based on prognosis

## 2023-06-07 NOTE — PROVIDER NOTIFICATION
MD Notification    Notified Person: MD    Notified Person Name: dr. Nichelle gutierrez    Notification Date/Time: 0756    Notification Interaction: vocera message    Purpose of Notification: pt converted to NSR overnight would you like diltiazem drip stopped or page cardiology for further orders?       Orders Received: stop drip    Comments:

## 2023-06-07 NOTE — PROGRESS NOTES
Speech paged 1030: pt is wondering if she can have hard candy to suck on complaining her mouth always feels dry.     Per speech ok to have suckers no hard candy, pt can also have a few ice for comfort with nursing supervision.

## 2023-06-07 NOTE — PROGRESS NOTES
[facility]  Cardiology Progress Note    Date of Service (when I saw the patient): 06/07/2023  Summary: Ghislaine Walls is a 87 year old female with history of  HFpEF, paroxysmal atrial fibrillation [on anticoagulation, CKD, chronic anemia, failure to thrive, hypertension who presented to the hospital for complaints of failure to thrive who was admitted on 5/30/2023 with weakness and dysphagia.   Interval History   Converted to NSR overnight. Diltiazem drip stopped. Remains hypertensive. Weight from today not accurate. 500 ml of UOP documented yesterday with some undocumented voids. S/p left sided thoracentesis yesterday with 0.4 L out. Breathing is better following that.   Assessment & Plan    1.  Atrial fibrillation with rapid ventricular response. [id number]-DAFy score is elevated. On anticoagulation with Eliquis.   -  Continue with rate control strategy. Managed with diltiazem drip and carvedilol.   -  Converted overnight on 6/6/23 to NSR.   2.  Heart failure with preserved ejection fraction.  On Torsemide 20 mg daily. Dry weight felt to be around 115 - 120 lbs.   3.  Acute hypoxic respiratory failure secondary to volume overload due to IV fluids and discontinuation of diuretics.   -  Diuresed with IV lasix 40 mg BID with good response on 6/5.   -  Transitioned to oral diuretics 6/6.  4.  Failure to thrive, malnutrition. albumin 3.1  5.  Chronic anemia. Hemoglobin 11 at baseline.   6.  Hypertension. Uncontrolled.   7.  DRAGAN on CKD    Plan:  1.  Stop diltiazem drip now that she is back in NSR.  2.  Continue Eliquis for thromboembolic prevention.  3.  Continue carvedilol 25 mg BID.   4.  Remains hypertension. Hydralazine has been increased to 75 mg TID and amlodipine increased to 10 mg daily.   5.  Increase torsemide to 40 mg daily.   6.  Add on BMP today.     Casie Blanco PA-C    Physical Exam   Temp: 98  F (36.7  C) Temp src: Axillary BP: (!) 180/81 Pulse: 67   Resp: 23 SpO2: 97 % O2  Device: Nasal cannula Oxygen Delivery: 3 LPM  Vitals:    06/03/23 2100 06/05/23 2300 06/06/23 0538 06/07/23 0516   Weight: 59.2 kg (130 lb 8.2 oz) 57.8 kg (127 lb 6.8 oz) 54.7 kg (120 lb 9.5 oz) 67 kg (147 lb 11.3 oz)     Vital Signs with Ranges  Temp:  [97.3  F (36.3  C)-98.7  F (37.1  C)] 98  F (36.7  C)  Pulse:  [] 67  Resp:  [15-41] 23  BP: (122-182)/(61-96) 180/81  SpO2:  [91 %-97 %] 97 %  06/02 0700 - 06/07 0659  In: 64387 [P.O.:2590; I.V.:7753]  Out: 2650 [Urine:2650]  Net: 7693  Constitutional: NAD.   Respiratory: anterior breath sounds are clear.  Cardiovascular: regular rate and rhythm.  GI: soft, BS+  Skin: warm, no rashes  Musculoskeletal: trace edema.   Neurologic: Alert, oriented x 3  Neuropsychiatric: Normal affect   Data   Results for orders placed or performed during the hospital encounter of 05/30/23 (from the past 24 hour(s))   Potassium   Result Value Ref Range    Potassium 3.9 3.4 - 5.3 mmol/L   Lactic Acid STAT   Result Value Ref Range    Lactic Acid 0.8 0.7 - 2.0 mmol/L   Lactate Dehydrogenase   Result Value Ref Range    Lactate Dehydrogenase 266 (H) 0 - 250 U/L   Protein total   Result Value Ref Range    Protein Total 6.0 (L) 6.4 - 8.3 g/dL   Cell count with differential fluid    Narrative    The following orders were created for panel order Cell count with differential fluid.  Procedure                               Abnormality         Status                     ---------                               -----------         ------                     Cell Count Body Fluid[[id number]]        Abnormal            Final result               Differential Body Fluid[[id number]]                          Final result                 Please view results for these tests on the individual orders.   Pleural fluid Aerobic Bacterial Culture Routine with Gram Stain    Specimen: Chest; Pleural fluid   Result Value Ref Range    Gram Stain Result No organisms seen     Gram Stain Result 1+ WBC seen    Glucose  fluid   Result Value Ref Range    Glucose Fluid Source Pleural Cavity, Left     Glucose fluid 103 mg/dL    Narrative    No reference ranges have been established. This result should be interpreted in the context of the patient's clinical condition and compared to simultaneous measurement in the patient's blood. This is a lab developed test. It has not been cleared or approved by the FDA. FDA clearance is not required for clinical use.   Lactate dehydrogenase fluid   Result Value Ref Range    LD Fluid Source Pleural Cavity, Left     Lactate dehydrogenase fluid 89 U/L    Narrative    No reference ranges have been established. This result should be interpreted in the context of the patient's clinical condition and compared to simultaneous measurement in the patient's blood. This is a lab developed test. It has not been cleared or approved by the FDA. FDA clearance is not required for clinical use.   Protein fluid   Result Value Ref Range    Protein Fluid Source Pleural Cavity, Left     Protein Total Fluid 1.8 g/dL    Narrative    No reference ranges have been established. This result should be interpreted in the context of the patient's clinical condition and compared to simultaneous measurement in the patient's blood. This is a lab developed test. It has not been cleared or approved by the FDA. FDA clearance is not required for clinical use.   Cell Count Body Fluid   Result Value Ref Range    Color Yellow Colorless, Yellow    Clarity Hazy (A) Clear    Cell Count Fluid Source Pleural Cavity, Left     Total Nucleated Cells 92 /uL    Narrative    No reference ranges have been established.  This result  should be interpreted in the context of the patient's clinical condition and   compared to simultaneous measurement in the patient's blood.         Differential Body Fluid   Result Value Ref Range    % Neutrophils 66 %    % Lymphocytes 28 %    % Monocyte/Macrophages 4 %    % Lining Cells 2 %    Narrative    No reference  ranges have been established. This result should be interpreted in the context of the patient's clinical condition and compared to simultaneous measurement in the patient's blood.   US Thoracentesis    Narrative    EXAM:  1. LEFT THORACENTESIS  2. ULTRASOUND GUIDANCE  LOCATION: [facility]  DATE/TIME: 6/6/2023 2:24 PM    INDICATION: Pleural effusion.    PROCEDURE: Informed consent obtained. Time out performed. The chest  was prepped and draped in a sterile fashion. 10 mL of 1% lidocaine was  infused into local soft tissues. A 5 French catheter system was  introduced into the pleural effusion under ultrasound guidance.    0.4 liters of clear fluid were removed and sent to lab if requested.    Patient tolerated procedure well.    Ultrasound images have been permanently captured for documentation.      Impression    IMPRESSION:    1. Status post ultrasound-guided left thoracentesis.  2. There is also a moderate right pleural effusion. Right  thoracentesis could be performed tomorrow as needed.    AREN SPIVEY MD         SYSTEM ID:  [id number]   Phosphorus   Result Value Ref Range    Phosphorus 2.8 2.5 - 4.5 mg/dL   Magnesium   Result Value Ref Range    Magnesium 2.3 1.7 - 2.3 mg/dL   Potassium   Result Value Ref Range    Potassium 3.6 3.4 - 5.3 mmol/L        Tele AF     Medications     dilTIAZem Stopped (06/07/23 0845)     Reason anticoagulant not prescribed for atrial fibrillation       - MEDICATION INSTRUCTIONS -         allopurinol  100 mg Oral Daily     amLODIPine  10 mg Oral Daily     apixaban ANTICOAGULANT  2.5 mg Oral BID     carvedilol  25 mg Oral BID w/meals     docusate sodium  100 mg Oral BID     [Held by provider] dronabinol  2.5 mg Oral BID AC     [Held by provider] ferrous sulfate  325 mg Oral Every Other Day     hydrALAZINE  75 mg Oral TID     hydroxychloroquine  200 mg Oral Daily     ipratropium - albuterol 0.5 mg/2.5 mg/3 mL  3 mL Nebulization TID     isosorbide dinitrate  30 mg Oral TID      levothyroxine  25 mcg Oral QAM AC     Lidocaine  1 patch Transdermal Q24H     lidocaine   Transdermal Q8H KEVEN     [Held by provider] mirtazapine  15 mg Oral At Bedtime     pantoprazole  40 mg Oral Daily     piperacillin-tazobactam  2.25 g Intravenous Q6H     polyethylene glycol  17 g Oral Daily     predniSONE  5 mg Oral Daily     sodium bicarbonate  650 mg Oral BID     sodium chloride (PF)  3 mL Intracatheter Q8H     torsemide  20 mg Oral Once     [START ON 6/8/2023] torsemide  40 mg Oral Daily

## 2023-06-07 NOTE — PROVIDER NOTIFICATION
MD Notification    Notified Person: MD    Notified Person Name: dr. Nichelle gutierrez    Notification Date/Time: 1737    Notification Interaction: vocera message    Purpose of Notification:  Pt converted back to afib rvr hr 100-120's    1754: going to 2.5mg IV metoprolol let me know if you want anything else given.       Orders Received:    Comments:

## 2023-06-07 NOTE — PROVIDER NOTIFICATION
MD Notification    Notified Person: MD    Notified Person Name:  matt    Notification Date/Time: 1040    Notification Interaction: vocera message    Purpose of Notification: Would you like pt to continue of Zosyn note placed by pharmacy can be discontinued after 6/6?       Orders Received: will discontinue    1151: ok to skip 11am dose of zosyn  Response 1151: ok to skip    Comments:    1227: pt still feeling nauseated Compazine given at 0930 can we have order for something else for nausea?

## 2023-06-07 NOTE — PROGRESS NOTES
[facility]    Hospitalist Progress Note    Assessment & Plan   Ghislaine Walls is a 87 year old female with RA; HTN; CHF; PAF; hypothyroidism; GERD; gout; HLD; CKD4; MGUS; chronic anemia and TCP; who presented 5/30/2023 from her [facility] with weakness.      On initial evaluation 5/30,  afebrile, hemodynamically stable with oxygen saturation 97% on room air.  ECG showed sinus rhythm with first-degree AV block; Q waves in the inferior limb leads and V2 and V3; diffuse nonspecific T wave changes; no acute ischemic findings.   Hemoglobin 10.1 and platelets 148; BMP with creatinine 2.91 (was 2.78 5/16); calcium 11.9;   LFTs were normal; troponin was 100; TSH 0.98.   X-rays of her lumbar spine did not show any acute findings.    Head CT without contrast did not show any acute findings.     Hospitalization complicated by development of aspiration pneumonia with acute hypoxic respiratory failure, volume overload, A-fib with RVR.     Aspiration pneumonia.   Acute hypoxic respiratory failure likely multifactorial from aspiration pneumonia, volume overload, deconditioning.  Leukocytosis, suspect related to pneumonia as well as steroids - improved   * On 5/31, pt noted to have aspirated in the morning with coughing and new O2 requirement while eating. Later in the afternoon, pt again had a coughing spell after drinking water coarse, congested cough, needing up to 15L O2 with sats in 80's. RN able to suction food material with sats to 90's.   --CXR showed showed question some minimal pleural fluid on the left with minimal retrocardiac atelectasis and/or infiltrate; right lung clear. Started on pipericillin-tazobactam. Made NPO. Continued high O2 requirements and transferred to [facility] [place]. Subsequently required high flow O2, then BiPAP overnight.  * On 6/1, WBC to 30 (but received IV steroids 5/31). Off BiPAP, back on HFNC in am. CXR showed new right base infiltrate; retrocardiac density similar, possibly related  to small hiatal hernia.   * On 6/2, down to 30L HFNC.  *On 6/4 down to 4 L nasal oxygen.  Has been having IV fluids, crackles present. (Pending weights).  PTA torsemide on hold since admission.  Follow proBNP.    ---  Added on Zosyn 5/31, discontinued 6/7.  --Continue ipratropium-albuterol nebs TID and PRN.  --  Overnight patient was weaned to room air but she required some oxygen during daytime occasionally.  Currently on 2-3 L of oxygen.  --Speech had reevaluated the patient, her diet has been further adjusted, patient is not happy about having to eat take thickened liquids.  Her sodium levels are going up.  This was discussed in detail with patient and family, palliative care consult requested since patient is requesting to drink tap water.  -Continue strict aspiration precautions, aggressive incentive spirometry, encourage ambulation.  COVID-19 PCR, influenza PCR negative  -Will need a repeat x-ray done in few weeks to evaluate for resolution.     Afib with RVR likely in the setting of volume overload, aspiration.  H/o PAF on anticoagulation.  * ECG on admit showed sinus rhythm with 1st degree AVB.  * Made NPO 5/31 for dysphagia.   * Started IV metoprolol 6/1. Later on 6/1, pt noted to be tachycardic and ECG showed afib with RVR w/o acute ischemic changes.  * Swallowing improved 6/2.  Restarted Coreg 6.25 mg twice daily.  -6/3- increased Coreg to 12.5 mg twice daily.  _6/4 -A-fib with RVR.    Patient is currently on diltiazem drip, will need to change that to p.o. Dilt, pending cardiology input.  -Requested cardiology input, notes reviewed, patient did receive a dose of IV Lasix on 6/4, another dose was given on 6/5, plan is to initiate PTA medications 6/7.  Patient is getting more and more dehydrated due to reduced oral intake.  Continue PTA Eliquis  -Echocardiogram results noted, patient noted to have a left-sided pleural effusion, global LV function is similar to prior echo but there is increased right-sided  pressures.  Status post thoracentesis on 6/6 with 400 cc removed.  Keep potassium around 4, magnesium around 2     Volume overload likely multifactorial from fluid resuscitation, holding diuretic, heart failure, renal disease.    Acute (on Chronic) Diastolic Heart Failure  Troponin elevation suspect demand ischemia.  History of heart failure with preserved EF [LVEF of 55 to 60%]  S/p Hypertensive urgency related to being off PO meds (NPO).  [PTA: amlodipine 10 mg daily; carvedilol 6.25 mg BID; hydralazine 100 mg TID; ISDN 30 mg TID; torsemide 20 mg daily.]  * Echo 3/2023 showed LVEF 55-60%; RV OK; mild MS.  * Initial presentation as above.   Troponin 100 > 88 > 78   Torsemide held on admission, other blood pressure medications resumed  * On 5/31, made NPO for dysphagia. Started on scheduled IV hydralazine.  * On 6/1, BP's uncontrolled. Started on scheduled IV metoprolol and NTG patch.  * On 6/2, BP's improved. Swallowing improved.  Restarted PTA antihypertensives.  --On 6/4 - patient with increased oxygen needs, shortness of breath, A-fib with RVR.    Discontinue IV fluids.  Follow proBNP.  Dose of Lasix was given on 6/4, another dose given on 6/5.  -  Continue to hold PTA oral torsemide.  Monitor renal function in a.m. and accordingly further diuresis.  Continue increased dose of Coreg at 12.5 mg twice daily.  Has been restarted on PTA Isordil, hydralazine, amlodipine is temporarily on hold.  See echocardiogram results below.  Isordil doses were restarted on 6/3, will need to decide on scheduling diuresis.  Strict input output monitoring.  Daily weights.  2000 mL fluid restriction.  -- Status post thoracentesis of left side on 6/6, patient was temporarily on room air, currently on 2 L.  Hypernatremia  --Monitor sodium levels, encouraged free water intake.  Patient has reduced intake due to thickening which he does not like.  --See above, palliative care consult requested since patient does not want to drink  thickened liquid and not drinking enough fluids can result in other complications including dehydration, DRAGAN etc.  DRAGAN on CKD stage IV, suspect prerenal related to decreased PO intake, diuretic.  CKD stage IV.  * Baseline cr around mid to upper 2 range over the past year.  * Cr 2.91 on admit.  Receiving IV fluids, diuresis held with which sodium improving to 1.84.gone to 2.01 with diuresis .  Currently creatinine is 2.2.  Continue PTA with sodium bicarbonate.    Avoid nephrotoxic drugs.  Consider nephrology evaluation if creatinine worsening     Acute encephalopathy likely multifactorial from metabolic, delirium from hospitalization, cognitive impairment.  Concern for cognitive impairment at baseline.  Per report confusion.  Treat medical issues as above.  - Re-orient as needed.  - Maintain normal day/night, sleep/wake cycles.  - Minimize sedating medications as able.  Cognitive screening as outpatient.     Physical deconditioning from medical illness, senile frailty.  Severe malnutrition in the setting of poor oral intake  Dysphagia.   Goals of care.  --Resident of assisted living facility. Presented with poor oral intake over the past few months prior to admission, weight loss of 15 pounds.--Patient simply not wanting to eat, prescribed dronabinol and Remeron to try to stimulate appetite with no improvement.  * Nutrition consulted on admit and noted with severe malnutrition.  * Issues with aspiration 5/31 as above, subsequently made NPO; SLP consulted.  * On 6/1, SLP evaluated and still recommending NPO 6/5 patient underwent video swallow evaluation and diet has been further adjusted.   Continue to hold PTA dronabinol and mirtazapine for now.  -Multiple discussion with family regarding goals of care, palliative consult requested on 6/7.  PT, OT following.  Will likely need TCU.  Left-sided pleural effusion  --Will order thoracentesis       Hypokalemia, hypomagnesemia-poor intake  Potassium 3.2, magnesium  "1.6.  Replacement protocol ordered, monitor.     Constipation.  Per report constipation, receiving scheduled, as needed bowel meds.  Per nursing report today had bowel movement yesterday.  Abdominal soft, nontender, bowel sounds heard.    Continue scheduled, as needed bowel meds.  Monitor     GERD with h/o hiatal hernia.  Continue oral pantoprazole.     Anemia of chronic disease/CKD.  Chronic TCP.  MGUS.  * Follows with [facility]. Has received ANGELIC in the past.  * Hgb 10.1 and plts 148 on admit. No overt clinical signs of major bleeding.  Monitor CBC level in AM.  - Consider prbc transfusion if hgb </= 7.0 or if significant bleeding with hemodynamic instability or if symptomatic.  - Consider platelet transfusion if needs a procedure and less than 50,000; or if less than 10,000.     Rheumatoid arthritis.  Continue PTA hydroxychloroquine and prednisolone 5 mg daily.  Hold outpatient abatacept.     Hypothyroidism.  Continue oral levothyroxine     Gout.  Continue PTA allopurinol.     Depression/anxiety.  Hold PTA Remeron as drowsy     DVT Prophylaxis: DOAC  Code Status: No CPR- Do NOT Intubate     52 MINUTES SPENT BY ME on the date of service doing chart review, history, exam, documentation & further activities per the note.  Disposition: Expected discharge in few days, will need TCU..  Clinically Significant Risk Factors         # Hypernatremia: Highest Na = 147 mmol/L in last 2 days, will monitor as appropriate      # Hypoalbuminemia: Lowest albumin = 3.1 g/dL at 6/4/2023  5:56 AM, will monitor as appropriate     # Hypertension: Noted on problem list        # Overweight: Estimated body mass index is 27.91 kg/m  as calculated from the following:    Height as of 5/16/23: 1.549 m (5' 1\").    Weight as of this encounter: 67 kg (147 lb 11.3 oz).   # Severe Malnutrition: based on nutrition assessment         Nichelle Carbajal MD  Text Page   (7am to 6pm)    Interval History   Overnight her oxygen requirement went to " room air, patient was feeling better, she does not like to drink thickened liquids, she requested that she be allowed to drink tap water, family wanted patient to continue restorative care if possible, patient is upset about thickened liquid, hypernatremia present, in between she mentioned that she does not want to live like this and want to do enjoy water, palliative care consult requested, which    -Data reviewed today: I reviewed all new labs and imaging results over the last 24 hours.     Physical Exam     Vital Signs with Ranges  Temp:  [97.3  F (36.3  C)-98.7  F (37.1  C)] 98  F (36.7  C)  Pulse:  [] 66  Resp:  [15-41] 23  BP: (122-182)/(61-96) 177/74  SpO2:  [90 %-97 %] 97 %  I/O last 3 completed shifts:  In: 490 [P.O.:390; I.V.:100]  Out: 500 [Urine:500]    Constitutional: Awake, alert, cooperative, no apparent distress  Respiratory: Bilateral basilar crackles noted  Cardiovascular: Regular rate and rhythm, normal S1 and S2, and no murmur noted  GI: Normal bowel sounds, soft, non-distended, non-tender  Skin/Integumen: No rashes, no cyanosis, 2+ edema noted lower extremity   Neuro : moving all 4 extremities, no focal deficit noted     Medications     dilTIAZem 5 mg/hr (06/07/23 0434)     Reason anticoagulant not prescribed for atrial fibrillation       - MEDICATION INSTRUCTIONS -         allopurinol  100 mg Oral Daily     amLODIPine  10 mg Oral Daily     apixaban ANTICOAGULANT  2.5 mg Oral BID     carvedilol  25 mg Oral BID w/meals     docusate sodium  100 mg Oral BID     [Held by provider] dronabinol  2.5 mg Oral BID AC     [Held by provider] ferrous sulfate  325 mg Oral Every Other Day     hydrALAZINE  25 mg Oral Once     hydrALAZINE  75 mg Oral TID     hydroxychloroquine  200 mg Oral Daily     ipratropium - albuterol 0.5 mg/2.5 mg/3 mL  3 mL Nebulization TID     isosorbide dinitrate  30 mg Oral TID     levothyroxine  25 mcg Oral QAM AC     Lidocaine  1 patch Transdermal Q24H     lidocaine    Transdermal Q8H KEVEN     [Held by provider] mirtazapine  15 mg Oral At Bedtime     pantoprazole  40 mg Oral Daily     piperacillin-tazobactam  2.25 g Intravenous Q6H     polyethylene glycol  17 g Oral Daily     predniSONE  5 mg Oral Daily     sodium bicarbonate  650 mg Oral BID     sodium chloride (PF)  3 mL Intracatheter Q8H     torsemide  20 mg Oral Once     [START ON 2023] torsemide  40 mg Oral Daily       Data   Recent Labs   Lab 23  0521 23  1238 23  1122 23  0531 23  1435 23  0556 23  0529 23  05   WBC  --   --   --  8.5  --   --  8.0 11.0   HGB  --   --   --  9.1*  --   --  9.5* 9.8*   MCV  --   --   --  103*  --   --  105* 104*   PLT  --   --   --  152  --   --  108* 113*   NA  --   --   --  147*  --  143 139 142   POTASSIUM 3.6  --  3.9 3.4   < > 3.2* 3.7 3.5   CHLORIDE  --   --   --  116*  --  114* 109* 107   CO2  --   --   --  18*  --  18* 19* 22   BUN  --   --   --  35.1*  --  37.9* 43.5* 34.7*   CR  --   --   --  2.01*  --  1.84* 2.24* 2.06*   ANIONGAP  --   --   --  13  --  11 11 13   ALIVIA  --   --   --  8.5*  --  8.6* 8.8 9.7   GLC  --   --   --  84  --  88 131* 122*   ALBUMIN  --   --   --   --   --  3.1*  --   --    PROTTOTAL  --  6.0*  --   --   --  5.8*  --   --    BILITOTAL  --   --   --   --   --  1.0  --   --    ALKPHOS  --   --   --   --   --  49  --   --    ALT  --   --   --   --   --  15  --   --    AST  --   --   --   --   --  19  --   --     < > = values in this interval not displayed.     Recent Labs   Lab 23  0531 23  0556 23  0529 23  0513 23  0548   GLC 84 88 131* 122* 108*       Imaging:   Recent Results (from the past 24 hour(s))   Echo Limited   Result Value    LVEF  55-60%    Narrative    [id number]  [id number]  [id number]  [id number]^CODY^ANURAG     [facility]  Echocardiography Laboratory  [address] [place], [place] [address]     Name: YOUNGNIHARIKAROSIO L  MRN: [id number]  :  1935  Study Date: 06/06/2023 08:02 AM  Age: 87 yrs  Gender: Female  Patient Location: [facility]  Reason For Study: CHF  Ordering Physician: ANURAG ROSS  Performed By: Re Cm RDCS     BSA: 1.5 m2  Height: 61 in  Weight: 120 lb  HR: 84  BP: 146/76 mmHg  ______________________________________________________________________________  Procedure  Limited Portable Echo Adult. Optison (NDC #[id number]) given intravenously.  ______________________________________________________________________________  Interpretation Summary     Technically challenging study even with the use of contrast imaging.     Left ventricular systolic function is normal. The visual ejection fraction is  55-60%.  Septal motion is consistent with conduction abnormality. Flattened septum is  consistent with RV pressure/volume overload.  Right ventricle is not well visualized, however global systolic function is  probably normal.  There is severe mitral annular calcification. The mitral valve leaflets are  moderately thickened. There is mild mitral stenosis. Mean gradient 4 mmHg at  68 bpm. There is mild (1+) mitral regurgitation.  The aortic valve is trileaflet with aortic valve sclerosis. There is trace  aortic regurgitation.  Pulmonary hypertension present. The right ventricular systolic pressure is  approximated at 38mmHg plus the right atrial pressure.  There is no pericardial effusion.  Moderate left pleural effusion.     This study was compared to a TTE from 3/31/2023. There is now a left pleural  effusion present. Estimated right-sided pressures are higher on this study.  Global LV function is stable.  ______________________________________________________________________________  Left Ventricle  Left ventricular systolic function is normal. The visual ejection fraction is  55-60%. Septal motion is consistent with conduction abnormality. Flattened  septum is consistent with RV pressure/volume overload.     Right Ventricle  The  right ventricle is not well visualized. Right ventricle is not well  visualized, however global systolic function is probably normal.     Mitral Valve  There is severe mitral annular calcification. The mitral valve leaflets are  moderately thickened. There is mild (1+) mitral regurgitation. There is mild  mitral stenosis. Mean gradient 4 mmHg at 68 bpm.     Tricuspid Valve  There is mild (1+) tricuspid regurgitation. The right ventricular systolic  pressure is approximated at 38mmHg plus the right atrial pressure. Pulmonary  hypertension.     Aortic Valve  The aortic valve is trileaflet with aortic valve sclerosis. There is trace  aortic regurgitation.     Pulmonic Valve  The pulmonic valve is not well seen, but is grossly normal.     Vessels  The aortic root is normal size. IVC diameter and respiratory changes fall into  an intermediate range suggesting an RA pressure of 8 mmHg.     Pericardium  There is no pericardial effusion. Moderate left pleural effusion.     Rhythm  The rhythm was atrial fibrillation.     ______________________________________________________________________________  MMode/2D Measurements & Calculations  Ao root diam: 3.1 cm  LVOT diam: 2.1 cm  LVOT area: 3.3 cm2     Doppler Measurements & Calculations  MV max P.1 mmHg  MV mean PG: 3.9 mmHg  MV V2 VTI: 43.1 cm  TR max edilberto: 308.6 cm/sec  TR max P.1 mmHg  RV S Edilberto: 7.1 cm/sec     ______________________________________________________________________________  Report approved by: Javier Delacruz 2023 10:13 AM         US Thoracentesis    Narrative    EXAM:  1. LEFT THORACENTESIS  2. ULTRASOUND GUIDANCE  LOCATION: [facility]  DATE/TIME: 2023 2:24 PM    INDICATION: Pleural effusion.    PROCEDURE: Informed consent obtained. Time out performed. The chest  was prepped and draped in a sterile fashion. 10 mL of 1% lidocaine was  infused into local soft tissues. A 5 Israeli catheter system was  introduced into the pleural  effusion under ultrasound guidance.    0.4 liters of clear fluid were removed and sent to lab if requested.    Patient tolerated procedure well.    Ultrasound images have been permanently captured for documentation.      Impression    IMPRESSION:    1. Status post ultrasound-guided left thoracentesis.  2. There is also a moderate right pleural effusion. Right  thoracentesis could be performed tomorrow as needed.    AREN SPIVEY MD         SYSTEM ID:  [id number]

## 2023-06-07 NOTE — PROVIDER NOTIFICATION
MD Notification    Notified Person: MD    Notified Person Name: Dr. Nichelle Carbajal    Notification Date/Time: 1255    Notification Interaction: [employer] message    Purpose of Notification: i saw you d/c tele, can we d/c all imc orders?       Orders Received: yes      1312:are you ok if we place pt on regular tele?    comments: yes

## 2023-06-07 NOTE — PLAN OF CARE
A&O X4, pt more lethargic this afternoon.VSS on 2-4L NC, HTN systolic 170-150's.TELE NSR, pt went into afib RVR HR in 100-120's 1740, 1755 pt HR increased to 120-140's prn metoprolol given.at Lung Sounds diminished.Bowel Sounds active, no bm today.Voiding adequately.Assist of 2 with lift frequent turn/repo. Pt denies pain. Minced and moist diet with moderately thick liquids pt refused intake most of day. Compazine given once for nausea. K replaced recheck in for am.

## 2023-06-08 NOTE — PROGRESS NOTES
[facility]    Hospitalist Progress Note    Assessment & Plan   Ghislaine Walls is a 87 year old female with RA; HTN; CHF; PAF; hypothyroidism; GERD; gout; HLD; CKD4; MGUS; chronic anemia and TCP; who presented 5/30/2023 from her [facility] with weakness.      On initial evaluation 5/30,  afebrile, hemodynamically stable with oxygen saturation 97% on room air.  ECG showed sinus rhythm with first-degree AV block; Q waves in the inferior limb leads and V2 and V3; diffuse nonspecific T wave changes; no acute ischemic findings.   Hemoglobin 10.1 and platelets 148; BMP with creatinine 2.91 (was 2.78 5/16); calcium 11.9;   LFTs were normal; troponin was 100; TSH 0.98.   X-rays of her lumbar spine did not show any acute findings.    Head CT without contrast did not show any acute findings.  Hospitalization complicated by development of aspiration pneumonia with acute hypoxic respiratory failure, volume overload, A-fib with RVR.     Aspiration pneumonia.   Acute hypoxic respiratory failure likely multifactorial from aspiration pneumonia, volume overload, deconditioning.  Leukocytosis, suspect related to pneumonia as well as steroids - improved   * On 5/31, pt noted to have aspirated in the morning with coughing and new O2 requirement while eating. Later in the afternoon, pt again had a coughing spell after drinking water coarse, congested cough, needing up to 15L O2 with sats in 80's. RN able to suction food material with sats to 90's.   --CXR showed showed question some minimal pleural fluid on the left with minimal retrocardiac atelectasis and/or infiltrate; right lung clear. Started on pipericillin-tazobactam. Made NPO. Continued high O2 requirements and transferred to [facility]. Subsequently required high flow O2, then BiPAP overnight.  * On 6/1, WBC to 30 (but received IV steroids 5/31). Off BiPAP, back on HFNC in am. CXR showed new right base infiltrate; retrocardiac density similar, possibly related to  small hiatal hernia.   * On 6/2, down to 30L HFNC.  *On 6/4 down to 4 L nasal oxygen.  Has been having IV fluids, crackles present. (Pending weights).  PTA torsemide on hold since admission.  Follow proBNP.    ---  Added on Zosyn 5/31, discontinued 6/7.  --Continue ipratropium-albuterol nebs TID and PRN.  --  Overnight patient was weaned to room air but she required some oxygen during daytime occasionally.  Currently on 2-3 L of oxygen.  --Speech had reevaluated the patient, her diet has been further adjusted, patient is not happy about having to eat take thickened liquids.  Her sodium levels are going up.  This was discussed in detail with patient and family, palliative care consult requested since patient is requesting to drink tap water.  -Continue strict aspiration precautions, aggressive incentive spirometry, encourage ambulation.  COVID-19 PCR, influenza PCR negative  -Will need a repeat x-ray done in few weeks to evaluate for resolution.  -I had a long discussion with daughter, updated her about patient's wishes, discussed with palliative care team, tentative family meeting set up for Monday 6/12.  Meantime wean oxygen down as possible.     Afib with RVR likely in the setting of volume overload, aspiration.  H/o PAF on anticoagulation.  * ECG on admit showed sinus rhythm with 1st degree AVB.  * Made NPO 5/31 for dysphagia.   * Started IV metoprolol 6/1. Later on 6/1, pt noted to be tachycardic and ECG showed afib with RVR w/o acute ischemic changes.  * Swallowing improved 6/2.  Restarted Coreg 6.25 mg twice daily.  -6/3- increased Coreg to 12.5 mg twice daily.  _6/4 -A-fib with RVR.    Patient is currently on diltiazem drip, will need to change that to p.o. Dilt, pending cardiology input.  -Requested cardiology input, notes reviewed, patient did receive a dose of IV Lasix on 6/4, another dose was given on 6/5, plan is to initiate PTA medications 6/7.  Patient is getting more and more dehydrated due to reduced  oral intake.  Continue PTA Eliquis  -Echocardiogram results noted, patient noted to have a left-sided pleural effusion, global LV function is similar to prior echo but there is increased right-sided pressures.  Status post thoracentesis on 6/6 with 400 cc removed.  Keep potassium around 4, magnesium around 2  -6/7 evening patient went into A-fib with RVR again, temporarily started on diltiazem drip and later weaned off once started on oral diltiazem 60 mg 3 times daily.  Patient's torsemide is on hold due to hypernatremia and dehydration.     Volume overload likely multifactorial from fluid resuscitation, holding diuretic, heart failure, renal disease.    Acute (on Chronic) Diastolic Heart Failure  Troponin elevation suspect demand ischemia.  History of heart failure with preserved EF [LVEF of 55 to 60%]  S/p Hypertensive urgency related to being off PO meds (NPO).  [PTA: amlodipine 10 mg daily; carvedilol 6.25 mg BID; hydralazine 100 mg TID; ISDN 30 mg TID; torsemide 20 mg daily.]  * Echo 3/2023 showed LVEF 55-60%; RV OK; mild MS.  * Initial presentation as above.   Troponin 100 > 88 > 78   Torsemide held on admission, other blood pressure medications resumed  * On 5/31, made NPO for dysphagia. Started on scheduled IV hydralazine.  * On 6/1, BP's uncontrolled. Started on scheduled IV metoprolol and NTG patch.  * On 6/2, BP's improved. Swallowing improved.  Restarted PTA antihypertensives.  --On 6/4 - patient with increased oxygen needs, shortness of breath, A-fib with RVR.    Discontinue IV fluids.  Follow proBNP.  Dose of Lasix was given on 6/4, another dose given on 6/5.  -  Continue to hold PTA oral torsemide.  Monitor renal function in a.m. and accordingly further diuresis.  Continue increased dose of Coreg at 12.5 mg twice daily.  Has been restarted on PTA Isordil, hydralazine, amlodipine is temporarily on hold.  See echocardiogram results below.  Isordil doses were restarted on 6/3, will need to decide on  scheduling diuresis.  Strict input output monitoring.  Daily weights.  2000 mL fluid restriction.  -- Status post thoracentesis of left side on 6/6, patient was temporarily on room air, currently on 2 L.  --Torsemide is currently on hold due to hyponatremia, will try gentle D5W infusion, I did discuss with daughter on multiple occasions that this cannot be a solution to her reduced oral intake, it needs to be either a G-tube or patient needs to tolerate enough fluids.  Patient did not want G-tube placed and does not want to have the thickened liquids.  Hospice discussions ongoing  Left knee effusion  --Noted on 6/8, consulted orthopedic surgery, appears to be either gout or pseudogout.  Hypernatremia  --Monitor sodium levels, encouraged free water intake.  Patient has reduced intake due to thickening which he does not like.  --See above, palliative care consult requested since patient does not want to drink thickened liquid and not drinking enough fluids can result in other complications including dehydration, DRAGAN etc.  --Sodium 148 on 6/8, started on D5W infusion.  DRAGAN on CKD stage IV, suspect prerenal related to decreased PO intake, diuretic.  CKD stage IV.  * Baseline cr around mid to upper 2 range over the past year.  * Cr 2.91 on admit.  Receiving IV fluids, diuresis held with which sodium improving to 1.84.gone to 2.01 with diuresis .  Currently creatinine is 2.2.  Continue PTA with sodium bicarbonate.    Avoid nephrotoxic drugs.  Consider nephrology evaluation if creatinine worsening, will obtain creatinine 6/9  Acute encephalopathy likely multifactorial from metabolic, delirium from hospitalization, cognitive impairment.  Concern for cognitive impairment at baseline.  Per report confusion.  Treat medical issues as above.  - Re-orient as needed.  - Maintain normal day/night, sleep/wake cycles.  - Minimize sedating medications as able.  Cognitive screening as outpatient.     Physical deconditioning from  medical illness, senile frailty.  Severe malnutrition in the setting of poor oral intake  Dysphagia.   Goals of care.  --Resident of assisted living facility. Presented with poor oral intake over the past few months prior to admission, weight loss of 15 pounds.--Patient simply not wanting to eat, prescribed dronabinol and Remeron to try to stimulate appetite with no improvement.  * Nutrition consulted on admit and noted with severe malnutrition.  * Issues with aspiration 5/31 as above, subsequently made NPO; SLP consulted.  * On 6/1, SLP evaluated and still recommending NPO 6/5 patient underwent video swallow evaluation and diet has been further adjusted.   Continue to hold PTA dronabinol and mirtazapine for now.  -See above  -Multiple discussion with family regarding goals of care, palliative consult requested on 6/7.  PT, OT following.  Will likely need TCU.  Left-sided pleural effusion  -- Thoracentesis done this admission with 400 cc removed 6/6       Hypokalemia, hypomagnesemia-poor intake  Potassium 3.2, magnesium 1.6.  Replacement protocol ordered, monitor.     Constipation.  Per report constipation, receiving scheduled, as needed bowel meds.  Per nursing report today had bowel movement yesterday.  Abdominal soft, nontender, bowel sounds heard.    Continue scheduled, as needed bowel meds.  Monitor     GERD with h/o hiatal hernia.  Continue oral pantoprazole.     Anemia of chronic disease/CKD.  Chronic TCP.  MGUS.  * Follows with [facility]. Has received ANGELIC in the past.  * Hgb 10.1 and plts 148 on admit. No overt clinical signs of major bleeding.  Monitor CBC level in AM.  - Consider prbc transfusion if hgb </= 7.0 or if significant bleeding with hemodynamic instability or if symptomatic.  - Consider platelet transfusion if needs a procedure and less than 50,000; or if less than 10,000.     Rheumatoid arthritis.  Continue PTA hydroxychloroquine and prednisolone 5 mg daily.  Hold outpatient  abatacept.     Hypothyroidism.  Continue oral levothyroxine     Gout.  Continue PTA allopurinol.     Depression/anxiety.  Hold PTA Remeron as drowsy     DVT Prophylaxis: DOAC  Code Status: No CPR- Do NOT Intubate     52 MINUTES SPENT BY ME on the date of service doing chart review, history, exam, documentation & further activities per the note.  Disposition: Expected discharge in few days, will need TCU, hospice discussions ongoing..  Clinically Significant Risk Factors         # Hypernatremia: Highest Na = 148 mmol/L in last 2 days, will monitor as appropriate      # Hypoalbuminemia: Lowest albumin = 3.1 g/dL at 6/4/2023  5:56 AM, will monitor as appropriate     # Hypertension: Noted on problem list         # Severe Malnutrition: based on nutrition assessment         Nichelle Carbajal MD  Text Page   (7am to 6pm)    Interval History   Patient is currently on 4 L of oxygen, she also went into A-fib with RVR overnight, currently stable on p.o. diltiazem, patient had her sodium go up to 148 today, denies any nausea, has left knee swelling overnight.  -Data reviewed today: I reviewed all new labs and imaging results over the last 24 hours.     Physical Exam     Vital Signs with Ranges  Temp:  [97.5  F (36.4  C)-98.8  F (37.1  C)] 97.8  F (36.6  C)  Pulse:  [] 88  Resp:  [12-28] 14  BP: ()/() 124/84  SpO2:  [89 %-96 %] 93 %  I/O last 3 completed shifts:  In: 150 [P.O.:150]  Out: 350 [Urine:350]    Constitutional: Awake, alert, cooperative, no apparent distress  Respiratory: Bilateral basilar crackles noted  Cardiovascular: Regular rate and rhythm, normal S1 and S2, and no murmur noted  GI: Normal bowel sounds, soft, non-distended, non-tender  Skin/Integumen: No rashes, no cyanosis, 2+ edema noted lower extremity, left knee effusion noted  Neuro : moving all 4 extremities, no focal deficit noted     Medications     D5W       dilTIAZem 10 mg/hr (06/08/23 2450)     Reason anticoagulant not prescribed for  atrial fibrillation       - MEDICATION INSTRUCTIONS -         allopurinol  100 mg Oral Daily     apixaban ANTICOAGULANT  2.5 mg Oral BID     carvedilol  25 mg Oral BID w/meals     diltiazem  60 mg Oral Q8H KEVEN     docusate  100 mg Oral BID     [Held by provider] dronabinol  2.5 mg Oral BID AC     [Held by provider] ferrous sulfate  325 mg Oral Every Other Day     hydrALAZINE  75 mg Oral TID     hydroxychloroquine  200 mg Oral Daily     ipratropium - albuterol 0.5 mg/2.5 mg/3 mL  3 mL Nebulization TID     isosorbide dinitrate  30 mg Oral TID     levothyroxine  25 mcg Oral QAM AC     Lidocaine  1 patch Transdermal Q24H     lidocaine   Transdermal Q8H KEVEN     [Held by provider] mirtazapine  15 mg Oral At Bedtime     pantoprazole  40 mg Oral Daily     polyethylene glycol  17 g Oral Daily     predniSONE  5 mg Oral Daily     sodium bicarbonate  650 mg Oral BID     sodium chloride (PF)  3 mL Intracatheter Q8H     [Held by provider] torsemide  40 mg Oral Daily       Data   Recent Labs   Lab 06/08/23  0530 06/07/23  0521 06/06/23  1238 06/06/23  1122 06/06/23  0531 06/04/23  1435 06/04/23  0556 06/03/23  0529 06/02/23  0513   WBC  --   --   --   --  8.5  --   --  8.0 11.0   HGB  --   --   --   --  9.1*  --   --  9.5* 9.8*   MCV  --   --   --   --  103*  --   --  105* 104*   PLT  --   --   --   --  152  --   --  108* 113*   * 147*  --   --  147*  --  143 139 142   POTASSIUM 3.8 3.7  3.6  --  3.9 3.4   < > 3.2* 3.7 3.5   CHLORIDE  --  115*  --   --  116*  --  114* 109* 107   CO2  --  17*  --   --  18*  --  18* 19* 22   BUN  --  37.6*  --   --  35.1*  --  37.9* 43.5* 34.7*   CR  --  2.12*  --   --  2.01*  --  1.84* 2.24* 2.06*   ANIONGAP  --  15  --   --  13  --  11 11 13   ALIVIA  --  8.7*  --   --  8.5*  --  8.6* 8.8 9.7   GLC  --  93  --   --  84  --  88 131* 122*   ALBUMIN  --   --   --   --   --   --  3.1*  --   --    PROTTOTAL  --   --  6.0*  --   --   --  5.8*  --   --    BILITOTAL  --   --   --   --   --   --  1.0   --   --    ALKPHOS  --   --   --   --   --   --  49  --   --    ALT  --   --   --   --   --   --  15  --   --    AST  --   --   --   --   --   --  19  --   --     < > = values in this interval not displayed.     Recent Labs   Lab 06/07/23  0521 06/06/23  0531 06/04/23  0556 06/03/23  0529 06/02/23  0513   GLC 93 84 88 131* 122*       Imaging:   No results found for this or any previous visit (from the past 24 hour(s)).

## 2023-06-08 NOTE — PROGRESS NOTES
[facility]  Cardiology Progress Note    Date of Service (when I saw the patient): 06/08/2023  Summary: Ghislaine Walls is a 87 year old female with history of  HFpEF, paroxysmal atrial fibrillation [on anticoagulation, CKD, chronic anemia, failure to thrive, hypertension who presented to the hospital for complaints of failure to thrive who was admitted on 5/30/2023 with weakness and dysphagia.   Interval History   She reverted back to AF with RVR yesterday. Diltiazem drip started. HRs better but on 15 mg/hr of diltiazem. HRs now in the 80s. Per nursing notes, she has had low po intake and low UOP as well. Palliative care consulted yesterday. She would like to drink water accepting aspiration risk. This morning, she confirms she would like to drink water and coffee and go to hospice.   Assessment & Plan    1.  Atrial fibrillation with rapid ventricular response. [id number] score is elevated. On anticoagulation with Eliquis.   -  Continue with rate control strategy. Managed with diltiazem drip and carvedilol.   -  Converted overnight on 6/6/23 to NSR however had recurrent afib on 6/7/23 thus diltiazem drip resumed.   2.  Heart failure with preserved ejection fraction.  On Torsemide 20 mg daily. Dry weight felt to be around 115 - 120 lbs.   3.  Acute hypoxic respiratory failure secondary to volume overload due to IV fluids and discontinuation of diuretics.   -  Diuresed with IV lasix 40 mg BID with good response on 6/5.   -  Transitioned to oral diuretics 6/6.  4.  Failure to thrive, malnutrition. albumin 3.1  5.  Chronic anemia. Hemoglobin 11 at baseline.   6.  Hypertension. Uncontrolled.   7.  DRAGAN on CKD    Plan:  1.  Continue Carvedilol 25 mg twice daily.   2.  Start diltiazem 60 mg TID for rate control. Will stop amlodipine.   3.  Discussed with nursing. Will give oral diltiazem and attempt to wean dilt drip as able today.   4.  Continue isordil, hydralazine.   5.  Continue Eliquis for  thromboembolic prevention.  6.  Palliative care consult noted - she would like to go home with hospice. Per notes, further discussion with family planned.     Casie Blanco PA-C    Physical Exam   Temp: 97.8  F (36.6  C) Temp src: Oral BP: (!) 141/71 Pulse: 90   Resp: 22 SpO2: 92 % O2 Device: Nasal cannula Oxygen Delivery: 4 LPM  Vitals:    06/03/23 2100 06/05/23 2300 06/06/23 0538 06/07/23 0516   Weight: 59.2 kg (130 lb 8.2 oz) 57.8 kg (127 lb 6.8 oz) 54.7 kg (120 lb 9.5 oz) 67 kg (147 lb 11.3 oz)    06/08/23 0451   Weight: 59.4 kg (130 lb 15.3 oz)     Vital Signs with Ranges  Temp:  [97.5  F (36.4  C)-98.8  F (37.1  C)] 97.8  F (36.6  C)  Pulse:  [] 90  Resp:  [12-28] 22  BP: ()/() 141/71  SpO2:  [89 %-96 %] 92 %  06/03 0700 - 06/08 0659  In: 4361.75 [P.O.:2270; I.V.:2091.75]  Out: 2600 [Urine:2600]  Net: 1761.75  Constitutional: NAD.   Respiratory: anterior breath sounds are clear.  Cardiovascular: IRR.   GI: soft, BS+  Skin: warm, no rashes  Musculoskeletal: no edema.   Neurologic: Alert, oriented x 3  Neuropsychiatric: Normal affect   Data   Results for orders placed or performed during the hospital encounter of 05/30/23 (from the past 24 hour(s))   EKG 12-lead, tracing only   Result Value Ref Range    Systolic Blood Pressure  mmHg    Diastolic Blood Pressure  mmHg    Ventricular Rate 116 BPM    Atrial Rate 138 BPM    NY Interval  ms    QRS Duration 90 ms     ms    QTc 480 ms    P Axis  degrees    R AXIS -55 degrees    T Axis 108 degrees    Interpretation ECG       Atrial fibrillation with rapid ventricular response  Left anterior fascicular block  Anterior infarct (cited on or before 07-JUN-2023)  ST & T wave abnormality, consider lateral ischemia  Abnormal ECG  When compared with ECG of 01-JUN-2023 18:48,  No significant change was found     Magnesium   Result Value Ref Range    Magnesium 2.4 (H) 1.7 - 2.3 mg/dL   Phosphorus   Result Value Ref Range    Phosphorus 3.8 2.5 - 4.5  mg/dL   Potassium   Result Value Ref Range    Potassium 3.8 3.4 - 5.3 mmol/L        Tele AF     Medications     dilTIAZem 15 mg/hr (06/08/23 0654)     Reason anticoagulant not prescribed for atrial fibrillation       - MEDICATION INSTRUCTIONS -         allopurinol  100 mg Oral Daily     amLODIPine  10 mg Oral Daily     apixaban ANTICOAGULANT  2.5 mg Oral BID     carvedilol  25 mg Oral BID w/meals     diltiazem  60 mg Oral Q8H KEVEN     docusate sodium  100 mg Oral BID     [Held by provider] dronabinol  2.5 mg Oral BID AC     [Held by provider] ferrous sulfate  325 mg Oral Every Other Day     hydrALAZINE  75 mg Oral TID     hydroxychloroquine  200 mg Oral Daily     ipratropium - albuterol 0.5 mg/2.5 mg/3 mL  3 mL Nebulization TID     isosorbide dinitrate  30 mg Oral TID     levothyroxine  25 mcg Oral QAM AC     Lidocaine  1 patch Transdermal Q24H     lidocaine   Transdermal Q8H KEVEN     [Held by provider] mirtazapine  15 mg Oral At Bedtime     pantoprazole  40 mg Oral Daily     polyethylene glycol  17 g Oral Daily     potassium chloride  10 mEq Oral Once     predniSONE  5 mg Oral Daily     sodium bicarbonate  650 mg Oral BID     sodium chloride (PF)  3 mL Intracatheter Q8H     torsemide  40 mg Oral Daily

## 2023-06-08 NOTE — PLAN OF CARE
Goal Outcome Evaluation:      Plan of Care Reviewed With: patient    Overall Patient Progress: improvingOverall Patient Progress: improving    Orientations: A/Ox4, forgetful at times  Vitals/Pain: Hypertensive and tachycardic, all other VSS on NC 3L   Tele: Afib RVR/CVR   LS: diminished, crackles in the bases  Lines/Drains: L hand PIV running dilt 15 mg/hr. L bicep PIV SL.   Skin/Wounds: Blanchable redness on coccyx   GI/: BM-BS+ Low uo, poor PO intake. Complaints of some nausea-resolved w compazine   Labs: K(3.8) Mag (2.4) Phos (3.8)  Ambulation/Assist: turn and repo, pt refused repositioning occasionally overnight   Sleep Quality: Good, slept between cares   Plan: Dilt drip, palliative meeting with family

## 2023-06-08 NOTE — PLAN OF CARE
Orthopedic Treatment Plan    Ortho trauma team consulted regarding patient's concerns of left knee swelling. There are no recent images of the left knee. These were ordered today as well as some preliminary labs (CRP, procalcitonin, CBC). Will await left knee x-rays and see patient afterwards to provide recommendations. Ortho will see the patient later today vs tomorrow.    Formal consult to follow.    Inez Cruz PA-C  [facility]

## 2023-06-08 NOTE — PROVIDER NOTIFICATION
MD Notification  Notified Person Name: LAUREN Corea   Notification Date/Time: 6/8 at 0820   Notification Interaction: vocera message   RN message to MD: 1000: I saw the oral dilt order placed, should I stop the drip a certain time after giving the oral dilt? thanks!   MD response to RN:  Not really. Titrate the drip to keep hr below 110. Thanks for checking   Orders Received: no new orders at this this time     MD Notification  Notified Person Name: Casie Blanco  Notification Date/Time: 6/8 at 1000   Notification Interaction: vocera message   RN message to MD: oral dilt given and dilt drip turned down to 10 mg/hr like we talked about, will keep you posted! at what point would you want me following the protocol again for titration? thanks!   MD response: Thanks. You can just follow it and adjust like it says       MD Notification  Notified Person Name: TAMMIE Carbajal   Notification Date/Time: 6/8 at 1100   Notification Interaction: vocera message   RN message to MD: 6563: Pt having new L knee swelling, applied heat. Not sure what caused the swelling of the R knee. R foot pulse strong, denies numbness and tingling. Can pt Tylenol be ordered more frequently?   MD response to RN: md rounded at bedside   Orders Received:  increase frequency of tylenol, xray ordered and completed

## 2023-06-08 NOTE — PROGRESS NOTES
"SPIRITUAL HEALTH SERVICES  SPIRITUAL ASSESSMENT Progress Note (Palliative Focus)  [facility]    REFERRAL SOURCE: Length of stay    Visited with pt Ghislaine bedside. Pt shared her belief that she is \"at the end,\" and discussed her wishes for the rest of her life and eventual death.     Patient/Family Understanding of Illness and Goals of Care - Pt shared that she believes she is \"at the end\" because of issues with her heart, kidneys, and arthritis. She stated, \"there is nothing more they can do to treat me.\" She expressed a hope to possibly discharge to a TCU and then to hospice.     Distress and Loss   - Pt discussed that her children's hopes for her goals of care may differ from her own. She stated, \"my daughter keeps saying that she wants me to work to get stronger so I can do more of what I want to do, but there is nothing more I want to do.\"  - When asked what felt important to her right now, pt stated \"getting out of here,\" noting her dislike for drinking thickened water.    Strengths, Coping, and Resources - Pt reflected on a sense of \"peace\" and \"relief\" when speaking about being at the end of her life. She also noted the care of her children.    Meaning, Beliefs, and Spirituality   - Pt shared a love for the hymn \"Children of the Heavenly Father,\" and expressed finding comfort in the idea of being held by God.   - Pt is [employer] and appreciated prayer, specifically asking for prayers for her children to accept her wishes and for peace through \"whatever happens.\" I offered prayer together, incorporating requested prayers and conversational themes.    Plan of Care - Palliative chaplains to continue to follow. Please consult should any needs arise.    Sarah Johanson  Chaplain Resident  Spiritual Health Services  [facility] Pager: [phone]    [facility] available 24/7 for emergent requests/referrals, either by having the on-call  paged or by entering an ASAP/STAT consult in Epic (this will also page " the on-call ).

## 2023-06-08 NOTE — PROVIDER NOTIFICATION
MD Notification    Notified Person: MD    Notified Person Name:      Notification Date/Time: 1903     Notification Interaction: amcom     Purpose of Notification: pt went into afib rvr 1730, 1745 hr jumped to 120-140, 2.5 mg iv metoprolol given 1800 no change in HR still 120-140 please advise.    Orders Received:  diltiazem drip

## 2023-06-08 NOTE — PROVIDER NOTIFICATION
"Dr. Matos paged at 4470    \" 314    50 mL of uo since 1900, bladder scan <68. Pt has had low po intake all day. Do you want IV fluids?     Summer RN   [id number]\"    "

## 2023-06-08 NOTE — PLAN OF CARE
Orientation: A&Ox4, occ forgetful   Vitals/Pain: VSS on 3-4L NC sating >90%. Pt reports back and generalized pain, managing with PRN Tylenol and Flexeril    Tele: Afib CVR - HR went into the 40s, dilt stopped per Mar direction, EKG completed to confirm SB   Lines/Drains: L forearm PIV saline locked, PRESTON PIV with D5 running at 50ml/hr   LS: inspiratory and expiratory wheezing   GI/: BS hypoactive, faint, x1 BM this shift. Pt having small urine output throughout shift, IV fluids started   Labs: Abnormal/Trends, Electrolyte Replacement- K replaced x1, recheck in AM, Na 148 - D5 fluids started   Ambulation/Assist: q2h turn, encouraging pt to get in chair today   Skin/Wounds: slight blanchable redness on coccyx, mepilex in place   Plan: palliative/goal of care conference     Goal Outcome Evaluation:  Plan of Care Reviewed With: patient  Overall Patient Progress: no change   Outcome Evaluation: Pt seen by palliative today. Pt started on D5. Pt having poor oral intake, poor appetite

## 2023-06-08 NOTE — PROVIDER NOTIFICATION
MD Notification  Notified Person Name: TAMMIE Carbajal   Notification Date/Time: 6/8 at 1100   Notification Interaction: vocera message   RN message to MD: 5605: Pt having new R knee swelling, applied heat. Not sure what caused the swelling of the R knee. R foot pulse strong, denies numbness and tingling. Can pt Tylenol be ordered more frequently?  MD response to RN: will consult ortho to potentially drain the knee   Orders Received: pending new orders at this time

## 2023-06-08 NOTE — PROGRESS NOTES
"Received a phone call from daughter Krupa. She and family would like to have a care conference with me on Monday 6/12 at 1030. All 3 adult children will be present. Krupa would like the hospitalist to be in attendance as well, if possible. I updated Dr. Carbajal. Krupa did state to me that when she visited Ghislaine last night, Ghislaine again told her she is not ready for hospice now, but maybe soon. Thanks.    Alma PUENTES, INES  Palliative Medicine   [facility]  Securely message with [employer]   *If you are having trouble locating my name, please ensure \"global\" is checked under contacts tab, within the sites button      "

## 2023-06-09 NOTE — PROGRESS NOTES
CLINICAL NUTRITION SERVICES - REASSESSMENT NOTE      RECOMMENDATIONS FOR MD/PROVIDER TO ORDER:   Recommend discuss alternate means of nutrition (noted Palliative meeting planned Monday 6/12)    Recommendations Ordered by Registered Dietitian (RD):   Recommend tube feeding as patient unable to improve or maintain oral intake or weight given poor appetite - if aligns with GOC  Standard trays TID   Magic cup TID with all meals    Malnutrition: (6/5)  % Weight Loss:  Weight loss does not meet criteria for malnutrition (5/31)  % Intake:  </=75% for >/= 1 month (severe) (5/31)  Subcutaneous Fat Loss: Facial region:  mild, Upper arm: mild and Lower arm:  mild (5/31)  Muscle Loss: Thoracic region (clavicle, acromium bone, deltoid, trapezius, pectoral):  severe, Upper arm (bicep, tricep):  severe and Lower arm  (forearm):  severe (5/31)  Fluid Retention:  Trace to mild generalized edema      Malnutrition Diagnosis: Severe malnutrition in the context of -  Acute illness or injury       EVALUATION OF PROGRESS TOWARD GOALS   Diet:  Minced/Moist  Liquidized/Moderatley thick   2000 mL Fluid restriction     Intake/Tolerance:    Pt continues with minimal PO intake   Receiving 1-3 meals/day, documented to be consuming 25% at best   Spoke with RN who reports this is a continuation of poor PO/appetite from PTA  Received patient preferred foods and will set standard meal trays with magic cup per RN suggestion     ASSESSED NUTRITION NEEDS:  Dosing wt: 50.6 kg (adjusted, based on 59.2 kg-- 6/3)  Estimated Energy Needs: 0010-9170 kcals/day (25 - 30 kcals/kg)  Justification: Maintenance  Estimated Protein Needs: 51-61 grams protein/day (1-1.2 grams of pro/kg)  Justification: preservation of muscle mass  Estimated Fluid Needs: 1 mL/kcal  Justification: Maintenance or Per provider pending fluid status       NEW FINDINGS:   D5 IVF started yesterday and stopped this AM  Per chart, Palliative meeting with care fran planned early next week  "\"not yet ready for hospice now, but maybe soon\"   Weight 59.4 kg   Vitals:    06/03/23 2100 06/05/23 2300 06/06/23 0538 06/07/23 0516   Weight: 59.2 kg (130 lb 8.2 oz) 57.8 kg (127 lb 6.8 oz) 54.7 kg (120 lb 9.5 oz) 67 kg (147 lb 11.3 oz)    06/08/23 0451   Weight: 59.4 kg (130 lb 15.3 oz)       Previous Goals:   Pt to consume >50% of at least 2 meals/day   Evaluation: Not met  Wt >59 kg   Evaluation: Not met    Previous Nutrition Diagnosis:   Inadequate oral intake related to difficulty swallowing and poor appetite as evidenced by pt report, 9% wt loss x 1 year, minimal intake this admit x6 days   Evaluation: No change      CURRENT NUTRITION DIAGNOSIS  Inadequate oral intake related to difficulty swallowing and poor appetite as evidenced by pt report, 9% wt loss x 1 year, minimal intake this admit x10 days     INTERVENTIONS  Recommendations / Nutrition Prescription  Recommend tube feeding as patient unable to improve or maintain oral intake or weight given poor appetite - if aligns with GOC  Standard trays TID   Magic cup TID with all meals     Implementation  Medical Food Supplement and Collaboration and Referral of Nutrition care: as above     Goals  Patient will consume at least 50% meals and supplements BID vs Nutrition POC determined within 72 hrs       MONITORING AND EVALUATION:  Progress towards goals will be monitored and evaluated per protocol and Practice Guidelines      Paulette Rajan RD, LD  Clinical Dietitian   [facility]  Pager [phone]    "

## 2023-06-09 NOTE — PLAN OF CARE
Goal Outcome Evaluation:  DATE & TIME: 06/08/2023, 5771-1347   Cognitive Concerns/ Orientation : oriented times four  BEHAVIOR & AGGRESSION TOOL COLOR: calm   ABNL VS/O2: HR tyler. Other vitals stable   MOBILITY: turn and repo every two hours   PAIN MANAGMENT: denied  DIET: minced and moist level 5, moderately thick level 3  BOWEL/BLADDER: incontinent of urine times one, external cath replaced  ABNL LAB/BG: on e-lyte replacement. Plan to repeat lab tomorrow  DRAIN/DEVICES: PIV times two  TELEMETRY RHYTHM: sinus tyler, HS PO diltiazem held this mariah.  Per cardiology to hold HR<55  SKIN: bruised scattered and blanchable redness coccyx  TESTS/PROCEDURES: lab in AM  D/C DATE: Pending  OTHER IMPORTANT INFO: continue to monitor.

## 2023-06-09 NOTE — PLAN OF CARE
A&O x 4. HTN and tyler, otherwise VSS on 3-4LNC. Tele: Sinus tyler, 1deg AVblock. Q2h turn/repo, up with lift. Minced/moist diet, level 3 thickened liquids, tolerating well. PIV infusing D5 at 50mL/hr. Lungs diminished. Pain managed with PRN Tylenol. Incontinent of b/b; Purewick in place, -BM, +BS. Continue plan of care.

## 2023-06-09 NOTE — PROVIDER NOTIFICATION
MD Notification  Notified Person Name: MAT Brice   Notification Date/Time: 6/8 at 1925   Notification Interaction: amcom paging   RN message to MD: 9400 SH: Pt went into SB with first degree block rates in the 40's at 1847, SBP in the 130's, pt asymptomatic. Dilt drip turned off. To confirm as long as she is NSR we will not restart the drip? [phone]   MD response to RN: okay to keep the drip off while out of AFib, continue to give oral dilt as long as HR >55

## 2023-06-09 NOTE — PROVIDER NOTIFICATION
MD Notification  Notified Person Name: Bella   Notification Date/Time: 6/9 at 1730   Notification Interaction: vocera message   RN message to MD: also pt has x2 3 beats runs of SVT @125, SBP in the 180-170's, managing with PRN medications. pt asymptomatic   MD response to RN: no response at this time   Orders Received: no new orders at this time

## 2023-06-09 NOTE — CONSULTS
[facility]    Orthopedic Consultation    Ghislaine Walls MRN# [id number]   Age: 87 year old YOB: 1935     Date of Admission: 5/30/2023    Reason for consult: Left knee swelling and pain       Requesting physician: Nichelle Carbajal MD       Level of consult: One-time consult to assist in determining a diagnosis, recommend an appropriate treatment plan and place orders           Assessment and Plan:   Assessment:   1. Advanced left knee degenerative joint disease  2. Left lateral thigh hematoma  3. Physical deconditioning      Plan:   The patient's history and clinical/diagnostic findings were reviewed with the on-call orthopedic trauma surgeon. The patient reports an acute onset of left knee pain yesterday after attempting to stand for the first time in awhile. Suspect a degree of exacerbation of OA in addition to deconditioning/stiffness. Currently, the patient denies experiencing left knee pain. Exam is benign aside from slightly limited ROM due to stiffness/swelling and an ecchymotic area over the lateral aspect of the left distal thigh consistent with a hematoma. Left knee x-rays dated 6/8/23 demonstrate advanced degenerative changes of the left knee with chronic bony proliferation of the lateral patella. No acute bony trauma on x-ray. Do not suspect septic joint. Recommend the following nonoperative measures for pain management:    -Diclofenac gel and lidocaine patches ordered. Recommended scheduled use of Tylenol if able (defer to medicine to update order). Maximize non-opioid pain relievers as able.   -Encourage use of Ace wrap (on during the day, off at night) and cold compresses.  -As the patient denies pain at this time and is not interested in further measures, will defer an intraarticular left knee cortisone injection. Should pain persist over the next few days, we may reconsider this.   -WBAT LLE with walker and assist.  -Strongly encourage working/mobilizing with  PT/OT.  -Follow up with a knee specialist at [facility]. Ortho trauma team will plan to revisit with the patient in a couple of days to see how she is progressing. Please contact our team if any questions or concerns arise.           Chief Complaint:   Left knee swelling         History of Present Illness:   Medical history obtained via chart review and discussion with the patient. Ghislaine Walls is an 87 year old female with past medical history of atrial fibrillation on Eliquis, HTN, HLD, PAD, and hypothyroidism who was admitted on 5/30/23 for weakness. On 6/8/23, the patient attempted to stand with the assistance of staff and had an acute onset of left knee pain at that time. Denies any apparent trauma. There has been slightly increased swelling of the left knee associated with the onset. During the onset, the patient states that her left anterior knee was most uncomfortable. Today, the patient denies experiencing pain. Denies numbness and tingling over the left lower extremity. I spoke with the patient's RN who states that the patient has not been ambulating well throughout the entire hospital stay. The patient seemingly uses a walker at baseline. She resides in an assisted living facility. No known prior injuries or surgeries to her left knee.           Past Medical History:     Past Medical History:   Diagnosis Date     Atrial fibrillation (H) new 10/19     [place] (hard of hearing)     wears hearing aids     Hyperlipidaemia      Hyperlipidaemia LDL goal < 130      Hypertension      Hypothyroid      PAD (peripheral artery disease) (H)      Renal insufficiency, mild      Uncontrolled hypertension 10/14/2019             Past Surgical History:     Past Surgical History:   Procedure Laterality Date     APPENDECTOMY  1951     BONE MARROW BIOPSY, BONE SPECIMEN, NEEDLE/TROCAR N/A 7/31/2020    Procedure: bone marrow biopsy;  Surgeon: Iris Cameron MD;  Location:  GI     CATARACT IOL, RT/LT  2001     bilateral (laser - 2013)     DENTAL SURGERY      wisdom     ESOPHAGOSCOPY, GASTROSCOPY, DUODENOSCOPY (EGD), COMBINED N/A 2020    Procedure: ESOPHAGOGASTRODUODENOSCOPY, WITH BIOPSY;  Surgeon: Humberto Bailon MD;  Location:  GI     EYE SURGERY      Muscle release     TUBAL LIGATION               Social History:     Social History     Tobacco Use     Smoking status: Former     Packs/day: 0.50     Years: 5.00     Pack years: 2.50     Types: Cigarettes     Quit date: 1973     Years since quittin.4     Smokeless tobacco: Never   Vaping Use     Vaping status: Never Used   Substance Use Topics     Alcohol use: Yes     Comment: one light beer of  occ             Family History:     Family History   Problem Relation Age of Onset     Heart Disease Mother      Respiratory Mother      Heart Disease Father      Heart Disease Brother      Coronary Artery Disease Brother         CAB     Heart Disease Brother      Coronary Artery Disease Brother         CAB             Immunizations:     VACCINE/DOSE   Diptheria   DPT   DTAP   HBIG   Hepatitis A   Hepatitis B   HIB   Influenza   Measles   Meningococcal   MMR   Mumps   Pneumococcal   Polio   Rubella   Small Pox   TDAP   Varicella   Zoster             Allergies:     Allergies   Allergen Reactions     Oxybutynin Itching     Seasonal Allergies              Medications:     Current Facility-Administered Medications   Medication     acetaminophen (TYLENOL) tablet 650 mg    Or     acetaminophen (TYLENOL) Suppository 650 mg     acetaminophen (TYLENOL) tablet 650 mg     allopurinol (ZYLOPRIM) tablet 100 mg     amiodarone (PACERONE) tablet 400 mg     apixaban ANTICOAGULANT (ELIQUIS) tablet 2.5 mg     bisacodyl (DULCOLAX) suppository 10 mg     carboxymethylcellulose PF (REFRESH PLUS) 0.5 % ophthalmic solution 1 drop     carvedilol (COREG) tablet 25 mg     cyclobenzaprine (FLEXERIL) half-tab 2.5-5 mg     diclofenac (VOLTAREN) 1 % topical gel 4 g      docusate (COLACE) 50 MG/5ML liquid 100 mg     [Held by provider] dronabinol (MARINOL) capsule 2.5 mg     [Held by provider] ferrous sulfate (FEROSUL) tablet 325 mg     HOLD: All Oral Medications     hydrALAZINE (APRESOLINE) injection 10 mg     hydrALAZINE (APRESOLINE) tablet 75 mg     hydroxychloroquine (PLAQUENIL) tablet 200 mg     ipratropium - albuterol 0.5 mg/2.5 mg/3 mL (DUONEB) neb solution 3 mL     ipratropium - albuterol 0.5 mg/2.5 mg/3 mL (DUONEB) neb solution 3 mL     isosorbide dinitrate (ISORDIL) tablet 30 mg     labetalol (NORMODYNE/TRANDATE) injection 10 mg     levothyroxine (SYNTHROID/LEVOTHROID) tablet 25 mcg     Lidocaine (LIDOCARE) 4 % Patch 2 patch     lidocaine (LMX4) cream     lidocaine 1 % 0.1-1 mL     lidocaine patch in PLACE     melatonin tablet 1 mg     metoprolol (LOPRESSOR) injection 2.5 mg     [Held by provider] mirtazapine (REMERON) tablet 15 mg     nitroGLYcerin (NITROSTAT) sublingual tablet 0.4 mg     No anticoagulants IF patient has had acute trauma/surgery or recent intracranial, GI or urinary tract bleeding.      No lozenges or gum should be given while patient on BIPAP/AVAPS/AVAPS AE     pantoprazole (PROTONIX) EC tablet 40 mg     polyethylene glycol (MIRALAX) Packet 17 g     predniSONE (DELTASONE) tablet 5 mg     prochlorperazine (COMPAZINE) injection 5 mg    Or     prochlorperazine (COMPAZINE) tablet 5 mg    Or     prochlorperazine (COMPAZINE) suppository 12.5 mg     promethazine (PHENERGAN) 12.5 mg in sodium chloride 0.9 % 55 mL intermittent infusion     senna-docusate (SENOKOT-S/PERICOLACE) 8.6-50 MG per tablet 1 tablet    Or     senna-docusate (SENOKOT-S/PERICOLACE) 8.6-50 MG per tablet 2 tablet     sodium bicarbonate tablet 650 mg     sodium chloride (PF) 0.9% PF flush 3 mL     sodium chloride (PF) 0.9% PF flush 3 mL     torsemide (DEMADEX) tablet 40 mg             Review of Systems:   CV: NEGATIVE for chest pain, palpitations or peripheral edema  C: NEGATIVE for fever,  chills, change in weight  E/M: NEGATIVE for ear, mouth and throat problems  R: NEGATIVE for significant cough or SOB          Physical Exam:   All vitals have been reviewed  Patient Vitals for the past 24 hrs:   BP Temp Temp src Pulse Resp SpO2   06/09/23 1614 (!) 186/79 -- -- 73 -- --   06/09/23 1405 (!) 183/75 -- -- -- -- --   06/09/23 1200 (!) 174/73 -- -- 59 21 94 %   06/09/23 1000 (!) 169/83 -- -- 60 15 90 %   06/09/23 0832 -- -- -- -- -- 90 %   06/09/23 0830 -- -- -- 58 22 (!) 88 %   06/09/23 0819 -- -- -- 59 -- --   06/09/23 0800 (!) 174/89 -- -- 60 24 (!) 88 %   06/09/23 0700 -- 97.6  F (36.4  C) Oral -- -- --   06/09/23 0600 (!) 179/70 -- -- 58 15 96 %   06/09/23 0400 (!) 177/69 97.7  F (36.5  C) Oral 60 15 95 %   06/09/23 0200 (!) 150/94 -- -- 60 14 91 %   06/09/23 0000 (!) 168/84 97.3  F (36.3  C) Oral 56 21 90 %   06/08/23 2233 (!) 142/56 -- -- 51 12 93 %   06/08/23 2206 (!) 150/75 -- -- 52 20 92 %   06/08/23 2200 -- -- -- 53 23 90 %   06/08/23 2147 (!) 162/72 97.5  F (36.4  C) Oral 51 15 92 %   06/08/23 2016 (!) 147/62 97.3  F (36.3  C) Oral (!) 47 22 92 %   06/08/23 2000 (!) 151/79 -- -- (!) 47 19 93 %   06/08/23 1948 (!) 145/73 -- -- (!) 46 20 94 %   06/08/23 1937 -- -- -- -- -- 95 %   06/08/23 1915 133/62 -- -- (!) 48 24 93 %   06/08/23 1900 (!) 143/57 -- -- (!) 47 18 92 %   06/08/23 1846 (!) 145/83 -- -- (!) 44 -- --   06/08/23 1732 135/87 -- -- 85 -- --   06/08/23 1715 -- -- -- 96 20 93 %   06/08/23 1710 -- -- -- 97 19 93 %   06/08/23 1705 -- -- -- 77 22 92 %   06/08/23 1700 -- -- -- 98 18 92 %   06/08/23 1655 -- -- -- 96 15 92 %   06/08/23 1650 -- -- -- 98 21 91 %   06/08/23 1640 -- -- -- 89 21 93 %       Intake/Output Summary (Last 24 hours) at 6/9/2023 1636  Last data filed at 6/9/2023 1616  Gross per 24 hour   Intake 1620 ml   Output 950 ml   Net 670 ml       Constitutional: Pleasant, alert, appropriate, following commands. Non-toxic appearing. NAD.  HEENT: Head atraumatic normocephalic. Pupils  equal round and reactive.  Respiratory: Unlabored breathing no audible wheeze  Cardiovascular: Regular rate and rhythm per pulses.  GI: Abdomen is non-distended.  Lymph/Hematologic: No lymphadenopathy in areas examined.  Genitourinary: No dupont  Skin: No rashes, no cyanosis, no edema.  Musculoskeletal: Left lower extremity: Skin intact. No erythema of the left knee. Ecchymosis and palpable induration, suspected to be a hematoma, over the lateral distal thigh. Nontender throughout the medial and lateral joint lines, patella, quadriceps and patellar tendon, posterior knee, calf, ankle, foot, and anterior and lateral hip. AROM of left knee from 0-95 degrees limited by stiffness. No pain with PROM of the knee. No pain with passive log roll of the left leg. Able to complete SLR actively. 5/5 ankle DF and PF. Able to flex and extend toes. DP pulse 2+. SILT.  Neurologic: GCS 15          Data:   All laboratory data reviewed  Results for orders placed or performed during the hospital encounter of 05/30/23   CT Head w/o Contrast     Status: None    Narrative    CT SCAN OF THE HEAD WITHOUT CONTRAST May 30, 2023 3:05 PM     HISTORY: Fall, head injury.    TECHNIQUE: Axial images of the head and coronal reformations without  IV contrast material. Radiation dose for this scan was reduced using  automated exposure control, adjustment of the mA and/or kV according  to patient size, or iterative reconstruction technique.    COMPARISON: None.    FINDINGS: There is no evidence of intracranial hemorrhage, mass, acute  infarct or anomaly. The ventricles are normal in size, shape and  configuration. Mild patchy periventricular white matter hypodensities  which are nonspecific, but likely related to chronic microvascular  ischemic disease.     The visualized portions of the sinuses and mastoids appear normal. The  bony calvarium and bones of the skull base appear intact.       Impression    IMPRESSION:     1. No evidence of acute  intracranial hemorrhage, mass, or herniation.  2. Mild nonspecific white matter changes likely due to chronic  microvascular ischemic disease.      DARVIN MATIAS MD         SYSTEM ID:  [id number]   XR Lumbar Spine 2/3 Views     Status: None    Narrative    LUMBAR SPINE TWO TO THREE VIEWS May 30, 2023 3:04 PM     HISTORY: Fall, back pain.    COMPARISON: Lumbar spine x-ray 4/6/2023.       Impression    IMPRESSION: Bones appear demineralized which limits evaluation. Lumbar  spine alignment is grossly within normal limits. No obvious loss of  vertebral body height. Mild degenerative endplate changes and loss of  disc height at L2-L3.    DARVIN MATIAS MD         SYSTEM ID:  [id number]   XR Chest Port 1 View     Status: None    Narrative    CHEST ONE VIEW  5/31/2023 3:41 PM     HISTORY: Dyspnea, hypoxia, evaluate for focal infiltrates/edema.    COMPARISON: January 14, 2022      Impression    IMPRESSION: Question some minimal pleural fluid on the left with  minimal retrocardiac atelectasis and/or infiltrate. Right lung clear.    HIREN ARVIZU MD         SYSTEM ID:  [id number]   XR Chest Port 1 View     Status: None    Narrative    CHEST ONE VIEW  6/1/2023 7:39 AM     HISTORY: Dyspnea, hypoxia, evaluate for focal infiltrates/edema.    COMPARISON: None.      Impression    IMPRESSION: New right base infiltrate. Retrocardiac density similar  possibly related to small hiatal hernia.     HIREN ARVIZU MD         SYSTEM ID:  [id number]   XR Chest Port 1 View     Status: None    Narrative    EXAM: XR CHEST PORT 1 VIEW  LOCATION: [facility]  DATE/TIME: 6/4/2023 8:10 AM CDT    INDICATION: SOB, INCREASED o2 Needs  COMPARISON: None.      Impression    IMPRESSION: Engorged indistinct central pulmonary vasculature with perihilar predominant airspace opacity and left greater than right small pleural effusions, findings favor ulnar edema, correlate to exclude superimposed infection. Recommend follow-up to   resolution.  No pneumothorax. Borderline enlarged heart. Mildly atherosclerotic aorta.   XR Video Swallow with SLP or OT     Status: None    Narrative    VIDEO SWALLOWING EVALUATION   6/5/2023 11:26 AM     HISTORY: Acute hypoxic respiratory failure, multifactorial, reported  possible aspiration pneumonia, coughing at the bedside, clinical  concern for aspiration.    COMPARISON: None.    FLUOROSCOPY TIME: 1.8 minutes.  SPOT IMAGES OR CINE RUNS: 11      Impression    IMPRESSION:  Thin: Deep penetration to the level of the vocal cords.    Slightly thick: Moderate penetration.    Mildly thick: Deep penetration with residue along the undersurface of  the epiglottis.    Moderately thick: Deep penetration observed with a cup. No penetration  with teaspoon.    Puree: No penetration or aspiration.    Semisolid: No penetration or aspiration.    This study only includes the cervical esophagus. Please see separate  report from speech pathology for additional detail.     HIREN PUENTE MD         SYSTEM ID:  [id number]   US Thoracentesis     Status: None    Narrative    EXAM:  1. LEFT THORACENTESIS  2. ULTRASOUND GUIDANCE  LOCATION: [facility]  DATE/TIME: 6/6/2023 2:24 PM    INDICATION: Pleural effusion.    PROCEDURE: Informed consent obtained. Time out performed. The chest  was prepped and draped in a sterile fashion. 10 mL of 1% lidocaine was  infused into local soft tissues. A 5 French catheter system was  introduced into the pleural effusion under ultrasound guidance.    0.4 liters of clear fluid were removed and sent to lab if requested.    Patient tolerated procedure well.    Ultrasound images have been permanently captured for documentation.      Impression    IMPRESSION:    1. Status post ultrasound-guided left thoracentesis.  2. There is also a moderate right pleural effusion. Right  thoracentesis could be performed tomorrow as needed.    AREN SPIVEY MD         SYSTEM ID:  [id number]   XR Knee Left 3 Views     Status: None     Narrative    EXAM: XR KNEE LEFT 3 VIEWS  LOCATION: [facility]  DATE/TIME: 6/8/2023 6:04 PM CDT    INDICATION: Left knee swelling and pain.  COMPARISON: 07/27/2020.      Impression    IMPRESSION: Tricompartmental degenerative changes which are advanced in the patellofemoral compartment. Chronic bony proliferative change along the lateral patella facet. There is no evidence of an acute fracture. No significant joint effusion. Bones are   demineralized. Atherosclerotic vascular calcifications.   Comprehensive metabolic panel     Status: Abnormal   Result Value Ref Range    Sodium 142 136 - 145 mmol/L    Potassium 4.2 3.4 - 5.3 mmol/L    Chloride 104 98 - 107 mmol/L    Carbon Dioxide (CO2) 25 22 - 29 mmol/L    Anion Gap 13 7 - 15 mmol/L    Urea Nitrogen 36.6 (H) 8.0 - 23.0 mg/dL    Creatinine 2.91 (H) 0.51 - 0.95 mg/dL    Calcium 11.9 (H) 8.8 - 10.2 mg/dL    Glucose 91 70 - 99 mg/dL    Alkaline Phosphatase 41 35 - 104 U/L    AST 25 10 - 35 U/L    ALT 10 10 - 35 U/L    Protein Total 6.8 6.4 - 8.3 g/dL    Albumin 4.0 3.5 - 5.2 g/dL    Bilirubin Total 0.9 <=1.2 mg/dL    GFR Estimate 15 (L) >60 mL/min/1.73m2   Lactic acid whole blood     Status: Normal   Result Value Ref Range    Lactic Acid 0.7 0.7 - 2.0 mmol/L   Troponin T, High Sensitivity     Status: Abnormal   Result Value Ref Range    Troponin T, High Sensitivity 100 (HH) <=14 ng/L   Magnesium     Status: Normal   Result Value Ref Range    Magnesium 1.9 1.7 - 2.3 mg/dL   TSH with free T4 reflex     Status: Normal   Result Value Ref Range    TSH 0.98 0.30 - 4.20 uIU/mL   UA with Microscopic reflex to Culture     Status: Abnormal    Specimen: Urine, Midstream   Result Value Ref Range    Color Urine Light Yellow Colorless, Straw, Light Yellow, Yellow    Appearance Urine Clear Clear    Glucose Urine Negative Negative mg/dL    Bilirubin Urine Negative Negative    Ketones Urine Negative Negative mg/dL    Specific Gravity Urine 1.016 1.003 -  1.035    Blood Urine Negative Negative    pH Urine 7.5 (H) 5.0 - 7.0    Protein Albumin Urine 100 (A) Negative mg/dL    Urobilinogen Urine Normal Normal, 2.0 mg/dL    Nitrite Urine Negative Negative    Leukocyte Esterase Urine Negative Negative    Mucus Urine Present (A) None Seen /LPF    RBC Urine 1 <=2 /HPF    WBC Urine 2 <=5 /HPF    Squamous Epithelials Urine <1 <=1 /HPF    Hyaline Casts Urine 4 (H) <=2 /LPF    Narrative    Urine Culture not indicated   CBC with platelets and differential     Status: Abnormal   Result Value Ref Range    WBC Count 7.9 4.0 - 11.0 10e3/uL    RBC Count 3.05 (L) 3.80 - 5.20 10e6/uL    Hemoglobin 10.1 (L) 11.7 - 15.7 g/dL    Hematocrit 31.8 (L) 35.0 - 47.0 %     (H) 78 - 100 fL    MCH 33.1 (H) 26.5 - 33.0 pg    MCHC 31.8 31.5 - 36.5 g/dL    RDW 15.4 (H) 10.0 - 15.0 %    Platelet Count 148 (L) 150 - 450 10e3/uL    % Neutrophils 69 %    % Lymphocytes 10 %    % Monocytes 20 %    % Eosinophils 0 %    % Basophils 0 %    % Immature Granulocytes 1 %    NRBCs per 100 WBC 0 <1 /100    Absolute Neutrophils 5.5 1.6 - 8.3 10e3/uL    Absolute Lymphocytes 0.8 0.8 - 5.3 10e3/uL    Absolute Monocytes 1.6 (H) 0.0 - 1.3 10e3/uL    Absolute Eosinophils 0.0 0.0 - 0.7 10e3/uL    Absolute Basophils 0.0 0.0 - 0.2 10e3/uL    Absolute Immature Granulocytes 0.1 <=0.4 10e3/uL    Absolute NRBCs 0.0 10e3/uL   Troponin T, High Sensitivity     Status: Abnormal   Result Value Ref Range    Troponin T, High Sensitivity 88 (H) <=14 ng/L   Basic metabolic panel     Status: Abnormal   Result Value Ref Range    Sodium 142 136 - 145 mmol/L    Potassium 3.8 3.4 - 5.3 mmol/L    Chloride 108 (H) 98 - 107 mmol/L    Carbon Dioxide (CO2) 22 22 - 29 mmol/L    Anion Gap 12 7 - 15 mmol/L    Urea Nitrogen 37.2 (H) 8.0 - 23.0 mg/dL    Creatinine 2.67 (H) 0.51 - 0.95 mg/dL    Calcium 10.7 (H) 8.8 - 10.2 mg/dL    Glucose 98 70 - 99 mg/dL    GFR Estimate 17 (L) >60 mL/min/1.73m2   CBC with platelets and differential     Status:  Abnormal   Result Value Ref Range    WBC Count 4.6 4.0 - 11.0 10e3/uL    RBC Count 3.01 (L) 3.80 - 5.20 10e6/uL    Hemoglobin 10.0 (L) 11.7 - 15.7 g/dL    Hematocrit 31.4 (L) 35.0 - 47.0 %     (H) 78 - 100 fL    MCH 33.2 (H) 26.5 - 33.0 pg    MCHC 31.8 31.5 - 36.5 g/dL    RDW 15.4 (H) 10.0 - 15.0 %    Platelet Count 133 (L) 150 - 450 10e3/uL   Manual Differential     Status: Abnormal   Result Value Ref Range    % Neutrophils 46 %    % Lymphocytes 31 %    % Monocytes 22 %    % Eosinophils 0 %    % Basophils 1 %    Absolute Neutrophils 2.1 1.6 - 8.3 10e3/uL    Absolute Lymphocytes 1.4 0.8 - 5.3 10e3/uL    Absolute Monocytes 1.0 0.0 - 1.3 10e3/uL    Absolute Eosinophils 0.0 0.0 - 0.7 10e3/uL    Absolute Basophils 0.0 0.0 - 0.2 10e3/uL    RBC Morphology Confirmed RBC Indices     Platelet Assessment  Automated Count Confirmed. Platelet morphology is normal.     Automated Count Confirmed. Platelet morphology is normal.    Polychromasia Slight (A) None Seen   Glucose by meter     Status: Abnormal   Result Value Ref Range    GLUCOSE BY METER POCT 116 (H) 70 - 99 mg/dL   Blood gas venous and oxyhgb     Status: Abnormal   Result Value Ref Range    pH Venous 7.44 (H) 7.32 - 7.43    pCO2 Venous 36 (L) 40 - 50 mm Hg    pO2 Venous 55 (H) 25 - 47 mm Hg    Bicarbonate Venous 24 21 - 28 mmol/L    FIO2 50     Oxyhemoglobin Venous 89 (H) 70 - 75 %    Base Excess/Deficit (+/-) -0.1 -7.7 - 1.9 mmol/L   Basic metabolic panel     Status: Abnormal   Result Value Ref Range    Sodium 142 136 - 145 mmol/L    Potassium 3.6 3.4 - 5.3 mmol/L    Chloride 107 98 - 107 mmol/L    Carbon Dioxide (CO2) 25 22 - 29 mmol/L    Anion Gap 10 7 - 15 mmol/L    Urea Nitrogen 35.7 (H) 8.0 - 23.0 mg/dL    Creatinine 2.47 (H) 0.51 - 0.95 mg/dL    Calcium 10.1 8.8 - 10.2 mg/dL    Glucose 108 (H) 70 - 99 mg/dL    GFR Estimate 18 (L) >60 mL/min/1.73m2   CBC with platelets and differential     Status: Abnormal   Result Value Ref Range    WBC Count 30.0 (H) 4.0  - 11.0 10e3/uL    RBC Count 3.12 (L) 3.80 - 5.20 10e6/uL    Hemoglobin 10.5 (L) 11.7 - 15.7 g/dL    Hematocrit 32.1 (L) 35.0 - 47.0 %     (H) 78 - 100 fL    MCH 33.7 (H) 26.5 - 33.0 pg    MCHC 32.7 31.5 - 36.5 g/dL    RDW 15.9 (H) 10.0 - 15.0 %    Platelet Count 146 (L) 150 - 450 10e3/uL   Manual Differential     Status: Abnormal   Result Value Ref Range    % Neutrophils 88 %    % Lymphocytes 4 %    % Monocytes 8 %    % Eosinophils 0 %    % Basophils 0 %    Absolute Neutrophils 26.4 (H) 1.6 - 8.3 10e3/uL    Absolute Lymphocytes 1.2 0.8 - 5.3 10e3/uL    Absolute Monocytes 2.4 (H) 0.0 - 1.3 10e3/uL    Absolute Eosinophils 0.0 0.0 - 0.7 10e3/uL    Absolute Basophils 0.0 0.0 - 0.2 10e3/uL    RBC Morphology Confirmed RBC Indices     Platelet Assessment  Automated Count Confirmed. Platelet morphology is normal.     Automated Count Confirmed. Platelet morphology is normal.    Polychromasia Slight (A) None Seen    Pathologist Review Comments       Slide reviewed by pathologist, AKIL De La Torre MD on 6/1/23  Reviewed and differential is confirmed.       Lactic Acid STAT     Status: Normal   Result Value Ref Range    Lactic Acid 0.8 0.7 - 2.0 mmol/L   Basic metabolic panel     Status: Abnormal   Result Value Ref Range    Sodium 142 136 - 145 mmol/L    Potassium 3.5 3.4 - 5.3 mmol/L    Chloride 107 98 - 107 mmol/L    Carbon Dioxide (CO2) 22 22 - 29 mmol/L    Anion Gap 13 7 - 15 mmol/L    Urea Nitrogen 34.7 (H) 8.0 - 23.0 mg/dL    Creatinine 2.06 (H) 0.51 - 0.95 mg/dL    Calcium 9.7 8.8 - 10.2 mg/dL    Glucose 122 (H) 70 - 99 mg/dL    GFR Estimate 23 (L) >60 mL/min/1.73m2   CBC with platelets and differential     Status: Abnormal   Result Value Ref Range    WBC Count 11.0 4.0 - 11.0 10e3/uL    RBC Count 3.03 (L) 3.80 - 5.20 10e6/uL    Hemoglobin 9.8 (L) 11.7 - 15.7 g/dL    Hematocrit 31.5 (L) 35.0 - 47.0 %     (H) 78 - 100 fL    MCH 32.3 26.5 - 33.0 pg    MCHC 31.1 (L) 31.5 - 36.5 g/dL    RDW 16.1 (H) 10.0 - 15.0 %     Platelet Count 113 (L) 150 - 450 10e3/uL    % Neutrophils 74 %    % Lymphocytes 5 %    % Monocytes 20 %    % Eosinophils 0 %    % Basophils 0 %    % Immature Granulocytes 1 %    NRBCs per 100 WBC 0 <1 /100    Absolute Neutrophils 8.1 1.6 - 8.3 10e3/uL    Absolute Lymphocytes 0.6 (L) 0.8 - 5.3 10e3/uL    Absolute Monocytes 2.2 (H) 0.0 - 1.3 10e3/uL    Absolute Eosinophils 0.0 0.0 - 0.7 10e3/uL    Absolute Basophils 0.0 0.0 - 0.2 10e3/uL    Absolute Immature Granulocytes 0.1 <=0.4 10e3/uL    Absolute NRBCs 0.0 10e3/uL   Lactic Acid STAT     Status: Normal   Result Value Ref Range    Lactic Acid 1.4 0.7 - 2.0 mmol/L   Basic metabolic panel     Status: Abnormal   Result Value Ref Range    Sodium 139 136 - 145 mmol/L    Potassium 3.7 3.4 - 5.3 mmol/L    Chloride 109 (H) 98 - 107 mmol/L    Carbon Dioxide (CO2) 19 (L) 22 - 29 mmol/L    Anion Gap 11 7 - 15 mmol/L    Urea Nitrogen 43.5 (H) 8.0 - 23.0 mg/dL    Creatinine 2.24 (H) 0.51 - 0.95 mg/dL    Calcium 8.8 8.8 - 10.2 mg/dL    Glucose 131 (H) 70 - 99 mg/dL    GFR Estimate 21 (L) >60 mL/min/1.73m2   CBC with platelets and differential     Status: Abnormal   Result Value Ref Range    WBC Count 8.0 4.0 - 11.0 10e3/uL    RBC Count 2.87 (L) 3.80 - 5.20 10e6/uL    Hemoglobin 9.5 (L) 11.7 - 15.7 g/dL    Hematocrit 30.2 (L) 35.0 - 47.0 %     (H) 78 - 100 fL    MCH 33.1 (H) 26.5 - 33.0 pg    MCHC 31.5 31.5 - 36.5 g/dL    RDW 16.0 (H) 10.0 - 15.0 %    Platelet Count 108 (L) 150 - 450 10e3/uL    % Neutrophils 71 %    % Lymphocytes 10 %    % Monocytes 18 %    % Eosinophils 0 %    % Basophils 0 %    % Immature Granulocytes 1 %    NRBCs per 100 WBC 0 <1 /100    Absolute Neutrophils 5.7 1.6 - 8.3 10e3/uL    Absolute Lymphocytes 0.8 0.8 - 5.3 10e3/uL    Absolute Monocytes 1.4 (H) 0.0 - 1.3 10e3/uL    Absolute Eosinophils 0.0 0.0 - 0.7 10e3/uL    Absolute Basophils 0.0 0.0 - 0.2 10e3/uL    Absolute Immature Granulocytes 0.1 <=0.4 10e3/uL    Absolute NRBCs 0.0 10e3/uL   Lactic  Acid STAT     Status: Normal   Result Value Ref Range    Lactic Acid 1.2 0.7 - 2.0 mmol/L   Magnesium (Add on or recollect)     Status: Abnormal   Result Value Ref Range    Magnesium 1.6 (L) 1.7 - 2.3 mg/dL   Phosphorus (Add on or recollect)     Status: Abnormal   Result Value Ref Range    Phosphorus 2.4 (L) 2.5 - 4.5 mg/dL   Basic metabolic panel     Status: Abnormal   Result Value Ref Range    Sodium 143 136 - 145 mmol/L    Potassium 3.2 (L) 3.4 - 5.3 mmol/L    Chloride 114 (H) 98 - 107 mmol/L    Carbon Dioxide (CO2) 18 (L) 22 - 29 mmol/L    Anion Gap 11 7 - 15 mmol/L    Urea Nitrogen 37.9 (H) 8.0 - 23.0 mg/dL    Creatinine 1.84 (H) 0.51 - 0.95 mg/dL    Calcium 8.6 (L) 8.8 - 10.2 mg/dL    Glucose 88 70 - 99 mg/dL    GFR Estimate 26 (L) >60 mL/min/1.73m2   Phosphorus     Status: Normal   Result Value Ref Range    Phosphorus 2.5 2.5 - 4.5 mg/dL   Lactic Acid STAT     Status: Normal   Result Value Ref Range    Lactic Acid 0.9 0.7 - 2.0 mmol/L   Magnesium     Status: Abnormal   Result Value Ref Range    Magnesium 1.6 (L) 1.7 - 2.3 mg/dL   Hepatic function panel     Status: Abnormal   Result Value Ref Range    Protein Total 5.8 (L) 6.4 - 8.3 g/dL    Albumin 3.1 (L) 3.5 - 5.2 g/dL    Bilirubin Total 1.0 <=1.2 mg/dL    Alkaline Phosphatase 49 35 - 104 U/L    AST 19 10 - 35 U/L    ALT 15 10 - 35 U/L    Bilirubin Direct 0.23 0.00 - 0.30 mg/dL   Procalcitonin     Status: Abnormal   Result Value Ref Range    Procalcitonin 1.42 (H) <0.05 ng/mL   Nt probnp inpatient     Status: Abnormal   Result Value Ref Range    N terminal Pro BNP Inpatient 33,729 (H) 0 - 1,800 pg/mL   Potassium     Status: Normal   Result Value Ref Range    Potassium 3.5 3.4 - 5.3 mmol/L   Troponin T, High Sensitivity     Status: Abnormal   Result Value Ref Range    Troponin T, High Sensitivity 78 (H) <=14 ng/L   Asymptomatic Influenza A/B, RSV, & SARS-CoV2 PCR (COVID-19) Nose     Status: Normal    Specimen: Nose; Swab   Result Value Ref Range    Influenza  A PCR Negative Negative    Influenza B PCR Negative Negative    RSV PCR Negative Negative    SARS CoV2 PCR Negative Negative    Narrative    Testing was performed using the Xpert Xpress CoV2/Flu/RSV Assay on the Cepheid GeneXpert Instrument. This test should be ordered for the detection of SARS-CoV-2, influenza, and RSV viruses in individuals who meet clinical and/or epidemiological criteria. Test performance is unknown in asymptomatic patients. This test is for in vitro diagnostic use under the FDA EUA for laboratories certified under CLIA to perform high or moderate complexity testing. This test has not been FDA cleared or approved. A negative result does not rule out the presence of PCR inhibitors in the specimen or target RNA in concentration below the limit of detection for the assay. If only one viral target is positive but coinfection with multiple targets is suspected, the sample should be re-tested with another FDA cleared, approved, or authorized test, if coinfection would change clinical management. This test was validated by the [facility]. These laboratories are certified under the Clinical Laboratory Improvement Amendments of 1988 (CLIA-88) as qualified to perform high complexity laboratory testing.   Potassium     Status: Normal   Result Value Ref Range    Potassium 3.6 3.4 - 5.3 mmol/L   Phosphorus     Status: Abnormal   Result Value Ref Range    Phosphorus 1.9 (L) 2.5 - 4.5 mg/dL   Magnesium     Status: Normal   Result Value Ref Range    Magnesium 2.0 1.7 - 2.3 mg/dL   Lactic Acid STAT     Status: Normal   Result Value Ref Range    Lactic Acid 0.7 0.7 - 2.0 mmol/L   Phosphorus     Status: Normal   Result Value Ref Range    Phosphorus 2.5 2.5 - 4.5 mg/dL   Magnesium     Status: Normal   Result Value Ref Range    Magnesium 2.0 1.7 - 2.3 mg/dL   Basic metabolic panel     Status: Abnormal   Result Value Ref Range    Sodium 147 (H) 136 - 145 mmol/L    Potassium 3.4 3.4 - 5.3 mmol/L     Chloride 116 (H) 98 - 107 mmol/L    Carbon Dioxide (CO2) 18 (L) 22 - 29 mmol/L    Anion Gap 13 7 - 15 mmol/L    Urea Nitrogen 35.1 (H) 8.0 - 23.0 mg/dL    Creatinine 2.01 (H) 0.51 - 0.95 mg/dL    Calcium 8.5 (L) 8.8 - 10.2 mg/dL    Glucose 84 70 - 99 mg/dL    GFR Estimate 24 (L) >60 mL/min/1.73m2   CBC with platelets     Status: Abnormal   Result Value Ref Range    WBC Count 8.5 4.0 - 11.0 10e3/uL    RBC Count 2.83 (L) 3.80 - 5.20 10e6/uL    Hemoglobin 9.1 (L) 11.7 - 15.7 g/dL    Hematocrit 29.2 (L) 35.0 - 47.0 %     (H) 78 - 100 fL    MCH 32.2 26.5 - 33.0 pg    MCHC 31.2 (L) 31.5 - 36.5 g/dL    RDW 15.8 (H) 10.0 - 15.0 %    Platelet Count 152 150 - 450 10e3/uL   Phosphorus     Status: Normal   Result Value Ref Range    Phosphorus 2.8 2.5 - 4.5 mg/dL   Potassium     Status: Normal   Result Value Ref Range    Potassium 3.9 3.4 - 5.3 mmol/L   Lactic Acid STAT     Status: Normal   Result Value Ref Range    Lactic Acid 0.8 0.7 - 2.0 mmol/L   Glucose fluid     Status: None   Result Value Ref Range    Glucose Fluid Source Pleural Cavity, Left     Glucose fluid 103 mg/dL    Narrative    No reference ranges have been established. This result should be interpreted in the context of the patient's clinical condition and compared to simultaneous measurement in the patient's blood. This is a lab developed test. It has not been cleared or approved by the FDA. FDA clearance is not required for clinical use.   Lactate dehydrogenase fluid     Status: None   Result Value Ref Range    LD Fluid Source Pleural Cavity, Left     Lactate dehydrogenase fluid 89 U/L    Narrative    No reference ranges have been established. This result should be interpreted in the context of the patient's clinical condition and compared to simultaneous measurement in the patient's blood. This is a lab developed test. It has not been cleared or approved by the FDA. FDA clearance is not required for clinical use.   Lactate Dehydrogenase     Status:  Abnormal   Result Value Ref Range    Lactate Dehydrogenase 266 (H) 0 - 250 U/L   Protein total     Status: Abnormal   Result Value Ref Range    Protein Total 6.0 (L) 6.4 - 8.3 g/dL   Protein fluid     Status: None   Result Value Ref Range    Protein Fluid Source Pleural Cavity, Left     Protein Total Fluid 1.8 g/dL    Narrative    No reference ranges have been established. This result should be interpreted in the context of the patient's clinical condition and compared to simultaneous measurement in the patient's blood. This is a lab developed test. It has not been cleared or approved by the FDA. FDA clearance is not required for clinical use.   Cell Count Body Fluid     Status: Abnormal   Result Value Ref Range    Color Yellow Colorless, Yellow    Clarity Hazy (A) Clear    Cell Count Fluid Source Pleural Cavity, Left     Total Nucleated Cells 92 /uL    Narrative    No reference ranges have been established.  This result  should be interpreted in the context of the patient's clinical condition and   compared to simultaneous measurement in the patient's blood.         Differential Body Fluid     Status: None   Result Value Ref Range    % Neutrophils 66 %    % Lymphocytes 28 %    % Monocyte/Macrophages 4 %    % Lining Cells 2 %    Narrative    No reference ranges have been established. This result should be interpreted in the context of the patient's clinical condition and compared to simultaneous measurement in the patient's blood.   Phosphorus     Status: Normal   Result Value Ref Range    Phosphorus 2.8 2.5 - 4.5 mg/dL   Magnesium     Status: Normal   Result Value Ref Range    Magnesium 2.3 1.7 - 2.3 mg/dL   Potassium     Status: Normal   Result Value Ref Range    Potassium 3.6 3.4 - 5.3 mmol/L   Basic metabolic panel     Status: Abnormal   Result Value Ref Range    Sodium 147 (H) 136 - 145 mmol/L    Potassium 3.7 3.4 - 5.3 mmol/L    Chloride 115 (H) 98 - 107 mmol/L    Carbon Dioxide (CO2) 17 (L) 22 - 29 mmol/L     Anion Gap 15 7 - 15 mmol/L    Urea Nitrogen 37.6 (H) 8.0 - 23.0 mg/dL    Creatinine 2.12 (H) 0.51 - 0.95 mg/dL    Calcium 8.7 (L) 8.8 - 10.2 mg/dL    Glucose 93 70 - 99 mg/dL    GFR Estimate 22 (L) >60 mL/min/1.73m2   Magnesium     Status: Abnormal   Result Value Ref Range    Magnesium 2.4 (H) 1.7 - 2.3 mg/dL   Phosphorus     Status: Normal   Result Value Ref Range    Phosphorus 3.8 2.5 - 4.5 mg/dL   Potassium     Status: Normal   Result Value Ref Range    Potassium 3.8 3.4 - 5.3 mmol/L   Sodium     Status: Abnormal   Result Value Ref Range    Sodium 148 (H) 136 - 145 mmol/L   CRP inflammation     Status: Abnormal   Result Value Ref Range    CRP Inflammation 71.91 (H) <5.00 mg/L   Procalcitonin     Status: Abnormal   Result Value Ref Range    Procalcitonin 0.38 (H) <0.05 ng/mL   CBC with platelets     Status: Abnormal   Result Value Ref Range    WBC Count 4.9 4.0 - 11.0 10e3/uL    RBC Count 3.02 (L) 3.80 - 5.20 10e6/uL    Hemoglobin 9.7 (L) 11.7 - 15.7 g/dL    Hematocrit 31.3 (L) 35.0 - 47.0 %     (H) 78 - 100 fL    MCH 32.1 26.5 - 33.0 pg    MCHC 31.0 (L) 31.5 - 36.5 g/dL    RDW 15.9 (H) 10.0 - 15.0 %    Platelet Count 185 150 - 450 10e3/uL   Lactic Acid STAT     Status: Normal   Result Value Ref Range    Lactic Acid 1.0 0.7 - 2.0 mmol/L   Magnesium     Status: Normal   Result Value Ref Range    Magnesium 2.1 1.7 - 2.3 mg/dL   Phosphorus     Status: Normal   Result Value Ref Range    Phosphorus 3.0 2.5 - 4.5 mg/dL   Basic metabolic panel     Status: Abnormal   Result Value Ref Range    Sodium 142 136 - 145 mmol/L    Potassium 3.6 3.4 - 5.3 mmol/L    Chloride 113 (H) 98 - 107 mmol/L    Carbon Dioxide (CO2) 18 (L) 22 - 29 mmol/L    Anion Gap 11 7 - 15 mmol/L    Urea Nitrogen 43.7 (H) 8.0 - 23.0 mg/dL    Creatinine 2.26 (H) 0.51 - 0.95 mg/dL    Calcium 8.3 (L) 8.8 - 10.2 mg/dL    Glucose 102 (H) 70 - 99 mg/dL    GFR Estimate 21 (L) >60 mL/min/1.73m2   EKG 12-lead, tracing only     Status: None   Result Value  Ref Range    Systolic Blood Pressure  mmHg    Diastolic Blood Pressure  mmHg    Ventricular Rate 62 BPM    Atrial Rate 62 BPM    MN Interval 242 ms    QRS Duration 98 ms     ms    QTc 517 ms    P Axis 81 degrees    R AXIS -59 degrees    T Axis 50 degrees    Interpretation ECG       Sinus rhythm with 1st degree A-V block  Left axis deviation  Pulmonary disease pattern  Moderate voltage criteria for LVH, may be normal variant ( R in aVL , [place] product )  Septal infarct (cited on or before 21-FEB-2021)  Inferior infarct , age undetermined  Prolonged QT  Abnormal ECG  When compared with ECG of 12-JAN-2022 13:00,  Questionable change in initial forces of Lateral leads  QT has lengthened  Confirmed by GENERATED REPORT, COMPUTER ([id number]),  Claudio Elliott ([id number]) on 5/30/2023 7:48:15 PM     EKG 12-lead, tracing only     Status: None   Result Value Ref Range    Systolic Blood Pressure  mmHg    Diastolic Blood Pressure  mmHg    Ventricular Rate 142 BPM    Atrial Rate 150 BPM    MN Interval  ms    QRS Duration 92 ms     ms    QTc 504 ms    P Axis  degrees    R AXIS -65 degrees    T Axis 150 degrees    Interpretation ECG       Atrial fibrillation with rapid ventricular response  Left anterior fascicular block  Minimal voltage criteria for LVH, may be normal variant  Abnormal ECG  When compared with ECG of 30-MAY-2023 13:36,  Atrial fibrillation has replaced Sinus rhythm  Confirmed by MD CASSIE, ALDO ([id number]),  Maddy Whitaker ([id number]) on 6/5/2023 10:17:27 AM     EKG 12-lead, tracing only     Status: None   Result Value Ref Range    Systolic Blood Pressure  mmHg    Diastolic Blood Pressure  mmHg    Ventricular Rate 116 BPM    Atrial Rate 138 BPM    MN Interval  ms    QRS Duration 90 ms     ms    QTc 480 ms    P Axis  degrees    R AXIS -55 degrees    T Axis 108 degrees    Interpretation ECG       Atrial fibrillation with rapid ventricular response  Left anterior fascicular block  Anterior infarct (cited  on or before 2023)  ST & T wave abnormality, consider lateral ischemia  Abnormal ECG  When compared with ECG of 2023 18:48,  No significant change was found  Confirmed by MD DUNBAR LUCIANO ([id number]),  Maddy Whitaker ([id number]) on 2023 7:37:37 AM     EKG 12-lead, tracing only     Status: None (Preliminary result)   Result Value Ref Range    Systolic Blood Pressure  mmHg    Diastolic Blood Pressure  mmHg    Ventricular Rate 48 BPM    Atrial Rate 48 BPM    MT Interval 254 ms    QRS Duration 92 ms     ms    QTc 482 ms    P Axis 62 degrees    R AXIS -42 degrees    T Axis 13 degrees    Interpretation ECG       Sinus bradycardia with 1st degree A-V block  Left axis deviation  Anterior infarct (cited on or before 2023)  Abnormal ECG  When compared with ECG of 2023 17:28, (unconfirmed)  Sinus rhythm has replaced Atrial fibrillation  Vent. rate has decreased BY  68 BPM  Nonspecific T wave abnormality, worse in Anterior leads  T wave inversion no longer evident in Lateral leads     Echo Limited     Status: None   Result Value Ref Range    LVEF  55-60%     Narrative    [id number]  [id number]  [id number]  [id number]^CODY^ANURAG     [facility]  Echocardiography Laboratory  [address] [place] [address]     Name: ROSIO YOUNG  MRN: [id number]  : 1935  Study Date: 2023 08:02 AM  Age: 87 yrs  Gender: Female  Patient Location: [facility]  Reason For Study: CHF  Ordering Physician: ANURAG ROSS  Performed By: Re Cm RDCS     BSA: 1.5 m2  Height: 61 in  Weight: 120 lb  HR: 84  BP: 146/76 mmHg  ______________________________________________________________________________  Procedure  Limited Portable Echo Adult. Optison (NDC #9811-4659) given intravenously.  ______________________________________________________________________________  Interpretation Summary     Technically challenging study even with the use of contrast imaging.     Left  ventricular systolic function is normal. The visual ejection fraction is  55-60%.  Septal motion is consistent with conduction abnormality. Flattened septum is  consistent with RV pressure/volume overload.  Right ventricle is not well visualized, however global systolic function is  probably normal.  There is severe mitral annular calcification. The mitral valve leaflets are  moderately thickened. There is mild mitral stenosis. Mean gradient 4 mmHg at  68 bpm. There is mild (1+) mitral regurgitation.  The aortic valve is trileaflet with aortic valve sclerosis. There is trace  aortic regurgitation.  Pulmonary hypertension present. The right ventricular systolic pressure is  approximated at 38mmHg plus the right atrial pressure.  There is no pericardial effusion.  Moderate left pleural effusion.     This study was compared to a TTE from 3/31/2023. There is now a left pleural  effusion present. Estimated right-sided pressures are higher on this study.  Global LV function is stable.  ______________________________________________________________________________  Left Ventricle  Left ventricular systolic function is normal. The visual ejection fraction is  55-60%. Septal motion is consistent with conduction abnormality. Flattened  septum is consistent with RV pressure/volume overload.     Right Ventricle  The right ventricle is not well visualized. Right ventricle is not well  visualized, however global systolic function is probably normal.     Mitral Valve  There is severe mitral annular calcification. The mitral valve leaflets are  moderately thickened. There is mild (1+) mitral regurgitation. There is mild  mitral stenosis. Mean gradient 4 mmHg at 68 bpm.     Tricuspid Valve  There is mild (1+) tricuspid regurgitation. The right ventricular systolic  pressure is approximated at 38mmHg plus the right atrial pressure. Pulmonary  hypertension.     Aortic Valve  The aortic valve is trileaflet with aortic valve sclerosis.  There is trace  aortic regurgitation.     Pulmonic Valve  The pulmonic valve is not well seen, but is grossly normal.     Vessels  The aortic root is normal size. IVC diameter and respiratory changes fall into  an intermediate range suggesting an RA pressure of 8 mmHg.     Pericardium  There is no pericardial effusion. Moderate left pleural effusion.     Rhythm  The rhythm was atrial fibrillation.     ______________________________________________________________________________  MMode/2D Measurements & Calculations  Ao root diam: 3.1 cm  LVOT diam: 2.1 cm  LVOT area: 3.3 cm2     Doppler Measurements & Calculations  MV max P.1 mmHg  MV mean PG: 3.9 mmHg  MV V2 VTI: 43.1 cm  TR max edilberto: 308.6 cm/sec  TR max P.1 mmHg  RV S Edilberto: 7.1 cm/sec     ______________________________________________________________________________  Report approved by: Javier Delacruz 2023 10:13 AM         Cytology, non-gynecologic     Status: None   Result Value Ref Range    Final Diagnosis       Specimen A     Interpretation:      Negative for malignancy     Adequacy:     Satisfactory for evaluation          Clinical Information       Pleural Effusion      Gross Description       A(A). Pleural Cavity, Left, :A. Pleural Cavity, Left, , Pleural Fluid:  Received 90 ml of hazy, yellow fluid, processed as 1 Pap stained Autocyte,  1 Barry stained cytospin and one hematoxylin and eosin stained cell block.                  Microscopic Description       Microscopic examination was performed.        Performing Labs       The technical component of this testing was completed at [facility]     Pleural fluid Aerobic Bacterial Culture Routine with Gram Stain     Status: None (Preliminary result)    Specimen: Chest; Pleural fluid   Result Value Ref Range    Culture No growth after 2 days     Gram Stain Result No organisms seen     Gram Stain Result 1+ WBC seen    CBC with  platelets differential     Status: Abnormal    Narrative    The following orders were created for panel order CBC with platelets differential.  Procedure                               Abnormality         Status                     ---------                               -----------         ------                     CBC with platelets and d...[[id number]]  Abnormal            Final result                 Please view results for these tests on the individual orders.   CBC with platelets differential     Status: Abnormal    Narrative    The following orders were created for panel order CBC with platelets differential.  Procedure                               Abnormality         Status                     ---------                               -----------         ------                     CBC with platelets and d...[[id number]]  Abnormal            Final result               Manual Differential[[id number]]          Abnormal            Final result                 Please view results for these tests on the individual orders.   CBC with Platelets & Differential     Status: Abnormal    Narrative    The following orders were created for panel order CBC with Platelets & Differential.  Procedure                               Abnormality         Status                     ---------                               -----------         ------                     CBC with platelets and d...[[id number]]  Abnormal            Final result               Manual Differential[[id number]]          Abnormal            Final result                 Please view results for these tests on the individual orders.   CBC with Platelets & Differential     Status: Abnormal    Narrative    The following orders were created for panel order CBC with Platelets & Differential.  Procedure                               Abnormality         Status                     ---------                               -----------         ------                     CBC with  platelets and d...[[id number]]  Abnormal            Final result                 Please view results for these tests on the individual orders.   CBC with Platelets & Differential     Status: Abnormal    Narrative    The following orders were created for panel order CBC with Platelets & Differential.  Procedure                               Abnormality         Status                     ---------                               -----------         ------                     CBC with platelets and d...[[id number]]  Abnormal            Final result                 Please view results for these tests on the individual orders.   Cell count with differential fluid     Status: Abnormal    Narrative    The following orders were created for panel order Cell count with differential fluid.  Procedure                               Abnormality         Status                     ---------                               -----------         ------                     Cell Count Body Fluid[[id number]]        Abnormal            Final result               Differential Body Fluid[[id number]]                          Final result                 Please view results for these tests on the individual orders.          Attestation:  I have reviewed today's vital signs, notes, medications, labs and imaging with Dr. Narendra Pabon.  Amount of time performed on this consult: 50 minutes.    Ludmila Cruz PA-C  [facility]

## 2023-06-09 NOTE — PLAN OF CARE
Orientation: A&Ox4, occ forgetful   Vitals/Pain: VSS. Pt O2 needs increased, IV fluids discontinued and torsemide restarted, pt now requiring 3-4L NC sating >90%, will wean as tolerates. Pt denies L knee pain     Tele: NSR/SB with first degree AVB, x2 3 beat SVT runs, pt asymptomatic - MD aware   Lines/Drains: L upper and forearm PIV saline locked    LS: coarse, encouraging IS use    GI/: BS hypoactive, X1 smear BM this shift. Pt having adequate urine output throughout shift   Labs: Abnormal/Trends, Electrolyte Replacement- K replaced x1, recheck in AM   Ambulation/Assist: q2h turn, encouraging pt to get in chair today   Skin/Wounds: slight blanchable redness on coccyx, mepilex in place   Plan: Pt had 1-2 bites of each meal throughout the day, refusing to drink more then a few spoonfuls of thickened liquid. Pt has verbalized multiple times today and yesterday she wants to discuss palliative care and drink thin liquids, she verbalized understanding the risk of drinking thin liquids     Goal Outcome Evaluation:  Plan of Care Reviewed With: patient  Overall Patient Progress: no change  Outcome Evaluation: Pt now on oral amiodirone, oral dilt d/c. SBP >180, PRN IV Hydralazine and Labetalol

## 2023-06-09 NOTE — PLAN OF CARE
Problem: Malnutrition  Goal: Improved Nutritional Intake  Outcome: Not Progressing   Goal Outcome Evaluation:      Recommend discuss alternate means of nutrition (noted Palliative meeting planned Monday 6/12)    Recommend tube feeding as patient unable to improve or maintain oral intake or weight given poor appetite - if aligns with GOC  Standard trays TID   Magic cup TID with all meals

## 2023-06-09 NOTE — PROGRESS NOTES
[facility]    Hospitalist Progress Note    Assessment & Plan   Ghislaine Walls is a 87 year old female with RA; HTN; CHF; PAF; hypothyroidism; GERD; gout; HLD; CKD4; MGUS; chronic anemia and TCP; who presented 5/30/2023 from her [facility] with weakness.      On initial evaluation 5/30,  afebrile, hemodynamically stable with oxygen saturation 97% on room air.  ECG showed sinus rhythm with first-degree AV block; Q waves in the inferior limb leads and V2 and V3; diffuse nonspecific T wave changes; no acute ischemic findings.   Hemoglobin 10.1 and platelets 148; BMP with creatinine 2.91 (was 2.78 5/16); calcium 11.9;   LFTs were normal; troponin was 100; TSH 0.98.   X-rays of her lumbar spine did not show any acute findings.    Head CT without contrast did not show any acute findings.  Hospitalization complicated by development of aspiration pneumonia with acute hypoxic respiratory failure, volume overload, A-fib with RVR.     Aspiration pneumonia.   Acute hypoxic respiratory failure likely multifactorial from aspiration pneumonia, volume overload, deconditioning.  Leukocytosis, suspect related to pneumonia as well as steroids - improved   * On 5/31, pt noted to have aspirated in the morning with coughing and new O2 requirement while eating. Later in the afternoon, pt again had a coughing spell after drinking water coarse, congested cough, needing up to 15L O2 with sats in 80's. RN able to suction food material with sats to 90's.   --CXR showed showed question some minimal pleural fluid on the left with minimal retrocardiac atelectasis and/or infiltrate; right lung clear. Started on pipericillin-tazobactam. Made NPO. Continued high O2 requirements and transferred to [facility]. Subsequently required high flow O2, then BiPAP overnight.  * On 6/1, WBC to 30 (but received IV steroids 5/31). Off BiPAP, back on HFNC in am. CXR showed new right base infiltrate; retrocardiac density similar, possibly related to  small hiatal hernia.   * On 6/2, down to 30L HFNC.  *On 6/4 down to 4 L nasal oxygen.  Has been having IV fluids, crackles present. (Pending weights).  PTA torsemide on hold since admission.  Follow proBNP.    ---  Added on Zosyn 5/31, discontinued 6/7.  --Continue ipratropium-albuterol nebs TID and PRN.  --  Overnight patient was weaned to room air but she required some oxygen during daytime occasionally.  Currently on 4 L of oxygen.  --Speech had reevaluated the patient, her diet has been further adjusted, patient is not happy about having to eat take thickened liquids.  Her sodium levels are going up.  This was discussed in detail with patient and family, palliative care consult requested since patient is requesting to drink tap water.  -Continue strict aspiration precautions, aggressive incentive spirometry, encourage ambulation.  COVID-19 PCR, influenza PCR negative  -Will need a repeat x-ray done in few weeks to evaluate for resolution.  -I had a long discussion with daughter, updated her about patient's wishes, discussed with palliative care team, tentative family meeting set up for Monday 6/12.  Meantime wean oxygen down as possible.  -Due to ongoing hypernatremia patient was started on D5W on 6/8 discontinued on 6/9, restarted torsemide which was on hold, will repeat BMP in AM.  Family updated     Afib with RVR likely in the setting of volume overload, aspiration.  H/o PAF on anticoagulation.  * ECG on admit showed sinus rhythm with 1st degree AVB.  * Made NPO 5/31 for dysphagia.   * Started IV metoprolol 6/1. Later on 6/1, pt noted to be tachycardic and ECG showed afib with RVR w/o acute ischemic changes.  * Swallowing improved 6/2.  Restarted Coreg 6.25 mg twice daily.  -6/3- increased Coreg to 12.5 mg twice daily.  _6/4 -A-fib with RVR.    Patient is currently on diltiazem drip, will need to change that to p.o. Dilt, pending cardiology input.  -Requested cardiology input, notes reviewed, patient did  receive a dose of IV Lasix on 6/4, another dose was given on 6/5, plan is to initiate PTA medications 6/7.  Patient is getting more and more dehydrated due to reduced oral intake.  Continue PTA Eliquis  -Echocardiogram results noted, patient noted to have a left-sided pleural effusion, global LV function is similar to prior echo but there is increased right-sided pressures.  Status post thoracentesis on 6/6 with 400 cc removed.  Keep potassium around 4, magnesium around 2  -6/7 evening patient went into A-fib with RVR again, temporarily started on diltiazem drip and later weaned off once started on oral diltiazem 60 mg 3 times daily.  Patient's torsemide is on hold due to hypernatremia and dehydration.  -6/9 patient had episodes of bradycardia overnight 6/8, diltiazem stopped and started on amiodarone.     Volume overload likely multifactorial from fluid resuscitation, holding diuretic, heart failure, renal disease.    Acute (on Chronic) Diastolic Heart Failure  Troponin elevation suspect demand ischemia.  History of heart failure with preserved EF [LVEF of 55 to 60%]  S/p Hypertensive urgency related to being off PO meds (NPO).  [PTA: amlodipine 10 mg daily; carvedilol 6.25 mg BID; hydralazine 100 mg TID; ISDN 30 mg TID; torsemide 20 mg daily.]  * Echo 3/2023 showed LVEF 55-60%; RV OK; mild MS.  * Initial presentation as above.   Troponin 100 > 88 > 78   Torsemide held on admission, other blood pressure medications resumed  * On 5/31, made NPO for dysphagia. Started on scheduled IV hydralazine.  * On 6/1, BP's uncontrolled. Started on scheduled IV metoprolol and NTG patch.  * On 6/2, BP's improved. Swallowing improved.  Restarted PTA antihypertensives.  --On 6/4 - patient with increased oxygen needs, shortness of breath, A-fib with RVR.    Discontinue IV fluids.  Follow proBNP.  Dose of Lasix was given on 6/4, another dose given on 6/5.  -  Continue to hold PTA oral torsemide.  Monitor renal function in a.m. and  accordingly further diuresis.  Continue increased dose of Coreg at 12.5 mg twice daily.  Has been restarted on PTA Isordil, hydralazine, amlodipine is temporarily on hold.  See echocardiogram results below.  Isordil doses were restarted on 6/3, will need to decide on scheduling diuresis.  Strict input output monitoring.  Daily weights.  2000 mL fluid restriction.  -- Status post thoracentesis of left side on 6/6, patient was temporarily on room air, currently on 2 L.  --Torsemide is currently on hold due to hyponatremia, will try gentle D5W infusion, I did discuss with daughter on multiple occasions that this cannot be a solution to her reduced oral intake, it needs to be either a G-tube or patient needs to tolerate enough fluids.  Patient did not want G-tube placed and does not want to have the thickened liquids.  Hospice discussions ongoing  Left knee effusion  --Noted on 6/8, consulted orthopedic surgery, appears to be either gout or pseudogout.  --X-ray reviewed without any fracture, even effusion is not significant in x-ray.  Hypernatremia  --Monitor sodium levels, encouraged free water intake.  Patient has reduced intake due to thickening which he does not like.  --See above, palliative care consult requested since patient does not want to drink thickened liquid and not drinking enough fluids can result in other complications including dehydration, DRAGAN etc.  --Sodium 148 on 6/8, started on D5W infusion.  --6/9 sodium improved, D5W discontinued, torsemide restarted.  DRAGAN on CKD stage IV, suspect prerenal related to decreased PO intake, diuretic.  CKD stage IV.  * Baseline cr around mid to upper 2 range over the past year.  * Cr 2.91 on admit.  Receiving IV fluids, diuresis held with which sodium improving to 1.84.gone to 2.01 with diuresis .  Currently creatinine is 2.2.  Continue PTA with sodium bicarbonate.    Avoid nephrotoxic drugs.  Consider nephrology evaluation if creatinine worsening, will obtain  creatinine 6/9  Acute encephalopathy likely multifactorial from metabolic, delirium from hospitalization, cognitive impairment.  Concern for cognitive impairment at baseline.  Per report confusion.  Treat medical issues as above.  - Re-orient as needed.  - Maintain normal day/night, sleep/wake cycles.  - Minimize sedating medications as able.  Cognitive screening as outpatient.     Physical deconditioning from medical illness, senile frailty.  Severe malnutrition in the setting of poor oral intake  Dysphagia.   Goals of care.  --Resident of assisted living facility. Presented with poor oral intake over the past few months prior to admission, weight loss of 15 pounds.--Patient simply not wanting to eat, prescribed dronabinol and Remeron to try to stimulate appetite with no improvement.  * Nutrition consulted on admit and noted with severe malnutrition.  * Issues with aspiration 5/31 as above, subsequently made NPO; SLP consulted.  * On 6/1, SLP evaluated and still recommending NPO 6/5 patient underwent video swallow evaluation and diet has been further adjusted.   Continue to hold PTA dronabinol and mirtazapine for now.  -See above  -Multiple discussion with family regarding goals of care, palliative consult requested on 6/7.  PT, OT following.  Will likely need TCU.  Left-sided pleural effusion  -- Thoracentesis done this admission with 400 cc removed 6/6       Hypokalemia, hypomagnesemia-poor intake  Potassium 3.2, magnesium 1.6.  Replacement protocol ordered, monitor.     Constipation.  Per report constipation, receiving scheduled, as needed bowel meds.  Per nursing report today had bowel movement yesterday.  Abdominal soft, nontender, bowel sounds heard.    Continue scheduled, as needed bowel meds.  Monitor     GERD with h/o hiatal hernia.  Continue oral pantoprazole.     Anemia of chronic disease/CKD.  Chronic TCP.  MGUS.  * Follows with [facility]. Has received ANGELIC in the past.  * Hgb 10.1 and plts  148 on admit. No overt clinical signs of major bleeding.  Monitor CBC level in AM.  - Consider prbc transfusion if hgb </= 7.0 or if significant bleeding with hemodynamic instability or if symptomatic.  - Consider platelet transfusion if needs a procedure and less than 50,000; or if less than 10,000.     Rheumatoid arthritis.  Continue PTA hydroxychloroquine and prednisolone 5 mg daily.  Hold outpatient abatacept.     Hypothyroidism.  Continue oral levothyroxine     Gout.  Continue PTA allopurinol.     Depression/anxiety.  Hold PTA Remeron as drowsy     DVT Prophylaxis: DOAC  Code Status: No CPR- Do NOT Intubate     52 MINUTES SPENT BY ME on the date of service doing chart review, history, exam, documentation & further activities per the note.  Disposition: Expected discharge in few days, will need TCU, hospice discussions ongoing..  Clinically Significant Risk Factors         # Hypernatremia: Highest Na = 148 mmol/L in last 2 days, will monitor as appropriate      # Hypoalbuminemia: Lowest albumin = 3.1 g/dL at 6/4/2023  5:56 AM, will monitor as appropriate     # Hypertension: Noted on problem list         # Severe Malnutrition: based on nutrition assessment         Nichelle Carbajal MD  Text Page   (7am to 6pm)    Interval History   Oxygen needs went up over 95 L, also noted to be bradycardic, diltiazem stopped and amiodarone started, patient did receive some clear liquids yesterday by nursing, this have not been approved by speech, and patient should not be receiving unthickened liquids.  -Data reviewed today: I reviewed all new labs and imaging results over the last 24 hours.     Physical Exam     Vital Signs with Ranges  Temp:  [97.2  F (36.2  C)-97.7  F (36.5  C)] 97.6  F (36.4  C)  Pulse:  [] 60  Resp:  [12-24] 15  BP: (123-179)/(56-94) 169/83  SpO2:  [87 %-96 %] 90 %  I/O last 3 completed shifts:  In: 1730.83 [P.O.:1020; I.V.:710.83]  Out: 400 [Urine:400]    Constitutional: Awake, alert, cooperative, no  apparent distress  Respiratory: Bilateral basilar crackles noted  Cardiovascular: Regular rate and rhythm, normal S1 and S2, and no murmur noted  GI: Normal bowel sounds, soft, non-distended, non-tender  Skin/Integumen: No rashes, no cyanosis, 2+ edema noted lower extremity, left knee effusion noted  Neuro : moving all 4 extremities, no focal deficit noted     Medications     Reason anticoagulant not prescribed for atrial fibrillation       - MEDICATION INSTRUCTIONS -         allopurinol  100 mg Oral Daily     amiodarone  400 mg Oral Daily     apixaban ANTICOAGULANT  2.5 mg Oral BID     carvedilol  25 mg Oral BID w/meals     diclofenac  4 g Topical 4x Daily     docusate  100 mg Oral BID     [Held by provider] dronabinol  2.5 mg Oral BID AC     [Held by provider] ferrous sulfate  325 mg Oral Every Other Day     hydrALAZINE  75 mg Oral TID     hydroxychloroquine  200 mg Oral Daily     ipratropium - albuterol 0.5 mg/2.5 mg/3 mL  3 mL Nebulization TID     isosorbide dinitrate  30 mg Oral TID     levothyroxine  25 mcg Oral QAM AC     Lidocaine  1 patch Transdermal Q24H     lidocaine  2 patch Transdermal Q24H     lidocaine   Transdermal Q8H KEVEN     lidocaine   Transdermal Q8H KEVEN     [Held by provider] mirtazapine  15 mg Oral At Bedtime     pantoprazole  40 mg Oral Daily     polyethylene glycol  17 g Oral Daily     predniSONE  5 mg Oral Daily     sodium bicarbonate  650 mg Oral BID     sodium chloride (PF)  3 mL Intracatheter Q8H     torsemide  40 mg Oral Daily       Data   Recent Labs   Lab 06/09/23  0523 06/08/23  1700 06/08/23  0530 06/07/23  0521 06/06/23  1238 06/06/23  1122 06/06/23  0531 06/04/23  1435 06/04/23  0556 06/03/23  0529   WBC  --  4.9  --   --   --   --  8.5  --   --  8.0   HGB  --  9.7*  --   --   --   --  9.1*  --   --  9.5*   MCV  --  104*  --   --   --   --  103*  --   --  105*   PLT  --  185  --   --   --   --  152  --   --  108*     --  148* 147*  --   --  147*  --  143 139   POTASSIUM 3.6   --  3.8 3.7  3.6  --    < > 3.4   < > 3.2* 3.7   CHLORIDE 113*  --   --  115*  --   --  116*  --  114* 109*   CO2 18*  --   --  17*  --   --  18*  --  18* 19*   BUN 43.7*  --   --  37.6*  --   --  35.1*  --  37.9* 43.5*   CR 2.26*  --   --  2.12*  --   --  2.01*  --  1.84* 2.24*   ANIONGAP 11  --   --  15  --   --  13  --  11 11   ALIVIA 8.3*  --   --  8.7*  --   --  8.5*  --  8.6* 8.8   *  --   --  93  --   --  84  --  88 131*   ALBUMIN  --   --   --   --   --   --   --   --  3.1*  --    PROTTOTAL  --   --   --   --  6.0*  --   --   --  5.8*  --    BILITOTAL  --   --   --   --   --   --   --   --  1.0  --    ALKPHOS  --   --   --   --   --   --   --   --  49  --    ALT  --   --   --   --   --   --   --   --  15  --    AST  --   --   --   --   --   --   --   --  19  --     < > = values in this interval not displayed.     Recent Labs   Lab 06/09/23  0523 06/07/23  0521 06/06/23  0531 06/04/23  0556 06/03/23  0529   * 93 84 88 131*       Imaging:   Recent Results (from the past 24 hour(s))   XR Knee Left 3 Views    Narrative    EXAM: XR KNEE LEFT 3 VIEWS  LOCATION: [facility]  DATE/TIME: 6/8/2023 6:04 PM CDT    INDICATION: Left knee swelling and pain.  COMPARISON: 07/27/2020.      Impression    IMPRESSION: Tricompartmental degenerative changes which are advanced in the patellofemoral compartment. Chronic bony proliferative change along the lateral patella facet. There is no evidence of an acute fracture. No significant joint effusion. Bones are   demineralized. Atherosclerotic vascular calcifications.

## 2023-06-09 NOTE — PROGRESS NOTES
[facility]  Cardiology Progress Note    Date of Service (when I saw the patient): 06/09/2023  Summary: Ghislaine Walls is a 87 year old female with history of  HFpEF, paroxysmal atrial fibrillation on anticoagulation, CKD, chronic anemia, failure to thrive, hypertension who presented to the hospital for complaints of failure to thrive who was admitted on 5/30/2023 with weakness and dysphagia.   Interval History   Converted back to NSR last night with some bradycardia. HRs 50 - 60s this morning. Remains on 4 L O2. She appears quite fatigued. Po intake remains very low.   Assessment & Plan    1.  Atrial fibrillation with rapid ventricular response. [id number] score is elevated. On anticoagulation with Eliquis.   -  Continue with rate control strategy. Managed with diltiazem drip and carvedilol.   -  Converted overnight on 6/6/23 to NSR however had recurrent afib on 6/7/23 thus diltiazem drip resumed.   2.  Heart failure with preserved ejection fraction.  On Torsemide 20 mg daily. Dry weight felt to be around 115 - 120 lbs.   3.  Acute hypoxic respiratory failure secondary to volume overload due to IV fluids and discontinuation of diuretics.   -  Diuresed with IV lasix 40 mg BID with good response on 6/5.   -  Transitioned to oral diuretics 6/6.  4.  Failure to thrive, malnutrition. albumin 3.1  5.  Chronic anemia. Hemoglobin 11 at baseline.   6.  Hypertension. Uncontrolled.   7.  DRAGAN on CKD    Plan:  1.  Continue Carvedilol 25 mg twice daily.   2.  She has continued to have intermittent afib with RVR requiring diltiazem drip. When in NSR, bradycardic on current med rx. Will add amiodarone. Start with 400 mg daily x 7 days then 200 mg daily subsequently.   3.  Will stop oral diltiazem.   4.  Continue Eliquis for thromboembolic prevention.   5.  Continue isordil, hydralazine. Increase as needed. Can add back amlodipine if needed for hypertension.   6.  Continue goals of care discussions.  Note plans for family meeting on Monday with palliative care.  7.  Has been on Torsemide 40 mg daily (currently on hold).    Casie Blanco PA-C    Physical Exam   Temp: 97.6  F (36.4  C) Temp src: Oral BP: (!) 174/89 Pulse: 59   Resp: 24 SpO2: (!) 88 % O2 Device: Nasal cannula Oxygen Delivery: 4 LPM  Vitals:    06/03/23 2100 06/05/23 2300 06/06/23 0538 06/07/23 0516   Weight: 59.2 kg (130 lb 8.2 oz) 57.8 kg (127 lb 6.8 oz) 54.7 kg (120 lb 9.5 oz) 67 kg (147 lb 11.3 oz)    06/08/23 0451   Weight: 59.4 kg (130 lb 15.3 oz)     Vital Signs with Ranges  Temp:  [97.2  F (36.2  C)-97.7  F (36.5  C)] 97.6  F (36.4  C)  Pulse:  [] 59  Resp:  [12-28] 24  BP: (110-179)/(56-94) 174/89  SpO2:  [87 %-96 %] 88 %  06/04 0700 - 06/09 0659  In: 4533.83 [P.O.:3260; I.V.:1273.83]  Out: 2550 [Urine:2550]  Net: 1983.83  Constitutional: NAD. Appears fatigued.  Respiratory: some posterior crackles.  Cardiovascular: regular. No murmur.  Skin: warm, no rashes  Musculoskeletal: no edema.   Neurologic: Alert, oriented x 3  Neuropsychiatric: Normal affect   Data   Results for orders placed or performed during the hospital encounter of 05/30/23 (from the past 24 hour(s))   CBC with platelets   Result Value Ref Range    WBC Count 4.9 4.0 - 11.0 10e3/uL    RBC Count 3.02 (L) 3.80 - 5.20 10e6/uL    Hemoglobin 9.7 (L) 11.7 - 15.7 g/dL    Hematocrit 31.3 (L) 35.0 - 47.0 %     (H) 78 - 100 fL    MCH 32.1 26.5 - 33.0 pg    MCHC 31.0 (L) 31.5 - 36.5 g/dL    RDW 15.9 (H) 10.0 - 15.0 %    Platelet Count 185 150 - 450 10e3/uL   XR Knee Left 3 Views    Narrative    EXAM: XR KNEE LEFT 3 VIEWS  LOCATION: [facility]  DATE/TIME: 6/8/2023 6:04 PM CDT    INDICATION: Left knee swelling and pain.  COMPARISON: 07/27/2020.      Impression    IMPRESSION: Tricompartmental degenerative changes which are advanced in the patellofemoral compartment. Chronic bony proliferative change along the lateral patella facet. There is no  evidence of an acute fracture. No significant joint effusion. Bones are   demineralized. Atherosclerotic vascular calcifications.   Lactic Acid STAT   Result Value Ref Range    Lactic Acid 1.0 0.7 - 2.0 mmol/L   EKG 12-lead, tracing only   Result Value Ref Range    Systolic Blood Pressure  mmHg    Diastolic Blood Pressure  mmHg    Ventricular Rate 48 BPM    Atrial Rate 48 BPM    WY Interval 254 ms    QRS Duration 92 ms     ms    QTc 482 ms    P Axis 62 degrees    R AXIS -42 degrees    T Axis 13 degrees    Interpretation ECG       Sinus bradycardia with 1st degree A-V block  Left axis deviation  Anterior infarct (cited on or before 07-JUN-2023)  Abnormal ECG  When compared with ECG of 07-JUN-2023 17:28, (unconfirmed)  Sinus rhythm has replaced Atrial fibrillation  Vent. rate has decreased BY  68 BPM  Nonspecific T wave abnormality, worse in Anterior leads  T wave inversion no longer evident in Lateral leads     Magnesium   Result Value Ref Range    Magnesium 2.1 1.7 - 2.3 mg/dL   Phosphorus   Result Value Ref Range    Phosphorus 3.0 2.5 - 4.5 mg/dL   Basic metabolic panel   Result Value Ref Range    Sodium 142 136 - 145 mmol/L    Potassium 3.6 3.4 - 5.3 mmol/L    Chloride 113 (H) 98 - 107 mmol/L    Carbon Dioxide (CO2) 18 (L) 22 - 29 mmol/L    Anion Gap 11 7 - 15 mmol/L    Urea Nitrogen 43.7 (H) 8.0 - 23.0 mg/dL    Creatinine 2.26 (H) 0.51 - 0.95 mg/dL    Calcium 8.3 (L) 8.8 - 10.2 mg/dL    Glucose 102 (H) 70 - 99 mg/dL    GFR Estimate 21 (L) >60 mL/min/1.73m2        Tele NSR this morning, HR 50 - 60s    Medications     D5W 50 mL/hr at 06/08/23 2156     dilTIAZem Stopped (06/08/23 1846)     Reason anticoagulant not prescribed for atrial fibrillation       - MEDICATION INSTRUCTIONS -         allopurinol  100 mg Oral Daily     apixaban ANTICOAGULANT  2.5 mg Oral BID     carvedilol  25 mg Oral BID w/meals     diltiazem  60 mg Oral Q8H KEVEN     docusate  100 mg Oral BID     [Held by provider] dronabinol  2.5 mg Oral  BID AC     [Held by provider] ferrous sulfate  325 mg Oral Every Other Day     hydrALAZINE  75 mg Oral TID     hydroxychloroquine  200 mg Oral Daily     ipratropium - albuterol 0.5 mg/2.5 mg/3 mL  3 mL Nebulization TID     isosorbide dinitrate  30 mg Oral TID     levothyroxine  25 mcg Oral QAM AC     Lidocaine  1 patch Transdermal Q24H     lidocaine   Transdermal Q8H KEVEN     [Held by provider] mirtazapine  15 mg Oral At Bedtime     pantoprazole  40 mg Oral Daily     polyethylene glycol  17 g Oral Daily     predniSONE  5 mg Oral Daily     sodium bicarbonate  650 mg Oral BID     sodium chloride (PF)  3 mL Intracatheter Q8H     [Held by provider] torsemide  40 mg Oral Daily

## 2023-06-10 NOTE — PLAN OF CARE
Orientation: A&Ox4  Vitals/Pain: VSS on 2-3L NC sating >90%. Pt reports L knee pain, managing with PRN Tylenol, lidocaine patch and diclofenac cream   Tele: Afib RVR, rates sustained >120's, SBP in the 100s, oral metoprolol added and IV PRN Metoprolol parameters changed, see MAR   Lines/Drains: L forearm PIV saline locked   LS: fine crackles with diminished bases, pt has occ weak congest, non productive cough   GI/: BS +, x4 BM this shift. Pt having adequate urine output throughout shift   Pt have minimal oral intake, pt ate one meal today, RN encouraged pt throughout shift to eat and drink. RN updated daughter on phone   Labs: Abnormal/Trends, Electrolyte Replacement- K and Mag replaced, recheck in AM, no phos replacement needed, recheck in AM   Ambulation/Assist: q2h turn on pulsate mattress   Skin/Wounds: L knee swelling improving, no change in bruising, ACE bandage in place during day as pt tolerates.   Plan: palliative meeting with family on Monday    Pt and daughter disagreeing about goal of care, daughter wanting pt to follow thickened liquids order, daughter is aware that pt is not eating or drinking thick liquids. Pt continues to verbalize understanding of risk of aspiration of thin liquids and continues to request thin liquids, refuses thick liquids. RN and MD discussed request of thin liquids, MD aware that pt wants to be palliative. No change in orders. See speech therapy note regarding free water protocol     Goal Outcome Evaluation:  Plan of Care Reviewed With: patient, child  Overall Patient Progress: no change  Outcome Evaluation: Pt continues to not eat or drink thick liquids. Pt continues to request thin liquids verbalizing understanding of aspiration risk of thin liquids. RN made MD aware of continuous request.

## 2023-06-10 NOTE — PROVIDER NOTIFICATION
MD Notification  Notified Person Name: Dr. Williamsondaphney   Notification Date/Time: 6/10 at 1024   Notification Interaction: web based paging   RN message to MD: [id number] SH: Pt in AFIB RVR rates 120's, Max 140, SBP <110, unable to given PRN IV metoprolol due to parameters. Please advise. [phone]   Orders Received: Md added oral metoprolol and adjusted PRN IV metoprolol parameters

## 2023-06-10 NOTE — PROGRESS NOTES
[facility]    Hospitalist Progress Note    Assessment & Plan   Ghislaine Walls is a 87 year old female with RA; HTN; CHF; PAF; hypothyroidism; GERD; gout; HLD; CKD4; MGUS; chronic anemia and TCP; who presented 5/30/2023 from her [facility] with weakness.      On initial evaluation 5/30,  afebrile, hemodynamically stable with oxygen saturation 97% on room air.  ECG showed sinus rhythm with first-degree AV block; Q waves in the inferior limb leads and V2 and V3; diffuse nonspecific T wave changes; no acute ischemic findings.   Hemoglobin 10.1 and platelets 148; BMP with creatinine 2.91 (was 2.78 5/16); calcium 11.9;   LFTs were normal; troponin was 100; TSH 0.98.   X-rays of her lumbar spine did not show any acute findings.    Head CT without contrast did not show any acute findings.  Hospitalization complicated by development of aspiration pneumonia with acute hypoxic respiratory failure, volume overload, A-fib with RVR.     Aspiration pneumonia.   Acute hypoxic respiratory failure likely multifactorial from aspiration pneumonia, volume overload, deconditioning.  Leukocytosis, suspect related to pneumonia as well as steroids - improved   * On 5/31, pt noted to have aspirated in the morning with coughing and new O2 requirement while eating. Later in the afternoon, pt again had a coughing spell after drinking water coarse, congested cough, needing up to 15L O2 with sats in 80's. RN able to suction food material with sats to 90's.   --CXR showed showed question some minimal pleural fluid on the left with minimal retrocardiac atelectasis and/or infiltrate; right lung clear. Started on pipericillin-tazobactam. Made NPO. Continued high O2 requirements and transferred to [facility]. Subsequently required high flow O2, then BiPAP overnight.  * On 6/1, WBC to 30 (but received IV steroids 5/31). Off BiPAP, back on HFNC in am. CXR showed new right base infiltrate; retrocardiac density similar, possibly related to  small hiatal hernia.   * On 6/2, down to 30L HFNC.  *On 6/4 down to 4 L nasal oxygen.  Has been having IV fluids, crackles present. (Pending weights).  PTA torsemide on hold since admission.  Follow proBNP.    ---  Added on Zosyn 5/31, discontinued 6/7 after completion of the course.  --Continue ipratropium-albuterol nebs TID and PRN.  --  Overnight patient was weaned to room air but she required some oxygen during daytime occasionally.  Currently on 4 L of oxygen.  --Speech had reevaluated the patient, her diet has been further adjusted, patient is not happy about having to eat take thickened liquids.  Her sodium levels are going up.  This was discussed in detail with patient and family, palliative care consult requested since patient is requesting to drink tap water.  -Continue strict aspiration precautions, aggressive incentive spirometry, encourage ambulation.  COVID-19 PCR, influenza PCR negative  -I had a long discussion with daughter, updated her about patient's wishes, discussed with palliative care team, tentative family meeting set up for Monday 6/12.  Meantime wean oxygen down as possible.  -Due to ongoing hypernatremia patient was started on D5W on 6/8 discontinued on 6/9, restarted torsemide which was on hold, repeat BMP shows a creatinine of 2.02 which is close to her baseline, will continue her torsemide for now and oxygen has been weaned down to 2 L now.     Afib with RVR likely in the setting of volume overload, aspiration.  H/o PAF on anticoagulation.  * ECG on admit showed sinus rhythm with 1st degree AVB.  * Made NPO 5/31 for dysphagia.   * Started IV metoprolol 6/1. Later on 6/1, pt noted to be tachycardic and ECG showed afib with RVR w/o acute ischemic changes.  * Swallowing improved 6/2.  Restarted Coreg 6.25 mg twice daily.  -6/3- increased Coreg to 12.5 mg twice daily.  _6/4 -A-fib with RVR.    Patient is currently on diltiazem drip, will need to change that to p.o. Dilt, pending cardiology  input.  -Requested cardiology input, notes reviewed, patient did receive a dose of IV Lasix on 6/4, another dose was given on 6/5, plan is to initiate PTA medications 6/7.  Patient is getting more and more dehydrated due to reduced oral intake.  Continue PTA Eliquis  -Echocardiogram results noted, patient noted to have a left-sided pleural effusion, global LV function is similar to prior echo but there is increased right-sided pressures.  Status post thoracentesis on 6/6 with 400 cc removed.  Keep potassium around 4, magnesium around 2  -6/7 evening patient went into A-fib with RVR again, temporarily started on diltiazem drip and later weaned off once started on oral diltiazem 60 mg 3 times daily.  Patient's torsemide is on hold due to hypernatremia and dehydration.  -6/9 patient had episodes of bradycardia overnight 6/8, diltiazem stopped and started on amiodarone.  -Appreciate input from cardiology, they are adjusting rate control medications and continuing on amiodarone.  Carvedilol has been discontinued and started on metoprolol until amiodarone is effective.  6/10     Volume overload likely multifactorial from fluid resuscitation, holding diuretic, heart failure, renal disease.    Acute (on Chronic) Diastolic Heart Failure  Troponin elevation suspect demand ischemia.  History of heart failure with preserved EF [LVEF of 55 to 60%]  S/p Hypertensive urgency related to being off PO meds (NPO).  [PTA: amlodipine 10 mg daily; carvedilol 6.25 mg BID; hydralazine 100 mg TID; ISDN 30 mg TID; torsemide 20 mg daily.]  * Echo 3/2023 showed LVEF 55-60%; RV OK; mild MS.  * Initial presentation as above.   Troponin 100 > 88 > 78   Torsemide held on admission, other blood pressure medications resumed  * On 5/31, made NPO for dysphagia. Started on scheduled IV hydralazine.  * On 6/1, BP's uncontrolled. Started on scheduled IV metoprolol and NTG patch.  * On 6/2, BP's improved. Swallowing improved.  Restarted PTA  antihypertensives.  --On 6/4 - patient with increased oxygen needs, shortness of breath, A-fib with RVR.    Discontinue IV fluids.  Follow proBNP.  Dose of Lasix was given on 6/4, another dose given on 6/5.  -  Continue to hold PTA oral torsemide.  Monitor renal function in a.m. and accordingly further diuresis.  Continue increased dose of Coreg at 12.5 mg twice daily.  Has been restarted on PTA Isordil, hydralazine, amlodipine is temporarily on hold.  See echocardiogram results below.  Isordil doses were restarted on 6/3, will need to decide on scheduling diuresis.  Strict input output monitoring.  Daily weights.  2000 mL fluid restriction.  -- Status post thoracentesis of left side on 6/6, patient was temporarily on room air, currently on 2 L.  --Torsemide is currently on hold due to hyponatremia, will try gentle D5W infusion, I did discuss with daughter on multiple occasions that this cannot be a solution to her reduced oral intake, it needs to be either a G-tube or patient needs to tolerate enough fluids.  Patient did not want G-tube placed and does not want to have the thickened liquids.  Hospice discussions ongoing  Left knee effusion  --Noted on 6/8, consulted orthopedic surgery, appears to be either gout or pseudogout.  --X-ray reviewed without any fracture, even effusion is not significant in x-ray.  Hypernatremia  --Monitor sodium levels, encouraged free water intake.  Patient has reduced intake due to thickening which he does not like.  --See above, palliative care consult requested since patient does not want to drink thickened liquid and not drinking enough fluids can result in other complications including dehydration, DRAGAN etc.  --Sodium 148 on 6/8, started on D5W infusion.  --6/9 sodium improved, D5W discontinued, torsemide restarted.  Sodium levels remain stable now.  DRAGAN on CKD stage IV, suspect prerenal related to decreased PO intake, diuretic.  CKD stage IV.  * Baseline cr around mid to upper 2  range over the past year.  * Cr 2.91 on admit.  Receiving IV fluids, diuresis held with which sodium improving to 1.84.gone to 2.01 with diuresis .  Currently creatinine is 2.2.  Continue PTA with sodium bicarbonate.    Avoid nephrotoxic drugs.  Consider nephrology evaluation if creatinine worsening, will obtain creatinine 6/9  Acute encephalopathy likely multifactorial from metabolic, delirium from hospitalization, cognitive impairment.  Concern for cognitive impairment at baseline.  Per report confusion.  Treat medical issues as above.  - Re-orient as needed.  - Maintain normal day/night, sleep/wake cycles.  - Minimize sedating medications as able.  Cognitive screening as outpatient.     Physical deconditioning from medical illness, senile frailty.  Severe malnutrition in the setting of poor oral intake  Dysphagia.   Goals of care.  --Resident of assisted living facility. Presented with poor oral intake over the past few months prior to admission, weight loss of 15 pounds.--Patient simply not wanting to eat, prescribed dronabinol and Remeron to try to stimulate appetite with no improvement.  * Nutrition consulted on admit and noted with severe malnutrition.  * Issues with aspiration 5/31 as above, subsequently made NPO; SLP consulted.  * On 6/1, SLP evaluated and still recommending NPO 6/5 patient underwent video swallow evaluation and diet has been further adjusted.   Continue to hold PTA dronabinol and mirtazapine for now.  -See above  -Multiple discussion with family regarding goals of care, palliative consult requested on 6/7.  PT, OT following.  Will likely need TCU.  Left-sided pleural effusion  -- Thoracentesis done this admission with 400 cc removed 6/6       Hypokalemia, hypomagnesemia-poor intake  Potassium 3.2, magnesium 1.6.  Replacement protocol ordered, monitor.     Constipation.  Per report constipation, receiving scheduled, as needed bowel meds.  Per nursing report today had bowel movement  yesterday.  Abdominal soft, nontender, bowel sounds heard.    Continue scheduled, as needed bowel meds.  Monitor     GERD with h/o hiatal hernia.  Continue oral pantoprazole.     Anemia of chronic disease/CKD.  Chronic TCP.  MGUS.  * Follows with [facility]. Has received ANGELIC in the past.  * Hgb 10.1 and plts 148 on admit. No overt clinical signs of major bleeding.  Monitor CBC level in AM.  - Consider prbc transfusion if hgb </= 7.0 or if significant bleeding with hemodynamic instability or if symptomatic.  - Consider platelet transfusion if needs a procedure and less than 50,000; or if less than 10,000.     Rheumatoid arthritis.  Continue PTA hydroxychloroquine and prednisolone 5 mg daily.  Hold outpatient abatacept.     Hypothyroidism.  Continue oral levothyroxine     Gout.  Continue PTA allopurinol.     Depression/anxiety.  Hold PTA Remeron as drowsy     DVT Prophylaxis: DOAC  Code Status: No CPR- Do NOT Intubate     52 MINUTES SPENT BY ME on the date of service doing chart review, history, exam, documentation & further activities per the note.  Disposition: Expected discharge in few days, will need TCU, hospice discussions ongoing..  Clinically Significant Risk Factors              # Hypoalbuminemia: Lowest albumin = 3.1 g/dL at 6/4/2023  5:56 AM, will monitor as appropriate     # Hypertension: Noted on problem list         # Severe Malnutrition: based on nutrition assessment         Nichelle Carbajal MD  Text Page   (7am to 6pm)    Interval History   Patient is sleepy, oxygen needs remain stable at 2 L, knee pain is better, denies any diarrhea, her oral intake is very poor, we are encouraging her constantly but she did not want to try thickened liquids.  I did suggest nursing to notify family about her dislike for thickened liquid and refusal to have appropriate nutrition.  -Data reviewed today: I reviewed all new labs and imaging results over the last 24 hours.     Physical Exam     Vital Signs with  Ranges  Temp:  [97.6  F (36.4  C)-98.8  F (37.1  C)] 98  F (36.7  C)  Pulse:  [] 120  Resp:  [16-40] 17  BP: (102-196)/() 110/63  SpO2:  [90 %-97 %] 93 %  I/O last 3 completed shifts:  In: 630 [P.O.:630]  Out: 1350 [Urine:1350]    Constitutional: Awake, alert, cooperative, no apparent distress  Respiratory: Bilateral basilar crackles noted  Cardiovascular: Regular rate and rhythm, normal S1 and S2, and no murmur noted  GI: Normal bowel sounds, soft, non-distended, non-tender  Skin/Integumen: No rashes, no cyanosis, 2+ edema noted lower extremity, left knee effusion noted  Neuro : moving all 4 extremities, no focal deficit noted     Medications     Reason anticoagulant not prescribed for atrial fibrillation       - MEDICATION INSTRUCTIONS -         allopurinol  100 mg Oral Daily     amiodarone  400 mg Oral Daily     apixaban ANTICOAGULANT  2.5 mg Oral BID     diclofenac  4 g Topical 4x Daily     docusate  100 mg Oral BID     [Held by provider] dronabinol  2.5 mg Oral BID AC     [Held by provider] ferrous sulfate  325 mg Oral Every Other Day     hydrALAZINE  75 mg Oral TID     hydroxychloroquine  200 mg Oral Daily     ipratropium - albuterol 0.5 mg/2.5 mg/3 mL  3 mL Nebulization TID     isosorbide dinitrate  30 mg Oral TID     levothyroxine  25 mcg Oral QAM AC     lidocaine  2 patch Transdermal Q24H     lidocaine   Transdermal Q8H KEVEN     metoprolol tartrate  100 mg Oral BID     [Held by provider] mirtazapine  15 mg Oral At Bedtime     pantoprazole  40 mg Oral Daily     polyethylene glycol  17 g Oral Daily     predniSONE  5 mg Oral Daily     sodium bicarbonate  650 mg Oral BID     sodium chloride (PF)  3 mL Intracatheter Q8H     torsemide  40 mg Oral Daily       Data   Recent Labs   Lab 06/10/23  0306 06/09/23  0523 06/08/23  1700 06/08/23  0530 06/07/23  0521 06/06/23  1238 06/06/23  1122 06/06/23  0531 06/04/23  1435 06/04/23  0556   WBC  --   --  4.9  --   --   --   --  8.5  --   --    HGB  --   --   9.7*  --   --   --   --  9.1*  --   --    MCV  --   --  104*  --   --   --   --  103*  --   --    PLT  --   --  185  --   --   --   --  152  --   --     142  --  148* 147*  --   --  147*  --  143   POTASSIUM 3.8 3.6  --  3.8 3.7  3.6  --    < > 3.4   < > 3.2*   CHLORIDE 110* 113*  --   --  115*  --   --  116*  --  114*   CO2 18* 18*  --   --  17*  --   --  18*  --  18*   BUN 39.2* 43.7*  --   --  37.6*  --   --  35.1*  --  37.9*   CR 2.02* 2.26*  --   --  2.12*  --   --  2.01*  --  1.84*   ANIONGAP 12 11  --   --  15  --   --  13  --  11   ALIVIA 8.5* 8.3*  --   --  8.7*  --   --  8.5*  --  8.6*   GLC 92 102*  --   --  93  --   --  84  --  88   ALBUMIN  --   --   --   --   --   --   --   --   --  3.1*   PROTTOTAL  --   --   --   --   --  6.0*  --   --   --  5.8*   BILITOTAL  --   --   --   --   --   --   --   --   --  1.0   ALKPHOS  --   --   --   --   --   --   --   --   --  49   ALT  --   --   --   --   --   --   --   --   --  15   AST  --   --   --   --   --   --   --   --   --  19    < > = values in this interval not displayed.     Recent Labs   Lab 06/10/23  0306 06/09/23 0523 06/07/23  0521 06/06/23  0531 06/04/23  0556   GLC 92 102* 93 84 88       Imaging:   No results found for this or any previous visit (from the past 24 hour(s)).

## 2023-06-10 NOTE — PROGRESS NOTES
[facility] Progress Note    Date of Admission:  5/30/2023  Reason for Consult: Atrial fibrillation    Subjective   Overnight, it sounds that the patient had gone into atrial fibrillation with rapid ventricular rates and received IV diltiazem and IV metoprolol.  She continues to be in atrial fibrillation with mildly elevated rates this morning.  Has been started on the amiodarone load as indicated by the consult team from yesterday.    She was drowsy when I interacted with her.    Objective   INTAKE / OUTPUT     Intake/Output Summary (Last 24 hours) at 6/10/2023 1027  Last data filed at 6/10/2023 0612  Gross per 24 hour   Intake 390 ml   Output 1300 ml   Net -910 ml       VITAL SIGNS  Temp:  [97.6  F (36.4  C)-98.8  F (37.1  C)] 98.1  F (36.7  C)  Pulse:  [] 125  Resp:  [16-40] 18  BP: (102-196)/() 105/66  SpO2:  [90 %-97 %] 90 %  111 lbs 5.32 oz    PHYSICAL EXAM   Constitutional: Resting comfortably comfortably.  Neck: No JVD  Cardiovascular: Irregularly irregular, normal S1 and S2, and no murmurs/rubs/gallops noted.  Respiratory: Clear to auscultation bilaterally, no crackles or wheezing.  Extremities: Trace lower extremity edema. Warm.   Vascular: Radial pulses 4+ and symmetric bilaterally    Data   Most Recent 3 CBC's:  Recent Labs   Lab Test 06/08/23  1700 06/06/23  0531 06/03/23  0529   WBC 4.9 8.5 8.0   HGB 9.7* 9.1* 9.5*   * 103* 105*    152 108*     Most Recent 3  BMP's:  Recent Labs   Lab Test 06/10/23  0306 06/09/23  0523 06/08/23  0530 06/07/23  0521    142 148* 147*   POTASSIUM 3.8 3.6 3.8 3.7  3.6   CHLORIDE 110* 113*  --  115*   CO2 18* 18*  --  17*   BUN 39.2* 43.7*  --  37.6*   CR 2.02* 2.26*  --  2.12*   ANIONGAP 12 11  --  15   ALIVIA 8.5* 8.3*  --  8.7*   GLC 92 102*  --  93     Most Recent 3 BNP's:  Recent Labs   Lab Test 06/04/23  0556 03/31/23  1428 07/06/22  1551 01/20/22  1426 01/12/22  1249  06/07/21  1615   NTBNPI 33,729*  --   --   --  12,009* 8,504*   NTBNP  --  6,915* 6,960* 7,801*  --   --       Most Recent Cholesterol Panel:  Recent Labs   Lab Test 06/24/22  0943   CHOL 131   LDL 65   HDL 48*   TRIG 92       Most Recent Transthoracic Echocardiogram:  Echo result w/o MOPS: Interpretation Summary Technically challenging study even with the use of contrast imaging. Left ventricular systolic function is normal. The visual ejection fraction is55-60%.Septal motion is consistent with conduction abnormality. Flattened septum isconsistent with RV pressure/volume overload.Right ventricle is not well visualized, however global systolic function isprobably normal.There is severe mitral annular calcification. The mitral valve leaflets aremoderately thickened. There is mild mitral stenosis. Mean gradient 4 mmHg at68 bpm. There is mild (1+) mitral regurgitation.The aortic valve is trileaflet with aortic valve sclerosis. There is traceaortic regurgitation.Pulmonary hypertension present. The right ventricular systolic pressure isapproximated at 38mmHg plus the right atrial pressure.There is no pericardial effusion.Moderate left pleural effusion. This study was compared to a TTE from 3/31/2023. There is now a left pleuraleffusion present. Estimated right-sided pressures are higher on this study.Global LV function is stable.    Most Recent Cardiac Catheterization:  No results found.    Assessment & Plan   1.  Paroxysmal atrial fibrillation, chronically anticoagulated with Eliquis  2.  Acute on Chronic heart failure with preserved ejection fraction  3.  Acute hypoxemic respiratory failure socket secondary to 2  4.  Acute kidney injury on chronic kidney disease stage IV  5.  Essential hypertension  6.  Failure to thrive & deconditioning    Ms. Walls is a pleasant 87 year old year old female admitted with failure to thrive found to have decompensated heart failure and atrial fibrillation with rapid ventricular  rates.    Today, the patient was started on oral amiodarone though has having breakthrough episodes of rapid ventricular rates.  This context given some lower systolic blood pressures this morning, recommend we transition her Coreg to metoprolol to tartrate and use IV metoprolol as needed.  She should continue on this until the amiodarone is built up and then I would hope to discontinue beta-blockers.  We will also need to cautiously watch her daily weights as these have been highly fluctuating and her home diuretic has been held for acute kidney injury.    Plan:    Continue oral amiodarone 400 mg daily for 7 days then 200 mg daily thereafter till cardiology follow-up    We will transition oral Coreg to metoprolol to tartrate 100 mg twice daily until amiodarone buildup (less effect on blood pressure).  Use IV metoprolol as needed for breakthrough episodes of RVR    Continue Eliquis 2.5 mg twice daily for stroke prevention in the setting of AF    Very labile recent weights.  Would continue daily weights right rate seems to be around 120 pounds.  If significant increase would commence diuretics.    Thank you for involving us in the care of this patient. We will continue to follow.     Humberto Villagomez M.D.   6/10/2023

## 2023-06-10 NOTE — PLAN OF CARE
"See \"IP SLP Eval/Treat Flowsheet\" for further details re: recent SLP/dysphagia tx sessions. RN requesting details re: Free Water Protocol initiated yesterday however per discussion with MD via [employer] messaging: discontinue, await goals of care discussion/meeting set for Monday.        "

## 2023-06-10 NOTE — PLAN OF CARE
A&O x 4, flat and frustrated. Hypertensive and tachycardic.  Pt flipped into A.fib RVR at about 0000.  Paged provider and provider ordered 1xdose of IV diltiazem, PRN metoprolol increased to 5mg. Q2h turn/repos. Minced/moist diet, pt refusing meals and oral intake.  Poor appetite, does not want thickened liquids; pt requested non-thickened liquids.  Writer educated pt on aspiration risks of non-thickened liquid.  Oral care performed.  PIV in LUE. Back and knee pain managed with PRN Tylenol, lido patches and Voltaren cream. Coarse lungs, nonproductive cough. Scattered bruises. Purewick in place, adequate UOP, frequent loose stools. Continue plan of care.

## 2023-06-11 PROBLEM — I48.0 PAROXYSMAL ATRIAL FIBRILLATION (H): Status: ACTIVE | Noted: 2019-10-14

## 2023-06-11 NOTE — PLAN OF CARE
No acute changes today. Hypertensive. Received PRN labetalol x1 at change of shift this morning. Has remained hypertensive but not meeting parameters for PRN medication. All other VSS on 2L O2 via NC. Wean as tolerated. Denies pain. Lidocaine patches to L knee. +2 BLE edema to ankles and feet. Legs/heels elevated in bed. Blanchable redness to coccyx. Mepilex changed. Incontinent of bowel/bladder. Purewick in place with good output. Loose BM x2. Turn and repo Q2H. On pulsate mattress. Patient continues to refuse most of meals. Total feed/supervised with all meals. Patient continues to eat very little of minced and moist diet. Has drank a couple cups of thickened water/juice today but repeatedly asks for thin liquids. Plan for family meeting with palliative care tomorrow at 1030 to discuss goals and plan of care.

## 2023-06-11 NOTE — PROGRESS NOTES
[facility]    Hospitalist Progress Note    Assessment & Plan   Ghislaine Walls is a 87 year old female with RA; HTN; CHF; PAF; hypothyroidism; GERD; gout; HLD; CKD4; MGUS; chronic anemia and TCP; who presented 5/30/2023 from her [facility] with weakness.      On initial evaluation 5/30,  afebrile, hemodynamically stable with oxygen saturation 97% on room air.  ECG showed sinus rhythm with first-degree AV block; Q waves in the inferior limb leads and V2 and V3; diffuse nonspecific T wave changes; no acute ischemic findings.   Hemoglobin 10.1 and platelets 148; BMP with creatinine 2.91 (was 2.78 5/16); calcium 11.9;   LFTs were normal; troponin was 100; TSH 0.98.   X-rays of her lumbar spine did not show any acute findings.    Head CT without contrast did not show any acute findings.  Hospitalization complicated by development of aspiration pneumonia with acute hypoxic respiratory failure, volume overload, A-fib with RVR.     Aspiration pneumonia.   Acute hypoxic respiratory failure likely multifactorial from aspiration pneumonia, volume overload, deconditioning.  Leukocytosis, suspect related to pneumonia as well as steroids - improved   * On 5/31, pt noted to have aspirated in the morning with coughing and new O2 requirement while eating. Later in the afternoon, pt again had a coughing spell after drinking water coarse, congested cough, needing up to 15L O2 with sats in 80's. RN able to suction food material with sats to 90's.   --CXR showed showed question some minimal pleural fluid on the left with minimal retrocardiac atelectasis and/or infiltrate; right lung clear. Started on pipericillin-tazobactam. Made NPO. Continued high O2 requirements and transferred to [facility]. Subsequently required high flow O2, then BiPAP overnight.  * On 6/1, WBC to 30 (but received IV steroids 5/31). Off BiPAP, back on HFNC in am. CXR showed new right base infiltrate; retrocardiac density similar, possibly related to  small hiatal hernia.   * On 6/2, down to 30L HFNC.  *On 6/4 down to 4 L nasal oxygen.  Has been having IV fluids, crackles present. (Pending weights).  PTA torsemide on hold since admission.  Follow proBNP.    ---  Added on Zosyn 5/31, discontinued 6/7 after completion of the course.  --Continue ipratropium-albuterol nebs TID and PRN.  --  Overnight patient was weaned to room air but she required some oxygen during daytime occasionally.  Currently on 4 L of oxygen.  --Speech had reevaluated the patient, her diet has been further adjusted, patient is not happy about having to eat take thickened liquids.  Her sodium levels are going up.  This was discussed in detail with patient and family, palliative care consult requested since patient is requesting to drink tap water.  -Continue strict aspiration precautions, aggressive incentive spirometry, encourage ambulation.  COVID-19 PCR, influenza PCR negative  -I had a long discussion with daughter, updated her about patient's wishes, discussed with palliative care team, tentative family meeting set up for Monday 6/12.  Meantime wean oxygen down as possible.  -Due to ongoing hypernatremia patient was started on D5W on 6/8 discontinued on 6/9, restarted torsemide which was on hold, repeat BMP shows a creatinine of 2.02 which is close to her baseline, will continue her torsemide for now and oxygen has been weaned down to 2 L now.  -6/11 levels are trending up, 144 today, creatinine is also 2.20, can reduce torsemide to 20 mg daily.     Afib with RVR likely in the setting of volume overload, aspiration.  H/o PAF on anticoagulation.  * ECG on admit showed sinus rhythm with 1st degree AVB.  * Made NPO 5/31 for dysphagia.   * Started IV metoprolol 6/1. Later on 6/1, pt noted to be tachycardic and ECG showed afib with RVR w/o acute ischemic changes.  * Swallowing improved 6/2.  Restarted Coreg 6.25 mg twice daily.  -6/3- increased Coreg to 12.5 mg twice daily.  _6/4 -A-fib with  RVR.    Patient is currently on diltiazem drip, will need to change that to p.o. Dilt, pending cardiology input.  -Requested cardiology input, notes reviewed, patient did receive a dose of IV Lasix on 6/4, another dose was given on 6/5, plan is to initiate PTA medications 6/7.  Patient is getting more and more dehydrated due to reduced oral intake.  Continue PTA Eliquis  -Echocardiogram results noted, patient noted to have a left-sided pleural effusion, global LV function is similar to prior echo but there is increased right-sided pressures.  Status post thoracentesis on 6/6 with 400 cc removed.  Keep potassium around 4, magnesium around 2  -6/7 evening patient went into A-fib with RVR again, temporarily started on diltiazem drip and later weaned off once started on oral diltiazem 60 mg 3 times daily.  Patient's torsemide is on hold due to hypernatremia and dehydration.  -6/9 patient had episodes of bradycardia overnight 6/8, diltiazem stopped and started on amiodarone.  -Appreciate input from cardiology, they are adjusting rate control medications and continuing on amiodarone.  Carvedilol has been discontinued and started on metoprolol until amiodarone is effective.  6/10  -Currently heart rate is better controlled, upon discharge discontinue metoprolol as per cardiology recommendation..  Volume overload likely multifactorial from fluid resuscitation, holding diuretic, heart failure, renal disease.    Acute (on Chronic) Diastolic Heart Failure  Troponin elevation suspect demand ischemia.  History of heart failure with preserved EF [LVEF of 55 to 60%]  S/p Hypertensive urgency related to being off PO meds (NPO).  [PTA: amlodipine 10 mg daily; carvedilol 6.25 mg BID; hydralazine 100 mg TID; ISDN 30 mg TID; torsemide 20 mg daily.]  * Echo 3/2023 showed LVEF 55-60%; RV OK; mild MS.  * Initial presentation as above.   Troponin 100 > 88 > 78   Torsemide held on admission, other blood pressure medications resumed  * On  5/31, made NPO for dysphagia. Started on scheduled IV hydralazine.  * On 6/1, BP's uncontrolled. Started on scheduled IV metoprolol and NTG patch.  * On 6/2, BP's improved. Swallowing improved.  Restarted PTA antihypertensives.  --On 6/4 - patient with increased oxygen needs, shortness of breath, A-fib with RVR.    Discontinue IV fluids.  Follow proBNP.  Dose of Lasix was given on 6/4, another dose given on 6/5.  -  Continue to hold PTA oral torsemide.  Monitor renal function in a.m. and accordingly further diuresis.  Continue increased dose of Coreg at 12.5 mg twice daily.  Has been restarted on PTA Isordil, hydralazine, amlodipine is temporarily on hold.  See echocardiogram results below.  Isordil doses were restarted on 6/3, will need to decide on scheduling diuresis.  Strict input output monitoring.  Daily weights.  2000 mL fluid restriction.  -- Status post thoracentesis of left side on 6/6, patient was temporarily on room air, currently on 2 L.  -- Torsemide was on hold temporarily and patient received D5W infusion  -- I did discuss with daughter on multiple occasions that this cannot be a solution to her reduced oral intake, it needs to be either a G-tube or patient needs to tolerate enough fluids.  Patient did not want G-tube placed and does not want to have the thickened liquids.  Hospice discussions ongoing  Left knee effusion  --Noted on 6/8, consulted orthopedic surgery, appears to be either gout or pseudogout.  --X-ray reviewed without any fracture, even effusion is not significant in x-ray.  Hypernatremia  --Monitor sodium levels, encouraged free water intake.  Patient has reduced intake due to thickening which he does not like.  --See above, palliative care consult requested since patient does not want to drink thickened liquid and not drinking enough fluids can result in other complications including dehydration, DRAGAN etc.  --Sodium 148 on 6/8, started on D5W infusion.  --6/9 sodium improved, D5W  discontinued, torsemide restarted.  Sodium levels remain stable now.  -Torsemide dose reduced to  20 mg daily 6/11  DRAGAN on CKD stage IV, suspect prerenal related to decreased PO intake, diuretic.  CKD stage IV.  * Baseline cr around mid to upper 2 range over the past year.  * Cr 2.91 on admit.  Receiving IV fluids, diuresis held with which sodium improving to 1.84.gone to 2.01 with diuresis .  Currently creatinine is 2.2.  Continue PTA with sodium bicarbonate.    Avoid nephrotoxic drugs.  Creatinine remains to be stable between 2-2.2.  Acute encephalopathy likely multifactorial from metabolic, delirium from hospitalization, cognitive impairment.  Concern for cognitive impairment at baseline.  Per report confusion.  Treat medical issues as above.  - Re-orient as needed.  - Maintain normal day/night, sleep/wake cycles.  - Minimize sedating medications as able.  Cognitive screening as outpatient.     Physical deconditioning from medical illness, senile frailty.  Severe malnutrition in the setting of poor oral intake  Dysphagia.   Goals of care.  --Resident of assisted living facility. Presented with poor oral intake over the past few months prior to admission, weight loss of 15 pounds.--Patient simply not wanting to eat, prescribed dronabinol and Remeron to try to stimulate appetite with no improvement.  * Nutrition consulted on admit and noted with severe malnutrition.  * Issues with aspiration 5/31 as above, subsequently made NPO; SLP consulted.  * On 6/1, SLP evaluated and still recommending NPO 6/5 patient underwent video swallow evaluation and diet has been further adjusted.   Continue to hold PTA dronabinol and mirtazapine for now.  -See above  -Multiple discussion with family regarding goals of care, palliative consult requested on 6/7.  PT, OT following.  Will likely need TCU.  Left-sided pleural effusion  -- Thoracentesis done this admission with 400 cc removed 6/6       Hypokalemia, hypomagnesemia-poor  "intake  Potassium 3.2, magnesium 1.6.  Replacement protocol ordered, monitor.     Constipation.  Per report constipation, receiving scheduled, as needed bowel meds.  Per nursing report today had bowel movement yesterday.  Abdominal soft, nontender, bowel sounds heard.    Continue scheduled, as needed bowel meds.  Monitor     GERD with h/o hiatal hernia.  Continue oral pantoprazole.     Anemia of chronic disease/CKD.  Chronic TCP.  MGUS.  * Follows with [facility]. Has received ANGELIC in the past.  * Hgb 10.1 and plts 148 on admit. No overt clinical signs of major bleeding.  Monitor CBC level in AM.  - Consider prbc transfusion if hgb </= 7.0 or if significant bleeding with hemodynamic instability or if symptomatic.  - Consider platelet transfusion if needs a procedure and less than 50,000; or if less than 10,000.     Rheumatoid arthritis.  Continue PTA hydroxychloroquine and prednisolone 5 mg daily.  Hold outpatient abatacept.     Hypothyroidism.  Continue oral levothyroxine     Gout.  Continue PTA allopurinol.     Depression/anxiety.  Hold PTA Remeron as drowsy     DVT Prophylaxis: DOAC  Code Status: No CPR- Do NOT Intubate     52 MINUTES SPENT BY ME on the date of service doing chart review, history, exam, documentation & further activities per the note.  Disposition: Expected discharge in few days, will need TCU, hospice discussions ongoing..  Clinically Significant Risk Factors              # Hypoalbuminemia: Lowest albumin = 3.1 g/dL at 6/4/2023  5:56 AM, will monitor as appropriate     # Hypertension: Noted on problem list        # Overweight: Estimated body mass index is 25.26 kg/m  as calculated from the following:    Height as of 5/16/23: 1.549 m (5' 1\").    Weight as of this encounter: 60.6 kg (133 lb 11.2 oz).   # Severe Malnutrition: based on nutrition assessment         Nichelle Carbajal MD  Text Page   (7am to 6pm)    Interval History   Patient was very clear today and mentioned to me that she " went to drink clear water and she want her family to oblige with her decisions and she understands that if she continue drinking water she would end up having aspiration pneumonia and that could make her weaker, still she want to pursue diet and she also mentioned that she is not looking forward to prolonging her life.  I mentioned to her that we have this hospice meeting coming, today I tried to address this with patient's daughter who wanted to postpone that to 6/12 for the discussion.  -Data reviewed today: I reviewed all new labs and imaging results over the last 24 hours.     Physical Exam     Vital Signs with Ranges  Temp:  [98.1  F (36.7  C)-98.8  F (37.1  C)] 98.6  F (37  C)  Pulse:  [] 67  Resp:  [18-23] 18  BP: ()/(59-88) 179/73  SpO2:  [90 %-95 %] 93 %  I/O last 3 completed shifts:  In: 230 [P.O.:230]  Out: 500 [Urine:500]    Constitutional: Awake, alert, cooperative, no apparent distress  Respiratory: Bilateral basilar crackles noted  Cardiovascular: Regular rate and rhythm, normal S1 and S2, and no murmur noted  GI: Normal bowel sounds, soft, non-distended, non-tender  Skin/Integumen: No rashes, no cyanosis, 2+ edema noted lower extremity, left knee effusion noted  Neuro : moving all 4 extremities, no focal deficit noted     Medications     Reason anticoagulant not prescribed for atrial fibrillation       - MEDICATION INSTRUCTIONS -         allopurinol  100 mg Oral Daily     amiodarone  400 mg Oral Daily     apixaban ANTICOAGULANT  2.5 mg Oral BID     diclofenac  4 g Topical 4x Daily     docusate  100 mg Oral BID     [Held by provider] dronabinol  2.5 mg Oral BID AC     [Held by provider] ferrous sulfate  325 mg Oral Every Other Day     hydrALAZINE  75 mg Oral TID     hydroxychloroquine  200 mg Oral Daily     ipratropium - albuterol 0.5 mg/2.5 mg/3 mL  3 mL Nebulization TID     isosorbide dinitrate  30 mg Oral TID     levothyroxine  25 mcg Oral QAM AC     lidocaine  2 patch Transdermal Q24H      lidocaine   Transdermal Q8H KEVEN     metoprolol tartrate  100 mg Oral BID     [Held by provider] mirtazapine  15 mg Oral At Bedtime     pantoprazole  40 mg Oral Daily     polyethylene glycol  17 g Oral Daily     predniSONE  5 mg Oral Daily     sodium bicarbonate  650 mg Oral BID     sodium chloride (PF)  3 mL Intracatheter Q8H     torsemide  40 mg Oral Daily       Data   Recent Labs   Lab 06/11/23  0952 06/10/23  0306 06/09/23  0523 06/08/23  1700 06/07/23  0521 06/06/23  1238 06/06/23  1122 06/06/23  0531   WBC  --   --   --  4.9  --   --   --  8.5   HGB  --   --   --  9.7*  --   --   --  9.1*   MCV  --   --   --  104*  --   --   --  103*   PLT  --   --   --  185  --   --   --  152    140 142  --    < >  --   --  147*   POTASSIUM 3.6 3.8 3.6  --    < >  --    < > 3.4   CHLORIDE 111* 110* 113*  --    < >  --   --  116*   CO2 21* 18* 18*  --    < >  --   --  18*   BUN 42.1* 39.2* 43.7*  --    < >  --   --  35.1*   CR 2.20* 2.02* 2.26*  --    < >  --   --  2.01*   ANIONGAP 12 12 11  --    < >  --   --  13   ALIVIA 9.1 8.5* 8.3*  --    < >  --   --  8.5*   * 92 102*  --    < >  --   --  84   PROTTOTAL  --   --   --   --   --  6.0*  --   --     < > = values in this interval not displayed.     Recent Labs   Lab 06/11/23  0952 06/10/23  0306 06/09/23  0523 06/07/23  0521 06/06/23  0531   * 92 102* 93 84       Imaging:   No results found for this or any previous visit (from the past 24 hour(s)).

## 2023-06-11 NOTE — PROVIDER NOTIFICATION
MD Notification    Notified Person: MD    Notified Person Name: Oneal Linares    Notification Date/Time: 6/11/23 6:52 AM    Notification Interaction: paged    Purpose of Notification: BP of 185/59, already had one dose of hydralazine, order additional med?     Orders Received: Orders received    Comments:

## 2023-06-11 NOTE — PLAN OF CARE
Diagnosis:Aspiration pneumonia; volume overload  Mental Status:AxOx4  Activity/dangle: Lift  Diet:Minced and moist; moderately thick; 1:1 assist  Pain:Denies  Dejesus/Voiding:Incontinent  02/LDA:2/L nasal cannula   D/C Date:BD

## 2023-06-11 NOTE — PROGRESS NOTES
Arrived from [facility] floor @ around 2300hr. Skin check done w/ the help of another RN. Claus hearing aids and glasses in place.

## 2023-06-12 NOTE — PLAN OF CARE
Physical Therapy Discharge Summary    Reason for therapy discharge:    Change in medical status.    Progress towards therapy goal(s). See goals on Care Plan in [facility] electronic health record for goal details.  Goals not met.  Barriers to achieving goals:   PT orders completed by medical team.    Therapy recommendation(s):    No further therapy is recommended.

## 2023-06-12 NOTE — PROGRESS NOTES
Palliative Care Daily Progress Note  [facility]      Patient: Ghislaine Walls  Date of Admission:  5/30/2023     Recommendations & Counseling     GOALS OF CARE:     Transition to comfort measures only, per pt and family request     SW consult for hospice planning. Pt interested in exploring care at [facility]     Regular diet with thin liquids for pleasure and comfort. Pt and family accepting of risk of aspiration. We discussed that symptoms of aspiration will be managed with comfort medications, and this may lead to a more precipitous decline at any point     O2 via NC for comfort PRN     ADVANCE CARE PLANNING:    Pt has a paper copy of her HCD, daughter rKupa brought this in today      There is a POLST form which is reviewed and current.    Code status: No CPR- Do NOT Intubate    MEDICAL MANAGEMENT:     Discontinue scheduled medications that do not directly add to her comfort     Ok to continue diclofenac topical gel and lidoderm patches     Dilaudid 1-2mg PO Q2hrs PRN pain, SOB or 0.3-0.5mg IV Q1hr PRN pain, SOB    Ativan 0.5-1mg SL every 3hrs PRN anxiety or 0.5-1mg IV Q1hr PRN anxiety    Zyprexa 2.5-5mg ODT every 6hrs PRN agitation    Atropine, robinul PRN secretions     PSYCHOSOCIAL/SPIRITUAL SUPPORT:    Family - 3 adult children, Krupa, Scooby, Gricelda, all present for today's care conference     Palliative Care will continue to follow. Thank you for the consult and allowing us to aid in the care of Ghislaine Walls.    These recommendations have been discussed with bedside RN Paloma, unit KATHI Portillo, and Dr. Carbajal.    DHAVAL Balderrama CNP  Securely message with [employer] (more info)  Text page via [facility] Paging/Directory       Palliative Summary/HPI     Ghislaine Walls is a 87 year old female with a past medical history significant for RA, HTN, HFpEF, PAF on chronic anticoagulation, hypothyroidism, GERD, gout, HLD, CKD stage IV, MGUS, chronic anemia and TCP who presents with weakness,  reduced PO intake and overall failure to thrive. Her hospital course has been complicated by acute hypoxic respiratory failure 2/2 aspiration PNA. She has dysphagia, SLP recommended modified textured diet and thickened liquids. She developed a.fib with RVR in the setting of volume overload and aspiration. She developed DRAGAN on CKD. She has severe malnutrition in the setting of acute illness.     Today, the patient was seen for:  Goals of care     Palliative Care Summary:   Met with Ghislaine, along with 3 adult children (Krupa, Scooby, Gricelda), palliative LICSW, Paula Godinez, and Dr. Carbajal.   I introduced our role as an extra layer of support and how we help patients and families dealing with serious, potentially life-limiting illnesses. I explained the composition of the palliative care team.  Palliative care helps patients and families navigate their care while focusing on the whole person; providing emotional, social and spiritual support  Palliative care often assists with symptom management, information sharing about what to expect from the illness, available treatment options and what effect those options may have on the disease course, and provide effective communication and caring support.    Prognosis, Goals, & Planning:      Functional Status just prior to this current hospitalization:    There has been a decline in daily function including weakness, reduced PO intake, failure to thrive .      ECOG3 (Capable of only limited self-care; needs help with ADLs; in bed/chair >50% of waking hours)      Prognosis, Goals, and/or Advance Care Planning:    Advance Care Planning Discussion 6/7/2023. Casie BENITES APRN CNP met with Patient and family (daughters Krupa and Gricelda, son Scooby) today at the hospital to discuss Advance Care Planning. Ghislaine LAROSE Titi does have decisional capacity and was present for this discussion. Those present were informed of the voluntary nature of this discussion and wished to  "proceed. This discussion began at 1030 and ended at 1112 for a total of 42 minutes.    We discussed Ghislaine's swallowing trouble within the context of her chronic comorbidities. These issues are stacking up on each other. Ghislaine doesn't feel she is getting to get better, or capable of getting better. She feels she is nearing the end of her life. She has little to no interest in food and liquid at this point. She does not want to drink thickened liquids. She wants regular ice water.   Ghislaine's preference is for comfort measures only. Education provided on transition to comfort-focused goals of care would be including discontinuation of IV fluids, cardiac monitoring, labs, and other interventions that do not directly promote comfort. Anticipatory guidance was given regarding feeding, hunger, fluids at end of life. We discussed utilization of medications to ease air hunger, agitation and restlessness. Discussed that this process is very purposeful in terms of ensuring patient is as comfortable as possible and that family wishes are honored.  - We discussed the risk of aspiration and she would like to eat and drink for pleasure and comfort, accepting the risk of aspiration. She declines a PEG. She understands that this is a hospice plan of care, and she is clear that comfort is what she wants. She [facility] with hosp[facility]  We discussed hospice planning. Educated patient and family regarding hospice philosophy and prognostic criteria. Dispelled common myths. Discussed what hospice is (and is not), what services are usually provided (and those that are not, ex \"correction care\"), under what circumstances people tend to enroll, and the variety of places people can get hospice care. Discussed typical anticipated timing of discharge.      Code Status was addressed today:     Yes, affirmed DNR/DNI today       Patient's decision making preferences: independently, with the support of medical team and family "           Patient has decision-making capacity today for complex decisions: Intact          Medications:  I have reviewed this patient's medication profile and medications from this hospitalization. Notable medications:  Prednisone 5mg PO daily   Diclofenac topical gel  lidoderm patches   Dilaudid 1-2mg PO Q2hrs PRN pain, SOB or 0.3-0.5mg IV Q1hr PRN pain, SOB  Ativan 0.5-1mg SL every 3hrs PRN anxiety or 0.5-1mg IV Q1hr PRN anxiety  Zyprexa 2.5-5mg ODT every 6hrs PRN agitation  Atropine, robinul PRN secretions     ROS:  Comprehensive ROS is reviewed and is negative except as here & per HPI: N/A    Physical Exam   Vital Signs with Ranges  Temp:  [97.7  F (36.5  C)-99.1  F (37.3  C)] 97.7  F (36.5  C)  Pulse:  [62-65] 64  Resp:  [16-18] 16  BP: (160-206)/(63-75) 206/65  SpO2:  [91 %-95 %] 91 %  133 lbs 11.2 oz  CONSTITUTIONAL: Chronically ill woman seen resting in bed in NAD, A&Ox3, quiet voice, fatigued. Calm and cooperative. Family present   RESPIRATORY: NL respiratory effort on NC, infrequent dry cough   NEUROLOGIC: Appropriately responsive during interview  PSYCH: Affect flat, somewhat disengaged     Data reviewed:  Recent imaging independently reviewed, my comments on pertinents:   6/11 EKG With prolonged Qtc    Recent lab data independently reviewed, my comments on pertinents:   Na 144  K 3.6  Creat 2.2  Mg 2  Phos 3.1  WBC 8.5  Hgb 9.1  Plt 152    Medical Decision Making   Please see A&P for additional details of medical decision making.  MANAGEMENT DISCUSSED with the following over the past 24 hours: Paloma ULRICH, Dr. Carbajal, dtr Krupa, son Scooby, dtr Gricelda   NOTE(S)/MEDICAL RECORDS REVIEWED over the past 24 hours: Hospitalist notes, nursing notes, nutrition note, SLP notes   Tests personally interpreted in the past 24 hours:  - EKG tracing showing prolonged Qtc   Tests REVIEWED in the past 24 hours:  - See lab/imaging results included in the data section of the note  - BMP  - CBC  - Magnesium  -  Phosphorus  SUPPLEMENTAL HISTORY, in addition to the patient's history, over the past 24 hours obtained from:   - Paloma ULIRCH, Dr. Carbajal, dtr millie Gentile, [facility] [place]  Medical complexity over the past 24 hours:  - Parenteral (IV) CONTROLLED SUBSTANCES ordered  - Intensive monitoring for MEDICATION TOXICITY  - Decision to DE-ESCALATE CARE based on prognosis

## 2023-06-12 NOTE — CONSULTS
"[facility] Nurse Inpatient Assessment     Consulted for: \"sacral\"    Summary: Patient transitioned to comfort cares 6/12. Pulsate mattress in use. No wound present. Prevention orders written.    Patient History (according to provider note(s):      \"Ghislaine Walls is a 87 year old female with RA; HTN; CHF; PAF; hypothyroidism; GERD; gout; HLD; CKD4; MGUS; chronic anemia and TCP; who [facility].\"     Assessment:      Areas visualized during today's visit: Perineal area and Sacrum/coccyx         Last photo: 6/12/23  Skin: hyperpigmented skin due to chronic tissue injury, some scar tissue noted to bilateral buttocks from healed wounds, 100& epidermis  History/plan of care: Pt incontinent of B&B. Purewick in use with brief. Per RN, Mepilex changed x2 d/t stool incontinence.        Color: dusky      Pain interventions prior to dressing change: patient tolerated well and slow and gentle cares   Treatment goal: Protection  Supplies ordered: at bedside, supplies stored on unit and discussed with RN     Treatment Plan:     Sacral and coccyx wound prevention:   1. Avoid brief in bed.  2. For incontinence care use Anthony spray and Raciel white cloths. Avoid blue bath wipes.  3. Follow PIP Plan    Pressure Injury Prevention (PIP) Plan:  -Mattress: Follow bed algorithm, reassess daily and order specialty mattress, if indicated.  -HOB: Maintain at or below 30 degrees, unless contraindicated  -Repositioning in bed: Every 1-2 hours , Left/right positioning; avoid supine and per patient and family's wishes due to comfort cares  -Heels: Keep elevated off mattress  -Protective Dressing: None  -Positioning Equipment: pillows  -Moisture Management: Perineal cleansing /protection: Follow Incontinence Protocol, Avoid brief in bed and Moisturize dry skin  -Under Devices: Ensure patient is not lying on medical devices or equipment when repositioned    Orders: " "Written    RECOMMEND PRIMARY TEAM ORDER: None, at this time  Education provided: importance of repositioning, Moisture management, Hygiene and Off-loading pressure  Discussed plan of care with: Patient and Nurse  WOC nurse follow-up plan: signing off  Notify WOC if wound(s) deteriorate.  Nursing to notify the Provider(s) and re-consult the WOC Nurse if new skin concern.    DATA:     Current support surface: Standard  Low air loss (BAKARI pump, Isolibrium, Pulsate, skin guard, etc)  Containment of urine/stool: Incontinence Protocol, Brief, Incontinent pad in bed and Purewick external catheter   BMI: Body mass index is 25.26 kg/m .   Active diet order: Orders Placed This Encounter      Combination Diet Minced and Moist Diet (level 5); Liquidized/Moderately Thick (level 3) (1:1 assist, liquids via tsp only, periodic cough and re-swallow)     Output: I/O last 3 completed shifts:  In: 400 [P.O.:400]  Out: 1900 [Urine:1900]     Labs: Recent Labs   Lab 06/08/23  1700   HGB 9.7*   WBC 4.9     Pressure injury risk assessment:   Sensory Perception: 4-->no impairment  Moisture: 3-->occasionally moist  Activity: 2-->chairfast  Mobility: 2-->very limited  Nutrition: 2-->probably inadequate  Friction and Shear: 1-->problem  Donato Score: 14    Paula Arreguin RN, CWON   Please contact through [employer] at name or group \"WO nurse\" (M-F [id number]-[id number])  Leave VM @ *[phone] checked occasionally t/o day M-F  "

## 2023-06-12 NOTE — PROGRESS NOTES
[facility]    Hospitalist Progress Note    Assessment & Plan   Ghislaine Walls is a 87 year old female with RA; HTN; CHF; PAF; hypothyroidism; GERD; gout; HLD; CKD4; MGUS; chronic anemia and TCP; who presented 5/30/2023 from her [facility] with weakness.      On initial evaluation 5/30,  afebrile, hemodynamically stable with oxygen saturation 97% on room air.  ECG showed sinus rhythm with first-degree AV block; Q waves in the inferior limb leads and V2 and V3; diffuse nonspecific T wave changes; no acute ischemic findings.   Hemoglobin 10.1 and platelets 148; BMP with creatinine 2.91 (was 2.78 5/16); calcium 11.9;   LFTs were normal; troponin was 100; TSH 0.98.   X-rays of her lumbar spine did not show any acute findings.    Head CT without contrast did not show any acute findings.  Hospitalization complicated by development of aspiration pneumonia with acute hypoxic respiratory failure, volume overload, A-fib with RVR.     Aspiration pneumonia.   Acute hypoxic respiratory failure likely multifactorial from aspiration pneumonia, volume overload, deconditioning.  Leukocytosis, suspect related to pneumonia as well as steroids - improved   * On 5/31, pt noted to have aspirated in the morning with coughing and new O2 requirement while eating. Later in the afternoon, pt again had a coughing spell after drinking water coarse, congested cough, needing up to 15L O2 with sats in 80's. RN able to suction food material with sats to 90's.   --CXR showed showed question some minimal pleural fluid on the left with minimal retrocardiac atelectasis and/or infiltrate; right lung clear. Started on pipericillin-tazobactam. Made NPO. Continued high O2 requirements and transferred to [facility] [place]. Subsequently required high flow O2, then BiPAP overnight.  * On 6/1, WBC to 30 (but received IV steroids 5/31). Off BiPAP, back on HFNC in am. CXR showed new right base infiltrate; retrocardiac density similar, possibly related to  small hiatal hernia.   * On 6/2, down to 30L HFNC.  *On 6/4 down to 4 L nasal oxygen.  Has been having IV fluids, crackles present. (Pending weights).  PTA torsemide on hold since admission.  Follow proBNP.    ---  Added on Zosyn 5/31, discontinued 6/7 after completion of the course.  --Continue ipratropium-albuterol nebs TID and PRN.  --  Overnight patient was weaned to room air but she required some oxygen during daytime occasionally.  Currently on 4 L of oxygen.  --Speech had reevaluated the patient, her diet has been further adjusted, patient is not happy about having to eat take thickened liquids.  Her sodium levels are going up.  This was discussed in detail with patient and family, palliative care consult requested since patient is requesting to drink tap water.  -Continue strict aspiration precautions, aggressive incentive spirometry, encourage ambulation.  COVID-19 PCR, influenza PCR negative  -I had a long discussion with daughter, updated her about patient's wishes, discussed with palliative care team, tentative family meeting set up for Monday 6/12.  Meantime wean oxygen down as possible.  -Due to ongoing hypernatremia patient was started on D5W on 6/8 discontinued on 6/9, restarted torsemide which was on hold, repeat BMP shows a creatinine of 2.02 which is close to her baseline, will continue her torsemide for now and oxygen has been weaned down to 2 L now.  -6/11 levels are trending up, 144 today, creatinine is also 2.20, can reduce torsemide to 20 mg daily.  6/12 we had a family conference, discussed with palliative care, patient's family and patient and patient expressed the wish to be comfort measures status, we will initiate comfort measures status as of 6/12 and plan for hospice discharge, social work consulted.  Appreciate palliative care input.     Afib with RVR likely in the setting of volume overload, aspiration.  H/o PAF on anticoagulation.  * ECG on admit showed sinus rhythm with 1st degree  AVB.  * Made NPO 5/31 for dysphagia.   * Started IV metoprolol 6/1. Later on 6/1, pt noted to be tachycardic and ECG showed afib with RVR w/o acute ischemic changes.  * Swallowing improved 6/2.  Restarted Coreg 6.25 mg twice daily.  -6/3- increased Coreg to 12.5 mg twice daily.  _6/4 -A-fib with RVR.    Patient is currently on diltiazem drip, will need to change that to p.o. Dilt, pending cardiology input.  -Requested cardiology input, notes reviewed, patient did receive a dose of IV Lasix on 6/4, another dose was given on 6/5, plan is to initiate PTA medications 6/7.  Patient is getting more and more dehydrated due to reduced oral intake.  Continue PTA Eliquis  -Echocardiogram results noted, patient noted to have a left-sided pleural effusion, global LV function is similar to prior echo but there is increased right-sided pressures.  Status post thoracentesis on 6/6 with 400 cc removed.  Keep potassium around 4, magnesium around 2  -6/7 evening patient went into A-fib with RVR again, temporarily started on diltiazem drip and later weaned off once started on oral diltiazem 60 mg 3 times daily.  Patient's torsemide is on hold due to hypernatremia and dehydration.  -6/9 patient had episodes of bradycardia overnight 6/8, diltiazem stopped and started on amiodarone.  -Appreciate input from cardiology, they are adjusting rate control medications and continuing on amiodarone.  Carvedilol has been discontinued and started on metoprolol until amiodarone is effective.  6/10  -Currently heart rate is better controlled, upon discharge discontinue metoprolol as per cardiology recommendation.  --See above, comfort measures status.  Volume overload likely multifactorial from fluid resuscitation, holding diuretic, heart failure, renal disease.    Acute (on Chronic) Diastolic Heart Failure  Troponin elevation suspect demand ischemia.  History of heart failure with preserved EF [LVEF of 55 to 60%]  S/p Hypertensive urgency related to  being off PO meds (NPO).  [PTA: amlodipine 10 mg daily; carvedilol 6.25 mg BID; hydralazine 100 mg TID; ISDN 30 mg TID; torsemide 20 mg daily.]  * Echo 3/2023 showed LVEF 55-60%; RV OK; mild MS.  * Initial presentation as above.   Troponin 100 > 88 > 78   Torsemide held on admission, other blood pressure medications resumed  * On 5/31, made NPO for dysphagia. Started on scheduled IV hydralazine.  * On 6/1, BP's uncontrolled. Started on scheduled IV metoprolol and NTG patch.  * On 6/2, BP's improved. Swallowing improved.  Restarted PTA antihypertensives.  --On 6/4 - patient with increased oxygen needs, shortness of breath, A-fib with RVR.    Discontinue IV fluids.  Follow proBNP.  Dose of Lasix was given on 6/4, another dose given on 6/5.  -  Continue to hold PTA oral torsemide.  Monitor renal function in a.m. and accordingly further diuresis.  Continue increased dose of Coreg at 12.5 mg twice daily.  Has been restarted on PTA Isordil, hydralazine, amlodipine is temporarily on hold.  See echocardiogram results below.  Isordil doses were restarted on 6/3, will need to decide on scheduling diuresis.  Strict input output monitoring.  Daily weights.  2000 mL fluid restriction.  -- Status post thoracentesis of left side on 6/6, patient was temporarily on room air, currently on 2 L.  -- Torsemide was on hold temporarily and patient received D5W infusion  -- I did discuss with daughter on multiple occasions that this cannot be a solution to her reduced oral intake, it needs to be either a G-tube or patient needs to tolerate enough fluids.  Patient did not want G-tube placed and does not want to have the thickened liquids.  Hospice discussions ongoing.  --It was decided on comfort measures status on 6/12.  Left knee effusion  --Noted on 6/8, consulted orthopedic surgery, appears to be either gout or pseudogout.  --X-ray reviewed without any fracture, even effusion is not significant in x-ray.  Hypernatremia  --Monitor sodium  levels, encouraged free water intake.  Patient has reduced intake due to thickening which he does not like.  --See above, palliative care consult requested since patient does not want to drink thickened liquid and not drinking enough fluids can result in other complications including dehydration, DRAGAN etc.  --Sodium 148 on 6/8, started on D5W infusion.  --6/9 sodium improved, D5W discontinued, torsemide restarted.  Sodium levels remain stable now.  -Torsemide dose reduced to  20 mg daily 6/11  DRAGAN on CKD stage IV, suspect prerenal related to decreased PO intake, diuretic.  CKD stage IV.  * Baseline cr around mid to upper 2 range over the past year.  * Cr 2.91 on admit.  Receiving IV fluids, diuresis held with which sodium improving to 1.84.gone to 2.01 with diuresis .  Currently creatinine is 2.2.  Continue PTA with sodium bicarbonate.    Avoid nephrotoxic drugs.  Creatinine remains to be stable between 2-2.2.  Acute encephalopathy likely multifactorial from metabolic, delirium from hospitalization, cognitive impairment.  Concern for cognitive impairment at baseline.  Per report confusion.  Treat medical issues as above.  - Re-orient as needed.  - Maintain normal day/night, sleep/wake cycles.  - Minimize sedating medications as able.  Cognitive screening as outpatient.     Physical deconditioning from medical illness, senile frailty.  Severe malnutrition in the setting of poor oral intake  Dysphagia.   Goals of care.  --Resident of assisted living facility. Presented with poor oral intake over the past few months prior to admission, weight loss of 15 pounds.--Patient simply not wanting to eat, prescribed dronabinol and Remeron to try to stimulate appetite with no improvement.  * Nutrition consulted on admit and noted with severe malnutrition.  * Issues with aspiration 5/31 as above, subsequently made NPO; SLP consulted.  * On 6/1, SLP evaluated and still recommending NPO 6/5 patient underwent video swallow evaluation  and diet has been further adjusted.   Continue to hold PTA dronabinol and mirtazapine for now.  -See above  -Multiple discussion with family regarding goals of care, palliative consult requested on 6/7.  PT, OT following.  Will likely need TCU.  Left-sided pleural effusion  -- Thoracentesis done this admission with 400 cc removed 6/6       Hypokalemia, hypomagnesemia-poor intake  Potassium 3.2, magnesium 1.6.  Replacement protocol ordered, monitor.     Constipation.  Per report constipation, receiving scheduled, as needed bowel meds.  Per nursing report today had bowel movement yesterday.  Abdominal soft, nontender, bowel sounds heard.    Continue scheduled, as needed bowel meds.  Monitor     GERD with h/o hiatal hernia.  Continue oral pantoprazole.     Anemia of chronic disease/CKD.  Chronic TCP.  MGUS.  * Follows with [facility]. Has received ANGELIC in the past.  * Hgb 10.1 and plts 148 on admit. No overt clinical signs of major bleeding.  Monitor CBC level in AM.  - Consider prbc transfusion if hgb </= 7.0 or if significant bleeding with hemodynamic instability or if symptomatic.  - Consider platelet transfusion if needs a procedure and less than 50,000; or if less than 10,000.     Rheumatoid arthritis.  Continue PTA hydroxychloroquine and prednisolone 5 mg daily.  Hold outpatient abatacept.     Hypothyroidism.  Continue oral levothyroxine     Gout.  Continue PTA allopurinol.     Depression/anxiety.  Hold PTA Remeron as drowsy     DVT Prophylaxis: DOAC  Code Status: No CPR- Do NOT Intubate     52 MINUTES SPENT BY ME on the date of service doing chart review, history, exam, documentation & further activities per the note.  Disposition: Expected discharge in few days, will need TCU, hospice discussions ongoing..  Clinically Significant Risk Factors              # Hypoalbuminemia: Lowest albumin = 3.1 g/dL at 6/4/2023  5:56 AM, will monitor as appropriate     # Hypertension: Noted on problem list        #  "Overweight: Estimated body mass index is 25.26 kg/m  as calculated from the following:    Height as of 5/16/23: 1.549 m (5' 1\").    Weight as of this encounter: 60.6 kg (133 lb 11.2 oz).   # Severe Malnutrition: based on nutrition assessment         Nichelle Carbajal MD  Text Page   (7am to 6pm)    Interval History   we had family meeting today, patient expressed her wish to be comfort measures status and family's presence and status changed to comfort measures.  -Data reviewed today: I reviewed all new labs and imaging results over the last 24 hours.     Physical Exam     Vital Signs with Ranges  Temp:  [97.7  F (36.5  C)-99.1  F (37.3  C)] 97.7  F (36.5  C)  Pulse:  [62-65] 64  Resp:  [16-18] 16  BP: (160-206)/(63-75) 206/65  SpO2:  [91 %-95 %] 91 %  I/O last 3 completed shifts:  In: 400 [P.O.:400]  Out: 1900 [Urine:1900]    Constitutional: Awake, alert, cooperative, no apparent distress  Respiratory: Bilateral basilar crackles noted  Cardiovascular: Regular rate and rhythm, normal S1 and S2, and no murmur noted  GI: Normal bowel sounds, soft, non-distended, non-tender  Skin/Integumen: No rashes, no cyanosis, 2+ edema noted lower extremity, left knee effusion noted  Neuro : moving all 4 extremities, no focal deficit noted     Medications       diclofenac  4 g Topical 4x Daily     lidocaine  2 patch Transdermal Q24H     lidocaine   Transdermal Q8H KEVEN     predniSONE  5 mg Oral Daily     sodium chloride (PF)  3 mL Intracatheter Q8H       Data   Recent Labs   Lab 06/12/23  0654 06/11/23  0952 06/10/23  0306 06/09/23  0523 06/08/23  1700 06/07/23  0521 06/06/23  1238 06/06/23  1122 06/06/23  0531   WBC  --   --   --   --  4.9  --   --   --  8.5   HGB  --   --   --   --  9.7*  --   --   --  9.1*   MCV  --   --   --   --  104*  --   --   --  103*   PLT  --   --   --   --  185  --   --   --  152   NA  --  144 140 142  --    < >  --   --  147*   POTASSIUM 3.6 3.6 3.8 3.6  --    < >  --    < > 3.4   CHLORIDE  --  111* 110* " 113*  --    < >  --   --  116*   CO2  --  21* 18* 18*  --    < >  --   --  18*   BUN  --  42.1* 39.2* 43.7*  --    < >  --   --  35.1*   CR  --  2.20* 2.02* 2.26*  --    < >  --   --  2.01*   ANIONGAP  --  12 12 11  --    < >  --   --  13   ALIVIA  --  9.1 8.5* 8.3*  --    < >  --   --  8.5*   GLC  --  104* 92 102*  --    < >  --   --  84   PROTTOTAL  --   --   --   --   --   --  6.0*  --   --     < > = values in this interval not displayed.     Recent Labs   Lab 06/11/23  0952 06/10/23  0306 06/09/23  0523 06/07/23  0521 06/06/23  0531   * 92 102* 93 84       Imaging:   No results found for this or any previous visit (from the past 24 hour(s)).

## 2023-06-12 NOTE — PROGRESS NOTES
BRIEF NUTRITION NOTE    Reason for note:  Ghislaine Walls is a 87 year old female originally seen by Registered Dietitian for RN Consult - malnutrition   Pt has transferred to [facility], nutrition will no longer be following. Please re-consult if a Registered Dietitian is needed in the future.

## 2023-06-12 NOTE — PROGRESS NOTES
Patient is hypertensive. A & O x 4, but forgetful.  Received PRN labetalol x1 after change of shift this afternoon when SBP reached 184.  SBP came down to 160 after prn labetalol given.   All other VSS on 2L O2 via NC. Denies pain. Lidocaine patches removed from knee at 2100 per instructions on MAR.  +2 BLE edema to ankles and feet. Legs/heels elevated in bed. Blanchable redness to coccyx. Mepilex in place.  Incontinent of bowel/bladder. Purewick in place with good output - 500 mL. No BMs this shift.  Turn and repo Q2H. On pulsate mattress. Patient continues to refuse most of meals. Total feed/supervised with all meals. Patient continues to eat very little of minced and moist diet. Drank three cups of thickened water this shift but repeatedly requested thin liquids instead.   Potassium replenished with oral tablet. K+, Mg+ and Phos will be rechecked tomorrow AM.  Plan for family meeting with palliative care tomorrow at 1030 to discuss goals and plan of care.

## 2023-06-12 NOTE — PLAN OF CARE
A/Ox4, VSS on 2 L O2 via NC ex HTN. PRN hydralazine given x1 and Prn labetalol given x1 for HTN. Denies pain and nausea. Legs/heels elevated. Blanchable redness on coccyx. Preventative mepi in place. Incont B/B, one small BM this shift. Purewick in place w/ good output. Strict I/Os. A2 w/ lift, T/R q2hr. Honey thickened liquids, minced/moist diet, total feel, very poor appetite. Plan for family meeting w/ palliative today @ 1030 to discuss plan of care.

## 2023-06-12 NOTE — CONSULTS
Care Management Follow Up    Length of Stay (days): 12    Expected Discharge Date: 06/13/2023     Concerns to be Addressed:       Patient plan of care discussed at interdisciplinary rounds: Yes    Anticipated Discharge Disposition:       Anticipated Discharge Services:    Anticipated Discharge DME:      Patient/family educated on Medicare website which has current facility and service quality ratings:    Education Provided on the Discharge Plan:    Patient/Family in Agreement with the Plan: yes    Referrals Placed by CM/SW:    Private pay costs discussed: private room/amenity fees and transportation costs    Additional Information:  Writer was notified patient transitioned to comfort cares. Writer met with patients and daughters bedside to discuss hospice discharge. Patients daughter reports that they want their Mom to go to [facility]. Writer discussed the fees for [facility] and explained that insurance covers the hospice portion but room and board is private pay. Writer discussed that where ever patient goes room and board would not be covered by hospice. Family states they need some time to figure out her finances as they have already paid rent for June and July at [facility] so they need to figure out a plan for that. Writer discussed that we could see how much it would be for patient to go back to [facility] and see if that is an option. Family would like to explore that option as well. Writer called Nicholas at [facility] and patient would be able to come to their TCU for hospice and it is $7500 down and then $500/day for hospice care at the TCU. Writer asked about patient returning to her skilled nursing and getting hospice care. Nicholas would follow up with Jeanette and the Business office to see what they could do for that but was not sure if it would work or not. Writer provided them with the hospice agency list and explained that where ever patient goes, they would be able to choose a hospice agency to provide extra care and  that is covered by medicare. SW will wait to hear from Elenita and then follow up with the family on next steps.       JONATHAN Waldron

## 2023-06-12 NOTE — PROVIDER NOTIFICATION
MD Notification    Notified Person: MD    Notified Person Name: Dr. Nichelle Carbajal    Notification Date/Time: 12:26 PM 06/12/23    Notification Interaction: [employer] Messaging    Purpose of Notification: Pt would like a lozenge for her throat. Would you please order. Thank you.     Orders Received:    Comments:

## 2023-06-13 NOTE — PROGRESS NOTES
[facility]    Social Work Progress Note - [facility]    ______________________________________________________________________    [facility] 24/7 Contact Number: [phone]    - [facility] in [facility] or clinical plan of care   - Nursing: Please contact [facility] with significant changes in patient condition  - Social Work: Please contact [facility] for discharge phanning/updates  ______________________________________________________________________  GAEL received a phone call from daughter Krupa about setting up a meeting to discuss hospice ine the community. Writer called Krupa and she would like to meet tomorrow 6/14 as she would like her brother involved in the conversation.    Writer will continue to follow up    Daphne Alvarez, [facility]\  [phone]

## 2023-06-13 NOTE — PROGRESS NOTES
Care Management Follow Up    Length of Stay (days): 13    Expected Discharge Date: 06/14/2023     Concerns to be Addressed:       Patient plan of care discussed at interdisciplinary rounds: Yes    Anticipated Discharge Disposition:       Anticipated Discharge Services:    Anticipated Discharge DME:      Patient/family educated on Medicare website which has current facility and service quality ratings:    Education Provided on the Discharge Plan:    Patient/Family in Agreement with the Plan: yes    Referrals Placed by CM/SW:    Private pay costs discussed: Not applicable    Additional Information:  Writer called patients daughter Krupa. Krupa states that they are leaning towards hospice at [facility] but they do not know for sure. Family is going to talk today regarding [facility] [place] and get back to .     Addendum: Writer was notified that family is going to meet with [facility] for community hospice      JONATHAN Waldron

## 2023-06-13 NOTE — PROGRESS NOTES
[facility]    Hospitalist Progress Note    Assessment & Plan   Ghislaine Walls is a 87 year old female with RA; HTN; CHF; PAF; hypothyroidism; GERD; gout; HLD; CKD4; MGUS; chronic anemia and TCP; who presented 5/30/2023 from her [facility] with weakness.      On initial evaluation 5/30,  afebrile, hemodynamically stable with oxygen saturation 97% on room air.  ECG showed sinus rhythm with first-degree AV block; Q waves in the inferior limb leads and V2 and V3; diffuse nonspecific T wave changes; no acute ischemic findings.   Hemoglobin 10.1 and platelets 148; BMP with creatinine 2.91 (was 2.78 5/16); calcium 11.9;   LFTs were normal; troponin was 100; TSH 0.98.   X-rays of her lumbar spine did not show any acute findings.    Head CT without contrast did not show any acute findings.  Hospitalization complicated by development of aspiration pneumonia with acute hypoxic respiratory failure, volume overload, A-fib with RVR.     Aspiration pneumonia.   Acute hypoxic respiratory failure likely multifactorial from aspiration pneumonia, volume overload, deconditioning.  Leukocytosis, suspect related to pneumonia as well as steroids - improved   * On 5/31, pt noted to have aspirated in the morning with coughing and new O2 requirement while eating. Later in the afternoon, pt again had a coughing spell after drinking water coarse, congested cough, needing up to 15L O2 with sats in 80's. RN able to suction food material with sats to 90's.   --CXR showed showed question some minimal pleural fluid on the left with minimal retrocardiac atelectasis and/or infiltrate; right lung clear. Started on pipericillin-tazobactam. Made NPO. Continued high O2 requirements and transferred to [facility]. Subsequently required high flow O2, then BiPAP overnight.  * On 6/1, WBC to 30 (but received IV steroids 5/31). Off BiPAP, back on HFNC in am. CXR showed new right base infiltrate; retrocardiac density similar, possibly related to  small hiatal hernia.   * On 6/2, down to 30L HFNC.  *On 6/4 down to 4 L nasal oxygen.  Has been having IV fluids, crackles present. (Pending weights).  PTA torsemide on hold since admission.  Follow proBNP.    ---  Added on Zosyn 5/31, discontinued 6/7 after completion of the course.  --Continue ipratropium-albuterol nebs TID and PRN.  --  Overnight patient was weaned to room air but she required some oxygen during daytime occasionally.  Currently on 4 L of oxygen.  --Speech had reevaluated the patient, her diet has been further adjusted, patient is not happy about having to eat take thickened liquids.  Her sodium levels are going up.  This was discussed in detail with patient and family, palliative care consult requested since patient is requesting to drink tap water.  -Continue strict aspiration precautions, aggressive incentive spirometry, encourage ambulation.  COVID-19 PCR, influenza PCR negative  -I had a long discussion with daughter, updated her about patient's wishes, discussed with palliative care team, tentative family meeting set up for Monday 6/12.  Meantime wean oxygen down as possible.  -Due to ongoing hypernatremia patient was started on D5W on 6/8 discontinued on 6/9, restarted torsemide which was on hold, repeat BMP shows a creatinine of 2.02 which is close to her baseline, will continue her torsemide for now and oxygen has been weaned down to 2 L now.  -6/11 levels are trending up, 144 today, creatinine is also 2.20, can reduce torsemide to 20 mg daily.  6/12 we had a family conference, discussed with palliative care, patient's family and patient and patient expressed the wish to be comfort measures status, we will initiate comfort measures status as of 6/12 and plan for hospice discharge, social work consulted.  Appreciate palliative care input.  --Patient is comfortable today, family is discussing options between [facility] (patient will have to pay $500 per day) versus are oral where she is  already a long-term care resident.     Afib with RVR likely in the setting of volume overload, aspiration.  H/o PAF on anticoagulation.  * ECG on admit showed sinus rhythm with 1st degree AVB.  * Made NPO 5/31 for dysphagia.   * Started IV metoprolol 6/1. Later on 6/1, pt noted to be tachycardic and ECG showed afib with RVR w/o acute ischemic changes.  * Swallowing improved 6/2.  Restarted Coreg 6.25 mg twice daily.  -6/3- increased Coreg to 12.5 mg twice daily.  _6/4 -A-fib with RVR.    Patient is currently on diltiazem drip, will need to change that to p.o. Dilt, pending cardiology input.  -Requested cardiology input, notes reviewed, patient did receive a dose of IV Lasix on 6/4, another dose was given on 6/5, plan is to initiate PTA medications 6/7.  Patient is getting more and more dehydrated due to reduced oral intake.  Continue PTA Eliquis  -Echocardiogram results noted, patient noted to have a left-sided pleural effusion, global LV function is similar to prior echo but there is increased right-sided pressures.  Status post thoracentesis on 6/6 with 400 cc removed.  Keep potassium around 4, magnesium around 2  -6/7 evening patient went into A-fib with RVR again, temporarily started on diltiazem drip and later weaned off once started on oral diltiazem 60 mg 3 times daily.  Patient's torsemide is on hold due to hypernatremia and dehydration.  -6/9 patient had episodes of bradycardia overnight 6/8, diltiazem stopped and started on amiodarone.  -Appreciate input from cardiology, they are adjusting rate control medications and continuing on amiodarone.  Carvedilol has been discontinued and started on metoprolol until amiodarone is effective.  6/10  -Currently heart rate is better controlled, upon discharge discontinue metoprolol as per cardiology recommendation.  --See above, comfort measures status.  Volume overload likely multifactorial from fluid resuscitation, holding diuretic, heart failure, renal disease.     Acute (on Chronic) Diastolic Heart Failure  Troponin elevation suspect demand ischemia.  History of heart failure with preserved EF [LVEF of 55 to 60%]  S/p Hypertensive urgency related to being off PO meds (NPO).  [PTA: amlodipine 10 mg daily; carvedilol 6.25 mg BID; hydralazine 100 mg TID; ISDN 30 mg TID; torsemide 20 mg daily.]  * Echo 3/2023 showed LVEF 55-60%; RV OK; mild MS.  * Initial presentation as above.   Troponin 100 > 88 > 78   Torsemide held on admission, other blood pressure medications resumed  * On 5/31, made NPO for dysphagia. Started on scheduled IV hydralazine.  * On 6/1, BP's uncontrolled. Started on scheduled IV metoprolol and NTG patch.  * On 6/2, BP's improved. Swallowing improved.  Restarted PTA antihypertensives.  --On 6/4 - patient with increased oxygen needs, shortness of breath, A-fib with RVR.    Discontinue IV fluids.  Follow proBNP.  Dose of Lasix was given on 6/4, another dose given on 6/5.  -  Continue to hold PTA oral torsemide.  Monitor renal function in a.m. and accordingly further diuresis.  Continue increased dose of Coreg at 12.5 mg twice daily.  Has been restarted on PTA Isordil, hydralazine, amlodipine is temporarily on hold.  See echocardiogram results below.  Isordil doses were restarted on 6/3, will need to decide on scheduling diuresis.  Strict input output monitoring.  Daily weights.  2000 mL fluid restriction.  -- Status post thoracentesis of left side on 6/6, patient was temporarily on room air, currently on 2 L.  -- Torsemide was on hold temporarily and patient received D5W infusion  -- I did discuss with daughter on multiple occasions that this cannot be a solution to her reduced oral intake, it needs to be either a G-tube or patient needs to tolerate enough fluids.  Patient did not want G-tube placed and does not want to have the thickened liquids.  Hospice discussions ongoing.  --It was decided on comfort measures status on 6/12.  Left knee effusion  --Noted on  6/8, consulted orthopedic surgery, appears to be either gout or pseudogout.  --X-ray reviewed without any fracture, even effusion is not significant in x-ray.  Hypernatremia  --Monitor sodium levels, encouraged free water intake.  Patient has reduced intake due to thickening which he does not like.  --See above, palliative care consult requested since patient does not want to drink thickened liquid and not drinking enough fluids can result in other complications including dehydration, DRAGAN etc.  --Sodium 148 on 6/8, started on D5W infusion.  --6/9 sodium improved, D5W discontinued, torsemide restarted.  Sodium levels remain stable now.  -Torsemide dose reduced to  20 mg daily 6/11  DRAGAN on CKD stage IV, suspect prerenal related to decreased PO intake, diuretic.  CKD stage IV.  * Baseline cr around mid to upper 2 range over the past year.  * Cr 2.91 on admit.  Receiving IV fluids, diuresis held with which sodium improving to 1.84.gone to 2.01 with diuresis .  Currently creatinine is 2.2.  Continue PTA with sodium bicarbonate.    Avoid nephrotoxic drugs.  Creatinine remains to be stable between 2-2.2.  Acute encephalopathy likely multifactorial from metabolic, delirium from hospitalization, cognitive impairment.  Concern for cognitive impairment at baseline.  Per report confusion.  Treat medical issues as above.  - Re-orient as needed.  - Maintain normal day/night, sleep/wake cycles.  - Minimize sedating medications as able.  Cognitive screening as outpatient.     Physical deconditioning from medical illness, senile frailty.  Severe malnutrition in the setting of poor oral intake  Dysphagia.   Goals of care.  --Resident of assisted living facility. Presented with poor oral intake over the past few months prior to admission, weight loss of 15 pounds.--Patient simply not wanting to eat, prescribed dronabinol and Remeron to try to stimulate appetite with no improvement.  * Nutrition consulted on admit and noted with severe  malnutrition.  * Issues with aspiration 5/31 as above, subsequently made NPO; SLP consulted.  * On 6/1, SLP evaluated and still recommending NPO 6/5 patient underwent video swallow evaluation and diet has been further adjusted.   Continue to hold PTA dronabinol and mirtazapine for now.  -See above  -Multiple discussion with family regarding goals of care, palliative consult requested on 6/7.  PT, OT following.  Will likely need TCU.  Left-sided pleural effusion  -- Thoracentesis done this admission with 400 cc removed 6/6       Hypokalemia, hypomagnesemia-poor intake  Potassium 3.2, magnesium 1.6.  Replacement protocol ordered, monitor.     Constipation.  Per report constipation, receiving scheduled, as needed bowel meds.  Per nursing report today had bowel movement yesterday.  Abdominal soft, nontender, bowel sounds heard.    Continue scheduled, as needed bowel meds.  Monitor     GERD with h/o hiatal hernia.  Continue oral pantoprazole.     Anemia of chronic disease/CKD.  Chronic TCP.  MGUS.  * [place]. [place]. Has received ANGELIC in the past.  * Hgb 10.1 and plts 148 on admit. No overt clinical signs of major bleeding.  Monitor CBC level in AM.  - Consider prbc transfusion if hgb </= 7.0 or if significant bleeding with hemodynamic instability or if symptomatic.  - Consider platelet transfusion if needs a procedure and less than 50,000; or if less than 10,000.     Rheumatoid arthritis.  Continue PTA hydroxychloroquine and prednisolone 5 mg daily.  Hold outpatient abatacept.     Hypothyroidism.  Continue oral levothyroxine     Gout.  Continue PTA allopurinol.     Depression/anxiety.  Hold PTA Remeron as drowsy     DVT Prophylaxis: DOAC  Code Status: No CPR- Do NOT Intubate     40 MINUTES SPENT BY ME on the date of service doing chart review, history, exam, documentation & further activities per the note.  Discussed discharge disposition with social work and nursing.  Disposition: Expected discharge in  "few days, will need TCU, hospice discussions ongoing..  Clinically Significant Risk Factors              # Hypoalbuminemia: Lowest albumin = 3.1 g/dL at 6/4/2023  5:56 AM, will monitor as appropriate     # Hypertension: Noted on problem list        # Overweight: Estimated body mass index is 25.26 kg/m  as calculated from the following:    Height as of 5/16/23: 1.549 m (5' 1\").    Weight as of this encounter: 60.6 kg (133 lb 11.2 oz).   # Severe Malnutrition: based on nutrition assessment         Nichelle Carbajal MD  Text Page   (7am to 6pm)    Interval History   Patient is resting comfortably.  -Data reviewed today: I reviewed all new labs and imaging results over the last 24 hours.     Physical Exam     Vital Signs with Ranges     I/O last 3 completed shifts:  In: -   Out: 1750 [Urine:1750]    Constitutional: Sleepy  Respiratory: Bilateral basilar crackles noted  Cardiovascular: Regular rate and rhythm, normal S1 and S2, and no murmur noted  GI: Normal bowel sounds, soft, non-distended, non-tender  Skin/Integumen: No rashes, no cyanosis, 2+ edema noted lower extremity, left knee effusion noted  Neuro : moving all 4 extremities, no focal deficit noted     Medications       diclofenac  4 g Topical 4x Daily     lidocaine  2 patch Transdermal Q24H     lidocaine   Transdermal Q8H KEVEN     predniSONE  5 mg Oral Daily     sodium chloride (PF)  3 mL Intracatheter Q8H       Data   Recent Labs   Lab 06/12/23  0654 06/11/23  0952 06/10/23  0306 06/09/23  0523 06/08/23  1700   WBC  --   --   --   --  4.9   HGB  --   --   --   --  9.7*   MCV  --   --   --   --  104*   PLT  --   --   --   --  185   NA  --  144 140 142  --    POTASSIUM 3.6 3.6 3.8 3.6  --    CHLORIDE  --  111* 110* 113*  --    CO2  --  21* 18* 18*  --    BUN  --  42.1* 39.2* 43.7*  --    CR  --  2.20* 2.02* 2.26*  --    ANIONGAP  --  12 12 11  --    ALIVIA  --  9.1 8.5* 8.3*  --    GLC  --  104* 92 102*  --      Recent Labs   Lab 06/11/23  0952 06/10/23  0306 " 06/09/23  0523 06/07/23  0521   [facility] 104* 92 102* 93       Imaging:   No results found for this or any previous visit (from the past 24 hour(s)).

## 2023-06-13 NOTE — TELEPHONE ENCOUNTER
Patient's daughter Krupa calling (on C2C) to inform PCP that patient will be transitioning to Hospice Care. Krupa reported patient is currently in the hospital and hospital approved Hospice transition but family was advised to get approval from PCP as well. Krupa wanted to also inform PCP that patient's main problems right now are the hiatal hernia she has as well as swallowing problems.     Routing to PCP for review. Does family need approval from PCP as well to begin Hospice services? Thanks.

## 2023-06-13 NOTE — PLAN OF CARE
Goal Outcome Evaluation:  1930-0730H  A/Ox3-disoriented to situation, forgetful. Assessments deferred-comfort cares. On Reg diet. L PIV SL.  A2 T/R Q2h/lift. Inc of B/B. Pure wick in place with adequate output. Compazine PO PRN given 1x for nausea. Ativan IV PRN given 1x. Denies pain. Discharge pending.

## 2023-06-14 NOTE — PLAN OF CARE
06/13/23   [id number]  A/Ox3-disoriented to situation, forgetful. Assessments deferred-comfort cares. On Reg diet. Appetite is poor, and pt seems to get easily nauseated after eating-several emesis after eating-Ativan given x1. Pt also needs assistance with eating-too weak. Pt is incontinent and is Ax2 with lift-purewick in place. 1x BM this shift-loose. Pt complains of back pain-lidocaine patches in place. Discharge pending hospice placement.

## 2023-06-14 NOTE — PLAN OF CARE
Goal Outcome Evaluation:  6/13/23  1900-0730H  Lethargic, disoriented to situation, place and time. Arouses to touch and to voice. Follows commands when asked. On O2 via NC at 1.5LPM for comfort. Assessments deferred-comfort cares. On Reg diet. L PIV SL.  A2 T/R Q2h/lift. Inc of B/B. Pure wick in place BM 2x this shift. Complained of back pain, dilaudid PO PRN given 1x. . Denies N/V. Discharge pending.

## 2023-06-14 NOTE — PROGRESS NOTES
[facility]    Hospitalist Progress Note    Assessment & Plan   Ghislaine Walls is a 87 year old female with RA; HTN; CHF; PAF; hypothyroidism; GERD; gout; HLD; CKD4; MGUS; chronic anemia and TCP; who presented 5/30/2023 from her [facility] with weakness.      On initial evaluation 5/30,  afebrile, hemodynamically stable with oxygen saturation 97% on room air.  ECG showed sinus rhythm with first-degree AV block; Q waves in the inferior limb leads and V2 and V3; diffuse nonspecific T wave changes; no acute ischemic findings.   Hemoglobin 10.1 and platelets 148; BMP with creatinine 2.91 (was 2.78 5/16); calcium 11.9;   LFTs were normal; troponin was 100; TSH 0.98.   X-rays of her lumbar spine did not show any acute findings.    Head CT without contrast did not show any acute findings.  Hospitalization complicated by development of aspiration pneumonia with acute hypoxic respiratory failure, volume overload, A-fib with RVR.    Patient is now switched to comfort care protocol only.  And remain comfortable at this time.     Comfort care only  Aspiration pneumonia.   Acute hypoxic respiratory failure likely multifactorial from aspiration pneumonia, volume overload, deconditioning.  Leukocytosis, suspect related to pneumonia as well as steroids - improved   * On 5/31, pt noted to have aspirated in the morning with coughing and new O2 requirement while eating. Later in the afternoon, pt again had a coughing spell after drinking water coarse, congested cough, needing up to 15L O2 with sats in 80's. RN able to suction food material with sats to 90's.   --CXR showed showed question some minimal pleural fluid on the left with minimal retrocardiac atelectasis and/or infiltrate; right lung clear. Started on pipericillin-tazobactam. Made NPO. Continued high O2 requirements and transferred to [facility]. Subsequently required high flow O2, then BiPAP overnight.  * On 6/1, WBC to 30 (but received IV steroids 5/31). Off  BiPAP, back on HFNC in am. CXR showed new right base infiltrate; retrocardiac density similar, possibly related to small hiatal hernia.   * On 6/2, down to 30L HFNC.  *On 6/4 down to 4 L nasal oxygen.  Has been having IV fluids, crackles present. (Pending weights).  PTA torsemide on hold since admission.  Follow proBNP.    ---  Added on Zosyn 5/31, discontinued 6/7 after completion of the course.  --Continue ipratropium-albuterol nebs TID and PRN.  --  Overnight patient was weaned to room air but she required some oxygen during daytime occasionally.  Currently on 4 L of oxygen.  --Speech had reevaluated the patient, her diet has been further adjusted, patient is not happy about having to eat take thickened liquids.  Her sodium levels are going up.  This was discussed in detail with patient and family, palliative care consult requested since patient is requesting to drink tap water.  -Continue strict aspiration precautions, aggressive incentive spirometry, encourage ambulation.  COVID-19 PCR, influenza PCR negative  -I had a long discussion with daughter, updated her about patient's wishes, discussed with palliative care team, tentative family meeting set up for Monday 6/12.  Meantime wean oxygen down as possible.  -Due to ongoing hypernatremia patient was started on D5W on 6/8 discontinued on 6/9, restarted torsemide which was on hold, repeat BMP shows a creatinine of 2.02 which is close to her baseline, will continue her torsemide for now and oxygen has been weaned down to 2 L now.  -6/11 levels are trending up, 144 today, creatinine is also 2.20, can reduce torsemide to 20 mg daily.  6/12 we had a family conference, discussed with palliative care, patient's family and patient and patient expressed the wish to be comfort measures status, we will initiate comfort measures status as of 6/12 and plan for hospice discharge, social work consulted.  Appreciate palliative care input.  -- Patient remained comfortable at  this time, family is discussing options between [facility] (patient will have to pay $500 per day) versus where she is already a long-term care resident, meeting with hospice for community hospice at this time.     Afib with RVR likely in the setting of volume overload, aspiration.  H/o PAF on anticoagulation.  * ECG on admit showed sinus rhythm with 1st degree AVB.  * Made NPO 5/31 for dysphagia.   * Started IV metoprolol 6/1. Later on 6/1, pt noted to be tachycardic and ECG showed afib with RVR w/o acute ischemic changes.  * Swallowing improved 6/2.  Restarted Coreg 6.25 mg twice daily.  -6/3- increased Coreg to 12.5 mg twice daily.  _6/4 -A-fib with RVR.    Patient is currently on diltiazem drip, will need to change that to p.o. Dilt, pending cardiology input.  -Requested cardiology input, notes reviewed, patient did receive a dose of IV Lasix on 6/4, another dose was given on 6/5, plan is to initiate PTA medications 6/7.  Patient is getting more and more dehydrated due to reduced oral intake.  Continue PTA Eliquis  -Echocardiogram results noted, patient noted to have a left-sided pleural effusion, global LV function is similar to prior echo but there is increased right-sided pressures.  Status post thoracentesis on 6/6 with 400 cc removed.  Keep potassium around 4, magnesium around 2  -6/7 evening patient went into A-fib with RVR again, temporarily started on diltiazem drip and later weaned off once started on oral diltiazem 60 mg 3 times daily.  Patient's torsemide is on hold due to hypernatremia and dehydration.  -6/9 patient had episodes of bradycardia overnight 6/8, diltiazem stopped and started on amiodarone.  -Appreciate input from cardiology, they are adjusting rate control medications and continuing on amiodarone.  Carvedilol has been discontinued and started on metoprolol until amiodarone is effective.  6/10  -Currently heart rate is better controlled, upon discharge discontinue metoprolol as per  cardiology recommendation.  --See above, comfort measures status.  Volume overload likely multifactorial from fluid resuscitation, holding diuretic, heart failure, renal disease.    Acute (on Chronic) Diastolic Heart Failure: Now improved  Troponin elevation suspect demand ischemia.  History of heart failure with preserved EF [LVEF of 55 to 60%]  S/p Hypertensive urgency related to being off PO meds (NPO).  [PTA: amlodipine 10 mg daily; carvedilol 6.25 mg BID; hydralazine 100 mg TID; ISDN 30 mg TID; torsemide 20 mg daily.]  * Echo 3/2023 showed LVEF 55-60%; RV OK; mild MS.  * Initial presentation as above.   Troponin 100 > 88 > 78   Torsemide held on admission, other blood pressure medications resumed  * On 5/31, made NPO for dysphagia. Started on scheduled IV hydralazine.  * On 6/1, BP's uncontrolled. Started on scheduled IV metoprolol and NTG patch.  * On 6/2, BP's improved. Swallowing improved.  Restarted PTA antihypertensives.  --On 6/4 - patient with increased oxygen needs, shortness of breath, A-fib with RVR.    Discontinue IV fluids.  Follow proBNP.  Dose of Lasix was given on 6/4, another dose given on 6/5.  -  Continue to hold PTA oral torsemide.  Monitor renal function in a.m. and accordingly further diuresis.  Continue increased dose of Coreg at 12.5 mg twice daily.  Has been restarted on PTA Isordil, hydralazine, amlodipine is temporarily on hold.  See echocardiogram results below.  Isordil doses were restarted on 6/3, will need to decide on scheduling diuresis.  Strict input output monitoring.  Daily weights.  2000 mL fluid restriction.  -- Status post thoracentesis of left side on 6/6, patient was temporarily on room air, currently on 2 L.  -- Torsemide was on hold temporarily and patient received D5W infusion  -- I did discuss with daughter on multiple occasions that this cannot be a solution to her reduced oral intake, it needs to be either a G-tube or patient needs to tolerate enough fluids.   Patient did not want G-tube placed and does not want to have the thickened liquids.  Hospice discussions ongoing.  --It was decided on comfort measures status on 6/12.    Left knee effusion  --Noted on 6/8, consulted orthopedic surgery, appears to be either gout or pseudogout.  --X-ray reviewed without any fracture, even effusion is not significant in x-ray.  No acute issue at this time    Hypernatremia: Resolved  --Monitor sodium levels, encouraged free water intake.  Patient has reduced intake due to thickening which he does not like.  --See above, palliative care consult requested since patient does not want to drink thickened liquid and not drinking enough fluids can result in other complications including dehydration, DRAGAN etc.  --Sodium 148 on 6/8, started on D5W infusion.  --6/9 sodium improved, D5W discontinued, torsemide restarted.  Sodium levels remain stable now.  -Torsemide dose reduced to  20 mg daily 6/11    DRAGAN on CKD stage IV, suspect prerenal related to decreased PO intake, diuretic.  CKD stage IV.  * Baseline cr around mid to upper 2 range over the past year.  * Cr 2.91 on admit.  Receiving IV fluids, diuresis held with which sodium improving to 1.84.gone to 2.01 with diuresis .  Currently creatinine is 2.2.  Continue PTA with sodium bicarbonate.    Avoid nephrotoxic drugs.  Creatinine remains to be stable between 2-2.2.    Acute encephalopathy likely multifactorial from metabolic, delirium from hospitalization, cognitive impairment.  Concern for cognitive impairment at baseline.  Per report confusion.  Treat medical issues as above.  - Re-orient as needed.  - Maintain normal day/night, sleep/wake cycles.  - Minimize sedating medications as able.  Patient at comfort care protocol remain at baseline at this time.  .     Physical deconditioning from medical illness, senile frailty.  Severe malnutrition in the setting of poor oral intake  Dysphagia.   Goals of care.  --Resident of assisted living  facility. Presented with poor oral intake over the past few months prior to admission, weight loss of 15 pounds.--Patient simply not wanting to eat, prescribed dronabinol and Remeron to try to stimulate appetite with no improvement.  * Nutrition consulted on admit and noted with severe malnutrition.  * Issues with aspiration 5/31 as above, subsequently made NPO; SLP consulted.  * On 6/1, SLP evaluated and still recommending NPO 6/5 patient underwent video swallow evaluation and diet has been further adjusted.   Continue to hold PTA dronabinol and mirtazapine for now.  -See above  -Multiple discussion with family regarding goals of care, palliative consult requested on 6/7.  PT, OT following.    As mentioned above, patient is now switched to comfort care protocol only on 6/12/2023      Left-sided pleural effusion  -- Thoracentesis done this admission with 400 cc removed 6/6       Hypokalemia, hypomagnesemia-poor intake  Potassium 3.2, magnesium 1.6.  Replacement protocol ordered, monitor.     Constipation.  Per report constipation, receiving scheduled, as needed bowel meds.  Per nursing report today had bowel movement yesterday.  Abdominal soft, nontender, bowel sounds heard.    Continue scheduled, as needed bowel meds.  Monitor     GERD with h/o hiatal hernia.  Continue oral pantoprazole.     Anemia of chronic disease/CKD.  Chronic TCP.  MGUS.  * Follows with [facility]. Has received [facility] in the past.  * Hgb 10.1 and plts 148 on admit. No overt clinical signs of major bleeding.  Monitor CBC level in AM.  - Consider prbc transfusion if hgb </= 7.0 or if significant bleeding with hemodynamic instability or if symptomatic.  - Consider platelet transfusion if needs a procedure and less than 50,000; or if less than 10,000.     Rheumatoid arthritis.  Continue PTA hydroxychloroquine and prednisolone 5 mg daily.  Hold outpatient abatacept.     Hypothyroidism.  Continue oral levothyroxine     Gout.  Continue PTA  "allopurinol.     Depression/anxiety.  Hold PTA Remeron as drowsy     DVT Prophylaxis: DOAC  Code Status: No CPR- Do NOT Intubate     40 MINUTES SPENT BY ME on the date of service doing chart review, history, exam, documentation & further activities per the note.  Discussed discharge disposition with social work and nursing.  Disposition: Expected discharge in few days, will need TCU, hospice discussions ongoing..  Clinically Significant Risk Factors              # Hypoalbuminemia: Lowest albumin = 3.1 g/dL at 6/4/2023  5:56 AM, will monitor as appropriate     # Hypertension: Noted on problem list        # Overweight: Estimated body mass index is 25.26 kg/m  as calculated from the following:    Height as of 5/16/23: 1.549 m (5' 1\").    Weight as of this encounter: 60.6 kg (133 lb 11.2 oz).   # Severe Malnutrition: based on nutrition assessment         Keenan Munoz MD  Text Page   (7am to 6pm)    Interval History   Patient seen and evaluated in her room today, was sleeping at the time of my visit, did woke up easily.  Complains of feeling tired and fatigued.  Denies any pain shortness of breath fever chills nausea vomiting at this time.    No other significant event overnight, patient remained comfortable.    She is currently on comfort care only.      -Data reviewed today: I reviewed all new labs and imaging results over the last 24 hours.     Physical Exam     Vital Signs with Ranges  Temp:  [98.6  F (37  C)] 98.6  F (37  C)  Pulse:  [109] 109  Resp:  [16] 16  BP: (156)/(86) 156/86  SpO2:  [94 %] 94 %  No intake/output data recorded.    Constitutional: Sleepy, but able to wake up easily.  Very pale  Respiratory: Diminished air entry bilaterally no wheezing or crackles  Cardiovascular: Regular rate and rhythm, normal S1 and S2, and no murmur noted  GI: Normal bowel sounds, soft, non-distended, non-tender  Skin/Integumen: No rashes, no cyanosis, trace to 1+ edema noted lower extremity, left knee effusion noted  Neuro " : No focal deficit    Medications       diclofenac  4 g Topical 4x Daily     lidocaine  2 patch Transdermal Q24H     lidocaine   Transdermal Q8H KEVEN     predniSONE  5 mg Oral Daily     sodium chloride (PF)  3 mL Intracatheter Q8H       Data   Recent Labs   Lab 06/12/23  0654 06/11/23  0952 06/10/23  0306 06/09/23  0523 06/08/23  1700   WBC  --   --   --   --  4.9   HGB  --   --   --   --  9.7*   MCV  --   --   --   --  104*   PLT  --   --   --   --  185   NA  --  144 140 142  --    POTASSIUM 3.6 3.6 3.8 3.6  --    CHLORIDE  --  111* 110* 113*  --    CO2  --  21* 18* 18*  --    BUN  --  42.1* 39.2* 43.7*  --    CR  --  2.20* 2.02* 2.26*  --    ANIONGAP  --  12 12 11  --    ALIVIA  --  9.1 8.5* 8.3*  --    GLC  --  104* 92 102*  --      Recent Labs   Lab 06/11/23  0952 06/10/23  0306 06/09/23  0523   * 92 102*       Imaging:   No results found for this or any previous visit (from the past 24 hour(s)).

## 2023-06-14 NOTE — PLAN OF CARE
Comfort care maintained, full assessment and VS deferred. Alert to self only. Denies pain or nausea throughout shift. PIV SL. Regular diet with room service, poor appetite. Loose BM x1, purewick in place. Turn and repositioned for comfort. A2/lift. Discharge to [place] with hospice, stretcher transport scheduled for tomorrow 6/15@[id number].

## 2023-06-14 NOTE — PROGRESS NOTES
Care Management Follow Up    Length of Stay (days): 14    Expected Discharge Date: 06/15/2023     Concerns to be Addressed:       Patient plan of care discussed at interdisciplinary rounds: Yes    Anticipated Discharge Disposition:       Anticipated Discharge Services:    Anticipated Discharge DME:      Patient/family educated on Medicare website which has current facility and service quality ratings:    Education Provided on the Discharge Plan:    Patient/Family in Agreement with the Plan: yes    Referrals Placed by CM/SW:    Private pay costs discussed: Not applicable    Additional Information:  Writer was notified patient and family would like to discharge to [place] [address] with [facility]. Writer set up stretcher transportation to [place] due to hospice, oxygen needs and confusion. Stretcher transport is set for 11:40-12:25. Writer updated Daughter Krupa. Krupa was confused with the hospice discharge and states that nothing was signed and nothing was confirmed yet. Writer asked if Daphne([facility]) met with her and her brother and she said yes but she did not have to sign anything. Krupa was worried that things are not going to go smoothly and was not in agreement with the plan to discharge to [place]. Writer talked with Daphne with [facility] and Scooby (patients son) is the Healthcare agent. Scooby signed consents and was in agreement with [facility]. Writer called and left message for Scooby asking for a return call to discuss discharge planning. Writer paged MD for 3-days of medications.       JONATHAN Waldron

## 2023-06-15 NOTE — PROGRESS NOTES
Care Management Discharge Note    Discharge Date: 06/15/2023       Discharge Disposition: Hospice    Discharge Services: Transportation Services, Other (see comment) (Hospice)    Discharge DME: None    Discharge Transportation: family or friend will provide    Private pay costs discussed: Not applicable    Does the patient's insurance plan have a 3 day qualifying hospital stay waiver?  No    PAS Confirmation Code: [id number]  Patient/family educated on Medicare website which has current facility and service quality ratings: yes    Education Provided on the Discharge Plan: Yes  Persons Notified of Discharge Plans: Pa  Patient/Family in Agreement with the Plan: yes    Handoff Referral Completed: No    Additional Information:  Patient was notified of discharge plan yesterday by KATHI Portillo. Writer completed a PCS form for stretcher. Form was faxed to billing and placed in the chart. KATHI Portillo paged MD for orders. PAS was completed and given to the [facility] for discharge at 11:40. Transport will be coming with a stretcher to go to [place]. Dr. Duffy has put in orders for discharge.     PAS-RR    D: Per DHS regulation, KATHI completed and submitted PAS-RR to [place] [employer] via the [employer] Line.  PAS-RR confirmation # is : [id number]    I: KATHI spoke with patient and son Scooby and they are aware a PAS-RR has been submitted.  KATHI reviewed with Patient and son Scooby that they may be contacted for a follow up appointment within 10 days of hospital discharge if their SNF stay is < 30 days.  Contact information for [facility] Link[employer] Line was also provided.    A: Patient and son Scooby verbalized understanding.    P: Further questions may be directed to [facility] Line at #[phone], option #4 for PAS-RR staff.    Vinnie Hedrick [facility]  Care Management

## 2023-06-15 NOTE — DISCHARGE SUMMARY
[facility]    Discharge Summary  Hospitalist    Date of Admission:  5/30/2023  Date of Discharge:  6/15/2023  Discharging Provider:  KHANH Duffy MD, FACP     Date of Service (when I saw the patient): 06/15/23    Discharge Diagnoses     Aspiration pneumonia  Acute hypoxic respiratory failure  Ongoing hypoxia  Leukocytosis  A-fib with RVR  Left pleural effusion  Bradycardia  Acute (on chronic) Diastolic Heart Failure  Troponin elevation  History of heart failure with preserved EF [LVEF of 55 to 60%]  S/p Hypertensive urgency  Hypertension  Left knee effusion  Hypernatremia  DRAGAN on CKD stage IV  Acute encephalopathy  Possible cognitive impairment  Physical deconditioning from medical illness  Severe malnutrition  Oropharyngeal dysphagia  Generalized weakness  Hypokalemia  Hypomagnesemia   Constipation  Diarrhea   GERD  History of hiatal hernia  Anemia of chronic disease/CKD  Chronic TCP  MGUS  Rheumatoid arthritis  Hypothyroidism  Gout  Depression  Anxiety    Goals of care: comfort care      History of Present Illness   Per 5/30 H & P:  Ghislaine Walls is a pleasant 87 year old woman with history of RA, HTN, CHF, PAF, gout, dyslipidemia, CKD4, MGUS, chronic anemia and TCP who presented on 5/30 from her residential with weakness.   She had a fall on 5/28.  She has needed more help at the assisted living for mobilization and her daughter had arranged for increased services recently.  She reported some pain in the back of her head (denied hitting her head but does not recollect much about the fall).  Also reported back pain, but has had this for about 1 month.     ED evaluation showed CBC with hemoglobin 10.1 and platelets 148; BMP with creatinine 2.91 (was 2.78 5/16); calcium 11.9; troponin was 100; TSH 0.98.     Patient's daughter had concerns about decreased oral intake.  She has been eating and drinking suboptimally for last several months.  Patient's daughter noted that the patient has lost  "approximately 15 pounds (from 130 pounds to around 115 pounds) over the past 6 months.  She says that the patient simply \"does not want to eat\".  Possible trouble swallowing dry foods.  Had a bout of vomiting yesterday.      Hospital Course   Ghislaine Walls was admitted on 5/30/2023.  Hospitalization complicated by development of aspiration pneumonia with acute hypoxic respiratory failure, volume overload, A-fib with RVR.    The following problems were addressed during her hospitalization:    Aspiration pneumonia  Acute hypoxic respiratory failure likely multifactorial from aspiration pneumonia, volume overload, deconditioning  Ongoing hypoxia  Leukocytosis, suspect related to pneumonia as well as steroids - improved   - 5/31, pt noted to have aspirated in the morning with coughing and new O2 requirement while eating. Later in the afternoon, pt again had a coughing spell after drinking water coarse, congested cough, needing up to 15L O2 with sats in 80's. RN able to suction food material with sats to 90's.   - CXR showed showed question some minimal pleural fluid on the left with minimal retrocardiac atelectasis and/or infiltrate; right lung clear. Started on pipericillin-tazobactam.  - continued high O2 requirements and was transferred to [facility]. Subsequently required high flow O2, then BiPAP overnight.  - 6/1: off BiPAP, back on HFNC in am. CXR showed new right base infiltrate; retrocardiac density similar, possibly related to small hiatal hernia.   - 6/2: down to 30L HFNC.  - 6/4: down to 4 L nasal oxygen.   - added Zosyn 5/31, discontinued 6/7  - treated with ipratropium-albuterol nebs TID and PRN.  - 6/12 family conference: discussed with palliative care, patient's family and patient and patient expressed the wish to be comfort measures status  - Palliative care consulted    Afib with RVR in the setting of volume overload, possible aspiration.  H/o PAF on anticoagulation.  Left pleural effusion  Bradycardia, " intermittent  - started IV metoprolol 6/1.  - restarted Coreg 6.25 mg twice daily, later increased to 12.5 mg BID  - required diltiazem drip  - Cardiology was consulted  - continued PTA Eliquis  - thoracentesis on 6/6 with 400 cc removed.  - treated with amiodarone briefly  - continued apixaban 2.5 mg BID    Acute (on chronic) Diastolic Heart Failure: improved  Troponin elevation; attributed to demand ischemia.  History of heart failure with preserved EF [LVEF of 55 to 60%]  S/p Hypertensive urgency related to being off PO meds (NPO)  Hypertension  - multiple medication changes were made  - more comfortable at time of discharge     Left knee effusion: noted on 6/8  - consulted Orthopedic surgery, appears to be either gout or pseudogout.  - no acute intervention; observed     Hypernatremia: resolved     DRAGAN on CKD stage IV, suspect prerenal related to decreased PO intake, diuretic.  - baseline Cr around mid to upper 2 range over the past year.  - treated with IV fluids  - continued PTA  sodium bicarbonate.     Acute encephalopathy likely multifactorial: metabolic, delirium from hospitalization, possible cognitive impairment.  Possible cognitive impairment  - stable at time of discharge  .     Physical deconditioning from medical illness  Severe malnutrition in the setting of poor oral intake  Oropharyngeal dysphagia  Generalized weakness   Goals of care: comfort care  - poor oral intake over the past few months prior to admission, weight loss of 15 pounds.  - earlier was prescribed dronabinol and Remeron to try to stimulate appetite with no improvement.  - issues with aspiration 5/31; subsequently made NPO; SLP consulted.  - Palliative Care consulted  - PT, OT evaluations were done     Hypokalemia  Hypomagnesemia  - corrected with replacement protocol     Constipation  Diarrhea, later this admission  - bowel medications were adjusted     GERD  History of hiatal hernia.  - continued oral pantoprazole.     Anemia of  chronic disease/CKD  Chronic TCP  MGUS  - follows with MARTINEZ Razo.  Has received ANGELIC in the past.  - Hgb stable; platelets improved     Rheumatoid arthritis; stable.  - continued at discharge: PTA hydroxychloroquine; discontinued: prednisolone 5 mg daily.  - abatacept held; needs review by PCP at follow up within 1 week     Hypothyroidism  - continued oral levothyroxine     Gout  - continued PTA allopurinol.     Depression/anxiety; stable  - restarting at discharge: PTA Remeron (held earlier due to drowsiness)         KHANH Duffy MD, [facility]    Significant Results and Procedures   See below and in EHR.    Pending Results   None.      Code Status   DNR / DNI       Primary Care Physician   Laurie Conteh    Physical Exam     Vitals:    06/08/23 0451 06/10/23 0500 06/11/23 0700   Weight: 59.4 kg (130 lb 15.3 oz) 50.5 kg (111 lb 5.3 oz) 60.6 kg (133 lb 11.2 oz)     06/14/23 0729 98.6  F (37  C) 109 -- -- 156/86 Abnormal  16 94 % 1.5 LPM Nasal cannula     Constitutional: awake, no apparent distress; lying in bed  HEENT: sclerae clear; MM's moist; nasal cannula 02 in place  Respiratory: good a/e bilaterally; decreased at bases; no wheezing or rhonchi  Cardiovascular: irregular rate and rhythm, S1, S2 noted; no m/r/g  GI: abdomen flat, + bowel sounds; soft, non-tender, non-distended  Skin/Integumen: no rashes, no cyanosis, no jaundice  Musculoskeletal: no edema  Neurologic: confused; follows directions well; no focal deficits     Discharge Disposition   Discharged to: [facility]  Condition at discharge: Stable    Consultations This Hospital Stay   CARE MANAGEMENT / SOCIAL WORK IP CONSULT  PHYSICAL THERAPY ADULT IP CONSULT  OCCUPATIONAL THERAPY ADULT IP CONSULT  NUTRITION SERVICES ADULT IP CONSULT  SPEECH LANGUAGE PATH ADULT IP CONSULT  SPEECH LANGUAGE PATH ADULT IP CONSULT  VASCULAR ACCESS ADULT IP CONSULT  CARDIOLOGY IP CONSULT  VASCULAR ACCESS ADULT IP  CONSULT  PALLIATIVE CARE ADULT IP CONSULT  VASCULAR ACCESS ADULT IP CONSULT  ORTHOPEDIC SURGERY IP CONSULT  WOUND OSTOMY CONTINENCE NURSE  IP CONSULT  CARE MANAGEMENT / SOCIAL WORK IP CONSULT  SOCIAL WORK IP CONSULT    Time Spent on this Encounter   I, Rik Duffy MD, personally saw the patient today and spent greater than 30 minutes discharging this patient.    Discharge Orders      Comprehensive metabolic panel     TSH     CBC with platelets     Medication Therapy Management Referral      Follow-Up with Cardiology      Primary Care - Care Coordination Referral      EKG 12-lead complete w/read  (to be scheduled)     Mantoux instructions    Give two-step Mantoux (PPD) Per Facility Policy Yes     Follow Up and recommended labs and tests    !. Follow up with Nursing home physician/provider within 1-3 days.  No follow up labs or test are needed.     Reason for your hospital stay    You were admitted for evaluation of multiple complex medical issues and have made good progress.     Intake and output    Every shift     Activity - Up ad louis     General info for SNF    * Ok to admit to Hospice.    Length of Stay Estimate: Short Term Care: Estimated # of Days <30  Condition at Discharge: Stable  Level of care:skilled   Rehabilitation Potential: Fair  Admission H&P remains valid and up-to-date: Yes  Recent Chemotherapy: N/A  Use Nursing Home Standing Orders: Yes     No CPR- Do NOT Intubate     Holter Monitor 24 hour Adult Pediatric     Fall precautions     Diet    Follow this diet upon discharge: Orders Placed This Encounter      Room Service      Snacks/Supplements Adult: Magic Cup; With Meals      Regular Diet Adult     Discharge Medications   Discharge Medication List as of 6/15/2023 11:41 AM      START taking these medications    Details   atropine 1 % ophthalmic solution Place 2 drops under the tongue every 4 hours as needed for secretions, Disp-2 mL, R-0, E-Prescribe      bisacodyl (DULCOLAX) 10 MG suppository Place  1 suppository (10 mg) rectally daily as needed for constipation, Disp-3 suppository, R-0, E-Prescribe      carboxymethylcellulose PF (REFRESH PLUS) 0.5 % ophthalmic solution Place 1 drop into both eyes every hour as needed for dry eyes, Disp-1 each, R-0, E-Prescribe      diclofenac (VOLTAREN) 1 % topical gel Apply 4 g topically 4 times daily, Disp-2 g, R-0, E-Prescribe      ipratropium - albuterol 0.5 mg/2.5 mg/3 mL (DUONEB) 0.5-2.5 (3) MG/3ML neb solution Take 1 vial (3 mLs) by nebulization every 4 hours as needed for wheezing or shortness of breath, Disp-54 mL, R-0, E-Prescribe      LORazepam (ATIVAN) 2 MG/ML (HIGH CONC) oral solution Take 0.25-0.5 mLs (0.5-1 mg) by mouth every 3 hours as needed for anxiety, Disp-12 mL, R-0, Transitional      prochlorperazine (COMPAZINE) 5 MG tablet Take 1 tablet (5 mg) by mouth every 6 hours as needed for vomiting, Disp-6 tablet, R-0, E-Prescribe      OLANZapine zydis (ZYPREXA) 5 MG ODT Take 1 tablet (5 mg) by mouth every 6 hours as needed for agitation, Disp-6 tablet, R-0, E-Prescribe1/2 tablet (2.5 mg) Q 6 hours PRN         CONTINUE these medications which have CHANGED    Details   !! acetaminophen (TYLENOL) 325 MG tablet Take 2 tablets (650 mg) by mouth every 6 hours as needed for mild pain, Disp-24 tablet, R-0, E-Prescribe      senna-docusate (SENOKOT-S/PERICOLACE) 8.6-50 MG tablet Take 1 tablet by mouth 2 times daily as needed for constipation, Disp-6 tablet, R-0, E-Prescribe      !! acetaminophen (TYLENOL) 325 MG tablet Take 2 tablets (650 mg) by mouth 2 times daily as needed for mild pain or other (and adjunct with moderate or severe pain or per patient request), Disp-6 tablet, R-0, E-Prescribe      Lidocaine (LIDOCARE) 4 % Patch Place 2 patches onto the skin every 24 hours To prevent lidocaine toxicity, patient should be patch free for 12 hrs daily.Disp-6 patch, [id number]       !! - Potential duplicate medications found. Please discuss with provider.      CONTINUE  these medications which have NOT CHANGED    Details   Abatacept (ORENCIA IV) Inject 500 mg into the vein every 30 days, Historical      ACE/ARB/ARNI NOT PRESCRIBED (INTENTIONAL) Reason ACE/ARB was Not Prescribed: Worsening renal function on ACE/ARB therapyNo Print Out      carvedilol (COREG) 6.25 MG tablet Take 1 tablet (6.25 mg) by mouth 2 times daily (with meals), Disp-180 tablet, R-0, E-Prescribe      Darbepoetin Alonso-Albumin (ARANESP IJ) Inject 10 mcg as directed Monthly as needed, Historical      mirtazapine (REMERON) 15 MG tablet Take 1 tablet by mouth daily At Bedtime., Disp-90 tablet, R-3, E-Prescribe      ondansetron (ZOFRAN-ODT) 4 MG ODT tab Take 4 mg by mouth every 8 hours as needed for nausea, Historical      allopurinol (ZYLOPRIM) 100 MG tablet Take 1 tablet (100 mg) by mouth daily, Disp-90 tablet, R-0, E-Prescribe      amLODIPine (NORVASC) 10 MG tablet TAKE 1 TABLET BY MOUTH ONE TIME DAILY, Disp-90 tablet, R-0, E-Prescribe      apixaban ANTICOAGULANT (ELIQUIS ANTICOAGULANT) 2.5 MG tablet Take 1 tablet (2.5 mg) by mouth 2 times daily, Disp-180 tablet, R-0, E-Prescribe      Calcium Carb-Cholecalciferol 500-10 MG-MCG TABS Take 1 tablet by mouth 3 times daily, Historical      dronabinol (MARINOL) 2.5 MG capsule Take 1 capsule (2.5 mg) by mouth 2 times daily (before meals), Disp-60 capsule, R-1, E-PrescribeDosing frequency increased to 2xDay      ferrous sulfate (FEROSUL) 325 (65 Fe) MG tablet Take 325 mg by mouth every other day, Historical      hydrALAZINE (APRESOLINE) 100 MG tablet Take 1 tablet (100 mg) by mouth 3 times daily, Disp-360 tablet, R-0, E-Prescribe      hydroxychloroquine (PLAQUENIL) 200 MG tablet Take 200 mg by mouth daily, Historical      isosorbide dinitrate (ISORDIL) 30 MG tablet Take 1 tablet (30 mg) by mouth 3 times daily, Disp-270 tablet, R-1, E-Prescribe      levothyroxine (SYNTHROID/LEVOTHROID) 25 MCG tablet Take 1 tablet by mouth daily., Disp-90 tablet, R-3, E-Prescribe       pantoprazole (PROTONIX) 40 MG EC tablet Take 1 tablet (40 mg) by mouth daily, Disp-90 tablet, R-2, E-Prescribe      sodium bicarbonate 650 MG tablet Take 650 mg by mouth 2 times daily , Historical      torsemide (DEMADEX) 20 MG tablet Take 1 tablet (20 mg) by mouth daily Take additional 20mg torsemide (1 tablet) for weight gain > 2 pounds overnight., Disp-100 tablet, R-3, E-Prescribe      Vitamin D (Cholecalciferol) 25 MCG (1000 UT) TABS Take 1 tablet by mouth 2 times daily, Historical         STOP taking these medications       predniSONE (DELTASONE) 5 MG tablet Comments:   Reason for Stopping:             Allergies   Allergies   Allergen Reactions     Oxybutynin Itching     Seasonal Allergies      Data   Most Recent 3 CBC's:  Recent Labs   Lab Test 06/08/23  1700 06/06/23  0531 06/03/23  0529   WBC 4.9 8.5 8.0   HGB 9.7* 9.1* 9.5*   * 103* 105*    152 108*      Most Recent 3 BMP's:  Recent Labs   Lab Test 06/12/23  0654 06/11/23  0952 06/10/23  0306 06/09/23  0523   NA  --  144 140 142   POTASSIUM 3.6 3.6 3.8 3.6   CHLORIDE  --  111* 110* 113*   CO2  --  21* 18* 18*   BUN  --  42.1* 39.2* 43.7*   CR  --  2.20* 2.02* 2.26*   ANIONGAP  --  12 12 11   ALIVIA  --  9.1 8.5* 8.3*   GLC  --  104* 92 102*     Most Recent 2 LFT's:  Recent Labs   Lab Test 06/04/23  0556 05/30/23  1339   AST 19 25   ALT 15 10   ALKPHOS 49 41   BILITOTAL 1.0 0.9        Most Recent 3 Troponin's:  Recent Labs   Lab Test 06/07/21  1615 04/15/21  1139 02/21/21  1546   TROPI <0.015 0.018 <0.015     Most Recent Cholesterol Panel:  Recent Labs   Lab Test 06/24/22  0943   CHOL 131   LDL 65   HDL 48*   TRIG 92       Most Recent TSH, T4 and A1c Labs:  Recent Labs   Lab Test 05/30/23  1339 04/16/21  0550 03/02/21  0701   TSH 0.98   < > 2.37   T4  --   --  7.3    < > = values in this interval not displayed.     Results for orders placed or performed during the hospital encounter of 05/30/23   CT Head w/o Contrast    Narrative    CT SCAN OF  THE HEAD WITHOUT CONTRAST May 30, 2023 3:05 PM     HISTORY: Fall, head injury.    TECHNIQUE: Axial images of the head and coronal reformations without  IV contrast material. Radiation dose for this scan was reduced using  automated exposure control, adjustment of the mA and/or kV according  to patient size, or iterative reconstruction technique.    COMPARISON: None.    FINDINGS: There is no evidence of intracranial hemorrhage, mass, acute  infarct or anomaly. The ventricles are normal in size, shape and  configuration. Mild patchy periventricular white matter hypodensities  which are nonspecific, but likely related to chronic microvascular  ischemic disease.     The visualized portions of the sinuses and mastoids appear normal. The  bony calvarium and bones of the skull base appear intact.       Impression    IMPRESSION:     1. No evidence of acute intracranial hemorrhage, mass, or herniation.  2. Mild nonspecific white matter changes likely due to chronic  microvascular ischemic disease.      DARVIN MATIAS MD         SYSTEM ID:  [id number]   XR Lumbar Spine 2/3 Views    Narrative    LUMBAR SPINE TWO TO THREE VIEWS May 30, 2023 3:04 PM     HISTORY: Fall, back pain.    COMPARISON: Lumbar spine x-ray 4/6/2023.       Impression    IMPRESSION: Bones appear demineralized which limits evaluation. Lumbar  spine alignment is grossly within normal limits. No obvious loss of  vertebral body height. Mild degenerative endplate changes and loss of  disc height at L2-L3.    DARVIN MATIAS MD         SYSTEM ID:  [id number]   XR Chest Port 1 View    Narrative    CHEST ONE VIEW  5/31/2023 3:41 PM     HISTORY: Dyspnea, hypoxia, evaluate for focal infiltrates/edema.    COMPARISON: January 14, 2022      Impression    IMPRESSION: Question some minimal pleural fluid on the left with  minimal retrocardiac atelectasis and/or infiltrate. Right lung clear.    HIREN ARVIZU MD         SYSTEM ID:  [id number]   XR Chest Port 1 View    Narrative     CHEST ONE VIEW  6/1/2023 7:39 AM     HISTORY: Dyspnea, hypoxia, evaluate for focal infiltrates/edema.    COMPARISON: None.      Impression    IMPRESSION: New right base infiltrate. Retrocardiac density similar  possibly related to small hiatal hernia.     HIREN ARVIZU MD         SYSTEM ID:  [id number]   XR Chest Port 1 View    Narrative    EXAM: XR CHEST PORT 1 VIEW  LOCATION: [facility]  DATE/TIME: 6/4/2023 8:10 AM CDT    INDICATION: SOB, INCREASED o2 Needs  COMPARISON: None.      Impression    IMPRESSION: Engorged indistinct central pulmonary vasculature with perihilar predominant airspace opacity and left greater than right small pleural effusions, findings favor ulnar edema, correlate to exclude superimposed infection. Recommend follow-up to   resolution. No pneumothorax. Borderline enlarged heart. Mildly atherosclerotic aorta.   XR Video Swallow with SLP or OT    Narrative    VIDEO SWALLOWING EVALUATION   6/5/2023 11:26 AM     HISTORY: Acute hypoxic respiratory failure, multifactorial, reported  possible aspiration pneumonia, coughing at the bedside, clinical  concern for aspiration.    COMPARISON: None.    FLUOROSCOPY TIME: 1.8 minutes.  SPOT IMAGES OR CINE RUNS: 11      Impression    IMPRESSION:  Thin: Deep penetration to the level of the vocal cords.    Slightly thick: Moderate penetration.    Mildly thick: Deep penetration with residue along the undersurface of  the epiglottis.    Moderately thick: Deep penetration observed with a cup. No penetration  with teaspoon.    Puree: No penetration or aspiration.    Semisolid: No penetration or aspiration.    This study only includes the cervical esophagus. Please see separate  report from speech pathology for additional detail.     HIREN PUENTE MD         SYSTEM ID:  [id number]   US Thoracentesis    Narrative    EXAM:  1. LEFT THORACENTESIS  2. ULTRASOUND GUIDANCE  LOCATION: [facility]  DATE/TIME: 6/6/2023 2:24 PM    INDICATION:  Pleural effusion.    PROCEDURE: Informed consent obtained. Time out performed. The chest  was prepped and draped in a sterile fashion. 10 mL of 1% lidocaine was  infused into local soft tissues. A 5 French catheter system was  introduced into the pleural effusion under ultrasound guidance.    0.4 liters of clear fluid were removed and sent to lab if requested.    Patient tolerated procedure well.    Ultrasound images have been permanently captured for documentation.      Impression    IMPRESSION:    1. Status post ultrasound-guided left thoracentesis.  2. There is also a moderate right pleural effusion. Right  thoracentesis could be performed tomorrow as needed.    AREN SPIVEY MD         SYSTEM ID:  [id number]   XR Knee Left 3 Views    Narrative    EXAM: XR KNEE LEFT 3 VIEWS  LOCATION: [facility]  DATE/TIME: 2023 6:04 PM CDT    INDICATION: Left knee swelling and pain.  COMPARISON: 2020.      Impression    IMPRESSION: Tricompartmental degenerative changes which are advanced in the patellofemoral compartment. Chronic bony proliferative change along the lateral patella facet. There is no evidence of an acute fracture. No significant joint effusion. Bones are   demineralized. Atherosclerotic vascular calcifications.   Echo Limited     Value    LVEF  55-60%    Narrative    [id number]  [id number]  [id number]  [id number]^CODY^ANURAG     [facility]  Echocardiography Laboratory  [address] [place] [address]     Name: ROSIO YOUNG  MRN: [id number]  : 1935  Study Date: 2023 08:02 AM  Age: 87 yrs  Gender: Female  Patient Location: [facility]  Reason For Study: CHF  Ordering Physician: ANURAG ROSS  Performed By: Re Cm RDCS     BSA: 1.5 m2  Height: 61 in  Weight: 120 lb  HR: 84  BP: 146/76 mmHg  ______________________________________________________________________________  Procedure  Limited Portable Echo Adult. Optison (NDC #5273-7283)  given intravenously.  ______________________________________________________________________________  Interpretation Summary     Technically challenging study even with the use of contrast imaging.     Left ventricular systolic function is normal. The visual ejection fraction is  55-60%.  Septal motion is consistent with conduction abnormality. Flattened septum is  consistent with RV pressure/volume overload.  Right ventricle is not well visualized, however global systolic function is  probably normal.  There is severe mitral annular calcification. The mitral valve leaflets are  moderately thickened. There is mild mitral stenosis. Mean gradient 4 mmHg at  68 bpm. There is mild (1+) mitral regurgitation.  The aortic valve is trileaflet with aortic valve sclerosis. There is trace  aortic regurgitation.  Pulmonary hypertension present. The right ventricular systolic pressure is  approximated at 38mmHg plus the right atrial pressure.  There is no pericardial effusion.  Moderate left pleural effusion.     This study was compared to a TTE from 3/31/2023. There is now a left pleural  effusion present. Estimated right-sided pressures are higher on this study.  Global LV function is stable.  ______________________________________________________________________________  Left Ventricle  Left ventricular systolic function is normal. The visual ejection fraction is  55-60%. Septal motion is consistent with conduction abnormality. Flattened  septum is consistent with RV pressure/volume overload.     Right Ventricle  The right ventricle is not well visualized. Right ventricle is not well  visualized, however global systolic function is probably normal.     Mitral Valve  There is severe mitral annular calcification. The mitral valve leaflets are  moderately thickened. There is mild (1+) mitral regurgitation. There is mild  mitral stenosis. Mean gradient 4 mmHg at 68 bpm.     Tricuspid Valve  There is mild (1+) tricuspid  regurgitation. The right ventricular systolic  pressure is approximated at 38mmHg plus the right atrial pressure. Pulmonary  hypertension.     Aortic Valve  The aortic valve is trileaflet with aortic valve sclerosis. There is trace  aortic regurgitation.     Pulmonic Valve  The pulmonic valve is not well seen, but is grossly normal.     Vessels  The aortic root is normal size. IVC diameter and respiratory changes fall into  an intermediate range suggesting an RA pressure of 8 mmHg.     Pericardium  There is no pericardial effusion. Moderate left pleural effusion.     Rhythm  The rhythm was atrial fibrillation.     ______________________________________________________________________________  MMode/2D Measurements & Calculations  Ao root diam: 3.1 cm  LVOT diam: 2.1 cm  LVOT area: 3.3 cm2     Doppler Measurements & Calculations  MV max P.1 mmHg  MV mean PG: 3.9 mmHg  MV V2 VTI: 43.1 cm  TR max edilberto: 308.6 cm/sec  TR max P.1 mmHg  RV S Edilberto: 7.1 cm/sec     ______________________________________________________________________________  Report approved by: Javier Delacruz 2023 10:13 AM

## 2023-06-15 NOTE — PLAN OF CARE
Date&time:06/14/23 1900-2330  Summary: From [facility]. Hx HTN, CHF, A-fib (Eliquis), and CKD4  Primary Diagnosis: Aspiration pneumonia/dysphagia, Malnutrition, Deconditioning/Weakness/FTT  Orientation: A&OX3, intermittent confusion, forgetful, [place].   Vitals/Pain: VSS,  lower back pain- diclofenac cream    Lines/Drains: L PIV -SL  LS: diminished bases, infrequent weak cough   GI/: Loose BM . Using purewick  Diet: Regular-poor appetite  Labs: none  Ambulation/Assist: AX2 lift, T/R Q2H, on pulsate mattress   Skin/Wounds:  +2 BLE edema, scattered bruising, blanchable redness to coccyx- mepilex in place   Plan: Discharge to [place] tomorrow with [facility] hosp[facility], stretcher transport will be between [id number]    All assessment deferred due to comfort care

## 2023-06-15 NOTE — PROGRESS NOTES
Patient discharged at 12:42 PM to [place] with hospice.  IV was discontinued.   Pain at time of discharge was 0/10.  Belongings returned to patient.   Discharge instructions and medications reviewed with patient.    Patient verbalized understanding and all questions were answered.  Prescriptions given to patient.    At time of discharge, patient condition was stable and left the unit via stretcher escorted by [facility].

## 2023-06-15 NOTE — PLAN OF CARE
Goal Outcome Evaluation:      [id number]:  Comfort focused care maintained.  Full assessment and VS deferred this shift.  Alert to self only overnight; very calm, cooperative and pleasant.  Q2hr turn/repositioning as patient allows.  Continues to complain of back pain; PRN Dilaudid (PO) given.  Slightly anxious this AM; PRN Ativan given.  Regular diet ordered, but only had sips of water this shift.  Purewick in-place; one loose incontinent BM this AM.  Discharge planned for today between [phone] via stretcher ride to [facility] hospice.

## 2023-06-16 NOTE — PROGRESS NOTES
[facility] GERIATRICS    PRIMARY CARE PROVIDER AND CLINIC:  Laurie Conteh MD, [address] / [place] [address]  Chief Complaint   Patient presents with     Hospital F/U      [facility] Medical Record Number:  [id number]  Place of Service where encounter took place:  [facility] [[id number]]    Ghislaine Walls  is a 87 year old  (1935), admitted to the above facility from  [facility]. Hospital stay 5/30/23 through 6/15/23..   HPI:    This is an 87 y.o. female with a PMHx of HTN, CHF, paroxsymal A-fib, HLD, CKD4, hypothyroidism, gout, and RA who was admitted to the hospital on 5/30/23 for increased weakness. She experienced a very complicated hospital stay with hypoxic respiratory failure secondary to aspiration pneumonia, Afib with RVR, CHF exacerbation with volume overload requiring thoracentesis, and acute on chronic kidney injury. Ultimately, a goals of care discussion was had and the patient and her family decided to pursue comfort only measures. She discharged to [facility] with [facility].     CODE STATUS/ADVANCE DIRECTIVES DISCUSSION:  No CPR- Do NOT Intubate  DNR / DNI  ALLERGIES:   Allergies   Allergen Reactions     Oxybutynin Itching     Seasonal Allergies       PAST MEDICAL HISTORY:   Past Medical History:   Diagnosis Date     Atrial fibrillation (H) new 10/19     [place] (hard of hearing)     wears hearing aids     Hyperlipidaemia      Hyperlipidaemia LDL goal < 130      Hypertension      Hypothyroid      PAD (peripheral artery disease) (H)      Renal insufficiency, mild      Uncontrolled hypertension 10/14/2019      PAST SURGICAL HISTORY:   has a past surgical history that includes appendectomy (1951); tubal ligation (1971); Eye surgery (1956); cataract iol, rt/lt (2001); Dental surgery (1962); Esophagoscopy, gastroscopy, duodenoscopy (EGD), combined (N/A, 7/24/2020); and Bone marrow biopsy, bone specimen, needle/trocar (N/A, 7/31/2020).  FAMILY  HISTORY: family history includes Coronary Artery Disease in her brother and brother; Heart Disease in her brother, brother, father, and mother; Respiratory in her mother.  SOCIAL HISTORY:   reports that she quit smoking about 50 years ago. Her smoking use included cigarettes. She has a 2.50 pack-year smoking history. She has never used smokeless tobacco. She reports current alcohol use. She reports that she does not use drugs.  Patient's living condition: lives in an assisted living facility    Post Discharge Medication Reconciliation Status:   MED REC REQUIRED  Post Medication Reconciliation Status:  Discharge medications reconciled and changed, see notes/orders         Current Outpatient Medications   Medication Sig     morphine 2.5 MG solu-tab Take 2.5 mg by mouth every 2 hours as needed for shortness of breath or moderate to severe pain     Abatacept (ORENCIA IV) Inject 500 mg into the vein every 30 days     ACE/ARB/ARNI NOT PRESCRIBED (INTENTIONAL) Please choose reason not prescribed from choices below.     acetaminophen (TYLENOL) 325 MG tablet Take 2 tablets (650 mg) by mouth every 6 hours as needed for mild pain     atropine 1 % ophthalmic solution Place 2 drops under the tongue every 4 hours as needed for secretions     bisacodyl (DULCOLAX) 10 MG suppository Place 1 suppository (10 mg) rectally daily as needed for constipation     carboxymethylcellulose PF (REFRESH PLUS) 0.5 % ophthalmic solution Place 1 drop into both eyes every hour as needed for dry eyes     carvedilol (COREG) 6.25 MG tablet Take 1 tablet (6.25 mg) by mouth 2 times daily (with meals)     Darbepoetin Alonso-Albumin (ARANESP IJ) Inject 10 mcg as directed Monthly as needed     diclofenac (VOLTAREN) 1 % topical gel Apply 4 g topically 4 times daily     ipratropium - albuterol 0.5 mg/2.5 mg/3 mL (DUONEB) 0.5-2.5 (3) MG/3ML neb solution Take 1 vial (3 mLs) by nebulization every 4 hours as needed for wheezing or shortness of breath     LORazepam  "(ATIVAN) 2 MG/ML (HIGH CONC) oral solution Take 0.25-0.5 mLs (0.5-1 mg) by mouth every 3 hours as needed for anxiety     mirtazapine (REMERON) 15 MG tablet Take 1 tablet by mouth daily At Bedtime.     ondansetron (ZOFRAN-ODT) 4 MG ODT tab Take 4 mg by mouth every 8 hours as needed for nausea     prochlorperazine (COMPAZINE) 5 MG tablet Take 1 tablet (5 mg) by mouth every 6 hours as needed for vomiting     senna-docusate (SENOKOT-S/PERICOLACE) 8.6-50 MG tablet Take 1 tablet by mouth 2 times daily as needed for constipation     No current facility-administered medications for this visit.       ROS:  10 point ROS of systems including Constitutional, Eyes, Respiratory, Cardiovascular, Gastroenterology, Genitourinary, Integumentary, Musculoskeletal, Psychiatric were all negative except for pertinent positives noted in my HPI.    Vitals:  /74   Pulse 69   Temp 97.3  F (36.3  C)   Resp 18   Ht 1.549 m (5' 1\")   Wt 52.6 kg (116 lb)   LMP  (LMP Unknown)   SpO2 92%   BMI 21.92 kg/m    Exam:  GENERAL APPEARANCE:  appears ill, frail, tired, denies pain  ENT:  normal hearing acuity  NECK:  No adenopathy,masses or thyromegaly  RESP:  diminished breath sounds on RA  CV:  peripheral edema 1+ in BLE, irregular rhythm A-fib  ABDOMEN:  normal bowel sounds, soft, nontender, no hepatosplenomegaly or other masses  M/S:   generalized weakness, pain in bilateral knees  SKIN:  pallor  NEURO:   No focal deficits  PSYCH: Alert and oriented x1/2    Lab/Diagnostic data:  Patient is on hospice/palliative care and labs are not recommended    ASSESSMENT/PLAN:    (R62.7) Failure to thrive in adult  (Z51.5) Hospice care patient  Continue [facility]   Focus on comfort only measures     CHF  (J90) Bilateral pleural effusion   (J44.9) Chronic obstructive pulmonary disease, unspecified COPD type (H)  (J69.0) Aspiration pneumonia due to regurgitated food, unspecified laterality, unspecified part of lung (H)  No longer pursuing tx. " Per declining/hypotension, discontinue torsemide, RA    (I10) Hypertension goal BP (blood pressure) < 140/90  A-fib  Continue carvedilol 6.25 mg twice daily, monitor blood pressure twice daily    Pain  Per hospice continue morphine SoluTab 2.5 mg every 2 hours as needed.  Also has Tylenol as needed.  Denies pain    Anxiety  Depression  Continue mirtazapine 15 mg at bedtime, Ativan every 3 hours as needed    (K59.00) Constipation, unspecified constipation type  Continue senna S 1 tab twice daily as needed with suppository daily PRN    Orders:  discontinued Imdur, synthroid, sodium bicarb, omeprazole, torsemide, olanzapine, and lidocaine patch    Electronically signed by:  Eric Wills NP      I was present with the student who particpated in the serivce and documentation of the note. I have verified the history and personally peformed the physical exam and medical decision making. I agree with the assessment and plan of care as documented in the note.

## 2023-06-17 PROBLEM — Z51.5 HOSPICE CARE PATIENT: Status: ACTIVE | Noted: 2023-01-01

## 2023-06-19 NOTE — PROGRESS NOTES
[facility]  INITIAL VISIT NOTE  June 20, 2023    PRIMARY CARE PROVIDER AND CLINIC:  Laurie Contehshua [address] / [place] [address]    CHIEF COMPLAINT:  Hospital follow-up/Initial visit    HPI:    Ghislaine Walls is a 87 year old  (1935) female [facility].     Medical history is notable for PAF, HFpEF, hypertension, dyslipidemia, hypothyroidism, PAD, CKD, chronic anemia, MGUS, rheumatoid arthritis, gout, and COVID-19.    Summary of hospital course:  Patient was hospitalized at [facility] from May 30 through Bernice 15, 2023 for failure to thrive and generalized muscle weakness.  She had a complicated hospital course with hypoxia due to aspiration pneumonia, atrial fibrillation with RVR, volume overload, and DRAGAN.  EKG was remarkable for normal sinus rhythm and first-degree AV block.  CT head was negative for acute intracranial pathology.  Chest x-ray showed question some minimal pleural fluid on the left with minimal retrocardiac atelectasis and a/or infiltrate.  Initial blood work was significant for creatinine 2.91, white count 7.9, hemoglobin 10.1, lactic acid 0.7, high-sensitivity troponin I 100, TSH 0.98, and unremarkable UA.  Screening for COVID-19, influenza, and RSV was negative.  Limited echo showed LVEF 55 to 60%, mild mitral stenosis and regurgitation, pulmonary hypertension, and moderate left pleural effusion.  Patient underwent left thoracentesis on June 6 with removal of 0.4 liters of clear fluid.  Fluid analysis showed glucose 103, LDH 89, total protein 1.8, no organisms, and negative for malignancy.  Ultimately, goals of care were discussed and family decided to pursue comfort only measures. Patient is admitted to this facility with [facility].     Of note, history was obtained from patient, facility RN, and extensive review of the chart.    Today's visit:  Patient was seen  in her room, while sitting on a wheelchair.  She appears extremely frail.  She feels weak all over her body.  She is currently on oxygen at 2 L/min.  She denies chest pain, dyspnea, nausea, vomiting, abdominal pain, or urinary symptoms.  She had a bowel movement 3 days ago.      CODE STATUS:   DNR / DNI    PAST MEDICAL HISTORY:   Chronic HFpEF (LVEF 55-60%, per echo on June 6, 2023)  Pulmonary hypertension  Mild mitral stenosis  Mild mitral regurgitation  Hypertension  Dyslipidemia  PAF  CKD, stage IV, baseline creatinine 1.9-2.3  Hypothyroidism  PAD  MGUS  Chronic anemia, baseline Hgb 9-10  Aspiration pneumonia  GERD  Rheumatoid arthritis  Gout  Impaired hearing  Cognitive impairment  COVID-19 in December 2020  Right thyroid lesion, measuring 2.3 cm, noted incidentally on CT chest on February 22, 2021      Past Medical History:   Diagnosis Date     Atrial fibrillation (H) new 10/19     [place] (hard of hearing)     wears hearing aids     Hyperlipidaemia      Hyperlipidaemia LDL goal < 130      Hypertension      Hypothyroid      PAD (peripheral artery disease) (H)      Renal insufficiency, mild      Uncontrolled hypertension 10/14/2019       PAST SURGICAL HISTORY:   Past Surgical History:   Procedure Laterality Date     APPENDECTOMY  1951     BONE MARROW BIOPSY, BONE SPECIMEN, NEEDLE/TROCAR N/A 7/31/2020    Procedure: bone marrow biopsy;  Surgeon: Iris Cameron MD;  Location:  GI     CATARACT IOL, RT/LT  2001    bilateral (laser - 5/2013)     [facility]     ESOPHAGOSCOPY, GASTROSCOPY, DUODENOSCOPY (EGD), COMBINED N/A 7/24/2020    Procedure: ESOPHAGOGASTRODUODENOSCOPY, WITH BIOPSY;  Surgeon: Humberto Bailon MD;  Location:  GI     EYE SURGERY  1956    Muscle release     TUBAL LIGATION  1971       FAMILY HISTORY:   Family History   Problem Relation Age of Onset     Heart Disease Mother      Respiratory Mother      Heart Disease Father      Heart Disease Brother      Coronary Artery  Disease Brother         CAB     Heart Disease Brother      Coronary Artery Disease Brother         [facility]       SOCIAL HISTORY:  Social History     Tobacco Use     Smoking status: Former     Packs/day: 0.50     Years: 5.00     Pack years: 2.50     Types: Cigarettes     Quit date: 1973     Years since quittin.4     Smokeless tobacco: Never   Vaping Use     Vaping status: Never Used   Substance Use Topics     Alcohol use: Yes     Comment: one light beer of  occ       MEDICATIONS:  Current Outpatient Medications   Medication Sig Dispense Refill     Abatacept (ORENCIA IV) Inject 500 mg into the vein every 30 days       ACE/ARB/ARNI NOT PRESCRIBED (INTENTIONAL) Please choose reason not prescribed from choices below.       acetaminophen (TYLENOL) 325 MG tablet Take 2 tablets (650 mg) by mouth every 6 hours as needed for mild pain 24 tablet 0     atropine 1 % ophthalmic solution Place 2 drops under the tongue every 4 hours as needed for secretions 2 mL 0     bisacodyl (DULCOLAX) 10 MG suppository Place 1 suppository (10 mg) rectally daily as needed for constipation 3 suppository 0     carboxymethylcellulose PF (REFRESH PLUS) 0.5 % ophthalmic solution Place 1 drop into both eyes every hour as needed for dry eyes 1 each 0     carvedilol (COREG) 6.25 MG tablet Take 1 tablet (6.25 mg) by mouth 2 times daily (with meals) 180 tablet 0     Darbepoetin Alonso-Albumin (ARANESP IJ) Inject 10 mcg as directed Monthly as needed       diclofenac (VOLTAREN) 1 % topical gel Apply 4 g topically 4 times daily 2 g 0     ipratropium - albuterol 0.5 mg/2.5 mg/3 mL (DUONEB) 0.5-2.5 (3) MG/3ML neb solution Take 1 vial (3 mLs) by nebulization every 4 hours as needed for wheezing or shortness of breath 54 mL 0     LORazepam (ATIVAN) 2 MG/ML (HIGH CONC) oral solution Take 0.25-0.5 mLs (0.5-1 mg) by mouth every 3 hours as needed for anxiety 12 mL 0     mirtazapine (REMERON) 15 MG tablet Take 1 tablet by mouth daily At Bedtime. 90 tablet 3  "    morphine 2.5 MG solu-tab Take 2.5 mg by mouth every 2 hours as needed for shortness of breath or moderate to severe pain       ondansetron (ZOFRAN-ODT) 4 MG ODT tab Take 4 mg by mouth every 8 hours as needed for nausea       prochlorperazine (COMPAZINE) 5 MG tablet Take 1 tablet (5 mg) by mouth every 6 hours as needed for vomiting 6 tablet 0     senna-docusate (SENOKOT-S/PERICOLACE) 8.6-50 MG tablet Take 1 tablet by mouth 2 times daily as needed for constipation 6 tablet 0       MED REC REQUIRED  Post Medication Reconciliation Status: medication reconcilation previously completed during another office visit         ALLERGIES:  Allergies   Allergen Reactions     Oxybutynin Itching     Seasonal Allergies        ROS:  10 point ROS was attempted but was limited, given patient's underlying cognitive impairment. It was reviewed as much as possible as outlined in HPI.    PHYSICAL EXAM:  Vital signs were reviewed in the chart.  Vital Signs: /63   Pulse 110   Temp 98  F (36.7  C)   Resp 22   Ht 1.549 m (5' 1\")   Wt 52.6 kg (116 lb)   LMP  (LMP Unknown)   SpO2 94%   BMI 21.92 kg/m    General: Extremely frail appearing but comfortable   HEENT: Conjunctival pallor, no scleral icterus or injection, moist oral mucosa  Cardiovascular: Normal S1, S2, irregular  Respiratory: Lungs clear to auscultation bilaterally  GI: Abdomen soft, non-tender, non-distended, +BS  Extremities: No LE edema  Neuro: CX II-XII grossly intact; ROM in all four extremities grossly intact  Psych: Alert and oriented x 1-2; normal affect  Skin: No acute rash    LABORATORY/IMAGING DATA:  All relevant labs and imaging data in [facility] and/or Care Everywhere were personally reviewed today.      Most Recent 3 CBC's:Recent Labs   Lab Test 06/08/23  1700 06/06/23  0531 06/03/23  0529   WBC 4.9 8.5 8.0   HGB 9.7* 9.1* 9.5*   * 103* 105*    152 108*     Most Recent 3 BMP's:Recent Labs   Lab Test 06/12/23  0654 06/11/23  0952 06/10/23  0306 " 06/09/23  0523   NA  --  144 140 142   POTASSIUM 3.6 3.6 3.8 3.6   CHLORIDE  --  111* 110* 113*   CO2  --  21* 18* 18*   BUN  --  42.1* 39.2* 43.7*   CR  --  2.20* 2.02* 2.26*   ANIONGAP  --  12 12 11   ALIVIA  --  9.1 8.5* 8.3*   GLC  --  104* 92 102*     Most Recent 2 LFT's:Recent Labs   Lab Test 06/04/23  0556 05/30/23  1339   AST 19 25   ALT 15 10   ALKPHOS 49 41   BILITOTAL 1.0 0.9         ASSESSMENT/PLAN:  Failure to thrive,  Hospice care patient.  Patient is extremely frail.  Plan:   Continue comfort care per [facility]  Continue comfort medications as ordered  Continue mirtazapine 15 mg at bedtime for sleep    Acute on chronic HFpEF (LVEF 60-65%),  Left pleural effusion, s/p thoracentesis on June 6, 2023,  Acute hypoxic respiratory failure,  Aspiration pneumonia.  Currently on hospice care and no longer on medical therapy.  She is on [place] oxygen at 2 L/min.  Plan:  Continue supplemental oxygen  Continue comfort focused care  Continue DuoNeb PRN    DRAGAN,  CKD stage IV.  Currently on hospice care.  Plan:  No need for monitoring renal function     PAF with RVR.  Heart rate in the range of .  Currently on hospice care and no longer on anticoagulation.  Plan:  Continue carvedilol 6.25 mg twice daily for comfort     Essential hypertension.  Blood pressure is fairly controlled.  Plan:  Continue carvedilol 6.25 mg twice daily as above  No need for monitoring blood pressure      Chronic anemia,  MGUS.  Baseline hemoglobin 9-10.  Plan:  No need for monitoring hemoglobin due to Comfort Care status     Rheumatoid arthritis.  Chronic.  Plan:  Continue pain management with topical diclofenac, morphine, and acetaminophen     Hypothyroidism.  Currently on hospice care and no longer on levothyroxine.  Plan:  No indication to monitor TSH     Cognitive impairment.  On today's examination, patient is oriented times x1-2.  Plan:  Continue standard delirium precautions        Orders written by provider at  facility:  None.          Disclaimer: This note may contain text created using speech-recognition software and may contain unintended word substitutions.      Electronically signed by:  Reinaldo Bashir MD

## 2023-06-20 NOTE — LETTER
6/20/2023        RE: Ghislaine Walls  [address]  [facility] [id number]        [place] [facility]  INITIAL VISIT NOTE  June 20, 2023    PRIMARY CARE PROVIDER AND CLINIC:  Laurie Conteh [address] / [place] [address]    CHIEF COMPLAINT:  Hospital follow-up/Initial visit    HPI:    Ghislaine Walls is a 87 year old  (1935) female who was seen at [facility] on June 20, 2023 for an initial visit.     Medical history is notable for PAF, HFpEF, hypertension, dyslipidemia, hypothyroidism, PAD, CKD, chronic anemia, MGUS, rheumatoid arthritis, gout, and COVID-19.    Summary of hospital course:  Patient was hospitalized at [facility] from May 30 through Bernice 15, 2023 for failure to thrive and generalized muscle weakness.  She had a complicated hospital course with hypoxia due to aspiration pneumonia, atrial fibrillation with RVR, volume overload, and DRAGAN.  EKG was remarkable for normal sinus rhythm and first-degree AV block.  CT head was negative for acute intracranial pathology.  Chest x-ray showed question some minimal pleural fluid on the left with minimal retrocardiac atelectasis and a/or infiltrate.  Initial blood work was significant for creatinine 2.91, white count 7.9, hemoglobin 10.1, lactic acid 0.7, high-sensitivity troponin I 100, TSH 0.98, and unremarkable UA.  Screening for COVID-19, influenza, and RSV was negative.  Limited echo showed LVEF 55 to 60%, mild mitral stenosis and regurgitation, pulmonary hypertension, and moderate left pleural effusion.  Patient underwent left thoracentesis on June 6 with removal of 0.4 liters of clear fluid.  Fluid analysis showed glucose 103, LDH 89, total protein 1.8, no organisms, and negative for malignancy.  Ultimately, goals of care were discussed and family decided to pursue comfort only measures. Patient is admitted to this facility with [facility].     Of note, history was  obtained from patient, facility RN, and extensive review of the chart.    Today's visit:  Patient was seen in her room, while sitting on a wheelchair.  She appears extremely frail.  She feels weak all over her body.  She is currently on oxygen at 2 L/min.  She denies chest pain, dyspnea, nausea, vomiting, abdominal pain, or urinary symptoms.  She had a bowel movement 3 days ago.      CODE STATUS:   DNR / DNI    PAST MEDICAL HISTORY:   Chronic HFpEF (LVEF 55-60%, per echo on June 6, 2023)  Pulmonary hypertension  Mild mitral stenosis  Mild mitral regurgitation  Hypertension  Dyslipidemia  PAF  CKD, stage IV, baseline creatinine 1.9-2.3  Hypothyroidism  PAD  MGUS  Chronic anemia, baseline Hgb 9-10  Aspiration pneumonia  GERD  Rheumatoid arthritis  Gout  Impaired hearing  Cognitive impairment  COVID-19 in December 2020  Right thyroid lesion, measuring 2.3 cm, noted incidentally on CT chest on February 22, 2021      Past Medical History:   Diagnosis Date     Atrial fibrillation (H) new 10/19     [place] (hard of hearing)     wears hearing aids     Hyperlipidaemia      Hyperlipidaemia LDL goal < 130      Hypertension      Hypothyroid      PAD (peripheral artery disease) (H)      Renal insufficiency, mild      Uncontrolled hypertension 10/14/2019       PAST SURGICAL HISTORY:   Past Surgical History:   Procedure Laterality Date     APPENDECTOMY  1951     BONE MARROW BIOPSY, BONE SPECIMEN, NEEDLE/TROCAR N/A 7/31/2020    Procedure: bone marrow biopsy;  Surgeon: Iris Cameron MD;  Location:  GI     CATARACT IOL, RT/LT  2001    bilateral (laser - 5/2013)     DENTAL SURGERY  1962    wisdom     ESOPHAGOSCOPY, GASTROSCOPY, DUODENOSCOPY (EGD), COMBINED N/A 7/24/2020    Procedure: ESOPHAGOGASTRODUODENOSCOPY, WITH BIOPSY;  Surgeon: Humberto Bailon MD;  Location:  GI     EYE SURGERY  1956    Muscle release     TUBAL LIGATION  1971       FAMILY HISTORY:   Family History   Problem Relation Age of Onset     Heart Disease  Mother      Respiratory Mother      Heart Disease Father      Heart Disease Brother      Coronary Artery Disease Brother         CAB     Heart Disease Brother      Coronary Artery Disease Brother         CAB       SOCIAL HISTORY:  Social History     Tobacco Use     Smoking status: Former     Packs/day: 0.50     Years: 5.00     Pack years: 2.50     Types: Cigarettes     Quit date: 1973     Years since quittin.4     Smokeless tobacco: Never   Vaping Use     Vaping status: Never Used   Substance Use Topics     Alcohol use: Yes     Comment: one light beer of  occ       MEDICATIONS:  Current Outpatient Medications   Medication Sig Dispense Refill     Abatacept (ORENCIA IV) Inject 500 mg into the vein every 30 days       ACE/ARB/ARNI NOT PRESCRIBED (INTENTIONAL) Please choose reason not prescribed from choices below.       acetaminophen (TYLENOL) 325 MG tablet Take 2 tablets (650 mg) by mouth every 6 hours as needed for mild pain 24 tablet 0     atropine 1 % ophthalmic solution Place 2 drops under the tongue every 4 hours as needed for secretions 2 mL 0     bisacodyl (DULCOLAX) 10 MG suppository Place 1 suppository (10 mg) rectally daily as needed for constipation 3 suppository 0     carboxymethylcellulose PF (REFRESH PLUS) 0.5 % ophthalmic solution Place 1 drop into both eyes every hour as needed for dry eyes 1 each 0     carvedilol (COREG) 6.25 MG tablet Take 1 tablet (6.25 mg) by mouth 2 times daily (with meals) 180 tablet 0     Darbepoetin Alonso-Albumin (ARANESP IJ) Inject 10 mcg as directed Monthly as needed       diclofenac (VOLTAREN) 1 % topical gel Apply 4 g topically 4 times daily 2 g 0     ipratropium - albuterol 0.5 mg/2.5 mg/3 mL (DUONEB) 0.5-2.5 (3) MG/3ML neb solution Take 1 vial (3 mLs) by nebulization every 4 hours as needed for wheezing or shortness of breath 54 mL 0     LORazepam (ATIVAN) 2 MG/ML (HIGH CONC) oral solution Take 0.25-0.5 mLs (0.5-1 mg) by mouth every 3 hours as needed for  "anxiety 12 mL 0     mirtazapine (REMERON) 15 MG tablet Take 1 tablet by mouth daily At Bedtime. 90 tablet 3     morphine 2.5 MG solu-tab Take 2.5 mg by mouth every 2 hours as needed for shortness of breath or moderate to severe pain       ondansetron (ZOFRAN-ODT) 4 MG ODT tab Take 4 mg by mouth every 8 hours as needed for nausea       prochlorperazine (COMPAZINE) 5 MG tablet Take 1 tablet (5 mg) by mouth every 6 hours as needed for vomiting 6 tablet 0     senna-docusate (SENOKOT-S/PERICOLACE) 8.6-50 MG tablet Take 1 tablet by mouth 2 times daily as needed for constipation 6 tablet 0       MED REC REQUIRED  Post Medication Reconciliation Status: medication reconcilation previously completed during another office visit         ALLERGIES:  Allergies   Allergen Reactions     Oxybutynin Itching     Seasonal Allergies        ROS:  10 point ROS was attempted but was limited, given patient's underlying cognitive impairment. It was reviewed as much as possible as outlined in HPI.    PHYSICAL EXAM:  Vital signs were reviewed in the chart.  Vital Signs: /63   Pulse 110   Temp 98  F (36.7  C)   Resp 22   Ht 1.549 m (5' 1\")   Wt 52.6 kg (116 lb)   LMP  (LMP Unknown)   SpO2 94%   BMI 21.92 kg/m    General: Extremely frail appearing but comfortable   HEENT: Conjunctival pallor, no scleral icterus or injection, moist oral mucosa  Cardiovascular: Normal S1, S2, irregular  Respiratory: Lungs clear to auscultation bilaterally  GI: Abdomen soft, non-tender, non-distended, +BS  Extremities: No LE edema  Neuro: CX II-XII grossly intact; ROM in all four extremities grossly intact  Psych: Alert and oriented x 1-2; normal affect  Skin: No acute rash    LABORATORY/IMAGING DATA:  All relevant labs and imaging data in [facility] and/or Care Everywhere were personally reviewed today.      Most Recent 3 CBC's:Recent Labs   Lab Test 06/08/23  1700 06/06/23  0531 06/03/23  0529   WBC 4.9 8.5 8.0   HGB 9.7* 9.1* 9.5*   * 103* 105* "    152 108*     Most Recent 3 BMP's:Recent Labs   Lab Test 06/12/23  0654 06/11/23  0952 06/10/23  0306 06/09/23  0523   NA  --  144 140 142   POTASSIUM 3.6 3.6 3.8 3.6   CHLORIDE  --  111* 110* 113*   CO2  --  21* 18* 18*   BUN  --  42.1* 39.2* 43.7*   CR  --  2.20* 2.02* 2.26*   ANIONGAP  --  12 12 11   ALIVIA  --  9.1 8.5* 8.3*   GLC  --  104* 92 102*     Most Recent 2 LFT's:Recent Labs   Lab Test 06/04/23  0556 05/30/23  1339   AST 19 25   ALT 15 10   ALKPHOS 49 41   BILITOTAL 1.0 0.9         ASSESSMENT/PLAN:  Failure to thrive,  Hospice care patient.  Patient is extremely frail.  Plan:   Continue comfort care per [facility]  Continue comfort medications as ordered  Continue mirtazapine 15 mg at bedtime for sleep    Acute on chronic HFpEF (LVEF 60-65%),  Left pleural effusion, s/p thoracentesis on June 6, 2023,  Acute hypoxic respiratory failure,  Aspiration pneumonia.  Currently on hospice care and no longer on medical therapy.  She is on [place] oxygen at 2 L/min.  Plan:  Continue supplemental oxygen  Continue comfort focused care  Continue DuoNeb PRN    DRAGAN,  CKD stage IV.  Currently on hospice care.  Plan:  No need for monitoring renal function     PAF with RVR.  Heart rate in the range of .  Currently on hospice care and no longer on anticoagulation.  Plan:  Continue carvedilol 6.25 mg twice daily for comfort     Essential hypertension.  Blood pressure is fairly controlled.  Plan:  Continue carvedilol 6.25 mg twice daily as above  No need for monitoring blood pressure      Chronic anemia,  MGUS.  Baseline hemoglobin 9-10.  Plan:  No need for monitoring hemoglobin due to Comfort Care status     Rheumatoid arthritis.  Chronic.  Plan:  Continue pain management with topical diclofenac, morphine, and acetaminophen     Hypothyroidism.  Currently on hospice care and no longer on levothyroxine.  Plan:  No indication to monitor TSH     Cognitive impairment.  On today's examination, patient is oriented  times x1-2.  Plan:  Continue standard delirium precautions        Orders written by provider at facility:  None.          Disclaimer: This note may contain text created using speech-recognition software and may contain unintended word substitutions.      Electronically signed by:  Reinaldo Bashir MD                          Sincerely,        Reinaldo Bashir MD

## 2023-06-21 NOTE — PROGRESS NOTES
[facility] GERIATRICS DISCHARGE SUMMARY  PATIENT'S NAME: Ghislaine Walls  YOB: 1935  MEDICAL RECORD NUMBER:  [id number]  [id number]    PRIMARY CARE PROVIDER AND CLINIC RESPONSIBLE AFTER TRANSFER:   Laurie Conteh MD, [address] / [place] [address]    [facility] Provider     Transferring providers:   Recent Hospitalization/ED:  [facility] Hospital stay 05/30/2023 to 06/15/2023  Date of SNF Admission: 06/15/2023  Date of SNF (anticipated) Discharge: June 22, 2023  Discharged to: hospice facility  Cognitive Scores: not completed  Physical Function: Wheelchair dependent  DME: No new DME needed    CODE STATUS/ADVANCE DIRECTIVES DISCUSSION:  DNR/DNI   ALLERGIES: Oxybutynin and Seasonal allergies    HPI  This is an 87 y.o. female with a PMHx of HTN, CHF, paroxsymal A-fib, HLD, CKD4, hypothyroidism, gout, and RA who was admitted to the hospital on 5/30/23 for increased weakness. She experienced a very complicated hospital stay with hypoxic respiratory failure secondary to aspiration pneumonia, Afib with RVR, CHF exacerbation with volume overload requiring thoracentesis, and acute on chronic kidney injury. Ultimately, a goals of care discussion was had and the patient and her family decided to pursue comfort only measures. She discharged to [facility] with [facility].     Summary of nursing facility stay:     (R62.7) Failure to thrive in adult  (Z51.5) Hospice care patient  Continue [facility]   Focus on comfort only measures      CHF  (J90) Bilateral pleural effusion   (J44.9) Chronic obstructive pulmonary disease, unspecified COPD type (H)  (J69.0) Aspiration pneumonia due to regurgitated food, unspecified laterality, unspecified part of lung (H)  No longer pursuing tx. Per declining/hypotension, discontinued torsemide, RA     (I10) Hypertension goal BP (blood pressure) <  140/90  A-fib  Continue carvedilol 6.25 mg twice daily, -140, HR <100     Pain  Per hospice continue morphine SoluTab 2.5 mg every 2 hours as needed.  Also has Tylenol as needed     Anxiety  Depression  Continue mirtazapine 15 mg at bedtime, Ativan every 3 hours as needed     (K59.00) Constipation, unspecified constipation type  Continue senna S 1 tab twice daily as needed with suppository daily PRN    Discharge Medications:    Current Outpatient Medications   Medication Sig Dispense Refill     Abatacept (ORENCIA IV) Inject 500 mg into the vein every 30 days       ACE/ARB/ARNI NOT PRESCRIBED (INTENTIONAL) Please choose reason not prescribed from choices below.       acetaminophen (TYLENOL) 325 MG tablet Take 2 tablets (650 mg) by mouth every 6 hours as needed for mild pain 24 tablet 0     atropine 1 % ophthalmic solution Place 2 drops under the tongue every 4 hours as needed for secretions 2 mL 0     bisacodyl (DULCOLAX) 10 MG suppository Place 1 suppository (10 mg) rectally daily as needed for constipation 3 suppository 0     carboxymethylcellulose PF (REFRESH PLUS) 0.5 % ophthalmic solution Place 1 drop into both eyes every hour as needed for dry eyes 1 each 0     carvedilol (COREG) 6.25 MG tablet Take 1 tablet (6.25 mg) by mouth 2 times daily (with meals) 180 tablet 0     Darbepoetin Alonso-Albumin (ARANESP IJ) Inject 10 mcg as directed Monthly as needed       diclofenac (VOLTAREN) 1 % topical gel Apply 4 g topically 4 times daily 2 g 0     ipratropium - albuterol 0.5 mg/2.5 mg/3 mL (DUONEB) 0.5-2.5 (3) MG/3ML neb solution Take 1 vial (3 mLs) by nebulization every 4 hours as needed for wheezing or shortness of breath 54 mL 0     LORazepam (ATIVAN) 2 MG/ML (HIGH CONC) oral solution Take 0.25-0.5 mLs (0.5-1 mg) by mouth every 3 hours as needed for anxiety 12 mL 0     mirtazapine (REMERON) 15 MG tablet Take 1 tablet by mouth daily At Bedtime. 90 tablet 3     morphine 2.5 MG solu-tab Take 2.5 mg by mouth every 2  "hours as needed for shortness of breath or moderate to severe pain       ondansetron (ZOFRAN-ODT) 4 MG ODT tab Take 4 mg by mouth every 8 hours as needed for nausea       prochlorperazine (COMPAZINE) 5 MG tablet Take 1 tablet (5 mg) by mouth every 6 hours as needed for vomiting 6 tablet 0     senna-docusate (SENOKOT-S/PERICOLACE) 8.6-50 MG tablet Take 1 tablet by mouth 2 times daily as needed for constipation 6 tablet 0     Controlled medications:   Medications per hospice     Past Medical History:   Past Medical History:   Diagnosis Date     Atrial fibrillation (H) new 10/19     [place] (hard of hearing)     wears hearing aids     Hyperlipidaemia      Hyperlipidaemia LDL goal < 130      Hypertension      Hypothyroid      PAD (peripheral artery disease) (H)      Renal insufficiency, mild      Uncontrolled hypertension 10/14/2019     Physical Exam:   Vitals: /77   Pulse 92   Temp 97.6  F (36.4  C)   Resp 18   Ht 1.549 m (5' 1\")   LMP  (LMP Unknown)   SpO2 92%   BMI 21.92 kg/m    BMI: Body mass index is 21.92 kg/m .  GENERAL APPEARANCE:  appears ill, frail, tired, denies pain  RESP:  RA  CV:  peripheral edema 1+ in BLE, irregular rhythm A-fib  PSYCH: Alert and oriented x1/2    SNF labs: Patient is on hospice/palliative care and labs are not recommended    DISCHARGE PLAN:    Follow up labs: No labs orders/due    Medical Follow Up:      Hospice    [facility] scheduled appointments:       Discharge Services: No therapy or home care recommended.     Discharge Instructions Verbalized to Patient at Discharge:     None    TOTAL DISCHARGE TIME:   Less than or equal to 30 minutes  Electronically signed by:  Eric Wills NP               "

## 2023-07-08 NOTE — TELEPHONE ENCOUNTER
Dr. Conteh,    Please update quantity and refills.  Patient seen by you 6/7/21 Virtual.    Refill as appropriate.    Concepción Nowak RN  [facility]             ICC VISIT NOTE       Subjective     Radha is a 58 year old female and is being seen in our office for   Chief Complaint   Patient presents with   • Office Visit   • Congestion     X 3 days. Exposed to COVID   • Sore Throat         58-year-old female with no significant medical condition presented with nasal congestion for 3 days, she got exposure to COVID from one of her daughter, who was tested positive, patient has no fever no chills no nauseous no vomiting no diarrhea, nasal congestions and scratchy throat is the main symptoms, patient want to be checked for Covid.        MEDICATIONS:  Current Outpatient Medications   Medication Sig   • albuterol 108 (90 Base) MCG/ACT inhaler Inhale 2 puffs into the lungs every 4 hours as needed for Wheezing.   • benzonatate (TESSALON PERLES) 200 MG capsule Take 1 capsule by mouth 3 times daily as needed for Cough.     No current facility-administered medications for this visit.       ALLERGIES:  ALLERGIES:  No Known Allergies    PAST MEDICAL HISTORY:  History reviewed. No pertinent past medical history.    PAST SURGICAL HISTORY:  Past Surgical History:   Procedure Laterality Date   •  delivery+postpartum care     • Removal gallbladder         FAMILY HISTORY:  Family History   Problem Relation Age of Onset   • Diabetes Mother    • COPD Father        SOCIAL HISTORY:  Social History     Tobacco Use   • Smoking status: Never   • Smokeless tobacco: Never   Vaping Use   • Vaping Use: never used   Substance Use Topics   • Alcohol use: Yes     Alcohol/week: 3.0 standard drinks of alcohol     Types: 3 Glasses of wine per week     Comment: daily   • Drug use: Never         Patient's medications, allergies, past medical, surgical, and social history  were reviewed and updated as appropriate.    REVIEW OF SYSTEMS  Review of Systems   HENT: Positive for rhinorrhea.    All other systems reviewed and are negative.      Vitals:    23 1553   BP: (!) 157/91   Pulse: 63   Resp: 18    Temp: 97.8 °F (36.6 °C)   TempSrc: Temporal   SpO2: 99%        POINT OF CARE TEST RESULTS    Results for orders placed or performed in visit on 07/08/23   POCT SARS-COV-2 ANTIGEN/FLU ANTIGEN PANEL   Result Value Ref Range    POCT SARS-COV-2 ANTIGEN Not Detected Not Detected    Rapid Influenza A Ag Negative Negative, Indeterminate    Rapid Influenza B Ag Negative Negative, Indeterminate           Objective     Physical Exam  Constitutional:       Appearance: Normal appearance.   HENT:      Right Ear: Tympanic membrane normal.      Left Ear: Tympanic membrane normal.      Nose: Nose normal.      Mouth/Throat:      Mouth: Mucous membranes are moist.      Pharynx: Posterior oropharyngeal erythema present.      Neck: Normal range of motion and neck supple.   Eyes:      Extraocular Movements: Extraocular movements intact.      Pupils: Pupils are equal, round, and reactive to light.   Cardiovascular:      Rate and Rhythm: Normal rate and regular rhythm.      Pulses: Normal pulses.      Heart sounds: Normal heart sounds.   Pulmonary:      Effort: Pulmonary effort is normal.      Breath sounds: Normal breath sounds.   Abdominal:      Palpations: Abdomen is soft.   Musculoskeletal:         General: Normal range of motion.   Skin:     General: Skin is warm and dry.      Capillary Refill: Capillary refill takes less than 2 seconds.   Neurological:      General: No focal deficit present.      Mental Status: She is alert and oriented to person, place, and time.           Assessment / Plan:  Congestion of nasal sinus  (primary encounter diagnosis)     MDM    58-year-old female with no significant medical condition presented with nasal congestion for 3 days, she got exposure to COVID from one of her daughter, who was tested positive, patient has no fever no chills no nauseous no vomiting no diarrhea, nasal congestions and scratchy throat is the main symptoms, patient want to be checked for Covid.    Examination see above    Past  medical history social history drug allergies surgical history obtained reviewed    Rapid COVID  Negative  Flu negative      Suggest push fluids extra rest soup and broth salt water gargling    Follow-up with PCP in 3 days if not better or return to IC    Ricki Gooden MD  7/8/2023

## 2023-11-02 NOTE — TELEPHONE ENCOUNTER
Faxed back (see below for info) and sent to [facility]    
Our goal is to have forms completed within 72 hours, however some forms may require a visit or additional information.    What clinic location was the form placed at [facility] or [facility]     Who is the form from?   Where did the form come from? Faxed to clinic   The form was placed in the inbox of Mahsa Soto, DO      Please fax to [phone]  Phone number: [phone]    Additional comments: [facility]- SN, INR 1.3 (begin date 10/29/2019)    Call take on 10/31/2019 at 2:21 PM by Lindsey Santiago                                
show

## 2023-11-23 NOTE — PROGRESS NOTES
Cardiology Miscellaneous Note    Based on telemetry review, the patient has maintained sinus rhythm over the last 24 hours.  EKG this morning confirms that the patient is in sinus rhythm.  She has been commenced on oral metoprolol tartrate and an amiodarone load.  At this juncture, I would recommend continuing the oral amiodarone 400 mg daily for a total of 7 days then 200 mg daily until cardiology follow-up.  I would discontinue the oral metoprolol on hospital dismissal.  We will sign off at this time and arrange outpatient cardiology follow-up in 4 to 6 weeks time.  Though do not hesitate to contact us with questions.    Sign off recommendations    Continue oral metoprolol tartrate 100 mg twice daily.  Stop metoprolol on hospital dismissal.  Amiodarone 400 mg daily for 7 days then 200 mg daily thereafter    Continue Eliquis for stroke prevention    Closely observe daily weights which are highly variable and if trending upward would recommence oral diuretic therapy    We will arrange cardiology follow-up in 4 to 6 weeks to discuss further management of her AF and if long-term amiodarone would be warranted.    Please feel free to contact the cardiology team with any questions or concerns.   20

## 2024-06-17 PROBLEM — Z71.89 ADVANCED DIRECTIVES, COUNSELING/DISCUSSION: Status: RESOLVED | Noted: 2021-03-09 | Resolved: 2024-06-17

## 2024-12-30 NOTE — PROVIDER NOTIFICATION
MD Notification    Notified Person: MD    Notified Person Name: Dr. Dorado    Notification Date/Time: 5/31 at 20:03    Notification Interaction: AMCOM Pager    Purpose of Notification: FYI: /77, asymptomatic. Pt is on Bipap.     Orders Received:     Comments:   0 = independent

## 2025-06-16 NOTE — CONSULTS
Problem: Infection  Goal: Absence of Infection Signs and Symptoms  Outcome: Progressing     Problem: Adult Inpatient Plan of Care  Goal: Plan of Care Review  Outcome: Progressing     Problem: Adult Inpatient Plan of Care  Goal: Patient-Specific Goal (Individualized)  Outcome: Progressing     Problem: Adult Inpatient Plan of Care  Goal: Absence of Hospital-Acquired Illness or Injury  Outcome: Progressing     Problem: Adult Inpatient Plan of Care  Goal: Optimal Comfort and Wellbeing  Outcome: Progressing      Care Transition Initial Assessment - SW     Met with: Patient    Active Problems:    Hyperlipidemia LDL goal <130    Hypertension goal BP (blood pressure) < 140/90    [place] (hard of hearing)    Other specified hypothyroidism    CKD (chronic kidney disease) stage 3, GFR 30-59 ml/min (H)    Lymphedema    Anemia of chronic renal failure, stage 3 (moderate) (H)    CHF (congestive heart failure) (H) possible     Physical deconditioning    Generalized weakness       DATA  Lives With: alone   Living Arrangements: apartment  Quality of Family Relationships: involved, supportive     Identified issues/concerns regarding health management:      Quality of Family Relationships: involved, supportive  Transportation Anticipated: family or friend will provide    Per social work consult for discharge planning and tcu placement on discharge.  Patient was admitted on 9-30-19 with CHF exacerbation.  The tentative date of discharge is yet to be determined.  Reviewed chart and spoke with patient regarding discharge plans.  Per patient report, patient lives alone in an apartment.  Patient uses a straight cane at baseline.  Patient is independent at baseline.  Reviewed the therapy discharge recommendations of tcu placement on discharge and patient is in agreement.  Patient is asking for a referral to be sent to [facility].  Referral sent, via discharge on the double, to check bed availability.      ASSESSMENT  Cognitive Status:  awake and alert  Concerns to be addressed: discharge planning, tcu placement on discharge.     PLAN  Financial costs for the patient includes N/A.  Patient given options and choices for discharge TCU choices.  Patient/family is agreeable to the plan?  Yes  Transportation/person available to transport on day of discharge  is TBD and have they been notified/set up TBD  Patient Goals and Preferences: TCU on discharge.  Patient anticipates discharging to:  TCU.    Will confirm a bed, continue to follow, and assist with a  safe discharge plan.      Di Alvarez, [facility], MSW  [phone]

## 2025-06-28 NOTE — LETTER
[facility] [address]  [place] [address]  Phone: [phone]  Fax: [phone]      2019       Re: Ghislaine Walls - 1935    Ghislaine Walls returns for vascular follow-up.  This 83-year-old independent woman has been followed for bilateral carotid stenosis.  She has been followed for a moderate left carotid stenosis with a ICA PSV= 174 cm/s on her exam 1 year ago with only mild changes of the right.     No history of PAD with excellent palpable dorsalis pedis pulses in the past.     She had several severe episodes of gout in her hands and feet over the past year.  Presently on allopurinol with no recurrent symptoms.  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      PMH: Medications: Allopurinol,Uloric, Norvasc, levothyroxine, lisinopril, Lopressor.            Medical: Hypertension, hypothyroidism, gout, carotid disease,CKI            Non-smoker.  Ambulatory.  Lives independently.      2019 laboratory: SCr= 1.42 (chronic)     Exam: Alert and appropriate.  Normal affect.             Blood pressure 195/69 (home 140-150) pulse 60 regular             HEENT= normal             Cardiovascular= regular rate             Chest= clear             Extremities= chronic ankle and calf edema.  No acute gouty changes.             Chronic arthritic changes to both hand knuckles.             Pedal pulses are difficult to palpate due to swelling but triphasic             Bedside Doppler to both dorsalis pedis arteries     Carotid duplex today reveals stable less than 20% stenosis of the right carotid bulb.  Also stable moderate stenosis of the left proximal internal carotid artery.  Peak systolic velocity is 177 cm/sec which is essentially unchanged over the last year.    Impression: #1.  Stable moderate asymptomatic left carotid bifurcation disease with mild changes in the right.  Aware of the importance of good risk factor control.  Repeat  carotid duplex ultrasound 1 year.                        #2.  No evidence of significant PAD with excellent Doppler signals in both dorsalis pedis arteries.       Niko Parker MD    her mother; Diabetes in her brother, father, and paternal grandmother; Lung Cancer in her mother; Pancreatic Cancer in her brother; Unknown in her paternal grandmother.  She reports that she has never smoked. She has never used smokeless tobacco. She reports that she does not drink alcohol and does not use drugs.    Medications     Previous Medications    AMLODIPINE (NORVASC) 5 MG TABLET    TAKE 1 TABLET DAILY    ASPIRIN (HM ASPIRIN) 81 MG CHEWABLE TABLET    TAKE ONE (1) TABLET BY MOUTH DAILY    BD PEN NEEDLE MICRO U/F 32G X 6 MM MISC    USE WITH LANTUS DAILY    BRIMONIDINE (ALPHAGAN) 0.2 % OPHTHALMIC SOLUTION    Place 1 drop into both eyes at bedtime    BUPROPION (WELLBUTRIN XL) 150 MG EXTENDED RELEASE TABLET    Take 1 tablet by mouth every morning    CALCIUM CARB-CHOLECALCIFEROL (CALCIUM 1000 + D) 1000-800 MG-UNIT TABS    Take 1,000 mg by mouth daily    CONTINUOUS BLOOD GLUC  (FREESTYLE ANT 2 READER) G[facility]    1 each by Does not apply route daily    CONTINUOUS BLOOD GLUC SENSOR (FREESTYLE ANT 2 SENSOR) [facility]    1 Device by Does not apply route every 14 days    CONTINUOUS BLOOD GLUC SENSOR (FREESTYLE ANT SENSOR SYSTEM) MISC    1 box by Does not apply route 2 times daily    CYANOCOBALAMIN ([employer] VITAMIN B12) 1000 MCG TABLET    Take 1 tablet by mouth daily    DOCUSATE SODIUM (COLACE) 100 MG CAPSULE    Take 1 capsule by mouth daily as needed for Constipation    DULOXETINE (CYMBALTA) 20 MG EXTENDED RELEASE CAPSULE        ELIQUIS 5 MG TABS TABLET    TAKE 1 TABLET BY MOUTH TWICE DAILY    EMPAGLIFLOZIN (JARDIANCE) 25 MG TABLET    Take 1 tablet by mouth daily    GLIPIZIDE (GLUCOTROL) 10 MG TABLET    TAKE 1 TABLET BY MOUTH EVERY DAY    HANDICAP PLACARD MISC    by Does not apply route 08/09/22    INSULIN LISPRO, 1 UNIT DIAL, (HUMALOG KWIKPEN) 100 UNIT/ML SOPN    Use on a sliding scale 3 times a day with meals. Maximum dose 30 units per day.    LANTUS SOLOSTAR 100 UNIT/ML INJECTION PEN    Inject 40 Units into the  skin nightly    LATANOPROST (XALATAN) 0.005 % OPHTHALMIC SOLUTION    1 drop nightly    LEVOTHYROXINE (SYNTHROID) 100 MCG TABLET    Take 1 tablet daily    LISINOPRIL (PRINIVIL;ZESTRIL) 10 MG TABLET    TAKE 1 TABLET BY MOUTH EVERY DAY    METOPROLOL TARTRATE (LOPRESSOR) 25 MG TABLET    TAKE 1 TABLET TWICE A DAY    MULTIPLE VITAMINS-MINERALS (VITEYES COMPLETE PO)    Take by mouth    ROSUVASTATIN (CRESTOR) 40 MG TABLET    TAKE 1 TABLET BY MOUTH EVERY DAY    SEMAGLUTIDE,0.25 OR 0.5MG/DOS, 2 MG/3ML SOPN    Inject 0.25 mg into the skin every 7 days    TRAZODONE (DESYREL) 50 MG TABLET    TAKE ONE (1) TABLET BY MOUTH NIGHTLY       Allergies     She is allergic to environmental/seasonal.    Physical Exam     INITIAL VITALS: BP: 135/65, Temp: 97.8 °F (36.6 °C), Pulse: 81, Respirations: 16, SpO2: 97 %   Physical Exam  Vitals reviewed.   HENT:      Head: Normocephalic.   Eyes:      Pupils: Pupils are equal, round, and reactive to light.   Cardiovascular:      Rate and Rhythm: Normal rate.   Pulmonary:      Effort: Pulmonary effort is normal.   Abdominal:      Palpations: Abdomen is soft.      Tenderness: There is no abdominal tenderness.   Genitourinary:     Comments: Left-sided vulvar lesion excision, scabbed over.  Nonthrombosed external hemorrhoids, no anal fissures or trauma  Musculoskeletal:         General: Normal range of motion.      Cervical back: Normal range of motion.   Skin:     General: Skin is warm and dry.   Neurological:      General: No focal deficit present.      Mental Status: She is alert and oriented to person, place, and time.   Psychiatric:         Mood and Affect: Mood normal.                    Karlos Ochoa MD  06/28/25 [id number]

## 2025-06-30 NOTE — ANESTHESIA POSTPROCEDURE EVALUATION
Overall voiding adequately with nocturia usually once nightly.  PSA and RAHUL not suspicious for malignancy       
Patient: Ghislaine Walls    Procedure(s):  bone marrow biopsy    Diagnosis:Anemia [D64.9]  Diagnosis Additional Information: No value filed.    Anesthesia Type:  MAC    Note:  Anesthesia Post Evaluation    Patient location during evaluation: PACU  Patient participation: Able to fully participate in evaluation  Level of consciousness: awake  Pain management: adequate  Airway patency: patent  Cardiovascular status: hemodynamically stable  Respiratory status: acceptable and unassisted  Hydration status: acceptable  PONV: none             Last vitals:  Vitals:    07/31/20 1440 07/31/20 1450 07/31/20 1513   BP: (!) 185/67 (!) 181/68 (!) 179/79   Pulse: 59 59    Resp: 23 13 16   Temp:   36.6  C (97.9  F)   SpO2: 98% 97% 99%         Electronically Signed By: Eric Blanco MD  July 31, 2020  5:26 PM  
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

## (undated) DEVICE — SPECIMEN CONTAINER 60MLW/10% FORMALIN 59601

## (undated) DEVICE — PEN MARKING SKIN